# Patient Record
Sex: MALE | Race: BLACK OR AFRICAN AMERICAN | NOT HISPANIC OR LATINO | Employment: OTHER | URBAN - METROPOLITAN AREA
[De-identification: names, ages, dates, MRNs, and addresses within clinical notes are randomized per-mention and may not be internally consistent; named-entity substitution may affect disease eponyms.]

---

## 2017-03-01 ENCOUNTER — HOSPITAL ENCOUNTER (EMERGENCY)
Facility: HOSPITAL | Age: 52
Discharge: HOME | End: 2017-03-01
Attending: EMERGENCY MEDICINE
Payer: MEDICAID

## 2017-03-01 VITALS
WEIGHT: 240 LBS | OXYGEN SATURATION: 95 % | TEMPERATURE: 99 F | RESPIRATION RATE: 13 BRPM | DIASTOLIC BLOOD PRESSURE: 62 MMHG | BODY MASS INDEX: 36.37 KG/M2 | HEIGHT: 68 IN | HEART RATE: 75 BPM | SYSTOLIC BLOOD PRESSURE: 111 MMHG

## 2017-03-01 DIAGNOSIS — F41.9 ANXIETY: ICD-10-CM

## 2017-03-01 DIAGNOSIS — K43.9 VENTRAL HERNIA WITHOUT OBSTRUCTION OR GANGRENE: ICD-10-CM

## 2017-03-01 DIAGNOSIS — J44.1 COPD WITH ACUTE EXACERBATION: Primary | ICD-10-CM

## 2017-03-01 LAB
ALBUMIN SERPL BCP-MCNC: 3 G/DL
ALP SERPL-CCNC: 81 U/L
ALT SERPL W/O P-5'-P-CCNC: 37 U/L
ANION GAP SERPL CALC-SCNC: 5 MMOL/L
AST SERPL-CCNC: 40 U/L
BASOPHILS # BLD AUTO: 0.02 K/UL
BASOPHILS NFR BLD: 0.3 %
BILIRUB SERPL-MCNC: 0.6 MG/DL
BNP SERPL-MCNC: 27 PG/ML
BUN SERPL-MCNC: 11 MG/DL
CALCIUM SERPL-MCNC: 8.8 MG/DL
CHLORIDE SERPL-SCNC: 103 MMOL/L
CO2 SERPL-SCNC: 28 MMOL/L
CREAT SERPL-MCNC: 0.9 MG/DL
DIFFERENTIAL METHOD: ABNORMAL
EOSINOPHIL # BLD AUTO: 0.2 K/UL
EOSINOPHIL NFR BLD: 2.1 %
ERYTHROCYTE [DISTWIDTH] IN BLOOD BY AUTOMATED COUNT: 12.1 %
EST. GFR  (AFRICAN AMERICAN): >60 ML/MIN/1.73 M^2
EST. GFR  (NON AFRICAN AMERICAN): >60 ML/MIN/1.73 M^2
GLUCOSE SERPL-MCNC: 123 MG/DL
HCT VFR BLD AUTO: 38.7 %
HGB BLD-MCNC: 13.2 G/DL
INR PPP: 1
LACTATE SERPL-SCNC: 0.8 MMOL/L
LYMPHOCYTES # BLD AUTO: 1.6 K/UL
LYMPHOCYTES NFR BLD: 22.2 %
MCH RBC QN AUTO: 31.4 PG
MCHC RBC AUTO-ENTMCNC: 34.1 %
MCV RBC AUTO: 92 FL
MONOCYTES # BLD AUTO: 0.5 K/UL
MONOCYTES NFR BLD: 6.8 %
NEUTROPHILS # BLD AUTO: 5 K/UL
NEUTROPHILS NFR BLD: 68.5 %
PLATELET # BLD AUTO: 206 K/UL
PMV BLD AUTO: 10.5 FL
POTASSIUM SERPL-SCNC: 4.1 MMOL/L
PROT SERPL-MCNC: 7.2 G/DL
PROTHROMBIN TIME: 10.4 SEC
RBC # BLD AUTO: 4.21 M/UL
SODIUM SERPL-SCNC: 136 MMOL/L
TROPONIN I SERPL DL<=0.01 NG/ML-MCNC: <0.006 NG/ML
WBC # BLD AUTO: 7.3 K/UL

## 2017-03-01 PROCEDURE — 84484 ASSAY OF TROPONIN QUANT: CPT

## 2017-03-01 PROCEDURE — 99284 EMERGENCY DEPT VISIT MOD MDM: CPT | Mod: 25

## 2017-03-01 PROCEDURE — 96366 THER/PROPH/DIAG IV INF ADDON: CPT

## 2017-03-01 PROCEDURE — 87040 BLOOD CULTURE FOR BACTERIA: CPT

## 2017-03-01 PROCEDURE — 99291 CRITICAL CARE FIRST HOUR: CPT | Mod: ,,, | Performed by: EMERGENCY MEDICINE

## 2017-03-01 PROCEDURE — 25000242 PHARM REV CODE 250 ALT 637 W/ HCPCS: Performed by: EMERGENCY MEDICINE

## 2017-03-01 PROCEDURE — 25000003 PHARM REV CODE 250: Performed by: EMERGENCY MEDICINE

## 2017-03-01 PROCEDURE — 83880 ASSAY OF NATRIURETIC PEPTIDE: CPT

## 2017-03-01 PROCEDURE — 63600175 PHARM REV CODE 636 W HCPCS: Performed by: EMERGENCY MEDICINE

## 2017-03-01 PROCEDURE — 96375 TX/PRO/DX INJ NEW DRUG ADDON: CPT

## 2017-03-01 PROCEDURE — 27000221 HC OXYGEN, UP TO 24 HOURS

## 2017-03-01 PROCEDURE — 83605 ASSAY OF LACTIC ACID: CPT

## 2017-03-01 PROCEDURE — 85025 COMPLETE CBC W/AUTO DIFF WBC: CPT

## 2017-03-01 PROCEDURE — 85610 PROTHROMBIN TIME: CPT

## 2017-03-01 PROCEDURE — 80053 COMPREHEN METABOLIC PANEL: CPT

## 2017-03-01 PROCEDURE — 94640 AIRWAY INHALATION TREATMENT: CPT

## 2017-03-01 PROCEDURE — 96365 THER/PROPH/DIAG IV INF INIT: CPT

## 2017-03-01 RX ORDER — DOXYCYCLINE HYCLATE 100 MG
100 TABLET ORAL
Status: COMPLETED | OUTPATIENT
Start: 2017-03-01 | End: 2017-03-01

## 2017-03-01 RX ORDER — TIOTROPIUM BROMIDE 18 UG/1
18 CAPSULE ORAL; RESPIRATORY (INHALATION) DAILY
Qty: 30 CAPSULE | Refills: 0 | Status: SHIPPED | OUTPATIENT
Start: 2017-03-01 | End: 2017-07-07 | Stop reason: SDUPTHER

## 2017-03-01 RX ORDER — IPRATROPIUM BROMIDE AND ALBUTEROL SULFATE 2.5; .5 MG/3ML; MG/3ML
3 SOLUTION RESPIRATORY (INHALATION)
Status: COMPLETED | OUTPATIENT
Start: 2017-03-01 | End: 2017-03-01

## 2017-03-01 RX ORDER — PREDNISONE 20 MG/1
60 TABLET ORAL DAILY
Qty: 12 TABLET | Refills: 0 | Status: SHIPPED | OUTPATIENT
Start: 2017-03-01 | End: 2017-03-05

## 2017-03-01 RX ORDER — ONDANSETRON 2 MG/ML
4 INJECTION INTRAMUSCULAR; INTRAVENOUS
Status: COMPLETED | OUTPATIENT
Start: 2017-03-01 | End: 2017-03-01

## 2017-03-01 RX ORDER — PREDNISONE 20 MG/1
40 TABLET ORAL
Status: COMPLETED | OUTPATIENT
Start: 2017-03-01 | End: 2017-03-01

## 2017-03-01 RX ORDER — FENTANYL CITRATE 50 UG/ML
50 INJECTION, SOLUTION INTRAMUSCULAR; INTRAVENOUS
Status: DISCONTINUED | OUTPATIENT
Start: 2017-03-01 | End: 2017-03-01

## 2017-03-01 RX ORDER — PROMETHAZINE HYDROCHLORIDE 25 MG/1
25 TABLET ORAL EVERY 6 HOURS PRN
Qty: 21 TABLET | Refills: 0 | Status: SHIPPED | OUTPATIENT
Start: 2017-03-01 | End: 2017-03-01

## 2017-03-01 RX ORDER — PROMETHAZINE HYDROCHLORIDE 25 MG/1
25 TABLET ORAL EVERY 6 HOURS PRN
Qty: 21 TABLET | Refills: 0 | Status: SHIPPED | OUTPATIENT
Start: 2017-03-01 | End: 2017-08-27 | Stop reason: CLARIF

## 2017-03-01 RX ORDER — MAGNESIUM SULFATE HEPTAHYDRATE 40 MG/ML
2 INJECTION, SOLUTION INTRAVENOUS
Status: COMPLETED | OUTPATIENT
Start: 2017-03-01 | End: 2017-03-01

## 2017-03-01 RX ORDER — CLONAZEPAM 0.5 MG/1
0.5 TABLET ORAL 2 TIMES DAILY PRN
Qty: 10 TABLET | Refills: 0 | Status: SHIPPED | OUTPATIENT
Start: 2017-03-01 | End: 2017-03-16

## 2017-03-01 RX ORDER — ALBUTEROL SULFATE 90 UG/1
1-2 AEROSOL, METERED RESPIRATORY (INHALATION) EVERY 4 HOURS PRN
Qty: 1 INHALER | Refills: 1 | Status: SHIPPED | OUTPATIENT
Start: 2017-03-01 | End: 2017-03-31

## 2017-03-01 RX ORDER — METHYLPREDNISOLONE SOD SUCC 125 MG
125 VIAL (EA) INJECTION
Status: COMPLETED | OUTPATIENT
Start: 2017-03-01 | End: 2017-03-01

## 2017-03-01 RX ORDER — ONDANSETRON 2 MG/ML
4 INJECTION INTRAMUSCULAR; INTRAVENOUS
Status: DISCONTINUED | OUTPATIENT
Start: 2017-03-01 | End: 2017-03-01

## 2017-03-01 RX ADMIN — PREDNISONE 40 MG: 20 TABLET ORAL at 04:03

## 2017-03-01 RX ADMIN — IPRATROPIUM BROMIDE AND ALBUTEROL SULFATE 3 ML: .5; 3 SOLUTION RESPIRATORY (INHALATION) at 12:03

## 2017-03-01 RX ADMIN — MAGNESIUM SULFATE IN WATER 2 G: 40 INJECTION, SOLUTION INTRAVENOUS at 12:03

## 2017-03-01 RX ADMIN — METHYLPREDNISOLONE SODIUM SUCCINATE 125 MG: 125 INJECTION, POWDER, FOR SOLUTION INTRAMUSCULAR; INTRAVENOUS at 12:03

## 2017-03-01 RX ADMIN — DOXYCYCLINE HYCLATE 100 MG: 100 TABLET, COATED ORAL at 04:03

## 2017-03-01 RX ADMIN — ONDANSETRON 4 MG: 2 INJECTION INTRAMUSCULAR; INTRAVENOUS at 12:03
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
## 2017-03-01 NOTE — DISCHARGE INSTRUCTIONS
COPD Flare    You have had a flare-up of your COPD.  COPD, or chronic obstructive pulmonary disease, is a common lung disease. It causes your airways to become irritated and narrower. This makes it harder for you to breathe. Emphysema and chronic bronchitis are both types of COPD. This is a chronic condition, which means you always have it. Sometimes it gets worse. When this happens, it is called a flare-up.  Symptoms of COPD  People with COPD may have symptoms most of the time. In a flare-up, your symptoms get worse. These symptoms may mean you are having a flare-up:  · Shortness of breath, shallow or rapid breathing, or wheezing that gets worse  · Lung infection  · Cough that gets worse  · More mucus, thicker mucus or mucus of a different color  · Tiredness, decreased energy, or trouble doing your usual activities  · Fever  · Chest tightness  · Your symptoms dont get better even when you use your usual medicines, inhalers, and nebulizer  · Trouble talking  · You feel confused  Causes of flare-ups  Unfortunately, a flare-up can happen even though you did everything right, and you followed your doctors instructions. Some causes of flare-ups are:  · Smoking or secondhand smoke  · Colds, the flu, or respiratory infections  · Air pollution  · Sudden change in the weather  · Dust, irritating chemicals, or strong fumes  · Not taking your medicines as prescribed  Home care  Here are some things you can do at home to treat a flare-up:  · Try not to panic. This makes it harder to breathe, and keeps you from doing the right things.  · Dont smoke or be around others who are smoking.  · Try to drink more fluids than usual during a flare-up, unless your doctor has told you not to because of heart and kidney problems. More fluids can help loosen the mucus.  · Use your inhalers and nebulizer, if you have one, as you have been told to.  · If you were given antibiotics, take them until they are used up or your doctor tells you  to stop. Its important to finish the antibiotics, even though you feel better. This will make sure the infection has cleared.  · If you were given prednisone or another steroid, finish it even if you feel better.  Preventing a flare-up  Even though flare-ups happen, the best way to treat one is to prevent it before it starts. Here are some pointers:  · Dont smoke or be around others who are smoking.  · Take your medicines as you have been told.  · Talk with your doctor about getting a flu shot every year. Also find out if you need a pneumonia shot.  · If there is a weather advisory warning to stay indoors, try to stay inside when possible.  · Try to eat healthy and get plenty of sleep.  · Try to avoid things that usually set you off, like dust, chemical fumes, hairsprays, or strong perfumes.  Follow-up care  Follow up with your healthcare provider, or as advised.  If a culture was done, you will be told if your treatment needs to be changed. You can call as directed for the results.  If X-rays were done, you will be notified of any new findings that may affect your care.  Call 911  Call 911 if any of these occur:  · You have trouble breathing  · You feel confused or its difficult to wake you up  · You faint or lose consciousness  · You have a rapid heart rate  · You have new pain in your chest, arm, shoulder, neck or upper back  When to seek medical advice  Call your healthcare provider right away if any of these occur:  · Wheezing or shortness of breath gets worse  · You need to use your inhalers more often than usual without relief  · Fever of 100.4°F (38ºC) or higher, or as directed by your healthcare provider  · Coughing up lots of dark-colored or bloody mucus (sputum)  · Chest pain with each breath  · You do not start to get better within 24 hours  · Swelling of your ankles gets worse  · Dizziness or weakness  Date Last Reviewed: 9/1/2016  © 2267-1562 [employer]. [address] [employer],  [place] [address]. All rights reserved. This information is not intended as a substitute for professional medical care. Always follow your healthcare professional's instructions.

## 2017-03-01 NOTE — ED AVS SNAPSHOT
[facility]  [address] [facility] [id number]               Ming Harrington   3/1/2017 11:49 AM   ED    Description:  Male : 1965   Department:  [facility]           Your Care was Coordinated By:     Provider Role From To    Reza Oreilly MD Attending Provider 17 1153 --      Reason for Visit     Asthma           Diagnoses this Visit        Comments    COPD with acute exacerbation    -  Primary     Ventral hernia without obstruction or gangrene         Anxiety           ED Disposition     None           To Do List           Follow-up Information     Follow up with Primary MD. Schedule an appointment as soon as possible for a visit in 3 days.        Follow up with [facility]. Schedule an appointment as soon as possible for a visit in 1 week.    Specialty:  Surgery    Why:  For further consultation and management of ventral hernia    Contact information:    [address] [facility] [id number] [phone]       These Medications        Disp Refills Start End    clonazePAM (KLONOPIN) 0.5 MG tablet 10 tablet 0 3/1/2017     Take 1 tablet (0.5 mg total) by mouth 2 (two) times daily as needed (anxiety). - Oral    Pharmacy: [facility] [address] AT [facility] Ph #: [phone]       mometasone-formoterol (DULERA) 200-5 mcg/actuation inhaler 1 Inhaler 0 3/1/2017 3/31/2017    Inhale 2 puffs into the lungs 2 (two) times daily. - Inhalation    Pharmacy: [address] AT [facility] Ph #: [phone]       promethazine (PHENERGAN) 25 MG tablet 21 tablet 0 3/1/2017 3/11/2017    Take 1 tablet (25 mg total) by mouth every 6 (six) hours as needed for Nausea. - Oral    Pharmacy: [address] AT [facility] Ph #:  [phone]       tiotropium (SPIRIVA) 18 mcg inhalation capsule 30 capsule 0 3/1/2017     Inhale 1 capsule (18 mcg total) into the lungs once daily. - Inhalation    Pharmacy: [place] [address] AT [facility] Ph #: [phone]       albuterol 90 mcg/actuation inhaler 1 Inhaler 1 3/1/2017 3/31/2017    Inhale 1-2 puffs into the lungs every 4 (four) hours as needed for Wheezing or Shortness of Breath. - Inhalation    Pharmacy: [address] AT [facility] Ph #: [phone]       predniSONE (DELTASONE) 20 MG tablet 12 tablet 0 3/1/2017 3/5/2017    Take 3 tablets (60 mg total) by mouth once daily. - Oral    Pharmacy: [address] Ph #: [phone]         [facility]     [facility] On Call Nurse Care Line - 24/7 Assistance  Registered nurses in the [facility] On Call Center provide clinical advisement, health education, appointment booking, and other advisory services.  Call for this free service at [phone].             Medications           Message regarding Medications     Verify the changes and/or additions to your medication regime listed below are the same as discussed with your clinician today.  If any of these changes or additions are incorrect, please notify your healthcare provider.        START taking these NEW medications        Refills    predniSONE (DELTASONE) 20 MG tablet 0    Sig: Take 3 tablets (60 mg total) by mouth once daily.    Class: Print    Route: Oral      These medications were administered today        Dose Freq    albuterol-ipratropium 2.5mg-0.5mg/3mL nebulizer solution 3 mL 3 mL Every 5 min    Sig: Take 3 mLs by nebulization every 5 (five) minutes.    Class: Normal    Route: Nebulization    methylPREDNISolone sodium succinate injection 125 mg 125 mg ED 1 Time    Sig:  Inject 125 mg into the vein ED 1 Time.    Class: Normal    Route: Intravenous    magnesium sulfate 2g in water 50mL IVPB (premix) 2 g ED 1 Time    Sig: Inject 50 mLs (2 g total) into the vein ED 1 Time.    Class: Normal    Route: Intravenous    ondansetron injection 4 mg 4 mg ED 1 Time    Sig: Inject 4 mg into the vein ED 1 Time.    Class: Normal    Route: Intravenous    predniSONE tablet 40 mg 40 mg ED 1 Time    Sig: Take 2 tablets (40 mg total) by mouth ED 1 Time.    Class: Normal    Route: Oral    doxycycline tablet 100 mg 100 mg ED 1 Time    Sig: Take 1 tablet (100 mg total) by mouth ED 1 Time.    Class: Normal    Route: Oral           Verify that the below list of medications is an accurate representation of the medications you are currently taking.  If none reported, the list may be blank. If incorrect, please contact your healthcare provider. Carry this list with you in case of emergency.           Current Medications     acetaminophen (TYLENOL) 325 MG tablet Take 1 tablet (325 mg total) by mouth every 6 (six) hours as needed for Pain.    albuterol 90 mcg/actuation inhaler Inhale 1-2 puffs into the lungs every 4 (four) hours as needed for Wheezing or Shortness of Breath.    aspirin (ECOTRIN) 325 MG EC tablet Take 1 tablet (325 mg total) by mouth daily as needed for Pain.    clonazePAM (KLONOPIN) 0.5 MG tablet Take 1 tablet (0.5 mg total) by mouth 2 (two) times daily as needed (anxiety).    cloNIDine (CATAPRES) 0.3 MG tablet Take 1 tablet (0.3 mg total) by mouth 3 (three) times daily.    furosemide (LASIX) 40 MG tablet Take 1 tablet (40 mg total) by mouth 2 (two) times daily.    mometasone-formoterol (DULERA) 200-5 mcg/actuation inhaler Inhale 2 puffs into the lungs 2 (two) times daily.    ondansetron (ZOFRAN-ODT) 4 MG TbDL Take 1 tablet (4 mg total) by mouth every 8 (eight) hours as needed.    predniSONE (DELTASONE) 20 MG tablet Take 3 tablets (60 mg total) by mouth once daily.    promethazine (PHENERGAN) 25  MG tablet Take 1 tablet (25 mg total) by mouth every 6 (six) hours as needed for Nausea.    senna-docusate 8.6-50 mg (PERICOLACE) 8.6-50 mg per tablet Take 1 tablet by mouth 2 (two) times daily.    tiotropium (SPIRIVA) 18 mcg inhalation capsule Inhale 1 capsule (18 mcg total) into the lungs once daily.    tramadol (ULTRAM) 50 mg tablet Take 1 tablet (50 mg total) by mouth every 6 (six) hours as needed for Pain.           Clinical Reference Information           Your Vitals Were     BP                   111/62           Allergies as of 3/1/2017        Reactions    Compazine [Prochlorperazine Edisylate] Other (See Comments)    Hallucinations     Iodine And Iodide Containing Products Swelling    Facial swelling      Immunizations Administered on Date of Encounter - 3/1/2017     None      ED Micro, Lab, POCT     Start Ordered       Status Ordering Provider    03/01/17 1346 03/01/17 1346  Comprehensive metabolic panel  STAT      Final result     03/01/17 1207 03/01/17 1206  Lactic acid, plasma  Once      Final result     03/01/17 1207 03/01/17 1207  CBC auto differential  STAT      Final result     03/01/17 1207 03/01/17 1207    STAT,   Status:  Canceled      Canceled     03/01/17 1207 03/01/17 1207  Troponin I  STAT      Final result     03/01/17 1207 03/01/17 1207  Brain natriuretic peptide  STAT      Final result     03/01/17 1207 03/01/17 1207  Protime-INR  Once      Final result     03/01/17 1206 03/01/17 1205  Blood Culture #1 **CANNOT BE ORDERED STAT**  Once      In process     03/01/17 1206 03/01/17 1205  Blood Culture #2 **CANNOT BE ORDERED STAT**  Once      In process     03/01/17 1155 03/01/17 1154    STAT,   Status:  Canceled      Canceled       ED Imaging Orders     Start Ordered       Status Ordering Provider    03/01/17 1155 03/01/17 1154  X-Ray Chest AP Portable  1 time imaging      Final result         Discharge Instructions         COPD Flare    You have had a flare-up of your COPD.  COPD, or chronic  obstructive pulmonary disease, is a common lung disease. It causes your airways to become irritated and narrower. This makes it harder for you to breathe. Emphysema and chronic bronchitis are both types of COPD. This is a chronic condition, which means you always have it. Sometimes it gets worse. When this happens, it is called a flare-up.  Symptoms of COPD  People with COPD may have symptoms most of the time. In a flare-up, your symptoms get worse. These symptoms may mean you are having a flare-up:  · Shortness of breath, shallow or rapid breathing, or wheezing that gets worse  · Lung infection  · Cough that gets worse  · More mucus, thicker mucus or mucus of a different color  · Tiredness, decreased energy, or trouble doing your usual activities  · Fever  · Chest tightness  · Your symptoms dont get better even when you use your usual medicines, inhalers, and nebulizer  · Trouble talking  · You feel confused  Causes of flare-ups  Unfortunately, a flare-up can happen even though you did everything right, and you followed your doctors instructions. Some causes of flare-ups are:  · Smoking or secondhand smoke  · Colds, the flu, or respiratory infections  · Air pollution  · Sudden change in the weather  · Dust, irritating chemicals, or strong fumes  · Not taking your medicines as prescribed  Home care  Here are some things you can do at home to treat a flare-up:  · Try not to panic. This makes it harder to breathe, and keeps you from doing the right things.  · Dont smoke or be around others who are smoking.  · Try to drink more fluids than usual during a flare-up, unless your doctor has told you not to because of heart and kidney problems. More fluids can help loosen the mucus.  · Use your inhalers and nebulizer, if you have one, as you have been told to.  · If you were given antibiotics, take them until they are used up or your doctor tells you to stop. Its important to finish the antibiotics, even though you feel  better. This will make sure the infection has cleared.  · If you were given prednisone or another steroid, finish it even if you feel better.  Preventing a flare-up  Even though flare-ups happen, the best way to treat one is to prevent it before it starts. Here are some pointers:  · Dont smoke or be around others who are smoking.  · Take your medicines as you have been told.  · Talk with your doctor about getting a flu shot every year. Also find out if you need a pneumonia shot.  · If there is a weather advisory warning to stay indoors, try to stay inside when possible.  · Try to eat healthy and get plenty of sleep.  · Try to avoid things that usually set you off, like dust, chemical fumes, hairsprays, or strong perfumes.  Follow-up care  Follow up with your healthcare provider, or as advised.  If a culture was done, you will be told if your treatment needs to be changed. You can call as directed for the results.  If X-rays were done, you will be notified of any new findings that may affect your care.  Call 911  Call 911 if any of these occur:  · You have trouble breathing  · You feel confused or its difficult to wake you up  · You faint or lose consciousness  · You have a rapid heart rate  · You have new pain in your chest, arm, shoulder, neck or upper back  When to seek medical advice  Call your healthcare provider right away if any of these occur:  · Wheezing or shortness of breath gets worse  · You need to use your inhalers more often than usual without relief  · Fever of 100.4°F (38ºC) or higher, or as directed by your healthcare provider  · Coughing up lots of dark-colored or bloody mucus (sputum)  · Chest pain with each breath  · You do not start to get better within 24 hours  · Swelling of your ankles gets worse  · Dizziness or weakness  Date Last Reviewed: 9/1/2016  © 8107-0392 [employer]. [address] [place] [address], [place] [address]. All rights reserved. This information is not intended as a  substitute for professional medical care. Always follow your healthcare professional's instructions.          [facility] Sign-Up     Activating your [facility] account is as easy as 1-2-3!     1) Visit [email or web address], select Sign Up Now, enter this activation code and your date of birth, then select Next.  [email or web address]  Expires: 4/15/2017  4:12 PM      2) Create a username and password to use when you visit [facility] in the future and select a security question in case you lose your password and select Next.    3) Enter your e-mail address and click Sign Up!    Additional Information  If you have questions, please e-mail [email or web address] or call [phone] to talk to our [facility] staff. Remember, [facility] is NOT to be used for urgent needs. For medical emergencies, dial 911.         Smoking Cessation     If you would like to quit smoking:   You may be eligible for free services if you are a [place] resident and started smoking cigarettes before September 1, 1988.  Call the Smoking Cessation Trust (SCT) toll free at (423) 471-9741 or (886) 549-0964.   Call 8-499-QUIT-NOW if you do not meet the above criteria.             [facility] complies with applicable Federal civil rights laws and does not discriminate on the basis of race, color, national origin, age, disability, or sex.        Language Assistance Services     ATTENTION: Language assistance services are available, free of charge. Please call [phone].      ATENCIÓN: Si habla español, tiene a ward disposición servicios gratuitos de asistencia lingüística. Llame al [phone].     CHÚ Ý: N?u b?n nói Ti?ng Vi?t, có các d?ch v? h? tr? ngôn ng? mi?n phí dành cho b?n. G?i s? [phone].

## 2017-03-01 NOTE — ED TRIAGE NOTES
Patient received with complaint of difficulty breathing for two days despite home treatments.  Patient with thick green sputum reported, not seen.  Fever intermittently.  Abdominal pain with hernia noted to above umbilicus.  Area is somewhat firm.  Will have physician evaluate.  Rates pain 10/10 to abdomen.  LBM two days ago.      No LDA's in place on arrival to department.    Family not present.    Pain:  Generalized abdominal pain rated 10/10.      Psychosocial:  Patient is calm and cooperative.  Patients insight and judgement are appropriate to situation.  Appears clean, well maintained, with clothing appropriate to environment.  No evidence of delusions, hallucinations, or psychosis.    Neuro:  Eyes open spontaneously.  Awake, alert, oriented x 4.  Speech clear and appropriate.  Tolerating saliva secretions well.  Able to follow commands, demonstrating ability to actively and appropriately communicate within context of current conversation.  Symmetrical facial muscles.  Moving all extremities well with no noted weakness.  Adequate muscle tone present.    Movement is purposeful.  No evidence of impaired sensation.  Responds to external stimuli with appropriate reflexes.  Pupils equally round and reactive to light.  No noted drifts.      Airway:  Bilateral chest rise and fall.  RR regular and labored with rate of 26, difficulty speaking full sentences, prolonged expiration and expiratory wheeze throughout.  No crepitus or subcutaneous emphysema noted on palpation.      Circulatory:  Skin warm, dry, and pink.  Apical and radial pulses strong and regular.  Capillary refill/skin blanching less than 3 seconds to distal of 4 extremities.    Abdomen:  Abdomen soft and non-distended.  See notes.      Urinary:  Patient reports routine urination without pain, frequency, or urgency.  Voids independently.  Reports urine appears el/yellow in color.    Extremities:  No redness, heat, swelling, deformity, or pain.    Skin:   Intact with no bruising/discolorations noted.

## 2017-03-06 LAB
BACTERIA BLD CULT: NORMAL
BACTERIA BLD CULT: NORMAL

## 2017-03-07 ENCOUNTER — OFFICE VISIT (OUTPATIENT)
Dept: SURGERY | Facility: CLINIC | Age: 52
End: 2017-03-07
Payer: MEDICAID

## 2017-03-07 VITALS
WEIGHT: 249 LBS | HEIGHT: 68 IN | TEMPERATURE: 98 F | BODY MASS INDEX: 37.74 KG/M2 | DIASTOLIC BLOOD PRESSURE: 99 MMHG | SYSTOLIC BLOOD PRESSURE: 144 MMHG | HEART RATE: 96 BPM

## 2017-03-07 DIAGNOSIS — E66.09 OBESITY DUE TO EXCESS CALORIES, UNSPECIFIED OBESITY SEVERITY: Chronic | ICD-10-CM

## 2017-03-07 DIAGNOSIS — I27.20 PULMONARY HTN: Chronic | ICD-10-CM

## 2017-03-07 DIAGNOSIS — J44.1 COPD EXACERBATION: ICD-10-CM

## 2017-03-07 DIAGNOSIS — K43.9 VENTRAL HERNIA WITHOUT OBSTRUCTION OR GANGRENE: Primary | ICD-10-CM

## 2017-03-07 PROBLEM — E66.9 OBESITY: Chronic | Status: ACTIVE | Noted: 2017-03-07

## 2017-03-07 PROCEDURE — 99215 OFFICE O/P EST HI 40 MIN: CPT | Mod: PBBFAC | Performed by: SURGERY

## 2017-03-07 PROCEDURE — 99999 PR PBB SHADOW E&M-EST. PATIENT-LVL V: CPT | Mod: PBBFAC,,, | Performed by: SURGERY

## 2017-03-07 PROCEDURE — 99203 OFFICE O/P NEW LOW 30 MIN: CPT | Mod: S$PBB,,, | Performed by: SURGERY

## 2017-03-07 RX ORDER — DOXYCYCLINE 100 MG/1
CAPSULE ORAL
COMMUNITY
Start: 2017-03-03 | End: 2017-03-16
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
## 2017-03-07 NOTE — PROGRESS NOTES
I have seen the patient, reviewed the Resident's history and physical, assessment and plan. I have personally interviewed and examined the patient at bedside and: agree with the findings.     Multiple severe medical problems including copd, although currently off O2 he was hospitalized for exacerbation in December, pulmonary htn, dilated cbd with occasional elevation in liver enzymes with incomplete work up and hep c, bmi 37.86, chronic pain and taking street drugs, very symptomatic and incarcerated epigastric hernia s/p repair times two.  Optimally we would get pulmonary consult, obtain pcp, GI follow up, pain clinic and then repair hernia.  He is not happy with this as he just wants his hernia repaired so we will see what anesthesia consult/clearance will suggest.  Chance of recurrent hernia is 50% or more.

## 2017-03-08 ENCOUNTER — TELEPHONE (OUTPATIENT)
Dept: HEPATOLOGY | Facility: CLINIC | Age: 52
End: 2017-03-08

## 2017-03-08 NOTE — TELEPHONE ENCOUNTER
MA attempted to call patient to schedule his appt to see us back in the clinic. Male answer his phone saying he is not available I asked who Im speaking with he didn't answer me and stated he will have Mr. Lai to call us to schedule his appt. HUSSAIN

## 2017-03-08 NOTE — TELEPHONE ENCOUNTER
----- Message from Aditi Galarza NP sent at 3/8/2017  8:09 AM CST -----   Tami, this is a pt I saw over 3 yrs ago. He needs f/u with us in hepatology clinic. He has hep C but has a lot of other issues as well that need to be sorted out first. Please schedule him in [facility] with me.    Thanks,  Aditi    ----- Message -----     From: Kenyon Chawla MD     Sent: 3/8/2017   7:36 AM       To: Aditi Galarza NP    Please do.  He doesn't really want anything but his hernia repaired but this is contraindicated as he has a number of medical issues that aren't worked up.  ----- Message -----     From: Aditi Galarza NP     Sent: 3/7/2017   5:06 PM       To: Kenyon Chawla MD    Ok. Let me know if there are any issues. Can have my staff call him to get him set up if so.    ----- Message -----     From: Kenyon Chawla MD     Sent: 3/7/2017   1:50 PM       To: Aditi Galarza NP, #    We will try to set up.  ----- Message -----     From: Aditi Galarza NP     Sent: 3/7/2017  12:43 PM       To: Kenyon Chawla MD    Yes, absolutely should see us for further evaluation since looks like he never followed up since his initial visit.    Aditi    ----- Message -----     From: Kenyon Chawla MD     Sent: 3/7/2017   9:48 AM       To: Aditi Galarza NP    Do his lfts elevation from a while back and his dilated cbd require further work up?

## 2017-03-10 ENCOUNTER — HOSPITAL ENCOUNTER (OUTPATIENT)
Dept: PREADMISSION TESTING | Facility: HOSPITAL | Age: 52
Discharge: HOME | End: 2017-03-10

## 2017-03-14 ENCOUNTER — TELEPHONE (OUTPATIENT)
Dept: HEPATOLOGY | Facility: CLINIC | Age: 52
End: 2017-03-14

## 2017-03-14 NOTE — TELEPHONE ENCOUNTER
MA attempted to call patient again, friend answer the phone and said he will have him to call us. Tentatively schedule 3/17. HUSSAIN

## 2017-03-14 NOTE — TELEPHONE ENCOUNTER
----- Message from Aditi Galarza NP sent at 3/8/2017  8:09 AM CST -----   Tami, this is a pt I saw over 3 yrs ago. He needs f/u with us in hepatology clinic. He has hep C but has a lot of other issues as well that need to be sorted out first. Please schedule him in [facility] with me.    Thanks,  Aditi    ----- Message -----     From: Kenyon Chawla MD     Sent: 3/8/2017   7:36 AM       To: Aditi Galarza NP    Please do.  He doesn't really want anything but his hernia repaired but this is contraindicated as he has a number of medical issues that aren't worked up.  ----- Message -----     From: Aditi Galarza NP     Sent: 3/7/2017   5:06 PM       To: Kenyon Chawla MD    Ok. Let me know if there are any issues. Can have my staff call him to get him set up if so.    ----- Message -----     From: Kenyon Chawla MD     Sent: 3/7/2017   1:50 PM       To: Adtii Galarza NP, #    We will try to set up.  ----- Message -----     From: Aditi Galarza NP     Sent: 3/7/2017  12:43 PM       To: Kenyon Chawla MD    Yes, absolutely should see us for further evaluation since looks like he never followed up since his initial visit.    Aditi    ----- Message -----     From: Kenyon Chawla MD     Sent: 3/7/2017   9:48 AM       To: Aditi Galarza NP    Do his lfts elevation from a while back and his dilated cbd require further work up?

## 2017-03-16 ENCOUNTER — INITIAL CONSULT (OUTPATIENT)
Dept: INTERNAL MEDICINE | Facility: CLINIC | Age: 52
End: 2017-03-16
Payer: MEDICAID

## 2017-03-16 ENCOUNTER — HOSPITAL ENCOUNTER (OUTPATIENT)
Dept: PREADMISSION TESTING | Facility: HOSPITAL | Age: 52
Discharge: HOME | End: 2017-03-16
Attending: ANESTHESIOLOGY
Payer: MEDICAID

## 2017-03-16 VITALS
OXYGEN SATURATION: 95 % | WEIGHT: 245.38 LBS | TEMPERATURE: 99 F | SYSTOLIC BLOOD PRESSURE: 125 MMHG | BODY MASS INDEX: 37.19 KG/M2 | DIASTOLIC BLOOD PRESSURE: 67 MMHG | HEIGHT: 68 IN | HEART RATE: 98 BPM

## 2017-03-16 DIAGNOSIS — B18.2 CHRONIC HEPATITIS C WITHOUT HEPATIC COMA: ICD-10-CM

## 2017-03-16 DIAGNOSIS — E66.9 NON MORBID OBESITY, UNSPECIFIED OBESITY TYPE: Chronic | ICD-10-CM

## 2017-03-16 DIAGNOSIS — I27.20 PULMONARY HTN: Chronic | ICD-10-CM

## 2017-03-16 DIAGNOSIS — I10 ESSENTIAL HYPERTENSION: Chronic | ICD-10-CM

## 2017-03-16 DIAGNOSIS — K83.8 COMMON BILE DUCT DILATATION: ICD-10-CM

## 2017-03-16 DIAGNOSIS — R10.9 ABDOMINAL PAIN, UNSPECIFIED LOCATION: ICD-10-CM

## 2017-03-16 DIAGNOSIS — F17.200 TOBACCO USE DISORDER: Primary | ICD-10-CM

## 2017-03-16 DIAGNOSIS — J44.9 CHRONIC OBSTRUCTIVE PULMONARY DISEASE, UNSPECIFIED COPD TYPE: ICD-10-CM

## 2017-03-16 DIAGNOSIS — K43.9 VENTRAL HERNIA WITHOUT OBSTRUCTION OR GANGRENE: ICD-10-CM

## 2017-03-16 PROCEDURE — 99999 PR PBB SHADOW E&M-EST. PATIENT-LVL IV: CPT | Mod: PBBFAC,,, | Performed by: HOSPITALIST

## 2017-03-16 PROCEDURE — 99214 OFFICE O/P EST MOD 30 MIN: CPT | Mod: PBBFAC | Performed by: HOSPITALIST

## 2017-03-16 PROCEDURE — 99215 OFFICE O/P EST HI 40 MIN: CPT | Mod: S$PBB,,, | Performed by: HOSPITALIST

## 2017-03-16 RX ORDER — OLANZAPINE 10 MG/1
10 TABLET ORAL 3 TIMES DAILY
COMMUNITY
End: 2017-03-16

## 2017-03-16 NOTE — IP AVS SNAPSHOT
[facility]  [address]  [facility] [address]  Phone: [phone]           Patient Discharge Instructions    Our goal is to set you up for success. This packet includes information on your condition, medications, and your home care. It will help you to care for yourself so you don't get sicker.     Please ask your nurse if you have any questions.        There are many details to remember when preparing for your surgery. Here is what you will need to do, please ask your nurse if there are more specific instructions and if you have any questions:    1. 24 hours before procedure Do not smoke or drink alcoholic beverages 24 hours prior to your procedure    2. Eating before procedure Do not eat or drink anything 8 hours before your procedure - this includes gum, mints, and candy.     3. Day of procedure Please remove all jewelry for the procedure. If you wear contact lenses, dentures, hearing aids or glasses, bring a container to put them in during your surgery and give to a family member for safekeeping.  If your doctor has scheduled you for an overnight stay, bring a small overnight bag with any personal items that you need.    4. After procedure Make arrangements in advance for transportation home by a responsible adult. It is not safe to drive a vehicle during the 24 hours following surgery.     PLEASE NOTE: You may be contacted the day before your surgery to confirm your surgery date and arrival time. The Surgery schedule has many variables which may affect the time of your surgery case. Family members should be available if your surgery time changes.                [facility]  Unless otherwise directed by your provider, please contact [facility], our nurse care line that is available for 24/7 assistance.     [phone] (toll-free)    Registered nurses in the [facility] provide clinical advisement, health education, appointment booking, and other  advisory services.                    ** Verify the list of medication(s) below is accurate and up to date. Carry this with you in case of emergency. If your medications have changed, please notify your healthcare provider.             Medication List      TAKE these medications        Additional Info                      * albuterol 0.63 mg/3 mL Nebu   Commonly known as:  ACCUNEB   Refills:  0   Dose:  0.63 mg    Instructions:  Take 0.63 mg by nebulization every 6 (six) hours as needed. Rescue     Begin Date    AM    Noon    PM    Bedtime       * albuterol 90 mcg/actuation inhaler   Quantity:  1 Inhaler   Refills:  1   Dose:  1-2 puff    Instructions:  Inhale 1-2 puffs into the lungs every 4 (four) hours as needed for Wheezing or Shortness of Breath.     Begin Date    AM    Noon    PM    Bedtime       cloNIDine 0.3 MG tablet   Commonly known as:  CATAPRES   Quantity:  90 tablet   Refills:  0   Dose:  0.3 mg    Instructions:  Take 1 tablet (0.3 mg total) by mouth 3 (three) times daily.     Begin Date    AM    Noon    PM    Bedtime       furosemide 40 MG tablet   Commonly known as:  LASIX   Quantity:  60 tablet   Refills:  0   Dose:  40 mg    Instructions:  Take 1 tablet (40 mg total) by mouth 2 (two) times daily.     Begin Date    AM    Noon    PM    Bedtime       ondansetron 4 MG Tbdl   Commonly known as:  ZOFRAN-ODT   Quantity:  15 tablet   Refills:  0   Dose:  4 mg    Instructions:  Take 1 tablet (4 mg total) by mouth every 8 (eight) hours as needed.     Begin Date    AM    Noon    PM    Bedtime       promethazine 25 MG tablet   Commonly known as:  PHENERGAN   Quantity:  21 tablet   Refills:  0   Dose:  25 mg    Instructions:  Take 1 tablet (25 mg total) by mouth every 6 (six) hours as needed for Nausea.     Begin Date    AM    Noon    PM    Bedtime       senna-docusate 8.6-50 mg 8.6-50 mg per tablet   Commonly known as:  PERICOLACE   Refills:  0   Dose:  1 tablet    Instructions:  Take 1 tablet by mouth 2 (two)  times daily.     Begin Date    AM    Noon    PM    Bedtime       tiotropium 18 mcg inhalation capsule   Commonly known as:  SPIRIVA   Quantity:  30 capsule   Refills:  0   Dose:  18 mcg    Instructions:  Inhale 1 capsule (18 mcg total) into the lungs once daily.     Begin Date    AM    Noon    PM    Bedtime       * Notice:  This list has 2 medication(s) that are the same as other medications prescribed for you. Read the directions carefully, and ask your doctor or other care provider to review them with you.               Please bring to all follow up appointments:    1. A copy of your discharge instructions.  2. All medicines you are currently taking in their original bottles.  3. Identification and insurance card.    Please arrive 15 minutes ahead of scheduled appointment time.    Please call 24 hours in advance if you must reschedule your appointment and/or time.        Your Scheduled Appointments     Mar 17, 2017  3:40 PM CDT   Established Patient Visit with Aditi Galarza NP   [facility] - Hepatology ([facility] )    [address]  [place] [address] [phone]              Your Future Surgeries/Procedures     Mar 30, 2017   Surgery with Kenyon Chawla MD   [facility] ([facility])    [address] [facility] [address] [phone]                  Discharge Instructions       Your surgery has been scheduled for:__________________________________________    You should report to:  ____[facility], located on the [place] [facility] side of the first floor of the           [facility] ([phone])  ____[facility], located on the [facility] side of the            Second floor of the [facility] ([phone])  ____3rd Floor [facility] located on the [facility] of the [facility] [phone]  Please Note   - Tell your doctor if you take Aspirin,  products containing Aspirin, herbal medications  or blood thinners, such as Coumadin, Ticlid, or Plavix.  (Consult your provider regarding holding or stopping before surgery).  - Arrange for someone to drive you home following surgery.  You will not be allowed to leave the surgical facility alone or drive yourself home following sedation and anesthesia.  Before Surgery  - Stop taking all herbal medications 14days prior to surgery  - No Motrin/Advil (Ibuprofen) 7 days before surgery  - No Aleve (Naproxen) 7 days before surgery  - Stop Taking Asprin, products containing Asprin _____days before surgery  - Stop taking blood thinners_______days before surgery  - Refrain from drinking alcoholic beverages for 24hours before and after surgery  - Stop or limit smoking _________days before surgery  Night before Surgery  - DO NOT EAT OR DRINK ANYTHING AFTER MIDNIGHT, INCLUDING GUM, HARD CANDY, MINTS, OR CHEWING TOBACCO.  - Take a shower or bath (shower is recommended).  Bathe with Hibiclens soap or an antibacterial soap from the neck down.  If not supplied by your surgeon, hibiclens soap will need to be purchased over the counter in pharmacy.  Rinse soap off thoroughly.  - Shampoo your hair with your regular shampoo  The Day of Surgery  - Take another bath or shower with hibiclens or any antibacterial soap, to reduce the chance of infection.  - Take heart and blood pressure medications with a small sip of water, as advised by the perioperative team.  - Do not take fluid pills  - You may brush your teeth and rinse your mouth, but do not swall any additional water.   - Do not apply perfumes, powder, body lotions or deodorant on the day of surgery.  - Nail polish should be removed.  - Do not wear makeup or moisturizer  - Wear comfortable clothes, such as a button front shirt and loose fitting pants.  - Leave all jewelry, including body piercings, and valuables at home.    - Bring any devices you will neeed after surgery such as  crutches or canes.  - If you have sleep apnea, please bring your CPAP machine  In the event that your physical condition changes including the onset of a cold or respiratory illness, or if you have to delay or cancel your surgery, please notify your surgeon.    Anesthesia: General Anesthesia  Youre due to have surgery. During surgery, youll be given medication called anesthesia. (It is also called anesthetic.) This will keep you comfortable and pain-free. Your surgeon will use general anesthesia. This sheet tells you more about it.    What Is General Anesthesia?  General anesthesia puts you into a state like deep sleep. It goes into the bloodstream (IV anesthetics), into the lungs (gas anesthetics), or both. You feel nothing during the procedure. You will not remember it. During the procedure, the anesthesia provider monitors you. He or she checks your heart rate and rhythm, blood pressure, and blood oxygen.  · IV Anesthetics  IV anesthetics are given through an IV line in your arm. Theyre often given first. This is so you are asleep before a gas anesthetic is started. Some kinds of IV anesthetics relieve pain. Others relax you. Your doctor will decide which kind is best in your case.  · Gas Anesthetics  Gas anesthetics are breathed into the lungs. They are often used to keep you asleep. They can be given through a facemask, an endotracheal tube, or a laryngeal mask airway.  ¨ If you have a facemask, your anesthesia provider will most likely place it over your nose and mouth while youre still awake. Youll breathe oxygen through the mask as your IV anesthetic is started. Gas anesthetic may be added through the mask.  ¨ If you have an endotracheal tube or a laryngeal mask airway, it will be inserted into your throat after youre asleep.  Anesthesia Tools and Medications  You will likely have:  · IV anesthetics sent through an IV line into your bloodstream.  · Gas anesthetics breathed into your lungs, where they  pass into your bloodstream.  · A pulse oximeter on the end of your finger. This measures your blood oxygen level.  · Electrocardiography leads (electrodes) on your chest. These record your heart rate and rhythm.  · A blood pressure cuff. This reads your blood pressure.  Risks and Possible Complications  General anesthesia has some risks. These include:  · Breathing problems  · Nausea and vomiting  · Sore throat or hoarseness  · Allergic reaction to the anesthetic  · Ongoing numbness (rare)  · Irregular heartbeat (rare)  · Cardiac arrest (rare)   Anesthesia Safety  · Follow all instructions you are given for how long not to eat or drink before your procedure.  · Be sure your doctor knows what medications you take. This includes over-the-counter medications, herbs, and supplements.  · Have an adult family member or friend drive you home after the procedure.  · For the first 24 hours after your surgery:  ¨ Do not drive or use heavy equipment.  ¨ Do not make important decisions or sign documents.  ¨ Avoid alcohol.  ¨ Have someone stay with you, if possible. They can watch for problems and help keep you safe.  © 1544-8087 [facility], [address] [place] [address]. All rights reserved. This information is not intended as a substitute for professional medical care. Always follow your healthcare professional's instructions.    Discharge References/Attachments     PAIN AT HOME, MANAGING POST-OP: NON-MEDICATION RELIEF (ENGLISH)    PAIN MANAGEMENT AFTER SURGERY (ENGLISH)        Admission Information     Date & Time Provider Department CSN    3/16/2017  9:00 AM Anais Qiu MD [facility] [id number]      Care Providers     Provider Role Specialty Primary office phone    Anais Qiu MD Attending Provider Anesthesiology [phone]      Recent Lab Values        1/4/2013 1/13/2014 8/18/2014                     1:00 PM  3:15 AM  6:02 AM         A1C 4.6 4.4 (L) 4.8                    Allergies as of 3/16/2017        Reactions    Compazine [Prochlorperazine Edisylate] Other (See Comments), Anaphylaxis    Hallucinations     Iodine And Iodide Containing Products Anaphylaxis, Swelling    Facial swelling    Shellfish Containing Products     Anaphylaxis^      Advance Directives     An advance directive is a document which, in the event you are no longer able to make decisions for yourself, tells your healthcare team what kind of treatment you do or do not want to receive, or who you would like to make those decisions for you.  If you do not currently have an advance directive, [facility] encourages you to create one.  For more information call:  [phone]), [phone] ([phone]),  or log on to [email or web address].        Language Assistance Services     ATTENTION: Language assistance services are available, free of charge. Please call [phone].      ATENCIÓN: Si habla español, tiene a ward disposición servicios gratuitos de asistencia lingüística. Llame al [phone].     [facility] Ý: N?u b?n nói Ti?ng Vi?t, có các d?ch v? h? tr? ngôn ng? mi?n phí dành cho b?n. G?i s? [phone].        [facility] Sign-Up     Activating your [facility] account is as easy as 1-2-3!     1) Visit [email or web address], select Sign Up Now, enter this activation code and your date of birth, then select Next.  [email or web address]  Expires: 4/15/2017  5:12 PM      2) Create a username and password to use when you visit [facility] in the future and select a security question in case you lose your password and select Next.    3) Enter your e-mail address and click Sign Up!    Additional Information  If you have questions, please e-mail [email or web address] or call [phone] to talk to our [facility] staff. Remember, [facility] is NOT to be used for urgent needs. For medical emergencies, dial 911.          [facility] complies with applicable Federal civil rights laws and does not  discriminate on the basis of race, color, national origin, age, disability, or sex.

## 2017-03-16 NOTE — DISCHARGE INSTRUCTIONS
Your surgery has been scheduled for:__________________________________________    You should report to:  ____[facility], located on the [place] side of the first floor of the           [facility] ([phone])  ____[facility], located on the [facility] side of the            Second floor of the [facility] [phone])  ____3rd Floor [facility] located on the [facility] side of the [facility] [phone]  Please Note   - Tell your doctor if you take Aspirin, products containing Aspirin, herbal medications  or blood thinners, such as Coumadin, Ticlid, or Plavix.  (Consult your provider regarding holding or stopping before surgery).  - Arrange for someone to drive you home following surgery.  You will not be allowed to leave the surgical facility alone or drive yourself home following sedation and anesthesia.  Before Surgery  - Stop taking all herbal medications 14days prior to surgery  - No Motrin/Advil (Ibuprofen) 7 days before surgery  - No Aleve (Naproxen) 7 days before surgery  - Stop Taking Asprin, products containing Asprin _____days before surgery  - Stop taking blood thinners_______days before surgery  - Refrain from drinking alcoholic beverages for 24hours before and after surgery  - Stop or limit smoking _________days before surgery  Night before Surgery  - DO NOT EAT OR DRINK ANYTHING AFTER MIDNIGHT, INCLUDING GUM, HARD CANDY, MINTS, OR CHEWING TOBACCO.  - Take a shower or bath (shower is recommended).  Bathe with Hibiclens soap or an antibacterial soap from the neck down.  If not supplied by your surgeon, hibiclens soap will need to be purchased over the counter in pharmacy.  Rinse soap off thoroughly.  - Shampoo your hair with your regular shampoo  The Day of Surgery  - Take another bath or shower with hibiclens or any antibacterial soap, to reduce the chance of infection.  - Take heart and blood  pressure medications with a small sip of water, as advised by the perioperative team.  - Do not take fluid pills  - You may brush your teeth and rinse your mouth, but do not swall any additional water.   - Do not apply perfumes, powder, body lotions or deodorant on the day of surgery.  - Nail polish should be removed.  - Do not wear makeup or moisturizer  - Wear comfortable clothes, such as a button front shirt and loose fitting pants.  - Leave all jewelry, including body piercings, and valuables at home.    - Bring any devices you will neeed after surgery such as crutches or canes.  - If you have sleep apnea, please bring your CPAP machine  In the event that your physical condition changes including the onset of a cold or respiratory illness, or if you have to delay or cancel your surgery, please notify your surgeon.    Anesthesia: General Anesthesia  Youre due to have surgery. During surgery, youll be given medication called anesthesia. (It is also called anesthetic.) This will keep you comfortable and pain-free. Your surgeon will use general anesthesia. This sheet tells you more about it.    What Is General Anesthesia?  General anesthesia puts you into a state like deep sleep. It goes into the bloodstream (IV anesthetics), into the lungs (gas anesthetics), or both. You feel nothing during the procedure. You will not remember it. During the procedure, the anesthesia provider monitors you. He or she checks your heart rate and rhythm, blood pressure, and blood oxygen.  · IV Anesthetics  IV anesthetics are given through an IV line in your arm. Theyre often given first. This is so you are asleep before a gas anesthetic is started. Some kinds of IV anesthetics relieve pain. Others relax you. Your doctor will decide which kind is best in your case.  · Gas Anesthetics  Gas anesthetics are breathed into the lungs. They are often used to keep you asleep. They can be given through a facemask, an endotracheal tube, or a  laryngeal mask airway.  ¨ If you have a facemask, your anesthesia provider will most likely place it over your nose and mouth while youre still awake. Youll breathe oxygen through the mask as your IV anesthetic is started. Gas anesthetic may be added through the mask.  ¨ If you have an endotracheal tube or a laryngeal mask airway, it will be inserted into your throat after youre asleep.  Anesthesia Tools and Medications  You will likely have:  · IV anesthetics sent through an IV line into your bloodstream.  · Gas anesthetics breathed into your lungs, where they pass into your bloodstream.  · A pulse oximeter on the end of your finger. This measures your blood oxygen level.  · Electrocardiography leads (electrodes) on your chest. These record your heart rate and rhythm.  · A blood pressure cuff. This reads your blood pressure.  Risks and Possible Complications  General anesthesia has some risks. These include:  · Breathing problems  · Nausea and vomiting  · Sore throat or hoarseness  · Allergic reaction to the anesthetic  · Ongoing numbness (rare)  · Irregular heartbeat (rare)  · Cardiac arrest (rare)   Anesthesia Safety  · Follow all instructions you are given for how long not to eat or drink before your procedure.  · Be sure your doctor knows what medications you take. This includes over-the-counter medications, herbs, and supplements.  · Have an adult family member or friend drive you home after the procedure.  · For the first 24 hours after your surgery:  ¨ Do not drive or use heavy equipment.  ¨ Do not make important decisions or sign documents.  ¨ Avoid alcohol.  ¨ Have someone stay with you, if possible. They can watch for problems and help keep you safe.  © 3013-6422 [employer], [address] [place] [address], [place] [address]. All rights reserved. This information is not intended as a substitute for professional medical care. Always follow your healthcare professional's instructions.
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
## 2017-03-29 ENCOUNTER — HOSPITAL ENCOUNTER (OUTPATIENT)
Dept: PULMONOLOGY | Facility: CLINIC | Age: 52
Discharge: HOME | End: 2017-03-29
Payer: MEDICAID

## 2017-03-29 ENCOUNTER — HOSPITAL ENCOUNTER (EMERGENCY)
Facility: HOSPITAL | Age: 52
Discharge: HOME | End: 2017-03-29
Attending: EMERGENCY MEDICINE
Payer: MEDICAID

## 2017-03-29 VITALS
DIASTOLIC BLOOD PRESSURE: 83 MMHG | TEMPERATURE: 98 F | RESPIRATION RATE: 18 BRPM | HEART RATE: 84 BPM | SYSTOLIC BLOOD PRESSURE: 146 MMHG | OXYGEN SATURATION: 95 %

## 2017-03-29 DIAGNOSIS — J44.9 CHRONIC OBSTRUCTIVE PULMONARY DISEASE, UNSPECIFIED COPD TYPE: ICD-10-CM

## 2017-03-29 DIAGNOSIS — K42.9 UMBILICAL HERNIA WITHOUT OBSTRUCTION AND WITHOUT GANGRENE: Primary | ICD-10-CM

## 2017-03-29 LAB
POST FEV1 FVC: 0.5
POST FEV1: 1.04
POST FVC: 2.09
PRE FEV1 FVC: 51
PRE FEV1: 0.89
PRE FVC: 1.73
PREDICTED FEV1 FVC: 82
PREDICTED FEV1: 3.13
PREDICTED FVC: 3.81

## 2017-03-29 PROCEDURE — 99284 EMERGENCY DEPT VISIT MOD MDM: CPT | Mod: ,,, | Performed by: EMERGENCY MEDICINE

## 2017-03-29 PROCEDURE — 94729 DIFFUSING CAPACITY: CPT | Mod: 26,S$PBB,, | Performed by: INTERNAL MEDICINE

## 2017-03-29 PROCEDURE — 99283 EMERGENCY DEPT VISIT LOW MDM: CPT | Mod: 25

## 2017-03-29 PROCEDURE — 36600 WITHDRAWAL OF ARTERIAL BLOOD: CPT | Mod: S$PBB,,, | Performed by: INTERNAL MEDICINE

## 2017-03-29 PROCEDURE — 25000003 PHARM REV CODE 250: Performed by: EMERGENCY MEDICINE

## 2017-03-29 PROCEDURE — 94060 EVALUATION OF WHEEZING: CPT | Mod: 26,S$PBB,, | Performed by: INTERNAL MEDICINE

## 2017-03-29 RX ORDER — ALBUTEROL SULFATE 90 UG/1
1-2 AEROSOL, METERED RESPIRATORY (INHALATION) EVERY 6 HOURS PRN
Qty: 1 INHALER | Refills: 0 | Status: SHIPPED | OUTPATIENT
Start: 2017-03-29 | End: 2017-06-04

## 2017-03-29 RX ORDER — OXYCODONE AND ACETAMINOPHEN 5; 325 MG/1; MG/1
1-2 TABLET ORAL EVERY 6 HOURS PRN
Qty: 20 TABLET | Refills: 0 | Status: ON HOLD | OUTPATIENT
Start: 2017-03-29 | End: 2017-06-27 | Stop reason: HOSPADM

## 2017-03-29 RX ORDER — CLONIDINE HYDROCHLORIDE 0.1 MG/1
0.3 TABLET ORAL 3 TIMES DAILY
Qty: 90 TABLET | Refills: 0 | Status: SHIPPED | OUTPATIENT
Start: 2017-03-29 | End: 2017-08-30 | Stop reason: SDUPTHER

## 2017-03-29 RX ORDER — OXYCODONE AND ACETAMINOPHEN 5; 325 MG/1; MG/1
2 TABLET ORAL
Status: COMPLETED | OUTPATIENT
Start: 2017-03-29 | End: 2017-03-29

## 2017-03-29 RX ADMIN — OXYCODONE HYDROCHLORIDE AND ACETAMINOPHEN 2 TABLET: 5; 325 TABLET ORAL at 04:03
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
## 2017-03-29 NOTE — ED NOTES
"Adult Physical Assessment  LOC: Ming Harrington, 51 y.o. male verified via two identifiers.  The patient is awake, alert, oriented and speaking appropriately at this time.  APPEARANCE: Patient resting comfortably and appears to be in no acute distress at this time. Patient is clean and well groomed, patient's clothing is properly fastened.  SKIN:The skin is warm and dry, color consistent with ethnicity, patient has normal skin turgor and moist mucus membranes, skin intact, no breakdown or brusing noted.  MUSCULOSKELETAL: Patient moving all extremities well, no obvious swelling or deformities noted.  RESPIRATORY: Airway is open and patent, respirations are spontaneous, patient has a normal effort and rate, no accessory muscle use noted.  CARDIAC: Patient has a normal rate and rhythm, no periphreal edema noted in any extremity, capillary refill < 3 seconds in all extremities  ABDOMEN: Tender LLQ. Inspection: hernia noted; Bowel sounds present in all four quadrants. Pt sts, "I HAVE HAD THE HERNIA FOR A LITTLE OVER A YEAR NOW. IT JUST KEEPS GETTING BIGGER AND IT HURTS REALLY BAD."  NEUROLOGIC: Eyes open spontaneously, behavior appropriate to situation, follows commands.  "

## 2017-03-29 NOTE — ED AVS SNAPSHOT
[facility]  [address]  [facility] [address]               Ming Harrington   3/29/2017  3:36 PM   ED    Description:  Male : 1965   Department:  [facility]           Your Care was Coordinated By:     Provider Role From To    Sonny Lockwood MD Attending Provider [id number] --      Reason for Visit     Abdominal Pain           Diagnoses this Visit        Comments    Umbilical hernia without obstruction and without gangrene    -  Primary       ED Disposition     ED Disposition Condition Comment    Discharge             To Do List           Follow-up Information     Follow up with Kenyon Chawla MD In 1 week.    Specialties:  General Surgery, Bariatrics    Why:  Call for an appointment to reschedule your surgery    Contact information:    [address]  [facility] [address]  [phone]         These Medications        Disp Refills Start End    albuterol 90 mcg/actuation inhaler 1 Inhaler 0 3/29/2017 3/29/2018    Inhale 1-2 puffs into the lungs every 6 (six) hours as needed for Wheezing. - Inhalation    Pharmacy: [facility] [address] Ph #: [phone]       cloNIDine (CATAPRES) 0.1 MG tablet 90 tablet 0 3/29/2017 3/29/2018    Take 3 tablets (0.3 mg total) by mouth 3 (three) times daily. - Oral    Pharmacy: [address] Ph #: [phone]       oxycodone-acetaminophen (PERCOCET) 5-325 mg per tablet 20 tablet 0 3/29/2017     Take 1-2 tablets by mouth every 6 (six) hours as needed for Pain. - Oral    Pharmacy: [address] Ph #: [phone]         [facility] On Call     [facility] On Call Nurse Care Line -  Assistance  Registered nurses in the [facility] On Call Center provide clinical  advisement, health education, appointment booking, and other advisory services.  Call for this free service at [phone].             Medications           Message regarding Medications     Verify the changes and/or additions to your medication regime listed below are the same as discussed with your clinician today.  If any of these changes or additions are incorrect, please notify your healthcare provider.        START taking these NEW medications        Refills    albuterol 90 mcg/actuation inhaler 0    Sig: Inhale 1-2 puffs into the lungs every 6 (six) hours as needed for Wheezing.    Class: Print    Route: Inhalation    cloNIDine (CATAPRES) 0.1 MG tablet 0    Sig: Take 3 tablets (0.3 mg total) by mouth 3 (three) times daily.    Class: Print    Route: Oral    oxycodone-acetaminophen (PERCOCET) 5-325 mg per tablet 0    Sig: Take 1-2 tablets by mouth every 6 (six) hours as needed for Pain.    Class: Print    Route: Oral      These medications were administered today        Dose Freq    oxycodone-acetaminophen 5-325 mg per tablet 2 tablet 2 tablet ED 1 Time    Sig: Take 2 tablets by mouth ED 1 Time.    Class: Normal    Route: Oral           Verify that the below list of medications is an accurate representation of the medications you are currently taking.  If none reported, the list may be blank. If incorrect, please contact your healthcare provider. Carry this list with you in case of emergency.           Current Medications     albuterol (ACCUNEB) 0.63 mg/3 mL Nebu Take 0.63 mg by nebulization every 6 (six) hours as needed. Rescue    albuterol 90 mcg/actuation inhaler Inhale 1-2 puffs into the lungs every 4 (four) hours as needed for Wheezing or Shortness of Breath.    albuterol 90 mcg/actuation inhaler Inhale 1-2 puffs into the lungs every 6 (six) hours as needed for Wheezing.    cloNIDine (CATAPRES) 0.1 MG tablet Take 3 tablets (0.3 mg total) by mouth 3 (three) times daily.    furosemide (LASIX) 40 MG  tablet Take 1 tablet (40 mg total) by mouth 2 (two) times daily.    ondansetron (ZOFRAN-ODT) 4 MG TbDL Take 1 tablet (4 mg total) by mouth every 8 (eight) hours as needed.    oxycodone-acetaminophen (PERCOCET) 5-325 mg per tablet Take 1-2 tablets by mouth every 6 (six) hours as needed for Pain.    oxycodone-acetaminophen 5-325 mg per tablet 2 tablet Take 2 tablets by mouth ED 1 Time.    promethazine (PHENERGAN) 25 MG tablet Take 1 tablet (25 mg total) by mouth every 6 (six) hours as needed for Nausea.    senna-docusate 8.6-50 mg (PERICOLACE) 8.6-50 mg per tablet Take 1 tablet by mouth 2 (two) times daily.    tiotropium (SPIRIVA) 18 mcg inhalation capsule Inhale 1 capsule (18 mcg total) into the lungs once daily.           Clinical Reference Information           Your Vitals Were     BP Pulse Temp Resp SpO2       146/83 84 98 °F (36.7 °C) 18 95%       Allergies as of 3/29/2017        Reactions    Compazine [Prochlorperazine Edisylate] Other (See Comments), Anaphylaxis    Hallucinations     Iodine And Iodide Containing Products Anaphylaxis, Swelling    Facial swelling    Shellfish Containing Products     Anaphylaxis^      Immunizations Administered on Date of Encounter - 3/29/2017     None      ED Micro, Lab, POCT     Start Ordered       Status Ordering Provider    03/29/17 1547 03/29/17 1546    STAT,   Status:  Canceled      Canceled     03/29/17 1547 03/29/17 1546    STAT,   Status:  Canceled      Canceled       ED Imaging Orders     None        Discharge Instructions         Hernia (Adult)    A hernia can happen when there is a weakness or defect in the wall of the abdomen or groin. Intestines or nearby tissues may move from their usual location and push through the weakness in the wall. This can cause a hernia (bulge) you may see or feel.  Causes and Risk Factors   A hernia may be present at birth. Or it may be caused by the wear and tear of daily living. Certain factors can make a hernia more likely. These can  include:  · Heavy lifting  · Straining, whether from lifting, movement, or constipation  · Chronic cough  · Injury to the abdominal wall  · Excess weight  · Pregnancy  · Prior surgery  · Older age  · Family history of hernia  Symptoms  Symptoms of a hernia may come on suddenly. Or they may appear slowly over time. Some common symptoms include:  · Bulge in the groin area, around the navel, or in the scrotum (the bulge may get bigger when you stand and go away when you lie down)  · Pain or pressure around the bulge  · Pain during activities such as lifting, coughing, or sneezing  · A feeling of weakness or pressure in the groin  · Pain or swelling in the scrotum  Types of hernias  There are different types of hernia. The type you have depends on its location:  · Inguinal: This type is in the groin or scrotum. It is more common in men.  · Femoral: This type is in the groin, upper thigh (where the leg bends), or labia. It is more common in women.  · Ventral: This type is in the abdominal wall.  · Umbilical: This type occurs around the navel (belly button).  · Incisional: This type occurs at the site of a previous surgery.  The condition of the hernia can help determine how urgently it needs to be treated.  · Reducible: It goes back in by itself, or it can be pushed back in.  · Irreducible: It cant be pushed back in.  · Incarcerated/Strangulated: The intestine is trapped (incarcerated). If this happens, you wont be able to push the bulge back in. If the incarcerated hernia isnt treated, it may become strangulated. This means the area loses blood supply and the tissue may die. This requires emergency surgery! Treatment is needed right away!  In most cases, a hernia will not heal on its own. Surgery is usually needed to repair the defect in the abdominal wall or groin. Youll be told more about surgery, if needed.  If your symptoms are not severe, treatment may sometimes be delayed. In such cases, regular follow-up  visits with the provider will be needed. Youll be asked to keep track of your symptoms and to watch for signs of more serious problems. You may also be given guidelines similar to the home care instructions below.  Home Care  To help keep a hernia from getting worse, you may be advised to:  · Avoid heavy lifting and straining as directed.  · Take steps to prevent constipation, such as eating more fiber and drinking more water. This may help reduce straining that can occur when having a bowel movement. Reducing straining may help keep your symptoms from getting worse.  · Maintain a healthy weight or lose excess weight. This can help reduce strain on abdominal muscles and tissues.  · Stop smoking. This can help prevent coughing that may also strain abdominal muscles and tissues.  Follow-up care  Follow up with your healthcare provider, or as directed. If imaging tests were done, they will be reviewed a doctor. You will be told the results and any new findings that may affect your care.  When to seek medical advice  Call your healthcare provider right away if any of these occur:  · Hernia hardens, swells, or grows larger  · Hernia can no longer be pushed back in  · Pain moves to the lower right abdomen (just below the waistline), or spreads to the back  Call 911  Call 911 right away if any of these occur:  · Nausea and vomiting  · Severe pain, redness, or tenderness in the area near the hernia  · Pain worsens quickly and doesnt get better  · Inability to have a bowel movement or pass gas  · Fever of 100.4°F (38°C) or higher  · Trouble breathing  · Fainting  · Rapid heart rate  · Vomiting blood  · Large amounts of blood in stool  Date Last Reviewed: 6/9/2015 © 2000-2016 [employer]. [address] [place] [address], [place] [address]. All rights reserved. This information is not intended as a substitute for professional medical care. Always follow your healthcare professional's instructions.          [facility]  Sign-Up     Activating your [facility] account is as easy as 1-2-3!     1) Visit [email or web address], select Sign Up Now, enter this activation code and your date of birth, then select Next.  [email or web address]  Expires: 4/15/2017  5:12 PM      2) Create a username and password to use when you visit [facility] in the future and select a security question in case you lose your password and select Next.    3) Enter your e-mail address and click Sign Up!    Additional Information  If you have questions, please e-mail [email or web address] or call [phone] to talk to our [facility] staff. Remember, [facility] is NOT to be used for urgent needs. For medical emergencies, dial 911.         Smoking Cessation     If you would like to quit smoking:   You may be eligible for free services if you are a [place] resident and started smoking cigarettes before September 1, 1988.  Call the Smoking Cessation [employer] (RUST) toll free at (337) 500-5367 or (896) 328-2742.   Call 7-904-QUIT-NOW if you do not meet the above criteria.             [facility] complies with applicable Federal civil rights laws and does not discriminate on the basis of race, color, national origin, age, disability, or sex.        Language Assistance Services     ATTENTION: Language assistance services are available, free of charge. Please call [phone].      ATENCIÓN: Si habla español, tiene a ward disposición servicios gratuitos de asistencia lingüística. Llame al [phone].     CHÚ Ý: N?u b?n nói Ti?ng Vi?t, có các d?ch v? h? tr? ngôn ng? mi?n phí dành cho b?n. G?i s? [phone].
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
## 2017-03-29 NOTE — DISCHARGE INSTRUCTIONS
Hernia (Adult)    A hernia can happen when there is a weakness or defect in the wall of the abdomen or groin. Intestines or nearby tissues may move from their usual location and push through the weakness in the wall. This can cause a hernia (bulge) you may see or feel.  Causes and Risk Factors   A hernia may be present at birth. Or it may be caused by the wear and tear of daily living. Certain factors can make a hernia more likely. These can include:  · Heavy lifting  · Straining, whether from lifting, movement, or constipation  · Chronic cough  · Injury to the abdominal wall  · Excess weight  · Pregnancy  · Prior surgery  · Older age  · Family history of hernia  Symptoms  Symptoms of a hernia may come on suddenly. Or they may appear slowly over time. Some common symptoms include:  · Bulge in the groin area, around the navel, or in the scrotum (the bulge may get bigger when you stand and go away when you lie down)  · Pain or pressure around the bulge  · Pain during activities such as lifting, coughing, or sneezing  · A feeling of weakness or pressure in the groin  · Pain or swelling in the scrotum  Types of hernias  There are different types of hernia. The type you have depends on its location:  · Inguinal: This type is in the groin or scrotum. It is more common in men.  · Femoral: This type is in the groin, upper thigh (where the leg bends), or labia. It is more common in women.  · Ventral: This type is in the abdominal wall.  · Umbilical: This type occurs around the navel (belly button).  · Incisional: This type occurs at the site of a previous surgery.  The condition of the hernia can help determine how urgently it needs to be treated.  · Reducible: It goes back in by itself, or it can be pushed back in.  · Irreducible: It cant be pushed back in.  · Incarcerated/Strangulated: The intestine is trapped (incarcerated). If this happens, you wont be able to push the bulge back in. If the incarcerated hernia isnt  treated, it may become strangulated. This means the area loses blood supply and the tissue may die. This requires emergency surgery! Treatment is needed right away!  In most cases, a hernia will not heal on its own. Surgery is usually needed to repair the defect in the abdominal wall or groin. Youll be told more about surgery, if needed.  If your symptoms are not severe, treatment may sometimes be delayed. In such cases, regular follow-up visits with the provider will be needed. Youll be asked to keep track of your symptoms and to watch for signs of more serious problems. You may also be given guidelines similar to the home care instructions below.  Home Care  To help keep a hernia from getting worse, you may be advised to:  · Avoid heavy lifting and straining as directed.  · Take steps to prevent constipation, such as eating more fiber and drinking more water. This may help reduce straining that can occur when having a bowel movement. Reducing straining may help keep your symptoms from getting worse.  · Maintain a healthy weight or lose excess weight. This can help reduce strain on abdominal muscles and tissues.  · Stop smoking. This can help prevent coughing that may also strain abdominal muscles and tissues.  Follow-up care  Follow up with your healthcare provider, or as directed. If imaging tests were done, they will be reviewed a doctor. You will be told the results and any new findings that may affect your care.  When to seek medical advice  Call your healthcare provider right away if any of these occur:  · Hernia hardens, swells, or grows larger  · Hernia can no longer be pushed back in  · Pain moves to the lower right abdomen (just below the waistline), or spreads to the back  Call 911  Call 911 right away if any of these occur:  · Nausea and vomiting  · Severe pain, redness, or tenderness in the area near the hernia  · Pain worsens quickly and doesnt get better  · Inability to have a bowel movement or  pass gas  · Fever of 100.4°F (38°C) or higher  · Trouble breathing  · Fainting  · Rapid heart rate  · Vomiting blood  · Large amounts of blood in stool  Date Last Reviewed: 6/9/2015  © 3080-4675 [employer]. [address] [place] [address], [place] [address]. All rights reserved. This information is not intended as a substitute for professional medical care. Always follow your healthcare professional's instructions.

## 2017-04-05 ENCOUNTER — TELEPHONE (OUTPATIENT)
Dept: SURGERY | Facility: CLINIC | Age: 52
End: 2017-04-05

## 2017-04-05 NOTE — TELEPHONE ENCOUNTER
Spoke to his roommate who told me he was on his breathing machine, and wanted me to call him back. I have scheduled him him another consult with a pulmonary physician on 4/25/17 to possibly get clearance for hernia repair. Roommate said he'd relay the message.

## 2017-04-06 ENCOUNTER — TELEPHONE (OUTPATIENT)
Dept: SURGERY | Facility: CLINIC | Age: 52
End: 2017-04-06

## 2017-04-06 NOTE — TELEPHONE ENCOUNTER
Spoke to patient and informed him of an appointment that was made for him to be surgically cleared through pulmonary. Patient was displeased with having to wait for surgical clearance. Patient also stated he was wanting or willing to see another surgeon who may consider performing this procedure if Dr. Chawla will not do it in a timely manner! I told him that I will attempt to find another general surgeon closer to his area([place]) , and schedule him a consultation. He was very happy with this solution. Patient will still keep his Pulmonary consult for 4/25/17 as well.

## 2017-04-25 ENCOUNTER — OFFICE VISIT (OUTPATIENT)
Dept: PULMONOLOGY | Facility: CLINIC | Age: 52
End: 2017-04-25
Payer: MEDICAID

## 2017-04-25 ENCOUNTER — HOSPITAL ENCOUNTER (EMERGENCY)
Facility: HOSPITAL | Age: 52
Discharge: HOME | End: 2017-04-25
Attending: EMERGENCY MEDICINE
Payer: MEDICAID

## 2017-04-25 VITALS
DIASTOLIC BLOOD PRESSURE: 74 MMHG | SYSTOLIC BLOOD PRESSURE: 130 MMHG | HEART RATE: 101 BPM | HEIGHT: 67 IN | BODY MASS INDEX: 38.1 KG/M2 | WEIGHT: 242.75 LBS | OXYGEN SATURATION: 95 %

## 2017-04-25 VITALS
HEIGHT: 70 IN | HEART RATE: 80 BPM | OXYGEN SATURATION: 96 % | WEIGHT: 242.5 LBS | BODY MASS INDEX: 34.72 KG/M2 | SYSTOLIC BLOOD PRESSURE: 128 MMHG | TEMPERATURE: 98 F | RESPIRATION RATE: 18 BRPM | DIASTOLIC BLOOD PRESSURE: 66 MMHG

## 2017-04-25 DIAGNOSIS — K46.9 HERNIA: Primary | ICD-10-CM

## 2017-04-25 DIAGNOSIS — Z01.811 ENCOUNTER FOR PRE-OPERATIVE RESPIRATORY CLEARANCE: Primary | ICD-10-CM

## 2017-04-25 DIAGNOSIS — R10.9 ABDOMINAL PAIN, UNSPECIFIED LOCATION: ICD-10-CM

## 2017-04-25 PROCEDURE — 99213 OFFICE O/P EST LOW 20 MIN: CPT | Mod: S$PBB,,, | Performed by: INTERNAL MEDICINE

## 2017-04-25 PROCEDURE — 99283 EMERGENCY DEPT VISIT LOW MDM: CPT | Mod: 27

## 2017-04-25 PROCEDURE — 99999 PR PBB SHADOW E&M-EST. PATIENT-LVL III: CPT | Mod: PBBFAC,,, | Performed by: INTERNAL MEDICINE

## 2017-04-25 PROCEDURE — 25000003 PHARM REV CODE 250: Performed by: PHYSICIAN ASSISTANT

## 2017-04-25 PROCEDURE — 99284 EMERGENCY DEPT VISIT MOD MDM: CPT | Mod: ,,, | Performed by: PHYSICIAN ASSISTANT

## 2017-04-25 RX ORDER — OXYCODONE AND ACETAMINOPHEN 5; 325 MG/1; MG/1
1 TABLET ORAL EVERY 4 HOURS PRN
Qty: 10 TABLET | Refills: 0 | Status: ON HOLD | OUTPATIENT
Start: 2017-04-25 | End: 2017-06-27 | Stop reason: HOSPADM

## 2017-04-25 RX ORDER — ONDANSETRON 4 MG/1
4 TABLET, ORALLY DISINTEGRATING ORAL EVERY 8 HOURS PRN
Qty: 12 TABLET | Refills: 0 | Status: ON HOLD | OUTPATIENT
Start: 2017-04-25 | End: 2017-06-27 | Stop reason: HOSPADM

## 2017-04-25 RX ORDER — OXYCODONE AND ACETAMINOPHEN 5; 325 MG/1; MG/1
1 TABLET ORAL
Status: COMPLETED | OUTPATIENT
Start: 2017-04-25 | End: 2017-04-25

## 2017-04-25 RX ORDER — CLONIDINE HYDROCHLORIDE 0.1 MG/1
0.3 TABLET ORAL 3 TIMES DAILY
Qty: 30 TABLET | Refills: 0 | Status: SHIPPED | OUTPATIENT
Start: 2017-04-25 | End: 2017-07-07 | Stop reason: SDUPTHER

## 2017-04-25 RX ADMIN — OXYCODONE HYDROCHLORIDE AND ACETAMINOPHEN 1 TABLET: 5; 325 TABLET ORAL at 04:04

## 2017-04-25 NOTE — DISCHARGE INSTRUCTIONS
Follow-up with Dr. Chawla.  Return to the ED for any new or worsening symptoms, including those listed below.        What Is a Hernia?    A hernia is when an organ or tissue pushes through a weak area in the belly (abdominal) wall. This weak area may be present at birth. Or it may be caused by abdominal strain over time. If not treated, a hernia can get worse with time and physical stress.  When a bulge forms  When there is a weak area in the abdominal wall, an organ or tissue can push outward. This often causes a bulge that you can see under your skin. The bulge may get bigger when you stand up. It may go away when you lie down. You may also feel some pressure or mild pain when lifting, coughing, urinating, or doing other activities.  Types of hernias  The type of hernia you have depends on its location. Most hernias form in the groin at or near the internal ring. This is the entrance to a canal between the abdomen and groin. Hernias can also occur in the abdomen, thigh, or genitals.  · An incisional hernia occurs at the site of a previous surgical incision.  · An umbilical hernia occurs at the navel.  · An indirect inguinal hernia occurs in the groin at the internal ring.  · A direct inguinal hernia occurs in the groin near the internal ring.  · A femoral hernia occurs just below the groin.  · An epigastric hernia occurs in the upper abdomen at the midline.  Diagnosis  In most cases, your healthcare provider can diagnose a hernia by doing a physical exam.  In some cases it might not be clear why you have a swelling in the belly wall. Then your provider may order an imaging test such as an ultrasound. This can help with the diagnosis.  Surgery  A hernia will not heal on its own. Surgery is needed to fix the weak spot in the abdominal wall. If not treated, a hernia can get larger. It can also cause serious health problems. The good news is that hernia surgery can be done quickly and safely. In some cases, you  can go home the same day as your surgery.   When to call your provider  Call your healthcare provider right away if the swelling around your hernia becomes larger, firmer, or more painful. These may be signs that your intestines are stuck in the abdominal wall. This is an emergency. The hernia must be repaired right away to avoid serious problems.  Date Last Reviewed: 7/1/2016  © 5976-9320 [employer]. [address] [place] [address], [place] [address]. All rights reserved. This information is not intended as a substitute for professional medical care. Always follow your healthcare professional's instructions.

## 2017-04-25 NOTE — ED NOTES
GENERAL: The patient is a well-developed, well-nourished male in no apparent distress. He is alert and oriented x3.    HEENT: Head is normocephalic and atraumatic. Extraocular muscles are intact. Pupils are equal, round, and reactive to light and accommodation. Nares appeared normal. Mouth is well hydrated and without lesions. Mucous membranes are moist. Posterior pharynx clear of any exudate or lesions.    NECK: Supple. No carotid bruits. No lymphadenopathy or thyromegaly.    LUNGS: Clear to auscultation.    HEART: Regular rate and rhythm without murmur.     ABDOMEN: Soft, non distended but tender to palpation LLQ and mid abdomin.    EXTREMITIES: Without any cyanosis, clubbing, rash, lesions or edema.     NEUROLOGIC: Cranial nerves II through XII are grossly intact.     PSYCHIATRIC: Flat affect, but denies suicidal or homicidal ideations.    SKIN: No ulceration or induration present.
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
## 2017-04-25 NOTE — ED AVS SNAPSHOT
[facility]  [address]  [facility] [address]               Ming Harrington   2017  3:41 PM   ED    Description:  Male : 1965   Department:  [facility]           Your Care was Coordinated By:     Provider Role From To    Aric Chapman III, MD Attending Provider 17 [id number] --    Chantel Mena PA-C Physician Assistant 17 [id number] --      Reason for Visit     Abdominal Pain           Diagnoses this Visit        Comments    Hernia    -  Primary     Abdominal pain, unspecified location           ED Disposition     ED Disposition Condition Comment    Discharge             To Do List           Follow-up Information     Schedule an appointment as soon as possible for a visit with Kenyon Chawla MD.    Specialties:  General Surgery, Bariatrics    Contact information:    [address]  [facility] [address]  [phone]         These Medications        Disp Refills Start End    oxycodone-acetaminophen (PERCOCET) 5-325 mg per tablet 10 tablet 0 2017     Take 1 tablet by mouth every 4 (four) hours as needed for Pain. - Oral    Pharmacy: [facility] [address] Ph #: [phone]       ondansetron (ZOFRAN-ODT) 4 MG TbDL 12 tablet 0 2017     Take 1 tablet (4 mg total) by mouth every 8 (eight) hours as needed. - Oral    Pharmacy: [facility] [address] Ph #: [phone]         [facility]     [facility] Nurse Care Line - 24/ Assistance  Unless otherwise directed by your provider, please contact [facility], our nurse care line that is available for 24/ assistance.     Registered nurses in the [facility] provide: appointment scheduling, clinical advisement, health education, and other advisory services.  Call: [phone] (toll free)                Medications           Message regarding Medications     Verify the changes and/or additions to your medication regime listed below are the same as discussed with your clinician today.  If any of these changes or additions are incorrect, please notify your healthcare provider.        START taking these NEW medications        Refills    oxycodone-acetaminophen (PERCOCET) 5-325 mg per tablet 0    Sig: Take 1 tablet by mouth every 4 (four) hours as needed for Pain.    Class: Print    Route: Oral    ondansetron (ZOFRAN-ODT) 4 MG TbDL 0    Sig: Take 1 tablet (4 mg total) by mouth every 8 (eight) hours as needed.    Class: Print    Route: Oral      These medications were administered today        Dose Freq    oxycodone-acetaminophen 5-325 mg per tablet 1 tablet 1 tablet ED 1 Time    Sig: Take 1 tablet by mouth ED 1 Time.    Class: Normal    Route: Oral    Cosign for Ordering: Required by Aric Chapman III, MD           Verify that the below list of medications is an accurate representation of the medications you are currently taking.  If none reported, the list may be blank. If incorrect, please contact your healthcare provider. Carry this list with you in case of emergency.           Current Medications     albuterol (ACCUNEB) 0.63 mg/3 mL Nebu Take 0.63 mg by nebulization every 6 (six) hours as needed. Rescue    albuterol 90 mcg/actuation inhaler Inhale 1-2 puffs into the lungs every 6 (six) hours as needed for Wheezing.    cloNIDine (CATAPRES) 0.1 MG tablet Take 3 tablets (0.3 mg total) by mouth 3 (three) times daily.    furosemide (LASIX) 40 MG tablet Take 1 tablet (40 mg total) by mouth 2 (two) times daily.    ondansetron (ZOFRAN-ODT) 4 MG TbDL Take 1 tablet (4 mg total) by mouth every 8 (eight) hours as needed.    ondansetron (ZOFRAN-ODT) 4 MG TbDL Take 1 tablet (4 mg total) by mouth every 8 (eight) hours as needed.    oxycodone-acetaminophen (PERCOCET) 5-325 mg per tablet Take 1-2 tablets by mouth every 6 (six)  "hours as needed for Pain.    oxycodone-acetaminophen (PERCOCET) 5-325 mg per tablet Take 1 tablet by mouth every 4 (four) hours as needed for Pain.    oxycodone-acetaminophen 5-325 mg per tablet 1 tablet Take 1 tablet by mouth ED 1 Time.    promethazine (PHENERGAN) 25 MG tablet Take 1 tablet (25 mg total) by mouth every 6 (six) hours as needed for Nausea.    senna-docusate 8.6-50 mg (PERICOLACE) 8.6-50 mg per tablet Take 1 tablet by mouth 2 (two) times daily.    tiotropium (SPIRIVA) 18 mcg inhalation capsule Inhale 1 capsule (18 mcg total) into the lungs once daily.           Clinical Reference Information           Your Vitals Were     BP Pulse Temp Resp Height Weight    127/85 88 98.9 °F (37.2 °C) (Oral) 18 5' 10" (1.778 m) 110 kg (242 lb 8.1 oz)    SpO2 BMI             96% 34.8 kg/m2         Allergies as of 4/25/2017        Reactions    Compazine [Prochlorperazine Edisylate] Other (See Comments), Anaphylaxis    Hallucinations     Iodine And Iodide Containing Products Anaphylaxis, Swelling    Facial swelling    Shellfish Containing Products     Anaphylaxis^      Immunizations Administered on Date of Encounter - 4/25/2017     None      ED Micro, Lab, POCT     None      ED Imaging Orders     None        Discharge Instructions       Follow-up with Dr. Chawla.  Return to the ED for any new or worsening symptoms, including those listed below.        What Is a Hernia?    A hernia is when an organ or tissue pushes through a weak area in the belly (abdominal) wall. This weak area may be present at birth. Or it may be caused by abdominal strain over time. If not treated, a hernia can get worse with time and physical stress.  When a bulge forms  When there is a weak area in the abdominal wall, an organ or tissue can push outward. This often causes a bulge that you can see under your skin. The bulge may get bigger when you stand up. It may go away when you lie down. You may also feel some pressure or mild pain when " lifting, coughing, urinating, or doing other activities.  Types of hernias  The type of hernia you have depends on its location. Most hernias form in the groin at or near the internal ring. This is the entrance to a canal between the abdomen and groin. Hernias can also occur in the abdomen, thigh, or genitals.  · An incisional hernia occurs at the site of a previous surgical incision.  · An umbilical hernia occurs at the navel.  · An indirect inguinal hernia occurs in the groin at the internal ring.  · A direct inguinal hernia occurs in the groin near the internal ring.  · A femoral hernia occurs just below the groin.  · An epigastric hernia occurs in the upper abdomen at the midline.  Diagnosis  In most cases, your healthcare provider can diagnose a hernia by doing a physical exam.  In some cases it might not be clear why you have a swelling in the belly wall. Then your provider may order an imaging test such as an ultrasound. This can help with the diagnosis.  Surgery  A hernia will not heal on its own. Surgery is needed to fix the weak spot in the abdominal wall. If not treated, a hernia can get larger. It can also cause serious health problems. The good news is that hernia surgery can be done quickly and safely. In some cases, you can go home the same day as your surgery.   When to call your provider  Call your healthcare provider right away if the swelling around your hernia becomes larger, firmer, or more painful. These may be signs that your intestines are stuck in the abdominal wall. This is an emergency. The hernia must be repaired right away to avoid serious problems.  Date Last Reviewed: 7/1/2016 © 2000-2016 [employer]. [address] [place] [address], [place] [address]. All rights reserved. This information is not intended as a substitute for professional medical care. Always follow your healthcare professional's instructions.          [facility] Sign-Up     Activating your [facility] account is as  easy as 1-2-3!     1) Visit [email or web address], select Sign Up Now, enter this activation code and your date of birth, then select Next.  [email or web address]  Expires: 6/9/2017  4:11 PM      2) Create a username and password to use when you visit [facility] in the future and select a security question in case you lose your password and select Next.    3) Enter your e-mail address and click Sign Up!    Additional Information  If you have questions, please e-mail [email or web address] or call [phone] to talk to our [facility] staff. Remember, [facility] is NOT to be used for urgent needs. For medical emergencies, dial 911.         Smoking Cessation     If you would like to quit smoking:   You may be eligible for free services if you are a [place] resident and started smoking cigarettes before September 1, 1988.  Call the Smoking Cessation Trust ([facility]) toll free at [phone] or [phone].   Call 7-085-QUIT-NOW if you do not meet the above criteria.   Contact us via email: [email or web address]   View our website for more information: [email or web address]         [facility] complies with applicable Federal civil rights laws and does not discriminate on the basis of race, color, national origin, age, disability, or sex.        Language Assistance Services     ATTENTION: Language assistance services are available, free of charge. Please call [phone].      ATENCIÓN: Si habla español, tiene a ward disposición servicios gratuitos de asistencia lingüística. Llame al [phone].     CHÚ Ý: N?u b?n nói Ti?ng Vi?t, có các d?ch v? h? tr? ngôn ng? mi?n phí dành cho b?n. G?i s? [phone].

## 2017-04-25 NOTE — LETTER
April 26, 2017      Kenyon Chawla MD  [address] [facility] [address]           [facility]  [address]  [facility] [address]  Phone: [phone]          Patient: Ming Harrington   MR Number: [id number]   YOB: 1965   Date of Visit: 4/25/2017       Dear Dr. Kenyon Chawla:    Thank you for referring Ming Harrington to me for evaluation. Attached you will find relevant portions of my assessment and plan of care.    If you have questions, please do not hesitate to call me. I look forward to following Ming Harrington along with you.    Sincerely,    Kenyon Wray MD    Enclosure  CC:  No Recipients    If you would like to receive this communication electronically, please contact [email or web address] or [phone] to request more information on [employer] Link access.    For providers and/or their staff who would like to refer a patient to [facility], please contact us through our one-stop-shop provider referral line, [facility], at [phone].    If you feel you have received this communication in error or would no longer like to receive these types of communications, please e-mail [email or web address]
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
## 2017-06-04 ENCOUNTER — HOSPITAL ENCOUNTER (EMERGENCY)
Facility: HOSPITAL | Age: 52
Discharge: HOME | End: 2017-06-04
Attending: EMERGENCY MEDICINE
Payer: MEDICAID

## 2017-06-04 VITALS
RESPIRATION RATE: 17 BRPM | DIASTOLIC BLOOD PRESSURE: 65 MMHG | SYSTOLIC BLOOD PRESSURE: 110 MMHG | TEMPERATURE: 98 F | OXYGEN SATURATION: 96 % | HEIGHT: 68 IN | BODY MASS INDEX: 37.13 KG/M2 | HEART RATE: 87 BPM | WEIGHT: 245 LBS

## 2017-06-04 DIAGNOSIS — J44.1 ACUTE EXACERBATION OF CHRONIC OBSTRUCTIVE PULMONARY DISEASE (COPD): Primary | ICD-10-CM

## 2017-06-04 DIAGNOSIS — Z72.0 TOBACCO ABUSE: ICD-10-CM

## 2017-06-04 PROCEDURE — 63600175 PHARM REV CODE 636 W HCPCS: Performed by: EMERGENCY MEDICINE

## 2017-06-04 PROCEDURE — 99284 EMERGENCY DEPT VISIT MOD MDM: CPT | Mod: 25

## 2017-06-04 PROCEDURE — 25000242 PHARM REV CODE 250 ALT 637 W/ HCPCS: Performed by: EMERGENCY MEDICINE

## 2017-06-04 PROCEDURE — 94644 CONT INHLJ TX 1ST HOUR: CPT

## 2017-06-04 PROCEDURE — 96365 THER/PROPH/DIAG IV INF INIT: CPT

## 2017-06-04 PROCEDURE — 25000003 PHARM REV CODE 250: Performed by: EMERGENCY MEDICINE

## 2017-06-04 RX ORDER — IPRATROPIUM BROMIDE 0.5 MG/2.5ML
1 SOLUTION RESPIRATORY (INHALATION)
Status: COMPLETED | OUTPATIENT
Start: 2017-06-04 | End: 2017-06-04

## 2017-06-04 RX ORDER — ALBUTEROL SULFATE 0.83 MG/ML
15 SOLUTION RESPIRATORY (INHALATION)
Status: COMPLETED | OUTPATIENT
Start: 2017-06-04 | End: 2017-06-04

## 2017-06-04 RX ORDER — ALBUTEROL SULFATE 90 UG/1
1-2 AEROSOL, METERED RESPIRATORY (INHALATION) EVERY 6 HOURS PRN
Qty: 1 INHALER | Refills: 0 | Status: ON HOLD | OUTPATIENT
Start: 2017-06-04 | End: 2017-06-27

## 2017-06-04 RX ORDER — METHYLPREDNISOLONE SOD SUCC 125 MG
125 VIAL (EA) INJECTION
Status: COMPLETED | OUTPATIENT
Start: 2017-06-04 | End: 2017-06-04

## 2017-06-04 RX ORDER — ALBUTEROL SULFATE 0.63 MG/3ML
0.63 SOLUTION RESPIRATORY (INHALATION) EVERY 6 HOURS PRN
Qty: 1 VIAL | Refills: 0 | Status: SHIPPED | OUTPATIENT
Start: 2017-06-04 | End: 2017-07-07 | Stop reason: ALTCHOICE

## 2017-06-04 RX ADMIN — IPRATROPIUM BROMIDE 1 MG: 0.5 SOLUTION RESPIRATORY (INHALATION) at 04:06

## 2017-06-04 RX ADMIN — ALBUTEROL SULFATE 15 MG: 2.5 SOLUTION RESPIRATORY (INHALATION) at 04:06

## 2017-06-04 RX ADMIN — METHYLPREDNISOLONE SODIUM SUCCINATE 125 MG: 125 INJECTION, POWDER, FOR SOLUTION INTRAMUSCULAR; INTRAVENOUS at 04:06

## 2017-06-04 RX ADMIN — PROMETHAZINE HYDROCHLORIDE 12.5 MG: 25 INJECTION INTRAMUSCULAR; INTRAVENOUS at 04:06

## 2017-06-04 NOTE — DISCHARGE INSTRUCTIONS
Stop smoking.  Wear your oxygen.  Use your nebulizer and inhaler as directed.  See your doctor this week.

## 2017-06-04 NOTE — ED NOTES
Pt presents to ED with c/o SOB that started last night. Pt states progressively got worse and is out of all inhalers and meds. Pt presents in mild distress on arrival. Audible wheezes noted throughout. Pt was chronic smoker but no longer smokes but lives with smokers and is also on 2L O2 at home. Pt placed on cardiac monitor, bp cuff and continuous pulse ox. Also placed on 2L O2 via nasal cannula.
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
## 2017-06-05 NOTE — ED NOTES
"Pt given discharge instruction and instructions explained to pt and wife with rx, pt becomes verbally abusive to staff, pt reports he wants his "fluid pill" refill, pt yells at MD and RN, pt wife tries to calm pt but pt continues to yell at staff and wife, pt leaves ED with wife wife was given pt discharge instructions and rx  "

## 2017-06-26 ENCOUNTER — HOSPITAL ENCOUNTER (OUTPATIENT)
Facility: HOSPITAL | Age: 52
Discharge: HOME | End: 2017-06-27
Attending: EMERGENCY MEDICINE | Admitting: INTERNAL MEDICINE
Payer: MEDICAID

## 2017-06-26 DIAGNOSIS — R06.02 SOB (SHORTNESS OF BREATH): ICD-10-CM

## 2017-06-26 DIAGNOSIS — J44.1 COPD WITH ACUTE EXACERBATION: Primary | ICD-10-CM

## 2017-06-26 DIAGNOSIS — J44.9 CHRONIC OBSTRUCTIVE PULMONARY DISEASE, UNSPECIFIED COPD TYPE: ICD-10-CM

## 2017-06-26 DIAGNOSIS — F19.11 HISTORY OF POLYDRUG ABUSE: ICD-10-CM

## 2017-06-26 LAB
ALBUMIN SERPL BCP-MCNC: 3.4 G/DL
ALLENS TEST: ABNORMAL
ALP SERPL-CCNC: 76 U/L
ALT SERPL W/O P-5'-P-CCNC: 37 U/L
AMMONIA PLAS-SCNC: 74 UMOL/L
AMPHET+METHAMPHET UR QL: NEGATIVE
ANION GAP SERPL CALC-SCNC: 7 MMOL/L
AST SERPL-CCNC: 40 U/L
BACTERIA #/AREA URNS HPF: NORMAL /HPF
BARBITURATES UR QL SCN>200 NG/ML: NEGATIVE
BASOPHILS # BLD AUTO: 0.01 K/UL
BASOPHILS NFR BLD: 0.1 %
BENZODIAZ UR QL SCN>200 NG/ML: NEGATIVE
BILIRUB SERPL-MCNC: 0.8 MG/DL
BILIRUB UR QL STRIP: NEGATIVE
BNP SERPL-MCNC: 11 PG/ML
BUN SERPL-MCNC: 12 MG/DL
BZE UR QL SCN: NORMAL
CALCIUM SERPL-MCNC: 9.4 MG/DL
CANNABINOIDS UR QL SCN: NEGATIVE
CHLORIDE SERPL-SCNC: 98 MMOL/L
CLARITY UR: ABNORMAL
CO2 SERPL-SCNC: 33 MMOL/L
COLOR UR: YELLOW
CREAT SERPL-MCNC: 0.9 MG/DL
CREAT UR-MCNC: 47.4 MG/DL
DELSYS: ABNORMAL
DIFFERENTIAL METHOD: ABNORMAL
EOSINOPHIL # BLD AUTO: 0.1 K/UL
EOSINOPHIL NFR BLD: 1.3 %
ERYTHROCYTE [DISTWIDTH] IN BLOOD BY AUTOMATED COUNT: 12.1 %
EST. GFR  (AFRICAN AMERICAN): >60 ML/MIN/1.73 M^2
EST. GFR  (NON AFRICAN AMERICAN): >60 ML/MIN/1.73 M^2
GLUCOSE SERPL-MCNC: 132 MG/DL
GLUCOSE UR QL STRIP: NEGATIVE
HCO3 UR-SCNC: 35.3 MMOL/L (ref 24–28)
HCT VFR BLD AUTO: 36.1 %
HGB BLD-MCNC: 12.2 G/DL
HGB UR QL STRIP: NEGATIVE
HYALINE CASTS #/AREA URNS LPF: 0 /LPF
KETONES UR QL STRIP: NEGATIVE
LEUKOCYTE ESTERASE UR QL STRIP: NEGATIVE
LYMPHOCYTES # BLD AUTO: 2.5 K/UL
LYMPHOCYTES NFR BLD: 30.6 %
MCH RBC QN AUTO: 30.3 PG
MCHC RBC AUTO-ENTMCNC: 33.8 %
MCV RBC AUTO: 90 FL
METHADONE UR QL SCN>300 NG/ML: NEGATIVE
MICROSCOPIC COMMENT: NORMAL
MONOCYTES # BLD AUTO: 0.5 K/UL
MONOCYTES NFR BLD: 5.8 %
NEUTROPHILS # BLD AUTO: 5.1 K/UL
NEUTROPHILS NFR BLD: 62.1 %
NITRITE UR QL STRIP: NEGATIVE
OPIATES UR QL SCN: NORMAL
PCO2 BLDA: 56.7 MMHG (ref 35–45)
PCP UR QL SCN>25 NG/ML: NEGATIVE
PH SMN: 7.4 [PH] (ref 7.35–7.45)
PH UR STRIP: 7 [PH] (ref 5–8)
PLATELET # BLD AUTO: 223 K/UL
PMV BLD AUTO: 9.5 FL
PO2 BLDA: 59 MMHG (ref 80–100)
POC BE: 10 MMOL/L
POC SATURATED O2: 89 % (ref 95–100)
POC TCO2: 37 MMOL/L (ref 23–27)
POCT GLUCOSE: 116 MG/DL (ref 70–110)
POCT GLUCOSE: 148 MG/DL (ref 70–110)
POTASSIUM SERPL-SCNC: 3.8 MMOL/L
PROT SERPL-MCNC: 8.1 G/DL
PROT UR QL STRIP: ABNORMAL
RBC # BLD AUTO: 4.03 M/UL
RBC #/AREA URNS HPF: 3 /HPF (ref 0–4)
SAMPLE: ABNORMAL
SITE: ABNORMAL
SODIUM SERPL-SCNC: 138 MMOL/L
SP GR UR STRIP: 1.01 (ref 1–1.03)
TOXICOLOGY INFORMATION: NORMAL
TROPONIN I SERPL DL<=0.01 NG/ML-MCNC: 0.01 NG/ML
URN SPEC COLLECT METH UR: ABNORMAL
UROBILINOGEN UR STRIP-ACNC: NEGATIVE EU/DL
WBC # BLD AUTO: 8.15 K/UL
WBC #/AREA URNS HPF: 2 /HPF (ref 0–5)

## 2017-06-26 PROCEDURE — 96374 THER/PROPH/DIAG INJ IV PUSH: CPT

## 2017-06-26 PROCEDURE — 25000242 PHARM REV CODE 250 ALT 637 W/ HCPCS: Performed by: PHYSICIAN ASSISTANT

## 2017-06-26 PROCEDURE — 36600 WITHDRAWAL OF ARTERIAL BLOOD: CPT

## 2017-06-26 PROCEDURE — 82140 ASSAY OF AMMONIA: CPT

## 2017-06-26 PROCEDURE — 25000242 PHARM REV CODE 250 ALT 637 W/ HCPCS: Performed by: EMERGENCY MEDICINE

## 2017-06-26 PROCEDURE — 83880 ASSAY OF NATRIURETIC PEPTIDE: CPT

## 2017-06-26 PROCEDURE — 94640 AIRWAY INHALATION TREATMENT: CPT | Mod: 76

## 2017-06-26 PROCEDURE — 82962 GLUCOSE BLOOD TEST: CPT

## 2017-06-26 PROCEDURE — 25000003 PHARM REV CODE 250: Performed by: PHYSICIAN ASSISTANT

## 2017-06-26 PROCEDURE — G0378 HOSPITAL OBSERVATION PER HR: HCPCS

## 2017-06-26 PROCEDURE — 94761 N-INVAS EAR/PLS OXIMETRY MLT: CPT

## 2017-06-26 PROCEDURE — 63600175 PHARM REV CODE 636 W HCPCS: Performed by: HOSPITALIST

## 2017-06-26 PROCEDURE — 94645 CONT INHLJ TX EACH ADDL HOUR: CPT

## 2017-06-26 PROCEDURE — 84484 ASSAY OF TROPONIN QUANT: CPT

## 2017-06-26 PROCEDURE — 94640 AIRWAY INHALATION TREATMENT: CPT

## 2017-06-26 PROCEDURE — 80053 COMPREHEN METABOLIC PANEL: CPT

## 2017-06-26 PROCEDURE — 94644 CONT INHLJ TX 1ST HOUR: CPT

## 2017-06-26 PROCEDURE — 25000003 PHARM REV CODE 250: Performed by: STUDENT IN AN ORGANIZED HEALTH CARE EDUCATION/TRAINING PROGRAM

## 2017-06-26 PROCEDURE — 63600175 PHARM REV CODE 636 W HCPCS: Performed by: PHYSICIAN ASSISTANT

## 2017-06-26 PROCEDURE — 99285 EMERGENCY DEPT VISIT HI MDM: CPT | Mod: 25

## 2017-06-26 PROCEDURE — 80307 DRUG TEST PRSMV CHEM ANLYZR: CPT

## 2017-06-26 PROCEDURE — 93010 ELECTROCARDIOGRAM REPORT: CPT | Mod: ,,, | Performed by: INTERNAL MEDICINE

## 2017-06-26 PROCEDURE — 63600175 PHARM REV CODE 636 W HCPCS

## 2017-06-26 PROCEDURE — 93005 ELECTROCARDIOGRAM TRACING: CPT

## 2017-06-26 PROCEDURE — 27000221 HC OXYGEN, UP TO 24 HOURS

## 2017-06-26 PROCEDURE — 81000 URINALYSIS NONAUTO W/SCOPE: CPT

## 2017-06-26 PROCEDURE — 63600175 PHARM REV CODE 636 W HCPCS: Performed by: STUDENT IN AN ORGANIZED HEALTH CARE EDUCATION/TRAINING PROGRAM

## 2017-06-26 PROCEDURE — 85025 COMPLETE CBC W/AUTO DIFF WBC: CPT

## 2017-06-26 PROCEDURE — 36415 COLL VENOUS BLD VENIPUNCTURE: CPT

## 2017-06-26 PROCEDURE — 25000242 PHARM REV CODE 250 ALT 637 W/ HCPCS: Performed by: STUDENT IN AN ORGANIZED HEALTH CARE EDUCATION/TRAINING PROGRAM

## 2017-06-26 PROCEDURE — 82803 BLOOD GASES ANY COMBINATION: CPT

## 2017-06-26 PROCEDURE — 96375 TX/PRO/DX INJ NEW DRUG ADDON: CPT

## 2017-06-26 RX ORDER — AMOXICILLIN AND CLAVULANATE POTASSIUM 875; 125 MG/1; MG/1
1 TABLET, FILM COATED ORAL EVERY 12 HOURS
Status: DISCONTINUED | OUTPATIENT
Start: 2017-06-26 | End: 2017-06-27 | Stop reason: HOSPADM

## 2017-06-26 RX ORDER — LOPERAMIDE HYDROCHLORIDE 2 MG/1
2 CAPSULE ORAL 4 TIMES DAILY PRN
Status: DISCONTINUED | OUTPATIENT
Start: 2017-06-26 | End: 2017-06-27 | Stop reason: HOSPADM

## 2017-06-26 RX ORDER — ONDANSETRON 2 MG/ML
4 INJECTION INTRAMUSCULAR; INTRAVENOUS EVERY 6 HOURS PRN
Status: DISCONTINUED | OUTPATIENT
Start: 2017-06-26 | End: 2017-06-27 | Stop reason: HOSPADM

## 2017-06-26 RX ORDER — METHYLPREDNISOLONE SOD SUCC 125 MG
125 VIAL (EA) INJECTION
Status: COMPLETED | OUTPATIENT
Start: 2017-06-26 | End: 2017-06-26

## 2017-06-26 RX ORDER — IPRATROPIUM BROMIDE AND ALBUTEROL SULFATE 2.5; .5 MG/3ML; MG/3ML
3 SOLUTION RESPIRATORY (INHALATION) EVERY 4 HOURS
Status: DISCONTINUED | OUTPATIENT
Start: 2017-06-26 | End: 2017-06-27 | Stop reason: HOSPADM

## 2017-06-26 RX ORDER — IPRATROPIUM BROMIDE 0.5 MG/2.5ML
500 SOLUTION RESPIRATORY (INHALATION)
Status: COMPLETED | OUTPATIENT
Start: 2017-06-26 | End: 2017-06-26

## 2017-06-26 RX ORDER — ONDANSETRON 2 MG/ML
INJECTION INTRAMUSCULAR; INTRAVENOUS
Status: DISPENSED
Start: 2017-06-26 | End: 2017-06-27

## 2017-06-26 RX ORDER — PREDNISONE 20 MG/1
60 TABLET ORAL
Status: DISCONTINUED | OUTPATIENT
Start: 2017-06-26 | End: 2017-06-26

## 2017-06-26 RX ORDER — NALOXONE HCL 0.4 MG/ML
0.4 VIAL (ML) INJECTION ONCE
Status: COMPLETED | OUTPATIENT
Start: 2017-06-26 | End: 2017-06-26

## 2017-06-26 RX ORDER — ONDANSETRON 4 MG/1
4 TABLET, ORALLY DISINTEGRATING ORAL EVERY 6 HOURS PRN
Status: DISCONTINUED | OUTPATIENT
Start: 2017-06-26 | End: 2017-06-27 | Stop reason: HOSPADM

## 2017-06-26 RX ORDER — TIOTROPIUM BROMIDE 18 UG/1
18 CAPSULE ORAL; RESPIRATORY (INHALATION) DAILY
Status: DISCONTINUED | OUTPATIENT
Start: 2017-06-26 | End: 2017-06-27 | Stop reason: HOSPADM

## 2017-06-26 RX ORDER — NALOXONE HCL 0.4 MG/ML
VIAL (ML) INJECTION
Status: COMPLETED
Start: 2017-06-26 | End: 2017-06-26

## 2017-06-26 RX ORDER — SODIUM CHLORIDE 0.9 % (FLUSH) 0.9 %
3 SYRINGE (ML) INJECTION EVERY 8 HOURS
Status: DISCONTINUED | OUTPATIENT
Start: 2017-06-26 | End: 2017-06-27 | Stop reason: HOSPADM

## 2017-06-26 RX ORDER — CLONIDINE HYDROCHLORIDE 0.3 MG/1
0.3 TABLET ORAL 3 TIMES DAILY
Status: DISCONTINUED | OUTPATIENT
Start: 2017-06-26 | End: 2017-06-27 | Stop reason: HOSPADM

## 2017-06-26 RX ORDER — ALBUTEROL SULFATE 0.83 MG/ML
10 SOLUTION RESPIRATORY (INHALATION)
Status: COMPLETED | OUTPATIENT
Start: 2017-06-26 | End: 2017-06-26

## 2017-06-26 RX ORDER — PROMETHAZINE HYDROCHLORIDE 25 MG/1
25 TABLET ORAL EVERY 6 HOURS PRN
Status: DISCONTINUED | OUTPATIENT
Start: 2017-06-26 | End: 2017-06-26

## 2017-06-26 RX ORDER — IPRATROPIUM BROMIDE AND ALBUTEROL SULFATE 2.5; .5 MG/3ML; MG/3ML
3 SOLUTION RESPIRATORY (INHALATION)
Status: DISCONTINUED | OUTPATIENT
Start: 2017-06-26 | End: 2017-06-26

## 2017-06-26 RX ORDER — DICYCLOMINE HYDROCHLORIDE 10 MG/1
10 CAPSULE ORAL EVERY 6 HOURS PRN
Status: DISCONTINUED | OUTPATIENT
Start: 2017-06-26 | End: 2017-06-27 | Stop reason: HOSPADM

## 2017-06-26 RX ORDER — ENOXAPARIN SODIUM 100 MG/ML
40 INJECTION SUBCUTANEOUS EVERY 24 HOURS
Status: DISCONTINUED | OUTPATIENT
Start: 2017-06-26 | End: 2017-06-27 | Stop reason: HOSPADM

## 2017-06-26 RX ORDER — FLUTICASONE FUROATE AND VILANTEROL 200; 25 UG/1; UG/1
1 POWDER RESPIRATORY (INHALATION) DAILY
Status: DISCONTINUED | OUTPATIENT
Start: 2017-06-26 | End: 2017-06-27 | Stop reason: HOSPADM

## 2017-06-26 RX ORDER — PROMETHAZINE HYDROCHLORIDE 25 MG/ML
25 INJECTION, SOLUTION INTRAMUSCULAR; INTRAVENOUS EVERY 6 HOURS PRN
Status: DISCONTINUED | OUTPATIENT
Start: 2017-06-26 | End: 2017-06-27 | Stop reason: HOSPADM

## 2017-06-26 RX ADMIN — PROMETHAZINE HYDROCHLORIDE 25 MG: 25 INJECTION INTRAMUSCULAR; INTRAVENOUS at 04:06

## 2017-06-26 RX ADMIN — IPRATROPIUM BROMIDE 500 MCG: 0.5 SOLUTION RESPIRATORY (INHALATION) at 09:06

## 2017-06-26 RX ADMIN — IPRATROPIUM BROMIDE AND ALBUTEROL SULFATE 3 ML: .5; 3 SOLUTION RESPIRATORY (INHALATION) at 11:06

## 2017-06-26 RX ADMIN — DICYCLOMINE HYDROCHLORIDE 10 MG: 10 CAPSULE ORAL at 09:06

## 2017-06-26 RX ADMIN — ONDANSETRON 4 MG: 2 INJECTION INTRAMUSCULAR; INTRAVENOUS at 09:06

## 2017-06-26 RX ADMIN — ALBUTEROL SULFATE 10 MG: 2.5 SOLUTION RESPIRATORY (INHALATION) at 12:06

## 2017-06-26 RX ADMIN — CLONIDINE HYDROCHLORIDE 0.3 MG: 0.3 TABLET ORAL at 09:06

## 2017-06-26 RX ADMIN — IPRATROPIUM BROMIDE AND ALBUTEROL SULFATE 3 ML: .5; 3 SOLUTION RESPIRATORY (INHALATION) at 05:06

## 2017-06-26 RX ADMIN — METHYLPREDNISOLONE SODIUM SUCCINATE 125 MG: 125 INJECTION, POWDER, FOR SOLUTION INTRAMUSCULAR; INTRAVENOUS at 10:06

## 2017-06-26 RX ADMIN — SODIUM CHLORIDE, PRESERVATIVE FREE 3 ML: 5 INJECTION INTRAVENOUS at 10:06

## 2017-06-26 RX ADMIN — PROMETHAZINE HYDROCHLORIDE 12.5 MG: 25 INJECTION INTRAMUSCULAR; INTRAVENOUS at 10:06

## 2017-06-26 RX ADMIN — IPRATROPIUM BROMIDE AND ALBUTEROL SULFATE 3 ML: .5; 3 SOLUTION RESPIRATORY (INHALATION) at 08:06

## 2017-06-26 RX ADMIN — FLUTICASONE FUROATE AND VILANTEROL TRIFENATATE 1 PUFF: 200; 25 POWDER RESPIRATORY (INHALATION) at 05:06

## 2017-06-26 RX ADMIN — Medication 0.4 MG: at 02:06

## 2017-06-26 RX ADMIN — ALBUTEROL SULFATE 10 MG: 2.5 SOLUTION RESPIRATORY (INHALATION) at 09:06

## 2017-06-26 RX ADMIN — AZITHROMYCIN MONOHYDRATE 500 MG: 500 INJECTION, POWDER, LYOPHILIZED, FOR SOLUTION INTRAVENOUS at 07:06

## 2017-06-26 RX ADMIN — ENOXAPARIN SODIUM 40 MG: 100 INJECTION SUBCUTANEOUS at 04:06

## 2017-06-26 RX ADMIN — NALOXONE HYDROCHLORIDE 0.4 MG: 0.4 INJECTION, SOLUTION INTRAMUSCULAR; INTRAVENOUS; SUBCUTANEOUS at 02:06

## 2017-06-26 RX ADMIN — AMOXICILLIN AND CLAVULANATE POTASSIUM 1 TABLET: 875; 125 TABLET, FILM COATED ORAL at 08:06

## 2017-06-26 NOTE — ED NOTES
Report received from ARIANNA Mcintosh. Assumed care of pt. Resting in no acute distress. Will continue to monitor.
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
## 2017-06-26 NOTE — NURSING
Patient arrived to floor. Patient extremely lethargic and opening eyes to vigorous stimulation only. Patient will not keep eyes open longer than 10-20 seconds at a time. MD and charge nurse notified. Vitals WNL and charted per flowsheet.

## 2017-06-26 NOTE — ED TRIAGE NOTES
Pt presents to ED today c/o SOB. Pt reports history of asthma, however home rescue inhaler is not providing relief.

## 2017-06-27 VITALS
HEIGHT: 70 IN | RESPIRATION RATE: 18 BRPM | WEIGHT: 235 LBS | OXYGEN SATURATION: 95 % | BODY MASS INDEX: 33.64 KG/M2 | DIASTOLIC BLOOD PRESSURE: 72 MMHG | TEMPERATURE: 99 F | SYSTOLIC BLOOD PRESSURE: 120 MMHG | HEART RATE: 105 BPM

## 2017-06-27 LAB
ALBUMIN SERPL BCP-MCNC: 3.3 G/DL
ALP SERPL-CCNC: 72 U/L
ALT SERPL W/O P-5'-P-CCNC: 34 U/L
ANION GAP SERPL CALC-SCNC: 7 MMOL/L
AST SERPL-CCNC: 35 U/L
BASOPHILS # BLD AUTO: 0 K/UL
BASOPHILS NFR BLD: 0 %
BILIRUB SERPL-MCNC: 0.7 MG/DL
BUN SERPL-MCNC: 17 MG/DL
CALCIUM SERPL-MCNC: 10.1 MG/DL
CHLORIDE SERPL-SCNC: 99 MMOL/L
CO2 SERPL-SCNC: 33 MMOL/L
CREAT SERPL-MCNC: 1 MG/DL
DIFFERENTIAL METHOD: ABNORMAL
EOSINOPHIL # BLD AUTO: 0 K/UL
EOSINOPHIL NFR BLD: 0 %
ERYTHROCYTE [DISTWIDTH] IN BLOOD BY AUTOMATED COUNT: 12.3 %
EST. GFR  (AFRICAN AMERICAN): >60 ML/MIN/1.73 M^2
EST. GFR  (NON AFRICAN AMERICAN): >60 ML/MIN/1.73 M^2
GLUCOSE SERPL-MCNC: 126 MG/DL
HCT VFR BLD AUTO: 37.1 %
HGB BLD-MCNC: 12.7 G/DL
LYMPHOCYTES # BLD AUTO: 1 K/UL
LYMPHOCYTES NFR BLD: 8.7 %
MCH RBC QN AUTO: 30.1 PG
MCHC RBC AUTO-ENTMCNC: 34.2 %
MCV RBC AUTO: 88 FL
MONOCYTES # BLD AUTO: 0.9 K/UL
MONOCYTES NFR BLD: 7.6 %
NEUTROPHILS # BLD AUTO: 10 K/UL
NEUTROPHILS NFR BLD: 83.5 %
PLATELET # BLD AUTO: 271 K/UL
PMV BLD AUTO: 10.3 FL
POTASSIUM SERPL-SCNC: 4.1 MMOL/L
PROT SERPL-MCNC: 8.3 G/DL
RBC # BLD AUTO: 4.22 M/UL
SODIUM SERPL-SCNC: 139 MMOL/L
WBC # BLD AUTO: 12.02 K/UL

## 2017-06-27 PROCEDURE — 63600175 PHARM REV CODE 636 W HCPCS: Performed by: STUDENT IN AN ORGANIZED HEALTH CARE EDUCATION/TRAINING PROGRAM

## 2017-06-27 PROCEDURE — 94640 AIRWAY INHALATION TREATMENT: CPT

## 2017-06-27 PROCEDURE — 80053 COMPREHEN METABOLIC PANEL: CPT

## 2017-06-27 PROCEDURE — 25000242 PHARM REV CODE 250 ALT 637 W/ HCPCS: Performed by: STUDENT IN AN ORGANIZED HEALTH CARE EDUCATION/TRAINING PROGRAM

## 2017-06-27 PROCEDURE — 63600175 PHARM REV CODE 636 W HCPCS: Performed by: HOSPITALIST

## 2017-06-27 PROCEDURE — 25000003 PHARM REV CODE 250: Performed by: STUDENT IN AN ORGANIZED HEALTH CARE EDUCATION/TRAINING PROGRAM

## 2017-06-27 PROCEDURE — 36415 COLL VENOUS BLD VENIPUNCTURE: CPT

## 2017-06-27 PROCEDURE — 94761 N-INVAS EAR/PLS OXIMETRY MLT: CPT

## 2017-06-27 PROCEDURE — 27000221 HC OXYGEN, UP TO 24 HOURS

## 2017-06-27 PROCEDURE — 25000242 PHARM REV CODE 250 ALT 637 W/ HCPCS: Performed by: EMERGENCY MEDICINE

## 2017-06-27 PROCEDURE — 85025 COMPLETE CBC W/AUTO DIFF WBC: CPT

## 2017-06-27 PROCEDURE — 94640 AIRWAY INHALATION TREATMENT: CPT | Mod: 76

## 2017-06-27 PROCEDURE — G0378 HOSPITAL OBSERVATION PER HR: HCPCS

## 2017-06-27 RX ORDER — IPRATROPIUM BROMIDE AND ALBUTEROL SULFATE 2.5; .5 MG/3ML; MG/3ML
3 SOLUTION RESPIRATORY (INHALATION) EVERY 6 HOURS PRN
Qty: 10 VIAL | Refills: 3 | Status: SHIPPED | OUTPATIENT
Start: 2017-06-27 | End: 2017-07-07 | Stop reason: SDUPTHER

## 2017-06-27 RX ORDER — ALBUTEROL SULFATE 90 UG/1
1-2 AEROSOL, METERED RESPIRATORY (INHALATION) EVERY 6 HOURS PRN
Qty: 1 INHALER | Refills: 0 | Status: SHIPPED | OUTPATIENT
Start: 2017-06-27 | End: 2017-07-07 | Stop reason: SDUPTHER

## 2017-06-27 RX ORDER — TIOTROPIUM BROMIDE 18 UG/1
18 CAPSULE ORAL; RESPIRATORY (INHALATION) DAILY
Qty: 30 CAPSULE | Refills: 1 | Status: SHIPPED | OUTPATIENT
Start: 2017-06-27 | End: 2017-07-07 | Stop reason: SDUPTHER

## 2017-06-27 RX ORDER — FLUTICASONE FUROATE AND VILANTEROL 200; 25 UG/1; UG/1
1 POWDER RESPIRATORY (INHALATION) DAILY
Qty: 60 EACH | Refills: 1 | Status: SHIPPED | OUTPATIENT
Start: 2017-06-27 | End: 2017-07-07 | Stop reason: ALTCHOICE

## 2017-06-27 RX ORDER — AZITHROMYCIN 250 MG/1
TABLET, FILM COATED ORAL
Qty: 6 TABLET | Refills: 0 | Status: SHIPPED | OUTPATIENT
Start: 2017-06-27 | End: 2020-04-16 | Stop reason: SDUPTHER

## 2017-06-27 RX ORDER — PREDNISONE 20 MG/1
40 TABLET ORAL DAILY
Qty: 6 TABLET | Refills: 0 | Status: SHIPPED | OUTPATIENT
Start: 2017-06-27 | End: 2017-06-30

## 2017-06-27 RX ORDER — AMOXICILLIN AND CLAVULANATE POTASSIUM 875; 125 MG/1; MG/1
1 TABLET, FILM COATED ORAL EVERY 12 HOURS
Qty: 14 TABLET | Refills: 0 | Status: SHIPPED | OUTPATIENT
Start: 2017-06-27 | End: 2017-07-04

## 2017-06-27 RX ADMIN — TIOTROPIUM BROMIDE 18 MCG: 18 CAPSULE ORAL; RESPIRATORY (INHALATION) at 09:06

## 2017-06-27 RX ADMIN — IPRATROPIUM BROMIDE AND ALBUTEROL SULFATE 3 ML: .5; 3 SOLUTION RESPIRATORY (INHALATION) at 04:06

## 2017-06-27 RX ADMIN — CLONIDINE HYDROCHLORIDE 0.3 MG: 0.3 TABLET ORAL at 06:06

## 2017-06-27 RX ADMIN — AMOXICILLIN AND CLAVULANATE POTASSIUM 1 TABLET: 875; 125 TABLET, FILM COATED ORAL at 08:06

## 2017-06-27 RX ADMIN — PREDNISONE 50 MG: 10 TABLET ORAL at 08:06

## 2017-06-27 RX ADMIN — PROMETHAZINE HYDROCHLORIDE 25 MG: 25 INJECTION INTRAMUSCULAR; INTRAVENOUS at 02:06

## 2017-06-27 RX ADMIN — SODIUM CHLORIDE, PRESERVATIVE FREE 3 ML: 5 INJECTION INTRAVENOUS at 06:06

## 2017-06-27 RX ADMIN — FLUTICASONE FUROATE AND VILANTEROL TRIFENATATE 1 PUFF: 200; 25 POWDER RESPIRATORY (INHALATION) at 09:06

## 2017-06-27 RX ADMIN — IPRATROPIUM BROMIDE AND ALBUTEROL SULFATE 3 ML: .5; 3 SOLUTION RESPIRATORY (INHALATION) at 11:06

## 2017-06-27 RX ADMIN — PROMETHAZINE HYDROCHLORIDE 25 MG: 25 INJECTION INTRAMUSCULAR; INTRAVENOUS at 09:06

## 2017-06-27 NOTE — PLAN OF CARE
"TN met with patient. Patient lives with his father. He does not drive, but uses public transportation. Patient is independent at home, but uses oxygen at home (supplied by [facility]). Patient inform TN he is able to afford his medications. He does not have a PCP, but would like a PCP on discharge. TN educated patient on priority care clinic, will contact Anita to schedule appointment.     --TN provided patient with priority care clinic pamphlet and instructed on follow-up date and time.    --Patient reports "I am not gonna have a ride home when I'm discharged." "I don't have any money, can ya'll get me a cab." TN called patient's sister Rosita on facesheet. She stated she will speak with her sister, and they will make sure patient has a ride home.     Asmita HarringtonDctkd-[phone]    Follow-up With  Details  Why  Contact Info   Kenyon Wray MD  Call   called to make appointment. They will call you with a date and time. This is your lung doctor. (Pulmonologist)  [address]  [place] [address]  [phone]   [facility]    This is your oxygen supplier.  [address]  [address]  [place] [address]  [phone]   Ayla Pate MD  On 7/7/2017  Labwork 1st floor at 1:30 pm, then appointment at 2:00 pm. This is the [facility].--This will be your Primary Care Physican visit.  [address]  [address]  [place] [address]  [phone]        06/27/17 1035   Discharge Assessment   Assessment Type Discharge Planning Assessment   Confirmed/corrected address and phone number on facesheet? Yes   Assessment information obtained from? Patient;Medical Record   Expected Length of Stay (days) 1   Communicated expected length of stay with patient/caregiver yes   Type of Healthcare Directive Received Other (Comment)  (None)   Prior to hospitilization cognitive status: Alert/Oriented   Prior to hospitalization functional status: Independent   Current cognitive status: " Alert/Oriented   Current Functional Status: Independent   Arrived From home or self-care   Lives With parent(s)  (lives with his father)   Able to Return to Prior Arrangements yes   Is patient able to care for self after discharge? Yes   Patient's perception of discharge disposition home or selfcare   Readmission Within The Last 30 Days no previous admission in last 30 days   Patient currently being followed by outpatient case management? No   Patient currently receives home health services? No   Does the patient currently use HME? Yes   Equipment Currently Used at Home oxygen;respiratory supplies  (supplied by [facility])   Do you have any problems affording any of your prescribed medications? No   Is the patient taking medications as prescribed? yes   Does the patient have transportation to healthcare appointments? Yes   Transportation Available public transportation   On Dialysis? No   Does the patient receive services at the Coumadin Clinic? No   Discharge Plan A Home with family   Discharge Plan B Home with family;Home Health   Patient/Family In Agreement With Plan yes     Eleni Price RN  Transition Navigator  [phone]

## 2017-06-27 NOTE — PROGRESS NOTES
"U Internal Medicine Resident CAROLAI Progress Note    Subjective:      Ming Harrington is a 51 y.o. male who is being followed by the U Internal Medicine service at [facility] for COPD exacerbation and opiate intoxication. Patient slept well overnight. No issues overnight per nursing. Complaining of heroin withdrawal symptoms consisting of abdominal pain, nausea, and myalgias; but much improved from yesterday afternoon. Denies any respiratory complaints this AM. Denies CP, SOB, diarrhea, increased cough, constipation, fever, chills.     Objective:   Last 24 Hour Vital Signs:  BP  Min: 123/78  Max: 175/85  Temp  Av.6 °F (37 °C)  Min: 98.2 °F (36.8 °C)  Max: 98.7 °F (37.1 °C)  Pulse  Av.5  Min: 88  Max: 119  Resp  Av.5  Min: 13  Max: 34  SpO2  Av.1 %  Min: 83 %  Max: 100 %  Height  Av' 10" (177.8 cm)  Min: 5' 10" (177.8 cm)  Max: 5' 10" (177.8 cm)  Weight  Av.6 kg (235 lb)  Min: 106.6 kg (235 lb)  Max: 106.6 kg (235 lb)  Body mass index is 33.72 kg/m².  I/O last 3 completed shifts:  In: 305 [P.O.:305]  Out: 400 [Urine:400]    Physical Examination:  BP (!) 144/83   Pulse 105   Temp 98.7 °F (37.1 °C) (Oral)   Resp 18   Ht 5' 10" (1.778 m)   Wt 106.6 kg (235 lb)   SpO2 97%   BMI 33.72 kg/m²     General Appearance:    No distress, resting comfortably, speaking in full sentences, alert and oriented     Head:    Normocephalic, without obvious abnormality, atraumatic   Eyes:    PERRL, conjunctiva/corneas clear, EOM's intact    Throat:   Lips, mucosa, and tongue normal; teeth and gums normal   Neck:   Supple, symmetrical, trachea midline   Lungs:     Wheezes throughout, prolonged expiratory phase, no rales, not in respiratory distress   Heart:    Tachycardic and regular rhythm, S1 and S2 normal, no murmur appreciated    Abdomen:     Soft, non-tender, bowel sounds active all four quadrants, midline ventral hernia   Extremities:  no cyanosis or edema   Pulses:   2+ and " symmetric all extremities   Skin:   Multiple indurated and erythematous wounds/needle marks on bilateral upper extremities   Neurologic:   No focal deficits. Normal strength and sensation       Laboratory:  Most Recent Data:  CBC:   Lab Results   Component Value Date    WBC 12.02 06/27/2017    HGB 12.7 (L) 06/27/2017    HCT 37.1 (L) 06/27/2017     06/27/2017    MCV 88 06/27/2017    RDW 12.3 06/27/2017     WBC Differential: 62.1 % N, 0 % Bands, 30.6 % L, 5.8 % M, 0.5 % Eo, 0.01 % Baso, 0 additional cells seen  BMP: Lab Results   Component Value Date     06/26/2017    K 3.8 06/26/2017    CL 98 06/26/2017    CO2 33 (H) 06/26/2017    BUN 12 06/26/2017    CREATININE 0.9 06/26/2017     (H) 06/26/2017    CALCIUM 9.4 06/26/2017     Cardiac:   Lab Results   Component Value Date    TROPONINI 0.012 06/26/2017    BNP 11 06/26/2017   Trended Lab Data:    Recent Labs  Lab 06/26/17  1024 06/27/17  0352   WBC 8.15 12.02   HGB 12.2* 12.7*   HCT 36.1* 37.1*    271   MCV 90 88   RDW 12.1 12.3    139   K 3.8 4.1   CL 98 99   CO2 33* 33*   BUN 12 17   CREATININE 0.9 1.0   * 126*   PROT 8.1 8.3   ALBUMIN 3.4* 3.3*   BILITOT 0.8 0.7   AST 40 35   ALKPHOS 76 72   ALT 37 34       Trended Cardiac Data:    Recent Labs  Lab 06/26/17  1024   TROPONINI 0.012   BNP 11       Other Results:  EKG (my interpretation): NSR, QTc 482    Radiology:  Imaging Results          X-Ray Chest AP Portable (Final result)  Result time 06/26/17 10:20:13    Final result by Ace Dolan MD (06/26/17 10:20:13)                 Impression:      No acute cardiopulmonary process identified.      Electronically signed by: ACE DOLAN MD  Date:     06/26/17  Time:    10:20              Narrative:    Chest AP single view.  Comparison: 6/4/17.    Mediastinal structures are midline.  Cardiac silhouette is normal in size.  Lungs are symmetrically expanded.  No evidence of focal consolidative process, pneumothorax, or significant  effusion.  The bones show DJD.  No free air visualized beneath the diaphragm.                               Assessment:     Ming Harrington is a 51 y.o. 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 07/21/2014    Hydrocephalus, adult 07/21/2014    Tremor, involuntary spasm, or fasciculation 07/21/2014    Tobacco use disorder 01/16/2014    Hepatitis C 02/06/2013    Chronic obstructive pulmonary disease     HTN (hypertension)         Plan:     Polysubstance Abuse with Opiate Withdrawal  - daily heroin use for years  states that he goes through withdrawals ~1day after not using  UTox also positive for cocaine  tobacco and occasional alcohol use as well  - counseled on importance of cessation  patient not willing to quit at this time   - required 1 dose of narcan once transferred to floor for increased lethargy  responded appropriately  now awake and alert, n/v, abdominal pain, myalgias  required transfer to ICU for closer monitoring with q1 hr neurochecks   - likely transfer out of ICU today with possible discharge later  - PRN bentyl, zofran, phenergan, clonidine    COPD Exacerbation  - 3-4 days of progressive SOB and productive cough  symptoms similar to previous episodes  recently presented to ED for similar presentation  - received solumedrol IV in ED and nebs with some relief  only using rescue inhaler for the past week  out of Spiriva and Advair  inhalers  continues to smoke  - pH 7.40  PCO2 57  PO2 59  bicarb 35  - no leukocytosis or bandemia  CXR without evidence of infection  no diaphragmatic flattening   - Prednisone 50mg daily for 5 days  duonebs q3 while awake  azithromycin 5 day course  Spiriva and Breo   - f/u daily peak flows   - improvement overnight  will need pulm follow up on discharge as well as rx refills  - continue to monitor for improvement     Soft Tissue Infection  - secondary to IVDU  multiple indurated wounds with some erythema  no leukocytosis or bandemia   - course of Augmentin for 10 days     HTN  - states that he is on clonidine 0.3mg TID   - will continue medication while inpatient and continue to monitor vitals    DVT ppx: lovenox  FEN: no IVF  replace PRN  regular    Dispo: likely transfer out of ICU today, possibly discharge in afternoon if respiratory status continues to improve      Carlos LOPEZ Garrett  [facility] Internal Medicine HO-I  [facility] Internal Medicine Service Team B    [facility] Medicine Hospitalist Pager numbers:   [facility] Hospitalist Medicine Team A (Boyd/Michael): [phone]  [facility] Hospitalist Medicine Team B (Dayanna/Danielle):  [phone]

## 2017-06-27 NOTE — PLAN OF CARE
Problem: Patient Care Overview  Goal: Plan of Care Review  Pt received on nasal cannula at  2 lpm. SPO2  98 %.  Pt in no apparent respiratory distress.  Will continue to monitor.

## 2017-06-27 NOTE — NURSING
Patient AAOX3. Patient yelling at staff about narcan being adminsitered. States he didn't want that medication and we woke him up. Explained importance of medication and risks if medication was not administered.

## 2017-06-27 NOTE — PLAN OF CARE
"Patient discharged to home, no needs noted upon discharge.    TN discussed with patient discharging planning. TN informed patient she spoke with his sister Rosita, and she stated that her and her sister will provide discharge transportation. TN will call sister Asmita when patient is ready for discharge. Patient also wears 02 at home. TN spoke with patient regarding needing oxygen at transport for discharge, and if his sister will be able to bring it. Patient responded, "I don't need it okay, don't worry about it!" TN informed patient's nurse Zack to notify when discharged, so sister can be called.     --TN called Asmita, no answer.     Asmita [id number]    --TN called and spoke with patient's sister Rosita. She was made aware patient is discharged today, but MD team would like to round on him first. TN will call Rosita back and tell her time patient can be picked up. She will notify her sister Asmita. Rosita is aware patient's home oxygen needs to be brought to the hospital as well for her brother. TN will update sisters.    Rosita Lopez Sister   [phone]     --Update: TN called patient's sister Rosita to inform her patient is discharged, and he is ready to be picked up. Patient is calling sisters as well to inform them of discharge.    --Update: TN noticed patient was discharged out of system. TN contact patient's nurse Zack. He said patient did not want to be in the hospital anymore and wanted to wait outside. Patient's nurse is aware patient is on home 02. TN attempted to contact Asmita, no response. Will contact Rosita.     --TN contact Rosita, she stated Asmita will be at the hospital in 10 minutes. She is aware about patient.    Future Appointments  Date Time Provider Department Center   7/7/2017 2:00 PM Ayla Pate MD [facility]       Follow-up With  Details  Why  Contact Info   Kenyon Wray MD  Call   called to make appointment. They will call " you with a date and time. This is your lung doctor. (Pulmonologist)  [address]  [facility] [address]  [phone]   [facility]    This is your oxygen supplier.  [address]  [address]  [facility] [address]  [phone]   Ayla Pate MD  On 7/7/2017  Labwork 1st floor at 1:30 pm, then appointment at 2:00 pm. This is [facility].--This will be your Primary Care Physican visit.  [address]  [address]  [facility] [address]  [phone]            06/27/17 1308   Final Note   Assessment Type Final Discharge Note   Discharge Disposition Home   Discharge planning education complete? Yes   Hospital Follow Up  Appt(s) scheduled? Yes   Discharge plans and expectations educations in teach back method with documentation complete? Yes   Offered [facility] -- Bedside Delivery? Yes   Discharge/Hospital Encounter Summary to (non-[facility]) PCP n/a   Referral to Outpatient Case Management complete? n/a   Referral to / orders for Home Health Complete? n/a   30 day supply of medicines given at discharge, if documented non-compliance / non-adherence? n/a   Did you assess the readiness or willingness of the family or caregiver to support self management of care? Yes   Right Care Referral Info   Post Acute Recommendation No Care     Eleni Price RN  Transition Navigator  [phone]

## 2017-06-29 RX ORDER — FUROSEMIDE 40 MG/1
TABLET ORAL
Qty: 60 TABLET | Refills: 6 | Status: SHIPPED | OUTPATIENT
Start: 2017-06-29 | End: 2017-08-30 | Stop reason: SDUPTHER

## 2017-07-07 ENCOUNTER — OFFICE VISIT (OUTPATIENT)
Dept: PRIMARY CARE CLINIC | Facility: CLINIC | Age: 52
End: 2017-07-07
Payer: MEDICAID

## 2017-07-07 ENCOUNTER — HOSPITAL ENCOUNTER (OUTPATIENT)
Dept: RADIOLOGY | Facility: HOSPITAL | Age: 52
Discharge: HOME | End: 2017-07-07
Attending: INTERNAL MEDICINE
Payer: MEDICAID

## 2017-07-07 VITALS
TEMPERATURE: 99 F | OXYGEN SATURATION: 96 % | WEIGHT: 253.5 LBS | BODY MASS INDEX: 36.38 KG/M2 | DIASTOLIC BLOOD PRESSURE: 73 MMHG | HEART RATE: 87 BPM | SYSTOLIC BLOOD PRESSURE: 113 MMHG

## 2017-07-07 DIAGNOSIS — F19.10 IV DRUG ABUSE: ICD-10-CM

## 2017-07-07 DIAGNOSIS — J44.0 CHRONIC OBSTRUCTIVE PULMONARY DISEASE WITH ACUTE LOWER RESPIRATORY INFECTION: Primary | ICD-10-CM

## 2017-07-07 DIAGNOSIS — R68.83 CHILLS (WITHOUT FEVER): ICD-10-CM

## 2017-07-07 DIAGNOSIS — F11.10 HEROIN ABUSE: ICD-10-CM

## 2017-07-07 DIAGNOSIS — R05.8 COUGH WITH EXPECTORATION: ICD-10-CM

## 2017-07-07 DIAGNOSIS — R61 NIGHT SWEAT: ICD-10-CM

## 2017-07-07 PROCEDURE — 71020 XR CHEST PA AND LATERAL: CPT | Mod: 26,,, | Performed by: RADIOLOGY

## 2017-07-07 PROCEDURE — 99214 OFFICE O/P EST MOD 30 MIN: CPT | Mod: S$PBB,,, | Performed by: INTERNAL MEDICINE

## 2017-07-07 PROCEDURE — 99213 OFFICE O/P EST LOW 20 MIN: CPT | Mod: PBBFAC,25,PO | Performed by: INTERNAL MEDICINE

## 2017-07-07 PROCEDURE — 99999 PR PBB SHADOW E&M-EST. PATIENT-LVL III: CPT | Mod: PBBFAC,,, | Performed by: INTERNAL MEDICINE

## 2017-07-07 PROCEDURE — 71020 XR CHEST PA AND LATERAL: CPT | Mod: TC

## 2017-07-07 RX ORDER — ALBUTEROL SULFATE 90 UG/1
1-2 AEROSOL, METERED RESPIRATORY (INHALATION) EVERY 6 HOURS PRN
Qty: 1 INHALER | Refills: 0 | Status: SHIPPED | OUTPATIENT
Start: 2017-07-07 | End: 2017-08-30 | Stop reason: SDUPTHER

## 2017-07-07 RX ORDER — METFORMIN HYDROCHLORIDE 500 MG/1
500 TABLET ORAL 2 TIMES DAILY
COMMUNITY
Start: 2017-06-20 | End: 2017-09-13 | Stop reason: SDUPTHER

## 2017-07-07 RX ORDER — IPRATROPIUM BROMIDE AND ALBUTEROL SULFATE 2.5; .5 MG/3ML; MG/3ML
3 SOLUTION RESPIRATORY (INHALATION) EVERY 6 HOURS PRN
Qty: 3 ML | Refills: 6 | Status: SHIPPED | OUTPATIENT
Start: 2017-07-07 | End: 2017-08-30 | Stop reason: SDUPTHER

## 2017-07-07 RX ORDER — OLANZAPINE 10 MG/1
10 TABLET ORAL 2 TIMES DAILY
COMMUNITY
Start: 2017-05-25 | End: 2017-09-13 | Stop reason: SDUPTHER

## 2017-07-07 RX ORDER — TIOTROPIUM BROMIDE 18 UG/1
18 CAPSULE ORAL; RESPIRATORY (INHALATION) DAILY
Qty: 30 CAPSULE | Refills: 1 | Status: SHIPPED | OUTPATIENT
Start: 2017-07-07 | End: 2017-09-13 | Stop reason: SDUPTHER

## 2017-07-07 RX ORDER — LEVOFLOXACIN 750 MG/1
750 TABLET ORAL DAILY
Qty: 7 TABLET | Refills: 0 | Status: SHIPPED | OUTPATIENT
Start: 2017-07-07 | End: 2017-07-14
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
## 2017-07-21 ENCOUNTER — HOSPITAL ENCOUNTER (INPATIENT)
Facility: HOSPITAL | Age: 52
LOS: 2 days | Discharge: HOME | DRG: 442 | End: 2017-07-23
Attending: EMERGENCY MEDICINE | Admitting: FAMILY MEDICINE
Payer: MEDICAID

## 2017-07-21 DIAGNOSIS — J96.02 ACUTE RESPIRATORY FAILURE WITH HYPOXIA AND HYPERCARBIA: Primary | ICD-10-CM

## 2017-07-21 DIAGNOSIS — B19.20 HEPATITIS C VIRUS INFECTION, UNSPECIFIED CHRONICITY: ICD-10-CM

## 2017-07-21 DIAGNOSIS — F19.10 POLYSUBSTANCE ABUSE: ICD-10-CM

## 2017-07-21 DIAGNOSIS — K72.91 HEPATIC COMA/ENCEPHALOPATHY: ICD-10-CM

## 2017-07-21 DIAGNOSIS — J44.1 COPD EXACERBATION: ICD-10-CM

## 2017-07-21 DIAGNOSIS — K76.82 HEPATIC ENCEPHALOPATHY: ICD-10-CM

## 2017-07-21 DIAGNOSIS — R06.02 SHORTNESS OF BREATH: ICD-10-CM

## 2017-07-21 DIAGNOSIS — J96.01 ACUTE RESPIRATORY FAILURE WITH HYPOXIA AND HYPERCARBIA: Primary | ICD-10-CM

## 2017-07-21 DIAGNOSIS — J96.02 ACUTE RESPIRATORY ACIDOSIS: ICD-10-CM

## 2017-07-21 DIAGNOSIS — J44.1 COPD WITH ACUTE EXACERBATION: ICD-10-CM

## 2017-07-21 DIAGNOSIS — G93.40 ENCEPHALOPATHY: ICD-10-CM

## 2017-07-21 LAB
ALBUMIN SERPL BCP-MCNC: 3.3 G/DL
ALLENS TEST: ABNORMAL
ALLENS TEST: ABNORMAL
ALP SERPL-CCNC: 82 U/L
ALT SERPL W/O P-5'-P-CCNC: 44 U/L
AMMONIA PLAS-SCNC: 81 UMOL/L
AMPHET+METHAMPHET UR QL: NEGATIVE
ANION GAP SERPL CALC-SCNC: 8 MMOL/L
AST SERPL-CCNC: 83 U/L
BACTERIA #/AREA URNS HPF: ABNORMAL /HPF
BARBITURATES UR QL SCN>200 NG/ML: NEGATIVE
BASOPHILS # BLD AUTO: 0.01 K/UL
BASOPHILS NFR BLD: 0.1 %
BENZODIAZ UR QL SCN>200 NG/ML: NORMAL
BILIRUB SERPL-MCNC: 1.6 MG/DL
BILIRUB UR QL STRIP: NEGATIVE
BNP SERPL-MCNC: 44 PG/ML
BUN SERPL-MCNC: 16 MG/DL
BZE UR QL SCN: NORMAL
CALCIUM SERPL-MCNC: 9 MG/DL
CANNABINOIDS UR QL SCN: NEGATIVE
CHLORIDE SERPL-SCNC: 98 MMOL/L
CLARITY UR: CLEAR
CO2 SERPL-SCNC: 34 MMOL/L
COLOR UR: ABNORMAL
CREAT SERPL-MCNC: 1.2 MG/DL
CREAT UR-MCNC: 109.6 MG/DL
DELSYS: ABNORMAL
DELSYS: ABNORMAL
DIFFERENTIAL METHOD: ABNORMAL
EOSINOPHIL # BLD AUTO: 0.1 K/UL
EOSINOPHIL NFR BLD: 0.8 %
ERYTHROCYTE [DISTWIDTH] IN BLOOD BY AUTOMATED COUNT: 12.5 %
EST. GFR  (AFRICAN AMERICAN): >60 ML/MIN/1.73 M^2
EST. GFR  (NON AFRICAN AMERICAN): >60 ML/MIN/1.73 M^2
ETHANOL SERPL-MCNC: <10 MG/DL
FLOW: 3
GLUCOSE SERPL-MCNC: 125 MG/DL
GLUCOSE UR QL STRIP: NEGATIVE
HCO3 UR-SCNC: 39.3 MMOL/L (ref 24–28)
HCO3 UR-SCNC: 39.9 MMOL/L (ref 24–28)
HCT VFR BLD AUTO: 38.3 %
HGB BLD-MCNC: 12.2 G/DL
HGB UR QL STRIP: NEGATIVE
HYALINE CASTS #/AREA URNS LPF: 0 /LPF
KETONES UR QL STRIP: NEGATIVE
LACTATE SERPL-SCNC: 1 MMOL/L
LEUKOCYTE ESTERASE UR QL STRIP: NEGATIVE
LYMPHOCYTES # BLD AUTO: 1.6 K/UL
LYMPHOCYTES NFR BLD: 17.8 %
MCH RBC QN AUTO: 29.4 PG
MCHC RBC AUTO-ENTMCNC: 31.9 G/DL
MCV RBC AUTO: 92 FL
METHADONE UR QL SCN>300 NG/ML: NEGATIVE
MICROSCOPIC COMMENT: ABNORMAL
MODE: ABNORMAL
MONOCYTES # BLD AUTO: 0.6 K/UL
MONOCYTES NFR BLD: 6.7 %
NEUTROPHILS # BLD AUTO: 6.7 K/UL
NEUTROPHILS NFR BLD: 74.4 %
NITRITE UR QL STRIP: NEGATIVE
OPIATES UR QL SCN: NORMAL
PCO2 BLDA: 88.9 MMHG (ref 35–45)
PCO2 BLDA: 99 MMHG (ref 35–45)
PCP UR QL SCN>25 NG/ML: NEGATIVE
PH SMN: 7.21 [PH] (ref 7.35–7.45)
PH SMN: 7.25 [PH] (ref 7.35–7.45)
PH UR STRIP: 6 [PH] (ref 5–8)
PLATELET # BLD AUTO: 189 K/UL
PMV BLD AUTO: 9.6 FL
PO2 BLDA: 77 MMHG (ref 80–100)
PO2 BLDA: 79 MMHG (ref 80–100)
POC BE: 12 MMOL/L
POC BE: 12 MMOL/L
POC SATURATED O2: 91 % (ref 95–100)
POC SATURATED O2: 92 % (ref 95–100)
POC TCO2: 42 MMOL/L (ref 23–27)
POC TCO2: 43 MMOL/L (ref 23–27)
POCT GLUCOSE: 123 MG/DL (ref 70–110)
POCT GLUCOSE: 149 MG/DL (ref 70–110)
POTASSIUM SERPL-SCNC: 3.7 MMOL/L
PROT SERPL-MCNC: 7.9 G/DL
PROT UR QL STRIP: ABNORMAL
RBC # BLD AUTO: 4.15 M/UL
RBC #/AREA URNS HPF: 2 /HPF (ref 0–4)
SAMPLE: ABNORMAL
SAMPLE: ABNORMAL
SITE: ABNORMAL
SITE: ABNORMAL
SODIUM SERPL-SCNC: 140 MMOL/L
SP GR UR STRIP: 1.02 (ref 1–1.03)
TOXICOLOGY INFORMATION: NORMAL
TROPONIN I SERPL DL<=0.01 NG/ML-MCNC: 0.03 NG/ML
URN SPEC COLLECT METH UR: ABNORMAL
UROBILINOGEN UR STRIP-ACNC: 1 EU/DL
WBC # BLD AUTO: 8.99 K/UL
WBC #/AREA URNS HPF: 3 /HPF (ref 0–5)

## 2017-07-21 PROCEDURE — 25000003 PHARM REV CODE 250: Performed by: EMERGENCY MEDICINE

## 2017-07-21 PROCEDURE — 82803 BLOOD GASES ANY COMBINATION: CPT

## 2017-07-21 PROCEDURE — 63600175 PHARM REV CODE 636 W HCPCS: Performed by: EMERGENCY MEDICINE

## 2017-07-21 PROCEDURE — 94640 AIRWAY INHALATION TREATMENT: CPT

## 2017-07-21 PROCEDURE — 80307 DRUG TEST PRSMV CHEM ANLYZR: CPT

## 2017-07-21 PROCEDURE — 94660 CPAP INITIATION&MGMT: CPT

## 2017-07-21 PROCEDURE — 80053 COMPREHEN METABOLIC PANEL: CPT

## 2017-07-21 PROCEDURE — 85025 COMPLETE CBC W/AUTO DIFF WBC: CPT

## 2017-07-21 PROCEDURE — 83605 ASSAY OF LACTIC ACID: CPT

## 2017-07-21 PROCEDURE — 94644 CONT INHLJ TX 1ST HOUR: CPT

## 2017-07-21 PROCEDURE — 96375 TX/PRO/DX INJ NEW DRUG ADDON: CPT

## 2017-07-21 PROCEDURE — 25000242 PHARM REV CODE 250 ALT 637 W/ HCPCS: Performed by: EMERGENCY MEDICINE

## 2017-07-21 PROCEDURE — 81000 URINALYSIS NONAUTO W/SCOPE: CPT

## 2017-07-21 PROCEDURE — 27000190 HC CPAP FULL FACE MASK W/VALVE

## 2017-07-21 PROCEDURE — 36600 WITHDRAWAL OF ARTERIAL BLOOD: CPT

## 2017-07-21 PROCEDURE — 99285 EMERGENCY DEPT VISIT HI MDM: CPT | Mod: 25

## 2017-07-21 PROCEDURE — 84484 ASSAY OF TROPONIN QUANT: CPT

## 2017-07-21 PROCEDURE — 83880 ASSAY OF NATRIURETIC PEPTIDE: CPT

## 2017-07-21 PROCEDURE — 96374 THER/PROPH/DIAG INJ IV PUSH: CPT

## 2017-07-21 PROCEDURE — 82962 GLUCOSE BLOOD TEST: CPT

## 2017-07-21 PROCEDURE — 99900035 HC TECH TIME PER 15 MIN (STAT)

## 2017-07-21 PROCEDURE — 82140 ASSAY OF AMMONIA: CPT

## 2017-07-21 PROCEDURE — 93005 ELECTROCARDIOGRAM TRACING: CPT

## 2017-07-21 PROCEDURE — 80320 DRUG SCREEN QUANTALCOHOLS: CPT

## 2017-07-21 PROCEDURE — 12000002 HC ACUTE/MED SURGE SEMI-PRIVATE ROOM

## 2017-07-21 PROCEDURE — 63600175 PHARM REV CODE 636 W HCPCS: Performed by: NURSE PRACTITIONER

## 2017-07-21 RX ORDER — IPRATROPIUM BROMIDE AND ALBUTEROL SULFATE 2.5; .5 MG/3ML; MG/3ML
3 SOLUTION RESPIRATORY (INHALATION)
Status: DISCONTINUED | OUTPATIENT
Start: 2017-07-21 | End: 2017-07-21

## 2017-07-21 RX ORDER — ACETAMINOPHEN 325 MG/1
650 TABLET ORAL
Status: COMPLETED | OUTPATIENT
Start: 2017-07-21 | End: 2017-07-21

## 2017-07-21 RX ORDER — METHYLPREDNISOLONE SOD SUCC 125 MG
125 VIAL (EA) INJECTION EVERY 8 HOURS
Status: DISCONTINUED | OUTPATIENT
Start: 2017-07-21 | End: 2017-07-22

## 2017-07-21 RX ORDER — OLANZAPINE 2.5 MG/1
10 TABLET ORAL 2 TIMES DAILY
Status: DISCONTINUED | OUTPATIENT
Start: 2017-07-21 | End: 2017-07-23 | Stop reason: HOSPADM

## 2017-07-21 RX ORDER — LACTULOSE 10 G/15ML
10 SOLUTION ORAL
Status: COMPLETED | OUTPATIENT
Start: 2017-07-21 | End: 2017-07-21

## 2017-07-21 RX ORDER — ONDANSETRON 8 MG/1
8 TABLET, ORALLY DISINTEGRATING ORAL EVERY 8 HOURS PRN
Status: DISCONTINUED | OUTPATIENT
Start: 2017-07-21 | End: 2017-07-23 | Stop reason: HOSPADM

## 2017-07-21 RX ORDER — ALBUTEROL SULFATE 0.83 MG/ML
10 SOLUTION RESPIRATORY (INHALATION)
Status: COMPLETED | OUTPATIENT
Start: 2017-07-21 | End: 2017-07-21

## 2017-07-21 RX ORDER — METHYLPREDNISOLONE SOD SUCC 125 MG
125 VIAL (EA) INJECTION
Status: COMPLETED | OUTPATIENT
Start: 2017-07-21 | End: 2017-07-21

## 2017-07-21 RX ORDER — IPRATROPIUM BROMIDE 0.5 MG/2.5ML
0.5 SOLUTION RESPIRATORY (INHALATION)
Status: COMPLETED | OUTPATIENT
Start: 2017-07-21 | End: 2017-07-21

## 2017-07-21 RX ORDER — ONDANSETRON 2 MG/ML
4 INJECTION INTRAMUSCULAR; INTRAVENOUS
Status: COMPLETED | OUTPATIENT
Start: 2017-07-21 | End: 2017-07-21

## 2017-07-21 RX ORDER — AMOXICILLIN 250 MG
1 CAPSULE ORAL 2 TIMES DAILY
Status: DISCONTINUED | OUTPATIENT
Start: 2017-07-21 | End: 2017-07-23 | Stop reason: HOSPADM

## 2017-07-21 RX ORDER — FAMOTIDINE 20 MG/1
20 TABLET, FILM COATED ORAL 2 TIMES DAILY
Status: DISCONTINUED | OUTPATIENT
Start: 2017-07-21 | End: 2017-07-23 | Stop reason: HOSPADM

## 2017-07-21 RX ORDER — ALBUTEROL SULFATE 0.83 MG/ML
5 SOLUTION RESPIRATORY (INHALATION)
Status: COMPLETED | OUTPATIENT
Start: 2017-07-21 | End: 2017-07-21

## 2017-07-21 RX ORDER — ACETAMINOPHEN 325 MG/1
650 TABLET ORAL EVERY 4 HOURS PRN
Status: DISCONTINUED | OUTPATIENT
Start: 2017-07-21 | End: 2017-07-23 | Stop reason: HOSPADM

## 2017-07-21 RX ORDER — HYDROCODONE BITARTRATE AND ACETAMINOPHEN 5; 325 MG/1; MG/1
1 TABLET ORAL EVERY 4 HOURS PRN
Status: CANCELLED | OUTPATIENT
Start: 2017-07-21

## 2017-07-21 RX ORDER — CLONIDINE HYDROCHLORIDE 0.3 MG/1
0.3 TABLET ORAL 3 TIMES DAILY
Status: DISCONTINUED | OUTPATIENT
Start: 2017-07-21 | End: 2017-07-23 | Stop reason: HOSPADM

## 2017-07-21 RX ORDER — INSULIN ASPART 100 [IU]/ML
0-5 INJECTION, SOLUTION INTRAVENOUS; SUBCUTANEOUS
Status: DISCONTINUED | OUTPATIENT
Start: 2017-07-21 | End: 2017-07-23 | Stop reason: HOSPADM

## 2017-07-21 RX ORDER — FUROSEMIDE 40 MG/1
40 TABLET ORAL 2 TIMES DAILY
Status: DISCONTINUED | OUTPATIENT
Start: 2017-07-21 | End: 2017-07-23 | Stop reason: HOSPADM

## 2017-07-21 RX ORDER — NALOXONE HCL 0.4 MG/ML
0.4 VIAL (ML) INJECTION
Status: DISCONTINUED | OUTPATIENT
Start: 2017-07-21 | End: 2017-07-21

## 2017-07-21 RX ADMIN — ONDANSETRON 4 MG: 2 INJECTION INTRAMUSCULAR; INTRAVENOUS at 06:07

## 2017-07-21 RX ADMIN — ACETAMINOPHEN 650 MG: 325 TABLET ORAL at 06:07

## 2017-07-21 RX ADMIN — METHYLPREDNISOLONE SODIUM SUCCINATE 125 MG: 125 INJECTION, POWDER, FOR SOLUTION INTRAMUSCULAR; INTRAVENOUS at 06:07

## 2017-07-21 RX ADMIN — ALBUTEROL SULFATE 5 MG: 2.5 SOLUTION RESPIRATORY (INHALATION) at 06:07

## 2017-07-21 RX ADMIN — METHYLPREDNISOLONE SODIUM SUCCINATE 125 MG: 125 INJECTION, POWDER, FOR SOLUTION INTRAMUSCULAR; INTRAVENOUS at 10:07

## 2017-07-21 RX ADMIN — LACTULOSE 10 G: 20 SOLUTION ORAL at 08:07

## 2017-07-21 RX ADMIN — ALBUTEROL SULFATE 10 MG: 2.5 SOLUTION RESPIRATORY (INHALATION) at 05:07

## 2017-07-21 RX ADMIN — IPRATROPIUM BROMIDE 0.5 MG: 0.5 SOLUTION RESPIRATORY (INHALATION) at 06:07
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
## 2017-07-21 NOTE — ED NOTES
Pt here c/o worsening SOB. RA sat=85%--pt placed on 2LncO2 and sat inc to 90%. Pt is alert with slurred speech, skin is warm/oily and normal coloring for ethnicity. Breath sounds are diminished throughout.Lower ext with edema. Pt is oriented and lethargic-pt states he took valium today. pt denies recent heroin iv use since he was last hospitalized.  He has multiple scabs from iv drug use to right arm.

## 2017-07-22 PROBLEM — F19.10 POLYSUBSTANCE ABUSE: Status: ACTIVE | Noted: 2017-07-22

## 2017-07-22 LAB
ALBUMIN SERPL BCP-MCNC: 3.2 G/DL
ALP SERPL-CCNC: 78 U/L
ALT SERPL W/O P-5'-P-CCNC: 42 U/L
ANION GAP SERPL CALC-SCNC: 10 MMOL/L
APTT BLDCRRT: 25.6 SEC
AST SERPL-CCNC: 70 U/L
BASOPHILS # BLD AUTO: 0 K/UL
BASOPHILS # BLD AUTO: NORMAL K/UL
BASOPHILS NFR BLD: 0 %
BASOPHILS NFR BLD: NORMAL %
BILIRUB SERPL-MCNC: 0.9 MG/DL
BUN SERPL-MCNC: 14 MG/DL
CALCIUM SERPL-MCNC: 8.6 MG/DL
CHLORIDE SERPL-SCNC: 99 MMOL/L
CK SERPL-CCNC: 904 U/L
CO2 SERPL-SCNC: 32 MMOL/L
CREAT SERPL-MCNC: 1.1 MG/DL
DELSYS: ABNORMAL
DIFFERENTIAL METHOD: ABNORMAL
DIFFERENTIAL METHOD: NORMAL
EOSINOPHIL # BLD AUTO: 0 K/UL
EOSINOPHIL # BLD AUTO: NORMAL K/UL
EOSINOPHIL NFR BLD: 0 %
EOSINOPHIL NFR BLD: NORMAL %
EP: 5
ERYTHROCYTE [DISTWIDTH] IN BLOOD BY AUTOMATED COUNT: 12.5 %
ERYTHROCYTE [DISTWIDTH] IN BLOOD BY AUTOMATED COUNT: NORMAL %
EST. GFR  (AFRICAN AMERICAN): >60 ML/MIN/1.73 M^2
EST. GFR  (NON AFRICAN AMERICAN): >60 ML/MIN/1.73 M^2
FIO2: 30
FLOW: 2
GLUCOSE SERPL-MCNC: 133 MG/DL
HCO3 UR-SCNC: 37.6 MMOL/L (ref 24–28)
HCO3 UR-SCNC: 39.7 MMOL/L (ref 24–28)
HCO3 UR-SCNC: 42.2 MMOL/L (ref 24–28)
HCT VFR BLD AUTO: 36.7 %
HCT VFR BLD AUTO: NORMAL %
HGB BLD-MCNC: 11.9 G/DL
HGB BLD-MCNC: NORMAL G/DL
INR PPP: 1.1
IP: 10
LYMPHOCYTES # BLD AUTO: 0.6 K/UL
LYMPHOCYTES # BLD AUTO: NORMAL K/UL
LYMPHOCYTES NFR BLD: 8.4 %
LYMPHOCYTES NFR BLD: NORMAL %
MAGNESIUM SERPL-MCNC: 1.8 MG/DL
MCH RBC QN AUTO: 29.8 PG
MCH RBC QN AUTO: NORMAL PG
MCHC RBC AUTO-ENTMCNC: 32.4 G/DL
MCHC RBC AUTO-ENTMCNC: NORMAL G/DL
MCV RBC AUTO: 92 FL
MCV RBC AUTO: NORMAL FL
MODE: ABNORMAL
MODE: ABNORMAL
MONOCYTES # BLD AUTO: 0.2 K/UL
MONOCYTES # BLD AUTO: NORMAL K/UL
MONOCYTES NFR BLD: 2 %
MONOCYTES NFR BLD: NORMAL %
NEUTROPHILS # BLD AUTO: 6.8 K/UL
NEUTROPHILS # BLD AUTO: NORMAL K/UL
NEUTROPHILS NFR BLD: 89.3 %
NEUTROPHILS NFR BLD: NORMAL %
PCO2 BLDA: 48.9 MMHG (ref 35–45)
PCO2 BLDA: 68.3 MMHG (ref 35–45)
PCO2 BLDA: 91.2 MMHG (ref 35–45)
PH SMN: 7.27 [PH] (ref 7.35–7.45)
PH SMN: 7.35 [PH] (ref 7.35–7.45)
PH SMN: 7.52 [PH] (ref 7.35–7.45)
PHOSPHATE SERPL-MCNC: 2.5 MG/DL
PLATELET # BLD AUTO: 200 K/UL
PLATELET # BLD AUTO: NORMAL K/UL
PMV BLD AUTO: 9.5 FL
PMV BLD AUTO: NORMAL FL
PO2 BLDA: 106 MMHG (ref 80–100)
PO2 BLDA: 51 MMHG (ref 80–100)
PO2 BLDA: 66 MMHG (ref 80–100)
POC BE: 12 MMOL/L
POC BE: 15 MMOL/L
POC BE: 17 MMOL/L
POC SATURATED O2: 88 % (ref 95–100)
POC SATURATED O2: 90 % (ref 95–100)
POC SATURATED O2: 97 % (ref 95–100)
POC TCO2: 40 MMOL/L (ref 23–27)
POC TCO2: 41 MMOL/L (ref 23–27)
POC TCO2: 45 MMOL/L (ref 23–27)
POCT GLUCOSE: 160 MG/DL (ref 70–110)
POTASSIUM SERPL-SCNC: 4.4 MMOL/L
PROT SERPL-MCNC: 7.7 G/DL
PROTHROMBIN TIME: 11.4 SEC
RBC # BLD AUTO: 4 M/UL
RBC # BLD AUTO: NORMAL M/UL
SAMPLE: ABNORMAL
SITE: ABNORMAL
SODIUM SERPL-SCNC: 141 MMOL/L
T4 FREE SERPL-MCNC: 0.85 NG/DL
TSH SERPL DL<=0.005 MIU/L-ACNC: 0.1 UIU/ML
WBC # BLD AUTO: 7.63 K/UL
WBC # BLD AUTO: NORMAL K/UL

## 2017-07-22 PROCEDURE — 25000003 PHARM REV CODE 250: Performed by: NURSE PRACTITIONER

## 2017-07-22 PROCEDURE — 36600 WITHDRAWAL OF ARTERIAL BLOOD: CPT

## 2017-07-22 PROCEDURE — 85025 COMPLETE CBC W/AUTO DIFF WBC: CPT | Mod: 91

## 2017-07-22 PROCEDURE — 84443 ASSAY THYROID STIM HORMONE: CPT

## 2017-07-22 PROCEDURE — 85610 PROTHROMBIN TIME: CPT

## 2017-07-22 PROCEDURE — 63600175 PHARM REV CODE 636 W HCPCS: Performed by: NURSE PRACTITIONER

## 2017-07-22 PROCEDURE — 94640 AIRWAY INHALATION TREATMENT: CPT

## 2017-07-22 PROCEDURE — 25000242 PHARM REV CODE 250 ALT 637 W/ HCPCS: Performed by: NURSE PRACTITIONER

## 2017-07-22 PROCEDURE — 20000000 HC ICU ROOM

## 2017-07-22 PROCEDURE — 83735 ASSAY OF MAGNESIUM: CPT

## 2017-07-22 PROCEDURE — 82803 BLOOD GASES ANY COMBINATION: CPT

## 2017-07-22 PROCEDURE — 94664 DEMO&/EVAL PT USE INHALER: CPT

## 2017-07-22 PROCEDURE — 80053 COMPREHEN METABOLIC PANEL: CPT

## 2017-07-22 PROCEDURE — 94761 N-INVAS EAR/PLS OXIMETRY MLT: CPT

## 2017-07-22 PROCEDURE — 63600175 PHARM REV CODE 636 W HCPCS

## 2017-07-22 PROCEDURE — 27000173 HC ACAPELLA DEVICE DH OR DM

## 2017-07-22 PROCEDURE — 36415 COLL VENOUS BLD VENIPUNCTURE: CPT

## 2017-07-22 PROCEDURE — 82550 ASSAY OF CK (CPK): CPT

## 2017-07-22 PROCEDURE — 27000221 HC OXYGEN, UP TO 24 HOURS

## 2017-07-22 PROCEDURE — 84100 ASSAY OF PHOSPHORUS: CPT

## 2017-07-22 PROCEDURE — 94660 CPAP INITIATION&MGMT: CPT

## 2017-07-22 PROCEDURE — 25000003 PHARM REV CODE 250

## 2017-07-22 PROCEDURE — 84439 ASSAY OF FREE THYROXINE: CPT

## 2017-07-22 PROCEDURE — 85730 THROMBOPLASTIN TIME PARTIAL: CPT

## 2017-07-22 PROCEDURE — 80074 ACUTE HEPATITIS PANEL: CPT

## 2017-07-22 RX ORDER — NALOXONE HCL 0.4 MG/ML
0.4 VIAL (ML) INJECTION
Status: DISCONTINUED | OUTPATIENT
Start: 2017-07-22 | End: 2017-07-23 | Stop reason: HOSPADM

## 2017-07-22 RX ORDER — ONDANSETRON 2 MG/ML
4 INJECTION INTRAMUSCULAR; INTRAVENOUS EVERY 12 HOURS PRN
Status: DISCONTINUED | OUTPATIENT
Start: 2017-07-22 | End: 2017-07-23 | Stop reason: HOSPADM

## 2017-07-22 RX ORDER — IBUPROFEN 200 MG
16 TABLET ORAL
Status: DISCONTINUED | OUTPATIENT
Start: 2017-07-22 | End: 2017-07-23 | Stop reason: HOSPADM

## 2017-07-22 RX ORDER — IBUPROFEN 200 MG
24 TABLET ORAL
Status: DISCONTINUED | OUTPATIENT
Start: 2017-07-22 | End: 2017-07-23 | Stop reason: HOSPADM

## 2017-07-22 RX ORDER — PREDNISONE 20 MG/1
40 TABLET ORAL DAILY
Status: DISCONTINUED | OUTPATIENT
Start: 2017-07-23 | End: 2017-07-23 | Stop reason: HOSPADM

## 2017-07-22 RX ORDER — SODIUM CHLORIDE 9 MG/ML
INJECTION, SOLUTION INTRAVENOUS CONTINUOUS
Status: DISCONTINUED | OUTPATIENT
Start: 2017-07-22 | End: 2017-07-22

## 2017-07-22 RX ORDER — LACTULOSE 10 G/15ML
20 SOLUTION ORAL 3 TIMES DAILY
Status: DISCONTINUED | OUTPATIENT
Start: 2017-07-22 | End: 2017-07-23 | Stop reason: HOSPADM

## 2017-07-22 RX ORDER — GLUCAGON 1 MG
1 KIT INJECTION
Status: DISCONTINUED | OUTPATIENT
Start: 2017-07-22 | End: 2017-07-23 | Stop reason: HOSPADM

## 2017-07-22 RX ORDER — IPRATROPIUM BROMIDE AND ALBUTEROL SULFATE 2.5; .5 MG/3ML; MG/3ML
3 SOLUTION RESPIRATORY (INHALATION) EVERY 4 HOURS
Status: DISCONTINUED | OUTPATIENT
Start: 2017-07-22 | End: 2017-07-23 | Stop reason: HOSPADM

## 2017-07-22 RX ORDER — DOXYCYCLINE HYCLATE 100 MG
100 TABLET ORAL EVERY 12 HOURS
Status: DISCONTINUED | OUTPATIENT
Start: 2017-07-22 | End: 2017-07-23 | Stop reason: HOSPADM

## 2017-07-22 RX ADMIN — NALOXONE HYDROCHLORIDE 0.4 MG: 0.4 INJECTION, SOLUTION INTRAMUSCULAR; INTRAVENOUS; SUBCUTANEOUS at 02:07

## 2017-07-22 RX ADMIN — METHYLPREDNISOLONE SODIUM SUCCINATE 125 MG: 125 INJECTION, POWDER, FOR SOLUTION INTRAMUSCULAR; INTRAVENOUS at 05:07

## 2017-07-22 RX ADMIN — ONDANSETRON 4 MG: 2 INJECTION INTRAMUSCULAR; INTRAVENOUS at 09:07

## 2017-07-22 RX ADMIN — IPRATROPIUM BROMIDE AND ALBUTEROL SULFATE 3 ML: .5; 3 SOLUTION RESPIRATORY (INHALATION) at 04:07

## 2017-07-22 RX ADMIN — IPRATROPIUM BROMIDE AND ALBUTEROL SULFATE 3 ML: .5; 3 SOLUTION RESPIRATORY (INHALATION) at 11:07

## 2017-07-22 RX ADMIN — CLONIDINE HYDROCHLORIDE 0.3 MG: 0.3 TABLET ORAL at 09:07

## 2017-07-22 RX ADMIN — ACETAMINOPHEN 650 MG: 325 TABLET ORAL at 09:07

## 2017-07-22 RX ADMIN — FUROSEMIDE 40 MG: 40 TABLET ORAL at 09:07

## 2017-07-22 RX ADMIN — OLANZAPINE 10 MG: 2.5 TABLET, FILM COATED ORAL at 10:07

## 2017-07-22 RX ADMIN — LACTULOSE 20 G: 20 SOLUTION ORAL at 04:07

## 2017-07-22 RX ADMIN — CLONIDINE HYDROCHLORIDE 0.3 MG: 0.3 TABLET ORAL at 01:07

## 2017-07-22 RX ADMIN — STANDARDIZED SENNA CONCENTRATE AND DOCUSATE SODIUM 1 TABLET: 8.6; 5 TABLET, FILM COATED ORAL at 09:07

## 2017-07-22 RX ADMIN — IPRATROPIUM BROMIDE AND ALBUTEROL SULFATE 3 ML: .5; 3 SOLUTION RESPIRATORY (INHALATION) at 09:07

## 2017-07-22 RX ADMIN — DOXYCYCLINE HYCLATE 100 MG: 100 TABLET, COATED ORAL at 09:07

## 2017-07-22 RX ADMIN — OLANZAPINE 10 MG: 2.5 TABLET, FILM COATED ORAL at 09:07

## 2017-07-22 RX ADMIN — LACTULOSE 20 G: 20 SOLUTION ORAL at 05:07

## 2017-07-22 RX ADMIN — FAMOTIDINE 20 MG: 20 TABLET, FILM COATED ORAL at 10:07

## 2017-07-22 RX ADMIN — IPRATROPIUM BROMIDE AND ALBUTEROL SULFATE 3 ML: .5; 3 SOLUTION RESPIRATORY (INHALATION) at 07:07

## 2017-07-22 RX ADMIN — FUROSEMIDE 40 MG: 40 TABLET ORAL at 10:07

## 2017-07-22 RX ADMIN — CLONIDINE HYDROCHLORIDE 0.3 MG: 0.3 TABLET ORAL at 05:07

## 2017-07-22 RX ADMIN — ONDANSETRON 8 MG: 8 TABLET, ORALLY DISINTEGRATING ORAL at 04:07

## 2017-07-22 RX ADMIN — DOXYCYCLINE HYCLATE 100 MG: 100 TABLET, COATED ORAL at 12:07

## 2017-07-22 RX ADMIN — LACTULOSE 20 G: 20 SOLUTION ORAL at 01:07

## 2017-07-22 RX ADMIN — Medication 500 MG: at 01:07

## 2017-07-22 RX ADMIN — SODIUM CHLORIDE: 0.9 INJECTION, SOLUTION INTRAVENOUS at 02:07

## 2017-07-22 RX ADMIN — FAMOTIDINE 20 MG: 20 TABLET, FILM COATED ORAL at 09:07

## 2017-07-22 RX ADMIN — IPRATROPIUM BROMIDE AND ALBUTEROL SULFATE 3 ML: .5; 3 SOLUTION RESPIRATORY (INHALATION) at 01:07

## 2017-07-22 RX ADMIN — LACTULOSE 20 G: 20 SOLUTION ORAL at 09:07

## 2017-07-22 RX ADMIN — SODIUM CHLORIDE 500 ML: 0.9 INJECTION, SOLUTION INTRAVENOUS at 02:07

## 2017-07-22 NOTE — ED NOTES
Patient standing up using urinal. Alert and oriented. Patient stated he wanted his sister called to tell her she did not need to come. Sister notified

## 2017-07-22 NOTE — PLAN OF CARE
Pt more awake, but confused, able to follow commands and answer few questions. Pt refusing to let lab stick for morning labs. Voided per urinal- 400ml el odorous urine. Bedside swallow study also performed- no coughing or complications, able to admin PO Lactulose and PRN zofran for nausea. MD paged several times to ask for morning ABG and update on pt status.

## 2017-07-22 NOTE — NURSING
Pt arrived to ICU via ED stretcher, placed on cardiac monitor, SR 90's, 117/72, SpO2 98% - BiPap 10/5 40%, oral temp 98.8, pt lethargic, not following commands, not answering admit questions,  pupils +2 PERRLA, safety precautions in place. Scars/scabs bilateral forearms present on admit. Education provided. Continue to monitor pt closely.

## 2017-07-22 NOTE — PROGRESS NOTES
Attempted ABG x 3 attempts.  Patient refused another stick at this time and wanted to be taken off BIPAP to eat.  Will try again later.

## 2017-07-22 NOTE — PLAN OF CARE
ABG results given to Dr. Roblero, update given on pt status- pt very lethargic, not following commands, unable to keep eyes open or answer questions. VSS. Unable to admin PO medications at this time. Orders received to admin Narcan and cont to monitor.

## 2017-07-22 NOTE — PLAN OF CARE
Problem: Patient Care Overview  Goal: Plan of Care Review  Outcome: Ongoing (interventions implemented as appropriate)  Pt remains drowsy, arouses to voice and follows commands, on BiPap SpO2 92-96%, ABG improving, Sinus rhythm- sinus tach , -140's, voids per urinal, PIV x1 with IVF infusing at 125ml/hr. Track marks from iv drug use on bilateral arms present on arrival. Safety precautions in place. Report given to ARIANNA Fernando and ARIANNA Murray
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
## 2017-07-22 NOTE — ED NOTES
Blue pads placed under patient. Patient aware of care of plan. Will be admitted for COPD exacerbation. Nonskid socks, fallrish band and allergy band applied

## 2017-07-22 NOTE — ED NOTES
Pt continues to appear drowsy and lethargic but arouses to verbal stimulation. Pt is oriented. VSS.

## 2017-07-23 VITALS
HEART RATE: 118 BPM | RESPIRATION RATE: 20 BRPM | TEMPERATURE: 99 F | HEIGHT: 63 IN | DIASTOLIC BLOOD PRESSURE: 94 MMHG | SYSTOLIC BLOOD PRESSURE: 142 MMHG | OXYGEN SATURATION: 96 % | WEIGHT: 243.81 LBS | BODY MASS INDEX: 43.2 KG/M2

## 2017-07-23 LAB
ALBUMIN SERPL BCP-MCNC: 3.3 G/DL
ALP SERPL-CCNC: 71 U/L
ALT SERPL W/O P-5'-P-CCNC: 42 U/L
ANION GAP SERPL CALC-SCNC: 12 MMOL/L
APTT BLDCRRT: 26.6 SEC
AST SERPL-CCNC: 74 U/L
BASOPHILS # BLD AUTO: 0.01 K/UL
BASOPHILS NFR BLD: 0.1 %
BILIRUB SERPL-MCNC: 1 MG/DL
BUN SERPL-MCNC: 17 MG/DL
CALCIUM SERPL-MCNC: 9.6 MG/DL
CHLORIDE SERPL-SCNC: 95 MMOL/L
CO2 SERPL-SCNC: 29 MMOL/L
CREAT SERPL-MCNC: 1.1 MG/DL
DIFFERENTIAL METHOD: ABNORMAL
EOSINOPHIL # BLD AUTO: 0.1 K/UL
EOSINOPHIL NFR BLD: 0.7 %
ERYTHROCYTE [DISTWIDTH] IN BLOOD BY AUTOMATED COUNT: 12.7 %
EST. GFR  (AFRICAN AMERICAN): >60 ML/MIN/1.73 M^2
EST. GFR  (NON AFRICAN AMERICAN): >60 ML/MIN/1.73 M^2
GLUCOSE SERPL-MCNC: 104 MG/DL
HCT VFR BLD AUTO: 36 %
HGB BLD-MCNC: 11.8 G/DL
INR PPP: 1
LYMPHOCYTES # BLD AUTO: 1.2 K/UL
LYMPHOCYTES NFR BLD: 9.9 %
MAGNESIUM SERPL-MCNC: 2.2 MG/DL
MCH RBC QN AUTO: 29.7 PG
MCHC RBC AUTO-ENTMCNC: 32.8 G/DL
MCV RBC AUTO: 91 FL
MONOCYTES # BLD AUTO: 0.9 K/UL
MONOCYTES NFR BLD: 7.1 %
NEUTROPHILS # BLD AUTO: 10.3 K/UL
NEUTROPHILS NFR BLD: 81.9 %
PHOSPHATE SERPL-MCNC: 2 MG/DL
PLATELET # BLD AUTO: 207 K/UL
PMV BLD AUTO: 9.8 FL
POCT GLUCOSE: 119 MG/DL (ref 70–110)
POCT GLUCOSE: 173 MG/DL (ref 70–110)
POTASSIUM SERPL-SCNC: 4.5 MMOL/L
PROT SERPL-MCNC: 8.5 G/DL
PROTHROMBIN TIME: 11.1 SEC
RBC # BLD AUTO: 3.97 M/UL
SODIUM SERPL-SCNC: 136 MMOL/L
WBC # BLD AUTO: 12.55 K/UL

## 2017-07-23 PROCEDURE — 99900035 HC TECH TIME PER 15 MIN (STAT)

## 2017-07-23 PROCEDURE — 80053 COMPREHEN METABOLIC PANEL: CPT

## 2017-07-23 PROCEDURE — 94664 DEMO&/EVAL PT USE INHALER: CPT

## 2017-07-23 PROCEDURE — 85025 COMPLETE CBC W/AUTO DIFF WBC: CPT

## 2017-07-23 PROCEDURE — 85730 THROMBOPLASTIN TIME PARTIAL: CPT

## 2017-07-23 PROCEDURE — 85610 PROTHROMBIN TIME: CPT

## 2017-07-23 PROCEDURE — 25000242 PHARM REV CODE 250 ALT 637 W/ HCPCS: Performed by: NURSE PRACTITIONER

## 2017-07-23 PROCEDURE — 84100 ASSAY OF PHOSPHORUS: CPT

## 2017-07-23 PROCEDURE — 25000003 PHARM REV CODE 250: Performed by: NURSE PRACTITIONER

## 2017-07-23 PROCEDURE — 27000173 HC ACAPELLA DEVICE DH OR DM

## 2017-07-23 PROCEDURE — 94640 AIRWAY INHALATION TREATMENT: CPT

## 2017-07-23 PROCEDURE — 83735 ASSAY OF MAGNESIUM: CPT

## 2017-07-23 PROCEDURE — 25000003 PHARM REV CODE 250

## 2017-07-23 PROCEDURE — 36415 COLL VENOUS BLD VENIPUNCTURE: CPT

## 2017-07-23 PROCEDURE — 27000221 HC OXYGEN, UP TO 24 HOURS

## 2017-07-23 PROCEDURE — 94761 N-INVAS EAR/PLS OXIMETRY MLT: CPT

## 2017-07-23 PROCEDURE — 63600175 PHARM REV CODE 636 W HCPCS

## 2017-07-23 RX ORDER — DOXYCYCLINE HYCLATE 100 MG
100 TABLET ORAL EVERY 12 HOURS
Qty: 4 TABLET | Refills: 0 | Status: SHIPPED | OUTPATIENT
Start: 2017-07-23 | End: 2017-08-27 | Stop reason: CLARIF

## 2017-07-23 RX ORDER — PREDNISONE 20 MG/1
40 TABLET ORAL DAILY
Qty: 3 TABLET | Refills: 0 | Status: SHIPPED | OUTPATIENT
Start: 2017-07-23 | End: 2017-07-26

## 2017-07-23 RX ADMIN — STANDARDIZED SENNA CONCENTRATE AND DOCUSATE SODIUM 1 TABLET: 8.6; 5 TABLET, FILM COATED ORAL at 08:07

## 2017-07-23 RX ADMIN — FUROSEMIDE 40 MG: 40 TABLET ORAL at 08:07

## 2017-07-23 RX ADMIN — IPRATROPIUM BROMIDE AND ALBUTEROL SULFATE 3 ML: .5; 3 SOLUTION RESPIRATORY (INHALATION) at 03:07

## 2017-07-23 RX ADMIN — CLONIDINE HYDROCHLORIDE 0.3 MG: 0.3 TABLET ORAL at 05:07

## 2017-07-23 RX ADMIN — IPRATROPIUM BROMIDE AND ALBUTEROL SULFATE 3 ML: .5; 3 SOLUTION RESPIRATORY (INHALATION) at 08:07

## 2017-07-23 RX ADMIN — OLANZAPINE 10 MG: 2.5 TABLET, FILM COATED ORAL at 08:07

## 2017-07-23 RX ADMIN — IPRATROPIUM BROMIDE AND ALBUTEROL SULFATE 3 ML: .5; 3 SOLUTION RESPIRATORY (INHALATION) at 12:07

## 2017-07-23 RX ADMIN — ACETAMINOPHEN 650 MG: 325 TABLET ORAL at 11:07

## 2017-07-23 RX ADMIN — LACTULOSE 20 G: 20 SOLUTION ORAL at 05:07

## 2017-07-23 RX ADMIN — CLONIDINE HYDROCHLORIDE 0.3 MG: 0.3 TABLET ORAL at 01:07

## 2017-07-23 RX ADMIN — PREDNISONE 40 MG: 20 TABLET ORAL at 08:07

## 2017-07-23 RX ADMIN — FAMOTIDINE 20 MG: 20 TABLET, FILM COATED ORAL at 08:07

## 2017-07-23 RX ADMIN — DOXYCYCLINE HYCLATE 100 MG: 100 TABLET, COATED ORAL at 08:07

## 2017-07-23 RX ADMIN — LACTULOSE 20 G: 20 SOLUTION ORAL at 01:07

## 2017-07-23 NOTE — PLAN OF CARE
Pt is being discharged. Discharge teaching was reviewed with patient. Pt verbalized understanding. Refer to clinical references for pt education. IV removed, tip intact, pt tolerated well. Cardiac monitor removed. Dr. Acevedo at bedside reviewing DC with pt. Pt sisterAsmita notified of DC and will be able to  pt in 1 hour. Will continue to monitor. Awaiting transport.    @1642: Pt continuously getting up to hallway to leave and stated he wants to wait outside for sister. SisterAsmita notified pt is sitting outside main lobby. Sister stated she is few minutes away. Pt transported via wheelchair with belongings to outside main lobby.

## 2017-07-23 NOTE — DISCHARGE INSTRUCTIONS
COPD, WHAT IS (ENGLISH) View Edit Remove  CHECKING YOUR BLOOD PRESSURE, STEP-BY-STEP (ENGLISH) View Edit Remove  ADDICTION, ADDICTION (ENGLISH) View Edit Remove  EATING HEART-HEALTHY FOODS (ENGLISH) View Edit Remove  HIGH BLOOD PRESSURE, CONTROLLING (ENGLISH) View Edit Remove  SHORTNESS OF BREATH (DYSPNEA) (ENGLISH) View Edit Remove  DOXYCYCLINE ORAL TABLETS (PERIODONTITIS) (ENGLISH) View Edit Remove  PREDNISONE TABLETS (ENGLISH) View Edit Remove

## 2017-07-23 NOTE — PLAN OF CARE
Problem: Patient Care Overview  Goal: Plan of Care Review  Pt received on nasal cannula at 2  lpm. SPO2 95  %.  Pt with mild respiratory distress improved with aerosol tx..  Will continue to monitor.

## 2017-07-23 NOTE — PLAN OF CARE
Problem: Patient Care Overview  Goal: Plan of Care Review  Outcome: Ongoing (interventions implemented as appropriate)  Pt's SpO2 92% on 2 lpm NC. No adverse reactions to aerosol tx. No respiratory distress noted. Continue to monitor SpO2.

## 2017-07-23 NOTE — PROGRESS NOTES
Call received from Dr Acevedo, pt not stable for discharge today. Will keep overnight. TN updated Gissel with [facility].

## 2017-07-23 NOTE — PLAN OF CARE
Pt discharged to home with family. Pt eager to leave. MD spoke with pt and plan of care reviewed. Pt has home concentrator already set up at home.  Patient told bedside RN he would be able to get his own portable tank.   No other d/c needs noted.     Future Appointments  Date Time Provider Department Center   7/27/2017 10:20 AM El Joe MD [facility]

## 2017-07-23 NOTE — PLAN OF CARE
Patient transported to room 423 per wheelchair I no distress. Alert, oriented x4. Telemetry sinus'/tachycardia. Oriented to room. Instructed to call for assistance, verbalizes understanding of teaching.

## 2017-07-23 NOTE — PLAN OF CARE
Problem: Fall Risk (Adult)  Goal: Identify Related Risk Factors and Signs and Symptoms  Related risk factors and signs and symptoms are identified upon initiation of Human Response Clinical Practice Guideline (CPG)   Outcome: Ongoing (interventions implemented as appropriate)  Pt had three large liquid stools on today. Pt tolerated PO diet well. Planning to discontinue to the floor pending PM. ABG

## 2017-07-23 NOTE — PROGRESS NOTES
TN updated by bedside RN, patient may need home oxygen . TN placed call to sister Asmita. Sister states pt has oxygen set up at home and she is able to  patient on discharge.  TN verified with patient, pt has home concentrator but no portable tank. Patient insurance does not cover portable tank. Pt states he can purchase portable tank. TN placed call to [facility] on call and udpated on above. Royce with [facility] will call TN back with update.        per Royce  Portable tank cannot be provided to patient, pt original prescription date is more than a year old. Pt will have to be retested and new prescriptions provided.      Dr Acevedo update.

## 2017-07-23 NOTE — PROGRESS NOTES
Progress Note  [facility]  Admit Date: 7/21/2017   LOS: 2 days   SUBJECTIVE:   Follow-up For: respiratory acidosis     Patient seen and examined this AM.  Patient breathing comfortably with NC displaced. Denies any CP, SOB, N/V/D, headaches, fever or chills. Requesting to be discharged    Review of Systems   Constitutional: Negative for chills and fever.   HENT: Negative for hearing loss.    Respiratory: Negative for cough, hemoptysis and shortness of breath.    Cardiovascular: Negative for chest pain and palpitations.   Musculoskeletal: Negative for myalgias.   Neurological: Negative for dizziness, tingling and headaches.   Psychiatric/Behavioral: Negative for depression and suicidal ideas.       OBJECTIVE:   Vital Signs (Most Recent)  Temp: 98.9 °F (37.2 °C) (07/23/17 0800)  Pulse: 99 (07/23/17 0836)  Resp: 20 (07/23/17 0836)  BP: (!) 142/94 (07/23/17 0800)  SpO2: 95 % (07/23/17 0836)    I & O (Last 24H):    Intake/Output Summary (Last 24 hours) at 07/23/17 0958  Last data filed at 07/23/17 0708   Gross per 24 hour   Intake              860 ml   Output             1425 ml   Net             -565 ml     Wt Readings from Last 3 Encounters:   07/22/17 110.6 kg (243 lb 13.3 oz)   07/17/17 116.1 kg (256 lb)   07/07/17 115 kg (253 lb 8.5 oz)       Current Diet Order   Procedures    Diet Diabetic 2000 Calories        Physical Exam   GEN: NAD, AAO X 3, bipap mask on face  HENT: Head:normocephalic, atraumatic. Ears:not examined. Nose: no discharge, no epistaxis. Throat: MOM.  Neck: no JVD  Lungs:  decreased breath sounds bilaterally on lung bases.  No crackles, no wheezing.   Cardiovascular: Heart: regular rate and rhythm. Chest Wall: no tenderness, responds to sternal rub. Extremities: no cyanosis or edema, or clubbing. Pulses: 2+ and symmetric.  Abdomen/Rectal: Abdomen: soft, non-tender non-distented; bowel sounds normal; no masses,  no organomegaly. Rectal: deferred  Skin: bilateral arm track marks.     Neurologic: Mental status: AAO X 3      Laboratory Data:  CBC    Recent Labs  Lab 07/22/17  0327 07/22/17  1154 07/23/17  0411   WBC SEE COMMENT 7.63 12.55   RBC SEE COMMENT 4.00* 3.97*   HGB SEE COMMENT 11.9* 11.8*   HCT SEE COMMENT 36.7* 36.0*   PLT SEE COMMENT 200 207   MCV SEE COMMENT 92 91   MCH SEE COMMENT 29.8 29.7   MCHC SEE COMMENT 32.4 32.8     CMP    Recent Labs  Lab 07/21/17  1740 07/22/17  0327 07/23/17  0813   CALCIUM 9.0 8.6* 9.6   PROT 7.9 7.7 8.5*    141 136   K 3.7 4.4 4.5   CO2 34* 32* 29   CL 98 99 95   BUN 16 14 17   CREATININE 1.2 1.1 1.1   ALKPHOS 82 78 71   ALT 44 42 42   AST 83* 70* 74*   BILITOT 1.6* 0.9 1.0     POCT-Glucose  POCT Glucose   Date Value Ref Range Status   07/23/2017 119 (H) 70 - 110 mg/dL Final   07/22/2017 160 (H) 70 - 110 mg/dL Final   07/21/2017 123 (H) 70 - 110 mg/dL Final   07/21/2017 149 (H) 70 - 110 mg/dL Final     COAGS    Recent Labs  Lab 07/22/17  1154 07/23/17  0411   INR 1.1 1.0   APTT 25.6 26.6     UA  No results for input(s): COLORU, CLARITYU, SPECGRAV, PHUR, PROTEINUA, GLUCOSEU, BLOODU, WBCU, RBCU, BACTERIA, MUCUS in the last 24 hours.    Invalid input(s):  BILIRUBINCON  MICRO  Microbiology Results (last 7 days)     ** No results found for the last 168 hours. **        LIPID PANEL  No results found for: CHOL  No results found for: HDL  No results found for: LDLCALC  No results found for: TRIG  No results found for: CHOLHDL    Diagnostic Results:    ASSESSMENT/PLAN:   50 y/o male with a PMH of COPD/asthma, polysubstance drug abuse, HTN, Hep C is admitted for respiratory acidosis with AMS.      AMS- resolved  -improved on mental status now AAO X 4  - thought to be 2/2 hepatic encephalopathy vs opoid intoxication vs hypoperfusion.  -ammonia level of 81  started lactulose  -breathing stable on 2L O2  opioid/benzo (+) on admission  s/p narcan  -will monitor with q4 neuro checks    HTN  -restart home dose clonidine     COPD exacerbation   -Resp Acidosis  improved  -acapella   - continue PO doxy abx and PO steroids, duonebs     H/O Hep C  - high ammonia levels on admissioin  - ordered lactulose  - MELD score of 7     DVT prophylaxis: Scd     Dispo: follow up O2 sats.     Case discussed with staff    Regina Acevedo D.O.  [facility] HO-2  07/23/2017

## 2017-07-23 NOTE — PLAN OF CARE
07/22/17 1045   Discharge Assessment   Assessment Type Discharge Planning Assessment   Confirmed/corrected address and phone number on facesheet? Yes   Assessment information obtained from? Other  (Asmita Harrington([phone])   Prior to hospitilization cognitive status: Alert/Oriented   Prior to hospitalization functional status: Independent;Assistive Equipment   Current cognitive status: Alert/Oriented   Current Functional Status: Independent;Assistive Equipment   Arrived From home or self-care   Lives With parent(s)   Able to Return to Prior Arrangements yes   Is patient able to care for self after discharge? Yes   How many people do you have in your home that can help with your care after discharge? 1   Who are your caregiver(s) and their phone number(s)? Asmita Harrington(sister)[phone]/Rosita Lopez([phone]   Patient's perception of discharge disposition home or selfcare   Readmission Within The Last 30 Days no previous admission in last 30 days   Patient currently being followed by outpatient case management? Unable to determine (comments)   Patient currently receives home health services? No   Does the patient currently use HME? Yes   Patient currently receives private duty nursing? No   Patient currently receives any other outside agency services? No   Equipment Currently Used at Home oxygen   Do you have any problems affording any of your prescribed medications? No   Is the patient taking medications as prescribed? yes   Do you have any financial concerns preventing you from receiving the healthcare you need? No   Does the patient have transportation to healthcare appointments? Yes   Transportation Available family or friend will provide   On Dialysis? No   Does the patient receive services at the [facility] Clinic? No   Are there any open cases? No   Discharge Plan A Home with family   Discharge Plan B Home   Patient/Family In Agreement With Plan yes   The pt was very lethargic in ICU,so his  sister Asmita completed the assessment. She states the pt sees a psych doctor and he's forgetful sometimes.

## 2017-07-23 NOTE — NURSING
Report to Ene KELLER called and given. Pt will transfer to rm 423 soon. Pt has had no distress this shift so far.

## 2017-07-24 LAB
HAV IGM SERPL QL IA: NEGATIVE
HBV CORE IGM SERPL QL IA: NEGATIVE
HBV SURFACE AG SERPL QL IA: NEGATIVE
HCV AB SERPL QL IA: POSITIVE
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
## 2017-08-26 PROCEDURE — 96374 THER/PROPH/DIAG INJ IV PUSH: CPT

## 2017-08-26 PROCEDURE — 96375 TX/PRO/DX INJ NEW DRUG ADDON: CPT

## 2017-08-26 PROCEDURE — 99285 EMERGENCY DEPT VISIT HI MDM: CPT

## 2017-08-27 ENCOUNTER — HOSPITAL ENCOUNTER (INPATIENT)
Facility: HOSPITAL | Age: 52
LOS: 1 days | Discharge: HOME | DRG: 189 | End: 2017-08-28
Attending: EMERGENCY MEDICINE | Admitting: HOSPITALIST
Payer: MEDICAID

## 2017-08-27 DIAGNOSIS — J96.01 ACUTE RESPIRATORY FAILURE WITH HYPOXIA: ICD-10-CM

## 2017-08-27 DIAGNOSIS — R79.89 ELEVATED TROPONIN: ICD-10-CM

## 2017-08-27 DIAGNOSIS — J44.1 COPD EXACERBATION: Primary | ICD-10-CM

## 2017-08-27 PROBLEM — F19.10 POLYSUBSTANCE ABUSE: Chronic | Status: ACTIVE | Noted: 2017-07-22

## 2017-08-27 PROBLEM — J96.11 CHRONIC RESPIRATORY FAILURE WITH HYPOXIA AND HYPERCAPNIA: Chronic | Status: ACTIVE | Noted: 2017-08-27

## 2017-08-27 PROBLEM — J96.12 CHRONIC RESPIRATORY FAILURE WITH HYPOXIA AND HYPERCAPNIA: Chronic | Status: ACTIVE | Noted: 2017-08-27

## 2017-08-27 LAB
ALBUMIN SERPL BCP-MCNC: 3.1 G/DL
ALLENS TEST: ABNORMAL
ALP SERPL-CCNC: 73 U/L
ALT SERPL W/O P-5'-P-CCNC: 21 U/L
AMPHET+METHAMPHET UR QL: NEGATIVE
ANION GAP SERPL CALC-SCNC: 5 MMOL/L
AST SERPL-CCNC: 34 U/L
BARBITURATES UR QL SCN>200 NG/ML: NEGATIVE
BASOPHILS # BLD AUTO: 0.01 K/UL
BASOPHILS NFR BLD: 0.1 %
BENZODIAZ UR QL SCN>200 NG/ML: NORMAL
BILIRUB SERPL-MCNC: 0.6 MG/DL
BNP SERPL-MCNC: 882 PG/ML
BUN SERPL-MCNC: 7 MG/DL
BZE UR QL SCN: NEGATIVE
CALCIUM SERPL-MCNC: 8.7 MG/DL
CANNABINOIDS UR QL SCN: NEGATIVE
CHLORIDE SERPL-SCNC: 102 MMOL/L
CO2 SERPL-SCNC: 36 MMOL/L
CREAT SERPL-MCNC: 0.9 MG/DL
CREAT UR-MCNC: 77.5 MG/DL
DELSYS: ABNORMAL
DELSYS: ABNORMAL
DIFFERENTIAL METHOD: ABNORMAL
EOSINOPHIL # BLD AUTO: 0.1 K/UL
EOSINOPHIL NFR BLD: 1.1 %
EP: 6
ERYTHROCYTE [DISTWIDTH] IN BLOOD BY AUTOMATED COUNT: 14.2 %
ERYTHROCYTE [SEDIMENTATION RATE] IN BLOOD BY WESTERGREN METHOD: 4 MM/H
EST. GFR  (AFRICAN AMERICAN): >60 ML/MIN/1.73 M^2
EST. GFR  (NON AFRICAN AMERICAN): >60 ML/MIN/1.73 M^2
FIO2: 100
FLOW: 3
GLUCOSE SERPL-MCNC: 86 MG/DL
HCO3 UR-SCNC: 41.5 MMOL/L (ref 24–28)
HCO3 UR-SCNC: 42.6 MMOL/L (ref 24–28)
HCT VFR BLD AUTO: 41.2 %
HGB BLD-MCNC: 12.8 G/DL
INR PPP: 1
IP: 12
LYMPHOCYTES # BLD AUTO: 1.5 K/UL
LYMPHOCYTES NFR BLD: 19.5 %
MCH RBC QN AUTO: 30.4 PG
MCHC RBC AUTO-ENTMCNC: 31.1 G/DL
MCV RBC AUTO: 98 FL
METHADONE UR QL SCN>300 NG/ML: NEGATIVE
MIN VOL: 3
MODE: ABNORMAL
MODE: ABNORMAL
MONOCYTES # BLD AUTO: 0.7 K/UL
MONOCYTES NFR BLD: 9 %
NEUTROPHILS # BLD AUTO: 5.5 K/UL
NEUTROPHILS NFR BLD: 70.3 %
OPIATES UR QL SCN: NORMAL
PCO2 BLDA: 106.6 MMHG (ref 35–45)
PCO2 BLDA: 71.4 MMHG (ref 35–45)
PCP UR QL SCN>25 NG/ML: NEGATIVE
PH SMN: 7.21 [PH] (ref 7.35–7.45)
PH SMN: 7.37 [PH] (ref 7.35–7.45)
PLATELET # BLD AUTO: 184 K/UL
PMV BLD AUTO: 10.5 FL
PO2 BLDA: 109 MMHG (ref 40–60)
PO2 BLDA: 60 MMHG (ref 80–100)
POC BE: 15 MMOL/L
POC BE: 16 MMOL/L
POC SATURATED O2: 88 % (ref 95–100)
POC SATURATED O2: 96 % (ref 95–100)
POC TCO2: 44 MMOL/L (ref 23–27)
POC TCO2: 46 MMOL/L (ref 24–29)
POCT GLUCOSE: 110 MG/DL (ref 70–110)
POTASSIUM SERPL-SCNC: 4.4 MMOL/L
PROT SERPL-MCNC: 7.8 G/DL
PROTHROMBIN TIME: 10.7 SEC
RBC # BLD AUTO: 4.21 M/UL
SAMPLE: ABNORMAL
SAMPLE: ABNORMAL
SITE: ABNORMAL
SITE: ABNORMAL
SODIUM SERPL-SCNC: 143 MMOL/L
SP02: 40
SPONT RATE: 13
TOXICOLOGY INFORMATION: NORMAL
TROPONIN I SERPL DL<=0.01 NG/ML-MCNC: 0.01 NG/ML
TROPONIN I SERPL DL<=0.01 NG/ML-MCNC: 0.02 NG/ML
TROPONIN I SERPL DL<=0.01 NG/ML-MCNC: 0.03 NG/ML
WBC # BLD AUTO: 7.88 K/UL

## 2017-08-27 PROCEDURE — S4991 NICOTINE PATCH NONLEGEND: HCPCS | Performed by: NURSE PRACTITIONER

## 2017-08-27 PROCEDURE — 63600175 PHARM REV CODE 636 W HCPCS: Performed by: NURSE PRACTITIONER

## 2017-08-27 PROCEDURE — 25000242 PHARM REV CODE 250 ALT 637 W/ HCPCS: Performed by: NURSE PRACTITIONER

## 2017-08-27 PROCEDURE — 82803 BLOOD GASES ANY COMBINATION: CPT

## 2017-08-27 PROCEDURE — 93005 ELECTROCARDIOGRAM TRACING: CPT

## 2017-08-27 PROCEDURE — 84484 ASSAY OF TROPONIN QUANT: CPT

## 2017-08-27 PROCEDURE — 85025 COMPLETE CBC W/AUTO DIFF WBC: CPT

## 2017-08-27 PROCEDURE — 25000242 PHARM REV CODE 250 ALT 637 W/ HCPCS: Performed by: EMERGENCY MEDICINE

## 2017-08-27 PROCEDURE — 63600175 PHARM REV CODE 636 W HCPCS: Performed by: HOSPITALIST

## 2017-08-27 PROCEDURE — 83880 ASSAY OF NATRIURETIC PEPTIDE: CPT

## 2017-08-27 PROCEDURE — 25000003 PHARM REV CODE 250: Performed by: NURSE PRACTITIONER

## 2017-08-27 PROCEDURE — 94761 N-INVAS EAR/PLS OXIMETRY MLT: CPT

## 2017-08-27 PROCEDURE — A4216 STERILE WATER/SALINE, 10 ML: HCPCS | Performed by: NURSE PRACTITIONER

## 2017-08-27 PROCEDURE — 94640 AIRWAY INHALATION TREATMENT: CPT

## 2017-08-27 PROCEDURE — 63600175 PHARM REV CODE 636 W HCPCS: Performed by: EMERGENCY MEDICINE

## 2017-08-27 PROCEDURE — 27000221 HC OXYGEN, UP TO 24 HOURS

## 2017-08-27 PROCEDURE — 27000190 HC CPAP FULL FACE MASK W/VALVE

## 2017-08-27 PROCEDURE — 84484 ASSAY OF TROPONIN QUANT: CPT | Mod: 91

## 2017-08-27 PROCEDURE — 80053 COMPREHEN METABOLIC PANEL: CPT

## 2017-08-27 PROCEDURE — 94644 CONT INHLJ TX 1ST HOUR: CPT

## 2017-08-27 PROCEDURE — 80307 DRUG TEST PRSMV CHEM ANLYZR: CPT

## 2017-08-27 PROCEDURE — 85610 PROTHROMBIN TIME: CPT

## 2017-08-27 PROCEDURE — 99900035 HC TECH TIME PER 15 MIN (STAT)

## 2017-08-27 PROCEDURE — 36415 COLL VENOUS BLD VENIPUNCTURE: CPT

## 2017-08-27 PROCEDURE — 11000001 HC ACUTE MED/SURG PRIVATE ROOM

## 2017-08-27 RX ORDER — AZITHROMYCIN 250 MG/1
250 TABLET, FILM COATED ORAL DAILY
Status: DISCONTINUED | OUTPATIENT
Start: 2017-08-28 | End: 2017-08-28 | Stop reason: HOSPADM

## 2017-08-27 RX ORDER — GUAIFENESIN 600 MG/1
600 TABLET, EXTENDED RELEASE ORAL 2 TIMES DAILY
Status: DISCONTINUED | OUTPATIENT
Start: 2017-08-27 | End: 2017-08-28 | Stop reason: HOSPADM

## 2017-08-27 RX ORDER — ONDANSETRON 2 MG/ML
8 INJECTION INTRAMUSCULAR; INTRAVENOUS EVERY 8 HOURS PRN
Status: DISCONTINUED | OUTPATIENT
Start: 2017-08-27 | End: 2017-08-28 | Stop reason: HOSPADM

## 2017-08-27 RX ORDER — IBUPROFEN 200 MG
24 TABLET ORAL
Status: DISCONTINUED | OUTPATIENT
Start: 2017-08-27 | End: 2017-08-27

## 2017-08-27 RX ORDER — OLANZAPINE 2.5 MG/1
10 TABLET ORAL DAILY
Status: DISCONTINUED | OUTPATIENT
Start: 2017-08-27 | End: 2017-08-28 | Stop reason: HOSPADM

## 2017-08-27 RX ORDER — INSULIN ASPART 100 [IU]/ML
0-5 INJECTION, SOLUTION INTRAVENOUS; SUBCUTANEOUS
Status: DISCONTINUED | OUTPATIENT
Start: 2017-08-27 | End: 2017-08-28 | Stop reason: HOSPADM

## 2017-08-27 RX ORDER — ACETAMINOPHEN 500 MG
500 TABLET ORAL EVERY 6 HOURS PRN
Status: DISCONTINUED | OUTPATIENT
Start: 2017-08-27 | End: 2017-08-28 | Stop reason: HOSPADM

## 2017-08-27 RX ORDER — GLUCAGON 1 MG
1 KIT INJECTION
Status: DISCONTINUED | OUTPATIENT
Start: 2017-08-27 | End: 2017-08-27

## 2017-08-27 RX ORDER — FUROSEMIDE 10 MG/ML
60 INJECTION INTRAMUSCULAR; INTRAVENOUS 2 TIMES DAILY
Status: DISCONTINUED | OUTPATIENT
Start: 2017-08-27 | End: 2017-08-28 | Stop reason: HOSPADM

## 2017-08-27 RX ORDER — GLUCAGON 1 MG
1 KIT INJECTION
Status: DISCONTINUED | OUTPATIENT
Start: 2017-08-27 | End: 2017-08-28 | Stop reason: HOSPADM

## 2017-08-27 RX ORDER — CLONIDINE HYDROCHLORIDE 0.3 MG/1
0.3 TABLET ORAL 3 TIMES DAILY
Status: DISCONTINUED | OUTPATIENT
Start: 2017-08-27 | End: 2017-08-28 | Stop reason: HOSPADM

## 2017-08-27 RX ORDER — IPRATROPIUM BROMIDE AND ALBUTEROL SULFATE 2.5; .5 MG/3ML; MG/3ML
3 SOLUTION RESPIRATORY (INHALATION) EVERY 4 HOURS
Status: DISCONTINUED | OUTPATIENT
Start: 2017-08-27 | End: 2017-08-28 | Stop reason: HOSPADM

## 2017-08-27 RX ORDER — ALBUTEROL SULFATE 0.83 MG/ML
10 SOLUTION RESPIRATORY (INHALATION)
Status: COMPLETED | OUTPATIENT
Start: 2017-08-27 | End: 2017-08-27

## 2017-08-27 RX ORDER — MUPIROCIN 20 MG/G
OINTMENT TOPICAL 2 TIMES DAILY
Status: DISCONTINUED | OUTPATIENT
Start: 2017-08-27 | End: 2017-08-28 | Stop reason: HOSPADM

## 2017-08-27 RX ORDER — IBUPROFEN 200 MG
24 TABLET ORAL
Status: DISCONTINUED | OUTPATIENT
Start: 2017-08-27 | End: 2017-08-28 | Stop reason: HOSPADM

## 2017-08-27 RX ORDER — KETOROLAC TROMETHAMINE 30 MG/ML
15 INJECTION, SOLUTION INTRAMUSCULAR; INTRAVENOUS EVERY 6 HOURS PRN
Status: DISCONTINUED | OUTPATIENT
Start: 2017-08-27 | End: 2017-08-28 | Stop reason: HOSPADM

## 2017-08-27 RX ORDER — FUROSEMIDE 10 MG/ML
80 INJECTION INTRAMUSCULAR; INTRAVENOUS
Status: COMPLETED | OUTPATIENT
Start: 2017-08-27 | End: 2017-08-27

## 2017-08-27 RX ORDER — TIOTROPIUM BROMIDE 18 UG/1
18 CAPSULE ORAL; RESPIRATORY (INHALATION) DAILY
Status: DISCONTINUED | OUTPATIENT
Start: 2017-08-27 | End: 2017-08-28 | Stop reason: HOSPADM

## 2017-08-27 RX ORDER — ENOXAPARIN SODIUM 100 MG/ML
40 INJECTION SUBCUTANEOUS EVERY 24 HOURS
Status: DISCONTINUED | OUTPATIENT
Start: 2017-08-27 | End: 2017-08-28 | Stop reason: HOSPADM

## 2017-08-27 RX ORDER — METHYLPREDNISOLONE SOD SUCC 125 MG
125 VIAL (EA) INJECTION
Status: COMPLETED | OUTPATIENT
Start: 2017-08-27 | End: 2017-08-27

## 2017-08-27 RX ORDER — IBUPROFEN 200 MG
16 TABLET ORAL
Status: DISCONTINUED | OUTPATIENT
Start: 2017-08-27 | End: 2017-08-27

## 2017-08-27 RX ORDER — INSULIN ASPART 100 [IU]/ML
0-5 INJECTION, SOLUTION INTRAVENOUS; SUBCUTANEOUS
Status: DISCONTINUED | OUTPATIENT
Start: 2017-08-27 | End: 2017-08-27

## 2017-08-27 RX ORDER — IBUPROFEN 200 MG
16 TABLET ORAL
Status: DISCONTINUED | OUTPATIENT
Start: 2017-08-27 | End: 2017-08-28 | Stop reason: HOSPADM

## 2017-08-27 RX ORDER — IBUPROFEN 200 MG
1 TABLET ORAL DAILY
Status: DISCONTINUED | OUTPATIENT
Start: 2017-08-27 | End: 2017-08-28 | Stop reason: HOSPADM

## 2017-08-27 RX ORDER — SODIUM CHLORIDE 0.9 % (FLUSH) 0.9 %
3 SYRINGE (ML) INJECTION EVERY 8 HOURS
Status: DISCONTINUED | OUTPATIENT
Start: 2017-08-27 | End: 2017-08-27

## 2017-08-27 RX ORDER — PREDNISONE 20 MG/1
40 TABLET ORAL DAILY
Status: DISCONTINUED | OUTPATIENT
Start: 2017-08-27 | End: 2017-08-28 | Stop reason: HOSPADM

## 2017-08-27 RX ORDER — AZITHROMYCIN 250 MG/1
500 TABLET, FILM COATED ORAL ONCE
Status: COMPLETED | OUTPATIENT
Start: 2017-08-27 | End: 2017-08-27

## 2017-08-27 RX ADMIN — FUROSEMIDE 60 MG: 10 INJECTION, SOLUTION INTRAMUSCULAR; INTRAVENOUS at 05:08

## 2017-08-27 RX ADMIN — IPRATROPIUM BROMIDE AND ALBUTEROL SULFATE 3 ML: .5; 3 SOLUTION RESPIRATORY (INHALATION) at 08:08

## 2017-08-27 RX ADMIN — OLANZAPINE 10 MG: 2.5 TABLET, FILM COATED ORAL at 09:08

## 2017-08-27 RX ADMIN — ALBUTEROL SULFATE 10 MG: 2.5 SOLUTION RESPIRATORY (INHALATION) at 12:08

## 2017-08-27 RX ADMIN — CLONIDINE HYDROCHLORIDE 0.3 MG: 0.3 TABLET ORAL at 02:08

## 2017-08-27 RX ADMIN — SODIUM CHLORIDE, PRESERVATIVE FREE 3 ML: 5 INJECTION INTRAVENOUS at 02:08

## 2017-08-27 RX ADMIN — FUROSEMIDE 80 MG: 10 INJECTION, SOLUTION INTRAMUSCULAR; INTRAVENOUS at 01:08

## 2017-08-27 RX ADMIN — MUPIROCIN: 20 OINTMENT TOPICAL at 09:08

## 2017-08-27 RX ADMIN — IPRATROPIUM BROMIDE AND ALBUTEROL SULFATE 3 ML: .5; 3 SOLUTION RESPIRATORY (INHALATION) at 03:08

## 2017-08-27 RX ADMIN — ONDANSETRON 8 MG: 2 INJECTION INTRAMUSCULAR; INTRAVENOUS at 10:08

## 2017-08-27 RX ADMIN — SODIUM CHLORIDE, PRESERVATIVE FREE 3 ML: 5 INJECTION INTRAVENOUS at 05:08

## 2017-08-27 RX ADMIN — CLONIDINE HYDROCHLORIDE 0.3 MG: 0.3 TABLET ORAL at 09:08

## 2017-08-27 RX ADMIN — PANTOPRAZOLE SODIUM 600 MG: 40 TABLET, DELAYED RELEASE ORAL at 09:08

## 2017-08-27 RX ADMIN — FUROSEMIDE 60 MG: 10 INJECTION, SOLUTION INTRAMUSCULAR; INTRAVENOUS at 09:08

## 2017-08-27 RX ADMIN — ENOXAPARIN SODIUM 40 MG: 100 INJECTION SUBCUTANEOUS at 04:08

## 2017-08-27 RX ADMIN — PREDNISONE 40 MG: 20 TABLET ORAL at 09:08

## 2017-08-27 RX ADMIN — AZITHROMYCIN 500 MG: 250 TABLET, FILM COATED ORAL at 03:08

## 2017-08-27 RX ADMIN — IPRATROPIUM BROMIDE AND ALBUTEROL SULFATE 3 ML: .5; 3 SOLUTION RESPIRATORY (INHALATION) at 12:08

## 2017-08-27 RX ADMIN — CLONIDINE HYDROCHLORIDE 0.3 MG: 0.3 TABLET ORAL at 05:08

## 2017-08-27 RX ADMIN — NICOTINE 1 PATCH: 14 PATCH, EXTENDED RELEASE TRANSDERMAL at 09:08

## 2017-08-27 RX ADMIN — TIOTROPIUM BROMIDE 18 MCG: 18 CAPSULE ORAL; RESPIRATORY (INHALATION) at 08:08

## 2017-08-27 RX ADMIN — METHYLPREDNISOLONE SODIUM SUCCINATE 125 MG: 125 INJECTION, POWDER, FOR SOLUTION INTRAMUSCULAR; INTRAVENOUS at 01:08

## 2017-08-27 NOTE — PLAN OF CARE
Placed on continuous POX as ordered. Pox on 3 L NC -88-93%.Abgs drawn RT will set up Bipap machine in room.Remains quite drowsy. Hob elevated . Bed alarm on.

## 2017-08-27 NOTE — PLAN OF CARE
Very sleepy / lethargic.Arousable to verbal stimuli  And when shaken .follows commands but falls asleep midway instructions.HOB elevated ,On telemetry and fall risk monitoring. Bilateral lower and upper extremities edematous with numerous scarring..Orededred ointment applied to all alba.

## 2017-08-27 NOTE — ASSESSMENT & PLAN NOTE
Patient with multiple ED visits and hospitalizations related to COPD but continues to smoke cigarettes.
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
## 2017-08-27 NOTE — ED NOTES
vbg drawn per finger stick per nurse and given to RT;md aware that RT unable to obtain abg after 3 attempts
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
## 2017-08-27 NOTE — ED NOTES
md at bedside and aware of no iv access and multiple scarring both arms due to pt hx iv drug use of heroin

## 2017-08-27 NOTE — HOSPITAL COURSE
Nebulizer treatments were continued.  He was given daily prednisone.  He tolerated being weaned to home dose 2L NC.  Discharged home to complete oral antibiotics and prednisone.
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
## 2017-08-27 NOTE — ED NOTES
Pt again redirected to the bed and again put pt back on nasal o2; pt reports anxiety and requesting xanax; po water given

## 2017-08-27 NOTE — ASSESSMENT & PLAN NOTE
Heroin abuse  Admits to heroin use within last 7 days.  Patient counseled on importance of seeking assistance with drug abuse counseling.

## 2017-08-27 NOTE — ED NOTES
Pt standing in room and grabbing his clothes and thought he was going home; told pt he was admitted; pt took o2 off and now in no distres and NP is here and aware; no mention of pain

## 2017-08-27 NOTE — PLAN OF CARE
Remains drowsy and lethargic. Arrousable to voice and whern shaken. On telemetry monitoring as well as continuous POX.No true red alarm ectopy. Pox on 2 L NC-89-93%All 4 extremities remain  edematous.

## 2017-08-27 NOTE — PROGRESS NOTES
[place] to obtain ABG due to scar tissue in both wrists. MD Guzman notified at [facility] 0116. Thomas suggested VBG route instead. Waiting for Nurse to draw venous

## 2017-08-27 NOTE — PLAN OF CARE
Received from ED via stretcher. Awake but drowsy. Responds to questions appropriately.placed on Telemetry, O2@2L/nc O2 sats 90%. Oriented to room and equipment. Instructed to call for assistance before getting out of bed. Verbalized understanding. Call bell in reach. Bed alarm initiated. No respiratory distress noted. Will continue to monitor.

## 2017-08-27 NOTE — ED NOTES
AMELIA Meier and charge nurse in room with the patient to discuss plan of care and results of tests

## 2017-08-27 NOTE — ASSESSMENT & PLAN NOTE
Shortness of breath  Acute on chronic respiratory failure with hypoxia   Given solumedrol 125 mg IV and albuterol nebulizer in ED.  Weaned from BiPAP to 4L NC.    - Albuterol-ipratropium nebulizers  - Mucinex BID  - Azithromycin x 5 days  - Prednisone 40 mg daily

## 2017-08-27 NOTE — PLAN OF CARE
Problem: Patient Care Overview  Goal: Plan of Care Review  Outcome: Ongoing (interventions implemented as appropriate)  Pt was found on room air with SpO2 84% on room air. Pt was place back on 3 lpm NC with SpO2 92%. No adverse reactions to aerosol tx or MDI. No respiratory distress noted. Continue to monitor SpO2.

## 2017-08-28 VITALS
SYSTOLIC BLOOD PRESSURE: 142 MMHG | BODY MASS INDEX: 38.31 KG/M2 | HEIGHT: 69 IN | TEMPERATURE: 99 F | OXYGEN SATURATION: 94 % | WEIGHT: 258.63 LBS | RESPIRATION RATE: 18 BRPM | HEART RATE: 87 BPM | DIASTOLIC BLOOD PRESSURE: 87 MMHG

## 2017-08-28 PROBLEM — J96.02 ACUTE RESPIRATORY ACIDOSIS: Status: RESOLVED | Noted: 2017-07-21 | Resolved: 2017-08-28

## 2017-08-28 PROBLEM — R79.89 ELEVATED TROPONIN I MEASUREMENT: Status: RESOLVED | Noted: 2017-08-27 | Resolved: 2017-08-28

## 2017-08-28 PROBLEM — J44.1 COPD WITH ACUTE EXACERBATION: Status: RESOLVED | Noted: 2017-06-26 | Resolved: 2017-08-28

## 2017-08-28 PROBLEM — K72.91 HEPATIC COMA/ENCEPHALOPATHY: Status: RESOLVED | Noted: 2017-07-21 | Resolved: 2017-08-28

## 2017-08-28 LAB
POCT GLUCOSE: 122 MG/DL (ref 70–110)
POCT GLUCOSE: 155 MG/DL (ref 70–110)

## 2017-08-28 PROCEDURE — S4991 NICOTINE PATCH NONLEGEND: HCPCS | Performed by: NURSE PRACTITIONER

## 2017-08-28 PROCEDURE — 99900035 HC TECH TIME PER 15 MIN (STAT)

## 2017-08-28 PROCEDURE — 63600175 PHARM REV CODE 636 W HCPCS: Performed by: NURSE PRACTITIONER

## 2017-08-28 PROCEDURE — 94761 N-INVAS EAR/PLS OXIMETRY MLT: CPT

## 2017-08-28 PROCEDURE — 25000003 PHARM REV CODE 250: Performed by: NURSE PRACTITIONER

## 2017-08-28 PROCEDURE — 25000242 PHARM REV CODE 250 ALT 637 W/ HCPCS: Performed by: NURSE PRACTITIONER

## 2017-08-28 PROCEDURE — 94640 AIRWAY INHALATION TREATMENT: CPT

## 2017-08-28 PROCEDURE — 63600175 PHARM REV CODE 636 W HCPCS: Performed by: HOSPITALIST

## 2017-08-28 RX ORDER — IBUPROFEN 200 MG
1 TABLET ORAL DAILY
Refills: 0 | COMMUNITY
Start: 2017-08-28 | End: 2017-09-13 | Stop reason: SDUPTHER

## 2017-08-28 RX ORDER — MUPIROCIN 20 MG/G
OINTMENT TOPICAL 2 TIMES DAILY
Qty: 30 G | Refills: 0 | Status: SHIPPED | OUTPATIENT
Start: 2017-08-28 | End: 2017-09-07

## 2017-08-28 RX ORDER — GUAIFENESIN 600 MG/1
600 TABLET, EXTENDED RELEASE ORAL 2 TIMES DAILY
COMMUNITY
Start: 2017-08-28 | End: 2017-09-07

## 2017-08-28 RX ORDER — PREDNISONE 20 MG/1
40 TABLET ORAL DAILY
Qty: 10 TABLET | Refills: 0 | Status: SHIPPED | OUTPATIENT
Start: 2017-08-28 | End: 2017-09-02

## 2017-08-28 RX ORDER — AZITHROMYCIN 250 MG/1
250 TABLET, FILM COATED ORAL DAILY
Qty: 4 TABLET | Refills: 0 | Status: SHIPPED | OUTPATIENT
Start: 2017-08-28 | End: 2017-09-01

## 2017-08-28 RX ORDER — IPRATROPIUM BROMIDE AND ALBUTEROL SULFATE 2.5; .5 MG/3ML; MG/3ML
3 SOLUTION RESPIRATORY (INHALATION) EVERY 6 HOURS PRN
Qty: 120 VIAL | Refills: 1 | Status: SHIPPED | OUTPATIENT
Start: 2017-08-28 | End: 2017-09-13 | Stop reason: SDUPTHER

## 2017-08-28 RX ADMIN — IPRATROPIUM BROMIDE AND ALBUTEROL SULFATE 3 ML: .5; 3 SOLUTION RESPIRATORY (INHALATION) at 04:08

## 2017-08-28 RX ADMIN — PANTOPRAZOLE SODIUM 600 MG: 40 TABLET, DELAYED RELEASE ORAL at 08:08

## 2017-08-28 RX ADMIN — AZITHROMYCIN 250 MG: 250 TABLET, FILM COATED ORAL at 08:08

## 2017-08-28 RX ADMIN — CLONIDINE HYDROCHLORIDE 0.3 MG: 0.3 TABLET ORAL at 05:08

## 2017-08-28 RX ADMIN — NICOTINE 1 PATCH: 14 PATCH, EXTENDED RELEASE TRANSDERMAL at 08:08

## 2017-08-28 RX ADMIN — KETOROLAC TROMETHAMINE 15 MG: 30 INJECTION, SOLUTION INTRAMUSCULAR at 12:08

## 2017-08-28 RX ADMIN — PREDNISONE 40 MG: 20 TABLET ORAL at 08:08

## 2017-08-28 RX ADMIN — ONDANSETRON 8 MG: 2 INJECTION INTRAMUSCULAR; INTRAVENOUS at 08:08

## 2017-08-28 RX ADMIN — FUROSEMIDE 60 MG: 10 INJECTION, SOLUTION INTRAMUSCULAR; INTRAVENOUS at 08:08

## 2017-08-28 RX ADMIN — MUPIROCIN: 20 OINTMENT TOPICAL at 09:08

## 2017-08-28 RX ADMIN — OLANZAPINE 10 MG: 2.5 TABLET, FILM COATED ORAL at 08:08

## 2017-08-28 NOTE — PLAN OF CARE
Problem: Infection, Risk/Actual (Adult)  Intervention: Prevent Infection/Maximize Resistance  Medicated x 1 for pain and nausea with adequate relief.  Pt able to sleep through the night.  NSR on telemetry with HR in the 80's. Continuous pulse ox on.  Sats low 90%.    Bed alarm on for safety.  Will continue to monitor.

## 2017-08-28 NOTE — PLAN OF CARE
Patient states he is willing to go to his f/u with Family Practice clinic as ordered, would prefer them over the [facility]. TN received ok to set up Secure Patient Delivery from PAT Garibay to return home today.     08/28/17 0951   Final Note   Assessment Type Final Discharge Note   Discharge Disposition Home   What phone number can be called within the next 1-3 days to see how you are doing after discharge? [phone]   Hospital Follow Up  Appt(s) scheduled? Yes   Discharge plans and expectations educations in teach back method with documentation complete? Yes   Right Care Referral Info   Post Acute Recommendation No Care   Hayden Schwartz, RN  Transitional Navigator/Case Management  [phone]

## 2017-08-28 NOTE — ASSESSMENT & PLAN NOTE
Heroin abuse   Patient counseled on importance of seeking assistance with drug abuse counseling.

## 2017-08-28 NOTE — PROGRESS NOTES
Pt refused to be placed on bipap due to pain. RN notified. PT SATS are 93% on 3lpm NC. Will continue to monitor.

## 2017-08-28 NOTE — DISCHARGE INSTRUCTIONS
ADDICTION, ADDICTION (ENGLISH) View Edit Remove  CHRONIC OBSTRUCTIVE PULMONARY DISEASE (COPD), DISCHARGE INSTRUCTIONS (ENGLISH) View Edit Remove  DRUG ABUSE (ENGLISH) View Edit Remove  AZITHROMYCIN TABLETS (ENGLISH) View Edit Remove  GUAIFENESIN ORAL ER TABLETS (ENGLISH) View Edit Remove  MUPIROCIN SKIN CREAM OR OINTMENT (ENGLISH) View Edit Remove  NICOTINE SKIN PATCHES (ENGLISH) View Edit Remove  PREDNISONE TABLETS (ENGLISH) View Edit Remove  ADDICTION, RECOVERING: CONTINUING WITH COUNSELING (ENGLISH) View Edit Remove  ADDICTION: YOUR TREATMENT OPTIONS (ENGLISH) View Edit Remove  ADDICTION: GETTING HELP (ENGLISH) View Edit Remove  CONTROLLED BREATHING, DISCHARGE INSTRUCTIONS (ENGLISH) View Edit Remove  COPD FLARE (ENGLISH) View Edit Remove  OXYGEN, USING AT HOME (ENGLISH) View Edit Remove  OXYGEN, USING AT HOME: DISCHARGE INSTRUCTIONS (ENGLISH) View Edit Remove  OXYGEN, USING SAFELY (ENGLISH) View Edit Remove  LUNG DISEASE, CHRONIC: IF OXYGEN IS PRESCRIBED (ENGLISH) View Edit

## 2017-08-28 NOTE — PLAN OF CARE
Problem: Patient Care Overview  Goal: Plan of Care Review  Outcome: Ongoing (interventions implemented as appropriate)  Administer nebulizer treatments as ordered and encourage deep breathing / cough.  Continued monitoring of patient's oxygenation status.  The proper method of use, as well as anticipated side effects, of this metered-dose inhaler are discussed and demonstrated to the patient.

## 2017-08-28 NOTE — NURSING
IV site removed. No active bleeding noted. Tele monitor removed for discharge. Discharge instructions and educational printouts given to pt and discussed with patient thoroughly. Patient verbalized clear understanding of all discussed. Patient d/c'd via w/c with Wheelchair Van escort with all of patient 's belongings. O2 at 3l/nc con to be in use during transport. At present no distress noted.
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
## 2017-08-28 NOTE — ASSESSMENT & PLAN NOTE
Shortness of breath  Acute on chronic respiratory failure with hypoxia   - Continue home dose supplemental O2    - Albuterol-ipratropium nebulizers  - Mucinex BID  - Azithromycin x 5 days  - Prednisone 40 mg daily x 5 days

## 2017-08-28 NOTE — SUBJECTIVE & OBJECTIVE
Interval History:  Patient had episode of vomiting last night.  Reports that he feels much better.  States that he is okay to go home.  Denies shortness of breath and chest pain.  Requesting prescription for nicotine patch.  Instructed to schedule and keep appointment with PCP for close f/u.    Review of Systems   Constitutional: Negative for chills and fever.   Respiratory: Positive for cough and wheezing. Negative for shortness of breath.    Cardiovascular: Negative for chest pain and palpitations.   Gastrointestinal: Negative for abdominal pain.   Neurological: Negative for dizziness and headaches.     Objective:     Vital Signs (Most Recent):  Temp: 98.8 °F (37.1 °C) (08/28/17 0440)  Pulse: 72 (08/28/17 0600)  Resp: 20 (08/28/17 0440)  BP: (!) 147/94 (08/28/17 0440)  SpO2: 96 % (08/28/17 0401) Vital Signs (24h Range):  Temp:  [96.1 °F (35.6 °C)-99 °F (37.2 °C)] 98.8 °F (37.1 °C)  Pulse:  [] 72  Resp:  [18-20] 20  SpO2:  [84 %-96 %] 96 %  BP: (119-147)/(69-94) 147/94     Weight: 117.3 kg (258 lb 9.6 oz)  Body mass index is 38.19 kg/m².    Intake/Output Summary (Last 24 hours) at 08/28/17 0636  Last data filed at 08/28/17 0500   Gross per 24 hour   Intake              890 ml   Output             3050 ml   Net            -2160 ml      Physical Exam   Constitutional: He is sleeping. He is easily aroused. No distress. Nasal cannula in place.   2L NC   Cardiovascular: Normal rate, regular rhythm and intact distal pulses.    Pulmonary/Chest: Effort normal. He has wheezes.   Expiratory wheezing bilaterally.   Abdominal: Soft. Bowel sounds are normal. There is no tenderness.   Musculoskeletal: Normal range of motion. He exhibits edema. He exhibits no tenderness.   1+ edema to bilateral lower extremities.   Neurological: He is easily aroused.   Nursing note and vitals reviewed.      Significant Labs:   CBC:   Recent Labs  Lab 08/27/17  0109   WBC 7.88   HGB 12.8*   HCT 41.2        CMP:   Recent Labs  Lab  08/27/17  0109      K 4.4      CO2 36*   GLU 86   BUN 7   CREATININE 0.9   CALCIUM 8.7   PROT 7.8   ALBUMIN 3.1*   BILITOT 0.6   ALKPHOS 73   AST 34   ALT 21   ANIONGAP 5*   EGFRNONAA >60     All pertinent labs within the past 24 hours have been reviewed.    Significant Imaging: None

## 2017-08-29 ENCOUNTER — PATIENT OUTREACH (OUTPATIENT)
Dept: ADMINISTRATIVE | Facility: CLINIC | Age: 52
End: 2017-08-29

## 2017-08-29 NOTE — PROGRESS NOTES
C3 nurse attempted to contact patient. No answer. The following message was left for the patient to return the call:  Good morning  I am a nurse calling on behalf of [facility] from the [facility].  This is a Transitional Care Call for Ming Harrington. When you have a moment please contact us at [phone] or [phone] Monday through Friday, between the hours of 8 am to 4 pm. We look forward to speaking with you. On behalf of [facility] have a nice day.    The patient has a scheduled HOSFU appointment with El Joe MD on 9/6/2017  @8:40am.

## 2017-08-29 NOTE — PROGRESS NOTES
pts appointment rescheduled per pts request due to needing prescriptions for other medications that he routinely takes.

## 2017-08-29 NOTE — PATIENT INSTRUCTIONS
COPD Flare    You have had a flare-up of your COPD.  COPD, or chronic obstructive pulmonary disease, is a common lung disease. It causes your airways to become irritated and narrower. This makes it harder for you to breathe. Emphysema and chronic bronchitis are both types of COPD. This is a chronic condition, which means you always have it. Sometimes it gets worse. When this happens, it is called a flare-up.  Symptoms of COPD  People with COPD may have symptoms most of the time. In a flare-up, your symptoms get worse. These symptoms may mean you are having a flare-up:  · Shortness of breath, shallow or rapid breathing, or wheezing that gets worse  · Lung infection  · Cough that gets worse  · More mucus, thicker mucus or mucus of a different color  · Tiredness, decreased energy, or trouble doing your usual activities  · Fever  · Chest tightness  · Your symptoms dont get better even when you use your usual medicines, inhalers, and nebulizer  · Trouble talking  · You feel confused  Causes of flare-ups  Unfortunately, a flare-up can happen even though you did everything right, and you followed your doctors instructions. Some causes of flare-ups are:  · Smoking or secondhand smoke  · Colds, the flu, or respiratory infections  · Air pollution  · Sudden change in the weather  · Dust, irritating chemicals, or strong fumes  · Not taking your medicines as prescribed  Home care  Here are some things you can do at home to treat a flare-up:  · Try not to panic. This makes it harder to breathe, and keeps you from doing the right things.  · Dont smoke or be around others who are smoking.  · Try to drink more fluids than usual during a flare-up, unless your doctor has told you not to because of heart and kidney problems. More fluids can help loosen the mucus.  · Use your inhalers and nebulizer, if you have one, as you have been told to.  · If you were given antibiotics, take them until they are used up or your doctor tells you  to stop. Its important to finish the antibiotics, even though you feel better. This will make sure the infection has cleared.  · If you were given prednisone or another steroid, finish it even if you feel better.  Preventing a flare-up  Even though flare-ups happen, the best way to treat one is to prevent it before it starts. Here are some pointers:  · Dont smoke or be around others who are smoking.  · Take your medicines as you have been told.  · Talk with your doctor about getting a flu shot every year. Also find out if you need a pneumonia shot.  · If there is a weather advisory warning to stay indoors, try to stay inside when possible.  · Try to eat healthy and get plenty of sleep.  · Try to avoid things that usually set you off, like dust, chemical fumes, hairsprays, or strong perfumes.  Follow-up care  Follow up with your healthcare provider, or as advised.  If a culture was done, you will be told if your treatment needs to be changed. You can call as directed for the results.  If X-rays were done, you will be notified of any new findings that may affect your care.  Call 911  Call 911 if any of these occur:  · You have trouble breathing  · You feel confused or its difficult to wake you up  · You faint or lose consciousness  · You have a rapid heart rate  · You have new pain in your chest, arm, shoulder, neck or upper back  When to seek medical advice  Call your healthcare provider right away if any of these occur:  · Wheezing or shortness of breath gets worse  · You need to use your inhalers more often than usual without relief  · Fever of 100.4°F (38ºC) or higher, or as directed by your healthcare provider  · Coughing up lots of dark-colored or bloody mucus (sputum)  · Chest pain with each breath  · You do not start to get better within 24 hours  · Swelling of your ankles gets worse  · Dizziness or weakness  Date Last Reviewed: 9/1/2017  © 1771-6724 [employer]. [address] [facility],  [place] [address]. All rights reserved. This information is not intended as a substitute for professional medical care. Always follow your healthcare professional's instructions.

## 2017-08-29 NOTE — PHYSICIAN QUERY
"PT Name: Ming Harrington  MR #: [id number]    Physician Query Form - Heart  Condition Clarification     CDS/: Sabina Aguilera RN              Contact information: [phone]  This form is a permanent document in the medical record.     Query Date: August 29, 2017    By submitting this query, we are merely seeking further clarification of documentation. Please utilize your independent clinical judgment when addressing the question(s) below.    The medical record contains the following   Indicators     Supporting Clinical Findings Location in Medical Record   x BNP  Labs 8/27/17    EF      Radiology findings      Echo Results      "Ascites" documented     x "SOB" or "LAND" documented Shortness of breath ED Note 8/27    "Hypoxia" documented     x Heart Failure documented Past med hx of CHF ED note 8/27/17  H&P 8/27/17   x "Edema" documented 3+ edema to bilateral lower extremity  H&P constitutional 8/27/17   x Diuretics/Meds Lasix 60mg  IV BID MAR 8/27/17   x Treatment: Diurese with IV lasix H&P 8/27/17   x Other:  Pulmonary HTN    DDX:  chf exacerbation H&P 8/27/17    ED note 8/27/17       Provider, please specify diagnosis or diagnoses associated with above clinical findings.                               [  ] Acute Systolic Heart Failure ( EF < 40)*  [  ] Acute on Chronic Systolic Heart Failure ( EF < 40)*  [  ] Acute Diastolic Heart Failure ( EF > 40)*  [x  ] Acute on Chronic Diastolic Heart Failure( EF > 40)*  [  ] Acute Combined Systolic and Diastolic Heart Failure  [  ] Acute on Chronic Combined Systolic and Diastolic Heart Failure  [  ] Other Type of Heart Failure (please specify type): _________________________  [  ] Heart Failure Ruled Out  [  ] Other (please specify): ___________________________________  [  ] Clinically Undetermined            *American [employer] Association                                                                                                          Please document in your progress " notes daily for the duration of treatment until resolved and include in your discharge summary.

## 2017-08-30 ENCOUNTER — TELEPHONE (OUTPATIENT)
Dept: FAMILY MEDICINE | Facility: HOSPITAL | Age: 52
End: 2017-08-30

## 2017-08-30 DIAGNOSIS — J44.0 CHRONIC OBSTRUCTIVE PULMONARY DISEASE WITH ACUTE LOWER RESPIRATORY INFECTION: ICD-10-CM

## 2017-08-30 RX ORDER — CLONIDINE HYDROCHLORIDE 0.1 MG/1
0.3 TABLET ORAL 3 TIMES DAILY
Qty: 90 TABLET | Refills: 0 | Status: SHIPPED | OUTPATIENT
Start: 2017-08-30 | End: 2017-09-13 | Stop reason: SDUPTHER

## 2017-08-30 RX ORDER — FUROSEMIDE 40 MG/1
40 TABLET ORAL 2 TIMES DAILY
Qty: 60 TABLET | Refills: 6 | Status: SHIPPED | OUTPATIENT
Start: 2017-08-30 | End: 2017-09-13 | Stop reason: SDUPTHER

## 2017-08-30 RX ORDER — ALBUTEROL SULFATE 90 UG/1
1-2 AEROSOL, METERED RESPIRATORY (INHALATION) EVERY 6 HOURS PRN
Qty: 1 INHALER | Refills: 0 | Status: SHIPPED | OUTPATIENT
Start: 2017-08-30 | End: 2017-09-13 | Stop reason: SDUPTHER

## 2017-08-30 RX ORDER — IPRATROPIUM BROMIDE AND ALBUTEROL SULFATE 2.5; .5 MG/3ML; MG/3ML
3 SOLUTION RESPIRATORY (INHALATION) EVERY 6 HOURS PRN
Qty: 3 ML | Refills: 6 | Status: SHIPPED | OUTPATIENT
Start: 2017-08-30 | End: 2017-10-01

## 2017-08-30 NOTE — TELEPHONE ENCOUNTER
Patient called stating he was discharged from the hospital yesterday but didn't get prescriptions for some of his medications. Patient requests refills of albuterol inhaler and nebulizer solution, clonidine 0.1mg tablets, and  furosemide 40mg. States he was also given a prescription for steroids but usually get promethazine to take with the steroids since steroids make him nauseous.

## 2017-09-13 ENCOUNTER — OFFICE VISIT (OUTPATIENT)
Dept: FAMILY MEDICINE | Facility: HOSPITAL | Age: 52
End: 2017-09-13
Attending: FAMILY MEDICINE
Payer: MEDICAID

## 2017-09-13 VITALS
HEART RATE: 97 BPM | SYSTOLIC BLOOD PRESSURE: 141 MMHG | DIASTOLIC BLOOD PRESSURE: 101 MMHG | BODY MASS INDEX: 38.42 KG/M2 | HEIGHT: 68 IN | TEMPERATURE: 99 F | WEIGHT: 253.5 LBS

## 2017-09-13 DIAGNOSIS — R60.0 BILATERAL LEG EDEMA: Primary | ICD-10-CM

## 2017-09-13 DIAGNOSIS — J44.0 CHRONIC OBSTRUCTIVE PULMONARY DISEASE WITH ACUTE LOWER RESPIRATORY INFECTION: ICD-10-CM

## 2017-09-13 DIAGNOSIS — K42.9 UMBILICAL HERNIA WITHOUT OBSTRUCTION AND WITHOUT GANGRENE: ICD-10-CM

## 2017-09-13 DIAGNOSIS — I10 ESSENTIAL HYPERTENSION: ICD-10-CM

## 2017-09-13 DIAGNOSIS — E11.9 TYPE 2 DIABETES MELLITUS WITHOUT COMPLICATION, WITHOUT LONG-TERM CURRENT USE OF INSULIN: ICD-10-CM

## 2017-09-13 DIAGNOSIS — F31.81 BIPOLAR 2 DISORDER: ICD-10-CM

## 2017-09-13 DIAGNOSIS — Z72.0 TOBACCO ABUSE: ICD-10-CM

## 2017-09-13 PROCEDURE — 99214 OFFICE O/P EST MOD 30 MIN: CPT | Performed by: FAMILY MEDICINE

## 2017-09-13 RX ORDER — OLANZAPINE 10 MG/1
10 TABLET ORAL 2 TIMES DAILY
Qty: 60 TABLET | Refills: 0 | Status: ON HOLD | OUTPATIENT
Start: 2017-09-13 | End: 2018-07-05

## 2017-09-13 RX ORDER — NAPROXEN 500 MG/1
500 TABLET ORAL 2 TIMES DAILY
Qty: 60 TABLET | Refills: 0 | Status: ON HOLD | OUTPATIENT
Start: 2017-09-13 | End: 2017-10-04

## 2017-09-13 RX ORDER — TIOTROPIUM BROMIDE 18 UG/1
18 CAPSULE ORAL; RESPIRATORY (INHALATION) DAILY
Qty: 30 CAPSULE | Refills: 1 | Status: SHIPPED | OUTPATIENT
Start: 2017-09-13 | End: 2017-12-13 | Stop reason: SDUPTHER

## 2017-09-13 RX ORDER — ONDANSETRON 4 MG/1
4 TABLET, ORALLY DISINTEGRATING ORAL EVERY 8 HOURS PRN
Qty: 60 TABLET | Refills: 0 | Status: SHIPPED | OUTPATIENT
Start: 2017-09-13 | End: 2018-02-15 | Stop reason: ALTCHOICE

## 2017-09-13 RX ORDER — DOXYCYCLINE 100 MG/1
CAPSULE ORAL
Status: ON HOLD | COMMUNITY
Start: 2017-07-24 | End: 2017-10-04

## 2017-09-13 RX ORDER — IBUPROFEN 200 MG
1 TABLET ORAL DAILY
Refills: 0 | Status: ON HOLD | COMMUNITY
Start: 2017-09-13 | End: 2019-02-28 | Stop reason: CLARIF

## 2017-09-13 RX ORDER — FUROSEMIDE 40 MG/1
40 TABLET ORAL 2 TIMES DAILY
Qty: 60 TABLET | Refills: 6 | Status: ON HOLD | OUTPATIENT
Start: 2017-09-13 | End: 2017-10-04 | Stop reason: HOSPADM

## 2017-09-13 RX ORDER — METFORMIN HYDROCHLORIDE 500 MG/1
500 TABLET ORAL 2 TIMES DAILY
Qty: 180 TABLET | Refills: 3 | Status: ON HOLD | OUTPATIENT
Start: 2017-09-13 | End: 2017-10-04

## 2017-09-13 RX ORDER — CLONIDINE HYDROCHLORIDE 0.1 MG/1
0.3 TABLET ORAL 3 TIMES DAILY
Qty: 90 TABLET | Refills: 0 | Status: ON HOLD | OUTPATIENT
Start: 2017-09-13 | End: 2017-10-04

## 2017-09-13 RX ORDER — ALBUTEROL SULFATE 90 UG/1
1-2 AEROSOL, METERED RESPIRATORY (INHALATION) EVERY 6 HOURS PRN
Qty: 1 INHALER | Refills: 0 | Status: SHIPPED | OUTPATIENT
Start: 2017-09-13 | End: 2017-10-16 | Stop reason: SDUPTHER

## 2017-09-13 RX ORDER — CLONIDINE HYDROCHLORIDE 0.1 MG/1
0.3 TABLET ORAL 3 TIMES DAILY
Qty: 90 TABLET | Refills: 0 | Status: SHIPPED | OUTPATIENT
Start: 2017-09-13 | End: 2017-09-13 | Stop reason: SDUPTHER

## 2017-09-13 RX ORDER — IPRATROPIUM BROMIDE AND ALBUTEROL SULFATE 2.5; .5 MG/3ML; MG/3ML
3 SOLUTION RESPIRATORY (INHALATION) EVERY 6 HOURS PRN
Qty: 120 VIAL | Refills: 1 | Status: SHIPPED | OUTPATIENT
Start: 2017-09-13 | End: 2017-10-05 | Stop reason: SDUPTHER

## 2017-09-13 RX ORDER — AMOXICILLIN 250 MG
1 CAPSULE ORAL 2 TIMES DAILY
Status: ON HOLD | COMMUNITY
Start: 2017-09-13 | End: 2018-07-05 | Stop reason: HOSPADM

## 2017-09-13 NOTE — PROGRESS NOTES
Subjective:       Patient ID: Ming Harrington is a 51 y.o. male.    Chief Complaint: Hospital Follow Up (8/27/2017 ); Hernia (Pain in abdomen); Medication Refill (Out of medications); and Medication Refill (Requested a promethazine prescription)    Medication Refill   Pertinent negatives include no abdominal pain, arthralgias, chest pain, chills, coughing, diaphoresis, fatigue, fever, myalgias or weakness.      Ming Harrington is a 52 yo male who presents as a follow up from the ER.  He was in the ER on 08/27/2017 for COPD exacerbation.  He has been recovering well, and all his symptoms have resolved except for a mild cough.  He did not take the methylprendisone that was prescribed in the ED because he says that the medication makes him nauseated.  In the ER, a BNP was measured to be 882, and Mr. Harrington was started on lasix.  He complains that he his bilateral leg edema has been increasing.  Today, he is also complaining of 9/10 pain from a left umbilical hernia.  Two years ago, he had surgery for the umbilical hernia, but the hernia returned last year.  For the past few months, the pain has been worsening and it associated with non-bloody constipation and bloody vomiting.  The hernia is reducible, and there are is no overlying erythema or infection.  Patient reports that his pain is severe and he buys illicit drugs such as opioids.        Review of Systems   Constitutional: Negative for chills, diaphoresis, fatigue and fever.   HENT: Negative for nosebleeds, rhinorrhea and sneezing.    Eyes: Negative for pain.   Respiratory: Negative for apnea, cough, chest tightness, shortness of breath and wheezing.    Cardiovascular: Negative for chest pain, palpitations and leg swelling.   Gastrointestinal: Positive for constipation. Negative for abdominal pain and diarrhea.   Endocrine: Negative.    Genitourinary: Negative for dysuria, flank pain, frequency and urgency.   Musculoskeletal: Negative for arthralgias, back pain and myalgias.    Skin: Negative.    Allergic/Immunologic: Negative.    Neurological: Negative for dizziness, weakness and light-headedness.   Hematological: Negative.    Psychiatric/Behavioral: Negative.        Objective:      Vitals:    09/13/17 1557   BP: (!) 141/101   Pulse: 97   Temp: 98.7 °F (37.1 °C)     Physical Exam   Constitutional: He is oriented to person, place, and time. He appears well-developed and well-nourished.   HENT:   Head: Normocephalic.   Eyes: Pupils are equal, round, and reactive to light.   Neck: Normal range of motion. Neck supple.   Cardiovascular: Normal rate, regular rhythm, normal heart sounds and intact distal pulses.  Exam reveals no gallop and no friction rub.    No murmur heard.  Pulmonary/Chest: Effort normal and breath sounds normal. He has no wheezes. He has no rales.   Abdominal: Soft. Bowel sounds are normal. He exhibits no distension. There is tenderness in the periumbilical area. A hernia is present.       Musculoskeletal: Normal range of motion.        Right lower leg: He exhibits edema.        Left lower leg: He exhibits edema.   Neurological: He is alert and oriented to person, place, and time.   Skin: Skin is warm and dry.   Psychiatric: He has a normal mood and affect. His behavior is normal. Judgment and thought content normal.       Assessment:       1. Bilateral leg edema    2. Chronic obstructive pulmonary disease with acute lower respiratory infection    3. Essential hypertension    4. Type 2 diabetes mellitus without complication, without long-term current use of insulin    5. Tobacco abuse    6. Umbilical hernia without obstruction and without gangrene    7. Bipolar 2 disorder        Plan:       Bilateral leg edema  -     2D Echo w/ Color Flow Doppler; Future  -     furosemide (LASIX) 40 MG tablet; Take 1 tablet (40 mg total) by mouth 2 (two) times daily.  Dispense: 60 tablet; Refill: 6    Chronic obstructive pulmonary disease with acute lower respiratory infection  -      albuterol 90 mcg/actuation inhaler; Inhale 1-2 puffs into the lungs every 6 (six) hours as needed for Wheezing.  Dispense: 1 Inhaler; Refill: 0  -     albuterol-ipratropium 2.5mg-0.5mg/3mL (DUO-NEB) 0.5 mg-3 mg(2.5 mg base)/3 mL nebulizer solution; Take 3 mLs by nebulization every 6 (six) hours as needed for Wheezing. Rescue  Dispense: 120 vial; Refill: 1  -     tiotropium (SPIRIVA) 18 mcg inhalation capsule; Inhale 1 capsule (18 mcg total) into the lungs once daily. Controller  Dispense: 30 capsule; Refill: 1    Essential hypertension  -     cloNIDine (CATAPRES) 0.1 MG tablet; Take 3 tablets (0.3 mg total) by mouth 3 (three) times daily.  Dispense: 90 tablet; Refill: 0    Type 2 diabetes mellitus without complication, without long-term current use of insulin  -     metformin (GLUCOPHAGE) 500 MG tablet; Take 1 tablet (500 mg total) by mouth 2 (two) times daily.  Dispense: 180 tablet; Refill: 3  -     Hemoglobin A1c; Future; Expected date: 09/13/2017    Tobacco abuse  -     nicotine (NICODERM CQ) 14 mg/24 hr; Place 1 patch onto the skin once daily.; Refill: 0    Umbilical hernia without obstruction and without gangrene  -     senna-docusate 8.6-50 mg (PERICOLACE) 8.6-50 mg per tablet; Take 1 tablet by mouth 2 (two) times daily.  -     Ambulatory referral to General Surgery  - Naproxen 500mg BID    Bipolar 2 disorder  -     olanzapine (ZYPREXA) 10 MG tablet; Take 1 tablet (10 mg total) by mouth 2 (two) times daily. 1 tab in am and 2 tabs at bedtime  Dispense: 60 tablet; Refill: 0      RTC 1 month

## 2017-09-14 NOTE — PROGRESS NOTES
I assume primary medical responsibility for this patient, I have reviewed the case history, findings, diagnosis and treatment plan with the resident and agree that the care is reasonable and necessary. This service has been performed by a resident without the presence of a teaching physician under the primary care exception  Katerin Maldonado  9/14/2017

## 2017-09-30 ENCOUNTER — HOSPITAL ENCOUNTER (INPATIENT)
Facility: HOSPITAL | Age: 52
LOS: 3 days | Discharge: HOME | DRG: 190 | End: 2017-10-04
Attending: EMERGENCY MEDICINE | Admitting: FAMILY MEDICINE
Payer: MEDICAID

## 2017-09-30 DIAGNOSIS — J44.1 COPD EXACERBATION: ICD-10-CM

## 2017-09-30 DIAGNOSIS — I10 ESSENTIAL HYPERTENSION: ICD-10-CM

## 2017-09-30 DIAGNOSIS — A41.9 SEPSIS: ICD-10-CM

## 2017-09-30 DIAGNOSIS — F31.81 BIPOLAR 2 DISORDER: ICD-10-CM

## 2017-09-30 DIAGNOSIS — E11.8 TYPE 2 DIABETES MELLITUS WITH COMPLICATION, WITHOUT LONG-TERM CURRENT USE OF INSULIN: ICD-10-CM

## 2017-09-30 DIAGNOSIS — F19.10 POLYSUBSTANCE ABUSE: ICD-10-CM

## 2017-09-30 DIAGNOSIS — J96.02 ACUTE RESPIRATORY FAILURE WITH HYPERCAPNIA: Primary | ICD-10-CM

## 2017-09-30 DIAGNOSIS — J44.1 ACUTE EXACERBATION OF CHRONIC OBSTRUCTIVE PULMONARY DISEASE (COPD): ICD-10-CM

## 2017-09-30 DIAGNOSIS — K42.9 UMBILICAL HERNIA WITHOUT OBSTRUCTION AND WITHOUT GANGRENE: ICD-10-CM

## 2017-09-30 DIAGNOSIS — E11.9 TYPE 2 DIABETES MELLITUS WITHOUT COMPLICATION, WITHOUT LONG-TERM CURRENT USE OF INSULIN: ICD-10-CM

## 2017-09-30 DIAGNOSIS — R78.81 BACTEREMIA: ICD-10-CM

## 2017-09-30 DIAGNOSIS — Z72.0 TOBACCO ABUSE: ICD-10-CM

## 2017-09-30 LAB
ALBUMIN SERPL BCP-MCNC: 3.1 G/DL
ALLENS TEST: ABNORMAL
ALP SERPL-CCNC: 76 U/L
ALT SERPL W/O P-5'-P-CCNC: 28 U/L
AMPHET+METHAMPHET UR QL: NEGATIVE
ANION GAP SERPL CALC-SCNC: 8 MMOL/L
AST SERPL-CCNC: 49 U/L
BARBITURATES UR QL SCN>200 NG/ML: NEGATIVE
BASOPHILS # BLD AUTO: 0.01 K/UL
BASOPHILS NFR BLD: 0.1 %
BENZODIAZ UR QL SCN>200 NG/ML: NEGATIVE
BILIRUB SERPL-MCNC: 0.6 MG/DL
BNP SERPL-MCNC: 72 PG/ML
BUN SERPL-MCNC: 12 MG/DL
BZE UR QL SCN: NORMAL
CALCIUM SERPL-MCNC: 8.8 MG/DL
CANNABINOIDS UR QL SCN: NEGATIVE
CHLORIDE SERPL-SCNC: 99 MMOL/L
CO2 SERPL-SCNC: 33 MMOL/L
CREAT SERPL-MCNC: 0.9 MG/DL
CREAT UR-MCNC: 27.2 MG/DL
DELSYS: ABNORMAL
DIFFERENTIAL METHOD: ABNORMAL
EOSINOPHIL # BLD AUTO: 0.1 K/UL
EOSINOPHIL NFR BLD: 1.7 %
ERYTHROCYTE [DISTWIDTH] IN BLOOD BY AUTOMATED COUNT: 13.2 %
EST. GFR  (AFRICAN AMERICAN): >60 ML/MIN/1.73 M^2
EST. GFR  (NON AFRICAN AMERICAN): >60 ML/MIN/1.73 M^2
FLOW: 10
GLUCOSE SERPL-MCNC: 116 MG/DL
HCO3 UR-SCNC: 40.5 MMOL/L (ref 24–28)
HCT VFR BLD AUTO: 40.7 %
HGB BLD-MCNC: 13.3 G/DL
INR PPP: 1
LYMPHOCYTES # BLD AUTO: 2.1 K/UL
LYMPHOCYTES NFR BLD: 27.5 %
MCH RBC QN AUTO: 30 PG
MCHC RBC AUTO-ENTMCNC: 32.7 G/DL
MCV RBC AUTO: 92 FL
METHADONE UR QL SCN>300 NG/ML: NEGATIVE
MODE: ABNORMAL
MONOCYTES # BLD AUTO: 0.5 K/UL
MONOCYTES NFR BLD: 6.8 %
NEUTROPHILS # BLD AUTO: 5 K/UL
NEUTROPHILS NFR BLD: 63.8 %
OPIATES UR QL SCN: NORMAL
PCO2 BLDA: 86 MMHG (ref 35–45)
PCP UR QL SCN>25 NG/ML: NEGATIVE
PH SMN: 7.28 [PH] (ref 7.35–7.45)
PLATELET # BLD AUTO: 180 K/UL
PMV BLD AUTO: 10.5 FL
PO2 BLDA: 178 MMHG (ref 80–100)
POC BE: 14 MMOL/L
POC SATURATED O2: 99 % (ref 95–100)
POC TCO2: 43 MMOL/L (ref 23–27)
POTASSIUM SERPL-SCNC: 4.9 MMOL/L
PROT SERPL-MCNC: 8 G/DL
PROTHROMBIN TIME: 10.5 SEC
RBC # BLD AUTO: 4.43 M/UL
SAMPLE: ABNORMAL
SITE: ABNORMAL
SODIUM SERPL-SCNC: 140 MMOL/L
SP02: 99
TOXICOLOGY INFORMATION: NORMAL
TROPONIN I SERPL DL<=0.01 NG/ML-MCNC: <0.006 NG/ML
WBC # BLD AUTO: 7.78 K/UL

## 2017-09-30 PROCEDURE — 96375 TX/PRO/DX INJ NEW DRUG ADDON: CPT

## 2017-09-30 PROCEDURE — 96376 TX/PRO/DX INJ SAME DRUG ADON: CPT

## 2017-09-30 PROCEDURE — 94640 AIRWAY INHALATION TREATMENT: CPT

## 2017-09-30 PROCEDURE — 80307 DRUG TEST PRSMV CHEM ANLYZR: CPT

## 2017-09-30 PROCEDURE — 93010 ELECTROCARDIOGRAM REPORT: CPT | Mod: ,,, | Performed by: INTERNAL MEDICINE

## 2017-09-30 PROCEDURE — 85025 COMPLETE CBC W/AUTO DIFF WBC: CPT

## 2017-09-30 PROCEDURE — 83880 ASSAY OF NATRIURETIC PEPTIDE: CPT

## 2017-09-30 PROCEDURE — 96372 THER/PROPH/DIAG INJ SC/IM: CPT

## 2017-09-30 PROCEDURE — 99900035 HC TECH TIME PER 15 MIN (STAT)

## 2017-09-30 PROCEDURE — 84484 ASSAY OF TROPONIN QUANT: CPT

## 2017-09-30 PROCEDURE — 25000003 PHARM REV CODE 250: Performed by: EMERGENCY MEDICINE

## 2017-09-30 PROCEDURE — 82962 GLUCOSE BLOOD TEST: CPT

## 2017-09-30 PROCEDURE — 87040 BLOOD CULTURE FOR BACTERIA: CPT

## 2017-09-30 PROCEDURE — 36600 WITHDRAWAL OF ARTERIAL BLOOD: CPT

## 2017-09-30 PROCEDURE — 93005 ELECTROCARDIOGRAM TRACING: CPT

## 2017-09-30 PROCEDURE — 99291 CRITICAL CARE FIRST HOUR: CPT | Mod: 25

## 2017-09-30 PROCEDURE — 82803 BLOOD GASES ANY COMBINATION: CPT

## 2017-09-30 PROCEDURE — 25000242 PHARM REV CODE 250 ALT 637 W/ HCPCS: Performed by: EMERGENCY MEDICINE

## 2017-09-30 PROCEDURE — 94660 CPAP INITIATION&MGMT: CPT

## 2017-09-30 PROCEDURE — 96365 THER/PROPH/DIAG IV INF INIT: CPT

## 2017-09-30 PROCEDURE — 11000001 HC ACUTE MED/SURG PRIVATE ROOM

## 2017-09-30 PROCEDURE — 80053 COMPREHEN METABOLIC PANEL: CPT

## 2017-09-30 PROCEDURE — 85610 PROTHROMBIN TIME: CPT

## 2017-09-30 PROCEDURE — 63600175 PHARM REV CODE 636 W HCPCS: Performed by: EMERGENCY MEDICINE

## 2017-09-30 PROCEDURE — 27000190 HC CPAP FULL FACE MASK W/VALVE

## 2017-09-30 RX ORDER — ALBUTEROL SULFATE 0.83 MG/ML
10 SOLUTION RESPIRATORY (INHALATION)
Status: COMPLETED | OUTPATIENT
Start: 2017-09-30 | End: 2017-09-30

## 2017-09-30 RX ORDER — MOXIFLOXACIN HYDROCHLORIDE 400 MG/250ML
400 INJECTION, SOLUTION INTRAVENOUS
Status: DISCONTINUED | OUTPATIENT
Start: 2017-09-30 | End: 2017-09-30

## 2017-09-30 RX ORDER — METHYLPREDNISOLONE SOD SUCC 125 MG
125 VIAL (EA) INJECTION
Status: COMPLETED | OUTPATIENT
Start: 2017-09-30 | End: 2017-09-30

## 2017-09-30 RX ORDER — MOXIFLOXACIN HYDROCHLORIDE 400 MG/1
400 TABLET ORAL
Status: COMPLETED | OUTPATIENT
Start: 2017-09-30 | End: 2017-09-30

## 2017-09-30 RX ADMIN — MOXIFLOXACIN HYDROCHLORIDE 400 MG: 400 TABLET, FILM COATED ORAL at 11:09

## 2017-09-30 RX ADMIN — METHYLPREDNISOLONE SODIUM SUCCINATE 125 MG: 125 INJECTION, POWDER, FOR SOLUTION INTRAMUSCULAR; INTRAVENOUS at 11:09

## 2017-09-30 RX ADMIN — ALBUTEROL SULFATE 10 MG: 2.5 SOLUTION RESPIRATORY (INHALATION) at 10:09

## 2017-09-30 RX ADMIN — PROMETHAZINE HYDROCHLORIDE: 25 INJECTION INTRAMUSCULAR; INTRAVENOUS at 11:09

## 2017-10-01 PROBLEM — E87.5 HYPERKALEMIA: Status: ACTIVE | Noted: 2017-10-01

## 2017-10-01 PROBLEM — K76.82 HEPATIC ENCEPHALOPATHY: Status: RESOLVED | Noted: 2017-07-21 | Resolved: 2017-10-01

## 2017-10-01 PROBLEM — E66.9 OBESITY: Chronic | Status: RESOLVED | Noted: 2017-03-07 | Resolved: 2017-10-01

## 2017-10-01 PROBLEM — K83.8 COMMON BILE DUCT DILATATION: Status: RESOLVED | Noted: 2017-03-16 | Resolved: 2017-10-01

## 2017-10-01 PROBLEM — J96.11 CHRONIC RESPIRATORY FAILURE WITH HYPOXIA AND HYPERCAPNIA: Chronic | Status: RESOLVED | Noted: 2017-08-27 | Resolved: 2017-10-01

## 2017-10-01 PROBLEM — F19.10 POLYSUBSTANCE ABUSE: Chronic | Status: RESOLVED | Noted: 2017-07-22 | Resolved: 2017-10-01

## 2017-10-01 PROBLEM — J96.02 ACUTE RESPIRATORY FAILURE WITH HYPERCAPNIA: Status: ACTIVE | Noted: 2017-10-01

## 2017-10-01 PROBLEM — F19.939 DRUG WITHDRAWAL: Status: ACTIVE | Noted: 2017-10-01

## 2017-10-01 PROBLEM — J96.12 CHRONIC RESPIRATORY FAILURE WITH HYPOXIA AND HYPERCAPNIA: Chronic | Status: RESOLVED | Noted: 2017-08-27 | Resolved: 2017-10-01

## 2017-10-01 PROBLEM — I27.20 PULMONARY HTN: Chronic | Status: RESOLVED | Noted: 2017-03-07 | Resolved: 2017-10-01

## 2017-10-01 LAB
ALBUMIN SERPL BCP-MCNC: 3.1 G/DL
ALLENS TEST: ABNORMAL
ALP SERPL-CCNC: 72 U/L
ALT SERPL W/O P-5'-P-CCNC: 30 U/L
ANION GAP SERPL CALC-SCNC: 11 MMOL/L
AST SERPL-CCNC: 45 U/L
BASOPHILS # BLD AUTO: 0 K/UL
BASOPHILS NFR BLD: 0 %
BILIRUB SERPL-MCNC: 0.8 MG/DL
BUN SERPL-MCNC: 13 MG/DL
CALCIUM SERPL-MCNC: 9.9 MG/DL
CHLORIDE SERPL-SCNC: 100 MMOL/L
CO2 SERPL-SCNC: 29 MMOL/L
CREAT SERPL-MCNC: 1 MG/DL
DELSYS: ABNORMAL
DIFFERENTIAL METHOD: ABNORMAL
EOSINOPHIL # BLD AUTO: 0 K/UL
EOSINOPHIL NFR BLD: 0.1 %
EP: 6
ERYTHROCYTE [DISTWIDTH] IN BLOOD BY AUTOMATED COUNT: 13.6 %
ERYTHROCYTE [SEDIMENTATION RATE] IN BLOOD BY WESTERGREN METHOD: 4 MM/H
EST. GFR  (AFRICAN AMERICAN): >60 ML/MIN/1.73 M^2
EST. GFR  (NON AFRICAN AMERICAN): >60 ML/MIN/1.73 M^2
ESTIMATED AVG GLUCOSE: 94 MG/DL
FIO2: 40
GLUCOSE SERPL-MCNC: 145 MG/DL
HBA1C MFR BLD HPLC: 4.9 %
HCO3 UR-SCNC: 39.2 MMOL/L (ref 24–28)
HCT VFR BLD AUTO: 40.6 %
HGB BLD-MCNC: 13.2 G/DL
IP: 12
LYMPHOCYTES # BLD AUTO: 0.6 K/UL
LYMPHOCYTES NFR BLD: 8.4 %
MAGNESIUM SERPL-MCNC: 2 MG/DL
MCH RBC QN AUTO: 29.9 PG
MCHC RBC AUTO-ENTMCNC: 32.5 G/DL
MCV RBC AUTO: 92 FL
MIN VOL: 6
MODE: ABNORMAL
MONOCYTES # BLD AUTO: 0.1 K/UL
MONOCYTES NFR BLD: 1.3 %
NEUTROPHILS # BLD AUTO: 6 K/UL
NEUTROPHILS NFR BLD: 90.1 %
PCO2 BLDA: 84 MMHG (ref 35–45)
PH SMN: 7.28 [PH] (ref 7.35–7.45)
PHOSPHATE SERPL-MCNC: 2.9 MG/DL
PLATELET # BLD AUTO: 161 K/UL
PMV BLD AUTO: 10.3 FL
PO2 BLDA: 102 MMHG (ref 80–100)
POC BE: 12 MMOL/L
POC SATURATED O2: 96 % (ref 95–100)
POC TCO2: 42 MMOL/L (ref 23–27)
POCT GLUCOSE: 128 MG/DL (ref 70–110)
POCT GLUCOSE: 146 MG/DL (ref 70–110)
POCT GLUCOSE: 157 MG/DL (ref 70–110)
POCT GLUCOSE: 174 MG/DL (ref 70–110)
POTASSIUM SERPL-SCNC: 5.6 MMOL/L
PROCALCITONIN SERPL IA-MCNC: 0.07 NG/ML
PROT SERPL-MCNC: 8.1 G/DL
RBC # BLD AUTO: 4.41 M/UL
SAMPLE: ABNORMAL
SITE: ABNORMAL
SODIUM SERPL-SCNC: 140 MMOL/L
SP02: 96
SPONT RATE: 18
WBC # BLD AUTO: 6.68 K/UL

## 2017-10-01 PROCEDURE — 85025 COMPLETE CBC W/AUTO DIFF WBC: CPT

## 2017-10-01 PROCEDURE — 63600175 PHARM REV CODE 636 W HCPCS: Performed by: FAMILY MEDICINE

## 2017-10-01 PROCEDURE — S4991 NICOTINE PATCH NONLEGEND: HCPCS | Performed by: STUDENT IN AN ORGANIZED HEALTH CARE EDUCATION/TRAINING PROGRAM

## 2017-10-01 PROCEDURE — 82803 BLOOD GASES ANY COMBINATION: CPT

## 2017-10-01 PROCEDURE — 94640 AIRWAY INHALATION TREATMENT: CPT

## 2017-10-01 PROCEDURE — 80053 COMPREHEN METABOLIC PANEL: CPT

## 2017-10-01 PROCEDURE — 25000003 PHARM REV CODE 250: Performed by: FAMILY MEDICINE

## 2017-10-01 PROCEDURE — 84100 ASSAY OF PHOSPHORUS: CPT

## 2017-10-01 PROCEDURE — 25000242 PHARM REV CODE 250 ALT 637 W/ HCPCS: Performed by: FAMILY MEDICINE

## 2017-10-01 PROCEDURE — 25000003 PHARM REV CODE 250: Performed by: STUDENT IN AN ORGANIZED HEALTH CARE EDUCATION/TRAINING PROGRAM

## 2017-10-01 PROCEDURE — A4216 STERILE WATER/SALINE, 10 ML: HCPCS | Performed by: STUDENT IN AN ORGANIZED HEALTH CARE EDUCATION/TRAINING PROGRAM

## 2017-10-01 PROCEDURE — 11000001 HC ACUTE MED/SURG PRIVATE ROOM

## 2017-10-01 PROCEDURE — 83735 ASSAY OF MAGNESIUM: CPT

## 2017-10-01 PROCEDURE — 36600 WITHDRAWAL OF ARTERIAL BLOOD: CPT

## 2017-10-01 PROCEDURE — 25000242 PHARM REV CODE 250 ALT 637 W/ HCPCS: Performed by: STUDENT IN AN ORGANIZED HEALTH CARE EDUCATION/TRAINING PROGRAM

## 2017-10-01 PROCEDURE — 63600175 PHARM REV CODE 636 W HCPCS: Performed by: STUDENT IN AN ORGANIZED HEALTH CARE EDUCATION/TRAINING PROGRAM

## 2017-10-01 PROCEDURE — 84145 PROCALCITONIN (PCT): CPT

## 2017-10-01 PROCEDURE — 83036 HEMOGLOBIN GLYCOSYLATED A1C: CPT

## 2017-10-01 RX ORDER — CLONIDINE HYDROCHLORIDE 0.3 MG/1
0.3 TABLET ORAL 3 TIMES DAILY
Status: DISCONTINUED | OUTPATIENT
Start: 2017-10-01 | End: 2017-10-03

## 2017-10-01 RX ORDER — IBUPROFEN 200 MG
24 TABLET ORAL
Status: DISCONTINUED | OUTPATIENT
Start: 2017-10-01 | End: 2017-10-04 | Stop reason: HOSPADM

## 2017-10-01 RX ORDER — FLUTICASONE FUROATE AND VILANTEROL 100; 25 UG/1; UG/1
1 POWDER RESPIRATORY (INHALATION) DAILY
Status: DISCONTINUED | OUTPATIENT
Start: 2017-10-01 | End: 2017-10-04 | Stop reason: HOSPADM

## 2017-10-01 RX ORDER — IBUPROFEN 200 MG
1 TABLET ORAL DAILY
Status: DISCONTINUED | OUTPATIENT
Start: 2017-10-01 | End: 2017-10-04 | Stop reason: HOSPADM

## 2017-10-01 RX ORDER — INSULIN ASPART 100 [IU]/ML
0-5 INJECTION, SOLUTION INTRAVENOUS; SUBCUTANEOUS
Status: DISCONTINUED | OUTPATIENT
Start: 2017-10-01 | End: 2017-10-04 | Stop reason: HOSPADM

## 2017-10-01 RX ORDER — PANTOPRAZOLE SODIUM 40 MG/1
40 TABLET, DELAYED RELEASE ORAL DAILY
Status: DISCONTINUED | OUTPATIENT
Start: 2017-10-01 | End: 2017-10-04 | Stop reason: HOSPADM

## 2017-10-01 RX ORDER — METHYLPREDNISOLONE SOD SUCC 125 MG
125 VIAL (EA) INJECTION EVERY 6 HOURS
Status: DISCONTINUED | OUTPATIENT
Start: 2017-10-01 | End: 2017-10-02

## 2017-10-01 RX ORDER — FUROSEMIDE 40 MG/1
40 TABLET ORAL 2 TIMES DAILY
Status: DISCONTINUED | OUTPATIENT
Start: 2017-10-01 | End: 2017-10-04 | Stop reason: HOSPADM

## 2017-10-01 RX ORDER — IPRATROPIUM BROMIDE AND ALBUTEROL SULFATE 2.5; .5 MG/3ML; MG/3ML
3 SOLUTION RESPIRATORY (INHALATION) EVERY 4 HOURS PRN
Status: DISCONTINUED | OUTPATIENT
Start: 2017-10-01 | End: 2017-10-04 | Stop reason: HOSPADM

## 2017-10-01 RX ORDER — IBUPROFEN 200 MG
16 TABLET ORAL
Status: DISCONTINUED | OUTPATIENT
Start: 2017-10-01 | End: 2017-10-04 | Stop reason: HOSPADM

## 2017-10-01 RX ORDER — OLANZAPINE 2.5 MG/1
10 TABLET ORAL 2 TIMES DAILY
Status: DISCONTINUED | OUTPATIENT
Start: 2017-10-01 | End: 2017-10-04 | Stop reason: HOSPADM

## 2017-10-01 RX ORDER — POLYETHYLENE GLYCOL 3350 17 G/17G
17 POWDER, FOR SOLUTION ORAL DAILY
Status: DISCONTINUED | OUTPATIENT
Start: 2017-10-01 | End: 2017-10-04 | Stop reason: HOSPADM

## 2017-10-01 RX ORDER — HYDROCODONE BITARTRATE AND ACETAMINOPHEN 10; 325 MG/1; MG/1
1 TABLET ORAL EVERY 6 HOURS PRN
Status: DISCONTINUED | OUTPATIENT
Start: 2017-10-01 | End: 2017-10-04

## 2017-10-01 RX ORDER — GLUCAGON 1 MG
1 KIT INJECTION
Status: DISCONTINUED | OUTPATIENT
Start: 2017-10-01 | End: 2017-10-04 | Stop reason: HOSPADM

## 2017-10-01 RX ORDER — SODIUM CHLORIDE 0.9 % (FLUSH) 0.9 %
3 SYRINGE (ML) INJECTION EVERY 8 HOURS
Status: DISCONTINUED | OUTPATIENT
Start: 2017-10-01 | End: 2017-10-04 | Stop reason: HOSPADM

## 2017-10-01 RX ORDER — ENOXAPARIN SODIUM 100 MG/ML
40 INJECTION SUBCUTANEOUS EVERY 24 HOURS
Status: DISCONTINUED | OUTPATIENT
Start: 2017-10-01 | End: 2017-10-04 | Stop reason: HOSPADM

## 2017-10-01 RX ORDER — HYDRALAZINE HYDROCHLORIDE 20 MG/ML
10 INJECTION INTRAMUSCULAR; INTRAVENOUS EVERY 6 HOURS PRN
Status: DISCONTINUED | OUTPATIENT
Start: 2017-10-01 | End: 2017-10-04 | Stop reason: HOSPADM

## 2017-10-01 RX ORDER — TIOTROPIUM BROMIDE 18 UG/1
18 CAPSULE ORAL; RESPIRATORY (INHALATION) DAILY
Status: DISCONTINUED | OUTPATIENT
Start: 2017-10-01 | End: 2017-10-04 | Stop reason: HOSPADM

## 2017-10-01 RX ORDER — ONDANSETRON 8 MG/1
8 TABLET, ORALLY DISINTEGRATING ORAL EVERY 8 HOURS PRN
Status: DISCONTINUED | OUTPATIENT
Start: 2017-10-01 | End: 2017-10-04 | Stop reason: HOSPADM

## 2017-10-01 RX ORDER — AMOXICILLIN 250 MG
1 CAPSULE ORAL 2 TIMES DAILY
Status: DISCONTINUED | OUTPATIENT
Start: 2017-10-01 | End: 2017-10-04 | Stop reason: HOSPADM

## 2017-10-01 RX ORDER — ACETAMINOPHEN 325 MG/1
650 TABLET ORAL EVERY 4 HOURS PRN
Status: DISCONTINUED | OUTPATIENT
Start: 2017-10-01 | End: 2017-10-04 | Stop reason: HOSPADM

## 2017-10-01 RX ADMIN — POLYETHYLENE GLYCOL 3350 17 G: 17 POWDER, FOR SOLUTION ORAL at 07:10

## 2017-10-01 RX ADMIN — METHYLPREDNISOLONE SODIUM SUCCINATE 125 MG: 125 INJECTION, POWDER, FOR SOLUTION INTRAMUSCULAR; INTRAVENOUS at 11:10

## 2017-10-01 RX ADMIN — STANDARDIZED SENNA CONCENTRATE AND DOCUSATE SODIUM 1 TABLET: 8.6; 5 TABLET, FILM COATED ORAL at 09:10

## 2017-10-01 RX ADMIN — HYDRALAZINE HYDROCHLORIDE 10 MG: 20 INJECTION INTRAMUSCULAR; INTRAVENOUS at 06:10

## 2017-10-01 RX ADMIN — CLONIDINE HYDROCHLORIDE 0.3 MG: 0.3 TABLET ORAL at 05:10

## 2017-10-01 RX ADMIN — CLONIDINE HYDROCHLORIDE 0.3 MG: 0.3 TABLET ORAL at 09:10

## 2017-10-01 RX ADMIN — IPRATROPIUM BROMIDE AND ALBUTEROL SULFATE 3 ML: .5; 3 SOLUTION RESPIRATORY (INHALATION) at 06:10

## 2017-10-01 RX ADMIN — AZITHROMYCIN MONOHYDRATE 500 MG: 500 INJECTION, POWDER, LYOPHILIZED, FOR SOLUTION INTRAVENOUS at 03:10

## 2017-10-01 RX ADMIN — OLANZAPINE 10 MG: 10 TABLET, FILM COATED ORAL at 09:10

## 2017-10-01 RX ADMIN — TIOTROPIUM BROMIDE 18 MCG: 18 CAPSULE ORAL; RESPIRATORY (INHALATION) at 09:10

## 2017-10-01 RX ADMIN — FUROSEMIDE 40 MG: 40 TABLET ORAL at 09:10

## 2017-10-01 RX ADMIN — METHYLPREDNISOLONE SODIUM SUCCINATE 125 MG: 125 INJECTION, POWDER, FOR SOLUTION INTRAMUSCULAR; INTRAVENOUS at 06:10

## 2017-10-01 RX ADMIN — SODIUM CHLORIDE, PRESERVATIVE FREE 3 ML: 5 INJECTION INTRAVENOUS at 02:10

## 2017-10-01 RX ADMIN — HYDROCODONE BITARTRATE AND ACETAMINOPHEN 1 TABLET: 10; 325 TABLET ORAL at 09:10

## 2017-10-01 RX ADMIN — PANTOPRAZOLE SODIUM 40 MG: 40 TABLET, DELAYED RELEASE ORAL at 09:10

## 2017-10-01 RX ADMIN — SODIUM CHLORIDE, PRESERVATIVE FREE 3 ML: 5 INJECTION INTRAVENOUS at 06:10

## 2017-10-01 RX ADMIN — NICOTINE 1 PATCH: 14 PATCH, EXTENDED RELEASE TRANSDERMAL at 10:10

## 2017-10-01 RX ADMIN — LORAZEPAM 1 MG: 2 INJECTION INTRAMUSCULAR; INTRAVENOUS at 11:10

## 2017-10-01 RX ADMIN — ENOXAPARIN SODIUM 40 MG: 100 INJECTION SUBCUTANEOUS at 05:10

## 2017-10-01 RX ADMIN — METHYLPREDNISOLONE SODIUM SUCCINATE 125 MG: 125 INJECTION, POWDER, FOR SOLUTION INTRAMUSCULAR; INTRAVENOUS at 05:10

## 2017-10-01 RX ADMIN — SODIUM CHLORIDE, PRESERVATIVE FREE 3 ML: 5 INJECTION INTRAVENOUS at 10:10

## 2017-10-01 RX ADMIN — ONDANSETRON 8 MG: 4 TABLET, ORALLY DISINTEGRATING ORAL at 03:10

## 2017-10-01 RX ADMIN — DOXYCYCLINE 100 MG: 100 INJECTION, POWDER, LYOPHILIZED, FOR SOLUTION INTRAVENOUS at 11:10

## 2017-10-01 RX ADMIN — HYDROCODONE BITARTRATE AND ACETAMINOPHEN 1 TABLET: 10; 325 TABLET ORAL at 03:10

## 2017-10-01 RX ADMIN — FLUTICASONE FUROATE AND VILANTEROL TRIFENATATE 1 PUFF: 100; 25 POWDER RESPIRATORY (INHALATION) at 09:10

## 2017-10-01 RX ADMIN — CLONIDINE HYDROCHLORIDE 0.3 MG: 0.3 TABLET ORAL at 01:10

## 2017-10-01 NOTE — ED NOTES
Pt refusing to wear bipap.  MD notified.  Instructed to keep pt on O@ per NC to keep sats greater than 92% as long as pt tolerates.

## 2017-10-01 NOTE — ED NOTES
Sleeping, easily arouses no c/o.  resp even and unlabored.  Exp wheezing noted but improved from arrival.  O2 sat 93% on 3l/m, RR 18.

## 2017-10-01 NOTE — ED TRIAGE NOTES
"To ER with c/o SOB x 3 days.  Pt states that he has asthma and wears home O2  But is out of "treatment" medicine.  Reports last use of albuterol inhaler one hour ago.  resp rate 22 and labored.  Audible wheezing noted.  Placed on cardiac monitor and continuous sat monitor.  Denies pain at this time.  Dr. Rodriguez at the bedside.  Pt states that he is afraid to sleep and is always afraid to sleep because he fears dying and has not slept for several days.  "
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
## 2017-10-02 ENCOUNTER — ANESTHESIA EVENT (OUTPATIENT)
Dept: CARDIOLOGY | Facility: HOSPITAL | Age: 52
DRG: 190 | End: 2017-10-02
Payer: MEDICAID

## 2017-10-02 ENCOUNTER — ANESTHESIA (OUTPATIENT)
Dept: CARDIOLOGY | Facility: HOSPITAL | Age: 52
DRG: 190 | End: 2017-10-02
Payer: MEDICAID

## 2017-10-02 PROBLEM — E11.8 TYPE 2 DIABETES MELLITUS WITH COMPLICATION, WITHOUT LONG-TERM CURRENT USE OF INSULIN: Status: ACTIVE | Noted: 2017-10-02

## 2017-10-02 LAB
ALBUMIN SERPL BCP-MCNC: 3 G/DL
ALP SERPL-CCNC: 67 U/L
ALT SERPL W/O P-5'-P-CCNC: 24 U/L
ANION GAP SERPL CALC-SCNC: 11 MMOL/L
AST SERPL-CCNC: 28 U/L
BASOPHILS # BLD AUTO: 0 K/UL
BASOPHILS NFR BLD: 0 %
BILIRUB SERPL-MCNC: 0.7 MG/DL
BUN SERPL-MCNC: 21 MG/DL
CALCIUM SERPL-MCNC: 9.3 MG/DL
CHLORIDE SERPL-SCNC: 95 MMOL/L
CO2 SERPL-SCNC: 28 MMOL/L
CREAT SERPL-MCNC: 1 MG/DL
CRP SERPL-MCNC: <0.1 MG/L
DIASTOLIC DYSFUNCTION: NO
DIFFERENTIAL METHOD: ABNORMAL
EOSINOPHIL # BLD AUTO: 0 K/UL
EOSINOPHIL NFR BLD: 0 %
ERYTHROCYTE [DISTWIDTH] IN BLOOD BY AUTOMATED COUNT: 13.5 %
ERYTHROCYTE [SEDIMENTATION RATE] IN BLOOD BY WESTERGREN METHOD: 16 MM/HR
EST. GFR  (AFRICAN AMERICAN): >60 ML/MIN/1.73 M^2
EST. GFR  (NON AFRICAN AMERICAN): >60 ML/MIN/1.73 M^2
ESTIMATED PA SYSTOLIC PRESSURE: 29.83
GLUCOSE SERPL-MCNC: 125 MG/DL
HCT VFR BLD AUTO: 41.5 %
HGB BLD-MCNC: 14 G/DL
LYMPHOCYTES # BLD AUTO: 0.7 K/UL
LYMPHOCYTES NFR BLD: 6 %
MAGNESIUM SERPL-MCNC: 1.8 MG/DL
MCH RBC QN AUTO: 29.7 PG
MCHC RBC AUTO-ENTMCNC: 33.7 G/DL
MCV RBC AUTO: 88 FL
MONOCYTES # BLD AUTO: 0.1 K/UL
MONOCYTES NFR BLD: 0.7 %
NEUTROPHILS # BLD AUTO: 10.2 K/UL
NEUTROPHILS NFR BLD: 93 %
PHOSPHATE SERPL-MCNC: 2.3 MG/DL
PLATELET # BLD AUTO: 183 K/UL
PMV BLD AUTO: 11 FL
POCT GLUCOSE: 117 MG/DL (ref 70–110)
POCT GLUCOSE: 129 MG/DL (ref 70–110)
POCT GLUCOSE: 140 MG/DL (ref 70–110)
POCT GLUCOSE: 146 MG/DL (ref 70–110)
POTASSIUM SERPL-SCNC: 4.1 MMOL/L
PROT SERPL-MCNC: 7.7 G/DL
RBC # BLD AUTO: 4.72 M/UL
RETIRED EF AND QEF - SEE NOTES: 70 (ref 55–65)
RHEUMATOID FACT SERPL-ACNC: 37 IU/ML
SODIUM SERPL-SCNC: 134 MMOL/L
VANCOMYCIN SERPL-MCNC: <1.1 UG/ML
WBC # BLD AUTO: 10.92 K/UL

## 2017-10-02 PROCEDURE — G8978 MOBILITY CURRENT STATUS: HCPCS | Mod: CK

## 2017-10-02 PROCEDURE — 86431 RHEUMATOID FACTOR QUANT: CPT

## 2017-10-02 PROCEDURE — 97161 PT EVAL LOW COMPLEX 20 MIN: CPT

## 2017-10-02 PROCEDURE — 83735 ASSAY OF MAGNESIUM: CPT

## 2017-10-02 PROCEDURE — 87040 BLOOD CULTURE FOR BACTERIA: CPT

## 2017-10-02 PROCEDURE — 25000242 PHARM REV CODE 250 ALT 637 W/ HCPCS: Performed by: STUDENT IN AN ORGANIZED HEALTH CARE EDUCATION/TRAINING PROGRAM

## 2017-10-02 PROCEDURE — G8979 MOBILITY GOAL STATUS: HCPCS | Mod: CJ

## 2017-10-02 PROCEDURE — 94761 N-INVAS EAR/PLS OXIMETRY MLT: CPT

## 2017-10-02 PROCEDURE — 25000003 PHARM REV CODE 250: Performed by: FAMILY MEDICINE

## 2017-10-02 PROCEDURE — 25000003 PHARM REV CODE 250: Performed by: STUDENT IN AN ORGANIZED HEALTH CARE EDUCATION/TRAINING PROGRAM

## 2017-10-02 PROCEDURE — 36000 PLACE NEEDLE IN VEIN: CPT | Performed by: ANESTHESIOLOGY

## 2017-10-02 PROCEDURE — 63600175 PHARM REV CODE 636 W HCPCS: Performed by: FAMILY MEDICINE

## 2017-10-02 PROCEDURE — 11000001 HC ACUTE MED/SURG PRIVATE ROOM

## 2017-10-02 PROCEDURE — 84100 ASSAY OF PHOSPHORUS: CPT

## 2017-10-02 PROCEDURE — A4216 STERILE WATER/SALINE, 10 ML: HCPCS | Performed by: STUDENT IN AN ORGANIZED HEALTH CARE EDUCATION/TRAINING PROGRAM

## 2017-10-02 PROCEDURE — 36415 COLL VENOUS BLD VENIPUNCTURE: CPT

## 2017-10-02 PROCEDURE — 80053 COMPREHEN METABOLIC PANEL: CPT

## 2017-10-02 PROCEDURE — 25000242 PHARM REV CODE 250 ALT 637 W/ HCPCS: Performed by: FAMILY MEDICINE

## 2017-10-02 PROCEDURE — 86140 C-REACTIVE PROTEIN: CPT

## 2017-10-02 PROCEDURE — S4991 NICOTINE PATCH NONLEGEND: HCPCS | Performed by: STUDENT IN AN ORGANIZED HEALTH CARE EDUCATION/TRAINING PROGRAM

## 2017-10-02 PROCEDURE — 27000221 HC OXYGEN, UP TO 24 HOURS

## 2017-10-02 PROCEDURE — 97165 OT EVAL LOW COMPLEX 30 MIN: CPT

## 2017-10-02 PROCEDURE — 93306 TTE W/DOPPLER COMPLETE: CPT

## 2017-10-02 PROCEDURE — 94640 AIRWAY INHALATION TREATMENT: CPT

## 2017-10-02 PROCEDURE — 63600175 PHARM REV CODE 636 W HCPCS: Performed by: STUDENT IN AN ORGANIZED HEALTH CARE EDUCATION/TRAINING PROGRAM

## 2017-10-02 PROCEDURE — 85652 RBC SED RATE AUTOMATED: CPT

## 2017-10-02 PROCEDURE — 99900035 HC TECH TIME PER 15 MIN (STAT)

## 2017-10-02 PROCEDURE — 85025 COMPLETE CBC W/AUTO DIFF WBC: CPT

## 2017-10-02 PROCEDURE — 80202 ASSAY OF VANCOMYCIN: CPT

## 2017-10-02 RX ORDER — AMLODIPINE BESYLATE 5 MG/1
10 TABLET ORAL DAILY
Status: DISCONTINUED | OUTPATIENT
Start: 2017-10-02 | End: 2017-10-04 | Stop reason: HOSPADM

## 2017-10-02 RX ORDER — AMLODIPINE BESYLATE 5 MG/1
10 TABLET ORAL DAILY
Status: DISCONTINUED | OUTPATIENT
Start: 2017-10-02 | End: 2017-10-02

## 2017-10-02 RX ORDER — AMLODIPINE BESYLATE 5 MG/1
5 TABLET ORAL DAILY
Status: DISCONTINUED | OUTPATIENT
Start: 2017-10-02 | End: 2017-10-02

## 2017-10-02 RX ORDER — PREDNISONE 20 MG/1
40 TABLET ORAL DAILY
Status: DISCONTINUED | OUTPATIENT
Start: 2017-10-02 | End: 2017-10-04 | Stop reason: HOSPADM

## 2017-10-02 RX ADMIN — FLUTICASONE FUROATE AND VILANTEROL TRIFENATATE 1 PUFF: 100; 25 POWDER RESPIRATORY (INHALATION) at 08:10

## 2017-10-02 RX ADMIN — IPRATROPIUM BROMIDE AND ALBUTEROL SULFATE 3 ML: .5; 3 SOLUTION RESPIRATORY (INHALATION) at 02:10

## 2017-10-02 RX ADMIN — ONDANSETRON 8 MG: 4 TABLET, ORALLY DISINTEGRATING ORAL at 01:10

## 2017-10-02 RX ADMIN — AMLODIPINE BESYLATE 10 MG: 5 TABLET ORAL at 11:10

## 2017-10-02 RX ADMIN — IPRATROPIUM BROMIDE AND ALBUTEROL SULFATE 3 ML: .5; 3 SOLUTION RESPIRATORY (INHALATION) at 12:10

## 2017-10-02 RX ADMIN — HYDRALAZINE HYDROCHLORIDE 10 MG: 20 INJECTION INTRAMUSCULAR; INTRAVENOUS at 08:10

## 2017-10-02 RX ADMIN — FUROSEMIDE 40 MG: 40 TABLET ORAL at 08:10

## 2017-10-02 RX ADMIN — SODIUM CHLORIDE, PRESERVATIVE FREE 3 ML: 5 INJECTION INTRAVENOUS at 01:10

## 2017-10-02 RX ADMIN — PREDNISONE 40 MG: 20 TABLET ORAL at 08:10

## 2017-10-02 RX ADMIN — STANDARDIZED SENNA CONCENTRATE AND DOCUSATE SODIUM 1 TABLET: 8.6; 5 TABLET, FILM COATED ORAL at 09:10

## 2017-10-02 RX ADMIN — NICOTINE 1 PATCH: 14 PATCH, EXTENDED RELEASE TRANSDERMAL at 08:10

## 2017-10-02 RX ADMIN — POLYETHYLENE GLYCOL 3350 17 G: 17 POWDER, FOR SOLUTION ORAL at 08:10

## 2017-10-02 RX ADMIN — METHYLPREDNISOLONE SODIUM SUCCINATE 125 MG: 125 INJECTION, POWDER, FOR SOLUTION INTRAMUSCULAR; INTRAVENOUS at 05:10

## 2017-10-02 RX ADMIN — ONDANSETRON 8 MG: 4 TABLET, ORALLY DISINTEGRATING ORAL at 05:10

## 2017-10-02 RX ADMIN — STANDARDIZED SENNA CONCENTRATE AND DOCUSATE SODIUM 1 TABLET: 8.6; 5 TABLET, FILM COATED ORAL at 08:10

## 2017-10-02 RX ADMIN — IPRATROPIUM BROMIDE AND ALBUTEROL SULFATE 3 ML: .5; 3 SOLUTION RESPIRATORY (INHALATION) at 08:10

## 2017-10-02 RX ADMIN — HYDROCODONE BITARTRATE AND ACETAMINOPHEN 1 TABLET: 10; 325 TABLET ORAL at 04:10

## 2017-10-02 RX ADMIN — TIOTROPIUM BROMIDE 18 MCG: 18 CAPSULE ORAL; RESPIRATORY (INHALATION) at 08:10

## 2017-10-02 RX ADMIN — CLONIDINE HYDROCHLORIDE 0.3 MG: 0.3 TABLET ORAL at 01:10

## 2017-10-02 RX ADMIN — CLONIDINE HYDROCHLORIDE 0.3 MG: 0.3 TABLET ORAL at 09:10

## 2017-10-02 RX ADMIN — VANCOMYCIN HYDROCHLORIDE 1250 MG: 10 INJECTION, POWDER, LYOPHILIZED, FOR SOLUTION INTRAVENOUS at 04:10

## 2017-10-02 RX ADMIN — DOXYCYCLINE 100 MG: 100 INJECTION, POWDER, LYOPHILIZED, FOR SOLUTION INTRAVENOUS at 01:10

## 2017-10-02 RX ADMIN — CLONIDINE HYDROCHLORIDE 0.3 MG: 0.3 TABLET ORAL at 05:10

## 2017-10-02 RX ADMIN — ENOXAPARIN SODIUM 40 MG: 100 INJECTION SUBCUTANEOUS at 04:10

## 2017-10-02 RX ADMIN — ACETAMINOPHEN 650 MG: 325 TABLET ORAL at 02:10

## 2017-10-02 RX ADMIN — HYDROCODONE BITARTRATE AND ACETAMINOPHEN 1 TABLET: 10; 325 TABLET ORAL at 11:10

## 2017-10-02 RX ADMIN — HYDROCODONE BITARTRATE AND ACETAMINOPHEN 1 TABLET: 10; 325 TABLET ORAL at 05:10

## 2017-10-02 RX ADMIN — VANCOMYCIN HYDROCHLORIDE 1750 MG: 1 INJECTION, POWDER, LYOPHILIZED, FOR SOLUTION INTRAVENOUS at 08:10

## 2017-10-02 RX ADMIN — OLANZAPINE 10 MG: 10 TABLET, FILM COATED ORAL at 09:10

## 2017-10-02 RX ADMIN — FUROSEMIDE 40 MG: 40 TABLET ORAL at 09:10

## 2017-10-02 RX ADMIN — OLANZAPINE 10 MG: 10 TABLET, FILM COATED ORAL at 08:10

## 2017-10-02 RX ADMIN — PANTOPRAZOLE SODIUM 40 MG: 40 TABLET, DELAYED RELEASE ORAL at 08:10

## 2017-10-02 RX ADMIN — SODIUM CHLORIDE, PRESERVATIVE FREE 3 ML: 5 INJECTION INTRAVENOUS at 05:10
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
## 2017-10-02 NOTE — PROGRESS NOTES
"Vancomycin Dosing and Monitoring Pharmacy Protocol    Ming Harrington is a 51 y.o. male    Height: 5' 8" (1.727 m)   Wt Readings from Last 1 Encounters:   10/01/17 113.4 kg (250 lb 1.6 oz)     Ideal body weight: 68.4 kg (150 lb 12.7 oz)  Adjusted ideal body weight: 86.4 kg (190 lb 8.3 oz)    Temp Readings from Last 3 Encounters:   10/02/17 97.7 °F (36.5 °C) (Oral)   09/13/17 98.7 °F (37.1 °C)   08/28/17 98.6 °F (37 °C) (Oral)      Lab Results   Component Value Date/Time    WBC 6.68 10/01/2017 05:05 AM    WBC 7.78 09/30/2017 11:00 PM    WBC 7.88 08/27/2017 01:09 AM      Lab Results   Component Value Date/Time    CREATININE 1.0 10/01/2017 06:25 AM    CREATININE 0.9 09/30/2017 11:00 PM    CREATININE 0.9 08/27/2017 01:09 AM        Serum creatinine: 1 mg/dL 10/01/17 0625  Estimated creatinine clearance: 105.6 mL/min    Antibiotics       Start     Stop Route Frequency Ordered    10/02/17 0715  vancomycin (VANCOCIN) 1,750 mg in dextrose 5 % 500 mL IVPB  (Vancomycin IVPB with levels panel)      -- IV Every 12 hours (non-standard times) 10/02/17 0610    10/01/17 1016  doxycycline (VIBRAMYCIN) 100mg in dextrose 5% 250 mL IVPB (ready to mix)      10/06 1029 IV Every 12 hours (non-standard times) 10/01/17 1016          Antifungals       None            Microbiology Results (last 7 days)       Procedure Component Value Units Date/Time    Blood culture [[id number]] Collected:  09/30/17 2337    Order Status:  Completed Specimen:  Blood from Peripheral, Antecubital, Right Updated:  10/02/17 0612     Blood Culture, Routine No Growth to date     Blood Culture, Routine No Growth to date    Blood Culture #1 **CANNOT BE ORDERED STAT** [[id number]] Collected:  09/30/17 2307    Order Status:  Completed Specimen:  Blood from Peripheral, Antecubital, Left Updated:  10/02/17 0424     Blood Culture, Routine Gram stain aer bottle: Gram positive cocci in clusters resembling Staph      Blood Culture, Routine Results called to and read back by: Esteban" China KELLER  10/02/2017       Blood Culture, Routine 04:24    Culture, Respiratory with Gram Stain [[id number]]     Order Status:  No result Specimen:  Respiratory             Indication:   Bacteremia    Target Level: 15-20 mcg/ml    Hemodialysis:   No on N/A    Dosing Weight:   AdjBW--adjusted body weight  If ABW is greater than or equal to 30% over Ideal Body Weight, AdjBW will be used to calculate vancomycin dose.    Last Vancomycin dose: N/A   Date/Time given: N/A         Vancomycin level:  No results for input(s): VANCOMYCIN-TROUGH in the last 72 hours.  No results for input(s): VANCOMYCIN, RANDOM in the last 72 hours.    Per Protocol Initial/Adjustments Dosin. Initial/Adjustment Dose: Loading dose = 1750mg x1, followed by Maintenance dose of 1250 mg q12hr to be given at 10- @ 19:30 date/time  2. Vancomycin Trough Level will be drawn on 10-03 @ 18:30date/time    Pharmacy will continue to follow.    Please contact if you have any further questions. Thank you.    Ryan Coleman, PharmD  [phone]

## 2017-10-02 NOTE — PROGRESS NOTES
"Vancomycin Dosing and Monitoring Pharmacy Protocol    Ming Harrington is a 51 y.o. male    Height: 5' 8" (1.727 m)   Wt Readings from Last 1 Encounters:   10/01/17 113.4 kg (250 lb 1.6 oz)     Ideal body weight: 68.4 kg (150 lb 12.7 oz)  Adjusted ideal body weight: 86.4 kg (190 lb 8.3 oz)    Temp Readings from Last 3 Encounters:   10/02/17 98.2 °F (36.8 °C) (Oral)   09/13/17 98.7 °F (37.1 °C)   08/28/17 98.6 °F (37 °C) (Oral)      Lab Results   Component Value Date/Time    WBC 10.92 10/02/2017 08:50 AM    WBC 6.68 10/01/2017 05:05 AM    WBC 7.78 09/30/2017 11:00 PM      Lab Results   Component Value Date/Time    CREATININE 1.0 10/02/2017 06:29 AM    CREATININE 1.0 10/01/2017 06:25 AM    CREATININE 0.9 09/30/2017 11:00 PM        Serum creatinine: 1 mg/dL 10/02/17 0629  Estimated creatinine clearance: 105.6 mL/min    Antibiotics       Start     Stop Route Frequency Ordered    10/02/17 1600  vancomycin (VANCOCIN) 1,250 mg in dextrose 5 % 250 mL IVPB      -- IV Every 8 hours (non-standard times) 10/02/17 1147    10/01/17 1016  doxycycline (VIBRAMYCIN) 100mg in dextrose 5% 250 mL IVPB (ready to mix)      10/05 2359 IV Every 12 hours (non-standard times) 10/01/17 1016          Antifungals       None            Microbiology Results (last 7 days)       Procedure Component Value Units Date/Time    Blood culture [[id number]] Collected:  10/02/17 0850    Order Status:  Sent Specimen:  Blood Updated:  10/02/17 0901    Blood culture [[id number]] Collected:  10/02/17 0855    Order Status:  Sent Specimen:  Blood Updated:  10/02/17 0901    Blood culture [[id number]] Collected:  09/30/17 2337    Order Status:  Completed Specimen:  Blood from Peripheral, Antecubital, Right Updated:  10/02/17 0612     Blood Culture, Routine No Growth to date     Blood Culture, Routine No Growth to date    Blood Culture #1 **CANNOT BE ORDERED STAT** [[id number]] Collected:  09/30/17 0013    Order Status:  Completed Specimen:  Blood from Peripheral, Antecubital, " Left Updated:  10/02/17 0424     Blood Culture, Routine Gram stain aer bottle: Gram positive cocci in clusters resembling Staph      Blood Culture, Routine Results called to and read back by: Esteban Atkinson RN  10/02/2017       Blood Culture, Routine 04:24    Culture, Respiratory with Gram Stain [[id number]]     Order Status:  No result Specimen:  Respiratory             Indication:   Bacteremia    Target Level: 15-20 mcg/ml    Hemodialysis:   No on N/A    Dosing Weight:   AdjBW--adjusted body weight  If ABW is greater than or equal to 30% over Ideal Body Weight, AdjBW will be used to calculate vancomycin dose.    Last Vancomycin dose: 1750 mg   Date/Time given: 10/2 0810          Vancomycin level:  No results for input(s): VANCOMYCIN-TROUGH in the last 72 hours.  Recent Labs   Lab Result Units  10/02/17   0629   Vancomycin, Random ug/mL  <1.1       Per Protocol Initial/Adjustments Dosin. Initial/Adjustment Dose: INCREASE Vancomycin will be adjusted from 1250mg q12hr to 1250mg q8hr  2. Vancomycin Trough Level will be drawn on 10/3 @ 0700date/time    Pharmacy will continue to follow.    Please contact if you have any further questions. Thank you.    Jean Camarena, PharmD  [phone]

## 2017-10-02 NOTE — PLAN OF CARE
Problem: Occupational Therapy Goal  Goal: Occupational Therapy Goal  Goals to be met by: 11/2/17     Patient will increase functional independence with ADLs by performing:    LE Dressing with Stand-by Assistance.  Grooming while standing with Stand-by Assistance.  Toileting from toilet with Stand-by Assistance for hygiene and clothing management.   Stand pivot transfers with Stand-by Assistance.  Toilet transfer to toilet with Stand-by Assistance.  Upper extremity exercise program x10 reps per handout, with independence.    Outcome: Ongoing (interventions implemented as appropriate)  Pt would benefit from cont OT services in order to maximize functional independence. Recommendations ongoing at this time as pt with reports of dizziness and pain limiting evaluation
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
## 2017-10-02 NOTE — PLAN OF CARE
Problem: Physical Therapy Goal  Goal: Physical Therapy Goal  Goals to be met by: 17     Patient will increase functional independence with mobility by performin. Supine <> sit with Modified [place]  2. Sit to stand transfer with Supervision  3. Bed to chair transfer with Stand-by Assistance   4. Gait  x 150 feet with Stand-by Assistance    Outcome: Ongoing (interventions implemented as appropriate)  PT evaluation completed and pt will benefit from skilled PT services to address pt's functional limitations.
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
## 2017-10-02 NOTE — PLAN OF CARE
Problem: Patient Care Overview  Goal: Plan of Care Review  Outcome: Ongoing (interventions implemented as appropriate)  Plan of care reviewed with patient, understanding verbalized. No complaints overnight. Pt remains on tele, SR in 60s and 70s. Bed alarm on, call light in reach, fall precautions in place. Report given to oncoming nurse.

## 2017-10-02 NOTE — PROGRESS NOTES
"Vancomycin Dosing and Monitoring Pharmacy Protocol    Ming Harrington is a 51 y.o. male    Height: 5' 8" (1.727 m)   Wt Readings from Last 1 Encounters:   10/01/17 113.4 kg (250 lb 1.6 oz)     Ideal body weight: 68.4 kg (150 lb 12.7 oz)  Adjusted ideal body weight: 86.4 kg (190 lb 8.3 oz)    Temp Readings from Last 3 Encounters:   10/02/17 97.7 °F (36.5 °C) (Oral)   09/13/17 98.7 °F (37.1 °C)   08/28/17 98.6 °F (37 °C) (Oral)      Lab Results   Component Value Date/Time    WBC 6.68 10/01/2017 05:05 AM    WBC 7.78 09/30/2017 11:00 PM    WBC 7.88 08/27/2017 01:09 AM      Lab Results   Component Value Date/Time    CREATININE 1.0 10/01/2017 06:25 AM    CREATININE 0.9 09/30/2017 11:00 PM    CREATININE 0.9 08/27/2017 01:09 AM        Serum creatinine: 1 mg/dL 10/01/17 0625  Estimated creatinine clearance: 105.6 mL/min    Antibiotics       Start     Stop Route Frequency Ordered    10/02/17 0715  vancomycin (VANCOCIN) 1,750 mg in dextrose 5 % 500 mL IVPB  (Vancomycin IVPB with levels panel)      -- IV Every 12 hours (non-standard times) 10/02/17 0610    10/01/17 1016  doxycycline (VIBRAMYCIN) 100mg in dextrose 5% 250 mL IVPB (ready to mix)      10/06 1029 IV Every 12 hours (non-standard times) 10/01/17 1016          Antifungals       None            Microbiology Results (last 7 days)       Procedure Component Value Units Date/Time    Blood culture [[id number]] Collected:  09/30/17 2337    Order Status:  Completed Specimen:  Blood from Peripheral, Antecubital, Right Updated:  10/02/17 0612     Blood Culture, Routine No Growth to date     Blood Culture, Routine No Growth to date    Blood Culture #1 **CANNOT BE ORDERED STAT** [[id number]] Collected:  09/30/17 2307    Order Status:  Completed Specimen:  Blood from Peripheral, Antecubital, Left Updated:  10/02/17 0424     Blood Culture, Routine Gram stain aer bottle: Gram positive cocci in clusters resembling Staph      Blood Culture, Routine Results called to and read back by: Esteban" China KELLER  10/02/2017       Blood Culture, Routine 04:24    Culture, Respiratory with Gram Stain [[id number]]     Order Status:  No result Specimen:  Respiratory             Indication:   Bacteremia    Target Level: 15-20 mcg/ml    Hemodialysis:   No on N/A    Dosing Weight:   AdjBW--adjusted body weight  If ABW is greater than or equal to 30% over Ideal Body Weight, AdjBW will be used to calculate vancomycin dose.    Last Vancomycin dose: N/A   Date/Time given: N/A         Vancomycin level:  No results for input(s): VANCOMYCIN-TROUGH in the last 72 hours.  No results for input(s): VANCOMYCIN, RANDOM in the last 72 hours.    Per Protocol Initial/Adjustments Dosin. Initial/Adjustment Dose: Loading dose = 1750mg x1, followed by Maintenance dose of 1250 mg q12hr to be given at 10- @ 19:30 date/time  2. Vancomycin Trough Level will be drawn on 10-03 @ 18:30date/time    Pharmacy will continue to follow.    Please contact if you have any further questions. Thank you.    Ryan Coleman, PharmD  [phone]

## 2017-10-02 NOTE — ANESTHESIA PROCEDURE NOTES
Peripheral IV Insertion    Diagnosis: COPD    Patient location during procedure: floor  Procedure start time: 10/2/2017 12:59 PM  Timeout: 10/2/2017 12:55 PM  Procedure end time: 10/2/2017 1:10 PM  Staffing  Anesthesiologist: APRIL THOMAS  Resident/CRNA: ALESSANDRA RIOJAS  Performed: resident/CRNA   Anesthesiologist was present at the time of the procedure.  Preanesthetic Checklist  Completed: patient identified, site marked, surgical consent, pre-op evaluation, timeout performed, IV checked, risks and benefits discussed, monitors and equipment checked and anesthesia consent givenPeripheral IV Insertion  Skin Prep: alcohol swabs  Local Infiltration: none  Orientation: left  Location: forearm  Catheter Size: 20 G  Catheter placement by Ultrasound guidance. Heme positive aspiration all ports.  Needle advanced into vessel with real time Ultrasound guidance.Insertion Attempts: 1  Assessment  Dressing: secured with tape and tegaderm  Patient: Tolerated well  Line flushed easily.

## 2017-10-02 NOTE — PLAN OF CARE
Problem: Patient Care Overview  Goal: Plan of Care Review  Outcome: Ongoing (interventions implemented as appropriate)  Plan of care reviewed with patient. Patient verbalized understanding. Patient on 3L O2 per nc, O2 sats remain ~94%. Patient complains of constant back pain, nausea that is unrelieved with PRN medications. Patient NSR-sinus tachycardia on telemetry monitoring, HR 80's-110's, with no true red alarms noted. Bed is low and locked, patient refuses bed alarm. Call light is within reach. Patient has been instructed to call if in need of assistance. Verbalized understanding.

## 2017-10-03 LAB
ALBUMIN SERPL BCP-MCNC: 2.9 G/DL
ALP SERPL-CCNC: 63 U/L
ALT SERPL W/O P-5'-P-CCNC: 29 U/L
ANION GAP SERPL CALC-SCNC: 10 MMOL/L
AST SERPL-CCNC: 34 U/L
BASOPHILS # BLD AUTO: 0 K/UL
BASOPHILS NFR BLD: 0 %
BILIRUB SERPL-MCNC: 1.2 MG/DL
BUN SERPL-MCNC: 23 MG/DL
CALCIUM SERPL-MCNC: 9 MG/DL
CHLORIDE SERPL-SCNC: 96 MMOL/L
CO2 SERPL-SCNC: 29 MMOL/L
CREAT SERPL-MCNC: 1 MG/DL
DIFFERENTIAL METHOD: ABNORMAL
EOSINOPHIL # BLD AUTO: 0 K/UL
EOSINOPHIL NFR BLD: 0 %
ERYTHROCYTE [DISTWIDTH] IN BLOOD BY AUTOMATED COUNT: 13.9 %
EST. GFR  (AFRICAN AMERICAN): >60 ML/MIN/1.73 M^2
EST. GFR  (NON AFRICAN AMERICAN): >60 ML/MIN/1.73 M^2
GLUCOSE SERPL-MCNC: 110 MG/DL
HCT VFR BLD AUTO: 44 %
HGB BLD-MCNC: 14.9 G/DL
LYMPHOCYTES # BLD AUTO: 1.2 K/UL
LYMPHOCYTES NFR BLD: 9.8 %
MAGNESIUM SERPL-MCNC: 2 MG/DL
MCH RBC QN AUTO: 29.7 PG
MCHC RBC AUTO-ENTMCNC: 33.9 G/DL
MCV RBC AUTO: 88 FL
MONOCYTES # BLD AUTO: 0.6 K/UL
MONOCYTES NFR BLD: 5 %
NEUTROPHILS # BLD AUTO: 10 K/UL
NEUTROPHILS NFR BLD: 84.9 %
PHOSPHATE SERPL-MCNC: 3.7 MG/DL
PLATELET # BLD AUTO: 173 K/UL
PMV BLD AUTO: 11.7 FL
POCT GLUCOSE: 121 MG/DL (ref 70–110)
POCT GLUCOSE: 130 MG/DL (ref 70–110)
POCT GLUCOSE: 132 MG/DL (ref 70–110)
POCT GLUCOSE: 135 MG/DL (ref 70–110)
POTASSIUM SERPL-SCNC: 4 MMOL/L
PROT SERPL-MCNC: 7.5 G/DL
RBC # BLD AUTO: 5.02 M/UL
SODIUM SERPL-SCNC: 135 MMOL/L
VANCOMYCIN TROUGH SERPL-MCNC: 17.5 UG/ML
WBC # BLD AUTO: 11.74 K/UL

## 2017-10-03 PROCEDURE — 25000003 PHARM REV CODE 250: Performed by: PSYCHIATRY & NEUROLOGY

## 2017-10-03 PROCEDURE — 97116 GAIT TRAINING THERAPY: CPT

## 2017-10-03 PROCEDURE — 80053 COMPREHEN METABOLIC PANEL: CPT

## 2017-10-03 PROCEDURE — 97535 SELF CARE MNGMENT TRAINING: CPT

## 2017-10-03 PROCEDURE — A4216 STERILE WATER/SALINE, 10 ML: HCPCS | Performed by: STUDENT IN AN ORGANIZED HEALTH CARE EDUCATION/TRAINING PROGRAM

## 2017-10-03 PROCEDURE — 84100 ASSAY OF PHOSPHORUS: CPT

## 2017-10-03 PROCEDURE — 25000003 PHARM REV CODE 250: Performed by: FAMILY MEDICINE

## 2017-10-03 PROCEDURE — 25000003 PHARM REV CODE 250: Performed by: STUDENT IN AN ORGANIZED HEALTH CARE EDUCATION/TRAINING PROGRAM

## 2017-10-03 PROCEDURE — 63600175 PHARM REV CODE 636 W HCPCS: Performed by: FAMILY MEDICINE

## 2017-10-03 PROCEDURE — 85025 COMPLETE CBC W/AUTO DIFF WBC: CPT

## 2017-10-03 PROCEDURE — 80202 ASSAY OF VANCOMYCIN: CPT

## 2017-10-03 PROCEDURE — 87522 HEPATITIS C REVRS TRNSCRPJ: CPT

## 2017-10-03 PROCEDURE — 25000242 PHARM REV CODE 250 ALT 637 W/ HCPCS: Performed by: STUDENT IN AN ORGANIZED HEALTH CARE EDUCATION/TRAINING PROGRAM

## 2017-10-03 PROCEDURE — 94640 AIRWAY INHALATION TREATMENT: CPT

## 2017-10-03 PROCEDURE — 63600175 PHARM REV CODE 636 W HCPCS: Performed by: PSYCHIATRY & NEUROLOGY

## 2017-10-03 PROCEDURE — S4991 NICOTINE PATCH NONLEGEND: HCPCS | Performed by: STUDENT IN AN ORGANIZED HEALTH CARE EDUCATION/TRAINING PROGRAM

## 2017-10-03 PROCEDURE — 63600175 PHARM REV CODE 636 W HCPCS: Performed by: STUDENT IN AN ORGANIZED HEALTH CARE EDUCATION/TRAINING PROGRAM

## 2017-10-03 PROCEDURE — 94761 N-INVAS EAR/PLS OXIMETRY MLT: CPT

## 2017-10-03 PROCEDURE — 83735 ASSAY OF MAGNESIUM: CPT

## 2017-10-03 PROCEDURE — 27000221 HC OXYGEN, UP TO 24 HOURS

## 2017-10-03 PROCEDURE — 11000001 HC ACUTE MED/SURG PRIVATE ROOM

## 2017-10-03 PROCEDURE — 99900035 HC TECH TIME PER 15 MIN (STAT)

## 2017-10-03 RX ORDER — BUPRENORPHINE AND NALOXONE 2; .5 MG/1; MG/1
4 FILM, SOLUBLE BUCCAL; SUBLINGUAL
Status: DISCONTINUED | OUTPATIENT
Start: 2017-10-03 | End: 2017-10-04

## 2017-10-03 RX ORDER — OXYCODONE HYDROCHLORIDE 5 MG/1
10 TABLET ORAL EVERY 4 HOURS PRN
Status: DISCONTINUED | OUTPATIENT
Start: 2017-10-03 | End: 2017-10-04

## 2017-10-03 RX ORDER — OXYCODONE HYDROCHLORIDE 5 MG/1
10 TABLET ORAL EVERY 4 HOURS
Status: DISCONTINUED | OUTPATIENT
Start: 2017-10-03 | End: 2017-10-03

## 2017-10-03 RX ORDER — OLANZAPINE 2.5 MG/1
5 TABLET ORAL NIGHTLY
Status: DISCONTINUED | OUTPATIENT
Start: 2017-10-03 | End: 2017-10-04 | Stop reason: HOSPADM

## 2017-10-03 RX ADMIN — CLONIDINE HYDROCHLORIDE 0.3 MG: 0.3 TABLET ORAL at 06:10

## 2017-10-03 RX ADMIN — PANTOPRAZOLE SODIUM 40 MG: 40 TABLET, DELAYED RELEASE ORAL at 07:10

## 2017-10-03 RX ADMIN — ONDANSETRON 8 MG: 4 TABLET, ORALLY DISINTEGRATING ORAL at 01:10

## 2017-10-03 RX ADMIN — STANDARDIZED SENNA CONCENTRATE AND DOCUSATE SODIUM 1 TABLET: 8.6; 5 TABLET, FILM COATED ORAL at 07:10

## 2017-10-03 RX ADMIN — HYDROCODONE BITARTRATE AND ACETAMINOPHEN 1 TABLET: 10; 325 TABLET ORAL at 03:10

## 2017-10-03 RX ADMIN — VANCOMYCIN HYDROCHLORIDE 1250 MG: 10 INJECTION, POWDER, LYOPHILIZED, FOR SOLUTION INTRAVENOUS at 12:10

## 2017-10-03 RX ADMIN — DOXYCYCLINE 100 MG: 100 INJECTION, POWDER, LYOPHILIZED, FOR SOLUTION INTRAVENOUS at 01:10

## 2017-10-03 RX ADMIN — FLUTICASONE FUROATE AND VILANTEROL TRIFENATATE 1 PUFF: 100; 25 POWDER RESPIRATORY (INHALATION) at 08:10

## 2017-10-03 RX ADMIN — TIOTROPIUM BROMIDE 18 MCG: 18 CAPSULE ORAL; RESPIRATORY (INHALATION) at 08:10

## 2017-10-03 RX ADMIN — OXYCODONE HYDROCHLORIDE 10 MG: 5 TABLET ORAL at 01:10

## 2017-10-03 RX ADMIN — ONDANSETRON 8 MG: 4 TABLET, ORALLY DISINTEGRATING ORAL at 02:10

## 2017-10-03 RX ADMIN — STANDARDIZED SENNA CONCENTRATE AND DOCUSATE SODIUM 1 TABLET: 8.6; 5 TABLET, FILM COATED ORAL at 09:10

## 2017-10-03 RX ADMIN — PREDNISONE 40 MG: 20 TABLET ORAL at 07:10

## 2017-10-03 RX ADMIN — VANCOMYCIN HYDROCHLORIDE 1250 MG: 10 INJECTION, POWDER, LYOPHILIZED, FOR SOLUTION INTRAVENOUS at 07:10

## 2017-10-03 RX ADMIN — HYDROCODONE BITARTRATE AND ACETAMINOPHEN 1 TABLET: 10; 325 TABLET ORAL at 12:10

## 2017-10-03 RX ADMIN — OXYCODONE HYDROCHLORIDE 10 MG: 5 TABLET ORAL at 05:10

## 2017-10-03 RX ADMIN — HYDROCODONE BITARTRATE AND ACETAMINOPHEN 1 TABLET: 10; 325 TABLET ORAL at 07:10

## 2017-10-03 RX ADMIN — IPRATROPIUM BROMIDE AND ALBUTEROL SULFATE 3 ML: .5; 3 SOLUTION RESPIRATORY (INHALATION) at 04:10

## 2017-10-03 RX ADMIN — FUROSEMIDE 40 MG: 40 TABLET ORAL at 07:10

## 2017-10-03 RX ADMIN — SODIUM CHLORIDE, PRESERVATIVE FREE 3 ML: 5 INJECTION INTRAVENOUS at 10:10

## 2017-10-03 RX ADMIN — AMLODIPINE BESYLATE 10 MG: 5 TABLET ORAL at 07:10

## 2017-10-03 RX ADMIN — OLANZAPINE 10 MG: 10 TABLET, FILM COATED ORAL at 09:10

## 2017-10-03 RX ADMIN — SODIUM CHLORIDE, PRESERVATIVE FREE 3 ML: 5 INJECTION INTRAVENOUS at 06:10

## 2017-10-03 RX ADMIN — NICOTINE 1 PATCH: 14 PATCH, EXTENDED RELEASE TRANSDERMAL at 07:10

## 2017-10-03 RX ADMIN — IPRATROPIUM BROMIDE AND ALBUTEROL SULFATE 3 ML: .5; 3 SOLUTION RESPIRATORY (INHALATION) at 09:10

## 2017-10-03 RX ADMIN — OLANZAPINE 10 MG: 10 TABLET, FILM COATED ORAL at 07:10

## 2017-10-03 RX ADMIN — ENOXAPARIN SODIUM 40 MG: 100 INJECTION SUBCUTANEOUS at 05:10

## 2017-10-03 RX ADMIN — POLYETHYLENE GLYCOL 3350 17 G: 17 POWDER, FOR SOLUTION ORAL at 07:10

## 2017-10-03 RX ADMIN — OLANZAPINE 5 MG: 2.5 TABLET, FILM COATED ORAL at 09:10

## 2017-10-03 RX ADMIN — FUROSEMIDE 40 MG: 40 TABLET ORAL at 09:10

## 2017-10-03 RX ADMIN — CLONIDINE HYDROCHLORIDE 0.3 MG: 0.3 TABLET ORAL at 01:10

## 2017-10-03 RX ADMIN — DOXYCYCLINE 100 MG: 100 INJECTION, POWDER, LYOPHILIZED, FOR SOLUTION INTRAVENOUS at 02:10

## 2017-10-03 RX ADMIN — VANCOMYCIN HYDROCHLORIDE 1250 MG: 10 INJECTION, POWDER, LYOPHILIZED, FOR SOLUTION INTRAVENOUS at 08:10

## 2017-10-03 RX ADMIN — BUPRENORPHINE HYDROCHLORIDE, NALOXONE HYDROCHLORIDE 2 EACH: 2; .5 FILM, SOLUBLE BUCCAL; SUBLINGUAL at 05:10

## 2017-10-03 NOTE — PLAN OF CARE
Problem: Patient Care Overview  Goal: Plan of Care Review  Sats 93% RA; tolerating well; pt request PRN aerosol tx; tx given without incident; will continue to monitor.

## 2017-10-03 NOTE — PT/OT/SLP PROGRESS
"Physical Therapy  Treatment and Discharge    Ming Harrington   MRN: [id number]   Admitting Diagnosis: Acute respiratory failure with hypercapnia    PT Received On: 10/03/17  PT Start Time: 1355     PT Stop Time: 1418    PT Total Time (min): 23 min with OT       Billable Minutes:  Gait Training 10    Treatment Type: Treatment  PT/PTA: PT     PTA Visit Number: 0       General Precautions: Standard, fall, respiratory  Orthopedic Precautions: N/A   Braces: N/A    Do you have any cultural, spiritual, Caodaism conflicts, given your current situation?: none reported to PT    Subjective:  Communicated with ARIANNA Atwood prior to session.  Pt reports he's feeling better today, "i'm getting sick because of the withdrawal"    Pain/Comfort  Pain Rating 1:  (reports pain but does not state a number)  Location - Orientation 1: generalized  Location 1:  ("all over')  Pain Addressed 1: Distraction, Cessation of Activity, Nurse notified    Objective:   Patient found with: oxygen    Functional Mobility:  Bed Mobility:   Supine to Sit: Modified Independent  Sit to Supine: Modified Independent    Transfers:  Sit <> Stand Assistance: Supervision  Sit <> Stand Assistive Device: No Assistive Device    Gait:   Gait Distance: 150 feet x 2 and >300 feet with no AD and CGA initially then progressed to SBA with therapist managing IV pole  Assistance 1: Stand by Assistance, Contact Guard Assistance  Gait Assistive Device: No device  Gait Deviation(s): lateral lean, increased stride width    Balance:   Static Sit: NORMAL: No deviations seen in posture held statically  Dynamic Sit: NORMAL: No deviations seen in posture held dynamically  Static Stand: GOOD: Takes MODERATE challenges from all directions  Dynamic stand: GOOD: Needs SUPERVISION only during gait and able to self right with moderate      Therapeutic Activities and Exercises:  Pt completed multiple bouts of gait without AD, initially completing with CGA then progressing to SBA with therapist " managing IV pole and O2 tank. Pt refused participation in stair training and reports he will have no problem with stairs. Pt stood at sink for 2-3 minutes with supervision with no LOB or instability.    AM-PAC 6 CLICK MOBILITY  How much help from another person does this patient currently need?   1 = Unable, Total/Dependent Assistance  2 = A lot, Maximum/Moderate Assistance  3 = A little, Minimum/Contact Guard/Supervision  4 = None, Modified [place]/Independent    Turning over in bed (including adjusting bedclothes, sheets and blankets)?: 4  Sitting down on and standing up from a chair with arms (e.g., wheelchair, bedside commode, etc.): 3  Moving from lying on back to sitting on the side of the bed?: 4  Moving to and from a bed to a chair (including a wheelchair)?: 3  Need to walk in hospital room?: 3  Climbing 3-5 steps with a railing?: 3  Total Score: 20    AM-PAC Raw Score CMS G-Code Modifier Level of Impairment Assistance   6 % Total / Unable   7 - 9 CM 80 - 100% Maximal Assist   10 - 14 CL 60 - 80% Moderate Assist   15 - 19 CK 40 - 60% Moderate Assist   20 - 22 CJ 20 - 40% Minimal Assist   23 CI 1-20% SBA / CGA   24 CH 0% Independent/ Mod I     Patient left HOB elevated with all lines intact, call button in reach, bed alarm on and RN notified.    Assessment:  Ming Harrington is a 51 y.o. male with a medical diagnosis of Acute respiratory failure with hypercapnia and presents with improved stability during gait and pt reporting he is ambulating and completing mobility at his baseline level. Pt discharged from PT services at this time.    Rehab identified problem list/impairments: Rehab identified problem list/impairments: impaired endurance, impaired cardiopulmonary response to activity    Rehab potential is good.    Activity tolerance: Good    Discharge recommendations: Discharge Facility/Level Of Care Needs:  (follow MD d/c disposition)     Barriers to discharge: Barriers to Discharge:  None    Equipment recommendations: Equipment Needed After Discharge: shower chair     GOALS:    Physical Therapy Goals     Not on file          Multidisciplinary Problems (Resolved)        Problem: Physical Therapy Goal    Goal Priority Disciplines Outcome Goal Variances Interventions   Physical Therapy Goal   (Resolved)     PT/OT, PT Outcome(s) achieved     Description:  Goals to be met by: 17     Patient will increase functional independence with mobility by performin. Supine <> sit with Modified [place] MET 10/3/2017  2. Sit to stand transfer with Supervision MET 10/3/2017  3. Bed to chair transfer with Stand-by Assistance MET 10/3/2017  4. Gait  x 150 feet with Stand-by Assistance MET 10/3/2017                       PLAN:    Patient to be seen  (d/c)  to address the above listed problems via gait training, therapeutic activities, therapeutic exercises  Plan of Care expires: 10/03/17  Plan of Care reviewed with: patient         Alecia Oliveira, PT  10/03/2017

## 2017-10-03 NOTE — PLAN OF CARE
Problem: Patient Care Overview  Goal: Plan of Care Review  Outcome: Ongoing (interventions implemented as appropriate)  Remained npo for denisse procedure this am. Telemetry intact, no evidence of ectopy during shift. Refused bed alarm during shift.

## 2017-10-03 NOTE — PLAN OF CARE
Problem: Occupational Therapy Goal  Goal: Occupational Therapy Goal  Goals to be met by: 11/2/17     Patient will increase functional independence with ADLs by performing:    LE Dressing with Stand-by Assistance.  Grooming while standing with Stand-by Assistance.  Toileting from toilet with Stand-by Assistance for hygiene and clothing management.   Stand pivot transfers with Stand-by Assistance.  Toilet transfer to toilet with Stand-by Assistance.  Upper extremity exercise program x10 reps per handout, with independence.     Outcome: Outcome(s) achieved Date Met: 10/03/17  Pt has met goals this date. No further OT needs at this time. Remains SOB with increased axs; educated on and participated in pursed lip breathing with rest breaks. D/c OT at this time

## 2017-10-03 NOTE — PT/OT/SLP PROGRESS
"Occupational Therapy  Treatment/Dc Summary     Ming Harrington   MRN: [id number]   Admitting Diagnosis: Acute respiratory failure with hypercapnia    OT Date of Treatment: 10/03/17   OT Start Time: 1356  OT Stop Time: 1419  OT Total Time (min): 23 min    Billable Minutes:  Self Care/Home Management 10 co treatment with PT     General Precautions: Standard, fall, respiratory  Orthopedic Precautions: N/A  Braces: N/A         Subjective:  Communicated with nsg prior to session.  Pt states, " I'm much better today"   Pain/Comfort  Pain Rating 1:  (states "everywhere" when asked if he has pain )    Objective:        Functional Mobility:  Bed Mobility:  Supine to Sit: Supervision  Sit to Supine: Supervision    Transfers:   Sit <> Stand Assistance: Stand By Assistance  Sit <> Stand Assistive Device: No Assistive Device    Functional Ambulation: SBA without AD; standing rest breaks required with pursed lip breathing     Activities of Daily Living:     LE Dressing Level of Assistance: Stand by assistance (maxx undergarments )  Grooming Position: Standing  Grooming Level of Assistance: Stand by assistance  Toileting Where Assessed: Bed level (urinal )  Toileting Level of Assistance: Stand by assistance, Supervision       Balance:   Static Sit: NORMAL: No deviations seen in posture held statically  Dynamic Sit: NORMAL: No deviations seen in posture held dynamically  Static Stand: GOOD-: Takes MODERATE challenges from all directions inconsistently  Dynamic stand: FAIR+: Needs CLOSE SUPERVISION during gait and is able to right self with minor LOB    Therapeutic Activities and Exercises:  See PT note for ambulation assessment     AM-PAC 6 CLICK ADL   How much help from another person does this patient currently need?   1 = Unable, Total/Dependent Assistance  2 = A lot, Maximum/Moderate Assistance  3 = A little, Minimum/Contact Guard/Supervision  4 = None, Modified [place]/Independent    Putting on and taking off regular lower body " "clothing? : 3  Bathing (including washing, rinsing, drying)?: 3  Toileting, which includes using toilet, bedpan, or urinal? : 4  Putting on and taking off regular upper body clothing?: 4  Taking care of personal grooming such as brushing teeth?: 3  Eating meals?: 4  Total Score: 21     AM-PAC Raw Score CMS "G-Code Modifier Level of Impairment Assistance   6 % Total / Unable   7 - 8 CM 80 - 100% Maximal Assist   9-13 CL 60 - 80% Moderate Assist   14 - 19 CK 40 - 60% Moderate Assist   20 - 22 CJ 20 - 40% Minimal Assist   23 CI 1-20% SBA / CGA   24 CH 0% Independent/ Mod I       Patient left supine with all lines intact, call button in reach, bed alarm on and nsg notified    ASSESSMENT:  Ming Harrington is a 51 y.o. male with a medical diagnosis of Acute respiratory failure with hypercapnia and presents with SOB. Pt has met goals this date. No further OT needs at this time. Remains SOB with increased axs; educated on and participated in pursed lip breathing with rest breaks. D/c OT at this time     Rehab identified problem list/impairments: Rehab identified problem list/impairments: impaired endurance, impaired cardiopulmonary response to activity, decreased safety awareness, edema, gait instability, impaired functional mobilty, weakness    Rehab potential is good.    Activity tolerance: Fair + 2/2 SOB     Discharge recommendations: Discharge Facility/Level Of Care Needs:  (follow MD d/c dispo )     Barriers to discharge: Barriers to Discharge: Inaccessible home environment, Decreased caregiver support    Equipment recommendations: shower chair     GOALS:    Occupational Therapy Goals     Not on file          Multidisciplinary Problems (Resolved)        Problem: Occupational Therapy Goal    Goal Priority Disciplines Outcome Interventions   Occupational Therapy Goal   (Resolved)     OT, PT/OT Outcome(s) achieved    Description:  Goals to be met by: 11/2/17     Patient will increase functional independence with ADLs by " performing:    LE Dressing with Stand-by Assistance.  Grooming while standing with Stand-by Assistance.  Toileting from toilet with Stand-by Assistance for hygiene and clothing management.   Stand pivot transfers with Stand-by Assistance.  Toilet transfer to toilet with Stand-by Assistance.  Upper extremity exercise program x10 reps per handout, with independence.                      Plan:  Patient to be seen  (D/c OT ) to address the above listed problems via self-care/home management, therapeutic activities, therapeutic exercises  Plan of Care expires: 10/03/17  Plan of Care reviewed with: patient         Ashlyn RUEDA Alfredo, OT  10/03/2017

## 2017-10-03 NOTE — PLAN OF CARE
Problem: Chronic Obstructive Pulmonary Disease (Adult)  Intervention: Optimize Oxygenation/Ventilation/Perfusion  Maintained o2 on per nasal cannula. Monitor o2 saturation with in normal limits. Received respiratory treatments as needed. Continue to monitor. Respiratory status.
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
## 2017-10-03 NOTE — PROGRESS NOTES
"Vancomycin Dosing and Monitoring Pharmacy Protocol    Ming Harrington is a 51 y.o. male    Height: 5' 8" (1.727 m)   Wt Readings from Last 1 Encounters:   10/01/17 113.4 kg (250 lb 1.6 oz)     Ideal body weight: 68.4 kg (150 lb 12.7 oz)  Adjusted ideal body weight: 86.4 kg (190 lb 8.3 oz)    Temp Readings from Last 3 Encounters:   10/03/17 98.1 °F (36.7 °C) (Oral)   09/13/17 98.7 °F (37.1 °C)   08/28/17 98.6 °F (37 °C) (Oral)      Lab Results   Component Value Date/Time    WBC 11.74 10/03/2017 07:01 AM    WBC 10.92 10/02/2017 08:50 AM    WBC 6.68 10/01/2017 05:05 AM      Lab Results   Component Value Date/Time    CREATININE 1.0 10/03/2017 07:01 AM    CREATININE 1.0 10/02/2017 06:29 AM    CREATININE 1.0 10/01/2017 06:25 AM        Serum creatinine: 1 mg/dL 10/03/17 0701  Estimated creatinine clearance: 105.6 mL/min    Antibiotics       Start     Stop Route Frequency Ordered    10/03/17 2000  vancomycin (VANCOCIN) 1,250 mg in dextrose 5 % 250 mL IVPB      -- IV Every 12 hours (non-standard times) 10/03/17 1143    10/01/17 1016  doxycycline (VIBRAMYCIN) 100mg in dextrose 5% 250 mL IVPB (ready to mix)      10/05 1359 IV Every 12 hours (non-standard times) 10/01/17 1016          Antifungals       None            Microbiology Results (last 7 days)       Procedure Component Value Units Date/Time    Blood Culture #1 **CANNOT BE ORDERED STAT** [[id number]] Collected:  09/30/17 2307    Order Status:  Completed Specimen:  Blood from Peripheral, Antecubital, Left Updated:  10/03/17 1101     Blood Culture, Routine Gram stain aer bottle: Gram positive cocci in clusters resembling Staph      Blood Culture, Routine Results called to and read back by: Esteban Atkinson RN  10/02/2017       Blood Culture, Routine 04:24     Blood Culture, Routine --     COAGULASE-NEGATIVE STAPHYLOCOCCUS SPECIES  Organism is a probable contaminant      Blood culture [[id number]] Collected:  09/30/17 2337    Order Status:  Completed Specimen:  Blood from " Peripheral, Antecubital, Right Updated:  10/03/17 0612     Blood Culture, Routine No Growth to date     Blood Culture, Routine No Growth to date     Blood Culture, Routine No Growth to date    Blood culture [[id number]] Collected:  10/02/17 0855    Order Status:  Completed Specimen:  Blood Updated:  10/02/17 2115     Blood Culture, Routine No Growth to date    Blood culture [[id number]] Collected:  10/02/17 0850    Order Status:  Completed Specimen:  Blood Updated:  10/02/17 2115     Blood Culture, Routine No Growth to date    Culture, Respiratory with Gram Stain [[id number]]     Order Status:  No result Specimen:  Respiratory             Indication:   Pneumonia    Target Level: 15-20 mcg/ml    Hemodialysis:   No on N/A    Dosing Weight:   AdjBW--adjusted body weight  If ABW is greater than or equal to 30% over Ideal Body Weight, AdjBW will be used to calculate vancomycin dose.    Last Vancomycin dose: 1250 mg   Date/Time given: 10/3 0027          Vancomycin level:  Recent Labs   Lab Result Units  10/03/17   0701   Vancomycin-Trough ug/mL  17.5     Recent Labs   Lab Result Units  10/02/17   0629  10/03/17   0701   Vancomycin, Random ug/mL  <1.1   --    Vancomycin-Trough ug/mL   --   17.5       Per Protocol Initial/Adjustments Dosin. Initial/Adjustment Dose: DECREASE Vancomycin will be adjusted from 1250mg q8hr to 1250mg q12hr  2. Vancomycin Trough Level will be drawn on 10/6 0700date/time    Pharmacy will continue to follow.    Please contact if you have any further questions. Thank you.    Jean Camarena, PharmD  [phone]

## 2017-10-03 NOTE — PROGRESS NOTES
.Pharmacy New Medication Education    Patient accepted medication education.    Pharmacy educated patient on name and purpose of medications and possible side effects, using the teach-back method.     Apap  Duoneb  Amlodipine  Clonidine  D50%  Doxy  Enoxaparin  Breo  Lasix  Glucagon  Glucose  Hydralazine  Norco  Novolog  Nicotine  Olanzapine  Zofran  Pantoprazole  Miralax  Prednisone  Senokot  Spiriva  vancomycin    Learners of pharmacy medication education included:  patient    Patient +/- learner response:  teachback

## 2017-10-03 NOTE — PLAN OF CARE
Problem: Physical Therapy Goal  Goal: Physical Therapy Goal  Goals to be met by: 17     Patient will increase functional independence with mobility by performin. Supine <> sit with Modified [place] MET 10/3/2017  2. Sit to stand transfer with Supervision MET 10/3/2017  3. Bed to chair transfer with Stand-by Assistance MET 10/3/2017  4. Gait  x 150 feet with Stand-by Assistance MET 10/3/2017     Outcome: Outcome(s) achieved Date Met: 10/03/17  Pt has met all goals and is being discharged from PT services at this time.

## 2017-10-04 ENCOUNTER — ANESTHESIA (OUTPATIENT)
Dept: SURGERY | Facility: HOSPITAL | Age: 52
DRG: 190 | End: 2017-10-04
Payer: MEDICAID

## 2017-10-04 ENCOUNTER — ANESTHESIA EVENT (OUTPATIENT)
Dept: SURGERY | Facility: HOSPITAL | Age: 52
DRG: 190 | End: 2017-10-04
Payer: MEDICAID

## 2017-10-04 VITALS
HEIGHT: 68 IN | SYSTOLIC BLOOD PRESSURE: 142 MMHG | BODY MASS INDEX: 37.91 KG/M2 | DIASTOLIC BLOOD PRESSURE: 81 MMHG | OXYGEN SATURATION: 95 % | WEIGHT: 250.13 LBS | TEMPERATURE: 99 F | RESPIRATION RATE: 26 BRPM | HEART RATE: 110 BPM

## 2017-10-04 VITALS — RESPIRATION RATE: 40 BRPM

## 2017-10-04 LAB
BACTERIA BLD CULT: NORMAL
POCT GLUCOSE: 113 MG/DL (ref 70–110)
POCT GLUCOSE: 117 MG/DL (ref 70–110)
POCT GLUCOSE: 177 MG/DL (ref 70–110)

## 2017-10-04 PROCEDURE — 37000009 HC ANESTHESIA EA ADD 15 MINS: Performed by: INTERNAL MEDICINE

## 2017-10-04 PROCEDURE — 27000221 HC OXYGEN, UP TO 24 HOURS

## 2017-10-04 PROCEDURE — 63600175 PHARM REV CODE 636 W HCPCS: Performed by: STUDENT IN AN ORGANIZED HEALTH CARE EDUCATION/TRAINING PROGRAM

## 2017-10-04 PROCEDURE — 63600175 PHARM REV CODE 636 W HCPCS: Performed by: FAMILY MEDICINE

## 2017-10-04 PROCEDURE — 94640 AIRWAY INHALATION TREATMENT: CPT

## 2017-10-04 PROCEDURE — 37000008 HC ANESTHESIA 1ST 15 MINUTES: Performed by: INTERNAL MEDICINE

## 2017-10-04 PROCEDURE — 94761 N-INVAS EAR/PLS OXIMETRY MLT: CPT

## 2017-10-04 PROCEDURE — 63600175 PHARM REV CODE 636 W HCPCS: Performed by: NURSE ANESTHETIST, CERTIFIED REGISTERED

## 2017-10-04 PROCEDURE — 25000003 PHARM REV CODE 250: Performed by: FAMILY MEDICINE

## 2017-10-04 PROCEDURE — 71000033 HC RECOVERY, INTIAL HOUR: Performed by: INTERNAL MEDICINE

## 2017-10-04 PROCEDURE — 25000242 PHARM REV CODE 250 ALT 637 W/ HCPCS: Performed by: STUDENT IN AN ORGANIZED HEALTH CARE EDUCATION/TRAINING PROGRAM

## 2017-10-04 PROCEDURE — S4991 NICOTINE PATCH NONLEGEND: HCPCS | Performed by: STUDENT IN AN ORGANIZED HEALTH CARE EDUCATION/TRAINING PROGRAM

## 2017-10-04 PROCEDURE — 63600175 PHARM REV CODE 636 W HCPCS: Performed by: PSYCHIATRY & NEUROLOGY

## 2017-10-04 PROCEDURE — 25000003 PHARM REV CODE 250: Performed by: NURSE ANESTHETIST, CERTIFIED REGISTERED

## 2017-10-04 PROCEDURE — A4216 STERILE WATER/SALINE, 10 ML: HCPCS | Performed by: STUDENT IN AN ORGANIZED HEALTH CARE EDUCATION/TRAINING PROGRAM

## 2017-10-04 PROCEDURE — 93312 ECHO TRANSESOPHAGEAL: CPT

## 2017-10-04 PROCEDURE — 93325 DOPPLER ECHO COLOR FLOW MAPG: CPT

## 2017-10-04 PROCEDURE — 25000003 PHARM REV CODE 250: Performed by: PSYCHIATRY & NEUROLOGY

## 2017-10-04 PROCEDURE — 25000003 PHARM REV CODE 250: Performed by: STUDENT IN AN ORGANIZED HEALTH CARE EDUCATION/TRAINING PROGRAM

## 2017-10-04 RX ORDER — OLANZAPINE 10 MG/1
10 TABLET ORAL 2 TIMES DAILY
Qty: 60 TABLET | Refills: 11 | Status: ON HOLD | OUTPATIENT
Start: 2017-10-04 | End: 2018-07-05 | Stop reason: HOSPADM

## 2017-10-04 RX ORDER — SODIUM CHLORIDE 9 MG/ML
INJECTION, SOLUTION INTRAVENOUS CONTINUOUS PRN
Status: DISCONTINUED | OUTPATIENT
Start: 2017-10-04 | End: 2017-10-04

## 2017-10-04 RX ORDER — IBUPROFEN 200 MG
1 TABLET ORAL DAILY
Qty: 30 PATCH | Refills: 11 | Status: ON HOLD | OUTPATIENT
Start: 2017-10-05 | End: 2018-07-05 | Stop reason: HOSPADM

## 2017-10-04 RX ORDER — NAPROXEN 500 MG/1
500 TABLET ORAL 2 TIMES DAILY
Qty: 60 TABLET | Refills: 0 | Status: SHIPPED | OUTPATIENT
Start: 2017-10-04 | End: 2018-02-15 | Stop reason: ALTCHOICE

## 2017-10-04 RX ORDER — CLONIDINE HYDROCHLORIDE 0.1 MG/1
0.3 TABLET ORAL 3 TIMES DAILY
Qty: 90 TABLET | Refills: 0 | Status: SHIPPED | OUTPATIENT
Start: 2017-10-04 | End: 2019-04-17 | Stop reason: SDUPTHER

## 2017-10-04 RX ORDER — METFORMIN HYDROCHLORIDE 500 MG/1
500 TABLET ORAL 2 TIMES DAILY
Qty: 180 TABLET | Refills: 3 | Status: SHIPPED | OUTPATIENT
Start: 2017-10-04 | End: 2017-10-05 | Stop reason: SDUPTHER

## 2017-10-04 RX ORDER — AMOXICILLIN 250 MG
1 CAPSULE ORAL 2 TIMES DAILY
Qty: 30 TABLET | Refills: 11 | Status: ON HOLD | OUTPATIENT
Start: 2017-10-04 | End: 2020-02-27 | Stop reason: HOSPADM

## 2017-10-04 RX ORDER — PROPOFOL 10 MG/ML
VIAL (ML) INTRAVENOUS
Status: DISCONTINUED | OUTPATIENT
Start: 2017-10-04 | End: 2017-10-04

## 2017-10-04 RX ORDER — ESCITALOPRAM OXALATE 10 MG/1
10 TABLET ORAL DAILY
Qty: 30 TABLET | Refills: 11 | Status: ON HOLD | OUTPATIENT
Start: 2017-10-05 | End: 2019-02-28 | Stop reason: CLARIF

## 2017-10-04 RX ORDER — OLANZAPINE 5 MG/1
5 TABLET ORAL NIGHTLY
Qty: 30 TABLET | Refills: 11 | Status: ON HOLD | OUTPATIENT
Start: 2017-10-04 | End: 2018-07-05 | Stop reason: HOSPADM

## 2017-10-04 RX ORDER — AMLODIPINE BESYLATE 10 MG/1
10 TABLET ORAL DAILY
Qty: 30 TABLET | Refills: 11 | Status: ON HOLD | OUTPATIENT
Start: 2017-10-05 | End: 2018-07-05

## 2017-10-04 RX ORDER — DOXYCYCLINE 100 MG/1
100 CAPSULE ORAL EVERY 12 HOURS
Qty: 4 CAPSULE | Refills: 0 | Status: SHIPPED | OUTPATIENT
Start: 2017-10-04 | End: 2017-10-06

## 2017-10-04 RX ORDER — PREDNISONE 20 MG/1
40 TABLET ORAL DAILY
Qty: 4 TABLET | Refills: 0 | Status: SHIPPED | OUTPATIENT
Start: 2017-10-05 | End: 2017-10-07

## 2017-10-04 RX ORDER — MIDAZOLAM HYDROCHLORIDE 1 MG/ML
INJECTION, SOLUTION INTRAMUSCULAR; INTRAVENOUS
Status: DISCONTINUED | OUTPATIENT
Start: 2017-10-04 | End: 2017-10-04

## 2017-10-04 RX ORDER — FLUTICASONE FUROATE AND VILANTEROL 100; 25 UG/1; UG/1
1 POWDER RESPIRATORY (INHALATION) DAILY
Qty: 60 EACH | Refills: 11 | Status: SHIPPED | OUTPATIENT
Start: 2017-10-05 | End: 2017-10-05 | Stop reason: SDUPTHER

## 2017-10-04 RX ORDER — ONDANSETRON 8 MG/1
8 TABLET, ORALLY DISINTEGRATING ORAL EVERY 8 HOURS PRN
Qty: 6 TABLET | Refills: 11 | Status: SHIPPED | OUTPATIENT
Start: 2017-10-04 | End: 2018-02-15 | Stop reason: ALTCHOICE

## 2017-10-04 RX ORDER — BUPRENORPHINE AND NALOXONE 2; .5 MG/1; MG/1
4 FILM, SOLUBLE BUCCAL; SUBLINGUAL 2 TIMES DAILY
Status: DISCONTINUED | OUTPATIENT
Start: 2017-10-04 | End: 2017-10-04 | Stop reason: HOSPADM

## 2017-10-04 RX ORDER — ESCITALOPRAM OXALATE 10 MG/1
10 TABLET ORAL DAILY
Status: DISCONTINUED | OUTPATIENT
Start: 2017-10-04 | End: 2017-10-04 | Stop reason: HOSPADM

## 2017-10-04 RX ORDER — DOXYCYCLINE HYCLATE 100 MG
100 TABLET ORAL EVERY 12 HOURS
Status: DISCONTINUED | OUTPATIENT
Start: 2017-10-05 | End: 2017-10-04 | Stop reason: HOSPADM

## 2017-10-04 RX ORDER — FUROSEMIDE 40 MG/1
40 TABLET ORAL 2 TIMES DAILY
Qty: 60 TABLET | Refills: 11 | Status: SHIPPED | OUTPATIENT
Start: 2017-10-04 | End: 2018-01-17

## 2017-10-04 RX ADMIN — TIOTROPIUM BROMIDE 18 MCG: 18 CAPSULE ORAL; RESPIRATORY (INHALATION) at 08:10

## 2017-10-04 RX ADMIN — MIDAZOLAM 3 MG: 1 INJECTION INTRAMUSCULAR; INTRAVENOUS at 12:10

## 2017-10-04 RX ADMIN — DOXYCYCLINE 100 MG: 100 INJECTION, POWDER, LYOPHILIZED, FOR SOLUTION INTRAVENOUS at 01:10

## 2017-10-04 RX ADMIN — SODIUM CHLORIDE, PRESERVATIVE FREE 3 ML: 5 INJECTION INTRAVENOUS at 02:10

## 2017-10-04 RX ADMIN — SODIUM CHLORIDE: 0.9 INJECTION, SOLUTION INTRAVENOUS at 12:10

## 2017-10-04 RX ADMIN — ESCITALOPRAM 10 MG: 10 TABLET, FILM COATED ORAL at 03:10

## 2017-10-04 RX ADMIN — OXYCODONE HYDROCHLORIDE 10 MG: 5 TABLET ORAL at 02:10

## 2017-10-04 RX ADMIN — FLUTICASONE FUROATE AND VILANTEROL TRIFENATATE 1 PUFF: 100; 25 POWDER RESPIRATORY (INHALATION) at 08:10

## 2017-10-04 RX ADMIN — ENOXAPARIN SODIUM 40 MG: 100 INJECTION SUBCUTANEOUS at 04:10

## 2017-10-04 RX ADMIN — IPRATROPIUM BROMIDE AND ALBUTEROL SULFATE 3 ML: .5; 3 SOLUTION RESPIRATORY (INHALATION) at 10:10

## 2017-10-04 RX ADMIN — PROPOFOL 50 MG: 10 INJECTION, EMULSION INTRAVENOUS at 12:10

## 2017-10-04 RX ADMIN — DOXYCYCLINE 100 MG: 100 INJECTION, POWDER, LYOPHILIZED, FOR SOLUTION INTRAVENOUS at 02:10

## 2017-10-04 RX ADMIN — ONDANSETRON 8 MG: 4 TABLET, ORALLY DISINTEGRATING ORAL at 11:10

## 2017-10-04 RX ADMIN — SODIUM CHLORIDE, PRESERVATIVE FREE 3 ML: 5 INJECTION INTRAVENOUS at 06:10

## 2017-10-04 RX ADMIN — OXYCODONE HYDROCHLORIDE 10 MG: 5 TABLET ORAL at 08:10

## 2017-10-04 RX ADMIN — PREDNISONE 40 MG: 20 TABLET ORAL at 08:10

## 2017-10-04 RX ADMIN — VANCOMYCIN HYDROCHLORIDE 1250 MG: 10 INJECTION, POWDER, LYOPHILIZED, FOR SOLUTION INTRAVENOUS at 08:10

## 2017-10-04 RX ADMIN — NICOTINE 1 PATCH: 14 PATCH, EXTENDED RELEASE TRANSDERMAL at 08:10

## 2017-10-04 RX ADMIN — FUROSEMIDE 40 MG: 40 TABLET ORAL at 08:10

## 2017-10-04 RX ADMIN — AMLODIPINE BESYLATE 10 MG: 5 TABLET ORAL at 08:10

## 2017-10-04 RX ADMIN — MIDAZOLAM 2 MG: 1 INJECTION INTRAMUSCULAR; INTRAVENOUS at 12:10

## 2017-10-04 RX ADMIN — PROPOFOL 40 MG: 10 INJECTION, EMULSION INTRAVENOUS at 12:10

## 2017-10-04 RX ADMIN — STANDARDIZED SENNA CONCENTRATE AND DOCUSATE SODIUM 1 TABLET: 8.6; 5 TABLET, FILM COATED ORAL at 08:10

## 2017-10-04 RX ADMIN — BUPRENORPHINE HYDROCHLORIDE, NALOXONE HYDROCHLORIDE 2 EACH: 2; .5 FILM, SOLUBLE BUCCAL; SUBLINGUAL at 11:10

## 2017-10-04 RX ADMIN — BUPRENORPHINE HYDROCHLORIDE, NALOXONE HYDROCHLORIDE 2 EACH: 2; .5 FILM, SOLUBLE BUCCAL; SUBLINGUAL at 08:10

## 2017-10-04 RX ADMIN — OXYCODONE HYDROCHLORIDE 10 MG: 5 TABLET ORAL at 12:10

## 2017-10-04 RX ADMIN — OLANZAPINE 10 MG: 10 TABLET, FILM COATED ORAL at 08:10

## 2017-10-04 RX ADMIN — PANTOPRAZOLE SODIUM 40 MG: 40 TABLET, DELAYED RELEASE ORAL at 08:10

## 2017-10-04 NOTE — PLAN OF CARE
Problem: Patient Care Overview  Goal: Plan of Care Review  Outcome: Ongoing (interventions implemented as appropriate)  No recovery room issue

## 2017-10-04 NOTE — PLAN OF CARE
Patient on continuous telemetry monitoring during shift. HR . Patient denies any chest pain for discomfort during shift.

## 2017-10-04 NOTE — TRANSFER OF CARE
"Anesthesia Transfer of Care Note    Patient: Ming Harrington    Procedure(s) Performed: Procedure(s) (LRB):  TRANSESOPHAGEAL ECHOCARDIOGRAM (ERIS) (N/A)    Patient location: PACU    Anesthesia Type: MAC    Transport from OR: Transported from OR on room air with adequate spontaneous ventilation    Post pain: adequate analgesia    Post assessment: no apparent anesthetic complications    Level of consciousness: alert, awake and oriented    Nausea/Vomiting: no nausea/vomiting    Complications: none    Transfer of care protocol was followed      Last vitals:   Visit Vitals  /89 (Patient Position: Lying)   Pulse 86   Temp 36.5 °C (97.7 °F) (Oral)   Resp 18   Ht 5' 8" (1.727 m)   Wt 113.4 kg (250 lb 1.6 oz)   SpO2 (!) 93%   BMI 38.03 kg/m²     "

## 2017-10-04 NOTE — ANESTHESIA PREPROCEDURE EVALUATION
10/04/2017  Ming Harrington is a 51 y.o., male for ERIS    Anesthesia Evaluation      I have reviewed the Medications.   Steroids Taken In Past Year: Prednisone    Review of Systems  Anesthesia Hx:  No problems with previous Anesthesia Denies Hx of Anesthetic complications History of prior surgery of interest to airway management or planning: Previous anesthesia: General 2013: Umb. Hernia with general anesthesia.  Denies Family Hx of Anesthesia complications.   Denies Personal Hx of Anesthesia complications.   Social:  Tobacco Use: Active smoker of cigarette for 16 years, < 1/2 a pack per day Illicit Drug Use: (heroin, last used thursday) Types of drugs include Marijuana,  Abuses drugs:  drug use is in recovery.  Cardiovascular:   Exercise tolerance: poor Hypertension CHF   ECG  Normal sinus rhythm  Anterior infarct ,age undetermined  Abnormal ECG, no change from previous    TTE 10/2/17  CONCLUSIONS     1 - No wall motion abnormalities.     2 - Normal left ventricular systolic function (EF 65-70%).     3 - Normal left ventricular diastolic function.     4 - Normal right ventricular systolic function .     5 - The estimated PA systolic pressure is 30 mmHg.  Congestive Heart Failure (CHF)  Hypertension    Pulmonary:   COPD Asthma ER visit 3/2017 for COPD- discharged on Prednisone, not taking for past few months, put on 40 mg per day since this admission Chronic Obstructive Pulmonary Disease (COPD): Inhaler use is rescue inhaler PRN.  Pulmonary Hypertension    Hepatic/GI:   Hepatitis, C  Hernia, Umbilical Hernia Liver Disease, Hepatitis (C)    Neurological:  Neurology Normal    Endocrine:   Diabetes  Metabolic Disorders, Obesity / BMI > 30      Lab Results   Component Value Date    WBC 11.74 10/03/2017    HGB 14.9 10/03/2017    HCT 44.0 10/03/2017     10/03/2017    ALT 29 10/03/2017    AST 34 10/03/2017    NA  135 (L) 10/03/2017    K 4.0 10/03/2017    CL 96 10/03/2017    CREATININE 1.0 10/03/2017    BUN 23 (H) 10/03/2017    CO2 29 10/03/2017    TSH 0.095 (L) 07/22/2017    INR 1.0 09/30/2017    HGBA1C 4.9 10/01/2017     Wt Readings from Last 3 Encounters:   10/01/17 113.4 kg (250 lb 1.6 oz)   09/13/17 115 kg (253 lb 8.5 oz)   08/28/17 117.3 kg (258 lb 9.6 oz)     Temp Readings from Last 3 Encounters:   10/04/17 36.5 °C (97.7 °F) (Oral)   09/13/17 37.1 °C (98.7 °F)   08/28/17 37 °C (98.6 °F) (Oral)     BP Readings from Last 3 Encounters:   10/04/17 128/89   09/13/17 (!) 141/101   08/28/17 (!) 142/87     Pulse Readings from Last 3 Encounters:   10/04/17 86   09/13/17 97   08/28/17 87         Physical Exam  General:  Obesity    Airway/Jaw/Neck:  Airway Findings: Mouth Opening: Normal Tongue: Normal  General Airway Assessment: Adult  Mallampati: III  Improves to II with phonation.  TM Distance: Normal, at least 6 cm      Dental:  Dental Findings: Edentulous   Chest/Lungs:  Chest/Lungs Findings: Clear to auscultation, Normal Respiratory Rate     Heart/Vascular:  Heart Findings: Rate: Normal  Rhythm: Regular Rhythm  Sounds: Normal        Mental Status:  Mental Status Findings:  Alert and Oriented, Cooperative         Anesthesia Plan  Type of Anesthesia, risks & benefits discussed:  Anesthesia Type:  general, MAC  Patient's Preference:   Intra-op Monitoring Plan: standard ASA monitors  Intra-op Monitoring Plan Comments:   Post Op Pain Control Plan: multimodal analgesia  Post Op Pain Control Plan Comments:   Induction:   IV  Beta Blocker:  Patient is not currently on a Beta-Blocker (No further documentation required).       Informed Consent: Patient understands risks and agrees with Anesthesia plan.  Questions answered. Anesthesia consent signed with patient.  ASA Score: 3     Day of Surgery Review of History & Physical:            Ready For Surgery From Anesthesia Perspective.

## 2017-10-04 NOTE — DISCHARGE INSTRUCTIONS
Alcohol and Drug Abuse Resources, your FIRST step is CALLING  Alcoholics Anonymous:  [phone]  Narcotics Anonymous:  [phone]  [place] on Alcohol and Drug Abuse (CADA): 489 824-CADA (8602)  [facility] [place] Line:  211  [facility] Administration:  [phone] ext. [phone]  [facility]:  [phone]  [facility]:  [phone]    Chronic Obstructive Pulmonary Disease (COPD), Discharge Instructions (English) View Edit Remove   Hypertension, Established (English) View Edit Remove   Hepatitis C (HCV), Understanding (English) View Edit Remove   Drug Abuse and Addiction, Treating (English) View Edit Remove   Shortness of Breath (Dyspnea) (English) View Edit Remove   Kicking the Smoking Habit (English) View Edit Remove   Clonidine tablets (English) View Edit Remove   Doxycycline tablets or capsules (English) View Edit Remove   Furosemide tablets (English) View Edit Remove   Olanzapine tablets (English) View Edit Remove   Ondansetron oral dissolving tablet (English) View Edit Remove   Nicotine skin patches (English) View Edit Remove   Prednisone tablets (English)
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
## 2017-10-04 NOTE — PHYSICIAN QUERY
PT Name: Ming Harrington  MR #: [id number]     Physician Query Form - Documentation Clarification      CDS/: Kari Hernandez               Contact information:[phone]  This form is a permanent document in the medical record.     Query Date: October 4, 2017    By submitting this query, we are merely seeking further clarification of documentation. Please utilize your independent clinical judgment when addressing the question(s) below.    The Medical record reflects the following:    Supporting Clinical Findings Location in Medical Record     Indication:   Pneumonia    Vancomycin IV  Vibramycin IV     Patient slightly rotated.  Cardiac silhouette is stable in size.  Lungs are expanded.  There is blunting of the left costophrenic angle which could reflect possible small effusion and mild left basilar atelectasis.  Otherwise no evidence of new focal consolidative process.  No pneumothorax.  No free air visualized beneath the diaphragm.   Pharmacist PN 10/3      MAR 10/3  MAR 10/3    Xray 9/30                                                                                      Doctor, Please specify diagnosis or diagnoses associated with above clinical findings.    Provider Use Only    [x ] Pneumonia  [  ] Other explanations with details____________________________________  [  ] Clinically unable to determine

## 2017-10-04 NOTE — H&P
Internal H/P      I have reviewed the patient's chart and there has been  No significant change to date. Patient aware of procedure and understands all risks/benefits. Consents obtained and signed. All questions and concerns addressed.
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
## 2017-10-04 NOTE — ANESTHESIA POSTPROCEDURE EVALUATION
"Anesthesia Post Evaluation    Patient: Ming Harrington    Procedure(s) Performed: Procedure(s) (LRB):  TRANSESOPHAGEAL ECHOCARDIOGRAM (ERIS) (N/A)    Final Anesthesia Type: MAC  Patient location during evaluation: PACU  Patient participation: Yes- Able to Participate  Level of consciousness: awake  Post-procedure vital signs: reviewed and stable  Pain management: adequate  Airway patency: patent  PONV status at discharge: No PONV  Anesthetic complications: no      Cardiovascular status: hemodynamically stable and blood pressure returned to baseline  Respiratory status: unassisted and spontaneous ventilation  Hydration status: euvolemic  Follow-up not needed.        Visit Vitals  BP (!) 147/69   Pulse (!) 111   Temp 36.7 °C (98 °F) (Oral)   Resp (!) 22   Ht 5' 8" (1.727 m)   Wt 113.4 kg (250 lb 1.6 oz)   SpO2 95%   BMI 38.03 kg/m²       Pain/Isis Score: Pain Assessment Performed: Yes (10/4/2017  1:13 PM)  Presence of Pain: denies (10/4/2017  1:13 PM)  Pain Rating Prior to Med Admin: 9 (10/4/2017  2:00 PM)  Pain Rating Post Med Admin: 0 (10/4/2017  1:20 AM)  Isis Score: 10 (10/4/2017  1:13 PM)  Modified Isis Score: 20 (10/4/2017  1:13 PM)      "

## 2017-10-04 NOTE — PLAN OF CARE
TN entered RX Rehab Resources into his Patient Instructions as follows:  Alcohol and Drug Abuse Resources, your FIRST step is CALLING  Alcoholics Anonymous:  [phone]  Narcotics Anonymous:  [phone]  [place] on Alcohol and Drug Abuse (CADA): 414 827-CADA (2234)  [facility] Line:  211  [facility] Administration:  [phone] ext. [phone]  [facility]:  [phone]  [facility]:  [phone]    Future Appointments  Date Time Provider Department Center   10/10/2017 1:20 PM El Joe MD [facility]

## 2017-10-04 NOTE — BRIEF OP NOTE
Procedure: ERIS with MAC    Surgeons:  Dr. ESTRELLITA Kearns MD    Brief findings:    No evidence of valvular vegetations or aortic root abscess.  Normal left and right ventricular systolic function.  No evidence of ASD/PFO via bubble study w/ valsalva.  No evidence of ascending aortic lesions.       Complications: none    EBL: none    Dispo: routine post ERIS care.    Thank you for involving us in the care of this patient.

## 2017-10-04 NOTE — PLAN OF CARE
Problem: Patient Care Overview  Goal: Plan of Care Review  Outcome: Ongoing (interventions implemented as appropriate)  Plan of care reviewed with patient. Fall precautions reviewed with patient. Patient refuse bed alarm today. Charge nurse aware. Patient on home oxygen 2L per NC and is maintained during hospital stay. CBG well controlled during shift, not requiring prn insulin. NPO after midnight for scheduled ERIS tomorrow. [facility] cardiology consulted. Psychology consulted. Medications adjusted for withdrawal prevention. Patient complains of chronic back pain requiring PRN pain medication multiple times during shift.     Bedside shift report done with ARIANNA Ramsey. Patient included and verbalized complete understanding of the plan of care.

## 2017-10-04 NOTE — PLAN OF CARE
Continued on telemetry and fall risk monitoring. No true red alarm ectopy outside of non sustained tachycardia at times.  No SS of drug witdrawlel thus far. Exertional sob wit expiratory Wheezing present ,pox 95-96 with o 2 on , without o2 93%Hob elevated

## 2017-10-04 NOTE — PROGRESS NOTES
.Pharmacy New Medication Education    Patient accepted medication education.    Pharmacy educated patient on name and purpose of medications and possible side effects, using the teach-back method.     suboxone  oxy  Learners of pharmacy medication education included:  patient    Patient +/- learner response:  teachback

## 2017-10-05 DIAGNOSIS — E11.9 TYPE 2 DIABETES MELLITUS WITHOUT COMPLICATION, WITHOUT LONG-TERM CURRENT USE OF INSULIN: ICD-10-CM

## 2017-10-05 DIAGNOSIS — J44.0 CHRONIC OBSTRUCTIVE PULMONARY DISEASE WITH ACUTE LOWER RESPIRATORY INFECTION: ICD-10-CM

## 2017-10-05 NOTE — TELEPHONE ENCOUNTER
----- Message from Jagruti Manzano sent at 10/5/2017  3:54 PM CDT -----  Contact: self   Please send refill orders for:   albuterol-ipratropium 2.5mg-0.5mg/3mL (DUO-NEB) 0.5 mg-3 mg(2.5 mg base)/3 mL nebulizer solution  fluticasone-vilanterol (BREO) 100-25 mcg/dose diskus inhaler    metformin (GLUCOPHAGE) 500 MG tablet    Pharmacy: [facility]

## 2017-10-05 NOTE — PROGRESS NOTES
Chart reviewed, the patient examined and case discussed with the staff.  The   staff has reported that the patient is doing better.  The patient reported that   he is doing better with his withdrawal part.  He denies craving for heroin or   any other opioid preparations.  He states that his withdrawal part is   better, but he still has those feelings that he is in withdrawal.  The patient   has no tremors, nausea, vomiting, diarrhea at this time.  He  states that   he was taking Ativan at home.  His blood pressure is 147/69 with 111 pulse.  He   is on continuous oxygen due to his hypoxia and COPD.  His respiratory rate is   28 and he has normal temperature.  He took his Zyprexa 10 in the morning and   at night.  The patient states that he is not hearing voices anymore.  His CBC   is unremarkable.  He has no tremors at this time.  He requires oxygen at home   too.    ASSESSMENT AND PLAN:  1.  We will discharge this patient back to home.   2.  We will reduce Suboxone 8:2, have sublingual twice a day from today.  3.  We will initiate Lexapro 5 mg p.o. q.a.m. x2 days, then 10 mg p.o. q.a.m.   for depression and anxiety.      NATHAN/IN  dd: 10/04/2017 15:11:04 (CDT)  td: 10/04/2017 21:46:51 (CDT)  Doc ID   #[id number]  Job ID #[id number]    CC:

## 2017-10-05 NOTE — PLAN OF CARE
Patient dc'd home after hours.  Follow-up With  Details  Why  Contact Info   El Joe MD  Go on 10/10/2017  @ 1:20pm, your new primary care MD  [address] [facility]  [address] [place] [id number]  [phone]       Rx Rehab resources entered in Patient Instructions            10/05/17 0733   Final Note   Assessment Type Final Discharge Note   Discharge Disposition Home   Hospital Follow Up  Appt(s) scheduled? Yes   Right Care Referral Info   Post Acute Recommendation No Care

## 2017-10-05 NOTE — PLAN OF CARE
Discharged home with instructions and prescriptions. Refused ordered vaccines ,stated had all and is up to date IV. Access, telemetry discontinued

## 2017-10-06 LAB
BACTERIA BLD CULT: NORMAL
HCV LOG: 7.12 LOG (10) IU/ML
HCV RNA QUANT PCR: ABNORMAL IU/ML
HCV, QUALITATIVE: DETECTED IU/ML

## 2017-10-07 LAB
BACTERIA BLD CULT: NORMAL
BACTERIA BLD CULT: NORMAL

## 2017-10-09 RX ORDER — METFORMIN HYDROCHLORIDE 500 MG/1
500 TABLET ORAL 2 TIMES DAILY
Qty: 180 TABLET | Refills: 3 | Status: SHIPPED | OUTPATIENT
Start: 2017-10-09 | End: 2018-01-17

## 2017-10-09 RX ORDER — FLUTICASONE FUROATE AND VILANTEROL 100; 25 UG/1; UG/1
1 POWDER RESPIRATORY (INHALATION) DAILY
Qty: 60 EACH | Refills: 11 | Status: SHIPPED | OUTPATIENT
Start: 2017-10-09 | End: 2017-10-30

## 2017-10-09 RX ORDER — IPRATROPIUM BROMIDE AND ALBUTEROL SULFATE 2.5; .5 MG/3ML; MG/3ML
3 SOLUTION RESPIRATORY (INHALATION) EVERY 6 HOURS PRN
Qty: 120 VIAL | Refills: 1 | Status: SHIPPED | OUTPATIENT
Start: 2017-10-09 | End: 2018-03-04 | Stop reason: ALTCHOICE

## 2017-10-14 DIAGNOSIS — J44.0 CHRONIC OBSTRUCTIVE PULMONARY DISEASE WITH ACUTE LOWER RESPIRATORY INFECTION: ICD-10-CM

## 2017-10-16 DIAGNOSIS — J44.0 CHRONIC OBSTRUCTIVE PULMONARY DISEASE WITH ACUTE LOWER RESPIRATORY INFECTION: ICD-10-CM

## 2017-10-16 RX ORDER — ALBUTEROL SULFATE 90 UG/1
1-2 AEROSOL, METERED RESPIRATORY (INHALATION) EVERY 6 HOURS PRN
Qty: 1 INHALER | Refills: 3 | Status: SHIPPED | OUTPATIENT
Start: 2017-10-16 | End: 2017-10-30

## 2017-10-16 RX ORDER — ALBUTEROL SULFATE 90 UG/1
AEROSOL, METERED RESPIRATORY (INHALATION)
Qty: 18 G | Refills: 3 | OUTPATIENT
Start: 2017-10-16
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
## 2017-10-30 ENCOUNTER — HOSPITAL ENCOUNTER (EMERGENCY)
Facility: HOSPITAL | Age: 52
Discharge: HOME | End: 2017-10-30
Attending: EMERGENCY MEDICINE
Payer: MEDICAID

## 2017-10-30 VITALS
HEART RATE: 98 BPM | RESPIRATION RATE: 20 BRPM | WEIGHT: 255 LBS | DIASTOLIC BLOOD PRESSURE: 86 MMHG | SYSTOLIC BLOOD PRESSURE: 139 MMHG | TEMPERATURE: 99 F | OXYGEN SATURATION: 90 % | BODY MASS INDEX: 38.65 KG/M2 | HEIGHT: 68 IN

## 2017-10-30 DIAGNOSIS — J44.0 CHRONIC OBSTRUCTIVE PULMONARY DISEASE WITH ACUTE LOWER RESPIRATORY INFECTION: ICD-10-CM

## 2017-10-30 DIAGNOSIS — R06.02 SOB (SHORTNESS OF BREATH): ICD-10-CM

## 2017-10-30 DIAGNOSIS — J44.1 COPD EXACERBATION: Primary | ICD-10-CM

## 2017-10-30 DIAGNOSIS — F11.10 HEROIN ABUSE: ICD-10-CM

## 2017-10-30 LAB
ALBUMIN SERPL BCP-MCNC: 3.2 G/DL
ALLENS TEST: ABNORMAL
ALP SERPL-CCNC: 77 U/L
ALT SERPL W/O P-5'-P-CCNC: 32 U/L
ANION GAP SERPL CALC-SCNC: 6 MMOL/L
APTT BLDCRRT: 31.5 SEC
AST SERPL-CCNC: 43 U/L
BACTERIA #/AREA URNS HPF: NORMAL /HPF
BASOPHILS # BLD AUTO: 0.02 K/UL
BASOPHILS NFR BLD: 0.3 %
BILIRUB SERPL-MCNC: 1 MG/DL
BILIRUB UR QL STRIP: NEGATIVE
BNP SERPL-MCNC: 52 PG/ML
BUN SERPL-MCNC: 10 MG/DL
CALCIUM SERPL-MCNC: 9 MG/DL
CHLORIDE SERPL-SCNC: 100 MMOL/L
CLARITY UR: CLEAR
CO2 SERPL-SCNC: 31 MMOL/L
COLOR UR: YELLOW
CREAT SERPL-MCNC: 0.8 MG/DL
DELSYS: ABNORMAL
DIFFERENTIAL METHOD: NORMAL
EOSINOPHIL # BLD AUTO: 0.1 K/UL
EOSINOPHIL NFR BLD: 1.3 %
ERYTHROCYTE [DISTWIDTH] IN BLOOD BY AUTOMATED COUNT: 14.2 %
EST. GFR  (AFRICAN AMERICAN): >60 ML/MIN/1.73 M^2
EST. GFR  (NON AFRICAN AMERICAN): >60 ML/MIN/1.73 M^2
GLUCOSE SERPL-MCNC: 108 MG/DL
GLUCOSE UR QL STRIP: NEGATIVE
HCO3 UR-SCNC: 39.6 MMOL/L (ref 24–28)
HCT VFR BLD AUTO: 44.4 %
HGB BLD-MCNC: 14.2 G/DL
HGB UR QL STRIP: ABNORMAL
HYALINE CASTS #/AREA URNS LPF: 0 /LPF
INR PPP: 1.2
KETONES UR QL STRIP: NEGATIVE
LEUKOCYTE ESTERASE UR QL STRIP: NEGATIVE
LYMPHOCYTES # BLD AUTO: 2.2 K/UL
LYMPHOCYTES NFR BLD: 29.9 %
MCH RBC QN AUTO: 29.6 PG
MCHC RBC AUTO-ENTMCNC: 32 G/DL
MCV RBC AUTO: 93 FL
MICROSCOPIC COMMENT: NORMAL
MONOCYTES # BLD AUTO: 0.7 K/UL
MONOCYTES NFR BLD: 9.9 %
NEUTROPHILS # BLD AUTO: 4.4 K/UL
NEUTROPHILS NFR BLD: 58.5 %
NITRITE UR QL STRIP: NEGATIVE
PCO2 BLDA: 83.5 MMHG (ref 35–45)
PH SMN: 7.28 [PH] (ref 7.35–7.45)
PH UR STRIP: 6 [PH] (ref 5–8)
PLATELET # BLD AUTO: 186 K/UL
PMV BLD AUTO: 9.7 FL
PO2 BLDA: 73 MMHG (ref 80–100)
POC BE: 13 MMOL/L
POC SATURATED O2: 91 % (ref 95–100)
POC TCO2: 42 MMOL/L (ref 23–27)
POTASSIUM SERPL-SCNC: 4.3 MMOL/L
PROT SERPL-MCNC: 7.9 G/DL
PROT UR QL STRIP: ABNORMAL
PROTHROMBIN TIME: 12.8 SEC
RBC # BLD AUTO: 4.79 M/UL
RBC #/AREA URNS HPF: 3 /HPF (ref 0–4)
SAMPLE: ABNORMAL
SITE: ABNORMAL
SODIUM SERPL-SCNC: 137 MMOL/L
SP GR UR STRIP: >=1.03 (ref 1–1.03)
TROPONIN I SERPL DL<=0.01 NG/ML-MCNC: <0.006 NG/ML
URN SPEC COLLECT METH UR: ABNORMAL
UROBILINOGEN UR STRIP-ACNC: NEGATIVE EU/DL
WBC # BLD AUTO: 7.5 K/UL
WBC #/AREA URNS HPF: 1 /HPF (ref 0–5)

## 2017-10-30 PROCEDURE — 85610 PROTHROMBIN TIME: CPT

## 2017-10-30 PROCEDURE — 84484 ASSAY OF TROPONIN QUANT: CPT

## 2017-10-30 PROCEDURE — 83880 ASSAY OF NATRIURETIC PEPTIDE: CPT

## 2017-10-30 PROCEDURE — 82803 BLOOD GASES ANY COMBINATION: CPT

## 2017-10-30 PROCEDURE — 80053 COMPREHEN METABOLIC PANEL: CPT

## 2017-10-30 PROCEDURE — 99284 EMERGENCY DEPT VISIT MOD MDM: CPT | Mod: 25

## 2017-10-30 PROCEDURE — 96374 THER/PROPH/DIAG INJ IV PUSH: CPT

## 2017-10-30 PROCEDURE — 25000003 PHARM REV CODE 250: Performed by: NURSE PRACTITIONER

## 2017-10-30 PROCEDURE — 85730 THROMBOPLASTIN TIME PARTIAL: CPT

## 2017-10-30 PROCEDURE — 36600 WITHDRAWAL OF ARTERIAL BLOOD: CPT

## 2017-10-30 PROCEDURE — 63600175 PHARM REV CODE 636 W HCPCS: Performed by: EMERGENCY MEDICINE

## 2017-10-30 PROCEDURE — 96361 HYDRATE IV INFUSION ADD-ON: CPT

## 2017-10-30 PROCEDURE — 25000242 PHARM REV CODE 250 ALT 637 W/ HCPCS: Performed by: NURSE PRACTITIONER

## 2017-10-30 PROCEDURE — 85025 COMPLETE CBC W/AUTO DIFF WBC: CPT

## 2017-10-30 PROCEDURE — 93005 ELECTROCARDIOGRAM TRACING: CPT

## 2017-10-30 PROCEDURE — 94644 CONT INHLJ TX 1ST HOUR: CPT

## 2017-10-30 PROCEDURE — 81000 URINALYSIS NONAUTO W/SCOPE: CPT

## 2017-10-30 RX ORDER — FLUTICASONE FUROATE AND VILANTEROL 100; 25 UG/1; UG/1
1 POWDER RESPIRATORY (INHALATION) DAILY
Qty: 60 EACH | Refills: 6 | Status: SHIPPED | OUTPATIENT
Start: 2017-10-30 | End: 2017-12-13 | Stop reason: SDUPTHER

## 2017-10-30 RX ORDER — ALBUTEROL SULFATE 90 UG/1
1-2 AEROSOL, METERED RESPIRATORY (INHALATION) EVERY 6 HOURS PRN
Qty: 1 INHALER | Refills: 6 | Status: SHIPPED | OUTPATIENT
Start: 2017-10-30 | End: 2018-01-17

## 2017-10-30 RX ORDER — ALBUTEROL SULFATE 2.5 MG/.5ML
2.5 SOLUTION RESPIRATORY (INHALATION) EVERY 4 HOURS PRN
Qty: 25 EACH | Refills: 6 | Status: SHIPPED | OUTPATIENT
Start: 2017-10-30 | End: 2018-03-04 | Stop reason: ALTCHOICE

## 2017-10-30 RX ORDER — METHYLPREDNISOLONE SOD SUCC 125 MG
125 VIAL (EA) INJECTION
Status: COMPLETED | OUTPATIENT
Start: 2017-10-30 | End: 2017-10-30

## 2017-10-30 RX ORDER — ALBUTEROL SULFATE 0.83 MG/ML
15 SOLUTION RESPIRATORY (INHALATION) ONCE
Status: COMPLETED | OUTPATIENT
Start: 2017-10-30 | End: 2017-10-30

## 2017-10-30 RX ADMIN — ALBUTEROL SULFATE 15 MG: 2.5 SOLUTION RESPIRATORY (INHALATION) at 12:10

## 2017-10-30 RX ADMIN — SODIUM CHLORIDE 1000 ML: 0.9 INJECTION, SOLUTION INTRAVENOUS at 12:10

## 2017-10-30 RX ADMIN — METHYLPREDNISOLONE SODIUM SUCCINATE 125 MG: 125 INJECTION, POWDER, FOR SOLUTION INTRAMUSCULAR; INTRAVENOUS at 01:10
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
## 2017-10-30 NOTE — CONSULTS
Discharge pt to home.  Continue Zyprexa as per order.  Pt will contact [facility] person for detox and rehab after father's burial services.

## 2017-10-30 NOTE — ED NOTES
Pt presents to ED w/ c/o asthma attack. Pt reports he is SOB and lost his inhaler and that the attack began last night.
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
## 2017-12-01 ENCOUNTER — HOSPITAL ENCOUNTER (INPATIENT)
Facility: HOSPITAL | Age: 52
LOS: 1 days | Discharge: LEFT BEFORE DISCHARGE | DRG: 190 | End: 2017-12-02
Attending: EMERGENCY MEDICINE | Admitting: FAMILY MEDICINE
Payer: MEDICAID

## 2017-12-01 DIAGNOSIS — J45.901 EXACERBATION OF ASTHMA, UNSPECIFIED ASTHMA SEVERITY, UNSPECIFIED WHETHER PERSISTENT: Primary | ICD-10-CM

## 2017-12-01 DIAGNOSIS — R06.02 SHORTNESS OF BREATH: ICD-10-CM

## 2017-12-01 DIAGNOSIS — Z95.828 S/P PICC CENTRAL LINE PLACEMENT: ICD-10-CM

## 2017-12-01 DIAGNOSIS — J96.02 ACUTE RESPIRATORY FAILURE WITH HYPERCAPNIA: ICD-10-CM

## 2017-12-01 DIAGNOSIS — F11.93 OPIOID WITHDRAWAL: ICD-10-CM

## 2017-12-01 DIAGNOSIS — R09.02 HYPOXIA: ICD-10-CM

## 2017-12-01 DIAGNOSIS — G89.29 OTHER CHRONIC PAIN: ICD-10-CM

## 2017-12-01 LAB
ALBUMIN SERPL BCP-MCNC: 3.4 G/DL
ALLENS TEST: ABNORMAL
ALP SERPL-CCNC: 72 U/L
ALT SERPL W/O P-5'-P-CCNC: 30 U/L
AMPHET+METHAMPHET UR QL: NEGATIVE
ANION GAP SERPL CALC-SCNC: 10 MMOL/L
APTT BLDCRRT: 29.8 SEC
AST SERPL-CCNC: 39 U/L
BARBITURATES UR QL SCN>200 NG/ML: NEGATIVE
BASOPHILS # BLD AUTO: 0.01 K/UL
BASOPHILS NFR BLD: 0.1 %
BENZODIAZ UR QL SCN>200 NG/ML: NORMAL
BILIRUB SERPL-MCNC: 1.5 MG/DL
BNP SERPL-MCNC: 69 PG/ML
BUN SERPL-MCNC: 24 MG/DL
BZE UR QL SCN: NORMAL
CALCIUM SERPL-MCNC: 9.1 MG/DL
CANNABINOIDS UR QL SCN: NEGATIVE
CHLORIDE SERPL-SCNC: 103 MMOL/L
CO2 SERPL-SCNC: 26 MMOL/L
CREAT SERPL-MCNC: 1.2 MG/DL
CREAT UR-MCNC: 100.7 MG/DL
DIFFERENTIAL METHOD: ABNORMAL
EOSINOPHIL # BLD AUTO: 0.1 K/UL
EOSINOPHIL NFR BLD: 0.9 %
ERYTHROCYTE [DISTWIDTH] IN BLOOD BY AUTOMATED COUNT: 13.9 %
EST. GFR  (AFRICAN AMERICAN): >60 ML/MIN/1.73 M^2
EST. GFR  (NON AFRICAN AMERICAN): >60 ML/MIN/1.73 M^2
GLUCOSE SERPL-MCNC: 107 MG/DL
HCO3 UR-SCNC: 35.3 MMOL/L (ref 24–28)
HCT VFR BLD AUTO: 50.8 %
HGB BLD-MCNC: 16.2 G/DL
INR PPP: 1
LACTATE SERPL-SCNC: 0.7 MMOL/L
LYMPHOCYTES # BLD AUTO: 3.3 K/UL
LYMPHOCYTES NFR BLD: 41.4 %
MCH RBC QN AUTO: 29.2 PG
MCHC RBC AUTO-ENTMCNC: 31.9 G/DL
MCV RBC AUTO: 92 FL
METHADONE UR QL SCN>300 NG/ML: NEGATIVE
MONOCYTES # BLD AUTO: 0.7 K/UL
MONOCYTES NFR BLD: 8.6 %
NEUTROPHILS # BLD AUTO: 3.9 K/UL
NEUTROPHILS NFR BLD: 48.9 %
OPIATES UR QL SCN: NORMAL
PCO2 BLDA: 68.7 MMHG (ref 35–45)
PCP UR QL SCN>25 NG/ML: NEGATIVE
PH SMN: 7.32 [PH] (ref 7.35–7.45)
PLATELET # BLD AUTO: 152 K/UL
PMV BLD AUTO: 10.4 FL
PO2 BLDA: 55 MMHG (ref 80–100)
POC BE: 9 MMOL/L
POC SATURATED O2: 84 % (ref 95–100)
POC TCO2: 37 MMOL/L (ref 23–27)
POCT GLUCOSE: 105 MG/DL (ref 70–110)
POCT GLUCOSE: 142 MG/DL (ref 70–110)
POCT GLUCOSE: 62 MG/DL (ref 70–110)
POCT GLUCOSE: 99 MG/DL (ref 70–110)
POTASSIUM SERPL-SCNC: 5.4 MMOL/L
PROT SERPL-MCNC: 8 G/DL
PROTHROMBIN TIME: 10.2 SEC
RBC # BLD AUTO: 5.55 M/UL
SAMPLE: ABNORMAL
SITE: ABNORMAL
SODIUM SERPL-SCNC: 139 MMOL/L
TOXICOLOGY INFORMATION: NORMAL
TROPONIN I SERPL DL<=0.01 NG/ML-MCNC: 0.03 NG/ML
WBC # BLD AUTO: 7.9 K/UL

## 2017-12-01 PROCEDURE — 94660 CPAP INITIATION&MGMT: CPT

## 2017-12-01 PROCEDURE — 93010 ELECTROCARDIOGRAM REPORT: CPT | Mod: ,,, | Performed by: INTERNAL MEDICINE

## 2017-12-01 PROCEDURE — 85610 PROTHROMBIN TIME: CPT

## 2017-12-01 PROCEDURE — 96375 TX/PRO/DX INJ NEW DRUG ADDON: CPT

## 2017-12-01 PROCEDURE — 83880 ASSAY OF NATRIURETIC PEPTIDE: CPT

## 2017-12-01 PROCEDURE — 94640 AIRWAY INHALATION TREATMENT: CPT

## 2017-12-01 PROCEDURE — 12000002 HC ACUTE/MED SURGE SEMI-PRIVATE ROOM

## 2017-12-01 PROCEDURE — 82803 BLOOD GASES ANY COMBINATION: CPT

## 2017-12-01 PROCEDURE — 84145 PROCALCITONIN (PCT): CPT

## 2017-12-01 PROCEDURE — 96366 THER/PROPH/DIAG IV INF ADDON: CPT

## 2017-12-01 PROCEDURE — 25000242 PHARM REV CODE 250 ALT 637 W/ HCPCS: Performed by: EMERGENCY MEDICINE

## 2017-12-01 PROCEDURE — 93005 ELECTROCARDIOGRAM TRACING: CPT

## 2017-12-01 PROCEDURE — 94644 CONT INHLJ TX 1ST HOUR: CPT

## 2017-12-01 PROCEDURE — 25000242 PHARM REV CODE 250 ALT 637 W/ HCPCS

## 2017-12-01 PROCEDURE — 85025 COMPLETE CBC W/AUTO DIFF WBC: CPT

## 2017-12-01 PROCEDURE — 27000190 HC CPAP FULL FACE MASK W/VALVE

## 2017-12-01 PROCEDURE — 63600175 PHARM REV CODE 636 W HCPCS: Performed by: EMERGENCY MEDICINE

## 2017-12-01 PROCEDURE — 87040 BLOOD CULTURE FOR BACTERIA: CPT | Mod: 59

## 2017-12-01 PROCEDURE — 36600 WITHDRAWAL OF ARTERIAL BLOOD: CPT

## 2017-12-01 PROCEDURE — 82962 GLUCOSE BLOOD TEST: CPT

## 2017-12-01 PROCEDURE — 63600175 PHARM REV CODE 636 W HCPCS

## 2017-12-01 PROCEDURE — 96365 THER/PROPH/DIAG IV INF INIT: CPT

## 2017-12-01 PROCEDURE — 96376 TX/PRO/DX INJ SAME DRUG ADON: CPT

## 2017-12-01 PROCEDURE — 80307 DRUG TEST PRSMV CHEM ANLYZR: CPT

## 2017-12-01 PROCEDURE — 80053 COMPREHEN METABOLIC PANEL: CPT

## 2017-12-01 PROCEDURE — 85730 THROMBOPLASTIN TIME PARTIAL: CPT

## 2017-12-01 PROCEDURE — 84484 ASSAY OF TROPONIN QUANT: CPT

## 2017-12-01 PROCEDURE — 25000003 PHARM REV CODE 250: Performed by: EMERGENCY MEDICINE

## 2017-12-01 PROCEDURE — 83605 ASSAY OF LACTIC ACID: CPT

## 2017-12-01 PROCEDURE — 96367 TX/PROPH/DG ADDL SEQ IV INF: CPT

## 2017-12-01 PROCEDURE — 99291 CRITICAL CARE FIRST HOUR: CPT | Mod: 25

## 2017-12-01 RX ORDER — METHYLPREDNISOLONE SOD SUCC 125 MG
125 VIAL (EA) INJECTION
Status: COMPLETED | OUTPATIENT
Start: 2017-12-01 | End: 2017-12-01

## 2017-12-01 RX ORDER — NALOXONE HCL 0.4 MG/ML
0.4 VIAL (ML) INJECTION ONCE
Status: COMPLETED | OUTPATIENT
Start: 2017-12-01 | End: 2017-12-01

## 2017-12-01 RX ORDER — DEXTROSE 50 % IN WATER (D50W) INTRAVENOUS SYRINGE
25
Status: COMPLETED | OUTPATIENT
Start: 2017-12-01 | End: 2017-12-01

## 2017-12-01 RX ORDER — IPRATROPIUM BROMIDE AND ALBUTEROL SULFATE 2.5; .5 MG/3ML; MG/3ML
3 SOLUTION RESPIRATORY (INHALATION) EVERY 6 HOURS
Status: DISCONTINUED | OUTPATIENT
Start: 2017-12-01 | End: 2017-12-02 | Stop reason: HOSPADM

## 2017-12-01 RX ORDER — NALOXONE HCL 0.4 MG/ML
2 VIAL (ML) INJECTION
Status: COMPLETED | OUTPATIENT
Start: 2017-12-01 | End: 2017-12-01

## 2017-12-01 RX ORDER — SODIUM CHLORIDE 9 MG/ML
INJECTION, SOLUTION INTRAVENOUS CONTINUOUS
Status: ACTIVE | OUTPATIENT
Start: 2017-12-02 | End: 2017-12-02

## 2017-12-01 RX ORDER — IPRATROPIUM BROMIDE AND ALBUTEROL SULFATE 2.5; .5 MG/3ML; MG/3ML
3 SOLUTION RESPIRATORY (INHALATION)
Status: COMPLETED | OUTPATIENT
Start: 2017-12-01 | End: 2017-12-01

## 2017-12-01 RX ORDER — ONDANSETRON 2 MG/ML
INJECTION INTRAMUSCULAR; INTRAVENOUS
Status: COMPLETED
Start: 2017-12-01 | End: 2017-12-01

## 2017-12-01 RX ORDER — ALBUTEROL SULFATE 0.83 MG/ML
10 SOLUTION RESPIRATORY (INHALATION)
Status: COMPLETED | OUTPATIENT
Start: 2017-12-01 | End: 2017-12-01

## 2017-12-01 RX ORDER — NALOXONE HCL 0.4 MG/ML
VIAL (ML) INJECTION
Status: COMPLETED
Start: 2017-12-01 | End: 2017-12-01

## 2017-12-01 RX ADMIN — IPRATROPIUM BROMIDE AND ALBUTEROL SULFATE 3 ML: .5; 3 SOLUTION RESPIRATORY (INHALATION) at 06:12

## 2017-12-01 RX ADMIN — NALOXONE HYDROCHLORIDE 0.4 MG: 0.4 INJECTION, SOLUTION INTRAMUSCULAR; INTRAVENOUS; SUBCUTANEOUS at 08:12

## 2017-12-01 RX ADMIN — METHYLPREDNISOLONE SODIUM SUCCINATE 125 MG: 125 INJECTION, POWDER, FOR SOLUTION INTRAMUSCULAR; INTRAVENOUS at 05:12

## 2017-12-01 RX ADMIN — ONDANSETRON 8 MG: 2 INJECTION INTRAMUSCULAR; INTRAVENOUS at 05:12

## 2017-12-01 RX ADMIN — ALBUTEROL SULFATE 10 MG: 2.5 SOLUTION RESPIRATORY (INHALATION) at 05:12

## 2017-12-01 RX ADMIN — INSULIN HUMAN 10 UNITS: 100 INJECTION, SOLUTION PARENTERAL at 05:12

## 2017-12-01 RX ADMIN — NALOXONE HYDROCHLORIDE 2 MG: 0.4 INJECTION, SOLUTION INTRAMUSCULAR; INTRAVENOUS; SUBCUTANEOUS at 05:12

## 2017-12-01 RX ADMIN — DEXTROSE MONOHYDRATE 25 G: 25 INJECTION, SOLUTION INTRAVENOUS at 05:12

## 2017-12-01 RX ADMIN — CEFTRIAXONE 2 G: 2 INJECTION, SOLUTION INTRAVENOUS at 10:12

## 2017-12-01 RX ADMIN — CALCIUM GLUCONATE 1 G: 94 INJECTION, SOLUTION INTRAVENOUS at 07:12

## 2017-12-01 RX ADMIN — IPRATROPIUM BROMIDE AND ALBUTEROL SULFATE 3 ML: .5; 3 SOLUTION RESPIRATORY (INHALATION) at 10:12
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
## 2017-12-01 NOTE — ED NOTES
"Pt awake and alert. States "oh no, Im withdrawing. im hurting so bad" MD notified. Respiratory and xray at BS  "

## 2017-12-01 NOTE — PROGRESS NOTES
"Pt threw up in BiPAP mask, taken off, after three times, placed on mask breathing tx. Via oxygen.  D/C'ed "BiPAP, sats = 96% on room air while throwing up.  "

## 2017-12-02 VITALS
WEIGHT: 255.75 LBS | BODY MASS INDEX: 41.1 KG/M2 | TEMPERATURE: 98 F | DIASTOLIC BLOOD PRESSURE: 73 MMHG | HEIGHT: 66 IN | HEART RATE: 109 BPM | OXYGEN SATURATION: 95 % | SYSTOLIC BLOOD PRESSURE: 153 MMHG | RESPIRATION RATE: 28 BRPM

## 2017-12-02 LAB
ALBUMIN SERPL BCP-MCNC: 2.8 G/DL
ALP SERPL-CCNC: 57 U/L
ALT SERPL W/O P-5'-P-CCNC: 25 U/L
ANION GAP SERPL CALC-SCNC: 3 MMOL/L
AST SERPL-CCNC: 26 U/L
BASOPHILS # BLD AUTO: 0 K/UL
BASOPHILS NFR BLD: 0 %
BILIRUB SERPL-MCNC: 0.8 MG/DL
BUN SERPL-MCNC: 22 MG/DL
CALCIUM SERPL-MCNC: 8.5 MG/DL
CHLORIDE SERPL-SCNC: 101 MMOL/L
CO2 SERPL-SCNC: 35 MMOL/L
CREAT SERPL-MCNC: 1 MG/DL
DIFFERENTIAL METHOD: ABNORMAL
EOSINOPHIL # BLD AUTO: 0 K/UL
EOSINOPHIL NFR BLD: 0 %
ERYTHROCYTE [DISTWIDTH] IN BLOOD BY AUTOMATED COUNT: 13.8 %
EST. GFR  (AFRICAN AMERICAN): >60 ML/MIN/1.73 M^2
EST. GFR  (NON AFRICAN AMERICAN): >60 ML/MIN/1.73 M^2
GLUCOSE SERPL-MCNC: 121 MG/DL
HCT VFR BLD AUTO: 39.9 %
HGB BLD-MCNC: 12.6 G/DL
LYMPHOCYTES # BLD AUTO: 1.1 K/UL
LYMPHOCYTES NFR BLD: 17.1 %
MAGNESIUM SERPL-MCNC: 1.9 MG/DL
MCH RBC QN AUTO: 29.3 PG
MCHC RBC AUTO-ENTMCNC: 31.6 G/DL
MCV RBC AUTO: 93 FL
MONOCYTES # BLD AUTO: 0.3 K/UL
MONOCYTES NFR BLD: 3.9 %
NEUTROPHILS # BLD AUTO: 5 K/UL
NEUTROPHILS NFR BLD: 78.7 %
PHOSPHATE SERPL-MCNC: 4.3 MG/DL
PLATELET # BLD AUTO: 164 K/UL
PMV BLD AUTO: 10.2 FL
POTASSIUM SERPL-SCNC: 5.2 MMOL/L
PROCALCITONIN SERPL IA-MCNC: 0.13 NG/ML
PROT SERPL-MCNC: 6.9 G/DL
RBC # BLD AUTO: 4.3 M/UL
SODIUM SERPL-SCNC: 139 MMOL/L
WBC # BLD AUTO: 6.33 K/UL

## 2017-12-02 PROCEDURE — 25000003 PHARM REV CODE 250

## 2017-12-02 PROCEDURE — 63600175 PHARM REV CODE 636 W HCPCS

## 2017-12-02 PROCEDURE — 02HV33Z INSERTION OF INFUSION DEVICE INTO SUPERIOR VENA CAVA, PERCUTANEOUS APPROACH: ICD-10-PCS | Performed by: RADIOLOGY

## 2017-12-02 PROCEDURE — 94640 AIRWAY INHALATION TREATMENT: CPT

## 2017-12-02 PROCEDURE — 27000221 HC OXYGEN, UP TO 24 HOURS

## 2017-12-02 PROCEDURE — 80053 COMPREHEN METABOLIC PANEL: CPT

## 2017-12-02 PROCEDURE — 25000003 PHARM REV CODE 250: Performed by: FAMILY MEDICINE

## 2017-12-02 PROCEDURE — 83735 ASSAY OF MAGNESIUM: CPT

## 2017-12-02 PROCEDURE — S4991 NICOTINE PATCH NONLEGEND: HCPCS

## 2017-12-02 PROCEDURE — 85025 COMPLETE CBC W/AUTO DIFF WBC: CPT

## 2017-12-02 PROCEDURE — 84100 ASSAY OF PHOSPHORUS: CPT

## 2017-12-02 PROCEDURE — 63600175 PHARM REV CODE 636 W HCPCS: Performed by: FAMILY MEDICINE

## 2017-12-02 PROCEDURE — 25000242 PHARM REV CODE 250 ALT 637 W/ HCPCS

## 2017-12-02 RX ORDER — FLUTICASONE FUROATE AND VILANTEROL 100; 25 UG/1; UG/1
1 POWDER RESPIRATORY (INHALATION) DAILY
Status: DISCONTINUED | OUTPATIENT
Start: 2017-12-02 | End: 2017-12-02 | Stop reason: HOSPADM

## 2017-12-02 RX ORDER — GLUCAGON 1 MG
1 KIT INJECTION
Status: DISCONTINUED | OUTPATIENT
Start: 2017-12-02 | End: 2017-12-02 | Stop reason: HOSPADM

## 2017-12-02 RX ORDER — OLANZAPINE 2.5 MG/1
10 TABLET ORAL 2 TIMES DAILY
Status: DISCONTINUED | OUTPATIENT
Start: 2017-12-02 | End: 2017-12-02 | Stop reason: HOSPADM

## 2017-12-02 RX ORDER — NALOXONE HCL 0.4 MG/ML
0.4 VIAL (ML) INJECTION
Status: DISCONTINUED | OUTPATIENT
Start: 2017-12-02 | End: 2017-12-02 | Stop reason: HOSPADM

## 2017-12-02 RX ORDER — TRAMADOL HYDROCHLORIDE AND ACETAMINOPHEN 37.5; 325 MG/1; MG/1
1 TABLET, FILM COATED ORAL EVERY 6 HOURS PRN
Status: DISCONTINUED | OUTPATIENT
Start: 2017-12-02 | End: 2017-12-02 | Stop reason: HOSPADM

## 2017-12-02 RX ORDER — PREDNISONE 20 MG/1
40 TABLET ORAL DAILY
Status: DISCONTINUED | OUTPATIENT
Start: 2017-12-02 | End: 2017-12-02 | Stop reason: HOSPADM

## 2017-12-02 RX ORDER — SODIUM CHLORIDE 0.9 % (FLUSH) 0.9 %
5 SYRINGE (ML) INJECTION
Status: DISCONTINUED | OUTPATIENT
Start: 2017-12-02 | End: 2017-12-02 | Stop reason: HOSPADM

## 2017-12-02 RX ORDER — IBUPROFEN 200 MG
16 TABLET ORAL
Status: DISCONTINUED | OUTPATIENT
Start: 2017-12-02 | End: 2017-12-02 | Stop reason: HOSPADM

## 2017-12-02 RX ORDER — INSULIN ASPART 100 [IU]/ML
1-10 INJECTION, SOLUTION INTRAVENOUS; SUBCUTANEOUS
Status: DISCONTINUED | OUTPATIENT
Start: 2017-12-02 | End: 2017-12-02 | Stop reason: HOSPADM

## 2017-12-02 RX ORDER — ESCITALOPRAM OXALATE 10 MG/1
10 TABLET ORAL DAILY
Status: DISCONTINUED | OUTPATIENT
Start: 2017-12-02 | End: 2017-12-02 | Stop reason: HOSPADM

## 2017-12-02 RX ORDER — CLONIDINE HYDROCHLORIDE 0.3 MG/1
0.3 TABLET ORAL 3 TIMES DAILY
Status: DISCONTINUED | OUTPATIENT
Start: 2017-12-02 | End: 2017-12-02 | Stop reason: HOSPADM

## 2017-12-02 RX ORDER — IBUPROFEN 200 MG
1 TABLET ORAL DAILY
Status: DISCONTINUED | OUTPATIENT
Start: 2017-12-02 | End: 2017-12-02 | Stop reason: HOSPADM

## 2017-12-02 RX ORDER — IBUPROFEN 200 MG
24 TABLET ORAL
Status: DISCONTINUED | OUTPATIENT
Start: 2017-12-02 | End: 2017-12-02 | Stop reason: HOSPADM

## 2017-12-02 RX ORDER — ALBUTEROL SULFATE 0.83 MG/ML
10 SOLUTION RESPIRATORY (INHALATION) ONCE
Status: DISCONTINUED | OUTPATIENT
Start: 2017-12-02 | End: 2017-12-02

## 2017-12-02 RX ORDER — ALBUTEROL SULFATE 0.83 MG/ML
10 SOLUTION RESPIRATORY (INHALATION) ONCE
Status: COMPLETED | OUTPATIENT
Start: 2017-12-02 | End: 2017-12-02

## 2017-12-02 RX ORDER — AMLODIPINE BESYLATE 5 MG/1
10 TABLET ORAL DAILY
Status: DISCONTINUED | OUTPATIENT
Start: 2017-12-02 | End: 2017-12-02 | Stop reason: HOSPADM

## 2017-12-02 RX ADMIN — ESCITALOPRAM 10 MG: 10 TABLET, FILM COATED ORAL at 08:12

## 2017-12-02 RX ADMIN — IPRATROPIUM BROMIDE AND ALBUTEROL SULFATE 3 ML: .5; 3 SOLUTION RESPIRATORY (INHALATION) at 07:12

## 2017-12-02 RX ADMIN — DEXTROSE MONOHYDRATE 1500 MG: 5 INJECTION, SOLUTION INTRAVENOUS at 04:12

## 2017-12-02 RX ADMIN — PREDNISONE 40 MG: 20 TABLET ORAL at 08:12

## 2017-12-02 RX ADMIN — OLANZAPINE 10 MG: 10 TABLET, FILM COATED ORAL at 12:12

## 2017-12-02 RX ADMIN — OLANZAPINE 10 MG: 10 TABLET, FILM COATED ORAL at 08:12

## 2017-12-02 RX ADMIN — ALBUTEROL SULFATE 10 MG: 2.5 SOLUTION RESPIRATORY (INHALATION) at 11:12

## 2017-12-02 RX ADMIN — DEXTROSE MONOHYDRATE 1500 MG: 5 INJECTION, SOLUTION INTRAVENOUS at 06:12

## 2017-12-02 RX ADMIN — SODIUM CHLORIDE: 0.9 INJECTION, SOLUTION INTRAVENOUS at 12:12

## 2017-12-02 RX ADMIN — VANCOMYCIN HYDROCHLORIDE 1750 MG: 100 INJECTION, POWDER, LYOPHILIZED, FOR SOLUTION INTRAVENOUS at 12:12

## 2017-12-02 RX ADMIN — FLUTICASONE FUROATE AND VILANTEROL TRIFENATATE 1 PUFF: 100; 25 POWDER RESPIRATORY (INHALATION) at 07:12

## 2017-12-02 RX ADMIN — TRAMADOL HYDROCHLORIDE AND ACETAMINOPHEN 1 TABLET: 37.5; 325 TABLET, FILM COATED ORAL at 02:12

## 2017-12-02 RX ADMIN — AMLODIPINE BESYLATE 10 MG: 5 TABLET ORAL at 08:12

## 2017-12-02 RX ADMIN — NICOTINE 1 PATCH: 14 PATCH, EXTENDED RELEASE TRANSDERMAL at 08:12

## 2017-12-02 NOTE — ED NOTES
Pt given 3 orange juices for blood glucose=62. Pt is alert with clear speech,resp are regular and unlabored,on 2LncO2 sat=95%,requesting pain med and food.

## 2017-12-02 NOTE — PROGRESS NOTES
Progress Note  [facility]  Admit Date: 12/1/2017   LOS: 1 day   SUBJECTIVE:   Follow-up For: COPD exacerbation     Patient seen and examined this AM.  On 3L nasal canula.  In NAD, is asking for pain medications.  Utox positive for coccaine, benzos and opioids.      ROS  Denies chest pain, fever, chills,n/v/d  OBJECTIVE:   Vital Signs (Most Recent)  Temp: 98 °F (36.7 °C) (12/02/17 0715)  Pulse: 82 (12/02/17 0800)  Resp: 19 (12/02/17 0800)  BP: 116/69 (12/02/17 0800)  SpO2: (!) 93 % (12/02/17 0800)    I & O (Last 24H):  Intake/Output Summary (Last 24 hours) at 12/02/17 0903  Last data filed at 12/02/17 0630   Gross per 24 hour   Intake              460 ml   Output             1200 ml   Net             -740 ml     Wt Readings from Last 3 Encounters:   12/02/17 116 kg (255 lb 11.7 oz)   10/30/17 115.7 kg (255 lb)   10/01/17 113.4 kg (250 lb 1.6 oz)       Current Diet Order   Procedures    Diet NPO        Physical Exam    GEN: NAD  HEENT: MOM, PERRLA  CV:RRR, no murmur  PULM: minimal wheezing   GI: s/nt/nd  EXT: upper extremities with multiple IV track marks, no abscess or discharge appreciated  Laboratory Data:  CBC    Recent Labs  Lab 12/01/17  1741 12/02/17  0603   WBC 7.90 6.33   RBC 5.55 4.30*   HGB 16.2 12.6*   HCT 50.8 39.9*    164   MCV 92 93   MCH 29.2 29.3   MCHC 31.9* 31.6*     CMP    Recent Labs  Lab 12/01/17  1700 12/02/17  0603   CALCIUM 9.1 8.5*   PROT 8.0 6.9    139   K 5.4* 5.2*   CO2 26 35*    101   BUN 24* 22*   CREATININE 1.2 1.0   ALKPHOS 72 57   ALT 30 25   AST 39 26   BILITOT 1.5* 0.8     POCT-Glucose  POCT Glucose   Date Value Ref Range Status   12/01/2017 142 (H) 70 - 110 mg/dL Final   12/01/2017 105 70 - 110 mg/dL Final   12/01/2017 62 (L) 70 - 110 mg/dL Final   12/01/2017 99 70 - 110 mg/dL Final     COAGS    Recent Labs  Lab 12/01/17  2130   INR 1.0   APTT 29.8     UA  No results for input(s): COLORU, CLARITYU, SPECGRAV, PHUR, PROTEINUA, GLUCOSEU, BLOODU, WBCU,  RBCU, BACTERIA, MUCUS in the last 24 hours.    Invalid input(s):  BILIRUBINCON  MICRO  Microbiology Results (last 7 days)     Procedure Component Value Units Date/Time    Blood culture [[id number]] Collected:  12/01/17 2122    Order Status:  Sent Specimen:  Blood from Peripheral, Antecubital, Right Updated:  12/02/17 0140    Blood culture [[id number]] Collected:  12/01/17 2122    Order Status:  Sent Specimen:  Blood from Peripheral, Antecubital, Left Updated:  12/02/17 0140    Culture, Respiratory with Gram Stain [[id number]]     Order Status:  No result Specimen:  Respiratory         LIPID PANEL  No results found for: CHOL  No results found for: HDL  No results found for: LDLCALC  No results found for: TRIG  No results found for: CHOLHDL    Diagnostic Results:  Imaging in last 24 hours:    ASSESSMENT/PLAN:     51 y/o M with a PMH of IV drug use, asthma/COPD, Hep C, HTN, anxiety presents with SOB found in resp distress, then became lethargic and wake up with narcan is admitted for a COPD exacerbation with polysubstance abuse.      Plan: admit to ICU     Neuro  -AAO X 3 initially  -became lethargic and was given narcan X 1 and woke up  -after narcan X 2, patient had symptoms of withdrawal  -restarted clonidine and will monitor for withdrawal symptoms   -no focal neurological deficits on PE  -Utox positive for benzodiazepines, coccaine and opioids     CV  hemodynamically stable      PULM  -improved lung exam from yesterday  -first was tachypnic, then became lethargic and RR decreased in the 12's, and responded to Narcan  -at home on 2 L nasal canula   -will monitor resp rate  -will treat as COPD exacerbation  -duonebs q 6 hrs   -prednisone 40 mg qd  -peak flow pending      GI/FEN  -electrolytes replaced      ID  -IV drug user  -Initially met SIRS criteria with tachycardia, tachypnea  -no WBC, lactic acid WNL and Procal WNL  -started daylin blood cultures and started abx with concern for endocarditis due to IV drug use  -if  remains afebrile X 24 hrs and no SIRS criteria, will d/c abx     PPx: SCD     Dispo: monitor resp rate and withdrawal symptoms, restarted clonidine, f/u blood bcx, d/c abx if afebrile x 24 hrs.  Step down today and possible d/c abx      Case discussed with staff        12/2/2017 Benny Roblero MD  9:03 AM

## 2017-12-02 NOTE — PLAN OF CARE
Problem: Patient Care Overview  Goal: Plan of Care Review  Outcome: Ongoing (interventions implemented as appropriate)  Patient on oxygen with documented flow.  Will attempt to wean per O2 order protocol. 3L NC sats 96%, Will continue to monitor.

## 2017-12-02 NOTE — PROCEDURES
"Ming Harrington is a 52 y.o. male patient.    Temp: 98.9 °F (37.2 °C) (12/01/17 2235)  Pulse: 91 (12/01/17 2235)  Resp: 20 (12/01/17 2235)  BP: (!) 147/87 (12/01/17 2220)  SpO2: 98 % (12/01/17 2235)  Weight: 114.8 kg (253 lb) (12/01/17 1648)  Height: 5' 8" (172.7 cm) (12/01/17 1648)    PICC  Date/Time: 12/1/2017 11:42 PM  Performed by: GWEN ASTORGA  Consent Done: Yes  Time out: Immediately prior to procedure a time out was called to verify the correct patient, procedure, equipment, support staff and site/side marked as required  Indications: med administration and vascular access  Anesthesia: local infiltration  Local anesthetic: lidocaine 1% without epinephrine  Anesthetic Total (mL): 5  Preparation: skin prepped with ChloraPrep  Skin prep agent dried: skin prep agent completely dried prior to procedure  Sterile barriers: all five maximum sterile barriers used - cap, mask, sterile gown, sterile gloves, and large sterile sheet  Hand hygiene: hand hygiene performed prior to central venous catheter insertion  Location details: right brachial  Catheter type: double lumen  Catheter size: 5 Fr  Catheter Length: 40cm    Ultrasound guidance: yes  Vessel Caliber: medium, compressibility normal  Needle advanced into vessel with real time Ultrasound guidance.  Guidewire confirmed in vessel.  Sterile sheath used.  Number of attempts: 1  Post-procedure: blood return through all ports and sterile dressing applied  Estimated blood loss (mL): 1    Assessment: placement verified by x-ray, no pneumothorax on x-ray, tip termination and successful placement  Complications: none  Comments: WAIT FOR RAD REPORT BEFORE USING        Gwen Astorga  12/1/2017  "

## 2017-12-02 NOTE — ED NOTES
Pt awake and alert following commands. resp are regular and unlabored. Skin WDL. 2LncO2 in use. C/o pain-notified MD. Instructed on urine collection.

## 2017-12-02 NOTE — ED NOTES
Unable to infuse vancomycin at this time due to PICC RN starting PICC line in arm where IV is located. Consent for PICC line at bedside. Will continue to monitor closely.

## 2017-12-02 NOTE — ED NOTES
Waiting on approval to use PICC line. Will notify admit team as soon as xray results. Holding Vanc infusion at this time. Will continue to monitor closely.

## 2017-12-02 NOTE — ED NOTES
Notified D of orange juice treating for blood glusoe=62. MD instrucetd that he tolerates juice,ok for judy, notified MD of continued request for pain med.
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
## 2017-12-02 NOTE — ED NOTES
notifeid admit MD Pauline of possible need for more narcan-pt blood glucose=105, RR is regular and unlabored and even with good depth however pt with lethargy and requries painful stimuli to wake up.once he is awake, he is oriented x3 and follows commands.

## 2017-12-02 NOTE — ED NOTES
Family medicine doctor at bedside to reassess patient. Patient belongings placed in bag and dentures labeled and placed in belongings bag at bedside. Patient repositioned. Patient in gown and on cardiac monitor. Patient awake and talking to this nurse. Requesting food and something to drink. Will continue to monitor closely.

## 2017-12-03 NOTE — PLAN OF CARE
12/02/17 1405   Discharge Assessment   Assessment Type Discharge Planning Assessment   Confirmed/corrected address and phone number on facesheet? Yes   Assessment information obtained from? Patient   Prior to hospitilization cognitive status: Alert/Oriented   Prior to hospitalization functional status: Independent   Current cognitive status: Alert/Oriented   Current Functional Status: Independent   Facility Arrived From: home   Lives With sibling(s)   Able to Return to Prior Arrangements yes   Is patient able to care for self after discharge? Yes   Who are your caregiver(s) and their phone number(s)? Rosita Lopez(sister)[phone]/ Asmita Juany(sister)[phone]   Patient's perception of discharge disposition psychiatric hospital   Readmission Within The Last 30 Days no previous admission in last 30 days   Patient currently being followed by outpatient case management? No   Patient currently receives any other outside agency services? No   Equipment Currently Used at Home oxygen   Do you have any problems affording any of your prescribed medications? No   Is the patient taking medications as prescribed? yes   Does the patient have transportation home? Yes   Transportation Available Medicaid transportation;family or friend will provide   Does the patient receive services at the [facility]? No   Discharge Plan A Psychiatric hospital   Discharge Plan B Home with family   Patient/Family In Agreement With Plan yes   The pt is known to the Sw from past admits. The pt was very very grouchy with the ICU staff but he was appropriate with the Sw. The pt states he lives with his brother and he uses Medicaid transportation to get to his doctor's appointments and get home from the hospital. The pt was slouched down in the bed stating he was comfortable.

## 2017-12-03 NOTE — NURSING
1930 Pt called RN to bedside and stated that he wanted to leave AMA because he is able to wear oxygen and take care of himself at home. Called Dr. Camarena to report pt request.Stated he will come to bedside and speak to patient.     2030 Dr. Braden at bedside. Advised patient of all medical risks of leaving AMA. Pt still chose to leave facility. Left at 2050 alone. Pt stated he called a cab to pick him up. PICC line removed before leaving. No other LDAs present.

## 2017-12-03 NOTE — PLAN OF CARE
Problem: Patient Care Overview  Goal: Plan of Care Review  Outcome: Ongoing (interventions implemented as appropriate)  Received pt on 2L NC; tolerated tx well.
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
## 2017-12-07 LAB
BACTERIA BLD CULT: NORMAL
BACTERIA BLD CULT: NORMAL

## 2017-12-12 DIAGNOSIS — I10 ESSENTIAL HYPERTENSION: ICD-10-CM

## 2017-12-12 DIAGNOSIS — J44.0 CHRONIC OBSTRUCTIVE PULMONARY DISEASE WITH ACUTE LOWER RESPIRATORY INFECTION: ICD-10-CM

## 2017-12-12 RX ORDER — ALBUTEROL SULFATE 2.5 MG/.5ML
2.5 SOLUTION RESPIRATORY (INHALATION) EVERY 4 HOURS PRN
Qty: 25 EACH | Refills: 6 | Status: CANCELLED | OUTPATIENT
Start: 2017-12-12 | End: 2018-12-12

## 2017-12-12 RX ORDER — CLONIDINE HYDROCHLORIDE 0.1 MG/1
0.3 TABLET ORAL 3 TIMES DAILY
Qty: 90 TABLET | Refills: 0 | Status: CANCELLED | OUTPATIENT
Start: 2017-12-12 | End: 2018-12-12

## 2017-12-12 RX ORDER — ALBUTEROL SULFATE 90 UG/1
1-2 AEROSOL, METERED RESPIRATORY (INHALATION) EVERY 6 HOURS PRN
Qty: 1 INHALER | Refills: 6 | Status: CANCELLED | OUTPATIENT
Start: 2017-12-12 | End: 2018-12-12

## 2017-12-12 NOTE — TELEPHONE ENCOUNTER
----- Message from Anais Pickard sent at 12/8/2017  3:49 PM CST -----  Pt need refills on he's med cloNIDine (CATAPRES) 0.1 MG tablet &  albuterol sulfate 2.5 mg/0.5 mL Nebu   Please send it to [facility] pharmacy [phone]

## 2017-12-12 NOTE — DISCHARGE SUMMARY
[facility]  Discharge Summary     Patient ID:  Ming Harrington  [id number]  52 y.o.  1965    Admit date: 12/1/2017    Discharge Date and Time: 12/2/2017  9:00 PM    Admitting Physician: Burt Betts MD     Discharge Provider: Al Camarena    Reason for Admission: Shortness of breath [R06.02]  Opioid withdrawal [F11.23]  Hypoxia [R09.02]  Other chronic pain [G89.29]  S/P PICC central line placement [Z95.828]  Acute respiratory failure with hypercapnia [J96.02]  Exacerbation of asthma, unspecified asthma severity, unspecified whether persistent [J45.901]  Exacerbation of asthma, unspecified asthma severity, unspecified whether persistent [J45.901]    Admission Condition: stable    Procedures Performed: * No surgery found *    History of Present Illness:  Patient is a 52 y.o. male with a PMH of IV drug use currently not on methadone, asthma/COPD, Hep C, HTN, anxiety presents with SOB. History was obtained via ED records due to patient's lethargy.  He initially presented with SOB reporting he was out of his albuterol and Breo.  No record of fever, chills, n/v/d.    In the ED, he was treated with duonebs, became lethargic and was given narcan and he work up.      Hospital Course (synopsis of major diagnoses, care, treatment, and services provided during the course of the hospital stay):     Patient was admitted to ICU for concerns for benzo withdrawal with concerns of acute severe decline in respiratory function in the setting of acute COPD. Patient was admitted with SIRS positive and started on borad spectrum antibiotics. Psych was consulted to assist with rehab after discharge and recommended [facility] to [facility] when he is medically stable. During hospital course, patient mentation and respiratory status improved and patient was transferred to the floor. Antibiotics were de-escalated. Patient was recommended to continue hospitalization due to acute COPD however patient  reported that he was fine and wanted to leave AMA prior to stepping down to the floor.     Consults: Psychiatry    Significant Diagnostic Studies:     Final Diagnoses:    Principal Problem: COPD exacerbation   Secondary Diagnoses:   Active Hospital Problems    Diagnosis  POA    *COPD exacerbation [J44.1]  Yes    Exacerbation of asthma [J45.901]  Yes    Drug withdrawal [F19.939]  Yes      Resolved Hospital Problems    Diagnosis Date Resolved POA   No resolved problems to display.       Discharged Condition: fair    Discharge Exam:  GEN: NAD  HEENT: MOM, MAHESH  CV:RRR, no murmur  PULM: minimal wheezing   GI: s/nt/nd  EXT: upper extremities with multiple IV track marks, no abscess or discharge appreciated    Disposition: Left Against Medical Advice    Follow Up/Patient Instructions:     Medications:  Reconciled Home Medications:   Discharge Medication List as of 12/2/2017  9:20 PM      CONTINUE these medications which have NOT CHANGED    Details   albuterol 90 mcg/actuation inhaler Inhale 1-2 puffs into the lungs every 6 (six) hours as needed for Wheezing., Starting Mon 10/30/2017, Until Tue 10/30/2018, Print      albuterol sulfate 2.5 mg/0.5 mL Nebu Take 2.5 mg by nebulization every 4 (four) hours as needed. Rescue, Starting Mon 10/30/2017, Until Tue 10/30/2018, Print      albuterol-ipratropium 2.5mg-0.5mg/3mL (DUO-NEB) 0.5 mg-3 mg(2.5 mg base)/3 mL nebulizer solution Take 3 mLs by nebulization every 6 (six) hours as needed for Wheezing. Rescue, Starting Mon 10/9/2017, Until Tue 10/9/2018, Normal      amlodipine (NORVASC) 10 MG tablet Take 1 tablet (10 mg total) by mouth once daily., Starting Thu 10/5/2017, Until Fri 10/5/2018, Print      cloNIDine (CATAPRES) 0.1 MG tablet Take 3 tablets (0.3 mg total) by mouth 3 (three) times daily., Starting Wed 10/4/2017, Until Thu 10/4/2018, Print      escitalopram oxalate (LEXAPRO) 10 MG tablet Take 1 tablet (10 mg total) by mouth once daily., Starting Thu 10/5/2017, Until  Fri 10/5/2018, Print      fluticasone-vilanterol (BREO) 100-25 mcg/dose diskus inhaler Inhale 1 puff into the lungs once daily. Controller, Starting Mon 10/30/2017, Print      furosemide (LASIX) 40 MG tablet Take 1 tablet (40 mg total) by mouth 2 (two) times daily., Starting Wed 10/4/2017, Until Thu 10/4/2018, Print      metformin (GLUCOPHAGE) 500 MG tablet Take 1 tablet (500 mg total) by mouth 2 (two) times daily., Starting Mon 10/9/2017, Print      naproxen (NAPROSYN) 500 MG tablet Take 1 tablet (500 mg total) by mouth 2 (two) times daily., Starting Wed 10/4/2017, Print      !! nicotine (NICODERM CQ) 14 mg/24 hr Place 1 patch onto the skin once daily., Starting Wed 9/13/2017, OTC      !! nicotine (NICODERM CQ) 14 mg/24 hr Place 1 patch onto the skin once daily., Starting Thu 10/5/2017, Print      !! olanzapine (ZYPREXA) 10 MG tablet Take 1 tablet (10 mg total) by mouth 2 (two) times daily. 1 tab in am and 2 tabs at bedtime, Starting Wed 9/13/2017, Normal      !! olanzapine (ZYPREXA) 10 MG tablet Take 1 tablet (10 mg total) by mouth 2 (two) times daily., Starting Wed 10/4/2017, Until Thu 10/4/2018, Print      !! olanzapine (ZYPREXA) 5 MG tablet Take 1 tablet (5 mg total) by mouth every evening., Starting Wed 10/4/2017, Until Thu 10/4/2018, Print      !! ondansetron (ZOFRAN-ODT) 4 MG TbDL Take 1 tablet (4 mg total) by mouth every 8 (eight) hours as needed., Starting Wed 9/13/2017, Normal      !! ondansetron (ZOFRAN-ODT) 8 MG TbDL Take 1 tablet (8 mg total) by mouth every 8 (eight) hours as needed., Starting Wed 10/4/2017, Print      !! senna-docusate 8.6-50 mg (PERICOLACE) 8.6-50 mg per tablet Take 1 tablet by mouth 2 (two) times daily., Starting Wed 9/13/2017, OTC      !! senna-docusate 8.6-50 mg (PERICOLACE) 8.6-50 mg per tablet Take 1 tablet by mouth 2 (two) times daily., Starting Wed 10/4/2017, Print      tiotropium (SPIRIVA) 18 mcg inhalation capsule Inhale 1 capsule (18 mcg total) into the lungs once daily.  Controller, Starting Wed 9/13/2017, Until Thu 9/13/2018, Normal       !! - Potential duplicate medications found. Please discuss with provider.        No discharge procedures on file.    Activity: activity as tolerated  Diet: cardiac diet    More than 30 minutes was allocated to discharge planning.    Signed:  Al Camarena  12/12/2017  9:49 AM

## 2017-12-13 ENCOUNTER — OFFICE VISIT (OUTPATIENT)
Dept: FAMILY MEDICINE | Facility: HOSPITAL | Age: 52
End: 2017-12-13
Attending: FAMILY MEDICINE
Payer: MEDICAID

## 2017-12-13 VITALS
TEMPERATURE: 97 F | HEART RATE: 93 BPM | WEIGHT: 256.81 LBS | BODY MASS INDEX: 41.45 KG/M2 | DIASTOLIC BLOOD PRESSURE: 73 MMHG | SYSTOLIC BLOOD PRESSURE: 104 MMHG

## 2017-12-13 DIAGNOSIS — J44.0 CHRONIC OBSTRUCTIVE PULMONARY DISEASE WITH ACUTE LOWER RESPIRATORY INFECTION: ICD-10-CM

## 2017-12-13 DIAGNOSIS — K42.9 RECURRENT UMBILICAL HERNIA: Primary | ICD-10-CM

## 2017-12-13 PROCEDURE — 99213 OFFICE O/P EST LOW 20 MIN: CPT | Performed by: FAMILY MEDICINE

## 2017-12-13 RX ORDER — FLUTICASONE FUROATE AND VILANTEROL 100; 25 UG/1; UG/1
1 POWDER RESPIRATORY (INHALATION) DAILY
Qty: 60 EACH | Refills: 6 | Status: SHIPPED | OUTPATIENT
Start: 2017-12-13 | End: 2017-12-13 | Stop reason: SDUPTHER

## 2017-12-13 RX ORDER — FLUTICASONE FUROATE AND VILANTEROL 100; 25 UG/1; UG/1
1 POWDER RESPIRATORY (INHALATION) DAILY
Qty: 60 EACH | Refills: 6 | Status: ON HOLD | OUTPATIENT
Start: 2017-12-13 | End: 2018-07-05

## 2017-12-13 NOTE — PROGRESS NOTES
Subjective:       Patient ID: Ming Harrington is a 52 y.o. male.    Chief Complaint: Hospital Follow Up; Hernia; and Medication Refill    Patient presents today requesting refill of his BREO and Spiriva.  In addition to this, patient states that he was recently moving a bed frame when he felt his previous hernia repair rupture.  Patient is requesting narcotics today for his pain and became very angry when other alternatives were mentioned.        Review of Systems   Constitutional: Negative for chills and fever.   Respiratory: Positive for wheezing. Negative for shortness of breath.    Cardiovascular: Negative for chest pain.   Gastrointestinal: Negative for nausea and vomiting.        Umbilical hernia   Neurological: Negative for headaches.       Objective:      Vitals:    12/13/17 1421   BP: 104/73   Pulse: 93   Temp: 97.2 °F (36.2 °C)     Physical Exam   Constitutional: He is oriented to person, place, and time. He appears well-developed and well-nourished. No distress.   HENT:   Head: Normocephalic and atraumatic.   Right Ear: External ear normal.   Left Ear: External ear normal.   Nose: Nose normal.   Mouth/Throat: Oropharynx is clear and moist. No oropharyngeal exudate.   Eyes: Conjunctivae and EOM are normal. Pupils are equal, round, and reactive to light. Right eye exhibits no discharge. Left eye exhibits no discharge.   Neck: Normal range of motion. Neck supple. No JVD present. No thyromegaly present.   Cardiovascular: Normal rate, regular rhythm, normal heart sounds and intact distal pulses.  Exam reveals no gallop and no friction rub.    No murmur heard.  Pulmonary/Chest: Effort normal. No respiratory distress. He has wheezes. He has no rales. He exhibits no tenderness.   Abdominal: Soft. Bowel sounds are normal. There is no tenderness. There is no guarding. A hernia is present.   Musculoskeletal: Normal range of motion. He exhibits no edema, tenderness or deformity.   Lymphadenopathy:     He has no cervical  adenopathy.   Neurological: He is alert and oriented to person, place, and time. He has normal reflexes. No cranial nerve deficit.   Skin: Skin is warm and dry. No rash noted. He is not diaphoretic. No erythema.   Psychiatric: He has a normal mood and affect. His behavior is normal.   Vitals reviewed.      Assessment:       1. Recurrent umbilical hernia    2. Chronic obstructive pulmonary disease with acute lower respiratory infection        Plan:       Recurrent umbilical hernia  -     Ambulatory referral to General Surgery    Chronic obstructive pulmonary disease with acute lower respiratory infection  -     tiotropium bromide (SPIRIVA RESPIMAT) 2.5 mcg/actuation Mist; Inhale 1 capsule into the lungs once daily. Controller  Dispense: 4 g; Refill: 11  -     Discontinue: fluticasone-vilanterol (BREO) 100-25 mcg/dose diskus inhaler; Inhale 1 puff into the lungs once daily. Controller  Dispense: 60 each; Refill: 6  -     fluticasone-vilanterol (BREO) 100-25 mcg/dose diskus inhaler; Inhale 1 puff into the lungs once daily. Controller  Dispense: 60 each; Refill: 6      Return in about 4 weeks (around 1/10/2018).

## 2018-01-03 NOTE — PROGRESS NOTES
I assume primary medical responsibility for this patient, I have reviewed the case history, findings, diagnosis and treatment plan with the resident and agree that the care is reasonable and necessary. This service has been performed by a resident without the presence of a teaching physician under the primary care exception  Katerin Maldonado  1/3/2018

## 2018-01-16 ENCOUNTER — HOSPITAL ENCOUNTER (INPATIENT)
Facility: HOSPITAL | Age: 53
LOS: 1 days | Discharge: LEFT BEFORE DISCHARGE | DRG: 192 | End: 2018-01-17
Attending: EMERGENCY MEDICINE | Admitting: FAMILY MEDICINE
Payer: MEDICAID

## 2018-01-16 DIAGNOSIS — R06.02 SHORTNESS OF BREATH: ICD-10-CM

## 2018-01-16 DIAGNOSIS — E11.9 TYPE 2 DIABETES MELLITUS WITHOUT COMPLICATION, WITHOUT LONG-TERM CURRENT USE OF INSULIN: ICD-10-CM

## 2018-01-16 DIAGNOSIS — J44.1 COPD EXACERBATION: Primary | ICD-10-CM

## 2018-01-16 DIAGNOSIS — J44.0 CHRONIC OBSTRUCTIVE PULMONARY DISEASE WITH ACUTE LOWER RESPIRATORY INFECTION: ICD-10-CM

## 2018-01-16 DIAGNOSIS — E11.8 TYPE 2 DIABETES MELLITUS WITH COMPLICATION, WITHOUT LONG-TERM CURRENT USE OF INSULIN: ICD-10-CM

## 2018-01-16 LAB
ANION GAP SERPL CALC-SCNC: 7 MMOL/L
BUN SERPL-MCNC: 9 MG/DL
CALCIUM SERPL-MCNC: 9.1 MG/DL
CHLORIDE SERPL-SCNC: 99 MMOL/L
CO2 SERPL-SCNC: 36 MMOL/L
CREAT SERPL-MCNC: 1 MG/DL
ERYTHROCYTE [DISTWIDTH] IN BLOOD BY AUTOMATED COUNT: 12.9 %
EST. GFR  (AFRICAN AMERICAN): >60 ML/MIN/1.73 M^2
EST. GFR  (NON AFRICAN AMERICAN): >60 ML/MIN/1.73 M^2
GLUCOSE SERPL-MCNC: 105 MG/DL
HCT VFR BLD AUTO: 45.4 %
HGB BLD-MCNC: 14.8 G/DL
MCH RBC QN AUTO: 29.7 PG
MCHC RBC AUTO-ENTMCNC: 32.6 G/DL
MCV RBC AUTO: 91 FL
PLATELET # BLD AUTO: 180 K/UL
PMV BLD AUTO: 10.7 FL
POTASSIUM SERPL-SCNC: 4.1 MMOL/L
RBC # BLD AUTO: 4.98 M/UL
SODIUM SERPL-SCNC: 142 MMOL/L
TROPONIN I SERPL DL<=0.01 NG/ML-MCNC: <0.006 NG/ML
WBC # BLD AUTO: 6.61 K/UL

## 2018-01-16 PROCEDURE — 96376 TX/PRO/DX INJ SAME DRUG ADON: CPT

## 2018-01-16 PROCEDURE — 99285 EMERGENCY DEPT VISIT HI MDM: CPT | Mod: 25

## 2018-01-16 PROCEDURE — 93005 ELECTROCARDIOGRAM TRACING: CPT

## 2018-01-16 PROCEDURE — 80048 BASIC METABOLIC PNL TOTAL CA: CPT

## 2018-01-16 PROCEDURE — 84484 ASSAY OF TROPONIN QUANT: CPT

## 2018-01-16 PROCEDURE — 25000003 PHARM REV CODE 250: Performed by: EMERGENCY MEDICINE

## 2018-01-16 PROCEDURE — 93010 ELECTROCARDIOGRAM REPORT: CPT | Mod: ,,, | Performed by: INTERNAL MEDICINE

## 2018-01-16 PROCEDURE — 25000242 PHARM REV CODE 250 ALT 637 W/ HCPCS: Performed by: EMERGENCY MEDICINE

## 2018-01-16 PROCEDURE — 63600175 PHARM REV CODE 636 W HCPCS: Performed by: EMERGENCY MEDICINE

## 2018-01-16 PROCEDURE — 12000002 HC ACUTE/MED SURGE SEMI-PRIVATE ROOM

## 2018-01-16 PROCEDURE — 85027 COMPLETE CBC AUTOMATED: CPT

## 2018-01-16 PROCEDURE — 96374 THER/PROPH/DIAG INJ IV PUSH: CPT

## 2018-01-16 PROCEDURE — 94640 AIRWAY INHALATION TREATMENT: CPT

## 2018-01-16 PROCEDURE — 96375 TX/PRO/DX INJ NEW DRUG ADDON: CPT

## 2018-01-16 PROCEDURE — 94644 CONT INHLJ TX 1ST HOUR: CPT

## 2018-01-16 RX ORDER — ACETAMINOPHEN 325 MG/1
650 TABLET ORAL
Status: COMPLETED | OUTPATIENT
Start: 2018-01-16 | End: 2018-01-16

## 2018-01-16 RX ORDER — ALBUTEROL SULFATE 0.83 MG/ML
15 SOLUTION RESPIRATORY (INHALATION) CONTINUOUS
Status: DISCONTINUED | OUTPATIENT
Start: 2018-01-16 | End: 2018-01-17

## 2018-01-16 RX ORDER — ONDANSETRON 2 MG/ML
4 INJECTION INTRAMUSCULAR; INTRAVENOUS
Status: COMPLETED | OUTPATIENT
Start: 2018-01-16 | End: 2018-01-16

## 2018-01-16 RX ORDER — METHYLPREDNISOLONE SOD SUCC 125 MG
80 VIAL (EA) INJECTION ONCE
Status: COMPLETED | OUTPATIENT
Start: 2018-01-16 | End: 2018-01-16

## 2018-01-16 RX ADMIN — METHYLPREDNISOLONE SODIUM SUCCINATE 80 MG: 125 INJECTION, POWDER, FOR SOLUTION INTRAMUSCULAR; INTRAVENOUS at 08:01

## 2018-01-16 RX ADMIN — ONDANSETRON 4 MG: 2 INJECTION INTRAMUSCULAR; INTRAVENOUS at 08:01

## 2018-01-16 RX ADMIN — ACETAMINOPHEN 650 MG: 325 TABLET ORAL at 10:01

## 2018-01-16 RX ADMIN — ALBUTEROL SULFATE 15 MG: 2.5 SOLUTION RESPIRATORY (INHALATION) at 08:01

## 2018-01-17 VITALS
TEMPERATURE: 99 F | HEIGHT: 66 IN | WEIGHT: 256 LBS | SYSTOLIC BLOOD PRESSURE: 185 MMHG | DIASTOLIC BLOOD PRESSURE: 109 MMHG | HEART RATE: 95 BPM | BODY MASS INDEX: 41.14 KG/M2 | RESPIRATION RATE: 12 BRPM | OXYGEN SATURATION: 93 %

## 2018-01-17 PROBLEM — R06.02 SHORTNESS OF BREATH: Status: ACTIVE | Noted: 2018-01-17

## 2018-01-17 LAB
AMPHET+METHAMPHET UR QL: NEGATIVE
BARBITURATES UR QL SCN>200 NG/ML: NEGATIVE
BENZODIAZ UR QL SCN>200 NG/ML: NEGATIVE
BNP SERPL-MCNC: 21 PG/ML
BZE UR QL SCN: NORMAL
CANNABINOIDS UR QL SCN: NEGATIVE
CREAT UR-MCNC: 55.8 MG/DL
ETHANOL UR-MCNC: <10 MG/DL
FLUAV AG SPEC QL IA: NEGATIVE
FLUBV AG SPEC QL IA: NEGATIVE
METHADONE UR QL SCN>300 NG/ML: NEGATIVE
OPIATES UR QL SCN: NORMAL
PCP UR QL SCN>25 NG/ML: NEGATIVE
POCT GLUCOSE: 166 MG/DL (ref 70–110)
SPECIMEN SOURCE: NORMAL
TOXICOLOGY INFORMATION: NORMAL

## 2018-01-17 PROCEDURE — 87400 INFLUENZA A/B EACH AG IA: CPT

## 2018-01-17 PROCEDURE — 83880 ASSAY OF NATRIURETIC PEPTIDE: CPT

## 2018-01-17 PROCEDURE — 80307 DRUG TEST PRSMV CHEM ANLYZR: CPT

## 2018-01-17 PROCEDURE — 25000003 PHARM REV CODE 250: Performed by: FAMILY MEDICINE

## 2018-01-17 PROCEDURE — 94645 CONT INHLJ TX EACH ADDL HOUR: CPT

## 2018-01-17 PROCEDURE — 94640 AIRWAY INHALATION TREATMENT: CPT

## 2018-01-17 PROCEDURE — S4991 NICOTINE PATCH NONLEGEND: HCPCS | Performed by: FAMILY MEDICINE

## 2018-01-17 PROCEDURE — 63600175 PHARM REV CODE 636 W HCPCS: Performed by: FAMILY MEDICINE

## 2018-01-17 PROCEDURE — 12000002 HC ACUTE/MED SURGE SEMI-PRIVATE ROOM

## 2018-01-17 PROCEDURE — 63600175 PHARM REV CODE 636 W HCPCS: Performed by: EMERGENCY MEDICINE

## 2018-01-17 PROCEDURE — 25000242 PHARM REV CODE 250 ALT 637 W/ HCPCS: Performed by: FAMILY MEDICINE

## 2018-01-17 PROCEDURE — 94761 N-INVAS EAR/PLS OXIMETRY MLT: CPT

## 2018-01-17 PROCEDURE — 27000221 HC OXYGEN, UP TO 24 HOURS

## 2018-01-17 PROCEDURE — 25000003 PHARM REV CODE 250: Performed by: EMERGENCY MEDICINE

## 2018-01-17 PROCEDURE — 25000242 PHARM REV CODE 250 ALT 637 W/ HCPCS: Performed by: EMERGENCY MEDICINE

## 2018-01-17 RX ORDER — ALBUTEROL SULFATE 90 UG/1
1-2 AEROSOL, METERED RESPIRATORY (INHALATION) EVERY 6 HOURS PRN
Qty: 1 INHALER | Refills: 6 | Status: SHIPPED | OUTPATIENT
Start: 2018-01-17 | End: 2018-03-04 | Stop reason: ALTCHOICE

## 2018-01-17 RX ORDER — METFORMIN HYDROCHLORIDE 500 MG/1
500 TABLET ORAL 2 TIMES DAILY
Qty: 180 TABLET | Refills: 3 | Status: ON HOLD | OUTPATIENT
Start: 2018-01-17 | End: 2018-07-05 | Stop reason: HOSPADM

## 2018-01-17 RX ORDER — AMLODIPINE BESYLATE 10 MG/1
10 TABLET ORAL DAILY
Qty: 30 TABLET | Refills: 2 | Status: ON HOLD | OUTPATIENT
Start: 2018-01-18 | End: 2018-07-05 | Stop reason: HOSPADM

## 2018-01-17 RX ORDER — ACETAMINOPHEN 325 MG/1
650 TABLET ORAL EVERY 4 HOURS PRN
Status: DISCONTINUED | OUTPATIENT
Start: 2018-01-17 | End: 2018-01-18 | Stop reason: HOSPADM

## 2018-01-17 RX ORDER — FLUTICASONE FUROATE AND VILANTEROL 100; 25 UG/1; UG/1
1 POWDER RESPIRATORY (INHALATION) DAILY
Status: DISCONTINUED | OUTPATIENT
Start: 2018-01-17 | End: 2018-01-18 | Stop reason: HOSPADM

## 2018-01-17 RX ORDER — ENOXAPARIN SODIUM 100 MG/ML
40 INJECTION SUBCUTANEOUS EVERY 24 HOURS
Status: DISCONTINUED | OUTPATIENT
Start: 2018-01-17 | End: 2018-01-18 | Stop reason: HOSPADM

## 2018-01-17 RX ORDER — AZITHROMYCIN 250 MG/1
250 TABLET, FILM COATED ORAL DAILY
Qty: 4 TABLET | Refills: 0 | Status: SHIPPED | OUTPATIENT
Start: 2018-01-18 | End: 2018-07-01 | Stop reason: ALTCHOICE

## 2018-01-17 RX ORDER — SODIUM CHLORIDE 0.9 % (FLUSH) 0.9 %
3 SYRINGE (ML) INJECTION
Status: DISCONTINUED | OUTPATIENT
Start: 2018-01-17 | End: 2018-01-18 | Stop reason: HOSPADM

## 2018-01-17 RX ORDER — GLUCAGON 1 MG
1 KIT INJECTION
Status: DISCONTINUED | OUTPATIENT
Start: 2018-01-17 | End: 2018-01-18 | Stop reason: HOSPADM

## 2018-01-17 RX ORDER — AMLODIPINE BESYLATE 5 MG/1
10 TABLET ORAL DAILY
Status: DISCONTINUED | OUTPATIENT
Start: 2018-01-17 | End: 2018-01-18 | Stop reason: HOSPADM

## 2018-01-17 RX ORDER — IBUPROFEN 200 MG
16 TABLET ORAL
Status: DISCONTINUED | OUTPATIENT
Start: 2018-01-17 | End: 2018-01-18 | Stop reason: HOSPADM

## 2018-01-17 RX ORDER — FUROSEMIDE 10 MG/ML
40 INJECTION INTRAMUSCULAR; INTRAVENOUS 2 TIMES DAILY
Status: DISCONTINUED | OUTPATIENT
Start: 2018-01-17 | End: 2018-01-17

## 2018-01-17 RX ORDER — INSULIN ASPART 100 [IU]/ML
1-10 INJECTION, SOLUTION INTRAVENOUS; SUBCUTANEOUS
Status: DISCONTINUED | OUTPATIENT
Start: 2018-01-17 | End: 2018-01-18 | Stop reason: HOSPADM

## 2018-01-17 RX ORDER — IPRATROPIUM BROMIDE AND ALBUTEROL SULFATE 2.5; .5 MG/3ML; MG/3ML
3 SOLUTION RESPIRATORY (INHALATION) EVERY 4 HOURS
Status: DISCONTINUED | OUTPATIENT
Start: 2018-01-17 | End: 2018-01-18 | Stop reason: HOSPADM

## 2018-01-17 RX ORDER — CLONIDINE HYDROCHLORIDE 0.1 MG/1
0.1 TABLET ORAL
Status: COMPLETED | OUTPATIENT
Start: 2018-01-17 | End: 2018-01-17

## 2018-01-17 RX ORDER — FLUTICASONE FUROATE AND VILANTEROL 100; 25 UG/1; UG/1
1 POWDER RESPIRATORY (INHALATION) DAILY
Qty: 60 EACH | Refills: 2 | Status: ON HOLD | OUTPATIENT
Start: 2018-01-18 | End: 2018-07-05 | Stop reason: HOSPADM

## 2018-01-17 RX ORDER — ONDANSETRON 4 MG/1
8 TABLET, ORALLY DISINTEGRATING ORAL EVERY 8 HOURS PRN
Status: DISCONTINUED | OUTPATIENT
Start: 2018-01-17 | End: 2018-01-18 | Stop reason: HOSPADM

## 2018-01-17 RX ORDER — AZITHROMYCIN 250 MG/1
250 TABLET, FILM COATED ORAL DAILY
Status: DISCONTINUED | OUTPATIENT
Start: 2018-01-18 | End: 2018-01-18 | Stop reason: HOSPADM

## 2018-01-17 RX ORDER — AZITHROMYCIN 250 MG/1
500 TABLET, FILM COATED ORAL ONCE
Status: COMPLETED | OUTPATIENT
Start: 2018-01-17 | End: 2018-01-17

## 2018-01-17 RX ORDER — ESCITALOPRAM OXALATE 10 MG/1
10 TABLET ORAL DAILY
Status: DISCONTINUED | OUTPATIENT
Start: 2018-01-17 | End: 2018-01-18 | Stop reason: HOSPADM

## 2018-01-17 RX ORDER — PREDNISONE 50 MG/1
50 TABLET ORAL DAILY
Qty: 4 TABLET | Refills: 0 | Status: SHIPPED | OUTPATIENT
Start: 2018-01-17 | End: 2018-01-21

## 2018-01-17 RX ORDER — SODIUM CHLORIDE 0.9 % (FLUSH) 0.9 %
5 SYRINGE (ML) INJECTION
Status: DISCONTINUED | OUTPATIENT
Start: 2018-01-17 | End: 2018-01-18 | Stop reason: HOSPADM

## 2018-01-17 RX ORDER — IBUPROFEN 200 MG
1 TABLET ORAL DAILY
Status: DISCONTINUED | OUTPATIENT
Start: 2018-01-17 | End: 2018-01-18 | Stop reason: HOSPADM

## 2018-01-17 RX ORDER — CLONIDINE HYDROCHLORIDE 0.3 MG/1
0.3 TABLET ORAL 3 TIMES DAILY
Qty: 90 TABLET | Refills: 3 | Status: ON HOLD | OUTPATIENT
Start: 2018-01-17 | End: 2018-07-05 | Stop reason: HOSPADM

## 2018-01-17 RX ORDER — METHYLPREDNISOLONE SOD SUCC 125 MG
80 VIAL (EA) INJECTION EVERY 8 HOURS
Status: DISCONTINUED | OUTPATIENT
Start: 2018-01-17 | End: 2018-01-18 | Stop reason: HOSPADM

## 2018-01-17 RX ORDER — IBUPROFEN 200 MG
24 TABLET ORAL
Status: DISCONTINUED | OUTPATIENT
Start: 2018-01-17 | End: 2018-01-18 | Stop reason: HOSPADM

## 2018-01-17 RX ORDER — ALBUTEROL SULFATE 0.83 MG/ML
15 SOLUTION RESPIRATORY (INHALATION) CONTINUOUS
Status: DISCONTINUED | OUTPATIENT
Start: 2018-01-17 | End: 2018-01-17

## 2018-01-17 RX ORDER — FUROSEMIDE 10 MG/ML
40 INJECTION INTRAMUSCULAR; INTRAVENOUS DAILY
Status: DISCONTINUED | OUTPATIENT
Start: 2018-01-17 | End: 2018-01-18 | Stop reason: HOSPADM

## 2018-01-17 RX ORDER — OLANZAPINE 2.5 MG/1
5 TABLET ORAL NIGHTLY
Status: DISCONTINUED | OUTPATIENT
Start: 2018-01-17 | End: 2018-01-18 | Stop reason: HOSPADM

## 2018-01-17 RX ORDER — FUROSEMIDE 40 MG/1
40 TABLET ORAL 2 TIMES DAILY
Qty: 60 TABLET | Refills: 3 | Status: ON HOLD | OUTPATIENT
Start: 2018-01-17 | End: 2018-07-05

## 2018-01-17 RX ORDER — TIOTROPIUM BROMIDE 18 UG/1
1 CAPSULE ORAL; RESPIRATORY (INHALATION) DAILY
Status: DISCONTINUED | OUTPATIENT
Start: 2018-01-17 | End: 2018-01-18 | Stop reason: HOSPADM

## 2018-01-17 RX ORDER — AMOXICILLIN 250 MG
1 CAPSULE ORAL 2 TIMES DAILY
Status: DISCONTINUED | OUTPATIENT
Start: 2018-01-17 | End: 2018-01-18 | Stop reason: HOSPADM

## 2018-01-17 RX ORDER — FUROSEMIDE 10 MG/ML
40 INJECTION INTRAMUSCULAR; INTRAVENOUS
Status: COMPLETED | OUTPATIENT
Start: 2018-01-17 | End: 2018-01-17

## 2018-01-17 RX ORDER — ESCITALOPRAM OXALATE 10 MG/1
10 TABLET ORAL DAILY
Qty: 30 TABLET | Refills: 3 | Status: ON HOLD | OUTPATIENT
Start: 2018-01-18 | End: 2018-07-05 | Stop reason: HOSPADM

## 2018-01-17 RX ORDER — ALBUTEROL SULFATE 2.5 MG/.5ML
2.5 SOLUTION RESPIRATORY (INHALATION) EVERY 4 HOURS
Status: DISCONTINUED | OUTPATIENT
Start: 2018-01-17 | End: 2018-01-17

## 2018-01-17 RX ORDER — OLANZAPINE 10 MG/1
10 TABLET ORAL 2 TIMES DAILY
Status: DISCONTINUED | OUTPATIENT
Start: 2018-01-17 | End: 2018-01-18 | Stop reason: HOSPADM

## 2018-01-17 RX ADMIN — IPRATROPIUM BROMIDE AND ALBUTEROL SULFATE 3 ML: .5; 3 SOLUTION RESPIRATORY (INHALATION) at 07:01

## 2018-01-17 RX ADMIN — ALBUTEROL SULFATE 15 MG: 2.5 SOLUTION RESPIRATORY (INHALATION) at 03:01

## 2018-01-17 RX ADMIN — TIOTROPIUM BROMIDE 18 MCG: 18 CAPSULE ORAL; RESPIRATORY (INHALATION) at 11:01

## 2018-01-17 RX ADMIN — OLANZAPINE 10 MG: 10 TABLET, FILM COATED ORAL at 02:01

## 2018-01-17 RX ADMIN — AZITHROMYCIN 500 MG: 250 TABLET, FILM COATED ORAL at 07:01

## 2018-01-17 RX ADMIN — ACETAMINOPHEN 650 MG: 325 TABLET ORAL at 11:01

## 2018-01-17 RX ADMIN — FUROSEMIDE 40 MG: 10 INJECTION, SOLUTION INTRAMUSCULAR; INTRAVENOUS at 03:01

## 2018-01-17 RX ADMIN — STANDARDIZED SENNA CONCENTRATE AND DOCUSATE SODIUM 1 TABLET: 8.6; 5 TABLET, FILM COATED ORAL at 09:01

## 2018-01-17 RX ADMIN — FLUTICASONE FUROATE AND VILANTEROL TRIFENATATE 1 PUFF: 100; 25 POWDER RESPIRATORY (INHALATION) at 11:01

## 2018-01-17 RX ADMIN — NICOTINE 1 PATCH: 14 PATCH, EXTENDED RELEASE TRANSDERMAL at 09:01

## 2018-01-17 RX ADMIN — IPRATROPIUM BROMIDE AND ALBUTEROL SULFATE 3 ML: .5; 3 SOLUTION RESPIRATORY (INHALATION) at 11:01

## 2018-01-17 RX ADMIN — FUROSEMIDE 40 MG: 10 INJECTION, SOLUTION INTRAMUSCULAR; INTRAVENOUS at 09:01

## 2018-01-17 RX ADMIN — METHYLPREDNISOLONE SODIUM SUCCINATE 80 MG: 125 INJECTION, POWDER, FOR SOLUTION INTRAMUSCULAR; INTRAVENOUS at 02:01

## 2018-01-17 RX ADMIN — ESCITALOPRAM 10 MG: 10 TABLET, FILM COATED ORAL at 09:01

## 2018-01-17 RX ADMIN — AMLODIPINE BESYLATE 10 MG: 5 TABLET ORAL at 09:01

## 2018-01-17 RX ADMIN — OLANZAPINE 10 MG: 10 TABLET, FILM COATED ORAL at 09:01

## 2018-01-17 RX ADMIN — CLONIDINE HYDROCHLORIDE 0.1 MG: 0.1 TABLET ORAL at 03:01

## 2018-01-17 RX ADMIN — CLONIDINE HYDROCHLORIDE 0.3 MG: 0.2 TABLET ORAL at 02:01
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
## 2018-01-17 NOTE — ED NOTES
Patient easily aroused; meal try served to patient and sitting up to eat; pt has some audible wheezing and md is aware and meds given for elevated bp and will monitor the patient
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
## 2018-01-17 NOTE — PLAN OF CARE
Problem: Patient Care Overview  Goal: Plan of Care Review  Outcome: Ongoing (interventions implemented as appropriate)  cont to monitor resp needs

## 2018-01-17 NOTE — ED NOTES
RT called for admit peak flow as requested and orders released and bed requested--- spoke to Sabina

## 2018-02-05 NOTE — DISCHARGE SUMMARY
[facility] Medicine  Discharge Summary      Patient Name: Ming Harrington  MRN: [id number]  Admission Date: 1/16/2018  Hospital Length of Stay: 1 days  Discharge Date and Time: Patient Left AMA on 1/17/18 3:00pm  Attending Physician: Dr. Lit Dallas  Discharging Provider: Kalen Mcdermott MD  Primary Care Provider: Theo Lopez MD        HPI:51 yo male with pmhx drug abuse on methadone, copd, hep C, htn, anxiety presented with SOB. He ran out of his meds yesterday. When seen he was lethergic but easily arousable. He denies any chest pain, nausea, vomiting, fever, chills, congestion. He was complaining of SOB that was improving with the breathing treatments in the ED. He was also complaining of pain in his hernia in the abdomen.   In the Ed, he was given nebulizer treatment and steroids. He responded well but was hypoxic at 85% on RA. He was  Started on oxygen.    * No surgery found *      Hospital Course:   SOB:   - likely 2/2 copd exacerbation vs chf exacerbation  - CXR with hyperinflation consistent with COPD exacerbation  - BNP normal   - s/p IV steroids and neb treatment in ED, pt hypoxic started on oxygen  - Continued steroids and azithromycin   - Duo nebs q4h  - Breo q4 h      CHF:   - Continued Lasix 40 mg daily  - strict I/O's     HTN:   - Continued home meds clonidine, amlodipine    Consults:     Final Active Diagnoses:    Diagnosis Date Noted POA    PRINCIPAL PROBLEM:  Shortness of breath [R06.02] 01/17/2018 Yes    Type 2 diabetes mellitus with complication, without long-term current use of insulin [E11.8] 10/02/2017 Yes    COPD exacerbation [J44.1] 07/21/2017 Yes    Recurrent umbilical hernia [K42.9] 03/04/2013 Yes      Problems Resolved During this Admission:    Diagnosis Date Noted Date Resolved POA      Discharged Condition: against medical advice    Disposition: Left Against Medical Adv*    Follow Up:     Patient Instructions:   No discharge procedures on  file.  Medications:  Reconciled Home Medications:   Discharge Medication List as of 1/18/2018 10:39 AM      START taking these medications    Details   azithromycin (Z-HAVEN) 250 MG tablet Take 1 tablet (250 mg total) by mouth once daily., Starting Thu 1/18/2018, Print      predniSONE (DELTASONE) 50 MG Tab Take 1 tablet (50 mg total) by mouth once daily., Starting Wed 1/17/2018, Until Sun 1/21/2018, Print         CONTINUE these medications which have CHANGED    Details   albuterol 90 mcg/actuation inhaler Inhale 1-2 puffs into the lungs every 6 (six) hours as needed for Wheezing., Starting Wed 1/17/2018, Until Thu 1/17/2019, Print      !! amLODIPine (NORVASC) 10 MG tablet Take 1 tablet (10 mg total) by mouth once daily., Starting Thu 1/18/2018, Until Fri 1/18/2019, Print      !! cloNIDine (CATAPRES) 0.3 MG tablet Take 1 tablet (0.3 mg total) by mouth 3 (three) times daily., Starting Wed 1/17/2018, Until Thu 1/17/2019, Print      !! escitalopram oxalate (LEXAPRO) 10 MG tablet Take 1 tablet (10 mg total) by mouth once daily., Starting Thu 1/18/2018, Until Fri 1/18/2019, Print      !! fluticasone-vilanterol (BREO) 100-25 mcg/dose diskus inhaler Inhale 1 puff into the lungs once daily. Controller, Starting Thu 1/18/2018, Print      furosemide (LASIX) 40 MG tablet Take 1 tablet (40 mg total) by mouth 2 (two) times daily., Starting Wed 1/17/2018, Until Thu 1/17/2019, Print      metFORMIN (GLUCOPHAGE) 500 MG tablet Take 1 tablet (500 mg total) by mouth 2 (two) times daily., Starting Wed 1/17/2018, Print      !! tiotropium bromide (SPIRIVA RESPIMAT) 2.5 mcg/actuation Mist Inhale 1 each into the lungs once daily. Controller, Starting Wed 1/17/2018, Print       !! - Potential duplicate medications found. Please discuss with provider.      CONTINUE these medications which have NOT CHANGED    Details   !! nicotine (NICODERM CQ) 14 mg/24 hr Place 1 patch onto the skin once daily., Starting Wed 9/13/2017, OTC      albuterol sulfate  2.5 mg/0.5 mL Nebu Take 2.5 mg by nebulization every 4 (four) hours as needed. Rescue, Starting Mon 10/30/2017, Until Tue 10/30/2018, Print      albuterol-ipratropium 2.5mg-0.5mg/3mL (DUO-NEB) 0.5 mg-3 mg(2.5 mg base)/3 mL nebulizer solution Take 3 mLs by nebulization every 6 (six) hours as needed for Wheezing. Rescue, Starting Mon 10/9/2017, Until Tue 10/9/2018, Normal      !! amlodipine (NORVASC) 10 MG tablet Take 1 tablet (10 mg total) by mouth once daily., Starting Thu 10/5/2017, Until Fri 10/5/2018, Print      !! cloNIDine (CATAPRES) 0.1 MG tablet Take 3 tablets (0.3 mg total) by mouth 3 (three) times daily., Starting Wed 10/4/2017, Until Thu 10/4/2018, Print      !! escitalopram oxalate (LEXAPRO) 10 MG tablet Take 1 tablet (10 mg total) by mouth once daily., Starting Thu 10/5/2017, Until Fri 10/5/2018, Print      !! fluticasone-vilanterol (BREO) 100-25 mcg/dose diskus inhaler Inhale 1 puff into the lungs once daily. Controller, Starting Wed 12/13/2017, Normal      naproxen (NAPROSYN) 500 MG tablet Take 1 tablet (500 mg total) by mouth 2 (two) times daily., Starting Wed 10/4/2017, Print      !! nicotine (NICODERM CQ) 14 mg/24 hr Place 1 patch onto the skin once daily., Starting Thu 10/5/2017, Print      !! olanzapine (ZYPREXA) 10 MG tablet Take 1 tablet (10 mg total) by mouth 2 (two) times daily. 1 tab in am and 2 tabs at bedtime, Starting Wed 9/13/2017, Normal      !! olanzapine (ZYPREXA) 10 MG tablet Take 1 tablet (10 mg total) by mouth 2 (two) times daily., Starting Wed 10/4/2017, Until Thu 10/4/2018, Print      !! olanzapine (ZYPREXA) 5 MG tablet Take 1 tablet (5 mg total) by mouth every evening., Starting Wed 10/4/2017, Until Thu 10/4/2018, Print      !! ondansetron (ZOFRAN-ODT) 4 MG TbDL Take 1 tablet (4 mg total) by mouth every 8 (eight) hours as needed., Starting Wed 9/13/2017, Normal      !! ondansetron (ZOFRAN-ODT) 8 MG TbDL Take 1 tablet (8 mg total) by mouth every 8 (eight) hours as needed.,  Starting Wed 10/4/2017, Print      !! senna-docusate 8.6-50 mg (PERICOLACE) 8.6-50 mg per tablet Take 1 tablet by mouth 2 (two) times daily., Starting Wed 9/13/2017, OTC      !! senna-docusate 8.6-50 mg (PERICOLACE) 8.6-50 mg per tablet Take 1 tablet by mouth 2 (two) times daily., Starting Wed 10/4/2017, Print      !! tiotropium bromide (SPIRIVA RESPIMAT) 2.5 mcg/actuation Mist Inhale 1 capsule into the lungs once daily. Controller, Starting Wed 12/13/2017, Normal       !! - Potential duplicate medications found. Please discuss with provider.          Significant Diagnostic Studies: See HPI    Pending Diagnostic Studies:     None        Indwelling Lines/Drains at time of discharge:   Lines/Drains/Airways          No matching active lines, drains, or airways          Time spent on the discharge of patient: 45 minutes  The patient felt he was back to his normal baseline and wanted to go home and he didn't want to be admitted. He walked out AMA.         Kalen Mcdermott MD  Department of Hospital Medicine  [facility]

## 2018-02-15 ENCOUNTER — HOSPITAL ENCOUNTER (EMERGENCY)
Facility: HOSPITAL | Age: 53
Discharge: HOME | End: 2018-02-15
Attending: EMERGENCY MEDICINE
Payer: MEDICAID

## 2018-02-15 VITALS
BODY MASS INDEX: 39.4 KG/M2 | HEART RATE: 100 BPM | HEIGHT: 68 IN | DIASTOLIC BLOOD PRESSURE: 62 MMHG | OXYGEN SATURATION: 94 % | RESPIRATION RATE: 18 BRPM | SYSTOLIC BLOOD PRESSURE: 105 MMHG | WEIGHT: 260 LBS | TEMPERATURE: 99 F

## 2018-02-15 DIAGNOSIS — K42.9 UMBILICAL HERNIA WITHOUT OBSTRUCTION AND WITHOUT GANGRENE: Primary | ICD-10-CM

## 2018-02-15 PROCEDURE — 99284 EMERGENCY DEPT VISIT MOD MDM: CPT | Mod: ,,, | Performed by: PHYSICIAN ASSISTANT

## 2018-02-15 PROCEDURE — 63600175 PHARM REV CODE 636 W HCPCS: Performed by: EMERGENCY MEDICINE

## 2018-02-15 PROCEDURE — 25000003 PHARM REV CODE 250: Performed by: PHYSICIAN ASSISTANT

## 2018-02-15 PROCEDURE — 99284 EMERGENCY DEPT VISIT MOD MDM: CPT | Mod: 25

## 2018-02-15 PROCEDURE — 96374 THER/PROPH/DIAG INJ IV PUSH: CPT

## 2018-02-15 RX ORDER — KETOROLAC TROMETHAMINE 30 MG/ML
10 INJECTION, SOLUTION INTRAMUSCULAR; INTRAVENOUS
Status: COMPLETED | OUTPATIENT
Start: 2018-02-15 | End: 2018-02-15

## 2018-02-15 RX ORDER — NAPROXEN 500 MG/1
500 TABLET ORAL 2 TIMES DAILY WITH MEALS
Qty: 60 TABLET | Refills: 0 | Status: SHIPPED | OUTPATIENT
Start: 2018-02-15 | End: 2018-02-15

## 2018-02-15 RX ORDER — ACETAMINOPHEN 500 MG
500 TABLET ORAL EVERY 6 HOURS PRN
Qty: 20 TABLET | Refills: 0 | Status: ON HOLD | OUTPATIENT
Start: 2018-02-15 | End: 2019-02-28 | Stop reason: CLARIF

## 2018-02-15 RX ORDER — ACETAMINOPHEN 500 MG
500 TABLET ORAL EVERY 6 HOURS PRN
Qty: 20 TABLET | Refills: 0 | Status: SHIPPED | OUTPATIENT
Start: 2018-02-15 | End: 2018-02-15

## 2018-02-15 RX ORDER — ONDANSETRON 4 MG/1
8 TABLET, ORALLY DISINTEGRATING ORAL EVERY 6 HOURS PRN
Qty: 20 TABLET | Refills: 0 | Status: SHIPPED | OUTPATIENT
Start: 2018-02-15 | End: 2020-04-16 | Stop reason: SDUPTHER

## 2018-02-15 RX ORDER — ACETAMINOPHEN 500 MG
1000 TABLET ORAL
Status: COMPLETED | OUTPATIENT
Start: 2018-02-15 | End: 2018-02-15

## 2018-02-15 RX ORDER — NAPROXEN 500 MG/1
500 TABLET ORAL 2 TIMES DAILY WITH MEALS
Qty: 60 TABLET | Refills: 0 | Status: ON HOLD | OUTPATIENT
Start: 2018-02-15 | End: 2018-07-05 | Stop reason: HOSPADM

## 2018-02-15 RX ORDER — ONDANSETRON 4 MG/1
4 TABLET, ORALLY DISINTEGRATING ORAL
Status: COMPLETED | OUTPATIENT
Start: 2018-02-15 | End: 2018-02-15

## 2018-02-15 RX ADMIN — ONDANSETRON 4 MG: 4 TABLET, ORALLY DISINTEGRATING ORAL at 12:02

## 2018-02-15 RX ADMIN — KETOROLAC TROMETHAMINE 10 MG: 30 INJECTION, SOLUTION INTRAMUSCULAR at 01:02

## 2018-02-15 RX ADMIN — ACETAMINOPHEN 1000 MG: 500 TABLET ORAL at 12:02

## 2018-02-15 NOTE — ED TRIAGE NOTES
Presents to ER with pain to his hernia site LLQ for about 4 months.  States that he wants his hernia removed.    Pt identifiers checked and correct  LOC: The patient is awake, alert, aware of environment with an appropriate affect. Oriented x3, speaking appropriately  APPEARANCE: Pt resting comfortably, in no acute distress, pt is clean and well groomed, clothing properly fastened  SKIN: Skin warm, dry and intact, normal skin turgor, moist mucus membranes  RESPIRATORY: Airway is open and patent, respirations are spontaneous, even and unlabored, normal effort and rate  MUSCULOSKELETAL: No obvious deformities.
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
## 2018-02-16 LAB
BUN SERPL-MCNC: 10 MG/DL (ref 6–30)
CHLORIDE SERPL-SCNC: 107 MMOL/L (ref 95–110)
CREAT SERPL-MCNC: 1.4 MG/DL (ref 0.5–1.4)
GLUCOSE SERPL-MCNC: 113 MG/DL (ref 70–110)
HCT VFR BLD CALC: 42 %PCV (ref 36–54)
POC IONIZED CALCIUM: 0.92 MMOL/L (ref 1.06–1.42)
POC TCO2 (MEASURED): 22 MMOL/L (ref 23–29)
POTASSIUM BLD-SCNC: 4.3 MMOL/L (ref 3.5–5.1)
SAMPLE: ABNORMAL
SODIUM BLD-SCNC: 138 MMOL/L (ref 136–145)

## 2018-03-04 ENCOUNTER — HOSPITAL ENCOUNTER (EMERGENCY)
Facility: HOSPITAL | Age: 53
Discharge: HOME | End: 2018-03-04
Attending: EMERGENCY MEDICINE
Payer: MEDICAID

## 2018-03-04 VITALS
BODY MASS INDEX: 39.4 KG/M2 | DIASTOLIC BLOOD PRESSURE: 77 MMHG | RESPIRATION RATE: 20 BRPM | TEMPERATURE: 99 F | OXYGEN SATURATION: 96 % | SYSTOLIC BLOOD PRESSURE: 113 MMHG | HEART RATE: 88 BPM | WEIGHT: 260 LBS | HEIGHT: 68 IN

## 2018-03-04 DIAGNOSIS — R10.9 ABDOMINAL PAIN, UNSPECIFIED ABDOMINAL LOCATION: Primary | ICD-10-CM

## 2018-03-04 DIAGNOSIS — E11.8 TYPE 2 DIABETES MELLITUS WITH COMPLICATION, WITHOUT LONG-TERM CURRENT USE OF INSULIN: ICD-10-CM

## 2018-03-04 DIAGNOSIS — K43.9 VENTRAL HERNIA WITHOUT OBSTRUCTION OR GANGRENE: ICD-10-CM

## 2018-03-04 DIAGNOSIS — J44.1 COPD EXACERBATION: ICD-10-CM

## 2018-03-04 LAB
ALBUMIN SERPL BCP-MCNC: 3.9 G/DL
ALP SERPL-CCNC: 103 U/L
ALT SERPL W/O P-5'-P-CCNC: 38 U/L
ANION GAP SERPL CALC-SCNC: 8 MMOL/L
AST SERPL-CCNC: 44 U/L
BASOPHILS # BLD AUTO: 0.04 K/UL
BASOPHILS NFR BLD: 0.6 %
BILIRUB SERPL-MCNC: 1.2 MG/DL
BUN SERPL-MCNC: 13 MG/DL
CALCIUM SERPL-MCNC: 9.7 MG/DL
CHLORIDE SERPL-SCNC: 97 MMOL/L
CO2 SERPL-SCNC: 35 MMOL/L
CREAT SERPL-MCNC: 1 MG/DL
DIFFERENTIAL METHOD: NORMAL
EOSINOPHIL # BLD AUTO: 0.1 K/UL
EOSINOPHIL NFR BLD: 1.6 %
ERYTHROCYTE [DISTWIDTH] IN BLOOD BY AUTOMATED COUNT: 12.3 %
EST. GFR  (AFRICAN AMERICAN): >60 ML/MIN/1.73 M^2
EST. GFR  (NON AFRICAN AMERICAN): >60 ML/MIN/1.73 M^2
GLUCOSE SERPL-MCNC: 91 MG/DL
HCT VFR BLD AUTO: 42.3 %
HGB BLD-MCNC: 14.1 G/DL
IMM GRANULOCYTES # BLD AUTO: 0.02 K/UL
IMM GRANULOCYTES NFR BLD AUTO: 0.3 %
LACTATE SERPL-SCNC: 0.7 MMOL/L
LIPASE SERPL-CCNC: 15 U/L
LYMPHOCYTES # BLD AUTO: 2 K/UL
LYMPHOCYTES NFR BLD: 29 %
MCH RBC QN AUTO: 30.1 PG
MCHC RBC AUTO-ENTMCNC: 33.3 G/DL
MCV RBC AUTO: 90 FL
MONOCYTES # BLD AUTO: 0.5 K/UL
MONOCYTES NFR BLD: 6.8 %
NEUTROPHILS # BLD AUTO: 4.3 K/UL
NEUTROPHILS NFR BLD: 61.7 %
NRBC BLD-RTO: 0 /100 WBC
PLATELET # BLD AUTO: 178 K/UL
PMV BLD AUTO: 10.8 FL
POTASSIUM SERPL-SCNC: 3.9 MMOL/L
PROT SERPL-MCNC: 8.3 G/DL
RBC # BLD AUTO: 4.68 M/UL
SODIUM SERPL-SCNC: 140 MMOL/L
WBC # BLD AUTO: 6.89 K/UL

## 2018-03-04 PROCEDURE — 96374 THER/PROPH/DIAG INJ IV PUSH: CPT

## 2018-03-04 PROCEDURE — 85025 COMPLETE CBC W/AUTO DIFF WBC: CPT

## 2018-03-04 PROCEDURE — 94640 AIRWAY INHALATION TREATMENT: CPT

## 2018-03-04 PROCEDURE — 25000003 PHARM REV CODE 250: Performed by: PHYSICIAN ASSISTANT

## 2018-03-04 PROCEDURE — 80053 COMPREHEN METABOLIC PANEL: CPT

## 2018-03-04 PROCEDURE — 99284 EMERGENCY DEPT VISIT MOD MDM: CPT | Mod: ,,, | Performed by: PHYSICIAN ASSISTANT

## 2018-03-04 PROCEDURE — 25000242 PHARM REV CODE 250 ALT 637 W/ HCPCS: Performed by: PHYSICIAN ASSISTANT

## 2018-03-04 PROCEDURE — 83605 ASSAY OF LACTIC ACID: CPT

## 2018-03-04 PROCEDURE — 99285 EMERGENCY DEPT VISIT HI MDM: CPT | Mod: 25

## 2018-03-04 PROCEDURE — 96375 TX/PRO/DX INJ NEW DRUG ADDON: CPT

## 2018-03-04 PROCEDURE — 63600175 PHARM REV CODE 636 W HCPCS: Performed by: PHYSICIAN ASSISTANT

## 2018-03-04 PROCEDURE — 83690 ASSAY OF LIPASE: CPT

## 2018-03-04 RX ORDER — ALBUTEROL SULFATE 2.5 MG/.5ML
2.5 SOLUTION RESPIRATORY (INHALATION) EVERY 4 HOURS PRN
Qty: 28 EACH | Refills: 0 | Status: ON HOLD | OUTPATIENT
Start: 2018-03-04 | End: 2018-07-05 | Stop reason: HOSPADM

## 2018-03-04 RX ORDER — IPRATROPIUM BROMIDE AND ALBUTEROL SULFATE 2.5; .5 MG/3ML; MG/3ML
3 SOLUTION RESPIRATORY (INHALATION)
Status: COMPLETED | OUTPATIENT
Start: 2018-03-04 | End: 2018-03-04

## 2018-03-04 RX ORDER — PREDNISONE 20 MG/1
60 TABLET ORAL
Status: COMPLETED | OUTPATIENT
Start: 2018-03-04 | End: 2018-03-04

## 2018-03-04 RX ORDER — ONDANSETRON 4 MG/1
4-8 TABLET, FILM COATED ORAL EVERY 8 HOURS PRN
Qty: 20 TABLET | Refills: 0 | Status: ON HOLD | OUTPATIENT
Start: 2018-03-04 | End: 2019-02-28 | Stop reason: CLARIF

## 2018-03-04 RX ORDER — METHYLPREDNISOLONE SOD SUCC 125 MG
125 VIAL (EA) INJECTION
Status: DISCONTINUED | OUTPATIENT
Start: 2018-03-04 | End: 2018-03-04

## 2018-03-04 RX ORDER — ALBUTEROL SULFATE 90 UG/1
1-2 AEROSOL, METERED RESPIRATORY (INHALATION) EVERY 6 HOURS PRN
Qty: 1 INHALER | Refills: 0 | Status: ON HOLD | OUTPATIENT
Start: 2018-03-04 | End: 2018-07-05

## 2018-03-04 RX ORDER — PREDNISONE 20 MG/1
40 TABLET ORAL DAILY
Qty: 10 TABLET | Refills: 0 | Status: SHIPPED | OUTPATIENT
Start: 2018-03-04 | End: 2019-03-28 | Stop reason: SDUPTHER

## 2018-03-04 RX ORDER — DICYCLOMINE HYDROCHLORIDE 20 MG/1
20 TABLET ORAL 2 TIMES DAILY PRN
Qty: 20 TABLET | Refills: 0 | Status: SHIPPED | OUTPATIENT
Start: 2018-03-04 | End: 2018-04-03

## 2018-03-04 RX ORDER — DIPHENHYDRAMINE HYDROCHLORIDE 50 MG/ML
50 INJECTION INTRAMUSCULAR; INTRAVENOUS
Status: DISCONTINUED | OUTPATIENT
Start: 2018-03-04 | End: 2018-03-04

## 2018-03-04 RX ORDER — KETOROLAC TROMETHAMINE 30 MG/ML
10 INJECTION, SOLUTION INTRAMUSCULAR; INTRAVENOUS
Status: COMPLETED | OUTPATIENT
Start: 2018-03-04 | End: 2018-03-04

## 2018-03-04 RX ORDER — ONDANSETRON 2 MG/ML
4 INJECTION INTRAMUSCULAR; INTRAVENOUS
Status: COMPLETED | OUTPATIENT
Start: 2018-03-04 | End: 2018-03-04

## 2018-03-04 RX ADMIN — ALUMINUM HYDROXIDE, MAGNESIUM HYDROXIDE, AND SIMETHICONE 50 ML: 200; 200; 20 SUSPENSION ORAL at 10:03

## 2018-03-04 RX ADMIN — IPRATROPIUM BROMIDE AND ALBUTEROL SULFATE 3 ML: .5; 3 SOLUTION RESPIRATORY (INHALATION) at 11:03

## 2018-03-04 RX ADMIN — PREDNISONE 60 MG: 20 TABLET ORAL at 11:03

## 2018-03-04 RX ADMIN — ONDANSETRON HYDROCHLORIDE 4 MG: 2 INJECTION, SOLUTION INTRAMUSCULAR; INTRAVENOUS at 09:03

## 2018-03-04 RX ADMIN — KETOROLAC TROMETHAMINE 10 MG: 30 INJECTION, SOLUTION INTRAMUSCULAR at 10:03

## 2018-03-05 NOTE — ED TRIAGE NOTES
Patient with hx of abdominal hernia presents to ED with c/c of increased size of his hernia accompanied by constipation, nausea, and vomiting x4 days.

## 2018-03-05 NOTE — ED NOTES
Pt asking if he could eat and drink something. Instructed pt that he could not have anything because he had a ct ordered and we had to wait for ct results before he could drink something. Pt upset.
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
## 2018-03-05 NOTE — ED NOTES
Pt walking down the hallway fussing and complaining about not getting pain meds. Spoke to Bea. New orders received.

## 2018-04-03 ENCOUNTER — TELEPHONE (OUTPATIENT)
Dept: SURGERY | Facility: CLINIC | Age: 53
End: 2018-04-03

## 2018-04-03 ENCOUNTER — NURSE TRIAGE (OUTPATIENT)
Dept: ADMINISTRATIVE | Facility: CLINIC | Age: 53
End: 2018-04-03

## 2018-04-03 NOTE — TELEPHONE ENCOUNTER
"    Reason for Disposition   Requesting regular office appointment    Answer Assessment - Initial Assessment Questions  1. REASON FOR CALL or QUESTION: "What is your reason for calling today?" or "How can I best help you?" or "What question do you have that I can help answer?"     Patient is calling to make an appointment with his surgeon for a hernia repair. Patient transferred to Dr. KIANA Chawla's office.    Protocols used: ST INFORMATION ONLY CALL-A-AH      "

## 2018-04-03 NOTE — TELEPHONE ENCOUNTER
----- Message from Jenna Mcfarland sent at 4/3/2018 12:54 PM CDT -----  Contact: self  Ming States that she needs a call returned by a nurse in reference to wanting to have his hernia surgery and states he was sick before,  Please call Ming @ [phone], he states he has been in and out of ER . Thanks :)

## 2018-04-26 ENCOUNTER — OFFICE VISIT (OUTPATIENT)
Dept: SURGERY | Facility: CLINIC | Age: 53
End: 2018-04-26
Payer: MEDICAID

## 2018-04-26 VITALS
HEART RATE: 91 BPM | WEIGHT: 245 LBS | BODY MASS INDEX: 37.13 KG/M2 | DIASTOLIC BLOOD PRESSURE: 87 MMHG | HEIGHT: 68 IN | TEMPERATURE: 99 F | SYSTOLIC BLOOD PRESSURE: 129 MMHG

## 2018-04-26 DIAGNOSIS — K42.9 RECURRENT UMBILICAL HERNIA: Primary | ICD-10-CM

## 2018-04-26 DIAGNOSIS — J44.1 COPD EXACERBATION: ICD-10-CM

## 2018-04-26 DIAGNOSIS — E11.8 TYPE 2 DIABETES MELLITUS WITH COMPLICATION, WITHOUT LONG-TERM CURRENT USE OF INSULIN: ICD-10-CM

## 2018-04-26 PROCEDURE — 99213 OFFICE O/P EST LOW 20 MIN: CPT | Mod: PBBFAC | Performed by: SURGERY

## 2018-04-26 PROCEDURE — 99213 OFFICE O/P EST LOW 20 MIN: CPT | Mod: S$PBB,,, | Performed by: SURGERY

## 2018-04-26 PROCEDURE — 99999 PR PBB SHADOW E&M-EST. PATIENT-LVL III: CPT | Mod: PBBFAC,,, | Performed by: SURGERY

## 2018-04-26 RX ORDER — ALBUTEROL SULFATE 0.83 MG/ML
SOLUTION RESPIRATORY (INHALATION)
Status: ON HOLD | COMMUNITY
Start: 2018-03-06 | End: 2018-07-05 | Stop reason: HOSPADM

## 2018-04-26 RX ORDER — ONDANSETRON 8 MG/1
TABLET, ORALLY DISINTEGRATING ORAL
Status: ON HOLD | COMMUNITY
Start: 2018-04-20 | End: 2018-07-05 | Stop reason: HOSPADM

## 2018-04-26 RX ORDER — ALBUTEROL SULFATE 1.25 MG/3ML
SOLUTION RESPIRATORY (INHALATION)
Status: ON HOLD | COMMUNITY
Start: 2018-03-27 | End: 2018-07-05 | Stop reason: HOSPADM

## 2018-04-26 RX ORDER — ESCITALOPRAM OXALATE 20 MG/1
TABLET ORAL
Status: ON HOLD | COMMUNITY
Start: 2018-02-15 | End: 2018-07-05 | Stop reason: HOSPADM
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
## 2018-04-26 NOTE — LETTER
[facility] - General Surgery  [address]  [facility] [address]  Phone: [phone] April 26, 2018      Theo Lopez MD  [address] [place] [address] [place]  [address]  [facility] [place] [address]    Patient: Ming Harrington   MR Number: [id number]   YOB: 1965   Date of Visit: 4/26/2018     Dear Dr. Lopez:    Thank you for referring Ming Harrington to me for evaluation. Below are the relevant portions of my assessment and plan of care.    ASSESSMENT/PLAN:     Multiply recurrent incisional hernia.  Many comorbidities and above average risk for surgery with over 50% chance of recurrent hernia.    PLAN: Consult liver GI.  Anesthesia consult.  For open incisional hernia with mesh.    Please let me know if you think he is not a surgical candidate.     If you have questions, please do not hesitate to call me. I look forward to following Ming along with you.    Sincerely,      Kenyon Chawla MD   Section Head - General, Laparoscopic, Bariatric  Acute Care and Oncologic Surgery   - Surgical Weight Loss Program  [facility]    WSR/bogdan    CC  Aditi Galarza, AMELIA Smith MD

## 2018-05-07 ENCOUNTER — TELEPHONE (OUTPATIENT)
Dept: HEPATOLOGY | Facility: CLINIC | Age: 53
End: 2018-05-07

## 2018-05-07 NOTE — TELEPHONE ENCOUNTER
----- Message from Angelic Harper RN sent at 5/7/2018  9:17 AM CDT -----  This pt needs to be set up for eval of hep c per Dr. Chawla.  He is tentatively scheduled for surgery on 5/18, but needs to be worked up prior and cleared per hepatology.    Please advise.    Thanks,  Angelic

## 2018-05-07 NOTE — TELEPHONE ENCOUNTER
Attempt to reach pt to schedule however person who answered the phone states the pt is not there and did not have a contact number to reach the pt, I can place the pt on this coming week Thursday or Friday at 340pm slot please let me know if this is good and which day to schedule thanks.

## 2018-05-07 NOTE — TELEPHONE ENCOUNTER
Angelic sorry about the change of date and time but linda can see this pt on Friday at 300pm with in her [facility] clinic instead of Thursday since she will be seeing patients with a doctor that afternoon,, please note the change and advise the pt of this new time and date thanks.

## 2018-05-07 NOTE — TELEPHONE ENCOUNTER
Pt scheduled for Thursday 5/10/18 at 340pm with linda Girard PA-C She will see pt on her Hepatology clinic afternoon to clear pt for upcoming surgery if possible.

## 2018-05-11 ENCOUNTER — TELEPHONE (OUTPATIENT)
Dept: HEPATOLOGY | Facility: CLINIC | Age: 53
End: 2018-05-11

## 2018-05-11 NOTE — TELEPHONE ENCOUNTER
----- Message from Edilma Urbina RN sent at 5/11/2018  1:00 PM CDT -----  Contact: self  Patient needs to be seen in [facility] clinic by any provider per Hugh.  ----- Message -----  From: Amy Grimes  Sent: 5/11/2018  10:04 AM  To: [facility] Staff    Pt is calling in regards to rescheduling his appt on today. Per pt would like to reschedule to 05/15, the same day as his other appt, if possible.    Pt would like a call back to do so at [phone].    Thank you
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
## 2018-05-15 ENCOUNTER — TELEPHONE (OUTPATIENT)
Dept: HEPATOLOGY | Facility: CLINIC | Age: 53
End: 2018-05-15

## 2018-05-15 ENCOUNTER — HOSPITAL ENCOUNTER (OUTPATIENT)
Dept: PREADMISSION TESTING | Facility: HOSPITAL | Age: 53
Discharge: HOME | End: 2018-05-15

## 2018-05-15 NOTE — TELEPHONE ENCOUNTER
MA called patient reschedule his appt he said it need to be before 5/18 before his surgery. He accepted the appt tomorrow 5/16.

## 2018-05-16 ENCOUNTER — HOSPITAL ENCOUNTER (OUTPATIENT)
Dept: PREADMISSION TESTING | Facility: HOSPITAL | Age: 53
Discharge: HOME | End: 2018-05-16

## 2018-05-22 ENCOUNTER — TELEPHONE (OUTPATIENT)
Dept: HEPATOLOGY | Facility: CLINIC | Age: 53
End: 2018-05-22

## 2018-05-22 NOTE — TELEPHONE ENCOUNTER
Pt has not been seen by me since 2013. Not cleared for surgery from hepatology perspective. He had appt last week that he cancelled. Please call pt to reschedule f/u visit with any available provider.

## 2018-06-06 NOTE — ED NOTES
Report received from edenilson velasquez patient awake alert ox4 in no acute distress patient on cardiac monitor bp cuff and pulse ox patient call light at bedside patient updated on admit status waiting for bed. Patient has no questions at this time nurse will continue to monitor    160

## 2018-07-01 ENCOUNTER — HOSPITAL ENCOUNTER (EMERGENCY)
Facility: HOSPITAL | Age: 53
Discharge: HOME | End: 2018-07-01
Attending: EMERGENCY MEDICINE
Payer: MEDICAID

## 2018-07-01 VITALS
RESPIRATION RATE: 19 BRPM | HEIGHT: 68 IN | DIASTOLIC BLOOD PRESSURE: 68 MMHG | SYSTOLIC BLOOD PRESSURE: 109 MMHG | BODY MASS INDEX: 36.37 KG/M2 | OXYGEN SATURATION: 93 % | WEIGHT: 240 LBS | HEART RATE: 87 BPM | TEMPERATURE: 98 F

## 2018-07-01 DIAGNOSIS — J44.1 COPD EXACERBATION: ICD-10-CM

## 2018-07-01 DIAGNOSIS — R06.02 SOB (SHORTNESS OF BREATH): ICD-10-CM

## 2018-07-01 DIAGNOSIS — T78.40XA ALLERGIC REACTION, INITIAL ENCOUNTER: Primary | ICD-10-CM

## 2018-07-01 PROCEDURE — 94640 AIRWAY INHALATION TREATMENT: CPT

## 2018-07-01 PROCEDURE — 63600175 PHARM REV CODE 636 W HCPCS: Performed by: EMERGENCY MEDICINE

## 2018-07-01 PROCEDURE — 96372 THER/PROPH/DIAG INJ SC/IM: CPT

## 2018-07-01 PROCEDURE — 27000221 HC OXYGEN, UP TO 24 HOURS

## 2018-07-01 PROCEDURE — 25000242 PHARM REV CODE 250 ALT 637 W/ HCPCS: Performed by: EMERGENCY MEDICINE

## 2018-07-01 PROCEDURE — 99284 EMERGENCY DEPT VISIT MOD MDM: CPT | Mod: 25

## 2018-07-01 PROCEDURE — 93005 ELECTROCARDIOGRAM TRACING: CPT

## 2018-07-01 PROCEDURE — 93010 ELECTROCARDIOGRAM REPORT: CPT | Mod: ,,, | Performed by: INTERNAL MEDICINE

## 2018-07-01 RX ORDER — IPRATROPIUM BROMIDE AND ALBUTEROL SULFATE 2.5; .5 MG/3ML; MG/3ML
3 SOLUTION RESPIRATORY (INHALATION)
Status: DISCONTINUED | OUTPATIENT
Start: 2018-07-01 | End: 2018-07-01 | Stop reason: HOSPADM

## 2018-07-01 RX ORDER — DEXAMETHASONE SODIUM PHOSPHATE 4 MG/ML
10 INJECTION, SOLUTION INTRA-ARTICULAR; INTRALESIONAL; INTRAMUSCULAR; INTRAVENOUS; SOFT TISSUE
Status: COMPLETED | OUTPATIENT
Start: 2018-07-01 | End: 2018-07-01

## 2018-07-01 RX ORDER — IPRATROPIUM BROMIDE AND ALBUTEROL SULFATE 2.5; .5 MG/3ML; MG/3ML
3 SOLUTION RESPIRATORY (INHALATION)
Status: COMPLETED | OUTPATIENT
Start: 2018-07-01 | End: 2018-07-01

## 2018-07-01 RX ORDER — ALBUTEROL SULFATE 90 UG/1
1-2 AEROSOL, METERED RESPIRATORY (INHALATION) EVERY 6 HOURS PRN
Qty: 1 INHALER | Refills: 0 | Status: ON HOLD | OUTPATIENT
Start: 2018-07-01 | End: 2018-07-05 | Stop reason: HOSPADM

## 2018-07-01 RX ADMIN — IPRATROPIUM BROMIDE AND ALBUTEROL SULFATE 3 ML: .5; 3 SOLUTION RESPIRATORY (INHALATION) at 08:07

## 2018-07-01 RX ADMIN — DEXAMETHASONE SODIUM PHOSPHATE 10 MG: 4 INJECTION, SOLUTION INTRAMUSCULAR; INTRAVENOUS at 08:07

## 2018-07-01 NOTE — ED TRIAGE NOTES
Pt presents to ED with complaint of shortness of breath with wheezing and swelling to tongue after eating boudin that had crawfish in it and states allergic to shellfish.

## 2018-07-01 NOTE — ED NOTES
"Patient became angry when Dr. Lewis would not refill all of home medications. Advised patient to f/u with PCP tomorrow for routine home meds. States that we are "putting him out to go to another hospital". Community referral sheet given and explained. Advised f/u with [facility] or [facility]. Explained that this was not another hospital but a clinic in which he can received consistent primary care. Patient began to raise his voice, stepping close to this staff member. He continued to holler about this facility not providing care. Security notified and patient escorted out.   "

## 2018-07-01 NOTE — DISCHARGE INSTRUCTIONS
Please make sure you are drinking plenty of fluids, such as gatorade G2 or Powerade Light. I recommend purchasing over the counter benadryl and pepcid to take as needed for any further allergic reaction symptoms.
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
## 2018-07-04 ENCOUNTER — HOSPITAL ENCOUNTER (OUTPATIENT)
Facility: HOSPITAL | Age: 53
Discharge: HOME | End: 2018-07-05
Attending: EMERGENCY MEDICINE | Admitting: HOSPITALIST
Payer: MEDICAID

## 2018-07-04 DIAGNOSIS — J44.0 CHRONIC OBSTRUCTIVE PULMONARY DISEASE WITH ACUTE LOWER RESPIRATORY INFECTION: ICD-10-CM

## 2018-07-04 DIAGNOSIS — E11.9 TYPE 2 DIABETES MELLITUS WITHOUT COMPLICATION, WITHOUT LONG-TERM CURRENT USE OF INSULIN: ICD-10-CM

## 2018-07-04 DIAGNOSIS — J45.21 EXACERBATION OF INTERMITTENT ASTHMA, UNSPECIFIED ASTHMA SEVERITY: Primary | ICD-10-CM

## 2018-07-04 DIAGNOSIS — J44.1 COPD WITH ACUTE EXACERBATION: ICD-10-CM

## 2018-07-04 DIAGNOSIS — F31.81 BIPOLAR 2 DISORDER: ICD-10-CM

## 2018-07-04 DIAGNOSIS — R07.9 CHEST PAIN: ICD-10-CM

## 2018-07-04 LAB
ALBUMIN SERPL BCP-MCNC: 3.2 G/DL
ALP SERPL-CCNC: 95 U/L
ALT SERPL W/O P-5'-P-CCNC: 36 U/L
ANION GAP SERPL CALC-SCNC: 10 MMOL/L
AST SERPL-CCNC: 41 U/L
BASOPHILS # BLD AUTO: 0.03 K/UL
BASOPHILS NFR BLD: 0.4 %
BILIRUB SERPL-MCNC: 1.1 MG/DL
BNP SERPL-MCNC: 41 PG/ML
BUN SERPL-MCNC: 10 MG/DL
CALCIUM SERPL-MCNC: 9.4 MG/DL
CHLORIDE SERPL-SCNC: 103 MMOL/L
CO2 SERPL-SCNC: 27 MMOL/L
CREAT SERPL-MCNC: 1 MG/DL
DIFFERENTIAL METHOD: NORMAL
EOSINOPHIL # BLD AUTO: 0.2 K/UL
EOSINOPHIL NFR BLD: 2.3 %
ERYTHROCYTE [DISTWIDTH] IN BLOOD BY AUTOMATED COUNT: 12.8 %
EST. GFR  (AFRICAN AMERICAN): >60 ML/MIN/1.73 M^2
EST. GFR  (NON AFRICAN AMERICAN): >60 ML/MIN/1.73 M^2
ESTIMATED AVG GLUCOSE: 88 MG/DL
GLUCOSE SERPL-MCNC: 101 MG/DL
HBA1C MFR BLD HPLC: 4.7 %
HCT VFR BLD AUTO: 45.2 %
HGB BLD-MCNC: 14.9 G/DL
IMM GRANULOCYTES # BLD AUTO: 0.02 K/UL
IMM GRANULOCYTES NFR BLD AUTO: 0.3 %
LYMPHOCYTES # BLD AUTO: 1.5 K/UL
LYMPHOCYTES NFR BLD: 22.2 %
MCH RBC QN AUTO: 29.8 PG
MCHC RBC AUTO-ENTMCNC: 33 G/DL
MCV RBC AUTO: 90 FL
MONOCYTES # BLD AUTO: 0.4 K/UL
MONOCYTES NFR BLD: 5.5 %
NEUTROPHILS # BLD AUTO: 4.8 K/UL
NEUTROPHILS NFR BLD: 69.3 %
NRBC BLD-RTO: 0 /100 WBC
PLATELET # BLD AUTO: 212 K/UL
PMV BLD AUTO: 9.8 FL
POTASSIUM SERPL-SCNC: 4 MMOL/L
PROCALCITONIN SERPL IA-MCNC: 0.06 NG/ML
PROT SERPL-MCNC: 8.3 G/DL
RBC # BLD AUTO: 5 M/UL
SODIUM SERPL-SCNC: 140 MMOL/L
TROPONIN I SERPL DL<=0.01 NG/ML-MCNC: <0.006 NG/ML
WBC # BLD AUTO: 6.93 K/UL

## 2018-07-04 PROCEDURE — 94761 N-INVAS EAR/PLS OXIMETRY MLT: CPT

## 2018-07-04 PROCEDURE — 27000221 HC OXYGEN, UP TO 24 HOURS

## 2018-07-04 PROCEDURE — 63600175 PHARM REV CODE 636 W HCPCS: Performed by: PHYSICIAN ASSISTANT

## 2018-07-04 PROCEDURE — 83036 HEMOGLOBIN GLYCOSYLATED A1C: CPT

## 2018-07-04 PROCEDURE — 84484 ASSAY OF TROPONIN QUANT: CPT

## 2018-07-04 PROCEDURE — 99219 PR INITIAL OBSERVATION CARE,LEVL II: CPT | Mod: ,,, | Performed by: HOSPITALIST

## 2018-07-04 PROCEDURE — 25000003 PHARM REV CODE 250: Performed by: PHYSICIAN ASSISTANT

## 2018-07-04 PROCEDURE — 94640 AIRWAY INHALATION TREATMENT: CPT

## 2018-07-04 PROCEDURE — 85025 COMPLETE CBC W/AUTO DIFF WBC: CPT

## 2018-07-04 PROCEDURE — 80053 COMPREHEN METABOLIC PANEL: CPT

## 2018-07-04 PROCEDURE — 25000242 PHARM REV CODE 250 ALT 637 W/ HCPCS: Performed by: HOSPITALIST

## 2018-07-04 PROCEDURE — 99285 EMERGENCY DEPT VISIT HI MDM: CPT | Mod: 25

## 2018-07-04 PROCEDURE — 84145 PROCALCITONIN (PCT): CPT

## 2018-07-04 PROCEDURE — 25000003 PHARM REV CODE 250: Performed by: HOSPITALIST

## 2018-07-04 PROCEDURE — 36415 COLL VENOUS BLD VENIPUNCTURE: CPT

## 2018-07-04 PROCEDURE — G0378 HOSPITAL OBSERVATION PER HR: HCPCS

## 2018-07-04 PROCEDURE — 96375 TX/PRO/DX INJ NEW DRUG ADDON: CPT

## 2018-07-04 PROCEDURE — 83880 ASSAY OF NATRIURETIC PEPTIDE: CPT

## 2018-07-04 PROCEDURE — 93005 ELECTROCARDIOGRAM TRACING: CPT

## 2018-07-04 PROCEDURE — 63600175 PHARM REV CODE 636 W HCPCS: Performed by: HOSPITALIST

## 2018-07-04 PROCEDURE — 99285 EMERGENCY DEPT VISIT HI MDM: CPT | Mod: ,,, | Performed by: PHYSICIAN ASSISTANT

## 2018-07-04 PROCEDURE — 93010 ELECTROCARDIOGRAM REPORT: CPT | Mod: ,,, | Performed by: INTERNAL MEDICINE

## 2018-07-04 PROCEDURE — 96365 THER/PROPH/DIAG IV INF INIT: CPT

## 2018-07-04 PROCEDURE — 96361 HYDRATE IV INFUSION ADD-ON: CPT

## 2018-07-04 PROCEDURE — 25000242 PHARM REV CODE 250 ALT 637 W/ HCPCS: Performed by: PHYSICIAN ASSISTANT

## 2018-07-04 RX ORDER — KETOROLAC TROMETHAMINE 30 MG/ML
15 INJECTION, SOLUTION INTRAMUSCULAR; INTRAVENOUS ONCE
Status: COMPLETED | OUTPATIENT
Start: 2018-07-04 | End: 2018-07-04

## 2018-07-04 RX ORDER — CETIRIZINE HYDROCHLORIDE 5 MG/1
10 TABLET ORAL DAILY
Status: DISCONTINUED | OUTPATIENT
Start: 2018-07-05 | End: 2018-07-04

## 2018-07-04 RX ORDER — DICYCLOMINE HYDROCHLORIDE 10 MG/1
20 CAPSULE ORAL 4 TIMES DAILY PRN
Status: DISCONTINUED | OUTPATIENT
Start: 2018-07-04 | End: 2018-07-05 | Stop reason: HOSPADM

## 2018-07-04 RX ORDER — GLUCAGON 1 MG
1 KIT INJECTION
Status: DISCONTINUED | OUTPATIENT
Start: 2018-07-04 | End: 2018-07-05 | Stop reason: HOSPADM

## 2018-07-04 RX ORDER — CETIRIZINE HYDROCHLORIDE 5 MG/1
10 TABLET ORAL DAILY
Status: DISCONTINUED | OUTPATIENT
Start: 2018-07-04 | End: 2018-07-05 | Stop reason: HOSPADM

## 2018-07-04 RX ORDER — AMLODIPINE BESYLATE 10 MG/1
10 TABLET ORAL DAILY
Status: DISCONTINUED | OUTPATIENT
Start: 2018-07-04 | End: 2018-07-05 | Stop reason: HOSPADM

## 2018-07-04 RX ORDER — IBUPROFEN 200 MG
24 TABLET ORAL
Status: DISCONTINUED | OUTPATIENT
Start: 2018-07-04 | End: 2018-07-05 | Stop reason: HOSPADM

## 2018-07-04 RX ORDER — PREDNISONE 20 MG/1
60 TABLET ORAL
Status: COMPLETED | OUTPATIENT
Start: 2018-07-04 | End: 2018-07-04

## 2018-07-04 RX ORDER — AMOXICILLIN 250 MG
1 CAPSULE ORAL 2 TIMES DAILY PRN
Status: DISCONTINUED | OUTPATIENT
Start: 2018-07-04 | End: 2018-07-05 | Stop reason: HOSPADM

## 2018-07-04 RX ORDER — PROMETHAZINE HYDROCHLORIDE 12.5 MG/1
12.5 TABLET ORAL
Status: COMPLETED | OUTPATIENT
Start: 2018-07-04 | End: 2018-07-04

## 2018-07-04 RX ORDER — IPRATROPIUM BROMIDE AND ALBUTEROL SULFATE 2.5; .5 MG/3ML; MG/3ML
3 SOLUTION RESPIRATORY (INHALATION)
Status: DISCONTINUED | OUTPATIENT
Start: 2018-07-04 | End: 2018-07-04

## 2018-07-04 RX ORDER — PREDNISONE 20 MG/1
60 TABLET ORAL DAILY
Status: DISCONTINUED | OUTPATIENT
Start: 2018-07-05 | End: 2018-07-05 | Stop reason: HOSPADM

## 2018-07-04 RX ORDER — RAMELTEON 8 MG/1
8 TABLET ORAL NIGHTLY PRN
Status: DISCONTINUED | OUTPATIENT
Start: 2018-07-04 | End: 2018-07-05 | Stop reason: HOSPADM

## 2018-07-04 RX ORDER — IPRATROPIUM BROMIDE AND ALBUTEROL SULFATE 2.5; .5 MG/3ML; MG/3ML
3 SOLUTION RESPIRATORY (INHALATION) EVERY 4 HOURS
Status: DISCONTINUED | OUTPATIENT
Start: 2018-07-04 | End: 2018-07-05 | Stop reason: HOSPADM

## 2018-07-04 RX ORDER — DICYCLOMINE HYDROCHLORIDE 10 MG/1
20 CAPSULE ORAL
Status: COMPLETED | OUTPATIENT
Start: 2018-07-04 | End: 2018-07-04

## 2018-07-04 RX ORDER — IPRATROPIUM BROMIDE AND ALBUTEROL SULFATE 2.5; .5 MG/3ML; MG/3ML
3 SOLUTION RESPIRATORY (INHALATION)
Status: COMPLETED | OUTPATIENT
Start: 2018-07-04 | End: 2018-07-04

## 2018-07-04 RX ORDER — IBUPROFEN 200 MG
16 TABLET ORAL
Status: DISCONTINUED | OUTPATIENT
Start: 2018-07-04 | End: 2018-07-05 | Stop reason: HOSPADM

## 2018-07-04 RX ORDER — KETOROLAC TROMETHAMINE 30 MG/ML
15 INJECTION, SOLUTION INTRAMUSCULAR; INTRAVENOUS EVERY 6 HOURS PRN
Status: DISCONTINUED | OUTPATIENT
Start: 2018-07-04 | End: 2018-07-05 | Stop reason: HOSPADM

## 2018-07-04 RX ORDER — ACETAMINOPHEN 500 MG
1000 TABLET ORAL EVERY 8 HOURS PRN
Status: DISCONTINUED | OUTPATIENT
Start: 2018-07-04 | End: 2018-07-05 | Stop reason: HOSPADM

## 2018-07-04 RX ORDER — MAGNESIUM SULFATE HEPTAHYDRATE 40 MG/ML
2 INJECTION, SOLUTION INTRAVENOUS
Status: COMPLETED | OUTPATIENT
Start: 2018-07-04 | End: 2018-07-04

## 2018-07-04 RX ORDER — ONDANSETRON 4 MG/1
4 TABLET, FILM COATED ORAL EVERY 6 HOURS PRN
Status: DISCONTINUED | OUTPATIENT
Start: 2018-07-04 | End: 2018-07-05 | Stop reason: HOSPADM

## 2018-07-04 RX ORDER — ACETAMINOPHEN 500 MG
1000 TABLET ORAL
Status: COMPLETED | OUTPATIENT
Start: 2018-07-04 | End: 2018-07-04

## 2018-07-04 RX ORDER — FUROSEMIDE 40 MG/1
40 TABLET ORAL 2 TIMES DAILY
Status: DISCONTINUED | OUTPATIENT
Start: 2018-07-04 | End: 2018-07-05 | Stop reason: HOSPADM

## 2018-07-04 RX ORDER — FLUTICASONE FUROATE AND VILANTEROL 100; 25 UG/1; UG/1
1 POWDER RESPIRATORY (INHALATION) DAILY
Status: DISCONTINUED | OUTPATIENT
Start: 2018-07-04 | End: 2018-07-05 | Stop reason: HOSPADM

## 2018-07-04 RX ORDER — PROMETHAZINE HYDROCHLORIDE 12.5 MG/1
12.5 TABLET ORAL EVERY 6 HOURS PRN
Status: DISCONTINUED | OUTPATIENT
Start: 2018-07-04 | End: 2018-07-05 | Stop reason: HOSPADM

## 2018-07-04 RX ORDER — SODIUM CHLORIDE 0.9 % (FLUSH) 0.9 %
5 SYRINGE (ML) INJECTION
Status: DISCONTINUED | OUTPATIENT
Start: 2018-07-04 | End: 2018-07-05 | Stop reason: HOSPADM

## 2018-07-04 RX ORDER — ONDANSETRON 4 MG/1
4 TABLET, ORALLY DISINTEGRATING ORAL
Status: COMPLETED | OUTPATIENT
Start: 2018-07-04 | End: 2018-07-04

## 2018-07-04 RX ADMIN — ONDANSETRON 4 MG: 4 TABLET, ORALLY DISINTEGRATING ORAL at 10:07

## 2018-07-04 RX ADMIN — FLUTICASONE FUROATE AND VILANTEROL TRIFENATATE 1 PUFF: 100; 25 POWDER RESPIRATORY (INHALATION) at 06:07

## 2018-07-04 RX ADMIN — IPRATROPIUM BROMIDE AND ALBUTEROL SULFATE 3 ML: .5; 3 SOLUTION RESPIRATORY (INHALATION) at 09:07

## 2018-07-04 RX ADMIN — IPRATROPIUM BROMIDE AND ALBUTEROL SULFATE 3 ML: .5; 3 SOLUTION RESPIRATORY (INHALATION) at 07:07

## 2018-07-04 RX ADMIN — AMLODIPINE BESYLATE 10 MG: 10 TABLET ORAL at 02:07

## 2018-07-04 RX ADMIN — DICYCLOMINE HYDROCHLORIDE 20 MG: 10 CAPSULE ORAL at 10:07

## 2018-07-04 RX ADMIN — CETIRIZINE HYDROCHLORIDE 10 MG: 5 TABLET, FILM COATED ORAL at 04:07

## 2018-07-04 RX ADMIN — FUROSEMIDE 40 MG: 40 TABLET ORAL at 04:07

## 2018-07-04 RX ADMIN — PROMETHAZINE HYDROCHLORIDE 12.5 MG: 12.5 TABLET ORAL at 08:07

## 2018-07-04 RX ADMIN — SODIUM CHLORIDE 1000 ML: 0.9 INJECTION, SOLUTION INTRAVENOUS at 08:07

## 2018-07-04 RX ADMIN — MAGNESIUM SULFATE IN WATER 2 G: 40 INJECTION, SOLUTION INTRAVENOUS at 12:07

## 2018-07-04 RX ADMIN — IPRATROPIUM BROMIDE AND ALBUTEROL SULFATE 3 ML: .5; 3 SOLUTION RESPIRATORY (INHALATION) at 08:07

## 2018-07-04 RX ADMIN — KETOROLAC TROMETHAMINE 15 MG: 30 INJECTION, SOLUTION INTRAMUSCULAR at 01:07

## 2018-07-04 RX ADMIN — PROMETHAZINE HYDROCHLORIDE 12.5 MG: 12.5 TABLET ORAL at 01:07

## 2018-07-04 RX ADMIN — ACETAMINOPHEN 1000 MG: 500 TABLET ORAL at 04:07

## 2018-07-04 RX ADMIN — IPRATROPIUM BROMIDE AND ALBUTEROL SULFATE 3 ML: .5; 3 SOLUTION RESPIRATORY (INHALATION) at 03:07

## 2018-07-04 RX ADMIN — ONDANSETRON 4 MG: 4 TABLET, FILM COATED ORAL at 04:07

## 2018-07-04 RX ADMIN — PROMETHAZINE HYDROCHLORIDE 12.5 MG: 12.5 TABLET ORAL at 07:07

## 2018-07-04 RX ADMIN — ACETAMINOPHEN 1000 MG: 500 TABLET, COATED ORAL at 10:07

## 2018-07-04 RX ADMIN — PREDNISONE 60 MG: 20 TABLET ORAL at 08:07

## 2018-07-04 RX ADMIN — KETOROLAC TROMETHAMINE 15 MG: 30 INJECTION, SOLUTION INTRAMUSCULAR at 08:07

## 2018-07-04 NOTE — SUBJECTIVE & OBJECTIVE
Interval History: ***    Review of Systems   Constitutional: Negative for chills, fatigue and fever.   HENT: Negative for ear discharge and rhinorrhea.    Respiratory: Positive for cough, chest tightness and shortness of breath. Negative for wheezing.    Cardiovascular: Negative.  Negative for chest pain, palpitations and leg swelling.   Gastrointestinal: Negative for abdominal distention, abdominal pain, constipation, diarrhea, nausea and vomiting.   Genitourinary: Negative for decreased urine volume, dysuria, flank pain, frequency and urgency.   Musculoskeletal: Negative for back pain, gait problem and joint swelling.   Skin: Negative for rash.   Neurological: Negative for dizziness, weakness, light-headedness and headaches.   Psychiatric/Behavioral: Negative for agitation, behavioral problems and confusion.     Objective:     Vital Signs (Most Recent):  Temp: 98.4 °F (36.9 °C) (07/04/18 0715)  Pulse: 88 (07/04/18 1544)  Resp: (!) 23 (07/04/18 1544)  BP: (!) 174/85 (07/04/18 1416)  SpO2: (!) 91 % (07/04/18 1544) Vital Signs (24h Range):  Temp:  [98.4 °F (36.9 °C)] 98.4 °F (36.9 °C)  Pulse:  [] 88  Resp:  [18-25] 23  SpO2:  [91 %-100 %] 91 %  BP: (115-174)/(80-96) 174/85     Weight: 108.9 kg (240 lb)  Body mass index is 36.49 kg/m².    Intake/Output Summary (Last 24 hours) at 07/04/18 1556  Last data filed at 07/04/18 0900   Gross per 24 hour   Intake                0 ml   Output              500 ml   Net             -500 ml      Physical Exam   Constitutional: He is oriented to person, place, and time. He appears well-developed and well-nourished.   Eyes: EOM are normal. Pupils are equal, round, and reactive to light.   Cardiovascular: Normal rate, regular rhythm and normal heart sounds.  Exam reveals no gallop and no friction rub.    No murmur heard.  Pulmonary/Chest: Effort normal. No respiratory distress. He has wheezes. He has no rales. He exhibits no tenderness.   Abdominal: Soft. Bowel sounds are  normal. He exhibits no distension and no mass. There is no tenderness. There is no rebound and no guarding.   Musculoskeletal: Normal range of motion.   Neurological: He is alert and oriented to person, place, and time. He displays normal reflexes. No cranial nerve deficit. He exhibits normal muscle tone. Coordination normal.   Skin: Skin is warm and dry.       MELD-Na score: 6 at 12/2/2017  6:03 AM  MELD score: 6 at 12/2/2017  6:03 AM  Calculated from:  Serum Creatinine: 1 mg/dL at 12/2/2017  6:03 AM  Serum Sodium: 139 mmol/L (Rounded to 137) at 12/2/2017  6:03 AM  Total Bilirubin: 0.8 mg/dL (Rounded to 1) at 12/2/2017  6:03 AM  INR(ratio): 1 at 12/1/2017  9:30 PM  Age: 52 years    Significant Labs:  CBC:    Recent Labs  Lab 07/04/18  0802   WBC 6.93   HGB 14.9   HCT 45.2        CMP:    Recent Labs  Lab 07/04/18  0802      K 4.0      CO2 27      BUN 10   CREATININE 1.0   CALCIUM 9.4   PROT 8.3   ALBUMIN 3.2*   BILITOT 1.1*   ALKPHOS 95   AST 41*   ALT 36   ANIONGAP 10   EGFRNONAA >60.0     PTINR:  No results for input(s): INR in the last 48 hours.    Significant Procedures:   Dobutamine Stress Test with Color Flow: No results found for this or any previous visit.    None  CXR- no consolidation

## 2018-07-04 NOTE — ASSESSMENT & PLAN NOTE
Symptoms likely related to asthma exacerbation spurred by allergies. Pt reports hx of asthma since childhood; however notes say COPD. Per pt, he reports only a 3 pack year hx ( 1/2 pack x 6 years). Unclear if pt with asthma vs. COPD.   - Will start prednisone 60mg qd , cetrizine 10mg qd   - duonebs q4   - cont breo; at home was on breo, spiriva, albuterol
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
## 2018-07-04 NOTE — ED TRIAGE NOTES
Pt reports sudden onset of SOB this morning. Pt reports he ran out of his inhaler. Pt denies CP, fever, HA, nausea, vomiting, urinary difficulties.
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
## 2018-07-04 NOTE — ASSESSMENT & PLAN NOTE
Prior hx of IVDU per notes. Pt states he is currently on methadone but unable to give name of clinic to verify   - will treat any withdrawal symptomatically - e.g. anti-emetics, bentyl, etc

## 2018-07-04 NOTE — HPI
"51 yo man with hx of asthma who presents with worsening SOB x  4 days with acute worsening since last night. Pt states symptoms started 4 days ago after eating some crawfish which he is allergic to. He reported to the ED tht day with SOB and was released after symptomatic treatment. Since that time, pt states he began to need his albuterol pump more often. Pt states he usually takes it sparingly and was requiring his pump on a daily basis. He also reports increasing cough and chest tightness during this time. Last night symptoms worsened as he ran out of his albuterol pump. Given his worsening symptoms her reported to the ED.  He denies fevers, chills, leg swelling, URI symptoms, sick contacts.     In terms of his asthma history, pt state he was "born with it." Has a prior hx of intubatation x  2 - last event was 20 years. States he usually has about 1 ER visit for his asthma a year. However in the last year 5 admits and 1 ER visit noted per chart review.   "

## 2018-07-04 NOTE — ED NOTES
"Pt states "I can't take prednisone without nausea medication. I usually get phenergan with it." MITCHEL MITCHELL notified.  "

## 2018-07-04 NOTE — ED NOTES
Pt. Given sandwich and crackers per diet orders. No distress noted. Call light within reach, will continue to monitor.

## 2018-07-04 NOTE — ASSESSMENT & PLAN NOTE
Pt hypertensive   Continue home amlodipine 10mg  Unclear if pt is on clonidine still as pt states he was recently in skilled nursing   Will monitor
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
## 2018-07-04 NOTE — ED NOTES
HOURLY ROUNDING COMPLETE. Patient resting in stretcher and is in NAD at this time. Pt is awake alert and oriented x4, VSS, respirations mildly labored, port XR at bedside. Pt updated on POC. Bed low and locked with side rails up x2, call bell in pt reach. Urinal at bedside.

## 2018-07-04 NOTE — ED NOTES
"Pt states "I'm withdrawing from methadone. I'm vomiting right now" - no active vomiting noted at this time, no emesis noted in pt room - MITCHEL MITCHELL notified.   "

## 2018-07-04 NOTE — ED NOTES
HOURLY ROUNDING COMPLETE. Patient resting in stretcher and is in NAD at this time. Pt is awake alert and oriented x4, VSS. Pt updated on POC. Bed low and locked with side rails up x2, call bell in pt reach. Urinal at bedside.

## 2018-07-05 VITALS
TEMPERATURE: 99 F | HEART RATE: 88 BPM | DIASTOLIC BLOOD PRESSURE: 104 MMHG | SYSTOLIC BLOOD PRESSURE: 148 MMHG | OXYGEN SATURATION: 97 % | WEIGHT: 240.31 LBS | RESPIRATION RATE: 16 BRPM | HEIGHT: 68 IN | BODY MASS INDEX: 36.42 KG/M2

## 2018-07-05 PROBLEM — J45.901 EXACERBATION OF ASTHMA: Status: RESOLVED | Noted: 2017-12-01 | Resolved: 2018-07-05

## 2018-07-05 PROBLEM — E11.8 TYPE 2 DIABETES MELLITUS WITH COMPLICATION, WITHOUT LONG-TERM CURRENT USE OF INSULIN: Status: RESOLVED | Noted: 2017-10-02 | Resolved: 2018-07-05

## 2018-07-05 LAB
ALBUMIN SERPL BCP-MCNC: 3.2 G/DL
ALP SERPL-CCNC: 91 U/L
ALT SERPL W/O P-5'-P-CCNC: 40 U/L
ANION GAP SERPL CALC-SCNC: 10 MMOL/L
AST SERPL-CCNC: 45 U/L
BASOPHILS # BLD AUTO: 0.01 K/UL
BASOPHILS NFR BLD: 0.1 %
BILIRUB SERPL-MCNC: 1.3 MG/DL
BUN SERPL-MCNC: 15 MG/DL
CALCIUM SERPL-MCNC: 9.3 MG/DL
CHLORIDE SERPL-SCNC: 100 MMOL/L
CO2 SERPL-SCNC: 29 MMOL/L
CREAT SERPL-MCNC: 1.1 MG/DL
DIFFERENTIAL METHOD: ABNORMAL
EOSINOPHIL # BLD AUTO: 0 K/UL
EOSINOPHIL NFR BLD: 0.3 %
ERYTHROCYTE [DISTWIDTH] IN BLOOD BY AUTOMATED COUNT: 12.6 %
EST. GFR  (AFRICAN AMERICAN): >60 ML/MIN/1.73 M^2
EST. GFR  (NON AFRICAN AMERICAN): >60 ML/MIN/1.73 M^2
GLUCOSE SERPL-MCNC: 107 MG/DL
HCT VFR BLD AUTO: 44.6 %
HGB BLD-MCNC: 15.2 G/DL
IMM GRANULOCYTES # BLD AUTO: 0.07 K/UL
IMM GRANULOCYTES NFR BLD AUTO: 0.6 %
LYMPHOCYTES # BLD AUTO: 2.1 K/UL
LYMPHOCYTES NFR BLD: 16.2 %
MCH RBC QN AUTO: 29.9 PG
MCHC RBC AUTO-ENTMCNC: 34.1 G/DL
MCV RBC AUTO: 88 FL
MONOCYTES # BLD AUTO: 0.7 K/UL
MONOCYTES NFR BLD: 5.7 %
NEUTROPHILS # BLD AUTO: 9.8 K/UL
NEUTROPHILS NFR BLD: 77.1 %
NRBC BLD-RTO: 0 /100 WBC
PLATELET # BLD AUTO: 234 K/UL
PLATELET BLD QL SMEAR: ABNORMAL
PMV BLD AUTO: 12.3 FL
POTASSIUM SERPL-SCNC: 3.8 MMOL/L
PROT SERPL-MCNC: 8.2 G/DL
RBC # BLD AUTO: 5.09 M/UL
SODIUM SERPL-SCNC: 139 MMOL/L
WBC # BLD AUTO: 12.68 K/UL

## 2018-07-05 PROCEDURE — 94640 AIRWAY INHALATION TREATMENT: CPT

## 2018-07-05 PROCEDURE — G0378 HOSPITAL OBSERVATION PER HR: HCPCS

## 2018-07-05 PROCEDURE — 63600175 PHARM REV CODE 636 W HCPCS: Performed by: PHYSICIAN ASSISTANT

## 2018-07-05 PROCEDURE — 85025 COMPLETE CBC W/AUTO DIFF WBC: CPT

## 2018-07-05 PROCEDURE — 25000242 PHARM REV CODE 250 ALT 637 W/ HCPCS: Performed by: HOSPITALIST

## 2018-07-05 PROCEDURE — 94761 N-INVAS EAR/PLS OXIMETRY MLT: CPT

## 2018-07-05 PROCEDURE — 36415 COLL VENOUS BLD VENIPUNCTURE: CPT

## 2018-07-05 PROCEDURE — 99225 PR SUBSEQUENT OBSERVATION CARE,LEVEL II: CPT | Mod: ,,, | Performed by: HOSPITALIST

## 2018-07-05 PROCEDURE — 63600175 PHARM REV CODE 636 W HCPCS: Performed by: HOSPITALIST

## 2018-07-05 PROCEDURE — 25000003 PHARM REV CODE 250: Performed by: HOSPITALIST

## 2018-07-05 PROCEDURE — 80053 COMPREHEN METABOLIC PANEL: CPT

## 2018-07-05 RX ORDER — AMLODIPINE BESYLATE 10 MG/1
10 TABLET ORAL DAILY
Qty: 30 TABLET | Refills: 3 | Status: SHIPPED | OUTPATIENT
Start: 2018-07-05 | End: 2019-03-28

## 2018-07-05 RX ORDER — PREDNISONE 20 MG/1
40 TABLET ORAL DAILY
Qty: 6 TABLET | Refills: 0 | Status: SHIPPED | OUTPATIENT
Start: 2018-07-06 | End: 2018-07-09

## 2018-07-05 RX ORDER — OLANZAPINE 10 MG/1
10 TABLET ORAL 2 TIMES DAILY
Qty: 60 TABLET | Refills: 0 | Status: SHIPPED | OUTPATIENT
Start: 2018-07-05 | End: 2019-03-28

## 2018-07-05 RX ORDER — IBUPROFEN 600 MG/1
600 TABLET ORAL EVERY 6 HOURS PRN
Status: DISCONTINUED | OUTPATIENT
Start: 2018-07-05 | End: 2018-07-05 | Stop reason: HOSPADM

## 2018-07-05 RX ORDER — FLUTICASONE FUROATE AND VILANTEROL 100; 25 UG/1; UG/1
1 POWDER RESPIRATORY (INHALATION) DAILY
Qty: 60 EACH | Refills: 6 | Status: ON HOLD | OUTPATIENT
Start: 2018-07-05 | End: 2018-12-02 | Stop reason: SDUPTHER

## 2018-07-05 RX ORDER — IPRATROPIUM BROMIDE AND ALBUTEROL SULFATE 2.5; .5 MG/3ML; MG/3ML
3 SOLUTION RESPIRATORY (INHALATION) EVERY 4 HOURS
Qty: 1 BOX | Refills: 3 | Status: ON HOLD | OUTPATIENT
Start: 2018-07-05 | End: 2018-12-02 | Stop reason: SDUPTHER

## 2018-07-05 RX ORDER — FUROSEMIDE 40 MG/1
40 TABLET ORAL DAILY
Qty: 60 TABLET | Refills: 3
Start: 2018-07-05 | End: 2019-08-13 | Stop reason: SDUPTHER

## 2018-07-05 RX ORDER — ALBUTEROL SULFATE 90 UG/1
1-2 AEROSOL, METERED RESPIRATORY (INHALATION) EVERY 6 HOURS PRN
Qty: 1 INHALER | Refills: 5 | Status: ON HOLD | OUTPATIENT
Start: 2018-07-05 | End: 2018-12-02 | Stop reason: SDUPTHER

## 2018-07-05 RX ADMIN — PREDNISONE 60 MG: 20 TABLET ORAL at 09:07

## 2018-07-05 RX ADMIN — CETIRIZINE HYDROCHLORIDE 10 MG: 5 TABLET, FILM COATED ORAL at 09:07

## 2018-07-05 RX ADMIN — PROMETHAZINE HYDROCHLORIDE 12.5 MG: 12.5 TABLET ORAL at 11:07

## 2018-07-05 RX ADMIN — FUROSEMIDE 40 MG: 40 TABLET ORAL at 09:07

## 2018-07-05 RX ADMIN — IPRATROPIUM BROMIDE AND ALBUTEROL SULFATE 3 ML: .5; 3 SOLUTION RESPIRATORY (INHALATION) at 04:07

## 2018-07-05 RX ADMIN — ACETAMINOPHEN 1000 MG: 500 TABLET ORAL at 11:07

## 2018-07-05 RX ADMIN — FLUTICASONE FUROATE AND VILANTEROL TRIFENATATE 1 PUFF: 100; 25 POWDER RESPIRATORY (INHALATION) at 08:07

## 2018-07-05 RX ADMIN — ONDANSETRON 4 MG: 4 TABLET, FILM COATED ORAL at 08:07

## 2018-07-05 RX ADMIN — IBUPROFEN 600 MG: 600 TABLET, FILM COATED ORAL at 09:07

## 2018-07-05 RX ADMIN — KETOROLAC TROMETHAMINE 15 MG: 30 INJECTION, SOLUTION INTRAMUSCULAR at 09:07

## 2018-07-05 RX ADMIN — IPRATROPIUM BROMIDE AND ALBUTEROL SULFATE 3 ML: .5; 3 SOLUTION RESPIRATORY (INHALATION) at 12:07

## 2018-07-05 RX ADMIN — ACETAMINOPHEN 1000 MG: 500 TABLET ORAL at 03:07

## 2018-07-05 RX ADMIN — AMLODIPINE BESYLATE 10 MG: 10 TABLET ORAL at 09:07

## 2018-07-05 RX ADMIN — IPRATROPIUM BROMIDE AND ALBUTEROL SULFATE 3 ML: .5; 3 SOLUTION RESPIRATORY (INHALATION) at 11:07

## 2018-07-05 NOTE — PLAN OF CARE
NASH notified by nurse to cancel patient's transportation as he was not in the room and appears to have left the hospital.     NASH cancelled transport with [employer].     Rachel Guzman LMSW  [facility]  [id number]

## 2018-07-05 NOTE — NURSING
Notified by NASH that transportation would  pt at 1315. Went to pt's room to notify pt, review discharge papers, and remove IV. Pt not in bed and not in bathroom. Notified by Tele tech that tele box had been found outside on bench. Overhead paged pt to return to room. Paged MD to inform of situation. Will continue to monitor.

## 2018-07-05 NOTE — DISCHARGE INSTRUCTIONS
Theo Lopez MD PCP - General Family Medicine 12/19/2017 End 12/19/17   Phone: [phone]; Fax: [phone]        APPOINTMENT MADE ON July 12 THURS 10:40 AM  [facility] [address]  [address] [place] [address]  [place] [address]

## 2018-07-05 NOTE — PLAN OF CARE
Problem: Patient Care Overview  Goal: Plan of Care Review  Patient complaining of cramping to legs. hospitalist team notified and order for tramadol received.  IV access lost and multiple nurses attempted to regain access.  Consult for midline placed.  No other complaints.  Vital signs stable.

## 2018-07-05 NOTE — NURSING
Patient complaining of cramping to calves bilaterally.  Reported to treatment team and received prn order for toradol.

## 2018-07-05 NOTE — PLAN OF CARE
Pt vomiting and in pain. Nurse paged DR Wong  Pt has Medicaid   Will need transport set up per MSW upon d/c  No future appointments.  Dr. Theo Lopez MD    Family Medicine  ·      Dr. Theo Lopez MD is a family medicine specialist in [place], [place]. He specializes in family medicine.    Family Medicine Specialist Search >  [facility] [address]  [address]  [place] [address]     07/05/18 0839   Discharge Assessment   Assessment Type Discharge Planning Assessment   Confirmed/corrected address and phone number on facesheet? Yes   Assessment information obtained from? Patient   Expected Length of Stay (days) 4   Communicated expected length of stay with patient/caregiver yes   Prior to hospitilization cognitive status: Alert/Oriented   Prior to hospitalization functional status: Independent   Current cognitive status: Alert/Oriented   Current Functional Status: Independent   Lives With sibling(s)   Able to Return to Prior Arrangements yes   Is patient able to care for self after discharge? Yes   Who are your caregiver(s) and their phone number(s)? brother      and humble   [phone]   and martin sister [phone]   Patient's perception of discharge disposition home or selfcare   Equipment Currently Used at Home oxygen   Do you have any problems affording any of your prescribed medications? No   Is the patient taking medications as prescribed? yes   Discharge Plan A Other;Home with family   Discharge Plan B Home with family;Other   Patient/Family In Agreement With Plan yes     Contact Information  [phone]

## 2018-07-05 NOTE — PLAN OF CARE
NASH assigned to case today 7/5/2018. NASH will assist team with DC needs. NASH in communication with CM.    CM notified SW that the patient needs transportation home.     NASH arranged transport with [facility].  time is 1:15PM.    NASH notified nurse of the above.     Rachel Guzman, MORENITA  [facility]  [id number]

## 2018-07-05 NOTE — PLAN OF CARE
Theo Lopez MD PCP - General Family Medicine 12/19/2017 End 12/19/17   Phone: [phone]; Fax: [phone]        APPOINTMENT MADE ON July 12 THURS 10:40 AM  [facility] [address]  [address]  [place] [address]      LEFT MSG FOR [facility] that pt needs ride home     07/05/18 1037   Final Note   Assessment Type Final Discharge Note   Discharge Disposition Home   Hospital Follow Up  Appt(s) scheduled? Yes   Discharge plans and expectations educations in teach back method with documentation complete? Yes

## 2018-07-06 NOTE — DISCHARGE SUMMARY
"[facility] Hospital Medicine  Discharge Summary      Patient Name: Ming Harrington  MRN: [id number]  Admission Date: 7/4/2018  Hospital Length of Stay: 0 days  Discharge Date and Time: 7/5/2018  1:00 PM  Attending Physician: No att. providers found   Discharging Provider: Fidelia Wong MD  Primary Care Provider: Theo Lopez MD    [facility] Medicine Team: [facility] MED A Fidelia Wong MD       Principal Problem:Exacerbation of asthma     Chief Complaint:        Chief Complaint   Patient presents with    Asthma       Patient brought in by  EMS. Patient had an asthma attack this morning. He is out of his rescue inhaler         HPI: 51 yo man with hx of asthma, hx of IVDU (unclear if on methadone) who presents with worsening SOB x  4 days with acute worsening since last night. Pt states symptoms started 4 days ago after eating some crawfish which he is allergic to. He reported to the ED tht day with SOB and was released after symptomatic treatment. Since that time, pt states he began to need his albuterol pump more often. Pt states he usually takes it sparingly and was requiring his pump on a daily basis. He also reports increasing cough and chest tightness during this time. Last night symptoms worsened as he ran out of his albuterol pump. Given his worsening symptoms her reported to the ED.  He denies fevers, chills, leg swelling, URI symptoms, sick contacts.      In terms of his asthma history, pt state he was "born with it." Has a prior hx of intubatation x  2 - last event was 20 years. States he usually has about 1 ER visit for his asthma a year. However in the last year 5 admits and 1 ER visit noted per chart review.          * No surgery found *      Hospital Course:            * Exacerbation of asthma     Symptoms likely related to asthma exacerbation spurred by allergies. Pt reports hx of asthma since childhood; however notes say COPD. Per pt, he reports only a 3 pack year hx ( 1/2 pack x " 6 years). Unclear if pt with asthma vs. COPD. Pt was started on prednisone 60mg qd , cetrizine 10mg qd, duonebs q4, and breo. Pt's symptoms improved and he denies SOB on day of discharge. Wheezing had also resolved. Pt was requesting discharge.  Pt was discharged on home was on breo, spiriva, albuterol, and prednisone to complete a 5 day course.         Type 2 diabetes mellitus with complication, without long-term current use of insulin      A1c - 4.7 - No elevated A1c in the system. DM meds discontinued upon discharge. Problem to be removed from problem list.           Opiate dependence     Prior hx of IVDU per notes. Pt states he is currently on methadone but unable to give name of clinic to verify. Treated withdrawal symptomatically - e.g. anti-emetics, bentyl, etc              Essential hypertension     Pt hypertensive. Pt was continued home amlodipine 10mg.  Unclear if pt is on clonidine still as pt states he was recently in nursing home. This med was held and pt remained normotensive to minimally hypertensive. Pt discharged to resume home meds.                 Consults:   Consults         Status Ordering Provider     Inpatient consult to Midline team  Once     Provider:  (Not yet assigned)    Completed KARIME LABOY          Final Active Diagnoses:    Diagnosis Date Noted POA    COPD with acute exacerbation [J44.1] 07/04/2018 Yes    Opiate dependence [F11.20] 01/08/2013 Yes      Problems Resolved During this Admission:    Diagnosis Date Noted Date Resolved POA    PRINCIPAL PROBLEM:  Exacerbation of asthma [J45.901] 12/01/2017 07/05/2018 Yes    Type 2 diabetes mellitus with complication, without long-term current use of insulin [E11.8] 10/02/2017 07/05/2018 Yes    Essential hypertension [I10]  07/05/2018 Yes     Chronic      Discharged Condition: good    Disposition: Home or Self Care    Follow Up:  Follow-up Information     Please follow up.    Why:  APPOINTMENT MADE ON July 12 THURS 10:40 AM  DR WONG                Patient Instructions:     Ambulatory Referral to [facility]   Referral Priority: Routine Referral Type: Consultation   Referral Reason: Specialty Services Required    Number of Visits Requested: 1      Diet Cardiac     Notify your health care provider if you experience any of the following:  difficulty breathing or increased cough     Activity as tolerated       Medications:  Reconciled Home Medications:      Medication List      START taking these medications    albuterol-ipratropium 2.5 mg-0.5 mg/3 mL nebulizer solution  Commonly known as:  DUO-NEB  Take 3 mLs by nebulization every 4 (four) hours. Rescue     predniSONE 20 MG tablet  Commonly known as:  DELTASONE  Take 2 tablets (40 mg total) by mouth once daily. for 3 days        CHANGE how you take these medications    albuterol 90 mcg/actuation inhaler  Inhale 1-2 puffs into the lungs every 6 (six) hours as needed for Wheezing. Rescue  What changed:  Another medication with the same name was removed. Continue taking this medication, and follow the directions you see here.     amLODIPine 10 MG tablet  Commonly known as:  NORVASC  Take 1 tablet (10 mg total) by mouth once daily.  What changed:  Another medication with the same name was removed. Continue taking this medication, and follow the directions you see here.     cloNIDine 0.1 MG tablet  Commonly known as:  CATAPRES  Take 3 tablets (0.3 mg total) by mouth 3 (three) times daily.  What changed:  Another medication with the same name was removed. Continue taking this medication, and follow the directions you see here.     escitalopram oxalate 10 MG tablet  Commonly known as:  LEXAPRO  Take 1 tablet (10 mg total) by mouth once daily.  What changed:  Another medication with the same name was removed. Continue taking this medication, and follow the directions you see here.     fluticasone-vilanterol 100-25 mcg/dose diskus inhaler  Commonly known as:  BREO  Inhale 1 puff into the lungs once daily.  Controller  What changed:  Another medication with the same name was removed. Continue taking this medication, and follow the directions you see here.     furosemide 40 MG tablet  Commonly known as:  LASIX  Take 1 tablet (40 mg total) by mouth once daily.  What changed:  when to take this     nicotine 14 mg/24 hr  Commonly known as:  NICODERM CQ  Place 1 patch onto the skin once daily.  What changed:  Another medication with the same name was removed. Continue taking this medication, and follow the directions you see here.     OLANZapine 10 MG tablet  Commonly known as:  ZyPREXA  Take 1 tablet (10 mg total) by mouth 2 (two) times daily. 1 tab in am and 2 tabs at bedtime  What changed:  Another medication with the same name was removed. Continue taking this medication, and follow the directions you see here.     senna-docusate 8.6-50 mg 8.6-50 mg per tablet  Commonly known as:  PERICOLACE  Take 1 tablet by mouth 2 (two) times daily.  What changed:  Another medication with the same name was removed. Continue taking this medication, and follow the directions you see here.     tiotropium bromide 2.5 mcg/actuation Mist  Commonly known as:  SPIRIVA RESPIMAT  Inhale 1 each into the lungs once daily. Controller  What changed:  Another medication with the same name was removed. Continue taking this medication, and follow the directions you see here.        CONTINUE taking these medications    acetaminophen 500 MG tablet  Commonly known as:  TYLENOL  Take 1 tablet (500 mg total) by mouth every 6 (six) hours as needed for Pain.     ondansetron 4 MG tablet  Commonly known as:  ZOFRAN  Take 1-2 tablets (4-8 mg total) by mouth every 8 (eight) hours as needed for Nausea.        STOP taking these medications    metFORMIN 500 MG tablet  Commonly known as:  GLUCOPHAGE     naproxen 500 MG tablet  Commonly known as:  NAPROSYN     ondansetron 8 MG Tbdl  Commonly known as:  ZOFRAN-ODT            Significant Diagnostic Studies: Labs:    CMP   Recent Labs  Lab 07/05/18  0656      K 3.8      CO2 29      BUN 15   CREATININE 1.1   CALCIUM 9.3   PROT 8.2   ALBUMIN 3.2*   BILITOT 1.3*   ALKPHOS 91   AST 45*   ALT 40   ANIONGAP 10   ESTGFRAFRICA >60.0   EGFRNONAA >60.0    and CBC   Recent Labs  Lab 07/05/18  0656   WBC 12.68   HGB 15.2   HCT 44.6          Pending Diagnostic Studies:     None        Indwelling Lines/Drains at time of discharge:   Lines/Drains/Airways          No matching active lines, drains, or airways          Time spent on the discharge of patient: >30 minutes  Patient was seen and examined on the date of discharge and determined to be suitable for discharge.         Fidelia Wong MD  Department of Hospital Medicine  [facility]

## 2018-12-01 ENCOUNTER — HOSPITAL ENCOUNTER (OUTPATIENT)
Facility: HOSPITAL | Age: 53
Discharge: HOME | End: 2018-12-02
Attending: EMERGENCY MEDICINE | Admitting: FAMILY MEDICINE
Payer: MEDICAID

## 2018-12-01 DIAGNOSIS — J96.02 ACUTE RESPIRATORY FAILURE WITH HYPERCAPNIA: Primary | ICD-10-CM

## 2018-12-01 DIAGNOSIS — R06.02 SHORTNESS OF BREATH: ICD-10-CM

## 2018-12-01 DIAGNOSIS — J45.901 ASTHMA EXACERBATION IN COPD: ICD-10-CM

## 2018-12-01 DIAGNOSIS — J44.1 ASTHMA EXACERBATION IN COPD: ICD-10-CM

## 2018-12-01 DIAGNOSIS — J44.0 CHRONIC OBSTRUCTIVE PULMONARY DISEASE WITH ACUTE LOWER RESPIRATORY INFECTION: ICD-10-CM

## 2018-12-01 LAB
ALBUMIN SERPL BCP-MCNC: 3.3 G/DL
ALLENS TEST: ABNORMAL
ALLENS TEST: ABNORMAL
ALP SERPL-CCNC: 111 U/L
ALT SERPL W/O P-5'-P-CCNC: 31 U/L
AMPHET+METHAMPHET UR QL: NEGATIVE
ANION GAP SERPL CALC-SCNC: 7 MMOL/L
ANION GAP SERPL CALC-SCNC: 8 MMOL/L
AST SERPL-CCNC: 31 U/L
BARBITURATES UR QL SCN>200 NG/ML: NORMAL
BASOPHILS # BLD AUTO: 0 K/UL
BASOPHILS # BLD AUTO: 0.01 K/UL
BASOPHILS NFR BLD: 0 %
BASOPHILS NFR BLD: 0.1 %
BENZODIAZ UR QL SCN>200 NG/ML: NEGATIVE
BILIRUB SERPL-MCNC: 1.5 MG/DL
BNP SERPL-MCNC: 132 PG/ML
BUN SERPL-MCNC: 13 MG/DL
BUN SERPL-MCNC: 13 MG/DL
BZE UR QL SCN: NEGATIVE
CALCIUM SERPL-MCNC: 9.2 MG/DL
CALCIUM SERPL-MCNC: 9.4 MG/DL
CANNABINOIDS UR QL SCN: NEGATIVE
CHLORIDE SERPL-SCNC: 100 MMOL/L
CHLORIDE SERPL-SCNC: 101 MMOL/L
CO2 SERPL-SCNC: 29 MMOL/L
CO2 SERPL-SCNC: 31 MMOL/L
CREAT SERPL-MCNC: 0.9 MG/DL
CREAT SERPL-MCNC: 0.9 MG/DL
CREAT UR-MCNC: 48.1 MG/DL
DELSYS: ABNORMAL
DELSYS: ABNORMAL
DIFFERENTIAL METHOD: ABNORMAL
DIFFERENTIAL METHOD: ABNORMAL
EOSINOPHIL # BLD AUTO: 0 K/UL
EOSINOPHIL # BLD AUTO: 0 K/UL
EOSINOPHIL NFR BLD: 0 %
EOSINOPHIL NFR BLD: 0.1 %
EP: 5
ERYTHROCYTE [DISTWIDTH] IN BLOOD BY AUTOMATED COUNT: 12.5 %
ERYTHROCYTE [DISTWIDTH] IN BLOOD BY AUTOMATED COUNT: 12.5 %
EST. GFR  (AFRICAN AMERICAN): >60 ML/MIN/1.73 M^2
EST. GFR  (AFRICAN AMERICAN): >60 ML/MIN/1.73 M^2
EST. GFR  (NON AFRICAN AMERICAN): >60 ML/MIN/1.73 M^2
EST. GFR  (NON AFRICAN AMERICAN): >60 ML/MIN/1.73 M^2
FIO2: 30
FLOW: 3
GLUCOSE SERPL-MCNC: 123 MG/DL
GLUCOSE SERPL-MCNC: 135 MG/DL
HCO3 UR-SCNC: 33.7 MMOL/L (ref 24–28)
HCO3 UR-SCNC: 34.4 MMOL/L (ref 24–28)
HCT VFR BLD AUTO: 39.6 %
HCT VFR BLD AUTO: 41 %
HGB BLD-MCNC: 12.9 G/DL
HGB BLD-MCNC: 13.4 G/DL
IP: 15
LYMPHOCYTES # BLD AUTO: 1.1 K/UL
LYMPHOCYTES # BLD AUTO: 1.6 K/UL
LYMPHOCYTES NFR BLD: 12 %
LYMPHOCYTES NFR BLD: 12.3 %
MAGNESIUM SERPL-MCNC: 2.4 MG/DL
MCH RBC QN AUTO: 29.7 PG
MCH RBC QN AUTO: 30 PG
MCHC RBC AUTO-ENTMCNC: 32.6 G/DL
MCHC RBC AUTO-ENTMCNC: 32.7 G/DL
MCV RBC AUTO: 91 FL
MCV RBC AUTO: 92 FL
METHADONE UR QL SCN>300 NG/ML: NEGATIVE
MODE: ABNORMAL
MODE: ABNORMAL
MONOCYTES # BLD AUTO: 0.2 K/UL
MONOCYTES # BLD AUTO: 1.2 K/UL
MONOCYTES NFR BLD: 1.7 %
MONOCYTES NFR BLD: 9 %
NEUTROPHILS # BLD AUTO: 10.6 K/UL
NEUTROPHILS # BLD AUTO: 7.5 K/UL
NEUTROPHILS NFR BLD: 78.5 %
NEUTROPHILS NFR BLD: 85.5 %
OPIATES UR QL SCN: NORMAL
PCO2 BLDA: 63.1 MMHG (ref 35–45)
PCO2 BLDA: 80.1 MMHG (ref 35–45)
PCP UR QL SCN>25 NG/ML: NEGATIVE
PH SMN: 7.24 [PH] (ref 7.35–7.45)
PH SMN: 7.34 [PH] (ref 7.35–7.45)
PHOSPHATE SERPL-MCNC: 2.9 MG/DL
PLATELET # BLD AUTO: 197 K/UL
PLATELET # BLD AUTO: 205 K/UL
PMV BLD AUTO: 10 FL
PMV BLD AUTO: 9.6 FL
PO2 BLDA: 67 MMHG (ref 80–100)
PO2 BLDA: 92 MMHG (ref 80–100)
POC BE: 7 MMOL/L
POC BE: 8 MMOL/L
POC SATURATED O2: 91 % (ref 95–100)
POC SATURATED O2: 95 % (ref 95–100)
POC TCO2: 36 MMOL/L (ref 23–27)
POC TCO2: 37 MMOL/L (ref 23–27)
POTASSIUM SERPL-SCNC: 4.6 MMOL/L
POTASSIUM SERPL-SCNC: 5.4 MMOL/L
PROCALCITONIN SERPL IA-MCNC: 0.08 NG/ML
PROT SERPL-MCNC: 8.6 G/DL
RBC # BLD AUTO: 4.35 M/UL
RBC # BLD AUTO: 4.47 M/UL
SAMPLE: ABNORMAL
SAMPLE: ABNORMAL
SITE: ABNORMAL
SITE: ABNORMAL
SODIUM SERPL-SCNC: 137 MMOL/L
SODIUM SERPL-SCNC: 139 MMOL/L
SP02: 94
TOXICOLOGY INFORMATION: NORMAL
TROPONIN I SERPL DL<=0.01 NG/ML-MCNC: 0.01 NG/ML
WBC # BLD AUTO: 13.54 K/UL
WBC # BLD AUTO: 8.72 K/UL

## 2018-12-01 PROCEDURE — 36600 WITHDRAWAL OF ARTERIAL BLOOD: CPT

## 2018-12-01 PROCEDURE — 80307 DRUG TEST PRSMV CHEM ANLYZR: CPT

## 2018-12-01 PROCEDURE — 25000003 PHARM REV CODE 250: Performed by: FAMILY MEDICINE

## 2018-12-01 PROCEDURE — 83880 ASSAY OF NATRIURETIC PEPTIDE: CPT

## 2018-12-01 PROCEDURE — 96372 THER/PROPH/DIAG INJ SC/IM: CPT | Mod: 59

## 2018-12-01 PROCEDURE — 36415 COLL VENOUS BLD VENIPUNCTURE: CPT

## 2018-12-01 PROCEDURE — 63600175 PHARM REV CODE 636 W HCPCS: Performed by: EMERGENCY MEDICINE

## 2018-12-01 PROCEDURE — 80053 COMPREHEN METABOLIC PANEL: CPT

## 2018-12-01 PROCEDURE — 93005 ELECTROCARDIOGRAM TRACING: CPT | Mod: 59

## 2018-12-01 PROCEDURE — 25000242 PHARM REV CODE 250 ALT 637 W/ HCPCS: Performed by: FAMILY MEDICINE

## 2018-12-01 PROCEDURE — 82803 BLOOD GASES ANY COMBINATION: CPT

## 2018-12-01 PROCEDURE — 25000003 PHARM REV CODE 250: Performed by: EMERGENCY MEDICINE

## 2018-12-01 PROCEDURE — 36569 INSJ PICC 5 YR+ W/O IMAGING: CPT | Mod: 59

## 2018-12-01 PROCEDURE — 84145 PROCALCITONIN (PCT): CPT

## 2018-12-01 PROCEDURE — G0378 HOSPITAL OBSERVATION PER HR: HCPCS

## 2018-12-01 PROCEDURE — 63600175 PHARM REV CODE 636 W HCPCS: Performed by: FAMILY MEDICINE

## 2018-12-01 PROCEDURE — 99900035 HC TECH TIME PER 15 MIN (STAT)

## 2018-12-01 PROCEDURE — A4216 STERILE WATER/SALINE, 10 ML: HCPCS | Performed by: FAMILY MEDICINE

## 2018-12-01 PROCEDURE — 27000190 HC CPAP FULL FACE MASK W/VALVE

## 2018-12-01 PROCEDURE — 80048 BASIC METABOLIC PNL TOTAL CA: CPT

## 2018-12-01 PROCEDURE — 84100 ASSAY OF PHOSPHORUS: CPT

## 2018-12-01 PROCEDURE — 36556 INSERT NON-TUNNEL CV CATH: CPT

## 2018-12-01 PROCEDURE — S4991 NICOTINE PATCH NONLEGEND: HCPCS | Performed by: FAMILY MEDICINE

## 2018-12-01 PROCEDURE — 94660 CPAP INITIATION&MGMT: CPT

## 2018-12-01 PROCEDURE — 25000242 PHARM REV CODE 250 ALT 637 W/ HCPCS: Performed by: EMERGENCY MEDICINE

## 2018-12-01 PROCEDURE — 85025 COMPLETE CBC W/AUTO DIFF WBC: CPT

## 2018-12-01 PROCEDURE — 83735 ASSAY OF MAGNESIUM: CPT

## 2018-12-01 PROCEDURE — 84484 ASSAY OF TROPONIN QUANT: CPT

## 2018-12-01 PROCEDURE — 94640 AIRWAY INHALATION TREATMENT: CPT

## 2018-12-01 PROCEDURE — 99291 CRITICAL CARE FIRST HOUR: CPT | Mod: 25

## 2018-12-01 RX ORDER — IBUPROFEN 200 MG
1 TABLET ORAL DAILY
Status: DISCONTINUED | OUTPATIENT
Start: 2018-12-01 | End: 2018-12-02 | Stop reason: HOSPADM

## 2018-12-01 RX ORDER — IPRATROPIUM BROMIDE AND ALBUTEROL SULFATE 2.5; .5 MG/3ML; MG/3ML
3 SOLUTION RESPIRATORY (INHALATION) EVERY 6 HOURS
Status: DISCONTINUED | OUTPATIENT
Start: 2018-12-02 | End: 2018-12-02 | Stop reason: HOSPADM

## 2018-12-01 RX ORDER — BUTALBITAL, ACETAMINOPHEN AND CAFFEINE 50; 325; 40 MG/1; MG/1; MG/1
1 TABLET ORAL
Status: COMPLETED | OUTPATIENT
Start: 2018-12-01 | End: 2018-12-01

## 2018-12-01 RX ORDER — AMLODIPINE BESYLATE 5 MG/1
10 TABLET ORAL DAILY
Status: DISCONTINUED | OUTPATIENT
Start: 2018-12-01 | End: 2018-12-02 | Stop reason: HOSPADM

## 2018-12-01 RX ORDER — DIPHENHYDRAMINE HCL 25 MG
25 CAPSULE ORAL
Status: COMPLETED | OUTPATIENT
Start: 2018-12-01 | End: 2018-12-01

## 2018-12-01 RX ORDER — SODIUM CHLORIDE 0.9 % (FLUSH) 0.9 %
10 SYRINGE (ML) INJECTION EVERY 6 HOURS
Status: DISCONTINUED | OUTPATIENT
Start: 2018-12-01 | End: 2018-12-02 | Stop reason: HOSPADM

## 2018-12-01 RX ORDER — CLONIDINE HYDROCHLORIDE 0.3 MG/1
0.3 TABLET ORAL 3 TIMES DAILY
Status: DISCONTINUED | OUTPATIENT
Start: 2018-12-01 | End: 2018-12-02 | Stop reason: HOSPADM

## 2018-12-01 RX ORDER — SODIUM CHLORIDE 0.9 % (FLUSH) 0.9 %
10 SYRINGE (ML) INJECTION
Status: DISCONTINUED | OUTPATIENT
Start: 2018-12-01 | End: 2018-12-02 | Stop reason: HOSPADM

## 2018-12-01 RX ORDER — FUROSEMIDE 40 MG/1
40 TABLET ORAL DAILY
Status: DISCONTINUED | OUTPATIENT
Start: 2018-12-01 | End: 2018-12-02 | Stop reason: HOSPADM

## 2018-12-01 RX ORDER — LEVOFLOXACIN 5 MG/ML
750 INJECTION, SOLUTION INTRAVENOUS
Status: COMPLETED | OUTPATIENT
Start: 2018-12-01 | End: 2018-12-01

## 2018-12-01 RX ORDER — FAMOTIDINE 20 MG/1
20 TABLET, FILM COATED ORAL 2 TIMES DAILY
Status: DISCONTINUED | OUTPATIENT
Start: 2018-12-01 | End: 2018-12-02 | Stop reason: HOSPADM

## 2018-12-01 RX ORDER — METHYLPREDNISOLONE SOD SUCC 125 MG
125 VIAL (EA) INJECTION
Status: COMPLETED | OUTPATIENT
Start: 2018-12-01 | End: 2018-12-01

## 2018-12-01 RX ORDER — OLANZAPINE 10 MG/1
10 TABLET ORAL 2 TIMES DAILY
Status: DISCONTINUED | OUTPATIENT
Start: 2018-12-01 | End: 2018-12-02 | Stop reason: HOSPADM

## 2018-12-01 RX ORDER — IPRATROPIUM BROMIDE AND ALBUTEROL SULFATE 2.5; .5 MG/3ML; MG/3ML
3 SOLUTION RESPIRATORY (INHALATION)
Status: COMPLETED | OUTPATIENT
Start: 2018-12-01 | End: 2018-12-01

## 2018-12-01 RX ORDER — SODIUM CHLORIDE 0.9 % (FLUSH) 0.9 %
3 SYRINGE (ML) INJECTION
Status: DISCONTINUED | OUTPATIENT
Start: 2018-12-01 | End: 2018-12-02 | Stop reason: HOSPADM

## 2018-12-01 RX ORDER — METOCLOPRAMIDE 10 MG/1
10 TABLET ORAL
Status: COMPLETED | OUTPATIENT
Start: 2018-12-01 | End: 2018-12-01

## 2018-12-01 RX ORDER — ESCITALOPRAM OXALATE 10 MG/1
10 TABLET ORAL DAILY
Status: DISCONTINUED | OUTPATIENT
Start: 2018-12-01 | End: 2018-12-02 | Stop reason: HOSPADM

## 2018-12-01 RX ORDER — AMOXICILLIN 250 MG
1 CAPSULE ORAL 2 TIMES DAILY
Status: DISCONTINUED | OUTPATIENT
Start: 2018-12-01 | End: 2018-12-02 | Stop reason: HOSPADM

## 2018-12-01 RX ORDER — METHYLPREDNISOLONE SOD SUCC 125 MG
125 VIAL (EA) INJECTION
Status: DISCONTINUED | OUTPATIENT
Start: 2018-12-01 | End: 2018-12-01

## 2018-12-01 RX ORDER — IPRATROPIUM BROMIDE AND ALBUTEROL SULFATE 2.5; .5 MG/3ML; MG/3ML
3 SOLUTION RESPIRATORY (INHALATION) EVERY 4 HOURS
Status: DISCONTINUED | OUTPATIENT
Start: 2018-12-01 | End: 2018-12-01

## 2018-12-01 RX ADMIN — FAMOTIDINE 20 MG: 20 TABLET ORAL at 01:12

## 2018-12-01 RX ADMIN — OLANZAPINE 10 MG: 10 TABLET, FILM COATED ORAL at 01:12

## 2018-12-01 RX ADMIN — IPRATROPIUM BROMIDE AND ALBUTEROL SULFATE 3 ML: .5; 3 SOLUTION RESPIRATORY (INHALATION) at 07:12

## 2018-12-01 RX ADMIN — ESCITALOPRAM OXALATE 10 MG: 10 TABLET, FILM COATED ORAL at 01:12

## 2018-12-01 RX ADMIN — BUTALBITAL, ACETAMINOPHEN, AND CAFFEINE 1 TABLET: 50; 325; 40 TABLET ORAL at 07:12

## 2018-12-01 RX ADMIN — METHYLPREDNISOLONE SODIUM SUCCINATE 40 MG: 40 INJECTION, POWDER, FOR SOLUTION INTRAMUSCULAR; INTRAVENOUS at 07:12

## 2018-12-01 RX ADMIN — STANDARDIZED SENNA CONCENTRATE AND DOCUSATE SODIUM 1 TABLET: 8.6; 5 TABLET, FILM COATED ORAL at 01:12

## 2018-12-01 RX ADMIN — CLONIDINE HYDROCHLORIDE 0.3 MG: 0.1 TABLET ORAL at 02:12

## 2018-12-01 RX ADMIN — IPRATROPIUM BROMIDE AND ALBUTEROL SULFATE 3 ML: .5; 3 SOLUTION RESPIRATORY (INHALATION) at 12:12

## 2018-12-01 RX ADMIN — LEVOFLOXACIN 750 MG: 750 INJECTION, SOLUTION INTRAVENOUS at 07:12

## 2018-12-01 RX ADMIN — NICOTINE 1 PATCH: 14 PATCH, EXTENDED RELEASE TRANSDERMAL at 01:12

## 2018-12-01 RX ADMIN — STANDARDIZED SENNA CONCENTRATE AND DOCUSATE SODIUM 1 TABLET: 8.6; 5 TABLET, FILM COATED ORAL at 08:12

## 2018-12-01 RX ADMIN — METOCLOPRAMIDE 10 MG: 10 TABLET ORAL at 10:12

## 2018-12-01 RX ADMIN — FUROSEMIDE 40 MG: 40 TABLET ORAL at 01:12

## 2018-12-01 RX ADMIN — DIPHENHYDRAMINE HYDROCHLORIDE 25 MG: 25 CAPSULE ORAL at 10:12

## 2018-12-01 RX ADMIN — CLONIDINE HYDROCHLORIDE 0.3 MG: 0.1 TABLET ORAL at 08:12

## 2018-12-01 RX ADMIN — AMLODIPINE BESYLATE 10 MG: 5 TABLET ORAL at 01:12

## 2018-12-01 RX ADMIN — METHYLPREDNISOLONE SODIUM SUCCINATE 125 MG: 125 INJECTION, POWDER, FOR SOLUTION INTRAMUSCULAR; INTRAVENOUS at 08:12

## 2018-12-01 RX ADMIN — FAMOTIDINE 20 MG: 20 TABLET ORAL at 08:12

## 2018-12-01 RX ADMIN — Medication 10 ML: at 07:12

## 2018-12-01 NOTE — ED NOTES
Unable to admin Levofloxacin IV due to no IV access. Awaiting PICC team arrival. Dr. Aguirre notified. Medication handed off to ICU nurse

## 2018-12-01 NOTE — NURSING
Pt had picc line placed. picc X-ray placement was read by radiologist. (see note). He recommended to rule out hemothorax due to findings on the x-ray. Team notified. Stat ct without contrast to be obtained. ABG to be obtained. Pulmonary to be consulted. Safety maintained. Pt on bipap. VSS. Will continue to monitor.

## 2018-12-01 NOTE — NURSING
Rad read picc in theb brachiocephalic.  Attempted to overwire a longer picc a lost access.  Held pressure for 5 mins. Replaced picc with a 41cm. Pt tolerated well.

## 2018-12-01 NOTE — ED NOTES
Anesthesia staff, Dr. Ling, arrived to bedside to attempt IV access after unsuccessful attempt by anesthesia resident. Unable to obtain IV access due to patient restlessness. Dr. Aguirre notified. House supervisor contacted to call out PICC line team.
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
## 2018-12-01 NOTE — ED NOTES
"Attempted iv access x2 without success. Pt. Has multiple small, hard, red abscesses to bilateral upper arms that pt. States are from "skin popping" drugs-heroin and cocaine. Pt. States he last used 2 days ago.   "

## 2018-12-01 NOTE — PROCEDURES
"Ming Harrington is a 53 y.o. male patient.    Temp: 98.9 °F (37.2 °C) (12/01/18 1442)  Pulse: 85 (12/01/18 1459)  Resp: (!) 25 (12/01/18 1459)  BP: (!) 144/82 (12/01/18 1442)  SpO2: (!) 91 % (12/01/18 1459)  Weight: 111.1 kg (245 lb) (12/01/18 0640)  Height: 5' 8" (172.7 cm) (12/01/18 0640)    PICC  Date/Time: 12/1/2018 3:23 PM  Performed by: Yariel Nicolas Jr. RN  Consent Done: Yes  Indications: med administration  Anesthesia: local infiltration  Local anesthetic: lidocaine 2% without epinephrine  Anesthetic Total (mL): 2  Preparation: skin prepped with ChloraPrep  Sterile barriers: all five maximum sterile barriers used - cap, mask, sterile gown, sterile gloves, and large sterile sheet  Hand hygiene: hand hygiene performed prior to central venous catheter insertion  Location details: right basilic  Catheter type: double lumen  Catheter size: 5 Fr  Catheter Length: 40cm    Ultrasound guidance: yes  Vessel Caliber: small, compressibility normal  Vascular Doppler: not done  Needle advanced into vessel with real time Ultrasound guidance.  Guidewire confirmed in vessel.  Sterile sheath used.  Number of attempts: 1  Post-procedure: blood return through all ports, chlorhexidine patch and sterile dressing applied  Technical procedures used: modified melany Nicolas Jr  12/1/2018  "

## 2018-12-01 NOTE — ED NOTES
UNABLE TO ESTABLISH PIV ACCESS AFTER 2 ATTEMPTS PER MYSELF. ARIANNA VILLARREAL AT BEDSIDE TO ATTEMPT.

## 2018-12-02 VITALS
WEIGHT: 229.06 LBS | DIASTOLIC BLOOD PRESSURE: 67 MMHG | RESPIRATION RATE: 18 BRPM | HEART RATE: 109 BPM | SYSTOLIC BLOOD PRESSURE: 101 MMHG | HEIGHT: 68 IN | OXYGEN SATURATION: 95 % | TEMPERATURE: 99 F | BODY MASS INDEX: 34.72 KG/M2

## 2018-12-02 PROBLEM — J44.1 ACUTE EXACERBATION OF COPD WITH ASTHMA: Status: ACTIVE | Noted: 2018-12-02

## 2018-12-02 PROBLEM — J45.901 ACUTE EXACERBATION OF COPD WITH ASTHMA: Status: ACTIVE | Noted: 2018-12-02

## 2018-12-02 LAB
ALBUMIN SERPL BCP-MCNC: 3 G/DL
ALLENS TEST: ABNORMAL
ALP SERPL-CCNC: 95 U/L
ALT SERPL W/O P-5'-P-CCNC: 27 U/L
ANION GAP SERPL CALC-SCNC: 7 MMOL/L
AST SERPL-CCNC: 24 U/L
BASOPHILS # BLD AUTO: 0 K/UL
BASOPHILS NFR BLD: 0 %
BILIRUB SERPL-MCNC: 0.9 MG/DL
BUN SERPL-MCNC: 19 MG/DL
CALCIUM SERPL-MCNC: 10 MG/DL
CHLORIDE SERPL-SCNC: 98 MMOL/L
CO2 SERPL-SCNC: 33 MMOL/L
CREAT SERPL-MCNC: 1 MG/DL
DELSYS: ABNORMAL
DIFFERENTIAL METHOD: ABNORMAL
EOSINOPHIL # BLD AUTO: 0 K/UL
EOSINOPHIL NFR BLD: 0 %
EP: 5
ERYTHROCYTE [DISTWIDTH] IN BLOOD BY AUTOMATED COUNT: 12.5 %
EST. GFR  (AFRICAN AMERICAN): >60 ML/MIN/1.73 M^2
EST. GFR  (NON AFRICAN AMERICAN): >60 ML/MIN/1.73 M^2
FIO2: 30
GLUCOSE SERPL-MCNC: 150 MG/DL
HCO3 UR-SCNC: 35.3 MMOL/L (ref 24–28)
HCT VFR BLD AUTO: 38.3 %
HGB BLD-MCNC: 12.8 G/DL
IP: 15
LYMPHOCYTES # BLD AUTO: 0.7 K/UL
LYMPHOCYTES NFR BLD: 6.1 %
MAGNESIUM SERPL-MCNC: 2 MG/DL
MCH RBC QN AUTO: 29.9 PG
MCHC RBC AUTO-ENTMCNC: 33.4 G/DL
MCV RBC AUTO: 90 FL
MODE: ABNORMAL
MONOCYTES # BLD AUTO: 0.5 K/UL
MONOCYTES NFR BLD: 4.1 %
NEUTROPHILS # BLD AUTO: 10.6 K/UL
NEUTROPHILS NFR BLD: 89.6 %
PCO2 BLDA: 53.9 MMHG (ref 35–45)
PH SMN: 7.42 [PH] (ref 7.35–7.45)
PHOSPHATE SERPL-MCNC: 2.2 MG/DL
PLATELET # BLD AUTO: 220 K/UL
PMV BLD AUTO: 9.6 FL
PO2 BLDA: 60 MMHG (ref 80–100)
POC BE: 11 MMOL/L
POC SATURATED O2: 91 % (ref 95–100)
POC TCO2: 37 MMOL/L (ref 23–27)
POTASSIUM SERPL-SCNC: 4.2 MMOL/L
PROT SERPL-MCNC: 8.4 G/DL
RBC # BLD AUTO: 4.28 M/UL
SAMPLE: ABNORMAL
SITE: ABNORMAL
SODIUM SERPL-SCNC: 138 MMOL/L
T4 FREE SERPL-MCNC: 1.02 NG/DL
TSH SERPL DL<=0.005 MIU/L-ACNC: 0.1 UIU/ML
WBC # BLD AUTO: 11.84 K/UL

## 2018-12-02 PROCEDURE — 25000003 PHARM REV CODE 250: Performed by: FAMILY MEDICINE

## 2018-12-02 PROCEDURE — 94640 AIRWAY INHALATION TREATMENT: CPT

## 2018-12-02 PROCEDURE — 94761 N-INVAS EAR/PLS OXIMETRY MLT: CPT

## 2018-12-02 PROCEDURE — 84100 ASSAY OF PHOSPHORUS: CPT

## 2018-12-02 PROCEDURE — 63600175 PHARM REV CODE 636 W HCPCS: Performed by: FAMILY MEDICINE

## 2018-12-02 PROCEDURE — 27000646 HC AEROBIKA DEVICE

## 2018-12-02 PROCEDURE — S4991 NICOTINE PATCH NONLEGEND: HCPCS | Performed by: FAMILY MEDICINE

## 2018-12-02 PROCEDURE — 82803 BLOOD GASES ANY COMBINATION: CPT

## 2018-12-02 PROCEDURE — 25000242 PHARM REV CODE 250 ALT 637 W/ HCPCS: Performed by: FAMILY MEDICINE

## 2018-12-02 PROCEDURE — G0378 HOSPITAL OBSERVATION PER HR: HCPCS

## 2018-12-02 PROCEDURE — 85025 COMPLETE CBC W/AUTO DIFF WBC: CPT

## 2018-12-02 PROCEDURE — 80053 COMPREHEN METABOLIC PANEL: CPT

## 2018-12-02 PROCEDURE — 84439 ASSAY OF FREE THYROXINE: CPT

## 2018-12-02 PROCEDURE — 99900035 HC TECH TIME PER 15 MIN (STAT)

## 2018-12-02 PROCEDURE — 27000221 HC OXYGEN, UP TO 24 HOURS

## 2018-12-02 PROCEDURE — 94660 CPAP INITIATION&MGMT: CPT

## 2018-12-02 PROCEDURE — 83735 ASSAY OF MAGNESIUM: CPT

## 2018-12-02 PROCEDURE — 36415 COLL VENOUS BLD VENIPUNCTURE: CPT

## 2018-12-02 PROCEDURE — 94664 DEMO&/EVAL PT USE INHALER: CPT

## 2018-12-02 PROCEDURE — 84443 ASSAY THYROID STIM HORMONE: CPT

## 2018-12-02 PROCEDURE — 36600 WITHDRAWAL OF ARTERIAL BLOOD: CPT

## 2018-12-02 PROCEDURE — A4216 STERILE WATER/SALINE, 10 ML: HCPCS | Performed by: FAMILY MEDICINE

## 2018-12-02 RX ORDER — LEVOFLOXACIN 500 MG/1
500 TABLET, FILM COATED ORAL DAILY
Qty: 5 TABLET | Refills: 0 | Status: SHIPPED | OUTPATIENT
Start: 2018-12-02 | End: 2018-12-07

## 2018-12-02 RX ORDER — LOPERAMIDE HYDROCHLORIDE 2 MG/1
2 CAPSULE ORAL 4 TIMES DAILY PRN
Status: DISCONTINUED | OUTPATIENT
Start: 2018-12-02 | End: 2018-12-02 | Stop reason: HOSPADM

## 2018-12-02 RX ORDER — GABAPENTIN 300 MG/1
300 CAPSULE ORAL 3 TIMES DAILY
Status: DISCONTINUED | OUTPATIENT
Start: 2018-12-02 | End: 2018-12-02 | Stop reason: HOSPADM

## 2018-12-02 RX ORDER — PREDNISONE 20 MG/1
20 TABLET ORAL 2 TIMES DAILY
Qty: 8 TABLET | Refills: 0 | Status: SHIPPED | OUTPATIENT
Start: 2018-12-02 | End: 2018-12-06

## 2018-12-02 RX ORDER — DICYCLOMINE HYDROCHLORIDE 10 MG/1
10 CAPSULE ORAL EVERY 6 HOURS PRN
Status: DISCONTINUED | OUTPATIENT
Start: 2018-12-02 | End: 2018-12-02 | Stop reason: HOSPADM

## 2018-12-02 RX ORDER — DEXMEDETOMIDINE HYDROCHLORIDE 4 UG/ML
0.2 INJECTION, SOLUTION INTRAVENOUS CONTINUOUS
Status: DISCONTINUED | OUTPATIENT
Start: 2018-12-02 | End: 2018-12-02 | Stop reason: HOSPADM

## 2018-12-02 RX ORDER — FLUTICASONE FUROATE AND VILANTEROL 100; 25 UG/1; UG/1
1 POWDER RESPIRATORY (INHALATION) DAILY
Qty: 60 EACH | Refills: 7 | Status: ON HOLD | OUTPATIENT
Start: 2018-12-02 | End: 2019-03-15 | Stop reason: SDUPTHER

## 2018-12-02 RX ORDER — LEVOFLOXACIN 5 MG/ML
750 INJECTION, SOLUTION INTRAVENOUS
Status: DISCONTINUED | OUTPATIENT
Start: 2018-12-02 | End: 2018-12-02 | Stop reason: HOSPADM

## 2018-12-02 RX ORDER — ALBUTEROL SULFATE 90 UG/1
1-2 AEROSOL, METERED RESPIRATORY (INHALATION) EVERY 6 HOURS PRN
Qty: 1 INHALER | Refills: 5 | Status: ON HOLD | OUTPATIENT
Start: 2018-12-02 | End: 2019-03-03 | Stop reason: SDUPTHER

## 2018-12-02 RX ORDER — IPRATROPIUM BROMIDE AND ALBUTEROL SULFATE 2.5; .5 MG/3ML; MG/3ML
3 SOLUTION RESPIRATORY (INHALATION) EVERY 4 HOURS
Qty: 1 BOX | Refills: 3 | Status: ON HOLD | OUTPATIENT
Start: 2018-12-02 | End: 2019-03-03 | Stop reason: HOSPADM

## 2018-12-02 RX ORDER — BACLOFEN 10 MG/1
10 TABLET ORAL 3 TIMES DAILY
Status: DISCONTINUED | OUTPATIENT
Start: 2018-12-02 | End: 2018-12-02 | Stop reason: HOSPADM

## 2018-12-02 RX ORDER — HYDROXYZINE PAMOATE 25 MG/1
50 CAPSULE ORAL EVERY 8 HOURS
Status: DISCONTINUED | OUTPATIENT
Start: 2018-12-02 | End: 2018-12-02

## 2018-12-02 RX ORDER — KETOROLAC TROMETHAMINE 30 MG/ML
30 INJECTION, SOLUTION INTRAMUSCULAR; INTRAVENOUS EVERY 6 HOURS PRN
Status: DISCONTINUED | OUTPATIENT
Start: 2018-12-02 | End: 2018-12-02 | Stop reason: HOSPADM

## 2018-12-02 RX ADMIN — BACLOFEN 10 MG: 10 TABLET ORAL at 09:12

## 2018-12-02 RX ADMIN — BACLOFEN 10 MG: 10 TABLET ORAL at 03:12

## 2018-12-02 RX ADMIN — IPRATROPIUM BROMIDE AND ALBUTEROL SULFATE 3 ML: .5; 3 SOLUTION RESPIRATORY (INHALATION) at 07:12

## 2018-12-02 RX ADMIN — IPRATROPIUM BROMIDE AND ALBUTEROL SULFATE 3 ML: .5; 3 SOLUTION RESPIRATORY (INHALATION) at 12:12

## 2018-12-02 RX ADMIN — ESCITALOPRAM OXALATE 10 MG: 10 TABLET, FILM COATED ORAL at 09:12

## 2018-12-02 RX ADMIN — Medication 10 ML: at 05:12

## 2018-12-02 RX ADMIN — METHYLPREDNISOLONE SODIUM SUCCINATE 40 MG: 40 INJECTION, POWDER, FOR SOLUTION INTRAMUSCULAR; INTRAVENOUS at 05:12

## 2018-12-02 RX ADMIN — LEVOFLOXACIN 750 MG: 750 INJECTION, SOLUTION INTRAVENOUS at 09:12

## 2018-12-02 RX ADMIN — CLONIDINE HYDROCHLORIDE 0.3 MG: 0.1 TABLET ORAL at 03:12

## 2018-12-02 RX ADMIN — DEXMEDETOMIDINE HYDROCHLORIDE 1.5 MCG/KG/HR: 4 INJECTION, SOLUTION INTRAVENOUS at 05:12

## 2018-12-02 RX ADMIN — STANDARDIZED SENNA CONCENTRATE AND DOCUSATE SODIUM 1 TABLET: 8.6; 5 TABLET, FILM COATED ORAL at 09:12

## 2018-12-02 RX ADMIN — CLONIDINE HYDROCHLORIDE 0.3 MG: 0.1 TABLET ORAL at 09:12

## 2018-12-02 RX ADMIN — AMLODIPINE BESYLATE 10 MG: 5 TABLET ORAL at 09:12

## 2018-12-02 RX ADMIN — Medication 10 ML: at 12:12

## 2018-12-02 RX ADMIN — METHYLPREDNISOLONE SODIUM SUCCINATE 40 MG: 40 INJECTION, POWDER, FOR SOLUTION INTRAMUSCULAR; INTRAVENOUS at 03:12

## 2018-12-02 RX ADMIN — GABAPENTIN 300 MG: 300 CAPSULE ORAL at 03:12

## 2018-12-02 RX ADMIN — NICOTINE 1 PATCH: 14 PATCH, EXTENDED RELEASE TRANSDERMAL at 09:12

## 2018-12-02 RX ADMIN — DEXMEDETOMIDINE HYDROCHLORIDE 1.3 MCG/KG/HR: 4 INJECTION, SOLUTION INTRAVENOUS at 05:12

## 2018-12-02 RX ADMIN — FUROSEMIDE 40 MG: 40 TABLET ORAL at 09:12

## 2018-12-02 RX ADMIN — KETOROLAC TROMETHAMINE 30 MG: 30 INJECTION, SOLUTION INTRAMUSCULAR at 09:12

## 2018-12-02 RX ADMIN — DEXMEDETOMIDINE HYDROCHLORIDE 0.2 MCG/KG/HR: 4 INJECTION, SOLUTION INTRAVENOUS at 01:12

## 2018-12-02 RX ADMIN — OLANZAPINE 10 MG: 10 TABLET, FILM COATED ORAL at 10:12

## 2018-12-02 RX ADMIN — GABAPENTIN 300 MG: 300 CAPSULE ORAL at 09:12

## 2018-12-02 RX ADMIN — HYDROXYZINE PAMOATE 50 MG: 25 CAPSULE ORAL at 07:12

## 2018-12-02 RX ADMIN — KETOROLAC TROMETHAMINE 30 MG: 30 INJECTION, SOLUTION INTRAMUSCULAR at 03:12

## 2018-12-02 RX ADMIN — FAMOTIDINE 20 MG: 20 TABLET ORAL at 09:12

## 2018-12-02 NOTE — HPI
Mr. Harrington is a 52yo male admitted for presumed COPD exacerbation. Pulmonary consulted for XR interpretation suggesting hemothorax.

## 2018-12-02 NOTE — PLAN OF CARE
Problem: Patient Care Overview  Goal: Plan of Care Review  Outcome: Ongoing (interventions implemented as appropriate)  Pt is aaox4 when stimulated. Tachypneic. Still lethargic. Bipap continuous. Ct was negative for hemothorax and it is now safe to use the picc line per El ROBERTS MD. Afebrile. Safety maintained. Report given to ARIANNA Valladares.

## 2018-12-02 NOTE — PLAN OF CARE
History of Present Illness:  Ming Harrington is a 53 y.o. male with Hx of asthma, COPD, HTN and IVDU (last used 2 days ago) who presented to the ED with complaint of SOB and productive cough x 2 days. The cough is productive of copious green phlegm.  At home the patient is on 2L NC. The patient has been with out an inhaler for 3 days    Pt independent with ADLs, no HH, has O2 concentrator. Lives with Brother, Daron Harrington, his two sisters check on him frequently, Rosita Lopez and Asmita Harrington. Pt does not drive , frinds give him rides or he takes cabs.     States he does not have ride home       12/02/18 8792   Discharge Assessment   Assessment Type Discharge Planning Assessment   Confirmed/corrected address and phone number on facesheet? Yes   Assessment information obtained from? Patient   Expected Length of Stay (days) 3   Communicated expected length of stay with patient/caregiver yes   Prior to hospitilization cognitive status: Alert/Oriented;Not Oriented to Time   Prior to hospitalization functional status: Independent   Current cognitive status: Alert/Oriented   Current Functional Status: Independent   Facility Arrived From: (home)   Lives With sibling(s)  (Brother: Daron Harrington [phone])   Able to Return to Prior Arrangements yes   Is patient able to care for self after discharge? Yes   Who are your caregiver(s) and their phone number(s)? Sisters: Rosita Lopez [phone], Asmita Harrington [phone]   Patient's perception of discharge disposition home or selfcare   Readmission Within The Last 30 Days no previous admission in last 30 days   Patient currently being followed by outpatient case management? No   Patient currently receives any other outside agency services? No   Equipment Currently Used at Home oxygen  (concentrator only)   Do you have any problems affording any of your prescribed medications? No   Is the patient taking medications as prescribed? yes   Does the patient have transportation home?  No  (needs ride)   Dialysis Name and Scheduled days N/A   Does the patient receive services at the [facility] Clinic? No   Discharge Plan A Home   Discharge Plan B Home with family   Patient/Family In Agreement With Plan yes     Genoveva Bland RN Transitional Navigator  [phone]

## 2018-12-02 NOTE — PLAN OF CARE
Discharge orders noted, no HH or HME ordered.    Pt's nurse will go over medications/signs and symptoms prior to discharge    Pt stated he does not have transportation home, TN obtained permission from Case Mgmt Supervisor, Lorena Garibay to call for WC Van. TN called [facility] [phone], they will bring a WC and O2 for transportation home,  time scheduled for  6:00pm, floor nurse informed       12/02/18 3615   Final Note   Assessment Type Final Discharge Note   Anticipated Discharge Disposition Home   What phone number can be called within the next 1-3 days to see how you are doing after discharge? [phone]   Hospital Follow Up  Appt(s) scheduled? No  (Offices closed for weekend. Patient to schedule own follow up appointment.)   Right Care Referral Info   Post Acute Recommendation No Care     Genoveva Bland, RN Transitional Navigator  [phone]

## 2018-12-02 NOTE — NURSING
Received report from Razia in ICU. Pt arrived to room via WC awake, alert and oriented x4. Pt denies pain at this time. No distress noted. Applied tele monitor.VSS Discussed POC with pt. All questions answered. Verbalized understanding. Oriented to surroundings. Bed in lowest position. Belongings/Call bell within reach.Side rails up x2. Instructed to call with any needs/concerns. Verbalized understanding.Will continue to monitor.

## 2018-12-02 NOTE — PLAN OF CARE
Problem: Patient Care Overview  Goal: Plan of Care Review  Pt received on Bipap with settings as noted on flow sheet. No respiratory distress noted. Will cont to monitor.

## 2018-12-02 NOTE — PROGRESS NOTES
0000 - Pt is awake, alert, and oriented at this time, and he is no longer drowsy. Physician updated on the drug screen as well as pt's request for analgesia for a burning pain to his back and pain to his legs. Physician states he will come and evaluate the pt at the bedside.     0040 - Pt asleep at this time. Physician states okay to hold baclofen and gabapentin until pt awakens.    0100 - reported pt's increased RR reading 34 and up, occasionally reaching 40 bpm on the Bipap. Physician at the bedside to evaluate pt with eICU.    0120 - After a STAT ABG, Dr. Martinez recommended Precedex for a bit of sedation with this pt.    0510 - Respiratory therapy changed the bipap mask size to a medium size, which has been forming a better seal than the larger mask on this pt.    0528 - Reported to Dr. Joe the pt's consistent respiratory rate of 40 bpm despite maxxing out at 1.5 mcg for dexmedetomidine.    0532 - Dr. Joe consulting Dr. Martinez about pt.     0545 - Pt expresses discomfort with bipap and took bipap off at this time. Dr. Joe states okay to leave bipap off pt at this time to trial pt.     0630 - pt tolerating being off bipap well. Physician states to work on discontinuing precedex.

## 2018-12-02 NOTE — DISCHARGE SUMMARY
Discharge Summary      Admit Date: 12/1/2018    Discharge Date and Time: 12/02/2018    Attending Physician: Burt Betts MD     Discharge Physician: Josep Young MD     Principal Diagnoses: Asthma exacerbation in COPD  The primary encounter diagnosis was Acute respiratory failure with hypercapnia. Diagnoses of Shortness of breath, Asthma exacerbation in COPD, and Chronic obstructive pulmonary disease with acute lower respiratory infection were also pertinent to this visit.    Discharged Condition: stable    Hospital Course:       Ming Harrington is a 53 y.o. male with Hx of asthma, COPD, HTN and IVDU (last used 2 days ago) who presented to the ED with complaint of SOB and productive cough x 2 days.  At home the patient is on 2L NC and he ran out of inhaler for 3 days prior to this exacerbation. Per EMS when they arrived the patient was diaphoretic with SAO2 of 72%. He was placed on oxygen via mask and in the ED he was treated with steroids, antibiotics, and duonebs. He did not require intubation and was on NC during his stay. He had a negative CXR for consolidation, negative procalcitonin, and normal wbc with no fevers during his admission. He was treated as a COPD/Asthma exacerbation and he quickly improved and was at his baseline 2L NC. His drug screen was positive for opiates and he reported illicit IV drug use. He was discharged on steroids, abx, and controller medications. He wanted to leaveon day of discharge. HE will follow up with his PCP.     Asthma  Exacerbation vs COPD exacerbation   -  Will continue Levofloxacin 750 mg    - Continuous Bipap overnight, he was taken off BiPAP and satting in low 90's  -Currently awake and alert, doing well   - Continue methylprednislone 40 mg IV q8   -Duonebs Q6hrs  -Encourage IS and acapella therapy   -CXR and CT showed no consolidation   -Procal negative  -Afebrile and wbc wnl during stay      IVDU  - Last uses approximately 2 days ago,   -Utox + for opiates       HTN:   - BP stable at 146/87   - Continue home amlodipine 10 mg  -Goal BP <1500/80      Recent Labs     12/02/18  0119   PH 7.425   PCO2 53.9*   PO2 60*   HCO3 35.3*   POCSATURATED 91*   BE 11       Recent Labs   Lab 12/01/18  0806 12/01/18  1734 12/02/18  0903   WBC 13.54* 8.72 11.84   HGB 12.9* 13.4* 12.8*   HCT 39.6* 41.0 38.3*    197 220     Recent Labs   Lab 12/01/18  0806 12/01/18  1734 12/02/18  0903    137 138   K 4.6 5.4* 4.2    100 98   CO2 31* 29 33*   BUN 13 13 19   CREATININE 0.9 0.9 1.0   CALCIUM 9.2 9.4 10.0   PROT 8.6*  --  8.4   BILITOT 1.5*  --  0.9   ALKPHOS 111  --  95   ALT 31  --  27   AST 31  --  24     No results for input(s): POCTGLUCOSE in the last 168 hours.  Recent Labs   Lab 12/01/18  0806   TROPONINI 0.012     Lab Results   Component Value Date    TSH 0.100 (L) 12/02/2018    TSH 0.095 (L) 07/22/2017    TSH 0.194 (L) 07/20/2014     X-ray Chest 1 View    Result Date: 12/2/2018  EXAMINATION: XR CHEST 1 VIEW CLINICAL HISTORY: asthma exacerbation; Acute respiratory failure with hypercapnia TECHNIQUE: Single frontal view of the chest was performed. COMPARISON: CT chest from 12/01/2018, chest radiograph 12/01/2018 FINDINGS: No significant change when compared to prior exam. Cardiac silhouette is at the upper limits of normal in size.  Tortuosity of thoracic aorta.  Right-sided PICC line catheter with its tip in the mid SVC.  Lungs are well aerated.  No pneumothorax.     As above. Electronically signed by: Josep Johns MD Date:    12/02/2018 Time:    08:16    X-ray Chest 1 View    Result Date: 12/1/2018  EXAMINATION: XR CHEST 1 VIEW CLINICAL HISTORY: RAC PICC Line; TECHNIQUE: Single frontal view of the chest was performed. COMPARISON: 12/01/2018 FINDINGS: A right-sided PICC line catheter is noted with the tip in the region of the distal right brachiocephalic vein.  No pneumothorax.  There is some stranding change noted within the right lung base either representing  some atelectasis or infiltrate.     As above Electronically signed by: Ricardo Arguello DO Date:    12/01/2018 Time:    15:39    X-ray Chest Pa And Lateral    Result Date: 12/1/2018  EXAMINATION: XR CHEST PA AND LATERAL CLINICAL HISTORY: Shortness of breath TECHNIQUE: PA and lateral views of the chest were performed. COMPARISON: July 4, 2018 FINDINGS: Cardiac size is not enlarged.  There is no pleural effusion.  The osseous structures are intact.  There is no increase in pulmonary vascularity.  Increased density at the right lung base on the PA view is felt to relate to overlying soft tissues, with no correlate seen on lateral view.  If the patient's clinical symptomatology persists, or there are clinical findings in this region, recommend repeat radiograph with reposition of overlying soft tissue.     As above Electronically signed by: Silva Handy MD Date:    12/01/2018 Time:    07:55    Ct Chest Without Contrast    Result Date: 12/1/2018  EXAMINATION: CT CHEST WITHOUT CONTRAST CLINICAL HISTORY: line placement; possible hemothorax; pt allergic to iodine; Acute respiratory failure with hypercapnia TECHNIQUE: Low dose axial images, sagittal and coronal reformations were obtained from the thoracic inlet to the lung bases. Contrast was not administered. COMPARISON: Radiograph 12/01/2018, radiograph 07/04/2018.  CT abdomen 03/04/2018 FINDINGS: The structures at the base of the neck are grossly unremarkable.  No significant mediastinal lymphadenopathy.  There is a right PICC catheter tip, appears to project along the course of the SVC, and terminates within the mid to distal SVC.  The heart is not enlarged.  No pericardial effusion.  Limited evaluation of the visualized portions of the kidneys, spleen, pancreas and liver are grossly unremarkable. The airways are patent noting limited evaluation secondary to extensive respiratory motion. Respiratory motion also limits evaluation of the pulmonary parenchyma.  No  pneumothorax.  There are patchy foci of bandlike atelectasis, possibly developing consolidation within the right lower lobe, new since the most recent CT.  Clinical correlation needed to assess clinically for any signs of infection.  There is a calcified granuloma within the left lower lobe.  There is left lower lobe basilar dependent atelectasis. No findings to suggest mediastinal hematoma. No focal osseous destructive process.  No significant axillary lymphadenopathy.  The subcutaneous course of the right PICC catheter is without focal fluid collection or inflammation.     1. Right PICC catheter tip terminates at the level of the mid to distal SVC, no findings to suggest mediastinal hematoma, or significant fluid collection/inflammation along the subcutaneous course of the PICC catheter. 2. Patchy regions of likely atelectasis within the right lower lobe and medial right middle lobe.  Developing consolidation is not excluded although felt less likely.  Clinical correlation is recommended as this is new since the most recent examination.  Please note, overall evaluation is limited secondary to respiratory motion. 3. Several additional findings above. Electronically signed by: Iván Wu MD Date:    12/01/2018 Time:    18:32    X-ray Chest Ap Portable    Result Date: 12/1/2018  EXAMINATION: XR CHEST AP PORTABLE CLINICAL HISTORY: picc adjustment; TECHNIQUE: Single frontal view of the chest was performed. COMPARISON: Study performed earlier the same day FINDINGS: Stranding changes noted within the bilateral lung bases right greater than the left similar in appearance to the prior exam.  The PICC line catheter tip is now seen overlying the atrial caval junction.  When compared to the prior exam the mediastinum appears widened particularly along the right side of the mediastinum.  Interval development of a hemothorax cannot be excluded.  Consider further evaluation with CT with IV contrast as clinically warranted.   Clinical correlation recommended.     As above This report was flagged in Epic as abnormal. COMMUNICATION This critical result was discovered/received at 14:25.  The critical information above was relayed directly by me by telephone to patient's nurse  on 12/01/2018 at 14:28. Electronically signed by: Ricardo Arguello DO Date:    12/01/2018 Time:    16:28      Consults: IP CONSULT TO ANESTHESIOLOGY  IP CONSULT TO PULMONOLOGY  IP CONSULT TO PSYCHIATRY        Disposition: Home or Self Care    Patient Instructions:   Current Discharge Medication List      START taking these medications    Details   levoFLOXacin (LEVAQUIN) 500 MG tablet Take 1 tablet (500 mg total) by mouth once daily. for 5 days  Qty: 5 tablet, Refills: 0      predniSONE (DELTASONE) 20 MG tablet Take 1 tablet (20 mg total) by mouth 2 (two) times daily. for 4 days  Qty: 8 tablet, Refills: 0         CONTINUE these medications which have CHANGED    Details   albuterol (PROVENTIL/VENTOLIN HFA) 90 mcg/actuation inhaler Inhale 1-2 puffs into the lungs every 6 (six) hours as needed for Wheezing. Rescue  Qty: 1 Inhaler, Refills: 5      albuterol-ipratropium (DUO-NEB) 2.5 mg-0.5 mg/3 mL nebulizer solution Take 3 mLs by nebulization every 4 (four) hours. Rescue  Qty: 1 Box, Refills: 3      fluticasone-vilanterol (BREO) 100-25 mcg/dose diskus inhaler Inhale 1 puff into the lungs once daily. Controller  Qty: 60 each, Refills: 7    Associated Diagnoses: Chronic obstructive pulmonary disease with acute lower respiratory infection      tiotropium bromide (SPIRIVA RESPIMAT) 2.5 mcg/actuation Mist Inhale 1 each into the lungs once daily. Controller  Qty: 4 g, Refills: 3         CONTINUE these medications which have NOT CHANGED    Details   acetaminophen (TYLENOL) 500 MG tablet Take 1 tablet (500 mg total) by mouth every 6 (six) hours as needed for Pain.  Qty: 20 tablet, Refills: 0      amLODIPine (NORVASC) 10 MG tablet Take 1 tablet (10 mg total) by mouth once  daily.  Qty: 30 tablet, Refills: 3      cloNIDine (CATAPRES) 0.1 MG tablet Take 3 tablets (0.3 mg total) by mouth 3 (three) times daily.  Qty: 90 tablet, Refills: 0    Associated Diagnoses: Essential hypertension      escitalopram oxalate (LEXAPRO) 10 MG tablet Take 1 tablet (10 mg total) by mouth once daily.  Qty: 30 tablet, Refills: 11      furosemide (LASIX) 40 MG tablet Take 1 tablet (40 mg total) by mouth once daily.  Qty: 60 tablet, Refills: 3      nicotine (NICODERM CQ) 14 mg/24 hr Place 1 patch onto the skin once daily.  Refills: 0    Associated Diagnoses: Tobacco abuse      OLANZapine (ZYPREXA) 10 MG tablet Take 1 tablet (10 mg total) by mouth 2 (two) times daily. 1 tab in am and 2 tabs at bedtime  Qty: 60 tablet, Refills: 0    Associated Diagnoses: Bipolar 2 disorder      ondansetron (ZOFRAN) 4 MG tablet Take 1-2 tablets (4-8 mg total) by mouth every 8 (eight) hours as needed for Nausea.  Qty: 20 tablet, Refills: 0      senna-docusate 8.6-50 mg (PERICOLACE) 8.6-50 mg per tablet Take 1 tablet by mouth 2 (two) times daily.  Qty: 30 tablet, Refills: 11             Discharge Procedure Orders   Ambulatory referral to Pulmonology   Referral Priority: Routine Referral Type: Consultation   Referral Reason: Specialty Services Required   Requested Specialty: Pulmonary Disease   Number of Visits Requested: 1     Diet Adult Regular     Notify your health care provider if you experience any of the following:  temperature >100.4     Notify your health care provider if you experience any of the following:  persistent nausea and vomiting or diarrhea     Notify your health care provider if you experience any of the following:  redness, tenderness, or signs of infection (pain, swelling, redness, odor or green/yellow discharge around incision site)     Notify your health care provider if you experience any of the following:  difficulty breathing or increased cough     Notify your health care provider if you experience any of  the following:  persistent dizziness, light-headedness, or visual disturbances     Activity as tolerated     33 minutes was spent on this discharge summary.   Josep Young MD  12/02/2018  4:49 PM

## 2018-12-02 NOTE — ASSESSMENT & PLAN NOTE
· Chest CT ruled out hemothorax.  · COPD treatment per primary team  · Please call with questions should they arise

## 2018-12-02 NOTE — PROGRESS NOTES
Progress Note  [facility]  Admit Date: 12/1/2018   LOS: 0 days   SUBJECTIVE:   Follow-up For:Shortness of breath (COPD exacerbation)    Patient seen and examined this AM. Doing well, he is awake and alert. Complaining of pain in his back and legs. Drug screen + for opiates.     Review of Systems   Constitutional: Positive for diaphoresis. Negative for chills and fever.   HENT: Negative for hearing loss.    Respiratory: Positive for cough, sputum production (green), shortness of breath and wheezing.    Cardiovascular: Negative for chest pain.   Gastrointestinal: Negative for abdominal pain, constipation, diarrhea and vomiting.   Genitourinary: Negative for flank pain.   Musculoskeletal: Negative for joint pain.   Skin: Negative for rash.   Neurological: Positive for headaches.       OBJECTIVE:   Vital Signs (Most Recent)  Temp: 97.8 °F (36.6 °C) (12/01/18 2305)  Pulse: 93 (12/02/18 0000)  Resp: (!) 30 (12/02/18 0000)  BP: 138/81 (12/02/18 0000)  SpO2: 99 % (12/02/18 0000)    I & O (Last 24H):    Intake/Output Summary (Last 24 hours) at 12/2/2018 0043  Last data filed at 12/2/2018 0000  Gross per 24 hour   Intake 150 ml   Output 1500 ml   Net -1350 ml     Wt Readings from Last 3 Encounters:   12/01/18 103.9 kg (229 lb 0.9 oz)   07/04/18 109 kg (240 lb 4.8 oz)   07/01/18 108.9 kg (240 lb)       Current Diet Order   Procedures    Diet Cardiac        Physical Exam   Constitutional: He is oriented to person, place, and time. He appears unhealthy. He appears distressed.   On room air, responsive to noxious stimuli obese male    HENT:   Head: Normocephalic and atraumatic.   Eyes: Conjunctivae and EOM are normal.   Neck: Normal range of motion. Neck supple.   Cardiovascular: Normal rate, regular rhythm, normal heart sounds and intact distal pulses.   Pulmonary/Chest: Breath sounds normal.   On continuous BiPAP overnight, currently doing well on room air    Abdominal: Soft. Bowel sounds are normal. He exhibits  distension (2/2 to body habitus ).   Musculoskeletal: Normal range of motion. He exhibits no edema.   Neurological: He is oriented to person, place, and time.   Skin: Skin is warm and dry.   Multiple track marks on BUE    Nursing note and vitals reviewed.        Laboratory Data:  CBC  Recent Labs   Lab 12/01/18  0806 12/01/18  1734   WBC 13.54* 8.72   RBC 4.35* 4.47*   HGB 12.9* 13.4*   HCT 39.6* 41.0    197   MCV 91 92   MCH 29.7 30.0   MCHC 32.6 32.7     CMP  Recent Labs   Lab 12/01/18  0806 12/01/18  1734   CALCIUM 9.2 9.4   PROT 8.6*  --     137   K 4.6 5.4*   CO2 31* 29    100   BUN 13 13   CREATININE 0.9 0.9   ALKPHOS 111  --    ALT 31  --    AST 31  --    BILITOT 1.5*  --        Diagnostic Results:  CT Chest Without Contrast   Final Result      1. Right PICC catheter tip terminates at the level of the mid to distal SVC, no findings to suggest mediastinal hematoma, or significant fluid collection/inflammation along the subcutaneous course of the PICC catheter.   2. Patchy regions of likely atelectasis within the right lower lobe and medial right middle lobe.  Developing consolidation is not excluded although felt less likely.  Clinical correlation is recommended as this is new since the most recent examination.  Please note, overall evaluation is limited secondary to respiratory motion.   3. Several additional findings above.         Electronically signed by: Iván Wu MD   Date:    12/01/2018   Time:    18:32      X-Ray Chest AP Portable   Final Result   Abnormal      As above      This report was flagged in Epic as abnormal.      COMMUNICATION   This critical result was discovered/received at 14:25.  The critical information above was relayed directly by me by telephone to patient's nurse  on 12/01/2018 at 14:28.         Electronically signed by: Ricardo Arguello DO   Date:    12/01/2018   Time:    16:28      X-Ray Chest 1 View   Final Result      As above         Electronically signed  by: Ricardo Arguello DO   Date:    12/01/2018   Time:    15:39      X-Ray Chest PA And Lateral   Final Result      As above         Electronically signed by: Silva Handy MD   Date:    12/01/2018   Time:    07:55      X-Ray Chest 1 View    (Results Pending)       ASSESSMENT/PLAN:   Ming Harrington is a 53 y.o. male with history of asthma, COPD, IVDU and HTN      Asthma  Exacerbation vs COPD exacerbation   -  Will continue Levofloxacin 750 mg    - Continuous Bipap overnight, he was taken off BiPAP and satting in low 90's  -Currently awake and alert, doing well   - Continue methylprednislone 40 mg IV q8   -Duonebs Q6hrs  -Encourage IS and acapella therapy   -Questionable hemothorax- Stat Chest CT rules out hemothorax  -Repeat CXR shows no pneumothorax   -Consult pulm, appreciate recs      IVDU  - Last uses approximately 2 days ago,   -Utox + for opiates   - Will continue to monitor for withdrawal         HTN:   - BP stable at 146/87   - Continue home amlodipine 10 mg  -Goal BP <1500/80          Dispo: Pending pulm consutl, improving lung function and O2 saturation      12/2/2018 Bella Wynne MD  08:43 AM
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
## 2018-12-02 NOTE — NURSING TRANSFER
Nursing Transfer Note      12/2/2018     Transfer To: rm [id number] to [facility]     Transfer via bed    Transfer with O2    Transported by [facility]         Medicines sent: NONE    Chart send with patient: Yes    Patient reassessed at: 1250 12/2/2018    Upon arrival to floor: patient oriented to room, call bell in reach and bed in lowest position

## 2018-12-02 NOTE — PLAN OF CARE
Problem: Fall Risk (Adult)  Intervention: Review Medications/Identify Contributors to Fall Risk   12/02/18 0056   Safety Interventions   Medication Review/Management medications reviewed     Intervention: Patient Rounds   12/01/18 2305   Safety Interventions   Patient Rounds bed in low position;bed wheels locked;call light in reach;clutter free environment maintained;ID band on;placement of personal items at bedside;toileting offered;visualized patient     Intervention: Safety Promotion/Fall Prevention   12/01/18 2305   Safety Interventions   Safety Promotion/Fall Prevention bed alarm set;Fall Risk reviewed with patient/family;Fall Risk signage in place;pulse ox;nonskid shoes/socks when out of bed;medications reviewed;room near unit station;side rails raised x 3     Intervention: Safety Precautions   12/01/18 2305   Safety Interventions   Safety Precautions emergency equipment at bedside       Goal: Identify Related Risk Factors and Signs and Symptoms  Related risk factors and signs and symptoms are identified upon initiation of Human Response Clinical Practice Guideline (CPG)  Outcome: Ongoing (interventions implemented as appropriate)   12/02/18 0056   Fall Risk   Related Risk Factors (Fall Risk) confusion/agitation;fatigue/slow reaction;objects hard to reach;polypharmacy;sensory deficits;sleep pattern alteration;slipper/uneven surfaces;environment unfamiliar   Signs and Symptoms (Fall Risk) presence of risk factors       Problem: Asthma (Adult)  Intervention: Support Effective Ventilation/Oxygenation   12/01/18 2305   Respiratory Interventions   Airway/Ventilation Management airway patency maintained;calming measures promoted   Activity   Activity Management activity minimized*;activity clustered for rest period*   Positioning   Head of Bed (HOB) HOB at 30-45 degrees     Intervention: Support/Optimize Psychosocial Response to Condition   12/02/18 0056   Psychosocial Support   Family/Support System Care  involvement promoted   Coping/Psychosocial Interventions   Supportive Measures relaxation techniques promoted;positive reinforcement provided;verbalization of feelings encouraged;self-responsibility promoted   Environmental Support calm environment promoted;distractions minimized;environmental consistency promoted;rest periods encouraged       Goal: Signs and Symptoms of Listed Potential Problems Will be Absent, Minimized or Managed (Asthma)  Signs and symptoms of listed potential problems will be absent, minimized or managed by discharge/transition of care (reference Asthma (Adult) CPG).  Outcome: Ongoing (interventions implemented as appropriate)   12/02/18 0056   Asthma   Problems Assessed (Asthma) hypoxia/hypoxemia;recurrent exacerbation;situational response   Problems Present (Asthma) situational response

## 2018-12-02 NOTE — EICU
Keli Note :    Called by the [facility]:    Problem: Tachypnea in the setting of Acute Exacerbation of COPD on BIPAP 11/5 ,  ml    Pertinent History and labs reviewed :   Problem List:  2018-12: Asthma exacerbation in COPD  2018-07: COPD with acute exacerbation  2018-01: Shortness of breath  2017-12: Exacerbation of asthma  2017-10: Type 2 diabetes mellitus with complication, without long-  term current use of insulin  2017-10: Acute respiratory failure with hypercapnia  2017-10: Drug withdrawal  2017-10: Hyperkalemia  2017-08: Elevated troponin I measurement  2017-08: Chronic respiratory failure with hypoxia and hypercapnia  2017-07: Polysubstance abuse  2017-07: Hepatic encephalopathy  2017-07: Hepatic coma/encephalopathy  2017-07: Acute respiratory acidosis  2017-07: COPD exacerbation  2017-06: COPD with acute exacerbation  2017-03: Common bile duct dilatation  2017-03: Pulmonary HTN  2017-03: Obesity  2016-12: Anxiety  2016-12: Asthmatic bronchitis  2016-11: Abdominal pain  2016-11: Asthma exacerbation  2016-11: Ventral hernia without obstruction or gangrene  2014-08: History of tobacco abuse  2014-08: COPD exacerbation  2014-08: Chronic respiratory failure with hypoxia  2014-08: Non-adherence to medical treatment  2014-08: Headache  2014-08: Chronic obstructive asthma with acute exacerbation  2014-07: Shortness of breath  2014-07: Syncope and collapse  2014-07: Neurological muscle weakness  2014-07: Heroin abuse  2014-07: 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  2014-07: Encephalopathy  2014-07: Tremor, involuntary spasm, or fasciculation  2014-07: COPD exacerbation  2014-07: Altered mental status  2014-07: Syncope  2014-07: Weakness  2014-01: Asthma with status asthmaticus  2014-01: Tobacco use disorder  2014-01: Viral upper respiratory infection  2014-01: Asthma exacerbation  2014-01: Acute on chronic respiratory failure with hypoxia and   hypercapnia  2014-01: SIRS (systemic  inflammatory response syndrome)  2014-01: Acute bronchitis  2014-01: Smokes 1/2 pack a day or less  2013-03: Recurrent umbilical hernia  2013-02: Incisional hernia  2013-02: Hepatitis C virus infection  2013-01: Opiate dependence  2013-01: Heroin withdrawal  2013-01: Asthma with acute exacerbation  Chronic obstructive pulmonary disease  Essential hypertension  Anxiety  Methadone use        Treatment /Intervention given: STAT ABG, may start Precedex GTT for Heroin withdrawal          Jessica Dickey Physician  5:47 AM  Maxed out on Precedex , pt still tachypneic , Resume home meds Lexapro and Olanzepine

## 2018-12-02 NOTE — NURSING
Pt is awake and alert. Pt continued to be NSR on telemetry with HR in the 80's.No ectopy noted.Denies any pain. No distress noted. PICC line removed from Right arm,cath tip intact. Tele monitor removed. Discharge instructions given to patient. Discussed side effects of new medications. All questions answered. Verbalized understanding.

## 2019-02-27 ENCOUNTER — HOSPITAL ENCOUNTER (INPATIENT)
Facility: HOSPITAL | Age: 54
LOS: 4 days | Discharge: HOME | DRG: 191 | End: 2019-03-03
Attending: EMERGENCY MEDICINE | Admitting: FAMILY MEDICINE
Payer: MEDICAID

## 2019-02-27 DIAGNOSIS — J44.1 ACUTE EXACERBATION OF COPD WITH ASTHMA: Primary | ICD-10-CM

## 2019-02-27 DIAGNOSIS — F19.11 HISTORY OF DRUG ABUSE: ICD-10-CM

## 2019-02-27 DIAGNOSIS — R78.81 BACTEREMIA: ICD-10-CM

## 2019-02-27 DIAGNOSIS — J45.901 ACUTE EXACERBATION OF COPD WITH ASTHMA: Primary | ICD-10-CM

## 2019-02-27 DIAGNOSIS — R06.02 SHORTNESS OF BREATH: ICD-10-CM

## 2019-02-27 DIAGNOSIS — I10 ESSENTIAL HYPERTENSION: Chronic | ICD-10-CM

## 2019-02-27 DIAGNOSIS — J44.1 COPD WITH ACUTE EXACERBATION: ICD-10-CM

## 2019-02-27 PROBLEM — E11.9 TYPE 2 DIABETES MELLITUS, WITHOUT LONG-TERM CURRENT USE OF INSULIN: Status: ACTIVE | Noted: 2017-10-02

## 2019-02-27 PROBLEM — E11.9 TYPE 2 DIABETES MELLITUS, WITHOUT LONG-TERM CURRENT USE OF INSULIN: Status: RESOLVED | Noted: 2017-10-02 | Resolved: 2019-02-27

## 2019-02-27 LAB
ALBUMIN SERPL BCP-MCNC: 3.2 G/DL
ALP SERPL-CCNC: 103 U/L
ALT SERPL W/O P-5'-P-CCNC: 26 U/L
AMPHET+METHAMPHET UR QL: NEGATIVE
ANION GAP SERPL CALC-SCNC: 7 MMOL/L
AST SERPL-CCNC: 37 U/L
BACTERIA #/AREA URNS HPF: NORMAL /HPF
BARBITURATES UR QL SCN>200 NG/ML: NEGATIVE
BASOPHILS # BLD AUTO: 0.01 K/UL
BASOPHILS NFR BLD: 0.2 %
BENZODIAZ UR QL SCN>200 NG/ML: NEGATIVE
BILIRUB SERPL-MCNC: 0.6 MG/DL
BILIRUB UR QL STRIP: NEGATIVE
BNP SERPL-MCNC: 18 PG/ML
BUN SERPL-MCNC: 11 MG/DL
BZE UR QL SCN: NEGATIVE
CALCIUM SERPL-MCNC: 9.2 MG/DL
CANNABINOIDS UR QL SCN: NEGATIVE
CHLORIDE SERPL-SCNC: 100 MMOL/L
CLARITY UR: CLEAR
CO2 SERPL-SCNC: 29 MMOL/L
COLOR UR: YELLOW
CREAT SERPL-MCNC: 1.1 MG/DL
CREAT UR-MCNC: 51 MG/DL
DIFFERENTIAL METHOD: ABNORMAL
EOSINOPHIL # BLD AUTO: 0.1 K/UL
EOSINOPHIL NFR BLD: 1 %
ERYTHROCYTE [DISTWIDTH] IN BLOOD BY AUTOMATED COUNT: 12.9 %
EST. GFR  (AFRICAN AMERICAN): >60 ML/MIN/1.73 M^2
EST. GFR  (NON AFRICAN AMERICAN): >60 ML/MIN/1.73 M^2
ETHANOL UR-MCNC: <10 MG/DL
GLUCOSE SERPL-MCNC: 105 MG/DL
GLUCOSE UR QL STRIP: NEGATIVE
HCT VFR BLD AUTO: 39.3 %
HGB BLD-MCNC: 12.7 G/DL
HGB UR QL STRIP: NEGATIVE
HYALINE CASTS #/AREA URNS LPF: 0 /LPF
INFLUENZA A, MOLECULAR: NEGATIVE
INFLUENZA B, MOLECULAR: NEGATIVE
KETONES UR QL STRIP: NEGATIVE
LACTATE SERPL-SCNC: 2.1 MMOL/L
LEUKOCYTE ESTERASE UR QL STRIP: NEGATIVE
LYMPHOCYTES # BLD AUTO: 1.3 K/UL
LYMPHOCYTES NFR BLD: 22.4 %
MCH RBC QN AUTO: 29.5 PG
MCHC RBC AUTO-ENTMCNC: 32.3 G/DL
MCV RBC AUTO: 91 FL
METHADONE UR QL SCN>300 NG/ML: NORMAL
MICROSCOPIC COMMENT: NORMAL
MONOCYTES # BLD AUTO: 0 K/UL
MONOCYTES NFR BLD: 0 %
NEUTROPHILS # BLD AUTO: 4.4 K/UL
NEUTROPHILS NFR BLD: 76.2 %
NITRITE UR QL STRIP: NEGATIVE
OPIATES UR QL SCN: NORMAL
PCP UR QL SCN>25 NG/ML: NEGATIVE
PH UR STRIP: 6 [PH] (ref 5–8)
PLATELET # BLD AUTO: 174 K/UL
PMV BLD AUTO: 9.9 FL
POTASSIUM SERPL-SCNC: 4.7 MMOL/L
PROT SERPL-MCNC: 8.1 G/DL
PROT UR QL STRIP: ABNORMAL
RBC # BLD AUTO: 4.3 M/UL
RBC #/AREA URNS HPF: 3 /HPF (ref 0–4)
SODIUM SERPL-SCNC: 136 MMOL/L
SP GR UR STRIP: 1.02 (ref 1–1.03)
SPECIMEN SOURCE: NORMAL
TOXICOLOGY INFORMATION: NORMAL
TROPONIN I SERPL DL<=0.01 NG/ML-MCNC: <0.006 NG/ML
URN SPEC COLLECT METH UR: ABNORMAL
UROBILINOGEN UR STRIP-ACNC: 1 EU/DL
WBC # BLD AUTO: 5.76 K/UL
WBC #/AREA URNS HPF: 2 /HPF (ref 0–5)

## 2019-02-27 PROCEDURE — 87502 INFLUENZA DNA AMP PROBE: CPT

## 2019-02-27 PROCEDURE — 96372 THER/PROPH/DIAG INJ SC/IM: CPT

## 2019-02-27 PROCEDURE — 87040 BLOOD CULTURE FOR BACTERIA: CPT

## 2019-02-27 PROCEDURE — 94644 CONT INHLJ TX 1ST HOUR: CPT

## 2019-02-27 PROCEDURE — 80307 DRUG TEST PRSMV CHEM ANLYZR: CPT

## 2019-02-27 PROCEDURE — 87186 SC STD MICRODIL/AGAR DIL: CPT

## 2019-02-27 PROCEDURE — 84484 ASSAY OF TROPONIN QUANT: CPT

## 2019-02-27 PROCEDURE — 96375 TX/PRO/DX INJ NEW DRUG ADDON: CPT

## 2019-02-27 PROCEDURE — 87077 CULTURE AEROBIC IDENTIFY: CPT

## 2019-02-27 PROCEDURE — 93010 ELECTROCARDIOGRAM REPORT: CPT | Mod: ,,, | Performed by: STUDENT IN AN ORGANIZED HEALTH CARE EDUCATION/TRAINING PROGRAM

## 2019-02-27 PROCEDURE — 99285 EMERGENCY DEPT VISIT HI MDM: CPT | Mod: 25

## 2019-02-27 PROCEDURE — 94640 AIRWAY INHALATION TREATMENT: CPT

## 2019-02-27 PROCEDURE — 25000003 PHARM REV CODE 250: Performed by: EMERGENCY MEDICINE

## 2019-02-27 PROCEDURE — 93005 ELECTROCARDIOGRAM TRACING: CPT

## 2019-02-27 PROCEDURE — 12000002 HC ACUTE/MED SURGE SEMI-PRIVATE ROOM

## 2019-02-27 PROCEDURE — 85025 COMPLETE CBC W/AUTO DIFF WBC: CPT

## 2019-02-27 PROCEDURE — 11000001 HC ACUTE MED/SURG PRIVATE ROOM

## 2019-02-27 PROCEDURE — 63600175 PHARM REV CODE 636 W HCPCS: Performed by: EMERGENCY MEDICINE

## 2019-02-27 PROCEDURE — 96366 THER/PROPH/DIAG IV INF ADDON: CPT

## 2019-02-27 PROCEDURE — 83605 ASSAY OF LACTIC ACID: CPT

## 2019-02-27 PROCEDURE — 25000242 PHARM REV CODE 250 ALT 637 W/ HCPCS: Performed by: EMERGENCY MEDICINE

## 2019-02-27 PROCEDURE — 80053 COMPREHEN METABOLIC PANEL: CPT

## 2019-02-27 PROCEDURE — 81000 URINALYSIS NONAUTO W/SCOPE: CPT

## 2019-02-27 PROCEDURE — 27100107 HC POCKET PEAK FLOW METER

## 2019-02-27 PROCEDURE — 93010 EKG 12-LEAD: ICD-10-PCS | Mod: ,,, | Performed by: STUDENT IN AN ORGANIZED HEALTH CARE EDUCATION/TRAINING PROGRAM

## 2019-02-27 PROCEDURE — 83880 ASSAY OF NATRIURETIC PEPTIDE: CPT

## 2019-02-27 PROCEDURE — 99900035 HC TECH TIME PER 15 MIN (STAT)

## 2019-02-27 PROCEDURE — 96365 THER/PROPH/DIAG IV INF INIT: CPT

## 2019-02-27 PROCEDURE — 96367 TX/PROPH/DG ADDL SEQ IV INF: CPT

## 2019-02-27 PROCEDURE — 63600175 PHARM REV CODE 636 W HCPCS

## 2019-02-27 PROCEDURE — 63600175 PHARM REV CODE 636 W HCPCS: Performed by: STUDENT IN AN ORGANIZED HEALTH CARE EDUCATION/TRAINING PROGRAM

## 2019-02-27 RX ORDER — PROMETHAZINE HYDROCHLORIDE 25 MG/ML
12.5 INJECTION, SOLUTION INTRAMUSCULAR; INTRAVENOUS
Status: COMPLETED | OUTPATIENT
Start: 2019-02-27 | End: 2019-02-27

## 2019-02-27 RX ORDER — MAGNESIUM SULFATE HEPTAHYDRATE 40 MG/ML
2 INJECTION, SOLUTION INTRAVENOUS
Status: COMPLETED | OUTPATIENT
Start: 2019-02-27 | End: 2019-02-27

## 2019-02-27 RX ORDER — ONDANSETRON 8 MG/1
8 TABLET, ORALLY DISINTEGRATING ORAL
Status: COMPLETED | OUTPATIENT
Start: 2019-02-27 | End: 2019-02-27

## 2019-02-27 RX ORDER — METHYLPREDNISOLONE SOD SUCC 125 MG
125 VIAL (EA) INJECTION
Status: DISCONTINUED | OUTPATIENT
Start: 2019-02-27 | End: 2019-02-27

## 2019-02-27 RX ORDER — ALBUTEROL SULFATE 2.5 MG/.5ML
10 SOLUTION RESPIRATORY (INHALATION) ONCE
Status: COMPLETED | OUTPATIENT
Start: 2019-02-27 | End: 2019-02-27

## 2019-02-27 RX ORDER — METHYLPREDNISOLONE SOD SUCC 125 MG
125 VIAL (EA) INJECTION ONCE
Status: COMPLETED | OUTPATIENT
Start: 2019-02-27 | End: 2019-02-27

## 2019-02-27 RX ORDER — IPRATROPIUM BROMIDE AND ALBUTEROL SULFATE 2.5; .5 MG/3ML; MG/3ML
3 SOLUTION RESPIRATORY (INHALATION)
Status: COMPLETED | OUTPATIENT
Start: 2019-02-27 | End: 2019-02-27

## 2019-02-27 RX ORDER — PREDNISONE 20 MG/1
60 TABLET ORAL
Status: COMPLETED | OUTPATIENT
Start: 2019-02-27 | End: 2019-02-27

## 2019-02-27 RX ORDER — METHYLPREDNISOLONE SOD SUCC 125 MG
125 VIAL (EA) INJECTION EVERY 8 HOURS
Status: DISCONTINUED | OUTPATIENT
Start: 2019-02-27 | End: 2019-02-28

## 2019-02-27 RX ORDER — LEVOFLOXACIN 5 MG/ML
750 INJECTION, SOLUTION INTRAVENOUS
Status: DISCONTINUED | OUTPATIENT
Start: 2019-02-27 | End: 2019-03-03 | Stop reason: HOSPADM

## 2019-02-27 RX ADMIN — ALBUTEROL SULFATE 10 MG: 2.5 SOLUTION RESPIRATORY (INHALATION) at 01:02

## 2019-02-27 RX ADMIN — PREDNISONE 60 MG: 20 TABLET ORAL at 02:02

## 2019-02-27 RX ADMIN — MAGNESIUM SULFATE IN WATER 2 G: 40 INJECTION, SOLUTION INTRAVENOUS at 04:02

## 2019-02-27 RX ADMIN — IPRATROPIUM BROMIDE AND ALBUTEROL SULFATE 3 ML: .5; 3 SOLUTION RESPIRATORY (INHALATION) at 03:02

## 2019-02-27 RX ADMIN — METHYLPREDNISOLONE SODIUM SUCCINATE 125 MG: 125 INJECTION, POWDER, FOR SOLUTION INTRAMUSCULAR; INTRAVENOUS at 10:02

## 2019-02-27 RX ADMIN — LEVOFLOXACIN 750 MG: 750 INJECTION, SOLUTION INTRAVENOUS at 09:02

## 2019-02-27 RX ADMIN — PROMETHAZINE HYDROCHLORIDE 12.5 MG: 25 INJECTION INTRAMUSCULAR; INTRAVENOUS at 10:02

## 2019-02-27 RX ADMIN — ONDANSETRON 8 MG: 8 TABLET, ORALLY DISINTEGRATING ORAL at 02:02

## 2019-02-27 NOTE — PROGRESS NOTES
02/27/19 1728   Peak Flow   $ Peak Flow Charges Given;Pocket Peak Flow Meter - Supply   PEFR PREtreatment (L/Min) 270   Effort (PEFR) good   Patient Position (PEFR) semi-Aldana's

## 2019-02-27 NOTE — ED NOTES
KAYLA Schmidt MD notified of unsuccessful IV attempts. MD states to hold off on the blood and IV until patient's breathing has improved.

## 2019-02-27 NOTE — ED NOTES
Pt states after eating, his SOB returned. KAYLA Schmidt MD at bedside. Breathing treatments ordered.
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
## 2019-02-27 NOTE — ED NOTES
Pt states he feels much better. Slight wheezing still audible. No nausea, vomiting, or chest pain. Will continue to monitor closely.

## 2019-02-27 NOTE — ED NOTES
Pt to Room 18 with c/o SOB and cough x 2 days. Pt states he has a history of asthma and COPD, his symptoms flare up with weather change. Pt states his cough is productive, sputum thick and greenish. Pt breathing labored, shallow and tachypneic. Pt denies any chest pain, nausea, vomiting, diarrhea, constipation, dizziness, or headache. Pt is AAO x 3. Skin warm and dry to touch, no swelling noted. Abdomen soft and non-distended, no guarding or tenderness noted. Hernia present. No acute distress noted. Cardiac monitor and pulse ox applied. Oxygen saturation noted. Will continue to monitor closely.

## 2019-02-28 LAB
ALBUMIN SERPL BCP-MCNC: 3.1 G/DL
ALP SERPL-CCNC: 104 U/L
ALT SERPL W/O P-5'-P-CCNC: 29 U/L
ANION GAP SERPL CALC-SCNC: 3 MMOL/L
AST SERPL-CCNC: 34 U/L
BASOPHILS # BLD AUTO: 0 K/UL
BASOPHILS NFR BLD: 0 %
BILIRUB SERPL-MCNC: 0.7 MG/DL
BUN SERPL-MCNC: 13 MG/DL
CALCIUM SERPL-MCNC: 9.6 MG/DL
CHLORIDE SERPL-SCNC: 98 MMOL/L
CO2 SERPL-SCNC: 35 MMOL/L
CREAT SERPL-MCNC: 1 MG/DL
DIFFERENTIAL METHOD: ABNORMAL
EOSINOPHIL # BLD AUTO: 0 K/UL
EOSINOPHIL NFR BLD: 0 %
ERYTHROCYTE [DISTWIDTH] IN BLOOD BY AUTOMATED COUNT: 12.9 %
EST. GFR  (AFRICAN AMERICAN): >60 ML/MIN/1.73 M^2
EST. GFR  (NON AFRICAN AMERICAN): >60 ML/MIN/1.73 M^2
ESTIMATED AVG GLUCOSE: 91 MG/DL
GLUCOSE SERPL-MCNC: 127 MG/DL
HBA1C MFR BLD HPLC: 4.8 %
HCT VFR BLD AUTO: 41.5 %
HGB BLD-MCNC: 13.1 G/DL
LYMPHOCYTES # BLD AUTO: 0.6 K/UL
LYMPHOCYTES NFR BLD: 12.2 %
MAGNESIUM SERPL-MCNC: 2.3 MG/DL
MCH RBC QN AUTO: 28.8 PG
MCHC RBC AUTO-ENTMCNC: 31.6 G/DL
MCV RBC AUTO: 91 FL
MONOCYTES # BLD AUTO: 0 K/UL
MONOCYTES NFR BLD: 0.7 %
NEUTROPHILS # BLD AUTO: 3.9 K/UL
NEUTROPHILS NFR BLD: 86.9 %
PHOSPHATE SERPL-MCNC: 3.8 MG/DL
PLATELET # BLD AUTO: 176 K/UL
PMV BLD AUTO: 10.2 FL
POTASSIUM SERPL-SCNC: 4.9 MMOL/L
PROT SERPL-MCNC: 8.5 G/DL
RBC # BLD AUTO: 4.55 M/UL
SODIUM SERPL-SCNC: 136 MMOL/L
WBC # BLD AUTO: 4.52 K/UL

## 2019-02-28 PROCEDURE — 94640 AIRWAY INHALATION TREATMENT: CPT

## 2019-02-28 PROCEDURE — 94799 UNLISTED PULMONARY SVC/PX: CPT

## 2019-02-28 PROCEDURE — 25000003 PHARM REV CODE 250: Performed by: STUDENT IN AN ORGANIZED HEALTH CARE EDUCATION/TRAINING PROGRAM

## 2019-02-28 PROCEDURE — 80053 COMPREHEN METABOLIC PANEL: CPT

## 2019-02-28 PROCEDURE — 11000001 HC ACUTE MED/SURG PRIVATE ROOM

## 2019-02-28 PROCEDURE — 63600175 PHARM REV CODE 636 W HCPCS: Performed by: STUDENT IN AN ORGANIZED HEALTH CARE EDUCATION/TRAINING PROGRAM

## 2019-02-28 PROCEDURE — 83036 HEMOGLOBIN GLYCOSYLATED A1C: CPT

## 2019-02-28 PROCEDURE — 99900035 HC TECH TIME PER 15 MIN (STAT)

## 2019-02-28 PROCEDURE — S4991 NICOTINE PATCH NONLEGEND: HCPCS | Performed by: STUDENT IN AN ORGANIZED HEALTH CARE EDUCATION/TRAINING PROGRAM

## 2019-02-28 PROCEDURE — 25000242 PHARM REV CODE 250 ALT 637 W/ HCPCS: Performed by: STUDENT IN AN ORGANIZED HEALTH CARE EDUCATION/TRAINING PROGRAM

## 2019-02-28 PROCEDURE — 25000003 PHARM REV CODE 250: Performed by: PSYCHIATRY & NEUROLOGY

## 2019-02-28 PROCEDURE — 94761 N-INVAS EAR/PLS OXIMETRY MLT: CPT

## 2019-02-28 PROCEDURE — 36415 COLL VENOUS BLD VENIPUNCTURE: CPT

## 2019-02-28 PROCEDURE — 27000221 HC OXYGEN, UP TO 24 HOURS

## 2019-02-28 PROCEDURE — 87040 BLOOD CULTURE FOR BACTERIA: CPT

## 2019-02-28 PROCEDURE — 84100 ASSAY OF PHOSPHORUS: CPT

## 2019-02-28 PROCEDURE — 27000646 HC AEROBIKA DEVICE

## 2019-02-28 PROCEDURE — 94664 DEMO&/EVAL PT USE INHALER: CPT

## 2019-02-28 PROCEDURE — 85025 COMPLETE CBC W/AUTO DIFF WBC: CPT

## 2019-02-28 PROCEDURE — 83735 ASSAY OF MAGNESIUM: CPT

## 2019-02-28 RX ORDER — CLONIDINE HYDROCHLORIDE 0.3 MG/1
0.3 TABLET ORAL 3 TIMES DAILY
Status: DISCONTINUED | OUTPATIENT
Start: 2019-02-28 | End: 2019-03-03 | Stop reason: HOSPADM

## 2019-02-28 RX ORDER — ESCITALOPRAM OXALATE 5 MG/1
5 TABLET ORAL DAILY
Status: COMPLETED | OUTPATIENT
Start: 2019-02-28 | End: 2019-02-28

## 2019-02-28 RX ORDER — FUROSEMIDE 40 MG/1
40 TABLET ORAL DAILY
Status: DISCONTINUED | OUTPATIENT
Start: 2019-02-28 | End: 2019-03-03 | Stop reason: HOSPADM

## 2019-02-28 RX ORDER — IPRATROPIUM BROMIDE AND ALBUTEROL SULFATE 2.5; .5 MG/3ML; MG/3ML
3 SOLUTION RESPIRATORY (INHALATION) EVERY 4 HOURS
Status: DISCONTINUED | OUTPATIENT
Start: 2019-02-28 | End: 2019-03-03 | Stop reason: HOSPADM

## 2019-02-28 RX ORDER — IPRATROPIUM BROMIDE 0.5 MG/2.5ML
500 SOLUTION RESPIRATORY (INHALATION) 4 TIMES DAILY PRN
COMMUNITY
End: 2019-05-23

## 2019-02-28 RX ORDER — OLANZAPINE 2.5 MG/1
10 TABLET ORAL 2 TIMES DAILY
Status: DISCONTINUED | OUTPATIENT
Start: 2019-02-28 | End: 2019-03-03 | Stop reason: HOSPADM

## 2019-02-28 RX ORDER — PROMETHAZINE HYDROCHLORIDE 25 MG/1
25 TABLET ORAL EVERY 6 HOURS PRN
COMMUNITY
End: 2019-04-17 | Stop reason: SDUPTHER

## 2019-02-28 RX ORDER — METHADONE HYDROCHLORIDE 10 MG/1
80 TABLET ORAL DAILY
Status: DISCONTINUED | OUTPATIENT
Start: 2019-02-28 | End: 2019-03-03 | Stop reason: HOSPADM

## 2019-02-28 RX ORDER — CLONAZEPAM 1 MG/1
2 TABLET ORAL 3 TIMES DAILY
COMMUNITY
End: 2019-05-23

## 2019-02-28 RX ORDER — IBUPROFEN 200 MG
1 TABLET ORAL DAILY
Status: DISCONTINUED | OUTPATIENT
Start: 2019-02-28 | End: 2019-03-03 | Stop reason: HOSPADM

## 2019-02-28 RX ORDER — SODIUM CHLORIDE 0.9 % (FLUSH) 0.9 %
3 SYRINGE (ML) INJECTION
Status: DISCONTINUED | OUTPATIENT
Start: 2019-02-28 | End: 2019-03-03 | Stop reason: HOSPADM

## 2019-02-28 RX ORDER — ESCITALOPRAM OXALATE 10 MG/1
10 TABLET ORAL DAILY
Status: DISCONTINUED | OUTPATIENT
Start: 2019-03-01 | End: 2019-03-03 | Stop reason: HOSPADM

## 2019-02-28 RX ORDER — ENOXAPARIN SODIUM 100 MG/ML
40 INJECTION SUBCUTANEOUS EVERY 24 HOURS
Status: DISCONTINUED | OUTPATIENT
Start: 2019-02-28 | End: 2019-03-03 | Stop reason: HOSPADM

## 2019-02-28 RX ORDER — METHADONE HYDROCHLORIDE 10 MG/5ML
90 SOLUTION ORAL EVERY MORNING
COMMUNITY
End: 2023-02-07

## 2019-02-28 RX ORDER — AMLODIPINE BESYLATE 5 MG/1
10 TABLET ORAL DAILY
Status: DISCONTINUED | OUTPATIENT
Start: 2019-02-28 | End: 2019-03-03 | Stop reason: HOSPADM

## 2019-02-28 RX ORDER — ONDANSETRON 2 MG/ML
4 INJECTION INTRAMUSCULAR; INTRAVENOUS EVERY 8 HOURS PRN
Status: DISCONTINUED | OUTPATIENT
Start: 2019-02-28 | End: 2019-03-03 | Stop reason: HOSPADM

## 2019-02-28 RX ORDER — PREDNISONE 20 MG/1
40 TABLET ORAL DAILY
Status: DISCONTINUED | OUTPATIENT
Start: 2019-03-01 | End: 2019-03-03 | Stop reason: HOSPADM

## 2019-02-28 RX ORDER — MONTELUKAST SODIUM 10 MG/1
10 TABLET ORAL DAILY
Status: DISCONTINUED | OUTPATIENT
Start: 2019-02-28 | End: 2019-03-03 | Stop reason: HOSPADM

## 2019-02-28 RX ADMIN — NICOTINE 1 PATCH: 14 PATCH, EXTENDED RELEASE TRANSDERMAL at 09:02

## 2019-02-28 RX ADMIN — IPRATROPIUM BROMIDE AND ALBUTEROL SULFATE 3 ML: .5; 3 SOLUTION RESPIRATORY (INHALATION) at 12:02

## 2019-02-28 RX ADMIN — IPRATROPIUM BROMIDE AND ALBUTEROL SULFATE 3 ML: .5; 3 SOLUTION RESPIRATORY (INHALATION) at 04:02

## 2019-02-28 RX ADMIN — IPRATROPIUM BROMIDE AND ALBUTEROL SULFATE 3 ML: .5; 3 SOLUTION RESPIRATORY (INHALATION) at 07:02

## 2019-02-28 RX ADMIN — ENOXAPARIN SODIUM 40 MG: 100 INJECTION SUBCUTANEOUS at 05:02

## 2019-02-28 RX ADMIN — METHYLPREDNISOLONE SODIUM SUCCINATE 125 MG: 125 INJECTION, POWDER, FOR SOLUTION INTRAMUSCULAR; INTRAVENOUS at 09:02

## 2019-02-28 RX ADMIN — CLONIDINE HYDROCHLORIDE 0.3 MG: 0.3 TABLET ORAL at 09:02

## 2019-02-28 RX ADMIN — AMLODIPINE BESYLATE 10 MG: 5 TABLET ORAL at 09:02

## 2019-02-28 RX ADMIN — METHYLPREDNISOLONE SODIUM SUCCINATE 125 MG: 125 INJECTION, POWDER, FOR SOLUTION INTRAMUSCULAR; INTRAVENOUS at 03:02

## 2019-02-28 RX ADMIN — MONTELUKAST SODIUM 10 MG: 10 TABLET, COATED ORAL at 09:02

## 2019-02-28 RX ADMIN — METHYLPREDNISOLONE SODIUM SUCCINATE 125 MG: 125 INJECTION, POWDER, FOR SOLUTION INTRAMUSCULAR; INTRAVENOUS at 05:02

## 2019-02-28 RX ADMIN — CLONIDINE HYDROCHLORIDE 0.3 MG: 0.3 TABLET ORAL at 03:02

## 2019-02-28 RX ADMIN — FUROSEMIDE 40 MG: 40 TABLET ORAL at 10:02

## 2019-02-28 RX ADMIN — OLANZAPINE 10 MG: 2.5 TABLET, FILM COATED ORAL at 12:02

## 2019-02-28 RX ADMIN — LEVOFLOXACIN 750 MG: 750 INJECTION, SOLUTION INTRAVENOUS at 09:02

## 2019-02-28 RX ADMIN — LORAZEPAM 2 MG: 2 INJECTION INTRAMUSCULAR; INTRAVENOUS at 01:02

## 2019-02-28 RX ADMIN — OLANZAPINE 10 MG: 2.5 TABLET, FILM COATED ORAL at 09:02

## 2019-02-28 RX ADMIN — LORAZEPAM 2 MG: 2 INJECTION INTRAMUSCULAR; INTRAVENOUS at 05:02

## 2019-02-28 RX ADMIN — ONDANSETRON 4 MG: 2 INJECTION INTRAMUSCULAR; INTRAVENOUS at 03:02

## 2019-02-28 RX ADMIN — ESCITALOPRAM 5 MG: 5 TABLET, FILM COATED ORAL at 11:02

## 2019-02-28 RX ADMIN — METHADONE HYDROCHLORIDE 80 MG: 10 TABLET ORAL at 10:02

## 2019-02-28 RX ADMIN — IPRATROPIUM BROMIDE AND ALBUTEROL SULFATE 3 ML: .5; 3 SOLUTION RESPIRATORY (INHALATION) at 11:02

## 2019-02-28 NOTE — PROGRESS NOTES
Home Oxygen Evaluation    Date Performed: 2/28/2019    1) Patient's Home O2 Sat on room air, while at rest: 82        If O2 sats on room air at rest are 88% or below, patient qualifies. No additional testing needed. Document N/A in steps 2 and 3. If 89% or above, complete steps 2.      2) Patient's O2 Sat on room air while exercising:         If O2 sats on room air while exercising remain 89% or above patient does not qualify, no further testing needed Document N/A in step 3. If O2 sats on room air while exercising are 88% or below, continue to step 3.      3) Patient's O2 Sat while exercising on O2:  at LPM         (Must show improvement from #2 for patients to qualify)    If O2 sats improve on oxygen, patient qualifies for portable oxygen. If not, the patient does not qualify.
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
## 2019-02-28 NOTE — PROGRESS NOTES
"PGY-1 Progress Note [facility]    Follow up for:   Chief Complaint   Patient presents with    Shortness of Breath     c/o cough and asthma exacerbation x2 days. Has been out of his home O2 and his albuterol machine for the past 4 days       Hospital Stay Day 1    Subjective: Patient is doing well. He reports improvement in symptoms. Currently on his baseline oxygen. Denies fever, chills, cough, abdominal pain. Tolerating diet with no nausea or vomiting.     Scheduled Meds:   albuterol-ipratropium  3 mL Nebulization Q4H    amLODIPine  10 mg Oral Daily    cloNIDine  0.3 mg Oral TID    enoxaparin  40 mg Subcutaneous Daily    [START ON 3/1/2019] escitalopram oxalate  10 mg Oral Daily    furosemide  40 mg Oral Daily    levoFLOXacin  750 mg Intravenous Q24H    methadone  80 mg Oral Daily    methylPREDNISolone sodium succinate  125 mg Intravenous Q8H    montelukast  10 mg Oral Daily    nicotine  1 patch Transdermal Daily    OLANZapine  10 mg Oral BID     Continuous Infusions:  PRN Meds:lorazepam, ondansetron, sodium chloride 0.9%    Review of patient's allergies indicates:   Allergen Reactions    Compazine [prochlorperazine edisylate] Other (See Comments) and Anaphylaxis     Hallucinations     Iodine and iodide containing products Anaphylaxis and Swelling     Facial swelling    Shellfish containing products      Anaphylaxis^  Anaphylaxis^       Objectives:     Vitals(Most Recent)      BP  Min: 125/79  Max: 169/75  Temp  Av.2 °F (36.8 °C)  Min: 97.8 °F (36.6 °C)  Max: 99 °F (37.2 °C)  Pulse  Av.2  Min: 67  Max: 93  Resp  Av.5  Min: 12  Max: 22  SpO2  Av.5 %  Min: 90 %  Max: 95 %  Height  Av' 8" (172.7 cm)  Min: 5' 8" (172.7 cm)  Max: 5' 8" (172.7 cm)             Vitals([id number])  Temp:  [97.8 °F (36.6 °C)-99 °F (37.2 °C)]   Pulse:  [67-93]   Resp:  [12-22]   BP: (125-169)/(66-92)   SpO2:  [90 %-95 %]     I & O([id number])    Intake/Output Summary (Last 24 hours) at 2019 1317  Last data " filed at 2/28/2019 1300  Gross per 24 hour   Intake 950.5 ml   Output 500 ml   Net 450.5 ml     General: AOx3, Patient currently on 2L NC  HEENT: Poor dentition, EOMI, No thyromegaly noted, neck with full ROM  Cardiovascular: Regular Rate, Regular Rhythm, Normal S1/S2 appreciated, No gallops, rubs, or murmurs noted  Respiratory: Expiratory wheezing in all bilateral lung fields.   Abdomen: Soft, Non-tender, +BS; Large, reducible umbilical hernia   Extremity: 2+ distal pulses in all extremities, no edema noted  Skin: Multiple track marks in bilateral arm  Psych: Normal Mood, Normal Affect  Neuro: AAOx4, CN II-XII Grossly intact, Strength 5/5 in all extremities, Sensation to touch and 2 point discrimination intact throughout    Urinalysis  Recent Labs   Lab 02/27/19  1638   COLORU Yellow   SPECGRAV 1.025   PHUR 6.0   PROTEINUA 2+*   BACTERIA None   NITRITE Negative   LEUKOCYTESUR Negative   UROBILINOGEN 1.0   HYALINECASTS 0       LABS  CBC  Recent Labs   Lab 02/27/19  1637 02/28/19  0545   WBC 5.76 4.52   RBC 4.30* 4.55*   HGB 12.7* 13.1*   HCT 39.3* 41.5    176   MCV 91 91   MCH 29.5 28.8   MCHC 32.3 31.6*     BMP  Recent Labs   Lab 02/27/19  1637 02/28/19  0545    136   K 4.7 4.9   CO2 29 35*    98   BUN 11 13   CREATININE 1.1 1.0    127*       POCT-Glucose  No results found for: POCTGLUCOSE    Recent Labs   Lab 02/27/19 1637 02/28/19  0545   CALCIUM 9.2 9.6   MG  --  2.3   PHOS  --  3.8     LFT  Recent Labs   Lab 02/27/19  1637 02/28/19  0545   PROT 8.1 8.5*   ALBUMIN 3.2* 3.1*   BILITOT 0.6 0.7   AST 37 34   ALKPHOS 103 104   ALT 26 29         COAGS  No results for input(s): PT, INR, APTT in the last 168 hours.  CE  Recent Labs   Lab 02/27/19 1637   TROPONINI <0.006     ABGs  No results for input(s): PH, PCO2, PO2, HCO3, POCSATURATED, BE in the last 24 hours.  BNP  Recent Labs   Lab 02/27/19  1637   BNP 18     UA  Recent Labs   Lab 02/27/19  1638   COLORU Yellow   SPECGRAV 1.025   PHUR  6.0   PROTEINUA 2+*   BACTERIA None     LAST HbA1c  Lab Results   Component Value Date    HGBA1C 4.8 02/28/2019           Imaging  Imaging Results          US Abdomen Limited (Final result)  Result time 02/27/19 20:27:08   Procedure changed from US Doppler Abdomen Complete     Final result by Giselle Garcia MD (02/27/19 20:27:08)                 Impression:      Periumbilical hernia.  Uncertain if this may be bowel containing based on images provided.  CT follow-up may be obtained if concern for bowel containing hernia and possible obstruction.      Electronically signed by: Giselle Garcia MD  Date:    02/27/2019  Time:    20:27             Narrative:    EXAMINATION:  US ABDOMEN LIMITED    CLINICAL HISTORY:  Umbilical Hernia; Chronic obstructive pulmonary disease with (acute) exacerbation    COMPARISON:  CT abdomen and pelvis from March 2018.    FINDINGS:  Limited ultrasound evaluation was performed of the abdomen in lying and sitting positions with provocative maneuvers.  Periumbilical hernia is seen.  Uncertain if this may be bowel containing based on imaging provided.                               X-Ray Chest 1 View (Final result)  Result time 02/27/19 13:41:52    Final result by Hakeem Kincaid Jr., MD (02/27/19 13:41:52)                 Impression:      No significant abnormality seen.      Electronically signed by: Hakeem Kincaid MD  Date:    02/27/2019  Time:    13:41             Narrative:    EXAMINATION:  XR CHEST 1 VIEW    CLINICAL HISTORY:  Shortness of breath    TECHNIQUE:  Single frontal view of the chest was performed.    COMPARISON:  December 2, 2018.    FINDINGS:  Monitoring leads in place.  Right PICC or port catheter has been removed.  Heart size and pulmonary vessels are normal.  No confluent consolidation.                                Micro:  Microbiology Results (last 7 days)     Procedure Component Value Units Date/Time    Blood culture [[id number]] Collected:  02/27/19 1637    Order Status:   Completed Specimen:  Blood from Peripheral, Jugular, External Right Updated:  02/28/19 0315     Blood Culture, Routine No Growth to date    Blood culture [[id number]] Collected:  02/27/19 1936    Order Status:  Sent Specimen:  Blood from Peripheral, Lower Arm, Right Updated:  02/27/19 1937    Influenza A & B by Molecular [[id number]] Collected:  02/27/19 1641    Order Status:  Completed Specimen:  Nasopharyngeal Swab Updated:  02/27/19 1735     Influenza A, Molecular Negative     Influenza B, Molecular Negative     Flu A & B Source Nasal swab               Assessment/Plan:     Mr. Ming Harrington is a 53 y.o. male w/ PMH of asthma, COPD, HTN, hx of heroin use currently on methadone presenting for asthma exacerbation vs acute COPD excerbation     Asthma vs COPD Exacerbation  Chest X-ray: No acute cardiopulmonary process  Influenza: Negative  Levaquin 750mg IV  IV Solumedrol  Duones q4  Chest PT, Acapella, IS q4     HTN  BP: 130-150s/80-90s  BP goal <140/90  Restart home medications Amlodipine and Clonidine     Heroin Use Disorder  Hx of IVDU.   Patient reports last use 2 months  Currently at [facility]  Verified with Clinic, on 80mg of Methadone daily  Monitor for withdrawals.      Umbilical Hernia  US Hernia: Periumbilical hernia  Reducible  Continue to monitor     Code: Full  Diet: Cardiac  Ppx: Lovenox  Dispo: pending clinical improvement, oxygen tank and nebulizer machine    Case discussed with staff    Ijeoma Dugan MD  02/28/2019

## 2019-02-28 NOTE — ED NOTES
Admit team at bedside assessing patient and developing care plan. Pt denies any SOB or chest pain. VSS. Will continue to monitor closely.
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
## 2019-02-28 NOTE — PLAN OF CARE
Cued into patient's room.  Permission received per patient to turn camera to view patient.  Introduced as VN for night shift that will be working with floor nurse and nursing assistant.  Educated patient on VN's role in patient care. Plan of care reviewed with patient. Education per flowsheet.  Opportunity given for questions and questions answered.  Admission assessment questions answered.  Patient worried about not taking his methadone today; will talk to md.Instructed to call for assistance.  Will cont to monitor.    Dr. Johns notified of patient's request for home methadone; states waiting for Dr. Rojas to see patient but in the meantime, can give the ativan prn that's ordered.    Entered patient's room to update him on prn medications and patient is asleep.  Will cont to monitor.

## 2019-02-28 NOTE — PLAN OF CARE
Problem: Adult Inpatient Plan of Care  Goal: Plan of Care Review  Outcome: Ongoing (interventions implemented as appropriate)  Pt is aaox4. Patient safety maintained. Pt denies pain. Patient sleep and rested over the night. No N&V,  No diarrhea. No distress on 2L of oxygen. Will continue to monitor.

## 2019-02-28 NOTE — PROGRESS NOTES
Spoke with Patient's Methadone clinic,  [facility].   Confirmed that Patient takes 80 mg liquid Methadone Daily.   Will plan to order Methadone for patient while inpatient.    Spoke with [facility] inpatient pharmacy regarding inpatient dosing of methadone. Ok to give 80mg q day.         Also spoke with patient regarding home copd/asthma equipment. He states that his nebulizer and oxygen tank no longer work. He believes this provoked current exacerbation.     Regina Acevedo D.O.  [facility] HO-3  02/28/2019

## 2019-02-28 NOTE — ED NOTES
Introduced self to patient. Roomed cleaned off trash. Bed linen changed. Patient given another gown. Belongings placed in personal belongings bag. Let patient know we were just waiting for a bed upstairs. Patient now sleeping

## 2019-02-28 NOTE — PLAN OF CARE
Problem: Adult Inpatient Plan of Care  Goal: Plan of Care Review  Outcome: Ongoing (interventions implemented as appropriate)  Cont to monitor and treat resp needs

## 2019-02-28 NOTE — ED NOTES
Pt denies any chest pain or SOB. Diet provided per patient's request with approval from MD. Pt updated on plan of care and impending admission.

## 2019-02-28 NOTE — PLAN OF CARE
"Pt informed Tn he lives with his brother and is independent with adls; Pt also informed Tn he uses Medicaid Transportation for appointments and will need transportation home also.  Pt states he has nebulizer that is broken, oxygen concentrator thats broken , and has 2 empty tanks at home. Pt reports he has called  [facility] to inform of broken equipment however they would not come out because "insurance want cover it anymore."   Tn called [facility] and spoke to Slime who informed TN pt needs recertification from Medicaid as it has been over 5 years. Slime informed Tn nebulizer is older than 5 years so new one can be ordered.    TN notified Dr. Acevedo to order home oxygen evaluation ASAP and place home oxygen order if still qualifies. TN also informed DR. Acevedo place nebulizer order and insurance will pay for new one.  TN will send orders to [facility] once written.    Tn notified floor nurse of need to perform home oxygen eval .ASAP and instructed how to chart.    TN sent request for [facility] follow up.       02/28/19 1044   Discharge Assessment   Assessment Type Discharge Planning Assessment   Confirmed/corrected address and phone number on facesheet? Yes   Assessment information obtained from? Patient   Expected Length of Stay (days) 2   Communicated expected length of stay with patient/caregiver yes   Prior to hospitilization cognitive status: Alert/Oriented   Prior to hospitalization functional status: Independent   Current cognitive status: Alert/Oriented   Current Functional Status: Independent   Lives With sibling(s)  (brother)   Able to Return to Prior Arrangements yes   Is patient able to care for self after discharge? Yes   Who are your caregiver(s) and their phone number(s)? (brother) [phone]   Patient's perception of discharge disposition home or selfcare   Readmission Within the Last 30 Days no previous admission in last 30 days   Patient currently being followed by outpatient case " management? No   Patient currently receives any other outside agency services? No   Equipment Currently Used at Home (pt states has nebulizer, oxygen concentratoe thats broken and has 2 empty tanks )   Do you have any problems affording any of your prescribed medications? No   Is the patient taking medications as prescribed? yes   Does the patient have transportation home? Yes   Transportation Anticipated health plan transportation  (MEDICAID TRANSPORTATION)   Does the patient receive services at the [facility] Clinic? No   Discharge Plan A Home   Discharge Plan B Home with family   DME Needed Upon Discharge  nebulizer;oxygen   Patient/Family in Agreement with Plan yes

## 2019-02-28 NOTE — PROGRESS NOTES
Home Oxygen Evaluation    Date Performed: 2/28/2019    1) Patient's Home O2 Sat on room air, while at rest: 82%        If O2 sats on room air at rest are 88% or below, patient qualifies. No additional testing needed. Document N/A in steps 2 and 3. If 89% or above, complete steps 2.      2) Patient's O2 Sat on room air while exercising:      If O2 sats on room air while exercising remain 89% or above patient does not qualify, no further testing needed Document N/A in step 3. If O2 sats on room air while exercising are 88% or below, continue to step 3.      3) Patient's O2 Sat while exercising on O2: 94% 2 LPM         (Must show improvement from #2 for patients to qualify)    If O2 sats improve on oxygen, patient qualifies for portable oxygen. If not, the patient does not qualify.

## 2019-02-28 NOTE — PLAN OF CARE
TN sent order for nebulizer and home oxygen to [facility] via [employer].  Tn also attached home oxygen evaluation note.     02/28/19 4228   Post-Acute Status   Post-Acute Authorization E   E Status Referrals Sent         1373- TN spoke to Ene at [facility] who confirmed orders received for nebulizer and home oxygen

## 2019-02-28 NOTE — CONSULTS
Restart Lexapro for anxiety.  Discharge this patient to home when medically stable,  [facility]

## 2019-03-01 PROBLEM — R78.81 BACTEREMIA: Status: ACTIVE | Noted: 2019-03-01

## 2019-03-01 LAB
ALBUMIN SERPL BCP-MCNC: 3.1 G/DL
ALP SERPL-CCNC: 90 U/L
ALT SERPL W/O P-5'-P-CCNC: 24 U/L
AMMONIA PLAS-SCNC: 42 UMOL/L
ANION GAP SERPL CALC-SCNC: 8 MMOL/L
AORTIC ROOT ANNULUS: 3.03 CM
AST SERPL-CCNC: 21 U/L
AV INDEX (PROSTH): 0.95
AV MEAN GRADIENT: 7.17 MMHG
AV PEAK GRADIENT: 12.25 MMHG
AV VALVE AREA: 2.98 CM2
AV VELOCITY RATIO: 0.79
BASOPHILS # BLD AUTO: 0.01 K/UL
BASOPHILS NFR BLD: 0.1 %
BILIRUB SERPL-MCNC: 0.7 MG/DL
BSA FOR ECHO PROCEDURE: 2.27 M2
BUN SERPL-MCNC: 27 MG/DL
CALCIUM SERPL-MCNC: 9.8 MG/DL
CHLORIDE SERPL-SCNC: 97 MMOL/L
CO2 SERPL-SCNC: 31 MMOL/L
CREAT SERPL-MCNC: 1.2 MG/DL
CV ECHO LV RWT: 0.45 CM
DIFFERENTIAL METHOD: ABNORMAL
DOP CALC AO PEAK VEL: 1.75 M/S
DOP CALC AO VTI: 31.82 CM
DOP CALC LVOT AREA: 3.14 CM2
DOP CALC LVOT DIAMETER: 2 CM
DOP CALC LVOT PEAK VEL: 1.38 M/S
DOP CALC LVOT STROKE VOLUME: 94.89 CM3
DOP CALCLVOT PEAK VEL VTI: 30.22 CM
E WAVE DECELERATION TIME: 220.13 MSEC
E/A RATIO: 1.27
ECHO LV POSTERIOR WALL: 0.97 CM (ref 0.6–1.1)
EOSINOPHIL # BLD AUTO: 0 K/UL
EOSINOPHIL NFR BLD: 0 %
ERYTHROCYTE [DISTWIDTH] IN BLOOD BY AUTOMATED COUNT: 13 %
EST. GFR  (AFRICAN AMERICAN): >60 ML/MIN/1.73 M^2
EST. GFR  (NON AFRICAN AMERICAN): >60 ML/MIN/1.73 M^2
FRACTIONAL SHORTENING: 40 % (ref 28–44)
GLUCOSE SERPL-MCNC: 157 MG/DL
HCT VFR BLD AUTO: 38.7 %
HGB BLD-MCNC: 12.6 G/DL
INTERVENTRICULAR SEPTUM: 0.94 CM (ref 0.6–1.1)
LA MAJOR: 4.03 CM
LA MINOR: 3.09 CM
LA WIDTH: 3.9 CM
LEFT ATRIUM SIZE: 3.49 CM
LEFT ATRIUM VOLUME INDEX: 18.4 ML/M2
LEFT ATRIUM VOLUME: 40.47 CM3
LEFT INTERNAL DIMENSION IN SYSTOLE: 2.59 CM (ref 2.1–4)
LEFT VENTRICLE DIASTOLIC VOLUME INDEX: 37.65 ML/M2
LEFT VENTRICLE DIASTOLIC VOLUME: 82.7 ML
LEFT VENTRICLE MASS INDEX: 60.6 G/M2
LEFT VENTRICLE SYSTOLIC VOLUME INDEX: 11.1 ML/M2
LEFT VENTRICLE SYSTOLIC VOLUME: 24.32 ML
LEFT VENTRICULAR INTERNAL DIMENSION IN DIASTOLE: 4.29 CM (ref 3.5–6)
LEFT VENTRICULAR MASS: 133.2 G
LV LATERAL E/E' RATIO: 5.46
LYMPHOCYTES # BLD AUTO: 1 K/UL
LYMPHOCYTES NFR BLD: 8.2 %
MAGNESIUM SERPL-MCNC: 2.2 MG/DL
MCH RBC QN AUTO: 29.3 PG
MCHC RBC AUTO-ENTMCNC: 32.6 G/DL
MCV RBC AUTO: 90 FL
MONOCYTES # BLD AUTO: 0 K/UL
MONOCYTES NFR BLD: 0.3 %
MV PEAK A VEL: 0.56 M/S
MV PEAK E VEL: 0.71 M/S
NEUTROPHILS # BLD AUTO: 10.8 K/UL
NEUTROPHILS NFR BLD: 91.1 %
PHOSPHATE SERPL-MCNC: 4.2 MG/DL
PLATELET # BLD AUTO: 170 K/UL
PMV BLD AUTO: 10.4 FL
POTASSIUM SERPL-SCNC: 4.9 MMOL/L
PROT SERPL-MCNC: 8.1 G/DL
PULM VEIN S/D RATIO: 0.96
PV PEAK D VEL: 0.5 M/S
PV PEAK S VEL: 0.48 M/S
RA MAJOR: 4.08 CM
RA PRESSURE: 3 MMHG
RBC # BLD AUTO: 4.3 M/UL
RIGHT VENTRICULAR END-DIASTOLIC DIMENSION: 3.36 CM
SODIUM SERPL-SCNC: 136 MMOL/L
TDI LATERAL: 0.13
WBC # BLD AUTO: 11.83 K/UL

## 2019-03-01 PROCEDURE — 63600175 PHARM REV CODE 636 W HCPCS: Performed by: STUDENT IN AN ORGANIZED HEALTH CARE EDUCATION/TRAINING PROGRAM

## 2019-03-01 PROCEDURE — 25000003 PHARM REV CODE 250: Performed by: STUDENT IN AN ORGANIZED HEALTH CARE EDUCATION/TRAINING PROGRAM

## 2019-03-01 PROCEDURE — 25000003 PHARM REV CODE 250: Performed by: FAMILY MEDICINE

## 2019-03-01 PROCEDURE — 94640 AIRWAY INHALATION TREATMENT: CPT

## 2019-03-01 PROCEDURE — 94761 N-INVAS EAR/PLS OXIMETRY MLT: CPT

## 2019-03-01 PROCEDURE — 36415 COLL VENOUS BLD VENIPUNCTURE: CPT

## 2019-03-01 PROCEDURE — 94664 DEMO&/EVAL PT USE INHALER: CPT

## 2019-03-01 PROCEDURE — 27000221 HC OXYGEN, UP TO 24 HOURS

## 2019-03-01 PROCEDURE — 99900035 HC TECH TIME PER 15 MIN (STAT)

## 2019-03-01 PROCEDURE — 80053 COMPREHEN METABOLIC PANEL: CPT

## 2019-03-01 PROCEDURE — 11000001 HC ACUTE MED/SURG PRIVATE ROOM

## 2019-03-01 PROCEDURE — 82140 ASSAY OF AMMONIA: CPT

## 2019-03-01 PROCEDURE — 84100 ASSAY OF PHOSPHORUS: CPT

## 2019-03-01 PROCEDURE — 63600175 PHARM REV CODE 636 W HCPCS: Performed by: FAMILY MEDICINE

## 2019-03-01 PROCEDURE — 94799 UNLISTED PULMONARY SVC/PX: CPT

## 2019-03-01 PROCEDURE — 25000003 PHARM REV CODE 250: Performed by: PSYCHIATRY & NEUROLOGY

## 2019-03-01 PROCEDURE — S4991 NICOTINE PATCH NONLEGEND: HCPCS | Performed by: STUDENT IN AN ORGANIZED HEALTH CARE EDUCATION/TRAINING PROGRAM

## 2019-03-01 PROCEDURE — 25000242 PHARM REV CODE 250 ALT 637 W/ HCPCS: Performed by: STUDENT IN AN ORGANIZED HEALTH CARE EDUCATION/TRAINING PROGRAM

## 2019-03-01 PROCEDURE — 83735 ASSAY OF MAGNESIUM: CPT

## 2019-03-01 PROCEDURE — 85025 COMPLETE CBC W/AUTO DIFF WBC: CPT

## 2019-03-01 RX ADMIN — IPRATROPIUM BROMIDE AND ALBUTEROL SULFATE 3 ML: .5; 3 SOLUTION RESPIRATORY (INHALATION) at 12:03

## 2019-03-01 RX ADMIN — NICOTINE 1 PATCH: 14 PATCH, EXTENDED RELEASE TRANSDERMAL at 09:03

## 2019-03-01 RX ADMIN — IPRATROPIUM BROMIDE AND ALBUTEROL SULFATE 3 ML: .5; 3 SOLUTION RESPIRATORY (INHALATION) at 04:03

## 2019-03-01 RX ADMIN — IPRATROPIUM BROMIDE AND ALBUTEROL SULFATE 3 ML: .5; 3 SOLUTION RESPIRATORY (INHALATION) at 08:03

## 2019-03-01 RX ADMIN — FUROSEMIDE 40 MG: 40 TABLET ORAL at 09:03

## 2019-03-01 RX ADMIN — CLONIDINE HYDROCHLORIDE 0.3 MG: 0.3 TABLET ORAL at 09:03

## 2019-03-01 RX ADMIN — ENOXAPARIN SODIUM 40 MG: 100 INJECTION SUBCUTANEOUS at 04:03

## 2019-03-01 RX ADMIN — MONTELUKAST SODIUM 10 MG: 10 TABLET, COATED ORAL at 09:03

## 2019-03-01 RX ADMIN — VANCOMYCIN HYDROCHLORIDE 1750 MG: 100 INJECTION, POWDER, LYOPHILIZED, FOR SOLUTION INTRAVENOUS at 01:03

## 2019-03-01 RX ADMIN — VANCOMYCIN HYDROCHLORIDE 3000 MG: 100 INJECTION, POWDER, LYOPHILIZED, FOR SOLUTION INTRAVENOUS at 01:03

## 2019-03-01 RX ADMIN — METHADONE HYDROCHLORIDE 80 MG: 10 TABLET ORAL at 09:03

## 2019-03-01 RX ADMIN — ESCITALOPRAM OXALATE 10 MG: 10 TABLET, FILM COATED ORAL at 09:03

## 2019-03-01 RX ADMIN — CLONIDINE HYDROCHLORIDE 0.3 MG: 0.3 TABLET ORAL at 04:03

## 2019-03-01 RX ADMIN — PREDNISONE 40 MG: 20 TABLET ORAL at 09:03

## 2019-03-01 RX ADMIN — OLANZAPINE 10 MG: 2.5 TABLET, FILM COATED ORAL at 09:03

## 2019-03-01 RX ADMIN — LEVOFLOXACIN 750 MG: 750 INJECTION, SOLUTION INTRAVENOUS at 09:03

## 2019-03-01 RX ADMIN — AMLODIPINE BESYLATE 10 MG: 5 TABLET ORAL at 09:03

## 2019-03-01 NOTE — PLAN OF CARE
Problem: Adult Inpatient Plan of Care  Goal: Plan of Care Review  Outcome: Ongoing (interventions implemented as appropriate)  Pt is oriented x4. Patient safety maintained. Patient is calm and cooperative and denies pain. No distress on 2L of oxygen. Will continue to monitor.

## 2019-03-01 NOTE — PLAN OF CARE
Patient was found to have BCx positive with gram + cocci in clusters likely being staph. Speciation and 2nd BCx pending. Repeat BCx ordered. Vancomycin started. Trough set before 4th dose. ID to be consulted in am. Patient still uses IV drugs, last use Monday this week. TTE ordered for AM.   Discussed with Chief Resident.       Paolo Oneill MD  [facility], PGY-1

## 2019-03-01 NOTE — CONSULTS
Full note to follow     Patient with H/O IVDU on methadone presents for COPD exacerbation   Admit urine tox screen shows narcotics but  That could be the methadone  - so not clear if he is actively using or not   BC from 2-27 only 1 set - growing staph - not sure the ID yet but is concerning for a significant blood stream infection   Agree with tommy and caty for now   Also great job getting the TTE today  -

## 2019-03-01 NOTE — PLAN OF CARE
Problem: Pain Acute  Goal: Optimal Pain Control  Outcome: Ongoing (interventions implemented as appropriate)  Not achieving optimal pain control using numeric pain scale. FACES pain scale pain rating zero. Pain described as generalized pain but after nonpharm measures, distraction, rest , calming pain subsides. Patient has been irritable overnight but distractive measures utilized for  behavior modification did not warrant offering ativan for anxiety. No falls/injuries overnigth, however, virtual sitter utilized as pt becomes impulsive and agitated. Oxygen via nc maintained . No wheezes auscul overnight but exertional dyspnea present . Discussed with pt nursing plan of care and he is in agreeance.

## 2019-03-01 NOTE — PROGRESS NOTES
PGY-2 Progress Note [facility]    Follow up for:   Chief Complaint   Patient presents with    Shortness of Breath     c/o cough and asthma exacerbation x2 days. Has been out of his home O2 and his albuterol machine for the past 4 days       Hospital Stay Day 2    Subjective: Patient is doing well. He said he wants to go home, he's speaking on the phone, restarted on home methadone as home meds. Per hx IVDU, and systolic murmur on night resident's physical exam and 1x blood bottle staph pos+. Added vanc to FQ.      Denies fever, chills, cough, abdominal pain. Tolerating diet with no nausea or vomiting.     Scheduled Meds:   albuterol-ipratropium  3 mL Nebulization Q4H    amLODIPine  10 mg Oral Daily    cloNIDine  0.3 mg Oral TID    enoxaparin  40 mg Subcutaneous Daily    escitalopram oxalate  10 mg Oral Daily    furosemide  40 mg Oral Daily    levoFLOXacin  750 mg Intravenous Q24H    methadone  80 mg Oral Daily    montelukast  10 mg Oral Daily    nicotine  1 patch Transdermal Daily    OLANZapine  10 mg Oral BID    predniSONE  40 mg Oral Daily    vancomycin (VANCOCIN) IVPB  1,750 mg Intravenous Q12H     Continuous Infusions:  PRN Meds:lorazepam, ondansetron, sodium chloride 0.9%    Review of patient's allergies indicates:   Allergen Reactions    Compazine [prochlorperazine edisylate] Other (See Comments) and Anaphylaxis     Hallucinations     Iodine and iodide containing products Anaphylaxis and Swelling     Facial swelling    Shellfish containing products      Anaphylaxis^  Anaphylaxis^       Objectives:     Vitals(Most Recent)      BP  Min: 114/69  Max: 164/72  Temp  Av °F (36.7 °C)  Min: 96.7 °F (35.9 °C)  Max: 98.6 °F (37 °C)  Pulse  Av.4  Min: 68  Max: 101  Resp  Av.8  Min: 15  Max: 22  SpO2  Av.8 %  Min: 87 %  Max: 96 %             Vitals(Kkap14l)  Temp:  [96.7 °F (35.9 °C)-98.6 °F (37 °C)]   Pulse:  []   Resp:  [15-22]   BP: (114-164)/(64-83)   SpO2:  [87 %-96 %]     I &  O(Dcau12s)    Intake/Output Summary (Last 24 hours) at 3/1/2019 1301  Last data filed at 3/1/2019 1000  Gross per 24 hour   Intake 1650 ml   Output 2875 ml   Net -1225 ml     General: AOx3, Patient currently on 2L NC  HEENT: Poor dentition, EOMI, No thyromegaly noted, neck with full ROM  Cardiovascular: Regular Rate, Regular Rhythm, Normal S1/S2 appreciated, No gallops, rubs, or murmurs noted  Respiratory: prolong phase, slight expiratory wheezing in all bilateral lung fields.   Abdomen: Soft, Non-tender, +BS; Large, reducible umbilical hernia   Extremity: 2+ distal pulses in all extremities, no edema noted  Skin: Multiple track marks in bilateral arm  Psych: Normal Mood, Normal Affect  Neuro: AAOx4, CN II-XII Grossly intact, Strength 5/5 in all extremities, Sensation to touch and 2 point discrimination intact throughout    Urinalysis  No results for input(s): COLORU, CLARITYU, SPECGRAV, PHUR, PROTEINUA, GLUCOSEU, BILIRUBINCON, BLOODU, WBCU, RBCU, BACTERIA, MUCUS, NITRITE, LEUKOCYTESUR, UROBILINOGEN, HYALINECASTS in the last 24 hours.    LABS  CBC  Recent Labs   Lab 02/27/19 1637 02/28/19  0545 03/01/19  1054   WBC 5.76 4.52 11.83   RBC 4.30* 4.55* 4.30*   HGB 12.7* 13.1* 12.6*   HCT 39.3* 41.5 38.7*    176 170   MCV 91 91 90   MCH 29.5 28.8 29.3   MCHC 32.3 31.6* 32.6     BMP  Recent Labs   Lab 02/27/19 1637 02/28/19  0545 03/01/19  1054    136 136   K 4.7 4.9 4.9   CO2 29 35* 31*    98 97   BUN 11 13 27*   CREATININE 1.1 1.0 1.2    127* 157*       POCT-Glucose  No results found for: POCTGLUCOSE    Recent Labs   Lab 02/27/19  1637 02/28/19  0545 03/01/19  1054   CALCIUM 9.2 9.6 9.8   MG  --  2.3 2.2   PHOS  --  3.8 4.2     LFT  Recent Labs   Lab 02/27/19  1637 02/28/19  0545 03/01/19  1054   PROT 8.1 8.5* 8.1   ALBUMIN 3.2* 3.1* 3.1*   BILITOT 0.6 0.7 0.7   AST 37 34 21   ALKPHOS 103 104 90   ALT 26 29 24         COAGS  No results for input(s): PT, INR, APTT in the last 168  hours.  CE  Recent Labs   Lab 02/27/19  1637   TROPONINI <0.006     ABGs  No results for input(s): PH, PCO2, PO2, HCO3, POCSATURATED, BE in the last 24 hours.  BNP  Recent Labs   Lab 02/27/19  1637   BNP 18     UA  No results for input(s): COLORU, CLARITYU, SPECGRAV, PHUR, PROTEINUA, GLUCOSEU, BLOODU, WBCU, RBCU, BACTERIA, MUCUS in the last 24 hours.    Invalid input(s):  BILIRUBINCON  LAST HbA1c  Lab Results   Component Value Date    HGBA1C 4.8 02/28/2019           Imaging  Imaging Results          US Abdomen Limited (Final result)  Result time 02/27/19 20:27:08   Procedure changed from US Doppler Abdomen Complete     Final result by Giselle Garcia MD (02/27/19 20:27:08)                 Impression:      Periumbilical hernia.  Uncertain if this may be bowel containing based on images provided.  CT follow-up may be obtained if concern for bowel containing hernia and possible obstruction.      Electronically signed by: Giselle Garcia MD  Date:    02/27/2019  Time:    20:27             Narrative:    EXAMINATION:  US ABDOMEN LIMITED    CLINICAL HISTORY:  Umbilical Hernia; Chronic obstructive pulmonary disease with (acute) exacerbation    COMPARISON:  CT abdomen and pelvis from March 2018.    FINDINGS:  Limited ultrasound evaluation was performed of the abdomen in lying and sitting positions with provocative maneuvers.  Periumbilical hernia is seen.  Uncertain if this may be bowel containing based on imaging provided.                               X-Ray Chest 1 View (Final result)  Result time 02/27/19 13:41:52    Final result by Hakeem Kincaid Jr., MD (02/27/19 13:41:52)                 Impression:      No significant abnormality seen.      Electronically signed by: Hakeem Kincaid MD  Date:    02/27/2019  Time:    13:41             Narrative:    EXAMINATION:  XR CHEST 1 VIEW    CLINICAL HISTORY:  Shortness of breath    TECHNIQUE:  Single frontal view of the chest was performed.    COMPARISON:  December 2,  2018.    FINDINGS:  Monitoring leads in place.  Right PICC or port catheter has been removed.  Heart size and pulmonary vessels are normal.  No confluent consolidation.                                Micro:  Microbiology Results (last 7 days)     Procedure Component Value Units Date/Time    Blood culture [[id number]] Collected:  02/27/19 1637    Order Status:  Completed Specimen:  Blood from Peripheral, Jugular, External Right Updated:  03/01/19 1204     Blood Culture, Routine Gram stain aer bottle: Gram positive cocci in clusters resembling Staph      Blood Culture, Routine Results called to and read back by: Juju Perez RN 02/28/2019  21:54     Blood Culture, Routine --     STAPHYLOCOCCUS EPIDERMIDIS  Susceptibility pending      Blood culture [[id number]] Collected:  02/28/19 2253    Order Status:  Completed Specimen:  Blood from Peripheral, Left  Wrist Updated:  03/01/19 0915     Blood Culture, Routine No Growth to date    Blood culture [[id number]] Collected:  02/27/19 1936    Order Status:  Sent Specimen:  Blood from Peripheral, Lower Arm, Right Updated:  02/27/19 1937    Influenza A & B by Molecular [[id number]] Collected:  02/27/19 1641    Order Status:  Completed Specimen:  Nasopharyngeal Swab Updated:  02/27/19 1735     Influenza A, Molecular Negative     Influenza B, Molecular Negative     Flu A & B Source Nasal swab               Assessment/Plan:     Mr. Ming Harrington is a 53 y.o. male w/ PMH of asthma, COPD, HTN, hx of heroin use currently on methadone presenting for asthma exacerbation vs acute COPD excerbation     Asthma vs COPD Exacerbation  Chest X-ray: No acute cardiopulmonary process  Influenza: Negative  Levaquin 750mg IV  IV Solumedrol  Duones q4  Chest PT, Acapella, IS q4  Improving.     HTN  BP: 130-150s/80-90s  BP goal <140/90  Restart home medications Amlodipine and Clonidine     Heroin Use Disorder  Hx of IVDU.   Patient reports last use 2 months  Currently at [facility]  Verified with  Clinic, on 80mg of Methadone daily  Monitor for withdrawals.      Umbilical Hernia  US Hernia: Periumbilical hernia  Reducible  Continue to monitor    Bacteremia  Staph epi x1, repeated culture negative.   TTE neg for vegetation  Consulted ID for further management. Appreciate recs.     Code: Full  Diet: Cardiac  Ppx: Lovenox  Dispo: f/u ID for staph Epi, cnt current medical management.    Case discussed with staff    Giuseppe Mcdermott MD  [facility] HO2  03/01/2019

## 2019-03-01 NOTE — PROGRESS NOTES
Pt AAO x 4.  VSS. NAD. Tolerating cardiac diet. No complaints of pain this shift. Pt handed off to Janelle Mena. Safety maintained.WIll continue to monitor.

## 2019-03-02 LAB
ALBUMIN SERPL BCP-MCNC: 2.9 G/DL
ALP SERPL-CCNC: 76 U/L
ALT SERPL W/O P-5'-P-CCNC: 26 U/L
ANION GAP SERPL CALC-SCNC: 8 MMOL/L
AST SERPL-CCNC: 26 U/L
BACTERIA BLD CULT: NORMAL
BASOPHILS # BLD AUTO: 0 K/UL
BASOPHILS NFR BLD: 0 %
BILIRUB SERPL-MCNC: 0.9 MG/DL
BUN SERPL-MCNC: 29 MG/DL
CALCIUM SERPL-MCNC: 9.2 MG/DL
CHLORIDE SERPL-SCNC: 97 MMOL/L
CO2 SERPL-SCNC: 28 MMOL/L
CREAT SERPL-MCNC: 1.2 MG/DL
DIFFERENTIAL METHOD: ABNORMAL
EOSINOPHIL # BLD AUTO: 0 K/UL
EOSINOPHIL NFR BLD: 0 %
ERYTHROCYTE [DISTWIDTH] IN BLOOD BY AUTOMATED COUNT: 13.3 %
EST. GFR  (AFRICAN AMERICAN): >60 ML/MIN/1.73 M^2
EST. GFR  (NON AFRICAN AMERICAN): >60 ML/MIN/1.73 M^2
GLUCOSE SERPL-MCNC: 112 MG/DL
HCT VFR BLD AUTO: 38.7 %
HGB BLD-MCNC: 12.5 G/DL
LYMPHOCYTES # BLD AUTO: 1.3 K/UL
LYMPHOCYTES NFR BLD: 14.6 %
MAGNESIUM SERPL-MCNC: 2 MG/DL
MCH RBC QN AUTO: 29.7 PG
MCHC RBC AUTO-ENTMCNC: 32.3 G/DL
MCV RBC AUTO: 92 FL
MONOCYTES # BLD AUTO: 0.6 K/UL
MONOCYTES NFR BLD: 6.9 %
NEUTROPHILS # BLD AUTO: 7.1 K/UL
NEUTROPHILS NFR BLD: 78.3 %
PHOSPHATE SERPL-MCNC: 4.7 MG/DL
PLATELET # BLD AUTO: 184 K/UL
PMV BLD AUTO: 10.1 FL
POTASSIUM SERPL-SCNC: 4.7 MMOL/L
PROT SERPL-MCNC: 7.4 G/DL
RBC # BLD AUTO: 4.21 M/UL
SODIUM SERPL-SCNC: 133 MMOL/L
VANCOMYCIN TROUGH SERPL-MCNC: 29.5 UG/ML
WBC # BLD AUTO: 9.12 K/UL

## 2019-03-02 PROCEDURE — 25000003 PHARM REV CODE 250: Performed by: STUDENT IN AN ORGANIZED HEALTH CARE EDUCATION/TRAINING PROGRAM

## 2019-03-02 PROCEDURE — 86592 SYPHILIS TEST NON-TREP QUAL: CPT

## 2019-03-02 PROCEDURE — 25000242 PHARM REV CODE 250 ALT 637 W/ HCPCS: Performed by: STUDENT IN AN ORGANIZED HEALTH CARE EDUCATION/TRAINING PROGRAM

## 2019-03-02 PROCEDURE — 83735 ASSAY OF MAGNESIUM: CPT

## 2019-03-02 PROCEDURE — 94664 DEMO&/EVAL PT USE INHALER: CPT

## 2019-03-02 PROCEDURE — 63600175 PHARM REV CODE 636 W HCPCS: Performed by: FAMILY MEDICINE

## 2019-03-02 PROCEDURE — 63600175 PHARM REV CODE 636 W HCPCS: Performed by: STUDENT IN AN ORGANIZED HEALTH CARE EDUCATION/TRAINING PROGRAM

## 2019-03-02 PROCEDURE — 27000221 HC OXYGEN, UP TO 24 HOURS

## 2019-03-02 PROCEDURE — S4991 NICOTINE PATCH NONLEGEND: HCPCS | Performed by: STUDENT IN AN ORGANIZED HEALTH CARE EDUCATION/TRAINING PROGRAM

## 2019-03-02 PROCEDURE — 11000001 HC ACUTE MED/SURG PRIVATE ROOM

## 2019-03-02 PROCEDURE — 36415 COLL VENOUS BLD VENIPUNCTURE: CPT

## 2019-03-02 PROCEDURE — 94799 UNLISTED PULMONARY SVC/PX: CPT

## 2019-03-02 PROCEDURE — 99900035 HC TECH TIME PER 15 MIN (STAT)

## 2019-03-02 PROCEDURE — 86703 HIV-1/HIV-2 1 RESULT ANTBDY: CPT

## 2019-03-02 PROCEDURE — 94640 AIRWAY INHALATION TREATMENT: CPT

## 2019-03-02 PROCEDURE — 94761 N-INVAS EAR/PLS OXIMETRY MLT: CPT

## 2019-03-02 PROCEDURE — 25000003 PHARM REV CODE 250: Performed by: FAMILY MEDICINE

## 2019-03-02 PROCEDURE — 85025 COMPLETE CBC W/AUTO DIFF WBC: CPT

## 2019-03-02 PROCEDURE — 84100 ASSAY OF PHOSPHORUS: CPT

## 2019-03-02 PROCEDURE — 80053 COMPREHEN METABOLIC PANEL: CPT

## 2019-03-02 PROCEDURE — 80202 ASSAY OF VANCOMYCIN: CPT

## 2019-03-02 PROCEDURE — 27000646 HC AEROBIKA DEVICE

## 2019-03-02 PROCEDURE — 25000003 PHARM REV CODE 250: Performed by: PSYCHIATRY & NEUROLOGY

## 2019-03-02 RX ORDER — POLYETHYLENE GLYCOL 3350 17 G/17G
17 POWDER, FOR SOLUTION ORAL DAILY
Status: DISCONTINUED | OUTPATIENT
Start: 2019-03-02 | End: 2019-03-03 | Stop reason: HOSPADM

## 2019-03-02 RX ORDER — AMOXICILLIN 250 MG
1 CAPSULE ORAL 2 TIMES DAILY PRN
Status: DISCONTINUED | OUTPATIENT
Start: 2019-03-02 | End: 2019-03-03 | Stop reason: HOSPADM

## 2019-03-02 RX ADMIN — LEVOFLOXACIN 750 MG: 750 INJECTION, SOLUTION INTRAVENOUS at 08:03

## 2019-03-02 RX ADMIN — NICOTINE 1 PATCH: 14 PATCH, EXTENDED RELEASE TRANSDERMAL at 09:03

## 2019-03-02 RX ADMIN — LORAZEPAM 2 MG: 2 INJECTION INTRAMUSCULAR; INTRAVENOUS at 03:03

## 2019-03-02 RX ADMIN — MONTELUKAST SODIUM 10 MG: 10 TABLET, COATED ORAL at 09:03

## 2019-03-02 RX ADMIN — IPRATROPIUM BROMIDE AND ALBUTEROL SULFATE 3 ML: .5; 3 SOLUTION RESPIRATORY (INHALATION) at 07:03

## 2019-03-02 RX ADMIN — OLANZAPINE 10 MG: 2.5 TABLET, FILM COATED ORAL at 09:03

## 2019-03-02 RX ADMIN — ENOXAPARIN SODIUM 40 MG: 100 INJECTION SUBCUTANEOUS at 04:03

## 2019-03-02 RX ADMIN — IPRATROPIUM BROMIDE AND ALBUTEROL SULFATE 3 ML: .5; 3 SOLUTION RESPIRATORY (INHALATION) at 04:03

## 2019-03-02 RX ADMIN — ESCITALOPRAM OXALATE 10 MG: 10 TABLET, FILM COATED ORAL at 09:03

## 2019-03-02 RX ADMIN — CLONIDINE HYDROCHLORIDE 0.3 MG: 0.3 TABLET ORAL at 03:03

## 2019-03-02 RX ADMIN — AMLODIPINE BESYLATE 10 MG: 5 TABLET ORAL at 09:03

## 2019-03-02 RX ADMIN — METHADONE HYDROCHLORIDE 80 MG: 10 TABLET ORAL at 09:03

## 2019-03-02 RX ADMIN — VANCOMYCIN HYDROCHLORIDE 1750 MG: 100 INJECTION, POWDER, LYOPHILIZED, FOR SOLUTION INTRAVENOUS at 03:03

## 2019-03-02 RX ADMIN — POLYETHYLENE GLYCOL 3350 17 G: 17 POWDER, FOR SOLUTION ORAL at 01:03

## 2019-03-02 RX ADMIN — FUROSEMIDE 40 MG: 40 TABLET ORAL at 09:03

## 2019-03-02 RX ADMIN — IPRATROPIUM BROMIDE AND ALBUTEROL SULFATE 3 ML: .5; 3 SOLUTION RESPIRATORY (INHALATION) at 12:03

## 2019-03-02 RX ADMIN — IPRATROPIUM BROMIDE AND ALBUTEROL SULFATE 3 ML: .5; 3 SOLUTION RESPIRATORY (INHALATION) at 11:03

## 2019-03-02 RX ADMIN — PREDNISONE 40 MG: 20 TABLET ORAL at 09:03

## 2019-03-02 RX ADMIN — CLONIDINE HYDROCHLORIDE 0.3 MG: 0.3 TABLET ORAL at 09:03

## 2019-03-02 NOTE — ASSESSMENT & PLAN NOTE
Most likely this is a contaminant as it is CNS  And patient not drug using at this time.  Would keep on vanc until the BC on 2/28 is negative - maybe tomorrow or 3/3/19      Update 3-2-19 - the CNS is a skin contaminant and patient does not need to be on vanc - thanks for stopping it     ID will sign off now

## 2019-03-02 NOTE — PLAN OF CARE
Problem: Adult Inpatient Plan of Care  Goal: Plan of Care Review  Outcome: Ongoing (interventions implemented as appropriate)  Patient on oxygen. No Distress Noted. Will continue to monitor.The proper method of use, as well as anticipated side effects, of this aerosol treatment are discussed and demonstrated to the patient. Peak flow performed. Aerobika performed. IS performed.
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
## 2019-03-02 NOTE — PROGRESS NOTES
Attempted to get a new IV access but unsuccessful. A second nurse took a look before me and did not see anything either. Patient has a history of IVDA both forearms are scarred up. Spoke to anesthesia will come up to get IV.

## 2019-03-02 NOTE — PLAN OF CARE
Problem: Adult Inpatient Plan of Care  Goal: Plan of Care Review  Outcome: Ongoing (interventions implemented as appropriate)  Patient on oxygen with documented flow.  Will attempt to wean per O2 order protocol. The proper method of use, as well as anticipated side effects, of this aerosol treatment are discussed and demonstrated to the patient. Patient performs IS and Aerobika therapy. Pre and Post peak flow respiratory mechanics are performed. Will continue to monitor.

## 2019-03-02 NOTE — PROGRESS NOTES
Anesthesia came up to start IV. Attempted several times but unsuccessful and patient said to stop for now. Anesthesia said she would come back later with the U/S machine.

## 2019-03-02 NOTE — HPI
Patient with history of IVDU but now on methadone and not using by his record admitted for COPD exacerbation.  Has had BC x 1 + for staph and ID consulted.  TTE done and is negative   Repeat BC on 2-28-19 - NGTD but only 12 hours or growth

## 2019-03-02 NOTE — PROGRESS NOTES
PGY-2 Progress Note [facility]    Follow up for:  SOB    Hospital Stay Day 3    Subjective:     Lost IV last night and needed new one placed.  Received ativan 2 mg for agitation this morning and subsequently sleepy though arousable.      Repeat cultures NG thus far. ID assisting.    Scheduled Meds:   albuterol-ipratropium  3 mL Nebulization Q4H    amLODIPine  10 mg Oral Daily    cloNIDine  0.3 mg Oral TID    enoxaparin  40 mg Subcutaneous Daily    escitalopram oxalate  10 mg Oral Daily    furosemide  40 mg Oral Daily    levoFLOXacin  750 mg Intravenous Q24H    methadone  80 mg Oral Daily    montelukast  10 mg Oral Daily    nicotine  1 patch Transdermal Daily    OLANZapine  10 mg Oral BID    predniSONE  40 mg Oral Daily    vancomycin (VANCOCIN) IVPB  1,750 mg Intravenous Q12H     Continuous Infusions:  PRN Meds:ondansetron, sodium chloride 0.9%    Review of patient's allergies indicates:   Allergen Reactions    Compazine [prochlorperazine edisylate] Other (See Comments) and Anaphylaxis     Hallucinations     Iodine and iodide containing products Anaphylaxis and Swelling     Facial swelling    Shellfish containing products      Anaphylaxis^  Anaphylaxis^       Objectives:     Vitals(Most Recent)      BP  Min: 107/66  Max: 164/72  Temp  Av.1 °F (36.7 °C)  Min: 97.4 °F (36.3 °C)  Max: 98.5 °F (36.9 °C)  Pulse  Av.3  Min: 68  Max: 86  Resp  Av.3  Min: 16  Max: 20  SpO2  Av %  Min: 90 %  Max: 98 %  Weight  Av.9 kg (240 lb 1.3 oz)  Min: 108.9 kg (240 lb 1.3 oz)  Max: 108.9 kg (240 lb 1.3 oz)             Vitals(Brve79i)  Temp:  [97.4 °F (36.3 °C)-98.5 °F (36.9 °C)]   Pulse:  [68-86]   Resp:  [16-20]   BP: (107-164)/(58-85)   SpO2:  [90 %-98 %]     I & O(Uvca10a)    Intake/Output Summary (Last 24 hours) at 3/2/2019 0917  Last data filed at 3/2/2019 0749  Gross per 24 hour   Intake 1750 ml   Output 2075 ml   Net -325 ml     General: AOx3, Patient currently on 2L NC  HEENT: Poor dentition,  EOMI, No thyromegaly noted  Cardiovascular: Regular Rate, Regular Rhythm, Normal S1/S2 appreciated, No gallops, rubs, or murmurs noted  Respiratory: prolong expir phase, slight expiratory wheezing in all bilateral lung fields.   Abdomen: Soft, Non-tender, +BS; Large, reducible umbilical hernia   Extremity: 2+ distal pulses in all extremities, no edema noted  Skin: Multiple track marks in bilateral arm, chronic skin changes/thickening  Psych: Normal Mood, Normal Affect  Neuro: Sleepy, not fully participating in exam    Urinalysis  No results for input(s): COLORU, CLARITYU, SPECGRAV, PHUR, PROTEINUA, GLUCOSEU, BILIRUBINCON, BLOODU, WBCU, RBCU, BACTERIA, MUCUS, NITRITE, LEUKOCYTESUR, UROBILINOGEN, HYALINECASTS in the last 24 hours.    LABS  CBC  Recent Labs   Lab 02/28/19  0545 03/01/19  1054 03/02/19  0914   WBC 4.52 11.83 9.12   RBC 4.55* 4.30* 4.21*   HGB 13.1* 12.6* 12.5*   HCT 41.5 38.7* 38.7*    170 184   MCV 91 90 92   MCH 28.8 29.3 29.7   MCHC 31.6* 32.6 32.3     BMP  Recent Labs   Lab 02/27/19  1637 02/28/19  0545 03/01/19  1054    136 136   K 4.7 4.9 4.9   CO2 29 35* 31*    98 97   BUN 11 13 27*   CREATININE 1.1 1.0 1.2    127* 157*       POCT-Glucose  No results found for: POCTGLUCOSE    Recent Labs   Lab 02/27/19  1637 02/28/19  0545 03/01/19  1054   CALCIUM 9.2 9.6 9.8   MG  --  2.3 2.2   PHOS  --  3.8 4.2     LFT  Recent Labs   Lab 02/27/19  1637 02/28/19  0545 03/01/19  1054   PROT 8.1 8.5* 8.1   ALBUMIN 3.2* 3.1* 3.1*   BILITOT 0.6 0.7 0.7   AST 37 34 21   ALKPHOS 103 104 90   ALT 26 29 24         COAGS  No results for input(s): PT, INR, APTT in the last 168 hours.  CE  Recent Labs   Lab 02/27/19  1637   TROPONINI <0.006     ABGs  No results for input(s): PH, PCO2, PO2, HCO3, POCSATURATED, BE in the last 24 hours.  BNP  Recent Labs   Lab 02/27/19  1637   BNP 18     UA  No results for input(s): COLORU, CLARITYU, SPECGRAV, PHUR, PROTEINUA, GLUCOSEU, BLOODU, WBCU, RBCU, BACTERIA,  MUCUS in the last 24 hours.    Invalid input(s):  BILIRUBINCON  LAST HbA1c  Lab Results   Component Value Date    HGBA1C 4.8 02/28/2019           Imaging  Imaging Results          US Abdomen Limited (Final result)  Result time 02/27/19 20:27:08   Procedure changed from US Doppler Abdomen Complete     Final result by Giselle Garcia MD (02/27/19 20:27:08)                 Impression:      Periumbilical hernia.  Uncertain if this may be bowel containing based on images provided.  CT follow-up may be obtained if concern for bowel containing hernia and possible obstruction.      Electronically signed by: Giselle Garcia MD  Date:    02/27/2019  Time:    20:27             Narrative:    EXAMINATION:  US ABDOMEN LIMITED    CLINICAL HISTORY:  Umbilical Hernia; Chronic obstructive pulmonary disease with (acute) exacerbation    COMPARISON:  CT abdomen and pelvis from March 2018.    FINDINGS:  Limited ultrasound evaluation was performed of the abdomen in lying and sitting positions with provocative maneuvers.  Periumbilical hernia is seen.  Uncertain if this may be bowel containing based on imaging provided.                               X-Ray Chest 1 View (Final result)  Result time 02/27/19 13:41:52    Final result by Hakeem Kincaid Jr., MD (02/27/19 13:41:52)                 Impression:      No significant abnormality seen.      Electronically signed by: Hakeem Kincaid MD  Date:    02/27/2019  Time:    13:41             Narrative:    EXAMINATION:  XR CHEST 1 VIEW    CLINICAL HISTORY:  Shortness of breath    TECHNIQUE:  Single frontal view of the chest was performed.    COMPARISON:  December 2, 2018.    FINDINGS:  Monitoring leads in place.  Right PICC or port catheter has been removed.  Heart size and pulmonary vessels are normal.  No confluent consolidation.                                Micro:  Microbiology Results (last 7 days)     Procedure Component Value Units Date/Time    Blood culture [[id number]] Collected:   02/28/19 2253    Order Status:  Completed Specimen:  Blood from Peripheral, Left  Wrist Updated:  03/02/19 0613     Blood Culture, Routine No Growth to date     Blood Culture, Routine No Growth to date    Blood culture [[id number]] Collected:  02/27/19 1637    Order Status:  Completed Specimen:  Blood from Peripheral, Jugular, External Right Updated:  03/01/19 1506     Blood Culture, Routine Gram stain aer bottle: Gram positive cocci in clusters resembling Staph      Blood Culture, Routine Results called to and read back by: Juju Perez RN 02/28/2019  21:54     Blood Culture, Routine --     STAPHYLOCOCCUS EPIDERMIDIS  Susceptibility pending      Blood culture [[id number]] Collected:  02/27/19 1936    Order Status:  Sent Specimen:  Blood from Peripheral, Lower Arm, Right Updated:  02/27/19 1937    Influenza A & B by Molecular [[id number]] Collected:  02/27/19 1641    Order Status:  Completed Specimen:  Nasopharyngeal Swab Updated:  02/27/19 1735     Influenza A, Molecular Negative     Influenza B, Molecular Negative     Flu A & B Source Nasal swab               Assessment/Plan:     Mr. Ming Harrington is a 53 y.o. male w/ PMH of asthma, COPD, HTN, hx of heroin use currently on methadone presenting for asthma exacerbation vs acute COPD excerbation     Asthma vs COPD Exacerbation  Chest X-ray: No acute cardiopulmonary process  Influenza: Negative  Levaquin 750mg IV  IV Solumedrol  Duones q4  Chest PT, Acapella, IS q4  Improving.     HTN  BP: 130-150s/80-90s  BP goal <140/90  Restart home medications Amlodipine and Clonidine     Heroin Use Disorder  Hx of IVDU.   Patient reports last use 2 months  Currently at [facility]  Verified with Clinic, on 80mg of Methadone daily  Monitor for withdrawals.      Umbilical Hernia  US Hernia: Periumbilical hernia  Reducible  Continue to monitor    Bacteremia  Staph epi x1, repeated culture negative.   TTE neg for vegetation  Consulted ID for further management. Appreciate  recs.     Code: Full  Diet: Cardiac  Ppx: Lovenox  Dispo: f/u ID for staph Epi (possibe contaminant?), cnt current medical management.    Case discussed with staff    Hugh Ambriz MD  [facility] HO2  03/02/2019   9:44 AM

## 2019-03-02 NOTE — PROGRESS NOTES
Notified Dr. Ambriz Patient lost his IV access. It was an EJ in the rt neck and anesthesia put it in. Consult placed for anesthesia to put in IV. Next dose of Vancomycin is due will hold for now.

## 2019-03-02 NOTE — PROGRESS NOTES
[facility]  Infectious Disease  Progress Note    Patient Name: Ming Harrington  MRN: [id number]  Admission Date: 2/27/2019  Length of Stay: 3 days  Attending Physician: Burt Betts MD  Primary Care Provider: Theo Lopez MD    Isolation Status: No active isolations  Assessment/Plan:      Bacteremia    Most likely this is a contaminant as it is CNS  And patient not drug using at this time.  Would keep on vanc until the BC on 2/28 is negative - maybe tomorrow or 3/3/19      Update 3-2-19 - the CNS is a skin contaminant and patient does not need to be on vanc - thanks for stopping it     ID will sign off now        COPD with acute exacerbation    ID is ok with Levaquin for COPD exacerbation - maybe a 7 day course          Anticipated Disposition:     Thank you for your consult. I will sign off. Please contact us if you have any additional questions.    Al Horne MD  Infectious Disease  [facility]    Subjective:     Principal Problem:Acute exacerbation of COPD with asthma    HPI: Patient with history of IVDU but now on methadone and not using by his record admitted for COPD exacerbation.  Has had BC x 1 + for staph and ID consulted.  TTE done and is negative   Repeat BC on 2-28-19 - NGTD but only 12 hours or growth   Interval History: Patient resting comfortably - no new issues     Review of Systems   Constitutional: Negative.    HENT: Positive for congestion.    Eyes: Negative.    Respiratory: Positive for shortness of breath and wheezing.    Cardiovascular: Negative.    Gastrointestinal: Negative.    Genitourinary: Negative.    Musculoskeletal: Negative.    Allergic/Immunologic: Negative.    Neurological: Negative.    Hematological: Negative.      Objective:     Vital Signs (Most Recent):  Temp: 98.2 °F (36.8 °C) (03/02/19 1203)  Pulse: 73 (03/02/19 1203)  Resp: 20 (03/02/19 1203)  BP: 98/64 (03/02/19 1203)  SpO2: 98 % (03/02/19 1158) Vital Signs (24h Range):  Temp:   [97.4 °F (36.3 °C)-98.5 °F (36.9 °C)] 98.2 °F (36.8 °C)  Pulse:  [68-79] 73  Resp:  [16-20] 20  SpO2:  [94 %-98 %] 98 %  BP: ()/(58-85) 98/64     Weight: 108.9 kg (240 lb 1.3 oz)  Body mass index is 36.5 kg/m².    Estimated Creatinine Clearance: 84.2 mL/min (based on SCr of 1.2 mg/dL).    Physical Exam   Constitutional: He is oriented to person, place, and time. He appears well-developed and well-nourished.   HENT:   Head: Normocephalic and atraumatic.   Eyes: Pupils are equal, round, and reactive to light.   Neck: Normal range of motion. Neck supple.   Cardiovascular: Normal rate and regular rhythm.   Pulmonary/Chest: Effort normal and breath sounds normal.   Abdominal: Soft. Bowel sounds are normal.   Musculoskeletal: Normal range of motion.   Neurological: He is alert and oriented to person, place, and time.   Skin: Skin is warm and dry.     Significant Labs: All pertinent labs within the past 24 hours have been reviewed.    Significant Imaging: I have reviewed all pertinent imaging results/findings within the past 24 hours.

## 2019-03-02 NOTE — PLAN OF CARE
Problem: Adult Inpatient Plan of Care  Goal: Plan of Care Review  Outcome: Ongoing (interventions implemented as appropriate)  IV antibiotics given as ordered. New IV site obtained. Oxygen in progress 95% on 3 l NC. On telemetry SR/ 1 degree AV blk. Remains free from falls, bed alarm in use. Nebulizer treatments given as ordered per respiratory.

## 2019-03-02 NOTE — SUBJECTIVE & OBJECTIVE
Interval History: Patient resting comfortably - no new issues     Review of Systems   Constitutional: Negative.    HENT: Positive for congestion.    Eyes: Negative.    Respiratory: Positive for shortness of breath and wheezing.    Cardiovascular: Negative.    Gastrointestinal: Negative.    Genitourinary: Negative.    Musculoskeletal: Negative.    Allergic/Immunologic: Negative.    Neurological: Negative.    Hematological: Negative.      Objective:     Vital Signs (Most Recent):  Temp: 98.2 °F (36.8 °C) (03/02/19 1203)  Pulse: 73 (03/02/19 1203)  Resp: 20 (03/02/19 1203)  BP: 98/64 (03/02/19 1203)  SpO2: 98 % (03/02/19 1158) Vital Signs (24h Range):  Temp:  [97.4 °F (36.3 °C)-98.5 °F (36.9 °C)] 98.2 °F (36.8 °C)  Pulse:  [68-79] 73  Resp:  [16-20] 20  SpO2:  [94 %-98 %] 98 %  BP: ()/(58-85) 98/64     Weight: 108.9 kg (240 lb 1.3 oz)  Body mass index is 36.5 kg/m².    Estimated Creatinine Clearance: 84.2 mL/min (based on SCr of 1.2 mg/dL).    Physical Exam   Constitutional: He is oriented to person, place, and time. He appears well-developed and well-nourished.   HENT:   Head: Normocephalic and atraumatic.   Eyes: Pupils are equal, round, and reactive to light.   Neck: Normal range of motion. Neck supple.   Cardiovascular: Normal rate and regular rhythm.   Pulmonary/Chest: Effort normal and breath sounds normal.   Abdominal: Soft. Bowel sounds are normal.   Musculoskeletal: Normal range of motion.   Neurological: He is alert and oriented to person, place, and time.   Skin: Skin is warm and dry.     Significant Labs: All pertinent labs within the past 24 hours have been reviewed.    Significant Imaging: I have reviewed all pertinent imaging results/findings within the past 24 hours.

## 2019-03-03 VITALS
OXYGEN SATURATION: 95 % | TEMPERATURE: 98 F | RESPIRATION RATE: 18 BRPM | SYSTOLIC BLOOD PRESSURE: 122 MMHG | HEIGHT: 68 IN | BODY MASS INDEX: 35.52 KG/M2 | DIASTOLIC BLOOD PRESSURE: 83 MMHG | HEART RATE: 80 BPM | WEIGHT: 234.38 LBS

## 2019-03-03 LAB
ALBUMIN SERPL BCP-MCNC: 2.8 G/DL
ALP SERPL-CCNC: 76 U/L
ALT SERPL W/O P-5'-P-CCNC: 33 U/L
ANION GAP SERPL CALC-SCNC: 7 MMOL/L
AST SERPL-CCNC: 37 U/L
BASOPHILS # BLD AUTO: 0 K/UL
BASOPHILS NFR BLD: 0 %
BILIRUB SERPL-MCNC: 0.7 MG/DL
BUN SERPL-MCNC: 31 MG/DL
CALCIUM SERPL-MCNC: 9.1 MG/DL
CHLORIDE SERPL-SCNC: 96 MMOL/L
CO2 SERPL-SCNC: 32 MMOL/L
CREAT SERPL-MCNC: 1.1 MG/DL
DIFFERENTIAL METHOD: ABNORMAL
EOSINOPHIL # BLD AUTO: 0 K/UL
EOSINOPHIL NFR BLD: 0.1 %
ERYTHROCYTE [DISTWIDTH] IN BLOOD BY AUTOMATED COUNT: 13.2 %
EST. GFR  (AFRICAN AMERICAN): >60 ML/MIN/1.73 M^2
EST. GFR  (NON AFRICAN AMERICAN): >60 ML/MIN/1.73 M^2
GLUCOSE SERPL-MCNC: 119 MG/DL
HCT VFR BLD AUTO: 39 %
HGB BLD-MCNC: 12.5 G/DL
LYMPHOCYTES # BLD AUTO: 1.3 K/UL
LYMPHOCYTES NFR BLD: 15.4 %
MAGNESIUM SERPL-MCNC: 2.1 MG/DL
MCH RBC QN AUTO: 29.1 PG
MCHC RBC AUTO-ENTMCNC: 32.1 G/DL
MCV RBC AUTO: 91 FL
MONOCYTES # BLD AUTO: 0.7 K/UL
MONOCYTES NFR BLD: 8.2 %
NEUTROPHILS # BLD AUTO: 6.3 K/UL
NEUTROPHILS NFR BLD: 75.8 %
PHOSPHATE SERPL-MCNC: 5 MG/DL
PLATELET # BLD AUTO: 178 K/UL
PMV BLD AUTO: 10.1 FL
POTASSIUM SERPL-SCNC: 4.5 MMOL/L
PROT SERPL-MCNC: 7.2 G/DL
RBC # BLD AUTO: 4.3 M/UL
RPR SER QL: NORMAL
SODIUM SERPL-SCNC: 135 MMOL/L
VANCOMYCIN SERPL-MCNC: 11.7 UG/ML
WBC # BLD AUTO: 8.25 K/UL

## 2019-03-03 PROCEDURE — 83735 ASSAY OF MAGNESIUM: CPT

## 2019-03-03 PROCEDURE — 36415 COLL VENOUS BLD VENIPUNCTURE: CPT

## 2019-03-03 PROCEDURE — 85025 COMPLETE CBC W/AUTO DIFF WBC: CPT

## 2019-03-03 PROCEDURE — 94640 AIRWAY INHALATION TREATMENT: CPT

## 2019-03-03 PROCEDURE — 27000646 HC AEROBIKA DEVICE

## 2019-03-03 PROCEDURE — 25000003 PHARM REV CODE 250: Performed by: PSYCHIATRY & NEUROLOGY

## 2019-03-03 PROCEDURE — 80053 COMPREHEN METABOLIC PANEL: CPT

## 2019-03-03 PROCEDURE — 25000242 PHARM REV CODE 250 ALT 637 W/ HCPCS: Performed by: STUDENT IN AN ORGANIZED HEALTH CARE EDUCATION/TRAINING PROGRAM

## 2019-03-03 PROCEDURE — S4991 NICOTINE PATCH NONLEGEND: HCPCS | Performed by: STUDENT IN AN ORGANIZED HEALTH CARE EDUCATION/TRAINING PROGRAM

## 2019-03-03 PROCEDURE — 84100 ASSAY OF PHOSPHORUS: CPT

## 2019-03-03 PROCEDURE — 63600175 PHARM REV CODE 636 W HCPCS: Performed by: STUDENT IN AN ORGANIZED HEALTH CARE EDUCATION/TRAINING PROGRAM

## 2019-03-03 PROCEDURE — 94799 UNLISTED PULMONARY SVC/PX: CPT

## 2019-03-03 PROCEDURE — 94761 N-INVAS EAR/PLS OXIMETRY MLT: CPT

## 2019-03-03 PROCEDURE — 25000003 PHARM REV CODE 250: Performed by: STUDENT IN AN ORGANIZED HEALTH CARE EDUCATION/TRAINING PROGRAM

## 2019-03-03 PROCEDURE — 80202 ASSAY OF VANCOMYCIN: CPT

## 2019-03-03 PROCEDURE — 94664 DEMO&/EVAL PT USE INHALER: CPT

## 2019-03-03 PROCEDURE — 27000221 HC OXYGEN, UP TO 24 HOURS

## 2019-03-03 PROCEDURE — 99900035 HC TECH TIME PER 15 MIN (STAT)

## 2019-03-03 RX ORDER — MONTELUKAST SODIUM 10 MG/1
10 TABLET ORAL DAILY
Qty: 30 TABLET | Refills: 0 | Status: ON HOLD | OUTPATIENT
Start: 2019-03-04 | End: 2019-03-15 | Stop reason: HOSPADM

## 2019-03-03 RX ORDER — IPRATROPIUM BROMIDE AND ALBUTEROL SULFATE 2.5; .5 MG/3ML; MG/3ML
3 SOLUTION RESPIRATORY (INHALATION) EVERY 4 HOURS
Qty: 1 BOX | Refills: 0 | Status: ON HOLD | OUTPATIENT
Start: 2019-03-03 | End: 2019-03-15 | Stop reason: HOSPADM

## 2019-03-03 RX ORDER — ALBUTEROL SULFATE 90 UG/1
1-2 AEROSOL, METERED RESPIRATORY (INHALATION) EVERY 6 HOURS PRN
Qty: 1 INHALER | Refills: 5 | Status: ON HOLD | OUTPATIENT
Start: 2019-03-03 | End: 2019-03-15 | Stop reason: SDUPTHER

## 2019-03-03 RX ORDER — LEVOFLOXACIN 500 MG/1
750 TABLET, FILM COATED ORAL DAILY
Qty: 3 TABLET | Refills: 0 | Status: SHIPPED | OUTPATIENT
Start: 2019-03-03 | End: 2019-03-28

## 2019-03-03 RX ORDER — IBUPROFEN 200 MG
1 TABLET ORAL DAILY
Refills: 0 | COMMUNITY
Start: 2019-03-04 | End: 2019-03-28 | Stop reason: SDUPTHER

## 2019-03-03 RX ADMIN — IPRATROPIUM BROMIDE AND ALBUTEROL SULFATE 3 ML: .5; 3 SOLUTION RESPIRATORY (INHALATION) at 11:03

## 2019-03-03 RX ADMIN — STANDARDIZED SENNA CONCENTRATE AND DOCUSATE SODIUM 1 TABLET: 8.6; 5 TABLET, FILM COATED ORAL at 02:03

## 2019-03-03 RX ADMIN — OLANZAPINE 10 MG: 2.5 TABLET, FILM COATED ORAL at 02:03

## 2019-03-03 RX ADMIN — NICOTINE 1 PATCH: 14 PATCH, EXTENDED RELEASE TRANSDERMAL at 08:03

## 2019-03-03 RX ADMIN — CLONIDINE HYDROCHLORIDE 0.3 MG: 0.3 TABLET ORAL at 08:03

## 2019-03-03 RX ADMIN — IPRATROPIUM BROMIDE AND ALBUTEROL SULFATE 3 ML: .5; 3 SOLUTION RESPIRATORY (INHALATION) at 03:03

## 2019-03-03 RX ADMIN — AMLODIPINE BESYLATE 10 MG: 5 TABLET ORAL at 08:03

## 2019-03-03 RX ADMIN — PREDNISONE 40 MG: 20 TABLET ORAL at 08:03

## 2019-03-03 RX ADMIN — OLANZAPINE 10 MG: 2.5 TABLET, FILM COATED ORAL at 08:03

## 2019-03-03 RX ADMIN — POLYETHYLENE GLYCOL 3350 17 G: 17 POWDER, FOR SOLUTION ORAL at 08:03

## 2019-03-03 RX ADMIN — MONTELUKAST SODIUM 10 MG: 10 TABLET, COATED ORAL at 08:03

## 2019-03-03 RX ADMIN — ESCITALOPRAM OXALATE 10 MG: 10 TABLET, FILM COATED ORAL at 08:03

## 2019-03-03 RX ADMIN — METHADONE HYDROCHLORIDE 80 MG: 10 TABLET ORAL at 08:03

## 2019-03-03 RX ADMIN — FUROSEMIDE 40 MG: 40 TABLET ORAL at 08:03

## 2019-03-03 NOTE — PLAN OF CARE
Problem: Adult Inpatient Plan of Care  Goal: Plan of Care Review  Pt on documented O2. No apparent respiratory distress noted. Will continue to monitor.

## 2019-03-03 NOTE — PLAN OF CARE
No HH ordered    DME- Home O2/ Nebulizer, delivered bedside to pt 3/2/18    Future Appointments   Date Time Provider Department Center   3/13/2019 10:00 AM David Beckett PA-C [facility]   3/26/2019 11:15 AM Kenyon Chawla MD [facility]       Pt's nurse will go over medications/signs and symptoms prior to discharge       03/03/19 1217   Final Note   Assessment Type Final Discharge Note   Anticipated Discharge Disposition Home   What phone number can be called within the next 1-3 days to see how you are doing after discharge? [phone]   Hospital Follow Up  Appt(s) scheduled? No  (Offices closed for weekend. Patient to schedule own follow up appointment.)   Right Care Referral Info   Post Acute Recommendation No Care     Genoveva Bland, RN Transitional Navigator  [phone]

## 2019-03-03 NOTE — PROGRESS NOTES
Discharge instructions reviewed with patient and sister, both verbalized understanding, IV removed at bedside, telebox returned to desk.

## 2019-03-03 NOTE — PLAN OF CARE
Problem: Adult Inpatient Plan of Care  Goal: Plan of Care Review  Outcome: Ongoing (interventions implemented as appropriate)  Patient AAO. VSS. NAd. Tolerating cardiac diet, and eats many snacks. 3l supplemental oxygen provided. Cardiac monitored. Safety maintained. Will continue to monitor.

## 2019-03-03 NOTE — PLAN OF CARE
Problem: Adult Inpatient Plan of Care  Goal: Plan of Care Review  Outcome: Ongoing (interventions implemented as appropriate)  Patient Mr. Harrington is awake, alert and oriented X 4. Vital signs are stable. All night he has been asking for food and Ice cream. If we don't give him what he wanted he tries to get out of bed. Slept little time during night. IV antibiotics given as prescribed and documented. On tele monitor shows NSR with  RBBB. Will continue to monitor patient.

## 2019-03-03 NOTE — PROGRESS NOTES
PGY-2 Progress Note [facility]    Follow up for:  SOB    Hospital Stay Day 4    Subjective:     The patient said he is doing great and wants to go home. O2 tank is at bedside. NC is off. He is w/o complaints. He denies f/c, n/v/d/c, weakness, numbness, abd/urinary complaints.     Scheduled Meds:   albuterol-ipratropium  3 mL Nebulization Q4H    amLODIPine  10 mg Oral Daily    cloNIDine  0.3 mg Oral TID    enoxaparin  40 mg Subcutaneous Daily    escitalopram oxalate  10 mg Oral Daily    furosemide  40 mg Oral Daily    levoFLOXacin  750 mg Intravenous Q24H    methadone  80 mg Oral Daily    montelukast  10 mg Oral Daily    nicotine  1 patch Transdermal Daily    OLANZapine  10 mg Oral BID    polyethylene glycol  17 g Oral Daily    predniSONE  40 mg Oral Daily     Continuous Infusions:  PRN Meds:ondansetron, senna-docusate 8.6-50 mg, sodium chloride 0.9%    Review of patient's allergies indicates:   Allergen Reactions    Compazine [prochlorperazine edisylate] Other (See Comments) and Anaphylaxis     Hallucinations     Iodine and iodide containing products Anaphylaxis and Swelling     Facial swelling    Shellfish containing products      Anaphylaxis^  Anaphylaxis^       Objectives:     Vitals(Most Recent)      BP  Min: 98/64  Max: 129/83  Temp  Av °F (36.7 °C)  Min: 97.2 °F (36.2 °C)  Max: 98.8 °F (37.1 °C)  Pulse  Av.1  Min: 65  Max: 91  Resp  Av.9  Min: 17  Max: 20  SpO2  Av.8 %  Min: 95 %  Max: 98 %  Weight  Av.3 kg (234 lb 5.6 oz)  Min: 106.3 kg (234 lb 5.6 oz)  Max: 106.3 kg (234 lb 5.6 oz)             Vitals([id number])  Temp:  [97.2 °F (36.2 °C)-98.8 °F (37.1 °C)]   Pulse:  [65-91]   Resp:  [17-20]   BP: ()/(59-88)   SpO2:  [95 %-98 %]     I & O([id number])    Intake/Output Summary (Last 24 hours) at 3/3/2019 0950  Last data filed at 3/3/2019 0506  Gross per 24 hour   Intake 150 ml   Output 2905 ml   Net -2755 ml     General: AOx3, Patient currently on 2L NC  HEENT: Poor  dentition, EOMI, No thyromegaly noted  Cardiovascular: Regular Rate, Regular Rhythm, Normal S1/S2 appreciated, No gallops, rubs, or murmurs noted  Respiratory: prolong expir phase, slight expiratory wheezing in all bilateral lung fields.   Abdomen: Soft, Non-tender, +BS; Large, reducible umbilical hernia   Extremity: 2+ distal pulses in all extremities, no edema noted  Skin: Multiple track marks in bilateral arm, chronic skin changes/thickening  Psych: Normal Mood, Normal Affect  Neuro: Sleepy, not fully participating in exam    Urinalysis  No results for input(s): COLORU, CLARITYU, SPECGRAV, PHUR, PROTEINUA, GLUCOSEU, BILIRUBINCON, BLOODU, WBCU, RBCU, BACTERIA, MUCUS, NITRITE, LEUKOCYTESUR, UROBILINOGEN, HYALINECASTS in the last 24 hours.    LABS  CBC  Recent Labs   Lab 03/01/19  1054 03/02/19  0914 03/03/19  0641   WBC 11.83 9.12 8.25   RBC 4.30* 4.21* 4.30*   HGB 12.6* 12.5* 12.5*   HCT 38.7* 38.7* 39.0*    184 178   MCV 90 92 91   MCH 29.3 29.7 29.1   MCHC 32.6 32.3 32.1     BMP  Recent Labs   Lab 03/01/19  1054 03/02/19  0914 03/03/19  0642    133* 135*   K 4.9 4.7 4.5   CO2 31* 28 32*   CL 97 97 96   BUN 27* 29* 31*   CREATININE 1.2 1.2 1.1   * 112* 119*       POCT-Glucose  No results found for: POCTGLUCOSE    Recent Labs   Lab 03/01/19  1054 03/02/19  0914 03/03/19  0642   CALCIUM 9.8 9.2 9.1   MG 2.2 2.0 2.1   PHOS 4.2 4.7* 5.0*     LFT  Recent Labs   Lab 03/01/19  1054 03/02/19  0914 03/03/19  0642   PROT 8.1 7.4 7.2   ALBUMIN 3.1* 2.9* 2.8*   BILITOT 0.7 0.9 0.7   AST 21 26 37   ALKPHOS 90 76 76   ALT 24 26 33         COAGS  No results for input(s): PT, INR, APTT in the last 168 hours.  CE  Recent Labs   Lab 02/27/19  1637   TROPONINI <0.006     ABGs  No results for input(s): PH, PCO2, PO2, HCO3, POCSATURATED, BE in the last 24 hours.  BNP  Recent Labs   Lab 02/27/19  1637   BNP 18     UA  No results for input(s): COLORU, CLARITYU, SPECGRAV, PHUR, PROTEINUA, GLUCOSEU, BLOODU, WBCU,  RBCU, BACTERIA, MUCUS in the last 24 hours.    Invalid input(s):  BILIRUBINCON  LAST HbA1c  Lab Results   Component Value Date    HGBA1C 4.8 02/28/2019           Imaging  Imaging Results          US Abdomen Limited (Final result)  Result time 02/27/19 20:27:08   Procedure changed from US Doppler Abdomen Complete     Final result by Giselle Garcia MD (02/27/19 20:27:08)                 Impression:      Periumbilical hernia.  Uncertain if this may be bowel containing based on images provided.  CT follow-up may be obtained if concern for bowel containing hernia and possible obstruction.      Electronically signed by: Giselle Garcia MD  Date:    02/27/2019  Time:    20:27             Narrative:    EXAMINATION:  US ABDOMEN LIMITED    CLINICAL HISTORY:  Umbilical Hernia; Chronic obstructive pulmonary disease with (acute) exacerbation    COMPARISON:  CT abdomen and pelvis from March 2018.    FINDINGS:  Limited ultrasound evaluation was performed of the abdomen in lying and sitting positions with provocative maneuvers.  Periumbilical hernia is seen.  Uncertain if this may be bowel containing based on imaging provided.                               X-Ray Chest 1 View (Final result)  Result time 02/27/19 13:41:52    Final result by Hakeem Kincaid Jr., MD (02/27/19 13:41:52)                 Impression:      No significant abnormality seen.      Electronically signed by: Hakeem Kincaid MD  Date:    02/27/2019  Time:    13:41             Narrative:    EXAMINATION:  XR CHEST 1 VIEW    CLINICAL HISTORY:  Shortness of breath    TECHNIQUE:  Single frontal view of the chest was performed.    COMPARISON:  December 2, 2018.    FINDINGS:  Monitoring leads in place.  Right PICC or port catheter has been removed.  Heart size and pulmonary vessels are normal.  No confluent consolidation.                                Micro:  Microbiology Results (last 7 days)     Procedure Component Value Units Date/Time    Blood culture [[id number]]  Collected:  02/28/19 2253    Order Status:  Completed Specimen:  Blood from Peripheral, Left  Wrist Updated:  03/03/19 0612     Blood Culture, Routine No Growth to date     Blood Culture, Routine No Growth to date     Blood Culture, Routine No Growth to date    Blood culture [[id number]]  (Susceptibility) Collected:  02/27/19 1637    Order Status:  Completed Specimen:  Blood from Peripheral, Jugular, External Right Updated:  03/02/19 1208     Blood Culture, Routine Gram stain aer bottle: Gram positive cocci in clusters resembling Staph      Blood Culture, Routine Results called to and read back by: Juju Perez RN 02/28/2019  21:54     Blood Culture, Routine STAPHYLOCOCCUS EPIDERMIDIS    Blood culture [[id number]] Collected:  02/27/19 1936    Order Status:  Sent Specimen:  Blood from Peripheral, Lower Arm, Right Updated:  02/27/19 1937    Influenza A & B by Molecular [[id number]] Collected:  02/27/19 1641    Order Status:  Completed Specimen:  Nasopharyngeal Swab Updated:  02/27/19 1735     Influenza A, Molecular Negative     Influenza B, Molecular Negative     Flu A & B Source Nasal swab               Assessment/Plan:     Mr. Ming Harrington is a 53 y.o. male w/ PMH of asthma, COPD, HTN, hx of heroin use currently on methadone presenting for asthma exacerbation vs acute COPD excerbation     Asthma vs COPD Exacerbation  Chest X-ray: No acute cardiopulmonary process  Influenza: Negative  Levaquin 750mg IV  IV Solumedrol  Duones q4  Chest PT, Acapella, IS q4  Improving.     HTN  BP: 130-150s/80-90s  BP goal <140/90  Restart home medications Amlodipine and Clonidine     Heroin Use Disorder  Hx of IVDU.   Patient reports last use 2 months  Currently at [facility]  Verified with Clinic, on 80mg of Methadone daily  Monitor for withdrawals.      Umbilical Hernia  US Hernia: Periumbilical hernia  Reducible  Continue to monitor    Bacteremia  Staph epi x1, repeated culture negative.   TTE neg for vegetation  Consulted ID  for further management. Appreciate recs.     Code: Full  Diet: Cardiac  Ppx: Lovenox  Dispo: ID signed off, recs abx regimen, will follow, oxygen tank at bedside, will order neb upon discharge.  Case discussed with staff    Giuseppe Mcdermott MD  [facility]  03/03/2019   9:44 AM

## 2019-03-04 ENCOUNTER — PATIENT OUTREACH (OUTPATIENT)
Dept: ADMINISTRATIVE | Facility: CLINIC | Age: 54
End: 2019-03-04

## 2019-03-04 LAB — HIV 1+2 AB+HIV1 P24 AG SERPL QL IA: NEGATIVE

## 2019-03-04 NOTE — PHYSICIAN QUERY
"PT Name: Ming Harrington  MR #: [id number]    Physician Query Form - Heart  Condition Clarification     CDS/: Yesica Fajardo   RN CCDS            Contact information:[email or web address]  This form is a permanent document in the medical record.     Query Date: March 4, 2019    By submitting this query, we are merely seeking further clarification of documentation. Please utilize your independent clinical judgment when addressing the question(s) below.    The medical record contains the following   Indicators     Supporting Clinical Findings Location in Medical Record   x BNP BNP= 18 Lab 2/27   x EF EF=60% Echo 3/1    Radiology findings     x Echo Results · Study is positive for right to left shunt. The shunt may be intrapulmonary.  · Normal left ventricular systolic function. The estimated ejection fraction is 60%  · Concentric left ventricular remodeling.  · Normal right ventricular systolic function.  · Normal central venous pressure (3 mm Hg).  · No evidence of endocarditis. Echo 3/1    "Ascites" documented     x "SOB" or "LAND" documented Positive for SOB and wheezing H&P    "Hypoxia" documented     x Heart Failure documented HX of CHF H&P    "Edema" documented     x Diuretics/Meds Lasix 40 mg PO daily MAR and home RX     Treatment:      Other:      Heart failure (HF) can be acute, chronic or both. It is generally further specificed as systolic, diastolic, or combined. Lastly, it is important to identify an underlying etiology if known or suspected.     Common clues to acute exacerbation:  Rapidly progressive symptoms (w/in 2 weeks of presentation), using IV diuretics to treat, using supplemental O2, pulmonary edema on Xray, MI w/in 4 weeks, and/or BNP >500    Systolic Heart Failure: is defined as chart documentation of a left ventricular ejection fraction (LVEF) less than 40%     Diastolic Heart Failure: is defined as a left ventricular ejection fraction (LVEF) greater than 40%   +      Evidence of diastolic " dysfunction on echocardiography OR    Right heart catheterization wedge pressure above 12 mm Hg OR    Left heart catheterization left ventricular end diastolic pressure 18 mm Hg or above.    References: *American Heart Association    The clinical guidelines noted below are only system guidelines, and do not replace the providers clinical judgment.     Please further clarify the patient's history of CHF.  Thank you.                                [   x] Chronic Diastolic Heart Failure - Pre-existing diastolic HF diagnosis.  EF > 40%  without  acute HF symptoms documented  [   ] Heart Failure Ruled Out  [   ] Other Type of Heart Failure (please specify type): _________________________    [   ] Other (please specify): ___________________________________  [  ] Clinically Undetermined                          Please document in your progress notes daily for the duration of treatment until resolved and include in your discharge summary.

## 2019-03-04 NOTE — PATIENT INSTRUCTIONS
COPD Flare    You have had a flare-up of your COPD.  COPD, or chronic obstructive pulmonary disease, is a common lung disease. It causes your airways to become irritated and narrower. This makes it harder for you to breathe. Emphysema and chronic bronchitis are both types of COPD. This is a chronic condition, which means you always have it. Sometimes it gets worse. When this happens, it is called a flare-up.  Symptoms of COPD  People with COPD may have symptoms most of the time. In a flare-up, your symptoms get worse. These symptoms may mean you are having a flare-up:  · Shortness of breath, shallow or rapid breathing, or wheezing that gets worse  · Lung infection  · Cough that gets worse  · More mucus, thicker mucus or mucus of a different color  · Tiredness, decreased energy, or trouble doing your usual activities  · Fever  · Chest tightness  · Your symptoms dont get better even when you use your usual medicines, inhalers, and nebulizer  · Trouble talking  · You feel confused  Causes of flare-ups  Unfortunately, a flare-up can happen even though you did everything right, and you followed your doctors instructions. Some causes of flare-ups are:  · Smoking or secondhand smoke  · Colds, the flu, or respiratory infections  · Air pollution  · Sudden change in the weather  · Dust, irritating chemicals, or strong fumes  · Not taking your medicines as prescribed  Home care  Here are some things you can do at home to treat a flare-up:  · Try not to panic. This makes it harder to breathe, and keeps you from doing the right things.  · Dont smoke or be around others who are smoking.  · Try to drink more fluids than usual during a flare-up, unless your doctor has told you not to because of heart and kidney problems. More fluids can help loosen the mucus.  · Use your inhalers and nebulizer, if you have one, as you have been told to.  · If you were given antibiotics, take them until they are used up or your doctor tells you  to stop. Its important to finish the antibiotics, even though you feel better. This will make sure the infection has cleared.  · If you were given prednisone or another steroid, finish it even if you feel better.  Preventing a flare-up  Even though flare-ups happen, the best way to treat one is to prevent it before it starts. Here are some pointers:  · Dont smoke or be around others who are smoking.  · Take your medicines as you have been told.  · Talk with your doctor about getting a flu shot every year. Also find out if you need a pneumonia shot.  · If there is a weather advisory warning to stay indoors, try to stay inside when possible.  · Try to eat healthy and get plenty of sleep.  · Try to avoid things that usually set you off, like dust, chemical fumes, hairsprays, or strong perfumes.  Follow-up care  Follow up with your healthcare provider, or as advised.  If a culture was done, you will be told if your treatment needs to be changed. You can call as directed for the results.  If X-rays were done, you will be notified of any new findings that may affect your care.  Call 911  Call 911 if any of these occur:  · You have trouble breathing  · You feel confused or its difficult to wake you up  · You faint or lose consciousness  · You have a rapid heart rate  · You have new pain in your chest, arm, shoulder, neck or upper back  When to seek medical advice  Call your healthcare provider right away if any of these occur:  · Wheezing or shortness of breath gets worse  · You need to use your inhalers more often than usual without relief  · Fever of 100.4°F (38ºC) or higher, or as directed by your healthcare provider  · Coughing up lots of dark-colored or bloody mucus (sputum)  · Chest pain with each breath  · You do not start to get better within 24 hours  · Swelling of your ankles gets worse  · Dizziness or weakness  Date Last Reviewed: 9/1/2016  © 4919-7496 [employer]. [address] [facility],  [place] [address]. All rights reserved. This information is not intended as a substitute for professional medical care. Always follow your healthcare professional's instructions.

## 2019-03-04 NOTE — PHYSICIAN QUERY
PT Name: Ming Harrington  MR #: [id number]    Physician Query Form - Asthma Clarification      CDS/: Yesica Fajardo    RN CCDS           Contact information:[email or web address]  This form is a permanent document in the medical record.    Query Date: March 4, 2019    By submitting this query, we are merely seeking further clarification of documentation. Please utilize your independent clinical judgment when addressing the question(s) below.    The Medical Record contains the following:     Indicators Supporting Clinical Findings Location in Medical Record   x Asthma or Reactive Airway Disease documented Mr. Ming Harrington is a 53 y.o. male w/ PMH of asthma, COPD, HTN, hx of heroin use currently on methadone presenting for asthma exacerbation vs acute COPD excerbation    H&P-PN 3/3   x Acute/Chronic Illness Mr. Ming Harrington is a 53 y.o. male w/ PMH of asthma, COPD, HTN H&P    Radiology Findings     x RR     Blood Gases     O2 sats O2 sat 89-98 on 2-3L/NC  RR=18-30      x BiPAP/Intubation/Supplemental O2 Pt presents  with 2 days of shortness of breath after his nebulizer machine and oxygen tank broke. Patient also has been using his rescue albuterol 7 times a day.      H&P   x SOB, Wheezing, Productive Cough, Use of Accessory Muscles, Respiratory Distress, Hypoxia, etc. Inspiratory and expiratory wheezing in all bilateral lung fields.  Respiratory: Positive for shortness of breath and wheezing.   H&P   x Treatment In the ED, patient's oxygen sat was 89% on RA. Patient was placed on 2L of oxygen and oxygen saturations improved to 93%. No leukocyosis on labs. On chest X-ray, there were no significant abnormality seen. Patient received oral steroids, magnesium, and 3 rounds of duonebs.         H&P   x Other Asthma vs COPD Exacerbation   H&P-PN 3/3       Please further specify the type of asthma.  Thank you.        [   ] Mild intermittent asthma, uncomplicated   [   ]    [ x ]  [   ] Mild intermittent asthma, with acute  exacerbation    With COPD exacerbation  Asthma exacerbation only   [   ]   Mild persistent asthma, uncomplicated   [ x  ]    [ x  ]  [   ] Mild persistent asthma, with acute exacerbation    With COPD exacerbation  Asthma exacerbation only    Moderate persistent asthma, uncomplicated   [   ]    [   ]  [   ] Moderate persistent asthma, with acute exacerbation    With COPD exacerbation  Asthma exacerbation only    Severe persistent asthma, uncomplicated   [   ]    [   ]  [   ] Severe persistent asthma, with acute exacerbation    With COPD exacerbation  Asthma exacerbation only   [   ] Asthma, unspecified   [   ]  Clinically Undetermined       Please document in your progress notes daily for the duration of treatment until resolved and include in your discharge summary.

## 2019-03-06 LAB — BACTERIA BLD CULT: NORMAL

## 2019-03-14 ENCOUNTER — HOSPITAL ENCOUNTER (INPATIENT)
Facility: HOSPITAL | Age: 54
LOS: 3 days | Discharge: HOME | DRG: 191 | End: 2019-03-17
Attending: EMERGENCY MEDICINE | Admitting: FAMILY MEDICINE
Payer: MEDICAID

## 2019-03-14 DIAGNOSIS — J45.909 SEVERE ASTHMA, UNSPECIFIED WHETHER COMPLICATED, UNSPECIFIED WHETHER PERSISTENT: ICD-10-CM

## 2019-03-14 DIAGNOSIS — I10 ESSENTIAL HYPERTENSION: Chronic | ICD-10-CM

## 2019-03-14 DIAGNOSIS — R10.9 ABDOMINAL PAIN, UNSPECIFIED ABDOMINAL LOCATION: ICD-10-CM

## 2019-03-14 DIAGNOSIS — J44.0 CHRONIC OBSTRUCTIVE PULMONARY DISEASE WITH ACUTE LOWER RESPIRATORY INFECTION: ICD-10-CM

## 2019-03-14 DIAGNOSIS — F11.20 UNCOMPLICATED OPIOID DEPENDENCE: ICD-10-CM

## 2019-03-14 DIAGNOSIS — J45.901 ACUTE EXACERBATION OF COPD WITH ASTHMA: Primary | ICD-10-CM

## 2019-03-14 DIAGNOSIS — R06.02 SHORTNESS OF BREATH: ICD-10-CM

## 2019-03-14 DIAGNOSIS — J44.1 ACUTE EXACERBATION OF COPD WITH ASTHMA: Primary | ICD-10-CM

## 2019-03-14 LAB
ALBUMIN SERPL BCP-MCNC: 3.2 G/DL
ALLENS TEST: ABNORMAL
ALLENS TEST: ABNORMAL
ALP SERPL-CCNC: 183 U/L
ALT SERPL W/O P-5'-P-CCNC: 38 U/L
AMPHET+METHAMPHET UR QL: NEGATIVE
ANION GAP SERPL CALC-SCNC: 7 MMOL/L
APTT BLDCRRT: 31.2 SEC
AST SERPL-CCNC: 43 U/L
BACTERIA #/AREA URNS HPF: NORMAL /HPF
BARBITURATES UR QL SCN>200 NG/ML: NEGATIVE
BASOPHILS # BLD AUTO: 0.01 K/UL
BASOPHILS NFR BLD: 0.1 %
BENZODIAZ UR QL SCN>200 NG/ML: NEGATIVE
BILIRUB SERPL-MCNC: 0.9 MG/DL
BILIRUB UR QL STRIP: NEGATIVE
BNP SERPL-MCNC: 26 PG/ML
BUN SERPL-MCNC: 14 MG/DL
BZE UR QL SCN: NEGATIVE
CALCIUM SERPL-MCNC: 9.1 MG/DL
CANNABINOIDS UR QL SCN: NEGATIVE
CHLORIDE SERPL-SCNC: 99 MMOL/L
CLARITY UR: CLEAR
CO2 SERPL-SCNC: 32 MMOL/L
COLOR UR: YELLOW
CREAT SERPL-MCNC: 1 MG/DL
CREAT UR-MCNC: 54 MG/DL
DELSYS: ABNORMAL
DELSYS: ABNORMAL
DIFFERENTIAL METHOD: ABNORMAL
EOSINOPHIL # BLD AUTO: 0.1 K/UL
EOSINOPHIL NFR BLD: 0.7 %
ERYTHROCYTE [DISTWIDTH] IN BLOOD BY AUTOMATED COUNT: 13 %
EST. GFR  (AFRICAN AMERICAN): >60 ML/MIN/1.73 M^2
EST. GFR  (NON AFRICAN AMERICAN): >60 ML/MIN/1.73 M^2
ETHANOL UR-MCNC: <10 MG/DL
GLUCOSE SERPL-MCNC: 98 MG/DL
GLUCOSE UR QL STRIP: NEGATIVE
HCO3 UR-SCNC: 35.6 MMOL/L (ref 24–28)
HCO3 UR-SCNC: 36.9 MMOL/L (ref 24–28)
HCT VFR BLD AUTO: 41.2 %
HGB BLD-MCNC: 13.3 G/DL
HGB UR QL STRIP: NEGATIVE
HYALINE CASTS #/AREA URNS LPF: 0 /LPF
INFLUENZA A, MOLECULAR: NEGATIVE
INFLUENZA B, MOLECULAR: NEGATIVE
INR PPP: 1
KETONES UR QL STRIP: NEGATIVE
LEUKOCYTE ESTERASE UR QL STRIP: NEGATIVE
LYMPHOCYTES # BLD AUTO: 2.5 K/UL
LYMPHOCYTES NFR BLD: 18.6 %
MCH RBC QN AUTO: 29.5 PG
MCHC RBC AUTO-ENTMCNC: 32.3 G/DL
MCV RBC AUTO: 91 FL
METHADONE UR QL SCN>300 NG/ML: NORMAL
MICROSCOPIC COMMENT: NORMAL
MONOCYTES # BLD AUTO: 0.5 K/UL
MONOCYTES NFR BLD: 3.6 %
NEUTROPHILS # BLD AUTO: 10.3 K/UL
NEUTROPHILS NFR BLD: 76.8 %
NITRITE UR QL STRIP: NEGATIVE
OPIATES UR QL SCN: NORMAL
PCO2 BLDA: 79.7 MMHG (ref 35–45)
PCO2 BLDA: 79.7 MMHG (ref 35–45)
PCP UR QL SCN>25 NG/ML: NEGATIVE
PH SMN: 7.26 [PH] (ref 7.35–7.45)
PH SMN: 7.27 [PH] (ref 7.35–7.45)
PH UR STRIP: 6 [PH] (ref 5–8)
PLATELET # BLD AUTO: 146 K/UL
PMV BLD AUTO: 10.3 FL
PO2 BLDA: 79 MMHG (ref 80–100)
PO2 BLDA: 79 MMHG (ref 80–100)
POC BE: 10 MMOL/L
POC BE: 8 MMOL/L
POC SATURATED O2: 92 % (ref 95–100)
POC SATURATED O2: 93 % (ref 95–100)
POC TCO2: 38 MMOL/L (ref 23–27)
POC TCO2: 39 MMOL/L (ref 23–27)
POTASSIUM SERPL-SCNC: 4.2 MMOL/L
PROCALCITONIN SERPL IA-MCNC: 0.05 NG/ML
PROT SERPL-MCNC: 7.7 G/DL
PROT UR QL STRIP: ABNORMAL
PROTHROMBIN TIME: 9.9 SEC
RBC # BLD AUTO: 4.51 M/UL
RBC #/AREA URNS HPF: 2 /HPF (ref 0–4)
SAMPLE: ABNORMAL
SAMPLE: ABNORMAL
SITE: ABNORMAL
SITE: ABNORMAL
SODIUM SERPL-SCNC: 138 MMOL/L
SP GR UR STRIP: 1.02 (ref 1–1.03)
SPECIMEN SOURCE: NORMAL
TOXICOLOGY INFORMATION: NORMAL
TROPONIN I SERPL DL<=0.01 NG/ML-MCNC: <0.006 NG/ML
URN SPEC COLLECT METH UR: ABNORMAL
UROBILINOGEN UR STRIP-ACNC: ABNORMAL EU/DL
WBC # BLD AUTO: 13.36 K/UL
WBC #/AREA URNS HPF: 1 /HPF (ref 0–5)

## 2019-03-14 PROCEDURE — 99900035 HC TECH TIME PER 15 MIN (STAT)

## 2019-03-14 PROCEDURE — 85025 COMPLETE CBC W/AUTO DIFF WBC: CPT

## 2019-03-14 PROCEDURE — 25000003 PHARM REV CODE 250: Performed by: STUDENT IN AN ORGANIZED HEALTH CARE EDUCATION/TRAINING PROGRAM

## 2019-03-14 PROCEDURE — 82803 BLOOD GASES ANY COMBINATION: CPT

## 2019-03-14 PROCEDURE — 93005 ELECTROCARDIOGRAM TRACING: CPT

## 2019-03-14 PROCEDURE — 87502 INFLUENZA DNA AMP PROBE: CPT

## 2019-03-14 PROCEDURE — 80053 COMPREHEN METABOLIC PANEL: CPT

## 2019-03-14 PROCEDURE — 63600175 PHARM REV CODE 636 W HCPCS: Performed by: STUDENT IN AN ORGANIZED HEALTH CARE EDUCATION/TRAINING PROGRAM

## 2019-03-14 PROCEDURE — 80307 DRUG TEST PRSMV CHEM ANLYZR: CPT

## 2019-03-14 PROCEDURE — 20000000 HC ICU ROOM

## 2019-03-14 PROCEDURE — 63600175 PHARM REV CODE 636 W HCPCS: Performed by: EMERGENCY MEDICINE

## 2019-03-14 PROCEDURE — 94660 CPAP INITIATION&MGMT: CPT

## 2019-03-14 PROCEDURE — 96366 THER/PROPH/DIAG IV INF ADDON: CPT

## 2019-03-14 PROCEDURE — 87070 CULTURE OTHR SPECIMN AEROBIC: CPT

## 2019-03-14 PROCEDURE — 83880 ASSAY OF NATRIURETIC PEPTIDE: CPT

## 2019-03-14 PROCEDURE — 96372 THER/PROPH/DIAG INJ SC/IM: CPT | Mod: 25

## 2019-03-14 PROCEDURE — 84484 ASSAY OF TROPONIN QUANT: CPT

## 2019-03-14 PROCEDURE — 25000242 PHARM REV CODE 250 ALT 637 W/ HCPCS: Performed by: FAMILY MEDICINE

## 2019-03-14 PROCEDURE — 94761 N-INVAS EAR/PLS OXIMETRY MLT: CPT

## 2019-03-14 PROCEDURE — C9113 INJ PANTOPRAZOLE SODIUM, VIA: HCPCS | Performed by: STUDENT IN AN ORGANIZED HEALTH CARE EDUCATION/TRAINING PROGRAM

## 2019-03-14 PROCEDURE — 27000221 HC OXYGEN, UP TO 24 HOURS

## 2019-03-14 PROCEDURE — 85730 THROMBOPLASTIN TIME PARTIAL: CPT

## 2019-03-14 PROCEDURE — 96375 TX/PRO/DX INJ NEW DRUG ADDON: CPT | Mod: 59

## 2019-03-14 PROCEDURE — 93010 EKG 12-LEAD: ICD-10-PCS | Mod: ,,, | Performed by: INTERNAL MEDICINE

## 2019-03-14 PROCEDURE — 27000190 HC CPAP FULL FACE MASK W/VALVE

## 2019-03-14 PROCEDURE — 25000242 PHARM REV CODE 250 ALT 637 W/ HCPCS: Performed by: EMERGENCY MEDICINE

## 2019-03-14 PROCEDURE — 93010 ELECTROCARDIOGRAM REPORT: CPT | Mod: ,,, | Performed by: INTERNAL MEDICINE

## 2019-03-14 PROCEDURE — 96368 THER/DIAG CONCURRENT INF: CPT

## 2019-03-14 PROCEDURE — 99291 CRITICAL CARE FIRST HOUR: CPT

## 2019-03-14 PROCEDURE — 94640 AIRWAY INHALATION TREATMENT: CPT

## 2019-03-14 PROCEDURE — 25000003 PHARM REV CODE 250: Performed by: EMERGENCY MEDICINE

## 2019-03-14 PROCEDURE — 94644 CONT INHLJ TX 1ST HOUR: CPT

## 2019-03-14 PROCEDURE — 81000 URINALYSIS NONAUTO W/SCOPE: CPT

## 2019-03-14 PROCEDURE — 85610 PROTHROMBIN TIME: CPT

## 2019-03-14 PROCEDURE — 87185 SC STD ENZYME DETCJ PER NZM: CPT

## 2019-03-14 PROCEDURE — 36600 WITHDRAWAL OF ARTERIAL BLOOD: CPT

## 2019-03-14 PROCEDURE — 87205 SMEAR GRAM STAIN: CPT

## 2019-03-14 PROCEDURE — 84145 PROCALCITONIN (PCT): CPT

## 2019-03-14 PROCEDURE — 96365 THER/PROPH/DIAG IV INF INIT: CPT

## 2019-03-14 PROCEDURE — 87077 CULTURE AEROBIC IDENTIFY: CPT

## 2019-03-14 RX ORDER — METHYLPREDNISOLONE SOD SUCC 125 MG
125 VIAL (EA) INJECTION ONCE
Status: COMPLETED | OUTPATIENT
Start: 2019-03-14 | End: 2019-03-14

## 2019-03-14 RX ORDER — INSULIN ASPART 100 [IU]/ML
0-5 INJECTION, SOLUTION INTRAVENOUS; SUBCUTANEOUS EVERY 6 HOURS PRN
Status: DISCONTINUED | OUTPATIENT
Start: 2019-03-15 | End: 2019-03-17 | Stop reason: HOSPADM

## 2019-03-14 RX ORDER — IPRATROPIUM BROMIDE AND ALBUTEROL SULFATE 2.5; .5 MG/3ML; MG/3ML
3 SOLUTION RESPIRATORY (INHALATION) EVERY 4 HOURS
Status: DISCONTINUED | OUTPATIENT
Start: 2019-03-14 | End: 2019-03-14

## 2019-03-14 RX ORDER — IPRATROPIUM BROMIDE AND ALBUTEROL SULFATE 2.5; .5 MG/3ML; MG/3ML
3 SOLUTION RESPIRATORY (INHALATION) EVERY 4 HOURS
Status: DISPENSED | OUTPATIENT
Start: 2019-03-14 | End: 2019-03-15

## 2019-03-14 RX ORDER — ALBUTEROL SULFATE 2.5 MG/.5ML
SOLUTION RESPIRATORY (INHALATION)
Status: DISPENSED
Start: 2019-03-14 | End: 2019-03-14

## 2019-03-14 RX ORDER — GLUCAGON 1 MG
1 KIT INJECTION
Status: DISCONTINUED | OUTPATIENT
Start: 2019-03-15 | End: 2019-03-17 | Stop reason: HOSPADM

## 2019-03-14 RX ORDER — PREDNISONE 20 MG/1
60 TABLET ORAL
Status: COMPLETED | OUTPATIENT
Start: 2019-03-14 | End: 2019-03-14

## 2019-03-14 RX ORDER — SODIUM CHLORIDE 0.9 % (FLUSH) 0.9 %
3 SYRINGE (ML) INJECTION
Status: DISCONTINUED | OUTPATIENT
Start: 2019-03-14 | End: 2019-03-17 | Stop reason: HOSPADM

## 2019-03-14 RX ORDER — ENOXAPARIN SODIUM 100 MG/ML
40 INJECTION SUBCUTANEOUS EVERY 24 HOURS
Status: DISCONTINUED | OUTPATIENT
Start: 2019-03-14 | End: 2019-03-17 | Stop reason: HOSPADM

## 2019-03-14 RX ORDER — IPRATROPIUM BROMIDE AND ALBUTEROL SULFATE 2.5; .5 MG/3ML; MG/3ML
3 SOLUTION RESPIRATORY (INHALATION)
Status: COMPLETED | OUTPATIENT
Start: 2019-03-14 | End: 2019-03-14

## 2019-03-14 RX ORDER — MAGNESIUM SULFATE HEPTAHYDRATE 40 MG/ML
2 INJECTION, SOLUTION INTRAVENOUS
Status: COMPLETED | OUTPATIENT
Start: 2019-03-14 | End: 2019-03-14

## 2019-03-14 RX ORDER — MONTELUKAST SODIUM 10 MG/1
10 TABLET ORAL NIGHTLY
Status: DISCONTINUED | OUTPATIENT
Start: 2019-03-14 | End: 2019-03-17 | Stop reason: HOSPADM

## 2019-03-14 RX ORDER — PANTOPRAZOLE SODIUM 40 MG/10ML
40 INJECTION, POWDER, LYOPHILIZED, FOR SOLUTION INTRAVENOUS DAILY
Status: DISCONTINUED | OUTPATIENT
Start: 2019-03-14 | End: 2019-03-17 | Stop reason: HOSPADM

## 2019-03-14 RX ORDER — IBUPROFEN 200 MG
1 TABLET ORAL DAILY
Status: DISCONTINUED | OUTPATIENT
Start: 2019-03-14 | End: 2019-03-17 | Stop reason: HOSPADM

## 2019-03-14 RX ORDER — OLANZAPINE 2.5 MG/1
10 TABLET ORAL 2 TIMES DAILY
Status: DISCONTINUED | OUTPATIENT
Start: 2019-03-14 | End: 2019-03-17 | Stop reason: HOSPADM

## 2019-03-14 RX ORDER — ALBUTEROL SULFATE 2.5 MG/.5ML
15 SOLUTION RESPIRATORY (INHALATION)
Status: COMPLETED | OUTPATIENT
Start: 2019-03-14 | End: 2019-03-14

## 2019-03-14 RX ORDER — ALBUTEROL SULFATE 2.5 MG/.5ML
2.5 SOLUTION RESPIRATORY (INHALATION)
Status: DISCONTINUED | OUTPATIENT
Start: 2019-03-14 | End: 2019-03-17 | Stop reason: SDUPTHER

## 2019-03-14 RX ORDER — AMLODIPINE BESYLATE 5 MG/1
10 TABLET ORAL DAILY
Status: DISCONTINUED | OUTPATIENT
Start: 2019-03-14 | End: 2019-03-17 | Stop reason: HOSPADM

## 2019-03-14 RX ORDER — ONDANSETRON 2 MG/ML
4 INJECTION INTRAMUSCULAR; INTRAVENOUS EVERY 8 HOURS PRN
Status: DISCONTINUED | OUTPATIENT
Start: 2019-03-14 | End: 2019-03-17 | Stop reason: HOSPADM

## 2019-03-14 RX ORDER — SODIUM CHLORIDE FOR INHALATION 3 %
4 VIAL, NEBULIZER (ML) INHALATION
Status: DISCONTINUED | OUTPATIENT
Start: 2019-03-14 | End: 2019-03-17 | Stop reason: HOSPADM

## 2019-03-14 RX ORDER — FUROSEMIDE 10 MG/ML
40 INJECTION INTRAMUSCULAR; INTRAVENOUS DAILY
Status: DISCONTINUED | OUTPATIENT
Start: 2019-03-14 | End: 2019-03-16

## 2019-03-14 RX ORDER — CLONIDINE HYDROCHLORIDE 0.3 MG/1
0.3 TABLET ORAL 3 TIMES DAILY
Status: DISCONTINUED | OUTPATIENT
Start: 2019-03-14 | End: 2019-03-17 | Stop reason: HOSPADM

## 2019-03-14 RX ORDER — ONDANSETRON 4 MG/1
4 TABLET, ORALLY DISINTEGRATING ORAL
Status: COMPLETED | OUTPATIENT
Start: 2019-03-14 | End: 2019-03-14

## 2019-03-14 RX ADMIN — FUROSEMIDE 40 MG: 10 INJECTION, SOLUTION INTRAVENOUS at 05:03

## 2019-03-14 RX ADMIN — MAGNESIUM SULFATE IN WATER 2 G: 40 INJECTION, SOLUTION INTRAVENOUS at 08:03

## 2019-03-14 RX ADMIN — METHYLPREDNISOLONE SODIUM SUCCINATE 125 MG: 125 INJECTION, POWDER, FOR SOLUTION INTRAMUSCULAR; INTRAVENOUS at 01:03

## 2019-03-14 RX ADMIN — ENOXAPARIN SODIUM 40 MG: 100 INJECTION SUBCUTANEOUS at 05:03

## 2019-03-14 RX ADMIN — IPRATROPIUM BROMIDE AND ALBUTEROL SULFATE 3 ML: .5; 3 SOLUTION RESPIRATORY (INHALATION) at 03:03

## 2019-03-14 RX ADMIN — MONTELUKAST SODIUM 10 MG: 10 TABLET, FILM COATED ORAL at 09:03

## 2019-03-14 RX ADMIN — IPRATROPIUM BROMIDE AND ALBUTEROL SULFATE 3 ML: .5; 3 SOLUTION RESPIRATORY (INHALATION) at 07:03

## 2019-03-14 RX ADMIN — IPRATROPIUM BROMIDE AND ALBUTEROL SULFATE 3 ML: .5; 3 SOLUTION RESPIRATORY (INHALATION) at 12:03

## 2019-03-14 RX ADMIN — OLANZAPINE 10 MG: 10 TABLET, FILM COATED ORAL at 01:03

## 2019-03-14 RX ADMIN — ALBUTEROL SULFATE 15 MG: 2.5 SOLUTION RESPIRATORY (INHALATION) at 06:03

## 2019-03-14 RX ADMIN — PANTOPRAZOLE SODIUM 40 MG: 40 INJECTION, POWDER, FOR SOLUTION INTRAVENOUS at 01:03

## 2019-03-14 RX ADMIN — PREDNISONE 60 MG: 20 TABLET ORAL at 06:03

## 2019-03-14 RX ADMIN — OLANZAPINE 10 MG: 10 TABLET, FILM COATED ORAL at 09:03

## 2019-03-14 RX ADMIN — AZITHROMYCIN MONOHYDRATE 500 MG: 500 INJECTION, POWDER, LYOPHILIZED, FOR SOLUTION INTRAVENOUS at 01:03

## 2019-03-14 RX ADMIN — ONDANSETRON 4 MG: 4 TABLET, ORALLY DISINTEGRATING ORAL at 06:03

## 2019-03-14 RX ADMIN — IPRATROPIUM BROMIDE AND ALBUTEROL SULFATE 3 ML: .5; 3 SOLUTION RESPIRATORY (INHALATION) at 08:03

## 2019-03-14 RX ADMIN — CLONIDINE HYDROCHLORIDE 0.3 MG: 0.1 TABLET ORAL at 09:03

## 2019-03-14 NOTE — ED NOTES
Breathing treatment finished. Pt placed on O2 at 2L per nc. Respiratory Therapist notified to reassess pt. Dr. Cooper informed that pt continues to be short of breath. Verbal order received for IV.

## 2019-03-14 NOTE — ED NOTES
Patient sitting up in bed eating lunch tray, bipap off at current time. O2 saturation 92%. Patient AAOx4

## 2019-03-14 NOTE — ED NOTES
Patient sitting up in bed eating dinner tray. No signs of distress noted. Patient tolerated dinner and gave patient phone to contact family to inform of admission
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
## 2019-03-14 NOTE — ED NOTES
Spoke with Family Medicine concerning ABG. Orders for continued BiPap after he eats. Pt is in no distress at this time.

## 2019-03-14 NOTE — ED NOTES
Pt awakens to name. No distress noted. Pt is drowsy. VS stable. On Bipap machine and cardiac monitor.

## 2019-03-14 NOTE — ED NOTES
Pt resting on stretcher with eyes closed and BiPap in place. On cardiac monitor. VS stable. No distress noted. Pt is diaphoretic at times and keeps a towel so he can wipe his forehead. Pt repositioned. Will continue to monitor.

## 2019-03-14 NOTE — ED NOTES
Called report to Reshma in ICU, patient will be transported to bed 255 with bipap in place. Care assumed to ICU nurse Reshma KELLER

## 2019-03-14 NOTE — ED TRIAGE NOTES
Patient presents to the ED with complaints of SOB. Patient has history of asthma, COPD, CHF. Patient reports waking up this morning struggling to breath.   Patient was recently admitted to hospital for same symptoms. Patient stated he is normally on oxygen at home but his motor ran out and has not been able to get another machine. After last discharge he was given one tank of oxygen that lasted 2 hours. Patient has some labored breathing but able to hold clear conversation.     Review of patient's allergies indicates:   Allergen Reactions    Compazine [prochlorperazine edisylate] Other (See Comments) and Anaphylaxis     Hallucinations     Iodine and iodide containing products Anaphylaxis and Swelling     Facial swelling    Shellfish containing products      Anaphylaxis^  Anaphylaxis^  Anaphylaxis^        Patient has verified the spelling of their name and  on armband.   APPEARANCE: Patient is alert, calm, oriented x 4, and appears to be struggling with taking a breath. Patient is able to speak in full clear sentences  SKIN: Skin is normal for race, warm, and dry. Normal skin turgor and mucous membranes moist.  CARDIAC: Normal rate and rhythm, no murmur heard. Reports chest pain with deep breaths  RESPIRATORY:Normal rate and effort. +expiratory wheezing heard in all fields. + SOB   GASTRO: Bowel sounds normal, abdomen is soft, no tenderness, and no abdominal distention.  MUSCLE: Full ROM. No bony tenderness or soft tissue tenderness. No obvious deformity.  PERIPHERAL VASCULAR: peripheral pulses present. Normal cap refill. No edema. Warm to touch.

## 2019-03-14 NOTE — ED NOTES
Lab called and states that the PT and APTT results that were put in on this patient were not the right results. They will rerun the lab to get the correct results.

## 2019-03-14 NOTE — ED NOTES
Lab having trouble getting blood cultures. Pt refusing to be stuck anymore for blood. Family Medicine MD notified.

## 2019-03-14 NOTE — ED NOTES
Respiratory at bedside for second ABG. Pt is currently off of BiPap to eat lunch and is on O2 at 2L per NC, O2 sats at 92%.

## 2019-03-14 NOTE — ED NOTES
Respiratory called, breathing treatment ordered. Bilateral breath sounds wheezing in all fields. O2 saturation 94%.

## 2019-03-14 NOTE — ED NOTES
Admitting MD Dr. Dallas and  team at bedside. Sputum culture obtained and sent to lab for cultures per MD orders.

## 2019-03-14 NOTE — ED NOTES
Assisted patient to call brother to inform him of admit. Patient brother Vinny informed of patient admission by patient. Cell phone number taken for brothchristopher Casillas [phone]

## 2019-03-14 NOTE — ED NOTES
Report received care assumed. Assessment performed pt sitting high fowlers position with breathing treatment in process. Breathsounds tight and wheezing present in all fields. Patient requested to have home O2 setup before he leaves hospital, will notify case management. O2 saturations 98%. Patient productive cough, yellow sputum noted in emesis bag. Patient sweating and complaining of being hot. Temp assessed orally, 98.3, room temp is warm, door opened to help ventilate room. Work order placed to check room ac.

## 2019-03-14 NOTE — ED NOTES
Spoke to Admit team about pt eating lunch- he is requesting to eat. MD states he will keep him on Bipap for 1 more hour and then allow him to be off of BiPap, get an ABG and allow him to eat lunch.

## 2019-03-15 LAB
ALBUMIN SERPL BCP-MCNC: 2.9 G/DL
ALLENS TEST: ABNORMAL
ALP SERPL-CCNC: 170 U/L
ALT SERPL W/O P-5'-P-CCNC: 31 U/L
ANION GAP SERPL CALC-SCNC: 8 MMOL/L
AST SERPL-CCNC: 27 U/L
BASOPHILS # BLD AUTO: 0 K/UL
BASOPHILS NFR BLD: 0 %
BILIRUB SERPL-MCNC: 0.6 MG/DL
BUN SERPL-MCNC: 18 MG/DL
CALCIUM SERPL-MCNC: 9.2 MG/DL
CHLORIDE SERPL-SCNC: 96 MMOL/L
CO2 SERPL-SCNC: 35 MMOL/L
CREAT SERPL-MCNC: 1.1 MG/DL
DELSYS: ABNORMAL
DIFFERENTIAL METHOD: ABNORMAL
EOSINOPHIL # BLD AUTO: 0 K/UL
EOSINOPHIL NFR BLD: 0 %
ERYTHROCYTE [DISTWIDTH] IN BLOOD BY AUTOMATED COUNT: 12.7 %
EST. GFR  (AFRICAN AMERICAN): >60 ML/MIN/1.73 M^2
EST. GFR  (NON AFRICAN AMERICAN): >60 ML/MIN/1.73 M^2
FLOW: 2
GLUCOSE SERPL-MCNC: 140 MG/DL
HCO3 UR-SCNC: 35 MMOL/L (ref 24–28)
HCT VFR BLD AUTO: 39.6 %
HGB BLD-MCNC: 12.6 G/DL
LYMPHOCYTES # BLD AUTO: 0.7 K/UL
LYMPHOCYTES NFR BLD: 8.8 %
MAGNESIUM SERPL-MCNC: 2.1 MG/DL
MCH RBC QN AUTO: 29.2 PG
MCHC RBC AUTO-ENTMCNC: 31.8 G/DL
MCV RBC AUTO: 92 FL
MODE: ABNORMAL
MONOCYTES # BLD AUTO: 0.3 K/UL
MONOCYTES NFR BLD: 3.3 %
NEUTROPHILS # BLD AUTO: 7 K/UL
NEUTROPHILS NFR BLD: 87.8 %
PCO2 BLDA: 68.1 MMHG (ref 35–45)
PH SMN: 7.32 [PH] (ref 7.35–7.45)
PHOSPHATE SERPL-MCNC: 3.2 MG/DL
PLATELET # BLD AUTO: 144 K/UL
PMV BLD AUTO: 11 FL
PO2 BLDA: 66 MMHG (ref 80–100)
POC BE: 9 MMOL/L
POC SATURATED O2: 90 % (ref 95–100)
POC TCO2: 37 MMOL/L (ref 23–27)
POCT GLUCOSE: 132 MG/DL (ref 70–110)
POCT GLUCOSE: 144 MG/DL (ref 70–110)
POCT GLUCOSE: 156 MG/DL (ref 70–110)
POCT GLUCOSE: 168 MG/DL (ref 70–110)
POTASSIUM SERPL-SCNC: 4.5 MMOL/L
PROT SERPL-MCNC: 7.6 G/DL
RBC # BLD AUTO: 4.32 M/UL
SAMPLE: ABNORMAL
SITE: ABNORMAL
SODIUM SERPL-SCNC: 139 MMOL/L
WBC # BLD AUTO: 7.98 K/UL

## 2019-03-15 PROCEDURE — 36600 WITHDRAWAL OF ARTERIAL BLOOD: CPT

## 2019-03-15 PROCEDURE — 25000242 PHARM REV CODE 250 ALT 637 W/ HCPCS

## 2019-03-15 PROCEDURE — 80053 COMPREHEN METABOLIC PANEL: CPT

## 2019-03-15 PROCEDURE — 63600175 PHARM REV CODE 636 W HCPCS: Performed by: STUDENT IN AN ORGANIZED HEALTH CARE EDUCATION/TRAINING PROGRAM

## 2019-03-15 PROCEDURE — S4991 NICOTINE PATCH NONLEGEND: HCPCS | Performed by: STUDENT IN AN ORGANIZED HEALTH CARE EDUCATION/TRAINING PROGRAM

## 2019-03-15 PROCEDURE — C9113 INJ PANTOPRAZOLE SODIUM, VIA: HCPCS | Performed by: STUDENT IN AN ORGANIZED HEALTH CARE EDUCATION/TRAINING PROGRAM

## 2019-03-15 PROCEDURE — 83735 ASSAY OF MAGNESIUM: CPT

## 2019-03-15 PROCEDURE — 25000242 PHARM REV CODE 250 ALT 637 W/ HCPCS: Performed by: FAMILY MEDICINE

## 2019-03-15 PROCEDURE — 94761 N-INVAS EAR/PLS OXIMETRY MLT: CPT

## 2019-03-15 PROCEDURE — 94660 CPAP INITIATION&MGMT: CPT

## 2019-03-15 PROCEDURE — 94640 AIRWAY INHALATION TREATMENT: CPT

## 2019-03-15 PROCEDURE — 27000221 HC OXYGEN, UP TO 24 HOURS

## 2019-03-15 PROCEDURE — 11000001 HC ACUTE MED/SURG PRIVATE ROOM

## 2019-03-15 PROCEDURE — 84100 ASSAY OF PHOSPHORUS: CPT

## 2019-03-15 PROCEDURE — 82803 BLOOD GASES ANY COMBINATION: CPT

## 2019-03-15 PROCEDURE — 85025 COMPLETE CBC W/AUTO DIFF WBC: CPT

## 2019-03-15 PROCEDURE — 25000003 PHARM REV CODE 250: Performed by: STUDENT IN AN ORGANIZED HEALTH CARE EDUCATION/TRAINING PROGRAM

## 2019-03-15 PROCEDURE — 36415 COLL VENOUS BLD VENIPUNCTURE: CPT

## 2019-03-15 RX ORDER — IPRATROPIUM BROMIDE AND ALBUTEROL SULFATE 2.5; .5 MG/3ML; MG/3ML
3 SOLUTION RESPIRATORY (INHALATION) EVERY 4 HOURS
Qty: 1620 ML | Refills: 3 | Status: SHIPPED | OUTPATIENT
Start: 2019-03-15 | End: 2019-03-28 | Stop reason: SDUPTHER

## 2019-03-15 RX ORDER — MONTELUKAST SODIUM 10 MG/1
10 TABLET ORAL NIGHTLY
Qty: 30 TABLET | Refills: 0 | Status: SHIPPED | OUTPATIENT
Start: 2019-03-15 | End: 2019-04-14

## 2019-03-15 RX ORDER — METHADONE HYDROCHLORIDE 10 MG/1
90 TABLET ORAL DAILY
Status: DISCONTINUED | OUTPATIENT
Start: 2019-03-15 | End: 2019-03-17 | Stop reason: HOSPADM

## 2019-03-15 RX ORDER — METHADONE HYDROCHLORIDE 10 MG/1
10 TABLET ORAL DAILY
Status: DISCONTINUED | OUTPATIENT
Start: 2019-03-15 | End: 2019-03-15

## 2019-03-15 RX ORDER — FLUTICASONE FUROATE AND VILANTEROL 100; 25 UG/1; UG/1
1 POWDER RESPIRATORY (INHALATION) DAILY
Qty: 60 EACH | Refills: 7 | Status: SHIPPED | OUTPATIENT
Start: 2019-03-15 | End: 2019-03-28 | Stop reason: SDUPTHER

## 2019-03-15 RX ORDER — ALBUTEROL SULFATE 90 UG/1
1-2 AEROSOL, METERED RESPIRATORY (INHALATION) EVERY 6 HOURS PRN
Qty: 18 G | Refills: 5 | Status: SHIPPED | OUTPATIENT
Start: 2019-03-15 | End: 2019-10-29 | Stop reason: SDUPTHER

## 2019-03-15 RX ORDER — IPRATROPIUM BROMIDE AND ALBUTEROL SULFATE 2.5; .5 MG/3ML; MG/3ML
3 SOLUTION RESPIRATORY (INHALATION) EVERY 4 HOURS PRN
Status: DISCONTINUED | OUTPATIENT
Start: 2019-03-15 | End: 2019-03-16

## 2019-03-15 RX ORDER — ALBUTEROL SULFATE 0.83 MG/ML
2.5 SOLUTION RESPIRATORY (INHALATION)
Qty: 75 ML | Refills: 5 | Status: SHIPPED | OUTPATIENT
Start: 2019-03-15 | End: 2019-03-28 | Stop reason: SDUPTHER

## 2019-03-15 RX ADMIN — DEXTROSE 250 MG: 5 SOLUTION INTRAVENOUS at 01:03

## 2019-03-15 RX ADMIN — ALBUTEROL SULFATE 2.5 MG: 2.5 SOLUTION RESPIRATORY (INHALATION) at 09:03

## 2019-03-15 RX ADMIN — NICOTINE 1 PATCH: 14 PATCH, EXTENDED RELEASE TRANSDERMAL at 01:03

## 2019-03-15 RX ADMIN — PANTOPRAZOLE SODIUM 40 MG: 40 INJECTION, POWDER, FOR SOLUTION INTRAVENOUS at 09:03

## 2019-03-15 RX ADMIN — IPRATROPIUM BROMIDE AND ALBUTEROL SULFATE 3 ML: .5; 3 SOLUTION RESPIRATORY (INHALATION) at 12:03

## 2019-03-15 RX ADMIN — CLONIDINE HYDROCHLORIDE 0.3 MG: 0.1 TABLET ORAL at 08:03

## 2019-03-15 RX ADMIN — METHYLPREDNISOLONE SODIUM SUCCINATE 80 MG: 40 INJECTION, POWDER, FOR SOLUTION INTRAMUSCULAR; INTRAVENOUS at 09:03

## 2019-03-15 RX ADMIN — FUROSEMIDE 40 MG: 10 INJECTION, SOLUTION INTRAVENOUS at 09:03

## 2019-03-15 RX ADMIN — MONTELUKAST SODIUM 10 MG: 10 TABLET, FILM COATED ORAL at 08:03

## 2019-03-15 RX ADMIN — CLONIDINE HYDROCHLORIDE 0.3 MG: 0.1 TABLET ORAL at 04:03

## 2019-03-15 RX ADMIN — CLONIDINE HYDROCHLORIDE 0.3 MG: 0.1 TABLET ORAL at 09:03

## 2019-03-15 RX ADMIN — ONDANSETRON 4 MG: 2 INJECTION INTRAMUSCULAR; INTRAVENOUS at 09:03

## 2019-03-15 RX ADMIN — IPRATROPIUM BROMIDE AND ALBUTEROL SULFATE 3 ML: .5; 3 SOLUTION RESPIRATORY (INHALATION) at 04:03

## 2019-03-15 RX ADMIN — OLANZAPINE 10 MG: 10 TABLET, FILM COATED ORAL at 08:03

## 2019-03-15 RX ADMIN — METHADONE HYDROCHLORIDE 90 MG: 10 TABLET ORAL at 09:03

## 2019-03-15 RX ADMIN — ENOXAPARIN SODIUM 40 MG: 100 INJECTION SUBCUTANEOUS at 04:03

## 2019-03-15 RX ADMIN — ALBUTEROL SULFATE 2.5 MG: 2.5 SOLUTION RESPIRATORY (INHALATION) at 03:03

## 2019-03-15 RX ADMIN — METHADONE HYDROCHLORIDE 10 MG: 10 TABLET ORAL at 05:03

## 2019-03-15 RX ADMIN — OLANZAPINE 10 MG: 10 TABLET, FILM COATED ORAL at 09:03

## 2019-03-15 NOTE — PLAN OF CARE
TN met with pt, pt lives with brother, pt independent with ADLs, pt stated he uses medicaid transport to get to follow up appointment and requested to use medicaid transportation on discharge from the hospital, pt states that he came back to the hospital due to he did not have any more oxygen at home, per medical chart pt had home O2 tank set up/nebulizer delivered  to bedside on 3/3 by CM, per patient, [facility] did not deliver new concentrator to the house, pt states he attempted to call [facility] for two days post discharge, but no one did not come deliver home concentrator to the house.     TN spoke with [place] at [facility] regarding above, she stated that  attempted deliver home concentrator to house but could not contact pt post discharge for delivery and no one was at home to deliver DME, she also stated that she has no record of patient calling regarding home concentrator post discharge, she stated that TN could pull one tank from [facility] closet to give to patient on discharge, but patient would have to have some one bring O2 cart and regulator to the hospital, per [facility], out of pocket cost for home O2 cart and regulator would be $80.     TN discussed with pt that [facility] approved for him to get one O2 tank on discharge, if a family member or friend could possibly bring the o2 cart and regulator to the hospital otherwise out of pocket cost would be 80$. Patient states that his friend could bring to home O2 cart and regulator to the hospital, pt states he will notify TN when friend gets to hospital room with equipment. TN spoke with North regarding if home contrator can be delivered today so can patient can have prior to discharge from the house, she stated that home concentrator can be delivered to house today. TN spoke with pt regarding home concentrator being delivered to house today, pt stated that his brother will be at home to receive equipment.      Discharge brochure  and blue discharge folder given to pt. TN updated contact information on whiteboard.       03/15/19 0913   Discharge Assessment   Assessment Type Discharge Planning Assessment   Confirmed/corrected address and phone number on facesheet? Yes   Assessment information obtained from? Patient   Communicated expected length of stay with patient/caregiver yes   Prior to hospitilization cognitive status: Alert/Oriented   Prior to hospitalization functional status: Independent   Current cognitive status: Alert/Oriented   Current Functional Status: Independent   Lives With sibling(s)   Able to Return to Prior Arrangements yes   Is patient able to care for self after discharge? Yes   Who are your caregiver(s) and their phone number(s)? Jessica Pereyra Relative [phone]    Patient's perception of discharge disposition home or selfcare   Readmission Within the Last 30 Days other (see comments)   Patient currently being followed by outpatient case management? No   Patient currently receives any other outside agency services? No   Equipment Currently Used at Home oxygen   Do you have any problems affording any of your prescribed medications? TBD   Is the patient taking medications as prescribed? yes   Does the patient have transportation home? Yes   Transportation Anticipated health plan transportation   Does the patient receive services at the [facility]? No   Discharge Plan A Home;Home with family   Patient/Family in Agreement with Plan yes   Reagan Walsh RN-BC  Transitional Navigator  [phone]

## 2019-03-15 NOTE — PROGRESS NOTES
Progress Note  [facility]  Admit Date: 3/14/2019   LOS: 1 day   SUBJECTIVE:     Patient seen and examined this AM. No acute events occurred overnight. He endorses improvement of SOB. E endorses that he would like to have his toe nails clipped today.     OBJECTIVE:   Vital Signs (Most Recent)  Temp: 97.8 °F (36.6 °C) (03/15/19 1203)  Pulse: 86 (03/15/19 1524)  Resp: (!) 22 (03/15/19 1524)  BP: 100/63 (03/15/19 1203)  SpO2: 97 % (03/15/19 1524)    I & O (Last 24H):    Intake/Output Summary (Last 24 hours) at 3/15/2019 1546  Last data filed at 3/15/2019 1400  Gross per 24 hour   Intake 1575 ml   Output 2750 ml   Net -1175 ml     Wt Readings from Last 3 Encounters:   03/15/19 104.8 kg (231 lb 0.7 oz)   03/03/19 106.3 kg (234 lb 5.6 oz)   12/01/18 103.9 kg (229 lb 0.9 oz)       Current Diet Order   Procedures    Diet Cardiac        Physical Exam  General: well appearing not in acute distress  HEENT: Conjunctivae clear. No scleral icterus.   Neck: supple. No masses.   Cardio: RRR. Intact distal pulses.   Pulmonary: Prolonged End expiratory phase w/ little air movement.   Skin: No rash noted.   Abdomen: soft, non-tender, non-distended. No rebound/guarding. +BS  Extremities: +pitting edema.   Neuro: no focal neurological deficit present.  Psychiatry: No SI/HI.    Laboratory Data:  CBC  Recent Labs   Lab 03/14/19  0820 03/15/19  0326   WBC 13.36* 7.98   RBC 4.51* 4.32*   HGB 13.3* 12.6*   HCT 41.2 39.6*   * 144*   MCV 91 92   MCH 29.5 29.2   MCHC 32.3 31.8*     CMP  Recent Labs   Lab 03/14/19  0820 03/15/19  0326   CALCIUM 9.1 9.2   PROT 7.7 7.6    139   K 4.2 4.5   CO2 32* 35*   CL 99 96   BUN 14 18   CREATININE 1.0 1.1   ALKPHOS 183* 170*   ALT 38 31   AST 43* 27   BILITOT 0.9 0.6     POCT-Glucose  POCT Glucose   Date Value Ref Range Status   03/15/2019 132 (H) 70 - 110 mg/dL Final   03/15/2019 156 (H) 70 - 110 mg/dL Final     COAGS  Recent Labs   Lab 03/14/19  1436   INR 1.0   APTT 31.2     UA  No  results for input(s): COLORU, CLARITYU, SPECGRAV, PHUR, PROTEINUA, GLUCOSEU, BLOODU, WBCU, RBCU, BACTERIA, MUCUS in the last 24 hours.    Invalid input(s):  BILIRUBINCON  MICRO  Microbiology Results (last 7 days)     Procedure Component Value Units Date/Time    Culture, Respiratory with Gram Stain [[id number]] Collected:  03/14/19 1148    Order Status:  Completed Specimen:  Respiratory from Sputum Updated:  03/15/19 0951     Respiratory Culture Normal respiratory lisa     Gram Stain (Respiratory) <10 epithelial cells per low power field.     Gram Stain (Respiratory) Many WBC's     Gram Stain (Respiratory) Many Gram positive cocci    Blood culture [[id number]] Collected:  03/14/19 1438    Order Status:  Sent Specimen:  Blood from Peripheral, Upper Arm, Left Updated:  03/14/19 1439    Blood culture [[id number]] Collected:  03/14/19 1438    Order Status:  Sent Specimen:  Blood from Peripheral, Forearm, Left Updated:  03/14/19 1438    Influenza A & B by Molecular [[id number]] Collected:  03/14/19 0641    Order Status:  Completed Specimen:  Nasopharyngeal Swab Updated:  03/14/19 0723     Influenza A, Molecular Negative     Influenza B, Molecular Negative     Flu A & B Source Nasal swab        LIPID PANEL  No results found for: CHOL  No results found for: HDL  No results found for: LDLCALC  No results found for: TRIG  No results found for: CHOLHDL    ASSESSMENT/PLAN:   Ming Harrington is a 53 y.o. male  w/ PMHx of asthma, COPD, HTN, hx of IVDA who presented to the ED w/ acute hypercapnic hypoxia respiratory distress most likely secondary to an acute Asthma vs COPD exacerbation. Utox positive for methadone metabolites and opiate.      Acute Asthma vs COPD Exacerbation  Repeat CXR is unremarkable  Influenza: Negative  Continue Azithromycin 250 mg x 4 days (end on 3/18)  IV Solumedrol x total 5 days (end on 3/18)  Duoneb q4  Procal 0.05  On BiPAP. Repeat ABG in the am.  F/u on blood/sputum cxs  I/O is negative 2 Liters. BUN/Cr:  18/1.1  WBC 7.98     HTN  BP: 137/60  BP goal <140/90  Restart home medications Amlodipine and Clonidine     H/O IVDA w/ Heroin Use Disorder  Hx of IVDU.    notes last suboxone refill in 12/2018. Called and confirmed that the patient is taking Methadone.   Restarted Methadone 90 today  Continue to monitor     Code: Full  DVT Ppx: Lovenox     Discussed with Dr. Burt Bueno Jr., D.O.  [facility], -1  [facility]   [phone]

## 2019-03-15 NOTE — PLAN OF CARE
Problem: Adult Inpatient Plan of Care  Goal: Plan of Care Review  Outcome: Ongoing (interventions implemented as appropriate)  The proper method of use, as well as anticipated side effects, of this aerosol treatment are discussed and demonstrated to the patient.   Patient on oxygen with documented flow.  Will attempt to wean per O2 order protocol.    Will continue to monitor.

## 2019-03-15 NOTE — PLAN OF CARE
"   03/15/19 1110   Readmission Questionnaire   At the time of your discharge, did someone talk to you about what your health problems were? Yes   At the time of discharge, did someone talk to you about what to watch out for regarding worsening of your health problem? Yes   At the time of discharge, did someone talk to you about what to do if you experienced worsening of your health problem? Yes   At the time of discharge, did someone talk to you about which medication to take when you left the hospital and which ones to stop taking? Yes   At the time of discharge, did someone talk to you about when and where to follow up with a doctor after you left the hospital? (pt unsure)   What do you believe caused you to be sick enough to be re-admitted? " I did not have any oxygen at home"   How often do you need to have someone help you when you read instructions, pamphlets, or other written material from your doctor or pharmacy? Rarely   Do you have problems taking your medications as prescribed? No   Do you have any problems affording any of  your prescribed medications? To be determined   Do you have problems obtaining/receiving your medications? No   Does the patient have transportation to healthcare appointments? Yes   Lives With sibling(s)   Living Arrangements house   Does the patient have family/friends to help with healtcare needs after discharge? yes   Does your caregiver provide all the help you need? Yes   Are you currently feeling confused? No   Are you currently having problems thinking? No   Are you currently having memory problems? No   Have you felt down, depressed, or hopeless? 3   Have you felt little interest or pleasure in doing things? 3   In the last 7 days, my sleep quality was: poor   Reagan Walsh RN-BC  Transitional Navigator  [phone]    "

## 2019-03-15 NOTE — PLAN OF CARE
Problem: Adult Inpatient Plan of Care  Goal: Plan of Care Review  Outcome: Ongoing (interventions implemented as appropriate)  Patient stable VS over the night, afebrile. Complaints of 10/10 abdominal pain, Dr. Oneill informed , methadone 10 mg PO given, but patient said he is receiving 100mg PO Methadone everyday in the clinic, to be seen by Dr. Oneill in the morning, will follow up.CPAP on 93% oxygen saturation. Telemetry no ectopy. Free from fall. Will endorse patient to day shift Nurse.
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
## 2019-03-15 NOTE — PROGRESS NOTES
Called report to sebastien velasquez. Transferred pt to room 457 with telemetry and oxygen and dropped off in room/connected to tele box and notified unit.

## 2019-03-15 NOTE — PLAN OF CARE
Pt's brother delivered pt's home O2 cart and regulator to the hospital.    TN pulled 1 home O2 tank from [facility] closet, tank noted to be defective. TN returned to TN to [facility] closet and labeled it defective, TN pulled another tank from DME closet, TN delivered to pt's bedside, tank fully functioning verified by RT, safety/education handout given to patient  And [facility] contact information.    Pt reports that he does not have nebulizer at home, per medical chart, nebulizer delivered to bedside on 3/2, per Diana at [facility], per their records, it showed approval to pull from DME closet, TN verified with pt's sister, who provided transportation on previous admission, pt only left with home O2 set up not nebulizer, TN called notified Diana at [facility] that pt's sister reports that pt did not leave with nebulizer on previous admission and if she had any paperwork on record of who signed for nebulizer, per Diana, she only has paperwork on file for Home O2 set up and not nebulizer and gave approval to pull nebulizer from DME closet this admission .    TN informed Dr. Mcdermott to send Albuterol solution to [facility] pharmacy for bedside delivery.

## 2019-03-15 NOTE — NURSING
Received patient trans in from ICU,53 year old male, case of acute asthma /copd in exacerbation  brought in to the unit around 0400H via wheelchair with monitor. Conscious , coherent to time, place date, and situation, with saline lock on the left hand gauge 22 , flushed patent, with clean and dry dressing.  Patient has subjective complaint of abdominal pain, 8/10 tolerable as verbalized by the patient. afebrile, stable VS. Dr. Oneill informed regarding this transfer. MD informed that patient is following up with methadone clinic every morning, methadone 10mg PO ordered. Telemetry Normal sinus Rhythm, (70-80's) 91 % on  2 lpm via NC. RT informed regarding this transfer. Blood sugar monitored. Patient is requesting for a podiatry to see his feet condition. Advised patient to call for any assistance. Safety fall precaution measures noted, Bed alarm ON. Call bell with in reach.  Will continue to monitor patient.

## 2019-03-15 NOTE — PROGRESS NOTES
Called and notified primary team of pt reporting he has a history of sz. He seemed to recognize the name depakote when I said it at the bedside but I couldn't find any history of him taking it in his admission navigator under home meds. Primary team said they'd further investigate and potentially put in something for this.

## 2019-03-16 LAB
ALBUMIN SERPL BCP-MCNC: 2.9 G/DL
ALLENS TEST: ABNORMAL
ALP SERPL-CCNC: 142 U/L
ALT SERPL W/O P-5'-P-CCNC: 28 U/L
ANION GAP SERPL CALC-SCNC: 6 MMOL/L
AST SERPL-CCNC: 28 U/L
BACTERIA SPEC AEROBE CULT: NORMAL
BACTERIA SPEC AEROBE CULT: NORMAL
BASOPHILS # BLD AUTO: 0 K/UL
BASOPHILS NFR BLD: 0 %
BILIRUB SERPL-MCNC: 0.6 MG/DL
BUN SERPL-MCNC: 26 MG/DL
CALCIUM SERPL-MCNC: 9 MG/DL
CHLORIDE SERPL-SCNC: 96 MMOL/L
CO2 SERPL-SCNC: 32 MMOL/L
CREAT SERPL-MCNC: 1.1 MG/DL
DELSYS: ABNORMAL
DIFFERENTIAL METHOD: ABNORMAL
EOSINOPHIL # BLD AUTO: 0 K/UL
EOSINOPHIL NFR BLD: 0.1 %
ERYTHROCYTE [DISTWIDTH] IN BLOOD BY AUTOMATED COUNT: 13.1 %
EST. GFR  (AFRICAN AMERICAN): >60 ML/MIN/1.73 M^2
EST. GFR  (NON AFRICAN AMERICAN): >60 ML/MIN/1.73 M^2
FLOW: 2
GGT SERPL-CCNC: 533 U/L
GLUCOSE SERPL-MCNC: 98 MG/DL
GRAM STN SPEC: NORMAL
HCO3 UR-SCNC: 36.2 MMOL/L (ref 24–28)
HCT VFR BLD AUTO: 39.8 %
HGB BLD-MCNC: 12.7 G/DL
LYMPHOCYTES # BLD AUTO: 2.1 K/UL
LYMPHOCYTES NFR BLD: 16.3 %
MAGNESIUM SERPL-MCNC: 2.2 MG/DL
MCH RBC QN AUTO: 29.5 PG
MCHC RBC AUTO-ENTMCNC: 31.9 G/DL
MCV RBC AUTO: 93 FL
MODE: ABNORMAL
MONOCYTES # BLD AUTO: 0.7 K/UL
MONOCYTES NFR BLD: 5 %
NEUTROPHILS # BLD AUTO: 10.2 K/UL
NEUTROPHILS NFR BLD: 78.4 %
PCO2 BLDA: 64.8 MMHG (ref 35–45)
PH SMN: 7.36 [PH] (ref 7.35–7.45)
PHOSPHATE SERPL-MCNC: 3.3 MG/DL
PLATELET # BLD AUTO: 143 K/UL
PMV BLD AUTO: 10.5 FL
PO2 BLDA: 66 MMHG (ref 80–100)
POC BE: 11 MMOL/L
POC SATURATED O2: 91 % (ref 95–100)
POC TCO2: 38 MMOL/L (ref 23–27)
POCT GLUCOSE: 144 MG/DL (ref 70–110)
POCT GLUCOSE: 151 MG/DL (ref 70–110)
POCT GLUCOSE: 177 MG/DL (ref 70–110)
POTASSIUM SERPL-SCNC: 4.9 MMOL/L
PROT SERPL-MCNC: 7.2 G/DL
RBC # BLD AUTO: 4.3 M/UL
SAMPLE: ABNORMAL
SITE: ABNORMAL
SODIUM SERPL-SCNC: 134 MMOL/L
WBC # BLD AUTO: 12.98 K/UL

## 2019-03-16 PROCEDURE — 94664 DEMO&/EVAL PT USE INHALER: CPT

## 2019-03-16 PROCEDURE — 82977 ASSAY OF GGT: CPT

## 2019-03-16 PROCEDURE — 99900035 HC TECH TIME PER 15 MIN (STAT)

## 2019-03-16 PROCEDURE — 85347 COAGULATION TIME ACTIVATED: CPT

## 2019-03-16 PROCEDURE — 85025 COMPLETE CBC W/AUTO DIFF WBC: CPT

## 2019-03-16 PROCEDURE — 36415 COLL VENOUS BLD VENIPUNCTURE: CPT

## 2019-03-16 PROCEDURE — 84100 ASSAY OF PHOSPHORUS: CPT

## 2019-03-16 PROCEDURE — 63600175 PHARM REV CODE 636 W HCPCS: Performed by: STUDENT IN AN ORGANIZED HEALTH CARE EDUCATION/TRAINING PROGRAM

## 2019-03-16 PROCEDURE — 25000003 PHARM REV CODE 250: Performed by: STUDENT IN AN ORGANIZED HEALTH CARE EDUCATION/TRAINING PROGRAM

## 2019-03-16 PROCEDURE — 80053 COMPREHEN METABOLIC PANEL: CPT

## 2019-03-16 PROCEDURE — 27000221 HC OXYGEN, UP TO 24 HOURS

## 2019-03-16 PROCEDURE — 36600 WITHDRAWAL OF ARTERIAL BLOOD: CPT

## 2019-03-16 PROCEDURE — 94761 N-INVAS EAR/PLS OXIMETRY MLT: CPT

## 2019-03-16 PROCEDURE — 94660 CPAP INITIATION&MGMT: CPT

## 2019-03-16 PROCEDURE — 25000242 PHARM REV CODE 250 ALT 637 W/ HCPCS: Performed by: STUDENT IN AN ORGANIZED HEALTH CARE EDUCATION/TRAINING PROGRAM

## 2019-03-16 PROCEDURE — C9113 INJ PANTOPRAZOLE SODIUM, VIA: HCPCS | Performed by: STUDENT IN AN ORGANIZED HEALTH CARE EDUCATION/TRAINING PROGRAM

## 2019-03-16 PROCEDURE — 11000001 HC ACUTE MED/SURG PRIVATE ROOM

## 2019-03-16 PROCEDURE — 83735 ASSAY OF MAGNESIUM: CPT

## 2019-03-16 PROCEDURE — S4991 NICOTINE PATCH NONLEGEND: HCPCS | Performed by: STUDENT IN AN ORGANIZED HEALTH CARE EDUCATION/TRAINING PROGRAM

## 2019-03-16 PROCEDURE — 94640 AIRWAY INHALATION TREATMENT: CPT

## 2019-03-16 RX ORDER — FUROSEMIDE 10 MG/ML
20 INJECTION INTRAMUSCULAR; INTRAVENOUS DAILY
Status: DISCONTINUED | OUTPATIENT
Start: 2019-03-17 | End: 2019-03-17 | Stop reason: HOSPADM

## 2019-03-16 RX ORDER — IPRATROPIUM BROMIDE AND ALBUTEROL SULFATE 2.5; .5 MG/3ML; MG/3ML
3 SOLUTION RESPIRATORY (INHALATION)
Status: DISCONTINUED | OUTPATIENT
Start: 2019-03-16 | End: 2019-03-17 | Stop reason: HOSPADM

## 2019-03-16 RX ORDER — ACETAMINOPHEN 325 MG/1
650 TABLET ORAL EVERY 6 HOURS PRN
Status: DISCONTINUED | OUTPATIENT
Start: 2019-03-16 | End: 2019-03-17 | Stop reason: HOSPADM

## 2019-03-16 RX ORDER — IPRATROPIUM BROMIDE AND ALBUTEROL SULFATE 2.5; .5 MG/3ML; MG/3ML
3 SOLUTION RESPIRATORY (INHALATION) EVERY 4 HOURS
Status: DISCONTINUED | OUTPATIENT
Start: 2019-03-16 | End: 2019-03-16

## 2019-03-16 RX ORDER — RAMELTEON 8 MG/1
8 TABLET ORAL NIGHTLY PRN
Status: DISCONTINUED | OUTPATIENT
Start: 2019-03-16 | End: 2019-03-17 | Stop reason: HOSPADM

## 2019-03-16 RX ADMIN — ACETAMINOPHEN 650 MG: 325 TABLET ORAL at 03:03

## 2019-03-16 RX ADMIN — CLONIDINE HYDROCHLORIDE 0.3 MG: 0.1 TABLET ORAL at 10:03

## 2019-03-16 RX ADMIN — IPRATROPIUM BROMIDE AND ALBUTEROL SULFATE 3 ML: .5; 3 SOLUTION RESPIRATORY (INHALATION) at 04:03

## 2019-03-16 RX ADMIN — CLONIDINE HYDROCHLORIDE 0.3 MG: 0.1 TABLET ORAL at 09:03

## 2019-03-16 RX ADMIN — IPRATROPIUM BROMIDE AND ALBUTEROL SULFATE 3 ML: .5; 3 SOLUTION RESPIRATORY (INHALATION) at 07:03

## 2019-03-16 RX ADMIN — RAMELTEON 8 MG: 8 TABLET, FILM COATED ORAL at 10:03

## 2019-03-16 RX ADMIN — OLANZAPINE 10 MG: 10 TABLET, FILM COATED ORAL at 09:03

## 2019-03-16 RX ADMIN — RAMELTEON 8 MG: 8 TABLET, FILM COATED ORAL at 03:03

## 2019-03-16 RX ADMIN — IPRATROPIUM BROMIDE AND ALBUTEROL SULFATE 3 ML: .5; 3 SOLUTION RESPIRATORY (INHALATION) at 11:03

## 2019-03-16 RX ADMIN — PANTOPRAZOLE SODIUM 40 MG: 40 INJECTION, POWDER, FOR SOLUTION INTRAVENOUS at 10:03

## 2019-03-16 RX ADMIN — AZITHROMYCIN MONOHYDRATE 500 MG: 500 INJECTION, POWDER, LYOPHILIZED, FOR SOLUTION INTRAVENOUS at 01:03

## 2019-03-16 RX ADMIN — FUROSEMIDE 40 MG: 10 INJECTION, SOLUTION INTRAVENOUS at 10:03

## 2019-03-16 RX ADMIN — MONTELUKAST SODIUM 10 MG: 10 TABLET, FILM COATED ORAL at 09:03

## 2019-03-16 RX ADMIN — NICOTINE 1 PATCH: 14 PATCH, EXTENDED RELEASE TRANSDERMAL at 01:03

## 2019-03-16 RX ADMIN — AMLODIPINE BESYLATE 10 MG: 5 TABLET ORAL at 10:03

## 2019-03-16 RX ADMIN — OLANZAPINE 10 MG: 10 TABLET, FILM COATED ORAL at 10:03

## 2019-03-16 RX ADMIN — METHADONE HYDROCHLORIDE 90 MG: 10 TABLET ORAL at 10:03

## 2019-03-16 RX ADMIN — METHYLPREDNISOLONE SODIUM SUCCINATE 80 MG: 40 INJECTION, POWDER, FOR SOLUTION INTRAMUSCULAR; INTRAVENOUS at 10:03

## 2019-03-16 RX ADMIN — CLONIDINE HYDROCHLORIDE 0.3 MG: 0.1 TABLET ORAL at 05:03

## 2019-03-16 RX ADMIN — ENOXAPARIN SODIUM 40 MG: 100 INJECTION SUBCUTANEOUS at 05:03

## 2019-03-16 RX ADMIN — IPRATROPIUM BROMIDE AND ALBUTEROL SULFATE 3 ML: .5; 3 SOLUTION RESPIRATORY (INHALATION) at 08:03

## 2019-03-16 NOTE — PLAN OF CARE
Problem: Adult Inpatient Plan of Care  Goal: Plan of Care Review  Outcome: Ongoing (interventions implemented as appropriate)  1925H Patient is awake and orientedx4. Care plan explained and verbalized understanding. VIP care model explained. On oxygen 2lpm/nasal cannula, O2 saturation 98%. On heart monitor running Bradycardia at 56bpm. IV site to to left hand 22G; flushed and saline locked. Umbilical bulge noted, complaining of pain. Offered pain medications but refused. Maintained on diabetic diet. Blood sugar monitored and covered per insulin sliding scale. Maintained on fall precaution. Bed in lowest position, bed alarms on, call light within reach and instructed to call for help when needed. Will continue  to monitor.  0319H Patient complaining of pain to umbilical bulge area 10/10. Informed Dr. MIGUEL Ambriz with orders, carried out. Will continue to monitor.

## 2019-03-16 NOTE — PLAN OF CARE
VN informed primary team of ABG. Per primary team, repeat  ABG to be drawn in the morning, pt to wear bipap at night. Message passed along to bedside nurse.
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
## 2019-03-16 NOTE — PLAN OF CARE
VN cued into patients room. Pt asleep in bed in no acute distress. Will attempt to round at a later time.

## 2019-03-16 NOTE — PROGRESS NOTES
Progress Note  [facility]  Admit Date: 3/14/2019   LOS: 2 days   SUBJECTIVE:     Patient seen and examined this AM. No acute events occurred overnight. He endorses improvement of SOB. He notes that he would like to have double portions for lunch.     OBJECTIVE:   Vital Signs (Most Recent)  Temp: 96.1 °F (35.6 °C) (03/16/19 0800)  Pulse: 60 (03/16/19 0800)  Resp: 17 (03/16/19 0800)  BP: 131/80 (03/16/19 0800)  SpO2: (!) 94 % (03/16/19 0747)    I & O (Last 24H):    Intake/Output Summary (Last 24 hours) at 3/16/2019 0803  Last data filed at 3/15/2019 2330  Gross per 24 hour   Intake 900 ml   Output 1430 ml   Net -530 ml     Wt Readings from Last 3 Encounters:   03/15/19 104.8 kg (231 lb 0.7 oz)   03/03/19 106.3 kg (234 lb 5.6 oz)   12/01/18 103.9 kg (229 lb 0.9 oz)       Current Diet Order   Procedures    Diet Cardiac        Physical Exam  General: well appearing not in acute distress  HEENT: Conjunctivae clear. No scleral icterus.   Neck: supple. No masses.   Cardio: RRR. Intact distal pulses.   Pulmonary: Prolonged end expiratory phase w/ evidence of wheezing.  Skin: No rash noted.   Abdomen: TTP mid epigastric and RUQ.. No rebound/guarding. +BS  Extremities: trace edema (improvement from admission)   Neuro: no focal neurological deficit present.  Psychiatry: No SI/HI.    Laboratory Data:  CBC  Recent Labs   Lab 03/14/19  0820 03/15/19  0326 03/16/19  0706   WBC 13.36* 7.98 12.98*   RBC 4.51* 4.32* 4.30*   HGB 13.3* 12.6* 12.7*   HCT 41.2 39.6* 39.8*   * 144* 143*   MCV 91 92 93   MCH 29.5 29.2 29.5   MCHC 32.3 31.8* 31.9*     CMP  Recent Labs   Lab 03/14/19  0820 03/15/19  0326 03/16/19  0706   CALCIUM 9.1 9.2 9.0   PROT 7.7 7.6 7.2    139 134*   K 4.2 4.5 4.9   CO2 32* 35* 32*   CL 99 96 96   BUN 14 18 26*   CREATININE 1.0 1.1 1.1   ALKPHOS 183* 170* 142*   ALT 38 31 28   AST 43* 27 28   BILITOT 0.9 0.6 0.6     POCT-Glucose  POCT Glucose   Date Value Ref Range Status   03/16/2019 144 (H) 70 -  110 mg/dL Final   03/15/2019 168 (H) 70 - 110 mg/dL Final   03/15/2019 144 (H) 70 - 110 mg/dL Final   03/15/2019 132 (H) 70 - 110 mg/dL Final   03/15/2019 156 (H) 70 - 110 mg/dL Final     COAGS  Recent Labs   Lab 03/14/19  1436   INR 1.0   APTT 31.2     UA  No results for input(s): COLORU, CLARITYU, SPECGRAV, PHUR, PROTEINUA, GLUCOSEU, BLOODU, WBCU, RBCU, BACTERIA, MUCUS in the last 24 hours.    Invalid input(s):  BILIRUBINCON  MICRO  Microbiology Results (last 7 days)     Procedure Component Value Units Date/Time    Culture, Respiratory with Gram Stain [[id number]] Collected:  03/14/19 1148    Order Status:  Completed Specimen:  Respiratory from Sputum Updated:  03/15/19 0951     Respiratory Culture Normal respiratory lisa     Gram Stain (Respiratory) <10 epithelial cells per low power field.     Gram Stain (Respiratory) Many WBC's     Gram Stain (Respiratory) Many Gram positive cocci    Blood culture [[id number]] Collected:  03/14/19 1438    Order Status:  Sent Specimen:  Blood from Peripheral, Upper Arm, Left Updated:  03/14/19 1439    Blood culture [[id number]] Collected:  03/14/19 1438    Order Status:  Sent Specimen:  Blood from Peripheral, Forearm, Left Updated:  03/14/19 1438    Influenza A & B by Molecular [[id number]] Collected:  03/14/19 0641    Order Status:  Completed Specimen:  Nasopharyngeal Swab Updated:  03/14/19 0723     Influenza A, Molecular Negative     Influenza B, Molecular Negative     Flu A & B Source Nasal swab        LIPID PANEL  No results found for: CHOL  No results found for: HDL  No results found for: LDLCALC  No results found for: TRIG  No results found for: CHOLHDL    ASSESSMENT/PLAN:   Ming Harrington is a 53 y.o. male  w/ PMHx of asthma, COPD, HTN, hx of IVDA who presented to the ED w/ acute hypercapnic hypoxia respiratory distress most likely secondary to an acute Asthma vs COPD exacerbation. Utox positive for methadone metabolites and opiate.      Acute Asthma vs COPD  Exacerbation  Influenza: Negative  Continue Azithromycin 500 mg (end on 3/18)  IV Solumedrol x total 5 days (end on 3/18)  Duoneb q4   Procal 0.05  Continue BiPAP qPM. Repeat ABG in the am.  F/u on blood cx. NGTD. Sputum cx w/ MORAXELLA (BRANHAMELLA) CATARRHALIS, Many Beta Lactamase positive, Normal respiratory lisa also present. Cont current abx tx.  I/O is negative ~0.5Liters.  WBC 12.9     HTN  BP: 137/60  BP goal <140/90  Restart home medications Amlodipine and Clonidine     H/O IVDA w/ Heroin Use Disorder  Hx of IVDU.    notes last suboxone refill in 12/2018. Called and confirmed that the patient is taking Methadone.   Restarted Methadone 90 today  Continue to monitor     Code: Full  DVT Ppx: Lovenox     Dispo:  - Follow up ABG in the am.  -  and  Alkaline phosphatase elevated 142. Ordered RUQ US to r/o biliary source for his abd pain.  - F/u ABG tomorrow AM    Discussed with Dr. Isaac Bueno Jr., D.O.  \[facility] [place] [facility]\"" Family Medicine, -[facility]   [phone]

## 2019-03-16 NOTE — NURSING
On VN rounds, pt coughing continuously and complaining of abdominal pain. Dr. Ambriz notified and Nurse Valdovinos notified. Respiratory treatments PRN ordered and nurse to call MD

## 2019-03-17 VITALS
HEIGHT: 68 IN | BODY MASS INDEX: 35.02 KG/M2 | HEART RATE: 77 BPM | SYSTOLIC BLOOD PRESSURE: 116 MMHG | RESPIRATION RATE: 20 BRPM | OXYGEN SATURATION: 95 % | TEMPERATURE: 98 F | WEIGHT: 231.06 LBS | DIASTOLIC BLOOD PRESSURE: 72 MMHG

## 2019-03-17 LAB
ALBUMIN SERPL BCP-MCNC: 2.9 G/DL
ALLENS TEST: ABNORMAL
ALLENS TEST: ABNORMAL
ALP SERPL-CCNC: 138 U/L
ALT SERPL W/O P-5'-P-CCNC: 34 U/L
ANION GAP SERPL CALC-SCNC: 9 MMOL/L
AST SERPL-CCNC: 36 U/L
BASOPHILS # BLD AUTO: 0.01 K/UL
BASOPHILS NFR BLD: 0.1 %
BILIRUB SERPL-MCNC: 0.6 MG/DL
BUN SERPL-MCNC: 26 MG/DL
CALCIUM SERPL-MCNC: 9.2 MG/DL
CHLORIDE SERPL-SCNC: 97 MMOL/L
CO2 SERPL-SCNC: 29 MMOL/L
CREAT SERPL-MCNC: 1 MG/DL
DELSYS: ABNORMAL
DELSYS: ABNORMAL
DIFFERENTIAL METHOD: ABNORMAL
EOSINOPHIL # BLD AUTO: 0 K/UL
EOSINOPHIL NFR BLD: 0.3 %
ERYTHROCYTE [DISTWIDTH] IN BLOOD BY AUTOMATED COUNT: 13.2 %
EST. GFR  (AFRICAN AMERICAN): >60 ML/MIN/1.73 M^2
EST. GFR  (NON AFRICAN AMERICAN): >60 ML/MIN/1.73 M^2
FLOW: 2
GLUCOSE SERPL-MCNC: 103 MG/DL
HCO3 UR-SCNC: 33.7 MMOL/L (ref 24–28)
HCO3 UR-SCNC: 38.3 MMOL/L (ref 24–28)
HCT VFR BLD AUTO: 39.6 %
HGB BLD-MCNC: 13 G/DL
LYMPHOCYTES # BLD AUTO: 2 K/UL
LYMPHOCYTES NFR BLD: 16.6 %
MAGNESIUM SERPL-MCNC: 2.6 MG/DL
MCH RBC QN AUTO: 29.9 PG
MCHC RBC AUTO-ENTMCNC: 32.8 G/DL
MCV RBC AUTO: 91 FL
MODE: ABNORMAL
MONOCYTES # BLD AUTO: 0.5 K/UL
MONOCYTES NFR BLD: 4.2 %
NEUTROPHILS # BLD AUTO: 9.6 K/UL
NEUTROPHILS NFR BLD: 78.8 %
PCO2 BLDA: 59 MMHG (ref 35–45)
PCO2 BLDA: 64.2 MMHG (ref 35–45)
PH SMN: 7.37 [PH] (ref 7.35–7.45)
PH SMN: 7.38 [PH] (ref 7.35–7.45)
PHOSPHATE SERPL-MCNC: 4.1 MG/DL
PLATELET # BLD AUTO: 144 K/UL
PMV BLD AUTO: 11.7 FL
PO2 BLDA: 68 MMHG (ref 80–100)
PO2 BLDA: 74 MMHG (ref 80–100)
POC BE: 13 MMOL/L
POC BE: 8 MMOL/L
POC SATURATED O2: 92 % (ref 95–100)
POC SATURATED O2: 94 % (ref 95–100)
POC TCO2: 36 MMOL/L (ref 23–27)
POC TCO2: 40 MMOL/L (ref 23–27)
POCT GLUCOSE: 141 MG/DL (ref 70–110)
POCT GLUCOSE: 143 MG/DL (ref 70–110)
POTASSIUM SERPL-SCNC: 5.2 MMOL/L
PROT SERPL-MCNC: 7.3 G/DL
RBC # BLD AUTO: 4.35 M/UL
SAMPLE: ABNORMAL
SAMPLE: ABNORMAL
SITE: ABNORMAL
SITE: ABNORMAL
SODIUM SERPL-SCNC: 135 MMOL/L
WBC # BLD AUTO: 12.18 K/UL

## 2019-03-17 PROCEDURE — C9113 INJ PANTOPRAZOLE SODIUM, VIA: HCPCS | Performed by: STUDENT IN AN ORGANIZED HEALTH CARE EDUCATION/TRAINING PROGRAM

## 2019-03-17 PROCEDURE — 82803 BLOOD GASES ANY COMBINATION: CPT

## 2019-03-17 PROCEDURE — 94761 N-INVAS EAR/PLS OXIMETRY MLT: CPT

## 2019-03-17 PROCEDURE — S4991 NICOTINE PATCH NONLEGEND: HCPCS | Performed by: STUDENT IN AN ORGANIZED HEALTH CARE EDUCATION/TRAINING PROGRAM

## 2019-03-17 PROCEDURE — 63600175 PHARM REV CODE 636 W HCPCS: Performed by: STUDENT IN AN ORGANIZED HEALTH CARE EDUCATION/TRAINING PROGRAM

## 2019-03-17 PROCEDURE — 36415 COLL VENOUS BLD VENIPUNCTURE: CPT

## 2019-03-17 PROCEDURE — 83735 ASSAY OF MAGNESIUM: CPT

## 2019-03-17 PROCEDURE — 25000242 PHARM REV CODE 250 ALT 637 W/ HCPCS: Performed by: STUDENT IN AN ORGANIZED HEALTH CARE EDUCATION/TRAINING PROGRAM

## 2019-03-17 PROCEDURE — 94640 AIRWAY INHALATION TREATMENT: CPT

## 2019-03-17 PROCEDURE — 80053 COMPREHEN METABOLIC PANEL: CPT

## 2019-03-17 PROCEDURE — 27000221 HC OXYGEN, UP TO 24 HOURS

## 2019-03-17 PROCEDURE — 25000003 PHARM REV CODE 250: Performed by: STUDENT IN AN ORGANIZED HEALTH CARE EDUCATION/TRAINING PROGRAM

## 2019-03-17 PROCEDURE — 84100 ASSAY OF PHOSPHORUS: CPT

## 2019-03-17 PROCEDURE — 99900035 HC TECH TIME PER 15 MIN (STAT)

## 2019-03-17 PROCEDURE — 36600 WITHDRAWAL OF ARTERIAL BLOOD: CPT

## 2019-03-17 PROCEDURE — 94664 DEMO&/EVAL PT USE INHALER: CPT

## 2019-03-17 PROCEDURE — 85025 COMPLETE CBC W/AUTO DIFF WBC: CPT

## 2019-03-17 RX ORDER — PREDNISONE 20 MG/1
40 TABLET ORAL DAILY
Qty: 6 TABLET | Refills: 0 | Status: SHIPPED | OUTPATIENT
Start: 2019-03-18 | End: 2019-03-21

## 2019-03-17 RX ORDER — AZITHROMYCIN 250 MG/1
250 TABLET, FILM COATED ORAL DAILY
Qty: 3 TABLET | Refills: 0 | Status: SHIPPED | OUTPATIENT
Start: 2019-03-18 | End: 2019-03-21

## 2019-03-17 RX ORDER — AZITHROMYCIN 250 MG/1
250 TABLET, FILM COATED ORAL DAILY
Qty: 3 TABLET | Refills: 0 | Status: SHIPPED | OUTPATIENT
Start: 2019-03-18 | End: 2019-03-17 | Stop reason: SDUPTHER

## 2019-03-17 RX ORDER — PREDNISONE 20 MG/1
40 TABLET ORAL DAILY
Qty: 6 TABLET | Refills: 0 | Status: SHIPPED | OUTPATIENT
Start: 2019-03-18 | End: 2019-03-17 | Stop reason: SDUPTHER

## 2019-03-17 RX ADMIN — METHYLPREDNISOLONE SODIUM SUCCINATE 60 MG: 40 INJECTION, POWDER, FOR SOLUTION INTRAMUSCULAR; INTRAVENOUS at 11:03

## 2019-03-17 RX ADMIN — CLONIDINE HYDROCHLORIDE 0.3 MG: 0.1 TABLET ORAL at 10:03

## 2019-03-17 RX ADMIN — OLANZAPINE 10 MG: 10 TABLET, FILM COATED ORAL at 10:03

## 2019-03-17 RX ADMIN — AMLODIPINE BESYLATE 10 MG: 5 TABLET ORAL at 10:03

## 2019-03-17 RX ADMIN — NICOTINE 1 PATCH: 14 PATCH, EXTENDED RELEASE TRANSDERMAL at 01:03

## 2019-03-17 RX ADMIN — AZITHROMYCIN MONOHYDRATE 500 MG: 500 INJECTION, POWDER, LYOPHILIZED, FOR SOLUTION INTRAVENOUS at 12:03

## 2019-03-17 RX ADMIN — PANTOPRAZOLE SODIUM 40 MG: 40 INJECTION, POWDER, FOR SOLUTION INTRAVENOUS at 11:03

## 2019-03-17 RX ADMIN — FUROSEMIDE 20 MG: 10 INJECTION, SOLUTION INTRAMUSCULAR; INTRAVENOUS at 11:03

## 2019-03-17 RX ADMIN — IPRATROPIUM BROMIDE AND ALBUTEROL SULFATE 3 ML: .5; 3 SOLUTION RESPIRATORY (INHALATION) at 07:03

## 2019-03-17 RX ADMIN — METHADONE HYDROCHLORIDE 90 MG: 10 TABLET ORAL at 10:03

## 2019-03-17 RX ADMIN — IPRATROPIUM BROMIDE AND ALBUTEROL SULFATE 3 ML: .5; 3 SOLUTION RESPIRATORY (INHALATION) at 12:03

## 2019-03-17 NOTE — PLAN OF CARE
Bedside nurse notified VN that patient does not have transport home for DC. VN notified CM Genoveva. Genoveva stated she will work on setting up transport home and will keep us updated. Residential address verified by patient as listed in the facesheet. Pt informed that O2 concentrator was to be delivered on Friday if someone was home to accept it. Pt stated his brother was home, but nothing was delivered. Pt provided number of DME company and instructed to call once home to set up time and date of delivery. Portable O2 tank at bedside.

## 2019-03-17 NOTE — DISCHARGE INSTRUCTIONS
Azithromycin tablets (English) View Edit Remove  Prednisolone tablets (English) View Edit Remove  COPD Flare (English) View Edit Remove  Hernia (Adult) (English) View Edit Remove    Azithromycin tablets (English) View Edit Remove   Prednisolone tablets (English) View Edit Remove   COPD Flare (English) View Edit Remove   Hernia (Adult) (English) View Edit Remove

## 2019-03-17 NOTE — PLAN OF CARE
Problem: Adult Inpatient Plan of Care  Goal: Plan of Care Review  Outcome: Ongoing (interventions implemented as appropriate)  Patient is awake and orientedx4. Care plan explained and verbalized understanding. On oxygen 2lpm/nasal cannula, O2 saturation 98%. On heart monitor running Bradycardia HR 59. IV site to to right FA 22G; flushed and saline locked. Umbilical bulge noted, complaining of pain. Offered pain medications but refused. Maintained on diabetic diet. Blood sugar monitored and covered per insulin sliding scale. Maintained on fall precaution. Bed in lowest position,patient refused bed alarm, call light within reach and instructed to call for help when needed. Will continue  to monitor. Patient refused Bipap at 02:45 a.m.

## 2019-03-17 NOTE — PROGRESS NOTES
[facility] Medicine  Progress Note    Patient Name: Ming Harrington  MRN: [id number]  Patient Class: IP- Inpatient   Admission Date: 3/14/2019  Length of Stay: 3 days  Attending Physician: Lit Dallas III, MD  Primary Care Provider: Vazquez Barajas MD        Subjective:     Principal Problem:Acute exacerbation of COPD with asthma    HPI:  Ming Harrington is a 53 y.o. male w/ Pmhx of asthma, COPD, HTN, HFpEF (Echo: EF~60%), Hep C, and h/o IVDA now on methadone who presents to the ED w/ c/o SOB beginning early this morning. He endorsed breathing treatments at home without relief. In addition, his nebulizer machine broke and he no longer has his oxygen tank/equipment. He denies any fevers, chills, productive cough, rash, CP, or HA. He is on 2L O2 NC at home. Patient reports leg edema, pleuritic chest pain, denies calf tenderness, denies. Patient denies long travel or prolonged immobilization. Pt denies history of blood clots, denies weight gain.     In the ED, ABG: pH:7.27/pCO2:79.7/HCO3:32. Placed on BiPAP and received duoneb, Magnesium, and steroids. His CXR unremarkable for PNA. Negative Trop, and BNP 26.    Hospital Course:  03/16: Pt continued to endorse abdominal pain GGT elevated 533  RUQ US ordered pending read   BiPap QHS  Breathing tx q4 hours     03/17: Pt refused Bipap  SOB has improved     Interval History: Patient seen and examined this AM at bedside. The patient is in a very pleasant mood. He states he feels significantly better than on admission. The patient refused Bipap last night stating that it feels very uncomfortable. He notes his SOB, fatigue and abdominal pain to have vastly improved from the prior days     Review of Systems   Constitutional: Positive for fatigue (resolved). Negative for activity change, chills and fever.   HENT: Negative for congestion and ear pain.    Eyes: Negative for pain.   Respiratory: Positive for shortness of breath (improving) and wheezing  (improving). Negative for apnea and chest tightness.    Cardiovascular: Positive for leg swelling (improved). Negative for chest pain and palpitations.   Gastrointestinal: Positive for abdominal pain (improved). Negative for abdominal distention, constipation, diarrhea, nausea and rectal pain.   Endocrine: Negative for polydipsia.   Genitourinary: Negative for flank pain.   Musculoskeletal: Negative for arthralgias, back pain, neck pain and neck stiffness.   Skin: Negative for rash.   Neurological: Negative for dizziness, tremors, facial asymmetry, speech difficulty and light-headedness.   Psychiatric/Behavioral: Negative for agitation.     Objective:     Vital Signs (Most Recent):  Temp: 96.2 °F (35.7 °C) (03/17/19 0613)  Pulse: 61 (03/17/19 0613)  Resp: (!) 21 (03/17/19 0613)  BP: (!) 151/93 (03/17/19 0613)  SpO2: 96 % (03/17/19 0359) Vital Signs (24h Range):  Temp:  [96.1 °F (35.6 °C)-97.1 °F (36.2 °C)] 96.2 °F (35.7 °C)  Pulse:  [55-83] 61  Resp:  [17-21] 21  SpO2:  [94 %-98 %] 96 %  BP: (105-155)/(60-95) 151/93     Weight: 104.8 kg (231 lb 0.7 oz)  Body mass index is 35.13 kg/m².    Intake/Output Summary (Last 24 hours) at 3/17/2019 0648  Last data filed at 3/17/2019 0600  Gross per 24 hour   Intake 1525 ml   Output 1800 ml   Net -275 ml      Physical Exam   Constitutional: He is oriented to person, place, and time. He appears well-developed and well-nourished. He is cooperative. No distress.   HENT:   Head: Normocephalic and atraumatic.   Right Ear: External ear normal.   Left Ear: External ear normal.   Nose: Nose normal.   Mouth/Throat: Oropharynx is clear and moist.   Eyes: Conjunctivae and EOM are normal. Pupils are equal, round, and reactive to light.   Neck: Normal range of motion. Neck supple.   Cardiovascular: Normal rate, regular rhythm, normal heart sounds and intact distal pulses. Exam reveals no gallop and no friction rub.   No murmur heard.  Pulmonary/Chest: Effort normal. He has wheezes.    Abdominal: Soft. Bowel sounds are normal. He exhibits no distension. There is tenderness in the right upper quadrant. There is no rigidity, no guarding and negative Moore's sign.   Musculoskeletal: Normal range of motion. He exhibits no edema.   Neurological: He is alert and oriented to person, place, and time.   Skin: Skin is warm and dry.   Psychiatric: He has a normal mood and affect.   Nursing note and vitals reviewed.      Significant Labs:   ABGs:   Recent Labs   Lab 03/17/19  0633   PH 7.384   PCO2 64.2*   HCO3 38.3*   POCSATURATED 94*   BE 13     Bilirubin:   Recent Labs   Lab 03/03/19  0642 03/14/19  0820 03/15/19  0326 03/16/19  0706 03/17/19  0443   BILITOT 0.7 0.9 0.6 0.6 0.6     CBC:   Recent Labs   Lab 03/16/19  0706   WBC 12.98*   HGB 12.7*   HCT 39.8*   *     CMP:   Recent Labs   Lab 03/16/19  0706 03/17/19  0443   * 135*   K 4.9 5.2*   CL 96 97   CO2 32* 29   GLU 98 103   BUN 26* 26*   CREATININE 1.1 1.0   CALCIUM 9.0 9.2   PROT 7.2 7.3   ALBUMIN 2.9* 2.9*   BILITOT 0.6 0.6   ALKPHOS 142* 138*   AST 28 36   ALT 28 34   ANIONGAP 6* 9   EGFRNONAA >60 >60     Lipase: No results for input(s): LIPASE in the last 48 hours.  Magnesium:   Recent Labs   Lab 03/16/19  0706 03/17/19  0443   MG 2.2 2.6     POCT Glucose:   Recent Labs   Lab 03/16/19  1218 03/16/19  2313 03/17/19  0610   POCTGLUCOSE 151* 177* 141*     TSH:   Recent Labs   Lab 12/02/18  0903   TSH 0.100*     Urine Studies: No results for input(s): COLORU, APPEARANCEUA, PHUR, SPECGRAV, PROTEINUA, GLUCUA, KETONESU, BILIRUBINUA, OCCULTUA, NITRITE, UROBILINOGEN, LEUKOCYTESUR, RBCUA, WBCUA, BACTERIA, SQUAMEPITHEL, HYALINECASTS in the last 48 hours.    Invalid input(s): WRIGHTSUR  All pertinent labs within the past 24 hours have been reviewed.    Significant Imaging: Pending Abd US today.  I have reviewed and interpreted all pertinent imaging results/findings within the past 24 hours.    Assessment/Plan:      * Acute exacerbation of COPD  with asthma    Patient with complaint of SOB  ABG on admission: pH:7.27/pCO2:79.7/HCO3:32.   Procal 0.05  Influenza: Negative  Continue Azithromycin 500 mg (end on 3/18)  IV Solumedrol x total 5 days (end on 3/18)  Duoneb q4 hours   Continue BiPAP qPM. Repeat ABG in the am.  F/u on blood cx. NGTD.   Sputum cx w/ MORAXELLA (BRANHAMELLA) CATARRHALIS, Many Beta Lactamase positive, Normal respiratory lisa also present. Cont current abx tx.  Repeat ABG: pH 7.38, CO2 64.2, O2 74. HCO3 38.3  WBC trended down to 12.18  Afebrile        Opiate dependence    Hx of IVDU.    notes last suboxone refill in 12/2018.   Called and confirmed that the patient is taking Methadone 90.   Restarted Methadone 90 today  Continue to monitor         HTN (hypertension)    Patient with history of HTN   Resume home Amlodipine and clonidine   BP goal <140/90  Adjust medications to achieve BP goals              VTE Risk Mitigation (From admission, onward)        Ordered     enoxaparin injection 40 mg  Daily      03/14/19 1153     IP VTE HIGH RISK PATIENT  Once      03/14/19 1153              D'Amico C Johnson, MD  Department of Hospital Medicine   [facility]

## 2019-03-17 NOTE — ASSESSMENT & PLAN NOTE
Patient with complaint of SOB  ABG on admission: pH:7.27/pCO2:79.7/HCO3:32.   Procal 0.05  Influenza: Negative  Continue Azithromycin 500 mg (end on 3/18)  IV Solumedrol x total 5 days (end on 3/18)  Duoneb q4 hours   Continue BiPAP qPM. Repeat ABG in the am.  F/u on blood cx. NGTD.   Sputum cx w/ MORAXELLA (BRANHAMELLA) CATARRHALIS, Many Beta Lactamase positive, Normal respiratory lisa also present. Cont current abx tx.  Repeat ABG: pH 7.38, CO2 64.2, O2 74. HCO3 38.3  WBC trended down to 12.18  Afebrile

## 2019-03-17 NOTE — HOSPITAL COURSE
03/16: Pt continued to endorse abdominal pain GGT elevated 533  RUQ US ordered pending read   BiPap QHS  Breathing tx q4 hours     03/17: Pt refused Bipap  SOB has improved

## 2019-03-17 NOTE — NURSING
Patient refuses bed alarm and turns off the alarm. Verbalized safety and fall precautions with patient will continue to monitor.

## 2019-03-17 NOTE — HPI
Ming Harrington is a 53 y.o. male w/ Pmhx of asthma, COPD, HTN, HFpEF (Echo: EF~60%), Hep C, and h/o IVDA now on methadone who presents to the ED w/ c/o SOB beginning early this morning. He endorsed breathing treatments at home without relief. In addition, his nebulizer machine broke and he no longer has his oxygen tank/equipment. He denies any fevers, chills, productive cough, rash, CP, or HA. He is on 2L O2 NC at home. Patient reports leg edema, pleuritic chest pain, denies calf tenderness, denies. Patient denies long travel or prolonged immobilization. Pt denies history of blood clots, denies weight gain.     In the ED, ABG: pH:7.27/pCO2:79.7/HCO3:32. Placed on BiPAP and received duoneb, Magnesium, and steroids. His CXR unremarkable for PNA. Negative Trop, and BNP 26.

## 2019-03-17 NOTE — ASSESSMENT & PLAN NOTE
Hx of IVDU.    notes last suboxone refill in 12/2018.   Called and confirmed that the patient is taking Methadone 90.   Restarted Methadone 90 today  Continue to monitor

## 2019-03-17 NOTE — PLAN OF CARE
Pt ambulated with bedside RN and VSS per flowsheet, pt sats >95% on 2L NC as ordered. Pt fell back to sleep as soon as bedside nurse left the room. Dr. Mcdermott called and made aware of the following, to assess pt at the bedside. Charge nurse Hugh aware of situation. Per Dr. Mcdermott, orders for ABG not necessary at this time and okay for discharge. Bedside nurse to review DC instructions with patient as he is not able to follow along with VN at this time. Wheelchair transport set up, O2 tank and prescription meds at bedside.

## 2019-03-17 NOTE — PLAN OF CARE
VN cued into patients room for DC instructions. Pt continues to doze off while reviewing DC instructions, easily arousable and answers questions appropriately when woken up by bedside nurse, but continues to doze off after left alone. Dr. Mcdermott called and notified, VN informed MD that CO2 was elevated this AM as pt refused bipap last night. Stated to ambulate with pt and retake vitals. Bedside nurse aware and to ambulate with pt.

## 2019-03-17 NOTE — PLAN OF CARE
Update: [facility] [phone] ETA between 3:30 - 4:00 pm    [place] contacted by Virtual Nurse, pt needs transportation home. [place] contacted Case Mgmt Supervisor, Lorena Garibay, she gave permission to arrange transportation. [place] placed order through [facility] [phone].     Per [place] [employer], Replacement O2 tank, nebulizer and medications delivered to pt on Friday 3/15/19 bedside.    Discharge orders noted, no HH ordered.    Future Appointments   Date Time Provider Department Center   3/25/2019  9:00 AM David Beckett PA-C [facility]   3/26/2019 11:15 AM Kenyon Chawla MD [facility]       Pt's nurse will go over medications/signs and symptoms prior to discharge       03/17/19 0953   Final Note   Assessment Type Final Discharge Note   Anticipated Discharge Disposition Home   What phone number can be called within the next 1-3 days to see how you are doing after discharge? [phone]   Right Care Referral Info   Post Acute Recommendation No Care     Genoveva Bland, RN Transitional Navigator  [phone]      .

## 2019-03-17 NOTE — PLAN OF CARE
Problem: Adult Inpatient Plan of Care  Goal: Plan of Care Review  Plan of care reviewed with patient. Voices understanding. NSR on monitor with no red alarms noted. Side rails up x3, bed low, bed alarm on, call bell within reach. Will continue to monitor.

## 2019-03-17 NOTE — SUBJECTIVE & OBJECTIVE
Interval History: Patient seen and examined this AM at bedside. The patient is in a very pleasant mood. He states he feels significantly better than on admission. The patient refused Bipap last night stating that it feels very uncomfortable. He notes his SOB, fatigue and abdominal pain to have vastly improved from the prior days     Review of Systems   Constitutional: Positive for fatigue (resolved). Negative for activity change, chills and fever.   HENT: Negative for congestion and ear pain.    Eyes: Negative for pain.   Respiratory: Positive for shortness of breath (improving) and wheezing (improving). Negative for apnea and chest tightness.    Cardiovascular: Positive for leg swelling (improved). Negative for chest pain and palpitations.   Gastrointestinal: Positive for abdominal pain (improved). Negative for abdominal distention, constipation, diarrhea, nausea and rectal pain.   Endocrine: Negative for polydipsia.   Genitourinary: Negative for flank pain.   Musculoskeletal: Negative for arthralgias, back pain, neck pain and neck stiffness.   Skin: Negative for rash.   Neurological: Negative for dizziness, tremors, facial asymmetry, speech difficulty and light-headedness.   Psychiatric/Behavioral: Negative for agitation.     Objective:     Vital Signs (Most Recent):  Temp: 96.2 °F (35.7 °C) (03/17/19 0613)  Pulse: 61 (03/17/19 0613)  Resp: (!) 21 (03/17/19 0613)  BP: (!) 151/93 (03/17/19 0613)  SpO2: 96 % (03/17/19 0359) Vital Signs (24h Range):  Temp:  [96.1 °F (35.6 °C)-97.1 °F (36.2 °C)] 96.2 °F (35.7 °C)  Pulse:  [55-83] 61  Resp:  [17-21] 21  SpO2:  [94 %-98 %] 96 %  BP: (105-155)/(60-95) 151/93     Weight: 104.8 kg (231 lb 0.7 oz)  Body mass index is 35.13 kg/m².    Intake/Output Summary (Last 24 hours) at 3/17/2019 0648  Last data filed at 3/17/2019 0600  Gross per 24 hour   Intake 1525 ml   Output 1800 ml   Net -275 ml      Physical Exam   Constitutional: He is oriented to person, place, and time. He  appears well-developed and well-nourished. He is cooperative. No distress.   HENT:   Head: Normocephalic and atraumatic.   Right Ear: External ear normal.   Left Ear: External ear normal.   Nose: Nose normal.   Mouth/Throat: Oropharynx is clear and moist.   Eyes: Conjunctivae and EOM are normal. Pupils are equal, round, and reactive to light.   Neck: Normal range of motion. Neck supple.   Cardiovascular: Normal rate, regular rhythm, normal heart sounds and intact distal pulses. Exam reveals no gallop and no friction rub.   No murmur heard.  Pulmonary/Chest: Effort normal. He has wheezes.   Abdominal: Soft. Bowel sounds are normal. He exhibits no distension. There is tenderness in the right upper quadrant. There is no rigidity, no guarding and negative Moore's sign.   Musculoskeletal: Normal range of motion. He exhibits no edema.   Neurological: He is alert and oriented to person, place, and time.   Skin: Skin is warm and dry.   Psychiatric: He has a normal mood and affect.   Nursing note and vitals reviewed.      Significant Labs:   ABGs:   Recent Labs   Lab 03/17/19  0633   PH 7.384   PCO2 64.2*   HCO3 38.3*   POCSATURATED 94*   BE 13     Bilirubin:   Recent Labs   Lab 03/03/19  0642 03/14/19  0820 03/15/19  0326 03/16/19  0706 03/17/19  0443   BILITOT 0.7 0.9 0.6 0.6 0.6     CBC:   Recent Labs   Lab 03/16/19  0706   WBC 12.98*   HGB 12.7*   HCT 39.8*   *     CMP:   Recent Labs   Lab 03/16/19  0706 03/17/19  0443   * 135*   K 4.9 5.2*   CL 96 97   CO2 32* 29   GLU 98 103   BUN 26* 26*   CREATININE 1.1 1.0   CALCIUM 9.0 9.2   PROT 7.2 7.3   ALBUMIN 2.9* 2.9*   BILITOT 0.6 0.6   ALKPHOS 142* 138*   AST 28 36   ALT 28 34   ANIONGAP 6* 9   EGFRNONAA >60 >60     Lipase: No results for input(s): LIPASE in the last 48 hours.  Magnesium:   Recent Labs   Lab 03/16/19  0706 03/17/19  0443   MG 2.2 2.6     POCT Glucose:   Recent Labs   Lab 03/16/19  1218 03/16/19  2313 03/17/19  0610   POCTGLUCOSE 151* 177* 141*      TSH:   Recent Labs   Lab 12/02/18  0903   TSH 0.100*     Urine Studies: No results for input(s): COLORU, APPEARANCEUA, PHUR, SPECGRAV, PROTEINUA, GLUCUA, KETONESU, BILIRUBINUA, OCCULTUA, NITRITE, UROBILINOGEN, LEUKOCYTESUR, RBCUA, WBCUA, BACTERIA, SQUAMEPITHEL, HYALINECASTS in the last 48 hours.    Invalid input(s): WRIGHTSUR  All pertinent labs within the past 24 hours have been reviewed.    Significant Imaging: Pending Abd US today.  I have reviewed and interpreted all pertinent imaging results/findings within the past 24 hours.

## 2019-03-17 NOTE — PLAN OF CARE
VN and patient rounded and discussed plan of care. Patient reports feeling better than when he came into the hospital. He is happy to be able to breathe better now.  Sixto KELLER floor nurse notified about patients requests for ordered sleep aid, and will be bringing it to patient.  He knows how to use his call light that is close by him and states that he will not get up out of bed by himself. Bed locked and in lowest position with alarm on.

## 2019-03-17 NOTE — ASSESSMENT & PLAN NOTE
Patient with history of HTN   Resume home Amlodipine and clonidine   BP goal <140/90  Adjust medications to achieve BP goals

## 2019-03-17 NOTE — NURSING
"Patient refusing bipap "the pressure is to tight" I wore it for a couple of hours." I explained the benefits he refused. Will continue to monitor.   "

## 2019-03-25 NOTE — DISCHARGE SUMMARY
[facility]  Discharge Summary      Admit Date: 3/14/2019    Discharge Date and Time: 3/17/2019  4:00 PM    Attending Physician: Dr. Gray    Reason for Admission: Acute COPD exacerbation with Asthma    Procedures Performed: * No surgery found *    History of Present Illness:  Mr. Ming Harrington is a 53 y.o. male w/ Pmhx of asthma, COPD, HTN, HFpEF (Echo: EF~60%), Hep C, and h/o IVDA now on methadone who presents to the ED w/ c/o SOB beginning early this morning. He endorsed breathing treatments at home without relief. In addition, his nebulizer machine broke and he no longer has his oxygen tank/equipment. He denies any fevers, chills, productive cough, rash, CP, or HA. He is on 2L O2 NC at home. Patient reports leg edema, pleuritic chest pain, denies calf tenderness, denies. Patient denies long travel or prolonged immobilization. Pt denies history of blood clots, denies weight gain.     In the ED, ABG: pH:7.27/pCO2:79.7/HCO3:32. Placed on BiPAP and received duoneb, Magnesium, and steroids. His CXR unremarkable for PNA. Negative Trop, and BNP 26.    Hospital Course (synopsis of major diagnoses, care, treatment, and services provided during the course of the hospital stay):   Pt was admitted for acute COPD exacerbation. Initially patient required Bipap but was able to transition to nasal canula during the day and bipap only at night. He was titrated to home O2  levels. Pt started on IV abx (azithromycin) and IV steriods (solumedrol). ABG improved, lung sounds improved and pt was stable for discharge. He will complete a 5 day course of ABx (po azithro) and 5 day course of (prednisone) upon discharge.     Consults: none    Significant Diagnostic Studies:   Sputum cx w/ MORAXELLA (BRANHAMELLA) CATARRHALIS, Many Beta Lactamase positive    Final Diagnoses:    Principal Problem: Acute exacerbation of COPD with asthma   Secondary Diagnoses:   Active Hospital Problems    Diagnosis  POA    *Acute  exacerbation of COPD with asthma [J44.1, J45.901]  Yes    Severe asthma [J45.998]  Yes    Opiate dependence [F11.20]  Yes    HTN (hypertension) [I10]  Yes     Chronic      Resolved Hospital Problems    Diagnosis Date Resolved POA    Recurrent umbilical hernia [K42.9] 03/14/2019 Yes       Discharged Condition: stable    Disposition: Home or Self Care    Follow Up/Patient Instructions:     Medications:  Reconciled Home Medications:      Medication List      CHANGE how you take these medications    * albuterol 2.5 mg /3 mL (0.083 %) nebulizer solution  Commonly known as:  PROVENTIL  take 3 mLs Nebulization Every 4 hours, Rescue  What changed:  You were already taking a medication with the same name, and this prescription was added. Make sure you understand how and when to take each.     * VENTOLIN HFA 90 mcg/actuation inhaler  Generic drug:  albuterol  Inhale 1-2 puffs into the lungs every 6 (six) hours as needed for Wheezing or Shortness of Breath. Rescue  What changed:  Another medication with the same name was added. Make sure you understand how and when to take each.     montelukast 10 mg tablet  Commonly known as:  SINGULAIR  Take 1 tablet (10 mg total) by mouth every evening.  What changed:  when to take this         * This list has 2 medication(s) that are the same as other medications prescribed for you. Read the directions carefully, and ask your doctor or other care provider to review them with you.            CONTINUE taking these medications    albuterol-ipratropium 2.5 mg-0.5 mg/3 mL nebulizer solution  Commonly known as:  DUO-NEB  Take 3 mLs by nebulization every 4 (four) hours. Rescue     amLODIPine 10 MG tablet  Commonly known as:  NORVASC  Take 1 tablet (10 mg total) by mouth once daily.     BREO ELLIPTA 100-25 mcg/dose diskus inhaler  Generic drug:  fluticasone-vilanterol  Inhale 1 puff into the lungs once daily. Controller     clonazePAM 1 MG tablet  Commonly known as:  KLONOPIN  Take 2 mg by  "mouth 3 (three) times daily.     cloNIDine 0.1 MG tablet  Commonly known as:  CATAPRES  Take 3 tablets (0.3 mg total) by mouth 3 (three) times daily.     furosemide 40 MG tablet  Commonly known as:  LASIX  Take 1 tablet (40 mg total) by mouth once daily.     ipratropium 0.02 % nebulizer solution  Commonly known as:  ATROVENT  Take 500 mcg by nebulization 4 (four) times daily. Rescue     levoFLOXacin 500 MG tablet  Commonly known as:  LEVAQUIN  Take 1.5 tablets (750 mg total) by mouth once daily.     methadone 5 MG tablet  Commonly known as:  DOLOPHINE  Take 90 mg by mouth once daily.     nicotine 14 mg/24 hr  Commonly known as:  NICODERM CQ  Place 1 patch onto the skin once daily.     OLANZapine 10 MG tablet  Commonly known as:  ZyPREXA  Take 1 tablet (10 mg total) by mouth 2 (two) times daily. 1 tab in am and 2 tabs at bedtime     promethazine 25 MG tablet  Commonly known as:  PHENERGAN  Take 25 mg by mouth every 6 (six) hours as needed for Nausea.     senna-docusate 8.6-50 mg 8.6-50 mg per tablet  Commonly known as:  PERICOLACE  Take 1 tablet by mouth 2 (two) times daily.     tiotropium bromide 2.5 mcg/actuation Mist  Commonly known as:  SPIRIVA RESPIMAT  Inhale 1 each into the lungs once daily. Controller        ASK your doctor about these medications    azithromycin 250 MG tablet  Commonly known as:  Z-HAVEN  Take 1 tablet (250 mg total) by mouth once daily. for 3 days  Ask about: Should I take this medication?     predniSONE 20 MG tablet  Commonly known as:  DELTASONE  Take 2 tablets (40 mg total) by mouth once daily. for 3 days  Ask about: Should I take this medication?          Discharge Procedure Orders   NEBULIZER FOR HOME USE     Order Specific Question Answer Comments   Height: 5' 8" (1.727 m)    Weight: 104.8 kg (231 lb 0.7 oz)    Does patient have medical equipment at home? oxygen    Length of need (1-99 months): 99      Diet Cardiac     Activity as tolerated     Follow-up Information     Vazquez Westbrook " MD Karl. Schedule an appointment as soon as possible for a visit in 1 week.    Specialty:  Family Medicine  Why:  Follow-Up, Offices closed for weekend. Patient to schedule own follow up appointment.  Contact information:  [address] [place] [id number] [phone]             [facility]    Specialty:  DME Provider  Why:  DME: provider of O2/Concentrator & Tanks  Contact information:  [address] [facility] [address]  [phone]                  > 30 minutes spent on this discharge.     Regina Acevedo D.O.  [facility] HO-3  03/25/2019

## 2019-03-26 ENCOUNTER — TELEPHONE (OUTPATIENT)
Dept: SURGERY | Facility: CLINIC | Age: 54
End: 2019-03-26

## 2019-03-26 ENCOUNTER — OFFICE VISIT (OUTPATIENT)
Dept: SURGERY | Facility: CLINIC | Age: 54
End: 2019-03-26
Payer: MEDICAID

## 2019-03-26 VITALS
HEIGHT: 68 IN | DIASTOLIC BLOOD PRESSURE: 91 MMHG | BODY MASS INDEX: 35.61 KG/M2 | SYSTOLIC BLOOD PRESSURE: 146 MMHG | WEIGHT: 235 LBS | HEART RATE: 81 BPM

## 2019-03-26 DIAGNOSIS — K43.2 INCISIONAL HERNIA, WITHOUT OBSTRUCTION OR GANGRENE: Primary | ICD-10-CM

## 2019-03-26 DIAGNOSIS — K42.9 UMBILICAL HERNIA WITHOUT OBSTRUCTION AND WITHOUT GANGRENE: Primary | ICD-10-CM

## 2019-03-26 PROCEDURE — 99214 PR OFFICE/OUTPT VISIT, EST, LEVL IV, 30-39 MIN: ICD-10-PCS | Mod: S$PBB,,, | Performed by: SURGERY

## 2019-03-26 PROCEDURE — 99999 PR PBB SHADOW E&M-EST. PATIENT-LVL III: CPT | Mod: PBBFAC,,, | Performed by: SURGERY

## 2019-03-26 PROCEDURE — 99213 OFFICE O/P EST LOW 20 MIN: CPT | Mod: PBBFAC | Performed by: SURGERY

## 2019-03-26 PROCEDURE — 99214 OFFICE O/P EST MOD 30 MIN: CPT | Mod: S$PBB,,, | Performed by: SURGERY

## 2019-03-26 PROCEDURE — 99999 PR PBB SHADOW E&M-EST. PATIENT-LVL III: ICD-10-PCS | Mod: PBBFAC,,, | Performed by: SURGERY

## 2019-03-26 RX ORDER — IPRATROPIUM BROMIDE 17 UG/1
2 AEROSOL, METERED RESPIRATORY (INHALATION)
COMMUNITY
Start: 2019-02-20 | End: 2019-10-29 | Stop reason: SDUPTHER

## 2019-03-26 RX ORDER — BUPRENORPHINE HYDROCHLORIDE AND NALOXONE HYDROCHLORIDE DIHYDRATE 8; 2 MG/1; MG/1
TABLET SUBLINGUAL
COMMUNITY
Start: 2018-12-28 | End: 2019-03-28

## 2019-03-26 RX ORDER — ARIPIPRAZOLE 15 MG/1
TABLET ORAL
COMMUNITY
Start: 2019-02-10 | End: 2019-03-28

## 2019-03-26 RX ORDER — HYDROXYZINE HYDROCHLORIDE 50 MG/1
TABLET, FILM COATED ORAL
COMMUNITY
Start: 2019-02-21 | End: 2019-03-28

## 2019-03-26 RX ORDER — ARIPIPRAZOLE 10 MG/1
10 TABLET ORAL 2 TIMES DAILY
COMMUNITY
Start: 2018-12-24 | End: 2019-08-22

## 2019-03-26 RX ORDER — FLUTICASONE PROPIONATE AND SALMETEROL 50; 250 UG/1; UG/1
POWDER RESPIRATORY (INHALATION)
COMMUNITY
Start: 2019-02-06 | End: 2019-04-17 | Stop reason: SDUPTHER

## 2019-03-26 RX ORDER — METFORMIN HYDROCHLORIDE 500 MG/1
500 TABLET ORAL
Status: ON HOLD | COMMUNITY
Start: 2019-01-14 | End: 2020-02-27 | Stop reason: SDUPTHER

## 2019-03-26 RX ORDER — ALBUTEROL SULFATE 1.25 MG/3ML
SOLUTION RESPIRATORY (INHALATION)
COMMUNITY
Start: 2019-02-10 | End: 2019-03-28

## 2019-03-26 NOTE — PROGRESS NOTES
I have seen the patient, reviewed the Resident's history and physical, assessment and plan. I have personally interviewed and examined the patient at bedside and: agree with the findings.     Multiply recurrent, symptomatic umbilical hernia (upper left navel, 8 cm), large defect for open repair with mesh.  He put off repair a year ago due to being in correction for a parking ticket.  He says that he cannot have sex due to embarrassment from the hernia.  He is just out of the hospital for copd flare.  Pulmonology and hepatology clearance.  For open repair with mesh.  I am not sure repair is going to allow him to have sex.
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
## 2019-03-26 NOTE — LETTER
[facility] - General Surgery  [address]  [facility] [address]  Phone: [phone] March 26, 2019      Vazquez Barajas MD  [address] [place] [id number]    Patient: Ming Harrington   MR Number: [id number]   YOB: 1965   Date of Visit: 3/26/2019     Dear Dr. Barajas:    Thank you for referring Ming Harrington to me for evaluation. Attached you will find relevant portions of my assessment and plan of care.    Mr. Harrington presents with multiple recurrent, symptomatic umbilical hernia (upper left navel, 8 cm), large defect for open repair with mesh.      He put off repair a year ago due to being in residential for a parking ticket.  He says that he cannot have sex due to embarrassment from the hernia.  He is just out of the hospital for COPD flare. We will need pulmonology and hepatology clearance.      We will plan for open repair with mesh.  I am not sure repair is going to allow him to have sex.    If you have questions, please do not hesitate to call me. I look forward to following Ming Harrington along with you.    Sincerely,      Kenyon Chawla MD  Professor, [employer]  Section Head, General Surgery  [facility]    WSR/afrachele
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
## 2019-03-28 ENCOUNTER — OFFICE VISIT (OUTPATIENT)
Dept: FAMILY MEDICINE | Facility: HOSPITAL | Age: 54
End: 2019-03-28
Attending: SPECIALIST
Payer: MEDICAID

## 2019-03-28 VITALS
HEART RATE: 79 BPM | DIASTOLIC BLOOD PRESSURE: 74 MMHG | HEIGHT: 68 IN | WEIGHT: 238.13 LBS | OXYGEN SATURATION: 92 % | SYSTOLIC BLOOD PRESSURE: 112 MMHG | BODY MASS INDEX: 36.09 KG/M2

## 2019-03-28 DIAGNOSIS — I10 ESSENTIAL HYPERTENSION: Chronic | ICD-10-CM

## 2019-03-28 DIAGNOSIS — F11.20 UNCOMPLICATED OPIOID DEPENDENCE: ICD-10-CM

## 2019-03-28 DIAGNOSIS — J44.0 CHRONIC OBSTRUCTIVE PULMONARY DISEASE WITH ACUTE LOWER RESPIRATORY INFECTION: Primary | ICD-10-CM

## 2019-03-28 DIAGNOSIS — I50.9 CONGESTIVE HEART FAILURE, UNSPECIFIED HF CHRONICITY, UNSPECIFIED HEART FAILURE TYPE: ICD-10-CM

## 2019-03-28 DIAGNOSIS — Z72.0 TOBACCO ABUSE: ICD-10-CM

## 2019-03-28 PROCEDURE — 99215 OFFICE O/P EST HI 40 MIN: CPT | Performed by: PHYSICIAN ASSISTANT

## 2019-03-28 RX ORDER — IBUPROFEN 200 MG
1 TABLET ORAL DAILY
Qty: 30 PATCH | Refills: 11 | Status: ON HOLD | OUTPATIENT
Start: 2019-03-28 | End: 2020-02-27 | Stop reason: HOSPADM

## 2019-03-28 RX ORDER — FLUTICASONE FUROATE AND VILANTEROL 100; 25 UG/1; UG/1
1 POWDER RESPIRATORY (INHALATION) DAILY
Qty: 60 EACH | Refills: 7 | Status: ON HOLD | OUTPATIENT
Start: 2019-03-28 | End: 2020-02-27 | Stop reason: HOSPADM

## 2019-03-28 RX ORDER — IPRATROPIUM BROMIDE AND ALBUTEROL SULFATE 2.5; .5 MG/3ML; MG/3ML
3 SOLUTION RESPIRATORY (INHALATION) EVERY 4 HOURS
Qty: 1620 ML | Refills: 3 | Status: SHIPPED | OUTPATIENT
Start: 2019-03-28 | End: 2019-10-29 | Stop reason: SDUPTHER

## 2019-03-28 RX ORDER — ALBUTEROL SULFATE 0.83 MG/ML
2.5 SOLUTION RESPIRATORY (INHALATION)
Qty: 75 ML | Refills: 5 | Status: SHIPPED | OUTPATIENT
Start: 2019-03-28 | End: 2019-04-17 | Stop reason: SDUPTHER

## 2019-03-28 RX ORDER — PREDNISONE 20 MG/1
40 TABLET ORAL DAILY
Qty: 10 TABLET | Refills: 0 | Status: SHIPPED | OUTPATIENT
Start: 2019-03-28 | End: 2019-04-02

## 2019-03-28 RX ORDER — AZITHROMYCIN 250 MG/1
TABLET, FILM COATED ORAL
Qty: 6 TABLET | Refills: 0 | Status: SHIPPED | OUTPATIENT
Start: 2019-03-28 | End: 2019-04-02

## 2019-03-28 NOTE — PROGRESS NOTES
"Subjective:       Patient ID: Ming Harrington is a 53 y.o. male.    Chief Complaint: COPD    Patient was seen in [facility] today for hospital follow up.  Recently hospitalized for COPD exacerbation after presenting to the ED with increasing SOB.    Admit Date: 3/14/2019  Discharge Date: 3/17/2019     PMH of asthma, COPD, HTN, HFpEF (Echo: EF~60%), Hep C, and h/o IVDA now on methadone.   He endorsed breathing treatments at home without relief.   In addition, his nebulizer machine broke and he no longer has his oxygen tank/equipment.   He denies any fevers, chills, productive cough, rash, CP, or HA.   He is on 2L O2 NC at home.   Patient reports leg edema, pleuritic chest pain, denies calf tenderness, denies. Patient denies long travel or prolonged immobilization. Pt denies history of blood clots, denies weight gain.     In the ED, ABG: pH:7.27/pCO2:79.7/HCO3:32. Placed on BiPAP and received duoneb, Magnesium, and steroids. His CXR unremarkable for PNA. Negative Trop, and BNP 26.     Admitted for acute COPD exacerbation.   Initially patient required Bipap but was able to transition to nasal canula during the day and bipap only at night. He was titrated to home O2  levels.   Pt started on IV abx (azithromycin) and IV steriods (solumedrol).   ABG improved, lung sounds improved and pt was stable for discharge.   Was supposed to complete a 5 day course of ABx (po azithro) and 5 day course of (prednisone) upon discharge.     Today the patient is unaccompanied during the visit.   He reports that he did not get any of his medications upon discharge from the hospital.  He is still having SOB (improved) and endorses a "cold" that they were supposed to treat with antibiotics.  He was supposed to receive the rest of a Zpack treatment at discharge, but he did not.   Not wearing his home oxygen during visit and is requesting portable (sats on RA at rest 83-89%).   Does have nebulizer, but did not get the " solution  Reports that stress brings on his COPD exacerbations and he needs a refill on his Klonopin ( checked and he has never filled a script for Klonopin).     COPD   This is a chronic problem. The current episode started more than 1 year ago. The problem occurs intermittently. The problem has been waxing and waning. Associated symptoms include abdominal pain and coughing. Pertinent negatives include no chest pain, chills, fever, headaches, joint swelling, nausea, sore throat or vomiting. The symptoms are aggravated by stress. The treatment provided mild relief.     Review of Systems   Constitutional: Negative for activity change, chills and fever.   HENT: Negative for ear pain, nosebleeds and sore throat.    Eyes: Negative for photophobia, pain and visual disturbance.   Respiratory: Positive for cough, shortness of breath and wheezing.    Cardiovascular: Positive for leg swelling. Negative for chest pain and palpitations.   Gastrointestinal: Positive for abdominal pain. Negative for diarrhea, nausea and vomiting.   Musculoskeletal: Negative for gait problem and joint swelling.   Skin: Negative for color change and wound.   Neurological: Negative for dizziness, syncope, light-headedness and headaches.   Hematological: Negative for adenopathy. Does not bruise/bleed easily.   Psychiatric/Behavioral: Negative for confusion and suicidal ideas. The patient is nervous/anxious.        Objective:      Vitals:    03/28/19 1013   BP: 112/74   Pulse: 79     Physical Exam   Constitutional: He is oriented to person, place, and time. He appears well-developed and well-nourished. No distress.   HENT:   Head: Normocephalic and atraumatic.   Eyes: Conjunctivae and EOM are normal.   Neck: No tracheal deviation present.   Cardiovascular: Normal rate, regular rhythm, normal heart sounds and intact distal pulses.   No murmur heard.  Pulmonary/Chest: Effort normal. No respiratory distress. He has wheezes. He exhibits no tenderness.    Scattered expiratory wheezes throughout bilateral lung fields   Abdominal: Soft. Bowel sounds are normal. He exhibits no distension. There is no tenderness. There is no guarding.   Musculoskeletal: Normal range of motion. He exhibits no edema, tenderness or deformity.   Neurological: He is alert and oriented to person, place, and time. Coordination normal.   Skin: Skin is warm and dry. He is not diaphoretic.   Psychiatric: He has a normal mood and affect. His behavior is normal. Thought content normal.   Nursing note and vitals reviewed.      Assessment:       1. Chronic obstructive pulmonary disease with acute lower respiratory infection    2. Essential hypertension    3. Uncomplicated opioid dependence    4. Congestive heart failure, unspecified HF chronicity, unspecified heart failure type    5. Tobacco abuse        Plan:       Chronic obstructive pulmonary disease with acute lower respiratory infection   -Will treat as COPD exacerbation with azithromycin and steroids x5 days   -Portable oxygen ordered   -Nebulizer refill for home delivery   -Continue inhalers  -     OXYGEN FOR HOME USE  -     albuterol (PROVENTIL) 2.5 mg /3 mL (0.083 %) nebulizer solution; take 3 mLs Nebulization Every 4 hours, Rescue  Dispense: 75 mL; Refill: 5  -     albuterol-ipratropium (DUO-NEB) 2.5 mg-0.5 mg/3 mL nebulizer solution; Take 3 mLs by nebulization every 4 (four) hours. Rescue  Dispense: 1620 mL; Refill: 3  -     fluticasone-vilanterol (BREO) 100-25 mcg/dose diskus inhaler; Inhale 1 puff into the lungs once daily. Controller  Dispense: 60 each; Refill: 7  -     azithromycin (Z-HAVEN) 250 MG tablet; Take 2 tablets by mouth on day 1; Take 1 tablet by mouth on days 2-5  Dispense: 6 tablet; Refill: 0  -     predniSONE (DELTASONE) 20 MG tablet; Take 2 tablets (40 mg total) by mouth once daily. for 5 days  Dispense: 10 tablet; Refill: 0    Essential hypertension   -BP well controlled today at 112/74   -Continue home  medications  Uncomplicated opioid dependence   -Daily methadone at [facility] on [address]  Congestive heart failure, unspecified HF chronicity, unspecified heart failure type   -Preserved EF;    -Daily lasix   -Cardiology for med recommendations  -     Cancel: Ambulatory referral to Cardiology    Tobacco abuse  -     nicotine (NICODERM CQ) 14 mg/24 hr; Place 1 patch onto the skin once daily.  Dispense: 30 patch; Refill: 11      Follow up in about 2 weeks (around 4/11/2019).

## 2019-04-09 ENCOUNTER — OFFICE VISIT (OUTPATIENT)
Dept: HEPATOLOGY | Facility: CLINIC | Age: 54
End: 2019-04-09
Payer: MEDICAID

## 2019-04-09 VITALS
BODY MASS INDEX: 34.55 KG/M2 | SYSTOLIC BLOOD PRESSURE: 138 MMHG | HEART RATE: 89 BPM | DIASTOLIC BLOOD PRESSURE: 82 MMHG | OXYGEN SATURATION: 96 % | HEIGHT: 68 IN | RESPIRATION RATE: 20 BRPM | TEMPERATURE: 99 F | WEIGHT: 227.94 LBS

## 2019-04-09 DIAGNOSIS — R74.8 ELEVATED LIVER ENZYMES: Primary | ICD-10-CM

## 2019-04-09 DIAGNOSIS — B19.20 HEPATITIS C VIRUS INFECTION WITHOUT HEPATIC COMA, UNSPECIFIED CHRONICITY: ICD-10-CM

## 2019-04-09 PROCEDURE — 99204 OFFICE O/P NEW MOD 45 MIN: CPT | Mod: S$PBB,,, | Performed by: INTERNAL MEDICINE

## 2019-04-09 PROCEDURE — 99204 PR OFFICE/OUTPT VISIT, NEW, LEVL IV, 45-59 MIN: ICD-10-PCS | Mod: S$PBB,,, | Performed by: INTERNAL MEDICINE

## 2019-04-09 PROCEDURE — 99214 OFFICE O/P EST MOD 30 MIN: CPT | Mod: PBBFAC,PN | Performed by: INTERNAL MEDICINE

## 2019-04-09 PROCEDURE — 99999 PR PBB SHADOW E&M-EST. PATIENT-LVL IV: ICD-10-PCS | Mod: PBBFAC,,, | Performed by: INTERNAL MEDICINE

## 2019-04-09 PROCEDURE — 99999 PR PBB SHADOW E&M-EST. PATIENT-LVL IV: CPT | Mod: PBBFAC,,, | Performed by: INTERNAL MEDICINE

## 2019-04-09 NOTE — LETTER
April 14, 2019      Theo Lopez MD  [address]  [place] [address]           [facility]  [address]  [place] [address]  Phone: [phone]  Fax: [phone]          Patient: Ming Harrington   MR Number: [id number]   YOB: 1965   Date of Visit: 4/9/2019       Dear Dr. Theo Lopez:    Thank you for referring Ming Harrington to me for evaluation. Attached you will find relevant portions of my assessment and plan of care.    If you have questions, please do not hesitate to call me. I look forward to following Ming Harrington along with you.    Sincerely,    Livia Rebollar MD    Enclosure  CC:  No Recipients    If you would like to receive this communication electronically, please contact [email or web address] or [phone] to request more information on [employer] access.    For providers and/or their staff who would like to refer a patient to [facility], please contact us through our one-stop-shop provider referral line, [facility], at [phone].    If you feel you have received this communication in error or would no longer like to receive these types of communications, please e-mail [email or web address]

## 2019-04-09 NOTE — PATIENT INSTRUCTIONS
1.blood tests today including a test to check for hepatitis C to see if it is still there  2. fibroscan at main campus (Cande will call to schedule)  Back in 2 weeks
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
## 2019-04-15 ENCOUNTER — TELEPHONE (OUTPATIENT)
Dept: HEPATOLOGY | Facility: CLINIC | Age: 54
End: 2019-04-15

## 2019-04-15 NOTE — TELEPHONE ENCOUNTER
----- Message from Livia Rebollar MD sent at 4/12/2019  5:09 PM CDT -----  Has HCV- please arrange for treatment. Cande, please let pt know he does have HCV. He knows I was going to check

## 2019-04-16 ENCOUNTER — PROCEDURE VISIT (OUTPATIENT)
Dept: HEPATOLOGY | Facility: CLINIC | Age: 54
End: 2019-04-16
Attending: INTERNAL MEDICINE
Payer: MEDICAID

## 2019-04-16 ENCOUNTER — TELEPHONE (OUTPATIENT)
Dept: HEPATOLOGY | Facility: CLINIC | Age: 54
End: 2019-04-16

## 2019-04-16 DIAGNOSIS — R74.8 ELEVATED LIVER ENZYMES: ICD-10-CM

## 2019-04-16 PROCEDURE — 91200 LIVER ELASTOGRAPHY: CPT | Mod: 26,S$PBB,, | Performed by: INTERNAL MEDICINE

## 2019-04-16 PROCEDURE — 91200 PR LIVER ELASTOGRAPHY W/OUT IMAG W/INTERP & REPORT: ICD-10-PCS | Mod: 26,S$PBB,, | Performed by: INTERNAL MEDICINE

## 2019-04-16 PROCEDURE — 91200 LIVER ELASTOGRAPHY: CPT | Mod: PBBFAC | Performed by: INTERNAL MEDICINE

## 2019-04-17 ENCOUNTER — OFFICE VISIT (OUTPATIENT)
Dept: FAMILY MEDICINE | Facility: HOSPITAL | Age: 54
End: 2019-04-17
Attending: FAMILY MEDICINE
Payer: MEDICAID

## 2019-04-17 VITALS
BODY MASS INDEX: 37.02 KG/M2 | WEIGHT: 235.88 LBS | HEART RATE: 76 BPM | SYSTOLIC BLOOD PRESSURE: 136 MMHG | HEIGHT: 67 IN | DIASTOLIC BLOOD PRESSURE: 94 MMHG

## 2019-04-17 DIAGNOSIS — F41.1 GENERALIZED ANXIETY DISORDER: ICD-10-CM

## 2019-04-17 DIAGNOSIS — K42.9 UMBILICAL HERNIA WITHOUT OBSTRUCTION AND WITHOUT GANGRENE: ICD-10-CM

## 2019-04-17 DIAGNOSIS — J44.1 ACUTE EXACERBATION OF COPD WITH ASTHMA: Primary | ICD-10-CM

## 2019-04-17 DIAGNOSIS — J45.901 ACUTE EXACERBATION OF COPD WITH ASTHMA: Primary | ICD-10-CM

## 2019-04-17 DIAGNOSIS — F20.9 SCHIZOPHRENIA, UNSPECIFIED TYPE: ICD-10-CM

## 2019-04-17 DIAGNOSIS — F19.90 IV DRUG USER: ICD-10-CM

## 2019-04-17 DIAGNOSIS — F11.220 OPIOID DEPENDENCE WITH UNCOMPLICATED INTOXICATION: ICD-10-CM

## 2019-04-17 DIAGNOSIS — I10 ESSENTIAL HYPERTENSION: ICD-10-CM

## 2019-04-17 DIAGNOSIS — B19.20 HEPATITIS C VIRUS INFECTION WITHOUT HEPATIC COMA, UNSPECIFIED CHRONICITY: ICD-10-CM

## 2019-04-17 PROBLEM — J44.9 COPD (CHRONIC OBSTRUCTIVE PULMONARY DISEASE): Status: ACTIVE | Noted: 2019-04-17

## 2019-04-17 PROCEDURE — 99213 OFFICE O/P EST LOW 20 MIN: CPT | Performed by: STUDENT IN AN ORGANIZED HEALTH CARE EDUCATION/TRAINING PROGRAM

## 2019-04-17 RX ORDER — ALBUTEROL SULFATE 0.83 MG/ML
2.5 SOLUTION RESPIRATORY (INHALATION)
Qty: 75 ML | Refills: 5 | Status: ON HOLD | OUTPATIENT
Start: 2019-04-17 | End: 2020-02-27 | Stop reason: HOSPADM

## 2019-04-17 RX ORDER — FLUTICASONE PROPIONATE AND SALMETEROL 50; 250 UG/1; UG/1
1 POWDER RESPIRATORY (INHALATION) 2 TIMES DAILY
Qty: 60 EACH | Refills: 3 | Status: SHIPPED | OUTPATIENT
Start: 2019-04-17 | End: 2019-10-29 | Stop reason: SDUPTHER

## 2019-04-17 RX ORDER — PROMETHAZINE HYDROCHLORIDE 25 MG/1
25 TABLET ORAL EVERY 8 HOURS PRN
Qty: 90 TABLET | Refills: 0 | Status: SHIPPED | OUTPATIENT
Start: 2019-04-17 | End: 2019-08-22

## 2019-04-17 RX ORDER — ESCITALOPRAM OXALATE 10 MG/1
10 TABLET ORAL DAILY
Qty: 30 TABLET | Refills: 0 | Status: ON HOLD | OUTPATIENT
Start: 2019-04-17 | End: 2020-02-27 | Stop reason: SDUPTHER

## 2019-04-17 RX ORDER — CLONIDINE HYDROCHLORIDE 0.1 MG/1
0.3 TABLET ORAL 3 TIMES DAILY
Qty: 90 TABLET | Refills: 0 | Status: SHIPPED | OUTPATIENT
Start: 2019-04-17 | End: 2019-10-29 | Stop reason: SDUPTHER

## 2019-04-17 NOTE — PROGRESS NOTES
"Subjective:       Patient ID: Ming Harrington is a 53 y.o. male.    Chief Complaint: Anxiety    HPI   Mr. Harrington is a 52 yo M with PMHx of Schizophrenia, COPD, HTN, HFpEF (EF of 60% on 3/1/19), and Hep C presenting to clinic for anxiety. Also needs refills of several medications including COPD meds. He says his COPD is well-controlled on his current regimen. He reports using his Albuterol every morning to "wake up his lungs" and occasionally whenever the weather changes. He reports shortness of breath when he walks up more than one flight of stairs. He denies lower extremity swelling and sleeps on one pillow. His BP is well-controlled on his current regimen. For his schizophrenia, he reports that he hears voices but they are not distressing and do not command him to do things. He has significant anxiety. History of benzo use that psych does not want him on. Anxiety presents him from sleeping and is keeping him on edge all the time. History of IV drug use but denies any current use.     Review of Systems   Constitutional: Negative for activity change, chills, fatigue and fever.   HENT: Negative for congestion and sinus pressure.    Eyes: Negative for visual disturbance.   Respiratory: Positive for shortness of breath. Negative for chest tightness.    Cardiovascular: Positive for chest pain (associated with greasy foods and laying down ).   Gastrointestinal: Positive for abdominal pain (hernia). Negative for abdominal distention, diarrhea, nausea and vomiting.   Endocrine: Negative for polyuria.   Genitourinary: Negative for frequency.   Musculoskeletal: Negative for arthralgias and myalgias.   Skin: Negative for color change.   Neurological: Negative for dizziness, weakness and light-headedness.   Psychiatric/Behavioral: Positive for hallucinations (auditory). Negative for agitation and behavioral problems.       Objective:      Vitals:    04/17/19 1141   BP: (!) 136/94   Pulse: 76     Physical Exam   Constitutional: He is " oriented to person, place, and time. He appears well-developed and well-nourished. No distress.   HENT:   Head: Normocephalic and atraumatic.   Eyes: Conjunctivae are normal.   Neck: Normal range of motion. Neck supple.   Cardiovascular: Normal rate and regular rhythm.   Pulmonary/Chest: Effort normal and breath sounds normal.   Abdominal: Soft. Bowel sounds are normal. He exhibits no distension. There is no tenderness. A hernia (large, protruding umbilical hernia) is present.   Musculoskeletal: He exhibits no edema or tenderness.   Neurological: He is alert and oriented to person, place, and time.   Skin: Skin is warm.   Psychiatric: He has a normal mood and affect. His behavior is normal.   Nursing note and vitals reviewed.      Assessment:       1. Acute exacerbation of COPD with asthma    2. Essential hypertension    3. Hepatitis C virus infection without hepatic coma, unspecified chronicity    4. Umbilical hernia without obstruction and without gangrene    5. Schizophrenia, unspecified type    6. Generalized anxiety disorder    7. IV drug user    8. Opioid dependence with uncomplicated intoxication        Plan:       Acute exacerbation of COPD with asthma  -     ADVAIR DISKUS 250-50 mcg/dose diskus inhaler; Inhale 1 puff into the lungs 2 (two) times daily.  Dispense: 60 each; Refill: 3  -     albuterol (PROVENTIL) 2.5 mg /3 mL (0.083 %) nebulizer solution; take 3 mLs Nebulization Every 4 hours, Rescue  Dispense: 75 mL; Refill: 5    Essential hypertension  Comments:  stable, will continue current regimen  Orders:  -     cloNIDine (CATAPRES) 0.1 MG tablet; Take 3 tablets (0.3 mg total) by mouth 3 (three) times daily.  Dispense: 90 tablet; Refill: 0    Hepatitis C virus infection without hepatic coma, unspecified chronicity  Comments:  following with hepatology  planning to undergo therapy    Umbilical hernia without obstruction and without gangrene  Comments:  stable, following with general surgery      Schizophrenia, unspecified type  Comments:  continue abilify    Generalized anxiety disorder  -     escitalopram oxalate (LEXAPRO) 10 MG tablet; Take 1 tablet (10 mg total) by mouth once daily.  Dispense: 30 tablet; Refill: 0    IV drug user    Opioid dependence with uncomplicated intoxication    Other orders  -     promethazine (PHENERGAN) 25 MG tablet; Take 1 tablet (25 mg total) by mouth every 8 (eight) hours as needed for Nausea.  Dispense: 90 tablet; Refill: 0      Follow up in about 2 weeks (around 5/1/2019) for review of symptoms following linitiation of lexapro.          Vazquez Barajas MD  [facility] PGY-2  04/17/2019  1:05 PM

## 2019-04-18 ENCOUNTER — TELEPHONE (OUTPATIENT)
Dept: SURGERY | Facility: CLINIC | Age: 54
End: 2019-04-18

## 2019-04-18 NOTE — PRE-PROCEDURE INSTRUCTIONS
Spoke with Patient.  NPO, medication, and pre-op instructions reviewed.  Arrival time 1400.  Instructed that he can eat and drink until 0600, have clear liquids between 0600 - 1230, and then to remain NPO after 1230.  Denies previous problems with Anesthesia.  Is [place].  Uses oxO2 @L prn.  Stated that he usually needs to take Nebulizer breathing treatments in the Summer on humid, hot days.  Verbalized understanding of instructions.

## 2019-04-21 ENCOUNTER — ANESTHESIA EVENT (OUTPATIENT)
Dept: SURGERY | Facility: HOSPITAL | Age: 54
DRG: 907 | End: 2019-04-21
Payer: MEDICAID

## 2019-04-21 NOTE — ANESTHESIA PREPROCEDURE EVALUATION
[facility]  Anesthesia Pre-Operative Evaluation         Patient Name: Ming Harrington  YOB: 1965  MRN: [id number]    SUBJECTIVE:     Pre-operative evaluation for Procedure(s) (LRB):  REPAIR, HERNIA, INCISIONAL, RECURRENT ( OPEN WITH MESH) (N/A)     04/21/2019    Ming Harrington is a 53 y.o. male w/ a significant PMHx of asthma, COPD, HTN, HFpEF (Echo: EF~60%), Hep C, and h/o IVDA now on methadone, schizophrenia and a recurrent ventral hernia who presents for the above procedure.         LDA: None documented.    Prev airway: Mask ventilation not attempted. Easy oral intubation with Alejandro 2, grade 1 view, 8.0 ETT secured at 22cm at the lip    Drips: None documented.    Patient Active Problem List   Diagnosis    HTN (hypertension)    Opiate dependence    Hepatitis C virus infection    Umbilical hernia without obstruction and without gangrene    COPD exacerbation    Acute respiratory failure with hypercapnia    Drug withdrawal    Hyperkalemia    Shortness of breath    COPD with acute exacerbation    Asthma exacerbation in COPD    Acute exacerbation of COPD with asthma    Bacteremia    Severe asthma    CHF (congestive heart failure)    COPD (chronic obstructive pulmonary disease)    IV drug user    Generalized anxiety disorder       Review of patient's allergies indicates:   Allergen Reactions    Iodine and iodide containing products Anaphylaxis and Swelling     Facial swelling    Shellfish containing products Anaphylaxis             Compazine [prochlorperazine edisylate] Hallucinations       Current Outpatient Medications:  No current facility-administered medications for this encounter.     Current Outpatient Medications:     ADVAIR DISKUS 250-50 mcg/dose diskus inhaler, Inhale 1 puff into the lungs 2 (two) times daily., Disp: 60 each, Rfl: 3    albuterol (PROVENTIL) 2.5 mg /3 mL (0.083 %) nebulizer solution, take 3 mLs Nebulization Every 4 hours, Rescue (Patient taking  differently: Take 2.5 mg by nebulization every 4 (four) hours as needed for Shortness of Breath. ), Disp: 75 mL, Rfl: 5    albuterol (PROVENTIL/VENTOLIN HFA) 90 mcg/actuation inhaler, Inhale 1-2 puffs into the lungs every 6 (six) hours as needed for Wheezing or Shortness of Breath. Rescue, Disp: 18 g, Rfl: 5    albuterol-ipratropium (DUO-NEB) 2.5 mg-0.5 mg/3 mL nebulizer solution, Take 3 mLs by nebulization every 4 (four) hours. Rescue (Patient taking differently: Take 3 mLs by nebulization every 4 (four) hours as needed. Rescue), Disp: 1620 mL, Rfl: 3    ARIPiprazole (ABILIFY) 10 MG Tab, Take 10 mg by mouth 2 (two) times daily. , Disp: , Rfl:     ATROVENT HFA 17 mcg/actuation inhaler, Inhale 2 puffs into the lungs every 4 to 6 hours as needed. , Disp: , Rfl:     clonazePAM (KLONOPIN) 1 MG tablet, Take 2 mg by mouth 3 (three) times daily. Takes two 1 mg tablets (2 mg) three times daily, Disp: , Rfl:     cloNIDine (CATAPRES) 0.1 MG tablet, Take 3 tablets (0.3 mg total) by mouth 3 (three) times daily., Disp: 90 tablet, Rfl: 0    escitalopram oxalate (LEXAPRO) 10 MG tablet, Take 1 tablet (10 mg total) by mouth once daily., Disp: 30 tablet, Rfl: 0    fluticasone-vilanterol (BREO) 100-25 mcg/dose diskus inhaler, Inhale 1 puff into the lungs once daily. Controller (Patient taking differently: Inhale 1 puff into the lungs every morning. Controller), Disp: 60 each, Rfl: 7    furosemide (LASIX) 40 MG tablet, Take 1 tablet (40 mg total) by mouth once daily. (Patient taking differently: Take 20 mg by mouth 2 (two) times daily. Takes half of a 40 mg tablet (20 mg) twice daily), Disp: 60 tablet, Rfl: 3    ipratropium (ATROVENT) 0.02 % nebulizer solution, Take 500 mcg by nebulization 4 (four) times daily as needed. Rescue , Disp: , Rfl:     metFORMIN (GLUCOPHAGE) 500 MG tablet, Take 500 mg by mouth daily with breakfast. , Disp: , Rfl:     methadone (DOLOPHINE) 5 MG tablet, Take 90 mg by mouth every morning. , Disp: ,  Rfl:     nicotine (NICODERM CQ) 14 mg/24 hr, Place 1 patch onto the skin once daily. (Patient taking differently: Place 1 patch onto the skin every morning. ), Disp: 30 patch, Rfl: 11    promethazine (PHENERGAN) 25 MG tablet, Take 1 tablet (25 mg total) by mouth every 8 (eight) hours as needed for Nausea., Disp: 90 tablet, Rfl: 0    senna-docusate 8.6-50 mg (PERICOLACE) 8.6-50 mg per tablet, Take 1 tablet by mouth 2 (two) times daily., Disp: 30 tablet, Rfl: 11    tiotropium bromide (SPIRIVA RESPIMAT) 2.5 mcg/actuation Mist, Inhale 1 each into the lungs once daily. Controller (Patient taking differently: Inhale 1 each into the lungs every morning. Controller), Disp: 4 g, Rfl: 3    Past Surgical History:   Procedure Laterality Date    AMPUTATION      left hand tip of fingers    REPAIR, HERNIA, UMBILICAL, AGE 4 YEARS OR OLDER N/A 3/4/2013    Performed by Huber Matta MD at [facility] OR [address] [place] [address]    TRANSESOPHAGEAL ECHOCARDIOGRAM (ERIS) N/A 10/4/2017    Performed by Herbert Peterson [facility] at [facility] OR    UMBILICAL HERNIA REPAIR      UMBILICAL HERNIA REPAIR  2013    Recurrent.  By Dr. Matta       Social History     Socioeconomic History    Marital status: Single     Spouse name: Not on file    Number of children: Not on file    Years of education: Not on file    Highest education level: Not on file   Occupational History    Not on file   Social Needs    Financial resource strain: Not on file    Food insecurity:     Worry: Not on file     Inability: Not on file    Transportation needs:     Medical: Not on file     Non-medical: Not on file   Tobacco Use    Smoking status: Former Smoker     Packs/day: 0.50     Types: Cigarettes     Last attempt to quit: 2018     Years since quittin.7    Smokeless tobacco: Never Used   Substance and Sexual Activity    Alcohol use: No     Comment: never a heavy drinker, used to drink socially    Drug use: Yes     Types: IV, Heroin, Hydrocodone,  Benzodiazepines, Marijuana     Comment: former marijuana use, h/o IVDA and intranasal drug use     Sexual activity: Never   Lifestyle    Physical activity:     Days per week: Not on file     Minutes per session: Not on file    Stress: Not on file   Relationships    Social connections:     Talks on phone: Not on file     Gets together: Not on file     Attends [employer] service: Not on file     Active member of club or organization: Not on file     Attends meetings of clubs or organizations: Not on file     Relationship status: Not on file   Other Topics Concern    Not on file   Social History Narrative    Not on file       OBJECTIVE:     Vital Signs Range (Last 24H):         Significant Labs:  Lab Results   Component Value Date    WBC 7.12 04/09/2019    HGB 14.4 04/09/2019    HCT 43.2 04/09/2019     04/09/2019    ALT 40 04/09/2019    AST 47 (H) 04/09/2019     04/09/2019    K 4.1 04/09/2019    CL 99 04/09/2019    CREATININE 0.99 04/09/2019    BUN 12 04/09/2019    CO2 34 (H) 04/09/2019    TSH 0.100 (L) 12/02/2018    INR 1.0 04/09/2019    HGBA1C 4.8 02/28/2019       Diagnostic Studies:     EKG:   Vent. Rate : 080 BPM     Atrial Rate : 080 BPM     P-R Int : 172 ms          QRS Dur : 096 ms      QT Int : 368 ms       P-R-T Axes : 082 075 075 degrees     QTc Int : 424 ms    Sinus rhythm with Premature atrial complexes with Aberrant conduction  Incomplete right bundle branch block  Borderline Abnormal ECG  When compared with ECG of 27-FEB-2019 13:03,  Aberrant conduction is now Present  Confirmed by Neymar Dickey MD ([id number]) on 3/15/2019 5:34:33 PM    2D ECHO:  · Study is positive for right to left shunt. The shunt may be intrapulmonary.  · Normal left ventricular systolic function. The estimated ejection fraction is 60%  · Concentric left ventricular remodeling.  · Normal right ventricular systolic function.  · Normal central venous pressure (3 mm Hg).  · No evidence of endocarditis.          ASSESSMENT/PLAN:         Anesthesia Evaluation      I have reviewed the Medications.   Steroids Taken In Past Year: Prednisone    Review of Systems  Anesthesia Hx:  No problems with previous Anesthesia Denies Hx of Anesthetic complications History of prior surgery of interest to airway management or planning: Previous anesthesia: General 2013: Umb. Hernia with general anesthesia.  Denies Family Hx of Anesthesia complications.   Denies Personal Hx of Anesthesia complications.   Social:  Non-Smoker  Tobacco Use:  of cigarette for 16 years, < 1/2 a pack per day Illicit Drug Use: (heroin, last used thursday) Types of drugs include Marijuana, IV drug use,  Abuses drugs:  drug use is in recovery.  Hematology/Oncology:     Oncology Normal    -- Denies Anemia:   EENT/Dental:EENT/Dental Normal   Cardiovascular:   Exercise tolerance: poor Hypertension Denies CAD.     Denies Angina. CHF  Functional Capacity Can you climb two flights of stairs? ==> Yes  Congestive Heart Failure (CHF)  Hypertension    Pulmonary:   COPD, severe Asthma Denies Recent URI.  Denies Sleep Apnea. 2 admissions for asthma vs COPD exacerbations within the past month. Chronic Obstructive Pulmonary Disease (COPD): Inhaler use is rescue inhaler PRN.    Renal/:  Renal/ Normal     Hepatic/GI:   Denies PUD. Denies Hiatal Hernia.  Denies GERD. Denies Liver Disease. Hepatitis, C  Hernia, Umbilical Hernia Liver DiseaseHepatitis: C.    Neurological:  Neurology Normal  Denies CVA. Denies Seizures.    Endocrine:   Diabetes Denies Hypothyroidism.  Metabolic Disorders, Obesity / BMI > 30  Psych:   Psychiatric History (schizophrenia) anxiety  Psychotic Disorder and Schizophrenia.          Physical Exam  General:  Morbid Obesity    Airway/Jaw/Neck:  Airway Findings: Mouth Opening: Normal Tongue: Normal  General Airway Assessment: Adult  Mallampati: I  TM Distance: Normal, at least 6 cm  Jaw/Neck Findings:  Neck ROM: Normal ROM  Neck Findings:      Eyes/Ears/Nose:  EYES/EARS/NOSE FINDINGS: Normal   Dental:  Dental Findings: Edentulous   Chest/Lungs:  Chest/Lungs Findings: Expiratory Wheezes, Mild     Heart/Vascular:  Heart Findings: Rate: Normal  Rhythm: Regular Rhythm  Sounds: Normal        Mental Status:  Mental Status Findings:  Alert and Oriented         Anesthesia Plan  Type of Anesthesia, risks & benefits discussed:  Anesthesia Type:  general  Patient's Preference: Proceed with anesthesia understanding that the risks are very small but could be serious or life threatening.  Intra-op Monitoring Plan: standard ASA monitors  Intra-op Monitoring Plan Comments:   Post Op Pain Control Plan: per primary service following discharge from PACU, IV/PO Opioids PRN and multimodal analgesia  Post Op Pain Control Plan Comments:   Induction:   IV  Beta Blocker:  Patient is not currently on a Beta-Blocker (No further documentation required).       Informed Consent: Patient understands risks and agrees with Anesthesia plan.  Questions answered. Anesthesia consent signed with patient.  ASA Score: 3     Day of Surgery Review of History & Physical: I have interviewed and examined the patient. I have reviewed the patient's H&P dated:        Anesthesia Plan Notes: LTA, bronchodilators as needed        Ready For Surgery From Anesthesia Perspective.

## 2019-04-22 ENCOUNTER — ANESTHESIA (OUTPATIENT)
Dept: SURGERY | Facility: HOSPITAL | Age: 54
DRG: 907 | End: 2019-04-22
Payer: MEDICAID

## 2019-04-22 ENCOUNTER — TELEPHONE (OUTPATIENT)
Dept: SURGERY | Facility: CLINIC | Age: 54
End: 2019-04-22

## 2019-04-22 ENCOUNTER — HOSPITAL ENCOUNTER (INPATIENT)
Facility: HOSPITAL | Age: 54
LOS: 9 days | Discharge: HOME | DRG: 907 | End: 2019-05-01
Attending: SURGERY | Admitting: SURGERY
Payer: MEDICAID

## 2019-04-22 DIAGNOSIS — K43.9 VENTRAL HERNIA: ICD-10-CM

## 2019-04-22 DIAGNOSIS — Z98.890 S/P EVACUATION OF HEMATOMA: ICD-10-CM

## 2019-04-22 DIAGNOSIS — D62 ACUTE BLOOD LOSS ANEMIA: ICD-10-CM

## 2019-04-22 DIAGNOSIS — R94.31 PROLONGED Q-T INTERVAL ON ECG: ICD-10-CM

## 2019-04-22 DIAGNOSIS — E86.1 HYPOTENSION DUE TO HYPOVOLEMIA: ICD-10-CM

## 2019-04-22 DIAGNOSIS — Z87.19 S/P HERNIA REPAIR: ICD-10-CM

## 2019-04-22 DIAGNOSIS — R53.81 DEBILITY: ICD-10-CM

## 2019-04-22 DIAGNOSIS — K42.9 UMBILICAL HERNIA WITHOUT OBSTRUCTION AND WITHOUT GANGRENE: Primary | ICD-10-CM

## 2019-04-22 DIAGNOSIS — J44.1 COPD EXACERBATION: ICD-10-CM

## 2019-04-22 DIAGNOSIS — I95.9 HYPOTENSION: ICD-10-CM

## 2019-04-22 DIAGNOSIS — R60.9 SWELLING: ICD-10-CM

## 2019-04-22 DIAGNOSIS — Z98.890 S/P HERNIA REPAIR: ICD-10-CM

## 2019-04-22 LAB
BASOPHILS # BLD AUTO: 0.02 K/UL (ref 0–0.2)
BASOPHILS NFR BLD: 0.3 % (ref 0–1.9)
DIFFERENTIAL METHOD: ABNORMAL
EOSINOPHIL # BLD AUTO: 0.1 K/UL (ref 0–0.5)
EOSINOPHIL NFR BLD: 1.6 % (ref 0–8)
ERYTHROCYTE [DISTWIDTH] IN BLOOD BY AUTOMATED COUNT: 12.6 % (ref 11.5–14.5)
HCT VFR BLD AUTO: 36.9 % (ref 40–54)
HGB BLD-MCNC: 12.1 G/DL (ref 14–18)
IMM GRANULOCYTES # BLD AUTO: 0.02 K/UL (ref 0–0.04)
IMM GRANULOCYTES NFR BLD AUTO: 0.3 % (ref 0–0.5)
LYMPHOCYTES # BLD AUTO: 1.8 K/UL (ref 1–4.8)
LYMPHOCYTES NFR BLD: 29 % (ref 18–48)
MCH RBC QN AUTO: 29.9 PG (ref 27–31)
MCHC RBC AUTO-ENTMCNC: 32.8 G/DL (ref 32–36)
MCV RBC AUTO: 91 FL (ref 82–98)
MONOCYTES # BLD AUTO: 0.4 K/UL (ref 0.3–1)
MONOCYTES NFR BLD: 6.3 % (ref 4–15)
NEUTROPHILS # BLD AUTO: 3.9 K/UL (ref 1.8–7.7)
NEUTROPHILS NFR BLD: 62.5 % (ref 38–73)
NRBC BLD-RTO: 0 /100 WBC
PLATELET # BLD AUTO: 149 K/UL (ref 150–350)
PMV BLD AUTO: 10.2 FL (ref 9.2–12.9)
POCT GLUCOSE: 91 MG/DL (ref 70–110)
POCT GLUCOSE: 94 MG/DL (ref 70–110)
RBC # BLD AUTO: 4.05 M/UL (ref 4.6–6.2)
WBC # BLD AUTO: 6.2 K/UL (ref 3.9–12.7)

## 2019-04-22 PROCEDURE — A4216 STERILE WATER/SALINE, 10 ML: HCPCS | Performed by: SURGERY

## 2019-04-22 PROCEDURE — 25000003 PHARM REV CODE 250: Performed by: SURGERY

## 2019-04-22 PROCEDURE — 94761 N-INVAS EAR/PLS OXIMETRY MLT: CPT

## 2019-04-22 PROCEDURE — 63600175 PHARM REV CODE 636 W HCPCS: Performed by: ANESTHESIOLOGY

## 2019-04-22 PROCEDURE — 63600175 PHARM REV CODE 636 W HCPCS: Performed by: SURGERY

## 2019-04-22 PROCEDURE — 71000039 HC RECOVERY, EACH ADD'L HOUR: Performed by: SURGERY

## 2019-04-22 PROCEDURE — 94640 AIRWAY INHALATION TREATMENT: CPT

## 2019-04-22 PROCEDURE — 12000002 HC ACUTE/MED SURGE SEMI-PRIVATE ROOM

## 2019-04-22 PROCEDURE — 71000033 HC RECOVERY, INTIAL HOUR: Performed by: SURGERY

## 2019-04-22 PROCEDURE — 49566 PR REPAIR RECURR INCIS HERNIA,STRANG: CPT | Mod: ,,, | Performed by: SURGERY

## 2019-04-22 PROCEDURE — 25000003 PHARM REV CODE 250: Performed by: NURSE ANESTHETIST, CERTIFIED REGISTERED

## 2019-04-22 PROCEDURE — 49566 PR REPAIR RECURR INCIS HERNIA,STRANG: ICD-10-PCS | Mod: ,,, | Performed by: SURGERY

## 2019-04-22 PROCEDURE — 49568 PR IMPLANT MESH HERNIA REPAIR/DEBRIDEMENT CLOSURE: CPT | Mod: ,,, | Performed by: SURGERY

## 2019-04-22 PROCEDURE — 49568 PR IMPLANT MESH HERNIA REPAIR/DEBRIDEMENT CLOSURE: ICD-10-PCS | Mod: ,,, | Performed by: SURGERY

## 2019-04-22 PROCEDURE — D9220A PRA ANESTHESIA: ICD-10-PCS | Mod: ANES,,, | Performed by: ANESTHESIOLOGY

## 2019-04-22 PROCEDURE — 36415 COLL VENOUS BLD VENIPUNCTURE: CPT

## 2019-04-22 PROCEDURE — D9220A PRA ANESTHESIA: Mod: CRNA,,, | Performed by: NURSE ANESTHETIST, CERTIFIED REGISTERED

## 2019-04-22 PROCEDURE — 85025 COMPLETE CBC W/AUTO DIFF WBC: CPT

## 2019-04-22 PROCEDURE — 36000706: Performed by: SURGERY

## 2019-04-22 PROCEDURE — C1729 CATH, DRAINAGE: HCPCS | Performed by: SURGERY

## 2019-04-22 PROCEDURE — D9220A PRA ANESTHESIA: ICD-10-PCS | Mod: CRNA,,, | Performed by: NURSE ANESTHETIST, CERTIFIED REGISTERED

## 2019-04-22 PROCEDURE — D9220A PRA ANESTHESIA: Mod: ANES,,, | Performed by: ANESTHESIOLOGY

## 2019-04-22 PROCEDURE — 82962 GLUCOSE BLOOD TEST: CPT | Performed by: SURGERY

## 2019-04-22 PROCEDURE — 25000242 PHARM REV CODE 250 ALT 637 W/ HCPCS: Performed by: ANESTHESIOLOGY

## 2019-04-22 PROCEDURE — 37000008 HC ANESTHESIA 1ST 15 MINUTES: Performed by: SURGERY

## 2019-04-22 PROCEDURE — C1781 MESH (IMPLANTABLE): HCPCS | Performed by: SURGERY

## 2019-04-22 PROCEDURE — 37000009 HC ANESTHESIA EA ADD 15 MINS: Performed by: SURGERY

## 2019-04-22 PROCEDURE — 36000707: Performed by: SURGERY

## 2019-04-22 PROCEDURE — 63600175 PHARM REV CODE 636 W HCPCS: Performed by: NURSE ANESTHETIST, CERTIFIED REGISTERED

## 2019-04-22 DEVICE — PROCEED MESH OVAL 4X6 INCH: Type: IMPLANTABLE DEVICE | Site: ABDOMEN | Status: FUNCTIONAL

## 2019-04-22 RX ORDER — LIDOCAINE HYDROCHLORIDE 10 MG/ML
1 INJECTION, SOLUTION EPIDURAL; INFILTRATION; INTRACAUDAL; PERINEURAL ONCE
Status: DISCONTINUED | OUTPATIENT
Start: 2019-04-22 | End: 2019-04-24

## 2019-04-22 RX ORDER — BUPIVACAINE HYDROCHLORIDE 2.5 MG/ML
INJECTION, SOLUTION EPIDURAL; INFILTRATION; INTRACAUDAL
Status: DISCONTINUED | OUTPATIENT
Start: 2019-04-22 | End: 2019-04-22 | Stop reason: HOSPADM

## 2019-04-22 RX ORDER — ONDANSETRON 2 MG/ML
8 INJECTION INTRAMUSCULAR; INTRAVENOUS EVERY 8 HOURS PRN
Status: DISCONTINUED | OUTPATIENT
Start: 2019-04-22 | End: 2019-05-01 | Stop reason: HOSPADM

## 2019-04-22 RX ORDER — FENTANYL CITRATE 50 UG/ML
INJECTION, SOLUTION INTRAMUSCULAR; INTRAVENOUS
Status: DISCONTINUED | OUTPATIENT
Start: 2019-04-22 | End: 2019-04-22

## 2019-04-22 RX ORDER — MIDAZOLAM HYDROCHLORIDE 1 MG/ML
INJECTION, SOLUTION INTRAMUSCULAR; INTRAVENOUS
Status: DISCONTINUED | OUTPATIENT
Start: 2019-04-22 | End: 2019-04-22

## 2019-04-22 RX ORDER — SODIUM CHLORIDE 0.9 % (FLUSH) 0.9 %
3 SYRINGE (ML) INJECTION EVERY 8 HOURS
Status: DISCONTINUED | OUTPATIENT
Start: 2019-04-22 | End: 2019-05-01 | Stop reason: HOSPADM

## 2019-04-22 RX ORDER — NALOXONE HCL 0.4 MG/ML
0.02 VIAL (ML) INJECTION
Status: DISCONTINUED | OUTPATIENT
Start: 2019-04-22 | End: 2019-04-25

## 2019-04-22 RX ORDER — DEXTROSE MONOHYDRATE, SODIUM CHLORIDE, AND POTASSIUM CHLORIDE 50; 1.49; 9 G/1000ML; G/1000ML; G/1000ML
INJECTION, SOLUTION INTRAVENOUS CONTINUOUS
Status: DISCONTINUED | OUTPATIENT
Start: 2019-04-22 | End: 2019-04-24

## 2019-04-22 RX ORDER — HYDROMORPHONE HYDROCHLORIDE 1 MG/ML
0.2 INJECTION, SOLUTION INTRAMUSCULAR; INTRAVENOUS; SUBCUTANEOUS EVERY 5 MIN PRN
Status: DISCONTINUED | OUTPATIENT
Start: 2019-04-22 | End: 2019-04-22 | Stop reason: HOSPADM

## 2019-04-22 RX ORDER — ONDANSETRON 2 MG/ML
INJECTION INTRAMUSCULAR; INTRAVENOUS
Status: DISCONTINUED | OUTPATIENT
Start: 2019-04-22 | End: 2019-04-22

## 2019-04-22 RX ORDER — CEFAZOLIN SODIUM 1 G/3ML
2 INJECTION, POWDER, FOR SOLUTION INTRAMUSCULAR; INTRAVENOUS
Status: COMPLETED | OUTPATIENT
Start: 2019-04-22 | End: 2019-04-22

## 2019-04-22 RX ORDER — PROPOFOL 10 MG/ML
VIAL (ML) INTRAVENOUS
Status: DISCONTINUED | OUTPATIENT
Start: 2019-04-22 | End: 2019-04-22

## 2019-04-22 RX ORDER — IPRATROPIUM BROMIDE AND ALBUTEROL SULFATE 2.5; .5 MG/3ML; MG/3ML
3 SOLUTION RESPIRATORY (INHALATION) ONCE
Status: COMPLETED | OUTPATIENT
Start: 2019-04-22 | End: 2019-04-22

## 2019-04-22 RX ORDER — IPRATROPIUM BROMIDE AND ALBUTEROL SULFATE 2.5; .5 MG/3ML; MG/3ML
3 SOLUTION RESPIRATORY (INHALATION) ONCE AS NEEDED
Status: DISCONTINUED | OUTPATIENT
Start: 2019-04-22 | End: 2019-04-22 | Stop reason: HOSPADM

## 2019-04-22 RX ORDER — HYDROMORPHONE HCL IN 0.9% NACL 6 MG/30 ML
PATIENT CONTROLLED ANALGESIA SYRINGE INTRAVENOUS CONTINUOUS
Status: DISCONTINUED | OUTPATIENT
Start: 2019-04-22 | End: 2019-04-25

## 2019-04-22 RX ORDER — BACITRACIN 50000 [IU]/1
INJECTION, POWDER, FOR SOLUTION INTRAMUSCULAR
Status: DISCONTINUED | OUTPATIENT
Start: 2019-04-22 | End: 2019-04-22 | Stop reason: HOSPADM

## 2019-04-22 RX ORDER — ROCURONIUM BROMIDE 10 MG/ML
INJECTION, SOLUTION INTRAVENOUS
Status: DISCONTINUED | OUTPATIENT
Start: 2019-04-22 | End: 2019-04-22

## 2019-04-22 RX ORDER — RAMELTEON 8 MG/1
8 TABLET ORAL NIGHTLY PRN
Status: DISCONTINUED | OUTPATIENT
Start: 2019-04-22 | End: 2019-05-01 | Stop reason: HOSPADM

## 2019-04-22 RX ORDER — ONDANSETRON 2 MG/ML
4 INJECTION INTRAMUSCULAR; INTRAVENOUS ONCE AS NEEDED
Status: DISCONTINUED | OUTPATIENT
Start: 2019-04-22 | End: 2019-04-22 | Stop reason: HOSPADM

## 2019-04-22 RX ORDER — SODIUM CHLORIDE 9 MG/ML
INJECTION, SOLUTION INTRAVENOUS CONTINUOUS
Status: DISCONTINUED | OUTPATIENT
Start: 2019-04-22 | End: 2019-04-22

## 2019-04-22 RX ORDER — IPRATROPIUM BROMIDE AND ALBUTEROL SULFATE 2.5; .5 MG/3ML; MG/3ML
3 SOLUTION RESPIRATORY (INHALATION) ONCE
Status: DISCONTINUED | OUTPATIENT
Start: 2019-04-22 | End: 2019-04-22

## 2019-04-22 RX ORDER — LIDOCAINE HCL/PF 100 MG/5ML
SYRINGE (ML) INTRAVENOUS
Status: DISCONTINUED | OUTPATIENT
Start: 2019-04-22 | End: 2019-04-22

## 2019-04-22 RX ORDER — ENOXAPARIN SODIUM 100 MG/ML
40 INJECTION SUBCUTANEOUS EVERY 24 HOURS
Status: DISCONTINUED | OUTPATIENT
Start: 2019-04-22 | End: 2019-04-24

## 2019-04-22 RX ADMIN — FENTANYL CITRATE 50 MCG: 50 INJECTION, SOLUTION INTRAMUSCULAR; INTRAVENOUS at 06:04

## 2019-04-22 RX ADMIN — MIDAZOLAM HYDROCHLORIDE 2 MG: 1 INJECTION, SOLUTION INTRAMUSCULAR; INTRAVENOUS at 06:04

## 2019-04-22 RX ADMIN — SODIUM CHLORIDE: 0.9 INJECTION, SOLUTION INTRAVENOUS at 05:04

## 2019-04-22 RX ADMIN — HYDROMORPHONE HYDROCHLORIDE 0.2 MG: 1 INJECTION, SOLUTION INTRAMUSCULAR; INTRAVENOUS; SUBCUTANEOUS at 09:04

## 2019-04-22 RX ADMIN — PROPOFOL 50 MG: 10 INJECTION, EMULSION INTRAVENOUS at 06:04

## 2019-04-22 RX ADMIN — ENOXAPARIN SODIUM 40 MG: 100 INJECTION SUBCUTANEOUS at 09:04

## 2019-04-22 RX ADMIN — ROCURONIUM BROMIDE 50 MG: 10 INJECTION, SOLUTION INTRAVENOUS at 06:04

## 2019-04-22 RX ADMIN — DEXTROSE MONOHYDRATE, SODIUM CHLORIDE, AND POTASSIUM CHLORIDE: 50; 9; 1.49 INJECTION, SOLUTION INTRAVENOUS at 08:04

## 2019-04-22 RX ADMIN — PROPOFOL 120 MG: 10 INJECTION, EMULSION INTRAVENOUS at 06:04

## 2019-04-22 RX ADMIN — SODIUM CHLORIDE, SODIUM GLUCONATE, SODIUM ACETATE, POTASSIUM CHLORIDE, MAGNESIUM CHLORIDE, SODIUM PHOSPHATE, DIBASIC, AND POTASSIUM PHOSPHATE: .53; .5; .37; .037; .03; .012; .00082 INJECTION, SOLUTION INTRAVENOUS at 07:04

## 2019-04-22 RX ADMIN — HYDROMORPHONE HYDROCHLORIDE 0.2 MG: 1 INJECTION, SOLUTION INTRAMUSCULAR; INTRAVENOUS; SUBCUTANEOUS at 10:04

## 2019-04-22 RX ADMIN — CEFAZOLIN 2 G: 330 INJECTION, POWDER, FOR SOLUTION INTRAMUSCULAR; INTRAVENOUS at 06:04

## 2019-04-22 RX ADMIN — HYDROMORPHONE HYDROCHLORIDE 0.2 MG: 1 INJECTION, SOLUTION INTRAMUSCULAR; INTRAVENOUS; SUBCUTANEOUS at 11:04

## 2019-04-22 RX ADMIN — Medication 3 ML: at 10:04

## 2019-04-22 RX ADMIN — Medication: at 08:04

## 2019-04-22 RX ADMIN — LIDOCAINE HYDROCHLORIDE 100 MG: 20 INJECTION, SOLUTION INTRAVENOUS at 06:04

## 2019-04-22 RX ADMIN — ROCURONIUM BROMIDE 20 MG: 10 INJECTION, SOLUTION INTRAVENOUS at 06:04

## 2019-04-22 RX ADMIN — ONDANSETRON 4 MG: 2 INJECTION INTRAMUSCULAR; INTRAVENOUS at 07:04

## 2019-04-22 RX ADMIN — IPRATROPIUM BROMIDE AND ALBUTEROL SULFATE 3 ML: .5; 3 SOLUTION RESPIRATORY (INHALATION) at 04:04

## 2019-04-22 NOTE — TELEPHONE ENCOUNTER
Informed patient that he can have clear liquids until 1230 pm today. His arrival time was for 230pm and no solid foods at all. Pt v/u and will follow plan.

## 2019-04-22 NOTE — INTERVAL H&P NOTE
The patient has been examined and the H&P has been reviewed:    I concur with the findings and no changes have occurred since H&P was written.    Anesthesia/Surgery risks, benefits and alternative options discussed and understood by patient/family.          Active Hospital Problems    Diagnosis  POA    Ventral hernia [K43.9]  Yes      Resolved Hospital Problems   No resolved problems to display.

## 2019-04-22 NOTE — PROGRESS NOTES
Dr. Rock called and report given that we were unable to start IV on pt. Also reported to doctor that pt. Stated her had a skittle in his mouth earlier but spit it out and also that pt. drank gatorade at 1130

## 2019-04-22 NOTE — PROCEDURES
"Ming Harrington is a 53 y.o. male patient.    Temp: 98.4 °F (36.9 °C) (04/22/19 1446)  Pulse: 80 (04/22/19 1635)  Resp: 18 (04/22/19 1635)  BP: (!) 152/90 (04/22/19 1446)  SpO2: (!) 90 % (04/22/19 1635)  Weight: 107 kg (236 lb) (04/22/19 1446)  Height: 5' 8" (172.7 cm) (04/22/19 1446)       Central Line  Date/Time: 4/22/2019 5:59 PM  Performed by: SANTOS Samano Jr., MD  Indications: vascular access and med administration  Anesthesia: local infiltration    Anesthesia:  Local Anesthetic: lidocaine 1% without epinephrine  Anesthetic total: 5 mL  Preparation: skin prepped with ChloraPrep  Skin prep agent dried: skin prep agent completely dried prior to procedure  Sterile barriers: all five maximum sterile barriers used - cap, mask, sterile gown, sterile gloves, and large sterile sheet  Location details: right internal jugular  Catheter type: triple lumen  Catheter Length: 16cm    Ultrasound guidance: yes  Vessel Caliber: largeNumber of attempts: 1  Post-procedure: line sutured,  chlorhexidine patch,  sterile dressing applied and blood return through all ports          OBEY Samano Jr.  4/22/2019  "

## 2019-04-22 NOTE — LETTER
May 1, 2019        No Recipients                [address]  [facility] [address]  Phone: [phone]  Fax: [phone]   Patient: Ming Harrington   MR Number: [id number]   YOB: 1965   Date of Visit: 3/26/2019     Dear [facility],    During his recent hospitalization, Ming Harrington has taken methadone 90 mg once daily. In the post-op period, he was on a dilaudid PCA. He was weaned to IV pushes. He then took about 50 mg oxycodone PO per day. He had decreased his intake to 20mg oxycodone daily towards the end of his stay.    Please don't hesitate to contact us if you need additional information.    Sincerely,    KIANA Jacob MD   General Surgery, PGY-1  Pager: [phone]        CC  No Recipients    Enclosure

## 2019-04-23 LAB
ALBUMIN SERPL BCP-MCNC: 2.6 G/DL (ref 3.5–5.2)
ALP SERPL-CCNC: 103 U/L (ref 55–135)
ALT SERPL W/O P-5'-P-CCNC: 26 U/L (ref 10–44)
ANION GAP SERPL CALC-SCNC: 6 MMOL/L (ref 8–16)
AST SERPL-CCNC: 32 U/L (ref 10–40)
BASOPHILS # BLD AUTO: 0.01 K/UL (ref 0–0.2)
BASOPHILS NFR BLD: 0.1 % (ref 0–1.9)
BILIRUB SERPL-MCNC: 1.1 MG/DL (ref 0.1–1)
BUN SERPL-MCNC: 6 MG/DL (ref 6–20)
CALCIUM SERPL-MCNC: 8.4 MG/DL (ref 8.7–10.5)
CHLORIDE SERPL-SCNC: 103 MMOL/L (ref 95–110)
CO2 SERPL-SCNC: 30 MMOL/L (ref 23–29)
CREAT SERPL-MCNC: 0.8 MG/DL (ref 0.5–1.4)
DIFFERENTIAL METHOD: ABNORMAL
EOSINOPHIL # BLD AUTO: 0 K/UL (ref 0–0.5)
EOSINOPHIL NFR BLD: 0 % (ref 0–8)
ERYTHROCYTE [DISTWIDTH] IN BLOOD BY AUTOMATED COUNT: 12.5 % (ref 11.5–14.5)
EST. GFR  (AFRICAN AMERICAN): >60 ML/MIN/1.73 M^2
EST. GFR  (NON AFRICAN AMERICAN): >60 ML/MIN/1.73 M^2
GLUCOSE SERPL-MCNC: 114 MG/DL (ref 70–110)
HCT VFR BLD AUTO: 35.5 % (ref 40–54)
HGB BLD-MCNC: 11.8 G/DL (ref 14–18)
IMM GRANULOCYTES # BLD AUTO: 0.06 K/UL (ref 0–0.04)
IMM GRANULOCYTES NFR BLD AUTO: 0.6 % (ref 0–0.5)
LYMPHOCYTES # BLD AUTO: 0.7 K/UL (ref 1–4.8)
LYMPHOCYTES NFR BLD: 7.2 % (ref 18–48)
MAGNESIUM SERPL-MCNC: 1.4 MG/DL (ref 1.6–2.6)
MCH RBC QN AUTO: 29.9 PG (ref 27–31)
MCHC RBC AUTO-ENTMCNC: 33.2 G/DL (ref 32–36)
MCV RBC AUTO: 90 FL (ref 82–98)
MONOCYTES # BLD AUTO: 0.4 K/UL (ref 0.3–1)
MONOCYTES NFR BLD: 4.4 % (ref 4–15)
NEUTROPHILS # BLD AUTO: 8.3 K/UL (ref 1.8–7.7)
NEUTROPHILS NFR BLD: 87.7 % (ref 38–73)
NRBC BLD-RTO: 0 /100 WBC
PHOSPHATE SERPL-MCNC: 3.2 MG/DL (ref 2.7–4.5)
PLATELET # BLD AUTO: 176 K/UL (ref 150–350)
PMV BLD AUTO: 11 FL (ref 9.2–12.9)
POCT GLUCOSE: 121 MG/DL (ref 70–110)
POCT GLUCOSE: 138 MG/DL (ref 70–110)
POTASSIUM SERPL-SCNC: 4.3 MMOL/L (ref 3.5–5.1)
PROT SERPL-MCNC: 6.2 G/DL (ref 6–8.4)
RBC # BLD AUTO: 3.95 M/UL (ref 4.6–6.2)
SODIUM SERPL-SCNC: 139 MMOL/L (ref 136–145)
WBC # BLD AUTO: 9.42 K/UL (ref 3.9–12.7)

## 2019-04-23 PROCEDURE — 85025 COMPLETE CBC W/AUTO DIFF WBC: CPT

## 2019-04-23 PROCEDURE — 27000221 HC OXYGEN, UP TO 24 HOURS

## 2019-04-23 PROCEDURE — 83735 ASSAY OF MAGNESIUM: CPT

## 2019-04-23 PROCEDURE — 80053 COMPREHEN METABOLIC PANEL: CPT

## 2019-04-23 PROCEDURE — 25000003 PHARM REV CODE 250: Performed by: STUDENT IN AN ORGANIZED HEALTH CARE EDUCATION/TRAINING PROGRAM

## 2019-04-23 PROCEDURE — 94761 N-INVAS EAR/PLS OXIMETRY MLT: CPT

## 2019-04-23 PROCEDURE — 84100 ASSAY OF PHOSPHORUS: CPT

## 2019-04-23 PROCEDURE — S4991 NICOTINE PATCH NONLEGEND: HCPCS | Performed by: SURGERY

## 2019-04-23 PROCEDURE — 25000003 PHARM REV CODE 250: Performed by: SURGERY

## 2019-04-23 PROCEDURE — 63600175 PHARM REV CODE 636 W HCPCS: Performed by: STUDENT IN AN ORGANIZED HEALTH CARE EDUCATION/TRAINING PROGRAM

## 2019-04-23 PROCEDURE — 25000242 PHARM REV CODE 250 ALT 637 W/ HCPCS: Performed by: STUDENT IN AN ORGANIZED HEALTH CARE EDUCATION/TRAINING PROGRAM

## 2019-04-23 PROCEDURE — 25000242 PHARM REV CODE 250 ALT 637 W/ HCPCS: Performed by: SURGERY

## 2019-04-23 PROCEDURE — A4216 STERILE WATER/SALINE, 10 ML: HCPCS | Performed by: SURGERY

## 2019-04-23 PROCEDURE — 63600175 PHARM REV CODE 636 W HCPCS: Performed by: SURGERY

## 2019-04-23 PROCEDURE — 12000002 HC ACUTE/MED SURGE SEMI-PRIVATE ROOM

## 2019-04-23 PROCEDURE — 94640 AIRWAY INHALATION TREATMENT: CPT

## 2019-04-23 RX ORDER — SCOLOPAMINE TRANSDERMAL SYSTEM 1 MG/1
1 PATCH, EXTENDED RELEASE TRANSDERMAL
Status: DISCONTINUED | OUTPATIENT
Start: 2019-04-23 | End: 2019-04-24

## 2019-04-23 RX ORDER — IBUPROFEN 200 MG
1 TABLET ORAL EVERY MORNING
Status: DISCONTINUED | OUTPATIENT
Start: 2019-04-23 | End: 2019-05-01 | Stop reason: HOSPADM

## 2019-04-23 RX ORDER — IPRATROPIUM BROMIDE AND ALBUTEROL SULFATE 2.5; .5 MG/3ML; MG/3ML
3 SOLUTION RESPIRATORY (INHALATION) EVERY 4 HOURS PRN
Status: DISCONTINUED | OUTPATIENT
Start: 2019-04-23 | End: 2019-04-23

## 2019-04-23 RX ORDER — FLUTICASONE FUROATE AND VILANTEROL 100; 25 UG/1; UG/1
1 POWDER RESPIRATORY (INHALATION) EVERY MORNING
Status: DISCONTINUED | OUTPATIENT
Start: 2019-04-23 | End: 2019-04-23 | Stop reason: SDUPTHER

## 2019-04-23 RX ORDER — FLUTICASONE PROPIONATE 50 MCG
2 SPRAY, SUSPENSION (ML) NASAL DAILY
Status: DISCONTINUED | OUTPATIENT
Start: 2019-04-23 | End: 2019-05-01 | Stop reason: HOSPADM

## 2019-04-23 RX ORDER — IPRATROPIUM BROMIDE AND ALBUTEROL SULFATE 2.5; .5 MG/3ML; MG/3ML
3 SOLUTION RESPIRATORY (INHALATION) EVERY 6 HOURS
Status: DISCONTINUED | OUTPATIENT
Start: 2019-04-23 | End: 2019-04-23

## 2019-04-23 RX ORDER — KETOROLAC TROMETHAMINE 30 MG/ML
30 INJECTION, SOLUTION INTRAMUSCULAR; INTRAVENOUS EVERY 8 HOURS
Status: DISCONTINUED | OUTPATIENT
Start: 2019-04-23 | End: 2019-04-24

## 2019-04-23 RX ORDER — GLUCAGON 1 MG
1 KIT INJECTION
Status: DISCONTINUED | OUTPATIENT
Start: 2019-04-23 | End: 2019-05-01 | Stop reason: HOSPADM

## 2019-04-23 RX ORDER — CLONAZEPAM 0.5 MG/1
2 TABLET ORAL 3 TIMES DAILY
Status: DISCONTINUED | OUTPATIENT
Start: 2019-04-23 | End: 2019-04-24

## 2019-04-23 RX ORDER — IPRATROPIUM BROMIDE AND ALBUTEROL SULFATE 2.5; .5 MG/3ML; MG/3ML
3 SOLUTION RESPIRATORY (INHALATION) EVERY 4 HOURS PRN
Status: DISCONTINUED | OUTPATIENT
Start: 2019-04-23 | End: 2019-05-01 | Stop reason: HOSPADM

## 2019-04-23 RX ORDER — ARIPIPRAZOLE 5 MG/1
10 TABLET ORAL 2 TIMES DAILY
Status: DISCONTINUED | OUTPATIENT
Start: 2019-04-23 | End: 2019-05-01 | Stop reason: HOSPADM

## 2019-04-23 RX ORDER — INSULIN ASPART 100 [IU]/ML
1-10 INJECTION, SOLUTION INTRAVENOUS; SUBCUTANEOUS EVERY 6 HOURS PRN
Status: DISCONTINUED | OUTPATIENT
Start: 2019-04-23 | End: 2019-05-01 | Stop reason: HOSPADM

## 2019-04-23 RX ORDER — METHADONE HYDROCHLORIDE 10 MG/1
90 TABLET ORAL DAILY
Status: DISCONTINUED | OUTPATIENT
Start: 2019-04-23 | End: 2019-04-24

## 2019-04-23 RX ORDER — IPRATROPIUM BROMIDE AND ALBUTEROL SULFATE 2.5; .5 MG/3ML; MG/3ML
3 SOLUTION RESPIRATORY (INHALATION) EVERY 6 HOURS
Status: DISCONTINUED | OUTPATIENT
Start: 2019-04-23 | End: 2019-04-24

## 2019-04-23 RX ORDER — ACETAMINOPHEN 500 MG
1000 TABLET ORAL EVERY 8 HOURS
Status: DISCONTINUED | OUTPATIENT
Start: 2019-04-23 | End: 2019-05-01 | Stop reason: HOSPADM

## 2019-04-23 RX ORDER — MAGNESIUM SULFATE HEPTAHYDRATE 40 MG/ML
2 INJECTION, SOLUTION INTRAVENOUS ONCE
Status: COMPLETED | OUTPATIENT
Start: 2019-04-23 | End: 2019-04-23

## 2019-04-23 RX ORDER — FLUTICASONE FUROATE AND VILANTEROL 100; 25 UG/1; UG/1
1 POWDER RESPIRATORY (INHALATION) DAILY
Status: DISCONTINUED | OUTPATIENT
Start: 2019-04-23 | End: 2019-05-01 | Stop reason: HOSPADM

## 2019-04-23 RX ORDER — GABAPENTIN 300 MG/1
300 CAPSULE ORAL 3 TIMES DAILY
Status: DISCONTINUED | OUTPATIENT
Start: 2019-04-23 | End: 2019-05-01 | Stop reason: HOSPADM

## 2019-04-23 RX ADMIN — CLONAZEPAM 2 MG: 0.5 TABLET ORAL at 03:04

## 2019-04-23 RX ADMIN — IPRATROPIUM BROMIDE AND ALBUTEROL SULFATE 3 ML: .5; 3 SOLUTION RESPIRATORY (INHALATION) at 01:04

## 2019-04-23 RX ADMIN — ACETAMINOPHEN 1000 MG: 500 TABLET ORAL at 11:04

## 2019-04-23 RX ADMIN — ARIPIPRAZOLE 10 MG: 5 TABLET ORAL at 11:04

## 2019-04-23 RX ADMIN — KETOROLAC TROMETHAMINE 30 MG: 30 INJECTION, SOLUTION INTRAMUSCULAR; INTRAVENOUS at 08:04

## 2019-04-23 RX ADMIN — ACETAMINOPHEN 1000 MG: 500 TABLET ORAL at 03:04

## 2019-04-23 RX ADMIN — Medication 1 PATCH: at 10:04

## 2019-04-23 RX ADMIN — ONDANSETRON 8 MG: 2 INJECTION INTRAMUSCULAR; INTRAVENOUS at 02:04

## 2019-04-23 RX ADMIN — ENOXAPARIN SODIUM 40 MG: 100 INJECTION SUBCUTANEOUS at 04:04

## 2019-04-23 RX ADMIN — IPRATROPIUM BROMIDE AND ALBUTEROL SULFATE 3 ML: .5; 3 SOLUTION RESPIRATORY (INHALATION) at 08:04

## 2019-04-23 RX ADMIN — ARIPIPRAZOLE 10 MG: 5 TABLET ORAL at 10:04

## 2019-04-23 RX ADMIN — CLONIDINE HYDROCHLORIDE 0.3 MG: 0.2 TABLET ORAL at 03:04

## 2019-04-23 RX ADMIN — Medication: at 11:04

## 2019-04-23 RX ADMIN — DEXTROSE MONOHYDRATE, SODIUM CHLORIDE, AND POTASSIUM CHLORIDE: 50; 9; 1.49 INJECTION, SOLUTION INTRAVENOUS at 11:04

## 2019-04-23 RX ADMIN — MAGNESIUM SULFATE IN WATER 2 G: 40 INJECTION, SOLUTION INTRAVENOUS at 10:04

## 2019-04-23 RX ADMIN — Medication 3 ML: at 06:04

## 2019-04-23 RX ADMIN — FLUTICASONE FUROATE AND VILANTEROL TRIFENATATE 1 PUFF: 100; 25 POWDER RESPIRATORY (INHALATION) at 10:04

## 2019-04-23 RX ADMIN — GABAPENTIN 300 MG: 300 CAPSULE ORAL at 08:04

## 2019-04-23 RX ADMIN — Medication 3 ML: at 10:04

## 2019-04-23 RX ADMIN — Medication: at 06:04

## 2019-04-23 RX ADMIN — GABAPENTIN 300 MG: 300 CAPSULE ORAL at 03:04

## 2019-04-23 RX ADMIN — SCOPALAMINE 1 PATCH: 1 PATCH, EXTENDED RELEASE TRANSDERMAL at 06:04

## 2019-04-23 RX ADMIN — Medication 3 ML: at 02:04

## 2019-04-23 RX ADMIN — METHADONE HYDROCHLORIDE 90 MG: 10 TABLET ORAL at 08:04

## 2019-04-23 RX ADMIN — KETOROLAC TROMETHAMINE 30 MG: 30 INJECTION, SOLUTION INTRAMUSCULAR; INTRAVENOUS at 11:04

## 2019-04-23 RX ADMIN — GABAPENTIN 300 MG: 300 CAPSULE ORAL at 11:04

## 2019-04-23 RX ADMIN — ONDANSETRON 8 MG: 2 INJECTION INTRAMUSCULAR; INTRAVENOUS at 01:04

## 2019-04-23 RX ADMIN — FLUTICASONE PROPIONATE 100 MCG: 50 SPRAY, METERED NASAL at 04:04

## 2019-04-23 RX ADMIN — KETOROLAC TROMETHAMINE 30 MG: 30 INJECTION, SOLUTION INTRAMUSCULAR; INTRAVENOUS at 04:04

## 2019-04-23 NOTE — ASSESSMENT & PLAN NOTE
Mr. Harrington is a 52 yo man who is s/p ventral hernia repair on 4/22/2019. Pain control issue overnight. Had N/V. On 3L NC.     - added back methadone 90mg qDaily  - multimodal pain control (toradol, gabapentin and tylenol kami)  - continue PCA  - goal sO2>88%  - duonebs q6 while awake  - ambulate today  - lovenox ppx    Dispo: await return of bowel function

## 2019-04-23 NOTE — PLAN OF CARE
Problem: Adult Inpatient Plan of Care  Goal: Plan of Care Review  Outcome: Ongoing (interventions implemented as appropriate)  Pt appears groggy but oriented x4, NV after lunch, uncontrolled pain, BG checks Q6 no coverage needed, pt refused to ambulate throughout day, voiding clear yellow urine, no falls noted, will continue to monitor.

## 2019-04-23 NOTE — NURSING TRANSFER
Nursing Transfer Note      4/22/2019     Transfer 540 A    Transfer via stretcher    Transfer with PCA pain pump, O2    Transported by transport    Medicines sent: Dilaudid pain pump, IV fluids    Chart send with patient: yes    Notified: {RN on the floor    Patient reassessed at: upon arrival to the unit

## 2019-04-23 NOTE — TRANSFER OF CARE
"Anesthesia Transfer of Care Note    Patient: Ming Harrington    Procedure(s) Performed: Procedure(s) (LRB):  REPAIR, HERNIA, INCISIONAL, RECURRENT ( OPEN WITH MESH) (N/A)    Patient location: PACU    Anesthesia Type: general    Transport from OR: Transported from OR on 6-10 L/min O2 by face mask with adequate spontaneous ventilation    Post pain: adequate analgesia    Post assessment: no apparent anesthetic complications and tolerated procedure well    Post vital signs: stable    Level of consciousness: awake, alert and oriented    Nausea/Vomiting: no nausea/vomiting    Complications: none    Transfer of care protocol was followed      Last vitals:   Visit Vitals  BP (!) 151/100 (BP Location: Left arm, Patient Position: Lying)   Pulse 81   Temp 36.4 °C (97.5 °F) (Temporal)   Resp 16   Ht 5' 8" (1.727 m)   Wt 107 kg (236 lb)   SpO2 100%   BMI 35.88 kg/m²     "

## 2019-04-23 NOTE — SUBJECTIVE & OBJECTIVE
Interval History: Pain control issues overnight. Methadone patient. On 3L NC; he is on 2L at home. Had N/V overnight.     Medications:  Continuous Infusions:   dextrose 5 % and 0.9 % NaCl with KCl 20 mEq 125 mL/hr at 04/22/19 2017    hydromorphone in 0.9 % NaCl 6 mg/30 ml       Scheduled Meds:   enoxaparin  40 mg Subcutaneous Daily    ketorolac  30 mg Intravenous Q8H    lidocaine (PF) 10 mg/ml (1%)  1 mL Intradermal Once    lidocaine (PF) 10 mg/ml (1%)  1 mL Other Once    methadone  90 mg Oral Daily    sodium chloride 0.9%  3 mL Intravenous Q8H     PRN Meds:albuterol-ipratropium, naloxone, ondansetron, ramelteon     Review of patient's allergies indicates:   Allergen Reactions    Iodine and iodide containing products Anaphylaxis and Swelling     Facial swelling    Shellfish containing products Anaphylaxis             Compazine [prochlorperazine edisylate] Hallucinations     Objective:     Vital Signs (Most Recent):  Temp: 98.9 °F (37.2 °C) (04/22/19 2230)  Pulse: 79 (04/22/19 2315)  Resp: (!) 23 (04/22/19 2315)  BP: (!) 172/106 (04/22/19 2315)  SpO2: (!) 94 % (04/22/19 2315) Vital Signs (24h Range):  Temp:  [97.5 °F (36.4 °C)-98.9 °F (37.2 °C)] 98.9 °F (37.2 °C)  Pulse:  [78-89] 79  Resp:  [16-23] 23  SpO2:  [90 %-100 %] 94 %  BP: (134-172)/() 172/106     Weight: 107 kg (236 lb)  Body mass index is 35.88 kg/m².    Intake/Output - Last 3 Shifts       04/21 0700 - 04/22 0659 04/22 0700 - 04/23 0659 04/23 0700 - 04/24 0659    I.V. (mL/kg)  1353 (12.6)     Total Intake(mL/kg)  1353 (12.6)     Urine (mL/kg/hr)  125     Drains  155     Total Output  280     Net  +1073            Urine Occurrence  1 x           Physical Exam   Constitutional: He is oriented to person, place, and time. He appears well-developed and well-nourished.   Cardiovascular: Normal rate and regular rhythm.   Pulmonary/Chest: Effort normal. No respiratory distress.   Abdominal: Soft. He exhibits no distension.   Appropriately tender.  Incisions are clean and intact, some minor bloody oozing   Musculoskeletal: He exhibits no edema.   Neurological: He is alert and oriented to person, place, and time.   Skin: Skin is warm and dry.   Psychiatric: He has a normal mood and affect.   Vitals reviewed.      Significant Labs:  Pending CMP/CBC    Significant Diagnostics:  I have reviewed all diagnostics.

## 2019-04-23 NOTE — PROGRESS NOTES
[facility]  General Surgery  Progress Note    Subjective:       Post-Op Info:  Procedure(s) (LRB):  REPAIR, HERNIA, INCISIONAL, RECURRENT ( OPEN WITH MESH) (N/A)   1 Day Post-Op     Interval History: Pain control issues overnight. Methadone patient. On 3L NC; he is on 2L at home. Had N/V overnight.     Medications:  Continuous Infusions:   dextrose 5 % and 0.9 % NaCl with KCl 20 mEq 125 mL/hr at 04/22/19 2017    hydromorphone in 0.9 % NaCl 6 mg/30 ml       Scheduled Meds:   enoxaparin  40 mg Subcutaneous Daily    ketorolac  30 mg Intravenous Q8H    lidocaine (PF) 10 mg/ml (1%)  1 mL Intradermal Once    lidocaine (PF) 10 mg/ml (1%)  1 mL Other Once    methadone  90 mg Oral Daily    sodium chloride 0.9%  3 mL Intravenous Q8H     PRN Meds:albuterol-ipratropium, naloxone, ondansetron, ramelteon     Review of patient's allergies indicates:   Allergen Reactions    Iodine and iodide containing products Anaphylaxis and Swelling     Facial swelling    Shellfish containing products Anaphylaxis             Compazine [prochlorperazine edisylate] Hallucinations     Objective:     Vital Signs (Most Recent):  Temp: 98.9 °F (37.2 °C) (04/22/19 2230)  Pulse: 79 (04/22/19 2315)  Resp: (!) 23 (04/22/19 2315)  BP: (!) 172/106 (04/22/19 2315)  SpO2: (!) 94 % (04/22/19 2315) Vital Signs (24h Range):  Temp:  [97.5 °F (36.4 °C)-98.9 °F (37.2 °C)] 98.9 °F (37.2 °C)  Pulse:  [78-89] 79  Resp:  [16-23] 23  SpO2:  [90 %-100 %] 94 %  BP: (134-172)/() 172/106     Weight: 107 kg (236 lb)  Body mass index is 35.88 kg/m².    Intake/Output - Last 3 Shifts       04/21 0700 - 04/22 0659 04/22 0700 - 04/23 0659 04/23 0700 - 04/24 0659    I.V. (mL/kg)  1353 (12.6)     Total Intake(mL/kg)  1353 (12.6)     Urine (mL/kg/hr)  125     Drains  155     Total Output  280     Net  +1073            Urine Occurrence  1 x           Physical Exam   Constitutional: He is oriented to person, place, and time. He appears  well-developed and well-nourished.   Cardiovascular: Normal rate and regular rhythm.   Pulmonary/Chest: Effort normal. No respiratory distress.   Abdominal: Soft. He exhibits no distension.   Appropriately tender. Incisions are clean and intact, some minor bloody oozing   Musculoskeletal: He exhibits no edema.   Neurological: He is alert and oriented to person, place, and time.   Skin: Skin is warm and dry.   Psychiatric: He has a normal mood and affect.   Vitals reviewed.      Significant Labs:  Pending CMP/CBC    Significant Diagnostics:  I have reviewed all diagnostics.    Assessment/Plan:     Ventral hernia  Mr. Harrington is a 52 yo man who is s/p ventral hernia repair on 4/22/2019. Pain control issue overnight. Had N/V. On 3L NC.     - added back methadone 90mg qDaily  - multimodal pain control (toradol, gabapentin and tylenol kami)  - continue PCA  - goal sO2>88%  - duonebs q6 while awake  - ambulate today  - lovenox ppx    Dispo: await return of bowel function        ESTIVEN Jacob MD  General Surgery  [facility]

## 2019-04-23 NOTE — PLAN OF CARE
POC reviewed with pt, acknowledged understanding. Pt AAO x 4. Pt slightly drowsy. Pt remains free of falls/injuries. Pt on telemetry remains SR. Pt tolerating npo diet. Pt pain controlled with prescribed meds, pca pump. No acute events throughout shift. Pt wearing SCDs. Pt on 3 L NC.No distress noted, will continue to monitor.

## 2019-04-23 NOTE — PROGRESS NOTES
Patient arrived to floor from recovery via stretcher. Patient is awake, slightly drowsy, but easily oriented. Skin is warm and dry. Pulses are present in all extremities. Neurovascularly intact. Some crackles noted to lung fields. O2@#L/nc at present. C/o SOB at times.Hypoactive bowel sounds noted. Midline noted with S/S. Left sided deepthi drain ivf's and dilaudid pca infusing at present. Patient oriented to environment as best as possible and encouraged to ask questions.

## 2019-04-23 NOTE — BRIEF OP NOTE
Operative Note       Surgery Date: 4/22/2019     Surgeon(s) and Role:     * Kenyon Chawla MD - Primary     * SANTOS Samano Jr., MD - Resident - Assisting    Pre-op Diagnosis:  Incisional hernia, without obstruction or gangrene [K43.2]    Post-op Diagnosis:  Incisional hernia, without obstruction or gangrene [K43.2]    Procedure(s) (LRB):  REPAIR, HERNIA, INCISIONAL, RECURRENT ( OPEN WITH MESH) (N/A)    Anesthesia: General    Procedure in Detail/Findings:  6 by 4 inch complicated defect repaired with underlay mesh and fascial closure.  Drain.    Estimated Blood Loss: Minimal           Specimens (From admission, onward)    None        Implants:   Implant Name Type Inv. Item Serial No.  Lot No. LRB No. Used   PROCEED MESH OVAL 4X6 INCH - [id number]  PROCEED MESH OVAL 4X6 INCH  [employer] [id number] N/A 1              Disposition: PACU - hemodynamically stable.           Condition: Good    Attestation:  I was present and scrubbed for the entire procedure.

## 2019-04-23 NOTE — NURSING
Pt complained of SOB and had wheezes in all lung fields, pt forced to sit up and use IS, pt coughed up sputum, breath sounds cleared, pt stated that he felt better.

## 2019-04-23 NOTE — OP NOTE
DATE OF PROCEDURE: 04/22/2019     PREOPERATIVE DIAGNOSIS: Recurrent epigastric hernia     POSTOPERATIVE DIAGNOSIS: Same    PROCEDURE PERFORMED: Recurrent epigastric hernia repair with mesh.     ATTENDING SURGEON: Kenyon Chawla M.D.     HOUSESTAFF SURGEON: Dangelo Samano M.D. (RES)    ANESTHESIA: General endotracheal.     ESTIMATED BLOOD LOSS: 50 mL.     FINDINGS: One large defect, hernia recurrence, and two smaller defects were all combined.  The final defect measured 6 x 4 inches and was repaired with Proceed mesh (6 x 4 cm).    SPECIMENS: None.     DRAINS: 10 Fr Brian drain over the fascia    COMPLICATIONS: None.     INDICATIONS: Ming Harrington is a 53 y.o.-year-old male referred to my General Surgery Clinic with a symptomatic hernia, desiring repair. It has been repaired twice in the past, most recently in 2013.    We offered a repair with mesh and the patient agreed to proceed. Informed consent was signed, including a high risk of recurrence, and understanding of the risks and benefits of surgery was expressed.     OPERATIVE PROCEDURE: The patient was identified in preoperative holding, brought back to the Operating Room, placed supine on the operating table and padded appropriately. Monitors were applied and a smooth induction of general endotracheal anesthesia. The bdomen was shaved with electric clippers and prepped and draped in the standard sterile surgical fashion. A timeout was performed and all team members present, agreed this was the correct procedure on the correct patient. We also confirmed administration of appropriate preoperative antibiotics.     A vertical midline skin incision was made with the scalpel, through the former incision and umbilicus, and extending 5 cm cephalad. Subcutaneous tissue was divided with Bovie electrocautery and this dissection was carried down to the anterior abdominal wall fascia. Fascia was cleaned off with cautery.  The hernia defect was identified.  Hernia sac was  opened.  Omentum was taken down from fascial edges with bovie electrocautery, until the undersurface of the peritoneum was free.      There were two smaller defects to the right, which were combined with the main hernia.    The final defect was measured to be 6 x 4 inches.  We decided to repair it with a piece of Proceed mesh.  It was introduced into the abdomen. The mesh was sewn in place with interrupted 0 Prolene sutures in horizontal mattress fashion.  We checked for gaps between them and found none and then all sutures were tied. The fascia was closed over the mesh with a running 0 Vicryl suture.      A 10 Fr Brian drain was placed over the fascia to help obliterate dead space, and secured with 3-0 Nylon.  Marcaine was administered.  A 2-0 Vicryl was used to tack the umbilical skin down to the fascia.    Several 3-0 Vicryl deep dermal sutures were placed.  4-0 Vicryl was used to re-approximate the skin in a running subcuticular layer.  A sterile dressing was applied. The patient was extubated in the Operating Room and transported to the Recovery Room in stable condition. All sponge, instrument and needle counts were correct at the end of the case.

## 2019-04-23 NOTE — PLAN OF CARE
1:10 PM Patient lives in a 1 story house, w/3 [address], w/railings, w/his brother. Patient is independent, agile, w/home O2-not working x 2 months. See below. CM asked patient if he tried calling the DME Co. phone # on equipment at home to get it repaired? Patient states he doesn't think the Co. is in business any longer. CM informed him CM would talk to his brother to find out more info. He verbalized his understanding.     [facility] given to patient after informed about it;patient verbalized their understanding.     3:20-3:30 PM CM left voice message on pt's brothers' cell, Vinny, asking him to call CM to discuss pt's non-workng O2 equipment. CM called [facility], spoke to Lorena/STEPHAN [id number], & informed of above;Asked her if she could tell if [facility] was the provider? She checked & states [facility] is the supplier;She put in a service call for home O2 & will reach out to brother to discuss w/him & asks CM to have pt bring in his empty portable O2 tanks into his hospital room to be refilled. CM called pts brother, Vinny, on home phone, & he answered. CM discussed above. He verbalized his understanding & states he will have to get a ride to hospital w/the empty O2 tanks to bring them here to get refilled;He will work on getting a ride by tomorrow.        04/23/19 1310   Discharge Assessment   Assessment Type Discharge Planning Assessment   Confirmed/corrected address and phone number on facesheet? Yes   Assessment information obtained from? Patient;Medical Record   Expected Length of Stay (days)   (TBD/? 2-3)   Communicated expected length of stay with patient/caregiver no  (Per MD)   Prior to hospitilization cognitive status: Alert/Oriented;No Deficits   Prior to hospitalization functional status: Independent;Assistive Equipment   Current cognitive status: No Deficits;Alert/Oriented   Current Functional Status: Independent;Assistive Equipment;Needs Assistance   Facility Arrived From:   (N/A)   Lives With  sibling(s)  (Adult brother: Vinny)   Able to Return to Prior Arrangements yes   Is patient able to care for self after discharge? Yes   Who are your caregiver(s) and their phone number(s)?    (Vinny Harrington Brother     [phone]. Emergency contacts: 1. Asmita Harrington Sister     [phone], 2.Rosita Lopez Sister     [phone].  Jessica Pereyra Relative [phone]   )   Patient's perception of discharge disposition home or selfcare  (Patient wants HH.  PT/OT will be ordered to evaluate his needs. CM discussed w/him: If he is independent w/ADL, agile, he will not need HH, & w/having HB Mcaid, it is difficult to obtain HH or get an accepting HH Co. He may be able to get OP PT/OT. )   Readmission Within the Last 30 Days no previous admission in last 30 days   Patient currently being followed by outpatient case management? No   Patient currently receives any other outside agency services? No   Equipment Currently Used at Home nebulizer;oxygen  (Patient state his O2 machine motor must have burned up for it had not worked for 2 months. His brother confirms his portable O2 tanks x2 are empty. See CM note regarding [facility] to assist w/replacement. Nebs prn. )   Do you have any problems affording any of your prescribed medications? No   Is the patient taking medications as prescribed? yes   Does the patient have transportation home? Yes   Transportation Anticipated health plan transportation  (Medicaid)   Dialysis Name and Scheduled days   (N/A)   Does the patient receive services at the [facility]? No   Discharge Plan A Home with family   Discharge Plan B Home with family  (? OP PT/OT pending PT/OT recs)   DME Needed Upon Discharge    (TBD)   Patient/Family in Agreement with Plan yes

## 2019-04-24 ENCOUNTER — ANESTHESIA (OUTPATIENT)
Dept: SURGERY | Facility: HOSPITAL | Age: 54
DRG: 907 | End: 2019-04-24
Payer: MEDICAID

## 2019-04-24 ENCOUNTER — ANESTHESIA EVENT (OUTPATIENT)
Dept: SURGERY | Facility: HOSPITAL | Age: 54
DRG: 907 | End: 2019-04-24
Payer: MEDICAID

## 2019-04-24 LAB
ABO + RH BLD: NORMAL
ALBUMIN SERPL BCP-MCNC: 1.8 G/DL (ref 3.5–5.2)
ALBUMIN SERPL BCP-MCNC: 2.1 G/DL (ref 3.5–5.2)
ALBUMIN SERPL BCP-MCNC: 2.2 G/DL (ref 3.5–5.2)
ALLENS TEST: ABNORMAL
ALLENS TEST: NORMAL
ALLENS TEST: NORMAL
ALP SERPL-CCNC: 61 U/L (ref 55–135)
ALP SERPL-CCNC: 70 U/L (ref 55–135)
ALP SERPL-CCNC: 75 U/L (ref 55–135)
ALT SERPL W/O P-5'-P-CCNC: 13 U/L (ref 10–44)
ALT SERPL W/O P-5'-P-CCNC: 16 U/L (ref 10–44)
ALT SERPL W/O P-5'-P-CCNC: 17 U/L (ref 10–44)
ANION GAP SERPL CALC-SCNC: 4 MMOL/L (ref 8–16)
ANION GAP SERPL CALC-SCNC: 5 MMOL/L (ref 8–16)
ANION GAP SERPL CALC-SCNC: 6 MMOL/L (ref 8–16)
ANISOCYTOSIS BLD QL SMEAR: SLIGHT
ANISOCYTOSIS BLD QL SMEAR: SLIGHT
APTT BLDCRRT: 23.9 SEC (ref 21–32)
APTT BLDCRRT: 29.3 SEC (ref 21–32)
AST SERPL-CCNC: 19 U/L (ref 10–40)
AST SERPL-CCNC: 19 U/L (ref 10–40)
AST SERPL-CCNC: 23 U/L (ref 10–40)
BASOPHILS # BLD AUTO: 0.02 K/UL (ref 0–0.2)
BASOPHILS # BLD AUTO: 0.02 K/UL (ref 0–0.2)
BASOPHILS # BLD AUTO: 0.03 K/UL (ref 0–0.2)
BASOPHILS # BLD AUTO: 0.04 K/UL (ref 0–0.2)
BASOPHILS # BLD AUTO: 0.09 K/UL (ref 0–0.2)
BASOPHILS NFR BLD: 0.1 % (ref 0–1.9)
BASOPHILS NFR BLD: 0.2 % (ref 0–1.9)
BILIRUB DIRECT SERPL-MCNC: 0.4 MG/DL (ref 0.1–0.3)
BILIRUB SERPL-MCNC: 0.6 MG/DL (ref 0.1–1)
BILIRUB SERPL-MCNC: 0.7 MG/DL (ref 0.1–1)
BILIRUB SERPL-MCNC: 0.8 MG/DL (ref 0.1–1)
BLD GP AB SCN CELLS X3 SERPL QL: NORMAL
BLD PROD TYP BPU: NORMAL
BLOOD UNIT EXPIRATION DATE: NORMAL
BLOOD UNIT TYPE CODE: 8400
BLOOD UNIT TYPE: NORMAL
BUN SERPL-MCNC: 12 MG/DL (ref 6–20)
BUN SERPL-MCNC: 14 MG/DL (ref 6–20)
BUN SERPL-MCNC: 17 MG/DL (ref 6–20)
BUN SERPL-MCNC: 19 MG/DL (ref 6–20)
BUN SERPL-MCNC: 21 MG/DL (ref 6–20)
CALCIUM SERPL-MCNC: 7.3 MG/DL (ref 8.7–10.5)
CALCIUM SERPL-MCNC: 7.6 MG/DL (ref 8.7–10.5)
CALCIUM SERPL-MCNC: 7.7 MG/DL (ref 8.7–10.5)
CALCIUM SERPL-MCNC: 8 MG/DL (ref 8.7–10.5)
CALCIUM SERPL-MCNC: 8.4 MG/DL (ref 8.7–10.5)
CHLORIDE SERPL-SCNC: 105 MMOL/L (ref 95–110)
CHLORIDE SERPL-SCNC: 107 MMOL/L (ref 95–110)
CHLORIDE SERPL-SCNC: 108 MMOL/L (ref 95–110)
CHLORIDE SERPL-SCNC: 108 MMOL/L (ref 95–110)
CHLORIDE SERPL-SCNC: 109 MMOL/L (ref 95–110)
CO2 SERPL-SCNC: 25 MMOL/L (ref 23–29)
CO2 SERPL-SCNC: 25 MMOL/L (ref 23–29)
CO2 SERPL-SCNC: 27 MMOL/L (ref 23–29)
CO2 SERPL-SCNC: 29 MMOL/L (ref 23–29)
CO2 SERPL-SCNC: 30 MMOL/L (ref 23–29)
CODING SYSTEM: NORMAL
CREAT SERPL-MCNC: 1.1 MG/DL (ref 0.5–1.4)
CREAT SERPL-MCNC: 1.5 MG/DL (ref 0.5–1.4)
CREAT SERPL-MCNC: 1.7 MG/DL (ref 0.5–1.4)
DELSYS: ABNORMAL
DELSYS: NORMAL
DELSYS: NORMAL
DIFFERENTIAL METHOD: ABNORMAL
DISPENSE STATUS: NORMAL
EOSINOPHIL # BLD AUTO: 0 K/UL (ref 0–0.5)
EOSINOPHIL # BLD AUTO: 0.1 K/UL (ref 0–0.5)
EOSINOPHIL # BLD AUTO: 0.1 K/UL (ref 0–0.5)
EOSINOPHIL NFR BLD: 0.1 % (ref 0–8)
EOSINOPHIL NFR BLD: 0.6 % (ref 0–8)
EOSINOPHIL NFR BLD: 0.6 % (ref 0–8)
ERYTHROCYTE [DISTWIDTH] IN BLOOD BY AUTOMATED COUNT: 12.6 % (ref 11.5–14.5)
ERYTHROCYTE [DISTWIDTH] IN BLOOD BY AUTOMATED COUNT: 12.8 % (ref 11.5–14.5)
ERYTHROCYTE [DISTWIDTH] IN BLOOD BY AUTOMATED COUNT: 13 % (ref 11.5–14.5)
ERYTHROCYTE [DISTWIDTH] IN BLOOD BY AUTOMATED COUNT: 13.7 % (ref 11.5–14.5)
ERYTHROCYTE [DISTWIDTH] IN BLOOD BY AUTOMATED COUNT: 13.8 % (ref 11.5–14.5)
ERYTHROCYTE [DISTWIDTH] IN BLOOD BY AUTOMATED COUNT: 14 % (ref 11.5–14.5)
ERYTHROCYTE [DISTWIDTH] IN BLOOD BY AUTOMATED COUNT: 14 % (ref 11.5–14.5)
ERYTHROCYTE [SEDIMENTATION RATE] IN BLOOD BY WESTERGREN METHOD: 20 MM/H
EST. GFR  (AFRICAN AMERICAN): 51.7 ML/MIN/1.73 M^2
EST. GFR  (AFRICAN AMERICAN): >60 ML/MIN/1.73 M^2
EST. GFR  (NON AFRICAN AMERICAN): 44.7 ML/MIN/1.73 M^2
EST. GFR  (NON AFRICAN AMERICAN): 52 ML/MIN/1.73 M^2
EST. GFR  (NON AFRICAN AMERICAN): >60 ML/MIN/1.73 M^2
FIO2: 100
FIO2: 40
FIO2: 70
FIO2: 90
FLOW: 15
FLOW: 6
FLOW: 6
GLUCOSE SERPL-MCNC: 111 MG/DL (ref 70–110)
GLUCOSE SERPL-MCNC: 114 MG/DL (ref 70–110)
GLUCOSE SERPL-MCNC: 115 MG/DL (ref 70–110)
GLUCOSE SERPL-MCNC: 120 MG/DL (ref 70–110)
GLUCOSE SERPL-MCNC: 120 MG/DL (ref 70–110)
HCO3 UR-SCNC: 25.8 MMOL/L (ref 24–28)
HCO3 UR-SCNC: 26 MMOL/L (ref 24–28)
HCO3 UR-SCNC: 26.1 MMOL/L (ref 24–28)
HCO3 UR-SCNC: 27.8 MMOL/L (ref 24–28)
HCO3 UR-SCNC: 27.8 MMOL/L (ref 24–28)
HCO3 UR-SCNC: 27.9 MMOL/L (ref 24–28)
HCO3 UR-SCNC: 28.1 MMOL/L (ref 24–28)
HCT VFR BLD AUTO: 26 % (ref 40–54)
HCT VFR BLD AUTO: 26.9 % (ref 40–54)
HCT VFR BLD AUTO: 28.4 % (ref 40–54)
HCT VFR BLD AUTO: 28.4 % (ref 40–54)
HCT VFR BLD AUTO: 28.5 % (ref 40–54)
HCT VFR BLD AUTO: 29.1 % (ref 40–54)
HCT VFR BLD AUTO: 29.1 % (ref 40–54)
HCT VFR BLD CALC: 18 %PCV (ref 36–54)
HCT VFR BLD CALC: 20 %PCV (ref 36–54)
HCT VFR BLD CALC: 26 %PCV (ref 36–54)
HCT VFR BLD CALC: 28 %PCV (ref 36–54)
HGB BLD-MCNC: 8.1 G/DL (ref 14–18)
HGB BLD-MCNC: 8.8 G/DL (ref 14–18)
HGB BLD-MCNC: 9.1 G/DL (ref 14–18)
HGB BLD-MCNC: 9.3 G/DL (ref 14–18)
HGB BLD-MCNC: 9.3 G/DL (ref 14–18)
HGB BLD-MCNC: 9.4 G/DL (ref 14–18)
HGB BLD-MCNC: 9.5 G/DL (ref 14–18)
HYPOCHROMIA BLD QL SMEAR: ABNORMAL
HYPOCHROMIA BLD QL SMEAR: ABNORMAL
IMM GRANULOCYTES # BLD AUTO: 0.06 K/UL (ref 0–0.04)
IMM GRANULOCYTES # BLD AUTO: 0.06 K/UL (ref 0–0.04)
IMM GRANULOCYTES # BLD AUTO: 0.09 K/UL (ref 0–0.04)
IMM GRANULOCYTES # BLD AUTO: 0.09 K/UL (ref 0–0.04)
IMM GRANULOCYTES # BLD AUTO: 0.17 K/UL (ref 0–0.04)
IMM GRANULOCYTES # BLD AUTO: 0.35 K/UL (ref 0–0.04)
IMM GRANULOCYTES # BLD AUTO: 0.37 K/UL (ref 0–0.04)
IMM GRANULOCYTES NFR BLD AUTO: 0.4 % (ref 0–0.5)
IMM GRANULOCYTES NFR BLD AUTO: 0.4 % (ref 0–0.5)
IMM GRANULOCYTES NFR BLD AUTO: 0.5 % (ref 0–0.5)
IMM GRANULOCYTES NFR BLD AUTO: 0.5 % (ref 0–0.5)
IMM GRANULOCYTES NFR BLD AUTO: 0.7 % (ref 0–0.5)
IMM GRANULOCYTES NFR BLD AUTO: 1 % (ref 0–0.5)
IMM GRANULOCYTES NFR BLD AUTO: 1.2 % (ref 0–0.5)
INR PPP: 1 (ref 0.8–1.2)
INR PPP: 1 (ref 0.8–1.2)
LACTATE SERPL-SCNC: 0.9 MMOL/L (ref 0.5–2.2)
LDH SERPL L TO P-CCNC: 1 MMOL/L (ref 0.5–2.2)
LDH SERPL L TO P-CCNC: 1.09 MMOL/L (ref 0.36–1.25)
LYMPHOCYTES # BLD AUTO: 1.7 K/UL (ref 1–4.8)
LYMPHOCYTES # BLD AUTO: 1.7 K/UL (ref 1–4.8)
LYMPHOCYTES # BLD AUTO: 1.9 K/UL (ref 1–4.8)
LYMPHOCYTES # BLD AUTO: 2.1 K/UL (ref 1–4.8)
LYMPHOCYTES # BLD AUTO: 2.5 K/UL (ref 1–4.8)
LYMPHOCYTES # BLD AUTO: 2.5 K/UL (ref 1–4.8)
LYMPHOCYTES # BLD AUTO: 3.1 K/UL (ref 1–4.8)
LYMPHOCYTES NFR BLD: 11 % (ref 18–48)
LYMPHOCYTES NFR BLD: 11.8 % (ref 18–48)
LYMPHOCYTES NFR BLD: 13.5 % (ref 18–48)
LYMPHOCYTES NFR BLD: 13.5 % (ref 18–48)
LYMPHOCYTES NFR BLD: 6.7 % (ref 18–48)
LYMPHOCYTES NFR BLD: 8.7 % (ref 18–48)
LYMPHOCYTES NFR BLD: 9.4 % (ref 18–48)
MAGNESIUM SERPL-MCNC: 1.8 MG/DL (ref 1.6–2.6)
MAGNESIUM SERPL-MCNC: 2.1 MG/DL (ref 1.6–2.6)
MCH RBC QN AUTO: 29.2 PG (ref 27–31)
MCH RBC QN AUTO: 29.8 PG (ref 27–31)
MCH RBC QN AUTO: 29.9 PG (ref 27–31)
MCH RBC QN AUTO: 30.1 PG (ref 27–31)
MCH RBC QN AUTO: 30.1 PG (ref 27–31)
MCH RBC QN AUTO: 30.6 PG (ref 27–31)
MCH RBC QN AUTO: 31.3 PG (ref 27–31)
MCHC RBC AUTO-ENTMCNC: 31.2 G/DL (ref 32–36)
MCHC RBC AUTO-ENTMCNC: 31.9 G/DL (ref 32–36)
MCHC RBC AUTO-ENTMCNC: 32 G/DL (ref 32–36)
MCHC RBC AUTO-ENTMCNC: 32 G/DL (ref 32–36)
MCHC RBC AUTO-ENTMCNC: 32.7 G/DL (ref 32–36)
MCHC RBC AUTO-ENTMCNC: 33.1 G/DL (ref 32–36)
MCHC RBC AUTO-ENTMCNC: 33.5 G/DL (ref 32–36)
MCV RBC AUTO: 92 FL (ref 82–98)
MCV RBC AUTO: 93 FL (ref 82–98)
MCV RBC AUTO: 94 FL (ref 82–98)
MODE: ABNORMAL
MODE: NORMAL
MODE: NORMAL
MONOCYTES # BLD AUTO: 1 K/UL (ref 0.3–1)
MONOCYTES # BLD AUTO: 1.1 K/UL (ref 0.3–1)
MONOCYTES # BLD AUTO: 1.7 K/UL (ref 0.3–1)
MONOCYTES # BLD AUTO: 1.7 K/UL (ref 0.3–1)
MONOCYTES NFR BLD: 4.5 % (ref 4–15)
MONOCYTES NFR BLD: 4.8 % (ref 4–15)
MONOCYTES NFR BLD: 5.7 % (ref 4–15)
MONOCYTES NFR BLD: 6.2 % (ref 4–15)
MONOCYTES NFR BLD: 6.2 % (ref 4–15)
MONOCYTES NFR BLD: 7 % (ref 4–15)
MONOCYTES NFR BLD: 7.9 % (ref 4–15)
NEUTROPHILS # BLD AUTO: 11.3 K/UL (ref 1.8–7.7)
NEUTROPHILS # BLD AUTO: 12.8 K/UL (ref 1.8–7.7)
NEUTROPHILS # BLD AUTO: 14.6 K/UL (ref 1.8–7.7)
NEUTROPHILS # BLD AUTO: 14.6 K/UL (ref 1.8–7.7)
NEUTROPHILS # BLD AUTO: 19.3 K/UL (ref 1.8–7.7)
NEUTROPHILS # BLD AUTO: 25 K/UL (ref 1.8–7.7)
NEUTROPHILS # BLD AUTO: 30.8 K/UL (ref 1.8–7.7)
NEUTROPHILS NFR BLD: 79 % (ref 38–73)
NEUTROPHILS NFR BLD: 79 % (ref 38–73)
NEUTROPHILS NFR BLD: 79.7 % (ref 38–73)
NEUTROPHILS NFR BLD: 81.4 % (ref 38–73)
NEUTROPHILS NFR BLD: 85.2 % (ref 38–73)
NEUTROPHILS NFR BLD: 85.2 % (ref 38–73)
NEUTROPHILS NFR BLD: 86.2 % (ref 38–73)
NRBC BLD-RTO: 0 /100 WBC
OVALOCYTES BLD QL SMEAR: ABNORMAL
OVALOCYTES BLD QL SMEAR: ABNORMAL
PCO2 BLDA: 51.7 MMHG (ref 35–45)
PCO2 BLDA: 52.4 MMHG (ref 35–45)
PCO2 BLDA: 55.3 MMHG (ref 35–45)
PCO2 BLDA: 57.1 MMHG (ref 35–45)
PCO2 BLDA: 58.2 MMHG (ref 35–45)
PCO2 BLDA: 70.3 MMHG (ref 35–45)
PCO2 BLDA: 76.5 MMHG (ref 35–45)
PEEP: 5
PH SMN: 7.17 [PH] (ref 7.35–7.45)
PH SMN: 7.18 [PH] (ref 7.35–7.45)
PH SMN: 7.29 [PH] (ref 7.35–7.45)
PH SMN: 7.3 [PH] (ref 7.35–7.45)
PH SMN: 7.3 [PH] (ref 7.35–7.45)
PH SMN: 7.31 [PH] (ref 7.35–7.45)
PH SMN: 7.31 [PH] (ref 7.35–7.45)
PHOSPHATE SERPL-MCNC: 4.4 MG/DL (ref 2.7–4.5)
PHOSPHATE SERPL-MCNC: 4.9 MG/DL (ref 2.7–4.5)
PLATELET # BLD AUTO: 118 K/UL (ref 150–350)
PLATELET # BLD AUTO: 127 K/UL (ref 150–350)
PLATELET # BLD AUTO: 148 K/UL (ref 150–350)
PLATELET # BLD AUTO: 158 K/UL (ref 150–350)
PLATELET # BLD AUTO: 158 K/UL (ref 150–350)
PLATELET # BLD AUTO: 180 K/UL (ref 150–350)
PLATELET # BLD AUTO: 195 K/UL (ref 150–350)
PMV BLD AUTO: 10.2 FL (ref 9.2–12.9)
PMV BLD AUTO: 10.2 FL (ref 9.2–12.9)
PMV BLD AUTO: 10.3 FL (ref 9.2–12.9)
PMV BLD AUTO: 10.4 FL (ref 9.2–12.9)
PMV BLD AUTO: 10.6 FL (ref 9.2–12.9)
PMV BLD AUTO: 10.8 FL (ref 9.2–12.9)
PMV BLD AUTO: 9.9 FL (ref 9.2–12.9)
PO2 BLDA: 167 MMHG (ref 80–100)
PO2 BLDA: 203 MMHG (ref 80–100)
PO2 BLDA: 34 MMHG (ref 40–60)
PO2 BLDA: 34 MMHG (ref 80–100)
PO2 BLDA: 63 MMHG (ref 80–100)
PO2 BLDA: 72 MMHG (ref 80–100)
PO2 BLDA: 98 MMHG (ref 80–100)
POC BE: -1 MMOL/L
POC BE: -2 MMOL/L
POC BE: 0 MMOL/L
POC BE: 0 MMOL/L
POC BE: 1 MMOL/L
POC BE: 1 MMOL/L
POC BE: 2 MMOL/L
POC IONIZED CALCIUM: 1.14 MMOL/L (ref 1.06–1.42)
POC IONIZED CALCIUM: 1.18 MMOL/L (ref 1.06–1.42)
POC IONIZED CALCIUM: 1.2 MMOL/L (ref 1.06–1.42)
POC IONIZED CALCIUM: 1.2 MMOL/L (ref 1.06–1.42)
POC SATURATED O2: 100 % (ref 95–100)
POC SATURATED O2: 48 % (ref 95–100)
POC SATURATED O2: 57 % (ref 95–100)
POC SATURATED O2: 89 % (ref 95–100)
POC SATURATED O2: 92 % (ref 95–100)
POC SATURATED O2: 97 % (ref 95–100)
POC SATURATED O2: 99 % (ref 95–100)
POC TCO2: 27 MMOL/L (ref 23–27)
POC TCO2: 28 MMOL/L (ref 23–27)
POC TCO2: 28 MMOL/L (ref 23–27)
POC TCO2: 29 MMOL/L (ref 23–27)
POC TCO2: 30 MMOL/L (ref 23–27)
POC TCO2: 30 MMOL/L (ref 23–27)
POC TCO2: 30 MMOL/L (ref 24–29)
POCT GLUCOSE: 115 MG/DL (ref 70–110)
POCT GLUCOSE: 118 MG/DL (ref 70–110)
POCT GLUCOSE: 119 MG/DL (ref 70–110)
POCT GLUCOSE: 127 MG/DL (ref 70–110)
POCT GLUCOSE: 139 MG/DL (ref 70–110)
POCT GLUCOSE: 94 MG/DL (ref 70–110)
POIKILOCYTOSIS BLD QL SMEAR: SLIGHT
POIKILOCYTOSIS BLD QL SMEAR: SLIGHT
POLYCHROMASIA BLD QL SMEAR: ABNORMAL
POLYCHROMASIA BLD QL SMEAR: ABNORMAL
POTASSIUM BLD-SCNC: 5.1 MMOL/L (ref 3.5–5.1)
POTASSIUM BLD-SCNC: 5.2 MMOL/L (ref 3.5–5.1)
POTASSIUM BLD-SCNC: 5.4 MMOL/L (ref 3.5–5.1)
POTASSIUM BLD-SCNC: 5.5 MMOL/L (ref 3.5–5.1)
POTASSIUM SERPL-SCNC: 5 MMOL/L (ref 3.5–5.1)
POTASSIUM SERPL-SCNC: 5.1 MMOL/L (ref 3.5–5.1)
POTASSIUM SERPL-SCNC: 5.1 MMOL/L (ref 3.5–5.1)
POTASSIUM SERPL-SCNC: 5.3 MMOL/L (ref 3.5–5.1)
POTASSIUM SERPL-SCNC: 5.7 MMOL/L (ref 3.5–5.1)
PROT SERPL-MCNC: 4.3 G/DL (ref 6–8.4)
PROT SERPL-MCNC: 5 G/DL (ref 6–8.4)
PROT SERPL-MCNC: 5.5 G/DL (ref 6–8.4)
PROTHROMBIN TIME: 10 SEC (ref 9–12.5)
PROTHROMBIN TIME: 10.5 SEC (ref 9–12.5)
RBC # BLD AUTO: 2.77 M/UL (ref 4.6–6.2)
RBC # BLD AUTO: 2.94 M/UL (ref 4.6–6.2)
RBC # BLD AUTO: 3.04 M/UL (ref 4.6–6.2)
RBC # BLD AUTO: 3.05 M/UL (ref 4.6–6.2)
RBC # BLD AUTO: 3.07 M/UL (ref 4.6–6.2)
RBC # BLD AUTO: 3.09 M/UL (ref 4.6–6.2)
RBC # BLD AUTO: 3.09 M/UL (ref 4.6–6.2)
SAMPLE: ABNORMAL
SAMPLE: NORMAL
SAMPLE: NORMAL
SITE: ABNORMAL
SITE: NORMAL
SITE: NORMAL
SODIUM BLD-SCNC: 139 MMOL/L (ref 136–145)
SODIUM BLD-SCNC: 139 MMOL/L (ref 136–145)
SODIUM BLD-SCNC: 140 MMOL/L (ref 136–145)
SODIUM BLD-SCNC: 141 MMOL/L (ref 136–145)
SODIUM SERPL-SCNC: 138 MMOL/L (ref 136–145)
SODIUM SERPL-SCNC: 139 MMOL/L (ref 136–145)
SODIUM SERPL-SCNC: 140 MMOL/L (ref 136–145)
SODIUM SERPL-SCNC: 140 MMOL/L (ref 136–145)
SODIUM SERPL-SCNC: 141 MMOL/L (ref 136–145)
SP02: 100
SP02: 100
SP02: 94
SP02: 98
TRANS ERYTHROCYTES VOL PATIENT: NORMAL ML
VT: 410
VT: 450
VT: 450
WBC # BLD AUTO: 14.24 K/UL (ref 3.9–12.7)
WBC # BLD AUTO: 15.77 K/UL (ref 3.9–12.7)
WBC # BLD AUTO: 18.44 K/UL (ref 3.9–12.7)
WBC # BLD AUTO: 18.44 K/UL (ref 3.9–12.7)
WBC # BLD AUTO: 22.67 K/UL (ref 3.9–12.7)
WBC # BLD AUTO: 28.95 K/UL (ref 3.9–12.7)
WBC # BLD AUTO: 36.18 K/UL (ref 3.9–12.7)

## 2019-04-24 PROCEDURE — 82330 ASSAY OF CALCIUM: CPT

## 2019-04-24 PROCEDURE — 93010 ELECTROCARDIOGRAM REPORT: CPT | Mod: ,,, | Performed by: INTERNAL MEDICINE

## 2019-04-24 PROCEDURE — 85730 THROMBOPLASTIN TIME PARTIAL: CPT | Mod: 91

## 2019-04-24 PROCEDURE — 83605 ASSAY OF LACTIC ACID: CPT

## 2019-04-24 PROCEDURE — 25000003 PHARM REV CODE 250: Performed by: STUDENT IN AN ORGANIZED HEALTH CARE EDUCATION/TRAINING PROGRAM

## 2019-04-24 PROCEDURE — 25000003 PHARM REV CODE 250: Performed by: SURGERY

## 2019-04-24 PROCEDURE — 86920 COMPATIBILITY TEST SPIN: CPT

## 2019-04-24 PROCEDURE — 80076 HEPATIC FUNCTION PANEL: CPT

## 2019-04-24 PROCEDURE — 25000242 PHARM REV CODE 250 ALT 637 W/ HCPCS: Performed by: STUDENT IN AN ORGANIZED HEALTH CARE EDUCATION/TRAINING PROGRAM

## 2019-04-24 PROCEDURE — 99291 CRITICAL CARE FIRST HOUR: CPT | Mod: 24,,, | Performed by: SURGERY

## 2019-04-24 PROCEDURE — 83735 ASSAY OF MAGNESIUM: CPT

## 2019-04-24 PROCEDURE — 99900035 HC TECH TIME PER 15 MIN (STAT)

## 2019-04-24 PROCEDURE — 94640 AIRWAY INHALATION TREATMENT: CPT

## 2019-04-24 PROCEDURE — 84100 ASSAY OF PHOSPHORUS: CPT

## 2019-04-24 PROCEDURE — 27200966 HC CLOSED SUCTION SYSTEM

## 2019-04-24 PROCEDURE — 10140 I&D HMTMA SEROMA/FLUID COLLJ: CPT | Mod: 51,58,, | Performed by: SURGERY

## 2019-04-24 PROCEDURE — 63600175 PHARM REV CODE 636 W HCPCS: Performed by: STUDENT IN AN ORGANIZED HEALTH CARE EDUCATION/TRAINING PROGRAM

## 2019-04-24 PROCEDURE — 63600175 PHARM REV CODE 636 W HCPCS: Performed by: SURGERY

## 2019-04-24 PROCEDURE — 99292 CRITICAL CARE ADDL 30 MIN: CPT | Mod: 24,,, | Performed by: SURGERY

## 2019-04-24 PROCEDURE — 36000709 HC OR TIME LEV III EA ADD 15 MIN: Performed by: SURGERY

## 2019-04-24 PROCEDURE — P9021 RED BLOOD CELLS UNIT: HCPCS

## 2019-04-24 PROCEDURE — 37000008 HC ANESTHESIA 1ST 15 MINUTES: Performed by: SURGERY

## 2019-04-24 PROCEDURE — 85014 HEMATOCRIT: CPT

## 2019-04-24 PROCEDURE — 84295 ASSAY OF SERUM SODIUM: CPT

## 2019-04-24 PROCEDURE — 10140 PR DRAINAGE OF HEMATOMA/FLUID: ICD-10-PCS | Mod: 51,58,, | Performed by: SURGERY

## 2019-04-24 PROCEDURE — 80053 COMPREHEN METABOLIC PANEL: CPT

## 2019-04-24 PROCEDURE — 80053 COMPREHEN METABOLIC PANEL: CPT | Mod: 91

## 2019-04-24 PROCEDURE — 49320 DIAG LAPARO SEPARATE PROC: CPT | Mod: 58,,, | Performed by: SURGERY

## 2019-04-24 PROCEDURE — 84132 ASSAY OF SERUM POTASSIUM: CPT

## 2019-04-24 PROCEDURE — D9220A PRA ANESTHESIA: Mod: ,,, | Performed by: ANESTHESIOLOGY

## 2019-04-24 PROCEDURE — 94667 MNPJ CHEST WALL 1ST: CPT

## 2019-04-24 PROCEDURE — 36000708 HC OR TIME LEV III 1ST 15 MIN: Performed by: SURGERY

## 2019-04-24 PROCEDURE — 86901 BLOOD TYPING SEROLOGIC RH(D): CPT

## 2019-04-24 PROCEDURE — 82565 ASSAY OF CREATININE: CPT

## 2019-04-24 PROCEDURE — 83735 ASSAY OF MAGNESIUM: CPT | Mod: 91

## 2019-04-24 PROCEDURE — 20000000 HC ICU ROOM

## 2019-04-24 PROCEDURE — C9113 INJ PANTOPRAZOLE SODIUM, VIA: HCPCS | Performed by: STUDENT IN AN ORGANIZED HEALTH CARE EDUCATION/TRAINING PROGRAM

## 2019-04-24 PROCEDURE — 63600175 PHARM REV CODE 636 W HCPCS

## 2019-04-24 PROCEDURE — D9220A PRA ANESTHESIA: ICD-10-PCS | Mod: ,,, | Performed by: ANESTHESIOLOGY

## 2019-04-24 PROCEDURE — 82800 BLOOD PH: CPT

## 2019-04-24 PROCEDURE — 99292 PR CRITICAL CARE, ADDL 30 MIN: ICD-10-PCS | Mod: 24,,, | Performed by: SURGERY

## 2019-04-24 PROCEDURE — 93005 ELECTROCARDIOGRAM TRACING: CPT

## 2019-04-24 PROCEDURE — 82803 BLOOD GASES ANY COMBINATION: CPT

## 2019-04-24 PROCEDURE — 85610 PROTHROMBIN TIME: CPT

## 2019-04-24 PROCEDURE — 80048 BASIC METABOLIC PNL TOTAL CA: CPT | Mod: 91

## 2019-04-24 PROCEDURE — 27000221 HC OXYGEN, UP TO 24 HOURS

## 2019-04-24 PROCEDURE — A4216 STERILE WATER/SALINE, 10 ML: HCPCS | Performed by: SURGERY

## 2019-04-24 PROCEDURE — 37000009 HC ANESTHESIA EA ADD 15 MINS: Performed by: SURGERY

## 2019-04-24 PROCEDURE — 85025 COMPLETE CBC W/AUTO DIFF WBC: CPT | Mod: 91

## 2019-04-24 PROCEDURE — 25000003 PHARM REV CODE 250

## 2019-04-24 PROCEDURE — 99291 PR CRITICAL CARE, E/M 30-74 MINUTES: ICD-10-PCS | Mod: 24,,, | Performed by: SURGERY

## 2019-04-24 PROCEDURE — 27201423 OPTIME MED/SURG SUP & DEVICES STERILE SUPPLY: Performed by: SURGERY

## 2019-04-24 PROCEDURE — 94668 MNPJ CHEST WALL SBSQ: CPT

## 2019-04-24 PROCEDURE — 99900026 HC AIRWAY MAINTENANCE (STAT)

## 2019-04-24 PROCEDURE — 93010 EKG 12-LEAD: ICD-10-PCS | Mod: ,,, | Performed by: INTERNAL MEDICINE

## 2019-04-24 PROCEDURE — 37799 UNLISTED PX VASCULAR SURGERY: CPT

## 2019-04-24 PROCEDURE — 49320 PR LAP,DIAGNOSTIC ABDOMEN: ICD-10-PCS | Mod: 58,,, | Performed by: SURGERY

## 2019-04-24 PROCEDURE — 94761 N-INVAS EAR/PLS OXIMETRY MLT: CPT

## 2019-04-24 PROCEDURE — 84100 ASSAY OF PHOSPHORUS: CPT | Mod: 91

## 2019-04-24 PROCEDURE — 94002 VENT MGMT INPAT INIT DAY: CPT

## 2019-04-24 RX ORDER — LIDOCAINE HYDROCHLORIDE 10 MG/ML
1 INJECTION, SOLUTION EPIDURAL; INFILTRATION; INTRACAUDAL; PERINEURAL ONCE
Status: DISCONTINUED | OUTPATIENT
Start: 2019-04-24 | End: 2019-04-25

## 2019-04-24 RX ORDER — PROPOFOL 10 MG/ML
INJECTION, EMULSION INTRAVENOUS
Status: COMPLETED
Start: 2019-04-24 | End: 2019-04-24

## 2019-04-24 RX ORDER — NOREPINEPHRINE BITARTRATE/D5W 4MG/250ML
0.02 PLASTIC BAG, INJECTION (ML) INTRAVENOUS CONTINUOUS
Status: DISCONTINUED | OUTPATIENT
Start: 2019-04-24 | End: 2019-04-25

## 2019-04-24 RX ORDER — LIDOCAINE HCL/PF 100 MG/5ML
SYRINGE (ML) INTRAVENOUS
Status: DISCONTINUED | OUTPATIENT
Start: 2019-04-24 | End: 2019-04-24

## 2019-04-24 RX ORDER — PROPOFOL 10 MG/ML
5 INJECTION, EMULSION INTRAVENOUS CONTINUOUS
Status: DISCONTINUED | OUTPATIENT
Start: 2019-04-24 | End: 2019-04-25

## 2019-04-24 RX ORDER — PANTOPRAZOLE SODIUM 40 MG/10ML
40 INJECTION, POWDER, LYOPHILIZED, FOR SOLUTION INTRAVENOUS DAILY
Status: DISCONTINUED | OUTPATIENT
Start: 2019-04-24 | End: 2019-04-26

## 2019-04-24 RX ORDER — LIDOCAINE HYDROCHLORIDE 10 MG/ML
1 INJECTION INFILTRATION; PERINEURAL ONCE
Status: COMPLETED | OUTPATIENT
Start: 2019-04-24 | End: 2019-04-24

## 2019-04-24 RX ORDER — SODIUM CHLORIDE 0.9 % (FLUSH) 0.9 %
10 SYRINGE (ML) INJECTION
Status: DISCONTINUED | OUTPATIENT
Start: 2019-04-24 | End: 2019-05-01 | Stop reason: HOSPADM

## 2019-04-24 RX ORDER — MAGNESIUM SULFATE HEPTAHYDRATE 40 MG/ML
2 INJECTION, SOLUTION INTRAVENOUS
Status: DISCONTINUED | OUTPATIENT
Start: 2019-04-24 | End: 2019-04-29

## 2019-04-24 RX ORDER — HEPARIN SODIUM 5000 [USP'U]/ML
5000 INJECTION, SOLUTION INTRAVENOUS; SUBCUTANEOUS EVERY 8 HOURS
Status: DISCONTINUED | OUTPATIENT
Start: 2019-04-25 | End: 2019-05-01 | Stop reason: HOSPADM

## 2019-04-24 RX ORDER — DEXTROSE MONOHYDRATE AND SODIUM CHLORIDE 5; .9 G/100ML; G/100ML
INJECTION, SOLUTION INTRAVENOUS CONTINUOUS
Status: DISCONTINUED | OUTPATIENT
Start: 2019-04-24 | End: 2019-04-26

## 2019-04-24 RX ORDER — FENTANYL CITRATE-0.9 % NACL/PF 10 MCG/ML
PLASTIC BAG, INJECTION (ML) INTRAVENOUS CONTINUOUS
Status: DISCONTINUED | OUTPATIENT
Start: 2019-04-24 | End: 2019-04-25

## 2019-04-24 RX ORDER — HYDROCODONE BITARTRATE AND ACETAMINOPHEN 500; 5 MG/1; MG/1
TABLET ORAL
Status: DISCONTINUED | OUTPATIENT
Start: 2019-04-24 | End: 2019-04-25

## 2019-04-24 RX ORDER — IPRATROPIUM BROMIDE AND ALBUTEROL SULFATE 2.5; .5 MG/3ML; MG/3ML
3 SOLUTION RESPIRATORY (INHALATION) EVERY 4 HOURS
Status: DISCONTINUED | OUTPATIENT
Start: 2019-04-24 | End: 2019-05-01 | Stop reason: HOSPADM

## 2019-04-24 RX ORDER — FENTANYL CITRATE 50 UG/ML
INJECTION, SOLUTION INTRAMUSCULAR; INTRAVENOUS
Status: DISCONTINUED | OUTPATIENT
Start: 2019-04-24 | End: 2019-04-24

## 2019-04-24 RX ORDER — HYDROCODONE BITARTRATE AND ACETAMINOPHEN 500; 5 MG/1; MG/1
TABLET ORAL
Status: DISCONTINUED | OUTPATIENT
Start: 2019-04-24 | End: 2019-05-01 | Stop reason: HOSPADM

## 2019-04-24 RX ORDER — POTASSIUM CHLORIDE 29.8 MG/ML
40 INJECTION INTRAVENOUS
Status: DISCONTINUED | OUTPATIENT
Start: 2019-04-24 | End: 2019-04-29

## 2019-04-24 RX ORDER — NOREPINEPHRINE BITARTRATE 1 MG/ML
INJECTION, SOLUTION INTRAVENOUS
Status: COMPLETED
Start: 2019-04-24 | End: 2019-04-24

## 2019-04-24 RX ORDER — SUCCINYLCHOLINE CHLORIDE 20 MG/ML
INJECTION INTRAMUSCULAR; INTRAVENOUS
Status: DISCONTINUED | OUTPATIENT
Start: 2019-04-24 | End: 2019-04-24

## 2019-04-24 RX ORDER — CEFAZOLIN SODIUM 1 G/3ML
INJECTION, POWDER, FOR SOLUTION INTRAMUSCULAR; INTRAVENOUS
Status: DISCONTINUED | OUTPATIENT
Start: 2019-04-24 | End: 2019-04-24

## 2019-04-24 RX ORDER — PHENYLEPHRINE HYDROCHLORIDE 10 MG/ML
INJECTION INTRAVENOUS
Status: DISCONTINUED | OUTPATIENT
Start: 2019-04-24 | End: 2019-04-24

## 2019-04-24 RX ORDER — PROPOFOL 10 MG/ML
VIAL (ML) INTRAVENOUS
Status: DISCONTINUED | OUTPATIENT
Start: 2019-04-24 | End: 2019-04-24

## 2019-04-24 RX ORDER — LIDOCAINE HYDROCHLORIDE 10 MG/ML
INJECTION INFILTRATION; PERINEURAL
Status: COMPLETED
Start: 2019-04-24 | End: 2019-04-24

## 2019-04-24 RX ORDER — ROCURONIUM BROMIDE 10 MG/ML
INJECTION, SOLUTION INTRAVENOUS
Status: DISCONTINUED | OUTPATIENT
Start: 2019-04-24 | End: 2019-04-24

## 2019-04-24 RX ORDER — BUPIVACAINE HYDROCHLORIDE 2.5 MG/ML
INJECTION, SOLUTION EPIDURAL; INFILTRATION; INTRACAUDAL
Status: DISCONTINUED | OUTPATIENT
Start: 2019-04-24 | End: 2019-04-24 | Stop reason: HOSPADM

## 2019-04-24 RX ADMIN — FENTANYL CITRATE 50 MCG: 50 INJECTION, SOLUTION INTRAMUSCULAR; INTRAVENOUS at 08:04

## 2019-04-24 RX ADMIN — ROCURONIUM BROMIDE 40 MG: 10 INJECTION, SOLUTION INTRAVENOUS at 08:04

## 2019-04-24 RX ADMIN — CLONIDINE HYDROCHLORIDE 0.3 MG: 0.2 TABLET ORAL at 12:04

## 2019-04-24 RX ADMIN — Medication: at 03:04

## 2019-04-24 RX ADMIN — DEXTROSE AND SODIUM CHLORIDE: 5; .9 INJECTION, SOLUTION INTRAVENOUS at 10:04

## 2019-04-24 RX ADMIN — LIDOCAINE HYDROCHLORIDE 1 ML: 10 INJECTION INFILTRATION; PERINEURAL at 06:04

## 2019-04-24 RX ADMIN — SODIUM CHLORIDE, SODIUM LACTATE, POTASSIUM CHLORIDE, AND CALCIUM CHLORIDE 1000 ML: .6; .31; .03; .02 INJECTION, SOLUTION INTRAVENOUS at 03:04

## 2019-04-24 RX ADMIN — Medication 0.12 MCG/KG/MIN: at 07:04

## 2019-04-24 RX ADMIN — IPRATROPIUM BROMIDE AND ALBUTEROL SULFATE 3 ML: .5; 3 SOLUTION RESPIRATORY (INHALATION) at 11:04

## 2019-04-24 RX ADMIN — PHENYLEPHRINE HYDROCHLORIDE 200 MCG: 10 INJECTION INTRAVENOUS at 08:04

## 2019-04-24 RX ADMIN — NOREPINEPHRINE BITARTRATE 4 MG: 1 INJECTION, SOLUTION, CONCENTRATE INTRAVENOUS at 07:04

## 2019-04-24 RX ADMIN — Medication 3 ML: at 06:04

## 2019-04-24 RX ADMIN — PROPOFOL 25 MCG/KG/MIN: 10 INJECTION, EMULSION INTRAVENOUS at 10:04

## 2019-04-24 RX ADMIN — Medication 3 ML: at 10:04

## 2019-04-24 RX ADMIN — NOREPINEPHRINE BITARTRATE 0.06 MCG/KG/MIN: 1 INJECTION, SOLUTION, CONCENTRATE INTRAVENOUS at 08:04

## 2019-04-24 RX ADMIN — SODIUM CHLORIDE 1000 ML: 0.9 INJECTION, SOLUTION INTRAVENOUS at 07:04

## 2019-04-24 RX ADMIN — SUCCINYLCHOLINE CHLORIDE 200 MG: 20 INJECTION, SOLUTION INTRAMUSCULAR; INTRAVENOUS at 08:04

## 2019-04-24 RX ADMIN — IPRATROPIUM BROMIDE AND ALBUTEROL SULFATE 3 ML: .5; 3 SOLUTION RESPIRATORY (INHALATION) at 03:04

## 2019-04-24 RX ADMIN — PANTOPRAZOLE SODIUM 40 MG: 40 INJECTION, POWDER, LYOPHILIZED, FOR SOLUTION INTRAVENOUS at 10:04

## 2019-04-24 RX ADMIN — DEXTROSE AND SODIUM CHLORIDE: 5; .9 INJECTION, SOLUTION INTRAVENOUS at 09:04

## 2019-04-24 RX ADMIN — Medication 0.06 MCG/KG/MIN: at 01:04

## 2019-04-24 RX ADMIN — CEFAZOLIN 2 G: 330 INJECTION, POWDER, FOR SOLUTION INTRAMUSCULAR; INTRAVENOUS at 08:04

## 2019-04-24 RX ADMIN — SODIUM CHLORIDE, SODIUM GLUCONATE, SODIUM ACETATE, POTASSIUM CHLORIDE, MAGNESIUM CHLORIDE, SODIUM PHOSPHATE, DIBASIC, AND POTASSIUM PHOSPHATE: .53; .5; .37; .037; .03; .012; .00082 INJECTION, SOLUTION INTRAVENOUS at 08:04

## 2019-04-24 RX ADMIN — PROPOFOL 30 MCG/KG/MIN: 10 INJECTION, EMULSION INTRAVENOUS at 03:04

## 2019-04-24 RX ADMIN — IPRATROPIUM BROMIDE AND ALBUTEROL SULFATE 3 ML: .5; 3 SOLUTION RESPIRATORY (INHALATION) at 07:04

## 2019-04-24 RX ADMIN — PROPOFOL 140 MG: 10 INJECTION, EMULSION INTRAVENOUS at 08:04

## 2019-04-24 RX ADMIN — PROPOFOL 30 MCG/KG/MIN: 10 INJECTION, EMULSION INTRAVENOUS at 09:04

## 2019-04-24 RX ADMIN — SODIUM CHLORIDE, SODIUM LACTATE, POTASSIUM CHLORIDE, AND CALCIUM CHLORIDE 1000 ML: .6; .31; .03; .02 INJECTION, SOLUTION INTRAVENOUS at 05:04

## 2019-04-24 RX ADMIN — PHENYLEPHRINE HYDROCHLORIDE 100 MCG: 10 INJECTION INTRAVENOUS at 08:04

## 2019-04-24 RX ADMIN — LIDOCAINE HYDROCHLORIDE 1 ML: 10 INJECTION, SOLUTION INFILTRATION; PERINEURAL at 06:04

## 2019-04-24 NOTE — PROGRESS NOTES
Patient has been stable since returning from OR. UOP remains WDL. Patient is currently intubated, continuing to wean FiO2. Pain managed with IV Fentanyl. Cardiac profile monitored via invasive device. Blood pressure support provided via Norepinephrine. Please see flowsheet data for full assessment details.

## 2019-04-24 NOTE — ANESTHESIA PREPROCEDURE EVALUATION
[facility]  Anesthesia Pre-Operative Evaluation         Patient Name: Ming Harrington  YOB: 1965  MRN: [id number]    SUBJECTIVE:     04/24/2019    Pre-operative evaluation for Procedure(s) (LRB):  LAPAROSCOPY, DIAGNOSTIC (N/A)  LAPAROTOMY, EXPLORATORY (N/A)    Ming Harrington is a 53 y.o. male with asthma, COPD, HTN, HFpEF (Echo: EF~60%), Hep C, and h/o IVDA now on methadone, schizophrenia and a recurrent ventral hernia who that was repaired on 4/22/19.  POD#2 rapid response called for hypotension.  Abdominal exam demonstrating distention.  Radial arterial line place.  Patient currently getting 2 units of blood.  Patient scheduled for class A exploratory laparoscopy versus laparotomy.  Patient currently on 0.02 of levophed for hemodynamic support.     LDA:        Percutaneous Central Line Insertion/Assessment - triple lumen  right internal jugular (Active)   Dressing dressing reinforced 4/23/2019  9:04 PM   Securement secured w/ sutures;catheter securement device utilized 4/23/2019  9:04 PM   Additional Site Signs no erythema;no warmth;no edema;no pain;no drainage;no palpable cord;no streak formation 4/23/2019  9:04 PM   Distal Patency/Care infusing;blood return present 4/23/2019  9:04 PM   Medial Patency/Care normal saline locked 4/23/2019  9:51 AM   Proximal Patency/Care normal saline locked 4/23/2019  9:51 AM   Line Interventions blood specimen obtained and sent to lab 4/23/2019  9:04 PM   Dressing Change Due 04/30/19 4/23/2019  9:04 PM   Daily Line Review Performed 4/23/2019  9:04 PM   Number of days:             Closed/Suction Drain 04/22/19 1910 Left Abdomen Bulb 10 Fr. (Active)   Site Description Unable to view 4/23/2019  9:04 PM   Dressing Type [place] 4/23/2019  9:04 PM   Dressing Status Intact 4/23/2019  9:04 PM   Dressing Intervention Dressing reinforced 4/23/2019  9:04 PM   Drainage Bloody 4/23/2019  9:04 PM   Status To bulb suction 4/23/2019  9:04 PM   Output (mL) 30 mL  4/24/2019  6:16 AM   Number of days: 1            Urethral Catheter 04/24/19 0547 (Active)   Output (mL) 275 mL 4/24/2019  6:16 AM   Number of days: 0       Previous airway:   Mask ventilation not attempted. Easy oral intubation with Alejandro 2, grade 1 view, 8.0 ETT secured at 22cm at the lip          Drips:    dextrose 5 % and 0.9 % NaCl with KCl 20 mEq 125 mL/hr at 04/23/19 2305    hydromorphone in 0.9 % NaCl 6 mg/30 ml      norepinephrine bitartrate-D5W 0.12 mcg/kg/min (04/24/19 0700)       Patient Active Problem List   Diagnosis    HTN (hypertension)    Opiate dependence    Hepatitis C virus infection    Umbilical hernia without obstruction and without gangrene    COPD exacerbation    Acute respiratory failure with hypercapnia    Drug withdrawal    Hyperkalemia    Shortness of breath    COPD with acute exacerbation    Asthma exacerbation in COPD    Acute exacerbation of COPD with asthma    Bacteremia    Severe asthma    CHF (congestive heart failure)    COPD (chronic obstructive pulmonary disease)    IV drug user    Generalized anxiety disorder    Ventral hernia       Review of patient's allergies indicates:   Allergen Reactions    Iodine and iodide containing products Anaphylaxis and Swelling     Facial swelling    Shellfish containing products Anaphylaxis             Compazine [prochlorperazine edisylate] Hallucinations       Current Inpatient Medications:   acetaminophen  1,000 mg Oral Q8H    albuterol-ipratropium  3 mL Nebulization Q6H    ARIPiprazole  10 mg Oral BID    fluticasone  2 spray Each Nare Daily    fluticasone-vilanterol  1 puff Inhalation Daily    gabapentin  300 mg Oral TID    ketorolac  30 mg Intravenous Q8H    lidocaine (PF) 10 mg/ml (1%)  1 mL Intradermal Once    methadone  90 mg Oral Daily    nicotine  1 patch Transdermal QAM    pantoprazole  40 mg Intravenous Daily    scopolamine  1 patch Transdermal Q3 Days    sodium chloride 0.9%  1,000 mL Intravenous  Once    sodium chloride 0.9%  3 mL Intravenous Q8H       No current facility-administered medications on file prior to encounter.      Current Outpatient Medications on File Prior to Encounter   Medication Sig Dispense Refill    albuterol (PROVENTIL/VENTOLIN HFA) 90 mcg/actuation inhaler Inhale 1-2 puffs into the lungs every 6 (six) hours as needed for Wheezing or Shortness of Breath. Rescue 18 g 5    ARIPiprazole (ABILIFY) 10 MG Tab Take 10 mg by mouth 2 (two) times daily.       ATROVENT HFA 17 mcg/actuation inhaler Inhale 2 puffs into the lungs every 4 to 6 hours as needed.       furosemide (LASIX) 40 MG tablet Take 1 tablet (40 mg total) by mouth once daily. (Patient taking differently: Take 20 mg by mouth 2 (two) times daily. Takes half of a 40 mg tablet (20 mg) twice daily) 60 tablet 3    ipratropium (ATROVENT) 0.02 % nebulizer solution Take 500 mcg by nebulization 4 (four) times daily as needed. Rescue       metFORMIN (GLUCOPHAGE) 500 MG tablet Take 500 mg by mouth daily with breakfast.       methadone (DOLOPHINE) 5 MG tablet Take 90 mg by mouth every morning.       senna-docusate 8.6-50 mg (PERICOLACE) 8.6-50 mg per tablet Take 1 tablet by mouth 2 (two) times daily. 30 tablet 11    clonazePAM (KLONOPIN) 1 MG tablet Take 2 mg by mouth 3 (three) times daily. Takes two 1 mg tablets (2 mg) three times daily      tiotropium bromide (SPIRIVA RESPIMAT) 2.5 mcg/actuation Mist Inhale 1 each into the lungs once daily. Controller (Patient taking differently: Inhale 1 each into the lungs every morning. Controller) 4 g 3       Past Surgical History:   Procedure Laterality Date    AMPUTATION      left hand tip of fingers    REPAIR, HERNIA, INCISIONAL, RECURRENT ( OPEN WITH MESH) N/A 4/22/2019    Performed by Kenyon [facility]awla MD at [facility] OR 2ND FLR    REPAIR, HERNIA, UMBILICAL, AGE 4 YEARS OR OLDER N/A 3/4/2013    Performed by Huber Matta MD at [facility] OR 2ND FLR    TRANSESOPHAGEAL ECHOCARDIOGRAM  (ERIS) N/A 10/4/2017    [facility]    UMBILICAL HERNIA REPAIR      UMBILICAL HERNIA REPAIR  2013    Recurrent.  By Dr. Matta       Social History     Socioeconomic History    Marital status: Single     Spouse name: Not on file    Number of children: Not on file    Years of education: Not on file    Highest education level: Not on file   Occupational History    Not on file   Social Needs    Financial resource strain: Not on file    Food insecurity:     Worry: Not on file     Inability: Not on file    Transportation needs:     Medical: Not on file     Non-medical: Not on file   Tobacco Use    Smoking status: Former Smoker     Packs/day: 0.50     Types: Cigarettes     Last attempt to quit: 2018     Years since quittin.7    Smokeless tobacco: Never Used   Substance and Sexual Activity    Alcohol use: No     Comment: never a heavy drinker, used to drink socially    Drug use: Yes     Types: IV, Heroin, Hydrocodone, Benzodiazepines, Marijuana     Comment: former marijuana use, h/o IVDA and intranasal drug use     Sexual activity: Never   Lifestyle    Physical activity:     Days per week: Not on file     Minutes per session: Not on file    Stress: Not on file   Relationships    Social connections:     Talks on phone: Not on file     Gets together: Not on file     Attends Restorationist service: Not on file     Active member of club or organization: Not on file     Attends meetings of clubs or organizations: Not on file     Relationship status: Not on file   Other Topics Concern    Not on file   Social History Narrative    Not on file       OBJECTIVE:     Vital Signs Range (Last 24H):  Temp:  [36.1 °C (97 °F)-37.2 °C (99 °F)]   Pulse:  []   Resp:  [15-26]   BP: ()/(39-82)   SpO2:  [90 %-99 %]       Significant Labs:  Lab Results   Component Value Date    WBC 15.77 (H) 2019    HGB 8.1 (L) 2019    HCT 20 (LL) 2019      04/24/2019    ALT 17 04/24/2019    AST 19 04/24/2019     04/24/2019    K 5.3 (H) 04/24/2019     04/24/2019    CREATININE 1.5 (H) 04/24/2019    BUN 14 04/24/2019    CO2 29 04/24/2019    TSH 0.100 (L) 12/02/2018    INR 1.0 04/09/2019    HGBA1C 4.8 02/28/2019       Diagnostic Studies:      Cardiac Studies:  EKG:   Vent. Rate : 094 BPM     Atrial Rate : 094 BPM     P-R Int : 154 ms          QRS Dur : 078 ms      QT Int : 390 ms       P-R-T Axes : 072 046 058 degrees     QTc Int : 487 ms    Normal sinus rhythm  Prolonged QT  Abnormal ECG  When compared with ECG of 14-MAR-2019 09:24,  Aberrant conduction is no longer Present  Incomplete right bundle branch block is no longer Present  QT has lengthened    Referred By: JANICE CARLIN           Confirmed By:     2D Echo:  · Study is positive for right to left shunt. The shunt may be intrapulmonary.  · Normal left ventricular systolic function. The estimated ejection fraction is 60%  · Concentric left ventricular remodeling.  · Normal right ventricular systolic function.  · Normal central venous pressure (3 mm Hg).  · No evidence of endocarditis.    ASSESSMENT/PLAN:     Anesthesia Evaluation      I have reviewed the Medications.   Steroids Taken In Past Year: Prednisone    Review of Systems  Anesthesia Hx:  No problems with previous Anesthesia Denies Hx of Anesthetic complications History of prior surgery of interest to airway management or planning: Previous anesthesia: General 2013: Umb. Hernia with general anesthesia.  Denies Family Hx of Anesthesia complications.   Denies Personal Hx of Anesthesia complications.   Social:  Non-Smoker  Tobacco Use:  of cigarette for 16 years, < 1/2 a pack per day Illicit Drug Use: (heroin, last used thursday) Types of drugs include Marijuana, IV drug use,  Abuses drugs:  drug use is in recovery.  Hematology/Oncology:     Oncology Normal    -- Denies Anemia:   EENT/Dental:EENT/Dental Normal   Cardiovascular:   Exercise  tolerance: poor Hypertension Denies CAD.     Denies Angina. CHF  Functional Capacity Can you climb two flights of stairs? ==> Yes  Congestive Heart Failure (CHF)  Hypertension    Pulmonary:   COPD, severe Asthma Denies Recent URI.  Denies Sleep Apnea. 2 admissions for asthma vs COPD exacerbations within the past month. Chronic Obstructive Pulmonary Disease (COPD): Inhaler use is rescue inhaler PRN.    Renal/:  Renal/ Normal     Hepatic/GI:   Denies PUD. Denies Hiatal Hernia.  Denies GERD. Denies Liver Disease. Hepatitis, C  Hernia, Umbilical Hernia Liver DiseaseHepatitis: C.    Neurological:  Neurology Normal  Denies CVA. Denies Seizures.    Endocrine:   Diabetes Denies Hypothyroidism.  Metabolic Disorders, Obesity / BMI > 30  Psych:   Psychiatric History (schizophrenia) anxiety  Psychotic Disorder and Schizophrenia.          Physical Exam  General:  Morbid Obesity    Airway/Jaw/Neck:  Airway Findings: Mouth Opening: Normal Tongue: Normal  General Airway Assessment: Adult  Mallampati: I  TM Distance: Normal, at least 6 cm  Jaw/Neck Findings:  Neck ROM: Normal ROM  Neck Findings:     Eyes/Ears/Nose:  EYES/EARS/NOSE FINDINGS: Normal   Dental:  Dental Findings: Edentulous   Chest/Lungs:  Chest/Lungs Findings: Expiratory Wheezes, Mild     Heart/Vascular:  Heart Findings: Rate: Normal  Rhythm: Regular Rhythm  Sounds: Normal  Vascular Findings: Normal       Mental Status:  Mental Status Findings:  Alert and Oriented         Anesthesia Plan  Type of Anesthesia, risks & benefits discussed:  Anesthesia Type:  general  Patient's Preference:   Intra-op Monitoring Plan: standard ASA monitors, arterial line and central line  Intra-op Monitoring Plan Comments:   Post Op Pain Control Plan: per primary service following discharge from PACU, IV/PO Opioids PRN and multimodal analgesia  Post Op Pain Control Plan Comments:   Induction:   IV  Beta Blocker:  Patient is not currently on a Beta-Blocker (No further documentation  required).       Informed Consent: Patient representative understands risks and agrees with Anesthesia plan.  Questions answered. Anesthesia consent signed with patient representative.  ASA Score: 3  emergent   Day of Surgery Review of History & Physical:    H&P update referred to the surgeon.     Anesthesia Plan Notes: Discussed with patient's sister that he will likely remain intubated and return to the ICU        Ready For Surgery From Anesthesia Perspective.

## 2019-04-24 NOTE — PROGRESS NOTES
Received pt to SICU room 02357 from OR. Pt placed on 980 vent with no complications. ETT secured at 24 lips. Will continue to monitor

## 2019-04-24 NOTE — PROGRESS NOTES
Pt arrived from OR to room 63693. Pt intubated, ETT secure, and pt placed on mechanical ventilation at documented settings. Will continue to monitor.

## 2019-04-24 NOTE — SIGNIFICANT EVENT
Patient POD2 open ventral hernia repair.  Overnight intern, Dr. Kwon initially called for abdominal distention. KUB done showing no fluid levels or sign of obstruction. Labs showed acute drop in H/H from 11.8/35.5 to 8.8/26.8. Stat repeat CBC drawn - 8.1/26, as well as ANNITA with Cr elevated to 1.5. Lactate wnl. Patient also with soft pressures (80 systolic) and HR in the 90s-100s despite 2L bolus of normal saline. Notably, given .3mg clonidine at 0039 which has also contributed to the hypotension. However, due to the acute drop in H/H, patient made NPO and 2 stat pRBC ordered and patient planned for transfer to the ICU for closer monitoring. Will get tejada placed for accurate I/os and resuscitation and consider radial a line once in the ICU. Discussed with both Dr. Romero and Dr. Chawla.

## 2019-04-24 NOTE — PROGRESS NOTES
[facility]  General Surgery  Progress Note    Subjective:       Post-Op Info:  Procedure(s) (LRB):  LAPAROSCOPY, DIAGNOSTIC (N/A)  EVACUATION, HEMATOMA  EXPLORATION, WOUND (N/A)   Day of Surgery     Interval History: Hypotension and falling Hgb overnight (see notes for details). Transfusing two units this morning and transfer to the ICU. Place Owen cath, A-line. Brought to OR for hematoma evacuation (1.8L) and ligation of bleeding vessel. Now, back in SICU.    Medications:  Continuous Infusions:   dextrose 5 % and 0.9 % NaCl with KCl 20 mEq 125 mL/hr at 04/23/19 2305    hydromorphone in 0.9 % NaCl 6 mg/30 ml       Scheduled Meds:   acetaminophen  1,000 mg Oral Q8H    albuterol-ipratropium  3 mL Nebulization Q6H    ARIPiprazole  10 mg Oral BID    enoxaparin  40 mg Subcutaneous Daily    fluticasone  2 spray Each Nare Daily    fluticasone-vilanterol  1 puff Inhalation Daily    gabapentin  300 mg Oral TID    ketorolac  30 mg Intravenous Q8H    lidocaine (PF) 10 mg/ml (1%)  1 mL Intradermal Once    lidocaine (PF) 10 mg/ml (1%)  1 mL Other Once    methadone  90 mg Oral Daily    nicotine  1 patch Transdermal QAM    scopolamine  1 patch Transdermal Q3 Days    sodium chloride 0.9%  3 mL Intravenous Q8H     PRN Meds:sodium chloride, sodium chloride, albuterol-ipratropium, dextrose 50%, glucagon (human recombinant), insulin aspart U-100, naloxone, ondansetron, promethazine (PHENERGAN) IVPB, ramelteon     Review of patient's allergies indicates:   Allergen Reactions    Iodine and iodide containing products Anaphylaxis and Swelling     Facial swelling    Shellfish containing products Anaphylaxis             Compazine [prochlorperazine edisylate] Hallucinations     Objective:     Vital Signs (Most Recent):  Temp: 97.9 °F (36.6 °C) (04/24/19 0315)  Pulse: 95 (04/24/19 0527)  Resp: 17 (04/24/19 0527)  BP: (!) 86/52 (04/24/19 0527)  SpO2: (!) 94 % (04/24/19 0527) Vital Signs (24h  Range):  Temp:  [97 °F (36.1 °C)-99 °F (37.2 °C)] 97.9 °F (36.6 °C)  Pulse:  [] 95  Resp:  [15-23] 17  SpO2:  [90 %-97 %] 94 %  BP: ()/(50-93) 86/52     Weight: 107 kg (236 lb)  Body mass index is 35.88 kg/m².    Intake/Output - Last 3 Shifts       04/22 0700 - 04/23 0659 04/23 0700 - 04/24 0659    P.O. 0 180    I.V. (mL/kg) 1353 (12.6)     Total Intake(mL/kg) 1353 (12.6) 180 (1.7)    Urine (mL/kg/hr) 825 200 (0.1)    Drains 185 150    Total Output 1010 350    Net +343 -170          Urine Occurrence 1 x           Physical Exam   Constitutional: He appears well-developed and well-nourished. No distress.   Cardiovascular: Normal rate and regular rhythm.   Pulmonary/Chest: Effort normal. No respiratory distress.   Abdominal:   Large abdominal mass protruding under incision   Musculoskeletal: He exhibits no edema.   Neurological: He is alert.   Skin: He is not diaphoretic.   Vitals reviewed.      Significant Labs:  CBC:   Recent Labs   Lab 04/24/19  1339   WBC 28.95*   RBC 3.04*   HGB 9.5*   HCT 28.4*   *   MCV 93   MCH 31.3*   MCHC 33.5     CMP:   Recent Labs   Lab 04/24/19  0656 04/24/19  0951   GLU  --  111*   CALCIUM  --  7.6*   ALBUMIN 1.8*  --    PROT 4.3*  --    NA  --  140   K  --  5.7*   CO2  --  27   CL  --  108   BUN  --  17   CREATININE  --  1.7*   ALKPHOS 61  --    ALT 13  --    AST 19  --    BILITOT 0.6  --        Significant Diagnostics:  I have reviewed all pertinent imaging results/findings within the past 24 hours.    Assessment/Plan:     Ventral hernia  Mr. Harrington is a 52 yo man who is s/p ventral hernia repair on 4/22/2019. Pain control issue overnight. Had N/V. On 3L NC.     Neuro:  Intubated and sedated  PCA once extubated  Multimodal pain control    Cardiac:  Norepi gtt, wean as tolerated  A-line    Pulm:  Intubated  AC control  Defer management to SICU    GI:   NPO  Pantoprazole ppx    Renal:  ANNITA due to hypotension  Cr 1.7 from 0.8  Monitor; should improve with  resuscitation    Heme/ID:  Bleeding stopped in the OR  4U PRBC transfused so far  Leukocytosis is likely from transfusion  Watch for signs of infection    Endocrine:  Insulin sliding scale      Dispo: wean vent and pressors        W Giuseppe Jacob MD  General Surgery  [facility]
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
## 2019-04-24 NOTE — ASSESSMENT & PLAN NOTE
" Patient is a 53 y.o. male presents with essential hypertension, pulmonary htn, BMI 37.25, COPD on home o2, hep C, not smoking (quit 3 months ago) and recurrent large umbilical hernia (times 2).  Per Dr. Wray's note 4/2017 he is above average risk for surgery.  Generally, he takes his advair daily, and only needs albuterol "when the weather changes," which is about 3 times a month.  Says he ambulated a quarter of a mile the other day without stopping.  Can ambulate 1 flight of stairs without stopping. Patient is s/p ventral hernia repair 4/22 with hypotension this am after receiving .3 of clonidine this am. His maps dropped to the 50s. He wsa given two  Boluses of LR and already had  A central line in place. The patient was not complaining of any pain when he was stepped up to the SICU on 4 liters NC with unstable pressures. Immediately, an arterial line was placed and 1 unit RBC was begun. He was also started on levo to .12. The patient's wbc count had climbed from 9 to 15 in one day. He's not currently on abx. His lovenox was stopped,last dose 4/23.       Neuro:   -Awake  -not on sedation  -patient was not alert and oriented, may have been secondary to pain medication.     Pulmonary:   -4 liters NC  -3 liters home o2 at baseline  -sating well mid 90s.  -trend blood gas  -Xray prn  -Duoneb prn  -acapella prn    Cardiac:   -Levo for maintaining maps greater than 65  -2 liter boluses LR on the floor, 1 liter NS in sicu 4/24  -2 units prbc 4/24    Renal:    - in 24 hrs  -Cr elevated to 1.5.   -agressively monitor renal status    ID:   -WBC up to 15 from 9  -Will consider broad spectrum abx for elevated wbc   -afebrile  -UA/cxr for possible wbc elevation outside of regular post op change     Hem/Onc:   -H and H decreased, 2 units transfused 4/24  -trend H and H q 4 hrs  -hold anticoag for now  -to OR today for ex lap    Endocrine:    -on metformin at home  -SSI here  -monitor " glucose    Fluids/Electrolytes/Nutrition/GI:   - Replace electrolytes PRN  - NPO  -  maintenace  - K+ elevated, ekg wnl. Will follow.     PPx:  - DVT: hold for now. Mechanical   - Bowel: NPO  - PUP: protonix      DISPO:  -Continue SICU care. Closely monitor H and H as he's HD unstable.

## 2019-04-24 NOTE — PROGRESS NOTES
Pt arrived from CTSU to SICU at 0600. SI resident, Dr. Robin MD @ bedside. Pt on 2L NC.  HR in 90s & MAP in low 60s. 30cc sanguinous output noted from RAZIA drain. Abdomen appeared distended, soft to touch, & noted to have oozing from midline incision. Pt lethargic. LR bolus noted to be almost completed. CVP: 1. Additional 1L NS bolus ordered while waiting for ordered blood. Pt's MAP dropped to 51, Levo gtt emergently started @ 0.12mcg/kg/min to achieve MAP >65. 2u RBC given to pt. Hct ran on Istat:18.  2u RBC ordered for standby. Anesthesia resident @ bedside for ART line placement. Aptt, INR, Hepartic panel, lactic, & CBC ordered. Flotrac monitor attached to pt, #s WNL. NGT inserted. Consent obtained by family over phone. Dr. Pleitez, Dr. Jacob, Dr. Samano, Dr. Kelly, & Dr. Hein @ bedside throughout. Unable to turn pt to complete skin assessment d/t pt being unstable.  Pt Class A to OR for EX Lap.

## 2019-04-24 NOTE — HPI
" Patient is a 53 y.o. male presents with essential hypertension, pulmonary htn, BMI 37.25, COPD on home o2, hep C, not smoking (quit 3 months ago) and recurrent large umbilical hernia (times 2).  Per Dr. Wray's note 4/2017 he is above average risk for surgery.  Generally, he takes his advair daily, and only needs albuterol "when the weather changes," which is about 3 times a month.  Says he ambulated a quarter of a mile the other day without stopping.  Can ambulate 1 flight of stairs without stopping. Patient is s/p ventral hernia repair 4/22 with hypotension this am after receiving .3 of clonidine this am. His maps dropped to the 50s. He wsa given two  Boluses of LR and already had  A central line in place. The patient was not complaining of any pain when he was stepped up to the SICU on 4 liters NC with unstable pressures. Immediately, an arterial line was placed and 1 unit RBC was begun. He was also started on levo to .12. The patient's wbc count had climbed from 9 to 15 in one day. He's not currently on abx. His lovenox was stopped,last dose 4/23.   "
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
## 2019-04-24 NOTE — SUBJECTIVE & OBJECTIVE
Interval History: Hypotension and falling Hgb overnight (see notes for details). Transfusing two units this morning and transfer to the ICU. Place Owen cath, A-line. Brought to OR for hematoma evacuation (1.8L) and ligation of bleeding vessel. Now, back in SICU.    Medications:  Continuous Infusions:   dextrose 5 % and 0.9 % NaCl with KCl 20 mEq 125 mL/hr at 04/23/19 2305    hydromorphone in 0.9 % NaCl 6 mg/30 ml       Scheduled Meds:   acetaminophen  1,000 mg Oral Q8H    albuterol-ipratropium  3 mL Nebulization Q6H    ARIPiprazole  10 mg Oral BID    enoxaparin  40 mg Subcutaneous Daily    fluticasone  2 spray Each Nare Daily    fluticasone-vilanterol  1 puff Inhalation Daily    gabapentin  300 mg Oral TID    ketorolac  30 mg Intravenous Q8H    lidocaine (PF) 10 mg/ml (1%)  1 mL Intradermal Once    lidocaine (PF) 10 mg/ml (1%)  1 mL Other Once    methadone  90 mg Oral Daily    nicotine  1 patch Transdermal QAM    scopolamine  1 patch Transdermal Q3 Days    sodium chloride 0.9%  3 mL Intravenous Q8H     PRN Meds:sodium chloride, sodium chloride, albuterol-ipratropium, dextrose 50%, glucagon (human recombinant), insulin aspart U-100, naloxone, ondansetron, promethazine (PHENERGAN) IVPB, ramelteon     Review of patient's allergies indicates:   Allergen Reactions    Iodine and iodide containing products Anaphylaxis and Swelling     Facial swelling    Shellfish containing products Anaphylaxis             Compazine [prochlorperazine edisylate] Hallucinations     Objective:     Vital Signs (Most Recent):  Temp: 97.9 °F (36.6 °C) (04/24/19 0315)  Pulse: 95 (04/24/19 0527)  Resp: 17 (04/24/19 0527)  BP: (!) 86/52 (04/24/19 0527)  SpO2: (!) 94 % (04/24/19 0527) Vital Signs (24h Range):  Temp:  [97 °F (36.1 °C)-99 °F (37.2 °C)] 97.9 °F (36.6 °C)  Pulse:  [] 95  Resp:  [15-23] 17  SpO2:  [90 %-97 %] 94 %  BP: ()/(50-93) 86/52     Weight: 107 kg (236 lb)  Body mass index is 35.88  kg/m².    Intake/Output - Last 3 Shifts       04/22 0700 - 04/23 0659 04/23 0700 - 04/24 0659    P.O. 0 180    I.V. (mL/kg) 1353 (12.6)     Total Intake(mL/kg) 1353 (12.6) 180 (1.7)    Urine (mL/kg/hr) 825 200 (0.1)    Drains 185 150    Total Output 1010 350    Net +343 -170          Urine Occurrence 1 x           Physical Exam   Constitutional: He appears well-developed and well-nourished. No distress.   Cardiovascular: Normal rate and regular rhythm.   Pulmonary/Chest: Effort normal. No respiratory distress.   Abdominal:   Large abdominal mass protruding under incision   Musculoskeletal: He exhibits no edema.   Neurological: He is alert.   Skin: He is not diaphoretic.   Vitals reviewed.      Significant Labs:  CBC:   Recent Labs   Lab 04/24/19  1339   WBC 28.95*   RBC 3.04*   HGB 9.5*   HCT 28.4*   *   MCV 93   MCH 31.3*   MCHC 33.5     CMP:   Recent Labs   Lab 04/24/19  0656 04/24/19  0951   GLU  --  111*   CALCIUM  --  7.6*   ALBUMIN 1.8*  --    PROT 4.3*  --    NA  --  140   K  --  5.7*   CO2  --  27   CL  --  108   BUN  --  17   CREATININE  --  1.7*   ALKPHOS 61  --    ALT 13  --    AST 19  --    BILITOT 0.6  --        Significant Diagnostics:  I have reviewed all pertinent imaging results/findings within the past 24 hours.

## 2019-04-24 NOTE — OP NOTE
DATE OF PROCEDURE: 04/24/2019     PREOPERATIVE DIAGNOSIS: Post operative bleeding    POSTOPERATIVE DIAGNOSIS: Subcutaneous bleeding and hematoma     PROCEDURE PERFORMED: 1. Diagnostic laparoscopy  2. Wound exploration  3. Hemostasis and evacuation of subcutaneous hematoma    ATTENDING SURGEON: Kenyon Chawla M.D.     HOUSESTAFF SURGEON: Dangelo Samano M.D. (RES)     ANESTHESIA: General endotracheal.     ESTIMATED BLOOD LOSS: 30 mL of active blood loss during surgery, ~1.8 L hematoma.     FINDINGS: No significant intraperitoneal bleed on diagnostic laparoscopy.  ~1.8 L of hematoma in subcutaneous layer.  Evacuated.  Two small, pulsatile bleeders, and a third brisk area stopped.  Irrigated until clear with antibiotics. Mary placed.  Drain upsized to 15 Fr.      SPECIMEN: None.     DRAINS: Upsized to 15 Fr.    COMPLICATIONS: None.     INDICATIONS: Ming Harrington is a 53 y.o. male s/p ventral hernia repair, who had hypotension overnight.  CBC showed decreasing Hb.  He is taken to the OR to assess for bleeding.    OPERATIVE PROCEDURE: The patient was identified in Preoperative Holding and  brought back to the Operating Room. Placed supine on the operating table and padded appropriately. Monitors were applied and there was smooth induction of general endotracheal anesthesia. The patient's abdomen was prepped and draped   in the standard sterile surgical fashion. A time-out was performed and all team   members present agreed this was the correct procedure on the correct patient.   We also confirmed administration of appropriate preoperative antibiotics.    A 5 mm incision was made in the RUQ.  An Optiview technique was used to gain access to the peritoneal cavity.  Abdomen was insufflated to 15 mm Hg.  There was no evidence of any intraperitoneal bleeding.  Therefore, gas was released, and attention turned to the subcutaneous layer.    The former midline incision was opened with the #10 scalpel and the Sheldahl  scissors.  1.8 L of hematoma was encountered in the subcutaneous layer.  This was evacuated.  After irrigation, two small, pulsatile bleeders, and a third brisk area were noted.  These areas were controlled with silk suture ligatures and the Bovie electrocautery.  No other areas were noted.    The wound was again irrigated, until clear, with antibiotic impregnated solution.  Mary was placed.  Brian drain was upsized to 15 Fr.      Skin was closed in two layers- with 3-0 Vicryl in a running subcutaneous layer and 4-0 Vicryl in subcuticular fashion.  The port site was closed with 4-0 plain catgut.  The drain was secured with 3-0 Nylon.    Dermabond was applied. The patient was transported to the ICU, remaining intubated, in critical but stable condition.  All sponge, instrument and needle counts were correct at the end of the case.    Dr. Chawla was present and scrubbed for the entirety of the procedure.

## 2019-04-24 NOTE — TRANSFER OF CARE
"Anesthesia Transfer of Care Note    Patient: Ming Harrington    Procedure(s) Performed: Procedure(s) (LRB):  LAPAROSCOPY, DIAGNOSTIC (N/A)  EVACUATION, HEMATOMA  EXPLORATION, WOUND (N/A)    Patient location: ICU    Anesthesia Type: general    Transport from OR: Continuous ECG monitoring in transport. Transported from OR intubated on 100% O2 by AMBU with assisted ventilation. Continuous SpO2 monitoring in transport. Continuos invasive BP monitoring in transport    Post pain: adequate analgesia    Post assessment: no apparent anesthetic complications    Post vital signs: stable    Level of consciousness: unresponsive    Nausea/Vomiting: no nausea/vomiting    Complications: none    Transfer of care protocol was followed      Last vitals:   Visit Vitals  BP (!) 111/57   Pulse 91   Temp 36.6 °C (97.8 °F) (Oral)   Resp 18   Ht 5' 8" (1.727 m)   Wt 107 kg (236 lb)   SpO2 100%   BMI 35.88 kg/m²     "

## 2019-04-24 NOTE — CARE UPDATE
Rapid Response Nurse Chart Check     Chart check completed, abnormal VS (BP 86/50 (58)) noted, bedside APRILN, Tessie contacted, 2 units PRBC ordered, spekaing with physician about POC. Instructed to call [phone] for further concerns or assistance.

## 2019-04-24 NOTE — ASSESSMENT & PLAN NOTE
Mr. Harrington is a 52 yo man who is s/p ventral hernia repair on 4/22/2019. Pain control issue overnight. Had N/V. On 3L NC.     Neuro:  Intubated and sedated  PCA once extubated  Multimodal pain control    Cardiac:  Norepi gtt, wean as tolerated  A-line    Pulm:  Intubated  AC control  Defer management to SICU    GI:   NPO  Pantoprazole ppx    Renal:  ANNITA due to hypotension  Cr 1.7 from 0.8  Monitor; should improve with resuscitation    Heme/ID:  Bleeding stopped in the OR  4U PRBC transfused so far  Leukocytosis is likely from transfusion  Watch for signs of infection    Endocrine:  Insulin sliding scale      Dispo: wean vent and pressors

## 2019-04-24 NOTE — ANESTHESIA POSTPROCEDURE EVALUATION
Anesthesia Post Evaluation    Patient: Ming Harrington    Procedure(s) Performed: Procedure(s) (LRB):  LAPAROSCOPY, DIAGNOSTIC (N/A)  EVACUATION, HEMATOMA  EXPLORATION, WOUND (N/A)    Final Anesthesia Type: general  Patient location during evaluation: ICU  Patient participation: No - Unable to Participate, Intubation  Level of consciousness: sedated  Post-procedure vital signs: reviewed and stable  Pain management: adequate  Airway patency: patent  PONV status at discharge: No PONV  Anesthetic complications: no      Cardiovascular status: hemodynamically stable  Respiratory status: ETT, ventilator and intubated  Hydration status: euvolemic  Follow-up not needed.          Vitals Value Taken Time   /57 4/24/2019 11:40 AM   Temp 36.7 °C (98 °F) 4/24/2019 11:20 AM   Pulse 68 4/24/2019 11:56 AM   Resp 20 4/24/2019 11:56 AM   SpO2 100 % 4/24/2019 11:56 AM   Vitals shown include unvalidated device data.      No case tracking events are documented in the log.      Pain/Isis Score: Pain Rating Prior to Med Admin: 10 (4/23/2019 11:20 PM)

## 2019-04-24 NOTE — ANESTHESIA POSTPROCEDURE EVALUATION
Anesthesia Post Evaluation    Patient: Ming Harrington    Procedure(s) Performed: Procedure(s) (LRB):  REPAIR, HERNIA, INCISIONAL, RECURRENT ( OPEN WITH MESH) (N/A)    Final Anesthesia Type: general  Patient location during evaluation: PACU  Patient participation: Yes- Able to Participate  Level of consciousness: awake and alert  Post-procedure vital signs: reviewed and stable  Pain management: adequate  Airway patency: patent  PONV status at discharge: No PONV  Anesthetic complications: no      Cardiovascular status: blood pressure returned to baseline  Respiratory status: unassisted  Hydration status: euvolemic  Follow-up not needed.          Vitals Value Taken Time   /57 4/24/2019  6:55 AM   Temp 36.8 °C (98.3 °F) 4/24/2019  6:08 AM   Pulse 80 4/24/2019  7:46 AM   Resp 21 4/24/2019  7:46 AM   SpO2 100 % 4/24/2019  7:46 AM   Vitals shown include unvalidated device data.      Event Time     Out of Recovery 23:28:25          Pain/Isis Score: Pain Rating Prior to Med Admin: 10 (4/23/2019 11:20 PM)

## 2019-04-24 NOTE — BRIEF OP NOTE
Operative Note       Surgery Date: 4/24/2019     Surgeon(s) and Role:     * Kenyon Chawla MD - Primary     * SANTOS Samano Jr., MD - Resident - Assisting     * Marvel Mckee MD - Resident - Observing    Pre-op Diagnosis:  Acute blood loss anemia [D62]    Post-op Diagnosis:  Acute blood loss anemia [D62]    Procedure(s) (LRB):  LAPAROSCOPY, DIAGNOSTIC (N/A)  EVACUATION, HEMATOMA  EXPLORATION, WOUND (N/A)    Anesthesia: General    Procedure in Detail/Findings:  One port dx laparoscopy showing no significant intrabdominal blood.  Skin reopened showing bleeding from sub q areas.  Hemostasis achieved, antibiotic irrigation, mabel and slightly larger drain placed.    Estimated Blood Loss: Minimal           Specimens (From admission, onward)    None        Implants: * No implants in log *           Disposition: PACU - hemodynamically stable.           Condition: Good    Attestation:  I was present and scrubbed for the entire procedure.

## 2019-04-24 NOTE — RESIDENT HANDOFF
Paged about increasing distension.    Patient states he starting becoming more distended in the afternoon.     Vitals:  /80 2L NC sat 94% RR 19    Gen: Appears mildly   Abd exam: Soft, tender to palpation in LUQ/RUQ/LLQ, no rebound, no guarding, hypoactive bowel sounds, incision intact with dried blood on steris and gauze    RAZIA with thicker serosanguinous output, 10 cc in bulb (125 cc output today total)     this shift, no tejada    Ordered KUB, CBC, CMP, lactate    Update: CBC shows Hgb drop 11.8 to 8.8 patient HR remained in 90s and BP stable. Lactate normal. KUB nonobstructive gas pattern, no obvious free air. CMP without significant changes from prior.    BP recheck at 0315 showed 92/58. STAT CBC ordered (drawn under kami AM labs, so STAT order cancelled) MAR review shows clonidine given at 0036 with /82. Gave 1L LR bolus. Awaiting fluids to finish and will recheck and bolus as needed.     Will continue to monitor.    Update 2: Hgb from STAT draw 8.1, rechecking vitals

## 2019-04-25 LAB
ALLENS TEST: ABNORMAL
ANION GAP SERPL CALC-SCNC: 4 MMOL/L (ref 8–16)
ANION GAP SERPL CALC-SCNC: 5 MMOL/L (ref 8–16)
BASOPHILS # BLD AUTO: 0.02 K/UL (ref 0–0.2)
BASOPHILS # BLD AUTO: 0.02 K/UL (ref 0–0.2)
BASOPHILS NFR BLD: 0.1 % (ref 0–1.9)
BASOPHILS NFR BLD: 0.2 % (ref 0–1.9)
BUN SERPL-MCNC: 17 MG/DL (ref 6–20)
BUN SERPL-MCNC: 20 MG/DL (ref 6–20)
BUN SERPL-MCNC: 21 MG/DL (ref 6–20)
BUN SERPL-MCNC: 21 MG/DL (ref 6–20)
CALCIUM SERPL-MCNC: 7.7 MG/DL (ref 8.7–10.5)
CALCIUM SERPL-MCNC: 7.7 MG/DL (ref 8.7–10.5)
CALCIUM SERPL-MCNC: 7.8 MG/DL (ref 8.7–10.5)
CALCIUM SERPL-MCNC: 7.9 MG/DL (ref 8.7–10.5)
CHLORIDE SERPL-SCNC: 107 MMOL/L (ref 95–110)
CHLORIDE SERPL-SCNC: 107 MMOL/L (ref 95–110)
CHLORIDE SERPL-SCNC: 108 MMOL/L (ref 95–110)
CHLORIDE SERPL-SCNC: 109 MMOL/L (ref 95–110)
CO2 SERPL-SCNC: 27 MMOL/L (ref 23–29)
CO2 SERPL-SCNC: 27 MMOL/L (ref 23–29)
CO2 SERPL-SCNC: 28 MMOL/L (ref 23–29)
CO2 SERPL-SCNC: 28 MMOL/L (ref 23–29)
CREAT SERPL-MCNC: 1.1 MG/DL (ref 0.5–1.4)
CREAT SERPL-MCNC: 1.2 MG/DL (ref 0.5–1.4)
CREAT SERPL-MCNC: 1.4 MG/DL (ref 0.5–1.4)
CREAT SERPL-MCNC: 1.4 MG/DL (ref 0.5–1.4)
DELSYS: ABNORMAL
DIFFERENTIAL METHOD: ABNORMAL
DIFFERENTIAL METHOD: ABNORMAL
EOSINOPHIL # BLD AUTO: 0 K/UL (ref 0–0.5)
EOSINOPHIL # BLD AUTO: 0.1 K/UL (ref 0–0.5)
EOSINOPHIL NFR BLD: 0.2 % (ref 0–8)
EOSINOPHIL NFR BLD: 0.5 % (ref 0–8)
ERYTHROCYTE [DISTWIDTH] IN BLOOD BY AUTOMATED COUNT: 13.9 % (ref 11.5–14.5)
ERYTHROCYTE [DISTWIDTH] IN BLOOD BY AUTOMATED COUNT: 13.9 % (ref 11.5–14.5)
ERYTHROCYTE [SEDIMENTATION RATE] IN BLOOD BY WESTERGREN METHOD: 20 MM/H
EST. GFR  (AFRICAN AMERICAN): >60 ML/MIN/1.73 M^2
EST. GFR  (NON AFRICAN AMERICAN): 56.6 ML/MIN/1.73 M^2
EST. GFR  (NON AFRICAN AMERICAN): 56.6 ML/MIN/1.73 M^2
EST. GFR  (NON AFRICAN AMERICAN): >60 ML/MIN/1.73 M^2
EST. GFR  (NON AFRICAN AMERICAN): >60 ML/MIN/1.73 M^2
FIO2: 40
GLUCOSE SERPL-MCNC: 101 MG/DL (ref 70–110)
GLUCOSE SERPL-MCNC: 104 MG/DL (ref 70–110)
GLUCOSE SERPL-MCNC: 104 MG/DL (ref 70–110)
GLUCOSE SERPL-MCNC: 81 MG/DL (ref 70–110)
HCO3 UR-SCNC: 28.7 MMOL/L (ref 24–28)
HCT VFR BLD AUTO: 26.7 % (ref 40–54)
HCT VFR BLD AUTO: 28.2 % (ref 40–54)
HGB BLD-MCNC: 8.8 G/DL (ref 14–18)
HGB BLD-MCNC: 9.3 G/DL (ref 14–18)
IMM GRANULOCYTES # BLD AUTO: 0.05 K/UL (ref 0–0.04)
IMM GRANULOCYTES # BLD AUTO: 0.06 K/UL (ref 0–0.04)
IMM GRANULOCYTES NFR BLD AUTO: 0.4 % (ref 0–0.5)
IMM GRANULOCYTES NFR BLD AUTO: 0.5 % (ref 0–0.5)
LYMPHOCYTES # BLD AUTO: 2 K/UL (ref 1–4.8)
LYMPHOCYTES # BLD AUTO: 2.1 K/UL (ref 1–4.8)
LYMPHOCYTES NFR BLD: 14.1 % (ref 18–48)
LYMPHOCYTES NFR BLD: 18.4 % (ref 18–48)
MAGNESIUM SERPL-MCNC: 1.7 MG/DL (ref 1.6–2.6)
MCH RBC QN AUTO: 30.3 PG (ref 27–31)
MCH RBC QN AUTO: 30.9 PG (ref 27–31)
MCHC RBC AUTO-ENTMCNC: 33 G/DL (ref 32–36)
MCHC RBC AUTO-ENTMCNC: 33 G/DL (ref 32–36)
MCV RBC AUTO: 92 FL (ref 82–98)
MCV RBC AUTO: 94 FL (ref 82–98)
MODE: ABNORMAL
MONOCYTES # BLD AUTO: 0.7 K/UL (ref 0.3–1)
MONOCYTES # BLD AUTO: 1 K/UL (ref 0.3–1)
MONOCYTES NFR BLD: 6.5 % (ref 4–15)
MONOCYTES NFR BLD: 7 % (ref 4–15)
NEUTROPHILS # BLD AUTO: 11.7 K/UL (ref 1.8–7.7)
NEUTROPHILS # BLD AUTO: 7.8 K/UL (ref 1.8–7.7)
NEUTROPHILS NFR BLD: 73.7 % (ref 38–73)
NEUTROPHILS NFR BLD: 78.4 % (ref 38–73)
NRBC BLD-RTO: 0 /100 WBC
NRBC BLD-RTO: 0 /100 WBC
PCO2 BLDA: 50.2 MMHG (ref 35–45)
PEEP: 5
PH SMN: 7.37 [PH] (ref 7.35–7.45)
PHOSPHATE SERPL-MCNC: 2.8 MG/DL (ref 2.7–4.5)
PLATELET # BLD AUTO: 124 K/UL (ref 150–350)
PLATELET # BLD AUTO: 139 K/UL (ref 150–350)
PMV BLD AUTO: 10.1 FL (ref 9.2–12.9)
PMV BLD AUTO: 9.9 FL (ref 9.2–12.9)
PO2 BLDA: 91 MMHG (ref 80–100)
POC BE: 3 MMOL/L
POC SATURATED O2: 97 % (ref 95–100)
POC TCO2: 30 MMOL/L (ref 23–27)
POCT GLUCOSE: 89 MG/DL (ref 70–110)
POCT GLUCOSE: 92 MG/DL (ref 70–110)
POCT GLUCOSE: 93 MG/DL (ref 70–110)
POTASSIUM SERPL-SCNC: 4.6 MMOL/L (ref 3.5–5.1)
POTASSIUM SERPL-SCNC: 4.9 MMOL/L (ref 3.5–5.1)
POTASSIUM SERPL-SCNC: 5.1 MMOL/L (ref 3.5–5.1)
POTASSIUM SERPL-SCNC: 5.1 MMOL/L (ref 3.5–5.1)
RBC # BLD AUTO: 2.85 M/UL (ref 4.6–6.2)
RBC # BLD AUTO: 3.07 M/UL (ref 4.6–6.2)
SAMPLE: ABNORMAL
SITE: ABNORMAL
SODIUM SERPL-SCNC: 139 MMOL/L (ref 136–145)
SODIUM SERPL-SCNC: 140 MMOL/L (ref 136–145)
SODIUM SERPL-SCNC: 140 MMOL/L (ref 136–145)
SODIUM SERPL-SCNC: 141 MMOL/L (ref 136–145)
SP02: 100
VT: 410
WBC # BLD AUTO: 10.59 K/UL (ref 3.9–12.7)
WBC # BLD AUTO: 14.99 K/UL (ref 3.9–12.7)

## 2019-04-25 PROCEDURE — 25000003 PHARM REV CODE 250: Performed by: SURGERY

## 2019-04-25 PROCEDURE — 27000646 HC AEROBIKA DEVICE

## 2019-04-25 PROCEDURE — 83735 ASSAY OF MAGNESIUM: CPT

## 2019-04-25 PROCEDURE — 97530 THERAPEUTIC ACTIVITIES: CPT

## 2019-04-25 PROCEDURE — 63600175 PHARM REV CODE 636 W HCPCS: Performed by: STUDENT IN AN ORGANIZED HEALTH CARE EDUCATION/TRAINING PROGRAM

## 2019-04-25 PROCEDURE — 25000003 PHARM REV CODE 250: Performed by: STUDENT IN AN ORGANIZED HEALTH CARE EDUCATION/TRAINING PROGRAM

## 2019-04-25 PROCEDURE — S4991 NICOTINE PATCH NONLEGEND: HCPCS | Performed by: SURGERY

## 2019-04-25 PROCEDURE — 97165 OT EVAL LOW COMPLEX 30 MIN: CPT

## 2019-04-25 PROCEDURE — 63600175 PHARM REV CODE 636 W HCPCS: Performed by: ANESTHESIOLOGY

## 2019-04-25 PROCEDURE — 94761 N-INVAS EAR/PLS OXIMETRY MLT: CPT

## 2019-04-25 PROCEDURE — 80048 BASIC METABOLIC PNL TOTAL CA: CPT

## 2019-04-25 PROCEDURE — 25000242 PHARM REV CODE 250 ALT 637 W/ HCPCS: Performed by: STUDENT IN AN ORGANIZED HEALTH CARE EDUCATION/TRAINING PROGRAM

## 2019-04-25 PROCEDURE — 37799 UNLISTED PX VASCULAR SURGERY: CPT

## 2019-04-25 PROCEDURE — C9113 INJ PANTOPRAZOLE SODIUM, VIA: HCPCS | Performed by: STUDENT IN AN ORGANIZED HEALTH CARE EDUCATION/TRAINING PROGRAM

## 2019-04-25 PROCEDURE — 94664 DEMO&/EVAL PT USE INHALER: CPT

## 2019-04-25 PROCEDURE — 99231 SBSQ HOSP IP/OBS SF/LOW 25: CPT | Mod: 24,,, | Performed by: SURGERY

## 2019-04-25 PROCEDURE — 99900035 HC TECH TIME PER 15 MIN (STAT)

## 2019-04-25 PROCEDURE — 94770 HC EXHALED C02 TEST: CPT

## 2019-04-25 PROCEDURE — A4216 STERILE WATER/SALINE, 10 ML: HCPCS | Performed by: SURGERY

## 2019-04-25 PROCEDURE — 99900026 HC AIRWAY MAINTENANCE (STAT)

## 2019-04-25 PROCEDURE — 99231 PR SUBSEQUENT HOSPITAL CARE,LEVL I: ICD-10-PCS | Mod: 24,,, | Performed by: SURGERY

## 2019-04-25 PROCEDURE — 25000003 PHARM REV CODE 250: Performed by: ANESTHESIOLOGY

## 2019-04-25 PROCEDURE — 94640 AIRWAY INHALATION TREATMENT: CPT

## 2019-04-25 PROCEDURE — 27000221 HC OXYGEN, UP TO 24 HOURS

## 2019-04-25 PROCEDURE — 82803 BLOOD GASES ANY COMBINATION: CPT

## 2019-04-25 PROCEDURE — 82800 BLOOD PH: CPT

## 2019-04-25 PROCEDURE — 84100 ASSAY OF PHOSPHORUS: CPT

## 2019-04-25 PROCEDURE — 20000000 HC ICU ROOM

## 2019-04-25 PROCEDURE — 94668 MNPJ CHEST WALL SBSQ: CPT

## 2019-04-25 PROCEDURE — 63600175 PHARM REV CODE 636 W HCPCS: Performed by: SURGERY

## 2019-04-25 PROCEDURE — 85025 COMPLETE CBC W/AUTO DIFF WBC: CPT | Mod: 91

## 2019-04-25 PROCEDURE — 97161 PT EVAL LOW COMPLEX 20 MIN: CPT

## 2019-04-25 RX ORDER — CLONAZEPAM 0.5 MG/1
1 TABLET ORAL 2 TIMES DAILY
Status: DISCONTINUED | OUTPATIENT
Start: 2019-04-25 | End: 2019-05-01 | Stop reason: HOSPADM

## 2019-04-25 RX ORDER — HYDROMORPHONE HYDROCHLORIDE 1 MG/ML
0.2 INJECTION, SOLUTION INTRAMUSCULAR; INTRAVENOUS; SUBCUTANEOUS EVERY 4 HOURS PRN
Status: DISCONTINUED | OUTPATIENT
Start: 2019-04-25 | End: 2019-04-25

## 2019-04-25 RX ORDER — OXYCODONE HCL 5 MG/5 ML
10 SOLUTION, ORAL ORAL EVERY 4 HOURS PRN
Status: DISCONTINUED | OUTPATIENT
Start: 2019-04-25 | End: 2019-04-28

## 2019-04-25 RX ORDER — OXYCODONE HCL 5 MG/5 ML
5 SOLUTION, ORAL ORAL EVERY 4 HOURS PRN
Status: DISCONTINUED | OUTPATIENT
Start: 2019-04-25 | End: 2019-04-28

## 2019-04-25 RX ORDER — HYDROMORPHONE HYDROCHLORIDE 1 MG/ML
0.2 INJECTION, SOLUTION INTRAMUSCULAR; INTRAVENOUS; SUBCUTANEOUS EVERY 4 HOURS PRN
Status: DISCONTINUED | OUTPATIENT
Start: 2019-04-25 | End: 2019-05-01 | Stop reason: HOSPADM

## 2019-04-25 RX ORDER — GUAIFENESIN 100 MG/5ML
200 SOLUTION ORAL EVERY 6 HOURS PRN
Status: DISCONTINUED | OUTPATIENT
Start: 2019-04-25 | End: 2019-05-01 | Stop reason: HOSPADM

## 2019-04-25 RX ORDER — METHADONE HYDROCHLORIDE 10 MG/1
90 TABLET ORAL DAILY
Status: DISCONTINUED | OUTPATIENT
Start: 2019-04-25 | End: 2019-05-01 | Stop reason: HOSPADM

## 2019-04-25 RX ADMIN — ACETAMINOPHEN 1000 MG: 500 TABLET ORAL at 10:04

## 2019-04-25 RX ADMIN — GABAPENTIN 300 MG: 300 CAPSULE ORAL at 08:04

## 2019-04-25 RX ADMIN — IPRATROPIUM BROMIDE AND ALBUTEROL SULFATE 3 ML: .5; 3 SOLUTION RESPIRATORY (INHALATION) at 11:04

## 2019-04-25 RX ADMIN — IPRATROPIUM BROMIDE AND ALBUTEROL SULFATE 3 ML: .5; 3 SOLUTION RESPIRATORY (INHALATION) at 03:04

## 2019-04-25 RX ADMIN — Medication 3 ML: at 06:04

## 2019-04-25 RX ADMIN — METHADONE HYDROCHLORIDE 90 MG: 10 TABLET ORAL at 09:04

## 2019-04-25 RX ADMIN — DEXTROSE AND SODIUM CHLORIDE: 5; .9 INJECTION, SOLUTION INTRAVENOUS at 05:04

## 2019-04-25 RX ADMIN — Medication 0.04 MCG/KG/MIN: at 06:04

## 2019-04-25 RX ADMIN — GABAPENTIN 300 MG: 300 CAPSULE ORAL at 09:04

## 2019-04-25 RX ADMIN — CLONAZEPAM 1 MG: 0.5 TABLET ORAL at 11:04

## 2019-04-25 RX ADMIN — OXYCODONE HYDROCHLORIDE 10 MG: 5 SOLUTION ORAL at 11:04

## 2019-04-25 RX ADMIN — GABAPENTIN 300 MG: 300 CAPSULE ORAL at 02:04

## 2019-04-25 RX ADMIN — OXYCODONE HYDROCHLORIDE 5 MG: 5 SOLUTION ORAL at 08:04

## 2019-04-25 RX ADMIN — FLUTICASONE PROPIONATE 100 MCG: 50 SPRAY, METERED NASAL at 09:04

## 2019-04-25 RX ADMIN — PROMETHAZINE HYDROCHLORIDE 12.5 MG: 25 INJECTION INTRAMUSCULAR; INTRAVENOUS at 07:04

## 2019-04-25 RX ADMIN — GUAIFENESIN 200 MG: 200 SOLUTION ORAL at 06:04

## 2019-04-25 RX ADMIN — PROPOFOL 30 MCG/KG/MIN: 10 INJECTION, EMULSION INTRAVENOUS at 05:04

## 2019-04-25 RX ADMIN — HEPARIN SODIUM 5000 UNITS: 5000 INJECTION, SOLUTION INTRAVENOUS; SUBCUTANEOUS at 09:04

## 2019-04-25 RX ADMIN — IPRATROPIUM BROMIDE AND ALBUTEROL SULFATE 3 ML: .5; 3 SOLUTION RESPIRATORY (INHALATION) at 08:04

## 2019-04-25 RX ADMIN — IPRATROPIUM BROMIDE AND ALBUTEROL SULFATE 3 ML: .5; 3 SOLUTION RESPIRATORY (INHALATION) at 10:04

## 2019-04-25 RX ADMIN — ACETAMINOPHEN 1000 MG: 500 TABLET ORAL at 01:04

## 2019-04-25 RX ADMIN — Medication 3 ML: at 10:04

## 2019-04-25 RX ADMIN — PROMETHAZINE HYDROCHLORIDE 12.5 MG: 25 INJECTION INTRAMUSCULAR; INTRAVENOUS at 01:04

## 2019-04-25 RX ADMIN — PANTOPRAZOLE SODIUM 40 MG: 40 INJECTION, POWDER, LYOPHILIZED, FOR SOLUTION INTRAVENOUS at 10:04

## 2019-04-25 RX ADMIN — FLUTICASONE FUROATE AND VILANTEROL TRIFENATATE 1 PUFF: 100; 25 POWDER RESPIRATORY (INHALATION) at 09:04

## 2019-04-25 RX ADMIN — CLONAZEPAM 1 MG: 0.5 TABLET ORAL at 08:04

## 2019-04-25 RX ADMIN — Medication 1 PATCH: at 07:04

## 2019-04-25 RX ADMIN — HEPARIN SODIUM 5000 UNITS: 5000 INJECTION, SOLUTION INTRAVENOUS; SUBCUTANEOUS at 01:04

## 2019-04-25 RX ADMIN — Medication 3 ML: at 01:04

## 2019-04-25 RX ADMIN — HEPARIN SODIUM 5000 UNITS: 5000 INJECTION, SOLUTION INTRAVENOUS; SUBCUTANEOUS at 10:04

## 2019-04-25 RX ADMIN — Medication 175 MCG/HR: at 05:04

## 2019-04-25 RX ADMIN — HYDROMORPHONE HYDROCHLORIDE 0.2 MG: 1 INJECTION, SOLUTION INTRAMUSCULAR; INTRAVENOUS; SUBCUTANEOUS at 01:04

## 2019-04-25 RX ADMIN — ARIPIPRAZOLE 10 MG: 5 TABLET ORAL at 08:04

## 2019-04-25 RX ADMIN — MAGNESIUM SULFATE 2 G: 2 INJECTION INTRAVENOUS at 05:04

## 2019-04-25 RX ADMIN — ARIPIPRAZOLE 10 MG: 5 TABLET ORAL at 09:04

## 2019-04-25 NOTE — PROGRESS NOTES
[facility]  Critical Care - Surgery  Progress Note    Patient Name: Ming Harrington  MRN: [id number]  Admission Date: 4/22/2019  Hospital Length of Stay: 2 days  Code Status: Full Code  Attending Provider: Kenyon Chawla MD  Primary Care Provider: Vazquez Barajas MD   Principal Problem: <principal problem not specified>    Subjective:     Hospital/ICU Course:  No notes on file    Interval History/Significant Events:   Extubated this AM to nasal cannula, satting well  Levo weaned to 0.02, MAPs 90s- to be weaned completely off   Patient asking about methadone; Clinic verifies 90mg dose. Will plan to resume methadone and add prn dilaudid pushes as needed for breakthrough- can increase as needed but given obesity and baseline hypoventilation with recent extubation, will titrate up slowly.    Follow-up For: Procedure(s) (LRB):  LAPAROSCOPY, DIAGNOSTIC (N/A)  EVACUATION, HEMATOMA  EXPLORATION, WOUND (N/A)    Post-Operative Day: 1 Day Post-Op    Objective:     Vital Signs (Most Recent):  Temp: (!) 100.6 °F (38.1 °C) (04/25/19 0700)  Pulse: (!) 115 (04/25/19 0715)  Resp: (!) 22 (04/25/19 0715)  BP: 137/63 (04/25/19 0700)  SpO2: 99 % (04/25/19 0715) Vital Signs (24h Range):  Temp:  [97.8 °F (36.6 °C)-100.6 °F (38.1 °C)] 100.6 °F (38.1 °C)  Pulse:  [] 115  Resp:  [0-37] 22  SpO2:  [96 %-100 %] 99 %  BP: (103-150)/(54-80) 137/63  Arterial Line BP: ()/(46-90) 103/80     Weight: 107 kg (236 lb)  Body mass index is 35.88 kg/m².      Intake/Output Summary (Last 24 hours) at 4/25/2019 0729  Last data filed at 4/25/2019 0700  Gross per 24 hour   Intake 6590 ml   Output 2175 ml   Net 4415 ml       Physical Exam   Constitutional: He appears well-developed and well-nourished.   HENT:   Head: Normocephalic and atraumatic.   Eyes: Pupils are equal, round, and reactive to light. EOM are normal.   Neck: Neck supple.   Central line in place right   Cardiovascular: Normal rate, regular rhythm and intact  distal pulses.   Pulmonary/Chest: Effort normal and breath sounds normal.   Abdominal: He exhibits distension. There is tenderness.   Musculoskeletal: He exhibits no edema or tenderness.   Neurological: He is alert.   Skin: Skin is warm and dry. Capillary refill takes 2 to 3 seconds.       Vents:  Vent Mode: A/C (04/25/19 0530)  Ventilator Initiated: Yes (04/24/19 1000)  Set Rate: 20 bmp (04/25/19 0530)  Vt Set: 410 mL (04/25/19 0530)  PEEP/CPAP: 5 cmH20 (04/25/19 0530)  Oxygen Concentration (%): 39 (04/25/19 0630)  Peak Airway Pressure: 22 cmH2O (04/25/19 0530)  Plateau Pressure: 0 cmH20 (04/25/19 0530)  Total Ve: 9.4 mL (04/25/19 0530)  F/VT Ratio<105 (RSBI): (!) 50.25 (04/25/19 0530)    Lines/Drains/Airways     Central Venous Catheter Line                 Percutaneous Central Line Insertion/Assessment - triple lumen  right internal jugular -- days          Drain                 Urethral Catheter 04/24/19 0547 1 day         Closed/Suction Drain 04/24/19 0916 Left Abdomen Bulb 15 Fr. less than 1 day         NG/OG Tube 04/24/19 0750 nasogastric Right nostril less than 1 day          Airway                 Airway - Non-Surgical 04/24/19 0824 Endotracheal Tube less than 1 day          Arterial Line                 Arterial Line 04/24/19 0715 Left Radial 1 day                Significant Labs:    CBC/Anemia Profile:  Recent Labs   Lab 04/24/19  1612 04/24/19  2300 04/25/19  0300   WBC 22.67* 18.44*  18.44* 14.99*   HGB 9.4* 9.3*  9.3* 9.3*   HCT 28.4* 29.1*  29.1* 28.2*   * 158  158 139*   MCV 93 94  94 92   RDW 13.8 14.0  14.0 13.9        Chemistries:  Recent Labs   Lab 04/24/19  0021 04/24/19  0319 04/24/19  0656  04/24/19  0954  04/24/19  1612 04/24/19  2300 04/25/19  0300    139  --    < >  --    < > 138 140  140 139   K 5.1 5.3*  --    < >  --    < > 5.1 5.1  5.1 4.9    105  --    < >  --    < > 108 107  107 108   CO2 30* 29  --    < >  --    < > 25 28  28 27   BUN 12 14  --    < >  --   "  < > 21* 21*  21* 20   CREATININE 1.1 1.5*  --    < >  --    < > 1.5* 1.4  1.4 1.2   CALCIUM 8.4* 8.0*  --    < >  --    < > 7.7* 7.7*  7.7* 7.9*   ALBUMIN 2.2* 2.1* 1.8*  --   --   --   --   --   --    PROT 5.5* 5.0* 4.3*  --   --   --   --   --   --    BILITOT 0.8 0.7 0.6  --   --   --   --   --   --    ALKPHOS 75 70 61  --   --   --   --   --   --    ALT 16 17 13  --   --   --   --   --   --    AST 23 19 19  --   --   --   --   --   --    MG  --  2.1  --   --  1.8  --   --   --  1.7   PHOS  --  4.4  --   --  4.9*  --   --   --  2.8    < > = values in this interval not displayed.         Significant Imaging:  I have reviewed all pertinent imaging results/findings within the past 24 hours.    Assessment/Plan:     Ventral hernia   Patient is a 53 y.o. male presents with essential hypertension, pulmonary htn, BMI 37.25, COPD on home o2, hep C, not smoking (quit 3 months ago) and recurrent large umbilical hernia (times 2).  Per Dr. Wray's note 4/2017 he is above average risk for surgery.  Generally, he takes his advair daily, and only needs albuterol "when the weather changes," which is about 3 times a month.  Says he ambulated a quarter of a mile the other day without stopping.  Can ambulate 1 flight of stairs without stopping. Patient is s/p ventral hernia repair 4/22 with hypotension this am after receiving .3 of clonidine this am. His maps dropped to the 50s. He wsa given two  Boluses of LR and already had  A central line in place. The patient was not complaining of any pain when he was stepped up to the SICU on 4 liters NC with unstable pressures. Immediately, an arterial line was placed and 1 unit RBC was begun. He was also started on levo to .12. The patient's wbc count had climbed from 9 to 15 in one day. He's not currently on abx. His lovenox was stopped,last dose 4/23.       Neuro:   -Awake  -not on sedation  -patient was not alert and oriented, may have been secondary to pain medication.     Pulmonary: "   - extubated to NC; satting well on 2L NC   - 3 liters home o2 at baseline  - Xray prn  - Duoneb prn  - acapella prn    Cardiac:   - Levo for maintaining maps greater than 65, weaning off this AM  - 2 units prbc 4/24  - home med clonidine will need restart when able     Renal:    -UO >1.5 L   -Cr trending down  - restart lasix when appropriate     ID:   -WBC trending down   - no abx   -afebrile  -UA/cxr for possible wbc elevation outside of regular post op change     Hem/Onc:   -H and H decreased, 2 units transfused 4/24, currently stable   -trend H and H q 4 hrs  -hold anticoag for now    Endocrine:    -on metformin at home  -SSI here  -monitor glucose    Fluids/Electrolytes/Nutrition/GI:   - Replace electrolytes PRN  - NPO, sips and ice chips today per primary once passes swallow, adat based on return of bowel fxn per primary   - NS/D5 125 maintenace      PPx:  - DVT: hold for now. Mechanical   - Bowel: NPOsips and ice chips today per primary once passes swallow, adat based on return of bowel fxn per primary   - PUP: protonix      DISPO:  - adat based on return of bowel fxn per primary  - contact methadone clinic for dosing  - multimodal pain control with PCA, gabapentin and tylenol- add NSAID as able per surgery   - stdwn this PM if continues to do well    Eliza Rodríguez MD  7:41 AM  04/25/2019            Critical care was time spent personally by me on the following activities: development of treatment plan with patient or surrogate and bedside caregivers, discussions with consultants, evaluation of patient's response to treatment, examination of patient, ordering and performing treatments and interventions, ordering and review of laboratory studies, ordering and review of radiographic studies, pulse oximetry, re-evaluation of patient's condition.  This critical care time did not overlap with that of any other provider or involve time for any procedures.

## 2019-04-25 NOTE — NURSING
Pt extubated to 2 L NC. Tolerated well. Levophed able to be weaned. All questions and concerns addressed.

## 2019-04-25 NOTE — PLAN OF CARE
Problem: Physical Therapy Goal  Goal: Physical Therapy Goal  Goals to be met by: 19    Patient will increase functional independence with mobility by performin. Supine to sit with Contact Guard Assistance -not met  2. Sit to stand transfer with Contact Guard Assistance with AD if needed. -not met  3. Gait  x 150 feet with Contact Guard Assistance using AD if needed.-not met   4. Ascend/descend 3 stair with bilateral Handrails Contact Guard Assistance -not met  5. Lower extremity exercise program x15 reps with supervision -not met    Evaluation completed and goals appropriate. Marce Hooks, PT ,2019

## 2019-04-25 NOTE — PLAN OF CARE
Problem: Adult Inpatient Plan of Care  Goal: Plan of Care Review  Pt extubated at 630 AM to 4L NC. All VSS. T max 100.1, NSR, MAP >60. AAOx4. Only gtt: D5NS @125. A-line and tejada RI'ed. Passed bedside swallow study, advanced to clear liquids. ~130cc serosanguineous output from RAZIA drain. OOBTC with PT/OT at 130. Home Methadone and Clonipine restarted. Abd binder remains in place. Full bath given. PRN cough medicine given x1. Transfer orders in place, awaiting room assignment. POC reviewed with pt and sister and all questions answered. Will continue to monitor.
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
## 2019-04-25 NOTE — PLAN OF CARE
04/25/2019      Ming Harrington  [address] [facility] [address]          Hospital Medicine Dept.  [facility] [address] [place] [id number] [phone] after hours  [phone] fax Ming Harrington has been hospitalized at the [facility] since 4/22/2019and will be discharged home tomorrow, 4/26/2019. Please help him make accommodations so that his water can not be shut off.      Please contact me if you have any questions.                  __________________________  Melissa Mg, PAT  04/25/2019
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
## 2019-04-25 NOTE — PLAN OF CARE
ARA met with patient to see if patient's brother had brought empty O2 tanks to the hospital for service/replacement with [facility]. Patient has 2 portable O2 tanks at the bedside. CM placed call to Manjula ([id number]) and requested /exchange of portable O2 tanks. Denis stated that there is a note in her system showing that someone went out to the patient's home to service the concentrator and that O2 tank exchange will take place today.     During visit, Patient also expressed concern to ARA that his water bill is due today 4/25/19. Patient states that he called water company rep. Christianson and that she is aware of his hospitalization however she is requesting documentation of hospitalized days in order to provide patient with an extention. CM reported patient concern to Rula and she has provided documentation of such for patient to present to the water company. No further needs noted at this time. ARA will continue to follow.

## 2019-04-25 NOTE — PLAN OF CARE
Problem: Occupational Therapy Goal  Goal: Occupational Therapy Goal  Goals to be met by: 5/9/19     Patient will increase functional independence with ADLs by performing:    UE Dressing with Modified [place].  LE Dressing with Modified [place] and Assistive Devices as needed.  Grooming while standing at sink with Modified [place].  Toileting from bedside commode with Modified [place] for hygiene and clothing management.   Bathing from  edge of bed with Modified [place].  Toilet transfer to bedside commode with Modified [place].  Increased functional strength to WFL for B UE.  Upper extremity exercise program x15 reps per handout, with independence.    POC initiated.

## 2019-04-25 NOTE — SUBJECTIVE & OBJECTIVE
Interval History/Significant Events:   Extubated this AM to nasal cannula, satting well  Levo weaned to 0.02, MAPs 90s- to be weaned completely off   Patient asking about methadone; will contact patients clinic. For now, PCA ordered and scheduled tylenol. Will discuss with GI     Follow-up For: Procedure(s) (LRB):  LAPAROSCOPY, DIAGNOSTIC (N/A)  EVACUATION, HEMATOMA  EXPLORATION, WOUND (N/A)    Post-Operative Day: 1 Day Post-Op    Objective:     Vital Signs (Most Recent):  Temp: (!) 100.6 °F (38.1 °C) (04/25/19 0700)  Pulse: (!) 115 (04/25/19 0715)  Resp: (!) 22 (04/25/19 0715)  BP: 137/63 (04/25/19 0700)  SpO2: 99 % (04/25/19 0715) Vital Signs (24h Range):  Temp:  [97.8 °F (36.6 °C)-100.6 °F (38.1 °C)] 100.6 °F (38.1 °C)  Pulse:  [] 115  Resp:  [0-37] 22  SpO2:  [96 %-100 %] 99 %  BP: (103-150)/(54-80) 137/63  Arterial Line BP: ()/(46-90) 103/80     Weight: 107 kg (236 lb)  Body mass index is 35.88 kg/m².      Intake/Output Summary (Last 24 hours) at 4/25/2019 0729  Last data filed at 4/25/2019 0700  Gross per 24 hour   Intake 6590 ml   Output 2175 ml   Net 4415 ml       Physical Exam   Constitutional: He appears well-developed and well-nourished.   HENT:   Head: Normocephalic and atraumatic.   Eyes: Pupils are equal, round, and reactive to light. EOM are normal.   Neck: Neck supple.   Central line in place right   Cardiovascular: Normal rate, regular rhythm and intact distal pulses.   Pulmonary/Chest: Effort normal and breath sounds normal.   Abdominal: He exhibits distension. There is tenderness.   Musculoskeletal: He exhibits no edema or tenderness.   Neurological: He is alert.   Skin: Skin is warm and dry. Capillary refill takes 2 to 3 seconds.       Vents:  Vent Mode: A/C (04/25/19 0530)  Ventilator Initiated: Yes (04/24/19 1000)  Set Rate: 20 bmp (04/25/19 0530)  Vt Set: 410 mL (04/25/19 0530)  PEEP/CPAP: 5 cmH20 (04/25/19 0530)  Oxygen Concentration (%): 39 (04/25/19 0630)  Peak Airway Pressure:  22 cmH2O (04/25/19 0530)  Plateau Pressure: 0 cmH20 (04/25/19 0530)  Total Ve: 9.4 mL (04/25/19 0530)  F/VT Ratio<105 (RSBI): (!) 50.25 (04/25/19 0530)    Lines/Drains/Airways     Central Venous Catheter Line                 Percutaneous Central Line Insertion/Assessment - triple lumen  right internal jugular -- days          Drain                 Urethral Catheter 04/24/19 0547 1 day         Closed/Suction Drain 04/24/19 0916 Left Abdomen Bulb 15 Fr. less than 1 day         NG/OG Tube 04/24/19 0750 nasogastric Right nostril less than 1 day          Airway                 Airway - Non-Surgical 04/24/19 0824 Endotracheal Tube less than 1 day          Arterial Line                 Arterial Line 04/24/19 0715 Left Radial 1 day                Significant Labs:    CBC/Anemia Profile:  Recent Labs   Lab 04/24/19  1612 04/24/19  2300 04/25/19  0300   WBC 22.67* 18.44*  18.44* 14.99*   HGB 9.4* 9.3*  9.3* 9.3*   HCT 28.4* 29.1*  29.1* 28.2*   * 158  158 139*   MCV 93 94  94 92   RDW 13.8 14.0  14.0 13.9        Chemistries:  Recent Labs   Lab 04/24/19  0021 04/24/19  0319 04/24/19  0656  04/24/19  0954  04/24/19  1612 04/24/19  2300 04/25/19  0300    139  --    < >  --    < > 138 140  140 139   K 5.1 5.3*  --    < >  --    < > 5.1 5.1  5.1 4.9    105  --    < >  --    < > 108 107  107 108   CO2 30* 29  --    < >  --    < > 25 28  28 27   BUN 12 14  --    < >  --    < > 21* 21*  21* 20   CREATININE 1.1 1.5*  --    < >  --    < > 1.5* 1.4  1.4 1.2   CALCIUM 8.4* 8.0*  --    < >  --    < > 7.7* 7.7*  7.7* 7.9*   ALBUMIN 2.2* 2.1* 1.8*  --   --   --   --   --   --    PROT 5.5* 5.0* 4.3*  --   --   --   --   --   --    BILITOT 0.8 0.7 0.6  --   --   --   --   --   --    ALKPHOS 75 70 61  --   --   --   --   --   --    ALT 16 17 13  --   --   --   --   --   --    AST 23 19 19  --   --   --   --   --   --    MG  --  2.1  --   --  1.8  --   --   --  1.7   PHOS  --  4.4  --   --  4.9*  --   --   --   2.8    < > = values in this interval not displayed.         Significant Imaging:  I have reviewed all pertinent imaging results/findings within the past 24 hours.

## 2019-04-25 NOTE — PROGRESS NOTES
Progress Note  General Surgery    Admit Date: 4/22/2019  Attending: [place]  S/P: Procedure(s) (LRB):  LAPAROSCOPY, DIAGNOSTIC (N/A)  EVACUATION, HEMATOMA  EXPLORATION, WOUND (N/A)    Post-operative Day: 1 Day Post-Op    Hospital Day: 4    SUBJECTIVE:     No acute events overnight.  Hb has been stable, 215 out RAZIA drain over 24 hours, bloody in bulb but very serous in tube.  NGT with only 100 over night.  Satting well on minimal vent settings this AM, awake and attempting to communicate    OBJECTIVE:     Vital Signs (Most Recent)  Temp: 98.1 °F (36.7 °C) (04/25/19 0300)  Pulse: 110 (04/25/19 0630)  Resp: (!) 37 (04/25/19 0630)  BP: (!) 150/70 (04/25/19 0630)  SpO2: 99 % (04/25/19 0630)    Vital Signs Range (Last 24H):  Temp:  [97.8 °F (36.6 °C)-99 °F (37.2 °C)]   Pulse:  []   Resp:  [0-37]   BP: (103-150)/(54-80)   SpO2:  [96 %-100 %]   Arterial Line BP: ()/(46-90)     I & O (Last 24H):    Intake/Output Summary (Last 24 hours) at 4/25/2019 0701  Last data filed at 4/25/2019 0600  Gross per 24 hour   Intake 6590 ml   Output 2075 ml   Net 4515 ml       Physical Exam:  Gen: Intubated, but awake  HEENT: NCAT  CV: RRR, no m/r/g   Pulm: CAB, unlabored, symmetrical   Abd: Soft, attp. Distension improvedNo rebound, guarding. Incisions c/d/i with dermabond intact.  Skin tears from previous adhesive dressing.  No hematoma.  Extremities: no cyanosis or edema, or clubbing  Skin: Skin color, texture, turgor normal. No rashes or lesions    Laboratory:  CBC:   Recent Labs   Lab 04/25/19  0300   WBC 14.99*   RBC 3.07*   HGB 9.3*   HCT 28.2*   *   MCV 92   MCH 30.3   MCHC 33.0     CMP:   Recent Labs   Lab 04/24/19  0656  04/25/19  0300   GLU  --    < > 101   CALCIUM  --    < > 7.9*   ALBUMIN 1.8*  --   --    PROT 4.3*  --   --    NA  --    < > 139   K  --    < > 4.9   CO2  --    < > 27   CL  --    < > 108   BUN  --    < > 20   CREATININE  --    < > 1.2   ALKPHOS 61  --   --    ALT 13  --   --    AST 19  --   --     BILITOT 0.6  --   --     < > = values in this interval not displayed.     Coagulation:   Recent Labs   Lab 04/24/19  0954   INR 1.0   APTT 23.9     Labs within the past 24 hours have been reviewed.    ASSESSMENT/PLAN:     Assessment: POD 1 from wound exploration and diagnostic lap, POD 3 from ventral hernia repair    Plan:     Neuro:  Sedation turned off  SBT now  PCA once extubated  Multimodal pain control     Cardiac:  Norepi gtt, wean as tolerated- goal MAP >60  A-line     Pulm:  Intubated  AC control  Defer management to SICU     GI:   NPO.  OK for sips and ice chips once extubated  Await ROBF  Pantoprazole ppx     Renal:  ANNITA continues to improve- Cr 1.2 today    Heme/ID:  Hb stable  Leukocytosis (reactive from transfusion) continues to improve     Endocrine:  Insulin sliding scale    Ppx: Start SQH today and continue to monitor CBC (Q12)      Dispo: Potential step down this afternoon- extubate this morning and wean pressors      Dangelo Samano MD  General Surgery, PGY-5  [phone]

## 2019-04-25 NOTE — PLAN OF CARE
SW saw that therapy had recommended SNF for Pt. Pt has been waiting for a stepdown bed for several days. SW went to meet with Pt and Pt's son, Barrett, who was at the bedside to discuss this recommendation. Pt states she would like to return to her Assisted Living with [facility], but Pt's son states he feels Pt may need placement first. NASH left a list of [facility] approved SNFs with her name and number and asked Pt and Pt's son to discuss this together and decide if they would like to pursue placement. NASH asked family to review list and provide her with choices should they decide on placement. SW to continue to follow.     Melissa Mg, PAT        04/25/19 1528   Post-Acute Status   Post-Acute Authorization Placement   Post-Acute Placement Status Patient List Provided

## 2019-04-25 NOTE — ASSESSMENT & PLAN NOTE
" Patient is a 53 y.o. male presents with essential hypertension, pulmonary htn, BMI 37.25, COPD on home o2, hep C, not smoking (quit 3 months ago) and recurrent large umbilical hernia (times 2).  Per Dr. Wray's note 4/2017 he is above average risk for surgery.  Generally, he takes his advair daily, and only needs albuterol "when the weather changes," which is about 3 times a month.  Says he ambulated a quarter of a mile the other day without stopping.  Can ambulate 1 flight of stairs without stopping. Patient is s/p ventral hernia repair 4/22 with hypotension this am after receiving .3 of clonidine this am. His maps dropped to the 50s. He wsa given two  Boluses of LR and already had  A central line in place. The patient was not complaining of any pain when he was stepped up to the SICU on 4 liters NC with unstable pressures. Immediately, an arterial line was placed and 1 unit RBC was begun. He was also started on levo to .12. The patient's wbc count had climbed from 9 to 15 in one day. He's not currently on abx. His lovenox was stopped,last dose 4/23.       Neuro:   -Awake  -not on sedation  -patient was not alert and oriented, may have been secondary to pain medication.     Pulmonary:   - extubated to NC; satting well on 2L NC   - 3 liters home o2 at baseline  - Xray prn  - Duoneb prn  - acapella prn    Cardiac:   - Levo for maintaining maps greater than 65, weaning off this AM  - 2 units prbc 4/24  - home med clonidine will need restart when able     Renal:    -UO >1.5 L   -Cr trending down  - restart lasix when appropriate     ID:   -WBC trending down   - no abx   -afebrile  -UA/cxr for possible wbc elevation outside of regular post op change     Hem/Onc:   -H and H decreased, 2 units transfused 4/24, currently stable   -trend H and H q 4 hrs  -hold anticoag for now    Endocrine:    -on metformin at home  -SSI here  -monitor glucose    Fluids/Electrolytes/Nutrition/GI:   - Replace electrolytes PRN  - NPO, sips " and ice chips today per primary once passes swallow, adat based on return of bowel fxn per primary   - NS/D5 125 maintenace      PPx:  - DVT: hold for now. Mechanical   - Bowel: NPOsips and ice chips today per primary once passes swallow, adat based on return of bowel fxn per primary   - PUP: protonix      DISPO:  - adat based on return of bowel fxn per primary  - contact methadone clinic for dosing  - multimodal pain control with PCA, gabapentin and tylenol- add NSAID as able per surgery   - stdwn this PM if continues to do well    Eliza Rodríguez MD  7:41 AM  04/25/2019

## 2019-04-25 NOTE — SUBJECTIVE & OBJECTIVE
Interval History: Extubated. Hgb stable. Productive cough. Ambulating. Tolerating diet.     Medications:  Continuous Infusions:   dextrose 5 % and 0.9 % NaCl 125 mL/hr at 04/25/19 0700    fentanyl 10 mL/hr at 04/25/19 0700    hydromorphone in 0.9 % NaCl 6 mg/30 ml      norepinephrine bitartrate-D5W 0.04 mcg/kg/min (04/25/19 0600)    propofol 20 mcg/kg/min (04/25/19 0600)     Scheduled Meds:   acetaminophen  1,000 mg Oral Q8H    albuterol-ipratropium  3 mL Nebulization Q4H    ARIPiprazole  10 mg Oral BID    fluticasone  2 spray Each Nare Daily    fluticasone-vilanterol  1 puff Inhalation Daily    gabapentin  300 mg Oral TID    heparin (porcine)  5,000 Units Subcutaneous Q8H    nicotine  1 patch Transdermal QAM    pantoprazole  40 mg Intravenous Daily    sodium chloride 0.9%  3 mL Intravenous Q8H     PRN Meds:sodium chloride, albuterol-ipratropium, dextrose 50%, glucagon (human recombinant), insulin aspart U-100, magnesium sulfate IVPB, naloxone, ondansetron, potassium chloride in water, promethazine (PHENERGAN) IVPB, ramelteon, sodium chloride 0.9%     Review of patient's allergies indicates:   Allergen Reactions    Iodine and iodide containing products Anaphylaxis and Swelling     Facial swelling    Shellfish containing products Anaphylaxis             Compazine [prochlorperazine edisylate] Hallucinations     Objective:     Vital Signs (Most Recent):  Temp: (!) 100.6 °F (38.1 °C) (04/25/19 0700)  Pulse: (!) 115 (04/25/19 0715)  Resp: (!) 22 (04/25/19 0715)  BP: 137/63 (04/25/19 0700)  SpO2: 99 % (04/25/19 0715) Vital Signs (24h Range):  Temp:  [97.8 °F (36.6 °C)-100.6 °F (38.1 °C)] 100.6 °F (38.1 °C)  Pulse:  [] 115  Resp:  [0-37] 22  SpO2:  [96 %-100 %] 99 %  BP: (103-150)/(55-80) 137/63  Arterial Line BP: ()/(46-90) 103/80     Weight: 107 kg (236 lb)  Body mass index is 35.88 kg/m².    Intake/Output - Last 3 Shifts       04/23 0700 - 04/24 0659 04/24 0700 - 04/25 0659 04/25 0700 -  04/26 0659    P.O. 180      I.V. (mL/kg)  5990 (56)     Blood 250 850     Total Intake(mL/kg) 430 (4) 6840 (63.9)     Urine (mL/kg/hr) 475 (0.2) 1770 (0.7) 100 (1.5)    Drains 180 315     Total Output 655 2085 100    Net -225 +4755 -100                 Physical Exam   Constitutional: He appears well-developed and well-nourished. No distress.   Cardiovascular: Normal rate and regular rhythm.   Pulmonary/Chest: Effort normal. No respiratory distress.   Productive cough   Abdominal: Soft. He exhibits no distension.   Abdominal mass gone after evacuation of hematoma; Appropriately tender to palpation   Drain in place with sanguinous output, but turning more sero-sanguinous   Musculoskeletal: He exhibits no edema.   Neurological: He is alert.   Skin: He is not diaphoretic.   Vitals reviewed.      Significant Labs:  CBC:   Recent Labs   Lab 04/25/19  0300   WBC 14.99*   RBC 3.07*   HGB 9.3*   HCT 28.2*   *   MCV 92   MCH 30.3   MCHC 33.0     CMP:   Recent Labs   Lab 04/24/19  0656  04/25/19  0300   GLU  --    < > 101   CALCIUM  --    < > 7.9*   ALBUMIN 1.8*  --   --    PROT 4.3*  --   --    NA  --    < > 139   K  --    < > 4.9   CO2  --    < > 27   CL  --    < > 108   BUN  --    < > 20   CREATININE  --    < > 1.2   ALKPHOS 61  --   --    ALT 13  --   --    AST 19  --   --    BILITOT 0.6  --   --     < > = values in this interval not displayed.     ABGs:   Recent Labs   Lab 04/25/19  0314   PH 7.366   PCO2 50.2*   PO2 91   HCO3 28.7*   POCSATURATED 97   BE 3       Significant Diagnostics:  I have reviewed all pertinent imaging results/findings within the past 24 hours.

## 2019-04-25 NOTE — PLAN OF CARE
SW met with Pt due to request for letter to not have water shut off. SW faxed ([phone]) letter to Flaca's attention at [facility].    Melissa Mg LCSW

## 2019-04-26 ENCOUNTER — CLINICAL SUPPORT (OUTPATIENT)
Dept: CARDIOLOGY | Facility: CLINIC | Age: 54
DRG: 907 | End: 2019-04-26
Attending: SURGERY
Payer: MEDICAID

## 2019-04-26 LAB
ANION GAP SERPL CALC-SCNC: 3 MMOL/L (ref 8–16)
BASOPHILS # BLD AUTO: 0.02 K/UL (ref 0–0.2)
BASOPHILS NFR BLD: 0.3 % (ref 0–1.9)
BUN SERPL-MCNC: 11 MG/DL (ref 6–20)
CALCIUM SERPL-MCNC: 8 MG/DL (ref 8.7–10.5)
CHLORIDE SERPL-SCNC: 107 MMOL/L (ref 95–110)
CO2 SERPL-SCNC: 29 MMOL/L (ref 23–29)
CREAT SERPL-MCNC: 0.9 MG/DL (ref 0.5–1.4)
DIFFERENTIAL METHOD: ABNORMAL
EOSINOPHIL # BLD AUTO: 0.1 K/UL (ref 0–0.5)
EOSINOPHIL NFR BLD: 1.2 % (ref 0–8)
ERYTHROCYTE [DISTWIDTH] IN BLOOD BY AUTOMATED COUNT: 13.8 % (ref 11.5–14.5)
EST. GFR  (AFRICAN AMERICAN): >60 ML/MIN/1.73 M^2
EST. GFR  (NON AFRICAN AMERICAN): >60 ML/MIN/1.73 M^2
GLUCOSE SERPL-MCNC: 85 MG/DL (ref 70–110)
HCT VFR BLD AUTO: 27.4 % (ref 40–54)
HGB BLD-MCNC: 8.6 G/DL (ref 14–18)
IMM GRANULOCYTES # BLD AUTO: 0.02 K/UL (ref 0–0.04)
IMM GRANULOCYTES NFR BLD AUTO: 0.3 % (ref 0–0.5)
LYMPHOCYTES # BLD AUTO: 1.7 K/UL (ref 1–4.8)
LYMPHOCYTES NFR BLD: 25.7 % (ref 18–48)
MAGNESIUM SERPL-MCNC: 1.8 MG/DL (ref 1.6–2.6)
MCH RBC QN AUTO: 30.3 PG (ref 27–31)
MCHC RBC AUTO-ENTMCNC: 31.4 G/DL (ref 32–36)
MCV RBC AUTO: 97 FL (ref 82–98)
MONOCYTES # BLD AUTO: 0.5 K/UL (ref 0.3–1)
MONOCYTES NFR BLD: 7.8 % (ref 4–15)
NEUTROPHILS # BLD AUTO: 4.2 K/UL (ref 1.8–7.7)
NEUTROPHILS NFR BLD: 64.7 % (ref 38–73)
NRBC BLD-RTO: 0 /100 WBC
PHOSPHATE SERPL-MCNC: 3.7 MG/DL (ref 2.7–4.5)
PLATELET # BLD AUTO: 128 K/UL (ref 150–350)
PMV BLD AUTO: 9.9 FL (ref 9.2–12.9)
POCT GLUCOSE: 116 MG/DL (ref 70–110)
POCT GLUCOSE: 93 MG/DL (ref 70–110)
POTASSIUM SERPL-SCNC: 4.3 MMOL/L (ref 3.5–5.1)
RBC # BLD AUTO: 2.84 M/UL (ref 4.6–6.2)
SODIUM SERPL-SCNC: 139 MMOL/L (ref 136–145)
WBC # BLD AUTO: 6.42 K/UL (ref 3.9–12.7)

## 2019-04-26 PROCEDURE — 97116 GAIT TRAINING THERAPY: CPT

## 2019-04-26 PROCEDURE — 63600175 PHARM REV CODE 636 W HCPCS: Performed by: SURGERY

## 2019-04-26 PROCEDURE — 25000242 PHARM REV CODE 250 ALT 637 W/ HCPCS: Performed by: STUDENT IN AN ORGANIZED HEALTH CARE EDUCATION/TRAINING PROGRAM

## 2019-04-26 PROCEDURE — 27000221 HC OXYGEN, UP TO 24 HOURS

## 2019-04-26 PROCEDURE — 63600175 PHARM REV CODE 636 W HCPCS: Performed by: STUDENT IN AN ORGANIZED HEALTH CARE EDUCATION/TRAINING PROGRAM

## 2019-04-26 PROCEDURE — 25000003 PHARM REV CODE 250: Performed by: STUDENT IN AN ORGANIZED HEALTH CARE EDUCATION/TRAINING PROGRAM

## 2019-04-26 PROCEDURE — 99900035 HC TECH TIME PER 15 MIN (STAT)

## 2019-04-26 PROCEDURE — 97535 SELF CARE MNGMENT TRAINING: CPT

## 2019-04-26 PROCEDURE — 25000242 PHARM REV CODE 250 ALT 637 W/ HCPCS: Performed by: SURGERY

## 2019-04-26 PROCEDURE — 94761 N-INVAS EAR/PLS OXIMETRY MLT: CPT

## 2019-04-26 PROCEDURE — 84100 ASSAY OF PHOSPHORUS: CPT

## 2019-04-26 PROCEDURE — 94664 DEMO&/EVAL PT USE INHALER: CPT

## 2019-04-26 PROCEDURE — 85025 COMPLETE CBC W/AUTO DIFF WBC: CPT

## 2019-04-26 PROCEDURE — 11000001 HC ACUTE MED/SURG PRIVATE ROOM

## 2019-04-26 PROCEDURE — S4991 NICOTINE PATCH NONLEGEND: HCPCS | Performed by: SURGERY

## 2019-04-26 PROCEDURE — A4216 STERILE WATER/SALINE, 10 ML: HCPCS | Performed by: SURGERY

## 2019-04-26 PROCEDURE — 25000003 PHARM REV CODE 250: Performed by: SURGERY

## 2019-04-26 PROCEDURE — 93971 CV US DOPPLER VENOUS ARM LEFT (CUPID ONLY): ICD-10-PCS | Mod: 26,LT,, | Performed by: INTERNAL MEDICINE

## 2019-04-26 PROCEDURE — 80048 BASIC METABOLIC PNL TOTAL CA: CPT

## 2019-04-26 PROCEDURE — 99231 PR SUBSEQUENT HOSPITAL CARE,LEVL I: ICD-10-PCS | Mod: 24,,, | Performed by: SURGERY

## 2019-04-26 PROCEDURE — 83735 ASSAY OF MAGNESIUM: CPT

## 2019-04-26 PROCEDURE — 99231 SBSQ HOSP IP/OBS SF/LOW 25: CPT | Mod: 24,,, | Performed by: SURGERY

## 2019-04-26 PROCEDURE — 94640 AIRWAY INHALATION TREATMENT: CPT

## 2019-04-26 PROCEDURE — 93971 EXTREMITY STUDY: CPT | Mod: LT

## 2019-04-26 PROCEDURE — 25000003 PHARM REV CODE 250: Performed by: ANESTHESIOLOGY

## 2019-04-26 PROCEDURE — C9113 INJ PANTOPRAZOLE SODIUM, VIA: HCPCS | Performed by: STUDENT IN AN ORGANIZED HEALTH CARE EDUCATION/TRAINING PROGRAM

## 2019-04-26 PROCEDURE — 94668 MNPJ CHEST WALL SBSQ: CPT

## 2019-04-26 PROCEDURE — 93971 EXTREMITY STUDY: CPT | Mod: 26,LT,, | Performed by: INTERNAL MEDICINE

## 2019-04-26 RX ORDER — SENNOSIDES 8.6 MG/1
8.6 TABLET ORAL DAILY
Status: DISCONTINUED | OUTPATIENT
Start: 2019-04-26 | End: 2019-05-01 | Stop reason: HOSPADM

## 2019-04-26 RX ORDER — FUROSEMIDE 10 MG/ML
20 INJECTION INTRAMUSCULAR; INTRAVENOUS ONCE
Status: COMPLETED | OUTPATIENT
Start: 2019-04-26 | End: 2019-04-26

## 2019-04-26 RX ORDER — PANTOPRAZOLE SODIUM 40 MG/1
40 TABLET, DELAYED RELEASE ORAL DAILY
Status: DISCONTINUED | OUTPATIENT
Start: 2019-04-27 | End: 2019-05-01 | Stop reason: HOSPADM

## 2019-04-26 RX ORDER — SODIUM CHLORIDE FOR INHALATION 3 %
4 VIAL, NEBULIZER (ML) INHALATION
Status: DISCONTINUED | OUTPATIENT
Start: 2019-04-26 | End: 2019-05-01 | Stop reason: HOSPADM

## 2019-04-26 RX ORDER — FUROSEMIDE 20 MG/1
20 TABLET ORAL 2 TIMES DAILY
Status: DISCONTINUED | OUTPATIENT
Start: 2019-04-27 | End: 2019-05-01 | Stop reason: HOSPADM

## 2019-04-26 RX ORDER — OXYCODONE HCL 5 MG/5 ML
10 SOLUTION, ORAL ORAL ONCE
Status: COMPLETED | OUTPATIENT
Start: 2019-04-26 | End: 2019-04-26

## 2019-04-26 RX ORDER — POLYETHYLENE GLYCOL 3350 17 G/17G
17 POWDER, FOR SOLUTION ORAL DAILY
Status: DISCONTINUED | OUTPATIENT
Start: 2019-04-26 | End: 2019-05-01 | Stop reason: HOSPADM

## 2019-04-26 RX ORDER — DOCUSATE SODIUM 100 MG/1
100 CAPSULE, LIQUID FILLED ORAL 2 TIMES DAILY
Status: DISCONTINUED | OUTPATIENT
Start: 2019-04-26 | End: 2019-05-01 | Stop reason: HOSPADM

## 2019-04-26 RX ADMIN — IPRATROPIUM BROMIDE AND ALBUTEROL SULFATE 3 ML: .5; 3 SOLUTION RESPIRATORY (INHALATION) at 04:04

## 2019-04-26 RX ADMIN — CLONAZEPAM 1 MG: 0.5 TABLET ORAL at 08:04

## 2019-04-26 RX ADMIN — GUAIFENESIN 200 MG: 200 SOLUTION ORAL at 08:04

## 2019-04-26 RX ADMIN — OXYCODONE HYDROCHLORIDE 10 MG: 5 SOLUTION ORAL at 03:04

## 2019-04-26 RX ADMIN — OXYCODONE HYDROCHLORIDE 10 MG: 5 SOLUTION ORAL at 09:04

## 2019-04-26 RX ADMIN — HEPARIN SODIUM 5000 UNITS: 5000 INJECTION, SOLUTION INTRAVENOUS; SUBCUTANEOUS at 03:04

## 2019-04-26 RX ADMIN — HEPARIN SODIUM 5000 UNITS: 5000 INJECTION, SOLUTION INTRAVENOUS; SUBCUTANEOUS at 05:04

## 2019-04-26 RX ADMIN — METHADONE HYDROCHLORIDE 90 MG: 10 TABLET ORAL at 08:04

## 2019-04-26 RX ADMIN — FLUTICASONE PROPIONATE 100 MCG: 50 SPRAY, METERED NASAL at 09:04

## 2019-04-26 RX ADMIN — HEPARIN SODIUM 5000 UNITS: 5000 INJECTION, SOLUTION INTRAVENOUS; SUBCUTANEOUS at 09:04

## 2019-04-26 RX ADMIN — ARIPIPRAZOLE 10 MG: 5 TABLET ORAL at 08:04

## 2019-04-26 RX ADMIN — ONDANSETRON 8 MG: 2 INJECTION INTRAMUSCULAR; INTRAVENOUS at 09:04

## 2019-04-26 RX ADMIN — IPRATROPIUM BROMIDE AND ALBUTEROL SULFATE 3 ML: .5; 3 SOLUTION RESPIRATORY (INHALATION) at 07:04

## 2019-04-26 RX ADMIN — IPRATROPIUM BROMIDE AND ALBUTEROL SULFATE 3 ML: .5; 3 SOLUTION RESPIRATORY (INHALATION) at 12:04

## 2019-04-26 RX ADMIN — ACETAMINOPHEN 1000 MG: 500 TABLET ORAL at 05:04

## 2019-04-26 RX ADMIN — Medication 1 PATCH: at 06:04

## 2019-04-26 RX ADMIN — GABAPENTIN 300 MG: 300 CAPSULE ORAL at 03:04

## 2019-04-26 RX ADMIN — Medication 3 ML: at 05:04

## 2019-04-26 RX ADMIN — DOCUSATE SODIUM 100 MG: 100 CAPSULE, LIQUID FILLED ORAL at 09:04

## 2019-04-26 RX ADMIN — IPRATROPIUM BROMIDE AND ALBUTEROL SULFATE 3 ML: .5; 3 SOLUTION RESPIRATORY (INHALATION) at 08:04

## 2019-04-26 RX ADMIN — SENNOSIDES 8.6 MG: 8.6 TABLET, FILM COATED ORAL at 08:04

## 2019-04-26 RX ADMIN — ONDANSETRON 8 MG: 2 INJECTION INTRAMUSCULAR; INTRAVENOUS at 06:04

## 2019-04-26 RX ADMIN — GABAPENTIN 300 MG: 300 CAPSULE ORAL at 08:04

## 2019-04-26 RX ADMIN — PANTOPRAZOLE SODIUM 40 MG: 40 INJECTION, POWDER, LYOPHILIZED, FOR SOLUTION INTRAVENOUS at 08:04

## 2019-04-26 RX ADMIN — Medication 3 ML: at 09:04

## 2019-04-26 RX ADMIN — SODIUM CHLORIDE SOLN NEBU 3% 4 ML: 3 NEBU SOLN at 05:04

## 2019-04-26 RX ADMIN — ACETAMINOPHEN 1000 MG: 500 TABLET ORAL at 09:04

## 2019-04-26 RX ADMIN — MAGNESIUM SULFATE 2 G: 2 INJECTION INTRAVENOUS at 09:04

## 2019-04-26 RX ADMIN — GUAIFENESIN 200 MG: 200 SOLUTION ORAL at 09:04

## 2019-04-26 RX ADMIN — CLONAZEPAM 1 MG: 0.5 TABLET ORAL at 09:04

## 2019-04-26 RX ADMIN — ACETAMINOPHEN 1000 MG: 500 TABLET ORAL at 03:04

## 2019-04-26 RX ADMIN — DOCUSATE SODIUM 100 MG: 100 CAPSULE, LIQUID FILLED ORAL at 08:04

## 2019-04-26 RX ADMIN — Medication 3 ML: at 02:04

## 2019-04-26 RX ADMIN — FLUTICASONE FUROATE AND VILANTEROL TRIFENATATE 1 PUFF: 100; 25 POWDER RESPIRATORY (INHALATION) at 09:04

## 2019-04-26 RX ADMIN — ARIPIPRAZOLE 10 MG: 5 TABLET ORAL at 09:04

## 2019-04-26 RX ADMIN — PROMETHAZINE HYDROCHLORIDE 12.5 MG: 25 INJECTION INTRAMUSCULAR; INTRAVENOUS at 03:04

## 2019-04-26 RX ADMIN — FUROSEMIDE 20 MG: 10 INJECTION, SOLUTION INTRAMUSCULAR; INTRAVENOUS at 03:04

## 2019-04-26 RX ADMIN — GABAPENTIN 300 MG: 300 CAPSULE ORAL at 09:04

## 2019-04-26 RX ADMIN — POLYETHYLENE GLYCOL 3350 17 G: 17 POWDER, FOR SOLUTION ORAL at 02:04

## 2019-04-26 RX ADMIN — OXYCODONE HYDROCHLORIDE 10 MG: 5 SOLUTION ORAL at 06:04

## 2019-04-26 NOTE — NURSING TRANSFER
Nursing Transfer Note      4/26/2019     Transfer To: [address]    Transfer via wheelchair    Transfer with 3L NC to O2    Transported by ICU RN    Medicines sent: Phenergan, inhaler    Chart send with patient: Yes    Notified: sister    Patient reassessed at: 4/26, 1115     Upon arrival to floor: patient oriented to room, call bell in reach and bed in lowest position    Report called to ARIANNA Greer. All VSS upon transfer. All belongings sent with pt and Zoey informed of pt's two home oxygen tanks at bedside.

## 2019-04-26 NOTE — PROGRESS NOTES
Pt ate 100% of lunch. C/O nausea. Given 12.5 mg of Promethazine IVPB. Medication was effective.Will continue to monitor.

## 2019-04-26 NOTE — PLAN OF CARE
Problem: Occupational Therapy Goal  Goal: Occupational Therapy Goal  Goals to be met by: 5/9/19     Patient will increase functional independence with ADLs by performing:    UE Dressing with Modified [place].  LE Dressing with Modified [place] and Assistive Devices as needed.  Grooming while standing at sink with Modified [place].  Toileting from bedside commode with Modified [place] for hygiene and clothing management.   Bathing from  edge of bed with Modified [place].  Toilet transfer to bedside commode with Modified [place].  Increased functional strength to WFL for B UE.  Upper extremity exercise program x15 reps per handout, with independence.     Cont.  POC.

## 2019-04-26 NOTE — ASSESSMENT & PLAN NOTE
" Patient is a 53 y.o. male presents with essential hypertension, pulmonary htn, BMI 37.25, COPD on home o2, hep C, not smoking (quit 3 months ago) and recurrent large umbilical hernia (times 2).  Per Dr. Wray's note 4/2017 he is above average risk for surgery.  Generally, he takes his advair daily, and only needs albuterol "when the weather changes," which is about 3 times a month.  Says he ambulated a quarter of a mile the other day without stopping.  Can ambulate 1 flight of stairs without stopping. Patient is s/p ventral hernia repair 4/22 with hypotension this am after receiving .3 of clonidine this am. His maps dropped to the 50s. He wsa given two  Boluses of LR and already had  A central line in place. The patient was not complaining of any pain when he was stepped up to the SICU on 4 liters NC with unstable pressures. Immediately, an arterial line was placed and 1 unit RBC was begun. He was also started on levo to .12. The patient's wbc count had climbed from 9 to 15 in one day. He's not currently on abx. His lovenox was stopped,last dose 4/23.       Neuro:   -Awake and appropriately responding to questions  - pain control with methadone (home med), scheduled tylenol, oxy 5, 10, and dilaudid for breakthrough  - currently well controlled per nursing     Pulmonary:   - extubated to NC; satting well on 2L NC   - 3 liters home o2 at baseline  - Xray prn  - Duoneb prn  - acapella prn    Cardiac:   - off pressors   - 2 units prbc 4/24  - home med clonidine will need restart when able     Renal:    -UO >1.9 L   -Cr trending down  - restart lasix when appropriate     ID:   -WBC trending down   - no abx   -afebrile       Hem/Onc:   -H and H decreased, 2 units transfused 4/24, currently stable   -trend H and H  -hold anticoag for now    Endocrine:    -on metformin at home  -SSI here  -monitor glucose    Fluids/Electrolytes/Nutrition/GI:   - Replace electrolytes PRN  - diet clear liquid   - KUB with some bowel " dilation; monitor with physical exam and repeat imaging  - bowel regiment in place       PPx:  - DVT: hold for now. Mechanical   - Bowel: clear liquid diet  - PUP: protonix      DISPO:  - monitor bowel distention   - multimodal pain control with gabapentin, tylenol and home med methadone; minimize opiods. add NSAID as able per surgery   - stdwn orders for 10 or 5 in 4/25

## 2019-04-26 NOTE — PT/OT/SLP PROGRESS
Physical Therapy Treatment    Patient Name:  Ming Harrington   MRN:  [id number]    Recommendations:     Discharge Recommendations:  rehabilitation facility   Discharge Equipment Recommendations: (will determine DME Needs closer to discharge)   Barriers to discharge: Decreased caregiver support family will not be able to assist at current functional level.     Assessment:     Ming Harrington is a 53 y.o. male admitted with a medical diagnosis of <principal problem not specified>.  He presents with the following impairments/functional limitations:  weakness, gait instability, impaired balance, impaired endurance, decreased lower extremity function, impaired functional mobilty, pain pt girma treatment better being able to gait train. Pt will benefit from cont skilled PT 4x/wk to progress physically. Pt will need inpt rehab when medically stable to maximize rehab potential. Pt is s/p recurrent epigastric hernia repair with mesh, wound exploration, evacuation of subcutaneous hematoma 4/24/19.    Rehab Prognosis: Good; patient would benefit from acute skilled PT services to address these deficits and reach maximum level of function.    Recent Surgery: Procedure(s) (LRB):  LAPAROSCOPY, DIAGNOSTIC (N/A)  EVACUATION, HEMATOMA  EXPLORATION, WOUND (N/A) 2 Days Post-Op    Plan:     During this hospitalization, patient to be seen 4 x/week to address the identified rehab impairments via gait training, therapeutic activities, therapeutic exercises and progress toward the following goals:    · Plan of Care Expires:  05/24/19    Subjective     Chief Complaint: pt c/o pain during treatment.   Patient/Family Comments/goals:  To get better and go home.   Pain/Comfort:  · Pain Rating 1: 8/10  · Location 1: abdomen  · Pain Rating Post-Intervention 1: 8/10      Objective:     Communicated with nurse prior to session.  Patient found supine with telemetry, pulse ox (continuous), blood pressure cuff, central line, oxygen, RAZIA drain(abdominal binder) upon PT  entry to room.     General Precautions: Standard, fall   Orthopedic Precautions:    Braces:       Functional Mobility:  · Bed Mobility:   Pt needed verbal cues for hand placement and sequencing for functional mobility  · Rolling Right: moderate assistance  · Supine to Sit: moderate assistance  ·   · Transfers:     · Sit to Stand:  minimum assistance with rolling walker  ·   · Gait: pt received gait training ~ 8 ft with RW and CGA.       AM-PAC 6 CLICK MOBILITY  Turning over in bed (including adjusting bedclothes, sheets and blankets)?: 2  Sitting down on and standing up from a chair with arms (e.g., wheelchair, bedside commode, etc.): 3  Moving from lying on back to sitting on the side of the bed?: 2  Moving to and from a bed to a chair (including a wheelchair)?: 3  Need to walk in hospital room?: 3  Climbing 3-5 steps with a railing?: 1  Basic Mobility Total Score: 14         Patient left up in w/c with all lines intact and call button in reach..    GOALS:   Multidisciplinary Problems     Physical Therapy Goals        Problem: Physical Therapy Goal    Goal Priority Disciplines Outcome Goal Variances Interventions   Physical Therapy Goal     PT, PT/OT      Description:  Goals to be met by: 19    Patient will increase functional independence with mobility by performin. Supine to sit with Contact Guard Assistance -not met  2. Sit to stand transfer with Contact Guard Assistance with AD if needed. -not met  3. Gait  x 150 feet with Contact Guard Assistance using AD if needed.-not met   4. Ascend/descend 3 stair with bilateral Handrails Contact Guard Assistance -not met  5. Lower extremity exercise program x15 reps with supervision -not met                      Time Tracking:     PT Received On: 19  PT Start Time: 951     PT Stop Time:   PT Total Time (min): 14 min     Billable Minutes: Gait Training 14 min    Treatment Type: Treatment  PT/PTA: PT     PTA Visit Number: 0     Marce Hooks  PT  04/26/2019

## 2019-04-26 NOTE — PHYSICIAN QUERY
PT Name: Ming Harrington  MR #: [id number]    Physician Query Form - Respiratory Condition Clarification      CDS/: Merna Aguilera RN, CDS               Contact information: [email or web address]                                                                                                                        [phone]    This form is a permanent document in the medical record.    Query Date: April 26, 2019    By submitting this query, we are merely seeking further clarification of documentation. Please utilize your independent clinical judgment when addressing the question(s) below.    The Medical record contains the following   Indicators   Supporting Clinical Findings Location in Medical Record   x   SOB, LAND, Wheezing, Productive Cough, Use of Accessory Muscles, etc. Pt complained of SOB and had wheezes in all lung fields, pt forced to sit up and use IS, pt coughed up sputum, breath sounds cleared, pt stated that he felt better   RN PN 4/23   x   Acute/Chronic Illness Hemorrhagic shock  COPD  CHF   CC Surgery H&P 4/24       x   Radiology Findings Subsegmental atelectatic changes noted at the lung bases. No pleural effusion   Chest Xray 4/24      Respiratory Distress or Failure        Hypoxia or Hypercapnia     x   RR         ABGs         O2 sat  Labs 4/24      BiPAP/Intubation       x   Supplemental O2 On 3L NC; he is on 2L at home    General Surgery PN 4/23   x   Home O2, Oxygen Dependence COPD on home o2   CC Surgery H&P 4/24   x   Treatment Xray prn  Duoneb prn  acapella prn    albuterol-ipratropium 2.5 mg-0.5 mg/3 mL nebulizer solution 3 mL    ADVAIR DISKUS 250-50 mcg/dose diskus inhaleralbuterol (PROVENTIL) 2.5 mg /3 mL (0.083 %) nebulizer solution  albuterol (PROVENTIL/VENTOLIN HFA) 90 mcg/actuation inhaler  albuterol-ipratropium (DUO-NEB) 2.5 mg-0.5 mg/3 mL nebulizer solution  tiotropium bromide (SPIRIVA RESPIMAT) 2.5 mcg/actuation Mist    CC Surgery H&P 4/24      MAR 4/22- 4/26      Home  "Meds  Home Meds  Home Meds    Home Meds    Home Meds   x   Other Patient is a 53 y.o. male presents with essential hypertension, pulmonary htn, BMI 37.25, COPD on home o2, hep C, not smoking (quit 3 months ago) and recurrent large umbilical hernia (times 2).  Per Dr. Wray's note 4/2017 he is above average risk for surgery.  Generally, he takes his advair daily, and only needs albuterol "when the weather changes," which is about 3 times a month.  Says he ambulated a quarter of a mile the other day without stopping.  Can ambulate 1 flight of stairs without stopping. Patient is s/p ventral hernia repair 4/22 with hypotension this am after receiving .3 of clonidine this am. His maps dropped to the 50s. He wsa given two  Boluses of LR and already had  A central line in place. The patient was not complaining of any pain when he was stepped up to the SICU on 4 liters NC with unstable pressures. Immediately, an arterial line was placed and 1 unit RBC was begun. He was also started on levo to .12.   CC Surgery H&P 4/24     Respiratory failure can be acute, chronic or both. It is generally further specified as hypoxic, hypercapnic or both. Lastly, it is important to identify an etiology, if known or suspected.   References::  https://www.acphospitalist.org/archives/2013/10/coding.htm http://[employer].com/acute-respiratory-failure-know     The clinical guidelines noted below are only system guidelines, and do not replace the providers clinical judgment.    Provider, please specify diagnosis or diagnoses associated with above clinical findings.   [ xx  ] Chronic Respiratory Failure with Hypoxia and Hypercapnia - Continuous home oxygen and Normal pH with high pCO2 (chronic hypercapnia) (common example: COPD)   [   ] Other Chronic Respiratory Failure   [   ] Other Respiratory Diagnosis (please specify): _________________________________   [   ] Clinically Undetermined       Please document in your progress notes daily for " the duration of treatment until resolved and include in your discharge summary.

## 2019-04-26 NOTE — ASSESSMENT & PLAN NOTE
Mr. Harrington is a 54 yo man who is s/p ventral hernia repair on 4/22/2019. Pain control issue overnight. Had N/V. On 3L NC.     - HDS  - trend Hgb  - resp toilet for productive cough  - multimodal pain control  - regular diet  - pantoprazole  - Cr normalized  - sliding scale insulin    Dispo: monitor at least one more day due to productive cough

## 2019-04-26 NOTE — PHYSICIAN QUERY
PT Name: Ming Harrington  MR #: [id number]     PHYSICIAN QUERY -  ELECTROLYTE CLARIFICATION      CDS/: Merna Aguilera RN, CDS              Contact information: [email or web address]                                                                                                                        [phone]  This form is a permanent document in the medical record.     Query Date: 2019    By submitting this query, we are merely seeking further clarification of documentation to reflect the severity of illness of your patient. Please utilize your independent clinical judgment when addressing the question(s) below.    The Medical record reflects the following:     Indicators   Supporting Clinical Findings Location in Medical Record   x Lab Value(s) Ma.4 --> 2.1 Labs -    x Treatment                                 Medication magnesium sulfate 2g in water 50mL IVPB (premix) MAR     Other       Provider, please specify the diagnosis or diagnoses that correspond(s) to the above indicators. John all that apply:    [ xx  ] Hypomagnesemia   [   ] Other electrolyte disturbance (please specify): _______   [   ] Clinically Undetermined       Please document in your progress notes daily for the duration of treatment until resolved, and include in your discharge summary.

## 2019-04-26 NOTE — PLAN OF CARE
Problem: Adult Inpatient Plan of Care  Goal: Plan of Care Review  Outcome: Ongoing (interventions implemented as appropriate)  No acute events overnight, see assessment and vital sign flow sheets for complete details of shift. Pertinent details as follows:    -AAOx4 calm and cooperative, verbalizes concerns r/t re-occurrence of internal bleeding, hx of anxiety, denies SI, expresses hopefulness.  -Counseled on treatment goals, safety precautions, pain and anxiety management interventions, discharge criteria, pt demonstrates understanding.  -Intermittent cough productive of thick white sputum moderating, weaned to 3L NC (baseline 2L), saturating well.  -Abdominal/incisional pain well controlled w/ PO meds per MAR, midline incision WDL.  -Tolerating clear liquids well, c/o constipation, will coordinate w/ primary team to address.   -Persistent c/o L hand neuropathy, numbness/shooting pains, LUE edema present, radial pulses unequal R>L, notified team, no additional orders at this time.   -RAZIA w/ approx 15ml/hr sanguinous drainage.    -Voiding concentrated urine per urinal.  -Mobilized from bed to chair w/ steady gait, no dizziness or SOB, pt subjectively reports improving strength.     Plan for txr to step down unit today. Will continue to monitor.

## 2019-04-26 NOTE — PHYSICIAN QUERY
"PT Name: Ming Harrington  MR #: [id number]    Physician Query Form - Heart  Condition Clarification     CDS/: Merna Aguilera RN, CDS               Contact information: [email or web address]                                                                                                                        [phone]  This form is a permanent document in the medical record.     Query Date: April 26, 2019    By submitting this query, we are merely seeking further clarification of documentation. Please utilize your independent clinical judgment when addressing the question(s) below.    The medical record contains the following   Indicators     Supporting Clinical Findings Location in Medical Record    BNP     x EF (Echo: EF~60%)   Anesthesia Preprocedure Eval 4/24   x Radiology findings Subsegmental atelectatic changes noted at the lung bases.  Otherwise the lungs are clear.  Heart size normal. Chest Xray 4/25    Echo Results      "Ascites" documented      "SOB" or "LAND" documented      "Hypoxia" documented     x Heart Failure documented HFpEF   Anesthesia Preprocedure Eval 4/24    "Edema" documented     x Diuretics/Meds furosemide (LASIX) 40 MG tablet   Home Med   x Treatment: restart lasix when appropriate   CC Surgery PN 4/26    Other:      Heart failure (HF) can be acute, chronic or both. It is generally further specificed as systolic, diastolic, or combined. Lastly, it is important to identify an underlying etiology if known or suspected.     Common clues to acute exacerbation:  Rapidly progressive symptoms (w/in 2 weeks of presentation), using IV diuretics to treat, using supplemental O2, pulmonary edema on Xray, MI w/in 4 weeks, and/or BNP >500    Systolic Heart Failure: is defined as chart documentation of a left ventricular ejection fraction (LVEF) less than 40%     Diastolic Heart Failure: is defined as a left ventricular ejection fraction (LVEF) greater than 40%   +      Evidence of diastolic " dysfunction on echocardiography OR    Right heart catheterization wedge pressure above 12 mm Hg OR    Left heart catheterization left ventricular end diastolic pressure 18 mm Hg or above.    References: *American Heart Association    The clinical guidelines noted below are only system guidelines, and do not replace the providers clinical judgment.     Provider, please specify the diagnosis associated with above clinical findings                              [   ] Chronic Diastolic Heart Failure - Pre-existing diastolic HF diagnosis.  EF > 40%  without  acute HF symptoms documented  [   ] Other Type of Heart Failure (please specify type): _________________________  [   ] Other (please specify): ___________________________________  [ x ] Clinically Undetermined                          Please document in your progress notes daily for the duration of treatment until resolved and include in your discharge summary.

## 2019-04-26 NOTE — PROGRESS NOTES
Pt arrive to floor via stretcher escorted by staff. 0n 3L O2. RAZIA drain x 1 to abdomen intact. Pt AAO x 4. Call light within reach.Bed in lowest position.

## 2019-04-26 NOTE — SUBJECTIVE & OBJECTIVE
"Interval History/Significant Events:   Did well overnight, pain well controlled. Continues to have some soreness around LLQ drain site, LUQ and intermittent L index finger "shooting pains." KUB obtained with some dilated appearing bowel. Otherwise doing well with good UOP (just under 2L) afebrile and HDS    Follow-up For: Procedure(s) (LRB):  LAPAROSCOPY, DIAGNOSTIC (N/A)  EVACUATION, HEMATOMA  EXPLORATION, WOUND (N/A)    Post-Operative Day: 2 Days Post-Op    Objective:     Vital Signs (Most Recent):  Temp: 98.5 °F (36.9 °C) (04/26/19 0345)  Pulse: 89 (04/26/19 0702)  Resp: 18 (04/26/19 0702)  BP: 134/73 (04/26/19 0300)  SpO2: 100 % (04/26/19 0702) Vital Signs (24h Range):  Temp:  [98.5 °F (36.9 °C)-99.9 °F (37.7 °C)] 98.5 °F (36.9 °C)  Pulse:  [] 89  Resp:  [13-34] 18  SpO2:  [95 %-100 %] 100 %  BP: (111-143)/(61-87) 134/73  Arterial Line BP: (115)/(81) 115/81     Weight: 107 kg (235 lb 14.3 oz)  Body mass index is 35.87 kg/m².      Intake/Output Summary (Last 24 hours) at 4/26/2019 0715  Last data filed at 4/26/2019 0605  Gross per 24 hour   Intake 4395.42 ml   Output 2225 ml   Net 2170.42 ml       Physical Exam   Constitutional: He appears well-developed and well-nourished. No distress.   Cardiovascular: Normal rate and regular rhythm.   Pulmonary/Chest: Effort normal. No respiratory distress.   Abdominal: Soft. He exhibits no distension.   Abdominal mass gone after evacuation of hematoma; Appropriately tender to palpation   Drain in place with sanguinous output, but turning more sero-sanguinous   Musculoskeletal: He exhibits no edema.   Neurological: He is alert.   Skin: He is not diaphoretic.   Vitals reviewed.      Vents:  Vent Mode: A/C (04/25/19 0530)  Ventilator Initiated: Yes (04/24/19 1000)  Set Rate: 20 bmp (04/25/19 0530)  Vt Set: 410 mL (04/25/19 0530)  PEEP/CPAP: 5 cmH20 (04/25/19 0530)  Oxygen Concentration (%): 32 (04/26/19 0410)  Peak Airway Pressure: 22 cmH2O (04/25/19 0530)  Plateau " Pressure: 0 cmH20 (04/25/19 0530)  Total Ve: 9.4 mL (04/25/19 0530)  F/VT Ratio<105 (RSBI): (!) 50.25 (04/25/19 0530)    Lines/Drains/Airways     Central Venous Catheter Line                 Percutaneous Central Line Insertion/Assessment - triple lumen  right internal jugular -- days          Drain                 Closed/Suction Drain 04/24/19 0916 Left Abdomen Bulb 15 Fr. 1 day                Significant Labs:    CBC/Anemia Profile:  Recent Labs   Lab 04/25/19 0300 04/25/19  1145 04/26/19 0300   WBC 14.99* 10.59 6.42   HGB 9.3* 8.8* 8.6*   HCT 28.2* 26.7* 27.4*   * 124* 128*   MCV 92 94 97   RDW 13.9 13.9 13.8        Chemistries:  Recent Labs   Lab 04/24/19  0954  04/25/19  0300 04/25/19 1145 04/26/19  0300   NA  --    < > 139 141 139   K  --    < > 4.9 4.6 4.3   CL  --    < > 108 109 107   CO2  --    < > 27 27 29   BUN  --    < > 20 17 11   CREATININE  --    < > 1.2 1.1 0.9   CALCIUM  --    < > 7.9* 7.8* 8.0*   MG 1.8  --  1.7  --  1.8   PHOS 4.9*  --  2.8  --  3.7    < > = values in this interval not displayed.       Significant Imaging:  I have reviewed all pertinent imaging results/findings within the past 24 hours.

## 2019-04-26 NOTE — HOSPITAL COURSE
Underwent open incision hernia repair with mesh. Post-op, he developed increasing abdominal pain. He had swelling. He became hypotensive and was found to have falling Hgb. Transferred to SICU for resuscitation and started on levophed. Taken back to the OR for exploratory laparoscopy, which was negative. Wound was opened and bleeding vessels were identified in the subcutaneous space and ligated. He received 4U PRBC and subsequently his Hgb was stable.  He did remain intubated for one night s/p takeback given high volume resuscitation with underlying COPD on home O2. He then progressed well and was tolerating a diet. However, he then developed a productive cough.      He was offered rehab, but declined, preferring to instead rely on his brother who is very attentive.

## 2019-04-26 NOTE — PLAN OF CARE
CM went to visit patient to discuss current POC and was notified by the ICU floor nurse that patient transferred to stepdown unit in room 527B. Floor nurse also notified CM that [facility] sent someone out to service the patient's O2 tanks however when they filled the tanks the valvel was never closed so the O2 tanks were on empty when transport picked up patient and belonging to transfer to 5th floor. CM called and spoke to staff at [facility]   to notify them of O2 tanks needing refills again due to valve being left open and staff stated that someone would be over today to reservice the O2 tanks.     CM went to 5th floor (Rm 527B) and spoke with patient to notify him of PT/OT recommendations for patient to discharge to Rehab facility once medically stable. Patient refused Rehab placement as well as out patient therapy services. Patient states that he prefers to discharge home with the assistance of his brother who is very attentive and is used to being his caregiver. Melissa Shields-NASH notified of patient refusal for Rehab placement as well as out patient therapy. NASH was also made aware of the issue with O2 tanks and [facility] being notified. Felipe will continue to follow.

## 2019-04-26 NOTE — PT/OT/SLP PROGRESS
Occupational Therapy   Treatment    Name: Ming Harrington  MRN: [id number]  Admitting Diagnosis:  <principal problem not specified>  2 Days Post-Op    Recommendations:     Discharge Recommendations: rehabilitation facility  Discharge Equipment Recommendations:  (TBD)  Barriers to discharge:  None    Assessment:     Ming Harrington is a 53 y.o. male with a medical diagnosis of <principal problem not specified>.  He was able to perform supine/sit T/F c mod A and sit/stand T/F c min A x2 and walk to window in room c CGA and RW.  Able to perform UB dressing c max A, toilet hygiene c set-up and grooming c set-up.  B UE appear to be stronger today compared to yesterday.  Pt was more alert today as well.  Performance deficits affecting function are weakness, impaired endurance, impaired self care skills, impaired functional mobilty, decreased upper extremity function.     Rehab Prognosis:  Good; patient would benefit from acute skilled OT services to address these deficits and reach maximum level of function.       Plan:     Patient to be seen 4 x/week to address the above listed problems via self-care/home management, therapeutic activities, therapeutic exercises  · Plan of Care Expires: 05/09/19  · Plan of Care Reviewed with: patient    Subjective     Pain/Comfort:  · Pain Rating 1: 8/10(Abdomen.)    Objective:     Communicated with: RN prior to session.  Patient found supine with bed alarm, blood pressure cuff, central line, oxygen, peripheral IV, pulse ox (continuous), SCD, telemetry, tracheostomy upon OT entry to room.    General Precautions: Standard, fall   Orthopedic Precautions:N/A   Braces: N/A     Occupational Performance:     Bed Mobility:    · Patient completed Supine to Sit with moderate assistance     Functional Mobility/Transfers:  · Patient completed Sit <> Stand Transfer with minimum assistance  with  x2   · Patient completed Bed <> Chair Transfer using Stand Pivot technique with minimum assistance with rolling  walker  · Functional Mobility: Pt was able to walk to window in room c min A and RW.  Has fair dynamic standing balance at this time.    Activities of Daily Living:  · Grooming: modified independence to wash face and rinse mouth c mouthwash.  · Upper Body Dressing: maximal assistance to don hospital gown 2* IV lines.  · Toileting: stand by assistance to use hand held urinal at bedside.      [facility] 6 Click ADL: 14    Patient left up in chair with all lines intact, call button in reach and RN notifiedEducation:      GOALS:   Multidisciplinary Problems     Occupational Therapy Goals        Problem: Occupational Therapy Goal    Goal Priority Disciplines Outcome Interventions   Occupational Therapy Goal     OT, PT/OT     Description:  Goals to be met by: 5/9/19     Patient will increase functional independence with ADLs by performing:    UE Dressing with Modified [place].  LE Dressing with Modified [place] and Assistive Devices as needed.  Grooming while standing at sink with Modified [place].  Toileting from bedside commode with Modified [place] for hygiene and clothing management.   Bathing from  edge of bed with Modified [place].  Toilet transfer to bedside commode with Modified [place].  Increased functional strength to WFL for B UE.  Upper extremity exercise program x15 reps per handout, with independence.                      Time Tracking:     OT Date of Treatment: 04/26/19  OT Start Time: 0938  OT Stop Time: 1006  OT Total Time (min): 28 min    Billable Minutes:Self Care/Home Management 28    JACK Sutton  4/26/2019

## 2019-04-26 NOTE — PROGRESS NOTES
"[facility]  General Surgery  Progress Note    Subjective:     History of Present Illness:  Patient is a 53 y.o. male presents with essential hypertension, pulmonary htn, BMI 37.25, COPD on home o2, hep C, not smoking (quit 3 months ago) and recurrent large umbilical hernia (times 2).  Per Dr. Wray's note 4/2017 he is above average risk for surgery.       Generally, he takes his advair daily, and only needs albuterol "when the weather changes," which is about 3 times a month.  Says he ambulated a quarter of a mile the other day without stopping.  Can ambulate 1 flight of stairs without stopping.      Pain is to the hernia site, and radiating inferiorly to his groin.  It is daily, and worse in the mornings, and exacerbated by laying on his side.    To OR for open incisional hernia repair with mesh        Post-Op Info:  Procedure(s) (LRB):  LAPAROSCOPY, DIAGNOSTIC (N/A)  EVACUATION, HEMATOMA  EXPLORATION, WOUND (N/A)   2 Days Post-Op     Interval History: Extubated. Hgb stable. Productive cough. Ambulating. Tolerating diet.     Medications:  Continuous Infusions:   dextrose 5 % and 0.9 % NaCl 125 mL/hr at 04/25/19 0700    fentanyl 10 mL/hr at 04/25/19 0700    hydromorphone in 0.9 % NaCl 6 mg/30 ml      norepinephrine bitartrate-D5W 0.04 mcg/kg/min (04/25/19 0600)    propofol 20 mcg/kg/min (04/25/19 0600)     Scheduled Meds:   acetaminophen  1,000 mg Oral Q8H    albuterol-ipratropium  3 mL Nebulization Q4H    ARIPiprazole  10 mg Oral BID    fluticasone  2 spray Each Nare Daily    fluticasone-vilanterol  1 puff Inhalation Daily    gabapentin  300 mg Oral TID    heparin (porcine)  5,000 Units Subcutaneous Q8H    nicotine  1 patch Transdermal QAM    pantoprazole  40 mg Intravenous Daily    sodium chloride 0.9%  3 mL Intravenous Q8H     PRN Meds:sodium chloride, albuterol-ipratropium, dextrose 50%, glucagon (human recombinant), insulin aspart U-100, magnesium sulfate IVPB, naloxone, " ondansetron, potassium chloride in water, promethazine (PHENERGAN) IVPB, ramelteon, sodium chloride 0.9%     Review of patient's allergies indicates:   Allergen Reactions    Iodine and iodide containing products Anaphylaxis and Swelling     Facial swelling    Shellfish containing products Anaphylaxis             Compazine [prochlorperazine edisylate] Hallucinations     Objective:     Vital Signs (Most Recent):  Temp: (!) 100.6 °F (38.1 °C) (04/25/19 0700)  Pulse: (!) 115 (04/25/19 0715)  Resp: (!) 22 (04/25/19 0715)  BP: 137/63 (04/25/19 0700)  SpO2: 99 % (04/25/19 0715) Vital Signs (24h Range):  Temp:  [97.8 °F (36.6 °C)-100.6 °F (38.1 °C)] 100.6 °F (38.1 °C)  Pulse:  [] 115  Resp:  [0-37] 22  SpO2:  [96 %-100 %] 99 %  BP: (103-150)/(55-80) 137/63  Arterial Line BP: ()/(46-90) 103/80     Weight: 107 kg (236 lb)  Body mass index is 35.88 kg/m².    Intake/Output - Last 3 Shifts       04/23 0700 - 04/24 0659 04/24 0700 - 04/25 0659 04/25 0700 - 04/26 0659    P.O. 180      I.V. (mL/kg)  5990 (56)     Blood 250 850     Total Intake(mL/kg) 430 (4) 6840 (63.9)     Urine (mL/kg/hr) 475 (0.2) 1770 (0.7) 100 (1.5)    Drains 180 315     Total Output 655 2085 100    Net -225 +4755 -100                 Physical Exam   Constitutional: He appears well-developed and well-nourished. No distress.   Cardiovascular: Normal rate and regular rhythm.   Pulmonary/Chest: Effort normal. No respiratory distress.   Productive cough   Abdominal: Soft. He exhibits no distension.   Abdominal mass gone after evacuation of hematoma; Appropriately tender to palpation   Drain in place with sanguinous output, but turning more sero-sanguinous   Musculoskeletal: He exhibits no edema.   Neurological: He is alert.   Skin: He is not diaphoretic.   Vitals reviewed.      Significant Labs:  CBC:   Recent Labs   Lab 04/25/19  0300   WBC 14.99*   RBC 3.07*   HGB 9.3*   HCT 28.2*   *   MCV 92   MCH 30.3   MCHC 33.0     CMP:   Recent Labs    Lab 04/24/19  0656  04/25/19  0300   GLU  --    < > 101   CALCIUM  --    < > 7.9*   ALBUMIN 1.8*  --   --    PROT 4.3*  --   --    NA  --    < > 139   K  --    < > 4.9   CO2  --    < > 27   CL  --    < > 108   BUN  --    < > 20   CREATININE  --    < > 1.2   ALKPHOS 61  --   --    ALT 13  --   --    AST 19  --   --    BILITOT 0.6  --   --     < > = values in this interval not displayed.     ABGs:   Recent Labs   Lab 04/25/19  0314   PH 7.366   PCO2 50.2*   PO2 91   HCO3 28.7*   POCSATURATED 97   BE 3       Significant Diagnostics:  I have reviewed all pertinent imaging results/findings within the past 24 hours.    Assessment/Plan:     Ventral hernia  Mr. Harrington is a 54 yo man who is s/p ventral hernia repair on 4/22/2019. Pain control issue overnight. Had N/V. On 3L NC.     - HDS  - trend Hgb  - resp toilet for productive cough  - multimodal pain control  - regular diet  - pantoprazole  - Cr normalized  - sliding scale insulin    Dispo: monitor at least one more day due to productive cough        W Giuseppe Jacob MD  General Surgery  [facility]

## 2019-04-26 NOTE — PROGRESS NOTES
"[facility]  Critical Care - Surgery  Progress Note    Patient Name: Ming Harrington  MRN: [id number]  Admission Date: 4/22/2019  Hospital Length of Stay: 3 days  Code Status: Full Code  Attending Provider: Kenyon Chawla MD  Primary Care Provider: Vazquez Barajas MD   Principal Problem: <principal problem not specified>    Subjective:     Hospital/ICU Course:  No notes on file    Interval History/Significant Events:   Did well overnight, pain well controlled. Continues to have some soreness around LLQ drain site, LUQ and intermittent L index finger "shooting pains." KUB obtained with some dilated appearing bowel. Otherwise doing well with good UOP (just under 2L) afebrile and HDS    Follow-up For: Procedure(s) (LRB):  LAPAROSCOPY, DIAGNOSTIC (N/A)  EVACUATION, HEMATOMA  EXPLORATION, WOUND (N/A)    Post-Operative Day: 2 Days Post-Op    Objective:     Vital Signs (Most Recent):  Temp: 98.5 °F (36.9 °C) (04/26/19 0345)  Pulse: 89 (04/26/19 0702)  Resp: 18 (04/26/19 0702)  BP: 134/73 (04/26/19 0300)  SpO2: 100 % (04/26/19 0702) Vital Signs (24h Range):  Temp:  [98.5 °F (36.9 °C)-99.9 °F (37.7 °C)] 98.5 °F (36.9 °C)  Pulse:  [] 89  Resp:  [13-34] 18  SpO2:  [95 %-100 %] 100 %  BP: (111-143)/(61-87) 134/73  Arterial Line BP: (115)/(81) 115/81     Weight: 107 kg (235 lb 14.3 oz)  Body mass index is 35.87 kg/m².      Intake/Output Summary (Last 24 hours) at 4/26/2019 0715  Last data filed at 4/26/2019 0605  Gross per 24 hour   Intake 4395.42 ml   Output 2225 ml   Net 2170.42 ml       Physical Exam   Constitutional: He appears well-developed and well-nourished. No distress.   Cardiovascular: Normal rate and regular rhythm.   Pulmonary/Chest: Effort normal. No respiratory distress.   Abdominal: Soft. He exhibits no distension.   Abdominal mass gone after evacuation of hematoma; Appropriately tender to palpation   Drain in place with sanguinous output, but turning more sero-sanguinous " "  Musculoskeletal: He exhibits no edema.   Neurological: He is alert.   Skin: He is not diaphoretic.   Vitals reviewed.      Vents:  Vent Mode: A/C (04/25/19 0530)  Ventilator Initiated: Yes (04/24/19 1000)  Set Rate: 20 bmp (04/25/19 0530)  Vt Set: 410 mL (04/25/19 0530)  PEEP/CPAP: 5 cmH20 (04/25/19 0530)  Oxygen Concentration (%): 32 (04/26/19 0410)  Peak Airway Pressure: 22 cmH2O (04/25/19 0530)  Plateau Pressure: 0 cmH20 (04/25/19 0530)  Total Ve: 9.4 mL (04/25/19 0530)  F/VT Ratio<105 (RSBI): (!) 50.25 (04/25/19 0530)    Lines/Drains/Airways     Central Venous Catheter Line                 Percutaneous Central Line Insertion/Assessment - triple lumen  right internal jugular -- days          Drain                 Closed/Suction Drain 04/24/19 0916 Left Abdomen Bulb 15 Fr. 1 day                Significant Labs:    CBC/Anemia Profile:  Recent Labs   Lab 04/25/19  0300 04/25/19  1145 04/26/19  0300   WBC 14.99* 10.59 6.42   HGB 9.3* 8.8* 8.6*   HCT 28.2* 26.7* 27.4*   * 124* 128*   MCV 92 94 97   RDW 13.9 13.9 13.8        Chemistries:  Recent Labs   Lab 04/24/19  0954  04/25/19  0300 04/25/19  1145 04/26/19  0300   NA  --    < > 139 141 139   K  --    < > 4.9 4.6 4.3   CL  --    < > 108 109 107   CO2  --    < > 27 27 29   BUN  --    < > 20 17 11   CREATININE  --    < > 1.2 1.1 0.9   CALCIUM  --    < > 7.9* 7.8* 8.0*   MG 1.8  --  1.7  --  1.8   PHOS 4.9*  --  2.8  --  3.7    < > = values in this interval not displayed.       Significant Imaging:  I have reviewed all pertinent imaging results/findings within the past 24 hours.    Assessment/Plan:     Ventral hernia   Patient is a 53 y.o. male presents with essential hypertension, pulmonary htn, BMI 37.25, COPD on home o2, hep C, not smoking (quit 3 months ago) and recurrent large umbilical hernia (times 2).  Per Dr. Wray's note 4/2017 he is above average risk for surgery.  Generally, he takes his advair daily, and only needs albuterol "when the weather " "changes," which is about 3 times a month.  Says he ambulated a quarter of a mile the other day without stopping.  Can ambulate 1 flight of stairs without stopping. Patient is s/p ventral hernia repair 4/22 with hypotension this am after receiving .3 of clonidine this am. His maps dropped to the 50s. He wsa given two  Boluses of LR and already had  A central line in place. The patient was not complaining of any pain when he was stepped up to the SICU on 4 liters NC with unstable pressures. Immediately, an arterial line was placed and 1 unit RBC was begun. He was also started on levo to .12. The patient's wbc count had climbed from 9 to 15 in one day. He's not currently on abx. His lovenox was stopped,last dose 4/23.       Neuro:   -Awake and appropriately responding to questions  - pain control with methadone (home med), scheduled tylenol, oxy 5, 10, and dilaudid for breakthrough  - currently well controlled per nursing     Pulmonary:   - extubated to NC; satting well on 2L NC   - 3 liters home o2 at baseline  - Xray prn  - Duoneb prn  - acapella prn    Cardiac:   - off pressors   - 2 units prbc 4/24  - home med clonidine will need restart when able     Renal:    -UO >1.9 L   -Cr trending down  - restart lasix when appropriate     ID:   -WBC trending down   - no abx   -afebrile       Hem/Onc:   -H and H decreased, 2 units transfused 4/24, currently stable   -trend H and H  -hold anticoag for now    Endocrine:    -on metformin at home  -SSI here  -monitor glucose    Fluids/Electrolytes/Nutrition/GI:   - Replace electrolytes PRN  - diet clear liquid   - KUB with some bowel dilation; monitor with physical exam and repeat imaging  - bowel regiment in place       PPx:  - DVT: hold for now. Mechanical   - Bowel: clear liquid diet  - PUP: protonix      DISPO:  - monitor bowel distention   - multimodal pain control with gabapentin, tylenol and home med methadone; minimize opiods. add NSAID as able per surgery   - stdwn " orders for 10 or 5 in 4/25               Critical care was time spent personally by me on the following activities: development of treatment plan with patient or surrogate and bedside caregivers, discussions with consultants, evaluation of patient's response to treatment, examination of patient, ordering and performing treatments and interventions, ordering and review of laboratory studies, ordering and review of radiographic studies, pulse oximetry, re-evaluation of patient's condition.  This critical care time did not overlap with that of any other provider or involve time for any procedures.     Eliza Rodríguez MD  Critical Care - Surgery  [facility]

## 2019-04-26 NOTE — HPI
"Patient is a 53 y.o. male presents with essential hypertension, pulmonary htn, BMI 37.25, COPD on home o2, hep C, not smoking (quit 3 months ago) and recurrent large umbilical hernia (times 2).  Per Dr. Wray's note 4/2017 he is above average risk for surgery.       Generally, he takes his advair daily, and only needs albuterol "when the weather changes," which is about 3 times a month.  Says he ambulated a quarter of a mile the other day without stopping.  Can ambulate 1 flight of stairs without stopping.      Pain is to the hernia site, and radiating inferiorly to his groin.  It is daily, and worse in the mornings, and exacerbated by laying on his side.    To OR for open incisional hernia repair with mesh      "

## 2019-04-26 NOTE — NURSING
0540 pt w/ severe breakthrough pain LUQ abd is tender upon palpation with new firmness in associated quadrant. VSS. MD at bedside to assess, one time additional oxycodone dose administered, orders for KUB placed. Pain resolves w/ med admin. No other changes. Will continue to monitor.

## 2019-04-26 NOTE — PLAN OF CARE
Per Surgical Resident, Pt will be staying over night because he needs to have a productive cough. Pt will need Medicaid Transport arranged when he is discharge ready. The number is [phone]. Resident made aware that he would need to call Case Mgmt On call in order to have this arranged.     Melissa Mg, PAT        04/26/19 7022   Post-Acute Status   Post-Acute Authorization Other  (Transport )

## 2019-04-26 NOTE — PLAN OF CARE
Problem: Physical Therapy Goal  Goal: Physical Therapy Goal  Goals to be met by: 19    Patient will increase functional independence with mobility by performin. Supine to sit with Contact Guard Assistance -not met  2. Sit to stand transfer with Contact Guard Assistance with AD if needed. -not met  3. Gait  x 150 feet with Contact Guard Assistance using AD if needed.-not met   4. Ascend/descend 3 stair with bilateral Handrails Contact Guard Assistance -not met  5. Lower extremity exercise program x15 reps with supervision -not met     Goals remain appropriate. Marce Hooks, PT  2019

## 2019-04-27 LAB
ANION GAP SERPL CALC-SCNC: 5 MMOL/L (ref 8–16)
BASOPHILS # BLD AUTO: 0.01 K/UL (ref 0–0.2)
BASOPHILS NFR BLD: 0.2 % (ref 0–1.9)
BUN SERPL-MCNC: 8 MG/DL (ref 6–20)
CALCIUM SERPL-MCNC: 8.3 MG/DL (ref 8.7–10.5)
CHLORIDE SERPL-SCNC: 105 MMOL/L (ref 95–110)
CO2 SERPL-SCNC: 32 MMOL/L (ref 23–29)
CREAT SERPL-MCNC: 1 MG/DL (ref 0.5–1.4)
DIFFERENTIAL METHOD: ABNORMAL
EOSINOPHIL # BLD AUTO: 0.2 K/UL (ref 0–0.5)
EOSINOPHIL NFR BLD: 3.2 % (ref 0–8)
ERYTHROCYTE [DISTWIDTH] IN BLOOD BY AUTOMATED COUNT: 13.6 % (ref 11.5–14.5)
EST. GFR  (AFRICAN AMERICAN): >60 ML/MIN/1.73 M^2
EST. GFR  (NON AFRICAN AMERICAN): >60 ML/MIN/1.73 M^2
GLUCOSE SERPL-MCNC: 80 MG/DL (ref 70–110)
HCT VFR BLD AUTO: 26.7 % (ref 40–54)
HGB BLD-MCNC: 8.5 G/DL (ref 14–18)
IMM GRANULOCYTES # BLD AUTO: 0.02 K/UL (ref 0–0.04)
IMM GRANULOCYTES NFR BLD AUTO: 0.4 % (ref 0–0.5)
LYMPHOCYTES # BLD AUTO: 1.2 K/UL (ref 1–4.8)
LYMPHOCYTES NFR BLD: 24.3 % (ref 18–48)
MAGNESIUM SERPL-MCNC: 1.7 MG/DL (ref 1.6–2.6)
MCH RBC QN AUTO: 30.7 PG (ref 27–31)
MCHC RBC AUTO-ENTMCNC: 31.8 G/DL (ref 32–36)
MCV RBC AUTO: 96 FL (ref 82–98)
MONOCYTES # BLD AUTO: 0.5 K/UL (ref 0.3–1)
MONOCYTES NFR BLD: 8.9 % (ref 4–15)
NEUTROPHILS # BLD AUTO: 3.2 K/UL (ref 1.8–7.7)
NEUTROPHILS NFR BLD: 63 % (ref 38–73)
NRBC BLD-RTO: 0 /100 WBC
PHOSPHATE SERPL-MCNC: 4.2 MG/DL (ref 2.7–4.5)
PLATELET # BLD AUTO: 163 K/UL (ref 150–350)
PMV BLD AUTO: 10.3 FL (ref 9.2–12.9)
POCT GLUCOSE: 100 MG/DL (ref 70–110)
POCT GLUCOSE: 119 MG/DL (ref 70–110)
POCT GLUCOSE: 88 MG/DL (ref 70–110)
POCT GLUCOSE: 93 MG/DL (ref 70–110)
POCT GLUCOSE: 94 MG/DL (ref 70–110)
POTASSIUM SERPL-SCNC: 4.6 MMOL/L (ref 3.5–5.1)
RBC # BLD AUTO: 2.77 M/UL (ref 4.6–6.2)
SODIUM SERPL-SCNC: 142 MMOL/L (ref 136–145)
WBC # BLD AUTO: 5.07 K/UL (ref 3.9–12.7)

## 2019-04-27 PROCEDURE — 99900035 HC TECH TIME PER 15 MIN (STAT)

## 2019-04-27 PROCEDURE — 83735 ASSAY OF MAGNESIUM: CPT

## 2019-04-27 PROCEDURE — 27000221 HC OXYGEN, UP TO 24 HOURS

## 2019-04-27 PROCEDURE — 11000001 HC ACUTE MED/SURG PRIVATE ROOM

## 2019-04-27 PROCEDURE — 84100 ASSAY OF PHOSPHORUS: CPT

## 2019-04-27 PROCEDURE — 94640 AIRWAY INHALATION TREATMENT: CPT

## 2019-04-27 PROCEDURE — 25000003 PHARM REV CODE 250: Performed by: STUDENT IN AN ORGANIZED HEALTH CARE EDUCATION/TRAINING PROGRAM

## 2019-04-27 PROCEDURE — 25000242 PHARM REV CODE 250 ALT 637 W/ HCPCS: Performed by: STUDENT IN AN ORGANIZED HEALTH CARE EDUCATION/TRAINING PROGRAM

## 2019-04-27 PROCEDURE — 94664 DEMO&/EVAL PT USE INHALER: CPT

## 2019-04-27 PROCEDURE — 25000242 PHARM REV CODE 250 ALT 637 W/ HCPCS: Performed by: SURGERY

## 2019-04-27 PROCEDURE — S4991 NICOTINE PATCH NONLEGEND: HCPCS | Performed by: SURGERY

## 2019-04-27 PROCEDURE — 63600175 PHARM REV CODE 636 W HCPCS: Performed by: STUDENT IN AN ORGANIZED HEALTH CARE EDUCATION/TRAINING PROGRAM

## 2019-04-27 PROCEDURE — 94761 N-INVAS EAR/PLS OXIMETRY MLT: CPT

## 2019-04-27 PROCEDURE — A4216 STERILE WATER/SALINE, 10 ML: HCPCS | Performed by: SURGERY

## 2019-04-27 PROCEDURE — 25000003 PHARM REV CODE 250: Performed by: SURGERY

## 2019-04-27 PROCEDURE — 80048 BASIC METABOLIC PNL TOTAL CA: CPT

## 2019-04-27 PROCEDURE — 85025 COMPLETE CBC W/AUTO DIFF WBC: CPT

## 2019-04-27 PROCEDURE — 25000003 PHARM REV CODE 250: Performed by: ANESTHESIOLOGY

## 2019-04-27 PROCEDURE — 63600175 PHARM REV CODE 636 W HCPCS: Performed by: SURGERY

## 2019-04-27 RX ORDER — FUROSEMIDE 10 MG/ML
20 INJECTION INTRAMUSCULAR; INTRAVENOUS ONCE
Status: COMPLETED | OUTPATIENT
Start: 2019-04-27 | End: 2019-04-27

## 2019-04-27 RX ADMIN — HEPARIN SODIUM 5000 UNITS: 5000 INJECTION, SOLUTION INTRAVENOUS; SUBCUTANEOUS at 09:04

## 2019-04-27 RX ADMIN — Medication 3 ML: at 09:04

## 2019-04-27 RX ADMIN — Medication 1 PATCH: at 08:04

## 2019-04-27 RX ADMIN — IPRATROPIUM BROMIDE AND ALBUTEROL SULFATE 3 ML: .5; 3 SOLUTION RESPIRATORY (INHALATION) at 01:04

## 2019-04-27 RX ADMIN — ACETAMINOPHEN 1000 MG: 500 TABLET ORAL at 02:04

## 2019-04-27 RX ADMIN — METHADONE HYDROCHLORIDE 90 MG: 10 TABLET ORAL at 08:04

## 2019-04-27 RX ADMIN — HEPARIN SODIUM 5000 UNITS: 5000 INJECTION, SOLUTION INTRAVENOUS; SUBCUTANEOUS at 01:04

## 2019-04-27 RX ADMIN — IPRATROPIUM BROMIDE AND ALBUTEROL SULFATE 3 ML: .5; 3 SOLUTION RESPIRATORY (INHALATION) at 09:04

## 2019-04-27 RX ADMIN — CLONAZEPAM 1 MG: 0.5 TABLET ORAL at 09:04

## 2019-04-27 RX ADMIN — PROMETHAZINE HYDROCHLORIDE 12.5 MG: 25 INJECTION INTRAMUSCULAR; INTRAVENOUS at 05:04

## 2019-04-27 RX ADMIN — FUROSEMIDE 20 MG: 20 TABLET ORAL at 09:04

## 2019-04-27 RX ADMIN — GUAIFENESIN 200 MG: 200 SOLUTION ORAL at 04:04

## 2019-04-27 RX ADMIN — SENNOSIDES 8.6 MG: 8.6 TABLET, FILM COATED ORAL at 08:04

## 2019-04-27 RX ADMIN — GABAPENTIN 300 MG: 300 CAPSULE ORAL at 08:04

## 2019-04-27 RX ADMIN — DOCUSATE SODIUM 100 MG: 100 CAPSULE, LIQUID FILLED ORAL at 08:04

## 2019-04-27 RX ADMIN — FUROSEMIDE 20 MG: 10 INJECTION, SOLUTION INTRAMUSCULAR; INTRAVENOUS at 01:04

## 2019-04-27 RX ADMIN — FLUTICASONE PROPIONATE 100 MCG: 50 SPRAY, METERED NASAL at 10:04

## 2019-04-27 RX ADMIN — OXYCODONE HYDROCHLORIDE 10 MG: 5 SOLUTION ORAL at 09:04

## 2019-04-27 RX ADMIN — IPRATROPIUM BROMIDE AND ALBUTEROL SULFATE 3 ML: .5; 3 SOLUTION RESPIRATORY (INHALATION) at 04:04

## 2019-04-27 RX ADMIN — OXYCODONE HYDROCHLORIDE 10 MG: 5 SOLUTION ORAL at 04:04

## 2019-04-27 RX ADMIN — FLUTICASONE FUROATE AND VILANTEROL TRIFENATATE 1 PUFF: 100; 25 POWDER RESPIRATORY (INHALATION) at 10:04

## 2019-04-27 RX ADMIN — ACETAMINOPHEN 1000 MG: 500 TABLET ORAL at 05:04

## 2019-04-27 RX ADMIN — ARIPIPRAZOLE 10 MG: 5 TABLET ORAL at 09:04

## 2019-04-27 RX ADMIN — IPRATROPIUM BROMIDE AND ALBUTEROL SULFATE 3 ML: .5; 3 SOLUTION RESPIRATORY (INHALATION) at 07:04

## 2019-04-27 RX ADMIN — SODIUM CHLORIDE SOLN NEBU 3% 4 ML: 3 NEBU SOLN at 07:04

## 2019-04-27 RX ADMIN — ONDANSETRON 8 MG: 2 INJECTION INTRAMUSCULAR; INTRAVENOUS at 01:04

## 2019-04-27 RX ADMIN — SODIUM CHLORIDE SOLN NEBU 3% 4 ML: 3 NEBU SOLN at 04:04

## 2019-04-27 RX ADMIN — ARIPIPRAZOLE 10 MG: 5 TABLET ORAL at 08:04

## 2019-04-27 RX ADMIN — CLONAZEPAM 1 MG: 0.5 TABLET ORAL at 08:04

## 2019-04-27 RX ADMIN — GABAPENTIN 300 MG: 300 CAPSULE ORAL at 02:04

## 2019-04-27 RX ADMIN — GUAIFENESIN 200 MG: 200 SOLUTION ORAL at 09:04

## 2019-04-27 RX ADMIN — GABAPENTIN 300 MG: 300 CAPSULE ORAL at 09:04

## 2019-04-27 RX ADMIN — FUROSEMIDE 20 MG: 20 TABLET ORAL at 08:04

## 2019-04-27 RX ADMIN — ACETAMINOPHEN 1000 MG: 500 TABLET ORAL at 09:04

## 2019-04-27 RX ADMIN — GUAIFENESIN 200 MG: 200 SOLUTION ORAL at 01:04

## 2019-04-27 RX ADMIN — PANTOPRAZOLE SODIUM 40 MG: 40 TABLET, DELAYED RELEASE ORAL at 08:04

## 2019-04-27 RX ADMIN — HEPARIN SODIUM 5000 UNITS: 5000 INJECTION, SOLUTION INTRAVENOUS; SUBCUTANEOUS at 05:04

## 2019-04-27 RX ADMIN — OXYCODONE HYDROCHLORIDE 10 MG: 5 SOLUTION ORAL at 01:04

## 2019-04-27 RX ADMIN — Medication 3 ML: at 01:04

## 2019-04-27 RX ADMIN — IPRATROPIUM BROMIDE AND ALBUTEROL SULFATE 3 ML: .5; 3 SOLUTION RESPIRATORY (INHALATION) at 12:04

## 2019-04-27 RX ADMIN — DOCUSATE SODIUM 100 MG: 100 CAPSULE, LIQUID FILLED ORAL at 09:04

## 2019-04-27 RX ADMIN — POLYETHYLENE GLYCOL 3350 17 G: 17 POWDER, FOR SOLUTION ORAL at 08:04

## 2019-04-27 NOTE — NURSING
Patient AAOX4, call light and belongings in reach, siderails upx 2.  Patient states pain is uncontrolled throughout shift, but during hourly checks patient is noted to be sleeping with no concerns and often drowsy when trying to speak with him.  Patient continues to request phenergan for nausea and vomiting, but only one occurrence of vomiting has been noted, zofran was administered.  Patient adamant he only needs phenergan.  T100.6 and MD notified will continue to monitor at this time.  Patient remains free from falls and safety has been maintained this shift.

## 2019-04-27 NOTE — ASSESSMENT & PLAN NOTE
Mr. Harrington is a 54 yo man who is s/p ventral hernia repair on 4/22/2019. Pain control issue overnight. Had N/V. On 3L NC.     - HDS  - resp toilet for productive cough  - Lasix scheduled PO and IV x 1 this AM  - multimodal pain control  - regular diet  - pantoprazole  - Cr normalized  - sliding scale insulin  - Ambulate in hallway TID    Dispo: monitor at least one more day due to productive cough/pulm congestion

## 2019-04-27 NOTE — SUBJECTIVE & OBJECTIVE
Interval History: No acute events. Responded to diuresis, but chest continues to sound congested. Tolerating diet without issue.     Medications:  Continuous Infusions:  Scheduled Meds:   acetaminophen  1,000 mg Oral Q8H    albuterol-ipratropium  3 mL Nebulization Q4H    ARIPiprazole  10 mg Oral BID    clonazePAM  1 mg Oral BID    docusate sodium  100 mg Oral BID    fluticasone  2 spray Each Nare Daily    fluticasone-vilanterol  1 puff Inhalation Daily    furosemide  20 mg Intravenous Once    furosemide  20 mg Oral BID    gabapentin  300 mg Oral TID    heparin (porcine)  5,000 Units Subcutaneous Q8H    methadone  90 mg Oral Daily    nicotine  1 patch Transdermal QAM    pantoprazole  40 mg Oral Daily    polyethylene glycol  17 g Oral Daily    senna  8.6 mg Oral Daily    sodium chloride 0.9%  3 mL Intravenous Q8H    sodium chloride 3%  4 mL Nebulization Q8H WA     PRN Meds:sodium chloride, albuterol-ipratropium, dextrose 50%, glucagon (human recombinant), guaifenesin 100 mg/5 ml, HYDROmorphone, insulin aspart U-100, magnesium sulfate IVPB, ondansetron, oxyCODONE, oxyCODONE, potassium chloride in water, promethazine (PHENERGAN) IVPB, ramelteon, sodium chloride 0.9%     Review of patient's allergies indicates:   Allergen Reactions    Iodine and iodide containing products Anaphylaxis and Swelling     Facial swelling    Shellfish containing products Anaphylaxis             Compazine [prochlorperazine edisylate] Hallucinations     Objective:     Vital Signs (Most Recent):  Temp: 99.8 °F (37.7 °C) (04/27/19 0825)  Pulse: 82 (04/27/19 1023)  Resp: 18 (04/27/19 1023)  BP: 134/76 (04/27/19 0825)  SpO2: (!) 94 % (04/27/19 0825) Vital Signs (24h Range):  Temp:  [97 °F (36.1 °C)-99.8 °F (37.7 °C)] 99.8 °F (37.7 °C)  Pulse:  [] 82  Resp:  [14-20] 18  SpO2:  [94 %-99 %] 94 %  BP: (118-150)/(75-86) 134/76     Weight: 107 kg (235 lb 14.3 oz)  Body mass index is 35.87 kg/m².    Intake/Output - Last 3 Shifts        04/25 0700 - 04/26 0659 04/26 0700 - 04/27 0659 04/27 0700 - 04/28 0659    P.O. 1140 740     I.V. (mL/kg) 3155.4 (29.5) 739.6 (6.9)     Blood       IV Piggyback 100      Total Intake(mL/kg) 4395.4 (41.1) 1479.6 (13.8)     Urine (mL/kg/hr) 2040 (0.8) 3100 (1.2)     Emesis/NG output  0     Drains 320 315     Stool  0     Total Output 2360 3415     Net +2035.4 -1935.4                  Physical Exam   Constitutional: He appears well-developed and well-nourished. No distress.   Cardiovascular: Normal rate and regular rhythm.   Pulmonary/Chest: Effort normal. No respiratory distress.   Productive cough   Abdominal: Soft. He exhibits no distension.   Abdominal mass gone after evacuation of hematoma; Appropriately tender to palpation   Drain in place with sero-sanguinous output   Musculoskeletal: He exhibits no edema.   Neurological: He is alert.   Skin: He is not diaphoretic.   Vitals reviewed.      Significant Labs:  CBC:   Recent Labs   Lab 04/27/19  0508   WBC 5.07   RBC 2.77*   HGB 8.5*   HCT 26.7*      MCV 96   MCH 30.7   MCHC 31.8*     CMP:   Recent Labs   Lab 04/24/19  0656  04/27/19  0508   GLU  --    < > 80   CALCIUM  --    < > 8.3*   ALBUMIN 1.8*  --   --    PROT 4.3*  --   --    NA  --    < > 142   K  --    < > 4.6   CO2  --    < > 32*   CL  --    < > 105   BUN  --    < > 8   CREATININE  --    < > 1.0   ALKPHOS 61  --   --    ALT 13  --   --    AST 19  --   --    BILITOT 0.6  --   --     < > = values in this interval not displayed.       Significant Diagnostics:  CXR: I have reviewed all pertinent results/findings within the past 24 hours and my personal findings are:  slightly worsened opacifications in the lower lung fields

## 2019-04-27 NOTE — PROGRESS NOTES
"[facility]  General Surgery  Progress Note    Subjective:     History of Present Illness:  Patient is a 53 y.o. male presents with essential hypertension, pulmonary htn, BMI 37.25, COPD on home o2, hep C, not smoking (quit 3 months ago) and recurrent large umbilical hernia (times 2).  Per Dr. Wray's note 4/2017 he is above average risk for surgery.       Generally, he takes his advair daily, and only needs albuterol "when the weather changes," which is about 3 times a month.  Says he ambulated a quarter of a mile the other day without stopping.  Can ambulate 1 flight of stairs without stopping.      Pain is to the hernia site, and radiating inferiorly to his groin.  It is daily, and worse in the mornings, and exacerbated by laying on his side.    To OR for open incisional hernia repair with mesh        Post-Op Info:  Procedure(s) (LRB):  LAPAROSCOPY, DIAGNOSTIC (N/A)  EVACUATION, HEMATOMA  EXPLORATION, WOUND (N/A)   3 Days Post-Op     Interval History: No acute events. Responded to diuresis, but chest continues to sound congested. Tolerating diet without issue.     Medications:  Continuous Infusions:  Scheduled Meds:   acetaminophen  1,000 mg Oral Q8H    albuterol-ipratropium  3 mL Nebulization Q4H    ARIPiprazole  10 mg Oral BID    clonazePAM  1 mg Oral BID    docusate sodium  100 mg Oral BID    fluticasone  2 spray Each Nare Daily    fluticasone-vilanterol  1 puff Inhalation Daily    furosemide  20 mg Intravenous Once    furosemide  20 mg Oral BID    gabapentin  300 mg Oral TID    heparin (porcine)  5,000 Units Subcutaneous Q8H    methadone  90 mg Oral Daily    nicotine  1 patch Transdermal QAM    pantoprazole  40 mg Oral Daily    polyethylene glycol  17 g Oral Daily    senna  8.6 mg Oral Daily    sodium chloride 0.9%  3 mL Intravenous Q8H    sodium chloride 3%  4 mL Nebulization Q8H WA     PRN Meds:sodium chloride, albuterol-ipratropium, dextrose 50%, glucagon (human " recombinant), guaifenesin 100 mg/5 ml, HYDROmorphone, insulin aspart U-100, magnesium sulfate IVPB, ondansetron, oxyCODONE, oxyCODONE, potassium chloride in water, promethazine (PHENERGAN) IVPB, ramelteon, sodium chloride 0.9%     Review of patient's allergies indicates:   Allergen Reactions    Iodine and iodide containing products Anaphylaxis and Swelling     Facial swelling    Shellfish containing products Anaphylaxis             Compazine [prochlorperazine edisylate] Hallucinations     Objective:     Vital Signs (Most Recent):  Temp: 99.8 °F (37.7 °C) (04/27/19 0825)  Pulse: 82 (04/27/19 1023)  Resp: 18 (04/27/19 1023)  BP: 134/76 (04/27/19 0825)  SpO2: (!) 94 % (04/27/19 0825) Vital Signs (24h Range):  Temp:  [97 °F (36.1 °C)-99.8 °F (37.7 °C)] 99.8 °F (37.7 °C)  Pulse:  [] 82  Resp:  [14-20] 18  SpO2:  [94 %-99 %] 94 %  BP: (118-150)/(75-86) 134/76     Weight: 107 kg (235 lb 14.3 oz)  Body mass index is 35.87 kg/m².    Intake/Output - Last 3 Shifts       04/25 0700 - 04/26 0659 04/26 0700 - 04/27 0659 04/27 0700 - 04/28 0659    P.O. 1140 740     I.V. (mL/kg) 3155.4 (29.5) 739.6 (6.9)     Blood       IV Piggyback 100      Total Intake(mL/kg) 4395.4 (41.1) 1479.6 (13.8)     Urine (mL/kg/hr) 2040 (0.8) 3100 (1.2)     Emesis/NG output  0     Drains 320 315     Stool  0     Total Output 2360 3415     Net +2035.4 -1935.4                  Physical Exam   Constitutional: He appears well-developed and well-nourished. No distress.   Cardiovascular: Normal rate and regular rhythm.   Pulmonary/Chest: Effort normal. No respiratory distress.   Productive cough   Abdominal: Soft. He exhibits no distension.   Abdominal mass gone after evacuation of hematoma; Appropriately tender to palpation   Drain in place with sero-sanguinous output   Musculoskeletal: He exhibits no edema.   Neurological: He is alert.   Skin: He is not diaphoretic.   Vitals reviewed.      Significant Labs:  CBC:   Recent Labs   Lab 04/27/19  0508    WBC 5.07   RBC 2.77*   HGB 8.5*   HCT 26.7*      MCV 96   MCH 30.7   MCHC 31.8*     CMP:   Recent Labs   Lab 04/24/19  0656  04/27/19  0508   GLU  --    < > 80   CALCIUM  --    < > 8.3*   ALBUMIN 1.8*  --   --    PROT 4.3*  --   --    NA  --    < > 142   K  --    < > 4.6   CO2  --    < > 32*   CL  --    < > 105   BUN  --    < > 8   CREATININE  --    < > 1.0   ALKPHOS 61  --   --    ALT 13  --   --    AST 19  --   --    BILITOT 0.6  --   --     < > = values in this interval not displayed.       Significant Diagnostics:  CXR: I have reviewed all pertinent results/findings within the past 24 hours and my personal findings are:  slightly worsened opacifications in the lower lung fields    Assessment/Plan:     Ventral hernia  Mr. Harrington is a 52 yo man who is s/p ventral hernia repair on 4/22/2019. Pain control issue overnight. Had N/V. On 3L NC.     - HDS  - resp toilet for productive cough  - Lasix scheduled PO and IV x 1 this AM  - multimodal pain control  - regular diet  - pantoprazole  - Cr normalized  - sliding scale insulin  - Ambulate in hallway TID    Dispo: monitor at least one more day due to productive cough/pulm congestion        Huber Joseph MD  General Surgery  [facility]

## 2019-04-28 LAB
ANION GAP SERPL CALC-SCNC: 5 MMOL/L (ref 8–16)
BASOPHILS # BLD AUTO: 0.02 K/UL (ref 0–0.2)
BASOPHILS NFR BLD: 0.4 % (ref 0–1.9)
BLD PROD TYP BPU: NORMAL
BLD PROD TYP BPU: NORMAL
BLOOD UNIT EXPIRATION DATE: NORMAL
BLOOD UNIT EXPIRATION DATE: NORMAL
BLOOD UNIT TYPE CODE: 8400
BLOOD UNIT TYPE CODE: 8400
BLOOD UNIT TYPE: NORMAL
BLOOD UNIT TYPE: NORMAL
BUN SERPL-MCNC: 9 MG/DL (ref 6–20)
CALCIUM SERPL-MCNC: 8.5 MG/DL (ref 8.7–10.5)
CHLORIDE SERPL-SCNC: 100 MMOL/L (ref 95–110)
CO2 SERPL-SCNC: 36 MMOL/L (ref 23–29)
CODING SYSTEM: NORMAL
CODING SYSTEM: NORMAL
CREAT SERPL-MCNC: 0.9 MG/DL (ref 0.5–1.4)
DIFFERENTIAL METHOD: ABNORMAL
DISPENSE STATUS: NORMAL
DISPENSE STATUS: NORMAL
EOSINOPHIL # BLD AUTO: 0.3 K/UL (ref 0–0.5)
EOSINOPHIL NFR BLD: 4.5 % (ref 0–8)
ERYTHROCYTE [DISTWIDTH] IN BLOOD BY AUTOMATED COUNT: 13.4 % (ref 11.5–14.5)
EST. GFR  (AFRICAN AMERICAN): >60 ML/MIN/1.73 M^2
EST. GFR  (NON AFRICAN AMERICAN): >60 ML/MIN/1.73 M^2
GLUCOSE SERPL-MCNC: 106 MG/DL (ref 70–110)
HCT VFR BLD AUTO: 26.4 % (ref 40–54)
HGB BLD-MCNC: 8.3 G/DL (ref 14–18)
IMM GRANULOCYTES # BLD AUTO: 0.01 K/UL (ref 0–0.04)
IMM GRANULOCYTES NFR BLD AUTO: 0.2 % (ref 0–0.5)
LYMPHOCYTES # BLD AUTO: 1.6 K/UL (ref 1–4.8)
LYMPHOCYTES NFR BLD: 28.9 % (ref 18–48)
MAGNESIUM SERPL-MCNC: 1.5 MG/DL (ref 1.6–2.6)
MCH RBC QN AUTO: 31 PG (ref 27–31)
MCHC RBC AUTO-ENTMCNC: 31.4 G/DL (ref 32–36)
MCV RBC AUTO: 99 FL (ref 82–98)
MONOCYTES # BLD AUTO: 0.5 K/UL (ref 0.3–1)
MONOCYTES NFR BLD: 8.4 % (ref 4–15)
NEUTROPHILS # BLD AUTO: 3.2 K/UL (ref 1.8–7.7)
NEUTROPHILS NFR BLD: 57.6 % (ref 38–73)
NRBC BLD-RTO: 0 /100 WBC
PHOSPHATE SERPL-MCNC: 3.4 MG/DL (ref 2.7–4.5)
PLATELET # BLD AUTO: 173 K/UL (ref 150–350)
PMV BLD AUTO: 10.3 FL (ref 9.2–12.9)
POCT GLUCOSE: 111 MG/DL (ref 70–110)
POCT GLUCOSE: 116 MG/DL (ref 70–110)
POCT GLUCOSE: 116 MG/DL (ref 70–110)
POCT GLUCOSE: 120 MG/DL (ref 70–110)
POCT GLUCOSE: 90 MG/DL (ref 70–110)
POTASSIUM SERPL-SCNC: 4 MMOL/L (ref 3.5–5.1)
RBC # BLD AUTO: 2.68 M/UL (ref 4.6–6.2)
SODIUM SERPL-SCNC: 141 MMOL/L (ref 136–145)
TRANS ERYTHROCYTES VOL PATIENT: NORMAL ML
TRANS ERYTHROCYTES VOL PATIENT: NORMAL ML
WBC # BLD AUTO: 5.61 K/UL (ref 3.9–12.7)

## 2019-04-28 PROCEDURE — 63600175 PHARM REV CODE 636 W HCPCS: Performed by: STUDENT IN AN ORGANIZED HEALTH CARE EDUCATION/TRAINING PROGRAM

## 2019-04-28 PROCEDURE — 63600175 PHARM REV CODE 636 W HCPCS: Performed by: SURGERY

## 2019-04-28 PROCEDURE — 25000003 PHARM REV CODE 250: Performed by: SURGERY

## 2019-04-28 PROCEDURE — 25000003 PHARM REV CODE 250: Performed by: ANESTHESIOLOGY

## 2019-04-28 PROCEDURE — 94640 AIRWAY INHALATION TREATMENT: CPT

## 2019-04-28 PROCEDURE — 25000003 PHARM REV CODE 250: Performed by: STUDENT IN AN ORGANIZED HEALTH CARE EDUCATION/TRAINING PROGRAM

## 2019-04-28 PROCEDURE — 99900035 HC TECH TIME PER 15 MIN (STAT)

## 2019-04-28 PROCEDURE — 80048 BASIC METABOLIC PNL TOTAL CA: CPT

## 2019-04-28 PROCEDURE — 11000001 HC ACUTE MED/SURG PRIVATE ROOM

## 2019-04-28 PROCEDURE — 84100 ASSAY OF PHOSPHORUS: CPT

## 2019-04-28 PROCEDURE — 94761 N-INVAS EAR/PLS OXIMETRY MLT: CPT

## 2019-04-28 PROCEDURE — 83735 ASSAY OF MAGNESIUM: CPT

## 2019-04-28 PROCEDURE — 63600175 PHARM REV CODE 636 W HCPCS: Performed by: ANESTHESIOLOGY

## 2019-04-28 PROCEDURE — 25000242 PHARM REV CODE 250 ALT 637 W/ HCPCS: Performed by: ANESTHESIOLOGY

## 2019-04-28 PROCEDURE — 94664 DEMO&/EVAL PT USE INHALER: CPT

## 2019-04-28 PROCEDURE — 27000221 HC OXYGEN, UP TO 24 HOURS

## 2019-04-28 PROCEDURE — 85025 COMPLETE CBC W/AUTO DIFF WBC: CPT

## 2019-04-28 PROCEDURE — 25000242 PHARM REV CODE 250 ALT 637 W/ HCPCS: Performed by: STUDENT IN AN ORGANIZED HEALTH CARE EDUCATION/TRAINING PROGRAM

## 2019-04-28 PROCEDURE — 25000242 PHARM REV CODE 250 ALT 637 W/ HCPCS: Performed by: SURGERY

## 2019-04-28 PROCEDURE — S4991 NICOTINE PATCH NONLEGEND: HCPCS | Performed by: STUDENT IN AN ORGANIZED HEALTH CARE EDUCATION/TRAINING PROGRAM

## 2019-04-28 PROCEDURE — A4216 STERILE WATER/SALINE, 10 ML: HCPCS | Performed by: STUDENT IN AN ORGANIZED HEALTH CARE EDUCATION/TRAINING PROGRAM

## 2019-04-28 RX ORDER — OXYCODONE HCL 5 MG/5 ML
10 SOLUTION, ORAL ORAL EVERY 6 HOURS PRN
Status: DISCONTINUED | OUTPATIENT
Start: 2019-04-28 | End: 2019-05-01 | Stop reason: HOSPADM

## 2019-04-28 RX ORDER — OXYCODONE HCL 5 MG/5 ML
5 SOLUTION, ORAL ORAL EVERY 6 HOURS PRN
Status: DISCONTINUED | OUTPATIENT
Start: 2019-04-28 | End: 2019-05-01 | Stop reason: HOSPADM

## 2019-04-28 RX ORDER — ACETAZOLAMIDE 500 MG/5ML
500 INJECTION, POWDER, LYOPHILIZED, FOR SOLUTION INTRAVENOUS ONCE
Status: DISCONTINUED | OUTPATIENT
Start: 2019-04-28 | End: 2019-04-28

## 2019-04-28 RX ADMIN — IPRATROPIUM BROMIDE AND ALBUTEROL SULFATE 3 ML: .5; 3 SOLUTION RESPIRATORY (INHALATION) at 11:04

## 2019-04-28 RX ADMIN — GUAIFENESIN 200 MG: 200 SOLUTION ORAL at 04:04

## 2019-04-28 RX ADMIN — IPRATROPIUM BROMIDE AND ALBUTEROL SULFATE 3 ML: .5; 3 SOLUTION RESPIRATORY (INHALATION) at 08:04

## 2019-04-28 RX ADMIN — SENNOSIDES 8.6 MG: 8.6 TABLET, FILM COATED ORAL at 08:04

## 2019-04-28 RX ADMIN — OXYCODONE HYDROCHLORIDE 10 MG: 5 SOLUTION ORAL at 06:04

## 2019-04-28 RX ADMIN — METHADONE HYDROCHLORIDE 90 MG: 10 TABLET ORAL at 09:04

## 2019-04-28 RX ADMIN — ACETAMINOPHEN 1000 MG: 500 TABLET ORAL at 05:04

## 2019-04-28 RX ADMIN — FLUTICASONE PROPIONATE 100 MCG: 50 SPRAY, METERED NASAL at 09:04

## 2019-04-28 RX ADMIN — FUROSEMIDE 20 MG: 20 TABLET ORAL at 09:04

## 2019-04-28 RX ADMIN — FUROSEMIDE 20 MG: 20 TABLET ORAL at 08:04

## 2019-04-28 RX ADMIN — ARIPIPRAZOLE 10 MG: 5 TABLET ORAL at 08:04

## 2019-04-28 RX ADMIN — HEPARIN SODIUM 5000 UNITS: 5000 INJECTION, SOLUTION INTRAVENOUS; SUBCUTANEOUS at 09:04

## 2019-04-28 RX ADMIN — CLONAZEPAM 1 MG: 0.5 TABLET ORAL at 09:04

## 2019-04-28 RX ADMIN — DOCUSATE SODIUM 100 MG: 100 CAPSULE, LIQUID FILLED ORAL at 09:04

## 2019-04-28 RX ADMIN — GABAPENTIN 300 MG: 300 CAPSULE ORAL at 08:04

## 2019-04-28 RX ADMIN — PANTOPRAZOLE SODIUM 40 MG: 40 TABLET, DELAYED RELEASE ORAL at 08:04

## 2019-04-28 RX ADMIN — FLUTICASONE FUROATE AND VILANTEROL TRIFENATATE 1 PUFF: 100; 25 POWDER RESPIRATORY (INHALATION) at 09:04

## 2019-04-28 RX ADMIN — ARIPIPRAZOLE 10 MG: 5 TABLET ORAL at 09:04

## 2019-04-28 RX ADMIN — GABAPENTIN 300 MG: 300 CAPSULE ORAL at 09:04

## 2019-04-28 RX ADMIN — Medication 3 ML: at 10:04

## 2019-04-28 RX ADMIN — POLYETHYLENE GLYCOL 3350 17 G: 17 POWDER, FOR SOLUTION ORAL at 08:04

## 2019-04-28 RX ADMIN — GABAPENTIN 300 MG: 300 CAPSULE ORAL at 03:04

## 2019-04-28 RX ADMIN — Medication 1 PATCH: at 05:04

## 2019-04-28 RX ADMIN — IPRATROPIUM BROMIDE AND ALBUTEROL SULFATE 3 ML: .5; 3 SOLUTION RESPIRATORY (INHALATION) at 12:04

## 2019-04-28 RX ADMIN — HEPARIN SODIUM 5000 UNITS: 5000 INJECTION, SOLUTION INTRAVENOUS; SUBCUTANEOUS at 05:04

## 2019-04-28 RX ADMIN — Medication 3 ML: at 01:04

## 2019-04-28 RX ADMIN — ACETAMINOPHEN 1000 MG: 500 TABLET ORAL at 01:04

## 2019-04-28 RX ADMIN — IPRATROPIUM BROMIDE AND ALBUTEROL SULFATE 3 ML: .5; 3 SOLUTION RESPIRATORY (INHALATION) at 04:04

## 2019-04-28 RX ADMIN — OXYCODONE HYDROCHLORIDE 10 MG: 5 SOLUTION ORAL at 09:04

## 2019-04-28 RX ADMIN — SODIUM CHLORIDE SOLN NEBU 3% 4 ML: 3 NEBU SOLN at 04:04

## 2019-04-28 RX ADMIN — SODIUM CHLORIDE SOLN NEBU 3% 4 ML: 3 NEBU SOLN at 08:04

## 2019-04-28 RX ADMIN — CLONAZEPAM 1 MG: 0.5 TABLET ORAL at 08:04

## 2019-04-28 RX ADMIN — DOCUSATE SODIUM 100 MG: 100 CAPSULE, LIQUID FILLED ORAL at 08:04

## 2019-04-28 RX ADMIN — ACETAZOLAMIDE 500 MG: 500 INJECTION, POWDER, LYOPHILIZED, FOR SOLUTION INTRAVENOUS at 01:04

## 2019-04-28 RX ADMIN — ACETAMINOPHEN 1000 MG: 500 TABLET ORAL at 09:04

## 2019-04-28 RX ADMIN — OXYCODONE HYDROCHLORIDE 10 MG: 5 SOLUTION ORAL at 01:04

## 2019-04-28 RX ADMIN — ONDANSETRON 8 MG: 2 INJECTION INTRAMUSCULAR; INTRAVENOUS at 06:04

## 2019-04-28 RX ADMIN — GUAIFENESIN 200 MG: 200 SOLUTION ORAL at 09:04

## 2019-04-28 RX ADMIN — HEPARIN SODIUM 5000 UNITS: 5000 INJECTION, SOLUTION INTRAVENOUS; SUBCUTANEOUS at 01:04

## 2019-04-28 RX ADMIN — OXYCODONE HYDROCHLORIDE 10 MG: 5 SOLUTION ORAL at 10:04

## 2019-04-28 NOTE — PLAN OF CARE
Problem: Adult Inpatient Plan of Care  Goal: Plan of Care Review  Patient AAOX4, patient is noted to be extremely drowsy with current pain medication regimen, and patient continues to request phenergan when not nauseated or vomiting, Dr Nicolas notified, patient vitals currently stable, and NS on tele.  IJ site clean, dry and reinforced, saline locked and flushes well.  Patient has sat at edge of bed but has not ambulated secondary to drowsiness MD notified.  Patient remains free from falls and safety has been maintained this shift.

## 2019-04-28 NOTE — SUBJECTIVE & OBJECTIVE
Interval History: No acute events. Ethan single low-grade temp to 100.6, otherwise VSS on 3L NC. Pain well-controlled. Ambulating in hallway.     Medications:  Continuous Infusions:  Scheduled Meds:   acetaminophen  1,000 mg Oral Q8H    acetaZOLAMIDE  500 mg Intravenous Once    albuterol-ipratropium  3 mL Nebulization Q4H    ARIPiprazole  10 mg Oral BID    clonazePAM  1 mg Oral BID    docusate sodium  100 mg Oral BID    fluticasone  2 spray Each Nare Daily    fluticasone-vilanterol  1 puff Inhalation Daily    furosemide  20 mg Oral BID    gabapentin  300 mg Oral TID    heparin (porcine)  5,000 Units Subcutaneous Q8H    methadone  90 mg Oral Daily    nicotine  1 patch Transdermal QAM    pantoprazole  40 mg Oral Daily    polyethylene glycol  17 g Oral Daily    senna  8.6 mg Oral Daily    sodium chloride 0.9%  3 mL Intravenous Q8H    sodium chloride 3%  4 mL Nebulization Q8H WA     PRN Meds:sodium chloride, albuterol-ipratropium, dextrose 50%, glucagon (human recombinant), guaifenesin 100 mg/5 ml, HYDROmorphone, insulin aspart U-100, magnesium sulfate IVPB, ondansetron, oxyCODONE, oxyCODONE, potassium chloride in water, promethazine (PHENERGAN) IVPB, ramelteon, sodium chloride 0.9%     Review of patient's allergies indicates:   Allergen Reactions    Iodine and iodide containing products Anaphylaxis and Swelling     Facial swelling    Shellfish containing products Anaphylaxis             Compazine [prochlorperazine edisylate] Hallucinations     Objective:     Vital Signs (Most Recent):  Temp: 97.2 °F (36.2 °C) (04/28/19 0711)  Pulse: 92 (04/28/19 0900)  Resp: 18 (04/28/19 0900)  BP: 130/79 (04/28/19 0711)  SpO2: 96 % (04/28/19 0837) Vital Signs (24h Range):  Temp:  [97.2 °F (36.2 °C)-100.6 °F (38.1 °C)] 97.2 °F (36.2 °C)  Pulse:  [] 92  Resp:  [16-20] 18  SpO2:  [92 %-97 %] 96 %  BP: (124-133)/(71-80) 130/79     Weight: 107 kg (235 lb 14.3 oz)  Body mass index is 35.87 kg/m².    Intake/Output  - Last 3 Shifts       04/26 0700 - 04/27 0659 04/27 0700 - 04/28 0659 04/28 0700 - 04/29 0659    P.O. 740 880     I.V. (mL/kg) 739.6 (6.9)      IV Piggyback  100     Total Intake(mL/kg) 1479.6 (13.8) 980 (9.2)     Urine (mL/kg/hr) 3100 (1.2) 1900 (0.7)     Emesis/NG output 0      Drains 315 90     Stool 0      Total Output 3415 1990     Net -1935.4 -1010                  Physical Exam   Constitutional: He appears well-developed and well-nourished. No distress.   Cardiovascular: Normal rate and regular rhythm.   Pulmonary/Chest: Effort normal. No respiratory distress.   Abdominal: Soft. He exhibits no distension.   Appropriately tender to palpation  Drain in place with sero-sanguinous output   Musculoskeletal: He exhibits no edema.   Neurological: He is alert.   Skin: He is not diaphoretic.   Vitals reviewed.      Significant Labs:  CBC:   Recent Labs   Lab 04/28/19  0526   WBC 5.61   RBC 2.68*   HGB 8.3*   HCT 26.4*      MCV 99*   MCH 31.0   MCHC 31.4*     CMP:   Recent Labs   Lab 04/24/19  0656  04/28/19  0526   GLU  --    < > 106   CALCIUM  --    < > 8.5*   ALBUMIN 1.8*  --   --    PROT 4.3*  --   --    NA  --    < > 141   K  --    < > 4.0   CO2  --    < > 36*   CL  --    < > 100   BUN  --    < > 9   CREATININE  --    < > 0.9   ALKPHOS 61  --   --    ALT 13  --   --    AST 19  --   --    BILITOT 0.6  --   --     < > = values in this interval not displayed.       Significant Diagnostics:  I have reviewed all pertinent imaging results/findings within the past 24 hours.

## 2019-04-28 NOTE — PROGRESS NOTES
"[facility]  General Surgery  Progress Note    Subjective:     History of Present Illness:  Patient is a 53 y.o. male presents with essential hypertension, pulmonary htn, BMI 37.25, COPD on home o2, hep C, not smoking (quit 3 months ago) and recurrent large umbilical hernia (times 2).  Per Dr. Wray's note 4/2017 he is above average risk for surgery.       Generally, he takes his advair daily, and only needs albuterol "when the weather changes," which is about 3 times a month.  Says he ambulated a quarter of a mile the other day without stopping.  Can ambulate 1 flight of stairs without stopping.      Pain is to the hernia site, and radiating inferiorly to his groin.  It is daily, and worse in the mornings, and exacerbated by laying on his side.    To OR for open incisional hernia repair with mesh        Post-Op Info:  Procedure(s) (LRB):  LAPAROSCOPY, DIAGNOSTIC (N/A)  EVACUATION, HEMATOMA  EXPLORATION, WOUND (N/A)   4 Days Post-Op     Interval History: No acute events. Ethan single low-grade temp to 100.6, otherwise VSS on 3L NC. Pain well-controlled. Ambulating in hallway.     Medications:  Continuous Infusions:  Scheduled Meds:   acetaminophen  1,000 mg Oral Q8H    acetaZOLAMIDE  500 mg Intravenous Once    albuterol-ipratropium  3 mL Nebulization Q4H    ARIPiprazole  10 mg Oral BID    clonazePAM  1 mg Oral BID    docusate sodium  100 mg Oral BID    fluticasone  2 spray Each Nare Daily    fluticasone-vilanterol  1 puff Inhalation Daily    furosemide  20 mg Oral BID    gabapentin  300 mg Oral TID    heparin (porcine)  5,000 Units Subcutaneous Q8H    methadone  90 mg Oral Daily    nicotine  1 patch Transdermal QAM    pantoprazole  40 mg Oral Daily    polyethylene glycol  17 g Oral Daily    senna  8.6 mg Oral Daily    sodium chloride 0.9%  3 mL Intravenous Q8H    sodium chloride 3%  4 mL Nebulization Q8H WA     PRN Meds:sodium chloride, albuterol-ipratropium, dextrose 50%, " glucagon (human recombinant), guaifenesin 100 mg/5 ml, HYDROmorphone, insulin aspart U-100, magnesium sulfate IVPB, ondansetron, oxyCODONE, oxyCODONE, potassium chloride in water, promethazine (PHENERGAN) IVPB, ramelteon, sodium chloride 0.9%     Review of patient's allergies indicates:   Allergen Reactions    Iodine and iodide containing products Anaphylaxis and Swelling     Facial swelling    Shellfish containing products Anaphylaxis             Compazine [prochlorperazine edisylate] Hallucinations     Objective:     Vital Signs (Most Recent):  Temp: 97.2 °F (36.2 °C) (04/28/19 0711)  Pulse: 92 (04/28/19 0900)  Resp: 18 (04/28/19 0900)  BP: 130/79 (04/28/19 0711)  SpO2: 96 % (04/28/19 0837) Vital Signs (24h Range):  Temp:  [97.2 °F (36.2 °C)-100.6 °F (38.1 °C)] 97.2 °F (36.2 °C)  Pulse:  [] 92  Resp:  [16-20] 18  SpO2:  [92 %-97 %] 96 %  BP: (124-133)/(71-80) 130/79     Weight: 107 kg (235 lb 14.3 oz)  Body mass index is 35.87 kg/m².    Intake/Output - Last 3 Shifts       04/26 0700 - 04/27 0659 04/27 0700 - 04/28 0659 04/28 0700 - 04/29 0659    P.O. 740 880     I.V. (mL/kg) 739.6 (6.9)      IV Piggyback  100     Total Intake(mL/kg) 1479.6 (13.8) 980 (9.2)     Urine (mL/kg/hr) 3100 (1.2) 1900 (0.7)     Emesis/NG output 0      Drains 315 90     Stool 0      Total Output 3415 1990     Net -1935.4 -1010                  Physical Exam   Constitutional: He appears well-developed and well-nourished. No distress.   Cardiovascular: Normal rate and regular rhythm.   Pulmonary/Chest: Effort normal. No respiratory distress.   Abdominal: Soft. He exhibits no distension.   Appropriately tender to palpation  Drain in place with sero-sanguinous output   Musculoskeletal: He exhibits no edema.   Neurological: He is alert.   Skin: He is not diaphoretic.   Vitals reviewed.      Significant Labs:  CBC:   Recent Labs   Lab 04/28/19  0526   WBC 5.61   RBC 2.68*   HGB 8.3*   HCT 26.4*      MCV 99*   MCH 31.0   MCHC 31.4*      CMP:   Recent Labs   Lab 04/24/19  0656  04/28/19  0526   GLU  --    < > 106   CALCIUM  --    < > 8.5*   ALBUMIN 1.8*  --   --    PROT 4.3*  --   --    NA  --    < > 141   K  --    < > 4.0   CO2  --    < > 36*   CL  --    < > 100   BUN  --    < > 9   CREATININE  --    < > 0.9   ALKPHOS 61  --   --    ALT 13  --   --    AST 19  --   --    BILITOT 0.6  --   --     < > = values in this interval not displayed.       Significant Diagnostics:  I have reviewed all pertinent imaging results/findings within the past 24 hours.    Assessment/Plan:     Ventral hernia  Mr. Harrington is a 52 yo man who is s/p ventral hernia repair on 4/22/2019. Pain improved; cough improved. On 3L NC.     - HDS  - resp toilet   - Lasix scheduled PO  - Diamox 500 IV x 1 this AM   - multimodal pain control  - regular diet  - pantoprazole  - sliding scale insulin  - Ambulate in hallway TID    Dispo: Likely home tomorrow after further diuresis and pt back to previous home O2 level.        Huber Joseph MD  General Surgery  [facility]

## 2019-04-28 NOTE — ASSESSMENT & PLAN NOTE
Mr. Harrington is a 52 yo man who is s/p ventral hernia repair on 4/22/2019. Pain improved; cough improved. On 3L NC.     - HDS  - resp toilet   - Lasix scheduled PO  - Diamox 500 IV x 1 this AM   - multimodal pain control  - regular diet  - pantoprazole  - sliding scale insulin  - Ambulate in hallway TID    Dispo: Likely home tomorrow after further diuresis and pt back to previous home O2 level.

## 2019-04-29 LAB
ANION GAP SERPL CALC-SCNC: 8 MMOL/L (ref 8–16)
BACTERIA #/AREA URNS AUTO: NORMAL /HPF
BASOPHILS # BLD AUTO: 0.02 K/UL (ref 0–0.2)
BASOPHILS NFR BLD: 0.2 % (ref 0–1.9)
BILIRUB UR QL STRIP: NEGATIVE
BNP SERPL-MCNC: 23 PG/ML (ref 0–99)
BUN SERPL-MCNC: 8 MG/DL (ref 6–20)
CALCIUM SERPL-MCNC: 8.7 MG/DL (ref 8.7–10.5)
CHLORIDE SERPL-SCNC: 99 MMOL/L (ref 95–110)
CLARITY UR REFRACT.AUTO: CLEAR
CO2 SERPL-SCNC: 32 MMOL/L (ref 23–29)
COLOR UR AUTO: YELLOW
CREAT SERPL-MCNC: 1 MG/DL (ref 0.5–1.4)
DIFFERENTIAL METHOD: ABNORMAL
EOSINOPHIL # BLD AUTO: 0.3 K/UL (ref 0–0.5)
EOSINOPHIL NFR BLD: 3.4 % (ref 0–8)
ERYTHROCYTE [DISTWIDTH] IN BLOOD BY AUTOMATED COUNT: 13.2 % (ref 11.5–14.5)
EST. GFR  (AFRICAN AMERICAN): >60 ML/MIN/1.73 M^2
EST. GFR  (NON AFRICAN AMERICAN): >60 ML/MIN/1.73 M^2
GLUCOSE SERPL-MCNC: 97 MG/DL (ref 70–110)
GLUCOSE UR QL STRIP: NEGATIVE
HCT VFR BLD AUTO: 28.8 % (ref 40–54)
HGB BLD-MCNC: 8.9 G/DL (ref 14–18)
HGB UR QL STRIP: NEGATIVE
HYALINE CASTS UR QL AUTO: 0 /LPF
IMM GRANULOCYTES # BLD AUTO: 0.03 K/UL (ref 0–0.04)
IMM GRANULOCYTES NFR BLD AUTO: 0.3 % (ref 0–0.5)
KETONES UR QL STRIP: NEGATIVE
LEUKOCYTE ESTERASE UR QL STRIP: NEGATIVE
LYMPHOCYTES # BLD AUTO: 1.1 K/UL (ref 1–4.8)
LYMPHOCYTES NFR BLD: 12.6 % (ref 18–48)
MAGNESIUM SERPL-MCNC: 1.6 MG/DL (ref 1.6–2.6)
MCH RBC QN AUTO: 30.4 PG (ref 27–31)
MCHC RBC AUTO-ENTMCNC: 30.9 G/DL (ref 32–36)
MCV RBC AUTO: 98 FL (ref 82–98)
MICROSCOPIC COMMENT: NORMAL
MONOCYTES # BLD AUTO: 0.8 K/UL (ref 0.3–1)
MONOCYTES NFR BLD: 8.8 % (ref 4–15)
NEUTROPHILS # BLD AUTO: 6.5 K/UL (ref 1.8–7.7)
NEUTROPHILS NFR BLD: 74.7 % (ref 38–73)
NITRITE UR QL STRIP: NEGATIVE
NRBC BLD-RTO: 0 /100 WBC
PH UR STRIP: >8 [PH] (ref 5–8)
PHOSPHATE SERPL-MCNC: 3.6 MG/DL (ref 2.7–4.5)
PLATELET # BLD AUTO: 215 K/UL (ref 150–350)
PMV BLD AUTO: 10.4 FL (ref 9.2–12.9)
POCT GLUCOSE: 215 MG/DL (ref 70–110)
POCT GLUCOSE: 98 MG/DL (ref 70–110)
POTASSIUM SERPL-SCNC: 4.2 MMOL/L (ref 3.5–5.1)
PROT UR QL STRIP: ABNORMAL
RBC # BLD AUTO: 2.93 M/UL (ref 4.6–6.2)
RBC #/AREA URNS AUTO: 0 /HPF (ref 0–4)
SODIUM SERPL-SCNC: 139 MMOL/L (ref 136–145)
SP GR UR STRIP: 1.01 (ref 1–1.03)
URN SPEC COLLECT METH UR: ABNORMAL
WBC # BLD AUTO: 8.73 K/UL (ref 3.9–12.7)
WBC #/AREA URNS AUTO: 0 /HPF (ref 0–5)

## 2019-04-29 PROCEDURE — 87070 CULTURE OTHR SPECIMN AEROBIC: CPT

## 2019-04-29 PROCEDURE — 93010 EKG 12-LEAD: ICD-10-PCS | Mod: ,,, | Performed by: INTERNAL MEDICINE

## 2019-04-29 PROCEDURE — 25000003 PHARM REV CODE 250: Performed by: ANESTHESIOLOGY

## 2019-04-29 PROCEDURE — 63600175 PHARM REV CODE 636 W HCPCS: Performed by: ANESTHESIOLOGY

## 2019-04-29 PROCEDURE — 25000242 PHARM REV CODE 250 ALT 637 W/ HCPCS: Performed by: SURGERY

## 2019-04-29 PROCEDURE — 63600175 PHARM REV CODE 636 W HCPCS: Performed by: STUDENT IN AN ORGANIZED HEALTH CARE EDUCATION/TRAINING PROGRAM

## 2019-04-29 PROCEDURE — 99233 SBSQ HOSP IP/OBS HIGH 50: CPT | Mod: ,,, | Performed by: INTERNAL MEDICINE

## 2019-04-29 PROCEDURE — 99900035 HC TECH TIME PER 15 MIN (STAT)

## 2019-04-29 PROCEDURE — 25000003 PHARM REV CODE 250: Performed by: SURGERY

## 2019-04-29 PROCEDURE — 87186 SC STD MICRODIL/AGAR DIL: CPT

## 2019-04-29 PROCEDURE — 87185 SC STD ENZYME DETCJ PER NZM: CPT

## 2019-04-29 PROCEDURE — 81001 URINALYSIS AUTO W/SCOPE: CPT

## 2019-04-29 PROCEDURE — 87040 BLOOD CULTURE FOR BACTERIA: CPT | Mod: 59

## 2019-04-29 PROCEDURE — 25000003 PHARM REV CODE 250: Performed by: STUDENT IN AN ORGANIZED HEALTH CARE EDUCATION/TRAINING PROGRAM

## 2019-04-29 PROCEDURE — 85025 COMPLETE CBC W/AUTO DIFF WBC: CPT

## 2019-04-29 PROCEDURE — S4991 NICOTINE PATCH NONLEGEND: HCPCS | Performed by: STUDENT IN AN ORGANIZED HEALTH CARE EDUCATION/TRAINING PROGRAM

## 2019-04-29 PROCEDURE — 27000221 HC OXYGEN, UP TO 24 HOURS

## 2019-04-29 PROCEDURE — 87077 CULTURE AEROBIC IDENTIFY: CPT

## 2019-04-29 PROCEDURE — 94799 UNLISTED PULMONARY SVC/PX: CPT

## 2019-04-29 PROCEDURE — 99233 PR SUBSEQUENT HOSPITAL CARE,LEVL III: ICD-10-PCS | Mod: ,,, | Performed by: INTERNAL MEDICINE

## 2019-04-29 PROCEDURE — 87205 SMEAR GRAM STAIN: CPT

## 2019-04-29 PROCEDURE — 94664 DEMO&/EVAL PT USE INHALER: CPT

## 2019-04-29 PROCEDURE — 25000242 PHARM REV CODE 250 ALT 637 W/ HCPCS: Performed by: ANESTHESIOLOGY

## 2019-04-29 PROCEDURE — 94640 AIRWAY INHALATION TREATMENT: CPT

## 2019-04-29 PROCEDURE — 84100 ASSAY OF PHOSPHORUS: CPT

## 2019-04-29 PROCEDURE — 11000001 HC ACUTE MED/SURG PRIVATE ROOM

## 2019-04-29 PROCEDURE — 83880 ASSAY OF NATRIURETIC PEPTIDE: CPT

## 2019-04-29 PROCEDURE — 94761 N-INVAS EAR/PLS OXIMETRY MLT: CPT

## 2019-04-29 PROCEDURE — A4216 STERILE WATER/SALINE, 10 ML: HCPCS | Performed by: STUDENT IN AN ORGANIZED HEALTH CARE EDUCATION/TRAINING PROGRAM

## 2019-04-29 PROCEDURE — 80048 BASIC METABOLIC PNL TOTAL CA: CPT

## 2019-04-29 PROCEDURE — 93005 ELECTROCARDIOGRAM TRACING: CPT

## 2019-04-29 PROCEDURE — 83735 ASSAY OF MAGNESIUM: CPT

## 2019-04-29 PROCEDURE — 63600175 PHARM REV CODE 636 W HCPCS: Performed by: SURGERY

## 2019-04-29 PROCEDURE — 93010 ELECTROCARDIOGRAM REPORT: CPT | Mod: ,,, | Performed by: INTERNAL MEDICINE

## 2019-04-29 RX ORDER — SYRING-NEEDL,DISP,INSUL,0.3 ML 29 G X1/2"
296 SYRINGE, EMPTY DISPOSABLE MISCELLANEOUS ONCE
Status: COMPLETED | OUTPATIENT
Start: 2019-04-29 | End: 2019-04-29

## 2019-04-29 RX ORDER — TIOTROPIUM BROMIDE 18 UG/1
1 CAPSULE ORAL; RESPIRATORY (INHALATION) DAILY
Status: DISCONTINUED | OUTPATIENT
Start: 2019-04-30 | End: 2019-05-01 | Stop reason: HOSPADM

## 2019-04-29 RX ORDER — LEVOFLOXACIN 750 MG/1
750 TABLET ORAL DAILY
Status: DISCONTINUED | OUTPATIENT
Start: 2019-04-30 | End: 2019-04-29

## 2019-04-29 RX ORDER — CIPROFLOXACIN 500 MG/1
500 TABLET ORAL EVERY 12 HOURS
Status: DISCONTINUED | OUTPATIENT
Start: 2019-04-29 | End: 2019-04-29

## 2019-04-29 RX ORDER — PREDNISONE 20 MG/1
40 TABLET ORAL DAILY
Status: DISCONTINUED | OUTPATIENT
Start: 2019-04-29 | End: 2019-05-01 | Stop reason: HOSPADM

## 2019-04-29 RX ORDER — LEVOFLOXACIN 750 MG/1
750 TABLET ORAL DAILY
Status: DISCONTINUED | OUTPATIENT
Start: 2019-04-30 | End: 2019-05-01 | Stop reason: HOSPADM

## 2019-04-29 RX ORDER — MAGNESIUM SULFATE HEPTAHYDRATE 40 MG/ML
2 INJECTION, SOLUTION INTRAVENOUS
Status: COMPLETED | OUTPATIENT
Start: 2019-04-29 | End: 2019-04-29

## 2019-04-29 RX ADMIN — ACETAMINOPHEN 1000 MG: 500 TABLET ORAL at 05:04

## 2019-04-29 RX ADMIN — DOCUSATE SODIUM 100 MG: 100 CAPSULE, LIQUID FILLED ORAL at 09:04

## 2019-04-29 RX ADMIN — FUROSEMIDE 20 MG: 20 TABLET ORAL at 09:04

## 2019-04-29 RX ADMIN — PREDNISONE 40 MG: 20 TABLET ORAL at 10:04

## 2019-04-29 RX ADMIN — PANTOPRAZOLE SODIUM 40 MG: 40 TABLET, DELAYED RELEASE ORAL at 10:04

## 2019-04-29 RX ADMIN — IPRATROPIUM BROMIDE AND ALBUTEROL SULFATE 3 ML: .5; 3 SOLUTION RESPIRATORY (INHALATION) at 07:04

## 2019-04-29 RX ADMIN — OXYCODONE HYDROCHLORIDE 10 MG: 5 SOLUTION ORAL at 06:04

## 2019-04-29 RX ADMIN — FLUTICASONE PROPIONATE 100 MCG: 50 SPRAY, METERED NASAL at 09:04

## 2019-04-29 RX ADMIN — HEPARIN SODIUM 5000 UNITS: 5000 INJECTION, SOLUTION INTRAVENOUS; SUBCUTANEOUS at 09:04

## 2019-04-29 RX ADMIN — ARIPIPRAZOLE 10 MG: 5 TABLET ORAL at 10:04

## 2019-04-29 RX ADMIN — HEPARIN SODIUM 5000 UNITS: 5000 INJECTION, SOLUTION INTRAVENOUS; SUBCUTANEOUS at 05:04

## 2019-04-29 RX ADMIN — FUROSEMIDE 20 MG: 20 TABLET ORAL at 10:04

## 2019-04-29 RX ADMIN — Medication 3 ML: at 02:04

## 2019-04-29 RX ADMIN — DOCUSATE SODIUM 100 MG: 100 CAPSULE, LIQUID FILLED ORAL at 10:04

## 2019-04-29 RX ADMIN — Medication 3 ML: at 06:04

## 2019-04-29 RX ADMIN — IPRATROPIUM BROMIDE AND ALBUTEROL SULFATE 3 ML: .5; 3 SOLUTION RESPIRATORY (INHALATION) at 03:04

## 2019-04-29 RX ADMIN — GABAPENTIN 300 MG: 300 CAPSULE ORAL at 04:04

## 2019-04-29 RX ADMIN — ACETAZOLAMIDE 500 MG: 500 INJECTION, POWDER, LYOPHILIZED, FOR SOLUTION INTRAVENOUS at 10:04

## 2019-04-29 RX ADMIN — GUAIFENESIN 200 MG: 200 SOLUTION ORAL at 05:04

## 2019-04-29 RX ADMIN — IPRATROPIUM BROMIDE AND ALBUTEROL SULFATE 3 ML: .5; 3 SOLUTION RESPIRATORY (INHALATION) at 11:04

## 2019-04-29 RX ADMIN — Medication 1 PATCH: at 07:04

## 2019-04-29 RX ADMIN — IPRATROPIUM BROMIDE AND ALBUTEROL SULFATE 3 ML: .5; 3 SOLUTION RESPIRATORY (INHALATION) at 08:04

## 2019-04-29 RX ADMIN — MAGNESIUM SULFATE IN WATER 2 G: 40 INJECTION, SOLUTION INTRAVENOUS at 02:04

## 2019-04-29 RX ADMIN — MAGNESIUM CITRATE 296 ML: 1.75 LIQUID ORAL at 07:04

## 2019-04-29 RX ADMIN — POLYETHYLENE GLYCOL 3350 17 G: 17 POWDER, FOR SOLUTION ORAL at 10:04

## 2019-04-29 RX ADMIN — FLUTICASONE FUROATE AND VILANTEROL TRIFENATATE 1 PUFF: 100; 25 POWDER RESPIRATORY (INHALATION) at 09:04

## 2019-04-29 RX ADMIN — ACETAMINOPHEN 1000 MG: 500 TABLET ORAL at 09:04

## 2019-04-29 RX ADMIN — MAGNESIUM SULFATE IN WATER 2 G: 40 INJECTION, SOLUTION INTRAVENOUS at 11:04

## 2019-04-29 RX ADMIN — ARIPIPRAZOLE 10 MG: 5 TABLET ORAL at 09:04

## 2019-04-29 RX ADMIN — CIPROFLOXACIN HYDROCHLORIDE 500 MG: 500 TABLET, FILM COATED ORAL at 02:04

## 2019-04-29 RX ADMIN — SODIUM CHLORIDE SOLN NEBU 3% 4 ML: 3 NEBU SOLN at 03:04

## 2019-04-29 RX ADMIN — GABAPENTIN 300 MG: 300 CAPSULE ORAL at 10:04

## 2019-04-29 RX ADMIN — METHADONE HYDROCHLORIDE 90 MG: 10 TABLET ORAL at 10:04

## 2019-04-29 RX ADMIN — CLONAZEPAM 1 MG: 0.5 TABLET ORAL at 09:04

## 2019-04-29 RX ADMIN — SENNOSIDES 8.6 MG: 8.6 TABLET, FILM COATED ORAL at 10:04

## 2019-04-29 RX ADMIN — INSULIN ASPART 4 UNITS: 100 INJECTION, SOLUTION INTRAVENOUS; SUBCUTANEOUS at 06:04

## 2019-04-29 RX ADMIN — GABAPENTIN 300 MG: 300 CAPSULE ORAL at 09:04

## 2019-04-29 RX ADMIN — OXYCODONE HYDROCHLORIDE 10 MG: 5 SOLUTION ORAL at 05:04

## 2019-04-29 RX ADMIN — SODIUM CHLORIDE SOLN NEBU 3% 4 ML: 3 NEBU SOLN at 07:04

## 2019-04-29 RX ADMIN — Medication 3 ML: at 09:04

## 2019-04-29 NOTE — ASSESSMENT & PLAN NOTE
Pt w/ known history of COPD presents for open incisional hernia repair w/ mesh on 4/24. Pt has been recovering well post-operatively. Developed fever overnight (4/28) w/ associated productive cough and interval changes in chest xray. Pulmonary consulted for assistance for possible COPD exacerbation.  - CXR 4/29: Pulmondary edema, PNA, aspiration or sepsis  - Normally on 2L home O2; Satting well on 2L NC   - Home meds include breo, spirivia and ventolin   - Started smoking at age 35. Admits stopping only upon hospital admission  - UOP 3.0L over last 24 hours. Net     Plan:   - Would switch ciprofloxacin to levoquin   - Continue to diurese per primary  - Continue incentive spirometry and pulm toilet  - PT/OT and mobilize pt as tolerated   - Consider derm consult for new onset cutaneous hand lesions   - BCx and Resp Cx pending

## 2019-04-29 NOTE — HPI
"Patient is a 53 y.o. male presents with essential hypertension, pulmonary htn, BMI 37.25, COPD on home o2, hep C, not smoking (quit 3 months ago) and recurrent large umbilical hernia (times 2).  Per Dr. Wray's note 4/2017 he is above average risk for surgery. Generally, he takes his advair daily, and only needs albuterol "when the weather changes," which is about 3 times a month.  Says he ambulated a quarter of a mile the other day without stopping.  Can ambulate 1 flight of stairs without stopping. Pain is to the hernia site, and radiating inferiorly to his groin. It is daily, and worse in the mornings, and exacerbated by laying on his side.    Presented for incisional hernia repair with mesh performed on 4/24.     Pulmonary consulted for COPD exacerbation.      "
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
## 2019-04-29 NOTE — ASSESSMENT & PLAN NOTE
Mr. Harrington is a 52 yo man who is s/p ventral hernia repair on 4/22/2019. Meets GOLD criteria for moderate to severe COPD exacerbation.    COPD  - fever work-up: blood cx, sputum cx, UA, CXR  - resp toilet, duonebs q4hrs  - cipro for COPD exacerbation; he is high risk for pseudomonas  - baseline EKG and follow-up tomorrow after 24 hrs cipro  - replaced Mg, K and Ca are replete    S/p incisional hernia repair  - multimodal pain control  - regular diet  - pantoprazole  - sliding scale insulin  - Ambulate in hallway TID    Dispo: Likely home tomorrow after further diuresis and pt back to previous home O2 level.

## 2019-04-29 NOTE — PROGRESS NOTES
"[facility]  General Surgery  Progress Note    Subjective:     History of Present Illness:  Patient is a 53 y.o. male presents with essential hypertension, pulmonary htn, BMI 37.25, COPD on home o2, hep C, not smoking (quit 3 months ago) and recurrent large umbilical hernia (times 2).  Per Dr. Wray's note 4/2017 he is above average risk for surgery.       Generally, he takes his advair daily, and only needs albuterol "when the weather changes," which is about 3 times a month.  Says he ambulated a quarter of a mile the other day without stopping.  Can ambulate 1 flight of stairs without stopping.      Pain is to the hernia site, and radiating inferiorly to his groin.  It is daily, and worse in the mornings, and exacerbated by laying on his side.    To OR for open incisional hernia repair with mesh        Post-Op Info:  Procedure(s) (LRB):  LAPAROSCOPY, DIAGNOSTIC (N/A)  EVACUATION, HEMATOMA  EXPLORATION, WOUND (N/A)   5 Days Post-Op     Interval History: Drain output dark serosanguinous. Increased sputum, change in sputum color overnight and increase in subjective dyspnea with stable O2 requirement. Ambulating. Pain controlled. Good response to lasix.    Medications:  Continuous Infusions:  Scheduled Meds:   acetaminophen  1,000 mg Oral Q8H    albuterol-ipratropium  3 mL Nebulization Q4H    ARIPiprazole  10 mg Oral BID    ciprofloxacin HCl  500 mg Oral Q12H    clonazePAM  1 mg Oral BID    docusate sodium  100 mg Oral BID    fluticasone  2 spray Each Nare Daily    fluticasone-vilanterol  1 puff Inhalation Daily    furosemide  20 mg Oral BID    gabapentin  300 mg Oral TID    heparin (porcine)  5,000 Units Subcutaneous Q8H    magnesium sulfate IVPB  2 g Intravenous Q2H    methadone  90 mg Oral Daily    nicotine  1 patch Transdermal QAM    pantoprazole  40 mg Oral Daily    polyethylene glycol  17 g Oral Daily    senna  8.6 mg Oral Daily    sodium chloride 0.9%  3 mL Intravenous Q8H "    sodium chloride 3%  4 mL Nebulization Q8H WA     PRN Meds:sodium chloride, albuterol-ipratropium, dextrose 50%, glucagon (human recombinant), guaifenesin 100 mg/5 ml, HYDROmorphone, insulin aspart U-100, ondansetron, oxyCODONE, oxyCODONE, promethazine (PHENERGAN) IVPB, ramelteon, sodium chloride 0.9%     Review of patient's allergies indicates:   Allergen Reactions    Iodine and iodide containing products Anaphylaxis and Swelling     Facial swelling    Shellfish containing products Anaphylaxis             Compazine [prochlorperazine edisylate] Hallucinations     Objective:     Vital Signs (Most Recent):  Temp: 98.2 °F (36.8 °C) (04/29/19 0744)  Pulse: (!) 111 (04/29/19 0744)  Resp: 14 (04/29/19 0744)  BP: 117/70 (04/29/19 0744)  SpO2: 96 % (04/29/19 0744) Vital Signs (24h Range):  Temp:  [97 °F (36.1 °C)-100.4 °F (38 °C)] 98.2 °F (36.8 °C)  Pulse:  [] 111  Resp:  [12-22] 14  SpO2:  [95 %-99 %] 96 %  BP: (111-133)/(65-77) 117/70     Weight: 107 kg (235 lb 14.3 oz)  Body mass index is 35.87 kg/m².    Intake/Output - Last 3 Shifts       04/27 0700 - 04/28 0659 04/28 0700 - 04/29 0659 04/29 0700 - 04/30 0659    P.O. 880 940     I.V. (mL/kg)       IV Piggyback 100      Total Intake(mL/kg) 980 (9.2) 940 (8.8)     Urine (mL/kg/hr) 1900 (0.7) 3000 (1.2)     Emesis/NG output       Drains 90 130     Stool       Total Output 1990 7040     Net -1010 -2191                  Physical Exam   Constitutional: He is oriented to person, place, and time. He appears well-developed and well-nourished. No distress.   Cardiovascular: Normal rate and regular rhythm.   Pulmonary/Chest: Effort normal. No respiratory distress. He has rales.   Increase in sputum; change in color from clear to light brown    Abdominal: Soft. He exhibits no distension.   Appropriately tender to palpation  Drain in place with dark sero-sanguinous output  Incision is clean/dry/intact   Musculoskeletal: He exhibits no edema.   Neurological: He is alert  and oriented to person, place, and time.   Skin: Skin is warm and dry. He is not diaphoretic.   Vitals reviewed.      Significant Labs:  CBC:   Recent Labs   Lab 04/29/19  0453   WBC 8.73   RBC 2.93*   HGB 8.9*   HCT 28.8*      MCV 98   MCH 30.4   MCHC 30.9*     CMP:   Recent Labs   Lab 04/24/19  0656  04/29/19  0453   GLU  --    < > 97   CALCIUM  --    < > 8.7   ALBUMIN 1.8*  --   --    PROT 4.3*  --   --    NA  --    < > 139   K  --    < > 4.2   CO2  --    < > 32*   CL  --    < > 99   BUN  --    < > 8   CREATININE  --    < > 1.0   ALKPHOS 61  --   --    ALT 13  --   --    AST 19  --   --    BILITOT 0.6  --   --     < > = values in this interval not displayed.       Significant Diagnostics:  I have reviewed all pertinent imaging results/findings within the past 24 hours.    Assessment/Plan:     Ventral hernia  Mr. Harrington is a 52 yo man who is s/p ventral hernia repair on 4/22/2019. Meets GOLD criteria for moderate to severe COPD exacerbation.    COPD  - fever work-up: blood cx, sputum cx, UA, CXR  - resp toilet, duonebs q4hrs  - cipro for COPD exacerbation; he is high risk for pseudomonas  - baseline EKG and follow-up tomorrow after 24 hrs cipro  - replaced Mg, K and Ca are replete    S/p incisional hernia repair  - multimodal pain control  - regular diet  - pantoprazole  - sliding scale insulin  - lasix 20 mg BID PO  - Ambulate in hallway TID    Dispo: Likely home tomorrow after further diuresis and pt back to previous home O2 level.        ESTIVEN Jacob MD  General Surgery  [facility]

## 2019-04-29 NOTE — PLAN OF CARE
12:17 PM Visited patient. AAOX4. D/c plan: Home;See below. 2 home portable O2 tanks are at his BS. Will follow-up w/[facility]orena, [facility], to confirm they refilled them.     3:30 PM NASH Bartlett, updated CM that she spoke to [employer], who confirms patient's 2 home portable O2 tanks were filled.        04/29/19 1217   Discharge Reassessment   Assessment Type Discharge Planning Reassessment   Provided patient/caregiver education on the expected discharge date and the discharge plan Yes  (D/c date per MD. Confirmed w/patient he is declining: Rehab placement as well as out patient therapy services. Patient states that he prefers to discharge home with the assistance of his brother who is very attentive and is used to being his caregiver. )   Do you have any problems affording any of your prescribed medications? No   Discharge Plan A Home with family   Discharge Plan B Home with family   DME Needed Upon Discharge  walker, rolling   Patient choice form signed by patient/caregiver N/A   Anticipated Discharge Disposition Home   Can the patient answer the patient profile reliably? Yes, cognitively intact

## 2019-04-29 NOTE — SUBJECTIVE & OBJECTIVE
Interval History: Drain output dark serosanguinous. Increased sputum, change in sputum color overnight and increase in subjective dyspnea with stable O2 requirement. Ambulating. Pain controlled. Good response to lasix.    Medications:  Continuous Infusions:  Scheduled Meds:   acetaminophen  1,000 mg Oral Q8H    albuterol-ipratropium  3 mL Nebulization Q4H    ARIPiprazole  10 mg Oral BID    ciprofloxacin HCl  500 mg Oral Q12H    clonazePAM  1 mg Oral BID    docusate sodium  100 mg Oral BID    fluticasone  2 spray Each Nare Daily    fluticasone-vilanterol  1 puff Inhalation Daily    furosemide  20 mg Oral BID    gabapentin  300 mg Oral TID    heparin (porcine)  5,000 Units Subcutaneous Q8H    magnesium sulfate IVPB  2 g Intravenous Q2H    methadone  90 mg Oral Daily    nicotine  1 patch Transdermal QAM    pantoprazole  40 mg Oral Daily    polyethylene glycol  17 g Oral Daily    senna  8.6 mg Oral Daily    sodium chloride 0.9%  3 mL Intravenous Q8H    sodium chloride 3%  4 mL Nebulization Q8H WA     PRN Meds:sodium chloride, albuterol-ipratropium, dextrose 50%, glucagon (human recombinant), guaifenesin 100 mg/5 ml, HYDROmorphone, insulin aspart U-100, ondansetron, oxyCODONE, oxyCODONE, promethazine (PHENERGAN) IVPB, ramelteon, sodium chloride 0.9%     Review of patient's allergies indicates:   Allergen Reactions    Iodine and iodide containing products Anaphylaxis and Swelling     Facial swelling    Shellfish containing products Anaphylaxis             Compazine [prochlorperazine edisylate] Hallucinations     Objective:     Vital Signs (Most Recent):  Temp: 98.2 °F (36.8 °C) (04/29/19 0744)  Pulse: (!) 111 (04/29/19 0744)  Resp: 14 (04/29/19 0744)  BP: 117/70 (04/29/19 0744)  SpO2: 96 % (04/29/19 0744) Vital Signs (24h Range):  Temp:  [97 °F (36.1 °C)-100.4 °F (38 °C)] 98.2 °F (36.8 °C)  Pulse:  [] 111  Resp:  [12-22] 14  SpO2:  [95 %-99 %] 96 %  BP: (111-133)/(65-77) 117/70     Weight: 107  kg (235 lb 14.3 oz)  Body mass index is 35.87 kg/m².    Intake/Output - Last 3 Shifts       04/27 0700 - 04/28 0659 04/28 0700 - 04/29 0659 04/29 0700 - 04/30 0659    P.O. 880 940     I.V. (mL/kg)       IV Piggyback 100      Total Intake(mL/kg) 980 (9.2) 940 (8.8)     Urine (mL/kg/hr) 1900 (0.7) 3000 (1.2)     Emesis/NG output       Drains 90 130     Stool       Total Output 1990 3130     Net -1010 -2190                  Physical Exam   Constitutional: He is oriented to person, place, and time. He appears well-developed and well-nourished. No distress.   Cardiovascular: Normal rate and regular rhythm.   Pulmonary/Chest: Effort normal. No respiratory distress. He has rales.   Increase in sputum; change in color from clear to light brown    Abdominal: Soft. He exhibits no distension.   Appropriately tender to palpation  Drain in place with dark sero-sanguinous output  Incision is clean/dry/intact   Musculoskeletal: He exhibits no edema.   Neurological: He is alert and oriented to person, place, and time.   Skin: Skin is warm and dry. He is not diaphoretic.   Vitals reviewed.      Significant Labs:  CBC:   Recent Labs   Lab 04/29/19  0453   WBC 8.73   RBC 2.93*   HGB 8.9*   HCT 28.8*      MCV 98   MCH 30.4   MCHC 30.9*     CMP:   Recent Labs   Lab 04/24/19  0656  04/29/19  0453   GLU  --    < > 97   CALCIUM  --    < > 8.7   ALBUMIN 1.8*  --   --    PROT 4.3*  --   --    NA  --    < > 139   K  --    < > 4.2   CO2  --    < > 32*   CL  --    < > 99   BUN  --    < > 8   CREATININE  --    < > 1.0   ALKPHOS 61  --   --    ALT 13  --   --    AST 19  --   --    BILITOT 0.6  --   --     < > = values in this interval not displayed.       Significant Diagnostics:  I have reviewed all pertinent imaging results/findings within the past 24 hours.
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
## 2019-04-30 LAB
ANION GAP SERPL CALC-SCNC: 8 MMOL/L (ref 8–16)
BASOPHILS # BLD AUTO: 0.02 K/UL (ref 0–0.2)
BASOPHILS NFR BLD: 0.2 % (ref 0–1.9)
BUN SERPL-MCNC: 11 MG/DL (ref 6–20)
CALCIUM SERPL-MCNC: 9.2 MG/DL (ref 8.7–10.5)
CHLORIDE SERPL-SCNC: 100 MMOL/L (ref 95–110)
CO2 SERPL-SCNC: 31 MMOL/L (ref 23–29)
CREAT SERPL-MCNC: 0.9 MG/DL (ref 0.5–1.4)
DIFFERENTIAL METHOD: ABNORMAL
EOSINOPHIL # BLD AUTO: 0 K/UL (ref 0–0.5)
EOSINOPHIL NFR BLD: 0.1 % (ref 0–8)
ERYTHROCYTE [DISTWIDTH] IN BLOOD BY AUTOMATED COUNT: 13.1 % (ref 11.5–14.5)
EST. GFR  (AFRICAN AMERICAN): >60 ML/MIN/1.73 M^2
EST. GFR  (NON AFRICAN AMERICAN): >60 ML/MIN/1.73 M^2
GLUCOSE SERPL-MCNC: 113 MG/DL (ref 70–110)
HCT VFR BLD AUTO: 32.1 % (ref 40–54)
HGB BLD-MCNC: 9.9 G/DL (ref 14–18)
IMM GRANULOCYTES # BLD AUTO: 0.09 K/UL (ref 0–0.04)
IMM GRANULOCYTES NFR BLD AUTO: 0.9 % (ref 0–0.5)
LYMPHOCYTES # BLD AUTO: 1.1 K/UL (ref 1–4.8)
LYMPHOCYTES NFR BLD: 11 % (ref 18–48)
MAGNESIUM SERPL-MCNC: 2.7 MG/DL (ref 1.6–2.6)
MCH RBC QN AUTO: 30.2 PG (ref 27–31)
MCHC RBC AUTO-ENTMCNC: 30.8 G/DL (ref 32–36)
MCV RBC AUTO: 98 FL (ref 82–98)
MONOCYTES # BLD AUTO: 0.5 K/UL (ref 0.3–1)
MONOCYTES NFR BLD: 5.6 % (ref 4–15)
NEUTROPHILS # BLD AUTO: 7.9 K/UL (ref 1.8–7.7)
NEUTROPHILS NFR BLD: 82.2 % (ref 38–73)
NRBC BLD-RTO: 0 /100 WBC
PHOSPHATE SERPL-MCNC: 4.3 MG/DL (ref 2.7–4.5)
PLATELET # BLD AUTO: 270 K/UL (ref 150–350)
PMV BLD AUTO: 10.8 FL (ref 9.2–12.9)
POCT GLUCOSE: 114 MG/DL (ref 70–110)
POCT GLUCOSE: 224 MG/DL (ref 70–110)
POCT GLUCOSE: 354 MG/DL (ref 70–110)
POTASSIUM SERPL-SCNC: 4.8 MMOL/L (ref 3.5–5.1)
RBC # BLD AUTO: 3.28 M/UL (ref 4.6–6.2)
SODIUM SERPL-SCNC: 139 MMOL/L (ref 136–145)
WBC # BLD AUTO: 9.57 K/UL (ref 3.9–12.7)

## 2019-04-30 PROCEDURE — 83735 ASSAY OF MAGNESIUM: CPT

## 2019-04-30 PROCEDURE — 27000646 HC AEROBIKA DEVICE

## 2019-04-30 PROCEDURE — 25000003 PHARM REV CODE 250: Performed by: STUDENT IN AN ORGANIZED HEALTH CARE EDUCATION/TRAINING PROGRAM

## 2019-04-30 PROCEDURE — 94664 DEMO&/EVAL PT USE INHALER: CPT

## 2019-04-30 PROCEDURE — 25000003 PHARM REV CODE 250: Performed by: ANESTHESIOLOGY

## 2019-04-30 PROCEDURE — 93010 EKG 12-LEAD: ICD-10-PCS | Mod: ,,, | Performed by: INTERNAL MEDICINE

## 2019-04-30 PROCEDURE — A4216 STERILE WATER/SALINE, 10 ML: HCPCS | Performed by: STUDENT IN AN ORGANIZED HEALTH CARE EDUCATION/TRAINING PROGRAM

## 2019-04-30 PROCEDURE — 99900035 HC TECH TIME PER 15 MIN (STAT)

## 2019-04-30 PROCEDURE — 97530 THERAPEUTIC ACTIVITIES: CPT

## 2019-04-30 PROCEDURE — 93005 ELECTROCARDIOGRAM TRACING: CPT

## 2019-04-30 PROCEDURE — 97535 SELF CARE MNGMENT TRAINING: CPT

## 2019-04-30 PROCEDURE — 36415 COLL VENOUS BLD VENIPUNCTURE: CPT

## 2019-04-30 PROCEDURE — 63600175 PHARM REV CODE 636 W HCPCS: Performed by: STUDENT IN AN ORGANIZED HEALTH CARE EDUCATION/TRAINING PROGRAM

## 2019-04-30 PROCEDURE — 84100 ASSAY OF PHOSPHORUS: CPT

## 2019-04-30 PROCEDURE — 11000001 HC ACUTE MED/SURG PRIVATE ROOM

## 2019-04-30 PROCEDURE — 25000003 PHARM REV CODE 250: Performed by: SURGERY

## 2019-04-30 PROCEDURE — 63600175 PHARM REV CODE 636 W HCPCS: Performed by: ANESTHESIOLOGY

## 2019-04-30 PROCEDURE — 94640 AIRWAY INHALATION TREATMENT: CPT

## 2019-04-30 PROCEDURE — 93010 ELECTROCARDIOGRAM REPORT: CPT | Mod: ,,, | Performed by: INTERNAL MEDICINE

## 2019-04-30 PROCEDURE — 94761 N-INVAS EAR/PLS OXIMETRY MLT: CPT

## 2019-04-30 PROCEDURE — 80048 BASIC METABOLIC PNL TOTAL CA: CPT

## 2019-04-30 PROCEDURE — 25000242 PHARM REV CODE 250 ALT 637 W/ HCPCS: Performed by: ANESTHESIOLOGY

## 2019-04-30 PROCEDURE — 97116 GAIT TRAINING THERAPY: CPT

## 2019-04-30 PROCEDURE — S4991 NICOTINE PATCH NONLEGEND: HCPCS | Performed by: STUDENT IN AN ORGANIZED HEALTH CARE EDUCATION/TRAINING PROGRAM

## 2019-04-30 PROCEDURE — 27000221 HC OXYGEN, UP TO 24 HOURS

## 2019-04-30 PROCEDURE — 25000242 PHARM REV CODE 250 ALT 637 W/ HCPCS: Performed by: HOSPITALIST

## 2019-04-30 PROCEDURE — 85025 COMPLETE CBC W/AUTO DIFF WBC: CPT

## 2019-04-30 PROCEDURE — 25000242 PHARM REV CODE 250 ALT 637 W/ HCPCS: Performed by: SURGERY

## 2019-04-30 RX ORDER — BISACODYL 10 MG
10 SUPPOSITORY, RECTAL RECTAL DAILY
Status: DISCONTINUED | OUTPATIENT
Start: 2019-04-30 | End: 2019-05-01 | Stop reason: HOSPADM

## 2019-04-30 RX ADMIN — FLUTICASONE PROPIONATE 100 MCG: 50 SPRAY, METERED NASAL at 08:04

## 2019-04-30 RX ADMIN — IPRATROPIUM BROMIDE AND ALBUTEROL SULFATE 3 ML: .5; 3 SOLUTION RESPIRATORY (INHALATION) at 07:04

## 2019-04-30 RX ADMIN — FUROSEMIDE 20 MG: 20 TABLET ORAL at 08:04

## 2019-04-30 RX ADMIN — SENNOSIDES 8.6 MG: 8.6 TABLET, FILM COATED ORAL at 08:04

## 2019-04-30 RX ADMIN — OXYCODONE HYDROCHLORIDE 10 MG: 5 SOLUTION ORAL at 04:04

## 2019-04-30 RX ADMIN — DOCUSATE SODIUM 100 MG: 100 CAPSULE, LIQUID FILLED ORAL at 08:04

## 2019-04-30 RX ADMIN — GABAPENTIN 300 MG: 300 CAPSULE ORAL at 03:04

## 2019-04-30 RX ADMIN — Medication 1 PATCH: at 06:04

## 2019-04-30 RX ADMIN — ACETAMINOPHEN 1000 MG: 500 TABLET ORAL at 05:04

## 2019-04-30 RX ADMIN — BISACODYL 10 MG: 10 SUPPOSITORY RECTAL at 07:04

## 2019-04-30 RX ADMIN — ACETAMINOPHEN 1000 MG: 500 TABLET ORAL at 01:04

## 2019-04-30 RX ADMIN — IPRATROPIUM BROMIDE AND ALBUTEROL SULFATE 3 ML: .5; 3 SOLUTION RESPIRATORY (INHALATION) at 03:04

## 2019-04-30 RX ADMIN — LEVOFLOXACIN 750 MG: 750 TABLET, FILM COATED ORAL at 05:04

## 2019-04-30 RX ADMIN — METHADONE HYDROCHLORIDE 90 MG: 10 TABLET ORAL at 08:04

## 2019-04-30 RX ADMIN — IPRATROPIUM BROMIDE AND ALBUTEROL SULFATE 3 ML: .5; 3 SOLUTION RESPIRATORY (INHALATION) at 09:04

## 2019-04-30 RX ADMIN — ACETAMINOPHEN 1000 MG: 500 TABLET ORAL at 09:04

## 2019-04-30 RX ADMIN — CLONAZEPAM 1 MG: 0.5 TABLET ORAL at 08:04

## 2019-04-30 RX ADMIN — GABAPENTIN 300 MG: 300 CAPSULE ORAL at 08:04

## 2019-04-30 RX ADMIN — FLUTICASONE FUROATE AND VILANTEROL TRIFENATATE 1 PUFF: 100; 25 POWDER RESPIRATORY (INHALATION) at 08:04

## 2019-04-30 RX ADMIN — POLYETHYLENE GLYCOL 3350 17 G: 17 POWDER, FOR SOLUTION ORAL at 08:04

## 2019-04-30 RX ADMIN — PREDNISONE 40 MG: 20 TABLET ORAL at 08:04

## 2019-04-30 RX ADMIN — PANTOPRAZOLE SODIUM 40 MG: 40 TABLET, DELAYED RELEASE ORAL at 08:04

## 2019-04-30 RX ADMIN — ACETAZOLAMIDE 500 MG: 500 INJECTION, POWDER, LYOPHILIZED, FOR SOLUTION INTRAVENOUS at 08:04

## 2019-04-30 RX ADMIN — OXYCODONE HYDROCHLORIDE 10 MG: 5 SOLUTION ORAL at 08:04

## 2019-04-30 RX ADMIN — HEPARIN SODIUM 5000 UNITS: 5000 INJECTION, SOLUTION INTRAVENOUS; SUBCUTANEOUS at 05:04

## 2019-04-30 RX ADMIN — PROMETHAZINE HYDROCHLORIDE 12.5 MG: 25 INJECTION INTRAMUSCULAR; INTRAVENOUS at 06:04

## 2019-04-30 RX ADMIN — ONDANSETRON 8 MG: 2 INJECTION INTRAMUSCULAR; INTRAVENOUS at 05:04

## 2019-04-30 RX ADMIN — TIOTROPIUM BROMIDE 18 MCG: 18 CAPSULE ORAL; RESPIRATORY (INHALATION) at 10:04

## 2019-04-30 RX ADMIN — SODIUM CHLORIDE SOLN NEBU 3% 4 ML: 3 NEBU SOLN at 03:04

## 2019-04-30 RX ADMIN — Medication 3 ML: at 05:04

## 2019-04-30 RX ADMIN — IPRATROPIUM BROMIDE AND ALBUTEROL SULFATE 3 ML: .5; 3 SOLUTION RESPIRATORY (INHALATION) at 12:04

## 2019-04-30 RX ADMIN — ARIPIPRAZOLE 10 MG: 5 TABLET ORAL at 08:04

## 2019-04-30 RX ADMIN — HEPARIN SODIUM 5000 UNITS: 5000 INJECTION, SOLUTION INTRAVENOUS; SUBCUTANEOUS at 09:04

## 2019-04-30 RX ADMIN — HEPARIN SODIUM 5000 UNITS: 5000 INJECTION, SOLUTION INTRAVENOUS; SUBCUTANEOUS at 01:04

## 2019-04-30 RX ADMIN — Medication 3 ML: at 02:04

## 2019-04-30 RX ADMIN — SODIUM CHLORIDE SOLN NEBU 3% 4 ML: 3 NEBU SOLN at 09:04

## 2019-04-30 NOTE — PLAN OF CARE
Problem: Occupational Therapy Goal  Goal: Occupational Therapy Goal  Goals to be met by: 5/9/19     Patient will increase functional independence with ADLs by performing:    UE Dressing with Modified [place].  LE Dressing with Modified [place] and Assistive Devices as needed.  Grooming while standing at sink with Modified [place].  Toileting from bedside commode with Modified [place] for hygiene and clothing management.   Bathing from  edge of bed with Modified [place].  Toilet transfer to bedside commode with Modified [place].  Increased functional strength to WFL for B UE.  Upper extremity exercise program x15 reps per handout, with independence.     Goals remain appropriate.

## 2019-04-30 NOTE — PT/OT/SLP DISCHARGE
Physical Therapy Discharge Summary    Name: Ming Harrington  MRN: [id number]   Principal Problem: <principal problem not specified>     Patient Discharged from acute Physical Therapy on 19.  Please refer to prior PT noted date on 19 for functional status.     Assessment:     Patient has met all goals and is not appropriate for therapy.    Objective:     GOALS:   Multidisciplinary Problems     Physical Therapy Goals     Not on file          Multidisciplinary Problems (Resolved)        Problem: Physical Therapy Goal    Goal Priority Disciplines Outcome Goal Variances Interventions   Physical Therapy Goal   (Resolved)     PT, PT/OT Outcome(s) achieved     Description:  Goals to be met by: 19    Patient will increase functional independence with mobility by performin. Supine to sit with Contact Guard Assistance -not met  2. Sit to stand transfer with Contact Guard Assistance with AD if needed. -not met  3. Gait  x 150 feet with Contact Guard Assistance using AD if needed.-not met   4. Ascend/descend 3 stair with bilateral Handrails Contact Guard Assistance -not met  5. Lower extremity exercise program x15 reps with supervision -not met                      Reasons for Discontinuation of Therapy Services  Satisfactory goal achievement.      Plan:     Patient Discharged to: Home with Home Health Service.    Vazquez Coleman, PT  2019

## 2019-04-30 NOTE — ASSESSMENT & PLAN NOTE
Mr. Harrington is a 52 yo man who is s/p ventral hernia repair on 4/22/2019. Meets GOLD criteria for moderate to severe COPD exacerbation. On levaquin and prednisone. Improving resp status.    COPD  - fever work-up: blood cx, sputum cx with GNR and GPC, UA, CXR  - resp toilet, restarted home resp meds  - levaquin for COPD exacerbation; he is high risk for pseudomonas  - baseline EKG and follow-up tomorrow after 24 hrs  - replaced Mg, K and Ca are replete    S/p incisional hernia repair  - multimodal pain control  - regular diet  - pantoprazole  - sliding scale insulin  - Ambulate in hallway TID    Dispo: Likely home tomorrow

## 2019-04-30 NOTE — PROGRESS NOTES
"[facility]  General Surgery  Progress Note    Subjective:     History of Present Illness:  Patient is a 53 y.o. male presents with essential hypertension, pulmonary htn, BMI 37.25, COPD on home o2, hep C, not smoking (quit 3 months ago) and recurrent large umbilical hernia (times 2).  Per Dr. Wray's note 4/2017 he is above average risk for surgery.       Generally, he takes his advair daily, and only needs albuterol "when the weather changes," which is about 3 times a month.  Says he ambulated a quarter of a mile the other day without stopping.  Can ambulate 1 flight of stairs without stopping.      Pain is to the hernia site, and radiating inferiorly to his groin.  It is daily, and worse in the mornings, and exacerbated by laying on his side.    To OR for open incisional hernia repair with mesh        Post-Op Info:  Procedure(s) (LRB):  LAPAROSCOPY, DIAGNOSTIC (N/A)  EVACUATION, HEMATOMA  EXPLORATION, WOUND (N/A)   6 Days Post-Op     Interval History: Emesis x 2 overnight and early this AM. Breathing is stable. HR is coming down. HDS. Incision looks good.     Medications:  Continuous Infusions:  Scheduled Meds:   acetaminophen  1,000 mg Oral Q8H    albuterol-ipratropium  3 mL Nebulization Q4H    ARIPiprazole  10 mg Oral BID    bisacodyl  10 mg Rectal Daily    clonazePAM  1 mg Oral BID    docusate sodium  100 mg Oral BID    fluticasone propionate  2 spray Each Nare Daily    fluticasone furoate-vilanterol  1 puff Inhalation Daily    furosemide  20 mg Oral BID    gabapentin  300 mg Oral TID    heparin (porcine)  5,000 Units Subcutaneous Q8H    levoFLOXacin  750 mg Oral Daily    methadone  90 mg Oral Daily    nicotine  1 patch Transdermal QAM    pantoprazole  40 mg Oral Daily    polyethylene glycol  17 g Oral Daily    predniSONE  40 mg Oral Daily    senna  8.6 mg Oral Daily    sodium chloride 0.9%  3 mL Intravenous Q8H    sodium chloride 3%  4 mL Nebulization Q8H WA    " tiotropium  1 capsule Inhalation Daily     PRN Meds:sodium chloride, albuterol-ipratropium, dextrose 50%, glucagon (human recombinant), guaifenesin 100 mg/5 ml, HYDROmorphone, insulin aspart U-100, ondansetron, oxyCODONE, oxyCODONE, promethazine (PHENERGAN) IVPB, ramelteon, sodium chloride 0.9%     Review of patient's allergies indicates:   Allergen Reactions    Iodine and iodide containing products Anaphylaxis and Swelling     Facial swelling    Shellfish containing products Anaphylaxis             Compazine [prochlorperazine edisylate] Hallucinations     Objective:     Vital Signs (Most Recent):  Temp: 96.4 °F (35.8 °C) (04/30/19 1221)  Pulse: 82 (04/30/19 1221)  Resp: 18 (04/30/19 1221)  BP: 112/69 (04/30/19 1221)  SpO2: 98 % (04/30/19 1221) Vital Signs (24h Range):  Temp:  [96.4 °F (35.8 °C)-99 °F (37.2 °C)] 96.4 °F (35.8 °C)  Pulse:  [] 82  Resp:  [16-20] 18  SpO2:  [95 %-98 %] 98 %  BP: (112-132)/(65-77) 112/69     Weight: 107 kg (235 lb 14.3 oz)  Body mass index is 35.87 kg/m².    Intake/Output - Last 3 Shifts       04/28 0700 - 04/29 0659 04/29 0700 - 04/30 0659 04/30 0700 - 05/01 0659    P.O. 940 940     IV Piggyback       Total Intake(mL/kg) 940 (8.8) 940 (8.8)     Urine (mL/kg/hr) 3000 (1.2) 3300 (1.3)     Emesis/NG output  0     Drains 130 90     Total Output 3130 3390     Net -2190 -2450            Urine Occurrence  400 x     Emesis Occurrence  2 x           Physical Exam   Constitutional: He is oriented to person, place, and time. He appears well-developed and well-nourished. No distress.   Cardiovascular: Normal rate and regular rhythm.   Pulmonary/Chest: Effort normal. No respiratory distress. He has rales.   Brown sputum, stable   Abdominal: Soft. He exhibits no distension.   Appropriately tender to palpation  Drain in place with dark sero-sanguinous output  Incision is clean/dry/intact   Musculoskeletal: He exhibits no edema.   Neurological: He is alert and oriented to person, place, and  time.   Skin: Skin is warm and dry. He is not diaphoretic.   Vitals reviewed.      Significant Labs:  CBC:   Recent Labs   Lab 04/30/19  0503   WBC 9.57   RBC 3.28*   HGB 9.9*   HCT 32.1*      MCV 98   MCH 30.2   MCHC 30.8*     CMP:   Recent Labs   Lab 04/24/19  0656  04/30/19  0503   GLU  --    < > 113*   CALCIUM  --    < > 9.2   ALBUMIN 1.8*  --   --    PROT 4.3*  --   --    NA  --    < > 139   K  --    < > 4.8   CO2  --    < > 31*   CL  --    < > 100   BUN  --    < > 11   CREATININE  --    < > 0.9   ALKPHOS 61  --   --    ALT 13  --   --    AST 19  --   --    BILITOT 0.6  --   --     < > = values in this interval not displayed.       Significant Diagnostics:  I have reviewed all pertinent imaging results/findings within the past 24 hours.    Assessment/Plan:     Ventral hernia  Mr. Harrington is a 52 yo man who is s/p ventral hernia repair on 4/22/2019. Meets GOLD criteria for moderate to severe COPD exacerbation. On levaquin and prednisone. Improving resp status.    COPD  - fever work-up: blood cx, sputum cx with GNR and GPC, UA, CXR  - resp toilet, restarted home resp meds  - levaquin for COPD exacerbation; he is high risk for pseudomonas  - baseline EKG and follow-up tomorrow after 24 hrs  - replaced Mg, K and Ca are replete    S/p incisional hernia repair  - multimodal pain control  - regular diet  - pantoprazole  - sliding scale insulin  - Ambulate in hallway TID    Dispo: Likely home tomorrow        ESTIVEN Jacob MD  General Surgery  [facility]

## 2019-04-30 NOTE — PT/OT/SLP PROGRESS
Physical Therapy Treatment    Patient Name:  Ming Harrington   MRN:  [id number]    Recommendations:     Discharge Recommendations:  home health PT   Discharge Equipment Recommendations: walker, rolling   Barriers to discharge: None    Assessment:     Ming Harrington is a 53 y.o. male admitted with a medical diagnosis of <principal problem not specified>.  He presents with the following impairments/functional limitations:  weakness, impaired endurance, impaired self care skills, impaired cardiopulmonary response to activity.    -Pt tolerated session well today with improved functional independence and activity tolerance. Pt ambulated >300' in hallway using RW for support and SBA. Pt with no LOB during ambulation and t/f's, demonstrating good stability. Pt reported no SOB during upright activity and stated he would rather go home instead of IP Rehab. PT recommends HHPT upon d/c at this time with a RW for safety.    Rehab Prognosis: Good; patient would benefit from acute skilled PT services to address these deficits and reach maximum level of function.    Recent Surgery: Procedure(s) (LRB):  LAPAROSCOPY, DIAGNOSTIC (N/A)  EVACUATION, HEMATOMA  EXPLORATION, WOUND (N/A) 6 Days Post-Op    Plan:     During this hospitalization, patient to be seen 4 x/week to address the identified rehab impairments via gait training, therapeutic activities, therapeutic exercises and progress toward the following goals:    · Plan of Care Expires:  05/24/19    Subjective     Chief Complaint: impaired mobility  Patient/Family Comments/goals: return home to [facility]  Pain/Comfort:  · Pain Rating 1: 0/10      Objective:     Communicated with nsg prior to session.  Patient found supine with peripheral IV, RAZIA drain, telemetry(abdominal binder) upon PT entry to room.     General Precautions: Standard, fall   Orthopedic Precautions:N/A   Braces: N/A     Functional Mobility:  · Bed Mobility:     · Scooting: modified independence  · Supine to Sit: modified  independence  · Sit to Supine: modified independence  · Transfers:     · Sit to Stand:  supervision with rolling walker  · Bed to Chair: supervision with  rolling walker  using  Stand Pivot  · Gait: 300' using RW with SBA      AM-PAC 6 CLICK MOBILITY  Turning over in bed (including adjusting bedclothes, sheets and blankets)?: 4  Sitting down on and standing up from a chair with arms (e.g., wheelchair, bedside commode, etc.): 4  Moving from lying on back to sitting on the side of the bed?: 4  Moving to and from a bed to a chair (including a wheelchair)?: 4  Need to walk in hospital room?: 4  Climbing 3-5 steps with a railing?: 4  Basic Mobility Total Score: 24       Therapeutic Activities and Exercises:   -Pt educated on:  A. PT POC and role of PT  B. Importance of OOB activity to improve functional outcomes   C. DME mgmt and t/f sequencing  D. Performing HEP to reduce the risk of developing blood clots  E. Gait sequencing   F. D/c planning      Patient left supine with all lines intact, call button in reach and nsg notified..    GOALS:   Multidisciplinary Problems     Physical Therapy Goals     Not on file          Multidisciplinary Problems (Resolved)        Problem: Physical Therapy Goal    Goal Priority Disciplines Outcome Goal Variances Interventions   Physical Therapy Goal   (Resolved)     PT, PT/OT Outcome(s) achieved     Description:  Goals to be met by: 19    Patient will increase functional independence with mobility by performin. Supine to sit with Contact Guard Assistance -not met  2. Sit to stand transfer with Contact Guard Assistance with AD if needed. -not met  3. Gait  x 150 feet with Contact Guard Assistance using AD if needed.-not met   4. Ascend/descend 3 stair with bilateral Handrails Contact Guard Assistance -not met  5. Lower extremity exercise program x15 reps with supervision -not met                      Time Tracking:     PT Received On: 19  PT Start Time: 945     PT Stop  Time: 1010  PT Total Time (min): 25 min     Billable Minutes: Gait Training 15 and Therapeutic Activity 10    Treatment Type: Treatment  PT/PTA: PT     PTA Visit Number: 0     Vazquez Coleman, PT  04/30/2019

## 2019-04-30 NOTE — SUBJECTIVE & OBJECTIVE
Interval History: Emesis x 2 overnight and early this AM. Breathing is stable. HR is coming down. HDS. Incision looks good.     Medications:  Continuous Infusions:  Scheduled Meds:   acetaminophen  1,000 mg Oral Q8H    albuterol-ipratropium  3 mL Nebulization Q4H    ARIPiprazole  10 mg Oral BID    bisacodyl  10 mg Rectal Daily    clonazePAM  1 mg Oral BID    docusate sodium  100 mg Oral BID    fluticasone propionate  2 spray Each Nare Daily    fluticasone furoate-vilanterol  1 puff Inhalation Daily    furosemide  20 mg Oral BID    gabapentin  300 mg Oral TID    heparin (porcine)  5,000 Units Subcutaneous Q8H    levoFLOXacin  750 mg Oral Daily    methadone  90 mg Oral Daily    nicotine  1 patch Transdermal QAM    pantoprazole  40 mg Oral Daily    polyethylene glycol  17 g Oral Daily    predniSONE  40 mg Oral Daily    senna  8.6 mg Oral Daily    sodium chloride 0.9%  3 mL Intravenous Q8H    sodium chloride 3%  4 mL Nebulization Q8H WA    tiotropium  1 capsule Inhalation Daily     PRN Meds:sodium chloride, albuterol-ipratropium, dextrose 50%, glucagon (human recombinant), guaifenesin 100 mg/5 ml, HYDROmorphone, insulin aspart U-100, ondansetron, oxyCODONE, oxyCODONE, promethazine (PHENERGAN) IVPB, ramelteon, sodium chloride 0.9%     Review of patient's allergies indicates:   Allergen Reactions    Iodine and iodide containing products Anaphylaxis and Swelling     Facial swelling    Shellfish containing products Anaphylaxis             Compazine [prochlorperazine edisylate] Hallucinations     Objective:     Vital Signs (Most Recent):  Temp: 96.4 °F (35.8 °C) (04/30/19 1221)  Pulse: 82 (04/30/19 1221)  Resp: 18 (04/30/19 1221)  BP: 112/69 (04/30/19 1221)  SpO2: 98 % (04/30/19 1221) Vital Signs (24h Range):  Temp:  [96.4 °F (35.8 °C)-99 °F (37.2 °C)] 96.4 °F (35.8 °C)  Pulse:  [] 82  Resp:  [16-20] 18  SpO2:  [95 %-98 %] 98 %  BP: (112-132)/(65-77) 112/69     Weight: 107 kg (235 lb 14.3  oz)  Body mass index is 35.87 kg/m².    Intake/Output - Last 3 Shifts       04/28 0700 - 04/29 0659 04/29 0700 - 04/30 0659 04/30 0700 - 05/01 0659    P.O. 940 940     IV Piggyback       Total Intake(mL/kg) 940 (8.8) 940 (8.8)     Urine (mL/kg/hr) 3000 (1.2) 3300 (1.3)     Emesis/NG output  0     Drains 130 90     Total Output 3130 3390     Net -2190 -2450            Urine Occurrence  400 x     Emesis Occurrence  2 x           Physical Exam   Constitutional: He is oriented to person, place, and time. He appears well-developed and well-nourished. No distress.   Cardiovascular: Normal rate and regular rhythm.   Pulmonary/Chest: Effort normal. No respiratory distress. He has rales.   Brown sputum, stable   Abdominal: Soft. He exhibits no distension.   Appropriately tender to palpation  Drain in place with dark sero-sanguinous output  Incision is clean/dry/intact   Musculoskeletal: He exhibits no edema.   Neurological: He is alert and oriented to person, place, and time.   Skin: Skin is warm and dry. He is not diaphoretic.   Vitals reviewed.      Significant Labs:  CBC:   Recent Labs   Lab 04/30/19  0503   WBC 9.57   RBC 3.28*   HGB 9.9*   HCT 32.1*      MCV 98   MCH 30.2   MCHC 30.8*     CMP:   Recent Labs   Lab 04/24/19  0656  04/30/19  0503   GLU  --    < > 113*   CALCIUM  --    < > 9.2   ALBUMIN 1.8*  --   --    PROT 4.3*  --   --    NA  --    < > 139   K  --    < > 4.8   CO2  --    < > 31*   CL  --    < > 100   BUN  --    < > 11   CREATININE  --    < > 0.9   ALKPHOS 61  --   --    ALT 13  --   --    AST 19  --   --    BILITOT 0.6  --   --     < > = values in this interval not displayed.       Significant Diagnostics:  I have reviewed all pertinent imaging results/findings within the past 24 hours.

## 2019-04-30 NOTE — PLAN OF CARE
Problem: Physical Therapy Goal  Goal: Physical Therapy Goal  Goals to be met by: 19    Patient will increase functional independence with mobility by performin. Supine to sit with Contact Guard Assistance -not met  2. Sit to stand transfer with Contact Guard Assistance with AD if needed. -not met  3. Gait  x 150 feet with Contact Guard Assistance using AD if needed.-not met   4. Ascend/descend 3 stair with bilateral Handrails Contact Guard Assistance -not met  5. Lower extremity exercise program x15 reps with supervision -not met     Outcome: Outcome(s) achieved Date Met: 19  Pt tolerated session well today with improved functional independence and activity tolerance. Pt ambulated >300' in hallway using RW for support and SBA. Pt with no LOB during ambulation and t/f's, demonstrating good stability. Pt reported no SOB during upright activity and stated he would rather go home instead of IP Rehab. PT recommends HHPT upon d/c at this time with a RW for safety.

## 2019-04-30 NOTE — PT/OT/SLP PROGRESS
Occupational Therapy   Treatment    Name: Ming Harrington  MRN: [id number]  Admitting Diagnosis:  Ventral hernia repair   6 Days Post-Op    Recommendations:     Discharge Recommendations: home with home health      Assessment:     Ming Harrington is a 53 y.o. male . Performance deficits affecting function are weakness, impaired functional mobilty, gait instability, impaired endurance, impaired balance, impaired self care skills, decreased safety awareness.   Pt tolerated session fairly well this date. Pt cooperative and following directions; however, limited safety awareness noted. Medical chart indicates pt is refusing t/f to REHAB or SNF; thus, plans is for d/c home with fly assist. Pt would benefit from  services upon t/f home with 24 hour assist/supervision.     Rehab Prognosis:  Good; patient would benefit from acute skilled OT services to address these deficits and reach maximum level of function.       Plan:     Patient to be seen 4 x/week to address the above listed problems via self-care/home management, therapeutic activities, therapeutic exercises  · Plan of Care Expires: 05/09/19  · Plan of Care Reviewed with: patient    Subjective     Pain/Comfort:  · Pain Rating 1: 0/10    Objective:     Communicated with: nsg prior to session.  Upon first attempt, nsg reports pt agitated and not cooperative and requested OT to return. OT returned in one hour and pt found lying in bed calm with pleasant affect.     General Precautions: Standard, fall   Orthopedic Precautions:N/A       Occupational Performance:     Bed Mobility:    · Patient completed Supine to Sit with supervision     Functional Mobility/Transfers:  · Patient completed Sit <> Stand Transfer with stand by assistance  with  no assistive device       Activities of Daily Living:  · Feeding: independent; however, pt noted to have spilled coffee all over his bed  · G/H; seated with set-up  · UE dressing: MIN A         [facility] 6 Click ADL: 16    Treatment & Education:  Pt  completed 3 stands with SBA without AE. Pt able to ambulated in room with CGA without AD and t/f to chair. Pt moving quickly with decreased safety awareness in environment with several obstacles. Chair alarm in place once in chair.   Pt participated with basic ADL's as stated and education provided to pt re: safety with functional mobility/ADL skills and OT POC.     Patient left up in chair with all lines intact, call button in reach, chair alarm on and nsg notifiedEducation:      GOALS:   Multidisciplinary Problems     Occupational Therapy Goals        Problem: Occupational Therapy Goal    Goal Priority Disciplines Outcome Interventions   Occupational Therapy Goal     OT, PT/OT     Description:  Goals to be met by: 5/9/19     Patient will increase functional independence with ADLs by performing:    UE Dressing with Modified [place].  LE Dressing with Modified [place] and Assistive Devices as needed.  Grooming while standing at sink with Modified [place].  Toileting from bedside commode with Modified [place] for hygiene and clothing management.   Bathing from  edge of bed with Modified [place].  Toilet transfer to bedside commode with Modified [place].  Increased functional strength to WFL for B UE.  Upper extremity exercise program x15 reps per handout, with independence.                      Time Tracking:     OT Date of Treatment: 04/30/19  OT Start Time: 1000  OT Stop Time: 1030  OT Total Time (min): 30 min    Billable Minutes:Self Care/Home Management 25    JACK Thompson  4/30/2019

## 2019-05-01 VITALS
WEIGHT: 235.88 LBS | TEMPERATURE: 99 F | OXYGEN SATURATION: 96 % | HEART RATE: 90 BPM | HEIGHT: 68 IN | BODY MASS INDEX: 35.75 KG/M2 | DIASTOLIC BLOOD PRESSURE: 60 MMHG | SYSTOLIC BLOOD PRESSURE: 100 MMHG | RESPIRATION RATE: 18 BRPM

## 2019-05-01 LAB
ANION GAP SERPL CALC-SCNC: 5 MMOL/L (ref 8–16)
BASOPHILS # BLD AUTO: 0.04 K/UL (ref 0–0.2)
BASOPHILS NFR BLD: 0.4 % (ref 0–1.9)
BUN SERPL-MCNC: 16 MG/DL (ref 6–20)
CALCIUM SERPL-MCNC: 8.8 MG/DL (ref 8.7–10.5)
CHLORIDE SERPL-SCNC: 102 MMOL/L (ref 95–110)
CO2 SERPL-SCNC: 33 MMOL/L (ref 23–29)
CREAT SERPL-MCNC: 1 MG/DL (ref 0.5–1.4)
DIFFERENTIAL METHOD: ABNORMAL
EOSINOPHIL # BLD AUTO: 0 K/UL (ref 0–0.5)
EOSINOPHIL NFR BLD: 0.2 % (ref 0–8)
ERYTHROCYTE [DISTWIDTH] IN BLOOD BY AUTOMATED COUNT: 13.1 % (ref 11.5–14.5)
EST. GFR  (AFRICAN AMERICAN): >60 ML/MIN/1.73 M^2
EST. GFR  (NON AFRICAN AMERICAN): >60 ML/MIN/1.73 M^2
GLUCOSE SERPL-MCNC: 99 MG/DL (ref 70–110)
HCT VFR BLD AUTO: 26.7 % (ref 40–54)
HGB BLD-MCNC: 8.4 G/DL (ref 14–18)
IMM GRANULOCYTES # BLD AUTO: 0.45 K/UL (ref 0–0.04)
IMM GRANULOCYTES NFR BLD AUTO: 4.6 % (ref 0–0.5)
LYMPHOCYTES # BLD AUTO: 1.3 K/UL (ref 1–4.8)
LYMPHOCYTES NFR BLD: 13.7 % (ref 18–48)
MAGNESIUM SERPL-MCNC: 2.4 MG/DL (ref 1.6–2.6)
MCH RBC QN AUTO: 30.9 PG (ref 27–31)
MCHC RBC AUTO-ENTMCNC: 31.5 G/DL (ref 32–36)
MCV RBC AUTO: 98 FL (ref 82–98)
MONOCYTES # BLD AUTO: 0.7 K/UL (ref 0.3–1)
MONOCYTES NFR BLD: 7.3 % (ref 4–15)
NEUTROPHILS # BLD AUTO: 7.2 K/UL (ref 1.8–7.7)
NEUTROPHILS NFR BLD: 73.8 % (ref 38–73)
NRBC BLD-RTO: 0 /100 WBC
PHOSPHATE SERPL-MCNC: 3.2 MG/DL (ref 2.7–4.5)
PLATELET # BLD AUTO: 303 K/UL (ref 150–350)
PMV BLD AUTO: 10.7 FL (ref 9.2–12.9)
POCT GLUCOSE: 113 MG/DL (ref 70–110)
POCT GLUCOSE: 114 MG/DL (ref 70–110)
POCT GLUCOSE: 128 MG/DL (ref 70–110)
POCT GLUCOSE: 143 MG/DL (ref 70–110)
POTASSIUM SERPL-SCNC: 4.8 MMOL/L (ref 3.5–5.1)
RBC # BLD AUTO: 2.72 M/UL (ref 4.6–6.2)
SODIUM SERPL-SCNC: 140 MMOL/L (ref 136–145)
WBC # BLD AUTO: 9.77 K/UL (ref 3.9–12.7)

## 2019-05-01 PROCEDURE — 63600175 PHARM REV CODE 636 W HCPCS: Performed by: STUDENT IN AN ORGANIZED HEALTH CARE EDUCATION/TRAINING PROGRAM

## 2019-05-01 PROCEDURE — 25000003 PHARM REV CODE 250: Performed by: STUDENT IN AN ORGANIZED HEALTH CARE EDUCATION/TRAINING PROGRAM

## 2019-05-01 PROCEDURE — 80048 BASIC METABOLIC PNL TOTAL CA: CPT

## 2019-05-01 PROCEDURE — 84100 ASSAY OF PHOSPHORUS: CPT

## 2019-05-01 PROCEDURE — 94664 DEMO&/EVAL PT USE INHALER: CPT

## 2019-05-01 PROCEDURE — 25000003 PHARM REV CODE 250: Performed by: SURGERY

## 2019-05-01 PROCEDURE — 94640 AIRWAY INHALATION TREATMENT: CPT

## 2019-05-01 PROCEDURE — 83735 ASSAY OF MAGNESIUM: CPT

## 2019-05-01 PROCEDURE — 25000242 PHARM REV CODE 250 ALT 637 W/ HCPCS: Performed by: SURGERY

## 2019-05-01 PROCEDURE — 25000003 PHARM REV CODE 250: Performed by: ANESTHESIOLOGY

## 2019-05-01 PROCEDURE — 85025 COMPLETE CBC W/AUTO DIFF WBC: CPT

## 2019-05-01 PROCEDURE — 94761 N-INVAS EAR/PLS OXIMETRY MLT: CPT

## 2019-05-01 PROCEDURE — S4991 NICOTINE PATCH NONLEGEND: HCPCS | Performed by: STUDENT IN AN ORGANIZED HEALTH CARE EDUCATION/TRAINING PROGRAM

## 2019-05-01 PROCEDURE — 25000242 PHARM REV CODE 250 ALT 637 W/ HCPCS: Performed by: ANESTHESIOLOGY

## 2019-05-01 PROCEDURE — 99900035 HC TECH TIME PER 15 MIN (STAT)

## 2019-05-01 PROCEDURE — 63600175 PHARM REV CODE 636 W HCPCS: Performed by: ANESTHESIOLOGY

## 2019-05-01 PROCEDURE — A4216 STERILE WATER/SALINE, 10 ML: HCPCS | Performed by: STUDENT IN AN ORGANIZED HEALTH CARE EDUCATION/TRAINING PROGRAM

## 2019-05-01 PROCEDURE — 27000221 HC OXYGEN, UP TO 24 HOURS

## 2019-05-01 RX ORDER — OXYCODONE AND ACETAMINOPHEN 5; 325 MG/1; MG/1
1-2 TABLET ORAL EVERY 4 HOURS PRN
Qty: 45 TABLET | Refills: 0 | Status: SHIPPED | OUTPATIENT
Start: 2019-05-01 | End: 2019-05-14 | Stop reason: SDUPTHER

## 2019-05-01 RX ORDER — POLYETHYLENE GLYCOL 3350 17 G/17G
17 POWDER, FOR SOLUTION ORAL 2 TIMES DAILY
Qty: 510 G | Refills: 2 | Status: SHIPPED | OUTPATIENT
Start: 2019-05-01 | End: 2019-08-22

## 2019-05-01 RX ORDER — SYRING-NEEDL,DISP,INSUL,0.3 ML 29 G X1/2"
296 SYRINGE, EMPTY DISPOSABLE MISCELLANEOUS ONCE
Status: COMPLETED | OUTPATIENT
Start: 2019-05-01 | End: 2019-05-01

## 2019-05-01 RX ORDER — LEVOFLOXACIN 750 MG/1
750 TABLET ORAL DAILY
Qty: 4 TABLET | Refills: 0 | Status: SHIPPED | OUTPATIENT
Start: 2019-05-02 | End: 2019-05-20

## 2019-05-01 RX ORDER — PREDNISONE 20 MG/1
40 TABLET ORAL DAILY
Qty: 4 TABLET | Refills: 0 | Status: SHIPPED | OUTPATIENT
Start: 2019-05-02 | End: 2019-05-04

## 2019-05-01 RX ADMIN — IPRATROPIUM BROMIDE AND ALBUTEROL SULFATE 3 ML: .5; 3 SOLUTION RESPIRATORY (INHALATION) at 12:05

## 2019-05-01 RX ADMIN — ARIPIPRAZOLE 10 MG: 5 TABLET ORAL at 09:05

## 2019-05-01 RX ADMIN — IPRATROPIUM BROMIDE AND ALBUTEROL SULFATE 3 ML: .5; 3 SOLUTION RESPIRATORY (INHALATION) at 04:05

## 2019-05-01 RX ADMIN — POLYETHYLENE GLYCOL 3350 17 G: 17 POWDER, FOR SOLUTION ORAL at 09:05

## 2019-05-01 RX ADMIN — SODIUM CHLORIDE SOLN NEBU 3% 4 ML: 3 NEBU SOLN at 07:05

## 2019-05-01 RX ADMIN — Medication 3 ML: at 02:05

## 2019-05-01 RX ADMIN — SENNOSIDES 8.6 MG: 8.6 TABLET, FILM COATED ORAL at 09:05

## 2019-05-01 RX ADMIN — IPRATROPIUM BROMIDE AND ALBUTEROL SULFATE 3 ML: .5; 3 SOLUTION RESPIRATORY (INHALATION) at 03:05

## 2019-05-01 RX ADMIN — FUROSEMIDE 20 MG: 20 TABLET ORAL at 09:05

## 2019-05-01 RX ADMIN — ACETAMINOPHEN 1000 MG: 500 TABLET ORAL at 01:05

## 2019-05-01 RX ADMIN — OXYCODONE HYDROCHLORIDE 10 MG: 5 SOLUTION ORAL at 05:05

## 2019-05-01 RX ADMIN — PREDNISONE 40 MG: 20 TABLET ORAL at 09:05

## 2019-05-01 RX ADMIN — SODIUM CHLORIDE SOLN NEBU 3% 4 ML: 3 NEBU SOLN at 03:05

## 2019-05-01 RX ADMIN — OXYCODONE HYDROCHLORIDE 10 MG: 5 SOLUTION ORAL at 06:05

## 2019-05-01 RX ADMIN — MAGNESIUM CITRATE 296 ML: 1.75 LIQUID ORAL at 01:05

## 2019-05-01 RX ADMIN — IPRATROPIUM BROMIDE AND ALBUTEROL SULFATE 3 ML: .5; 3 SOLUTION RESPIRATORY (INHALATION) at 07:05

## 2019-05-01 RX ADMIN — HEPARIN SODIUM 5000 UNITS: 5000 INJECTION, SOLUTION INTRAVENOUS; SUBCUTANEOUS at 07:05

## 2019-05-01 RX ADMIN — IPRATROPIUM BROMIDE AND ALBUTEROL SULFATE 3 ML: .5; 3 SOLUTION RESPIRATORY (INHALATION) at 11:05

## 2019-05-01 RX ADMIN — PANTOPRAZOLE SODIUM 40 MG: 40 TABLET, DELAYED RELEASE ORAL at 09:05

## 2019-05-01 RX ADMIN — HEPARIN SODIUM 5000 UNITS: 5000 INJECTION, SOLUTION INTRAVENOUS; SUBCUTANEOUS at 01:05

## 2019-05-01 RX ADMIN — DOCUSATE SODIUM 100 MG: 100 CAPSULE, LIQUID FILLED ORAL at 09:05

## 2019-05-01 RX ADMIN — Medication 3 ML: at 06:05

## 2019-05-01 RX ADMIN — ACETAMINOPHEN 1000 MG: 500 TABLET ORAL at 06:05

## 2019-05-01 RX ADMIN — CLONAZEPAM 1 MG: 0.5 TABLET ORAL at 09:05

## 2019-05-01 RX ADMIN — Medication 1 PATCH: at 07:05

## 2019-05-01 RX ADMIN — BISACODYL 10 MG: 10 SUPPOSITORY RECTAL at 09:05

## 2019-05-01 RX ADMIN — GABAPENTIN 300 MG: 300 CAPSULE ORAL at 05:05

## 2019-05-01 RX ADMIN — LEVOFLOXACIN 750 MG: 750 TABLET, FILM COATED ORAL at 09:05

## 2019-05-01 RX ADMIN — METHADONE HYDROCHLORIDE 90 MG: 10 TABLET ORAL at 09:05

## 2019-05-01 RX ADMIN — GABAPENTIN 300 MG: 300 CAPSULE ORAL at 09:05

## 2019-05-01 NOTE — PROGRESS NOTES
I assume primary medical responsibility for this patient, I have reviewed the case history, findings, diagnosis and treatment plan with the resident and agree that the care is reasonable and necessary. This service has been performed by a resident without the presence of a teaching physician under the primary care exception.  See below addendum for my evaluation and additional findings.

## 2019-05-01 NOTE — SUBJECTIVE & OBJECTIVE
Interval History: Another episode of emesis overnight; not preceded by nausea. Possibly from coughing? His breathing is improved, on 2L NC, which is home rate. Incision looks good. Pain controlled. Dark SS output from drain, decreasing volume (140 --> 100).    Medications:  Continuous Infusions:  Scheduled Meds:   acetaminophen  1,000 mg Oral Q8H    albuterol-ipratropium  3 mL Nebulization Q4H    ARIPiprazole  10 mg Oral BID    bisacodyl  10 mg Rectal Daily    clonazePAM  1 mg Oral BID    docusate sodium  100 mg Oral BID    fluticasone propionate  2 spray Each Nare Daily    fluticasone furoate-vilanterol  1 puff Inhalation Daily    furosemide  20 mg Oral BID    gabapentin  300 mg Oral TID    heparin (porcine)  5,000 Units Subcutaneous Q8H    levoFLOXacin  750 mg Oral Daily    methadone  90 mg Oral Daily    nicotine  1 patch Transdermal QAM    pantoprazole  40 mg Oral Daily    polyethylene glycol  17 g Oral Daily    predniSONE  40 mg Oral Daily    senna  8.6 mg Oral Daily    sodium chloride 0.9%  3 mL Intravenous Q8H    sodium chloride 3%  4 mL Nebulization Q8H WA    tiotropium  1 capsule Inhalation Daily     PRN Meds:sodium chloride, albuterol-ipratropium, dextrose 50%, glucagon (human recombinant), guaifenesin 100 mg/5 ml, HYDROmorphone, insulin aspart U-100, ondansetron, oxyCODONE, oxyCODONE, promethazine (PHENERGAN) IVPB, ramelteon, sodium chloride 0.9%     Review of patient's allergies indicates:   Allergen Reactions    Iodine and iodide containing products Anaphylaxis and Swelling     Facial swelling    Shellfish containing products Anaphylaxis             Compazine [prochlorperazine edisylate] Hallucinations     Objective:     Vital Signs (Most Recent):  Temp: 97.7 °F (36.5 °C) (05/01/19 0806)  Pulse: 93 (05/01/19 0806)  Resp: 16 (05/01/19 0806)  BP: 113/75 (05/01/19 0806)  SpO2: 96 % (05/01/19 0806) Vital Signs (24h Range):  Temp:  [96.1 °F (35.6 °C)-97.7 °F (36.5 °C)] 97.7 °F (36.5  °C)  Pulse:  [] 93  Resp:  [16-20] 16  SpO2:  [92 %-98 %] 96 %  BP: (112-140)/(66-76) 113/75     Weight: 107 kg (235 lb 14.3 oz)  Body mass index is 35.87 kg/m².    Intake/Output - Last 3 Shifts       04/29 0700 - 04/30 0659 04/30 0700 - 05/01 0659 05/01 0700 - 05/02 0659    P.O. 940 1200     Total Intake(mL/kg) 940 (8.8) 1200 (11.2)     Urine (mL/kg/hr) 3300 (1.3) 400 (0.2)     Emesis/NG output 0 0     Drains 90 100     Stool  0     Total Output 3390 500     Net -2450 +700            Urine Occurrence 400 x 1875 x     Emesis Occurrence 2 x            Physical Exam   Constitutional: He is oriented to person, place, and time. He appears well-developed and well-nourished. No distress.   Cardiovascular: Normal rate and regular rhythm.   Pulmonary/Chest: Effort normal. No respiratory distress. He has rales.   Brown sputum, stable   Abdominal: Soft. He exhibits no distension.   Appropriately tender to palpation  Drain in place with dark sero-sanguinous output  Incision is clean/dry/intact   Musculoskeletal: He exhibits no edema.   Neurological: He is alert and oriented to person, place, and time.   Skin: Skin is warm and dry. He is not diaphoretic.   Vitals reviewed.      Significant Labs:  CBC:   Recent Labs   Lab 05/01/19  0416   WBC 9.77   RBC 2.72*   HGB 8.4*   HCT 26.7*      MCV 98   MCH 30.9   MCHC 31.5*     CMP:   Recent Labs   Lab 05/01/19  0416   GLU 99   CALCIUM 8.8      K 4.8   CO2 33*      BUN 16   CREATININE 1.0       Significant Diagnostics:  I have reviewed all pertinent imaging results/findings within the past 24 hours.

## 2019-05-01 NOTE — PROGRESS NOTES
"[facility]  General Surgery  Progress Note    Subjective:     History of Present Illness:  Patient is a 53 y.o. male presents with essential hypertension, pulmonary htn, BMI 37.25, COPD on home o2, hep C, not smoking (quit 3 months ago) and recurrent large umbilical hernia (times 2).  Per Dr. Wray's note 4/2017 he is above average risk for surgery.       Generally, he takes his advair daily, and only needs albuterol "when the weather changes," which is about 3 times a month.  Says he ambulated a quarter of a mile the other day without stopping.  Can ambulate 1 flight of stairs without stopping.      Pain is to the hernia site, and radiating inferiorly to his groin.  It is daily, and worse in the mornings, and exacerbated by laying on his side.    To OR for open incisional hernia repair with mesh        Post-Op Info:  Procedure(s) (LRB):  LAPAROSCOPY, DIAGNOSTIC (N/A)  EVACUATION, HEMATOMA  EXPLORATION, WOUND (N/A)   7 Days Post-Op     Interval History: Another episode of emesis overnight; not preceded by nausea. Possibly from coughing? His breathing is improved, on 2L NC, which is home rate. Incision looks good. Pain controlled. Dark SS output from drain, decreasing volume (140 --> 100).    Had small BM. Passing gas    Medications:  Continuous Infusions:  Scheduled Meds:   acetaminophen  1,000 mg Oral Q8H    albuterol-ipratropium  3 mL Nebulization Q4H    ARIPiprazole  10 mg Oral BID    bisacodyl  10 mg Rectal Daily    clonazePAM  1 mg Oral BID    docusate sodium  100 mg Oral BID    fluticasone propionate  2 spray Each Nare Daily    fluticasone furoate-vilanterol  1 puff Inhalation Daily    furosemide  20 mg Oral BID    gabapentin  300 mg Oral TID    heparin (porcine)  5,000 Units Subcutaneous Q8H    levoFLOXacin  750 mg Oral Daily    methadone  90 mg Oral Daily    nicotine  1 patch Transdermal QAM    pantoprazole  40 mg Oral Daily    polyethylene glycol  17 g Oral Daily    " predniSONE  40 mg Oral Daily    senna  8.6 mg Oral Daily    sodium chloride 0.9%  3 mL Intravenous Q8H    sodium chloride 3%  4 mL Nebulization Q8H WA    tiotropium  1 capsule Inhalation Daily     PRN Meds:sodium chloride, albuterol-ipratropium, dextrose 50%, glucagon (human recombinant), guaifenesin 100 mg/5 ml, HYDROmorphone, insulin aspart U-100, ondansetron, oxyCODONE, oxyCODONE, promethazine (PHENERGAN) IVPB, ramelteon, sodium chloride 0.9%     Review of patient's allergies indicates:   Allergen Reactions    Iodine and iodide containing products Anaphylaxis and Swelling     Facial swelling    Shellfish containing products Anaphylaxis             Compazine [prochlorperazine edisylate] Hallucinations     Objective:     Vital Signs (Most Recent):  Temp: 97.7 °F (36.5 °C) (05/01/19 0806)  Pulse: 93 (05/01/19 0806)  Resp: 16 (05/01/19 0806)  BP: 113/75 (05/01/19 0806)  SpO2: 96 % (05/01/19 0806) Vital Signs (24h Range):  Temp:  [96.1 °F (35.6 °C)-97.7 °F (36.5 °C)] 97.7 °F (36.5 °C)  Pulse:  [] 93  Resp:  [16-20] 16  SpO2:  [92 %-98 %] 96 %  BP: (112-140)/(66-76) 113/75     Weight: 107 kg (235 lb 14.3 oz)  Body mass index is 35.87 kg/m².    Intake/Output - Last 3 Shifts       04/29 0700 - 04/30 0659 04/30 0700 - 05/01 0659 05/01 0700 - 05/02 0659    P.O. 940 1200     Total Intake(mL/kg) 940 (8.8) 1200 (11.2)     Urine (mL/kg/hr) 3300 (1.3) 400 (0.2)     Emesis/NG output 0 0     Drains 90 100     Stool  0     Total Output 3390 500     Net -2450 +700            Urine Occurrence 400 x 1875 x     Emesis Occurrence 2 x            Physical Exam   Constitutional: He is oriented to person, place, and time. He appears well-developed and well-nourished. No distress.   Cardiovascular: Normal rate and regular rhythm.   Pulmonary/Chest: Effort normal. No respiratory distress. He has rales.   Brown sputum, stable   Abdominal: Soft. He exhibits no distension.   Appropriately tender to palpation  Drain in place with  dark sero-sanguinous output  Incision is clean/dry/intact   Musculoskeletal: He exhibits no edema.   Neurological: He is alert and oriented to person, place, and time.   Skin: Skin is warm and dry. He is not diaphoretic.   Vitals reviewed.      Significant Labs:  CBC:   Recent Labs   Lab 05/01/19 0416   WBC 9.77   RBC 2.72*   HGB 8.4*   HCT 26.7*      MCV 98   MCH 30.9   MCHC 31.5*     CMP:   Recent Labs   Lab 05/01/19 0416   GLU 99   CALCIUM 8.8      K 4.8   CO2 33*      BUN 16   CREATININE 1.0       Significant Diagnostics:  I have reviewed all pertinent imaging results/findings within the past 24 hours.    Assessment/Plan:     Ventral hernia  Mr. Harrington is a 54 yo man who is s/p ventral hernia repair on 4/22/2019. Meets GOLD criteria for moderate to severe COPD exacerbation. On levaquin and prednisone. Improving resp status.    COPD  - fever work-up: blood cx, sputum cx with GNR and GPC, UA, CXR  - resp toilet, restarted home resp meds  - levaquin and prednisone for COPD exacerbation; he is high risk for pseudomonas  - baseline EKG and follow-up tomorrow after 24 hrs  - replaced Mg, K and Ca are replete  - discharge with levaquin and prednisone    S/p incisional hernia repair  - multimodal pain control  - regular diet  - pantoprazole  - sliding scale insulin  - keep drain  - Ambulate in hallway TID    Dispo: Likely home today; needs note for methadone clinic        ESTIVEN Jacob MD  General Surgery  [facility]

## 2019-05-01 NOTE — DISCHARGE SUMMARY
"[facility]  General Surgery  Discharge Summary      Patient Name: Ming Harrington  MRN: [id number]  Admission Date: 4/22/2019  Hospital Length of Stay: 8 days  Discharge Date and Time: 5/1/2019  5:29 PM  Attending Physician: No att. providers found   Discharging Provider: OBEY Samano Jr., MD  Primary Care Provider: Vazquez Barajas MD     HPI: Patient is a 53 y.o. male presents with essential hypertension, pulmonary htn, BMI 37.25, COPD on home o2, hep C, not smoking (quit 3 months ago) and recurrent large umbilical hernia (times 2).  Per Dr. Wray's note 4/2017 he is above average risk for surgery.       Generally, he takes his advair daily, and only needs albuterol "when the weather changes," which is about 3 times a month.  Says he ambulated a quarter of a mile the other day without stopping.  Can ambulate 1 flight of stairs without stopping.      Pain is to the hernia site, and radiating inferiorly to his groin.  It is daily, and worse in the mornings, and exacerbated by laying on his side.     To OR for open incisional hernia repair with mesh    Procedure(s) (LRB):  LAPAROSCOPY, DIAGNOSTIC (N/A)  EVACUATION, HEMATOMA  EXPLORATION, WOUND (N/A)     Hospital Course: Patient was taken to the OR for incisional hernia repair.  On the evening of POD 1, he became hypotensive.  CBC revealed blood loss anemia.  He was transfused and taken to the operating room for re-exploration, and found to have a hematoma in the subcutaneous space.  This was evacuated, and the bleeding stopped.    He was then monitored in the ICU, where no further evidence of bleeding was noted.  He was extubated the following day.  He did also suffer from a COPD exacerbation.  This improved with pulmonary toilet, diuresis, PO levaquin, and steroids.       Pt was started on a surgically progressive diet, which he tolerated well.  At this time, his pain is controlled with oral pain medication, he is ambulating well, and is able to " "urinate on his own.  Patient stable for discharge- he will require f/u in one week to evaluate RAZIA drain for removal, and will complete a short course of levaquin and steroids at home.    He has been written for post operative pain medication (percocet)    Consults:   Consults (From admission, onward)        Status Ordering Provider     Inpatient consult to Midline team  Once     Provider:  (Not yet assigned)    Completed KIANA MARTINEZ     Inpatient consult to Pulmonology  Once     Provider:  (Not yet assigned)    Completed KIANA MARTINEZ     IP consult to case management  Once     Provider:  (Not yet assigned)    Acknowledged KENYON CHAWLA        Pending Diagnostic Studies:     None        Final Active Diagnoses:    Diagnosis Date Noted POA    PRINCIPAL PROBLEM:  Ventral hernia [K43.9] 04/22/2019 Yes    Acute blood loss anemia [D62]  No    Hypotension [I95.9]  No    COPD exacerbation [J44.1] 07/21/2017 Yes      Problems Resolved During this Admission:      Discharged Condition: good    Disposition: Home or Self Care    Follow Up:  Follow-up Information     Kenyon Chawla MD On 5/7/2019.    Specialties:  General Surgery, Bariatrics  Why:  Post-op Appt 5/7/19 at 08:45 AM.  RAZIA drain eval  Contact information:  [address]  [facility] [address]  [phone]             [facility]    Specialty:  DME Provider  Why:  Home DME: Rolling walker to be delivered to hospital room.   Contact information:  [address]  [facility]  [facility] [id number] [phone]             Home O2, portable O2 tanks.    Why:  Oxygen-Already on at home. Home O2 serviced;home portable tanks refilled & are at pt bedside.                Patient Instructions:      WALKER FOR HOME USE     Order Specific Question Answer Comments   Type of Walker: Adult (5'4"-6'6")    With wheels? Yes    Height: 5' 8" (1.727 m)    Weight: 107 kg (235 lb 14.3 oz)    Length of need (1-99 months): 99    Does patient have medical " equipment at home? oxygen    Please check all that apply: Patient's condition impairs ambulation.    Please check all that apply: Patient is unable to safely ambulate without equipment.      Lifting restrictions   Order Comments: Do not lift anything greater than 10-15 lbs for 6 weeks     Notify your health care provider if you experience any of the following:  increased confusion or weakness     Notify your health care provider if you experience any of the following:  persistent dizziness, light-headedness, or visual disturbances     Notify your health care provider if you experience any of the following:  worsening rash     Notify your health care provider if you experience any of the following:  severe persistent headache     Notify your health care provider if you experience any of the following:  difficulty breathing or increased cough     Notify your health care provider if you experience any of the following:  redness, tenderness, or signs of infection (pain, swelling, redness, odor or green/yellow discharge around incision site)     Notify your health care provider if you experience any of the following:  severe uncontrolled pain     Notify your health care provider if you experience any of the following:  persistent nausea and vomiting or diarrhea     Notify your health care provider if you experience any of the following:  temperature >100.4     No dressing needed     Activity as tolerated   Order Comments: Please wear abdominal binder as much as possible.  Okay to take it off for bathing or washing the binder (can put it in washing machine).    May shower.  Wash gently with warm soap and water.  Do not bathe or soak the incision for two weeks.  Dermabond (surgical glue) will peel off on its own after a couple of weeks.    You may need an over the counter stool softener, such as Miralax, for constipation caused by the pain medication.  We recommend titrating this to 1-2 BM every 1-2 days.      Medications:  Reconciled Home Medications:      Medication List      START taking these medications    levoFLOXacin 750 MG tablet  Commonly known as:  LEVAQUIN  Take 1 tablet (750 mg total) by mouth once daily for 4 days  Start taking on:  5/2/2019     oxyCODONE-acetaminophen 5-325 mg per tablet  Commonly known as:  PERCOCET  Take 1-2 tablets by mouth every 4 (four) hours as needed for Pain.     polyethylene glycol 17 gram/dose powder  Commonly known as:  GLYCOLAX  Mix 17 g by mouth 2 (two) times daily.     predniSONE 20 MG tablet  Commonly known as:  DELTASONE  Take 2 tablets (40 mg total) by mouth once daily for 2 days  Start taking on:  5/2/2019        CHANGE how you take these medications    albuterol-ipratropium 2.5 mg-0.5 mg/3 mL nebulizer solution  Commonly known as:  DUO-NEB  Take 3 mLs by nebulization every 4 (four) hours. Rescue  What changed:    · when to take this  · reasons to take this  · additional instructions     fluticasone furoate-vilanterol 100-25 mcg/dose diskus inhaler  Commonly known as:  BREO  Inhale 1 puff into the lungs once daily. Controller  What changed:    · when to take this  · additional instructions     furosemide 40 MG tablet  Commonly known as:  LASIX  Take 1 tablet (40 mg total) by mouth once daily.  What changed:    · how much to take  · when to take this  · additional instructions     nicotine 14 mg/24 hr  Commonly known as:  NICODERM CQ  Place 1 patch onto the skin once daily.  What changed:  when to take this     tiotropium bromide 2.5 mcg/actuation Mist  Commonly known as:  SPIRIVA RESPIMAT  Inhale 1 each into the lungs once daily. Controller  What changed:    · when to take this  · additional instructions     * VENTOLIN HFA 90 mcg/actuation inhaler  Generic drug:  albuterol  Inhale 1-2 puffs into the lungs every 6 (six) hours as needed for Wheezing or Shortness of Breath. Rescue  What changed:  Another medication with the same name was changed. Make sure you understand  how and when to take each.     * albuterol 2.5 mg /3 mL (0.083 %) nebulizer solution  Commonly known as:  PROVENTIL  take 3 mLs Nebulization Every 4 hours, Rescue  What changed:    · when to take this  · reasons to take this         * This list has 2 medication(s) that are the same as other medications prescribed for you. Read the directions carefully, and ask your doctor or other care provider to review them with you.            CONTINUE taking these medications    ADVAIR DISKUS 250-50 mcg/dose diskus inhaler  Generic drug:  fluticasone-salmeterol 250-50 mcg/dose  Inhale 1 puff into the lungs 2 (two) times daily.     ARIPiprazole 10 MG Tab  Commonly known as:  ABILIFY  Take 10 mg by mouth 2 (two) times daily.     clonazePAM 1 MG tablet  Commonly known as:  KLONOPIN  Take 2 mg by mouth 3 (three) times daily. Takes two 1 mg tablets (2 mg) three times daily     cloNIDine 0.1 MG tablet  Commonly known as:  CATAPRES  Take 3 tablets (0.3 mg total) by mouth 3 (three) times daily.     escitalopram oxalate 10 MG tablet  Commonly known as:  LEXAPRO  Take 1 tablet (10 mg total) by mouth once daily.     * ipratropium 0.02 % nebulizer solution  Commonly known as:  ATROVENT  Take 500 mcg by nebulization 4 (four) times daily as needed. Rescue     * ATROVENT HFA 17 mcg/actuation inhaler  Generic drug:  ipratropium  Inhale 2 puffs into the lungs every 4 to 6 hours as needed.     metFORMIN 500 MG tablet  Commonly known as:  GLUCOPHAGE  Take 500 mg by mouth daily with breakfast.     methadone 5 MG tablet  Commonly known as:  DOLOPHINE  Take 90 mg by mouth every morning.     promethazine 25 MG tablet  Commonly known as:  PHENERGAN  Take 1 tablet (25 mg total) by mouth every 8 (eight) hours as needed for Nausea.     senna-docusate 8.6-50 mg 8.6-50 mg per tablet  Commonly known as:  PERICOLACE  Take 1 tablet by mouth 2 (two) times daily.         * This list has 2 medication(s) that are the same as other medications prescribed for you.  Read the directions carefully, and ask your doctor or other care provider to review them with you.                OBEY Samano Jr., MD  General Surgery  [facility]

## 2019-05-01 NOTE — ASSESSMENT & PLAN NOTE
Mr. Harrington is a 52 yo man who is s/p ventral hernia repair on 4/22/2019. Meets GOLD criteria for moderate to severe COPD exacerbation. On levaquin and prednisone. Improving resp status.    COPD  - fever work-up: blood cx, sputum cx with GNR and GPC, UA, CXR  - resp toilet, restarted home resp meds  - levaquin and prednisone for COPD exacerbation; he is high risk for pseudomonas  - baseline EKG and follow-up tomorrow after 24 hrs  - replaced Mg, K and Ca are replete  - discharge with levaquin and prednisone    S/p incisional hernia repair  - multimodal pain control  - regular diet  - pantoprazole  - sliding scale insulin  - keep drain  - Ambulate in hallway TID    Dispo: Likely home today; needs note for methadone clinic

## 2019-05-02 ENCOUNTER — PATIENT OUTREACH (OUTPATIENT)
Dept: ADMINISTRATIVE | Facility: CLINIC | Age: 54
End: 2019-05-02

## 2019-05-02 LAB
BACTERIA SPEC AEROBE CULT: NORMAL
GRAM STN SPEC: NORMAL

## 2019-05-02 NOTE — PROCEDURES
Procedures Fibroscan Procedure     Name: Ming Harrington  Date of Procedure : 2019   :: Livia Rebollar MD  Diagnosis: HCV    Probe: XL    Fibroscan readin.9 KPa    Fibrosis:F2     CAP readin dB/m    Steatosis: :<S1

## 2019-05-02 NOTE — PATIENT INSTRUCTIONS
After Laparoscopic Hernia Repair  You had a procedure called laparoscopic hernia repair. A hernia is a defect in the tough tissue covering the musculature of the abdominal wall (fascia). During laparoscopic hernia surgery, a surgeon inserts a telescope attached to a camera as well as surgical instruments through tiny incisions in your abdomen. The surgeon repairs the hernia with a mesh, which patches the tear or weakness in the fascia.  Home care  · Note that your shoulder may feel tight or your neck may be stiff for 24 to 48 hours after your surgery. This is common and usually lasts a short time. You may also have numbness around the incision area.  · Keep doing the coughing and deep breathing exercises that you learned in the hospital. These will help to prevent lung infection.  · Prevent constipation so you dont strain when going to the bathroom. Eat fruits, vegetables, and whole grains. Drink 6 to 8 glasses of water a day, unless otherwise directed. Use a laxative or a mild stool softener if your healthcare provider says its OK.  · Wash your incision with mild soap and water. Pat it dry. Dont use oil, powder, or lotion on your incision.  · Shower or take baths as instructed by your healthcare provider. Instructions will vary based on how your incision was closed and how its healing. It may be closed with glue, sutures, or staples. Your healthcare provider may have different advice for each kind.  Activity  · Ask others to help with chores and errands while you recover.  · Dont lift anything heavier than 10 pounds until your healthcare provider says its OK.  · Dont mow the lawn, use a vacuum , or do other strenuous activities until your healthcare provider says it's OK.  · Climb stairs slowly and pause after every few steps.  · Walk as often as you feel able.  · Ask your healthcare provider when you can drive again. This may be when you stop taking pain medicine and can move comfortably from side  to side. Dont drive if you are still taking opioid pain medicine.  When to call your healthcare provider   Call your healthcare provider right away if you have any of the following:  · Pain, bleeding, redness, or fluid at the incision site that gets worse  · Fever of 100.4°F (38°C) or higher, or as directed by your healthcare provider  · Vomiting or nausea that doesnt go away  · Inability to urinate  · No bowel movement after 3 days  · Swelling in abdomen or groin that gets worse  · Pain thats not relieved by medicine   Date Last Reviewed: 10/1/2016  © 7042-0009 [employer]. [address] [place] [address], [place] [address]. All rights reserved. This information is not intended as a substitute for professional medical care. Always follow your healthcare professional's instructions.

## 2019-05-04 LAB
BACTERIA BLD CULT: NORMAL
BACTERIA BLD CULT: NORMAL

## 2019-05-06 ENCOUNTER — TELEPHONE (OUTPATIENT)
Dept: SURGERY | Facility: CLINIC | Age: 54
End: 2019-05-06

## 2019-05-06 NOTE — PT/OT/SLP DISCHARGE
Occupational Therapy Discharge Summary    Ming Harrington  MRN: [id number]   Principal Problem: Ventral hernia      Patient Discharged from acute Occupational Therapy on 5/6/2019  .  Please refer to prior OT note dated 4/30/19 for functional status.    Assessment:      Patient appropriate for care in another setting.    Objective:     GOALS:   Multidisciplinary Problems     Occupational Therapy Goals     Not on file          Multidisciplinary Problems (Resolved)        Problem: Occupational Therapy Goal    Goal Priority Disciplines Outcome Interventions   Occupational Therapy Goal   (Resolved)     OT, PT/OT Outcome(s) achieved    Description:  Goals to be met by: 5/9/19     Patient will increase functional independence with ADLs by performing:    UE Dressing with Modified [place].  LE Dressing with Modified [place] and Assistive Devices as needed.  Grooming while standing at sink with Modified [place].  Toileting from bedside commode with Modified [place] for hygiene and clothing management.   Bathing from  edge of bed with Modified [place].  Toilet transfer to bedside commode with Modified [place].  Increased functional strength to WFL for B UE.  Upper extremity exercise program x15 reps per handout, with independence.                  Pt progressing toward stated goals, but goals not yet achieved upon hospital d/c.     Reasons for Discontinuation of Therapy Services  Transfer to alternate level of care.      Plan:     Patient Discharged to: Home with Home Health Service    JACK Thompson  5/6/2019

## 2019-05-06 NOTE — TELEPHONE ENCOUNTER
Returned a call to [facility] which was a transportation service was unable to speak with anyone. Called pt as well, no answer. Patient has a 2 week p/o appt from surgery that is important for him to be here.

## 2019-05-06 NOTE — TELEPHONE ENCOUNTER
----- Message from Kishore Terrell sent at 5/6/2019  9:54 AM CDT -----  Contact: [facility]  Needs Advice    Reason for call: [facility] calling to see if pts appt tomorrow was urgent. Caller states they need to schedule the pt for an appt for tomorrow but if this appt is mandatory [facility] will wait. please contact [facility] regarding this matter.    Communication Preference: [phone]    Additional Information:

## 2019-05-07 ENCOUNTER — OFFICE VISIT (OUTPATIENT)
Dept: SURGERY | Facility: CLINIC | Age: 54
End: 2019-05-07
Payer: MEDICAID

## 2019-05-07 VITALS
HEART RATE: 86 BPM | WEIGHT: 244 LBS | HEIGHT: 68 IN | BODY MASS INDEX: 36.98 KG/M2 | DIASTOLIC BLOOD PRESSURE: 90 MMHG | TEMPERATURE: 99 F | SYSTOLIC BLOOD PRESSURE: 149 MMHG

## 2019-05-07 DIAGNOSIS — Z87.19 HISTORY OF VENTRAL HERNIA REPAIR: Primary | ICD-10-CM

## 2019-05-07 DIAGNOSIS — Z98.890 HISTORY OF VENTRAL HERNIA REPAIR: Primary | ICD-10-CM

## 2019-05-07 PROCEDURE — 99024 PR POST-OP FOLLOW-UP VISIT: ICD-10-PCS | Mod: ,,, | Performed by: SURGERY

## 2019-05-07 PROCEDURE — 99024 POSTOP FOLLOW-UP VISIT: CPT | Mod: ,,, | Performed by: SURGERY

## 2019-05-07 PROCEDURE — 99999 PR PBB SHADOW E&M-EST. PATIENT-LVL III: ICD-10-PCS | Mod: PBBFAC,,, | Performed by: SURGERY

## 2019-05-07 PROCEDURE — 99999 PR PBB SHADOW E&M-EST. PATIENT-LVL III: CPT | Mod: PBBFAC,,, | Performed by: SURGERY

## 2019-05-07 PROCEDURE — 99213 OFFICE O/P EST LOW 20 MIN: CPT | Mod: PBBFAC | Performed by: SURGERY

## 2019-05-07 RX ORDER — ONDANSETRON 4 MG/1
4 TABLET, FILM COATED ORAL EVERY 6 HOURS PRN
Qty: 30 TABLET | Refills: 0 | Status: SHIPPED | OUTPATIENT
Start: 2019-05-07 | End: 2019-08-02 | Stop reason: CLARIF

## 2019-05-07 NOTE — LETTER
[facility] - General Surgery  [address]  [facility] [address]  Phone: [phone] May 7, 2019      Vazquez Barajas MD  [address] [place] [id number]    Patient: Ming Harrington   MR Number: [id number]   YOB: 1965   Date of Visit: 5/7/2019     Dear Dr. Barajas:    Thank you for referring Ming Harrington to me for evaluation. Attached you will find relevant portions of my assessment and plan of care.    Mr. Harrington is doing well after surgery with expected pain and ecchymosis.  Drain removed.      The patient is to return to the clinic in one week.  He is to continue light duty restrictions for one month.    If you have questions, please do not hesitate to call me. I look forward to following Ming Harrington along with you.    Sincerely,      Kenyon Chawla MD  Professor, [employer]  Section Head, General Surgery  [facility]    WSR/afw
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
## 2019-05-14 ENCOUNTER — OFFICE VISIT (OUTPATIENT)
Dept: SURGERY | Facility: CLINIC | Age: 54
End: 2019-05-14
Payer: MEDICAID

## 2019-05-14 VITALS
HEART RATE: 87 BPM | WEIGHT: 237.63 LBS | BODY MASS INDEX: 36.02 KG/M2 | SYSTOLIC BLOOD PRESSURE: 136 MMHG | DIASTOLIC BLOOD PRESSURE: 77 MMHG | HEIGHT: 68 IN

## 2019-05-14 DIAGNOSIS — R10.84 GENERALIZED ABDOMINAL PAIN: ICD-10-CM

## 2019-05-14 DIAGNOSIS — K43.9 VENTRAL HERNIA WITHOUT OBSTRUCTION OR GANGRENE: ICD-10-CM

## 2019-05-14 DIAGNOSIS — Z09 POSTOP CHECK: Primary | ICD-10-CM

## 2019-05-14 PROCEDURE — 99024 PR POST-OP FOLLOW-UP VISIT: ICD-10-PCS | Mod: ,,, | Performed by: SURGERY

## 2019-05-14 PROCEDURE — 99999 PR PBB SHADOW E&M-EST. PATIENT-LVL III: ICD-10-PCS | Mod: PBBFAC,,, | Performed by: SURGERY

## 2019-05-14 PROCEDURE — 99213 OFFICE O/P EST LOW 20 MIN: CPT | Mod: PBBFAC | Performed by: SURGERY

## 2019-05-14 PROCEDURE — 99024 POSTOP FOLLOW-UP VISIT: CPT | Mod: ,,, | Performed by: SURGERY

## 2019-05-14 PROCEDURE — 99999 PR PBB SHADOW E&M-EST. PATIENT-LVL III: CPT | Mod: PBBFAC,,, | Performed by: SURGERY

## 2019-05-14 RX ORDER — MONTELUKAST SODIUM 10 MG/1
10 TABLET ORAL NIGHTLY
COMMUNITY
Start: 2019-04-15 | End: 2019-10-29 | Stop reason: SDUPTHER

## 2019-05-14 RX ORDER — OXYCODONE AND ACETAMINOPHEN 5; 325 MG/1; MG/1
1-2 TABLET ORAL EVERY 4 HOURS PRN
Qty: 10 TABLET | Refills: 0 | Status: SHIPPED | OUTPATIENT
Start: 2019-05-14 | End: 2019-08-02 | Stop reason: CLARIF

## 2019-05-14 RX ORDER — ARIPIPRAZOLE 15 MG/1
TABLET ORAL
COMMUNITY
Start: 2019-03-29 | End: 2019-05-23

## 2019-05-17 ENCOUNTER — HOSPITAL ENCOUNTER (OUTPATIENT)
Dept: RADIOLOGY | Facility: HOSPITAL | Age: 54
Discharge: HOME | End: 2019-05-17
Attending: SURGERY
Payer: MEDICAID

## 2019-05-17 ENCOUNTER — TELEPHONE (OUTPATIENT)
Dept: INTERVENTIONAL RADIOLOGY/VASCULAR | Facility: HOSPITAL | Age: 54
End: 2019-05-17

## 2019-05-17 DIAGNOSIS — R10.84 GENERALIZED ABDOMINAL PAIN: ICD-10-CM

## 2019-05-17 DIAGNOSIS — L02.91 ABSCESS: Primary | ICD-10-CM

## 2019-05-17 PROCEDURE — 74176 CT ABD & PELVIS W/O CONTRAST: CPT | Mod: TC

## 2019-05-17 PROCEDURE — 74176 CT ABDOMEN PELVIS WITHOUT CONTRAST: ICD-10-PCS | Mod: 26,,, | Performed by: RADIOLOGY

## 2019-05-17 PROCEDURE — 74176 CT ABD & PELVIS W/O CONTRAST: CPT | Mod: 26,,, | Performed by: RADIOLOGY

## 2019-05-17 RX ORDER — SULFAMETHOXAZOLE AND TRIMETHOPRIM 800; 160 MG/1; MG/1
1 TABLET ORAL 2 TIMES DAILY
Qty: 20 TABLET | Refills: 0 | Status: SHIPPED | OUTPATIENT
Start: 2019-05-17 | End: 2019-05-23

## 2019-05-19 RX ORDER — MIDAZOLAM HYDROCHLORIDE 1 MG/ML
1 INJECTION INTRAMUSCULAR; INTRAVENOUS
Status: CANCELLED | OUTPATIENT
Start: 2019-05-19

## 2019-05-19 RX ORDER — FENTANYL CITRATE 50 UG/ML
50 INJECTION, SOLUTION INTRAMUSCULAR; INTRAVENOUS
Status: CANCELLED | OUTPATIENT
Start: 2019-05-19

## 2019-05-20 ENCOUNTER — HOSPITAL ENCOUNTER (OUTPATIENT)
Facility: HOSPITAL | Age: 54
Discharge: HOME | End: 2019-05-20
Attending: SURGERY | Admitting: SURGERY
Payer: MEDICAID

## 2019-05-20 VITALS
BODY MASS INDEX: 35.92 KG/M2 | WEIGHT: 237 LBS | HEART RATE: 76 BPM | TEMPERATURE: 98 F | SYSTOLIC BLOOD PRESSURE: 128 MMHG | RESPIRATION RATE: 16 BRPM | HEIGHT: 68 IN | DIASTOLIC BLOOD PRESSURE: 87 MMHG | OXYGEN SATURATION: 95 %

## 2019-05-20 DIAGNOSIS — L02.91 ABSCESS: ICD-10-CM

## 2019-05-20 LAB
APPEARANCE FLD: NORMAL
BODY FLD TYPE: NORMAL
COLOR FLD: YELLOW
GRAM STN SPEC: NORMAL
GRAM STN SPEC: NORMAL
LYMPHOCYTES NFR FLD MANUAL: 5 %
MONOS+MACROS NFR FLD MANUAL: 21 %
NEUTROPHILS NFR FLD MANUAL: 74 %
POCT GLUCOSE: 102 MG/DL (ref 70–110)
WBC # FLD: 476 /CU MM

## 2019-05-20 PROCEDURE — 87102 FUNGUS ISOLATION CULTURE: CPT

## 2019-05-20 PROCEDURE — 87205 SMEAR GRAM STAIN: CPT

## 2019-05-20 PROCEDURE — 87206 SMEAR FLUORESCENT/ACID STAI: CPT

## 2019-05-20 PROCEDURE — 87116 MYCOBACTERIA CULTURE: CPT

## 2019-05-20 PROCEDURE — 89051 BODY FLUID CELL COUNT: CPT

## 2019-05-20 PROCEDURE — 87070 CULTURE OTHR SPECIMN AEROBIC: CPT

## 2019-05-20 PROCEDURE — 82962 GLUCOSE BLOOD TEST: CPT

## 2019-05-20 PROCEDURE — 87075 CULTR BACTERIA EXCEPT BLOOD: CPT

## 2019-05-20 RX ORDER — SODIUM CHLORIDE 9 MG/ML
500 INJECTION, SOLUTION INTRAVENOUS ONCE
Status: DISCONTINUED | OUTPATIENT
Start: 2019-05-20 | End: 2019-05-20 | Stop reason: HOSPADM
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
## 2019-05-20 NOTE — PROGRESS NOTES
Pt arrived to IR room 189 for drain placement, no acute distress noted. Orders, consent and labs reviewed on chart.

## 2019-05-20 NOTE — PROGRESS NOTES
Drain placement completed, pt tolerated well. No apparent distress noted. 750 ml removed from left abd, dressing applied CDI. Labs collected and sent. Pt to be transferred to [facility] for discharge. Report to be given at bedside.

## 2019-05-20 NOTE — PROCEDURES
Radiology Post-Procedure Note    Pre Op Diagnosis:Seroma    Post Op Diagnosis: Seroma     Procedure:midline SQ drainage    Procedure performed by: Brenton Acuña MD    Written Informed Consent Obtained: Yes    Specimen Removed:  cc clear fluid    Estimated Blood Loss: Minimal    Findings: CT was used for localization of abnormal fluid collection. A needle was inserted into the fluid collection and serous fluid was aspirated.  A wire was inserted into the collection and the tract was dilated.  A 10 Portuguese all-purpose drainage catheter was inserted and a pigtail loop of the distal end was formed.  The catheter was sutured into place and approximately  750 mL fluid was removed.     A specimen was sent to the lab for further analysis and culture.    The patient tolerated procedure well and there were no complications. Please see procedure report under Imaging for further details.    Brenton Acuña M.D.  Diagnostic and Interventional Radiologist  Department of Radiology  Pager: [phone]

## 2019-05-22 LAB — BACTERIA SPEC AEROBE CULT: NORMAL

## 2019-05-23 ENCOUNTER — TELEPHONE (OUTPATIENT)
Dept: SURGERY | Facility: CLINIC | Age: 54
End: 2019-05-23

## 2019-05-23 ENCOUNTER — HOSPITAL ENCOUNTER (EMERGENCY)
Facility: HOSPITAL | Age: 54
Discharge: HOME | End: 2019-05-23
Attending: EMERGENCY MEDICINE
Payer: MEDICAID

## 2019-05-23 VITALS
BODY MASS INDEX: 35.92 KG/M2 | WEIGHT: 237 LBS | HEART RATE: 65 BPM | SYSTOLIC BLOOD PRESSURE: 117 MMHG | TEMPERATURE: 98 F | RESPIRATION RATE: 18 BRPM | HEIGHT: 68 IN | DIASTOLIC BLOOD PRESSURE: 96 MMHG | OXYGEN SATURATION: 95 %

## 2019-05-23 DIAGNOSIS — R11.0 NAUSEA: ICD-10-CM

## 2019-05-23 DIAGNOSIS — G89.18 JACKSON PRATT DRAIN SITE PAIN: Primary | ICD-10-CM

## 2019-05-23 PROBLEM — L02.91 ABSCESS: Status: RESOLVED | Noted: 2019-05-20 | Resolved: 2019-05-23

## 2019-05-23 PROBLEM — R18.8 ABDOMINAL FLUID COLLECTION: Status: ACTIVE | Noted: 2019-05-23

## 2019-05-23 LAB
ALBUMIN SERPL BCP-MCNC: 3.3 G/DL (ref 3.5–5.2)
ALP SERPL-CCNC: 137 U/L (ref 55–135)
ALT SERPL W/O P-5'-P-CCNC: 32 U/L (ref 10–44)
ANION GAP SERPL CALC-SCNC: 8 MMOL/L (ref 8–16)
AST SERPL-CCNC: 75 U/L (ref 10–40)
BACTERIA #/AREA URNS AUTO: ABNORMAL /HPF
BASOPHILS # BLD AUTO: 0.03 K/UL (ref 0–0.2)
BASOPHILS NFR BLD: 0.6 % (ref 0–1.9)
BILIRUB SERPL-MCNC: 0.7 MG/DL (ref 0.1–1)
BILIRUB UR QL STRIP: NEGATIVE
BUN SERPL-MCNC: 14 MG/DL (ref 6–20)
BUN SERPL-MCNC: 16 MG/DL (ref 6–30)
CALCIUM SERPL-MCNC: 9.4 MG/DL (ref 8.7–10.5)
CHLORIDE SERPL-SCNC: 95 MMOL/L (ref 95–110)
CHLORIDE SERPL-SCNC: 96 MMOL/L (ref 95–110)
CLARITY UR REFRACT.AUTO: CLEAR
CO2 SERPL-SCNC: 31 MMOL/L (ref 23–29)
COLOR UR AUTO: YELLOW
CREAT SERPL-MCNC: 1 MG/DL (ref 0.5–1.4)
CREAT SERPL-MCNC: 1.1 MG/DL (ref 0.5–1.4)
DIFFERENTIAL METHOD: ABNORMAL
EOSINOPHIL # BLD AUTO: 0.2 K/UL (ref 0–0.5)
EOSINOPHIL NFR BLD: 4.1 % (ref 0–8)
ERYTHROCYTE [DISTWIDTH] IN BLOOD BY AUTOMATED COUNT: 13.6 % (ref 11.5–14.5)
EST. GFR  (AFRICAN AMERICAN): >60 ML/MIN/1.73 M^2
EST. GFR  (NON AFRICAN AMERICAN): >60 ML/MIN/1.73 M^2
GLUCOSE SERPL-MCNC: 79 MG/DL (ref 70–110)
GLUCOSE SERPL-MCNC: 82 MG/DL (ref 70–110)
GLUCOSE UR QL STRIP: NEGATIVE
HCT VFR BLD AUTO: 38.5 % (ref 40–54)
HCT VFR BLD CALC: 38 %PCV (ref 36–54)
HGB BLD-MCNC: 12.5 G/DL (ref 14–18)
HGB UR QL STRIP: NEGATIVE
HYALINE CASTS UR QL AUTO: 2 /LPF
IMM GRANULOCYTES # BLD AUTO: 0.03 K/UL (ref 0–0.04)
IMM GRANULOCYTES NFR BLD AUTO: 0.6 % (ref 0–0.5)
KETONES UR QL STRIP: NEGATIVE
LACTATE SERPL-SCNC: 0.8 MMOL/L (ref 0.5–2.2)
LEUKOCYTE ESTERASE UR QL STRIP: ABNORMAL
LIPASE SERPL-CCNC: 28 U/L (ref 4–60)
LYMPHOCYTES # BLD AUTO: 1.5 K/UL (ref 1–4.8)
LYMPHOCYTES NFR BLD: 28.4 % (ref 18–48)
MCH RBC QN AUTO: 29.7 PG (ref 27–31)
MCHC RBC AUTO-ENTMCNC: 32.5 G/DL (ref 32–36)
MCV RBC AUTO: 91 FL (ref 82–98)
MICROSCOPIC COMMENT: ABNORMAL
MONOCYTES # BLD AUTO: 0.3 K/UL (ref 0.3–1)
MONOCYTES NFR BLD: 6.2 % (ref 4–15)
NEUTROPHILS # BLD AUTO: 3.2 K/UL (ref 1.8–7.7)
NEUTROPHILS NFR BLD: 60.1 % (ref 38–73)
NITRITE UR QL STRIP: NEGATIVE
NRBC BLD-RTO: 0 /100 WBC
PH UR STRIP: 6 [PH] (ref 5–8)
PLATELET # BLD AUTO: 180 K/UL (ref 150–350)
PMV BLD AUTO: 10.9 FL (ref 9.2–12.9)
POC IONIZED CALCIUM: 1 MMOL/L (ref 1.06–1.42)
POC TCO2 (MEASURED): 33 MMOL/L (ref 23–29)
POTASSIUM BLD-SCNC: 4.1 MMOL/L (ref 3.5–5.1)
POTASSIUM SERPL-SCNC: 5.3 MMOL/L (ref 3.5–5.1)
PROT SERPL-MCNC: 8.3 G/DL (ref 6–8.4)
PROT UR QL STRIP: ABNORMAL
RBC # BLD AUTO: 4.21 M/UL (ref 4.6–6.2)
RBC #/AREA URNS AUTO: 1 /HPF (ref 0–4)
SAMPLE: ABNORMAL
SODIUM BLD-SCNC: 138 MMOL/L (ref 136–145)
SODIUM SERPL-SCNC: 134 MMOL/L (ref 136–145)
SP GR UR STRIP: 1.01 (ref 1–1.03)
URN SPEC COLLECT METH UR: ABNORMAL
WBC # BLD AUTO: 5.35 K/UL (ref 3.9–12.7)
WBC #/AREA URNS AUTO: 2 /HPF (ref 0–5)

## 2019-05-23 PROCEDURE — 80053 COMPREHEN METABOLIC PANEL: CPT

## 2019-05-23 PROCEDURE — 99285 PR EMERGENCY DEPT VISIT,LEVEL V: ICD-10-PCS | Mod: ,,, | Performed by: EMERGENCY MEDICINE

## 2019-05-23 PROCEDURE — 96374 THER/PROPH/DIAG INJ IV PUSH: CPT

## 2019-05-23 PROCEDURE — 82962 GLUCOSE BLOOD TEST: CPT

## 2019-05-23 PROCEDURE — 81001 URINALYSIS AUTO W/SCOPE: CPT

## 2019-05-23 PROCEDURE — 25000242 PHARM REV CODE 250 ALT 637 W/ HCPCS: Performed by: PHYSICIAN ASSISTANT

## 2019-05-23 PROCEDURE — 87040 BLOOD CULTURE FOR BACTERIA: CPT

## 2019-05-23 PROCEDURE — 25000003 PHARM REV CODE 250: Performed by: PHYSICIAN ASSISTANT

## 2019-05-23 PROCEDURE — 83690 ASSAY OF LIPASE: CPT

## 2019-05-23 PROCEDURE — 99284 EMERGENCY DEPT VISIT MOD MDM: CPT | Mod: 25

## 2019-05-23 PROCEDURE — 96361 HYDRATE IV INFUSION ADD-ON: CPT

## 2019-05-23 PROCEDURE — 83605 ASSAY OF LACTIC ACID: CPT

## 2019-05-23 PROCEDURE — 63600175 PHARM REV CODE 636 W HCPCS: Performed by: PHYSICIAN ASSISTANT

## 2019-05-23 PROCEDURE — 99285 EMERGENCY DEPT VISIT HI MDM: CPT | Mod: ,,, | Performed by: EMERGENCY MEDICINE

## 2019-05-23 PROCEDURE — 85025 COMPLETE CBC W/AUTO DIFF WBC: CPT

## 2019-05-23 RX ORDER — IPRATROPIUM BROMIDE AND ALBUTEROL SULFATE 2.5; .5 MG/3ML; MG/3ML
3 SOLUTION RESPIRATORY (INHALATION)
Status: COMPLETED | OUTPATIENT
Start: 2019-05-23 | End: 2019-05-23

## 2019-05-23 RX ORDER — ONDANSETRON 2 MG/ML
4 INJECTION INTRAMUSCULAR; INTRAVENOUS
Status: COMPLETED | OUTPATIENT
Start: 2019-05-23 | End: 2019-05-23

## 2019-05-23 RX ADMIN — ONDANSETRON 4 MG: 2 INJECTION INTRAMUSCULAR; INTRAVENOUS at 05:05

## 2019-05-23 RX ADMIN — IPRATROPIUM BROMIDE AND ALBUTEROL SULFATE 3 ML: .5; 3 SOLUTION RESPIRATORY (INHALATION) at 04:05

## 2019-05-23 RX ADMIN — SODIUM CHLORIDE 1000 ML: 0.9 INJECTION, SOLUTION INTRAVENOUS at 03:05

## 2019-05-23 NOTE — ED TRIAGE NOTES
Pt reports having hernia repair one month ago, began to have complication of swelling to abd, RAZIA drain placed to abd to drain fluid, pt reports that there has not been any new drainage within the last 3 days and is now having abd swelling.
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
## 2019-05-23 NOTE — ED NOTES
Several attempts have been made by 2 different RN's to obtain second set of BC, unsuccessful, PAULA Melendez made aware.

## 2019-05-23 NOTE — ED NOTES
Provider with surgery team noted at patient's bedside to assess RAZIA drain. RAZIA drain unclogged per MD, drain is now draining without difficulty.
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
## 2019-05-23 NOTE — ED NOTES
Pt requesting breathing tx, pt has hx of asthma, wheezing noted, pt reports SOB, o2 sat 96% on 2LNC, pt repositioned in bed. PAULA Melendez made aware of patient request.

## 2019-05-23 NOTE — TELEPHONE ENCOUNTER
Patient called c/o his stomach being distended , he had a drain placed and is concerned it's not working properly. I suggested him coming in to be seen but he has transportation issues. I then suggested that he may need to report to the nearest ED to be worked up, he agrees and will try to find a ride.
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
## 2019-05-23 NOTE — DISCHARGE INSTRUCTIONS
Please make sure to follow up with General Surgery and PCP to discuss today's Emergency Department visit and for further evaluation and management. Please return to the Emergency Department if your symptoms return or you develop any additional concerning symptoms.

## 2019-05-23 NOTE — ED NOTES
Patient lying in bed sleeping, easily aroused. Continuous cardiac monitoring,  Alarms set and turned on for monitor, Plan of care provided and explained to patient.Plan of care includes: observe and reassure, position of comfort. AOx 3, Respirations even and unlabored, no drainage noted in RAZIA drain at this time, pt c/o nausea, no vomiting, zofran to be given, skin W/D, PAREDES well.Bed placed in low position, side rails up x 2, call light is within reach of patient. No distress noted. Will continue to monitor.

## 2019-05-23 NOTE — ASSESSMENT & PLAN NOTE
Patient is a 54yo male with multiple medical comorbidities that underwent open ventral hernia repair with mesh on 4/22/19, requiring takeback for subq hematoma on 4/24/19. IR drain placement on 5/20/19 for postoperative fluid collection. Presenting today with increased abdominal distension and decreased drain output over the last 48 hours.     - Patient is afebrile with no leukocytosis and a normal white count. WBC of the body fluid on 5/20 were within normal limits. Other cultures still pending.   - Abdominal distention and discomfort is likely secondary to a clogged IR drain. Small amounts of fibrinous material in the drain tubing and at the apex of the bulb. After manipulation of the tubing and the drain itself, over 140cc of serous fluid was expressed on my exam.   - Abdomen was not peritoneal upon exam. No recurrence of the hernia appreciated.   - Would recommend potential exchange of the IR tubing. Drain itself appears to be in a fine position as fluid freely flowed after stripping of the tubing.   - Can follow up with CT on Monday 5/27 if condition persists.

## 2019-05-23 NOTE — ED NOTES
IV attempt x 2 unsuccessful. Dressing applied, Dr. bergman made aware that there will be delays with lab collection and IV.

## 2019-05-23 NOTE — HPI
Patient is a 53 y.o. male with essential hypertension, pulmonary htn, BMI 37.25, COPD on 2L home O2, and hep C, that is one month status post repair of recurrent large epigastric hernia with mesh on 4/22/19. Surgery was complicated by a post-operative hematoma (1.8L) that required take-back diagnostic lap with washout. He was discharged on 5/1/19. He re-presented on 5/20/19 with a large abdominal fluid collection. He underwent IR drainage with pigtail catheter placement, with 750cc serous fluid out immediately. Patient was discharged on a course of Bactrim. Body fluid cultures showed WBC count of 476. Aerobic culture showed rare diptheroids. Other cultures still pending.     Patient presented to the ED today secondary to increased abdominal distension, pain around the drain site, poor taste in his mouth, and shortness of breath that began yesterday morning. He says that for the first two days after his drain placement, that he had a lot of output and was feeling well. However, he says that he has had minimal output yesterday and today (unable to quantify, did not measure). He came in for evaluation today.     Upon workup in the ED, he was afebrile, vital signs stable, did not have a leukocytosis, had a normal lactate, and was non-toxic appearing.

## 2019-05-24 NOTE — ED NOTES
At discharge pt AOx3, resp even/unlabored, abd pain improved to 3:10, no n/v, skinw/d, PAREDES well, NAD.

## 2019-05-27 LAB — BACTERIA SPEC ANAEROBE CULT: NORMAL

## 2019-05-28 LAB — BACTERIA BLD CULT: NORMAL

## 2019-06-05 ENCOUNTER — TELEPHONE (OUTPATIENT)
Dept: SURGERY | Facility: CLINIC | Age: 54
End: 2019-06-05

## 2019-06-05 NOTE — TELEPHONE ENCOUNTER
Explained that he'll need to keep his drain in until the output has stopped. He previously had an ER visit due to his drain being clogged which was remedied. He is ok with coming in on 6/11/19 for 1045 am to be evaluated.

## 2019-06-05 NOTE — TELEPHONE ENCOUNTER
----- Message from Hope Camarena sent at 6/5/2019 11:12 AM CDT -----  Contact: Patient   Needs Advice    Reason for call: Patient states that he needs to speak with a nurse regarding the tube in his stomach, after a recent trip to the ED         Communication Preference: [phone]     Additional Information: please contact the patient

## 2019-06-11 ENCOUNTER — OFFICE VISIT (OUTPATIENT)
Dept: SURGERY | Facility: CLINIC | Age: 54
End: 2019-06-11
Payer: MEDICAID

## 2019-06-11 VITALS
WEIGHT: 242 LBS | HEIGHT: 68 IN | SYSTOLIC BLOOD PRESSURE: 158 MMHG | DIASTOLIC BLOOD PRESSURE: 93 MMHG | BODY MASS INDEX: 36.68 KG/M2 | HEART RATE: 67 BPM

## 2019-06-11 DIAGNOSIS — Z09 POSTOP CHECK: Primary | ICD-10-CM

## 2019-06-11 DIAGNOSIS — R10.9 ABDOMINAL PAIN, UNSPECIFIED ABDOMINAL LOCATION: ICD-10-CM

## 2019-06-11 PROBLEM — K43.9 VENTRAL HERNIA: Status: RESOLVED | Noted: 2019-04-22 | Resolved: 2019-06-11

## 2019-06-11 PROCEDURE — 99999 PR PBB SHADOW E&M-EST. PATIENT-LVL III: ICD-10-PCS | Mod: PBBFAC,,, | Performed by: SURGERY

## 2019-06-11 PROCEDURE — 99213 OFFICE O/P EST LOW 20 MIN: CPT | Mod: PBBFAC | Performed by: SURGERY

## 2019-06-11 PROCEDURE — 99999 PR PBB SHADOW E&M-EST. PATIENT-LVL III: CPT | Mod: PBBFAC,,, | Performed by: SURGERY

## 2019-06-11 PROCEDURE — 99024 POSTOP FOLLOW-UP VISIT: CPT | Mod: ,,, | Performed by: SURGERY

## 2019-06-11 PROCEDURE — 99024 PR POST-OP FOLLOW-UP VISIT: ICD-10-PCS | Mod: ,,, | Performed by: SURGERY

## 2019-06-11 RX ORDER — ONDANSETRON 8 MG/1
8 TABLET, ORALLY DISINTEGRATING ORAL EVERY 6 HOURS PRN
Qty: 30 TABLET | Refills: 0 | Status: SHIPPED | OUTPATIENT
Start: 2019-06-11 | End: 2019-07-25 | Stop reason: SDUPTHER
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
## 2019-06-20 ENCOUNTER — TELEPHONE (OUTPATIENT)
Dept: SURGERY | Facility: CLINIC | Age: 54
End: 2019-06-20

## 2019-06-20 NOTE — TELEPHONE ENCOUNTER
Spoke to patient and scheduled a CT scan to be done @ our [place] location on Saturday 6/22/19. Also informed patient that his appointment set for today will be rescheduled until after we receive imaging results. Pt v/u

## 2019-06-20 NOTE — TELEPHONE ENCOUNTER
----- Message from Solitario Beach sent at 6/20/2019  8:40 AM CDT -----  Ray,     Reason for call:Pt calling to speak with you in regards to scheduling a CT.        Communication Preference:[phone]    Additional Information:

## 2019-06-24 ENCOUNTER — TELEPHONE (OUTPATIENT)
Dept: SURGERY | Facility: CLINIC | Age: 54
End: 2019-06-24

## 2019-06-24 NOTE — TELEPHONE ENCOUNTER
----- Message from Hope Camarena sent at 6/24/2019 10:31 AM CDT -----  Contact: Patient   Needs Advice    Reason for call: Caller states that he needs to speak to the nurse regarding the CT scan that was scheduled for him        Communication Preference: [phone]     Additional Information: please contact the patient

## 2019-06-26 ENCOUNTER — HOSPITAL ENCOUNTER (OUTPATIENT)
Dept: RADIOLOGY | Facility: HOSPITAL | Age: 54
Discharge: HOME | End: 2019-06-26
Attending: SURGERY
Payer: MEDICAID

## 2019-06-26 DIAGNOSIS — R10.9 ABDOMINAL PAIN, UNSPECIFIED ABDOMINAL LOCATION: ICD-10-CM

## 2019-06-26 LAB — FUNGUS SPEC CULT: NORMAL

## 2019-06-26 PROCEDURE — 74176 CT ABD & PELVIS W/O CONTRAST: CPT | Mod: 26,,, | Performed by: RADIOLOGY

## 2019-06-26 PROCEDURE — 74176 CT ABD & PELVIS W/O CONTRAST: CPT | Mod: TC

## 2019-06-26 PROCEDURE — 74176 CT ABDOMEN PELVIS WITHOUT CONTRAST: ICD-10-PCS | Mod: 26,,, | Performed by: RADIOLOGY

## 2019-07-02 ENCOUNTER — OFFICE VISIT (OUTPATIENT)
Dept: SURGERY | Facility: CLINIC | Age: 54
End: 2019-07-02
Payer: MEDICAID

## 2019-07-02 VITALS
WEIGHT: 238 LBS | SYSTOLIC BLOOD PRESSURE: 143 MMHG | HEART RATE: 86 BPM | BODY MASS INDEX: 36.07 KG/M2 | TEMPERATURE: 98 F | HEIGHT: 68 IN | DIASTOLIC BLOOD PRESSURE: 100 MMHG

## 2019-07-02 DIAGNOSIS — T88.8XXD FLUID COLLECTION AT SURGICAL SITE, SUBSEQUENT ENCOUNTER: Primary | ICD-10-CM

## 2019-07-02 PROCEDURE — 99999 PR PBB SHADOW E&M-EST. PATIENT-LVL III: CPT | Mod: PBBFAC,,, | Performed by: SURGERY

## 2019-07-02 PROCEDURE — 99024 POSTOP FOLLOW-UP VISIT: CPT | Mod: ,,, | Performed by: SURGERY

## 2019-07-02 PROCEDURE — 99213 OFFICE O/P EST LOW 20 MIN: CPT | Mod: PBBFAC | Performed by: SURGERY

## 2019-07-02 PROCEDURE — 99999 PR PBB SHADOW E&M-EST. PATIENT-LVL III: ICD-10-PCS | Mod: PBBFAC,,, | Performed by: SURGERY

## 2019-07-02 PROCEDURE — 99024 PR POST-OP FOLLOW-UP VISIT: ICD-10-PCS | Mod: ,,, | Performed by: SURGERY

## 2019-07-02 RX ORDER — KETOROLAC TROMETHAMINE 10 MG/1
10 TABLET, FILM COATED ORAL EVERY 6 HOURS
Qty: 30 TABLET | Refills: 0 | Status: SHIPPED | OUTPATIENT
Start: 2019-07-02 | End: 2019-08-02 | Stop reason: CLARIF
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
## 2019-07-12 DIAGNOSIS — T88.8XXD FLUID COLLECTION AT SURGICAL SITE, SUBSEQUENT ENCOUNTER: Primary | ICD-10-CM

## 2019-07-16 ENCOUNTER — TELEPHONE (OUTPATIENT)
Dept: INTERVENTIONAL RADIOLOGY/VASCULAR | Facility: HOSPITAL | Age: 54
End: 2019-07-16

## 2019-07-16 DIAGNOSIS — T88.8XXD FLUID COLLECTION AT SURGICAL SITE, SUBSEQUENT ENCOUNTER: Primary | ICD-10-CM

## 2019-07-16 RX ORDER — FENTANYL CITRATE 50 UG/ML
50 INJECTION, SOLUTION INTRAMUSCULAR; INTRAVENOUS
Status: CANCELLED | OUTPATIENT
Start: 2019-07-16

## 2019-07-16 RX ORDER — MIDAZOLAM HYDROCHLORIDE 1 MG/ML
1 INJECTION INTRAMUSCULAR; INTRAVENOUS
Status: CANCELLED | OUTPATIENT
Start: 2019-07-16

## 2019-07-17 ENCOUNTER — HOSPITAL ENCOUNTER (OUTPATIENT)
Facility: HOSPITAL | Age: 54
Discharge: HOME | End: 2019-07-17
Attending: SURGERY | Admitting: SURGERY
Payer: MEDICAID

## 2019-07-17 VITALS
DIASTOLIC BLOOD PRESSURE: 86 MMHG | OXYGEN SATURATION: 97 % | TEMPERATURE: 98 F | HEART RATE: 65 BPM | RESPIRATION RATE: 16 BRPM | SYSTOLIC BLOOD PRESSURE: 130 MMHG

## 2019-07-17 DIAGNOSIS — T88.8XXA FLUID COLLECTION AT SURGICAL SITE: ICD-10-CM

## 2019-07-17 DIAGNOSIS — T88.8XXD FLUID COLLECTION AT SURGICAL SITE, SUBSEQUENT ENCOUNTER: ICD-10-CM

## 2019-07-17 LAB — POCT GLUCOSE: 70 MG/DL (ref 70–110)

## 2019-07-17 PROCEDURE — 82962 GLUCOSE BLOOD TEST: CPT

## 2019-07-17 RX ORDER — SODIUM CHLORIDE 9 MG/ML
500 INJECTION, SOLUTION INTRAVENOUS ONCE
Status: DISCONTINUED | OUTPATIENT
Start: 2019-07-17 | End: 2019-07-17 | Stop reason: HOSPADM

## 2019-07-17 RX ORDER — LIDOCAINE HYDROCHLORIDE 10 MG/ML
1 INJECTION, SOLUTION EPIDURAL; INFILTRATION; INTRACAUDAL; PERINEURAL ONCE
Status: DISCONTINUED | OUTPATIENT
Start: 2019-07-17 | End: 2019-07-17 | Stop reason: HOSPADM

## 2019-07-17 NOTE — PROCEDURES
Radiology Post-Procedure Note    Pre Op Diagnosis: Anterior abdominal wall collection  Post Op Diagnosis: Same    Procedure: US guided drainage    Procedure performed by: Ryan Dumont MD    Written Informed Consent Obtained: Yes  Specimen Removed: NO  Estimated Blood Loss: Minimal    Findings:   US demonstrated a multiloculated collection in the anterior abdominal wall. The previously placed drain was no longer working and was removed. A new 10 Fr drain was placed and 10 cc of serosanguinous fluid was obtained. Due to the multiloculated nature of the collection, it may not resolve with catheter drainage alone.    Patient tolerated procedure well.    Ryan Dumont MD  Department of Radiology  Pager: [phone]
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
## 2019-07-17 NOTE — NURSING
Pt & pts sister received dc instructions & demonstration for emptying drain and care, pt denies any pain, dsg CDI, pt left via WC in NAD with escort

## 2019-07-17 NOTE — PROGRESS NOTES
PT 10 [place]  abscess drain procedure complete. VSS. Drain site CDI. Pt to [facility]. Report given to [facility] RN. Family contacted. Pt may be discharged @ 1700 per MD Dumont.

## 2019-07-17 NOTE — PROGRESS NOTES
IV attempted per myself and Bing Gonzales LPN multiple times, unsuccessful. Called IR to notify unable to obtain IV. Called anesthesia board to notify unable to obtain IV.
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
## 2019-07-17 NOTE — PROGRESS NOTES
Pt arrived to [facility] via stretcher on RA, pt drowsy, report received from Timothy RN, no sedation was given per Timothy RN, pt arrived drowsy per RN, pt may be dcd home with family @ 1700 per MD Dumont

## 2019-07-17 NOTE — PROGRESS NOTES
Lab called to notify blood clotted again. Stated need new order before lab can come back down to redraw lab. Notified IR. Will put order in. Pt has no IV. Interventional Radiology aware. IR to do procedure w/out sedation. Pt in agreement.   1520- Called lab to come redraw blood. Will come to bedside.   1725-Lab at bedside. IR to take PT w/out blood being drawn. Unable to obtain sample. IR to cancel order.

## 2019-07-17 NOTE — DISCHARGE SUMMARY
Radiology Discharge Summary      Hospital Course: No complications    Admit Date: 7/17/2019  Discharge Date: 07/17/2019     Instructions Given to Patient: Yes  Diet: Resume prior diet  Activity: activity as tolerated and no driving for today    Description of Condition on Discharge: Stable  Vital Signs (Most Recent): Temp: 98 °F (36.7 °C) (07/17/19 1630)  Pulse: 65 (07/17/19 1715)  Resp: 16 (07/17/19 1715)  BP: 130/86 (07/17/19 1715)  SpO2: 97 % (07/17/19 1715)    Discharge Disposition: Home    Discharge Diagnosis:     Abdominal wall collection drainage.     Follow-up:   Follow up with surgery    Ryan Dumont MD  Department of Radiology  Pager: [phone]

## 2019-07-18 ENCOUNTER — TELEPHONE (OUTPATIENT)
Dept: SURGERY | Facility: CLINIC | Age: 54
End: 2019-07-18

## 2019-07-18 NOTE — TELEPHONE ENCOUNTER
----- Message from Hope Camarena sent at 7/18/2019 12:17 PM CDT -----  Contact: Patient   Needs Advice    Reason for call: Patient is asking to speak to Ray. Patient states that he is having some issues post procedure that he would like to speak with him about.        Communication Preference: [phone]     Additional Information: please contact the patient

## 2019-07-22 LAB
ACID FAST MOD KINY STN SPEC: NORMAL
MYCOBACTERIUM SPEC QL CULT: NORMAL

## 2019-07-25 ENCOUNTER — OFFICE VISIT (OUTPATIENT)
Dept: SURGERY | Facility: CLINIC | Age: 54
End: 2019-07-25
Payer: MEDICAID

## 2019-07-25 VITALS
HEIGHT: 68 IN | TEMPERATURE: 99 F | DIASTOLIC BLOOD PRESSURE: 80 MMHG | HEART RATE: 84 BPM | SYSTOLIC BLOOD PRESSURE: 125 MMHG | WEIGHT: 232 LBS | BODY MASS INDEX: 35.16 KG/M2

## 2019-07-25 DIAGNOSIS — L08.9 CHRONIC WOUND INFECTION OF ABDOMEN, SUBSEQUENT ENCOUNTER: ICD-10-CM

## 2019-07-25 DIAGNOSIS — S31.109D CHRONIC WOUND INFECTION OF ABDOMEN, SUBSEQUENT ENCOUNTER: ICD-10-CM

## 2019-07-25 PROBLEM — S31.109A CHRONIC WOUND INFECTION OF ABDOMEN: Status: ACTIVE | Noted: 2019-07-25

## 2019-07-25 PROCEDURE — 99214 OFFICE O/P EST MOD 30 MIN: CPT | Mod: S$PBB,,, | Performed by: SURGERY

## 2019-07-25 PROCEDURE — 99213 OFFICE O/P EST LOW 20 MIN: CPT | Mod: PBBFAC | Performed by: SURGERY

## 2019-07-25 PROCEDURE — 99999 PR PBB SHADOW E&M-EST. PATIENT-LVL III: ICD-10-PCS | Mod: PBBFAC,,, | Performed by: SURGERY

## 2019-07-25 PROCEDURE — 99999 PR PBB SHADOW E&M-EST. PATIENT-LVL III: CPT | Mod: PBBFAC,,, | Performed by: SURGERY

## 2019-07-25 PROCEDURE — 99214 PR OFFICE/OUTPT VISIT, EST, LEVL IV, 30-39 MIN: ICD-10-PCS | Mod: S$PBB,,, | Performed by: SURGERY

## 2019-07-25 RX ORDER — ONDANSETRON 8 MG/1
8 TABLET, ORALLY DISINTEGRATING ORAL EVERY 6 HOURS PRN
Qty: 30 TABLET | Refills: 0 | Status: SHIPPED | OUTPATIENT
Start: 2019-07-25 | End: 2019-10-29 | Stop reason: SDUPTHER
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
## 2019-07-25 NOTE — PROGRESS NOTES
I have seen the patient, reviewed the Student's history and physical, assessment and plan. I have personally interviewed and examined the patient at bedside and: agree with the findings.     S/p second drain for hematoma collection after hernia repair.  The drain is putting out 40 cc josette a day of yellowish fluid but the radiologist is concerned that placement of the drain isn't draining the whole collection.  For OR debridement.  Wound vac.

## 2019-08-02 ENCOUNTER — TELEPHONE (OUTPATIENT)
Dept: SURGERY | Facility: CLINIC | Age: 54
End: 2019-08-02

## 2019-08-04 ENCOUNTER — ANESTHESIA EVENT (OUTPATIENT)
Dept: SURGERY | Facility: HOSPITAL | Age: 54
End: 2019-08-04
Payer: MEDICAID

## 2019-08-04 NOTE — ANESTHESIA PREPROCEDURE EVALUATION
[facility]  Anesthesia Pre-Operative Evaluation         Patient Name: Ming Harrington  YOB: 1965  MRN: [id number]    SUBJECTIVE:     Pre-operative evaluation for Procedure(s) (LRB):  DEBRIDEMENT, WOUND, ABDOMEN (N/A)  APPLICATION, WOUND VAC (N/A)     08/04/2019    Ming Harrington is a 53 y.o. male w/ a significant PMHx of HTN, DM on metformin, HFpEF?, Severe COPD(FEV1 less than 1 liter on previous PFT.) on 2L home O2, smoker/IV drug use and hep C, recurrent large epigastric hernia s/p repair with mesh on 4/22/19. complicated by post-operative hematoma (1.8L) that required take-back diagnostic lap with washout, further c/w large abdominal fluid collectio s/p IR drainage.    Patient now presents for the above procedure(s).    LDA: None documented.       Closed/Suction Drain 05/20/19 1125 Left Abdomen Bulb 10 Fr. (Active)   Number of days: 76            Closed/Suction Drain 07/17/19 1611 [place];Midline Abdomen 10 Fr. (Active)   Number of days: 18       Prev airway:   Airway (Primary) Present Prior to Hospital Arrival?: No; Placement Date: 04/22/19; Placement Time: 1805; Method of Intubation: Direct laryngoscopy; Inserted by: CRNA; Airway Device: Endotracheal Tube; Mask Ventilation: Easy - oral; Intubated: Postinduction; Blade: Maria Teresa #3; Airway Device Size: 7.5; Style: Cuffed; Cuff Inflation: Minimal occlusive pressure; Inflation Amount: 7; Placement Verified By: Auscultation, Capnometry, ETT Condensation; Grade: Grade I; Complicating Factors: None; Intubation Findings: Positive EtCO2, Bilateral breath sounds, Atraumatic/Condition of teeth unchanged;  Depth of Insertion: 21; Securment: Lips; Complications: None; Breath Sounds: Equal Bilateral; Insertion Attempts: 1; Removal Date: 04/22/19;  Removal Time: 2007         Drips: None documented.      Patient Active Problem List   Diagnosis    HTN (hypertension)    Opiate dependence    Hepatitis C virus infection    COPD  exacerbation    Acute respiratory failure with hypercapnia    Drug withdrawal    Hyperkalemia    Shortness of breath    COPD with acute exacerbation    Asthma exacerbation in COPD    Acute exacerbation of COPD with asthma    Bacteremia    Severe asthma    CHF (congestive heart failure)    COPD (chronic obstructive pulmonary disease)    IV drug user    Generalized anxiety disorder    Acute blood loss anemia    Hypotension    Abdominal fluid collection    Fluid collection at surgical site    Chronic wound infection of abdomen       Review of patient's allergies indicates:   Allergen Reactions    Iodine and iodide containing products Anaphylaxis and Swelling     Facial swelling    Shellfish containing products Anaphylaxis             Compazine [prochlorperazine edisylate] Hallucinations       Current Inpatient Medications:      No current facility-administered medications on file prior to encounter.      Current Outpatient Medications on File Prior to Encounter   Medication Sig Dispense Refill    ADVAIR DISKUS 250-50 mcg/dose diskus inhaler Inhale 1 puff into the lungs 2 (two) times daily. 60 each 3    albuterol (PROVENTIL) 2.5 mg /3 mL (0.083 %) nebulizer solution take 3 mLs Nebulization Every 4 hours, Rescue (Patient taking differently: Take 2.5 mg by nebulization every 4 (four) hours as needed for Shortness of Breath. ) 75 mL 5    albuterol (PROVENTIL/VENTOLIN HFA) 90 mcg/actuation inhaler Inhale 1-2 puffs into the lungs every 6 (six) hours as needed for Wheezing or Shortness of Breath. Rescue 18 g 5    albuterol-ipratropium (DUO-NEB) 2.5 mg-0.5 mg/3 mL nebulizer solution Take 3 mLs by nebulization every 4 (four) hours. Rescue (Patient taking differently: Take 3 mLs by nebulization every 4 (four) hours as needed. Rescue) 1620 mL 3    ARIPiprazole (ABILIFY) 10 MG Tab Take 10 mg by mouth 2 (two) times daily.       ATROVENT HFA 17 mcg/actuation inhaler Inhale 2 puffs into the lungs every 4 to  6 hours as needed.       cloNIDine (CATAPRES) 0.1 MG tablet Take 3 tablets (0.3 mg total) by mouth 3 (three) times daily. 90 tablet 0    fluticasone-vilanterol (BREO) 100-25 mcg/dose diskus inhaler Inhale 1 puff into the lungs once daily. Controller (Patient taking differently: Inhale 1 puff into the lungs every morning. Controller) 60 each 7    furosemide (LASIX) 40 MG tablet Take 1 tablet (40 mg total) by mouth once daily. (Patient taking differently: Take 20 mg by mouth 2 (two) times daily. Takes half of a 40 mg tablet (20 mg) twice daily) 60 tablet 3    metFORMIN (GLUCOPHAGE) 500 MG tablet Take 500 mg by mouth daily with breakfast.       methadone (DOLOPHINE) 10 mg/5 mL solution Take 90 mg by mouth every morning.       ondansetron (ZOFRAN-ODT) 8 MG TbDL Take 1 tablet (8 mg total) by mouth every 6 (six) hours as needed. 30 tablet 0    polyethylene glycol (GLYCOLAX) 17 gram/dose powder Mix 17 g by mouth 2 (two) times daily. 510 g 2    senna-docusate 8.6-50 mg (PERICOLACE) 8.6-50 mg per tablet Take 1 tablet by mouth 2 (two) times daily. 30 tablet 11    escitalopram oxalate (LEXAPRO) 10 MG tablet Take 1 tablet (10 mg total) by mouth once daily. 30 tablet 0    montelukast (SINGULAIR) 10 mg tablet Take 10 mg by mouth every evening.       nicotine (NICODERM CQ) 14 mg/24 hr Place 1 patch onto the skin once daily. (Patient taking differently: Place 1 patch onto the skin every morning. ) 30 patch 11    promethazine (PHENERGAN) 25 MG tablet Take 1 tablet (25 mg total) by mouth every 8 (eight) hours as needed for Nausea. 90 tablet 0    tiotropium bromide (SPIRIVA RESPIMAT) 2.5 mcg/actuation Mist Inhale 1 each into the lungs once daily. Controller (Patient taking differently: Inhale 1 each into the lungs every morning. Controller) 4 g 3       Past Surgical History:   Procedure Laterality Date    AMPUTATION      left hand tip of fingers    Drainage N/A 7/17/2019    Performed by [facility] at [facility]  OR 2ND FLR    Drainage N/A 2019    Performed by [facility] Provider at [facility] OR 2ND FLR    EVACUATION, HEMATOMA  2019    Performed by Kenyon Chawla MD at [facility] OR 2ND FLR    EXPLORATION, WOUND N/A 2019    Performed by [facility] at [facility] OR 2ND FLR    LAPAROSCOPY, DIAGNOSTIC N/A 2019    Performed by [facility] at [facility] OR 2ND FLR    REPAIR, HERNIA, INCISIONAL, RECURRENT ( OPEN WITH MESH) N/A 2019    Performed by [facility] at [facility] OR 2ND FLR    REPAIR, HERNIA, UMBILICAL, AGE 5 YEARS OR OLDER N/A 3/4/2013    Performed by Huber Matta MD at [facility] OR 2ND FLR    TRANSESOPHAGEAL ECHOCARDIOGRAM (ERIS) N/A 10/4/2017    Performed by [facility] at [facility] OR    UMBILICAL HERNIA REPAIR      UMBILICAL HERNIA REPAIR      Recurrent.  By Dr. Matta       Social History     Socioeconomic History    Marital status: Single     Spouse name: Not on file    Number of children: Not on file    Years of education: Not on file    Highest education level: Not on file   Occupational History    Not on file   Social Needs    Financial resource strain: Not on file    Food insecurity:     Worry: Not on file     Inability: Not on file    Transportation needs:     Medical: Not on file     Non-medical: Not on file   Tobacco Use    Smoking status: Former Smoker     Packs/day: 0.50     Types: Cigarettes     Last attempt to quit: 2018     Years since quittin.0    Smokeless tobacco: Never Used   Substance and Sexual Activity    Alcohol use: No     Comment: never a heavy drinker, used to drink socially    Drug use: Not Currently     Types: Heroin, Hydrocodone, Benzodiazepines     Comment: former marijuana use, h/o IVDA and intranasal drug use     Sexual activity: Yes     Partners: Female   Lifestyle    Physical activity:     Days per week: Not on file     Minutes per session: Not on file    Stress: Not on file    Relationships    Social connections:     Talks on phone: Not on file     Gets together: Not on file     Attends Orthodox service: Not on file     Active member of club or organization: Not on file     Attends meetings of clubs or organizations: Not on file     Relationship status: Not on file   Other Topics Concern    Not on file   Social History Narrative    Not on file       OBJECTIVE:     Vital Signs Range (Last 24H):         Significant Labs:  Lab Results   Component Value Date    WBC 5.35 05/23/2019    HGB 12.5 (L) 05/23/2019    HCT 38 05/23/2019     05/23/2019    ALT 32 05/23/2019    AST 75 (H) 05/23/2019     (L) 05/23/2019    K 5.3 (H) 05/23/2019    CL 95 05/23/2019    CREATININE 1.1 05/23/2019    BUN 14 05/23/2019    CO2 31 (H) 05/23/2019    TSH 0.100 (L) 12/02/2018    INR 1.0 07/17/2019    HGBA1C 4.8 02/28/2019       Diagnostic Studies:  CT Abdomen Pelvis  Without Contrast   Status: Final result   "[facility]hart Results Release     [place] Status: Pending    PACS Images for [employer] Viewer      Show images for CT Abdomen Pelvis Without Contrast   PACS Images for [employer] [place] Viewer (Incl. [facility], [facility], and [facility])      Show images for CT Abdomen Pelvis Without Contrast   All Reviewers List     Kenyon Chawla MD on 7/1/2019 17:22   CT Abdomen Pelvis  Without Contrast   Order: [id number]   Status:  Final result   Visible to patient:  No (Not Released) Next appt:  None Dx:  Abdominal pain, unspecified abdominal...   Details     Reading Physician Reading Date Result Priority   Lit Espinoza MD 6/26/2019       Narrative     EXAMINATION:  CT ABDOMEN PELVIS WITHOUT CONTRAST    CLINICAL HISTORY:  eval incisional hernia; Unspecified abdominal pain    TECHNIQUE:  Low dose axial images, sagittal and coronal reformations were obtained from the lung bases to the pubic symphysis.  Oral contrast was not administered.    COMPARISON:  05/17/2019    FINDINGS:  Lung  bases are clear.  No pericardial or pleural effusion.    Liver, pancreas, and adrenal glands are unremarkable.  Gallbladder contains no calcified stones.  There is dilatation of the common bile duct, measuring up to 1.6 cm.  There is mild intrahepatic biliary dilatation.  There is mild splenomegaly, measuring 13.7 cm AP.  Multiple cysts are seen in both kidneys.  There is a punctate nonobstructing calculus at the right lower pole collecting system.  There is no hydronephrosis.  There are no contour deforming renal masses.    GI tract demonstrates no evidence for obstruction or inflammation.    No retroperitoneal lymphadenopathy.  No ascites.  Abdominal aorta is normal caliber.    There are postoperative changes in the anterior abdominal wall.  There is a pigtail drainage catheter terminating within a fluid collection in the anterior subcutaneous fat of the abdominal wall, which measures 4.6 x 11.2 x 8.9 cm.  Surrounding fat stranding noted.    Regional osseous structures demonstrate no aggressive appearing lytic or blastic lesions.  There are degenerative changes of the lumbar spine.      Impression       Pigtail catheter terminating within a fluid collection in the subcutaneous fat of the ventral abdominal wall, diminished in size from prior study.    Common bile duct dilatation, measuring up to 1.6 cm with mild intrahepatic biliary dilatation, similar to prior study.    Nonobstructing right nephrolithiasis and bilateral renal cysts.    Mild splenomegaly.      Electronically signed by: Lit Espinoza MD  Date: 06/26/2019  Time: 14:25                 EKG:   EKG 12-lead   Order: [id number]   Status:  Final result   Visible to patient:  No (Not Released) Next appt:  None Dx:  Prolonged Q-T interval on ECG      Narrative   Performed by: GEMUSE   Test Reason : R94.31,    Vent. Rate : 095 BPM     Atrial Rate : 095 BPM     P-R Int : 156 ms          QRS Dur : 092 ms      QT Int : 356 ms       P-R-T Axes : 055 014 028  degrees     QTc Int : 447 ms    Normal sinus rhythm  Normal ECG  When compared with ECG of 29-APR-2019 08:17,  No significant change was found  Confirmed by Chiki Gallego MD ([id number]) on 5/1/2019 7:52:08 AM    Referred By: JANICE CARLIN           Confirmed By:Chiki Gallego MD      Specimen Collected: 04/30/19 17:21 Last Resulted: 05/01/19 07:52               2D ECHO  3/1/2019:    TRANSTHORACIC ECHO (TTE) COMPLETE W/ CONTRAST   Conclusion     · Study is positive for right to left shunt. The shunt may be intrapulmonary.  · Normal left ventricular systolic function. The estimated ejection fraction is 60%  · Concentric left ventricular remodeling.  · Normal right ventricular systolic function.  · Normal central venous pressure (3 mm Hg).  · No evidence of endocarditis.         Results for orders placed or performed during the hospital encounter of 09/30/17   2D echo with color flow doppler   Result Value Ref Range    QEF 70 55 - 65    Diastolic Dysfunction No     Est. PA Systolic Pressure 29.83          ASSESSMENT/PLAN:         Anesthesia Evaluation    I have reviewed the Patient Summary Reports.    I have reviewed the Nursing Notes.   I have reviewed the Medications.     Review of Systems  Anesthesia Hx:  No problems with previous Anesthesia  History of prior surgery of interest to airway management or planning: Previous anesthesia: General   Social:  Smoker, Alcohol Use    Cardiovascular:   Denies Pacemaker. Hypertension  Denies Valvular problems/Murmurs.  Denies MI.  Denies CAD.    Denies CABG/stent.  Denies Dysrhythmias.   Denies Angina.             denies PVD no hyperlipidemia    Pulmonary:   COPD Denies Asthma. Shortness of breath    Renal/:   Denies Chronic Renal Disease.     Hepatic/GI:   Liver Disease, Hepatitis, C    Neurological:   Denies TIA. Denies CVA.    Endocrine:   Denies Diabetes. Denies Hypothyroidism. Denies Hyperthyroidism.    Psych:   Psychiatric History             Anesthesia Plan  Type of  Anesthesia, risks & benefits discussed:  Anesthesia Type:  general  Patient's Preference: General   Intra-op Monitoring Plan: standard ASA monitors  Intra-op Monitoring Plan Comments:   Post Op Pain Control Plan: multimodal analgesia, IV/PO Opioids PRN and per primary service following discharge from PACU  Post Op Pain Control Plan Comments:   Induction:   IV  Beta Blocker:  Patient is not currently on a Beta-Blocker (No further documentation required).       Informed Consent: Patient understands risks and agrees with Anesthesia plan.  Questions answered. Anesthesia consent signed with patient.  ASA Score: 3     Day of Surgery Review of History & Physical:    H&P update referred to the surgeon.     Anesthesia Plan Notes: NPO confirmed.  Severe COPD noted including admission in March of this year for exacerbation.  Methadone use noted, will plan multi-modal analgesia.  MICHELLE and CHF noted.         Ready For Surgery From Anesthesia Perspective.

## 2019-08-05 ENCOUNTER — ANESTHESIA (OUTPATIENT)
Dept: SURGERY | Facility: HOSPITAL | Age: 54
End: 2019-08-05
Payer: MEDICAID

## 2019-08-05 ENCOUNTER — HOSPITAL ENCOUNTER (OUTPATIENT)
Facility: HOSPITAL | Age: 54
Discharge: HOME | End: 2019-08-05
Attending: SURGERY | Admitting: SURGERY
Payer: MEDICAID

## 2019-08-05 VITALS
BODY MASS INDEX: 37.59 KG/M2 | RESPIRATION RATE: 14 BRPM | HEART RATE: 79 BPM | HEIGHT: 68 IN | WEIGHT: 248 LBS | SYSTOLIC BLOOD PRESSURE: 156 MMHG | DIASTOLIC BLOOD PRESSURE: 98 MMHG | OXYGEN SATURATION: 95 % | TEMPERATURE: 98 F

## 2019-08-05 DIAGNOSIS — S31.109A WOUND OF GROIN: ICD-10-CM

## 2019-08-05 DIAGNOSIS — R18.8 ABDOMINAL FLUID COLLECTION: Primary | ICD-10-CM

## 2019-08-05 LAB
GRAM STN SPEC: NORMAL
POCT GLUCOSE: 80 MG/DL (ref 70–110)

## 2019-08-05 PROCEDURE — 25000003 PHARM REV CODE 250: Performed by: STUDENT IN AN ORGANIZED HEALTH CARE EDUCATION/TRAINING PROGRAM

## 2019-08-05 PROCEDURE — 87205 SMEAR GRAM STAIN: CPT | Mod: 59

## 2019-08-05 PROCEDURE — 97605 NEG PRS WND THER DME<=50SQCM: CPT | Mod: ,,, | Performed by: SURGERY

## 2019-08-05 PROCEDURE — 71000039 HC RECOVERY, EACH ADD'L HOUR: Performed by: SURGERY

## 2019-08-05 PROCEDURE — 11042 DBRDMT SUBQ TIS 1ST 20SQCM/<: CPT | Mod: ,,, | Performed by: SURGERY

## 2019-08-05 PROCEDURE — 63600175 PHARM REV CODE 636 W HCPCS: Performed by: STUDENT IN AN ORGANIZED HEALTH CARE EDUCATION/TRAINING PROGRAM

## 2019-08-05 PROCEDURE — 11045 DBRDMT SUBQ TISS EACH ADDL: CPT | Mod: ,,, | Performed by: SURGERY

## 2019-08-05 PROCEDURE — 87070 CULTURE OTHR SPECIMN AEROBIC: CPT

## 2019-08-05 PROCEDURE — D9220A PRA ANESTHESIA: ICD-10-PCS | Mod: ,,, | Performed by: ANESTHESIOLOGY

## 2019-08-05 PROCEDURE — 00400 ANES INTEGUMENTARY SYS NOS: CPT | Performed by: SURGERY

## 2019-08-05 PROCEDURE — 37000009 HC ANESTHESIA EA ADD 15 MINS: Performed by: SURGERY

## 2019-08-05 PROCEDURE — 71000015 HC POSTOP RECOV 1ST HR: Performed by: SURGERY

## 2019-08-05 PROCEDURE — 27000221 HC OXYGEN, UP TO 24 HOURS

## 2019-08-05 PROCEDURE — 37000008 HC ANESTHESIA 1ST 15 MINUTES: Performed by: SURGERY

## 2019-08-05 PROCEDURE — 97605 PR NEG PRESS WOUND THERAPY (NPWT) W/NON-DISPOSABLE WOUND VAC DEVICE (DME), <=50 CM: ICD-10-PCS | Mod: ,,, | Performed by: SURGERY

## 2019-08-05 PROCEDURE — D9220A PRA ANESTHESIA: Mod: ,,, | Performed by: ANESTHESIOLOGY

## 2019-08-05 PROCEDURE — 25000003 PHARM REV CODE 250: Performed by: SURGERY

## 2019-08-05 PROCEDURE — 11042 PR DEBRIDEMENT, SKIN, SUB-Q TISSUE,=<20 SQ CM: ICD-10-PCS | Mod: ,,, | Performed by: SURGERY

## 2019-08-05 PROCEDURE — 36000706: Performed by: SURGERY

## 2019-08-05 PROCEDURE — 87206 SMEAR FLUORESCENT/ACID STAI: CPT | Mod: 91

## 2019-08-05 PROCEDURE — 99900035 HC TECH TIME PER 15 MIN (STAT)

## 2019-08-05 PROCEDURE — 87186 SC STD MICRODIL/AGAR DIL: CPT | Mod: 59

## 2019-08-05 PROCEDURE — A4216 STERILE WATER/SALINE, 10 ML: HCPCS | Performed by: STUDENT IN AN ORGANIZED HEALTH CARE EDUCATION/TRAINING PROGRAM

## 2019-08-05 PROCEDURE — 87075 CULTR BACTERIA EXCEPT BLOOD: CPT | Mod: 59

## 2019-08-05 PROCEDURE — 36556 INSERT NON-TUNNEL CV CATH: CPT | Mod: 59,,, | Performed by: ANESTHESIOLOGY

## 2019-08-05 PROCEDURE — 87015 SPECIMEN INFECT AGNT CONCNTJ: CPT

## 2019-08-05 PROCEDURE — 87116 MYCOBACTERIA CULTURE: CPT | Mod: 59

## 2019-08-05 PROCEDURE — 71000033 HC RECOVERY, INTIAL HOUR: Performed by: SURGERY

## 2019-08-05 PROCEDURE — 87102 FUNGUS ISOLATION CULTURE: CPT | Mod: 59

## 2019-08-05 PROCEDURE — 87077 CULTURE AEROBIC IDENTIFY: CPT

## 2019-08-05 PROCEDURE — 36000707: Performed by: SURGERY

## 2019-08-05 PROCEDURE — 36556 PR INSERT NON-TUNNEL CV CATH 5+ YRS OLD: ICD-10-PCS | Mod: 59,,, | Performed by: ANESTHESIOLOGY

## 2019-08-05 PROCEDURE — 82962 GLUCOSE BLOOD TEST: CPT | Performed by: SURGERY

## 2019-08-05 PROCEDURE — 94002 VENT MGMT INPAT INIT DAY: CPT | Mod: 59

## 2019-08-05 PROCEDURE — 11045 PR DEB SUBQ TISSUE ADD-ON: ICD-10-PCS | Mod: ,,, | Performed by: SURGERY

## 2019-08-05 RX ORDER — SODIUM CHLORIDE 9 MG/ML
INJECTION, SOLUTION INTRAVENOUS CONTINUOUS
Status: ACTIVE | OUTPATIENT
Start: 2019-08-05

## 2019-08-05 RX ORDER — KETAMINE HYDROCHLORIDE 10 MG/ML
INJECTION, SOLUTION INTRAMUSCULAR; INTRAVENOUS
Status: DISCONTINUED | OUTPATIENT
Start: 2019-08-05 | End: 2019-08-05

## 2019-08-05 RX ORDER — LIDOCAINE HCL/PF 100 MG/5ML
SYRINGE (ML) INTRAVENOUS
Status: DISCONTINUED | OUTPATIENT
Start: 2019-08-05 | End: 2019-08-05

## 2019-08-05 RX ORDER — ACETAMINOPHEN 10 MG/ML
INJECTION, SOLUTION INTRAVENOUS
Status: DISCONTINUED | OUTPATIENT
Start: 2019-08-05 | End: 2019-08-05

## 2019-08-05 RX ORDER — PROPOFOL 10 MG/ML
VIAL (ML) INTRAVENOUS
Status: DISCONTINUED | OUTPATIENT
Start: 2019-08-05 | End: 2019-08-05

## 2019-08-05 RX ORDER — SODIUM CHLORIDE 0.9 % (FLUSH) 0.9 %
10 SYRINGE (ML) INJECTION
Status: DISCONTINUED | OUTPATIENT
Start: 2019-08-05 | End: 2019-08-05 | Stop reason: HOSPADM

## 2019-08-05 RX ORDER — SUCCINYLCHOLINE CHLORIDE 20 MG/ML
INJECTION INTRAMUSCULAR; INTRAVENOUS
Status: DISCONTINUED | OUTPATIENT
Start: 2019-08-05 | End: 2019-08-05

## 2019-08-05 RX ORDER — ONDANSETRON 2 MG/ML
INJECTION INTRAMUSCULAR; INTRAVENOUS
Status: DISCONTINUED | OUTPATIENT
Start: 2019-08-05 | End: 2019-08-05

## 2019-08-05 RX ORDER — CEFAZOLIN SODIUM 1 G/3ML
2 INJECTION, POWDER, FOR SOLUTION INTRAMUSCULAR; INTRAVENOUS
Status: COMPLETED | OUTPATIENT
Start: 2019-08-05 | End: 2019-08-05

## 2019-08-05 RX ORDER — PHENYLEPHRINE HYDROCHLORIDE 10 MG/ML
INJECTION INTRAVENOUS
Status: DISCONTINUED | OUTPATIENT
Start: 2019-08-05 | End: 2019-08-05

## 2019-08-05 RX ORDER — FENTANYL CITRATE 50 UG/ML
INJECTION, SOLUTION INTRAMUSCULAR; INTRAVENOUS
Status: DISCONTINUED | OUTPATIENT
Start: 2019-08-05 | End: 2019-08-05

## 2019-08-05 RX ORDER — ONDANSETRON 2 MG/ML
4 INJECTION INTRAMUSCULAR; INTRAVENOUS ONCE AS NEEDED
Status: DISCONTINUED | OUTPATIENT
Start: 2019-08-05 | End: 2019-08-05 | Stop reason: HOSPADM

## 2019-08-05 RX ORDER — DEXAMETHASONE SODIUM PHOSPHATE 4 MG/ML
INJECTION, SOLUTION INTRA-ARTICULAR; INTRALESIONAL; INTRAMUSCULAR; INTRAVENOUS; SOFT TISSUE
Status: DISCONTINUED | OUTPATIENT
Start: 2019-08-05 | End: 2019-08-05

## 2019-08-05 RX ORDER — BUPIVACAINE HYDROCHLORIDE 2.5 MG/ML
INJECTION, SOLUTION EPIDURAL; INFILTRATION; INTRACAUDAL
Status: DISCONTINUED | OUTPATIENT
Start: 2019-08-05 | End: 2019-08-05 | Stop reason: HOSPADM

## 2019-08-05 RX ORDER — ROCURONIUM BROMIDE 10 MG/ML
INJECTION, SOLUTION INTRAVENOUS
Status: DISCONTINUED | OUTPATIENT
Start: 2019-08-05 | End: 2019-08-05

## 2019-08-05 RX ORDER — HYDROCODONE BITARTRATE AND ACETAMINOPHEN 5; 325 MG/1; MG/1
1 TABLET ORAL EVERY 6 HOURS PRN
Qty: 30 TABLET | Refills: 0 | Status: ON HOLD | OUTPATIENT
Start: 2019-08-05 | End: 2020-02-27 | Stop reason: HOSPADM

## 2019-08-05 RX ORDER — MIDAZOLAM HYDROCHLORIDE 1 MG/ML
INJECTION, SOLUTION INTRAMUSCULAR; INTRAVENOUS
Status: DISCONTINUED | OUTPATIENT
Start: 2019-08-05 | End: 2019-08-05

## 2019-08-05 RX ORDER — HYDROCODONE BITARTRATE AND ACETAMINOPHEN 5; 325 MG/1; MG/1
1 TABLET ORAL EVERY 6 HOURS PRN
Status: DISCONTINUED | OUTPATIENT
Start: 2019-08-05 | End: 2019-08-05 | Stop reason: HOSPADM

## 2019-08-05 RX ORDER — LIDOCAINE HYDROCHLORIDE 10 MG/ML
1 INJECTION, SOLUTION EPIDURAL; INFILTRATION; INTRACAUDAL; PERINEURAL ONCE
Status: ACTIVE | OUTPATIENT
Start: 2019-08-05

## 2019-08-05 RX ADMIN — KETAMINE HYDROCHLORIDE 30 MG: 10 INJECTION, SOLUTION INTRAMUSCULAR; INTRAVENOUS at 01:08

## 2019-08-05 RX ADMIN — SUCCINYLCHOLINE CHLORIDE 200 MG: 20 INJECTION, SOLUTION INTRAMUSCULAR; INTRAVENOUS at 01:08

## 2019-08-05 RX ADMIN — DEXMEDETOMIDINE HYDROCHLORIDE 0.25 MCG/KG/HR: 100 INJECTION, SOLUTION, CONCENTRATE INTRAVENOUS at 01:08

## 2019-08-05 RX ADMIN — ROCURONIUM BROMIDE 5 MG: 10 INJECTION, SOLUTION INTRAVENOUS at 01:08

## 2019-08-05 RX ADMIN — PROPOFOL 200 MG: 10 INJECTION, EMULSION INTRAVENOUS at 01:08

## 2019-08-05 RX ADMIN — ONDANSETRON 4 MG: 2 INJECTION INTRAMUSCULAR; INTRAVENOUS at 01:08

## 2019-08-05 RX ADMIN — PHENYLEPHRINE HYDROCHLORIDE 100 MCG: 10 INJECTION INTRAVENOUS at 01:08

## 2019-08-05 RX ADMIN — FENTANYL CITRATE 100 MCG: 50 INJECTION, SOLUTION INTRAMUSCULAR; INTRAVENOUS at 01:08

## 2019-08-05 RX ADMIN — ACETAMINOPHEN 1000 MG: 10 INJECTION, SOLUTION INTRAVENOUS at 01:08

## 2019-08-05 RX ADMIN — CEFAZOLIN 2 G: 330 INJECTION, POWDER, FOR SOLUTION INTRAMUSCULAR; INTRAVENOUS at 01:08

## 2019-08-05 RX ADMIN — MIDAZOLAM HYDROCHLORIDE 2 MG: 1 INJECTION, SOLUTION INTRAMUSCULAR; INTRAVENOUS at 01:08

## 2019-08-05 RX ADMIN — DEXAMETHASONE SODIUM PHOSPHATE 4 MG: 4 INJECTION, SOLUTION INTRAMUSCULAR; INTRAVENOUS at 01:08

## 2019-08-05 RX ADMIN — LIDOCAINE HYDROCHLORIDE 100 MG: 20 INJECTION, SOLUTION INTRAVENOUS at 01:08

## 2019-08-05 NOTE — OP NOTE
DATE 8/5/2019    PREOPERATIVE DIAGNOSIS:  Chronic wound infection of abdomen, subsequent encounter     POSTOPERATIVE DIAGNOSIS:  Chronic wound infection of abdomen, subsequent encounter     PROCEDURE PERFORMED: Debridement of chronic abdominal wound and wound vac placement    ATTENDING SURGEON: Kenyon Chawla MD    HOUSESTAFF SURGEON: Gladis Henson MD (RES)    ANESTHESIA: General    ESTIMATED BLOOD LOSS: 5cc    FINDINGS: Periumbilical fluid collection     COMPLICATIONS: None    DRAINS: None    SPECIMEN: Wound culture    INDICATION: Debridement of chronic abdominal wound with fluid collection and placement of wound vac    PROCEDURE IN DETAIL: The patient was identified in preoperative holding and brought back to the operating room. He was placed supine on the operating room table and padded appropriately. Monitors were applied and there was smooth induction of general endotracheal anesthesia. His abdomen was prepped and draped in the standard sterile surgical fashion. A timeout was performed and all team members present agreed that this was the correct procedure on the correct patient. We also confirmed administration of appropriate preoperative antibiotics.    A midline supra umbilical skin incision was made using a 15 blade scalpel. Bovie electrocautery was used to dissect down to the fluid collection located in the subcutaneous fat. Upon encountering the fluid cultures were obtained. All fluid was evacuated from wound and tissue surrounding was debrided until healthy appearing tissue was encountered. Previously placed IR drain was removed. Area was then irrigated with saline and one black sponge was measured and cut to fit into 5 cm deep by 8 cm wide wound. Wound vac dressing was applied in a sterile fashion and a good seal was confirmed once connected to the wound vac device.     The patient was slow to emerge from sedation and was transferred to recovery still intubated. After some time patient became more  responsive and was then able to extubate in the recovery room safely. From this point on recovery was uncomplicated. All sponge, instrument, and needle counts were reported as correct at the end of the procedure.    Dr. Kenyon Chawla was present and scrubbed for the entire procedure.

## 2019-08-05 NOTE — TRANSFER OF CARE
"Anesthesia Transfer of Care Note    Patient: Ming Harrington    Procedure(s) Performed: Procedure(s) (LRB):  DEBRIDEMENT, WOUND, ABDOMEN (N/A)  APPLICATION, WOUND VAC (N/A)    Patient location: PACU    Anesthesia Type: general    Transport from OR: Upon arrival to PACU/ICU, patient attached to ventilator and auscultated to confirm bilateral breath sounds and adequate TV    Post pain: adequate analgesia    Post assessment: no apparent anesthetic complications    Post vital signs: stable    Level of consciousness: awake and sedated    Nausea/Vomiting: no nausea/vomiting    Complications: none    Transfer of care protocol was followedComments: Delayed emergence, transported via ambubag and connected to vent at PACU for further emergence/extubation      Last vitals:   Visit Vitals  BP (!) 146/87 (BP Location: Right arm, Patient Position: Lying)   Pulse 87   Temp 36.9 °C (98.4 °F) (Oral)   Resp 18   Ht 5' 8" (1.727 m)   Wt 112.5 kg (248 lb)   SpO2 98%   BMI 37.71 kg/m²     "

## 2019-08-05 NOTE — INTERVAL H&P NOTE
The patient has been examined and the H&P has been reviewed:    I concur with the findings and no changes have occurred since H&P was written.    Anesthesia/Surgery risks, benefits and alternative options discussed and understood by patient/family.          Active Hospital Problems    Diagnosis  POA    Wound of groin [S31.109A]  Yes      Resolved Hospital Problems   No resolved problems to display.

## 2019-08-05 NOTE — PROGRESS NOTES
CL dc'd intact,pressure dressing applied, pt girma po applesauce and juice, VSS,resp unlabored,wound vac intact, made ready for Phase II, stable at present.

## 2019-08-05 NOTE — ANESTHESIA POSTPROCEDURE EVALUATION
Anesthesia Post Evaluation    Patient: Ming Harrington    Procedure(s) Performed: Procedure(s) (LRB):  DEBRIDEMENT, WOUND, ABDOMEN (N/A)  APPLICATION, WOUND VAC (N/A)    Final Anesthesia Type: general  Patient location during evaluation: PACU  Patient participation: Yes- Able to Participate  Level of consciousness: awake and alert  Post-procedure vital signs: reviewed and stable  Pain management: adequate  Airway patency: patent  PONV status at discharge: No PONV  Anesthetic complications: yes  Perioperative Events: delayed emergence    Cardiovascular status: blood pressure returned to baseline  Respiratory status: unassisted  Hydration status: euvolemic  Follow-up not needed.  Comments: Aggressive multi-modal regimen used for methadone patient but ended up being too much. Intubated in PACU for about 1hr postop.          Vitals Value Taken Time   /88 8/5/2019  5:01 PM   Temp 37 °C (98.6 °F) 8/5/2019  3:45 PM   Pulse 71 8/5/2019  5:11 PM   Resp 17 8/5/2019  5:11 PM   SpO2 94 % 8/5/2019  5:11 PM   Vitals shown include unvalidated device data.      Event Time     Out of Recovery 17:11:38          Pain/Isis Score: Isis Score: 9 (8/5/2019  5:11 PM)

## 2019-08-05 NOTE — BRIEF OP NOTE
[facility]  Brief Operative Note     SUMMARY     Surgery Date: 8/5/2019     Surgeon(s) and Role:     * Kenyon Chawla MD - Primary     * Gladis Henson MD - Resident - Assisting        Pre-op Diagnosis:  Chronic wound infection of abdomen, subsequent encounter [S31.109D, L08.9]    Post-op Diagnosis:  Post-Op Diagnosis Codes:     * Chronic wound infection of abdomen, subsequent encounter [S31.109D, L08.9]    Procedure(s) (LRB):  DEBRIDEMENT, WOUND, ABDOMEN (N/A)  APPLICATION, WOUND VAC (N/A)    Anesthesia: General    Description of the findings of the procedure: Debridement of abdominal fluid collection and placement of wound vac    Findings/Key Components: Debridement of chronic abdominal fluid collection/wound. Cultures obtained and IR drain removed intra-operatively. Wound vac placed with good seal observed.    Estimated Blood Loss: Minimal         Specimens:   Specimen (12h ago, onward)    None          Discharge Note    SUMMARY     Admit Date: 8/5/2019    Discharge Date and Time:  08/05/2019 3:19 PM    Hospital Course (synopsis of major diagnoses, care, treatment, and services provided during the course of the hospital stay): Patient presented for scheduled debridement of abdominal wound and wound vac placement today. Tolerated the procedure well, without complication. Patient with difficulty awakening from sedation and unable to extubate in OR. He was transferred to PACU on wake up vent. Patient extubated in PACU once awakened from sedation and following commands. Plan to discharge home today with follow up appointment in 2 weeks.       Final Diagnosis: Post-Op Diagnosis Codes:     * Chronic wound infection of abdomen, subsequent encounter [S31.109D, L08.9]    Disposition: Home or Self Care    Follow Up/Patient Instructions:     Medications:  Reconciled Home Medications:      Medication List      START taking these medications    HYDROcodone-acetaminophen 5-325 mg per  tablet  Commonly known as:  NORCO  Take 1 tablet by mouth every 6 (six) hours as needed for Pain.        CHANGE how you take these medications    albuterol-ipratropium 2.5 mg-0.5 mg/3 mL nebulizer solution  Commonly known as:  DUO-NEB  Take 3 mLs by nebulization every 4 (four) hours. Rescue  What changed:    · when to take this  · reasons to take this  · additional instructions     fluticasone furoate-vilanterol 100-25 mcg/dose diskus inhaler  Commonly known as:  BREO  Inhale 1 puff into the lungs once daily. Controller  What changed:    · when to take this  · additional instructions     furosemide 40 MG tablet  Commonly known as:  LASIX  Take 1 tablet (40 mg total) by mouth once daily.  What changed:    · how much to take  · when to take this  · additional instructions     nicotine 14 mg/24 hr  Commonly known as:  NICODERM CQ  Place 1 patch onto the skin once daily.  What changed:  when to take this     tiotropium bromide 2.5 mcg/actuation Mist  Commonly known as:  SPIRIVA RESPIMAT  Inhale 1 each into the lungs once daily. Controller  What changed:    · when to take this  · additional instructions     * VENTOLIN HFA 90 mcg/actuation inhaler  Generic drug:  albuterol  Inhale 1-2 puffs into the lungs every 6 (six) hours as needed for Wheezing or Shortness of Breath. Rescue  What changed:  Another medication with the same name was changed. Make sure you understand how and when to take each.     * albuterol 2.5 mg /3 mL (0.083 %) nebulizer solution  Commonly known as:  PROVENTIL  take 3 mLs Nebulization Every 4 hours, Rescue  What changed:    · when to take this  · reasons to take this         * This list has 2 medication(s) that are the same as other medications prescribed for you. Read the directions carefully, and ask your doctor or other care provider to review them with you.            CONTINUE taking these medications    ADVAIR DISKUS 250-50 mcg/dose diskus inhaler  Generic drug:  fluticasone-salmeterol 250-50  mcg/dose  Inhale 1 puff into the lungs 2 (two) times daily.     ARIPiprazole 10 MG Tab  Commonly known as:  ABILIFY  Take 10 mg by mouth 2 (two) times daily.     ATROVENT HFA 17 mcg/actuation inhaler  Generic drug:  ipratropium  Inhale 2 puffs into the lungs every 4 to 6 hours as needed.     cloNIDine 0.1 MG tablet  Commonly known as:  CATAPRES  Take 3 tablets (0.3 mg total) by mouth 3 (three) times daily.     escitalopram oxalate 10 MG tablet  Commonly known as:  LEXAPRO  Take 1 tablet (10 mg total) by mouth once daily.     metFORMIN 500 MG tablet  Commonly known as:  GLUCOPHAGE  Take 500 mg by mouth daily with breakfast.     methadone 10 mg/5 mL solution  Commonly known as:  DOLOPHINE  Take 90 mg by mouth every morning.     montelukast 10 mg tablet  Commonly known as:  SINGULAIR  Take 10 mg by mouth every evening.     ondansetron 8 MG Tbdl  Commonly known as:  ZOFRAN-ODT  Take 1 tablet (8 mg total) by mouth every 6 (six) hours as needed.     polyethylene glycol 17 gram/dose powder  Commonly known as:  GLYCOLAX  Mix 17 g by mouth 2 (two) times daily.     promethazine 25 MG tablet  Commonly known as:  PHENERGAN  Take 1 tablet (25 mg total) by mouth every 8 (eight) hours as needed for Nausea.     senna-docusate 8.6-50 mg 8.6-50 mg per tablet  Commonly known as:  PERICOLACE  Take 1 tablet by mouth 2 (two) times daily.          Discharge Procedure Orders   Diet Adult Regular     Lifting restrictions   Order Comments: No lifting more than 10 lbs for 6 weeks.     No driving until:   Order Comments: Not taking narcotic pain medication.     Notify your health care provider if you experience any of the following:  increased confusion or weakness     Notify your health care provider if you experience any of the following:  persistent dizziness, light-headedness, or visual disturbances     Notify your health care provider if you experience any of the following:  worsening rash     Notify your health care provider if you  experience any of the following:  severe persistent headache     Notify your health care provider if you experience any of the following:  difficulty breathing or increased cough     Notify your health care provider if you experience any of the following:  redness, tenderness, or signs of infection (pain, swelling, redness, odor or green/yellow discharge around incision site)     Notify your health care provider if you experience any of the following:  severe uncontrolled pain     Notify your health care provider if you experience any of the following:  persistent nausea and vomiting or diarrhea     Notify your health care provider if you experience any of the following:  temperature >100.4     No dressing needed   Order Comments: Wound vac in place.     Activity as tolerated     Follow-up Information     Kenyon Chawla MD In 2 weeks.    Specialties:  General Surgery, Bariatrics  Why:  Post-operative follow up  Contact information:  Ritesh MONTERROSO  [facility] [address] [phone]

## 2019-08-05 NOTE — PROGRESS NOTES
Pt extubated per MD and RT w/o difficulty, resting quietly,VSS resp unlabored,O2 sat 98% 2L NC, girma po apple juice, sister updated omn pt status, wound vac intact at -125mm/Hg, stable at present.

## 2019-08-05 NOTE — ANESTHESIA PROCEDURE NOTES
Central Line    Diagnosis: wound infection  Patient location during procedure: done in OR  Procedure start time: 8/5/2019 1:00 PM  Timeout: 8/5/2019 1:00 PM  Procedure end time: 8/5/2019 1:07 PM    Staffing  Authorizing Provider: Felipe Perez MD  Performing Provider: Felipe Perez MD    Staffing  Anesthesiologist: Sebas Antonio MD  Performed: anesthesiologist and resident/CRNA   Anesthesiologist was present at the time of the procedure.  Preanesthetic Checklist  Completed: patient identified  Indication   Indication: hemodynamic monitoring, vascular access     Anesthesia   local infiltration    Central Line   Skin Prep: skin prepped with Betadine, skin prep agent completely dried prior to procedure  maximum sterile barriers used during central venous catheter insertion  hand hygiene performed prior to central venous catheter insertion  Location: right, internal jugular.   Catheter type: triple lumen  Catheter Size: 7 Fr  Ultrasound: vascular probe with ultrasound  Vessel Caliber: large, patent  Needle advanced into vessel with real time Ultrasound guidance.   Manometry: Venous cannualation confirmed by visual estimation of blood vessel pressure using manometry.  Insertion Attempts: 1   Securement:line sutured, chlorhexidine patch, sterile dressing applied and blood return through all ports    Post-Procedure   Adverse Events:none    Guidewire

## 2019-08-05 NOTE — PLAN OF CARE
Discharge instructions reviewed with pt and sister. Understanding verbalized. No complaints of pain reported. Paper prescriptions given. Pt able to tolerate po intake. wound vac remains at 125mmMg Transported to car with belonging by PCT.

## 2019-08-05 NOTE — DISCHARGE INSTRUCTIONS
PATIENT INSTRUCTIONS  POST-ANESTHESIA    IMMEDIATELY FOLLOWING SURGERY:  Do not drive or operate machinery for the first twenty four hours after surgery.  Do not make any important decisions for twenty four hours after surgery or while taking narcotic pain medications or sedatives.  If you develop intractable nausea and vomiting or a severe headache please notify your doctor immediately.    FOLLOW-UP:  Please make an appointment with your surgeon as instructed. You do not need to follow up with anesthesia unless specifically instructed to do so.    WOUND CARE INSTRUCTIONS (if applicable):  Keep a dry clean dressing on the anesthesia/puncture wound site if there is drainage.  Once the wound has quit draining you may leave it open to air.  Generally you should leave the bandage intact for twenty four hours unless there is drainage.  If the epidural site drains for more than 36-48 hours please call the anesthesia department.    QUESTIONS?:  Please feel free to call your physician or the hospital  if you have any questions, and they will be happy to assist you.         Recovery After Procedural Sedation (Adult)  You have been given medicine by vein to make you sleep during your surgery. This may have included both a pain medicine and sleeping medicine. Most of the effects have worn off. But you may still have some drowsiness for the next 6 to 8 hours.  Home care  Follow these guidelines when you get home:  · For the next 8 hours, you should be watched by a responsible adult. This person should make sure your condition is not getting worse.  · Don't drink any alcohol for the next 24 hours.  · Don't drive, operate dangerous machinery, or make important business or personal decisions during the next 24 hours.  Note: Your healthcare provider may tell you not to take any medicine by mouth for pain or sleep in the next 4 hours. These medicines may react with the medicines you were given in the hospital. This could  cause a much stronger response than usual.  Follow-up care  Follow up with your healthcare provider if you are not alert and back to your usual level of activity within 12 hours.  When to seek medical advice  Call your healthcare provider right away if any of these occur:  · Drowsiness gets worse  · Weakness or dizziness gets worse  · Repeated vomiting  · You can't be awakened   Date Last Reviewed: 10/18/2016  © 5748-9279 [employer]. [address] [place] [address], [place] [address]. All rights reserved. This information is not intended as a substitute for professional medical care. Always follow your healthcare professional's instructions.      Vacuum-Assisted Closure of a Wound  Vacuum-assisted closure (VAC) of a wound is a type of treatment to help wounds heal. Its also known as negative pressure wound therapy. During the treatment, a device lowers air pressure on the wound. This can help the wound heal more quickly.  Understanding the wound VAC system  A wound VAC system has several parts. A foam or gauze dressing is put directly on the wound. The dressing is changed every 24 to 72 hours. An adhesive film covers and seals the dressing and wound. A drainage tube leads from under the adhesive film and connects to a portable vacuum pump. This pump removes air pressure over the wound. It may do this constantly. Or it may do it in cycles. During the treatment, youll need to carry the portable pump everywhere you go.  Why wound VAC is used  You might need this therapy for a recent traumatic wound. Or you may need it for a chronic wound. This is a wound that does not heal the way it should over time. This can happen with wounds in people who have diabetes. You may need a wound VAC if youve had a recent skin graft. And you may need a wound VAC for a large wound. Large wounds can take a longer time to heal.  A wound vacuum system may help your wound heal more quickly by:  · Draining extra fluid from the  wound  · Reducing swelling  · Reducing bacteria in the wound  · Keeping your wound moist and warm  · Helping draw together wound edges  · Increasing blood flow to your wound  · Decreasing inflammation  Wound VAC offers some other advantages over other types of wound care. It may decrease your overall discomfort. The dressings usually need to be changed less often. And they may be easier to keep in place.  Risks of wound VAC  Wound VAC has some rare risks, such as:  · Bleeding (which may be severe)  · Wound infection  · An abnormal connection between the intestinal tract and the skin (enteric fistula)  Proper training in dressing changes can help reduce the risk for these complications. Also, your healthcare provider will carefully evaluate you to make sure you are a good candidate for the therapy. Certain problems can increase your risk for complications. These include:  · Exposed organs or blood vessels  · High risk of bleeding from another medical problem  · Wound infection  · Nearby bone infection  · Dead wound tissue  · Cancer tissue  · Fragile skin, such as from aging or longtime use of topical steroids  · Allergy to adhesive  · Very poor blood flow to your wound  · Wounds close to joints that may reopen because of movement  Your healthcare provider will discuss the risks that apply to you. Make sure to talk with him or her about all of your questions and concerns.  Getting ready for wound VAC  You likely wont need to do much to get ready for wound VAC. In some cases, you may need to wait a while before having this therapy. For example, your healthcare provider may first need to treat an infection in your wound. Dead or damaged tissue may also need to be removed from your wound.  You or a caregiver may need training on how to use the wound VAC device. This is done if you will be able to have your wound vacuum therapy at home. In other cases, you may need to have your wound vacuum therapy in a healthcare  facility.  On the day of your procedure  A healthcare provider will cover your wound with foam or gauze wound dressing. An adhesive film will be put over the dressing and wound. This seals the wound. The foam connects to a drainage tube, which leads to a vacuum pump. This pump is portable. When the pump is turned on, it draws fluid through the foam and out the drainage tubing. The pump may run constantly, or it may cycle off and on. Your exact setup will depend on the specific type of wound vacuum system that you use.  Managing your wound  You may need the dressing changed about once a day. You may need it changed more or less often, depending on your wound. You or your caregiver may be trained to do this at home. Or it may be done by a visiting healthcare provider. Your provider may prescribe a pain medicine. This is to prevent or reduce pain during the dressing change.  You will likely need to use the wound VAC system for several weeks or months. During this time, youll carry the portable pump everywhere you go.  Nutrition for wound healing  During this time, make sure you follow a healthy diet. This is needed so the wound can heal and to prevent infection. Your healthcare provider can tell you more about what to include in your diet during this time.  Follow up with your healthcare provider if you have a medical condition that led to your wound, such as diabetes. Your provider can help you prevent future wounds.  Follow-up care  Your healthcare provider will carefully keep track of your healing. Make sure to keep all follow-up appointments.  When to call your healthcare provider  Call your healthcare provider right away if you have any of these:  · Fever of 100.4°F (38.0°C) or higher  · Increased redness, swelling, or warmth around wound  · Increased pain  · Bright red blood or blood clots in tubing or the collection chamber of the vacuum   Date Last Reviewed: 2/1/2017  © 6669-0117 [employer] [address]  [place] [address]. All rights reserved. This information is not intended as a substitute for professional medical care. Always follow your healthcare professional's instructions.

## 2019-08-05 NOTE — PLAN OF CARE
"Surgery waiting room called for pt sister to come back to preop room. Told to send pt family to preop room 48 in [facility].  Surgery waiting room called back to say "nevermind patient's sister went downstairs to charge her phone she will see him after surgery". Pt belongings sent to locker. Wound vac sent to OR with pt.  "

## 2019-08-07 ENCOUNTER — DOCUMENTATION ONLY (OUTPATIENT)
Dept: BARIATRICS | Facility: CLINIC | Age: 54
End: 2019-08-07

## 2019-08-07 NOTE — PROGRESS NOTES
Phone call: He is not sure if his wound vac is working correctly.  He doesn't know when wound care is coming.  He says the area hurts when he coughs.

## 2019-08-08 LAB
BACTERIA SPEC AEROBE CULT: ABNORMAL

## 2019-08-09 ENCOUNTER — HOSPITAL ENCOUNTER (EMERGENCY)
Facility: HOSPITAL | Age: 54
Discharge: HOME | End: 2019-08-09
Attending: EMERGENCY MEDICINE
Payer: MEDICAID

## 2019-08-09 VITALS
BODY MASS INDEX: 36.07 KG/M2 | RESPIRATION RATE: 18 BRPM | SYSTOLIC BLOOD PRESSURE: 144 MMHG | HEIGHT: 68 IN | HEART RATE: 75 BPM | WEIGHT: 238 LBS | DIASTOLIC BLOOD PRESSURE: 102 MMHG | OXYGEN SATURATION: 93 % | TEMPERATURE: 99 F

## 2019-08-09 DIAGNOSIS — T81.30XA WOUND DISRUPTION, INITIAL ENCOUNTER: Primary | ICD-10-CM

## 2019-08-09 LAB — BACTERIA SPEC ANAEROBE CULT: ABNORMAL

## 2019-08-09 PROCEDURE — 25000003 PHARM REV CODE 250: Performed by: EMERGENCY MEDICINE

## 2019-08-09 PROCEDURE — 99284 EMERGENCY DEPT VISIT MOD MDM: CPT | Mod: ,,, | Performed by: EMERGENCY MEDICINE

## 2019-08-09 PROCEDURE — 99283 EMERGENCY DEPT VISIT LOW MDM: CPT

## 2019-08-09 PROCEDURE — 99284 PR EMERGENCY DEPT VISIT,LEVEL IV: ICD-10-PCS | Mod: ,,, | Performed by: EMERGENCY MEDICINE

## 2019-08-09 RX ORDER — SULFAMETHOXAZOLE AND TRIMETHOPRIM 800; 160 MG/1; MG/1
1 TABLET ORAL
Status: COMPLETED | OUTPATIENT
Start: 2019-08-09 | End: 2019-08-09

## 2019-08-09 RX ORDER — SULFAMETHOXAZOLE AND TRIMETHOPRIM 800; 160 MG/1; MG/1
1 TABLET ORAL 2 TIMES DAILY
Qty: 20 TABLET | Refills: 0 | Status: SHIPPED | OUTPATIENT
Start: 2019-08-09 | End: 2019-08-19

## 2019-08-09 RX ORDER — HYDROCODONE BITARTRATE AND ACETAMINOPHEN 5; 325 MG/1; MG/1
1 TABLET ORAL
Status: COMPLETED | OUTPATIENT
Start: 2019-08-09 | End: 2019-08-09

## 2019-08-09 RX ADMIN — HYDROCODONE BITARTRATE AND ACETAMINOPHEN 1 TABLET: 5; 325 TABLET ORAL at 08:08

## 2019-08-09 RX ADMIN — SULFAMETHOXAZOLE AND TRIMETHOPRIM 1 TABLET: 800; 160 TABLET ORAL at 12:08

## 2019-08-09 NOTE — ED NOTES
"LOC: The patient is awake, alert, and oriented to place, time, situation. Affect is appropriate.  Speech is appropriate and clear.     APPEARANCE: Patient resting comfortably in no acute distress.  Patient is clean and well groomed.    SKIN: The skin is warm and dry; color consistent with ethnicity.  Patient has normal skin turgor and moist mucus membranes.  No breakdown, rash or bruising noted. Pt has wound vac to abdomen (placed Monday) that he accidentally pulled on. Small amount of controlled bleeding from incision site. Pt covered site with plastic bag taped to skin.     MUSCULOSKELETAL: Patient moving upper and lower extremities without difficulty.  Denies weakness and fatigue. Denies pain.    RESPIRATORY: Airway is open and patent. Respirations spontaneous, even, easy, and non-labored.  Patient has a normal effort and rate.  No accessory muscle use noted. Denies cough. Patient denies sob. Pt sating 90-94% on ra. Hx COPD    CARDIAC:  Normal rhythm and rate noted.  No peripheral edema noted. No complaints of chest pain.     ABDOMEN: Soft and tender to palpation LLQ. Reports LLQ/L groin pain 8/10 "shooting". Hx hernia repair 1 month ago.  No distention noted. Patient denies n/v/d.     NEUROLOGIC: Eyes open spontaneously.  Behavior appropriate to situation.  Follows commands; facial expression symmetrical.  Purposeful motor response noted; normal sensation in all extremities. Patient denies headache and dizziness.  "

## 2019-08-09 NOTE — DISCHARGE INSTRUCTIONS
Please follow-up with the wound care clinic for continued management of your wound VAC.    If you have any issues with wound VAC please call your surgeon, feel they are not able to resolve you can return to the emergency department.    He have increasing pain, fevers, redness around the surgical site, or any other new, worsening or worrisome symptoms please return to the emergency department for re-evaluation.
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
## 2019-08-09 NOTE — ED NOTES
Assumed care from Fatmata KELLER. Sitting on ED stretcher. Pt AAOX4, Breathing equal and non labored, In no acute distress. Voiced concerns of being hungry, fed pudding and akash crackers. Updated on POC, pt voiced understanding. SR upx1, call light in reach. Pt voiced understanding to call for any needs. No new needs voiced at this time. Will continue to monitor.
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
## 2019-08-09 NOTE — ED NOTES
Pt. Ambulatory to lobby with steady gait. Awaiting [facility]'s wheelchair transportation home.

## 2019-08-09 NOTE — PLAN OF CARE
Notified by Dr. Gladis Henson patient had OP surgery 8.5, and was sent home, w/home WVAC, & HH was to be arranged (via clinic);However patient told them his insurer told him they could not provide this service in the home. Patient returned, is currently in the ER, & home WVAC was changed by them. She asks for guidance in how can pt get his home WVAC changed. ARA informed her Medicaid insurers never provide in home care for home WVAC changes, so alternate plan would be pt go to [facility] for this care. Informed her an Appt will be arranged for OP Home WVAC changes.     ARA called & spoke to Mann, in scheduling, & asked for an Appt w/Renetta Issa, Wound care RN, for Monday, 8/12.   She will message Renetta to arrange.   This information was placed on discharge paper work for patient to know.

## 2019-08-09 NOTE — ED TRIAGE NOTES
"Pt had hernia repair 1 month ago and was discharged with RAZIA drain. Pt states he was not educated on how to empty/suction drain, so he "just let it be." States he still had fluid collection secondary to not emptying RAZIA drain, so he had a wound vac placed on 8/5. Pt was scratching around wound vac insertion site at 0200 this morning and wound vac came loose. Pt taped plastic bag over site. Reports small amount of bloody drainage; denies purulent drainage. Reports intermittent shooting pain in LLQ/L groin 8/10. Denies taking pain meds this am. Bleeding around site controlled. Denies fevers, chills, n/v/d, ha, dizziness, cp, sob.   "

## 2019-08-12 LAB — BACTERIA SPEC ANAEROBE CULT: NORMAL

## 2019-08-13 ENCOUNTER — OFFICE VISIT (OUTPATIENT)
Dept: SURGERY | Facility: CLINIC | Age: 54
End: 2019-08-13
Payer: MEDICAID

## 2019-08-13 VITALS
SYSTOLIC BLOOD PRESSURE: 153 MMHG | WEIGHT: 238 LBS | HEART RATE: 107 BPM | DIASTOLIC BLOOD PRESSURE: 115 MMHG | BODY MASS INDEX: 36.07 KG/M2 | HEIGHT: 68 IN

## 2019-08-13 DIAGNOSIS — L08.9 CHRONIC WOUND INFECTION OF ABDOMEN, SUBSEQUENT ENCOUNTER: Primary | ICD-10-CM

## 2019-08-13 DIAGNOSIS — S31.109D CHRONIC WOUND INFECTION OF ABDOMEN, SUBSEQUENT ENCOUNTER: Primary | ICD-10-CM

## 2019-08-13 DIAGNOSIS — I50.9 CONGESTIVE HEART FAILURE, UNSPECIFIED HF CHRONICITY, UNSPECIFIED HEART FAILURE TYPE: Primary | ICD-10-CM

## 2019-08-13 PROBLEM — R18.8 ABDOMINAL FLUID COLLECTION: Status: RESOLVED | Noted: 2019-05-23 | Resolved: 2019-08-13

## 2019-08-13 PROBLEM — R78.81 BACTEREMIA: Status: RESOLVED | Noted: 2019-03-01 | Resolved: 2019-08-13

## 2019-08-13 PROCEDURE — 99213 OFFICE O/P EST LOW 20 MIN: CPT | Mod: PBBFAC | Performed by: SURGERY

## 2019-08-13 PROCEDURE — 99024 PR POST-OP FOLLOW-UP VISIT: ICD-10-PCS | Mod: ,,, | Performed by: SURGERY

## 2019-08-13 PROCEDURE — 99999 PR PBB SHADOW E&M-EST. PATIENT-LVL III: ICD-10-PCS | Mod: PBBFAC,,, | Performed by: SURGERY

## 2019-08-13 PROCEDURE — 99024 POSTOP FOLLOW-UP VISIT: CPT | Mod: ,,, | Performed by: SURGERY

## 2019-08-13 PROCEDURE — 99999 PR PBB SHADOW E&M-EST. PATIENT-LVL III: CPT | Mod: PBBFAC,,, | Performed by: SURGERY

## 2019-08-13 RX ORDER — FUROSEMIDE 40 MG/1
20 TABLET ORAL 2 TIMES DAILY
Qty: 90 TABLET | Refills: 3 | Status: SHIPPED | OUTPATIENT
Start: 2019-08-13 | End: 2019-10-29 | Stop reason: SDUPTHER

## 2019-08-13 RX ORDER — OLANZAPINE 10 MG/1
TABLET ORAL
Status: ON HOLD | COMMUNITY
Start: 2019-08-02 | End: 2019-10-16

## 2019-08-13 NOTE — PROGRESS NOTES
I have seen the patient, reviewed the Resident's history and physical, assessment and plan. I have personally interviewed and examined the patient at bedside and: agree with the findings.     Wound vac replaced today.

## 2019-08-13 NOTE — PROGRESS NOTES
"Subjective:       Ming Harrington s/p wound vac placement on 8/5/19 for a chronic abdominal wound/fluid collection presents to the clinic for wound vac management. States his wound vac lost suction on Sunday and fluid began leaking around wound vac dressing. He last had it changed on Friday 8/8/19 and was working well at time of discharge. He was supposed to follow up at wound care clinic near his home but states that they never made an effort to contact him and he was unable to call them. He will need to schedule clinic appointments to have his wound vac changes as he does not qualify for changes at home and he was unable to schedule appointments with the wound care clinic.     Objective:      BP (!) 153/115   Pulse 107   Ht 5' 8" (1.727 m)   Wt 108 kg (238 lb)   BMI 36.19 kg/m²     General:  alert, appears stated age, cooperative and no distress   Abdomen: soft, bowel sounds active, non-tender   Incision:   Midline abdominal incision s/p debridement with granulation tissue beginning to form in areas, still with small areas of purulent fluid. Wound vac dressing replaced and cannister/hoses changed. Now working well with good seal achieved.       Assessment:       54 yo M with chronic abdominal wound with fluid collection s/p incision and debridement with wound vac placement presents to clinic for wound vac management. Dressing and sponge removed in clinic today. Wound was inspected and new sponge/dressing placed with good seal achieved.          Plan:      1. Continue antibiotics as prescribed  2. Wound care discussed  3. Follow up in clinic next week for wound vac change     Gladis Henson MD  General Surgery, PGY-I  Pager: [phone]  8/13/2019 12:56 PM    "

## 2019-08-20 ENCOUNTER — TELEPHONE (OUTPATIENT)
Dept: SURGERY | Facility: CLINIC | Age: 54
End: 2019-08-20

## 2019-08-20 ENCOUNTER — NURSE TRIAGE (OUTPATIENT)
Dept: ADMINISTRATIVE | Facility: CLINIC | Age: 54
End: 2019-08-20

## 2019-08-20 NOTE — TELEPHONE ENCOUNTER
Reason for Disposition   Health Information question, no triage required and triager able to answer question    Additional Information   Negative: RN needs further essential information from caller in order to complete triage   Negative: Requesting regular office appointment   Negative: [1] Caller requesting NON-URGENT health information AND [2] PCP's office is the best resource    Protocols used: INFORMATION ONLY CALL-A-AH    Pt states his wound vac machine is beeping. Pt was able to troubleshoot machine and it is now working.  Pt denies need for further assistance.

## 2019-08-20 NOTE — TELEPHONE ENCOUNTER
Patient informed me that he has another appointment today (bonifacio) . He'll call me once he's done and will try to make it here afterwards. I explained to him that Dr. Chawla would be in another clinic this afternoon . Pt v/u and do his best to get here before clinic ends.

## 2019-08-22 ENCOUNTER — HOSPITAL ENCOUNTER (EMERGENCY)
Facility: HOSPITAL | Age: 54
Discharge: LEFT BEFORE DISCHARGE | End: 2019-08-22
Attending: EMERGENCY MEDICINE
Payer: MEDICAID

## 2019-08-22 ENCOUNTER — OFFICE VISIT (OUTPATIENT)
Dept: SURGERY | Facility: CLINIC | Age: 54
End: 2019-08-22
Payer: MEDICAID

## 2019-08-22 VITALS
BODY MASS INDEX: 35.99 KG/M2 | TEMPERATURE: 99 F | DIASTOLIC BLOOD PRESSURE: 93 MMHG | HEART RATE: 89 BPM | HEIGHT: 69 IN | SYSTOLIC BLOOD PRESSURE: 147 MMHG | RESPIRATION RATE: 22 BRPM | WEIGHT: 243 LBS | OXYGEN SATURATION: 95 %

## 2019-08-22 VITALS
BODY MASS INDEX: 31.4 KG/M2 | SYSTOLIC BLOOD PRESSURE: 114 MMHG | WEIGHT: 212 LBS | DIASTOLIC BLOOD PRESSURE: 71 MMHG | HEIGHT: 69 IN | HEART RATE: 72 BPM | TEMPERATURE: 99 F

## 2019-08-22 DIAGNOSIS — L08.9 CHRONIC WOUND INFECTION OF ABDOMEN, SUBSEQUENT ENCOUNTER: Primary | ICD-10-CM

## 2019-08-22 DIAGNOSIS — S31.109D CHRONIC WOUND INFECTION OF ABDOMEN, SUBSEQUENT ENCOUNTER: Primary | ICD-10-CM

## 2019-08-22 DIAGNOSIS — J45.901 ACUTE EXACERBATION OF COPD WITH ASTHMA: ICD-10-CM

## 2019-08-22 DIAGNOSIS — J44.1 ACUTE EXACERBATION OF COPD WITH ASTHMA: ICD-10-CM

## 2019-08-22 PROCEDURE — 99024 POSTOP FOLLOW-UP VISIT: CPT | Mod: ,,, | Performed by: SURGERY

## 2019-08-22 PROCEDURE — 99281 EMR DPT VST MAYX REQ PHY/QHP: CPT | Mod: 27

## 2019-08-22 PROCEDURE — 99999 PR PBB SHADOW E&M-EST. PATIENT-LVL III: ICD-10-PCS | Mod: PBBFAC,,, | Performed by: SURGERY

## 2019-08-22 PROCEDURE — 99284 EMERGENCY DEPT VISIT MOD MDM: CPT | Mod: ,,, | Performed by: PHYSICIAN ASSISTANT

## 2019-08-22 PROCEDURE — 99213 OFFICE O/P EST LOW 20 MIN: CPT | Mod: PBBFAC | Performed by: SURGERY

## 2019-08-22 PROCEDURE — 99024 PR POST-OP FOLLOW-UP VISIT: ICD-10-PCS | Mod: ,,, | Performed by: SURGERY

## 2019-08-22 PROCEDURE — 99284 PR EMERGENCY DEPT VISIT,LEVEL IV: ICD-10-PCS | Mod: ,,, | Performed by: PHYSICIAN ASSISTANT

## 2019-08-22 PROCEDURE — 99999 PR PBB SHADOW E&M-EST. PATIENT-LVL III: CPT | Mod: PBBFAC,,, | Performed by: SURGERY

## 2019-08-22 RX ORDER — ALBUTEROL SULFATE 2.5 MG/.5ML
2.5 SOLUTION RESPIRATORY (INHALATION)
Status: DISCONTINUED | OUTPATIENT
Start: 2019-08-22 | End: 2019-08-22

## 2019-08-22 RX ORDER — IPRATROPIUM BROMIDE AND ALBUTEROL SULFATE 2.5; .5 MG/3ML; MG/3ML
3 SOLUTION RESPIRATORY (INHALATION)
Status: DISCONTINUED | OUTPATIENT
Start: 2019-08-22 | End: 2019-08-22

## 2019-08-22 RX ORDER — KETOROLAC TROMETHAMINE 10 MG/1
10 TABLET, FILM COATED ORAL EVERY 6 HOURS PRN
Qty: 15 TABLET | Refills: 0 | Status: ON HOLD | OUTPATIENT
Start: 2019-08-22 | End: 2020-02-27 | Stop reason: HOSPADM

## 2019-08-22 RX ORDER — POLYETHYLENE GLYCOL 3350 17 G/17G
17 POWDER, FOR SOLUTION ORAL DAILY
Qty: 30 PACKET | Refills: 0 | Status: SHIPPED | OUTPATIENT
Start: 2019-08-22 | End: 2022-01-26

## 2019-08-22 NOTE — PROGRESS NOTES
I have seen the patient, reviewed the Resident's history and physical, assessment and plan. I have personally interviewed and examined the patient at bedside and: agree with the findings.   Wound healing fairly well.  Continue wound vac.  Rtc 1 month.  May use Toradol and needs mirilax for constipation.

## 2019-08-22 NOTE — ED TRIAGE NOTES
Patient reports to the ED today with reports of SOB. Patient states that he did not have his inhaler with him. Hx of asthma

## 2019-08-22 NOTE — ED NOTES
Patient identifiers verified and correct for Don White  LOC: The patient is awake, alert and aware of environment with an appropriate affect, the patient is oriented x 3 and speaking appropriately.   APPEARANCE: Patient appears comfortable and in no acute distress, patient is clean and well groomed.  SKIN: The skin is warm and dry, color consistent with ethnicity, patient has normal skin turgor and moist mucus membranes, skin intact, no breakdown or bruising noted.   MUSCULOSKELETAL: Patient moving all extremities spontaneously, no swelling noted.  RESPIRATORY: Airway is open and patent, respirations are spontaneous, patient has a normal effort and rate, no accessory muscle use noted, O2 sats noted at 95% on room air. Reports of SOB  CARDIAC: Denies chest pain capillary refill < 3 seconds.   GASTRO: Soft and non tender to palpation, no distention noted, normoactive bowel sounds present in all four quadrants. Pt states bowel movements have been regular.  : Pt denies any pain or frequency with urination.  NEURO: Pt opens eyes spontaneously, behavior appropriate to situation, follows commands, facial expression symmetrical, bilateral hand grasp equal and even, purposeful motor response noted, normal sensation in all extremities when touched with a finger.

## 2019-08-22 NOTE — PROGRESS NOTES
"Subjective:       Ming Harrington s/p wound vac placement on 8/5/19 for a chronic abdominal wound/fluid collection presents to the clinic for wound vac management.  He last had it changed on Friday 8/13/19 He was supposed to follow up at wound care clinic near his home but states that they never made an effort to contact him and he was unable to call them. He will need to schedule clinic appointments to have his wound vac changes as he does not qualify for changes at home and he was unable to schedule appointments with the wound care clinic.     Objective:      /71   Pulse 72   Temp 98.9 °F (37.2 °C) (Oral)   Ht 5' 9" (1.753 m)   Wt 96.2 kg (212 lb)   BMI 31.31 kg/m²     General:  alert, appears stated age, cooperative and no distress   Abdomen: soft, bowel sounds active, non-tender   Incision:   Midline abdominal incision s/p debridement with granulation tissue beginning to form in areas, still with small areas of purulent fluid. Wound vac dressing replaced and cannister/hoses changed. Now working well with good seal achieved.  Wound 7*6*4       Assessment:       52 yo M with chronic abdominal wound with fluid collection s/p incision and debridement with wound vac placement presents to clinic for wound vac management. Dressing and sponge removed in clinic today. Wound was inspected and new sponge/dressing placed with good seal achieved.          Plan:     1. Continue antibiotics as prescribed  2. Wound care discussed  3. Follow up in clinic 1 month, continuer wound vac changes   4.  Mirolax for constipation and toradol for pain control       Alo Amezquita. MD   General Surgery PGY-I  Pager [phone]    "
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
## 2019-09-05 LAB
FUNGUS SPEC CULT: NORMAL
FUNGUS SPEC CULT: NORMAL

## 2019-09-10 ENCOUNTER — OFFICE VISIT (OUTPATIENT)
Dept: SURGERY | Facility: CLINIC | Age: 54
End: 2019-09-10
Payer: MEDICAID

## 2019-09-10 DIAGNOSIS — S31.109D CHRONIC WOUND INFECTION OF ABDOMEN, SUBSEQUENT ENCOUNTER: Primary | ICD-10-CM

## 2019-09-10 DIAGNOSIS — L08.9 CHRONIC WOUND INFECTION OF ABDOMEN, SUBSEQUENT ENCOUNTER: Primary | ICD-10-CM

## 2019-09-10 PROCEDURE — 99211 OFF/OP EST MAY X REQ PHY/QHP: CPT | Mod: PBBFAC | Performed by: SURGERY

## 2019-09-10 PROCEDURE — 99999 PR PBB SHADOW E&M-EST. PATIENT-LVL I: CPT | Mod: PBBFAC,,, | Performed by: SURGERY

## 2019-09-10 PROCEDURE — 99999 PR PBB SHADOW E&M-EST. PATIENT-LVL I: ICD-10-PCS | Mod: PBBFAC,,, | Performed by: SURGERY

## 2019-09-10 PROCEDURE — 99212 OFFICE O/P EST SF 10 MIN: CPT | Mod: S$PBB,,, | Performed by: SURGERY

## 2019-09-10 PROCEDURE — 99212 PR OFFICE/OUTPT VISIT, EST, LEVL II, 10-19 MIN: ICD-10-PCS | Mod: S$PBB,,, | Performed by: SURGERY

## 2019-09-10 RX ORDER — GABAPENTIN 300 MG/1
300 CAPSULE ORAL 2 TIMES DAILY
Qty: 40 CAPSULE | Refills: 0 | Status: ON HOLD | OUTPATIENT
Start: 2019-09-10 | End: 2019-10-16

## 2019-09-10 NOTE — PROGRESS NOTES
Subjective:       Ming Harrington s/p wound vac placement on 8/5/19 for a chronic abdominal wound/fluid collection presents to the clinic for wound vac management.  He last had it changed on 9/3/19 He was supposed to follow up at wound care clinic near his home but states that they never made an effort to contact him and he was unable to call them. He will need to schedule clinic appointments to have his wound vac changes as he does not qualify for changes at home and he was unable to schedule appointments with the wound care clinic.     Objective:      There were no vitals taken for this visit.    General:  alert, appears stated age, cooperative and no distress   Abdomen: soft, bowel sounds active, non-tender   Incision:   Midline abdominal incision s/p debridement with granulation tissue beginning to form in areas, still with small areas of purulent fluid. Wound vac dressing replaced and cannister/hoses changed. Now working well with good seal achieved.  Wound length: 5cm Width: 1.2cm Depth: 1.5cm       Assessment:       54 yo M with chronic abdominal wound with fluid collection s/p incision and debridement with wound vac placement presents to clinic for wound vac management. Dressing and sponge removed in clinic today. Wound was inspected and new sponge/dressing placed with good seal achieved.          Plan:     1. Continue with wound vac  2. Wound care discussed  3. Follow up in clinic 1 month, continue wound vac changes   4.  Gabapentin for pain control      Alo Amezquita. MD   General Surgery PGY-I  Pager [phone]

## 2019-09-10 NOTE — PROGRESS NOTES
I have seen the patient, reviewed the Resident's history and physical, assessment and plan. I have personally interviewed and examined the patient at bedside and: agree with the findings.     Healing well.  Likely will need one more month of wound vac.

## 2019-09-14 ENCOUNTER — HOSPITAL ENCOUNTER (EMERGENCY)
Facility: HOSPITAL | Age: 54
Discharge: HOME | End: 2019-09-14
Attending: EMERGENCY MEDICINE
Payer: MEDICAID

## 2019-09-14 VITALS
BODY MASS INDEX: 36.29 KG/M2 | OXYGEN SATURATION: 95 % | WEIGHT: 245 LBS | HEIGHT: 69 IN | SYSTOLIC BLOOD PRESSURE: 112 MMHG | TEMPERATURE: 99 F | RESPIRATION RATE: 18 BRPM | DIASTOLIC BLOOD PRESSURE: 68 MMHG | HEART RATE: 76 BPM

## 2019-09-14 DIAGNOSIS — Z51.89 VISIT FOR WOUND CHECK: ICD-10-CM

## 2019-09-14 DIAGNOSIS — S31.109D OPEN ABDOMINAL WALL WOUND, SUBSEQUENT ENCOUNTER: Primary | ICD-10-CM

## 2019-09-14 PROCEDURE — 99284 EMERGENCY DEPT VISIT MOD MDM: CPT | Mod: ,,, | Performed by: PHYSICIAN ASSISTANT

## 2019-09-14 PROCEDURE — 99281 EMR DPT VST MAYX REQ PHY/QHP: CPT

## 2019-09-14 PROCEDURE — 99284 PR EMERGENCY DEPT VISIT,LEVEL IV: ICD-10-PCS | Mod: ,,, | Performed by: PHYSICIAN ASSISTANT

## 2019-09-14 NOTE — ED TRIAGE NOTES
"Ming Harrington, a 53 y.o. male presents to the ED with complaints of mid abdominal wound vac alarming. States machine said "tubing clogged, seek professional help" around 4-5am. Wound vac dressing changed yesterday.     LOC: The patient is awake, alert, aware of environment with an appropriate affect. Oriented x3, speaking appropriately per baseline  APPEARANCE: Pt resting comfortably, in no acute distress, pt is clean and well groomed, clothing properly fastened.  SKIN: Skin warm, dry, normal skin turgor, moist mucus membranes. Wound vac noted just above umbilicus. Appears to have good seal. Vacuumed turned off by patient PTA due to "it kept alarming"  RESPIRATORY: Airway is open and patent, respirations are spontaneous, even and unlabored, normal effort and rate  MUSCULOSKELETAL: No obvious deformities.    "
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
## 2019-09-14 NOTE — ASSESSMENT & PLAN NOTE
- Changed WV.   - Wound vac functioning.   - Continue f/u with Dr. Chawla [facility] for changes.
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
## 2019-09-14 NOTE — HPI
54 y/o male with malfunctioning WV s/p wound vac placement on 8/5/19 for a chronic abdominal wound/fluid collection.  He last had it changed on 9/11/19.     Sabrina pad tubing clogged. WV alarming.

## 2019-09-17 ENCOUNTER — OFFICE VISIT (OUTPATIENT)
Dept: SURGERY | Facility: CLINIC | Age: 54
End: 2019-09-17
Payer: MEDICAID

## 2019-09-17 VITALS
BODY MASS INDEX: 36.29 KG/M2 | TEMPERATURE: 98 F | DIASTOLIC BLOOD PRESSURE: 65 MMHG | SYSTOLIC BLOOD PRESSURE: 128 MMHG | HEIGHT: 69 IN | WEIGHT: 245 LBS | HEART RATE: 65 BPM

## 2019-09-17 DIAGNOSIS — S31.109D CHRONIC WOUND INFECTION OF ABDOMEN, SUBSEQUENT ENCOUNTER: Primary | ICD-10-CM

## 2019-09-17 DIAGNOSIS — L08.9 CHRONIC WOUND INFECTION OF ABDOMEN, SUBSEQUENT ENCOUNTER: Primary | ICD-10-CM

## 2019-09-17 PROCEDURE — 99212 PR OFFICE/OUTPT VISIT, EST, LEVL II, 10-19 MIN: ICD-10-PCS | Mod: S$PBB,,, | Performed by: SURGERY

## 2019-09-17 PROCEDURE — 99213 OFFICE O/P EST LOW 20 MIN: CPT | Mod: PBBFAC | Performed by: SURGERY

## 2019-09-17 PROCEDURE — 99212 OFFICE O/P EST SF 10 MIN: CPT | Mod: S$PBB,,, | Performed by: SURGERY

## 2019-09-17 PROCEDURE — 99999 PR PBB SHADOW E&M-EST. PATIENT-LVL III: CPT | Mod: PBBFAC,,, | Performed by: SURGERY

## 2019-09-17 PROCEDURE — 99999 PR PBB SHADOW E&M-EST. PATIENT-LVL III: ICD-10-PCS | Mod: PBBFAC,,, | Performed by: SURGERY

## 2019-09-17 NOTE — PROGRESS NOTES
"Subjective:       Ming Harrington s/p wound vac placement on 8/5/19 for a chronic abdominal wound/fluid collection presents to the clinic for wound vac management.  He last had it changed on 9/3/19 He was supposed to follow up at wound care clinic near his home but states that they never made an effort to contact him and he was unable to call them. He will need to schedule clinic appointments to have his wound vac changes as he does not qualify for changes at home and he was unable to schedule appointments with the wound care clinic.     Objective:      /65   Pulse 65   Temp 98.3 °F (36.8 °C)   Ht 5' 9" (1.753 m)   Wt 111.1 kg (245 lb)   BMI 36.18 kg/m²     General:  alert, appears stated age, cooperative and no distress   Abdomen: soft, bowel sounds active, non-tender   Incision:   Midline abdominal incision s/p debridement with granulation tissue beginning to form in areas, still with small areas of purulent fluid. Wound vac dressing replaced and cannister/hoses changed. Now working well with good seal achieved.  Wound length: 5cm Width: 1.2cm Depth: 2mm       Assessment:       52 yo M with chronic abdominal wound with fluid collection s/p incision and debridement with wound vac placement presents to clinic for wound vac management. Dressing and sponge removed in clinic today. Wound was inspected and there is no need for wound vac. Can continue with wet and dry dressing changes daily. Patient was instructed in wound care, all questions were answered.       Plan:     1. Continue with wound vac  2. Wound care discussed  3. Follow up in clinic 2months. Wet and dry dressing Will try medihoney  4.  Gabapentin for pain control      Alo Amezquita. MD   General Surgery PGY-I  Pager [phone]  "

## 2019-09-17 NOTE — PROGRESS NOTES
I have seen the patient, reviewed the Resident's history and physical, assessment and plan. I have personally interviewed and examined the patient at bedside and: agree with the findings.     Wound doesn't need wound vac any more.  Continue with daily wet to dry.  Try medihoney.  Rtc 2 months.

## 2019-10-07 LAB
ACID FAST MOD KINY STN SPEC: NORMAL
ACID FAST MOD KINY STN SPEC: NORMAL
MYCOBACTERIUM SPEC QL CULT: NORMAL
MYCOBACTERIUM SPEC QL CULT: NORMAL

## 2019-10-16 ENCOUNTER — HOSPITAL ENCOUNTER (INPATIENT)
Facility: HOSPITAL | Age: 54
LOS: 2 days | Discharge: HOME | DRG: 191 | End: 2019-10-18
Attending: EMERGENCY MEDICINE | Admitting: FAMILY MEDICINE
Payer: MEDICAID

## 2019-10-16 DIAGNOSIS — R06.02 SHORTNESS OF BREATH: ICD-10-CM

## 2019-10-16 DIAGNOSIS — J44.1 COPD WITH ACUTE EXACERBATION: Primary | ICD-10-CM

## 2019-10-16 PROBLEM — I50.9 CHF (CONGESTIVE HEART FAILURE): Status: RESOLVED | Noted: 2019-03-28 | Resolved: 2019-10-16

## 2019-10-16 PROBLEM — E87.5 HYPERKALEMIA: Status: RESOLVED | Noted: 2017-10-01 | Resolved: 2019-10-16

## 2019-10-16 PROBLEM — J96.02 ACUTE RESPIRATORY FAILURE WITH HYPERCAPNIA: Status: RESOLVED | Noted: 2017-10-01 | Resolved: 2019-10-16

## 2019-10-16 PROBLEM — F19.939 DRUG WITHDRAWAL: Status: RESOLVED | Noted: 2017-10-01 | Resolved: 2019-10-16

## 2019-10-16 PROBLEM — R60.0 BILATERAL LOWER EXTREMITY EDEMA: Status: ACTIVE | Noted: 2019-10-16

## 2019-10-16 PROBLEM — S31.109A WOUND OF GROIN: Status: RESOLVED | Noted: 2019-08-05 | Resolved: 2019-10-16

## 2019-10-16 LAB
ALBUMIN SERPL BCP-MCNC: 3.3 G/DL (ref 3.5–5.2)
ALP SERPL-CCNC: 71 U/L (ref 55–135)
ALT SERPL W/O P-5'-P-CCNC: 15 U/L (ref 10–44)
AMPHET+METHAMPHET UR QL: NEGATIVE
ANION GAP SERPL CALC-SCNC: 8 MMOL/L (ref 8–16)
AST SERPL-CCNC: 22 U/L (ref 10–40)
BARBITURATES UR QL SCN>200 NG/ML: NEGATIVE
BASOPHILS # BLD AUTO: 0.02 K/UL (ref 0–0.2)
BASOPHILS NFR BLD: 0.2 % (ref 0–1.9)
BENZODIAZ UR QL SCN>200 NG/ML: NEGATIVE
BILIRUB SERPL-MCNC: 0.9 MG/DL (ref 0.1–1)
BNP SERPL-MCNC: 18 PG/ML (ref 0–99)
BUN SERPL-MCNC: 10 MG/DL (ref 6–20)
BZE UR QL SCN: NEGATIVE
CALCIUM SERPL-MCNC: 9.4 MG/DL (ref 8.7–10.5)
CANNABINOIDS UR QL SCN: NEGATIVE
CHLORIDE SERPL-SCNC: 104 MMOL/L (ref 95–110)
CO2 SERPL-SCNC: 26 MMOL/L (ref 23–29)
CREAT SERPL-MCNC: 1 MG/DL (ref 0.5–1.4)
CREAT UR-MCNC: 121.6 MG/DL (ref 23–375)
D DIMER PPP IA.FEU-MCNC: 0.53 MG/L FEU
DIFFERENTIAL METHOD: ABNORMAL
EOSINOPHIL # BLD AUTO: 0.1 K/UL (ref 0–0.5)
EOSINOPHIL NFR BLD: 1.2 % (ref 0–8)
ERYTHROCYTE [DISTWIDTH] IN BLOOD BY AUTOMATED COUNT: 12.8 % (ref 11.5–14.5)
EST. GFR  (AFRICAN AMERICAN): >60 ML/MIN/1.73 M^2
EST. GFR  (NON AFRICAN AMERICAN): >60 ML/MIN/1.73 M^2
ESTIMATED AVG GLUCOSE: 85 MG/DL (ref 68–131)
GLUCOSE SERPL-MCNC: 96 MG/DL (ref 70–110)
HBA1C MFR BLD HPLC: 4.6 % (ref 4–5.6)
HCT VFR BLD AUTO: 37.8 % (ref 40–54)
HGB BLD-MCNC: 12.3 G/DL (ref 14–18)
LYMPHOCYTES # BLD AUTO: 2.1 K/UL (ref 1–4.8)
LYMPHOCYTES NFR BLD: 25.7 % (ref 18–48)
MCH RBC QN AUTO: 30.7 PG (ref 27–31)
MCHC RBC AUTO-ENTMCNC: 32.5 G/DL (ref 32–36)
MCV RBC AUTO: 94 FL (ref 82–98)
METHADONE UR QL SCN>300 NG/ML: NORMAL
MONOCYTES # BLD AUTO: 0.9 K/UL (ref 0.3–1)
MONOCYTES NFR BLD: 11.6 % (ref 4–15)
NEUTROPHILS # BLD AUTO: 5 K/UL (ref 1.8–7.7)
NEUTROPHILS NFR BLD: 61.3 % (ref 38–73)
OPIATES UR QL SCN: NORMAL
PCP UR QL SCN>25 NG/ML: NEGATIVE
PLATELET # BLD AUTO: 240 K/UL (ref 150–350)
PMV BLD AUTO: 9.4 FL (ref 9.2–12.9)
POTASSIUM SERPL-SCNC: 4.4 MMOL/L (ref 3.5–5.1)
PROCALCITONIN SERPL IA-MCNC: 0.02 NG/ML
PROCALCITONIN SERPL IA-MCNC: 0.03 NG/ML
PROT SERPL-MCNC: 8.4 G/DL (ref 6–8.4)
RBC # BLD AUTO: 4.01 M/UL (ref 4.6–6.2)
SODIUM SERPL-SCNC: 138 MMOL/L (ref 136–145)
TOXICOLOGY INFORMATION: NORMAL
TROPONIN I SERPL DL<=0.01 NG/ML-MCNC: <0.006 NG/ML (ref 0–0.03)
WBC # BLD AUTO: 8.13 K/UL (ref 3.9–12.7)

## 2019-10-16 PROCEDURE — 85025 COMPLETE CBC W/AUTO DIFF WBC: CPT

## 2019-10-16 PROCEDURE — 84145 PROCALCITONIN (PCT): CPT

## 2019-10-16 PROCEDURE — 25000003 PHARM REV CODE 250: Performed by: EMERGENCY MEDICINE

## 2019-10-16 PROCEDURE — 85379 FIBRIN DEGRADATION QUANT: CPT

## 2019-10-16 PROCEDURE — 80053 COMPREHEN METABOLIC PANEL: CPT

## 2019-10-16 PROCEDURE — 84484 ASSAY OF TROPONIN QUANT: CPT

## 2019-10-16 PROCEDURE — 25000003 PHARM REV CODE 250: Performed by: STUDENT IN AN ORGANIZED HEALTH CARE EDUCATION/TRAINING PROGRAM

## 2019-10-16 PROCEDURE — 25000242 PHARM REV CODE 250 ALT 637 W/ HCPCS: Performed by: STUDENT IN AN ORGANIZED HEALTH CARE EDUCATION/TRAINING PROGRAM

## 2019-10-16 PROCEDURE — 11000001 HC ACUTE MED/SURG PRIVATE ROOM

## 2019-10-16 PROCEDURE — 63600175 PHARM REV CODE 636 W HCPCS: Performed by: STUDENT IN AN ORGANIZED HEALTH CARE EDUCATION/TRAINING PROGRAM

## 2019-10-16 PROCEDURE — 80307 DRUG TEST PRSMV CHEM ANLYZR: CPT

## 2019-10-16 PROCEDURE — 83880 ASSAY OF NATRIURETIC PEPTIDE: CPT

## 2019-10-16 PROCEDURE — 93005 ELECTROCARDIOGRAM TRACING: CPT

## 2019-10-16 PROCEDURE — 96372 THER/PROPH/DIAG INJ SC/IM: CPT

## 2019-10-16 PROCEDURE — 99291 CRITICAL CARE FIRST HOUR: CPT | Mod: 25

## 2019-10-16 PROCEDURE — 93010 EKG 12-LEAD: ICD-10-PCS | Mod: ,,, | Performed by: INTERNAL MEDICINE

## 2019-10-16 PROCEDURE — 96365 THER/PROPH/DIAG IV INF INIT: CPT

## 2019-10-16 PROCEDURE — 83036 HEMOGLOBIN GLYCOSYLATED A1C: CPT

## 2019-10-16 PROCEDURE — 84145 PROCALCITONIN (PCT): CPT | Mod: 91

## 2019-10-16 PROCEDURE — 27000221 HC OXYGEN, UP TO 24 HOURS

## 2019-10-16 PROCEDURE — 63600175 PHARM REV CODE 636 W HCPCS: Performed by: EMERGENCY MEDICINE

## 2019-10-16 PROCEDURE — 94640 AIRWAY INHALATION TREATMENT: CPT

## 2019-10-16 PROCEDURE — 25000242 PHARM REV CODE 250 ALT 637 W/ HCPCS: Performed by: EMERGENCY MEDICINE

## 2019-10-16 PROCEDURE — 94644 CONT INHLJ TX 1ST HOUR: CPT

## 2019-10-16 PROCEDURE — 93010 ELECTROCARDIOGRAM REPORT: CPT | Mod: ,,, | Performed by: INTERNAL MEDICINE

## 2019-10-16 RX ORDER — METHYLPREDNISOLONE SOD SUCC 125 MG
125 VIAL (EA) INJECTION
Status: COMPLETED | OUTPATIENT
Start: 2019-10-16 | End: 2019-10-16

## 2019-10-16 RX ORDER — PREDNISONE 20 MG/1
40 TABLET ORAL DAILY
Status: DISCONTINUED | OUTPATIENT
Start: 2019-10-17 | End: 2019-10-16

## 2019-10-16 RX ORDER — GLUCAGON 1 MG
1 KIT INJECTION
Status: DISCONTINUED | OUTPATIENT
Start: 2019-10-16 | End: 2019-10-18 | Stop reason: HOSPADM

## 2019-10-16 RX ORDER — FUROSEMIDE 20 MG/1
20 TABLET ORAL 2 TIMES DAILY
Status: DISCONTINUED | OUTPATIENT
Start: 2019-10-16 | End: 2019-10-18 | Stop reason: HOSPADM

## 2019-10-16 RX ORDER — GUAIFENESIN 600 MG/1
600 TABLET, EXTENDED RELEASE ORAL 2 TIMES DAILY
Status: DISCONTINUED | OUTPATIENT
Start: 2019-10-16 | End: 2019-10-18 | Stop reason: HOSPADM

## 2019-10-16 RX ORDER — TAMSULOSIN HYDROCHLORIDE 0.4 MG/1
0.4 CAPSULE ORAL DAILY
Status: DISCONTINUED | OUTPATIENT
Start: 2019-10-16 | End: 2019-10-18 | Stop reason: HOSPADM

## 2019-10-16 RX ORDER — SODIUM CHLORIDE 0.9 % (FLUSH) 0.9 %
10 SYRINGE (ML) INJECTION
Status: DISCONTINUED | OUTPATIENT
Start: 2019-10-16 | End: 2019-10-18 | Stop reason: HOSPADM

## 2019-10-16 RX ORDER — IPRATROPIUM BROMIDE 0.5 MG/2.5ML
1 SOLUTION RESPIRATORY (INHALATION)
Status: COMPLETED | OUTPATIENT
Start: 2019-10-16 | End: 2019-10-16

## 2019-10-16 RX ORDER — AZITHROMYCIN 250 MG/1
250 TABLET, FILM COATED ORAL DAILY
Status: DISCONTINUED | OUTPATIENT
Start: 2019-10-17 | End: 2019-10-16

## 2019-10-16 RX ORDER — ALBUTEROL SULFATE 2.5 MG/.5ML
15 SOLUTION RESPIRATORY (INHALATION)
Status: COMPLETED | OUTPATIENT
Start: 2019-10-16 | End: 2019-10-16

## 2019-10-16 RX ORDER — FUROSEMIDE 10 MG/ML
60 INJECTION INTRAMUSCULAR; INTRAVENOUS
Status: DISCONTINUED | OUTPATIENT
Start: 2019-10-16 | End: 2019-10-16

## 2019-10-16 RX ORDER — ENOXAPARIN SODIUM 100 MG/ML
40 INJECTION SUBCUTANEOUS EVERY 24 HOURS
Status: DISCONTINUED | OUTPATIENT
Start: 2019-10-16 | End: 2019-10-18 | Stop reason: HOSPADM

## 2019-10-16 RX ORDER — FLUTICASONE FUROATE AND VILANTEROL 100; 25 UG/1; UG/1
1 POWDER RESPIRATORY (INHALATION) EVERY MORNING
Status: DISCONTINUED | OUTPATIENT
Start: 2019-10-17 | End: 2019-10-18 | Stop reason: HOSPADM

## 2019-10-16 RX ORDER — IPRATROPIUM BROMIDE AND ALBUTEROL SULFATE 2.5; .5 MG/3ML; MG/3ML
3 SOLUTION RESPIRATORY (INHALATION) EVERY 4 HOURS
Status: DISCONTINUED | OUTPATIENT
Start: 2019-10-16 | End: 2019-10-18 | Stop reason: HOSPADM

## 2019-10-16 RX ORDER — AZITHROMYCIN 250 MG/1
1000 TABLET, FILM COATED ORAL
Status: COMPLETED | OUTPATIENT
Start: 2019-10-16 | End: 2019-10-16

## 2019-10-16 RX ORDER — CLONIDINE HYDROCHLORIDE 0.3 MG/1
0.3 TABLET ORAL 3 TIMES DAILY
Status: DISCONTINUED | OUTPATIENT
Start: 2019-10-16 | End: 2019-10-18 | Stop reason: HOSPADM

## 2019-10-16 RX ORDER — IBUPROFEN 200 MG
24 TABLET ORAL
Status: DISCONTINUED | OUTPATIENT
Start: 2019-10-16 | End: 2019-10-18 | Stop reason: HOSPADM

## 2019-10-16 RX ORDER — IBUPROFEN 200 MG
16 TABLET ORAL
Status: DISCONTINUED | OUTPATIENT
Start: 2019-10-16 | End: 2019-10-18 | Stop reason: HOSPADM

## 2019-10-16 RX ORDER — CLONAZEPAM 1 MG/1
2 TABLET ORAL 2 TIMES DAILY
Status: ON HOLD | COMMUNITY
End: 2020-02-27 | Stop reason: HOSPADM

## 2019-10-16 RX ADMIN — CLONIDINE HYDROCHLORIDE 0.3 MG: 0.2 TABLET ORAL at 09:10

## 2019-10-16 RX ADMIN — IPRATROPIUM BROMIDE AND ALBUTEROL SULFATE 3 ML: .5; 3 SOLUTION RESPIRATORY (INHALATION) at 07:10

## 2019-10-16 RX ADMIN — CEFTRIAXONE 1 G: 1 INJECTION, SOLUTION INTRAVENOUS at 04:10

## 2019-10-16 RX ADMIN — ENOXAPARIN SODIUM 40 MG: 100 INJECTION SUBCUTANEOUS at 07:10

## 2019-10-16 RX ADMIN — AZITHROMYCIN MONOHYDRATE 1000 MG: 250 TABLET ORAL at 04:10

## 2019-10-16 RX ADMIN — ALBUTEROL SULFATE 15 MG: 2.5 SOLUTION RESPIRATORY (INHALATION) at 03:10

## 2019-10-16 RX ADMIN — METHYLPREDNISOLONE SODIUM SUCCINATE 125 MG: 125 INJECTION, POWDER, FOR SOLUTION INTRAMUSCULAR; INTRAVENOUS at 02:10

## 2019-10-16 RX ADMIN — TAMSULOSIN HYDROCHLORIDE 0.4 MG: 0.4 CAPSULE ORAL at 07:10

## 2019-10-16 RX ADMIN — IPRATROPIUM BROMIDE 1 MG: 0.5 SOLUTION RESPIRATORY (INHALATION) at 03:10

## 2019-10-16 RX ADMIN — GUAIFENESIN 600 MG: 600 TABLET, EXTENDED RELEASE ORAL at 09:10

## 2019-10-16 RX ADMIN — FUROSEMIDE 20 MG: 20 TABLET ORAL at 09:10

## 2019-10-16 RX ADMIN — METHYLPREDNISOLONE SODIUM SUCCINATE 80 MG: 40 INJECTION, POWDER, FOR SOLUTION INTRAMUSCULAR; INTRAVENOUS at 09:10
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
## 2019-10-16 NOTE — ED NOTES
Attempted to obtained ABG's from Mr. Harrington. He said this is a bad time because he has a shima horse in his legs and started yelling. .. Notified nurse in ER.

## 2019-10-16 NOTE — ASSESSMENT & PLAN NOTE
Pt with increased SOB and sputum production x1wk  SpO2 80s in the ED  SpO2 on 4L increased to high 88%  CXR showed interstitial opacities c/w inflammatory process  Procal, ABG pending  Azithromycin 250m qd IV  Prednisone 40 qd x 5d  Fluticasone-vilanterol diskus  Duonebs q4  O2 NC keep Spo2 >88%  Mucinex 600 BID
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
## 2019-10-17 LAB
POCT GLUCOSE: 130 MG/DL (ref 70–110)
POCT GLUCOSE: 131 MG/DL (ref 70–110)
POCT GLUCOSE: 134 MG/DL (ref 70–110)
POCT GLUCOSE: 149 MG/DL (ref 70–110)
POCT GLUCOSE: 168 MG/DL (ref 70–110)

## 2019-10-17 PROCEDURE — 25000003 PHARM REV CODE 250: Performed by: FAMILY MEDICINE

## 2019-10-17 PROCEDURE — 27000646 HC AEROBIKA DEVICE

## 2019-10-17 PROCEDURE — 63600175 PHARM REV CODE 636 W HCPCS: Performed by: STUDENT IN AN ORGANIZED HEALTH CARE EDUCATION/TRAINING PROGRAM

## 2019-10-17 PROCEDURE — 11000001 HC ACUTE MED/SURG PRIVATE ROOM

## 2019-10-17 PROCEDURE — 25000003 PHARM REV CODE 250: Performed by: STUDENT IN AN ORGANIZED HEALTH CARE EDUCATION/TRAINING PROGRAM

## 2019-10-17 PROCEDURE — 94761 N-INVAS EAR/PLS OXIMETRY MLT: CPT

## 2019-10-17 PROCEDURE — 94640 AIRWAY INHALATION TREATMENT: CPT

## 2019-10-17 PROCEDURE — 99900035 HC TECH TIME PER 15 MIN (STAT)

## 2019-10-17 PROCEDURE — 27000221 HC OXYGEN, UP TO 24 HOURS

## 2019-10-17 PROCEDURE — 94664 DEMO&/EVAL PT USE INHALER: CPT

## 2019-10-17 PROCEDURE — 25000242 PHARM REV CODE 250 ALT 637 W/ HCPCS: Performed by: STUDENT IN AN ORGANIZED HEALTH CARE EDUCATION/TRAINING PROGRAM

## 2019-10-17 RX ORDER — TIOTROPIUM BROMIDE 18 UG/1
1 CAPSULE ORAL; RESPIRATORY (INHALATION) DAILY
Status: DISCONTINUED | OUTPATIENT
Start: 2019-10-17 | End: 2019-10-18 | Stop reason: HOSPADM

## 2019-10-17 RX ORDER — METHADONE HYDROCHLORIDE 5 MG/5ML
90 SOLUTION ORAL EVERY MORNING
Status: DISCONTINUED | OUTPATIENT
Start: 2019-10-17 | End: 2019-10-17

## 2019-10-17 RX ORDER — PREDNISONE 20 MG/1
40 TABLET ORAL DAILY
Status: DISCONTINUED | OUTPATIENT
Start: 2019-10-18 | End: 2019-10-18 | Stop reason: HOSPADM

## 2019-10-17 RX ORDER — IBUPROFEN 200 MG
16 TABLET ORAL
Status: DISCONTINUED | OUTPATIENT
Start: 2019-10-17 | End: 2019-10-17

## 2019-10-17 RX ORDER — IBUPROFEN 600 MG/1
600 TABLET ORAL EVERY 6 HOURS PRN
Status: DISCONTINUED | OUTPATIENT
Start: 2019-10-17 | End: 2019-10-18 | Stop reason: HOSPADM

## 2019-10-17 RX ORDER — ONDANSETRON 4 MG/1
4 TABLET, ORALLY DISINTEGRATING ORAL EVERY 6 HOURS PRN
Status: DISCONTINUED | OUTPATIENT
Start: 2019-10-17 | End: 2019-10-18 | Stop reason: HOSPADM

## 2019-10-17 RX ORDER — LIDOCAINE 50 MG/G
1 PATCH TOPICAL
Status: DISCONTINUED | OUTPATIENT
Start: 2019-10-17 | End: 2019-10-18 | Stop reason: HOSPADM

## 2019-10-17 RX ORDER — AZITHROMYCIN 250 MG/1
250 TABLET, FILM COATED ORAL DAILY
Status: DISCONTINUED | OUTPATIENT
Start: 2019-10-17 | End: 2019-10-18 | Stop reason: HOSPADM

## 2019-10-17 RX ORDER — GLUCAGON 1 MG
1 KIT INJECTION
Status: DISCONTINUED | OUTPATIENT
Start: 2019-10-17 | End: 2019-10-17

## 2019-10-17 RX ORDER — CLONAZEPAM 0.5 MG/1
2 TABLET ORAL 2 TIMES DAILY
Status: DISCONTINUED | OUTPATIENT
Start: 2019-10-17 | End: 2019-10-18 | Stop reason: HOSPADM

## 2019-10-17 RX ORDER — INSULIN ASPART 100 [IU]/ML
1-10 INJECTION, SOLUTION INTRAVENOUS; SUBCUTANEOUS
Status: DISCONTINUED | OUTPATIENT
Start: 2019-10-17 | End: 2019-10-18 | Stop reason: HOSPADM

## 2019-10-17 RX ORDER — IBUPROFEN 200 MG
24 TABLET ORAL
Status: DISCONTINUED | OUTPATIENT
Start: 2019-10-17 | End: 2019-10-17

## 2019-10-17 RX ORDER — MORPHINE SULFATE 2 MG/ML
1 INJECTION, SOLUTION INTRAMUSCULAR; INTRAVENOUS EVERY 6 HOURS PRN
Status: DISCONTINUED | OUTPATIENT
Start: 2019-10-17 | End: 2019-10-18 | Stop reason: HOSPADM

## 2019-10-17 RX ORDER — METHADONE HYDROCHLORIDE 10 MG/1
90 TABLET ORAL EVERY MORNING
Status: DISCONTINUED | OUTPATIENT
Start: 2019-10-17 | End: 2019-10-18 | Stop reason: HOSPADM

## 2019-10-17 RX ADMIN — TIOTROPIUM BROMIDE 18 MCG: 18 CAPSULE ORAL; RESPIRATORY (INHALATION) at 07:10

## 2019-10-17 RX ADMIN — MORPHINE SULFATE 1 MG: 2 INJECTION, SOLUTION INTRAMUSCULAR; INTRAVENOUS at 09:10

## 2019-10-17 RX ADMIN — IPRATROPIUM BROMIDE AND ALBUTEROL SULFATE 3 ML: .5; 3 SOLUTION RESPIRATORY (INHALATION) at 11:10

## 2019-10-17 RX ADMIN — AZITHROMYCIN 250 MG: 250 TABLET, FILM COATED ORAL at 04:10

## 2019-10-17 RX ADMIN — CLONAZEPAM 2 MG: 0.5 TABLET ORAL at 09:10

## 2019-10-17 RX ADMIN — IPRATROPIUM BROMIDE AND ALBUTEROL SULFATE 3 ML: .5; 3 SOLUTION RESPIRATORY (INHALATION) at 12:10

## 2019-10-17 RX ADMIN — ONDANSETRON 4 MG: 4 TABLET, ORALLY DISINTEGRATING ORAL at 05:10

## 2019-10-17 RX ADMIN — FLUTICASONE FUROATE AND VILANTEROL TRIFENATATE 1 PUFF: 100; 25 POWDER RESPIRATORY (INHALATION) at 07:10

## 2019-10-17 RX ADMIN — CLONIDINE HYDROCHLORIDE 0.3 MG: 0.2 TABLET ORAL at 04:10

## 2019-10-17 RX ADMIN — METHYLPREDNISOLONE SODIUM SUCCINATE 80 MG: 40 INJECTION, POWDER, FOR SOLUTION INTRAMUSCULAR; INTRAVENOUS at 05:10

## 2019-10-17 RX ADMIN — FUROSEMIDE 20 MG: 20 TABLET ORAL at 09:10

## 2019-10-17 RX ADMIN — ENOXAPARIN SODIUM 40 MG: 100 INJECTION SUBCUTANEOUS at 04:10

## 2019-10-17 RX ADMIN — CLONIDINE HYDROCHLORIDE 0.3 MG: 0.2 TABLET ORAL at 09:10

## 2019-10-17 RX ADMIN — TAMSULOSIN HYDROCHLORIDE 0.4 MG: 0.4 CAPSULE ORAL at 09:10

## 2019-10-17 RX ADMIN — LIDOCAINE 1 PATCH: 50 PATCH TOPICAL at 09:10

## 2019-10-17 RX ADMIN — IPRATROPIUM BROMIDE AND ALBUTEROL SULFATE 3 ML: .5; 3 SOLUTION RESPIRATORY (INHALATION) at 07:10

## 2019-10-17 RX ADMIN — GUAIFENESIN 600 MG: 600 TABLET, EXTENDED RELEASE ORAL at 09:10

## 2019-10-17 RX ADMIN — ONDANSETRON 4 MG: 4 TABLET, ORALLY DISINTEGRATING ORAL at 04:10

## 2019-10-17 RX ADMIN — IPRATROPIUM BROMIDE AND ALBUTEROL SULFATE 3 ML: .5; 3 SOLUTION RESPIRATORY (INHALATION) at 03:10

## 2019-10-17 RX ADMIN — METHADONE HYDROCHLORIDE 90 MG: 10 TABLET ORAL at 06:10

## 2019-10-17 RX ADMIN — METHYLPREDNISOLONE SODIUM SUCCINATE 80 MG: 40 INJECTION, POWDER, FOR SOLUTION INTRAMUSCULAR; INTRAVENOUS at 04:10

## 2019-10-17 RX ADMIN — ONDANSETRON 4 MG: 4 TABLET, ORALLY DISINTEGRATING ORAL at 09:10

## 2019-10-17 RX ADMIN — CLONAZEPAM 2 MG: 0.5 TABLET ORAL at 02:10

## 2019-10-17 RX ADMIN — METHYLPREDNISOLONE SODIUM SUCCINATE 80 MG: 40 INJECTION, POWDER, FOR SOLUTION INTRAMUSCULAR; INTRAVENOUS at 09:10

## 2019-10-17 RX ADMIN — IPRATROPIUM BROMIDE AND ALBUTEROL SULFATE 3 ML: .5; 3 SOLUTION RESPIRATORY (INHALATION) at 04:10

## 2019-10-17 NOTE — PLAN OF CARE
Pt states he did have home oxygen but the concentrator was making noises.  I asked if he reached out to HME company and he said no.  He also asked if I can help him get a new nebulizer.  I reached out to Lynnette with [facility] and pt was given new portable cart set up in March when he was discharged.  He has a working concentrator at home as far as they know and he got a new nebulizer this year.  Pt has not called to have portable tanks refilled or called for service visit since April 26, 2019.  She said someone will have to bring in an empty tank from home to swap out and they can fill it.  I requested a home service visit be set up soon once pt discharges.  White board updated with CM name and contact information.  Discharge brochure provided.  Pt encouraged to call with any questions or concerns.  Cm will continue to follow pt through transitions of care and assist with any discharge needs.       10/17/19 1028   Discharge Assessment   Assessment Type Discharge Planning Assessment   Confirmed/corrected address and phone number on facesheet? Yes   Assessment information obtained from? Patient   Communicated expected length of stay with patient/caregiver yes   Prior to hospitilization cognitive status: Alert/Oriented   Prior to hospitalization functional status: Assistive Equipment   Current cognitive status: Alert/Oriented   Current Functional Status: Assistive Equipment   Lives With sibling(s)   Able to Return to Prior Arrangements yes   Is patient able to care for self after discharge? Yes   Patient's perception of discharge disposition home or selfcare   Readmission Within the Last 30 Days no previous admission in last 30 days   Patient currently being followed by outpatient case management? No   Patient currently receives any other outside agency services? No   Equipment Currently Used at Home nebulizer;oxygen   Do you have any problems affording any of your prescribed medications? No   Is the patient taking  medications as prescribed? yes   Does the patient have transportation home? Yes   Transportation Anticipated health plan transportation  (Medicaid transportation)   Discharge Plan A Home   Discharge Plan B Home with family   DME Needed Upon Discharge    (TBD)   Patient/Family in Agreement with Plan yes   Does the patient have transportation to healthcare appointments? Yes     Ronald Álvarez RN,   [phone]
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
## 2019-10-17 NOTE — PROGRESS NOTES
Pharmacist Intervention IV to PO Note    Ming Harrington is a 54 y.o. male being treated with IV medication azithromycin    Patient Data:    Vital Signs (Most Recent):  Temp: 96.1 °F (35.6 °C) (10/17/19 0738)  Pulse: 70 (10/17/19 0742)  Resp: 18 (10/17/19 0742)  BP: (!) 148/105 (10/17/19 0738)  SpO2: (!) 91 % (10/17/19 0742)   Vital Signs (72h Range):  Temp:  [96.1 °F (35.6 °C)-98.1 °F (36.7 °C)]   Pulse:  [57-95]   Resp:  [15-24]   BP: (136-149)/()   SpO2:  [88 %-96 %]      CBC:  Recent Labs   Lab 10/16/19  1428   WBC 8.13   RBC 4.01*   HGB 12.3*   HCT 37.8*      MCV 94   MCH 30.7   MCHC 32.5     CMP:     Recent Labs   Lab 10/16/19  1428   GLU 96   CALCIUM 9.4   ALBUMIN 3.3*   PROT 8.4      K 4.4   CO2 26      BUN 10   CREATININE 1.0   ALKPHOS 71   ALT 15   AST 22   BILITOT 0.9       Dietary Orders:  Diet Orders            Diet Adult Regular (IDDSI Level 7): Regular starting at 10/16 1759            Based on the following criteria, this patient qualifies for intravenous to oral conversion:  [x] The patients gastrointestinal tract is functioning (tolerating medications via oral or enteral route for 24 hours and tolerating food or enteral feeds for 24 hours).  [x] The patient is hemodynamically stable for 24 hours (heart rate <100 beats per minute, systolic blood pressure >99 mm Hg, and respiratory rate <20 breaths per minute).  [x] The patient shows clinical improvement (afebrile for at least 24 hours and white blood cell count downtrending or normalized). Additionally, the patient must be non-neutropenic (absolute neutrophil count >500 cells/mm3).  [x] For antimicrobials, the patient has received IV therapy for at least 24 hours.    IV medication   azithromycin will be changed to oral medication azithromycin    Pharmacist's Name: Patrice Camarena  Pharmacist's Extension: [phone]

## 2019-10-17 NOTE — PLAN OF CARE
I spoke with pt and asked if he can have his brother bring a home portable tank to his room so he will have it for discharge or we can swap it out if needed.  He is calling his brother now.  Pt uses Medicaid transportation.  Ronald Álvarez RN,   [phone]

## 2019-10-17 NOTE — NURSING
Home Oxygen Evaluation    Date Performed: 10/17/2019    1) Patient's Home O2 Sat on room air, while at rest: SPO2 88% on 3 L nc at rest.       2) Patient's O2 Sat on room air while exercising: NA        If O2 sats on room air while exercising remain 89% or above patient does not qualify, no further testing needed Document N/A in step 3. If O2 sats on room air while exercising are 88% or below, continue to step 3.      3) Patient's O2 Sat while exercising on O2: NA      (Must show improvement from #2 for patients to qualify)    If O2 sats improve on oxygen, patient qualifies for portable oxygen. If not, the patient does not qualify.

## 2019-10-17 NOTE — NURSING
Plan of care reviewed with patient. Voiced understanding. NSR on monitor with no red alarms noted. 3 l nc today with spo2 between 88-90% at rest. Will dip to 85% with little exertion. No acute distress noted at this time. Side rails x3, bed low, call bell within reach. Maintain bed alarm for patient safety. Patient will be monitored overnight.

## 2019-10-17 NOTE — PROGRESS NOTES
[facility] Hospital Medicine  Progress Note    Patient Name: Ming Harrington  MRN: [id number]  Patient Class: IP- Inpatient   Admission Date: 10/16/2019  Length of Stay: 1 days  Attending Physician: Burt Betts MD  Primary Care Provider: Vazquez Barajas MD        Subjective:     Principal Problem:COPD with acute exacerbation    Interval History: Mr. Harrington reports feeling better today. He is able to take deeper breaths and has been weaned to 3L NC. He reports that he still has a cough with green sputum. He denies fever, chills, chest pain, nausea, vomiting.     Review of Systems   Constitutional: Negative for chills, diaphoresis, fatigue and fever.   HENT: Positive for rhinorrhea and sinus pressure. Negative for congestion, sore throat and trouble swallowing.    Eyes: Negative for itching and visual disturbance.   Respiratory: Positive for choking, chest tightness, shortness of breath and wheezing.    Cardiovascular: Negative for chest pain and leg swelling.   Gastrointestinal: Negative for constipation, diarrhea, nausea and vomiting.   Endocrine: Negative for polydipsia and polyphagia.   Genitourinary: Negative for dysuria and flank pain.   Musculoskeletal: Negative for back pain, joint swelling and neck pain.   Skin: Negative for color change and rash.   Neurological: Negative for dizziness, weakness, light-headedness and headaches.   Psychiatric/Behavioral: Negative for confusion.     Objective:     Vital Signs (Most Recent):  Temp: 96.1 °F (35.6 °C) (10/17/19 0738)  Pulse: 70 (10/17/19 0742)  Resp: 18 (10/17/19 0742)  BP: (!) 148/105 (10/17/19 0738)  SpO2: (!) 91 % (10/17/19 0742) Vital Signs (24h Range):  Temp:  [96.1 °F (35.6 °C)-98.1 °F (36.7 °C)] 96.1 °F (35.6 °C)  Pulse:  [57-95] 70  Resp:  [15-24] 18  SpO2:  [88 %-96 %] 91 %  BP: (136-149)/() 148/105     Weight: 113.5 kg (250 lb 3.6 oz)  Body mass index is 38.05 kg/m².    Intake/Output Summary (Last 24 hours) at  10/17/2019 0925  Last data filed at 10/17/2019 0830  Gross per 24 hour   Intake 630 ml   Output 975 ml   Net -345 ml      Physical Exam   Constitutional: He is oriented to person, place, and time. He appears well-developed and well-nourished.   HENT:   Head: Normocephalic and atraumatic.   Eyes: Pupils are equal, round, and reactive to light. EOM are normal.   Neck: Normal range of motion. Neck supple.   Cardiovascular: Normal rate, regular rhythm and normal heart sounds. Exam reveals no gallop and no friction rub.   No murmur heard.  Pulmonary/Chest: No respiratory distress. He has decreased breath sounds. He has wheezes in the right middle field, the right lower field, the left middle field and the left lower field. He has rhonchi in the right middle field and the right lower field.   Abdominal: Soft. Bowel sounds are normal.   Musculoskeletal: He exhibits edema.   Neurological: He is alert and oriented to person, place, and time.   Skin: Skin is warm and dry.       Significant Labs:   CBC:   Recent Labs   Lab 10/16/19  1428   WBC 8.13   HGB 12.3*   HCT 37.8*        CMP:   Recent Labs   Lab 10/16/19  1428      K 4.4      CO2 26   GLU 96   BUN 10   CREATININE 1.0   CALCIUM 9.4   PROT 8.4   ALBUMIN 3.3*   BILITOT 0.9   ALKPHOS 71   AST 22   ALT 15   ANIONGAP 8   EGFRNONAA >60     POCT Glucose:   Recent Labs   Lab 10/17/19  0552   POCTGLUCOSE 149*       Significant Imaging:   Imaging Results          X-Ray Chest AP Portable (Final result)  Result time 10/16/19 14:37:13    Final result by Slime Orourke MD (10/16/19 14:37:13)                 Impression:      See above      Electronically signed by: Slime Orourke MD  Date:    10/16/2019  Time:    14:37             Narrative:    EXAMINATION:  XR CHEST AP PORTABLE    CLINICAL HISTORY:  CHF;    TECHNIQUE:  Single frontal view of the chest was performed.    COMPARISON:  Prior from 08/05/2019    FINDINGS:  The mediastinal structures are midline.   The cardiac silhouette is not enlarged.    There are faint ill-defined opacities in the right mid and lower lung zones which could reflect developing pneumonia, a small airways inflammatory process, aspiration, or other pneumonitis.  Follow-up is recommended.    No evidence of cardiac decompensation.                                  Assessment/Plan:      Active Diagnoses:    Diagnosis Date Noted POA    PRINCIPAL PROBLEM:  COPD with acute exacerbation [J44.1] 07/04/2018 Yes    Bilateral lower extremity edema [R60.0] 10/16/2019 Unknown    Opiate dependence [F11.20] 01/08/2013 Yes    HTN (hypertension) [I10]  Yes     Chronic      Problems Resolved During this Admission:     VTE Risk Mitigation (From admission, onward)         Ordered     Place sequential compression device  Until discontinued      10/16/19 2007     enoxaparin injection 40 mg  Daily      10/16/19 1801     IP VTE HIGH RISK PATIENT  Once      10/16/19 1801               * COPD with acute exacerbation  Pt with increased SOB and sputum production x1wk  SpO2 90 on 3L  Sill with upper airway wheezing today  CXR showed interstitial opacities c/w inflammatory process  Procal negative  Azithromycin 250m qd IV transitioned to PO  Continue solumedrol 80 iv q8hr today then Prednisone 40 qd x 5d starting tomorrow  Fluticasone-vilanterol diskus  Duonebs q4  O2 NC keep Spo2 >88%  Mucinex 600 BID  Chest physiotherapy tid  CURB-65 score: 0        Bilateral lower extremity edema  Chronic lower extremity edema  Home lasix 20 bid, tamsulosin 0.4mg for help with urination     Opiate dependence  Pt prescribed methadone outpatient  UDS + methadone  Consider restarting tomorrow        HTN (hypertension)  Continue home clonidine 0.1mg tid            VTE Risk Mitigation (From admission, onward)                  Ordered        enoxaparin injection 40 mg  Daily      10/16/19 1801        IP VTE HIGH RISK PATIENT  Once      10/16/19 1801                  Fluids: n/a  Diet:  regular  DVTppx: scds  Code: full     Dispo: pt admitted for COPD exacerbation. Treat w/ duonebs, iv azithro, prednisone oral. Keep spo2>88%, currently on 3 LNC spo2 90%. Will monitor overnight. Pt has home O2 equipment. Will discharge pt tomorrow on O2, oral steroids, oral abx.      Krishna Whipple MD  Department of Hospital Medicine   [facility]

## 2019-10-17 NOTE — PLAN OF CARE
O2 saturation 92% on nasal cannula 3 lpm. The proper method of use, as well as anticipated side effects, of this aerosol treatment are discussed and demonstrated to the patient.  The proper method of use, as well as anticipated side effects, of this aerosol treatment are discussed and demonstrated to the patient.  Will continue to monitor.

## 2019-10-17 NOTE — PLAN OF CARE
Patient on oxygen with documented flow.  Will attempt to wean per O2 order protocol. The proper method of use, as well as anticipated side effects, of this aerosol treatment are discussed and demonstrated to the patient. Will continue to monitor.

## 2019-10-17 NOTE — DISCHARGE SUMMARY
[facility]  Short Stay  Discharge Summary    Admit Date: 10/16/2019    Discharge Date and Time:  10/17/2019      Discharge Attending Physician: Burt Betts MD     HPI: Pt is a 53 yo M w/ pmhx of COPD (prescribed home O2 but not currently using x 4 mo), asthma, HTN, anxiety, Hx of IVDU 1 year ago, lower extremity edema, presenting for SOB and productive cough presenting from home. Pt states about 1 week ago he began to experience SOB. Worsened with exertion. Two days ago began to experience increased green sputum production, green rhinorrhea and frontal headache. Pt has associated wheezing and pleuritic chest pain. Pt endorses subjective fever, chills, sweats, nausea. Pt tried his home albuterol and duonebs with no relief. Pt had a sick contact (brother) with a cold a week ago when the symptoms started. Pt denies chest pressure, palpitations, abdominal pain, vomiting, dysuria, hematuria, confusion.      Pt also c/o lower extremity swelling x 1-2 months for which he takes lasix. Pt was told he had CHF in the past however his last Echo 3/19 showed LVEF 60% w/ no diastolic dysfunction.      In the ED, pt Spo2 88% on 3 LNC, otherwise vitals stable. CBC, CMP no abnormalities. BNP 18. Trop negative x 1. CXR showed ill defined interstitial opacities likely representing inflammatory process, no pulmonary edema. EKG showed NSR. Pt given duoneb, solumedrol, rocephin 1g and azithromycin 1g oral. Pt remained spo2 88% after interventions. Will admit pt for COPD exacerbation.    Hospital Course:   The patient was admitted directly to the Family Medicine service. He was started on Azithromycin, Prednisone, Fluticasone-vilanterol, Duonebs, and Mucinex. He had no acute events overnight. He was weaned to 3L O2 NC in the AM. He reported feeling better and less short of breath. His home O2 tank was brought to the hospital and use at home was encouraged. At the time of discharge, he was hemodynamically stable  and had received maximum benefit from his admission.     Final Diagnoses:    Principal Problem: COPD with acute exacerbation   Secondary Diagnoses:   Active Hospital Problems    Diagnosis  POA    *COPD with acute exacerbation [J44.1]  Yes    Bilateral lower extremity edema [R60.0]  Unknown    Opiate dependence [F11.20]  Yes    HTN (hypertension) [I10]  Yes     Chronic      Resolved Hospital Problems   No resolved problems to display.       Discharged Condition: stable    Disposition: Home or Self Care    Follow up/Patient Instructions:  F/u in 1 week w/ [facility]    Medications:  Reconciled Home Medications:      Medication List      ASK your doctor about these medications    ADVAIR DISKUS 250-50 mcg/dose diskus inhaler  Generic drug:  fluticasone-salmeterol 250-50 mcg/dose  Inhale 1 puff into the lungs 2 (two) times daily.     albuterol-ipratropium 2.5 mg-0.5 mg/3 mL nebulizer solution  Commonly known as:  DUO-NEB  Take 3 mLs by nebulization every 4 (four) hours. Rescue     ATROVENT HFA 17 mcg/actuation inhaler  Generic drug:  ipratropium  Inhale 2 puffs into the lungs every 4 to 6 hours as needed.     clonazePAM 1 MG tablet  Commonly known as:  KLONOPIN  Take 2 mg by mouth 2 (two) times daily.     cloNIDine 0.1 MG tablet  Commonly known as:  CATAPRES  Take 3 tablets (0.3 mg total) by mouth 3 (three) times daily.     escitalopram oxalate 10 MG tablet  Commonly known as:  LEXAPRO  Take 1 tablet (10 mg total) by mouth once daily.     fluticasone furoate-vilanterol 100-25 mcg/dose diskus inhaler  Commonly known as:  BREO  Inhale 1 puff into the lungs once daily. Controller     furosemide 40 MG tablet  Commonly known as:  LASIX  Take 0.5 tablets (20 mg total) by mouth 2 (two) times daily.     HYDROcodone-acetaminophen 5-325 mg per tablet  Commonly known as:  NORCO  Take 1 tablet by mouth every 6 (six) hours as needed for Pain.     ketorolac 10 mg tablet  Commonly known as:  TORADOL  Take 1 tablet (10 mg  total) by mouth every 6 (six) hours as needed for Pain.     metFORMIN 500 MG tablet  Commonly known as:  GLUCOPHAGE  Take 500 mg by mouth daily with breakfast.     methadone 10 mg/5 mL solution  Commonly known as:  DOLOPHINE  Take 90 mg by mouth every morning.     montelukast 10 mg tablet  Commonly known as:  SINGULAIR  Take 10 mg by mouth every evening.     nicotine 14 mg/24 hr  Commonly known as:  NICODERM CQ  Place 1 patch onto the skin once daily.     ondansetron 8 MG Tbdl  Commonly known as:  ZOFRAN-ODT  Take 1 tablet (8 mg total) by mouth every 6 (six) hours as needed.     polyethylene glycol 17 gram Pwpk  Commonly known as:  GLYCOLAX  Take 17 g by mouth once daily.     senna-docusate 8.6-50 mg 8.6-50 mg per tablet  Commonly known as:  PERICOLACE  Take 1 tablet by mouth 2 (two) times daily.     tiotropium bromide 2.5 mcg/actuation Mist  Commonly known as:  SPIRIVA RESPIMAT  Inhale 1 each into the lungs once daily. Controller     * VENTOLIN HFA 90 mcg/actuation inhaler  Generic drug:  albuterol  Inhale 1-2 puffs into the lungs every 6 (six) hours as needed for Wheezing or Shortness of Breath. Rescue     * albuterol 2.5 mg /3 mL (0.083 %) nebulizer solution  Commonly known as:  PROVENTIL  take 3 mLs Nebulization Every 4 hours, Rescue         * This list has 2 medication(s) that are the same as other medications prescribed for you. Read the directions carefully, and ask your doctor or other care provider to review them with you.              No discharge procedures on file.     Krishna Whipple DO HO-1  [facility]

## 2019-10-17 NOTE — PLAN OF CARE
Patient's VS are stable, on tele with NSR, piv is saline locked, pt is having no s/s of resp. Distress, patient is still wheezing with expiration, currently monitoring

## 2019-10-17 NOTE — NURSING
SPO2 85% while patient eating lunch. SPO2 maintained around 88%-90% on 3 liter at rest after lunch.

## 2019-10-17 NOTE — NURSING
Received report from ARIANNA Santioz. Patient on 3L via nasal cannula; oxygen saturation 93%. Hooked to heart monitor. Encouraged to turn every 2 hours as tolerated. Maintained on fall risk precaution. Bed in lowest position, bed alarm on, call light/personal items within reach and instructed to call for help when needed. Will continue to monitor.

## 2019-10-18 VITALS
HEART RATE: 83 BPM | DIASTOLIC BLOOD PRESSURE: 82 MMHG | BODY MASS INDEX: 37.93 KG/M2 | HEIGHT: 68 IN | WEIGHT: 250.25 LBS | SYSTOLIC BLOOD PRESSURE: 134 MMHG | OXYGEN SATURATION: 90 % | RESPIRATION RATE: 22 BRPM | TEMPERATURE: 98 F

## 2019-10-18 LAB
ALBUMIN SERPL BCP-MCNC: 3.2 G/DL (ref 3.5–5.2)
ALBUMIN SERPL BCP-MCNC: 3.2 G/DL (ref 3.5–5.2)
ALP SERPL-CCNC: 71 U/L (ref 55–135)
ALP SERPL-CCNC: 78 U/L (ref 55–135)
ALT SERPL W/O P-5'-P-CCNC: 16 U/L (ref 10–44)
ALT SERPL W/O P-5'-P-CCNC: 17 U/L (ref 10–44)
ANION GAP SERPL CALC-SCNC: 10 MMOL/L (ref 8–16)
ANION GAP SERPL CALC-SCNC: 11 MMOL/L (ref 8–16)
AST SERPL-CCNC: 19 U/L (ref 10–40)
AST SERPL-CCNC: 24 U/L (ref 10–40)
BASOPHILS # BLD AUTO: 0 K/UL (ref 0–0.2)
BASOPHILS # BLD AUTO: 0.02 K/UL (ref 0–0.2)
BASOPHILS NFR BLD: 0 % (ref 0–1.9)
BASOPHILS NFR BLD: 0.2 % (ref 0–1.9)
BILIRUB SERPL-MCNC: 0.5 MG/DL (ref 0.1–1)
BILIRUB SERPL-MCNC: 0.5 MG/DL (ref 0.1–1)
BUN SERPL-MCNC: 13 MG/DL (ref 6–20)
BUN SERPL-MCNC: 15 MG/DL (ref 6–20)
CALCIUM SERPL-MCNC: 9.5 MG/DL (ref 8.7–10.5)
CALCIUM SERPL-MCNC: 9.6 MG/DL (ref 8.7–10.5)
CHLORIDE SERPL-SCNC: 100 MMOL/L (ref 95–110)
CHLORIDE SERPL-SCNC: 100 MMOL/L (ref 95–110)
CO2 SERPL-SCNC: 26 MMOL/L (ref 23–29)
CO2 SERPL-SCNC: 28 MMOL/L (ref 23–29)
CREAT SERPL-MCNC: 1.1 MG/DL (ref 0.5–1.4)
CREAT SERPL-MCNC: 1.1 MG/DL (ref 0.5–1.4)
DIFFERENTIAL METHOD: ABNORMAL
DIFFERENTIAL METHOD: ABNORMAL
EOSINOPHIL # BLD AUTO: 0 K/UL (ref 0–0.5)
EOSINOPHIL # BLD AUTO: 0 K/UL (ref 0–0.5)
EOSINOPHIL NFR BLD: 0.3 % (ref 0–8)
EOSINOPHIL NFR BLD: 0.4 % (ref 0–8)
ERYTHROCYTE [DISTWIDTH] IN BLOOD BY AUTOMATED COUNT: 12.6 % (ref 11.5–14.5)
ERYTHROCYTE [DISTWIDTH] IN BLOOD BY AUTOMATED COUNT: 12.6 % (ref 11.5–14.5)
EST. GFR  (AFRICAN AMERICAN): >60 ML/MIN/1.73 M^2
EST. GFR  (AFRICAN AMERICAN): >60 ML/MIN/1.73 M^2
EST. GFR  (NON AFRICAN AMERICAN): >60 ML/MIN/1.73 M^2
EST. GFR  (NON AFRICAN AMERICAN): >60 ML/MIN/1.73 M^2
GLUCOSE SERPL-MCNC: 122 MG/DL (ref 70–110)
GLUCOSE SERPL-MCNC: 153 MG/DL (ref 70–110)
HCT VFR BLD AUTO: 35.6 % (ref 40–54)
HCT VFR BLD AUTO: 36.8 % (ref 40–54)
HGB BLD-MCNC: 11.6 G/DL (ref 14–18)
HGB BLD-MCNC: 12 G/DL (ref 14–18)
LYMPHOCYTES # BLD AUTO: 0.6 K/UL (ref 1–4.8)
LYMPHOCYTES # BLD AUTO: 0.9 K/UL (ref 1–4.8)
LYMPHOCYTES NFR BLD: 6 % (ref 18–48)
LYMPHOCYTES NFR BLD: 7.7 % (ref 18–48)
MAGNESIUM SERPL-MCNC: 1.9 MG/DL (ref 1.6–2.6)
MAGNESIUM SERPL-MCNC: 2.1 MG/DL (ref 1.6–2.6)
MCH RBC QN AUTO: 29.9 PG (ref 27–31)
MCH RBC QN AUTO: 29.9 PG (ref 27–31)
MCHC RBC AUTO-ENTMCNC: 32.6 G/DL (ref 32–36)
MCHC RBC AUTO-ENTMCNC: 32.6 G/DL (ref 32–36)
MCV RBC AUTO: 92 FL (ref 82–98)
MCV RBC AUTO: 92 FL (ref 82–98)
MONOCYTES # BLD AUTO: 0.3 K/UL (ref 0.3–1)
MONOCYTES # BLD AUTO: 0.5 K/UL (ref 0.3–1)
MONOCYTES NFR BLD: 3 % (ref 4–15)
MONOCYTES NFR BLD: 3.8 % (ref 4–15)
NEUTROPHILS # BLD AUTO: 10.5 K/UL (ref 1.8–7.7)
NEUTROPHILS # BLD AUTO: 8.6 K/UL (ref 1.8–7.7)
NEUTROPHILS NFR BLD: 88 % (ref 38–73)
NEUTROPHILS NFR BLD: 90.6 % (ref 38–73)
PHOSPHATE SERPL-MCNC: 2.8 MG/DL (ref 2.7–4.5)
PHOSPHATE SERPL-MCNC: 3.9 MG/DL (ref 2.7–4.5)
PLATELET # BLD AUTO: 235 K/UL (ref 150–350)
PLATELET # BLD AUTO: 251 K/UL (ref 150–350)
PMV BLD AUTO: 10.2 FL (ref 9.2–12.9)
PMV BLD AUTO: 10.4 FL (ref 9.2–12.9)
POCT GLUCOSE: 128 MG/DL (ref 70–110)
POCT GLUCOSE: 140 MG/DL (ref 70–110)
POCT GLUCOSE: 151 MG/DL (ref 70–110)
POTASSIUM SERPL-SCNC: 5 MMOL/L (ref 3.5–5.1)
POTASSIUM SERPL-SCNC: 5 MMOL/L (ref 3.5–5.1)
PROT SERPL-MCNC: 8.2 G/DL (ref 6–8.4)
PROT SERPL-MCNC: 8.4 G/DL (ref 6–8.4)
RBC # BLD AUTO: 3.88 M/UL (ref 4.6–6.2)
RBC # BLD AUTO: 4.02 M/UL (ref 4.6–6.2)
SODIUM SERPL-SCNC: 137 MMOL/L (ref 136–145)
SODIUM SERPL-SCNC: 138 MMOL/L (ref 136–145)
WBC # BLD AUTO: 12.08 K/UL (ref 3.9–12.7)
WBC # BLD AUTO: 9.49 K/UL (ref 3.9–12.7)

## 2019-10-18 PROCEDURE — 25000003 PHARM REV CODE 250: Performed by: STUDENT IN AN ORGANIZED HEALTH CARE EDUCATION/TRAINING PROGRAM

## 2019-10-18 PROCEDURE — 27000221 HC OXYGEN, UP TO 24 HOURS

## 2019-10-18 PROCEDURE — 94640 AIRWAY INHALATION TREATMENT: CPT

## 2019-10-18 PROCEDURE — 83735 ASSAY OF MAGNESIUM: CPT

## 2019-10-18 PROCEDURE — 25000242 PHARM REV CODE 250 ALT 637 W/ HCPCS: Performed by: STUDENT IN AN ORGANIZED HEALTH CARE EDUCATION/TRAINING PROGRAM

## 2019-10-18 PROCEDURE — 99900035 HC TECH TIME PER 15 MIN (STAT)

## 2019-10-18 PROCEDURE — 97116 GAIT TRAINING THERAPY: CPT

## 2019-10-18 PROCEDURE — 94761 N-INVAS EAR/PLS OXIMETRY MLT: CPT

## 2019-10-18 PROCEDURE — 63600175 PHARM REV CODE 636 W HCPCS: Performed by: STUDENT IN AN ORGANIZED HEALTH CARE EDUCATION/TRAINING PROGRAM

## 2019-10-18 PROCEDURE — 83735 ASSAY OF MAGNESIUM: CPT | Mod: 91

## 2019-10-18 PROCEDURE — 36415 COLL VENOUS BLD VENIPUNCTURE: CPT

## 2019-10-18 PROCEDURE — 84100 ASSAY OF PHOSPHORUS: CPT

## 2019-10-18 PROCEDURE — 25000003 PHARM REV CODE 250: Performed by: FAMILY MEDICINE

## 2019-10-18 PROCEDURE — 97161 PT EVAL LOW COMPLEX 20 MIN: CPT

## 2019-10-18 PROCEDURE — 85025 COMPLETE CBC W/AUTO DIFF WBC: CPT | Mod: 91

## 2019-10-18 PROCEDURE — 84100 ASSAY OF PHOSPHORUS: CPT | Mod: 91

## 2019-10-18 PROCEDURE — 80053 COMPREHEN METABOLIC PANEL: CPT

## 2019-10-18 PROCEDURE — 80053 COMPREHEN METABOLIC PANEL: CPT | Mod: 91

## 2019-10-18 RX ORDER — AZITHROMYCIN 250 MG/1
250 TABLET, FILM COATED ORAL DAILY
Qty: 3 TABLET | Refills: 0 | Status: SHIPPED | OUTPATIENT
Start: 2019-10-18 | End: 2020-02-25

## 2019-10-18 RX ORDER — PREDNISONE 20 MG/1
40 TABLET ORAL DAILY
Qty: 6 TABLET | Refills: 0 | Status: SHIPPED | OUTPATIENT
Start: 2019-10-19 | End: 2019-10-22

## 2019-10-18 RX ADMIN — GUAIFENESIN 600 MG: 600 TABLET, EXTENDED RELEASE ORAL at 08:10

## 2019-10-18 RX ADMIN — TIOTROPIUM BROMIDE 18 MCG: 18 CAPSULE ORAL; RESPIRATORY (INHALATION) at 07:10

## 2019-10-18 RX ADMIN — IPRATROPIUM BROMIDE AND ALBUTEROL SULFATE 3 ML: .5; 3 SOLUTION RESPIRATORY (INHALATION) at 12:10

## 2019-10-18 RX ADMIN — FLUTICASONE FUROATE AND VILANTEROL TRIFENATATE 1 PUFF: 100; 25 POWDER RESPIRATORY (INHALATION) at 07:10

## 2019-10-18 RX ADMIN — IPRATROPIUM BROMIDE AND ALBUTEROL SULFATE 3 ML: .5; 3 SOLUTION RESPIRATORY (INHALATION) at 03:10

## 2019-10-18 RX ADMIN — PREDNISONE 40 MG: 20 TABLET ORAL at 08:10

## 2019-10-18 RX ADMIN — IPRATROPIUM BROMIDE AND ALBUTEROL SULFATE 3 ML: .5; 3 SOLUTION RESPIRATORY (INHALATION) at 07:10

## 2019-10-18 RX ADMIN — METHADONE HYDROCHLORIDE 90 MG: 10 TABLET ORAL at 08:10

## 2019-10-18 RX ADMIN — CLONIDINE HYDROCHLORIDE 0.3 MG: 0.2 TABLET ORAL at 08:10

## 2019-10-18 RX ADMIN — CLONAZEPAM 2 MG: 0.5 TABLET ORAL at 08:10

## 2019-10-18 RX ADMIN — TAMSULOSIN HYDROCHLORIDE 0.4 MG: 0.4 CAPSULE ORAL at 08:10

## 2019-10-18 RX ADMIN — AZITHROMYCIN 250 MG: 250 TABLET, FILM COATED ORAL at 03:10

## 2019-10-18 RX ADMIN — MORPHINE SULFATE 1 MG: 2 INJECTION, SOLUTION INTRAMUSCULAR; INTRAVENOUS at 08:10

## 2019-10-18 RX ADMIN — CLONIDINE HYDROCHLORIDE 0.3 MG: 0.2 TABLET ORAL at 03:10

## 2019-10-18 RX ADMIN — FUROSEMIDE 20 MG: 20 TABLET ORAL at 08:10
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
## 2019-10-18 NOTE — PLAN OF CARE
VN note: VN completed AVS and attachments and notified bedside nurseYesica. Waiting for family to arrive to admin discharge education. Will cont to be available and intervene prn.

## 2019-10-18 NOTE — PHYSICIAN QUERY
"PT Name: Ming Harrington  MR #: [id number]    Physician Query Form - Heart  Condition Clarification     CDS: Ilya Guadalupe RN CCDS               Contact information: [email or web address] or [phone]  This form is a permanent document in the medical record.     Query Date: October 18, 2019    By submitting this query, we are merely seeking further clarification of documentation. Please utilize your independent clinical judgment when addressing the question(s) below.    The medical record contains the following   Indicators     Supporting Clinical Findings Location in Medical Record     x BNP  10/16/2019 14:28   BNP 18    Lab values     x EF EF of 60% 10/16/19 H&P Krishna Whipple MD     x Radiology findings The mediastinal structures are midline.  The cardiac silhouette is not enlarged. There are faint ill-defined opacities in the right mid and lower lung zones which could reflect developing pneumonia, a small airways inflammatory process, aspiration, or other pneumonitis.  Follow-up is recommended. No evidence of cardiac decompensation. 10/16/19 Chest x-ray     x Echo Results last Echo 3/19 showed LVEF 60% w/ no diastolic dysfunction 10/16/19 H&P Krishna Whipple MD    "Ascites" documented       x "SOB" or "LAND" documented SOB 10/16/19 ED provider note Domo Tavera MD    "Hypoxia" documented       x Heart Failure documented PMHx of CHF 10/16/19 ED provider note Domo Tavera MD     x "Edema" documented edema (1+ pitting edema b/l), Chronic lower extremity edema 10/16/19 H&P Krishna Whipple MD     x Diuretics/Meds furosemide tablet 20 mg PO BID 10/16/19- current Medications    Treatment:      Other:      Heart failure (HF) can be acute, chronic or both. It is generally further specificed as systolic, diastolic, or combined. Lastly, it is important to identify an underlying etiology if known or suspected.     Common clues to acute exacerbation:  Rapidly progressive symptoms (w/in 2 weeks of presentation), using IV " diuretics to treat, using supplemental O2, pulmonary edema on Xray, MI w/in 4 weeks, and/or BNP >500    Systolic Heart Failure: is defined as chart documentation of a left ventricular ejection fraction (LVEF) less than 40%     Diastolic Heart Failure: is defined as a left ventricular ejection fraction (LVEF) greater than 40%   +      Evidence of diastolic dysfunction on echocardiography OR    Right heart catheterization wedge pressure above 12 mm Hg OR    Left heart catheterization left ventricular end diastolic pressure 18 mm Hg or above.    References: *American Heart Association    The clinical guidelines noted below are only system guidelines, and do not replace the providers clinical judgment.     Provider, please specify the diagnosis associated with above clinical findings  [   ] Chronic Diastolic Heart Failure - Pre-existing diastolic HF diagnosis.  EF > 40%  without  acute HF symptoms documented   [ x  ] Heart Failure Ruled Out   [   ] Other (please specify):   [  ] Clinically Undetermined                           Please document in your progress notes daily for the duration of treatment until resolved and include in your discharge summary.

## 2019-10-18 NOTE — PROGRESS NOTES
Progress Note    Admit Date: 10/16/2019   LOS: 2 days     SUBJECTIVE:     Follow-up For:  Mr. Harrington reports an improvement in his breathing today. He reports that he is wheezing less and feeling better overall. He is on 2L O2 NC. He reports that he is ready to go home. He denies fever, chills, chest pain, nausea, and vomiting.     Scheduled Meds:   albuterol-ipratropium  3 mL Nebulization Q4H    azithromycin  250 mg Oral Daily    clonazePAM  2 mg Oral BID    cloNIDine  0.3 mg Oral TID    enoxaparin  40 mg Subcutaneous Daily    fluticasone furoate-vilanterol  1 puff Inhalation QAM    furosemide  20 mg Oral BID    guaiFENesin  600 mg Oral BID    lidocaine  1 patch Transdermal Q24H    methadone  90 mg Oral QAM    predniSONE  40 mg Oral Daily    tamsulosin  0.4 mg Oral Daily    tiotropium  1 capsule Inhalation Daily     Continuous Infusions:  PRN Meds:Dextrose 10% Bolus, Dextrose 10% Bolus, glucagon (human recombinant), glucose, glucose, ibuprofen, insulin aspart U-100, morphine, ondansetron, sodium chloride 0.9%    Review of patient's allergies indicates:   Allergen Reactions    Iodine and iodide containing products Anaphylaxis and Swelling     Facial swelling    Shellfish containing products Anaphylaxis             Compazine [prochlorperazine edisylate] Hallucinations       Review of Systems  Constitutional: negative for chills, fatigue, fevers and malaise  Eyes: negative for irritation, redness and visual disturbance  Respiratory: positive for cough and wheezing  Cardiovascular: negative for chest pain and palpitations  Gastrointestinal: negative for constipation, diarrhea, nausea and vomiting  Integument/breast: negative for rash and skin color change    OBJECTIVE:     Vital Signs (Most Recent)  Temp: 97.2 °F (36.2 °C) (10/18/19 0727)  Pulse: 76 (10/18/19 0727)  Resp: 14 (10/18/19 0727)  BP: (!) 154/96 (10/18/19 0727)  SpO2: (!) 91 % (10/18/19 0513)    Vital Signs Range (Last 24H):  Temp:  [96.1 °F  (35.6 °C)-97.2 °F (36.2 °C)]   Pulse:  []   Resp:  [14-24]   BP: (108-154)/()   SpO2:  [90 %-95 %]     I & O (Last 24H):    Intake/Output Summary (Last 24 hours) at 10/18/2019 0730  Last data filed at 10/18/2019 0700  Gross per 24 hour   Intake 805 ml   Output 1300 ml   Net -495 ml     Physical Exam:  General: well developed, well nourished, no distress  Eyes: conjunctivae/corneas clear. PERRL.   Lungs:  diminished breath sounds LLL, RLL and RML, rhonchi LLL, RML and RUL and wheezes LLL, RML and RUL  Cardiovascular: Heart: regular rate and rhythm, S1, S2 normal, no murmur, click, rub or gallop. Chest Wall: no tenderness. Extremities: no cyanosis or edema, or clubbing and edema 1+ pitting edema in bilateral lower extremities. Pulses: 2+ and symmetric.  Abdomen/Rectal: Abdomen: soft, non-tender non-distented; bowel sounds normal; no masses,  no organomegaly. Rectal: normal tone, no masses or tenderness and not examined  Skin: Skin color, texture, turgor normal. No rashes or lesions  Psych/Behavioral:  Alert and oriented, appropriate affect.    Laboratory:  CBC:   Recent Labs   Lab 10/18/19  0057   WBC 9.49   RBC 3.88*   HGB 11.6*   HCT 35.6*      MCV 92   MCH 29.9   MCHC 32.6     CMP:   Recent Labs   Lab 10/18/19  0056   *   CALCIUM 9.5   ALBUMIN 3.2*   PROT 8.2      K 5.0   CO2 28      BUN 13   CREATININE 1.1   ALKPHOS 78   ALT 16   AST 19   BILITOT 0.5       Diagnostic Results:  Imaging Results          X-Ray Chest AP Portable (Final result)  Result time 10/16/19 14:37:13    Final result by Slime Orourke MD (10/16/19 14:37:13)                 Impression:      See above      Electronically signed by: Slime Orourke MD  Date:    10/16/2019  Time:    14:37             Narrative:    EXAMINATION:  XR CHEST AP PORTABLE    CLINICAL HISTORY:  CHF;    TECHNIQUE:  Single frontal view of the chest was performed.    COMPARISON:  Prior from 08/05/2019    FINDINGS:  The mediastinal  structures are midline.  The cardiac silhouette is not enlarged.    There are faint ill-defined opacities in the right mid and lower lung zones which could reflect developing pneumonia, a small airways inflammatory process, aspiration, or other pneumonitis.  Follow-up is recommended.    No evidence of cardiac decompensation.                                  ASSESSMENT/PLAN:     Active Diagnoses:     Diagnosis Date Noted POA    PRINCIPAL PROBLEM:  COPD with acute exacerbation [J44.1] 07/04/2018 Yes    Bilateral lower extremity edema [R60.0] 10/16/2019 Unknown    Opiate dependence [F11.20] 01/08/2013 Yes    HTN (hypertension) [I10]   Yes       Chronic       Problems Resolved During this Admission:      VTE Risk Mitigation (From admission, onward)                  Ordered        Place sequential compression device  Until discontinued      10/16/19 2007        enoxaparin injection 40 mg  Daily      10/16/19 1801        IP VTE HIGH RISK PATIENT  Once      10/16/19 1801                   * COPD with acute exacerbation  Pt with increased SOB and sputum production x1wk  SpO2 93 on 2L  Sill with upper airway wheezing today  CXR showed interstitial opacities c/w inflammatory process  Procal negative  Azithromycin 250m qd IV transitioned to PO  Prednisone 40 qd x 5d starting today  Fluticasone-vilanterol diskus  Duonebs q4  O2 NC keep Spo2 >88%  Mucinex 600 BID  Chest physiotherapy tid  CURB-65 score: 0        Bilateral lower extremity edema  Chronic lower extremity edema  Home lasix 20 bid, tamsulosin 0.4mg for help with urination     Opiate dependence  Pt prescribed methadone outpatient  UDS + methadone  Methadone restarted        HTN (hypertension)  Continue home clonidine 0.1mg tid              VTE Risk Mitigation (From admission, onward)                       Ordered         enoxaparin injection 40 mg  Daily      10/16/19 1801         IP VTE HIGH RISK PATIENT  Once      10/16/19 1801                       Fluids:  n/a  Diet: regular  DVTppx: scds  Code: full     Dispo: Continue oral azithro, prednisone. Keep spo2>88%, currently on 2 LNC spo2 93%. Pt has home O2 equipment. Will d/c today on PO abx, steroids and order for O2.    Krishna Whipple MD  Department of Hospital Medicine   [facility]

## 2019-10-18 NOTE — PLAN OF CARE
AVS and educational attachments shared with patient via [employer]. Discussed thoroughly. Patient verbalized clear understanding using teach back method. Notified bedside nurse of completion of education. At present no distress noted. Patient to be discharged via w/c with escort service and Medicaid transportation with all of patient's belongings and home oxygen. Will cont to be available to patient and family and intervene prn.

## 2019-10-18 NOTE — PLAN OF CARE
Plan of care reviewed with patient. Normal sinus rhythm on continuous heart monitor, no true red alarms. Safety maintained at this time. Bed in lowest position, side rails up x 2. Call light in reach.  Encouraged patient to use call light for assistance. Verbalized understanding. Patient AAOx4. 3L NC tolerating well. Blood glucose managed per MD orders. Vitals signs stable. Will continue to monitor patient for changes.

## 2019-10-18 NOTE — PLAN OF CARE
Patient on 3L NC. Wears at home.  Patient given aerosol treatment with no adverse reactions noted. Patient instructed on proper use.  No distress. Will continue to monitor.
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
## 2019-10-18 NOTE — PLAN OF CARE
Problem: Physical Therapy Goal  Goal: Physical Therapy Goal  Description  Goals to be met by: 10/18/2019     No PT goals established         Outcome: Met   No acute skilled PT needs  Patient independent with gait and transitional mvts.  Will DC PT service.
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
## 2019-10-21 ENCOUNTER — PATIENT OUTREACH (OUTPATIENT)
Dept: ADMINISTRATIVE | Facility: CLINIC | Age: 54
End: 2019-10-21

## 2019-10-21 NOTE — PATIENT INSTRUCTIONS
COPD Flare    You have had a flare-up of your COPD.  COPD, or chronic obstructive pulmonary disease, is a common lung disease. It causes your airways to become irritated and narrower. This makes it harder for you to breathe. Emphysema and chronic bronchitis are both types of COPD. This is a chronic condition, which means you always have it. Sometimes it gets worse. When this happens, it is called a flare-up.  Symptoms of COPD  People with COPD may have symptoms most of the time. In a flare-up, your symptoms get worse. These symptoms may mean you are having a flare-up:  · Shortness of breath, shallow or rapid breathing, or wheezing that gets worse  · Lung infection  · Cough that gets worse  · More mucus, thicker mucus or mucus of a different color  · Tiredness, decreased energy, or trouble doing your usual activities  · Fever  · Chest tightness  · Your symptoms dont get better even when you use your usual medicines, inhalers, and nebulizer  · Trouble talking  · You feel confused  Causes of flare-ups  Unfortunately, a flare-up can happen even though you did everything right, and you followed your doctors instructions. Some causes of flare-ups are:  · Smoking or secondhand smoke  · Colds, the flu, or respiratory infections  · Air pollution  · Sudden change in the weather  · Dust, irritating chemicals, or strong fumes  · Not taking your medicines as prescribed  Home care  Here are some things you can do at home to treat a flare-up:  · Try not to panic. This makes it harder to breathe, and keeps you from doing the right things.  · Dont smoke or be around others who are smoking.  · Try to drink more fluids than usual during a flare-up, unless your doctor has told you not to because of heart and kidney problems. More fluids can help loosen the mucus.  · Use your inhalers and nebulizer, if you have one, as you have been told to.  · If you were given antibiotics, take them until they are used up or your doctor tells you  to stop. Its important to finish the antibiotics, even though you feel better. This will make sure the infection has cleared.  · If you were given prednisone or another steroid, finish it even if you feel better.  Preventing a flare-up  Even though flare-ups happen, the best way to treat one is to prevent it before it starts. Here are some pointers:  · Dont smoke or be around others who are smoking.  · Take your medicines as you have been told.  · Talk with your doctor about getting a flu shot every year. Also find out if you need a pneumonia shot.  · If there is a weather advisory warning to stay indoors, try to stay inside when possible.  · Try to eat healthy and get plenty of sleep.  · Try to avoid things that usually set you off, like dust, chemical fumes, hairsprays, or strong perfumes.  Follow-up care  Follow up with your healthcare provider, or as advised.  If a culture was done, you will be told if your treatment needs to be changed. You can call as directed for the results.  If X-rays were done, you will be notified of any new findings that may affect your care.  Call 911  Call 911 if any of these occur:  · You have trouble breathing  · You feel confused or its difficult to wake you up  · You faint or lose consciousness  · You have a rapid heart rate  · You have new pain in your chest, arm, shoulder, neck or upper back  When to seek medical advice  Call your healthcare provider right away if any of these occur:  · Wheezing or shortness of breath gets worse  · You need to use your inhalers more often than usual without relief  · Fever of 100.4°F (38ºC) or higher, or as directed by your healthcare provider  · Coughing up lots of dark-colored or bloody mucus (sputum)  · Chest pain with each breath  · You do not start to get better within 24 hours  · Swelling of your ankles gets worse  · Dizziness or weakness  Date Last Reviewed: 9/1/2016  © 7127-2250 [employer]. [address],  [place] [address]. All rights reserved. This information is not intended as a substitute for professional medical care. Always follow your healthcare professional's instructions.

## 2019-10-21 NOTE — TELEPHONE ENCOUNTER
C3 nurse attempted to contact patient. The following occurred:   C3 nurse attempted to contact Ming Harrington for a TCC post hospital discharge follow up call. The patient is unable to conduct the call @ this time. The patient requested a callback.    The patient does not have a scheduled HOSFU appointment within 7-14 days post hospital discharge date 10/18/19.

## 2019-10-29 ENCOUNTER — TELEPHONE (OUTPATIENT)
Dept: FAMILY MEDICINE | Facility: HOSPITAL | Age: 54
End: 2019-10-29

## 2019-10-29 ENCOUNTER — OFFICE VISIT (OUTPATIENT)
Dept: FAMILY MEDICINE | Facility: HOSPITAL | Age: 54
End: 2019-10-29
Attending: FAMILY MEDICINE
Payer: MEDICAID

## 2019-10-29 VITALS
DIASTOLIC BLOOD PRESSURE: 79 MMHG | HEIGHT: 68 IN | WEIGHT: 249.81 LBS | TEMPERATURE: 99 F | BODY MASS INDEX: 37.86 KG/M2 | HEART RATE: 62 BPM | OXYGEN SATURATION: 94 % | SYSTOLIC BLOOD PRESSURE: 113 MMHG

## 2019-10-29 DIAGNOSIS — F41.9 ANXIETY: ICD-10-CM

## 2019-10-29 DIAGNOSIS — J44.1 ACUTE EXACERBATION OF COPD WITH ASTHMA: ICD-10-CM

## 2019-10-29 DIAGNOSIS — I10 ESSENTIAL HYPERTENSION: Primary | Chronic | ICD-10-CM

## 2019-10-29 DIAGNOSIS — J44.0 CHRONIC OBSTRUCTIVE PULMONARY DISEASE WITH ACUTE LOWER RESPIRATORY INFECTION: ICD-10-CM

## 2019-10-29 DIAGNOSIS — I10 ESSENTIAL HYPERTENSION: ICD-10-CM

## 2019-10-29 DIAGNOSIS — S31.109D CHRONIC WOUND INFECTION OF ABDOMEN, SUBSEQUENT ENCOUNTER: ICD-10-CM

## 2019-10-29 DIAGNOSIS — J45.901 ACUTE EXACERBATION OF COPD WITH ASTHMA: ICD-10-CM

## 2019-10-29 DIAGNOSIS — J44.9 CHRONIC OBSTRUCTIVE PULMONARY DISEASE, UNSPECIFIED COPD TYPE: ICD-10-CM

## 2019-10-29 DIAGNOSIS — L08.9 CHRONIC WOUND INFECTION OF ABDOMEN, SUBSEQUENT ENCOUNTER: ICD-10-CM

## 2019-10-29 DIAGNOSIS — I50.9 CONGESTIVE HEART FAILURE, UNSPECIFIED HF CHRONICITY, UNSPECIFIED HEART FAILURE TYPE: ICD-10-CM

## 2019-10-29 DIAGNOSIS — R11.0 NAUSEA: ICD-10-CM

## 2019-10-29 PROCEDURE — 99215 OFFICE O/P EST HI 40 MIN: CPT | Performed by: PHYSICIAN ASSISTANT

## 2019-10-29 RX ORDER — FLUTICASONE PROPIONATE AND SALMETEROL 50; 250 UG/1; UG/1
1 POWDER RESPIRATORY (INHALATION) 2 TIMES DAILY
Qty: 60 EACH | Refills: 3 | Status: ON HOLD | OUTPATIENT
Start: 2019-10-29 | End: 2020-02-27 | Stop reason: SDUPTHER

## 2019-10-29 RX ORDER — ONDANSETRON 8 MG/1
8 TABLET, ORALLY DISINTEGRATING ORAL EVERY 6 HOURS PRN
Qty: 30 TABLET | Refills: 0 | Status: ON HOLD | OUTPATIENT
Start: 2019-10-29 | End: 2020-02-27 | Stop reason: HOSPADM

## 2019-10-29 RX ORDER — CLONIDINE HYDROCHLORIDE 0.1 MG/1
0.3 TABLET ORAL 3 TIMES DAILY
Qty: 90 TABLET | Refills: 0 | Status: ON HOLD | OUTPATIENT
Start: 2019-10-29 | End: 2020-02-27 | Stop reason: SDUPTHER

## 2019-10-29 RX ORDER — ALBUTEROL SULFATE 90 UG/1
1-2 AEROSOL, METERED RESPIRATORY (INHALATION) EVERY 6 HOURS PRN
Qty: 18 G | Refills: 5 | Status: ON HOLD | OUTPATIENT
Start: 2019-10-29 | End: 2020-02-27 | Stop reason: SDUPTHER

## 2019-10-29 RX ORDER — IPRATROPIUM BROMIDE AND ALBUTEROL SULFATE 2.5; .5 MG/3ML; MG/3ML
3 SOLUTION RESPIRATORY (INHALATION) EVERY 4 HOURS
Qty: 1620 ML | Refills: 3 | Status: ON HOLD | OUTPATIENT
Start: 2019-10-29 | End: 2020-02-27 | Stop reason: SDUPTHER

## 2019-10-29 RX ORDER — HYDROXYZINE HYDROCHLORIDE 50 MG/1
TABLET, FILM COATED ORAL
COMMUNITY
Start: 2019-10-23 | End: 2019-10-29 | Stop reason: SDUPTHER

## 2019-10-29 RX ORDER — IPRATROPIUM BROMIDE 17 UG/1
2 AEROSOL, METERED RESPIRATORY (INHALATION)
Qty: 12.9 G | Refills: 6 | Status: ON HOLD | OUTPATIENT
Start: 2019-10-29 | End: 2020-02-27 | Stop reason: HOSPADM

## 2019-10-29 RX ORDER — MONTELUKAST SODIUM 10 MG/1
10 TABLET ORAL NIGHTLY
Qty: 90 TABLET | Refills: 3 | Status: SHIPPED | OUTPATIENT
Start: 2019-10-29 | End: 2020-02-25

## 2019-10-29 RX ORDER — HYDROXYZINE HYDROCHLORIDE 50 MG/1
25 TABLET, FILM COATED ORAL 3 TIMES DAILY PRN
Qty: 90 TABLET | Refills: 3 | Status: SHIPPED | OUTPATIENT
Start: 2019-10-29 | End: 2022-01-27

## 2019-10-29 RX ORDER — FUROSEMIDE 40 MG/1
20 TABLET ORAL 2 TIMES DAILY
Qty: 90 TABLET | Refills: 3 | Status: ON HOLD | OUTPATIENT
Start: 2019-10-29 | End: 2020-02-27 | Stop reason: SDUPTHER

## 2019-10-29 NOTE — TELEPHONE ENCOUNTER
----- Message from Hipolito aKtz sent at 10/24/2019  3:22 PM CDT -----  PT  FROM [employer] CALL STATING THAT THE PATIENT NEEDS OXYGEN CONCENTRATOR FOR HIS HOME OXYGEN. HIS MACHINE IS BROKEN HE NEEDS IT REPLACE. SHE ASK THAT I SENT THIS MESSAGE BECAUSE PATIENT MAY NOT REMEMBER TO TELL JAMES ON 10/29 @ 11.

## 2019-10-29 NOTE — PROGRESS NOTES
Subjective:       Patient ID: Ming Harrington is a 54 y.o. male.    Chief Complaint: COPD and Hospital Follow Up    Admit Date: 10/16/2019  Discharge Date and Time:  10/17/2019      PMH of COPD (prescribed home O2 but not currently using x 4 mo), asthma, HTN, anxiety, Hx of IVDU 1 year ago, lower extremity edema, presented to ED with SOB and productive cough x1 week. Associated wheezing and pleuritic chest pain. +subjective fever, chills, sweats, nausea. Home albuterol and duonebs without relief.      In ED, pt Spo2 88% on 3 LNC, otherwise vitals stable. CBC, CMP no abnormalities. BNP 18. Trop negative x 1. CXR with ill defined interstitial opacities likely representing inflammatory process, no pulmonary edema. EKG showed NSR. Pt given duoneb, solumedrol, rocephin 1g and azithromycin 1g oral. Pt remained spo2 88% after interventions. Admitted to [facility] and started on Azithromycin, Prednisone, Fluticasone-vilanterol, Duonebs, and Mucinex.     COPD:  He has finished abx and steroids and reports breathing much better.  Requesting refills of his medications. Continue home Advair.    HTN:  Well controlled today. Continue home clonidine TID.     Opiate Dependence:  Pt prescribed methadone outpatient; goes downtown daily for methadone treatment.     Review of Systems   Constitutional: Negative.    HENT: Negative.    Respiratory: Negative.    Cardiovascular: Negative.    Gastrointestinal: Negative.    Genitourinary: Negative.    Musculoskeletal: Negative.    Skin: Negative.    Neurological: Negative.    Psychiatric/Behavioral: Negative.        Objective:      Vitals:    10/29/19 1115   BP: 113/79   Pulse: 62   Temp: 98.9 °F (37.2 °C)     Physical Exam   Constitutional: He is oriented to person, place, and time. He appears well-developed and well-nourished. No distress.   HENT:   Head: Normocephalic and atraumatic.   Eyes: Conjunctivae and EOM are normal.   Neck: No tracheal deviation present.   Cardiovascular: Normal  rate, regular rhythm, normal heart sounds and intact distal pulses.   No murmur heard.  Pulmonary/Chest: Effort normal. No respiratory distress. He has wheezes. He exhibits no tenderness.   Scattered wheezes  Decreased BS throughout   Abdominal: Soft. Bowel sounds are normal. He exhibits no distension. There is no tenderness. There is no guarding.   Musculoskeletal: Normal range of motion. He exhibits no edema, tenderness or deformity.   Bilateral LE edema   Neurological: He is alert and oriented to person, place, and time. Coordination normal.   Skin: Skin is warm and dry. He is not diaphoretic.   Psychiatric: He has a normal mood and affect. His behavior is normal. Thought content normal.   Nursing note and vitals reviewed.      Assessment:       1. Essential hypertension    2. Chronic obstructive pulmonary disease, unspecified COPD type    3. Chronic wound infection of abdomen, subsequent encounter    4. Acute exacerbation of COPD with asthma    5. Chronic obstructive pulmonary disease with acute lower respiratory infection    6. Nausea    7. Essential hypertension    8. Congestive heart failure, unspecified HF chronicity, unspecified heart failure type    9. Anxiety        Plan:       Essential hypertension   -Well controlled today; continue current medication  -     cloNIDine (CATAPRES) 0.1 MG tablet; Take 3 tablets (0.3 mg total) by mouth 3 (three) times daily.  Dispense: 90 tablet; Refill: 0    Chronic wound infection of abdomen, subsequent encounter   -Looks good today; healing well   -Follow up with wound care if needed    Acute exacerbation of COPD with asthma  -     ADVAIR DISKUS 250-50 mcg/dose diskus inhaler; Inhale 1 puff into the lungs 2 (two) times daily.  Dispense: 60 each; Refill: 3    Chronic obstructive pulmonary disease with acute lower respiratory infection  -     albuterol-ipratropium (DUO-NEB) 2.5 mg-0.5 mg/3 mL nebulizer solution; Take 3 mLs by nebulization every 4 (four) hours. Rescue   Dispense: 1620 mL; Refill: 3  -     ATROVENT HFA 17 mcg/actuation inhaler; Inhale 2 puffs into the lungs every 4 to 6 hours as needed.  Dispense: 12.9 g; Refill: 6  -     albuterol (PROVENTIL/VENTOLIN HFA) 90 mcg/actuation inhaler; Inhale 1-2 puffs into the lungs every 6 (six) hours as needed for Wheezing or Shortness of Breath. Rescue  Dispense: 18 g; Refill: 5  -     montelukast (SINGULAIR) 10 mg tablet; Take 1 tablet (10 mg total) by mouth every evening.  Dispense: 90 tablet; Refill: 3    Nausea  -     ondansetron (ZOFRAN-ODT) 8 MG TbDL; Take 1 tablet (8 mg total) by mouth every 6 (six) hours as needed.  Dispense: 30 tablet; Refill: 0    Essential hypertension  Comments:  stable, will continue current regimen  Orders:  -     cloNIDine (CATAPRES) 0.1 MG tablet; Take 3 tablets (0.3 mg total) by mouth 3 (three) times daily.  Dispense: 90 tablet; Refill: 0    Congestive heart failure, unspecified HF chronicity, unspecified heart failure type  -     furosemide (LASIX) 40 MG tablet; Take 0.5 tablets (20 mg total) by mouth 2 (two) times daily.  Dispense: 90 tablet; Refill: 3    Anxiety  -     hydrOXYzine (ATARAX) 50 MG tablet; Take 0.5 tablets (25 mg total) by mouth 3 (three) times daily as needed for Itching.  Dispense: 90 tablet; Refill: 3      Future Appointments   Date Time Provider Department Center   11/25/2019 11:00 AM [facility]     Follow up in about 4 weeks (around 11/26/2019), or with PCP.

## 2020-02-25 ENCOUNTER — HOSPITAL ENCOUNTER (OUTPATIENT)
Facility: HOSPITAL | Age: 55
Discharge: HOME | End: 2020-02-27
Attending: EMERGENCY MEDICINE | Admitting: FAMILY MEDICINE
Payer: MEDICAID

## 2020-02-25 DIAGNOSIS — J45.909 ASTHMA: ICD-10-CM

## 2020-02-25 DIAGNOSIS — R09.02 HYPOXIA: ICD-10-CM

## 2020-02-25 DIAGNOSIS — F41.1 GENERALIZED ANXIETY DISORDER: ICD-10-CM

## 2020-02-25 DIAGNOSIS — R06.02 SOB (SHORTNESS OF BREATH): ICD-10-CM

## 2020-02-25 DIAGNOSIS — J45.901 ACUTE EXACERBATION OF COPD WITH ASTHMA: ICD-10-CM

## 2020-02-25 DIAGNOSIS — J44.1 COPD EXACERBATION: Primary | ICD-10-CM

## 2020-02-25 DIAGNOSIS — I50.9 CONGESTIVE HEART FAILURE, UNSPECIFIED HF CHRONICITY, UNSPECIFIED HEART FAILURE TYPE: ICD-10-CM

## 2020-02-25 DIAGNOSIS — J44.1 ACUTE EXACERBATION OF COPD WITH ASTHMA: ICD-10-CM

## 2020-02-25 DIAGNOSIS — Z72.0 TOBACCO ABUSE: ICD-10-CM

## 2020-02-25 DIAGNOSIS — J44.0 CHRONIC OBSTRUCTIVE PULMONARY DISEASE WITH ACUTE LOWER RESPIRATORY INFECTION: ICD-10-CM

## 2020-02-25 DIAGNOSIS — I10 ESSENTIAL HYPERTENSION: ICD-10-CM

## 2020-02-25 PROBLEM — F17.200 TOBACCO DEPENDENCE: Status: ACTIVE | Noted: 2020-02-25

## 2020-02-25 LAB
ALBUMIN SERPL BCP-MCNC: 3.3 G/DL (ref 3.5–5.2)
ALP SERPL-CCNC: 86 U/L (ref 55–135)
ALT SERPL W/O P-5'-P-CCNC: 14 U/L (ref 10–44)
AMPHET+METHAMPHET UR QL: NEGATIVE
ANION GAP SERPL CALC-SCNC: 10 MMOL/L (ref 8–16)
APTT BLDCRRT: 39.2 SEC (ref 21–32)
AST SERPL-CCNC: 23 U/L (ref 10–40)
BACTERIA #/AREA URNS HPF: ABNORMAL /HPF
BARBITURATES UR QL SCN>200 NG/ML: NEGATIVE
BASOPHILS # BLD AUTO: 0.02 K/UL (ref 0–0.2)
BASOPHILS NFR BLD: 0.3 % (ref 0–1.9)
BENZODIAZ UR QL SCN>200 NG/ML: NEGATIVE
BILIRUB SERPL-MCNC: 0.5 MG/DL (ref 0.1–1)
BILIRUB UR QL STRIP: NEGATIVE
BNP SERPL-MCNC: 36 PG/ML (ref 0–99)
BUN SERPL-MCNC: 9 MG/DL (ref 6–20)
BZE UR QL SCN: NEGATIVE
CALCIUM SERPL-MCNC: 8.8 MG/DL (ref 8.7–10.5)
CANNABINOIDS UR QL SCN: NEGATIVE
CHLORIDE SERPL-SCNC: 103 MMOL/L (ref 95–110)
CLARITY UR: CLEAR
CO2 SERPL-SCNC: 27 MMOL/L (ref 23–29)
COLOR UR: ABNORMAL
CREAT SERPL-MCNC: 1 MG/DL (ref 0.5–1.4)
CREAT UR-MCNC: 121.5 MG/DL (ref 23–375)
CRP SERPL-MCNC: 0.2 MG/L (ref 0–8.2)
DIFFERENTIAL METHOD: ABNORMAL
EOSINOPHIL # BLD AUTO: 0.2 K/UL (ref 0–0.5)
EOSINOPHIL NFR BLD: 2.2 % (ref 0–8)
ERYTHROCYTE [DISTWIDTH] IN BLOOD BY AUTOMATED COUNT: 13.1 % (ref 11.5–14.5)
EST. GFR  (AFRICAN AMERICAN): >60 ML/MIN/1.73 M^2
EST. GFR  (NON AFRICAN AMERICAN): >60 ML/MIN/1.73 M^2
GLUCOSE SERPL-MCNC: 104 MG/DL (ref 70–110)
GLUCOSE UR QL STRIP: NEGATIVE
HCT VFR BLD AUTO: 43.4 % (ref 40–54)
HGB BLD-MCNC: 13.5 G/DL (ref 14–18)
HGB UR QL STRIP: NEGATIVE
HYALINE CASTS #/AREA URNS LPF: 2 /LPF
IMM GRANULOCYTES # BLD AUTO: 0.01 K/UL (ref 0–0.04)
IMM GRANULOCYTES NFR BLD AUTO: 0.1 % (ref 0–0.5)
INFLUENZA A, MOLECULAR: NEGATIVE
INFLUENZA B, MOLECULAR: NEGATIVE
KETONES UR QL STRIP: NEGATIVE
LEUKOCYTE ESTERASE UR QL STRIP: NEGATIVE
LYMPHOCYTES # BLD AUTO: 2.5 K/UL (ref 1–4.8)
LYMPHOCYTES NFR BLD: 35.8 % (ref 18–48)
MCH RBC QN AUTO: 29 PG (ref 27–31)
MCHC RBC AUTO-ENTMCNC: 31.1 G/DL (ref 32–36)
MCV RBC AUTO: 93 FL (ref 82–98)
METHADONE UR QL SCN>300 NG/ML: NORMAL
MICROSCOPIC COMMENT: ABNORMAL
MONOCYTES # BLD AUTO: 0.5 K/UL (ref 0.3–1)
MONOCYTES NFR BLD: 7.3 % (ref 4–15)
NEUTROPHILS # BLD AUTO: 3.7 K/UL (ref 1.8–7.7)
NEUTROPHILS NFR BLD: 54.3 % (ref 38–73)
NITRITE UR QL STRIP: NEGATIVE
NRBC BLD-RTO: 0 /100 WBC
OPIATES UR QL SCN: NORMAL
PCP UR QL SCN>25 NG/ML: NEGATIVE
PH UR STRIP: 6 [PH] (ref 5–8)
PLATELET # BLD AUTO: 151 K/UL (ref 150–350)
PMV BLD AUTO: 10.3 FL (ref 9.2–12.9)
POTASSIUM SERPL-SCNC: 3.9 MMOL/L (ref 3.5–5.1)
PROT SERPL-MCNC: 7.8 G/DL (ref 6–8.4)
PROT UR QL STRIP: ABNORMAL
RBC # BLD AUTO: 4.65 M/UL (ref 4.6–6.2)
RBC #/AREA URNS HPF: 5 /HPF (ref 0–4)
SODIUM SERPL-SCNC: 140 MMOL/L (ref 136–145)
SP GR UR STRIP: 1.02 (ref 1–1.03)
SPECIMEN SOURCE: NORMAL
SQUAMOUS #/AREA URNS HPF: 1 /HPF
TOXICOLOGY INFORMATION: NORMAL
TROPONIN I SERPL DL<=0.01 NG/ML-MCNC: 0.01 NG/ML (ref 0–0.03)
URN SPEC COLLECT METH UR: ABNORMAL
UROBILINOGEN UR STRIP-ACNC: ABNORMAL EU/DL
WBC # BLD AUTO: 6.85 K/UL (ref 3.9–12.7)
WBC #/AREA URNS HPF: 5 /HPF (ref 0–5)

## 2020-02-25 PROCEDURE — 80053 COMPREHEN METABOLIC PANEL: CPT

## 2020-02-25 PROCEDURE — 85025 COMPLETE CBC W/AUTO DIFF WBC: CPT

## 2020-02-25 PROCEDURE — 25000003 PHARM REV CODE 250: Performed by: FAMILY MEDICINE

## 2020-02-25 PROCEDURE — 94761 N-INVAS EAR/PLS OXIMETRY MLT: CPT

## 2020-02-25 PROCEDURE — 96374 THER/PROPH/DIAG INJ IV PUSH: CPT

## 2020-02-25 PROCEDURE — 63600175 PHARM REV CODE 636 W HCPCS: Performed by: EMERGENCY MEDICINE

## 2020-02-25 PROCEDURE — 87205 SMEAR GRAM STAIN: CPT

## 2020-02-25 PROCEDURE — 81000 URINALYSIS NONAUTO W/SCOPE: CPT | Mod: 59

## 2020-02-25 PROCEDURE — 84484 ASSAY OF TROPONIN QUANT: CPT

## 2020-02-25 PROCEDURE — 94640 AIRWAY INHALATION TREATMENT: CPT

## 2020-02-25 PROCEDURE — 94644 CONT INHLJ TX 1ST HOUR: CPT

## 2020-02-25 PROCEDURE — 87070 CULTURE OTHR SPECIMN AEROBIC: CPT

## 2020-02-25 PROCEDURE — 96372 THER/PROPH/DIAG INJ SC/IM: CPT | Mod: 59 | Performed by: FAMILY MEDICINE

## 2020-02-25 PROCEDURE — 83880 ASSAY OF NATRIURETIC PEPTIDE: CPT

## 2020-02-25 PROCEDURE — G0378 HOSPITAL OBSERVATION PER HR: HCPCS

## 2020-02-25 PROCEDURE — 87077 CULTURE AEROBIC IDENTIFY: CPT

## 2020-02-25 PROCEDURE — 93005 ELECTROCARDIOGRAM TRACING: CPT

## 2020-02-25 PROCEDURE — 94664 DEMO&/EVAL PT USE INHALER: CPT

## 2020-02-25 PROCEDURE — 25000242 PHARM REV CODE 250 ALT 637 W/ HCPCS: Performed by: STUDENT IN AN ORGANIZED HEALTH CARE EDUCATION/TRAINING PROGRAM

## 2020-02-25 PROCEDURE — 85730 THROMBOPLASTIN TIME PARTIAL: CPT

## 2020-02-25 PROCEDURE — 80307 DRUG TEST PRSMV CHEM ANLYZR: CPT

## 2020-02-25 PROCEDURE — 63600175 PHARM REV CODE 636 W HCPCS: Performed by: STUDENT IN AN ORGANIZED HEALTH CARE EDUCATION/TRAINING PROGRAM

## 2020-02-25 PROCEDURE — 99285 EMERGENCY DEPT VISIT HI MDM: CPT | Mod: 25

## 2020-02-25 PROCEDURE — 99900035 HC TECH TIME PER 15 MIN (STAT)

## 2020-02-25 PROCEDURE — 25000242 PHARM REV CODE 250 ALT 637 W/ HCPCS: Performed by: EMERGENCY MEDICINE

## 2020-02-25 PROCEDURE — 25000003 PHARM REV CODE 250: Performed by: STUDENT IN AN ORGANIZED HEALTH CARE EDUCATION/TRAINING PROGRAM

## 2020-02-25 PROCEDURE — 93010 ELECTROCARDIOGRAM REPORT: CPT | Mod: ,,, | Performed by: INTERNAL MEDICINE

## 2020-02-25 PROCEDURE — 96375 TX/PRO/DX INJ NEW DRUG ADDON: CPT

## 2020-02-25 PROCEDURE — 27000221 HC OXYGEN, UP TO 24 HOURS

## 2020-02-25 PROCEDURE — 27000646 HC AEROBIKA DEVICE

## 2020-02-25 PROCEDURE — 93010 EKG 12-LEAD: ICD-10-PCS | Mod: ,,, | Performed by: INTERNAL MEDICINE

## 2020-02-25 PROCEDURE — 86140 C-REACTIVE PROTEIN: CPT

## 2020-02-25 PROCEDURE — 96375 TX/PRO/DX INJ NEW DRUG ADDON: CPT | Performed by: FAMILY MEDICINE

## 2020-02-25 PROCEDURE — 94667 MNPJ CHEST WALL 1ST: CPT

## 2020-02-25 PROCEDURE — 87186 SC STD MICRODIL/AGAR DIL: CPT

## 2020-02-25 PROCEDURE — 87502 INFLUENZA DNA AMP PROBE: CPT

## 2020-02-25 RX ORDER — IPRATROPIUM BROMIDE AND ALBUTEROL SULFATE 2.5; .5 MG/3ML; MG/3ML
3 SOLUTION RESPIRATORY (INHALATION)
Status: COMPLETED | OUTPATIENT
Start: 2020-02-25 | End: 2020-02-25

## 2020-02-25 RX ORDER — ALBUTEROL SULFATE 2.5 MG/.5ML
2.5 SOLUTION RESPIRATORY (INHALATION)
Status: DISCONTINUED | OUTPATIENT
Start: 2020-02-25 | End: 2020-02-27 | Stop reason: HOSPADM

## 2020-02-25 RX ORDER — POLYETHYLENE GLYCOL 3350 17 G/17G
17 POWDER, FOR SOLUTION ORAL DAILY
Status: DISCONTINUED | OUTPATIENT
Start: 2020-02-25 | End: 2020-02-26

## 2020-02-25 RX ORDER — IBUPROFEN 200 MG
1 TABLET ORAL DAILY
Status: DISCONTINUED | OUTPATIENT
Start: 2020-02-26 | End: 2020-02-27 | Stop reason: HOSPADM

## 2020-02-25 RX ORDER — ONDANSETRON 2 MG/ML
4 INJECTION INTRAMUSCULAR; INTRAVENOUS
Status: COMPLETED | OUTPATIENT
Start: 2020-02-25 | End: 2020-02-25

## 2020-02-25 RX ORDER — METHADONE HYDROCHLORIDE 5 MG/5ML
100 SOLUTION ORAL DAILY
Status: DISCONTINUED | OUTPATIENT
Start: 2020-02-25 | End: 2020-02-25 | Stop reason: CLARIF

## 2020-02-25 RX ORDER — FLUTICASONE FUROATE AND VILANTEROL 200; 25 UG/1; UG/1
1 POWDER RESPIRATORY (INHALATION) DAILY
Status: DISCONTINUED | OUTPATIENT
Start: 2020-02-25 | End: 2020-02-27 | Stop reason: HOSPADM

## 2020-02-25 RX ORDER — METHADONE HYDROCHLORIDE 10 MG/1
100 TABLET ORAL DAILY
Status: DISCONTINUED | OUTPATIENT
Start: 2020-02-26 | End: 2020-02-27 | Stop reason: HOSPADM

## 2020-02-25 RX ORDER — OSELTAMIVIR PHOSPHATE 75 MG/1
75 CAPSULE ORAL 2 TIMES DAILY
Status: DISCONTINUED | OUTPATIENT
Start: 2020-02-25 | End: 2020-02-27 | Stop reason: HOSPADM

## 2020-02-25 RX ORDER — ENOXAPARIN SODIUM 100 MG/ML
40 INJECTION SUBCUTANEOUS EVERY 24 HOURS
Status: DISCONTINUED | OUTPATIENT
Start: 2020-02-25 | End: 2020-02-27 | Stop reason: HOSPADM

## 2020-02-25 RX ORDER — ACETAMINOPHEN 325 MG/1
650 TABLET ORAL EVERY 8 HOURS PRN
Status: DISCONTINUED | OUTPATIENT
Start: 2020-02-25 | End: 2020-02-27 | Stop reason: HOSPADM

## 2020-02-25 RX ORDER — SODIUM CHLORIDE FOR INHALATION 3 %
4 VIAL, NEBULIZER (ML) INHALATION
Status: DISCONTINUED | OUTPATIENT
Start: 2020-02-25 | End: 2020-02-27 | Stop reason: HOSPADM

## 2020-02-25 RX ORDER — ALBUTEROL SULFATE 2.5 MG/.5ML
15 SOLUTION RESPIRATORY (INHALATION)
Status: COMPLETED | OUTPATIENT
Start: 2020-02-25 | End: 2020-02-25

## 2020-02-25 RX ORDER — PREDNISONE 20 MG/1
40 TABLET ORAL DAILY
Status: DISCONTINUED | OUTPATIENT
Start: 2020-02-25 | End: 2020-02-27 | Stop reason: HOSPADM

## 2020-02-25 RX ORDER — METHYLPREDNISOLONE SOD SUCC 125 MG
125 VIAL (EA) INJECTION
Status: COMPLETED | OUTPATIENT
Start: 2020-02-25 | End: 2020-02-25

## 2020-02-25 RX ORDER — IPRATROPIUM BROMIDE AND ALBUTEROL SULFATE 2.5; .5 MG/3ML; MG/3ML
3 SOLUTION RESPIRATORY (INHALATION) EVERY 4 HOURS
Status: DISCONTINUED | OUTPATIENT
Start: 2020-02-25 | End: 2020-02-27 | Stop reason: HOSPADM

## 2020-02-25 RX ORDER — PANTOPRAZOLE SODIUM 40 MG/1
40 TABLET, DELAYED RELEASE ORAL DAILY
Status: DISCONTINUED | OUTPATIENT
Start: 2020-02-25 | End: 2020-02-27 | Stop reason: HOSPADM

## 2020-02-25 RX ORDER — HYDROMORPHONE HYDROCHLORIDE 1 MG/ML
0.2 INJECTION, SOLUTION INTRAMUSCULAR; INTRAVENOUS; SUBCUTANEOUS ONCE
Status: COMPLETED | OUTPATIENT
Start: 2020-02-25 | End: 2020-02-25

## 2020-02-25 RX ORDER — ACETAMINOPHEN 500 MG
1000 TABLET ORAL
Status: COMPLETED | OUTPATIENT
Start: 2020-02-25 | End: 2020-02-25

## 2020-02-25 RX ORDER — ONDANSETRON 2 MG/ML
4 INJECTION INTRAMUSCULAR; INTRAVENOUS EVERY 8 HOURS PRN
Status: DISCONTINUED | OUTPATIENT
Start: 2020-02-25 | End: 2020-02-27 | Stop reason: HOSPADM

## 2020-02-25 RX ORDER — ESCITALOPRAM OXALATE 10 MG/1
10 TABLET ORAL DAILY
Status: DISCONTINUED | OUTPATIENT
Start: 2020-02-25 | End: 2020-02-27 | Stop reason: HOSPADM

## 2020-02-25 RX ORDER — CLONIDINE HYDROCHLORIDE 0.3 MG/1
0.3 TABLET ORAL 3 TIMES DAILY
Status: DISCONTINUED | OUTPATIENT
Start: 2020-02-25 | End: 2020-02-27 | Stop reason: HOSPADM

## 2020-02-25 RX ORDER — HYDROXYZINE PAMOATE 25 MG/1
25 CAPSULE ORAL EVERY 8 HOURS PRN
Status: DISCONTINUED | OUTPATIENT
Start: 2020-02-25 | End: 2020-02-27 | Stop reason: HOSPADM

## 2020-02-25 RX ORDER — SODIUM CHLORIDE 0.9 % (FLUSH) 0.9 %
10 SYRINGE (ML) INJECTION
Status: DISCONTINUED | OUTPATIENT
Start: 2020-02-25 | End: 2020-02-27 | Stop reason: HOSPADM

## 2020-02-25 RX ADMIN — ACETAMINOPHEN 1000 MG: 500 TABLET ORAL at 02:02

## 2020-02-25 RX ADMIN — METHYLPREDNISOLONE SODIUM SUCCINATE 125 MG: 125 INJECTION, POWDER, FOR SOLUTION INTRAMUSCULAR; INTRAVENOUS at 12:02

## 2020-02-25 RX ADMIN — LACTOBACILLUS TAB 4 TABLET: TAB at 05:02

## 2020-02-25 RX ADMIN — PANTOPRAZOLE SODIUM 40 MG: 40 TABLET, DELAYED RELEASE ORAL at 05:02

## 2020-02-25 RX ADMIN — CEFTRIAXONE 1 G: 1 INJECTION, SOLUTION INTRAVENOUS at 05:02

## 2020-02-25 RX ADMIN — GUAIFENESIN AND DEXTROMETHORPHAN HYDROBROMIDE 2 TABLET: 600; 30 TABLET, EXTENDED RELEASE ORAL at 08:02

## 2020-02-25 RX ADMIN — ESCITALOPRAM OXALATE 10 MG: 10 TABLET ORAL at 05:02

## 2020-02-25 RX ADMIN — IPRATROPIUM BROMIDE AND ALBUTEROL SULFATE 3 ML: .5; 3 SOLUTION RESPIRATORY (INHALATION) at 11:02

## 2020-02-25 RX ADMIN — IPRATROPIUM BROMIDE AND ALBUTEROL SULFATE 3 ML: .5; 3 SOLUTION RESPIRATORY (INHALATION) at 04:02

## 2020-02-25 RX ADMIN — ONDANSETRON 4 MG: 2 INJECTION INTRAMUSCULAR; INTRAVENOUS at 12:02

## 2020-02-25 RX ADMIN — HYDROMORPHONE HYDROCHLORIDE 0.2 MG: 1 INJECTION, SOLUTION INTRAMUSCULAR; INTRAVENOUS; SUBCUTANEOUS at 06:02

## 2020-02-25 RX ADMIN — CLONIDINE HYDROCHLORIDE 0.3 MG: 0.3 TABLET ORAL at 08:02

## 2020-02-25 RX ADMIN — OSELTAMIVIR PHOSPHATE 75 MG: 75 CAPSULE ORAL at 08:02

## 2020-02-25 RX ADMIN — IPRATROPIUM BROMIDE AND ALBUTEROL SULFATE 3 ML: .5; 3 SOLUTION RESPIRATORY (INHALATION) at 07:02

## 2020-02-25 RX ADMIN — ENOXAPARIN SODIUM 40 MG: 100 INJECTION SUBCUTANEOUS at 05:02

## 2020-02-25 RX ADMIN — PREDNISONE 40 MG: 20 TABLET ORAL at 05:02

## 2020-02-25 RX ADMIN — AZITHROMYCIN MONOHYDRATE 500 MG: 500 INJECTION, POWDER, LYOPHILIZED, FOR SOLUTION INTRAVENOUS at 05:02

## 2020-02-25 RX ADMIN — ALBUTEROL SULFATE 15 MG: 2.5 SOLUTION RESPIRATORY (INHALATION) at 11:02

## 2020-02-25 NOTE — ASSESSMENT & PLAN NOTE
Patient is presenting with complaints of shortness of breath and productive cough progressively worsening over the previous 2 weeks. Cough is productive of thick, green sputum. Patient reports a sick contact at home. No fever while in ED. Last PFT's about 3 years ago showed FEV/FVC of 51%. Patient reports that he has just taken his Advair on the day of admission, but no other medications. Diffuse myalgias but pain is not more severe in one leg compared to the other and no unilateral leg swelling or color change so DVT/PE unlikely. Patient desating in bed in ED after multiple breathing treatments.   - Duonebs q4hrs   - Fluticasone-vilanterol daily   - Ceftriaxone   - Azithromycin   - Acapella and chest physiotherapy q6hrs   - Prednisone 40mg daily   - Mucinex   - Will continue to monitor breathing status and saturations

## 2020-02-25 NOTE — PLAN OF CARE
VN cued into pt's room for introduction with pt's permission. Pt arrived to unit from ED. Pt is resting in bed noted to be on oxygen and states he feels terrible VN role explained. Fall risk and bed alarm protocol education provided. Instructed pt to call for assistance. Pt aware and agreeable. Allowed time for questions. NAD noted. Admission questions completed. Will cont to be available as needed.

## 2020-02-25 NOTE — ASSESSMENT & PLAN NOTE
Patient with a history of hypertension on clonidine .3mg TID at home. Reports that he has not taken his home medications for at least the past 5-7 days.    - Will restart home clonidine   - Consider adding second agent if necessary   - Continue to monitor BP
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
## 2020-02-25 NOTE — ASSESSMENT & PLAN NOTE
Patient with about 15-20 pack year smoking history. Endorsed increase in smoking lately.   - Nicotine patch   - Recommend cessation

## 2020-02-25 NOTE — ASSESSMENT & PLAN NOTE
Patient with a reported history of JUVENCIO. He was clearly significantly anxious during interview in the ED. Has a history of clonazepam use BID but has not taken any for the past week.    - Not giving clonazepam as patient has been off for about one week at this point   - Hydroxyzine PRN for anxiety   - Restarted home lexapro   - Will continue to monitor
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
## 2020-02-25 NOTE — ASSESSMENT & PLAN NOTE
Patient reports history of IVDU, last use >1 year ago. On methadone maintenance therapy, reportedly 100mg daily. UDS positive for methadone and opiates. Denied any other recreational drug use.   - restart home methadone   - Continue to monitor

## 2020-02-25 NOTE — HPI
"Ming Harrington is a 54 year old male with PMHx of COPD, HTN, Anxiety, Opioid dependence who presented to the ED with complaints of shortness of breath and cough. Patient reports that about 2 weeks ago he began to take care of his son who was sick with an unknown respiratory infection.  Patient reports that since then he has been progressively feeling worse.  He has had an increase in a productive cough, productive of thick green sputum.  He is also having increasingly worse shortness of breath.  Patient was then is gone was with past 2 weeks.  Patient has not taken any of his medications over the past few days, as he has felt too sick to go to the pharmacy to get his medications.  Patient has been admitted multiple times in the past for COPD exacerbations.  He is also endorsing significant myalgias throughout his entire body, rhinorrhea, facial pain and pressure.  He reports that the sputum that he is producing so thick that is clogging up my throat and is altering his voice.    In the ED, the patient received multiple DuoNeb as and an hour long breathing treatment without any benefit.  Patient also received 125 mg IV Solu-Medrol.  Despite treatment, patient remained satting in the mid 80s. CXR showed "Bibasilar subsegmental scarring versus atelectasis without focal consolidation." Patient was flu negative. BNP and troponin wnl.   "

## 2020-02-26 ENCOUNTER — CLINICAL SUPPORT (OUTPATIENT)
Dept: SMOKING CESSATION | Facility: CLINIC | Age: 55
End: 2020-02-26
Payer: COMMERCIAL

## 2020-02-26 DIAGNOSIS — F17.210 CIGARETTE SMOKER: Primary | ICD-10-CM

## 2020-02-26 LAB
ALBUMIN SERPL BCP-MCNC: 3 G/DL (ref 3.5–5.2)
ALP SERPL-CCNC: 80 U/L (ref 55–135)
ALT SERPL W/O P-5'-P-CCNC: 11 U/L (ref 10–44)
ANION GAP SERPL CALC-SCNC: 8 MMOL/L (ref 8–16)
AST SERPL-CCNC: 18 U/L (ref 10–40)
BASOPHILS # BLD AUTO: 0 K/UL (ref 0–0.2)
BASOPHILS NFR BLD: 0 % (ref 0–1.9)
BILIRUB SERPL-MCNC: 0.4 MG/DL (ref 0.1–1)
BUN SERPL-MCNC: 11 MG/DL (ref 6–20)
CALCIUM SERPL-MCNC: 8.9 MG/DL (ref 8.7–10.5)
CHLORIDE SERPL-SCNC: 102 MMOL/L (ref 95–110)
CO2 SERPL-SCNC: 29 MMOL/L (ref 23–29)
CREAT SERPL-MCNC: 1.1 MG/DL (ref 0.5–1.4)
DIFFERENTIAL METHOD: ABNORMAL
EOSINOPHIL # BLD AUTO: 0 K/UL (ref 0–0.5)
EOSINOPHIL NFR BLD: 0 % (ref 0–8)
ERYTHROCYTE [DISTWIDTH] IN BLOOD BY AUTOMATED COUNT: 12.9 % (ref 11.5–14.5)
EST. GFR  (AFRICAN AMERICAN): >60 ML/MIN/1.73 M^2
EST. GFR  (NON AFRICAN AMERICAN): >60 ML/MIN/1.73 M^2
GLUCOSE SERPL-MCNC: 159 MG/DL (ref 70–110)
HCT VFR BLD AUTO: 41.1 % (ref 40–54)
HGB BLD-MCNC: 12.7 G/DL (ref 14–18)
IMM GRANULOCYTES # BLD AUTO: 0.03 K/UL (ref 0–0.04)
IMM GRANULOCYTES NFR BLD AUTO: 0.5 % (ref 0–0.5)
LYMPHOCYTES # BLD AUTO: 0.7 K/UL (ref 1–4.8)
LYMPHOCYTES NFR BLD: 10.5 % (ref 18–48)
MAGNESIUM SERPL-MCNC: 1.9 MG/DL (ref 1.6–2.6)
MCH RBC QN AUTO: 28.9 PG (ref 27–31)
MCHC RBC AUTO-ENTMCNC: 30.9 G/DL (ref 32–36)
MCV RBC AUTO: 94 FL (ref 82–98)
MONOCYTES # BLD AUTO: 0.3 K/UL (ref 0.3–1)
MONOCYTES NFR BLD: 4.6 % (ref 4–15)
NEUTROPHILS # BLD AUTO: 5.3 K/UL (ref 1.8–7.7)
NEUTROPHILS NFR BLD: 84.4 % (ref 38–73)
NRBC BLD-RTO: 0 /100 WBC
PHOSPHATE SERPL-MCNC: 2.6 MG/DL (ref 2.7–4.5)
PLATELET # BLD AUTO: 133 K/UL (ref 150–350)
PMV BLD AUTO: 10.8 FL (ref 9.2–12.9)
POTASSIUM SERPL-SCNC: 4.9 MMOL/L (ref 3.5–5.1)
PROT SERPL-MCNC: 7.4 G/DL (ref 6–8.4)
RBC # BLD AUTO: 4.39 M/UL (ref 4.6–6.2)
SODIUM SERPL-SCNC: 139 MMOL/L (ref 136–145)
WBC # BLD AUTO: 6.31 K/UL (ref 3.9–12.7)

## 2020-02-26 PROCEDURE — 80053 COMPREHEN METABOLIC PANEL: CPT

## 2020-02-26 PROCEDURE — 96372 THER/PROPH/DIAG INJ SC/IM: CPT | Mod: 59 | Performed by: FAMILY MEDICINE

## 2020-02-26 PROCEDURE — 94668 MNPJ CHEST WALL SBSQ: CPT

## 2020-02-26 PROCEDURE — 63600175 PHARM REV CODE 636 W HCPCS: Performed by: STUDENT IN AN ORGANIZED HEALTH CARE EDUCATION/TRAINING PROGRAM

## 2020-02-26 PROCEDURE — 99900035 HC TECH TIME PER 15 MIN (STAT)

## 2020-02-26 PROCEDURE — 27000190 HC CPAP FULL FACE MASK W/VALVE

## 2020-02-26 PROCEDURE — 27000221 HC OXYGEN, UP TO 24 HOURS

## 2020-02-26 PROCEDURE — 25000003 PHARM REV CODE 250: Performed by: STUDENT IN AN ORGANIZED HEALTH CARE EDUCATION/TRAINING PROGRAM

## 2020-02-26 PROCEDURE — 25000242 PHARM REV CODE 250 ALT 637 W/ HCPCS: Performed by: STUDENT IN AN ORGANIZED HEALTH CARE EDUCATION/TRAINING PROGRAM

## 2020-02-26 PROCEDURE — 85025 COMPLETE CBC W/AUTO DIFF WBC: CPT

## 2020-02-26 PROCEDURE — 94640 AIRWAY INHALATION TREATMENT: CPT

## 2020-02-26 PROCEDURE — 84100 ASSAY OF PHOSPHORUS: CPT

## 2020-02-26 PROCEDURE — 99999 PR PBB SHADOW E&M-EST. PATIENT-LVL I: CPT | Mod: PBBFAC,,,

## 2020-02-26 PROCEDURE — 94761 N-INVAS EAR/PLS OXIMETRY MLT: CPT

## 2020-02-26 PROCEDURE — S4991 NICOTINE PATCH NONLEGEND: HCPCS | Performed by: STUDENT IN AN ORGANIZED HEALTH CARE EDUCATION/TRAINING PROGRAM

## 2020-02-26 PROCEDURE — 83735 ASSAY OF MAGNESIUM: CPT

## 2020-02-26 PROCEDURE — 99407 PR TOBACCO USE CESSATION INTENSIVE >10 MINUTES: ICD-10-PCS | Mod: S$GLB,,,

## 2020-02-26 PROCEDURE — 36415 COLL VENOUS BLD VENIPUNCTURE: CPT

## 2020-02-26 PROCEDURE — 99999 PR PBB SHADOW E&M-EST. PATIENT-LVL I: ICD-10-PCS | Mod: PBBFAC,,,

## 2020-02-26 PROCEDURE — 94660 CPAP INITIATION&MGMT: CPT

## 2020-02-26 PROCEDURE — 25000003 PHARM REV CODE 250: Performed by: FAMILY MEDICINE

## 2020-02-26 PROCEDURE — G0378 HOSPITAL OBSERVATION PER HR: HCPCS

## 2020-02-26 PROCEDURE — 99407 BEHAV CHNG SMOKING > 10 MIN: CPT | Mod: S$GLB,,,

## 2020-02-26 RX ORDER — TAMSULOSIN HYDROCHLORIDE 0.4 MG/1
0.4 CAPSULE ORAL DAILY
Status: DISCONTINUED | OUTPATIENT
Start: 2020-02-26 | End: 2020-02-27 | Stop reason: HOSPADM

## 2020-02-26 RX ORDER — FUROSEMIDE 20 MG/1
20 TABLET ORAL 2 TIMES DAILY
Status: DISCONTINUED | OUTPATIENT
Start: 2020-02-26 | End: 2020-02-27 | Stop reason: HOSPADM

## 2020-02-26 RX ORDER — AZITHROMYCIN 250 MG/1
500 TABLET, FILM COATED ORAL DAILY
Status: DISCONTINUED | OUTPATIENT
Start: 2020-02-26 | End: 2020-02-27 | Stop reason: HOSPADM

## 2020-02-26 RX ORDER — POLYETHYLENE GLYCOL 3350 17 G/17G
17 POWDER, FOR SOLUTION ORAL DAILY
Status: DISCONTINUED | OUTPATIENT
Start: 2020-02-26 | End: 2020-02-27 | Stop reason: HOSPADM

## 2020-02-26 RX ORDER — BENZONATATE 100 MG/1
100 CAPSULE ORAL 3 TIMES DAILY PRN
Status: DISCONTINUED | OUTPATIENT
Start: 2020-02-26 | End: 2020-02-27 | Stop reason: HOSPADM

## 2020-02-26 RX ADMIN — FLUTICASONE FUROATE AND VILANTEROL TRIFENATATE 1 PUFF: 200; 25 POWDER RESPIRATORY (INHALATION) at 07:02

## 2020-02-26 RX ADMIN — PANTOPRAZOLE SODIUM 40 MG: 40 TABLET, DELAYED RELEASE ORAL at 08:02

## 2020-02-26 RX ADMIN — POLYETHYLENE GLYCOL 3350 17 G: 17 POWDER, FOR SOLUTION ORAL at 08:02

## 2020-02-26 RX ADMIN — BENZONATATE 100 MG: 100 CAPSULE ORAL at 08:02

## 2020-02-26 RX ADMIN — OSELTAMIVIR PHOSPHATE 75 MG: 75 CAPSULE ORAL at 08:02

## 2020-02-26 RX ADMIN — ENOXAPARIN SODIUM 40 MG: 100 INJECTION SUBCUTANEOUS at 04:02

## 2020-02-26 RX ADMIN — LACTOBACILLUS TAB 4 TABLET: TAB at 11:02

## 2020-02-26 RX ADMIN — GUAIFENESIN AND DEXTROMETHORPHAN HYDROBROMIDE 2 TABLET: 600; 30 TABLET, EXTENDED RELEASE ORAL at 08:02

## 2020-02-26 RX ADMIN — IPRATROPIUM BROMIDE AND ALBUTEROL SULFATE 3 ML: .5; 3 SOLUTION RESPIRATORY (INHALATION) at 11:02

## 2020-02-26 RX ADMIN — CLONIDINE HYDROCHLORIDE 0.3 MG: 0.3 TABLET ORAL at 04:02

## 2020-02-26 RX ADMIN — PREDNISONE 40 MG: 20 TABLET ORAL at 08:02

## 2020-02-26 RX ADMIN — IPRATROPIUM BROMIDE AND ALBUTEROL SULFATE 3 ML: .5; 3 SOLUTION RESPIRATORY (INHALATION) at 07:02

## 2020-02-26 RX ADMIN — LACTOBACILLUS TAB 4 TABLET: TAB at 08:02

## 2020-02-26 RX ADMIN — IPRATROPIUM BROMIDE AND ALBUTEROL SULFATE 3 ML: .5; 3 SOLUTION RESPIRATORY (INHALATION) at 04:02

## 2020-02-26 RX ADMIN — Medication 1 PATCH: at 08:02

## 2020-02-26 RX ADMIN — ESCITALOPRAM OXALATE 10 MG: 10 TABLET ORAL at 08:02

## 2020-02-26 RX ADMIN — LACTOBACILLUS TAB 4 TABLET: TAB at 04:02

## 2020-02-26 RX ADMIN — AZITHROMYCIN MONOHYDRATE 500 MG: 250 TABLET ORAL at 04:02

## 2020-02-26 RX ADMIN — CLONIDINE HYDROCHLORIDE 0.3 MG: 0.3 TABLET ORAL at 08:02

## 2020-02-26 RX ADMIN — FUROSEMIDE 20 MG: 20 TABLET ORAL at 01:02

## 2020-02-26 RX ADMIN — IPRATROPIUM BROMIDE AND ALBUTEROL SULFATE 3 ML: .5; 3 SOLUTION RESPIRATORY (INHALATION) at 12:02

## 2020-02-26 RX ADMIN — METHADONE HYDROCHLORIDE 100 MG: 10 TABLET ORAL at 08:02

## 2020-02-26 RX ADMIN — TAMSULOSIN HYDROCHLORIDE 0.4 MG: 0.4 CAPSULE ORAL at 09:02

## 2020-02-26 NOTE — SUBJECTIVE & OBJECTIVE
Interval History: Patient seen and examined this morning. No acute events overnight. Patient continues to endorse shortness of breath and coughing. Patient denies any improvement in his symptoms but patient was sitting in bed without oxygen in no apparent distress. Continues to report significant amount of mucus. Patient reports urinary retention and constipation.     Review of Systems   Constitutional: Positive for chills and fatigue. Negative for appetite change, fever and unexpected weight change.   HENT: Positive for sinus pressure and sinus pain. Negative for rhinorrhea, sneezing and sore throat.    Respiratory: Positive for cough, shortness of breath and wheezing.    Cardiovascular: Negative for chest pain and palpitations.   Gastrointestinal: Positive for abdominal distention and constipation. Negative for abdominal pain, blood in stool, diarrhea, nausea and vomiting.   Genitourinary: Positive for difficulty urinating.   Musculoskeletal: Positive for myalgias. Negative for arthralgias and back pain.   Skin: Negative for color change and rash.   Neurological: Positive for weakness. Negative for dizziness, light-headedness, numbness and headaches.   Psychiatric/Behavioral: Negative for behavioral problems, confusion, decreased concentration and sleep disturbance. The patient is nervous/anxious.      Objective:     Vital Signs (Most Recent):  Temp: 96.3 °F (35.7 °C) (02/26/20 0407)  Pulse: 70 (02/26/20 0429)  Resp: 20 (02/26/20 0429)  BP: (!) 142/90 (02/26/20 0407)  SpO2: (!) 94 % (02/26/20 0429) Vital Signs (24h Range):  Temp:  [96.3 °F (35.7 °C)-98.1 °F (36.7 °C)] 96.3 °F (35.7 °C)  Pulse:  [] 70  Resp:  [13-54] 20  SpO2:  [84 %-96 %] 94 %  BP: (135-190)/() 142/90     Weight: 117.6 kg (259 lb 4.2 oz)  Body mass index is 39.42 kg/m².    Intake/Output Summary (Last 24 hours) at 2/26/2020 0739  Last data filed at 2/26/2020 0600  Gross per 24 hour   Intake 250 ml   Output 950 ml   Net -700 ml       Physical Exam   Constitutional: He is oriented to person, place, and time. He appears distressed.   HENT:   Head: Normocephalic.   Eyes: Pupils are equal, round, and reactive to light. Conjunctivae and EOM are normal.   Neck: Normal range of motion. Neck supple. No JVD present.   Cardiovascular: Normal rate, regular rhythm, normal heart sounds and intact distal pulses.   No murmur heard.  Pulmonary/Chest: No respiratory distress. He has wheezes.   Poor air entry bilaterally   Abdominal: Soft. Bowel sounds are normal. He exhibits no distension and no mass. There is no tenderness.   Musculoskeletal: Normal range of motion. He exhibits no edema (reports improved).   Neurological: He is alert and oriented to person, place, and time.   Skin: Skin is warm and dry. Capillary refill takes less than 2 seconds.   Psychiatric: He has a normal mood and affect. His behavior is normal.       Significant Labs:   CBC:   Recent Labs   Lab 02/25/20  1200 02/26/20  0659   WBC 6.85 6.31   HGB 13.5* 12.7*   HCT 43.4 41.1    133*     CMP:   Recent Labs   Lab 02/25/20  1200 02/26/20  0659    139   K 3.9 4.9    102   CO2 27 29    159*   BUN 9 11   CREATININE 1.0 1.1   CALCIUM 8.8 8.9   PROT 7.8 7.4   ALBUMIN 3.3* 3.0*   BILITOT 0.5 0.4   ALKPHOS 86 80   AST 23 18   ALT 14 11   ANIONGAP 10 8   EGFRNONAA >60 >60       Significant Imaging: I have reviewed and interpreted all pertinent imaging results/findings within the past 24 hours.

## 2020-02-26 NOTE — PROGRESS NOTES
Individual Follow-Up Form    2/26/2020    Quit Date: To be determined    Clinical Status of Patient: Outpatient    Length of Service: 30 minutes    Comments: Smoking cessation education provided. Pt denies nicotine withdrawal symptoms, stating that he has cut down to 5 cigarettes per day, but has been unable to quit completely.  He states that he would like to try Chantix.  Pt is currently enrolled in the [employer].       Diagnosis: F17.210    Next Visit: Ambulatory referral to Smoking Cessation program following hospital discharge.

## 2020-02-26 NOTE — PLAN OF CARE
Visit with pt, d/c planning discussed.  Pt independent with ADL's, lives in a home with his brother and sister.  Pt uses 3L O2 from [facility], states all 4 tanks are empty.  Message to agency with request to swap tanks, pt states his brother is at home but does not drive and cannot bring tank in.  At time of d/c will need transportation home, will request transport with Medicaid.    Discharge planning brochure provided. White board updated with CM name & contact info.  Pt encouraged to call with any questions or needs. CM will continue to follow patient throughout the transitions of care, and assist with any discharge needs.       02/26/20 8931   Discharge Assessment   Assessment Type Discharge Planning Assessment   Confirmed/corrected address and phone number on facesheet? Yes   Assessment information obtained from? Patient   Communicated expected length of stay with patient/caregiver yes   Prior to hospitilization cognitive status: Alert/Oriented   Prior to hospitalization functional status: Independent   Current cognitive status: Alert/Oriented   Current Functional Status: Independent   Lives With sibling(s)   Able to Return to Prior Arrangements yes   Is patient able to care for self after discharge? Yes   Readmission Within the Last 30 Days no previous admission in last 30 days   Patient currently being followed by outpatient case management? No   Patient currently receives any other outside agency services? No   Equipment Currently Used at Home nebulizer;oxygen   Do you have any problems affording any of your prescribed medications? No   Is the patient taking medications as prescribed? yes   Does the patient have transportation home? Yes   Transportation Anticipated family or friend will provide   Does the patient receive services at the [facility]? No   Discharge Plan A Home with family   DME Needed Upon Discharge  none   Patient/Family in Agreement with Plan yes       Avis Taylor RN  Case  Manager  [phone]
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
## 2020-02-26 NOTE — ASSESSMENT & PLAN NOTE
Patient is presenting with complaints of shortness of breath and productive cough progressively worsening over the previous 2 weeks. Cough is productive of thick, green sputum. Patient reports a sick contact at home. No fever while in ED. Last PFT's about 3 years ago showed FEV/FVC of 51%. Patient reports that he has just taken his Advair on the day of admission, but no other medications. Diffuse myalgias but pain is not more severe in one leg compared to the other and no unilateral leg swelling or color change so DVT/PE unlikely. Patient desating in bed in ED after multiple breathing treatments.   - Duonebs q4hrs   - Fluticasone-vilanterol daily   - Ceftriaxone   - Azithromycin   - Acapella and chest physiotherapy q6hrs   - Prednisone 40mg daily   - Mucinex   - Patient reports that he isn't feeling any better but is no longer de-sating and sounds better on exam   - Will continue to monitor breathing status and saturations

## 2020-02-26 NOTE — ASSESSMENT & PLAN NOTE
Patient with a history of hypertension on clonidine .3mg TID at home. Reports that he has not taken his home medications for at least the past 5-7 days.    - Will restart home clonidine   - Consider adding second agent if necessary   - BP this /90   - Continue to monitor BP

## 2020-02-26 NOTE — PLAN OF CARE
The proper method of use, as well as anticipated side effects, of this aerosol treatment are discussed and demonstrated to the patient.

## 2020-02-26 NOTE — PLAN OF CARE
Plan of care reviewed with patient, understanding verbalized. Pt remains SR on tele. No complaints of pain or acute distress noted. 2L/O2 NC. Instructed to call for any assistance, understanding verbalized. Droplet precautions initiated. Bed alarm on, call light in reach, fall precautions in place. Will continue to monitor.

## 2020-02-26 NOTE — PROGRESS NOTES
Pharmacist Intervention IV to PO Note    Ming Harrington is a 54 y.o. male being treated with IV medication azithromycin    Patient Data:    Vital Signs (Most Recent):  Temp: 97.8 °F (36.6 °C) (02/26/20 1100)  Pulse: 78 (02/26/20 1200)  Resp: 20 (02/26/20 1200)  BP: 134/75 (02/26/20 1100)  SpO2: (!) 92 % (02/26/20 1100)   Vital Signs (72h Range):  Temp:  [96.3 °F (35.7 °C)-99 °F (37.2 °C)]   Pulse:  []   Resp:  [13-54]   BP: (134-190)/()   SpO2:  [84 %-96 %]      CBC:  Recent Labs   Lab 02/25/20  1200 02/26/20  0659   WBC 6.85 6.31   RBC 4.65 4.39*   HGB 13.5* 12.7*   HCT 43.4 41.1    133*   MCV 93 94   MCH 29.0 28.9   MCHC 31.1* 30.9*     CMP:     Recent Labs   Lab 02/25/20  1200 02/26/20  0659    159*   CALCIUM 8.8 8.9   ALBUMIN 3.3* 3.0*   PROT 7.8 7.4    139   K 3.9 4.9   CO2 27 29    102   BUN 9 11   CREATININE 1.0 1.1   ALKPHOS 86 80   ALT 14 11   AST 23 18   BILITOT 0.5 0.4       Dietary Orders:  Diet Orders            Diet Cardiac: Cardiac starting at 02/25 1517            Based on the following criteria, this patient qualifies for intravenous to oral conversion:  [x] The patients gastrointestinal tract is functioning (tolerating medications via oral or enteral route for 24 hours and tolerating food or enteral feeds for 24 hours).  [x] The patient is hemodynamically stable for 24 hours (heart rate <100 beats per minute, systolic blood pressure >99 mm Hg, and respiratory rate <20 breaths per minute).  [x] The patient shows clinical improvement (afebrile for at least 24 hours and white blood cell count downtrending or normalized). Additionally, the patient must be non-neutropenic (absolute neutrophil count >500 cells/mm3).  [x] For antimicrobials, the patient has received IV therapy for at least 24 hours.    IV medication azithromycin will be changed to oral medication azitrhomycin    Pharmacist's Name: Patrice Camarena  Pharmacist's Extension: [phone]

## 2020-02-27 VITALS
HEIGHT: 68 IN | BODY MASS INDEX: 39.29 KG/M2 | TEMPERATURE: 97 F | WEIGHT: 259.25 LBS | OXYGEN SATURATION: 91 % | DIASTOLIC BLOOD PRESSURE: 82 MMHG | SYSTOLIC BLOOD PRESSURE: 129 MMHG | HEART RATE: 88 BPM | RESPIRATION RATE: 18 BRPM

## 2020-02-27 LAB
ALBUMIN SERPL BCP-MCNC: 2.9 G/DL (ref 3.5–5.2)
ALP SERPL-CCNC: 74 U/L (ref 55–135)
ALT SERPL W/O P-5'-P-CCNC: 11 U/L (ref 10–44)
ANION GAP SERPL CALC-SCNC: 4 MMOL/L (ref 8–16)
AST SERPL-CCNC: 14 U/L (ref 10–40)
BASOPHILS # BLD AUTO: 0 K/UL (ref 0–0.2)
BASOPHILS NFR BLD: 0 % (ref 0–1.9)
BILIRUB SERPL-MCNC: 0.4 MG/DL (ref 0.1–1)
BUN SERPL-MCNC: 15 MG/DL (ref 6–20)
CALCIUM SERPL-MCNC: 8.7 MG/DL (ref 8.7–10.5)
CHLORIDE SERPL-SCNC: 99 MMOL/L (ref 95–110)
CO2 SERPL-SCNC: 35 MMOL/L (ref 23–29)
CREAT SERPL-MCNC: 1 MG/DL (ref 0.5–1.4)
DIFFERENTIAL METHOD: ABNORMAL
EOSINOPHIL # BLD AUTO: 0 K/UL (ref 0–0.5)
EOSINOPHIL NFR BLD: 0.1 % (ref 0–8)
ERYTHROCYTE [DISTWIDTH] IN BLOOD BY AUTOMATED COUNT: 13.1 % (ref 11.5–14.5)
EST. GFR  (AFRICAN AMERICAN): >60 ML/MIN/1.73 M^2
EST. GFR  (NON AFRICAN AMERICAN): >60 ML/MIN/1.73 M^2
GLUCOSE SERPL-MCNC: 112 MG/DL (ref 70–110)
HCT VFR BLD AUTO: 41.7 % (ref 40–54)
HGB BLD-MCNC: 12.9 G/DL (ref 14–18)
IMM GRANULOCYTES # BLD AUTO: 0.02 K/UL (ref 0–0.04)
IMM GRANULOCYTES NFR BLD AUTO: 0.2 % (ref 0–0.5)
LYMPHOCYTES # BLD AUTO: 1.5 K/UL (ref 1–4.8)
LYMPHOCYTES NFR BLD: 17.5 % (ref 18–48)
MAGNESIUM SERPL-MCNC: 2.1 MG/DL (ref 1.6–2.6)
MCH RBC QN AUTO: 28.5 PG (ref 27–31)
MCHC RBC AUTO-ENTMCNC: 30.9 G/DL (ref 32–36)
MCV RBC AUTO: 92 FL (ref 82–98)
MONOCYTES # BLD AUTO: 0.6 K/UL (ref 0.3–1)
MONOCYTES NFR BLD: 6.7 % (ref 4–15)
NEUTROPHILS # BLD AUTO: 6.6 K/UL (ref 1.8–7.7)
NEUTROPHILS NFR BLD: 75.5 % (ref 38–73)
NRBC BLD-RTO: 0 /100 WBC
PHOSPHATE SERPL-MCNC: 3.1 MG/DL (ref 2.7–4.5)
PLATELET # BLD AUTO: 150 K/UL (ref 150–350)
PMV BLD AUTO: 10.6 FL (ref 9.2–12.9)
POTASSIUM SERPL-SCNC: 4.7 MMOL/L (ref 3.5–5.1)
PROT SERPL-MCNC: 7.1 G/DL (ref 6–8.4)
RBC # BLD AUTO: 4.52 M/UL (ref 4.6–6.2)
SODIUM SERPL-SCNC: 138 MMOL/L (ref 136–145)
WBC # BLD AUTO: 8.72 K/UL (ref 3.9–12.7)

## 2020-02-27 PROCEDURE — 94668 MNPJ CHEST WALL SBSQ: CPT

## 2020-02-27 PROCEDURE — 84100 ASSAY OF PHOSPHORUS: CPT

## 2020-02-27 PROCEDURE — 94640 AIRWAY INHALATION TREATMENT: CPT

## 2020-02-27 PROCEDURE — 83735 ASSAY OF MAGNESIUM: CPT

## 2020-02-27 PROCEDURE — 27000221 HC OXYGEN, UP TO 24 HOURS

## 2020-02-27 PROCEDURE — 25000003 PHARM REV CODE 250: Performed by: FAMILY MEDICINE

## 2020-02-27 PROCEDURE — 25000003 PHARM REV CODE 250: Performed by: STUDENT IN AN ORGANIZED HEALTH CARE EDUCATION/TRAINING PROGRAM

## 2020-02-27 PROCEDURE — 36415 COLL VENOUS BLD VENIPUNCTURE: CPT

## 2020-02-27 PROCEDURE — 80053 COMPREHEN METABOLIC PANEL: CPT

## 2020-02-27 PROCEDURE — 99900035 HC TECH TIME PER 15 MIN (STAT)

## 2020-02-27 PROCEDURE — 85025 COMPLETE CBC W/AUTO DIFF WBC: CPT

## 2020-02-27 PROCEDURE — 63600175 PHARM REV CODE 636 W HCPCS: Performed by: STUDENT IN AN ORGANIZED HEALTH CARE EDUCATION/TRAINING PROGRAM

## 2020-02-27 PROCEDURE — 25000242 PHARM REV CODE 250 ALT 637 W/ HCPCS: Performed by: STUDENT IN AN ORGANIZED HEALTH CARE EDUCATION/TRAINING PROGRAM

## 2020-02-27 PROCEDURE — S4991 NICOTINE PATCH NONLEGEND: HCPCS | Performed by: STUDENT IN AN ORGANIZED HEALTH CARE EDUCATION/TRAINING PROGRAM

## 2020-02-27 PROCEDURE — G0378 HOSPITAL OBSERVATION PER HR: HCPCS

## 2020-02-27 RX ORDER — METFORMIN HYDROCHLORIDE 500 MG/1
500 TABLET ORAL
Qty: 30 TABLET | Refills: 2 | Status: SHIPPED | OUTPATIENT
Start: 2020-02-27 | End: 2020-12-22 | Stop reason: SDUPTHER

## 2020-02-27 RX ORDER — ALBUTEROL SULFATE 90 UG/1
1-2 AEROSOL, METERED RESPIRATORY (INHALATION) EVERY 6 HOURS PRN
Qty: 18 G | Refills: 1 | Status: SHIPPED | OUTPATIENT
Start: 2020-02-27 | End: 2021-01-13 | Stop reason: SDUPTHER

## 2020-02-27 RX ORDER — CLONIDINE HYDROCHLORIDE 0.1 MG/1
0.3 TABLET ORAL 3 TIMES DAILY
Qty: 270 TABLET | Refills: 2 | Status: SHIPPED | OUTPATIENT
Start: 2020-02-27 | End: 2020-06-18 | Stop reason: SDUPTHER

## 2020-02-27 RX ORDER — FLUTICASONE PROPIONATE AND SALMETEROL 50; 250 UG/1; UG/1
1 POWDER RESPIRATORY (INHALATION) 2 TIMES DAILY
Qty: 60 EACH | Refills: 3 | Status: SHIPPED | OUTPATIENT
Start: 2020-02-27 | End: 2020-04-16 | Stop reason: SDUPTHER

## 2020-02-27 RX ORDER — TAMSULOSIN HYDROCHLORIDE 0.4 MG/1
0.4 CAPSULE ORAL DAILY
Qty: 30 CAPSULE | Refills: 2 | Status: SHIPPED | OUTPATIENT
Start: 2020-02-28 | End: 2020-05-25

## 2020-02-27 RX ORDER — AZITHROMYCIN 250 MG/1
250 TABLET, FILM COATED ORAL DAILY
Qty: 3 TABLET | Refills: 0 | Status: SHIPPED | OUTPATIENT
Start: 2020-02-27 | End: 2020-05-25

## 2020-02-27 RX ORDER — PREDNISONE 20 MG/1
40 TABLET ORAL DAILY
Qty: 6 TABLET | Refills: 0 | Status: SHIPPED | OUTPATIENT
Start: 2020-02-28 | End: 2020-03-02

## 2020-02-27 RX ORDER — POLYETHYLENE GLYCOL 3350 17 G/17G
17 POWDER, FOR SOLUTION ORAL 2 TIMES DAILY PRN
Qty: 510 G | Refills: 0 | Status: SHIPPED | OUTPATIENT
Start: 2020-02-27 | End: 2022-01-26

## 2020-02-27 RX ORDER — ESCITALOPRAM OXALATE 10 MG/1
10 TABLET ORAL DAILY
Qty: 30 TABLET | Refills: 2 | Status: SHIPPED | OUTPATIENT
Start: 2020-02-27 | End: 2021-07-22 | Stop reason: SDUPTHER

## 2020-02-27 RX ORDER — BENZONATATE 100 MG/1
200 CAPSULE ORAL 3 TIMES DAILY PRN
Qty: 30 CAPSULE | Refills: 0 | Status: SHIPPED | OUTPATIENT
Start: 2020-02-27 | End: 2020-03-08

## 2020-02-27 RX ORDER — IPRATROPIUM BROMIDE AND ALBUTEROL SULFATE 2.5; .5 MG/3ML; MG/3ML
3 SOLUTION RESPIRATORY (INHALATION) EVERY 4 HOURS
Qty: 1620 ML | Refills: 3 | Status: SHIPPED | OUTPATIENT
Start: 2020-02-27 | End: 2020-04-16 | Stop reason: SDUPTHER

## 2020-02-27 RX ORDER — OSELTAMIVIR PHOSPHATE 75 MG/1
75 CAPSULE ORAL 2 TIMES DAILY
Qty: 6 CAPSULE | Refills: 0 | Status: SHIPPED | OUTPATIENT
Start: 2020-02-27 | End: 2020-03-01

## 2020-02-27 RX ORDER — FUROSEMIDE 40 MG/1
20 TABLET ORAL 2 TIMES DAILY
Qty: 90 TABLET | Refills: 3 | Status: SHIPPED | OUTPATIENT
Start: 2020-02-27 | End: 2020-04-16 | Stop reason: SDUPTHER

## 2020-02-27 RX ADMIN — IPRATROPIUM BROMIDE AND ALBUTEROL SULFATE 3 ML: .5; 3 SOLUTION RESPIRATORY (INHALATION) at 11:02

## 2020-02-27 RX ADMIN — FUROSEMIDE 20 MG: 20 TABLET ORAL at 08:02

## 2020-02-27 RX ADMIN — METHADONE HYDROCHLORIDE 100 MG: 10 TABLET ORAL at 08:02

## 2020-02-27 RX ADMIN — BENZONATATE 100 MG: 100 CAPSULE ORAL at 05:02

## 2020-02-27 RX ADMIN — PANTOPRAZOLE SODIUM 40 MG: 40 TABLET, DELAYED RELEASE ORAL at 08:02

## 2020-02-27 RX ADMIN — POLYETHYLENE GLYCOL 3350 17 G: 17 POWDER, FOR SOLUTION ORAL at 08:02

## 2020-02-27 RX ADMIN — IPRATROPIUM BROMIDE AND ALBUTEROL SULFATE 3 ML: .5; 3 SOLUTION RESPIRATORY (INHALATION) at 03:02

## 2020-02-27 RX ADMIN — HYDROXYZINE PAMOATE 25 MG: 25 CAPSULE ORAL at 01:02

## 2020-02-27 RX ADMIN — GUAIFENESIN AND DEXTROMETHORPHAN HYDROBROMIDE 2 TABLET: 600; 30 TABLET, EXTENDED RELEASE ORAL at 08:02

## 2020-02-27 RX ADMIN — AZITHROMYCIN MONOHYDRATE 500 MG: 250 TABLET ORAL at 08:02

## 2020-02-27 RX ADMIN — FLUTICASONE FUROATE AND VILANTEROL TRIFENATATE 1 PUFF: 200; 25 POWDER RESPIRATORY (INHALATION) at 07:02

## 2020-02-27 RX ADMIN — Medication 1 PATCH: at 08:02

## 2020-02-27 RX ADMIN — CLONIDINE HYDROCHLORIDE 0.3 MG: 0.3 TABLET ORAL at 08:02

## 2020-02-27 RX ADMIN — LACTOBACILLUS TAB 4 TABLET: TAB at 08:02

## 2020-02-27 RX ADMIN — OSELTAMIVIR PHOSPHATE 75 MG: 75 CAPSULE ORAL at 08:02

## 2020-02-27 RX ADMIN — TAMSULOSIN HYDROCHLORIDE 0.4 MG: 0.4 CAPSULE ORAL at 08:02

## 2020-02-27 RX ADMIN — PREDNISONE 40 MG: 20 TABLET ORAL at 08:02

## 2020-02-27 RX ADMIN — ESCITALOPRAM OXALATE 10 MG: 10 TABLET ORAL at 08:02

## 2020-02-27 RX ADMIN — IPRATROPIUM BROMIDE AND ALBUTEROL SULFATE 3 ML: .5; 3 SOLUTION RESPIRATORY (INHALATION) at 07:02

## 2020-02-27 NOTE — NURSING
Home Oxygen Evaluation    Date Performed: 2020    1) Patient's Home O2 Sat on room air, while at rest: 90 %        If O2 sats on room air at rest are 88% or below, patient qualifies. No additional testing needed. Document N/A in steps 2 and 3. If 89% or above, complete steps 2.      2) Patient's O2 Sat on room air while exercisin%        If O2 sats on room air while exercising remain 89% or above patient does not qualify, no further testing needed Document N/A in step 3. If O2 sats on room air while exercising are 88% or below, continue to step 3.      3) Patient's O2 Sat while exercising on O2: 92% at 3 LPM         (Must show improvement from #2 for patients to qualify)    If O2 sats improve on oxygen, patient qualifies for portable oxygen. If not, the patient does not qualify.

## 2020-02-27 NOTE — ASSESSMENT & PLAN NOTE
Patient is presenting with complaints of shortness of breath and productive cough progressively worsening over the previous 2 weeks. Cough is productive of thick, green sputum. Patient reports a sick contact at home. No fever while in ED. Last PFT's about 3 years ago showed FEV/FVC of 51%. Patient reports that he has just taken his Advair on the day of admission, but no other medications. Diffuse myalgias but pain is not more severe in one leg compared to the other and no unilateral leg swelling or color change so DVT/PE unlikely. Patient desating in bed in ED after multiple breathing treatments.   - Duonebs q4hrs   - Fluticasone-vilanterol daily   - Ceftriaxone   - Azithromycin   - Acapella and chest physiotherapy q6hrs   - Prednisone 40mg daily   - Mucinex   - Patient reports that he is breathing much better but is still coughing   - Will continue to monitor breathing status and saturations; will ambulate today

## 2020-02-27 NOTE — PLAN OF CARE
Plan per pt to return to home today, pt does not have his portable tank with him, will require transport home with O2 3LNC to be arranged by CM once ready for d/c.  [facility] to exchange portable O2 tanks (4 empty total) at the home, pt has concentrator and knows he must use this until portable tank comes.  Pt verbalizes understanding he must not wait until he has all empty portable tanks to call for refills.  Pt attends outpt methadone clinic appts and must use O2 at all times.  Verbalizes understanding.  Pt could be heard scheduling his outpt Medicaid transport.  Aware he needs to speak with his Medicaid  to schedule remaining appts for the month.      Future Appointments   Date Time Provider Department Center   3/11/2020 10:40 AM David Beckett PA-C [facility]

## 2020-02-27 NOTE — PROGRESS NOTES
"[facility] Hospital Medicine  Progress Note    Patient Name: Ming Harrington  MRN: [id number]  Patient Class: OP- Observation   Admission Date: 2/25/2020  Length of Stay: 0 days  Attending Physician: Burt Betts MD  Primary Care Provider: Vazquez Barajas MD        Subjective:     Principal Problem:COPD with acute exacerbation        HPI:  Ming Harrington is a 54 year old male with PMHx of COPD, HTN, Anxiety, Opioid dependence who presented to the ED with complaints of shortness of breath and cough. Patient reports that about 2 weeks ago he began to take care of his son who was sick with an unknown respiratory infection.  Patient reports that since then he has been progressively feeling worse.  He has had an increase in a productive cough, productive of thick green sputum.  He is also having increasingly worse shortness of breath.  Patient was then is gone was with past 2 weeks.  Patient has not taken any of his medications over the past few days, as he has felt too sick to go to the pharmacy to get his medications.  Patient has been admitted multiple times in the past for COPD exacerbations.  He is also endorsing significant myalgias throughout his entire body, rhinorrhea, facial pain and pressure.  He reports that the sputum that he is producing so thick that is clogging up my throat and is altering his voice.    In the ED, the patient received multiple DuoNeb as and an hour long breathing treatment without any benefit.  Patient also received 125 mg IV Solu-Medrol.  Despite treatment, patient remained satting in the mid 80s. CXR showed "Bibasilar subsegmental scarring versus atelectasis without focal consolidation." Patient was flu negative. BNP and troponin wnl.     Overview/Hospital Course:  No notes on file    Interval History: Patient seen and examined this morning. No acute events overnight. Patient continues to endorse shortness of breath and coughing. Patient denies any improvement in " his symptoms but patient was sitting in bed without oxygen in no apparent distress. Continues to report significant amount of mucus. Patient reports urinary retention and constipation.     Review of Systems   Constitutional: Positive for fatigue. Negative for appetite change, chills, fever and unexpected weight change.   HENT: Positive for sinus pressure and sinus pain. Negative for rhinorrhea, sneezing and sore throat.    Respiratory: Positive for cough (improved) and shortness of breath (much improved from presentation). Negative for wheezing.    Cardiovascular: Negative for chest pain and palpitations.   Gastrointestinal: Negative for abdominal distention, abdominal pain, blood in stool, constipation, diarrhea, nausea and vomiting.   Genitourinary: Negative for difficulty urinating.   Musculoskeletal: Positive for myalgias. Negative for arthralgias and back pain.   Skin: Negative for color change and rash.   Neurological: Negative for dizziness, weakness, light-headedness, numbness and headaches.   Psychiatric/Behavioral: Negative for behavioral problems, confusion, decreased concentration and sleep disturbance. The patient is nervous/anxious.      Objective:     Vital Signs (Most Recent):  Temp: 96.7 °F (35.9 °C) (02/27/20 0325)  Pulse: 70 (02/27/20 0729)  Resp: 18 (02/27/20 0728)  BP: (!) 142/82 (02/27/20 0500)  SpO2: 97 % (02/27/20 0728) Vital Signs (24h Range):  Temp:  [96.7 °F (35.9 °C)-99 °F (37.2 °C)] 96.7 °F (35.9 °C)  Pulse:  [64-90] 70  Resp:  [18-24] 18  SpO2:  [92 %-97 %] 97 %  BP: (130-154)/() 142/82     Weight: 117.6 kg (259 lb 4.2 oz)  Body mass index is 39.42 kg/m².    Intake/Output Summary (Last 24 hours) at 2/27/2020 0803  Last data filed at 2/27/2020 0500  Gross per 24 hour   Intake 1040 ml   Output 3700 ml   Net -2660 ml      Physical Exam   Constitutional: He is oriented to person, place, and time. He appears distressed.   HENT:   Head: Normocephalic.   Eyes: Pupils are equal, round,  and reactive to light. Conjunctivae and EOM are normal.   Neck: Normal range of motion. Neck supple. No JVD present.   Cardiovascular: Normal rate, regular rhythm, normal heart sounds and intact distal pulses.   No murmur heard.  Pulmonary/Chest: No respiratory distress. He has wheezes (much improved from presentation).   Poor air entry bilaterally but much improved   Abdominal: Soft. Bowel sounds are normal. He exhibits no distension and no mass. There is no tenderness.   Musculoskeletal: Normal range of motion. He exhibits no edema.   Neurological: He is alert and oriented to person, place, and time.   Skin: Skin is warm and dry. Capillary refill takes less than 2 seconds.   Psychiatric: He has a normal mood and affect. His behavior is normal.       Significant Labs:   CBC:   Recent Labs   Lab 02/25/20  1200 02/26/20  0659 02/27/20  0505   WBC 6.85 6.31 8.72   HGB 13.5* 12.7* 12.9*   HCT 43.4 41.1 41.7    133* 150     CMP:   Recent Labs   Lab 02/25/20  1200 02/26/20  0659 02/27/20  0505    139 138   K 3.9 4.9 4.7    102 99   CO2 27 29 35*    159* 112*   BUN 9 11 15   CREATININE 1.0 1.1 1.0   CALCIUM 8.8 8.9 8.7   PROT 7.8 7.4 7.1   ALBUMIN 3.3* 3.0* 2.9*   BILITOT 0.5 0.4 0.4   ALKPHOS 86 80 74   AST 23 18 14   ALT 14 11 11   ANIONGAP 10 8 4*   EGFRNONAA >60 >60 >60       Significant Imaging: I have reviewed and interpreted all pertinent imaging results/findings within the past 24 hours.      Assessment/Plan:      * COPD with acute exacerbation  Patient is presenting with complaints of shortness of breath and productive cough progressively worsening over the previous 2 weeks. Cough is productive of thick, green sputum. Patient reports a sick contact at home. No fever while in ED. Last PFT's about 3 years ago showed FEV/FVC of 51%. Patient reports that he has just taken his Advair on the day of admission, but no other medications. Diffuse myalgias but pain is not more severe in one leg  compared to the other and no unilateral leg swelling or color change so DVT/PE unlikely. Patient desating in bed in ED after multiple breathing treatments.   - Duonebs q4hrs   - Fluticasone-vilanterol daily   - Ceftriaxone   - Azithromycin   - Acapella and chest physiotherapy q6hrs   - Prednisone 40mg daily   - Mucinex   - Patient reports that he is breathing much better but is still coughing   - Will continue to monitor breathing status and saturations; will ambulate today       HTN (hypertension)  Patient with a history of hypertension on clonidine .3mg TID at home. Reports that he has not taken his home medications for at least the past 5-7 days.    - Will restart home clonidine   - Consider adding second agent if necessary   - BP this /82   - Continue to monitor BP      Generalized anxiety disorder  Patient with a reported history of JUVENCIO. He was clearly significantly anxious during interview in the ED. Has a history of clonazepam use BID but has not taken any for the past week.    - Not giving clonazepam as patient has been off for about one week at this point   - Hydroxyzine PRN for anxiety   - Restarted home lexapro   - Will continue to monitor      Opiate dependence  Patient reports history of IVDU, last use >1 year ago. On methadone maintenance therapy, reportedly 100mg daily. UDS positive for methadone and opiates. Denied any other recreational drug use.   - restart home methadone   - Continue to monitor      Tobacco dependence  Patient with about 15-20 pack year smoking history. Endorsed increase in smoking lately.   - Nicotine patch   - Recommend cessation        VTE Risk Mitigation (From admission, onward)         Ordered     enoxaparin injection 40 mg  Daily      02/25/20 1541     IP VTE HIGH RISK PATIENT  Once      02/25/20 1541     Place GENOVEVA hose  Until discontinued      02/25/20 1420                Coleman Vargas MD PGY-I  Department of Hospital Medicine   [facility]

## 2020-02-27 NOTE — PLAN OF CARE
Plan for d/c home today, no HH or DME needed.  Transport arranged for 3PM pickup today via w/c van with O2, pt has concentrator and portable tanks at home.      Future Appointments   Date Time Provider Department Center   3/11/2020 10:40 AM David Beckett PA-C [facility]        02/27/20 1336   Final Note   Assessment Type Final Discharge Note   Anticipated Discharge Disposition Home   Hospital Follow Up  Appt(s) scheduled? Yes   Discharge plans and expectations educations in teach back method with documentation complete? Yes   Right Care Referral Info   Post Acute Recommendation No Care       Avis Taylor, ARIANNA    [phone]

## 2020-02-27 NOTE — PLAN OF CARE
VN rounds:  VN cued into pt's room with pt's permission.  Pt resting in bed in low position with call bell at side, bed alarm in place. Fall risk protocol discussed with pt.  VN instructed to call for assistance.  Pt aware and agreeable.   Pt reports cough unrelieved by Tessalon and Mucinex.  Dr. Vargas informed.  No acute distress noted.  Pt's chart, labs and vital signs reviewed.  Allowed time for questions.  Will continue to be available and intervene as needed.

## 2020-02-27 NOTE — ASSESSMENT & PLAN NOTE
Patient with a history of hypertension on clonidine .3mg TID at home. Reports that he has not taken his home medications for at least the past 5-7 days.    - Will restart home clonidine   - Consider adding second agent if necessary   - BP this /82   - Continue to monitor BP

## 2020-02-27 NOTE — SUBJECTIVE & OBJECTIVE
Interval History: Patient seen and examined this morning. No acute events overnight. Patient continues to endorse shortness of breath and coughing. Patient denies any improvement in his symptoms but patient was sitting in bed without oxygen in no apparent distress. Continues to report significant amount of mucus. Patient reports urinary retention and constipation.     Review of Systems   Constitutional: Positive for fatigue. Negative for appetite change, chills, fever and unexpected weight change.   HENT: Positive for sinus pressure and sinus pain. Negative for rhinorrhea, sneezing and sore throat.    Respiratory: Positive for cough (improved) and shortness of breath (much improved from presentation). Negative for wheezing.    Cardiovascular: Negative for chest pain and palpitations.   Gastrointestinal: Negative for abdominal distention, abdominal pain, blood in stool, constipation, diarrhea, nausea and vomiting.   Genitourinary: Negative for difficulty urinating.   Musculoskeletal: Positive for myalgias. Negative for arthralgias and back pain.   Skin: Negative for color change and rash.   Neurological: Negative for dizziness, weakness, light-headedness, numbness and headaches.   Psychiatric/Behavioral: Negative for behavioral problems, confusion, decreased concentration and sleep disturbance. The patient is nervous/anxious.      Objective:     Vital Signs (Most Recent):  Temp: 96.7 °F (35.9 °C) (02/27/20 0325)  Pulse: 70 (02/27/20 0729)  Resp: 18 (02/27/20 0728)  BP: (!) 142/82 (02/27/20 0500)  SpO2: 97 % (02/27/20 0728) Vital Signs (24h Range):  Temp:  [96.7 °F (35.9 °C)-99 °F (37.2 °C)] 96.7 °F (35.9 °C)  Pulse:  [64-90] 70  Resp:  [18-24] 18  SpO2:  [92 %-97 %] 97 %  BP: (130-154)/() 142/82     Weight: 117.6 kg (259 lb 4.2 oz)  Body mass index is 39.42 kg/m².    Intake/Output Summary (Last 24 hours) at 2/27/2020 0803  Last data filed at 2/27/2020 0500  Gross per 24 hour   Intake 1040 ml   Output 3700 ml    Net -2660 ml      Physical Exam   Constitutional: He is oriented to person, place, and time. He appears distressed.   HENT:   Head: Normocephalic.   Eyes: Pupils are equal, round, and reactive to light. Conjunctivae and EOM are normal.   Neck: Normal range of motion. Neck supple. No JVD present.   Cardiovascular: Normal rate, regular rhythm, normal heart sounds and intact distal pulses.   No murmur heard.  Pulmonary/Chest: No respiratory distress. He has wheezes (much improved from presentation).   Poor air entry bilaterally but much improved   Abdominal: Soft. Bowel sounds are normal. He exhibits no distension and no mass. There is no tenderness.   Musculoskeletal: Normal range of motion. He exhibits no edema.   Neurological: He is alert and oriented to person, place, and time.   Skin: Skin is warm and dry. Capillary refill takes less than 2 seconds.   Psychiatric: He has a normal mood and affect. His behavior is normal.       Significant Labs:   CBC:   Recent Labs   Lab 02/25/20  1200 02/26/20  0659 02/27/20  0505   WBC 6.85 6.31 8.72   HGB 13.5* 12.7* 12.9*   HCT 43.4 41.1 41.7    133* 150     CMP:   Recent Labs   Lab 02/25/20  1200 02/26/20  0659 02/27/20  0505    139 138   K 3.9 4.9 4.7    102 99   CO2 27 29 35*    159* 112*   BUN 9 11 15   CREATININE 1.0 1.1 1.0   CALCIUM 8.8 8.9 8.7   PROT 7.8 7.4 7.1   ALBUMIN 3.3* 3.0* 2.9*   BILITOT 0.5 0.4 0.4   ALKPHOS 86 80 74   AST 23 18 14   ALT 14 11 11   ANIONGAP 10 8 4*   EGFRNONAA >60 >60 >60       Significant Imaging: I have reviewed and interpreted all pertinent imaging results/findings within the past 24 hours.

## 2020-02-27 NOTE — NURSING
Pt. Blood pressure 154/109.Dr. Cedillo notified of results.Orders given to continue to monitor for agitation after Visteril given per PRN orders.No complaints are noted at this time.will continue to monitor.

## 2020-02-27 NOTE — PLAN OF CARE
Care plan explained & understood by pt. On O2 at 2L Sats 97%. NSR on Tele.  Safety maintained at all times. Bed on low position. Call bell within reach. Bed alarm on.  O2 eval done.  Pt for discharge today.

## 2020-02-27 NOTE — HOSPITAL COURSE
Ming Harrington is a 54 y.o. male with PMHx of COPD, CHF, htn, and opioid dependence presented with SOB and productive cough of green sputum. Patient was also experiencing desaturations down to the 80s when asleep. He was admitted for Flu negative COPD exacerbation and started on duonebs, breo, steroids, antibiotics, and chest physiotherapy. Patient was also placed on BiPAP at night. Given opioid dependence, home methadone was restarted. After the first night, patient experienced mildly improved wheezing and cough, but complained of urinary retention and constipation. He was given Flomax, Furosamide, and miralax which relieved these symptoms sufficiently. On the second day of admission, the patient experienced much improved resolution of symptoms and he felt that he was was more than feeling well enough to return home. Patient is stable for discharge home on antibiotics and home medications.

## 2020-02-27 NOTE — DISCHARGE SUMMARY
"[facility] Hospital Medicine  Discharge Summary      Patient Name: Ming Harrington  MRN: [id number]  Admission Date: 2/25/2020  Hospital Length of Stay: 0 days  Discharge Date and Time:  02/27/2020 2:08 PM  Attending Physician: Jacinda Cooper MD   Discharging Provider: Coleman Vargas MD  Primary Care Provider: Vazquez Barajas MD      HPI:   Ming Harrington is a 54 year old male with PMHx of COPD, HTN, Anxiety, Opioid dependence who presented to the ED with complaints of shortness of breath and cough. Patient reports that about 2 weeks ago he began to take care of his son who was sick with an unknown respiratory infection.  Patient reports that since then he has been progressively feeling worse.  He has had an increase in a productive cough, productive of thick green sputum.  He is also having increasingly worse shortness of breath.  Patient was then is gone was with past 2 weeks.  Patient has not taken any of his medications over the past few days, as he has felt too sick to go to the pharmacy to get his medications.  Patient has been admitted multiple times in the past for COPD exacerbations.  He is also endorsing significant myalgias throughout his entire body, rhinorrhea, facial pain and pressure.  He reports that the sputum that he is producing so thick that is clogging up my throat and is altering his voice.    In the ED, the patient received multiple DuoNeb as and an hour long breathing treatment without any benefit.  Patient also received 125 mg IV Solu-Medrol.  Despite treatment, patient remained satting in the mid 80s. CXR showed "Bibasilar subsegmental scarring versus atelectasis without focal consolidation." Patient was flu negative. BNP and troponin wnl.     * No surgery found *      Hospital Course:   Ming Harrington is a 54 y.o. male with PMHx of COPD, CHF, htn, and opioid dependence presented with SOB and productive cough of green sputum. Patient was also experiencing desaturations down " "to the 80s when asleep. He was admitted for Flu negative COPD exacerbation and started on duonebs, breo, steroids, antibiotics, and chest physiotherapy. Patient was also placed on BiPAP at night. Given opioid dependence, home methadone was restarted. After the first night, patient experienced mildly improved wheezing and cough, but complained of urinary retention and constipation. He was given Flomax, Furosamide, and miralax which relieved these symptoms sufficiently. On the second day of admission, the patient experienced much improved resolution of symptoms and he felt that he was was more than feeling well enough to return home. Patient is stable for discharge home on antibiotics and home medications.      Consults:     No new Assessment & Plan notes have been filed under this hospital service since the last note was generated.  Service: Hospital Medicine    Final Active Diagnoses:    Diagnosis Date Noted POA    PRINCIPAL PROBLEM:  COPD with acute exacerbation [J44.1] 07/04/2018 Yes    HTN (hypertension) [I10]  Yes    Generalized anxiety disorder [F41.1] 04/17/2019 Yes    Opiate dependence [F11.20] 01/08/2013 Yes    Tobacco dependence [F17.200] 02/25/2020 Yes      Problems Resolved During this Admission:       Discharged Condition: stable    Disposition: Home or Self Care    Follow Up:  Follow-up Information     David Beckett PA-C On 3/11/2020.    Specialty:  Family Medicine  Why:  10:40 AM  Contact information:  [address]  [address] [place] [address] [id number] [phone]                 Patient Instructions:      SPACER WITH MASK FOR HOME USE     Order Specific Question Answer Comments   Height: 5' 8" (1.727 m)    Weight: 117.6 kg (259 lb 4.2 oz)    Does patient have medical equipment at home? nebulizer    Length of need (1-99 months): 99      Ambulatory referral/consult to Smoking Cessation Program   Standing Status: Future   Referral Priority: Routine Referral Type: Consultation   Referral Reason: " Specialty Services Required   Requested Specialty: CTTS   Number of Visits Requested: 1     Diet Cardiac     Notify your health care provider if you experience any of the following:  temperature >100.4     Notify your health care provider if you experience any of the following:  persistent nausea and vomiting or diarrhea     Notify your health care provider if you experience any of the following:  severe uncontrolled pain     Notify your health care provider if you experience any of the following:  redness, tenderness, or signs of infection (pain, swelling, redness, odor or green/yellow discharge around incision site)     Notify your health care provider if you experience any of the following:  severe persistent headache     Notify your health care provider if you experience any of the following:  persistent dizziness, light-headedness, or visual disturbances     Notify your health care provider if you experience any of the following:  increased confusion or weakness     Activity as tolerated       Significant Diagnostic Studies: Labs:   CMP   Recent Labs   Lab 02/26/20  0659 02/27/20  0505    138   K 4.9 4.7    99   CO2 29 35*   * 112*   BUN 11 15   CREATININE 1.1 1.0   CALCIUM 8.9 8.7   PROT 7.4 7.1   ALBUMIN 3.0* 2.9*   BILITOT 0.4 0.4   ALKPHOS 80 74   AST 18 14   ALT 11 11   ANIONGAP 8 4*   ESTGFRAFRICA >60 >60   EGFRNONAA >60 >60    and CBC   Recent Labs   Lab 02/26/20  0659 02/27/20  0505   WBC 6.31 8.72   HGB 12.7* 12.9*   HCT 41.1 41.7   * 150       Pending Diagnostic Studies:     None         Medications:  Reconciled Home Medications:      Medication List      START taking these medications    azithromycin 250 MG tablet  Commonly known as:  Z-HAVEN  Take 1 tablet (250 mg total) by mouth once daily.     benzonatate 100 MG capsule  Commonly known as:  TESSALON  Take 2 capsules (200 mg total) by mouth 3 (three) times daily as needed for Cough.     dextromethorphan-guaifenesin  mg   mg per 12 hr tablet  Commonly known as:  MUCINEX DM  Take 2 tablets by mouth 2 (two) times daily. for 10 days     oseltamivir 75 MG capsule  Commonly known as:  TAMIFLU  Take 1 capsule (75 mg total) by mouth 2 (two) times daily. for 3 days     predniSONE 20 MG tablet  Commonly known as:  DELTASONE  Take 2 tablets (40 mg total) by mouth once daily. for 3 days  Start taking on:  February 28, 2020     tamsulosin 0.4 mg Cap  Commonly known as:  FLOMAX  Take 1 capsule (0.4 mg total) by mouth once daily.  Start taking on:  February 28, 2020        CHANGE how you take these medications    albuterol 90 mcg/actuation inhaler  Commonly known as:  PROVENTIL/VENTOLIN HFA  Inhale 1-2 puffs into the lungs every 6 (six) hours as needed for Wheezing or Shortness of Breath. Rescue  What changed:  Another medication with the same name was removed. Continue taking this medication, and follow the directions you see here.     * polyethylene glycol 17 gram Pwpk  Commonly known as:  GLYCOLAX  Take 17 g by mouth once daily.  What changed:  Another medication with the same name was added. Make sure you understand how and when to take each.     * polyethylene glycol 17 gram/dose powder  Commonly known as:  GLYCOLAX  mix and Take 17 g  (1 capful )  by mouth 2 (two) times daily as needed (constipation).  What changed:  You were already taking a medication with the same name, and this prescription was added. Make sure you understand how and when to take each.         * This list has 2 medication(s) that are the same as other medications prescribed for you. Read the directions carefully, and ask your doctor or other care provider to review them with you.            CONTINUE taking these medications    Advair Diskus 250-50 mcg/dose diskus inhaler  Generic drug:  fluticasone-salmeterol 250-50 mcg/dose  Inhale 1 puff into the lungs 2 (two) times daily.     albuterol-ipratropium 2.5 mg-0.5 mg/3 mL nebulizer solution  Commonly known as:   DUO-NEB  Take 3 mLs by nebulization every 4 (four) hours. Rescue     cloNIDine 0.1 MG tablet  Commonly known as:  CATAPRES  Take 3 tablets (0.3 mg total) by mouth 3 (three) times daily.     escitalopram oxalate 10 MG tablet  Commonly known as:  LEXAPRO  Take 1 tablet (10 mg total) by mouth once daily.     furosemide 40 MG tablet  Commonly known as:  LASIX  Take 0.5 tablets (20 mg total) by mouth 2 (two) times daily.     hydrOXYzine 50 MG tablet  Commonly known as:  ATARAX  Take 0.5 tablets (25 mg total) by mouth 3 (three) times daily as needed for Itching.     metFORMIN 500 MG tablet  Commonly known as:  GLUCOPHAGE  Take 1 tablet (500 mg total) by mouth daily with breakfast.     methadone 10 mg/5 mL solution  Commonly known as:  DOLOPHINE  Take 90 mg by mouth every morning.        STOP taking these medications    Atrovent HFA 17 mcg/actuation inhaler  Generic drug:  ipratropium     clonazePAM 1 MG tablet  Commonly known as:  KLONOPIN     fluticasone furoate-vilanterol 100-25 mcg/dose diskus inhaler  Commonly known as:  BREO     HYDROcodone-acetaminophen 5-325 mg per tablet  Commonly known as:  NORCO     ketorolac 10 mg tablet  Commonly known as:  TORADOL     nicotine 14 mg/24 hr  Commonly known as:  NICODERM CQ     ondansetron 8 MG Tbdl  Commonly known as:  ZOFRAN-ODT     senna-docusate 8.6-50 mg 8.6-50 mg per tablet  Commonly known as:  PERICOLACE     tiotropium bromide 2.5 mcg/actuation Mist  Commonly known as:  Spiriva Respimat            Indwelling Lines/Drains at time of discharge:   Lines/Drains/Airways     None                 Time spent on the discharge of patient: >30 minutes  Patient was seen and examined on the date of discharge and determined to be suitable for discharge.         Coleman Vargas MD PGY-I  Department of Hospital Medicine  [facility]

## 2020-02-29 LAB
BACTERIA SPEC AEROBE CULT: ABNORMAL
GRAM STN SPEC: ABNORMAL

## 2020-03-02 ENCOUNTER — TELEPHONE (OUTPATIENT)
Dept: FAMILY MEDICINE | Facility: HOSPITAL | Age: 55
End: 2020-03-02

## 2020-03-02 NOTE — TELEPHONE ENCOUNTER
----- Message from Danisha Price MA sent at 3/2/2020 11:32 AM CST -----  Contact: Patient [phone]  Type:  Patient Requesting return call    Who Called: Patient  Would the patient rather a call back or a response via [facility]? call  Best Call Back Number:[phone]  Additional Information: Patient states he is completely out of his oxygen; please [facility] an rx [facility] for more oxygen.  Please call patient to advise.  Thanks.

## 2020-03-03 ENCOUNTER — TELEPHONE (OUTPATIENT)
Dept: FAMILY MEDICINE | Facility: HOSPITAL | Age: 55
End: 2020-03-03

## 2020-03-03 DIAGNOSIS — J44.9 CHRONIC OBSTRUCTIVE PULMONARY DISEASE, UNSPECIFIED COPD TYPE: Primary | ICD-10-CM

## 2020-03-03 NOTE — TELEPHONE ENCOUNTER
----- Message from Danisha Price MA sent at 3/3/2020 10:52 AM CST -----  Contact: Patient [phone]  Patient called again requesting same; has not received return call.  Please call patient.  Thanks.  03/03/2020@10:53am  ----- Message -----  From: Danisha Price MA  Sent: 3/2/2020  11:32 AM CST  To: Karl Uriostegui Staff    Type:  Patient Requesting return call    Who Called: Patient  Would the patient rather a call back or a response via [facility]? call  Best Call Back Number:[phone]  Additional Information: Patient states he is completely out of his oxygen; please send an rx to [facility] for more oxygen.  Please call patient to advise.  Thanks.

## 2020-03-03 NOTE — TELEPHONE ENCOUNTER
----- Message from Danisha Price MA sent at 3/2/2020  4:57 PM CST -----  Contact: Patient [phone]  Patient called again requesting same.  Thanks. 03/02/2020@4:58pm  ----- Message -----  From: Danisha Price MA  Sent: 3/2/2020  11:32 AM CST  To: Karl Uriostegui Staff    Type:  Patient Requesting return call    Who Called: Patient  Would the patient rather a call back or a response via [facility]? call  Best Call Back Number:[phone]  Additional Information: Patient states he is completely out of his oxygen; please send an rx to [facility] for more oxygen.  Please call patient to advise.  Thanks.

## 2020-03-03 NOTE — TELEPHONE ENCOUNTER
----- Message from Danisha Price MA sent at 3/3/2020  4:19 PM CST -----  Contact: Patient [phone]  Patient called again requesting return call.  Thanks.  03/03/2020@4:20pm  ----- Message -----  From: Danisha Price MA  Sent: 3/3/2020  10:52 AM CST  To: Karl Uriostegui Staff    Patient called again requesting same; has not received return call.  Please call patient.  Thanks.  03/03/2020@10:53am  ----- Message -----  From: Danisha Price MA  Sent: 3/2/2020  11:32 AM CST  To: Karl Uriostegui Staff    Type:  Patient Requesting return call    Who Called: Patient  Would the patient rather a call back or a response via [facility]? call  Best Call Back Number:[phone]  Additional Information: Patient states he is completely out of his oxygen; please send an rx to [facility] for more oxygen.  Please call patient to advise.  Thanks.

## 2020-03-10 ENCOUNTER — TELEPHONE (OUTPATIENT)
Dept: FAMILY MEDICINE | Facility: HOSPITAL | Age: 55
End: 2020-03-10

## 2020-03-10 NOTE — TELEPHONE ENCOUNTER
Attempted to contact patient and notify him that he needs to be seen in clinic to re-qualified for Oxygen.   No answer and unable to leave voicemail.

## 2020-03-17 ENCOUNTER — TELEPHONE (OUTPATIENT)
Dept: FAMILY MEDICINE | Facility: HOSPITAL | Age: 55
End: 2020-03-17

## 2020-03-17 NOTE — TELEPHONE ENCOUNTER
----- Message from Hipolito Katz sent at 3/17/2020  3:23 PM CDT -----  PT CALL STATING HE MISSED HIS APPT BECAUSE OF TRANSPORTATION AND HE NEED MORE OXYGEN HE ONLY HAVE A HALF TANK LEFT AND THEY ARE ABOUT TO CLOSE THE CITY DOWN. HIS HOME [phone]

## 2020-03-19 NOTE — TELEPHONE ENCOUNTER
Called [facility] and explained due to COVID 19 patient is unable to come into clinic and only has half a tank of oxygen left . Juliana asked to fax order again. Order faxed.

## 2020-03-20 NOTE — TELEPHONE ENCOUNTER
Called patient to advise that I really could not say what [facility] is going to do abou the oxygen. I did fax them the order. Gave patient the telephone number to call.

## 2020-04-16 ENCOUNTER — E-VISIT (OUTPATIENT)
Dept: FAMILY MEDICINE | Facility: HOSPITAL | Age: 55
End: 2020-04-16

## 2020-04-16 DIAGNOSIS — J44.0 CHRONIC OBSTRUCTIVE PULMONARY DISEASE WITH ACUTE LOWER RESPIRATORY INFECTION: ICD-10-CM

## 2020-04-16 DIAGNOSIS — F19.982 DRUG-INDUCED INSOMNIA: ICD-10-CM

## 2020-04-16 DIAGNOSIS — J44.1 ACUTE EXACERBATION OF COPD WITH ASTHMA: Primary | ICD-10-CM

## 2020-04-16 DIAGNOSIS — I50.9 CONGESTIVE HEART FAILURE, UNSPECIFIED HF CHRONICITY, UNSPECIFIED HEART FAILURE TYPE: ICD-10-CM

## 2020-04-16 DIAGNOSIS — J45.901 ACUTE EXACERBATION OF COPD WITH ASTHMA: Primary | ICD-10-CM

## 2020-04-16 DIAGNOSIS — R11.0 NAUSEA: ICD-10-CM

## 2020-04-16 RX ORDER — FLUTICASONE PROPIONATE AND SALMETEROL 50; 250 UG/1; UG/1
1 POWDER RESPIRATORY (INHALATION) 2 TIMES DAILY
Qty: 60 EACH | Refills: 3 | Status: SHIPPED | OUTPATIENT
Start: 2020-04-16 | End: 2020-11-30 | Stop reason: SDUPTHER

## 2020-04-16 RX ORDER — AZITHROMYCIN 250 MG/1
TABLET, FILM COATED ORAL
Qty: 6 TABLET | Refills: 0 | Status: SHIPPED | OUTPATIENT
Start: 2020-04-16 | End: 2020-04-21

## 2020-04-16 RX ORDER — IPRATROPIUM BROMIDE AND ALBUTEROL SULFATE 2.5; .5 MG/3ML; MG/3ML
3 SOLUTION RESPIRATORY (INHALATION) EVERY 4 HOURS
Qty: 1620 ML | Refills: 3 | Status: SHIPPED | OUTPATIENT
Start: 2020-04-16 | End: 2020-05-25 | Stop reason: SDUPTHER

## 2020-04-16 RX ORDER — FUROSEMIDE 40 MG/1
40 TABLET ORAL 2 TIMES DAILY
Qty: 180 TABLET | Refills: 3 | Status: SHIPPED | OUTPATIENT
Start: 2020-04-16 | End: 2020-05-25

## 2020-04-16 RX ORDER — ONDANSETRON 4 MG/1
4-8 TABLET, ORALLY DISINTEGRATING ORAL EVERY 6 HOURS PRN
Qty: 60 TABLET | Refills: 0 | Status: SHIPPED | OUTPATIENT
Start: 2020-04-16 | End: 2020-04-21

## 2020-04-16 RX ORDER — RAMELTEON 8 MG/1
8 TABLET ORAL NIGHTLY
Qty: 90 TABLET | Refills: 0 | Status: SHIPPED | OUTPATIENT
Start: 2020-04-16 | End: 2021-04-29 | Stop reason: SDUPTHER

## 2020-04-16 NOTE — PROGRESS NOTES
Subjective:     The patient location is: Home  The chief complaint leading to consultation is: Hospital follow up, cough  Visit type: audio only  Total time spent with patient: 16 min  Each patient to whom he or she provides medical services by telemedicine is:  (1) informed of the relationship between the physician and patient and the respective role of any other health care provider with respect to management of the patient; and (2) notified that he or she may decline to receive medical services by telemedicine and may withdraw from such care at any time.      Patient ID: Ming Harrington is a 54 y.o. male.    Chief Complaint: Transitional Care and Cough  With PMH of COPD admitted w/ COPD exacerbation, also concern for CAP. Improved w/ steriods, nebs, abx    Today he reports that he is starting cough up some yellow stuff. This just started over the last 2-3 days. He denies any fever, but does report having some sweats. This is the same thing that brought him into the hospital last time. Denies any exposure to anyone. Reports that he does not leave his house. Needs his breathing machine daily, but this is his baseline.    He did get 3 tanks of oxygen delivered and a home machine.     Review of Systems  General ROS: negative fever +sweats occasionally +insomnia  Psychological ROS: negative for hallucination, depression or suicidal ideation  Ophthalmic ROS: negative for blurry vision, photophobia or eye pain  ENT ROS: negative for epistaxis, sore throat or rhinorrhea  Respiratory ROS: +cough  Cardiovascular ROS: no chest pain or dyspnea on exertion  Gastrointestinal ROS: no abdominal pain, change in bowel habits, or black/ bloody stools  Genito-Urinary ROS: no dysuria, trouble voiding, or hematuria  Musculoskeletal ROS: negative for gait disturbance or muscular weakness  Neurological ROS: no syncope or seizures; no ataxia  Dermatological ROS: negative for pruritis, rash and jaundice      Objective:      Vital Signs:  There  were no vitals filed for this visit.  Wt Readings from Last 1 Encounters:   02/26/20 0407 117.6 kg (259 lb 4.2 oz)   02/25/20 1126 108.9 kg (240 lb)     There is no height or weight on file to calculate BMI.   SpO2 Readings from Last 1 Encounters:   02/27/20 (!) 91%       Physical Exam:  General appearance: alert, cooperative, no distress  Constitutional:Oriented to person, place, and time.  Resp: +speaking in full sentences; occasional wet sounding cough  Neurologic: Alert and oriented X 3  Psychiatric: no pressured speech; normal affect; no evidence of impaired cognition    Assessment:       1. Acute exacerbation of COPD with asthma    2. Chronic obstructive pulmonary disease with acute lower respiratory infection    3. Nausea    4. Drug-induced insomnia    5. Congestive heart failure, unspecified HF chronicity, unspecified heart failure type        Plan:       Ming was seen today for transitional care and cough.    Diagnoses and all orders for this visit:    Acute exacerbation of COPD with asthma   -Will treat with ABx to prevent patient from ED visit/admission due to risk of exposure to COVID19 in a high risk patient    -Will call if symptoms worsen or fail to improve  -     azithromycin (Z-HAVEN) 250 MG tablet; Take 2 tablets by mouth on day 1; Take 1 tablet by mouth on days 2-5  -     ADVAIR DISKUS 250-50 mcg/dose diskus inhaler; Inhale 1 puff into the lungs 2 (two) times daily.    Chronic obstructive pulmonary disease with acute lower respiratory infection  -     albuterol-ipratropium (DUO-NEB) 2.5 mg-0.5 mg/3 mL nebulizer solution; Take 3 mLs by nebulization every 4 (four) hours. Rescue    Nausea  -     ondansetron (ZOFRAN-ODT) 4 MG TbDL; Take 1-2 tablets (4-8 mg total) by mouth every 6 (six) hours as needed.    Drug-induced insomnia  -     ramelteon (ROZEREM) 8 mg tablet; Take 1 tablet (8 mg total) by mouth every evening.    Congestive heart failure, unspecified HF chronicity, unspecified heart failure type  -      furosemide (LASIX) 40 MG tablet; Take 1 tablet (40 mg total) by mouth 2 (two) times daily.          Follow up with PCP in 4-6 weeks for face to face assessment. May have sooner visit if symptoms are worsening.     Post Visit Medication List:     Medication List           Accurate as of April 16, 2020 11:47 AM. If you have any questions, ask your nurse or doctor.               START taking these medications    ramelteon 8 mg tablet  Commonly known as:  ROZEREM  Take 1 tablet (8 mg total) by mouth every evening.  Started by:  David Beckett PA-C        CHANGE how you take these medications    furosemide 40 MG tablet  Commonly known as:  LASIX  Take 1 tablet (40 mg total) by mouth 2 (two) times daily.  What changed:  how much to take  Changed by:  David Beckett PA-C     ondansetron 4 MG Tbdl  Commonly known as:  ZOFRAN-ODT  Take 1-2 tablets (4-8 mg total) by mouth every 6 (six) hours as needed.  What changed:  how much to take  Changed by:  David Beckett PA-C        CONTINUE taking these medications    ADVAIR DISKUS 250-50 mcg/dose diskus inhaler  Generic drug:  fluticasone-salmeterol 250-50 mcg/dose  Inhale 1 puff into the lungs 2 (two) times daily.     albuterol 90 mcg/actuation inhaler  Commonly known as:  PROVENTIL/VENTOLIN HFA  Inhale 1-2 puffs into the lungs every 6 (six) hours as needed for Wheezing or Shortness of Breath. Rescue     albuterol-ipratropium 2.5 mg-0.5 mg/3 mL nebulizer solution  Commonly known as:  DUO-NEB  Take 3 mLs by nebulization every 4 (four) hours. Rescue     * azithromycin 250 MG tablet  Commonly known as:  Z-HAVEN  Take 1 tablet (250 mg total) by mouth once daily.     * azithromycin 250 MG tablet  Commonly known as:  Z-HAVEN  Take 2 tablets by mouth on day 1; Take 1 tablet by mouth on days 2-5     cloNIDine 0.1 MG tablet  Commonly known as:  CATAPRES  Take 3 tablets (0.3 mg total) by mouth 3 (three) times daily.     escitalopram oxalate 10 MG tablet  Commonly known as:  LEXAPRO  Take 1  tablet (10 mg total) by mouth once daily.     hydrOXYzine 50 MG tablet  Commonly known as:  ATARAX  Take 0.5 tablets (25 mg total) by mouth 3 (three) times daily as needed for Itching.     metFORMIN 500 MG tablet  Commonly known as:  GLUCOPHAGE  Take 1 tablet (500 mg total) by mouth daily with breakfast.     methadone 10 mg/5 mL solution  Commonly known as:  DOLOPHINE     * polyethylene glycol 17 gram Pwpk  Commonly known as:  GLYCOLAX  Take 17 g by mouth once daily.     * polyethylene glycol 17 gram/dose powder  Commonly known as:  GLYCOLAX  mix and Take 17 g  (1 capful )  by mouth 2 (two) times daily as needed (constipation).     tamsulosin 0.4 mg Cap  Commonly known as:  FLOMAX  Take 1 capsule (0.4 mg total) by mouth once daily.         * This list has 4 medication(s) that are the same as other medications prescribed for you. Read the directions carefully, and ask your doctor or other care provider to review them with you.               Where to Get Your Medications      [facility] [address]  [address] [facility] [address]    Phone:  [phone]   · ADVAIR DISKUS 250-50 mcg/dose diskus inhaler  · albuterol-ipratropium 2.5 mg-0.5 mg/3 mL nebulizer solution  · azithromycin 250 MG tablet  · furosemide 40 MG tablet  · ondansetron 4 MG Tbdl  · ramelteon 8 mg tablet

## 2020-05-25 ENCOUNTER — OFFICE VISIT (OUTPATIENT)
Dept: FAMILY MEDICINE | Facility: HOSPITAL | Age: 55
End: 2020-05-25
Attending: FAMILY MEDICINE
Payer: MEDICAID

## 2020-05-25 VITALS
BODY MASS INDEX: 39.42 KG/M2 | HEART RATE: 90 BPM | HEIGHT: 68 IN | WEIGHT: 260.13 LBS | SYSTOLIC BLOOD PRESSURE: 145 MMHG | DIASTOLIC BLOOD PRESSURE: 90 MMHG | OXYGEN SATURATION: 94 %

## 2020-05-25 DIAGNOSIS — R11.0 NAUSEA: ICD-10-CM

## 2020-05-25 DIAGNOSIS — J44.9 CHRONIC OBSTRUCTIVE PULMONARY DISEASE, UNSPECIFIED COPD TYPE: Primary | ICD-10-CM

## 2020-05-25 PROCEDURE — 99214 OFFICE O/P EST MOD 30 MIN: CPT | Performed by: STUDENT IN AN ORGANIZED HEALTH CARE EDUCATION/TRAINING PROGRAM

## 2020-05-25 RX ORDER — IPRATROPIUM BROMIDE 17 UG/1
AEROSOL, METERED RESPIRATORY (INHALATION)
COMMUNITY
Start: 2020-03-16 | End: 2020-05-25

## 2020-05-25 RX ORDER — ONDANSETRON 8 MG/1
8 TABLET, ORALLY DISINTEGRATING ORAL EVERY 6 HOURS PRN
Qty: 20 TABLET | Refills: 1 | Status: SHIPPED | OUTPATIENT
Start: 2020-05-25 | End: 2021-01-13 | Stop reason: SDUPTHER

## 2020-05-25 RX ORDER — FUROSEMIDE 40 MG/1
40 TABLET ORAL 2 TIMES DAILY
COMMUNITY
End: 2021-03-25 | Stop reason: SDUPTHER

## 2020-05-25 RX ORDER — IPRATROPIUM BROMIDE AND ALBUTEROL SULFATE 2.5; .5 MG/3ML; MG/3ML
3 SOLUTION RESPIRATORY (INHALATION) EVERY 4 HOURS
Qty: 1620 ML | Refills: 3 | Status: SHIPPED | OUTPATIENT
Start: 2020-05-25 | End: 2020-11-30 | Stop reason: SDUPTHER

## 2020-05-25 RX ORDER — HYDROXYZINE PAMOATE 50 MG/1
CAPSULE ORAL
COMMUNITY
Start: 2020-04-17 | End: 2022-01-27

## 2020-05-25 RX ORDER — MIRTAZAPINE 15 MG/1
TABLET, FILM COATED ORAL
COMMUNITY
Start: 2020-04-17 | End: 2022-01-27

## 2020-05-25 RX ORDER — ONDANSETRON 8 MG/1
TABLET, ORALLY DISINTEGRATING ORAL
COMMUNITY
Start: 2020-04-17 | End: 2020-05-25 | Stop reason: SDUPTHER

## 2020-05-25 RX ORDER — OLANZAPINE 10 MG/1
TABLET ORAL
COMMUNITY
Start: 2020-02-19 | End: 2022-01-26
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
## 2020-06-18 DIAGNOSIS — I10 ESSENTIAL HYPERTENSION: ICD-10-CM

## 2020-06-22 RX ORDER — CLONIDINE HYDROCHLORIDE 0.1 MG/1
0.3 TABLET ORAL 3 TIMES DAILY
Qty: 270 TABLET | Refills: 2 | Status: SHIPPED | OUTPATIENT
Start: 2020-06-22 | End: 2021-03-25 | Stop reason: SDUPTHER

## 2020-07-21 NOTE — DISCHARGE INSTRUCTIONS
Azithromycin tablets (English) View Edit Remove  Prednisone tablets (English) View Edit Remove  Chronic Obstructive Pulmonary Disease (COPD), Discharge Instructions (English) View Edit Remove  COPD Flare (English) View Edit Remove  Oxygen, Using at Home (English) View Edit Remove  Oxygen, Using Safely (English) View Edit Remove  Eating Heart-Healthy Foods (English) View Edit Remove  Diet, Discharge Instructions for Eating a Low-Salt (English) View Edit Remove      
with patient

## 2020-07-23 ENCOUNTER — LAB VISIT (OUTPATIENT)
Dept: PRIMARY CARE CLINIC | Facility: OTHER | Age: 55
End: 2020-07-23
Attending: INTERNAL MEDICINE
Payer: MEDICAID

## 2020-07-23 DIAGNOSIS — Z03.818 ENCOUNTER FOR OBSERVATION FOR SUSPECTED EXPOSURE TO OTHER BIOLOGICAL AGENTS RULED OUT: ICD-10-CM

## 2020-07-23 PROCEDURE — U0003 INFECTIOUS AGENT DETECTION BY NUCLEIC ACID (DNA OR RNA); SEVERE ACUTE RESPIRATORY SYNDROME CORONAVIRUS 2 (SARS-COV-2) (CORONAVIRUS DISEASE [COVID-19]), AMPLIFIED PROBE TECHNIQUE, MAKING USE OF HIGH THROUGHPUT TECHNOLOGIES AS DESCRIBED BY CMS-2020-01-R: HCPCS

## 2020-07-27 LAB — SARS-COV-2 RNA RESP QL NAA+PROBE: NEGATIVE

## 2020-08-06 ENCOUNTER — TELEPHONE (OUTPATIENT)
Dept: FAMILY MEDICINE | Facility: HOSPITAL | Age: 55
End: 2020-08-06

## 2020-08-06 NOTE — TELEPHONE ENCOUNTER
----- Message from Danisha Price MA sent at 8/6/2020 10:41 AM CDT -----  Contact: Patient [phone]  Patient states his oxygen tanks are empty and he needs you to call to authorize more.  Thanks.

## 2020-11-21 ENCOUNTER — NURSE TRIAGE (OUTPATIENT)
Dept: ADMINISTRATIVE | Facility: CLINIC | Age: 55
End: 2020-11-21

## 2020-11-21 NOTE — TELEPHONE ENCOUNTER
Patient c/o SOB, wheezing, and confusion. Advised per protocol to call 911.      Reason for Disposition   Difficult to awaken or acting confused (e.g., disoriented, slurred speech)    Additional Information   Negative: [1] Breathing stopped AND [2] hasn't returned   Negative: Choking on something   Negative: Severe difficulty breathing (e.g., struggling for each breath, speaks in single words)   Negative: Bluish (or gray) lips or face now    Protocols used: BREATHING DIFFICULTY-A-AH

## 2020-11-30 DIAGNOSIS — J44.1 ACUTE EXACERBATION OF COPD WITH ASTHMA: ICD-10-CM

## 2020-11-30 DIAGNOSIS — J44.0 CHRONIC OBSTRUCTIVE PULMONARY DISEASE WITH ACUTE LOWER RESPIRATORY INFECTION: ICD-10-CM

## 2020-11-30 DIAGNOSIS — J45.901 ACUTE EXACERBATION OF COPD WITH ASTHMA: ICD-10-CM

## 2020-11-30 DIAGNOSIS — J44.9 CHRONIC OBSTRUCTIVE PULMONARY DISEASE, UNSPECIFIED COPD TYPE: ICD-10-CM

## 2020-11-30 RX ORDER — FLUTICASONE PROPIONATE AND SALMETEROL 50; 250 UG/1; UG/1
1 POWDER RESPIRATORY (INHALATION) 2 TIMES DAILY
Qty: 60 EACH | Refills: 3 | Status: SHIPPED | OUTPATIENT
Start: 2020-11-30 | End: 2021-01-13 | Stop reason: SDUPTHER

## 2020-11-30 RX ORDER — IPRATROPIUM BROMIDE AND ALBUTEROL SULFATE 2.5; .5 MG/3ML; MG/3ML
3 SOLUTION RESPIRATORY (INHALATION) EVERY 4 HOURS
Qty: 1620 ML | Refills: 3 | Status: SHIPPED | OUTPATIENT
Start: 2020-11-30 | End: 2021-03-25 | Stop reason: SDUPTHER

## 2020-11-30 NOTE — TELEPHONE ENCOUNTER
----- Message from Yamileth Lawson sent at 11/30/2020 10:54 AM CST -----  Contact: [phone]  Type:  Needs Medical Advice    Who Called: Pt  Reason for Call: regarding a Refill on his Medication for ADVAIR DISKUS 250-50 mcg/dose diskus inhaler and pt needs a Refill on His Nebulizer Solution as well  Pharmacy name and phone #:  [facility] [address]  Would the patient rather a call back or a response via [facility]?  Call back  Best Call Back Number:  [phone]

## 2020-12-03 ENCOUNTER — LAB VISIT (OUTPATIENT)
Dept: PRIMARY CARE CLINIC | Facility: OTHER | Age: 55
End: 2020-12-03
Attending: INTERNAL MEDICINE
Payer: MEDICAID

## 2020-12-03 DIAGNOSIS — Z03.818 ENCOUNTER FOR OBSERVATION FOR SUSPECTED EXPOSURE TO OTHER BIOLOGICAL AGENTS RULED OUT: ICD-10-CM

## 2020-12-03 PROCEDURE — U0003 INFECTIOUS AGENT DETECTION BY NUCLEIC ACID (DNA OR RNA); SEVERE ACUTE RESPIRATORY SYNDROME CORONAVIRUS 2 (SARS-COV-2) (CORONAVIRUS DISEASE [COVID-19]), AMPLIFIED PROBE TECHNIQUE, MAKING USE OF HIGH THROUGHPUT TECHNOLOGIES AS DESCRIBED BY CMS-2020-01-R: HCPCS

## 2020-12-04 LAB — SARS-COV-2 RNA RESP QL NAA+PROBE: NOT DETECTED

## 2020-12-24 ENCOUNTER — NURSE TRIAGE (OUTPATIENT)
Dept: ADMINISTRATIVE | Facility: CLINIC | Age: 55
End: 2020-12-24

## 2020-12-30 RX ORDER — METFORMIN HYDROCHLORIDE 500 MG/1
500 TABLET ORAL
Qty: 30 TABLET | Refills: 2 | Status: SHIPPED | OUTPATIENT
Start: 2020-12-30 | End: 2021-03-25 | Stop reason: SDUPTHER

## 2021-01-13 DIAGNOSIS — J44.1 ACUTE EXACERBATION OF COPD WITH ASTHMA: ICD-10-CM

## 2021-01-13 DIAGNOSIS — J45.901 ACUTE EXACERBATION OF COPD WITH ASTHMA: ICD-10-CM

## 2021-01-13 DIAGNOSIS — J44.0 CHRONIC OBSTRUCTIVE PULMONARY DISEASE WITH ACUTE LOWER RESPIRATORY INFECTION: ICD-10-CM

## 2021-01-13 DIAGNOSIS — R11.0 NAUSEA: ICD-10-CM

## 2021-01-13 RX ORDER — ONDANSETRON 8 MG/1
8 TABLET, ORALLY DISINTEGRATING ORAL EVERY 6 HOURS PRN
Qty: 20 TABLET | Refills: 1 | Status: SHIPPED | OUTPATIENT
Start: 2021-01-13 | End: 2022-05-12 | Stop reason: ALTCHOICE

## 2021-01-13 RX ORDER — ALBUTEROL SULFATE 90 UG/1
1-2 AEROSOL, METERED RESPIRATORY (INHALATION) EVERY 6 HOURS PRN
Qty: 18 G | Refills: 1 | Status: SHIPPED | OUTPATIENT
Start: 2021-01-13 | End: 2021-03-25 | Stop reason: SDUPTHER

## 2021-01-13 RX ORDER — FLUTICASONE PROPIONATE AND SALMETEROL 50; 250 UG/1; UG/1
1 POWDER RESPIRATORY (INHALATION) 2 TIMES DAILY
Qty: 60 EACH | Refills: 3 | Status: SHIPPED | OUTPATIENT
Start: 2021-01-13 | End: 2021-03-25 | Stop reason: SDUPTHER

## 2021-03-25 ENCOUNTER — OFFICE VISIT (OUTPATIENT)
Dept: FAMILY MEDICINE | Facility: HOSPITAL | Age: 56
End: 2021-03-25
Attending: FAMILY MEDICINE
Payer: MEDICAID

## 2021-03-25 ENCOUNTER — HOSPITAL ENCOUNTER (EMERGENCY)
Facility: HOSPITAL | Age: 56
Discharge: HOME | End: 2021-03-25
Attending: EMERGENCY MEDICINE
Payer: MEDICAID

## 2021-03-25 VITALS
DIASTOLIC BLOOD PRESSURE: 111 MMHG | HEIGHT: 68 IN | HEART RATE: 101 BPM | BODY MASS INDEX: 43.03 KG/M2 | SYSTOLIC BLOOD PRESSURE: 162 MMHG | WEIGHT: 283.94 LBS

## 2021-03-25 VITALS
OXYGEN SATURATION: 98 % | HEART RATE: 90 BPM | DIASTOLIC BLOOD PRESSURE: 70 MMHG | RESPIRATION RATE: 18 BRPM | SYSTOLIC BLOOD PRESSURE: 150 MMHG | TEMPERATURE: 99 F

## 2021-03-25 DIAGNOSIS — R06.00 DYSPNEA: ICD-10-CM

## 2021-03-25 DIAGNOSIS — J45.901 ACUTE EXACERBATION OF COPD WITH ASTHMA: ICD-10-CM

## 2021-03-25 DIAGNOSIS — Z76.0 MEDICATION REFILL: Primary | ICD-10-CM

## 2021-03-25 DIAGNOSIS — J44.0 CHRONIC OBSTRUCTIVE PULMONARY DISEASE WITH ACUTE LOWER RESPIRATORY INFECTION: ICD-10-CM

## 2021-03-25 DIAGNOSIS — J44.9 CHRONIC OBSTRUCTIVE PULMONARY DISEASE, UNSPECIFIED COPD TYPE: ICD-10-CM

## 2021-03-25 DIAGNOSIS — R60.0 BILATERAL LOWER EXTREMITY EDEMA: ICD-10-CM

## 2021-03-25 DIAGNOSIS — I10 ESSENTIAL HYPERTENSION: Primary | ICD-10-CM

## 2021-03-25 DIAGNOSIS — J44.1 ACUTE EXACERBATION OF COPD WITH ASTHMA: ICD-10-CM

## 2021-03-25 DIAGNOSIS — I10 ESSENTIAL HYPERTENSION: ICD-10-CM

## 2021-03-25 LAB
ALBUMIN SERPL BCP-MCNC: 3.3 G/DL (ref 3.5–5.2)
ALP SERPL-CCNC: 98 U/L (ref 55–135)
ALT SERPL W/O P-5'-P-CCNC: 33 U/L (ref 10–44)
ANION GAP SERPL CALC-SCNC: 9 MMOL/L (ref 8–16)
AST SERPL-CCNC: 41 U/L (ref 10–40)
BASOPHILS # BLD AUTO: 0.02 K/UL (ref 0–0.2)
BASOPHILS NFR BLD: 0.3 % (ref 0–1.9)
BILIRUB SERPL-MCNC: 0.7 MG/DL (ref 0.1–1)
BNP SERPL-MCNC: 10 PG/ML (ref 0–99)
BUN SERPL-MCNC: 14 MG/DL (ref 6–20)
CALCIUM SERPL-MCNC: 8.5 MG/DL (ref 8.7–10.5)
CHLORIDE SERPL-SCNC: 101 MMOL/L (ref 95–110)
CO2 SERPL-SCNC: 30 MMOL/L (ref 23–29)
CREAT SERPL-MCNC: 1 MG/DL (ref 0.5–1.4)
CTP QC/QA: YES
DIFFERENTIAL METHOD: ABNORMAL
EOSINOPHIL # BLD AUTO: 0.1 K/UL (ref 0–0.5)
EOSINOPHIL NFR BLD: 1.5 % (ref 0–8)
ERYTHROCYTE [DISTWIDTH] IN BLOOD BY AUTOMATED COUNT: 11.9 % (ref 11.5–14.5)
EST. GFR  (AFRICAN AMERICAN): >60 ML/MIN/1.73 M^2
EST. GFR  (NON AFRICAN AMERICAN): >60 ML/MIN/1.73 M^2
GLUCOSE SERPL-MCNC: 111 MG/DL (ref 70–110)
HCT VFR BLD AUTO: 39.4 % (ref 40–54)
HGB BLD-MCNC: 12.9 G/DL (ref 14–18)
IMM GRANULOCYTES # BLD AUTO: 0.01 K/UL (ref 0–0.04)
IMM GRANULOCYTES NFR BLD AUTO: 0.2 % (ref 0–0.5)
LYMPHOCYTES # BLD AUTO: 1.3 K/UL (ref 1–4.8)
LYMPHOCYTES NFR BLD: 20.7 % (ref 18–48)
MAGNESIUM SERPL-MCNC: 2 MG/DL (ref 1.6–2.6)
MCH RBC QN AUTO: 30.4 PG (ref 27–31)
MCHC RBC AUTO-ENTMCNC: 32.7 G/DL (ref 32–36)
MCV RBC AUTO: 93 FL (ref 82–98)
MONOCYTES # BLD AUTO: 0.4 K/UL (ref 0.3–1)
MONOCYTES NFR BLD: 6.9 % (ref 4–15)
NEUTROPHILS # BLD AUTO: 4.3 K/UL (ref 1.8–7.7)
NEUTROPHILS NFR BLD: 70.4 % (ref 38–73)
NRBC BLD-RTO: 0 /100 WBC
PLATELET # BLD AUTO: 174 K/UL (ref 150–350)
PMV BLD AUTO: 10.8 FL (ref 9.2–12.9)
POTASSIUM SERPL-SCNC: 4.3 MMOL/L (ref 3.5–5.1)
PROT SERPL-MCNC: 7.8 G/DL (ref 6–8.4)
RBC # BLD AUTO: 4.25 M/UL (ref 4.6–6.2)
SARS-COV-2 RDRP RESP QL NAA+PROBE: NEGATIVE
SODIUM SERPL-SCNC: 140 MMOL/L (ref 136–145)
TROPONIN I SERPL DL<=0.01 NG/ML-MCNC: 0.01 NG/ML (ref 0–0.03)
WBC # BLD AUTO: 6.1 K/UL (ref 3.9–12.7)

## 2021-03-25 PROCEDURE — 63600175 PHARM REV CODE 636 W HCPCS: Performed by: EMERGENCY MEDICINE

## 2021-03-25 PROCEDURE — 94640 AIRWAY INHALATION TREATMENT: CPT

## 2021-03-25 PROCEDURE — 80053 COMPREHEN METABOLIC PANEL: CPT | Performed by: EMERGENCY MEDICINE

## 2021-03-25 PROCEDURE — 83880 ASSAY OF NATRIURETIC PEPTIDE: CPT | Performed by: EMERGENCY MEDICINE

## 2021-03-25 PROCEDURE — 85025 COMPLETE CBC W/AUTO DIFF WBC: CPT | Performed by: EMERGENCY MEDICINE

## 2021-03-25 PROCEDURE — 83735 ASSAY OF MAGNESIUM: CPT | Performed by: EMERGENCY MEDICINE

## 2021-03-25 PROCEDURE — 96374 THER/PROPH/DIAG INJ IV PUSH: CPT

## 2021-03-25 PROCEDURE — 27000221 HC OXYGEN, UP TO 24 HOURS

## 2021-03-25 PROCEDURE — 51798 US URINE CAPACITY MEASURE: CPT

## 2021-03-25 PROCEDURE — 99285 EMERGENCY DEPT VISIT HI MDM: CPT | Mod: 25,27

## 2021-03-25 PROCEDURE — 94761 N-INVAS EAR/PLS OXIMETRY MLT: CPT

## 2021-03-25 PROCEDURE — 99214 OFFICE O/P EST MOD 30 MIN: CPT | Mod: 25 | Performed by: STUDENT IN AN ORGANIZED HEALTH CARE EDUCATION/TRAINING PROGRAM

## 2021-03-25 PROCEDURE — 93010 EKG 12-LEAD: ICD-10-PCS | Mod: ,,, | Performed by: INTERNAL MEDICINE

## 2021-03-25 PROCEDURE — 25000003 PHARM REV CODE 250: Performed by: EMERGENCY MEDICINE

## 2021-03-25 PROCEDURE — 84484 ASSAY OF TROPONIN QUANT: CPT | Performed by: EMERGENCY MEDICINE

## 2021-03-25 PROCEDURE — 93005 ELECTROCARDIOGRAM TRACING: CPT

## 2021-03-25 PROCEDURE — U0002 COVID-19 LAB TEST NON-CDC: HCPCS | Performed by: EMERGENCY MEDICINE

## 2021-03-25 PROCEDURE — 25000242 PHARM REV CODE 250 ALT 637 W/ HCPCS: Performed by: EMERGENCY MEDICINE

## 2021-03-25 PROCEDURE — 93010 ELECTROCARDIOGRAM REPORT: CPT | Mod: ,,, | Performed by: INTERNAL MEDICINE

## 2021-03-25 RX ORDER — ALBUTEROL SULFATE 90 UG/1
1-2 AEROSOL, METERED RESPIRATORY (INHALATION) EVERY 6 HOURS PRN
Qty: 18 G | Refills: 0 | Status: SHIPPED | OUTPATIENT
Start: 2021-03-25 | End: 2021-04-29 | Stop reason: SDUPTHER

## 2021-03-25 RX ORDER — OLANZAPINE 20 MG/1
TABLET ORAL
COMMUNITY
Start: 2021-03-01 | End: 2021-04-22 | Stop reason: SDUPTHER

## 2021-03-25 RX ORDER — FUROSEMIDE 40 MG/1
40 TABLET ORAL 2 TIMES DAILY
Qty: 60 TABLET | Refills: 0 | Status: SHIPPED | OUTPATIENT
Start: 2021-03-25 | End: 2021-04-29 | Stop reason: SDUPTHER

## 2021-03-25 RX ORDER — CLONIDINE HYDROCHLORIDE 0.1 MG/1
0.1 TABLET ORAL 2 TIMES DAILY
Qty: 60 TABLET | Refills: 0 | Status: SHIPPED | OUTPATIENT
Start: 2021-03-25 | End: 2021-04-22 | Stop reason: SDUPTHER

## 2021-03-25 RX ORDER — IPRATROPIUM BROMIDE AND ALBUTEROL SULFATE 2.5; .5 MG/3ML; MG/3ML
3 SOLUTION RESPIRATORY (INHALATION) EVERY 4 HOURS
Qty: 1620 ML | Refills: 3 | Status: SHIPPED | OUTPATIENT
Start: 2021-03-25 | End: 2021-12-11 | Stop reason: SDUPTHER

## 2021-03-25 RX ORDER — FLUTICASONE PROPIONATE AND SALMETEROL 50; 250 UG/1; UG/1
1 POWDER RESPIRATORY (INHALATION) 2 TIMES DAILY
Qty: 14 EACH | Refills: 0 | Status: SHIPPED | OUTPATIENT
Start: 2021-03-25 | End: 2021-12-21

## 2021-03-25 RX ORDER — IPRATROPIUM BROMIDE AND ALBUTEROL SULFATE 2.5; .5 MG/3ML; MG/3ML
3 SOLUTION RESPIRATORY (INHALATION)
Status: COMPLETED | OUTPATIENT
Start: 2021-03-25 | End: 2021-03-25

## 2021-03-25 RX ORDER — CLONIDINE HYDROCHLORIDE 0.1 MG/1
0.1 TABLET ORAL
Status: CANCELLED | OUTPATIENT
Start: 2021-03-25 | End: 2021-03-25

## 2021-03-25 RX ORDER — FUROSEMIDE 10 MG/ML
40 INJECTION INTRAMUSCULAR; INTRAVENOUS
Status: COMPLETED | OUTPATIENT
Start: 2021-03-25 | End: 2021-03-25

## 2021-03-25 RX ORDER — METFORMIN HYDROCHLORIDE 500 MG/1
500 TABLET ORAL
Qty: 30 TABLET | Refills: 0 | Status: SHIPPED | OUTPATIENT
Start: 2021-03-25 | End: 2021-04-29 | Stop reason: SDUPTHER

## 2021-03-25 RX ADMIN — NITROGLYCERIN 1 INCH: 20 OINTMENT TOPICAL at 03:03

## 2021-03-25 RX ADMIN — IPRATROPIUM BROMIDE AND ALBUTEROL SULFATE 3 ML: .5; 2.5 SOLUTION RESPIRATORY (INHALATION) at 05:03

## 2021-03-25 RX ADMIN — FUROSEMIDE 40 MG: 10 INJECTION, SOLUTION INTRAMUSCULAR; INTRAVENOUS at 03:03

## 2021-04-02 ENCOUNTER — IMMUNIZATION (OUTPATIENT)
Dept: PRIMARY CARE CLINIC | Facility: CLINIC | Age: 56
End: 2021-04-02
Payer: MEDICAID

## 2021-04-02 DIAGNOSIS — Z23 NEED FOR VACCINATION: Primary | ICD-10-CM

## 2021-04-02 PROCEDURE — 91300 PR SARS-COV- 2 COVID-19 VACCINE, NO PRSV, 30MCG/0.3ML, IM: ICD-10-PCS | Mod: S$GLB,,, | Performed by: INTERNAL MEDICINE

## 2021-04-02 PROCEDURE — 0001A PR IMMUNIZ ADMIN, SARS-COV-2 COVID-19 VACC, 30MCG/0.3ML, 1ST DOSE: CPT | Mod: CV19,S$GLB,, | Performed by: INTERNAL MEDICINE

## 2021-04-02 PROCEDURE — 0001A PR IMMUNIZ ADMIN, SARS-COV-2 COVID-19 VACC, 30MCG/0.3ML, 1ST DOSE: ICD-10-PCS | Mod: CV19,S$GLB,, | Performed by: INTERNAL MEDICINE

## 2021-04-02 PROCEDURE — 91300 PR SARS-COV- 2 COVID-19 VACCINE, NO PRSV, 30MCG/0.3ML, IM: CPT | Mod: S$GLB,,, | Performed by: INTERNAL MEDICINE

## 2021-04-02 RX ADMIN — Medication 0.3 ML: at 12:04

## 2021-04-22 DIAGNOSIS — I10 ESSENTIAL HYPERTENSION: ICD-10-CM

## 2021-04-23 ENCOUNTER — IMMUNIZATION (OUTPATIENT)
Dept: PRIMARY CARE CLINIC | Facility: CLINIC | Age: 56
End: 2021-04-23

## 2021-04-23 ENCOUNTER — TELEPHONE (OUTPATIENT)
Dept: SURGERY | Facility: CLINIC | Age: 56
End: 2021-04-23

## 2021-04-23 DIAGNOSIS — Z23 NEED FOR VACCINATION: Primary | ICD-10-CM

## 2021-04-23 PROCEDURE — 91300 PR SARS-COV- 2 COVID-19 VACCINE, NO PRSV, 30MCG/0.3ML, IM: ICD-10-PCS | Mod: S$GLB,,, | Performed by: INTERNAL MEDICINE

## 2021-04-23 PROCEDURE — 0002A PR IMMUNIZ ADMIN, SARS-COV-2 COVID-19 VACC, 30MCG/0.3ML, 2ND DOSE: CPT | Mod: CV19,S$GLB,, | Performed by: INTERNAL MEDICINE

## 2021-04-23 PROCEDURE — 0002A PR IMMUNIZ ADMIN, SARS-COV-2 COVID-19 VACC, 30MCG/0.3ML, 2ND DOSE: ICD-10-PCS | Mod: CV19,S$GLB,, | Performed by: INTERNAL MEDICINE

## 2021-04-23 PROCEDURE — 91300 PR SARS-COV- 2 COVID-19 VACCINE, NO PRSV, 30MCG/0.3ML, IM: CPT | Mod: S$GLB,,, | Performed by: INTERNAL MEDICINE

## 2021-04-23 RX ADMIN — Medication 0.3 ML: at 02:04

## 2021-04-24 RX ORDER — CLONIDINE HYDROCHLORIDE 0.1 MG/1
0.1 TABLET ORAL 2 TIMES DAILY
Qty: 60 TABLET | Refills: 0 | Status: SHIPPED | OUTPATIENT
Start: 2021-04-24 | End: 2021-04-29 | Stop reason: SDUPTHER

## 2021-04-24 RX ORDER — OLANZAPINE 20 MG/1
20 TABLET ORAL NIGHTLY
Qty: 30 TABLET | Refills: 6 | Status: SHIPPED | OUTPATIENT
Start: 2021-04-24 | End: 2022-02-09 | Stop reason: SDUPTHER

## 2021-04-29 DIAGNOSIS — J44.0 CHRONIC OBSTRUCTIVE PULMONARY DISEASE WITH ACUTE LOWER RESPIRATORY INFECTION: ICD-10-CM

## 2021-04-29 DIAGNOSIS — I10 ESSENTIAL HYPERTENSION: ICD-10-CM

## 2021-04-29 DIAGNOSIS — F19.982 DRUG-INDUCED INSOMNIA: ICD-10-CM

## 2021-05-03 RX ORDER — FUROSEMIDE 40 MG/1
40 TABLET ORAL 2 TIMES DAILY
Qty: 60 TABLET | Refills: 0 | Status: SHIPPED | OUTPATIENT
Start: 2021-05-03 | End: 2021-06-17 | Stop reason: SDUPTHER

## 2021-05-03 RX ORDER — METFORMIN HYDROCHLORIDE 500 MG/1
500 TABLET ORAL
Qty: 30 TABLET | Refills: 0 | Status: SHIPPED | OUTPATIENT
Start: 2021-05-03 | End: 2021-08-24 | Stop reason: SDUPTHER

## 2021-05-03 RX ORDER — CLONIDINE HYDROCHLORIDE 0.1 MG/1
0.1 TABLET ORAL 2 TIMES DAILY
Qty: 60 TABLET | Refills: 0 | Status: SHIPPED | OUTPATIENT
Start: 2021-05-03 | End: 2021-06-17 | Stop reason: SDUPTHER

## 2021-05-03 RX ORDER — ALBUTEROL SULFATE 90 UG/1
1-2 AEROSOL, METERED RESPIRATORY (INHALATION) EVERY 6 HOURS PRN
Qty: 18 G | Refills: 0 | Status: SHIPPED | OUTPATIENT
Start: 2021-05-03 | End: 2021-07-21 | Stop reason: SDUPTHER

## 2021-05-03 RX ORDER — RAMELTEON 8 MG/1
8 TABLET ORAL NIGHTLY
Qty: 90 TABLET | Refills: 0 | Status: SHIPPED | OUTPATIENT
Start: 2021-05-03 | End: 2022-02-09

## 2021-06-17 ENCOUNTER — HOSPITAL ENCOUNTER (EMERGENCY)
Facility: HOSPITAL | Age: 56
Discharge: HOME | End: 2021-06-17
Attending: EMERGENCY MEDICINE
Payer: MEDICAID

## 2021-06-17 VITALS
RESPIRATION RATE: 16 BRPM | BODY MASS INDEX: 43.04 KG/M2 | HEART RATE: 94 BPM | TEMPERATURE: 98 F | SYSTOLIC BLOOD PRESSURE: 149 MMHG | HEIGHT: 68 IN | DIASTOLIC BLOOD PRESSURE: 89 MMHG | WEIGHT: 284 LBS | OXYGEN SATURATION: 95 %

## 2021-06-17 DIAGNOSIS — I10 ESSENTIAL HYPERTENSION: ICD-10-CM

## 2021-06-17 DIAGNOSIS — G44.209 ACUTE NON INTRACTABLE TENSION-TYPE HEADACHE: Primary | ICD-10-CM

## 2021-06-17 DIAGNOSIS — L02.415 ABSCESS OF RIGHT LEG: ICD-10-CM

## 2021-06-17 PROCEDURE — 25000003 PHARM REV CODE 250: Performed by: EMERGENCY MEDICINE

## 2021-06-17 PROCEDURE — 99284 EMERGENCY DEPT VISIT MOD MDM: CPT | Mod: 25

## 2021-06-17 RX ORDER — CLONIDINE HYDROCHLORIDE 0.1 MG/1
0.1 TABLET ORAL
Status: COMPLETED | OUTPATIENT
Start: 2021-06-17 | End: 2021-06-17

## 2021-06-17 RX ORDER — FUROSEMIDE 40 MG/1
40 TABLET ORAL
Status: COMPLETED | OUTPATIENT
Start: 2021-06-17 | End: 2021-06-17

## 2021-06-17 RX ORDER — FUROSEMIDE 40 MG/1
40 TABLET ORAL 2 TIMES DAILY
Qty: 60 TABLET | Refills: 0 | Status: SHIPPED | OUTPATIENT
Start: 2021-06-17 | End: 2022-01-04 | Stop reason: SDUPTHER

## 2021-06-17 RX ORDER — CLONIDINE HYDROCHLORIDE 0.1 MG/1
0.1 TABLET ORAL 2 TIMES DAILY
Qty: 60 TABLET | Refills: 0 | Status: SHIPPED | OUTPATIENT
Start: 2021-06-17 | End: 2021-07-21 | Stop reason: SDUPTHER

## 2021-06-17 RX ADMIN — CLONIDINE HYDROCHLORIDE 0.1 MG: 0.1 TABLET ORAL at 05:06

## 2021-06-17 RX ADMIN — FUROSEMIDE 40 MG: 40 TABLET ORAL at 05:06

## 2021-06-22 ENCOUNTER — NURSE TRIAGE (OUTPATIENT)
Dept: ADMINISTRATIVE | Facility: CLINIC | Age: 56
End: 2021-06-22

## 2021-07-19 DIAGNOSIS — J44.1 ACUTE EXACERBATION OF COPD WITH ASTHMA: ICD-10-CM

## 2021-07-19 DIAGNOSIS — J45.901 ACUTE EXACERBATION OF COPD WITH ASTHMA: ICD-10-CM

## 2021-07-19 DIAGNOSIS — R11.0 NAUSEA: ICD-10-CM

## 2021-07-19 DIAGNOSIS — I10 ESSENTIAL HYPERTENSION: ICD-10-CM

## 2021-07-19 DIAGNOSIS — J44.0 CHRONIC OBSTRUCTIVE PULMONARY DISEASE WITH ACUTE LOWER RESPIRATORY INFECTION: ICD-10-CM

## 2021-07-19 RX ORDER — CLONIDINE HYDROCHLORIDE 0.1 MG/1
0.1 TABLET ORAL 2 TIMES DAILY
Qty: 60 TABLET | Refills: 0 | Status: CANCELLED | OUTPATIENT
Start: 2021-07-19 | End: 2021-08-18

## 2021-07-19 RX ORDER — ONDANSETRON 8 MG/1
8 TABLET, ORALLY DISINTEGRATING ORAL EVERY 6 HOURS PRN
Qty: 20 TABLET | Refills: 1 | Status: CANCELLED | OUTPATIENT
Start: 2021-07-19

## 2021-07-19 RX ORDER — ALBUTEROL SULFATE 90 UG/1
1-2 AEROSOL, METERED RESPIRATORY (INHALATION) EVERY 6 HOURS PRN
Qty: 18 G | Refills: 0 | Status: CANCELLED | OUTPATIENT
Start: 2021-07-19 | End: 2021-08-18

## 2021-07-21 DIAGNOSIS — J44.0 CHRONIC OBSTRUCTIVE PULMONARY DISEASE WITH ACUTE LOWER RESPIRATORY INFECTION: ICD-10-CM

## 2021-07-21 DIAGNOSIS — I10 ESSENTIAL HYPERTENSION: ICD-10-CM

## 2021-07-21 DIAGNOSIS — J44.1 ACUTE EXACERBATION OF COPD WITH ASTHMA: ICD-10-CM

## 2021-07-21 DIAGNOSIS — J45.901 ACUTE EXACERBATION OF COPD WITH ASTHMA: ICD-10-CM

## 2021-07-21 DIAGNOSIS — R11.0 NAUSEA: ICD-10-CM

## 2021-07-21 RX ORDER — ONDANSETRON 8 MG/1
8 TABLET, ORALLY DISINTEGRATING ORAL EVERY 6 HOURS PRN
Qty: 20 TABLET | Refills: 1 | Status: CANCELLED | OUTPATIENT
Start: 2021-07-21

## 2021-07-21 RX ORDER — ALBUTEROL SULFATE 90 UG/1
1-2 AEROSOL, METERED RESPIRATORY (INHALATION) EVERY 6 HOURS PRN
Qty: 18 G | Refills: 3 | Status: SHIPPED | OUTPATIENT
Start: 2021-07-21 | End: 2021-08-20

## 2021-07-21 RX ORDER — CLONIDINE HYDROCHLORIDE 0.1 MG/1
0.1 TABLET ORAL 2 TIMES DAILY
Qty: 60 TABLET | Refills: 2 | Status: SHIPPED | OUTPATIENT
Start: 2021-07-21 | End: 2021-08-24 | Stop reason: SDUPTHER

## 2021-07-22 DIAGNOSIS — F41.1 GENERALIZED ANXIETY DISORDER: ICD-10-CM

## 2021-07-22 RX ORDER — ESCITALOPRAM OXALATE 10 MG/1
10 TABLET ORAL DAILY
Qty: 30 TABLET | Refills: 2 | Status: SHIPPED | OUTPATIENT
Start: 2021-07-22 | End: 2022-01-27 | Stop reason: SDUPTHER

## 2021-08-24 DIAGNOSIS — I10 ESSENTIAL HYPERTENSION: ICD-10-CM

## 2021-08-26 RX ORDER — METFORMIN HYDROCHLORIDE 500 MG/1
500 TABLET ORAL
Qty: 30 TABLET | Refills: 0 | Status: SHIPPED | OUTPATIENT
Start: 2021-08-26 | End: 2022-01-04 | Stop reason: SDUPTHER

## 2021-08-26 RX ORDER — CLONIDINE HYDROCHLORIDE 0.1 MG/1
0.1 TABLET ORAL 2 TIMES DAILY
Qty: 60 TABLET | Refills: 2 | Status: SHIPPED | OUTPATIENT
Start: 2021-08-26 | End: 2022-01-26 | Stop reason: SDUPTHER

## 2021-12-10 ENCOUNTER — HOSPITAL ENCOUNTER (EMERGENCY)
Facility: HOSPITAL | Age: 56
Discharge: HOME | End: 2021-12-11
Attending: EMERGENCY MEDICINE
Payer: MEDICAID

## 2021-12-10 DIAGNOSIS — R06.02 SHORTNESS OF BREATH: ICD-10-CM

## 2021-12-10 DIAGNOSIS — J44.9 CHRONIC OBSTRUCTIVE PULMONARY DISEASE, UNSPECIFIED COPD TYPE: Primary | ICD-10-CM

## 2021-12-10 LAB
ALBUMIN SERPL BCP-MCNC: 3.2 G/DL (ref 3.5–5.2)
ALP SERPL-CCNC: 115 U/L (ref 55–135)
ALT SERPL W/O P-5'-P-CCNC: 22 U/L (ref 10–44)
ANION GAP SERPL CALC-SCNC: 7 MMOL/L (ref 8–16)
AST SERPL-CCNC: 28 U/L (ref 10–40)
BASOPHILS # BLD AUTO: 0.03 K/UL (ref 0–0.2)
BASOPHILS NFR BLD: 0.4 % (ref 0–1.9)
BILIRUB SERPL-MCNC: 0.5 MG/DL (ref 0.1–1)
BNP SERPL-MCNC: 11 PG/ML (ref 0–99)
BUN SERPL-MCNC: 11 MG/DL (ref 6–20)
CALCIUM SERPL-MCNC: 8.7 MG/DL (ref 8.7–10.5)
CHLORIDE SERPL-SCNC: 104 MMOL/L (ref 95–110)
CO2 SERPL-SCNC: 29 MMOL/L (ref 23–29)
CREAT SERPL-MCNC: 1.3 MG/DL (ref 0.5–1.4)
CTP QC/QA: YES
DIFFERENTIAL METHOD: NORMAL
EOSINOPHIL # BLD AUTO: 0.2 K/UL (ref 0–0.5)
EOSINOPHIL NFR BLD: 2.2 % (ref 0–8)
ERYTHROCYTE [DISTWIDTH] IN BLOOD BY AUTOMATED COUNT: 11.7 % (ref 11.5–14.5)
EST. GFR  (AFRICAN AMERICAN): >60 ML/MIN/1.73 M^2
EST. GFR  (NON AFRICAN AMERICAN): >60 ML/MIN/1.73 M^2
GLUCOSE SERPL-MCNC: 94 MG/DL (ref 70–110)
HCT VFR BLD AUTO: 44 % (ref 40–54)
HGB BLD-MCNC: 14.3 G/DL (ref 14–18)
IMM GRANULOCYTES # BLD AUTO: 0.02 K/UL (ref 0–0.04)
IMM GRANULOCYTES NFR BLD AUTO: 0.2 % (ref 0–0.5)
LYMPHOCYTES # BLD AUTO: 2.5 K/UL (ref 1–4.8)
LYMPHOCYTES NFR BLD: 29.4 % (ref 18–48)
MCH RBC QN AUTO: 30.7 PG (ref 27–31)
MCHC RBC AUTO-ENTMCNC: 32.5 G/DL (ref 32–36)
MCV RBC AUTO: 94 FL (ref 82–98)
MONOCYTES # BLD AUTO: 0.6 K/UL (ref 0.3–1)
MONOCYTES NFR BLD: 6.8 % (ref 4–15)
NEUTROPHILS # BLD AUTO: 5.2 K/UL (ref 1.8–7.7)
NEUTROPHILS NFR BLD: 61 % (ref 38–73)
NRBC BLD-RTO: 0 /100 WBC
PLATELET # BLD AUTO: 181 K/UL (ref 150–450)
PMV BLD AUTO: 10.4 FL (ref 9.2–12.9)
POTASSIUM SERPL-SCNC: 4.6 MMOL/L (ref 3.5–5.1)
PROT SERPL-MCNC: 8.1 G/DL (ref 6–8.4)
RBC # BLD AUTO: 4.66 M/UL (ref 4.6–6.2)
SARS-COV-2 RDRP RESP QL NAA+PROBE: NEGATIVE
SODIUM SERPL-SCNC: 140 MMOL/L (ref 136–145)
TROPONIN I SERPL DL<=0.01 NG/ML-MCNC: <0.006 NG/ML (ref 0–0.03)
WBC # BLD AUTO: 8.5 K/UL (ref 3.9–12.7)

## 2021-12-10 PROCEDURE — 25000242 PHARM REV CODE 250 ALT 637 W/ HCPCS: Performed by: EMERGENCY MEDICINE

## 2021-12-10 PROCEDURE — 99285 EMERGENCY DEPT VISIT HI MDM: CPT | Mod: 25

## 2021-12-10 PROCEDURE — 27000221 HC OXYGEN, UP TO 24 HOURS

## 2021-12-10 PROCEDURE — 94640 AIRWAY INHALATION TREATMENT: CPT

## 2021-12-10 PROCEDURE — U0002 COVID-19 LAB TEST NON-CDC: HCPCS | Performed by: EMERGENCY MEDICINE

## 2021-12-10 PROCEDURE — 83880 ASSAY OF NATRIURETIC PEPTIDE: CPT | Performed by: EMERGENCY MEDICINE

## 2021-12-10 PROCEDURE — 96374 THER/PROPH/DIAG INJ IV PUSH: CPT

## 2021-12-10 PROCEDURE — 93005 ELECTROCARDIOGRAM TRACING: CPT

## 2021-12-10 PROCEDURE — 84484 ASSAY OF TROPONIN QUANT: CPT | Performed by: EMERGENCY MEDICINE

## 2021-12-10 PROCEDURE — 85025 COMPLETE CBC W/AUTO DIFF WBC: CPT | Performed by: EMERGENCY MEDICINE

## 2021-12-10 PROCEDURE — 96375 TX/PRO/DX INJ NEW DRUG ADDON: CPT

## 2021-12-10 PROCEDURE — 93010 ELECTROCARDIOGRAM REPORT: CPT | Mod: ,,, | Performed by: INTERNAL MEDICINE

## 2021-12-10 PROCEDURE — 93010 EKG 12-LEAD: ICD-10-PCS | Mod: ,,, | Performed by: INTERNAL MEDICINE

## 2021-12-10 PROCEDURE — 80053 COMPREHEN METABOLIC PANEL: CPT | Performed by: EMERGENCY MEDICINE

## 2021-12-10 RX ORDER — ALBUTEROL SULFATE 90 UG/1
4 AEROSOL, METERED RESPIRATORY (INHALATION)
Status: DISCONTINUED | OUTPATIENT
Start: 2021-12-10 | End: 2021-12-11 | Stop reason: HOSPADM

## 2021-12-10 RX ORDER — IPRATROPIUM BROMIDE 0.5 MG/2.5ML
0.5 SOLUTION RESPIRATORY (INHALATION)
Status: COMPLETED | OUTPATIENT
Start: 2021-12-10 | End: 2021-12-10

## 2021-12-10 RX ORDER — ALBUTEROL SULFATE 2.5 MG/.5ML
10 SOLUTION RESPIRATORY (INHALATION)
Status: COMPLETED | OUTPATIENT
Start: 2021-12-10 | End: 2021-12-10

## 2021-12-10 RX ORDER — ONDANSETRON 2 MG/ML
8 INJECTION INTRAMUSCULAR; INTRAVENOUS
Status: COMPLETED | OUTPATIENT
Start: 2021-12-10 | End: 2021-12-11

## 2021-12-10 RX ORDER — METHYLPREDNISOLONE SOD SUCC 125 MG
125 VIAL (EA) INJECTION
Status: COMPLETED | OUTPATIENT
Start: 2021-12-10 | End: 2021-12-11

## 2021-12-10 RX ADMIN — IPRATROPIUM BROMIDE 0.5 MG: 0.5 SOLUTION RESPIRATORY (INHALATION) at 11:12

## 2021-12-10 RX ADMIN — ALBUTEROL SULFATE 10 MG: 2.5 SOLUTION RESPIRATORY (INHALATION) at 11:12

## 2021-12-11 VITALS
WEIGHT: 273 LBS | OXYGEN SATURATION: 93 % | RESPIRATION RATE: 22 BRPM | HEART RATE: 85 BPM | DIASTOLIC BLOOD PRESSURE: 78 MMHG | BODY MASS INDEX: 41.51 KG/M2 | TEMPERATURE: 98 F | SYSTOLIC BLOOD PRESSURE: 160 MMHG

## 2021-12-11 PROCEDURE — 94640 AIRWAY INHALATION TREATMENT: CPT

## 2021-12-11 PROCEDURE — 25000003 PHARM REV CODE 250: Performed by: EMERGENCY MEDICINE

## 2021-12-11 PROCEDURE — 63600175 PHARM REV CODE 636 W HCPCS: Performed by: EMERGENCY MEDICINE

## 2021-12-11 PROCEDURE — 25000242 PHARM REV CODE 250 ALT 637 W/ HCPCS: Performed by: EMERGENCY MEDICINE

## 2021-12-11 RX ORDER — IPRATROPIUM BROMIDE AND ALBUTEROL SULFATE 2.5; .5 MG/3ML; MG/3ML
3 SOLUTION RESPIRATORY (INHALATION)
Status: COMPLETED | OUTPATIENT
Start: 2021-12-11 | End: 2021-12-11

## 2021-12-11 RX ORDER — ALBUTEROL SULFATE 90 UG/1
1-2 AEROSOL, METERED RESPIRATORY (INHALATION) EVERY 6 HOURS PRN
Qty: 18 G | Refills: 0 | Status: SHIPPED | OUTPATIENT
Start: 2021-12-11 | End: 2022-01-27 | Stop reason: SDUPTHER

## 2021-12-11 RX ORDER — PREDNISONE 20 MG/1
40 TABLET ORAL DAILY
Qty: 10 TABLET | Refills: 0 | Status: SHIPPED | OUTPATIENT
Start: 2021-12-11 | End: 2021-12-16

## 2021-12-11 RX ORDER — AZITHROMYCIN 250 MG/1
250 TABLET, FILM COATED ORAL DAILY
Qty: 6 TABLET | Refills: 0 | Status: SHIPPED | OUTPATIENT
Start: 2021-12-11 | End: 2021-12-21

## 2021-12-11 RX ORDER — ACETAMINOPHEN 500 MG
1000 TABLET ORAL
Status: COMPLETED | OUTPATIENT
Start: 2021-12-11 | End: 2021-12-11

## 2021-12-11 RX ORDER — IPRATROPIUM BROMIDE AND ALBUTEROL SULFATE 2.5; .5 MG/3ML; MG/3ML
3 SOLUTION RESPIRATORY (INHALATION) EVERY 4 HOURS
Qty: 1620 ML | Refills: 3 | Status: SHIPPED | OUTPATIENT
Start: 2021-12-11 | End: 2022-01-27 | Stop reason: SDUPTHER

## 2021-12-11 RX ADMIN — IPRATROPIUM BROMIDE AND ALBUTEROL SULFATE 3 ML: .5; 3 SOLUTION RESPIRATORY (INHALATION) at 02:12

## 2021-12-11 RX ADMIN — METHYLPREDNISOLONE SODIUM SUCCINATE 125 MG: 125 INJECTION, POWDER, FOR SOLUTION INTRAMUSCULAR; INTRAVENOUS at 12:12

## 2021-12-11 RX ADMIN — ONDANSETRON 8 MG: 2 INJECTION INTRAMUSCULAR; INTRAVENOUS at 12:12

## 2021-12-11 RX ADMIN — ACETAMINOPHEN 1000 MG: 500 TABLET ORAL at 03:12

## 2021-12-21 ENCOUNTER — OFFICE VISIT (OUTPATIENT)
Dept: FAMILY MEDICINE | Facility: HOSPITAL | Age: 56
End: 2021-12-21
Attending: STUDENT IN AN ORGANIZED HEALTH CARE EDUCATION/TRAINING PROGRAM
Payer: MEDICAID

## 2021-12-21 VITALS
HEART RATE: 102 BPM | BODY MASS INDEX: 41.93 KG/M2 | SYSTOLIC BLOOD PRESSURE: 145 MMHG | DIASTOLIC BLOOD PRESSURE: 98 MMHG | HEIGHT: 68 IN | WEIGHT: 276.69 LBS

## 2021-12-21 DIAGNOSIS — J44.1 ACUTE EXACERBATION OF COPD WITH ASTHMA: ICD-10-CM

## 2021-12-21 DIAGNOSIS — J30.2 SEASONAL ALLERGIC RHINITIS, UNSPECIFIED TRIGGER: ICD-10-CM

## 2021-12-21 DIAGNOSIS — L08.9 WOUND INFECTION: Primary | ICD-10-CM

## 2021-12-21 DIAGNOSIS — J45.901 ACUTE EXACERBATION OF COPD WITH ASTHMA: ICD-10-CM

## 2021-12-21 DIAGNOSIS — T14.8XXA WOUND INFECTION: Primary | ICD-10-CM

## 2021-12-21 DIAGNOSIS — B35.1 ONYCHOMYCOSIS: ICD-10-CM

## 2021-12-21 DIAGNOSIS — R11.2 NAUSEA AND VOMITING, INTRACTABILITY OF VOMITING NOT SPECIFIED, UNSPECIFIED VOMITING TYPE: ICD-10-CM

## 2021-12-21 DIAGNOSIS — L85.3 DRY SKIN DERMATITIS: ICD-10-CM

## 2021-12-21 PROCEDURE — 99215 OFFICE O/P EST HI 40 MIN: CPT | Performed by: STUDENT IN AN ORGANIZED HEALTH CARE EDUCATION/TRAINING PROGRAM

## 2021-12-21 RX ORDER — BENZONATATE 100 MG/1
100 CAPSULE ORAL 3 TIMES DAILY PRN
Qty: 30 CAPSULE | Refills: 0 | Status: SHIPPED | OUTPATIENT
Start: 2021-12-21 | End: 2021-12-31

## 2021-12-21 RX ORDER — TIOTROPIUM BROMIDE AND OLODATEROL 3.124; 2.736 UG/1; UG/1
2 SPRAY, METERED RESPIRATORY (INHALATION) DAILY
Qty: 4 G | Refills: 1 | Status: SHIPPED | OUTPATIENT
Start: 2021-12-21 | End: 2022-01-27 | Stop reason: SDUPTHER

## 2021-12-21 RX ORDER — ONDANSETRON 4 MG/1
4 TABLET, FILM COATED ORAL EVERY 8 HOURS PRN
Qty: 30 TABLET | Refills: 0 | Status: SHIPPED | OUTPATIENT
Start: 2021-12-21 | End: 2021-12-31

## 2021-12-21 RX ORDER — PREDNISONE 20 MG/1
40 TABLET ORAL DAILY
Qty: 10 TABLET | Refills: 0 | Status: SHIPPED | OUTPATIENT
Start: 2021-12-21 | End: 2021-12-26

## 2021-12-21 RX ORDER — TRIAMCINOLONE ACETONIDE 0.25 MG/G
OINTMENT TOPICAL 2 TIMES DAILY
Qty: 454 G | Refills: 0 | OUTPATIENT
Start: 2021-12-21 | End: 2022-01-27

## 2021-12-21 RX ORDER — DOXYCYCLINE 100 MG/1
100 CAPSULE ORAL 2 TIMES DAILY
Qty: 14 CAPSULE | Refills: 0 | Status: SHIPPED | OUTPATIENT
Start: 2021-12-21 | End: 2021-12-28

## 2021-12-21 RX ORDER — FLUTICASONE PROPIONATE 50 MCG
1 SPRAY, SUSPENSION (ML) NASAL DAILY
Qty: 16 G | Refills: 1 | Status: SHIPPED | OUTPATIENT
Start: 2021-12-21 | End: 2022-01-27 | Stop reason: SDUPTHER

## 2021-12-21 RX ORDER — CETIRIZINE HYDROCHLORIDE 10 MG/1
10 TABLET ORAL DAILY
Qty: 90 TABLET | Refills: 1 | Status: SHIPPED | OUTPATIENT
Start: 2021-12-21 | End: 2022-01-27 | Stop reason: SDUPTHER

## 2021-12-28 ENCOUNTER — TELEPHONE (OUTPATIENT)
Dept: ADMINISTRATIVE | Facility: OTHER | Age: 56
End: 2021-12-28
Payer: MEDICAID

## 2022-01-05 RX ORDER — FUROSEMIDE 40 MG/1
40 TABLET ORAL 2 TIMES DAILY
Qty: 60 TABLET | Refills: 0 | Status: SHIPPED | OUTPATIENT
Start: 2022-01-05 | End: 2022-02-09 | Stop reason: SDUPTHER

## 2022-01-05 RX ORDER — METFORMIN HYDROCHLORIDE 500 MG/1
500 TABLET ORAL
Qty: 30 TABLET | Refills: 0 | OUTPATIENT
Start: 2022-01-05 | End: 2022-01-27

## 2022-01-12 ENCOUNTER — TELEPHONE (OUTPATIENT)
Dept: HEPATOLOGY | Facility: HOSPITAL | Age: 57
End: 2022-01-12
Payer: MEDICAID

## 2022-01-12 NOTE — TELEPHONE ENCOUNTER
----- Message from Danisha Price MA sent at 1/7/2022  4:45 PM CST -----  Contact: Patient [phone]  Metformin, furosemide and nausea meds were printed instead of being sent to pharmacy.  Please re-send.  Thanks.    Spoke with patient, advised to schedule an appt with me in clinic.     Garland Xiong DO  [facility], PGY-2  01/12/2022

## 2022-01-26 ENCOUNTER — HOSPITAL ENCOUNTER (OUTPATIENT)
Facility: HOSPITAL | Age: 57
Discharge: HOME | End: 2022-01-27
Attending: EMERGENCY MEDICINE | Admitting: STUDENT IN AN ORGANIZED HEALTH CARE EDUCATION/TRAINING PROGRAM
Payer: MEDICAID

## 2022-01-26 DIAGNOSIS — M79.89 RIGHT LEG SWELLING: Primary | ICD-10-CM

## 2022-01-26 DIAGNOSIS — F41.1 GENERALIZED ANXIETY DISORDER: ICD-10-CM

## 2022-01-26 DIAGNOSIS — J44.9 CHRONIC OBSTRUCTIVE PULMONARY DISEASE, UNSPECIFIED COPD TYPE: ICD-10-CM

## 2022-01-26 DIAGNOSIS — J45.901 ACUTE EXACERBATION OF COPD WITH ASTHMA: ICD-10-CM

## 2022-01-26 DIAGNOSIS — J44.1 ACUTE EXACERBATION OF COPD WITH ASTHMA: ICD-10-CM

## 2022-01-26 DIAGNOSIS — J30.2 SEASONAL ALLERGIC RHINITIS, UNSPECIFIED TRIGGER: ICD-10-CM

## 2022-01-26 DIAGNOSIS — R06.02 SHORTNESS OF BREATH: ICD-10-CM

## 2022-01-26 LAB
ALBUMIN SERPL BCP-MCNC: 3.2 G/DL (ref 3.5–5.2)
ALP SERPL-CCNC: 109 U/L (ref 55–135)
ALT SERPL W/O P-5'-P-CCNC: 24 U/L (ref 10–44)
ANION GAP SERPL CALC-SCNC: 8 MMOL/L (ref 8–16)
AST SERPL-CCNC: 31 U/L (ref 10–40)
BASOPHILS # BLD AUTO: 0.02 K/UL (ref 0–0.2)
BASOPHILS NFR BLD: 0.3 % (ref 0–1.9)
BILIRUB SERPL-MCNC: 0.7 MG/DL (ref 0.1–1)
BNP SERPL-MCNC: 15 PG/ML (ref 0–99)
BUN SERPL-MCNC: 10 MG/DL (ref 6–20)
CALCIUM SERPL-MCNC: 8.8 MG/DL (ref 8.7–10.5)
CHLORIDE SERPL-SCNC: 101 MMOL/L (ref 95–110)
CO2 SERPL-SCNC: 31 MMOL/L (ref 23–29)
CREAT SERPL-MCNC: 1.3 MG/DL (ref 0.5–1.4)
CTP QC/QA: YES
CTP QC/QA: YES
DIFFERENTIAL METHOD: ABNORMAL
EOSINOPHIL # BLD AUTO: 0.1 K/UL (ref 0–0.5)
EOSINOPHIL NFR BLD: 1.4 % (ref 0–8)
ERYTHROCYTE [DISTWIDTH] IN BLOOD BY AUTOMATED COUNT: 11.8 % (ref 11.5–14.5)
EST. GFR  (AFRICAN AMERICAN): >60 ML/MIN/1.73 M^2
EST. GFR  (NON AFRICAN AMERICAN): >60 ML/MIN/1.73 M^2
GLUCOSE SERPL-MCNC: 102 MG/DL (ref 70–110)
HCT VFR BLD AUTO: 41 % (ref 40–54)
HGB BLD-MCNC: 13.7 G/DL (ref 14–18)
IMM GRANULOCYTES # BLD AUTO: 0.02 K/UL (ref 0–0.04)
IMM GRANULOCYTES NFR BLD AUTO: 0.3 % (ref 0–0.5)
LYMPHOCYTES # BLD AUTO: 1.3 K/UL (ref 1–4.8)
LYMPHOCYTES NFR BLD: 19.4 % (ref 18–48)
MCH RBC QN AUTO: 31 PG (ref 27–31)
MCHC RBC AUTO-ENTMCNC: 33.4 G/DL (ref 32–36)
MCV RBC AUTO: 93 FL (ref 82–98)
MONOCYTES # BLD AUTO: 0.6 K/UL (ref 0.3–1)
MONOCYTES NFR BLD: 8.1 % (ref 4–15)
NEUTROPHILS # BLD AUTO: 4.9 K/UL (ref 1.8–7.7)
NEUTROPHILS NFR BLD: 70.5 % (ref 38–73)
NRBC BLD-RTO: 0 /100 WBC
PLATELET # BLD AUTO: 193 K/UL (ref 150–450)
PMV BLD AUTO: 10.3 FL (ref 9.2–12.9)
POC MOLECULAR INFLUENZA A AGN: NEGATIVE
POC MOLECULAR INFLUENZA B AGN: NEGATIVE
POTASSIUM SERPL-SCNC: 4.1 MMOL/L (ref 3.5–5.1)
PROT SERPL-MCNC: 7.5 G/DL (ref 6–8.4)
RBC # BLD AUTO: 4.42 M/UL (ref 4.6–6.2)
SARS-COV-2 RDRP RESP QL NAA+PROBE: NEGATIVE
SODIUM SERPL-SCNC: 140 MMOL/L (ref 136–145)
TROPONIN I SERPL DL<=0.01 NG/ML-MCNC: <0.006 NG/ML (ref 0–0.03)
WBC # BLD AUTO: 6.9 K/UL (ref 3.9–12.7)

## 2022-01-26 PROCEDURE — 80053 COMPREHEN METABOLIC PANEL: CPT | Performed by: PHYSICIAN ASSISTANT

## 2022-01-26 PROCEDURE — 63700000 PHARM REV CODE 250 ALT 637 W/O HCPCS: Performed by: STUDENT IN AN ORGANIZED HEALTH CARE EDUCATION/TRAINING PROGRAM

## 2022-01-26 PROCEDURE — 63600175 PHARM REV CODE 636 W HCPCS: Performed by: STUDENT IN AN ORGANIZED HEALTH CARE EDUCATION/TRAINING PROGRAM

## 2022-01-26 PROCEDURE — 85025 COMPLETE CBC W/AUTO DIFF WBC: CPT | Performed by: PHYSICIAN ASSISTANT

## 2022-01-26 PROCEDURE — 84484 ASSAY OF TROPONIN QUANT: CPT | Performed by: PHYSICIAN ASSISTANT

## 2022-01-26 PROCEDURE — 94640 AIRWAY INHALATION TREATMENT: CPT

## 2022-01-26 PROCEDURE — 94761 N-INVAS EAR/PLS OXIMETRY MLT: CPT

## 2022-01-26 PROCEDURE — 25000242 PHARM REV CODE 250 ALT 637 W/ HCPCS: Performed by: STUDENT IN AN ORGANIZED HEALTH CARE EDUCATION/TRAINING PROGRAM

## 2022-01-26 PROCEDURE — 96372 THER/PROPH/DIAG INJ SC/IM: CPT | Performed by: STUDENT IN AN ORGANIZED HEALTH CARE EDUCATION/TRAINING PROGRAM

## 2022-01-26 PROCEDURE — 25000003 PHARM REV CODE 250: Performed by: STUDENT IN AN ORGANIZED HEALTH CARE EDUCATION/TRAINING PROGRAM

## 2022-01-26 PROCEDURE — 63600175 PHARM REV CODE 636 W HCPCS: Performed by: EMERGENCY MEDICINE

## 2022-01-26 PROCEDURE — 99285 EMERGENCY DEPT VISIT HI MDM: CPT | Mod: 25

## 2022-01-26 PROCEDURE — G0378 HOSPITAL OBSERVATION PER HR: HCPCS

## 2022-01-26 PROCEDURE — 96372 THER/PROPH/DIAG INJ SC/IM: CPT | Mod: 59

## 2022-01-26 PROCEDURE — U0002 COVID-19 LAB TEST NON-CDC: HCPCS | Performed by: PHYSICIAN ASSISTANT

## 2022-01-26 PROCEDURE — 83880 ASSAY OF NATRIURETIC PEPTIDE: CPT | Performed by: PHYSICIAN ASSISTANT

## 2022-01-26 PROCEDURE — 25000242 PHARM REV CODE 250 ALT 637 W/ HCPCS: Performed by: PHYSICIAN ASSISTANT

## 2022-01-26 PROCEDURE — 25000242 PHARM REV CODE 250 ALT 637 W/ HCPCS: Performed by: EMERGENCY MEDICINE

## 2022-01-26 PROCEDURE — 93005 ELECTROCARDIOGRAM TRACING: CPT

## 2022-01-26 PROCEDURE — 96374 THER/PROPH/DIAG INJ IV PUSH: CPT

## 2022-01-26 PROCEDURE — 27000221 HC OXYGEN, UP TO 24 HOURS

## 2022-01-26 PROCEDURE — 93010 EKG 12-LEAD: ICD-10-PCS | Mod: ,,, | Performed by: INTERNAL MEDICINE

## 2022-01-26 PROCEDURE — 93010 ELECTROCARDIOGRAM REPORT: CPT | Mod: ,,, | Performed by: INTERNAL MEDICINE

## 2022-01-26 RX ORDER — CLONIDINE HYDROCHLORIDE 0.1 MG/1
0.1 TABLET ORAL 2 TIMES DAILY
COMMUNITY
End: 2022-01-27 | Stop reason: SDUPTHER

## 2022-01-26 RX ORDER — GABAPENTIN 300 MG/1
300 CAPSULE ORAL 2 TIMES DAILY
Status: DISCONTINUED | OUTPATIENT
Start: 2022-01-26 | End: 2022-01-27 | Stop reason: HOSPADM

## 2022-01-26 RX ORDER — TALC
6 POWDER (GRAM) TOPICAL NIGHTLY PRN
Status: DISCONTINUED | OUTPATIENT
Start: 2022-01-26 | End: 2022-01-27 | Stop reason: HOSPADM

## 2022-01-26 RX ORDER — IPRATROPIUM BROMIDE AND ALBUTEROL SULFATE 2.5; .5 MG/3ML; MG/3ML
3 SOLUTION RESPIRATORY (INHALATION)
Status: CANCELLED | OUTPATIENT
Start: 2022-01-26

## 2022-01-26 RX ORDER — OLANZAPINE 5 MG/1
20 TABLET, ORALLY DISINTEGRATING ORAL NIGHTLY
Status: DISCONTINUED | OUTPATIENT
Start: 2022-01-26 | End: 2022-01-27 | Stop reason: HOSPADM

## 2022-01-26 RX ORDER — FLUTICASONE PROPIONATE 50 MCG
1 SPRAY, SUSPENSION (ML) NASAL DAILY
Status: DISCONTINUED | OUTPATIENT
Start: 2022-01-27 | End: 2022-01-27 | Stop reason: HOSPADM

## 2022-01-26 RX ORDER — AMOXICILLIN 250 MG
1 CAPSULE ORAL 2 TIMES DAILY PRN
Status: DISCONTINUED | OUTPATIENT
Start: 2022-01-26 | End: 2022-01-27 | Stop reason: HOSPADM

## 2022-01-26 RX ORDER — LOPERAMIDE HYDROCHLORIDE 2 MG/1
2 CAPSULE ORAL
Status: DISCONTINUED | OUTPATIENT
Start: 2022-01-26 | End: 2022-01-26

## 2022-01-26 RX ORDER — IPRATROPIUM BROMIDE AND ALBUTEROL SULFATE 2.5; .5 MG/3ML; MG/3ML
3 SOLUTION RESPIRATORY (INHALATION)
Status: DISCONTINUED | OUTPATIENT
Start: 2022-01-26 | End: 2022-01-26

## 2022-01-26 RX ORDER — ALBUTEROL SULFATE 90 UG/1
2 AEROSOL, METERED RESPIRATORY (INHALATION) EVERY 6 HOURS PRN
Status: DISCONTINUED | OUTPATIENT
Start: 2022-01-26 | End: 2022-01-26

## 2022-01-26 RX ORDER — METHYLPREDNISOLONE SOD SUCC 125 MG
125 VIAL (EA) INJECTION
Status: COMPLETED | OUTPATIENT
Start: 2022-01-26 | End: 2022-01-26

## 2022-01-26 RX ORDER — PREDNISONE 20 MG/1
40 TABLET ORAL DAILY
Status: CANCELLED | OUTPATIENT
Start: 2022-01-27 | End: 2022-02-01

## 2022-01-26 RX ORDER — PREDNISONE 20 MG/1
40 TABLET ORAL DAILY
Status: DISCONTINUED | OUTPATIENT
Start: 2022-01-27 | End: 2022-01-27 | Stop reason: HOSPADM

## 2022-01-26 RX ORDER — FUROSEMIDE 20 MG/1
20 TABLET ORAL DAILY
Status: DISCONTINUED | OUTPATIENT
Start: 2022-01-27 | End: 2022-01-27 | Stop reason: HOSPADM

## 2022-01-26 RX ORDER — AZITHROMYCIN 250 MG/1
250 TABLET, FILM COATED ORAL DAILY
Status: DISCONTINUED | OUTPATIENT
Start: 2022-01-27 | End: 2022-01-27 | Stop reason: HOSPADM

## 2022-01-26 RX ORDER — ENOXAPARIN SODIUM 100 MG/ML
40 INJECTION SUBCUTANEOUS EVERY 24 HOURS
Status: DISCONTINUED | OUTPATIENT
Start: 2022-01-26 | End: 2022-01-27 | Stop reason: HOSPADM

## 2022-01-26 RX ORDER — AZITHROMYCIN 250 MG/1
500 TABLET, FILM COATED ORAL ONCE
Status: CANCELLED | OUTPATIENT
Start: 2022-01-26 | End: 2022-01-26

## 2022-01-26 RX ORDER — ALBUTEROL SULFATE 2.5 MG/.5ML
15 SOLUTION RESPIRATORY (INHALATION)
Status: COMPLETED | OUTPATIENT
Start: 2022-01-26 | End: 2022-01-26

## 2022-01-26 RX ORDER — AZITHROMYCIN 250 MG/1
250 TABLET, FILM COATED ORAL DAILY
Status: CANCELLED | OUTPATIENT
Start: 2022-01-27 | End: 2022-01-31

## 2022-01-26 RX ORDER — GABAPENTIN 300 MG/1
300 CAPSULE ORAL 2 TIMES DAILY
Status: DISCONTINUED | OUTPATIENT
Start: 2022-01-26 | End: 2022-01-26

## 2022-01-26 RX ORDER — AZITHROMYCIN 250 MG/1
500 TABLET, FILM COATED ORAL ONCE
Status: DISCONTINUED | OUTPATIENT
Start: 2022-01-26 | End: 2022-01-26

## 2022-01-26 RX ORDER — AZITHROMYCIN 250 MG/1
500 TABLET, FILM COATED ORAL ONCE
Status: COMPLETED | OUTPATIENT
Start: 2022-01-26 | End: 2022-01-26

## 2022-01-26 RX ORDER — IPRATROPIUM BROMIDE AND ALBUTEROL SULFATE 2.5; .5 MG/3ML; MG/3ML
3 SOLUTION RESPIRATORY (INHALATION) EVERY 6 HOURS
Status: DISCONTINUED | OUTPATIENT
Start: 2022-01-26 | End: 2022-01-27

## 2022-01-26 RX ORDER — CETIRIZINE HYDROCHLORIDE 10 MG/1
10 TABLET ORAL DAILY
Status: DISCONTINUED | OUTPATIENT
Start: 2022-01-27 | End: 2022-01-27 | Stop reason: HOSPADM

## 2022-01-26 RX ORDER — CLONIDINE HYDROCHLORIDE 0.1 MG/1
0.1 TABLET ORAL 2 TIMES DAILY
Status: DISCONTINUED | OUTPATIENT
Start: 2022-01-26 | End: 2022-01-27 | Stop reason: HOSPADM

## 2022-01-26 RX ORDER — SODIUM CHLORIDE 0.9 % (FLUSH) 0.9 %
10 SYRINGE (ML) INJECTION
Status: DISCONTINUED | OUTPATIENT
Start: 2022-01-26 | End: 2022-01-27 | Stop reason: HOSPADM

## 2022-01-26 RX ORDER — ACETAMINOPHEN 500 MG
1000 TABLET ORAL EVERY 8 HOURS PRN
Status: DISCONTINUED | OUTPATIENT
Start: 2022-01-26 | End: 2022-01-27 | Stop reason: HOSPADM

## 2022-01-26 RX ORDER — IPRATROPIUM BROMIDE AND ALBUTEROL SULFATE 2.5; .5 MG/3ML; MG/3ML
3 SOLUTION RESPIRATORY (INHALATION)
Status: COMPLETED | OUTPATIENT
Start: 2022-01-26 | End: 2022-01-26

## 2022-01-26 RX ORDER — BUDESONIDE 0.5 MG/2ML
0.5 INHALANT ORAL EVERY 12 HOURS
Status: DISCONTINUED | OUTPATIENT
Start: 2022-01-26 | End: 2022-01-27 | Stop reason: HOSPADM

## 2022-01-26 RX ADMIN — AZITHROMYCIN MONOHYDRATE 500 MG: 250 TABLET ORAL at 06:01

## 2022-01-26 RX ADMIN — CLONIDINE HYDROCHLORIDE 0.1 MG: 0.1 TABLET ORAL at 08:01

## 2022-01-26 RX ADMIN — METHYLPREDNISOLONE SODIUM SUCCINATE 125 MG: 125 INJECTION, POWDER, FOR SOLUTION INTRAMUSCULAR; INTRAVENOUS at 03:01

## 2022-01-26 RX ADMIN — ALBUTEROL SULFATE 15 MG: 2.5 SOLUTION RESPIRATORY (INHALATION) at 02:01

## 2022-01-26 RX ADMIN — IPRATROPIUM BROMIDE AND ALBUTEROL SULFATE 3 ML: .5; 2.5 SOLUTION RESPIRATORY (INHALATION) at 03:01

## 2022-01-26 RX ADMIN — ENOXAPARIN SODIUM 40 MG: 100 INJECTION SUBCUTANEOUS at 06:01

## 2022-01-26 RX ADMIN — GABAPENTIN 300 MG: 300 CAPSULE ORAL at 06:01

## 2022-01-26 RX ADMIN — OLANZAPINE 20 MG: 5 TABLET, ORALLY DISINTEGRATING ORAL at 08:01

## 2022-01-26 RX ADMIN — IPRATROPIUM BROMIDE AND ALBUTEROL SULFATE 3 ML: .5; 2.5 SOLUTION RESPIRATORY (INHALATION) at 07:01

## 2022-01-26 RX ADMIN — BUDESONIDE 0.5 MG: 0.5 INHALANT RESPIRATORY (INHALATION) at 07:01
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
## 2022-01-26 NOTE — ED NOTES
Provided patient with drink and crackers. Respiratory at bedside administering breathing treatment

## 2022-01-26 NOTE — ASSESSMENT & PLAN NOTE
Likely 2/2 Hypoxia due to home equipment malfunction  Presented w/ SOB and cough productive of green sputum  S/p breathing treatment and methyl prednisone in ED  Currently on home O2 2L NC, and satting well  On PE B/L wheezing on all lung fields    Plan:  - Duo Neb Q 6  - Budesonide Q12  - Azirthomycin (1/31)  - Prednisone (1/31)  - Continuous pulse ox, IC  - Monitor respiratory status  - Amb O2 in Am for new O2 equipment

## 2022-01-26 NOTE — FIRST PROVIDER EVALUATION
Emergency Department TeleTriage Encounter Note      CHIEF COMPLAINT    Chief Complaint   Patient presents with    Shortness of Breath     Pt has hx of asthma/ copd/chf, increase in swelling/cough/ and sob, pt has audible  expiratory wheezing noted in triage        VITAL SIGNS   Initial Vitals [01/26/22 1213]   BP Pulse Resp Temp SpO2   (!) 158/106 93 (!) 24 98.9 °F (37.2 °C) (!) 87 %      MAP       --            ALLERGIES    Review of patient's allergies indicates:   Allergen Reactions    Iodine and iodide containing products Anaphylaxis and Swelling     Facial swelling    Shellfish containing products Anaphylaxis             Compazine [prochlorperazine edisylate] Hallucinations       PROVIDER TRIAGE NOTE  This is a teletriage evaluation of a 56 y.o. male presenting to the ED complaining of shortness of breath. Patient reports chest pain and shortness of breath for the past 2-3 days. He has been doing breathing treatments at home without relief. Last one was 3 hours PTA. He also reports worsening swelling and cough. Patient requesting solumedrol. He is normally on home oxygen. He was able to speak in complete sentences during teletriage. Wheezing noted by triage nurse.    Initial orders will be placed and care will be transferred to an alternate provider when patient is roomed for a full evaluation. Any additional orders and the final disposition will be determined by that provider.           ORDERS  Labs Reviewed   CBC W/ AUTO DIFFERENTIAL   COMPREHENSIVE METABOLIC PANEL   TROPONIN I   B-TYPE NATRIURETIC PEPTIDE   SARS-COV-2 RDRP GENE       ED Orders (720h ago, onward)    Start Ordered     Status Ordering Provider    01/26/22 1230 01/26/22 1222  albuterol-ipratropium 2.5 mg-0.5 mg/3 mL nebulizer solution 3 mL  ED 1 Time         Ordered JEN DOMINGUEZ    01/26/22 1222 01/26/22 1222  CBC Auto Differential  STAT         Ordered JEN DOMINGUEZ    01/26/22 1222 01/26/22 1222  Comprehensive Metabolic Panel  STAT          Ordered JEN DOMINGUEZ.    01/26/22 1222 01/26/22 1222  Pulse Oximetry Continuous  Continuous         Ordered JEN DOMINGUEZ.    01/26/22 1222 01/26/22 1222  Cardiac Monitoring - Adult  Continuous         Ordered JEN DOMINGUEZ.    01/26/22 1222 01/26/22 1222  EKG 12-lead  Once         Ordered JEN DOMINGUEZ.    01/26/22 1222 01/26/22 1222  POCT COVID-19 Rapid Screening  Once         Ordered JEN DOMINGUEZ.    01/26/22 1222 01/26/22 1222  X-Ray Chest 1 View  1 time imaging         Ordered JEN DOMINGUEZ.    01/26/22 1222 01/26/22 1222  Troponin I  STAT         Ordered JEN DOMINGUEZ.    01/26/22 1222 01/26/22 1222  Brain Natriuretic Peptide  STAT         Ordered JEN DOMINGUEZ.            Virtual Visit Note: The provider triage portion of this emergency department evaluation and documentation was performed via [employer], a HIPAA-compliant telemedicine application, in concert with a tele-presenter in the room. A face to face patient evaluation with one of my colleagues will occur once the patient is placed in an emergency department room.      DISCLAIMER: This note was prepared with [employer] voice recognition transcription software. Garbled syntax, mangled pronouns, and other bizarre constructions may be attributed to that software system.

## 2022-01-27 VITALS
HEART RATE: 89 BPM | HEIGHT: 68 IN | DIASTOLIC BLOOD PRESSURE: 68 MMHG | SYSTOLIC BLOOD PRESSURE: 141 MMHG | OXYGEN SATURATION: 94 % | RESPIRATION RATE: 22 BRPM | TEMPERATURE: 98 F | BODY MASS INDEX: 39.4 KG/M2 | WEIGHT: 260 LBS

## 2022-01-27 LAB
ALBUMIN SERPL BCP-MCNC: 2.8 G/DL (ref 3.5–5.2)
ALP SERPL-CCNC: 94 U/L (ref 55–135)
ALT SERPL W/O P-5'-P-CCNC: 22 U/L (ref 10–44)
ANION GAP SERPL CALC-SCNC: 12 MMOL/L (ref 8–16)
AST SERPL-CCNC: 26 U/L (ref 10–40)
BASOPHILS # BLD AUTO: 0 K/UL (ref 0–0.2)
BASOPHILS NFR BLD: 0 % (ref 0–1.9)
BILIRUB SERPL-MCNC: 0.5 MG/DL (ref 0.1–1)
BUN SERPL-MCNC: 14 MG/DL (ref 6–20)
CALCIUM SERPL-MCNC: 8.7 MG/DL (ref 8.7–10.5)
CHLORIDE SERPL-SCNC: 103 MMOL/L (ref 95–110)
CO2 SERPL-SCNC: 23 MMOL/L (ref 23–29)
CREAT SERPL-MCNC: 1.4 MG/DL (ref 0.5–1.4)
DIFFERENTIAL METHOD: ABNORMAL
EOSINOPHIL # BLD AUTO: 0 K/UL (ref 0–0.5)
EOSINOPHIL NFR BLD: 0 % (ref 0–8)
ERYTHROCYTE [DISTWIDTH] IN BLOOD BY AUTOMATED COUNT: 11.9 % (ref 11.5–14.5)
EST. GFR  (AFRICAN AMERICAN): >60 ML/MIN/1.73 M^2
EST. GFR  (NON AFRICAN AMERICAN): 55 ML/MIN/1.73 M^2
GLUCOSE SERPL-MCNC: 204 MG/DL (ref 70–110)
HCT VFR BLD AUTO: 40.7 % (ref 40–54)
HGB BLD-MCNC: 13 G/DL (ref 14–18)
IMM GRANULOCYTES # BLD AUTO: 0.01 K/UL (ref 0–0.04)
IMM GRANULOCYTES NFR BLD AUTO: 0.2 % (ref 0–0.5)
LYMPHOCYTES # BLD AUTO: 0.5 K/UL (ref 1–4.8)
LYMPHOCYTES NFR BLD: 9.5 % (ref 18–48)
MAGNESIUM SERPL-MCNC: 1.7 MG/DL (ref 1.6–2.6)
MCH RBC QN AUTO: 30.8 PG (ref 27–31)
MCHC RBC AUTO-ENTMCNC: 31.9 G/DL (ref 32–36)
MCV RBC AUTO: 96 FL (ref 82–98)
MONOCYTES # BLD AUTO: 0 K/UL (ref 0.3–1)
MONOCYTES NFR BLD: 0.8 % (ref 4–15)
NEUTROPHILS # BLD AUTO: 4.4 K/UL (ref 1.8–7.7)
NEUTROPHILS NFR BLD: 89.5 % (ref 38–73)
NRBC BLD-RTO: 0 /100 WBC
PHOSPHATE SERPL-MCNC: 3.1 MG/DL (ref 2.7–4.5)
PLATELET # BLD AUTO: 146 K/UL (ref 150–450)
PMV BLD AUTO: 11.3 FL (ref 9.2–12.9)
POTASSIUM SERPL-SCNC: 4.7 MMOL/L (ref 3.5–5.1)
PROT SERPL-MCNC: 7.2 G/DL (ref 6–8.4)
RBC # BLD AUTO: 4.22 M/UL (ref 4.6–6.2)
SODIUM SERPL-SCNC: 138 MMOL/L (ref 136–145)
WBC # BLD AUTO: 4.93 K/UL (ref 3.9–12.7)

## 2022-01-27 PROCEDURE — 25000003 PHARM REV CODE 250: Performed by: STUDENT IN AN ORGANIZED HEALTH CARE EDUCATION/TRAINING PROGRAM

## 2022-01-27 PROCEDURE — 85025 COMPLETE CBC W/AUTO DIFF WBC: CPT | Performed by: STUDENT IN AN ORGANIZED HEALTH CARE EDUCATION/TRAINING PROGRAM

## 2022-01-27 PROCEDURE — 63700000 PHARM REV CODE 250 ALT 637 W/O HCPCS: Performed by: STUDENT IN AN ORGANIZED HEALTH CARE EDUCATION/TRAINING PROGRAM

## 2022-01-27 PROCEDURE — 63600175 PHARM REV CODE 636 W HCPCS: Performed by: STUDENT IN AN ORGANIZED HEALTH CARE EDUCATION/TRAINING PROGRAM

## 2022-01-27 PROCEDURE — G0378 HOSPITAL OBSERVATION PER HR: HCPCS

## 2022-01-27 PROCEDURE — 84100 ASSAY OF PHOSPHORUS: CPT | Performed by: STUDENT IN AN ORGANIZED HEALTH CARE EDUCATION/TRAINING PROGRAM

## 2022-01-27 PROCEDURE — 25000242 PHARM REV CODE 250 ALT 637 W/ HCPCS: Performed by: STUDENT IN AN ORGANIZED HEALTH CARE EDUCATION/TRAINING PROGRAM

## 2022-01-27 PROCEDURE — 94761 N-INVAS EAR/PLS OXIMETRY MLT: CPT

## 2022-01-27 PROCEDURE — 94640 AIRWAY INHALATION TREATMENT: CPT

## 2022-01-27 PROCEDURE — 99900035 HC TECH TIME PER 15 MIN (STAT)

## 2022-01-27 PROCEDURE — 83735 ASSAY OF MAGNESIUM: CPT | Performed by: STUDENT IN AN ORGANIZED HEALTH CARE EDUCATION/TRAINING PROGRAM

## 2022-01-27 PROCEDURE — 27000221 HC OXYGEN, UP TO 24 HOURS

## 2022-01-27 PROCEDURE — 25000003 PHARM REV CODE 250: Performed by: FAMILY MEDICINE

## 2022-01-27 PROCEDURE — 80053 COMPREHEN METABOLIC PANEL: CPT | Performed by: STUDENT IN AN ORGANIZED HEALTH CARE EDUCATION/TRAINING PROGRAM

## 2022-01-27 RX ORDER — HYDROXYZINE HYDROCHLORIDE 25 MG/1
25 TABLET, FILM COATED ORAL 3 TIMES DAILY PRN
Status: DISCONTINUED | OUTPATIENT
Start: 2022-01-27 | End: 2022-01-27 | Stop reason: HOSPADM

## 2022-01-27 RX ORDER — ESCITALOPRAM OXALATE 10 MG/1
10 TABLET ORAL DAILY
Status: DISCONTINUED | OUTPATIENT
Start: 2022-01-27 | End: 2022-01-27 | Stop reason: HOSPADM

## 2022-01-27 RX ORDER — IPRATROPIUM BROMIDE AND ALBUTEROL SULFATE 2.5; .5 MG/3ML; MG/3ML
3 SOLUTION RESPIRATORY (INHALATION) EVERY 4 HOURS PRN
Qty: 1620 ML | Refills: 3 | Status: SHIPPED | OUTPATIENT
Start: 2022-01-27 | End: 2022-02-09 | Stop reason: SDUPTHER

## 2022-01-27 RX ORDER — PREDNISONE 20 MG/1
40 TABLET ORAL DAILY
Qty: 8 TABLET | Refills: 0 | Status: SHIPPED | OUTPATIENT
Start: 2022-01-28 | End: 2022-02-01

## 2022-01-27 RX ORDER — CETIRIZINE HYDROCHLORIDE 10 MG/1
10 TABLET ORAL DAILY
Qty: 90 TABLET | Refills: 3 | Status: SHIPPED | OUTPATIENT
Start: 2022-01-27 | End: 2022-02-09 | Stop reason: SDUPTHER

## 2022-01-27 RX ORDER — CLONIDINE HYDROCHLORIDE 0.1 MG/1
0.1 TABLET ORAL 2 TIMES DAILY
Qty: 180 TABLET | Refills: 3 | Status: SHIPPED | OUTPATIENT
Start: 2022-01-27 | End: 2022-02-09 | Stop reason: SDUPTHER

## 2022-01-27 RX ORDER — ESCITALOPRAM OXALATE 10 MG/1
10 TABLET ORAL DAILY
Status: DISCONTINUED | OUTPATIENT
Start: 2022-01-27 | End: 2022-01-27

## 2022-01-27 RX ORDER — ESCITALOPRAM OXALATE 10 MG/1
10 TABLET ORAL DAILY
Qty: 90 TABLET | Refills: 3 | Status: SHIPPED | OUTPATIENT
Start: 2022-01-27 | End: 2022-02-09 | Stop reason: SDUPTHER

## 2022-01-27 RX ORDER — IPRATROPIUM BROMIDE AND ALBUTEROL SULFATE 2.5; .5 MG/3ML; MG/3ML
3 SOLUTION RESPIRATORY (INHALATION) EVERY 4 HOURS
Status: DISCONTINUED | OUTPATIENT
Start: 2022-01-27 | End: 2022-01-27 | Stop reason: HOSPADM

## 2022-01-27 RX ORDER — METHADONE HYDROCHLORIDE 10 MG/1
90 TABLET ORAL DAILY
Status: DISCONTINUED | OUTPATIENT
Start: 2022-01-27 | End: 2022-01-27 | Stop reason: HOSPADM

## 2022-01-27 RX ORDER — TIOTROPIUM BROMIDE AND OLODATEROL 3.124; 2.736 UG/1; UG/1
2 SPRAY, METERED RESPIRATORY (INHALATION) DAILY
Qty: 4 G | Refills: 0 | Status: SHIPPED | OUTPATIENT
Start: 2022-01-27 | End: 2023-01-03 | Stop reason: SDUPTHER

## 2022-01-27 RX ORDER — ALBUTEROL SULFATE 90 UG/1
1-2 AEROSOL, METERED RESPIRATORY (INHALATION) EVERY 6 HOURS PRN
Qty: 18 G | Refills: 0 | Status: SHIPPED | OUTPATIENT
Start: 2022-01-27 | End: 2022-02-09 | Stop reason: SDUPTHER

## 2022-01-27 RX ORDER — AZITHROMYCIN 250 MG/1
250 TABLET, FILM COATED ORAL DAILY
Qty: 2 TABLET | Refills: 0 | Status: SHIPPED | OUTPATIENT
Start: 2022-01-28 | End: 2022-01-30

## 2022-01-27 RX ORDER — FLUTICASONE PROPIONATE 50 MCG
1 SPRAY, SUSPENSION (ML) NASAL DAILY
Qty: 16 G | Refills: 2 | Status: SHIPPED | OUTPATIENT
Start: 2022-01-27 | End: 2022-07-21

## 2022-01-27 RX ADMIN — IPRATROPIUM BROMIDE AND ALBUTEROL SULFATE 3 ML: .5; 2.5 SOLUTION RESPIRATORY (INHALATION) at 11:01

## 2022-01-27 RX ADMIN — AZITHROMYCIN MONOHYDRATE 250 MG: 250 TABLET ORAL at 08:01

## 2022-01-27 RX ADMIN — FUROSEMIDE 20 MG: 20 TABLET ORAL at 08:01

## 2022-01-27 RX ADMIN — BUDESONIDE 0.5 MG: 0.5 INHALANT RESPIRATORY (INHALATION) at 07:01

## 2022-01-27 RX ADMIN — IPRATROPIUM BROMIDE AND ALBUTEROL SULFATE 3 ML: .5; 2.5 SOLUTION RESPIRATORY (INHALATION) at 07:01

## 2022-01-27 RX ADMIN — METHADONE HYDROCHLORIDE 90 MG: 10 TABLET ORAL at 08:01

## 2022-01-27 RX ADMIN — ACETAMINOPHEN 1000 MG: 500 TABLET ORAL at 03:01

## 2022-01-27 RX ADMIN — CLONIDINE HYDROCHLORIDE 0.1 MG: 0.1 TABLET ORAL at 08:01

## 2022-01-27 RX ADMIN — GABAPENTIN 300 MG: 300 CAPSULE ORAL at 08:01

## 2022-01-27 RX ADMIN — CETIRIZINE HYDROCHLORIDE 10 MG: 10 TABLET, FILM COATED ORAL at 08:01

## 2022-01-27 RX ADMIN — PREDNISONE 40 MG: 20 TABLET ORAL at 08:01

## 2022-01-27 RX ADMIN — IPRATROPIUM BROMIDE AND ALBUTEROL SULFATE 3 ML: .5; 2.5 SOLUTION RESPIRATORY (INHALATION) at 12:01

## 2022-01-27 RX ADMIN — ESCITALOPRAM OXALATE 10 MG: 10 TABLET ORAL at 11:01

## 2022-01-27 NOTE — HPI
Patient is a 57 y/o male with PMH of COPD, HTN, and CKD presents to ED for evaluation of 3 day history of SOB, cough and wheezing. He reports symptoms progressively worsened. He reports that it is an intermittently productive cough with thick green sputum. He has also been having associated chest tightness. He states that he is on oxygen 2L NC home oxygen. He attempted 4-5 nebulized albuterol treatments at home today with no relief of symptoms. Patient also endorses some mild bilateral leg swelling. He reports associated nasal congestion and runny nose.  He denies any fever, chills, body aches, neck pain, ear pain, sore throat, palpitations, abdominal pain, N/V/D. He denies any known sick contacts.    ED Course: in the ED he was hypoxic at 87% on home O2 2L NC. Switched to hospital equipment of 2L NC and satted to 97. /106. Labs wnl. EKG wnl. Trops negative. Covid negative. Flu negative. CXR revealed bibasilar opacities poss atelectasis. S/p breathing treatment with improvement of SOB ans wheezing. S/p methylprednisolone. Admitted to [facility]  for management of COPD exacerbation.

## 2022-01-27 NOTE — PLAN OF CARE
Pt lives in [place] and is independent at home.  He is on 2L NC home oxygen.  Pt has his oxygen home portable set up in his room but the tank is empty.  He drove himself here and is requesting we swap out his tank for a full one.  I sent a message to Verenice with [facility] [employer] (pt's [employer] company).  Waiting on a response.  If unable to swap the tank, pt is agreeable to me setting up ambulatory transportation home with oxygen and he can have someone drive him back to get his car in the parking lot later.  Pt says he has 2 full tanks and working concentrator at home.         01/27/22 1112   Discharge Assessment   Assessment Type Discharge Planning Assessment   Confirmed/corrected address, phone number and insurance Yes   Confirmed Demographics Correct on Facesheet   Source of Information patient   Does patient/caregiver understand observation status Yes   Communicated RYLAN with patient/caregiver Yes   Reason For Admission SOB   Lives With alone   Facility Arrived From: home   Do you expect to return to your current living situation? Yes   Do you have help at home or someone to help you manage your care at home? Yes   Prior to hospitilization cognitive status: Alert/Oriented   Current cognitive status: Alert/Oriented   Walking or Climbing Stairs Difficulty ambulation difficulty, requires equipment   Dressing/Bathing Difficulty none   Equipment Currently Used at Home oxygen   Readmission within 30 days? No   Patient currently being followed by outpatient case management? No   Do you currently have service(s) that help you manage your care at home? No   Do you take prescription medications? Yes   Do you have prescription coverage? Yes   Do you have any problems affording any of your prescribed medications? No   Is the patient taking medications as prescribed? yes   How do you get to doctors appointments? car, drives self   Are you on dialysis? No   Do you take coumadin? No   Discharge Plan A Home   Discharge Plan B Home with  family   DME Needed Upon Discharge  none   Discharge Plan discussed with: Patient   Discharge Barriers Identified None     Ronald Álvarez RN,   [phone]

## 2022-01-27 NOTE — CARE UPDATE
Patient pending reassessment in the afternoon for oxygen requirement and possible discharge. Patient pending ambulatory oxygen testing. CM notified and working with DME to supply patient with oxygen tank. Nursing notified as well of pending results. Primary team return later to assess patient and speak with nursing staff about discharge planning. Primary team informed by nursing patient received his oxygen tank at bedside, his new medications in hand. Nursing staff informed primary team patient was discharged from the ED. No discharge orders were placed per primary team.  Primary team unable to give education and return precautions to patient.     Patient scheduled for appointment with PCP on 2/9/2022    Pattie Flynn MD  [facility] HO-2  1/27/2022 2:34 PM

## 2022-01-27 NOTE — PHARMACY MED REC
"Admission Medication History     The home medication history was taken by Iraida Samuels CPhT.    Medication history obtained from,[facility] pharmacy and patient    You may go to "Admission" then "Reconcile Home Medications" tabs to review and/or act upon these items.      The home medication list has been updated by the Pharmacy department.    Please read ALL comments highlighted in yellow.    Please address this information as you see fit.     Feel free to contact us if you have any questions or require assistance.      The medications listed below were removed from the home medication list.  Please reorder if appropriate:  Patient reports no longer taking the following medication(s):   glycolax           Potential issues to be addressed PRIOR TO DISCHARGE   Patient lacks understanding of therapy      Iraida Samuels CPhT.  Ext [phone]                  .          "

## 2022-01-27 NOTE — ASSESSMENT & PLAN NOTE
Asemmetrical R leg swelling with pain and tenderness on palp  H/o Venous stasis  Must r/o DVT, Wells Score 4.5 (moderate risk)  US of LE negative     Plan:  - prophylactic dvt

## 2022-01-27 NOTE — HOSPITAL COURSE
Patient admitted for COPD exacerbation. Patient resumed on home oxygen of 2LNC. Patient reports feeling well in the AM reporting difficulty obtaining his meds recently. Patient endorses being mainly home bound and chair bound. Patient reported improved appetite. Patient ambulated per nursing with home oxygen requirements. Patient was to be reassessed for discharge.   Primary team went to assess patient for discharge. Notified by nursing staff patients PIVs removed and patient was informed he was clear for discharge with his new oxygen tank per CM. Primary team did not place discharge order prior to patient ambulating out of facility. Patient ambulated per nursing on his home oxygen status and tolerated well. Meds were refilled and sent to bedside. SW documented patient does not qualify for Home Health but was able to obtain a new oxygen tank per DME.   No discharge education given or discharge referrals placed prior to patient ambulating out of facility.    See meds below for full report of new meds and continued medications.
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
## 2022-01-27 NOTE — PLAN OF CARE
Discharge plans discussed with pt.  He verbalized understanding of all.  I spoke with Verenice Katz with [facility].  She says okay to swap out oxygen tank for pt because the one he brought from home is empty.  LEIGHANN Pozo will deliver the full tank to pt's room and take the [place] one [place].  Rounds completed on pt.  All questions addressed.  Bedside nurse to discuss d/c medications.  Discussed importance to attend all f/u appts and take medications as prescribed.  Verbalized understanding.    Future Appointments   Date Time Provider Department Center   2/9/2022  3:20 PM Garland Xiong,  [facility]        01/27/22 1244   Final Note   Assessment Type Final Discharge Note   Anticipated Discharge Disposition Home   Hospital Resources/Appts/Education Provided Appointments scheduled by Navigator/Coordinator   Post-Acute Status   Post-Acute Authorization [facility] Status Set-up Complete/Auth obtained   Discharge Delays None known at this time     Ronald Álvarez RN,   [phone]

## 2022-01-27 NOTE — NURSING
Pt. Dressed and ready for discharge. Discharge instructions reviewed and understanding demonstrated. Cleared per case management upon arrival of home resources. Pt. Wheeled out to car.

## 2022-01-27 NOTE — DISCHARGE SUMMARY
[facility] Emergency Dept  [facility] Medicine  Discharge Summary      Patient Name: Ming Harrington  MRN: [id number]  Patient Class: OP- Observation  Admission Date: 1/26/2022  Hospital Length of Stay: 0 days  Discharge Date and Time: No discharge date for patient encounter.  Attending Physician: Burt Betts MD   Discharging Provider: Pattie Flynn MD  Primary Care Provider: Vu Villalpando DO      HPI:   Patient is a 55 y/o male with PMH of COPD, HTN, and CKD presents to ED for evaluation of 3 day history of SOB, cough and wheezing. He reports symptoms progressively worsened. He reports that it is an intermittently productive cough with thick green sputum. He has also been having associated chest tightness. He states that he is on oxygen 2L NC home oxygen. He attempted 4-5 nebulized albuterol treatments at home today with no relief of symptoms. Patient also endorses some mild bilateral leg swelling. He reports associated nasal congestion and runny nose.  He denies any fever, chills, body aches, neck pain, ear pain, sore throat, palpitations, abdominal pain, N/V/D. He denies any known sick contacts.    ED Course: in the ED he was hypoxic at 87% on home O2 2L NC. Switched to hospital equipment of 2L NC and satted to 97. /106. Labs wnl. EKG wnl. Trops negative. Covid negative. Flu negative. CXR revealed bibasilar opacities poss atelectasis. S/p breathing treatment with improvement of SOB ans wheezing. S/p methylprednisolone. Admitted to [facility]  for management of COPD exacerbation.       * No surgery found *      Hospital Course:   Patient admitted for COPD exacerbation. Patient resumed on home oxygen of 2LNC. Patient reports feeling well in the AM reporting difficulty obtaining his meds recently. Patient endorses being mainly home bound and chair bound. Patient reported improved appetite. Patient ambulated per nursing with home oxygen requirements. Patient was to be reassessed for discharge later today.    Primary team went to assess patient for discharge. Notified by nursing staff patients PIVs removed and patient was informed he was clear for discharge with his new oxygen tank per CM. Primary team did not place discharge order prior to patient ambulating out of facility. Patient ambulated per nursing on his home oxygen status and tolerated well. Meds were refilled and sent to bedside. SW documented patient does not qualify for Home Health but was able to obtain a new oxygen tank per DME.   No discharge education given or discharge referrals placed prior to patient ambulating out of facility.    See meds below for full report of new meds and continued medications.       Goals of Care Treatment Preferences:  Code Status: Full Code      Consults:   Consults (From admission, onward)        Status Ordering Provider     Inpatient consult to Social Work  Once        Provider:  (Not yet assigned)    Ordered ISRRAEL FOSTER          No new Assessment & Plan notes have been filed under this hospital service since the last note was generated.  Service: Hospital Medicine    Final Active Diagnoses:    Diagnosis Date Noted POA    PRINCIPAL PROBLEM:  COPD exacerbation [J44.1] 07/21/2017 Yes    Right leg swelling [M79.89] 01/26/2022 Yes    Tobacco dependence [F17.200] 02/25/2020 Yes    Bilateral lower extremity edema [R60.0] 10/16/2019 Yes    Generalized anxiety disorder [F41.1] 04/17/2019 Yes    Polysubstance abuse [F19.10] 07/22/2017 Yes     Chronic    HTN (hypertension) [I10]  Yes      Problems Resolved During this Admission:       Discharged Condition: unable to determine 2/2 to patient discharged prior to primary team assessment    Disposition:     Follow Up:    Patient Instructions:      COMPRESSION STOCKINGS     Order Specific Question Answer Comments   Pressure amount: 20-30 mmHg        Significant Diagnostic Studies: Labs:   CMP   Recent Labs   Lab 01/26/22  1316 01/27/22  0343    138   K 4.1 4.7   CL  101 103   CO2 31* 23    204*   BUN 10 14   CREATININE 1.3 1.4   CALCIUM 8.8 8.7   PROT 7.5 7.2   ALBUMIN 3.2* 2.8*   BILITOT 0.7 0.5   ALKPHOS 109 94   AST 31 26   ALT 24 22   ANIONGAP 8 12   ESTGFRAFRICA >60 >60   EGFRNONAA >60 55*   , CBC   Recent Labs   Lab 01/26/22  1316 01/26/22  1316 01/27/22  0343   WBC 6.90  --  4.93   HGB 13.7*  --  13.0*   HCT 41.0   < > 40.7     --  146*    < > = values in this interval not displayed.   , Troponin   Recent Labs   Lab 01/26/22  1316   TROPONINI <0.006    and A1C: No results for input(s): HGBA1C in the last 4320 hours.    Pending Diagnostic Studies:     None         Medications:  Reconciled Home Medications:      Medication List      START taking these medications    azithromycin 250 MG tablet  Commonly known as: Z-HAVEN  Take 1 tablet (250 mg total) by mouth once daily. for 2 days  Start taking on: January 28, 2022     predniSONE 20 MG tablet  Commonly known as: DELTASONE  Take 2 tablets (40 mg total) by mouth once daily. for 4 days  Start taking on: January 28, 2022        CHANGE how you take these medications    albuterol-ipratropium 2.5 mg-0.5 mg/3 mL nebulizer solution  Commonly known as: DUO-NEB  Inhale 1 vial (3 mLs) by nebulization every 4 (four) hours as needed for Wheezing. Rescue  What changed:   · when to take this  · reasons to take this  · additional instructions     OLANZapine 20 MG tablet  Commonly known as: ZyPREXA  Take 1 tablet (20 mg total) by mouth nightly.  What changed: Another medication with the same name was removed. Continue taking this medication, and follow the directions you see here.        CONTINUE taking these medications    albuterol 90 mcg/actuation inhaler  Commonly known as: PROVENTIL/VENTOLIN HFA  Inhale 1-2 puffs into the lungs every 6 (six) hours as needed for Wheezing.     cetirizine 10 MG tablet  Commonly known as: ZYRTEC  Take 1 tablet (10 mg total) by mouth once daily.     cloNIDine 0.1 MG tablet  Commonly known as:  CATAPRES  Take 1 tablet (0.1 mg total) by mouth 2 (two) times daily.     EScitalopram oxalate 10 MG tablet  Commonly known as: LEXAPRO  Take 1 tablet (10 mg total) by mouth once daily.     fluticasone propionate 50 mcg/actuation nasal spray  Commonly known as: FLONASE  1 spray (50 mcg total) by Each Nostril route once daily.     furosemide 40 MG tablet  Commonly known as: LASIX  Take 1 tablet (40 mg total) by mouth 2 (two) times daily.     methadone 10 mg/5 mL solution  Commonly known as: DOLOPHINE  Take 90 mg by mouth every morning.     ondansetron 8 MG Tbdl  Commonly known as: ZOFRAN-ODT  Take 1 tablet (8 mg total) by mouth every 6 (six) hours as needed.     ramelteon 8 mg tablet  Commonly known as: ROZEREM  Take 1 tablet (8 mg total) by mouth every evening.     STIOLTO RESPIMAT 2.5-2.5 mcg/actuation Mist  Generic drug: tiotropium-olodateroL  Inhale 2 puffs into the lungs once daily. Controller        STOP taking these medications    hydrOXYzine 50 MG tablet  Commonly known as: ATARAX     hydrOXYzine pamoate 50 MG Cap  Commonly known as: VISTARIL     metFORMIN 500 MG tablet  Commonly known as: GLUCOPHAGE     mirtazapine 15 MG tablet  Commonly known as: REMERON     triamcinolone acetonide 0.025% 0.025 % Oint  Commonly known as: KENALOG            Indwelling Lines/Drains at time of discharge:   Lines/Drains/Airways     None                 Time spent on the discharge of patient: 5 minutes         Pattie Flynn MD  Department of Hospital Medicine  [place] - Emergency Dept

## 2022-01-27 NOTE — ASSESSMENT & PLAN NOTE
Asemmetrical R leg swelling with pain and tenderness on palp  H/o Venous stasis  Must r/o DVT, Wells Score 4.5 (moderate risk)    Plan:  - F/u DVT US  - Switch to therapeutic Lovenox if pos DVT US

## 2022-01-27 NOTE — SUBJECTIVE & OBJECTIVE
Interval History: NAEON. Patient prescribed methadone and was restarted this AM.  Patient reports feeling better and at baseline    Review of Systems   Constitutional: Negative for fatigue and fever.   HENT: Negative for congestion, rhinorrhea and sore throat.    Eyes: Negative for visual disturbance.   Respiratory: Positive for cough, shortness of breath and wheezing (improved at baseline). Negative for chest tightness.    Cardiovascular: Negative for chest pain and leg swelling.   Gastrointestinal: Negative for abdominal pain, constipation, diarrhea and nausea.   Genitourinary: Negative for difficulty urinating and urgency.   Musculoskeletal: Negative for arthralgias.   Neurological: Negative for dizziness and headaches.     Objective:     Vital Signs (Most Recent):  Temp: 97.6 °F (36.4 °C) (01/27/22 0457)  Pulse: 76 (01/27/22 0744)  Resp: (!) 22 (01/27/22 0744)  BP: (!) 140/70 (01/27/22 0457)  SpO2: (!) 93 % (01/27/22 0744) Vital Signs (24h Range):  Temp:  [97.6 °F (36.4 °C)-98.9 °F (37.2 °C)] 97.6 °F (36.4 °C)  Pulse:  [76-93] 76  Resp:  [18-24] 22  SpO2:  [87 %-100 %] 93 %  BP: (140-158)/() 140/70     Weight: 117.9 kg (260 lb)  Body mass index is 39.53 kg/m².  No intake or output data in the 24 hours ending 01/27/22 0834   Physical Exam  Constitutional:       General: He is not in acute distress.     Appearance: Normal appearance. He is obese.   HENT:      Head: Normocephalic and atraumatic.      Right Ear: External ear normal.      Left Ear: External ear normal.      Nose: Nose normal.   Cardiovascular:      Rate and Rhythm: Normal rate and regular rhythm.      Pulses: Normal pulses.      Heart sounds: Normal heart sounds.   Pulmonary:      Effort: Pulmonary effort is normal.      Breath sounds: No wheezing.      Comments: Improved BLAE  Chest:      Chest wall: No tenderness.   Abdominal:      General: There is no distension.      Tenderness: There is no guarding.   Musculoskeletal:         General:  Swelling (asymmetric swelling worse on R side) and tenderness (R side calf) present.   Skin:     General: Skin is warm.      Capillary Refill: Capillary refill takes less than 2 seconds.   Neurological:      Mental Status: He is alert and oriented to person, place, and time. Mental status is at baseline.   Psychiatric:         Mood and Affect: Mood normal.         Behavior: Behavior normal.         Thought Content: Thought content normal.         Significant Labs:   All pertinent labs within the past 24 hours have been reviewed.  A1C: No results for input(s): HGBA1C in the last 4320 hours.  CBC:   Recent Labs   Lab 01/26/22  1316 01/27/22  0343   WBC 6.90 4.93   HGB 13.7* 13.0*   HCT 41.0 40.7    146*     CMP:   Recent Labs   Lab 01/26/22 1316 01/27/22  0343    138   K 4.1 4.7    103   CO2 31* 23    204*   BUN 10 14   CREATININE 1.3 1.4   CALCIUM 8.8 8.7   PROT 7.5 7.2   ALBUMIN 3.2* 2.8*   BILITOT 0.7 0.5   ALKPHOS 109 94   AST 31 26   ALT 24 22   ANIONGAP 8 12   EGFRNONAA >60 55*     Lipase: No results for input(s): LIPASE in the last 48 hours.  Lipid Panel: No results for input(s): CHOL, HDL, LDLCALC, TRIG, CHOLHDL in the last 48 hours.  Magnesium:   Recent Labs   Lab 01/27/22  0343   MG 1.7     POCT Glucose: No results for input(s): POCTGLUCOSE in the last 48 hours.  Troponin:   Recent Labs   Lab 01/26/22  1316   TROPONINI <0.006     TSH: No results for input(s): TSH in the last 4320 hours.    Significant Imaging: I have reviewed all pertinent imaging results/findings within the past 24 hours.

## 2022-01-27 NOTE — ASSESSMENT & PLAN NOTE
Likely 2/2 Hypoxia due to home equipment malfunction  Presented w/ SOB and cough productive of green sputum  S/p breathing treatment and methyl prednisone in ED  Currently on home O2 2L NC, and satting well  On PE B/L wheezing on all lung fields  Improved respiratory status, tolerating a diet    Plan:  - Duo Neb Q 6  - Budesonide Q12  - Azirthomycin (1/31)  - Prednisone (1/31)  - Continuous pulse ox, IS  - med rec with meds sent to bed  - Amb O2 pending   - will need oxygen on discharge

## 2022-01-27 NOTE — PROGRESS NOTES
[facility] [place] [facility]  [facility] Medicine  Progress Note    Patient Name: Ming Harrington  MRN: [id number]  Patient Class: OP- Observation   Admission Date: 1/26/2022  Length of Stay: 0 days  Attending Physician: Burt Betts MD  Primary Care Provider: Vu Villalpando DO        Subjective:     Principal Problem:COPD exacerbation        HPI:  Patient is a 55 y/o male with PMH of COPD, HTN, and CKD presents to ED for evaluation of 3 day history of SOB, cough and wheezing. He reports symptoms progressively worsened. He reports that it is an intermittently productive cough with thick green sputum. He has also been having associated chest tightness. He states that he is on oxygen 2L NC home oxygen. He attempted 4-5 nebulized albuterol treatments at home today with no relief of symptoms. Patient also endorses some mild bilateral leg swelling. He reports associated nasal congestion and runny nose.  He denies any fever, chills, body aches, neck pain, ear pain, sore throat, palpitations, abdominal pain, N/V/D. He denies any known sick contacts.    ED Course: in the ED he was hypoxic at 87% on home O2 2L NC. Switched to hospital equipment of 2L NC and satted to 97. /106. Labs wnl. EKG wnl. Trops negative. Covid negative. Flu negative. CXR revealed bibasilar opacities poss atelectasis. S/p breathing treatment with improvement of SOB ans wheezing. S/p methylprednisolone. Admitted to [facility] for management of COPD exacerbation.       Overview/Hospital Course:  No notes on file    Interval History: NAEON. Patient prescribed methadone and was restarted this AM.  Patient reports feeling better and at baseline    Review of Systems   Constitutional: Negative for fatigue and fever.   HENT: Negative for congestion, rhinorrhea and sore throat.    Eyes: Negative for visual disturbance.   Respiratory: Positive for cough, shortness of breath and wheezing (improved at baseline). Negative for chest tightness.    Cardiovascular:  Negative for chest pain and leg swelling.   Gastrointestinal: Negative for abdominal pain, constipation, diarrhea and nausea.   Genitourinary: Negative for difficulty urinating and urgency.   Musculoskeletal: Negative for arthralgias.   Neurological: Negative for dizziness and headaches.     Objective:     Vital Signs (Most Recent):  Temp: 97.6 °F (36.4 °C) (01/27/22 0457)  Pulse: 76 (01/27/22 0744)  Resp: (!) 22 (01/27/22 0744)  BP: (!) 140/70 (01/27/22 0457)  SpO2: (!) 93 % (01/27/22 0744) Vital Signs (24h Range):  Temp:  [97.6 °F (36.4 °C)-98.9 °F (37.2 °C)] 97.6 °F (36.4 °C)  Pulse:  [76-93] 76  Resp:  [18-24] 22  SpO2:  [87 %-100 %] 93 %  BP: (140-158)/() 140/70     Weight: 117.9 kg (260 lb)  Body mass index is 39.53 kg/m².  No intake or output data in the 24 hours ending 01/27/22 0834   Physical Exam  Constitutional:       General: He is not in acute distress.     Appearance: Normal appearance. He is obese.   HENT:      Head: Normocephalic and atraumatic.      Right Ear: External ear normal.      Left Ear: External ear normal.      Nose: Nose normal.   Cardiovascular:      Rate and Rhythm: Normal rate and regular rhythm.      Pulses: Normal pulses.      Heart sounds: Normal heart sounds.   Pulmonary:      Effort: Pulmonary effort is normal.      Breath sounds: No wheezing.      Comments: Improved BLAE  Chest:      Chest wall: No tenderness.   Abdominal:      General: There is no distension.      Tenderness: There is no guarding.   Musculoskeletal:         General: Swelling (asymmetric swelling worse on R side) and tenderness (R side calf) present.   Skin:     General: Skin is warm.      Capillary Refill: Capillary refill takes less than 2 seconds.   Neurological:      Mental Status: He is alert and oriented to person, place, and time. Mental status is at baseline.   Psychiatric:         Mood and Affect: Mood normal.         Behavior: Behavior normal.         Thought Content: Thought content normal.          Significant Labs:   All pertinent labs within the past 24 hours have been reviewed.  A1C: No results for input(s): HGBA1C in the last 4320 hours.  CBC:   Recent Labs   Lab 01/26/22  1316 01/27/22  0343   WBC 6.90 4.93   HGB 13.7* 13.0*   HCT 41.0 40.7    146*     CMP:   Recent Labs   Lab 01/26/22  1316 01/27/22  0343    138   K 4.1 4.7    103   CO2 31* 23    204*   BUN 10 14   CREATININE 1.3 1.4   CALCIUM 8.8 8.7   PROT 7.5 7.2   ALBUMIN 3.2* 2.8*   BILITOT 0.7 0.5   ALKPHOS 109 94   AST 31 26   ALT 24 22   ANIONGAP 8 12   EGFRNONAA >60 55*     Lipase: No results for input(s): LIPASE in the last 48 hours.  Lipid Panel: No results for input(s): CHOL, HDL, LDLCALC, TRIG, CHOLHDL in the last 48 hours.  Magnesium:   Recent Labs   Lab 01/27/22  0343   MG 1.7     POCT Glucose: No results for input(s): POCTGLUCOSE in the last 48 hours.  Troponin:   Recent Labs   Lab 01/26/22  1316   TROPONINI <0.006     TSH: No results for input(s): TSH in the last 4320 hours.    Significant Imaging: I have reviewed all pertinent imaging results/findings within the past 24 hours.      Assessment/Plan:      * COPD exacerbation  Likely 2/2 Hypoxia due to home equipment malfunction  Presented w/ SOB and cough productive of green sputum  S/p breathing treatment and methyl prednisone in ED  Currently on home O2 2L NC, and satting well  On PE B/L wheezing on all lung fields  Improved respiratory status, tolerating a diet    Plan:  - Duo Neb Q 6  - Budesonide Q12  - Azirthomycin (1/31)  - Prednisone (1/31)  - Continuous pulse ox, IS  - med rec with meds sent to bed  - Amb O2 pending   - will need oxygen on discharge        Right leg swelling  Asemmetrical R leg swelling with pain and tenderness on palp  H/o Venous stasis  Must r/o DVT, Wells Score 4.5 (moderate risk)  US of LE negative     Plan:  - prophylactic dvt       Tobacco dependence  No acute distress  Not requiring patch at this time        Bilateral  lower extremity edema  Baseline lower extremity swelling  Noncompliant    Plan:  Give compression socks at discharge      Generalized anxiety disorder  Continue home meds  - vistaril prn      Polysubstance abuse  Continue methadone 90 daily      HTN (hypertension)  BP on admission 158/106  On home Clonidine  - Continue home med Clonidine  - Monitor BP   - improved        VTE Risk Mitigation (From admission, onward)         Ordered     enoxaparin injection 40 mg  Daily         01/26/22 1743     IP VTE HIGH RISK PATIENT  Once         01/26/22 1743     Place sequential compression device  Until discontinued         01/26/22 1743                Discharge Planning   RYLAN:      Code Status: Full Code   Is the patient medically ready for discharge?:     Reason for patient still in hospital (select all that apply): Treatment and Pending disposition  Discharge Plan A: Maykel Flynn MD  Department of Hospital Medicine   [place] - Emergency Dept
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
## 2022-02-09 ENCOUNTER — OFFICE VISIT (OUTPATIENT)
Dept: FAMILY MEDICINE | Facility: HOSPITAL | Age: 57
End: 2022-02-09
Payer: MEDICAID

## 2022-02-09 VITALS
DIASTOLIC BLOOD PRESSURE: 113 MMHG | BODY MASS INDEX: 42.66 KG/M2 | SYSTOLIC BLOOD PRESSURE: 147 MMHG | WEIGHT: 281.5 LBS | HEART RATE: 101 BPM | HEIGHT: 68 IN

## 2022-02-09 DIAGNOSIS — R05.9 COUGH: ICD-10-CM

## 2022-02-09 DIAGNOSIS — I10 PRIMARY HYPERTENSION: Primary | ICD-10-CM

## 2022-02-09 DIAGNOSIS — F41.1 GENERALIZED ANXIETY DISORDER: ICD-10-CM

## 2022-02-09 DIAGNOSIS — J45.50 SEVERE PERSISTENT ASTHMA, UNSPECIFIED WHETHER COMPLICATED: ICD-10-CM

## 2022-02-09 DIAGNOSIS — G47.00 INSOMNIA, UNSPECIFIED TYPE: ICD-10-CM

## 2022-02-09 DIAGNOSIS — J44.9 CHRONIC OBSTRUCTIVE PULMONARY DISEASE, UNSPECIFIED COPD TYPE: ICD-10-CM

## 2022-02-09 DIAGNOSIS — R60.9 CHRONIC EDEMA: ICD-10-CM

## 2022-02-09 DIAGNOSIS — J30.2 SEASONAL ALLERGIC RHINITIS, UNSPECIFIED TRIGGER: ICD-10-CM

## 2022-02-09 PROCEDURE — 99213 OFFICE O/P EST LOW 20 MIN: CPT | Performed by: STUDENT IN AN ORGANIZED HEALTH CARE EDUCATION/TRAINING PROGRAM

## 2022-02-09 RX ORDER — OLANZAPINE 20 MG/1
20 TABLET ORAL NIGHTLY
Qty: 30 TABLET | Refills: 6 | Status: SHIPPED | OUTPATIENT
Start: 2022-02-09 | End: 2022-07-21 | Stop reason: SDUPTHER

## 2022-02-09 RX ORDER — IPRATROPIUM BROMIDE AND ALBUTEROL SULFATE 2.5; .5 MG/3ML; MG/3ML
3 SOLUTION RESPIRATORY (INHALATION) EVERY 4 HOURS PRN
Qty: 1620 ML | Refills: 3 | Status: SHIPPED | OUTPATIENT
Start: 2022-02-09 | End: 2022-12-22 | Stop reason: SDUPTHER

## 2022-02-09 RX ORDER — FUROSEMIDE 40 MG/1
40 TABLET ORAL 2 TIMES DAILY
Qty: 60 TABLET | Refills: 0 | Status: SHIPPED | OUTPATIENT
Start: 2022-02-09 | End: 2022-02-18 | Stop reason: SDUPTHER

## 2022-02-09 RX ORDER — CLONIDINE HYDROCHLORIDE 0.1 MG/1
0.1 TABLET ORAL 2 TIMES DAILY
Qty: 180 TABLET | Refills: 3 | Status: SHIPPED | OUTPATIENT
Start: 2022-02-09 | End: 2022-03-31 | Stop reason: SDUPTHER

## 2022-02-09 RX ORDER — CETIRIZINE HYDROCHLORIDE 10 MG/1
10 TABLET ORAL DAILY
Qty: 90 TABLET | Refills: 3 | Status: SHIPPED | OUTPATIENT
Start: 2022-02-09 | End: 2022-07-21

## 2022-02-09 RX ORDER — ALBUTEROL SULFATE 90 UG/1
1-2 AEROSOL, METERED RESPIRATORY (INHALATION) EVERY 6 HOURS PRN
Qty: 18 G | Refills: 0 | Status: SHIPPED | OUTPATIENT
Start: 2022-02-09 | End: 2022-05-09 | Stop reason: SDUPTHER

## 2022-02-09 RX ORDER — ESCITALOPRAM OXALATE 10 MG/1
10 TABLET ORAL DAILY
Qty: 90 TABLET | Refills: 3 | Status: SHIPPED | OUTPATIENT
Start: 2022-02-09 | End: 2022-07-18 | Stop reason: SDUPTHER

## 2022-02-11 NOTE — PROGRESS NOTES
Subjective:       Patient ID: Ming Harrington is a 56 y.o. male.    Chief Complaint: Follow-up and Medication Refill    Ming Harrington is a 56-year-old male PMHx COPD, asthma, CHF, T2DM, hypertension was presenting for follow-up. Patient with a recent hospitalization at [facility] for COPD exacerbation.  Patient on 2 L nasal cannula at home. Reports improvement in shortness of breath but need refill of his albuterol nebulized solution and in need of a new epidural nebulizer machine. Endorses a persistent cough.  Endorses using his albuterol inhaler daily. Denies any worsening lower extremity edema, orthopnea, or PND.    Review of Systems   Constitutional: Negative for appetite change, chills, fever and unexpected weight change.   HENT: Negative for congestion, hearing loss and sore throat.    Eyes: Negative for visual disturbance.   Respiratory: Positive for wheezing. Negative for cough and shortness of breath.    Cardiovascular: Negative for chest pain and leg swelling.   Gastrointestinal: Negative for abdominal distention, abdominal pain, blood in stool, constipation, diarrhea, nausea and vomiting.   Endocrine: Negative for cold intolerance, heat intolerance and polyuria.   Genitourinary: Negative for difficulty urinating, dysuria and frequency.   Musculoskeletal: Negative for arthralgias and myalgias.   Skin: Negative for rash.   Neurological: Negative for dizziness, weakness, numbness and headaches.   Psychiatric/Behavioral: Negative for dysphoric mood and sleep disturbance. The patient is not nervous/anxious.        Objective:      Vitals:    02/09/22 1557   BP: (!) 147/113   Pulse: 101     Physical Exam  Constitutional:       Appearance: Normal appearance.   HENT:      Head: Normocephalic and atraumatic.   Eyes:      Pupils: Pupils are equal, round, and reactive to light.   Cardiovascular:      Rate and Rhythm: Normal rate and regular rhythm.      Heart sounds: No murmur heard.  No friction rub. No gallop.    Pulmonary:       Effort: Pulmonary effort is normal.      Breath sounds: Normal breath sounds. No wheezing, rhonchi or rales.      Comments: No wheezing. Prolonged expiratory phase.  In no acute respiratory distress on 2 L nasal cannula.  Abdominal:      General: Bowel sounds are normal. There is no distension.      Palpations: Abdomen is soft.      Tenderness: There is no abdominal tenderness.   Musculoskeletal:         General: Normal range of motion.      Right lower leg: No edema.      Left lower leg: No edema.   Skin:     General: Skin is warm.      Findings: No rash.   Neurological:      Mental Status: He is alert and oriented to person, place, and time.   Psychiatric:         Mood and Affect: Mood normal.         Behavior: Behavior normal.         Assessment:       1. Primary hypertension    2. Chronic edema    3. Seasonal allergic rhinitis, unspecified trigger    4. Severe persistent asthma, unspecified whether complicated    5. Chronic obstructive pulmonary disease, unspecified COPD type    6. Generalized anxiety disorder    7. Cough        Plan:       Primary hypertension  -     cloNIDine (CATAPRES) 0.1 MG tablet; Take 1 tablet (0.1 mg total) by mouth 2 (two) times daily.  Dispense: 180 tablet; Refill: 3    Chronic edema  -     furosemide (LASIX) 40 MG tablet; Take 1 tablet (40 mg total) by mouth 2 (two) times daily.  Dispense: 60 tablet; Refill: 0    Seasonal allergic rhinitis, unspecified trigger  -     cetirizine (ZYRTEC) 10 MG tablet; Take 1 tablet (10 mg total) by mouth once daily.  Dispense: 90 tablet; Refill: 3    Severe persistent asthma, unspecified whether complicated  -     albuterol (PROVENTIL/VENTOLIN HFA) 90 mcg/actuation inhaler; Inhale 1-2 puffs into the lungs every 6 (six) hours as needed for Wheezing.  Dispense: 18 g; Refill: 0  -     NEBULIZER FOR HOME USE    Chronic obstructive pulmonary disease, unspecified COPD type  -     albuterol-ipratropium (DUO-NEB) 2.5 mg-0.5 mg/3 mL nebulizer solution;  Inhale 1 vial (3 mLs) by nebulization every 4 (four) hours as needed for Wheezing. Rescue  Dispense: 1620 mL; Refill: 3    Generalized anxiety disorder  -     EScitalopram oxalate (LEXAPRO) 10 MG tablet; Take 1 tablet (10 mg total) by mouth once daily.  Dispense: 90 tablet; Refill: 3    Insomnia  -     OLANZapine (ZYPREXA) 20 MG tablet; Take 1 tablet (20 mg total) by mouth nightly.  Dispense: 30 tablet; Refill: 6      Overall doing well since discharge from [facility] on 01/27/2022.  Provided nebulizer machine for home use and nebulizer solution. Advised to report to the emergency department if acute worsening of shortness of breath. Provided refills of home medications. Follow-up in 1-3 months.    Garland Xiong DO  [facility], PGY-2  02/09/2022

## 2022-02-18 DIAGNOSIS — R60.9 CHRONIC EDEMA: ICD-10-CM

## 2022-02-18 RX ORDER — FUROSEMIDE 40 MG/1
40 TABLET ORAL 2 TIMES DAILY
Qty: 60 TABLET | Refills: 0 | Status: SHIPPED | OUTPATIENT
Start: 2022-02-18 | End: 2022-03-16

## 2022-03-30 ENCOUNTER — OFFICE VISIT (OUTPATIENT)
Dept: FAMILY MEDICINE | Facility: HOSPITAL | Age: 57
End: 2022-03-30
Attending: SPECIALIST
Payer: MEDICAID

## 2022-03-30 VITALS
BODY MASS INDEX: 40.76 KG/M2 | DIASTOLIC BLOOD PRESSURE: 82 MMHG | SYSTOLIC BLOOD PRESSURE: 121 MMHG | WEIGHT: 268.94 LBS | HEART RATE: 77 BPM | HEIGHT: 68 IN

## 2022-03-30 DIAGNOSIS — B02.9 HERPES ZOSTER WITHOUT COMPLICATION: Primary | ICD-10-CM

## 2022-03-30 PROCEDURE — 99214 OFFICE O/P EST MOD 30 MIN: CPT | Performed by: STUDENT IN AN ORGANIZED HEALTH CARE EDUCATION/TRAINING PROGRAM

## 2022-03-30 RX ORDER — MUPIROCIN 20 MG/G
OINTMENT TOPICAL 3 TIMES DAILY
Qty: 30 G | Refills: 1 | Status: SHIPPED | OUTPATIENT
Start: 2022-03-30 | End: 2022-07-21 | Stop reason: SDUPTHER

## 2022-03-30 RX ORDER — VALACYCLOVIR HYDROCHLORIDE 1 G/1
1000 TABLET, FILM COATED ORAL 3 TIMES DAILY
Qty: 21 TABLET | Refills: 0 | Status: SHIPPED | OUTPATIENT
Start: 2022-03-30 | End: 2022-07-21 | Stop reason: SDUPTHER

## 2022-03-30 NOTE — PROGRESS NOTES
Subjective:       Patient ID: Ming Harrington is a 56 y.o. male.    Chief Complaint: Medication Refill, Follow-up (Right leg edema), and Rash (Fluid filled pustules on both lower legs-itching with broken skin on right posterior leg x one month)    Ming Harrington is a 56-year-old male PMHx COPD, asthma, CHF, T2DM, hypertension presenting with complaint of rash to right leg.  Rash first presented one month ago, located on the right inner thigh and right lateral thigh. Rash presented first as multiple fluid-filled blisters and vesicles which eventually ruptured and are now open wounds. Rash is very itchy. Denies any underlying erythema. Denies any history of shingles outbreak in the past.    Review of Systems   Constitutional: Negative for chills and fever.   HENT: Negative for congestion.    Respiratory: Positive for cough. Negative for shortness of breath.    Cardiovascular: Negative for chest pain.   Gastrointestinal: Negative for constipation, diarrhea, nausea and vomiting.   Genitourinary: Negative for dysuria and frequency.   Skin: Positive for rash.   Neurological: Negative for weakness and numbness.   Psychiatric/Behavioral: Negative for dysphoric mood. The patient is not nervous/anxious.        Objective:      Vitals:    03/30/22 1021   BP: 121/82   Pulse: 77     Physical Exam  HENT:      Head: Normocephalic.   Cardiovascular:      Rate and Rhythm: Normal rate and regular rhythm.      Heart sounds: No murmur heard.    No friction rub. No gallop.   Pulmonary:      Breath sounds: No wheezing, rhonchi or rales.      Comments: Prolonged expiratory phase  Skin:     Findings: Rash present. Rash is vesicular.             Comments: Multiple open ruptured vesicles around 2 mm in diameter over the right inner thigh and right lateral thigh in multiple stages of healing. One remaining clear vesicle on right inner thigh.    Neurological:      Mental Status: He is alert.         Assessment:       1. Herpes zoster without complication         Plan:       Herpes zoster without complication  -     valACYclovir (VALTREX) 1000 MG tablet; Take 1 tablet (1,000 mg total) by mouth 3 (three) times daily. for 7 days  Dispense: 21 tablet; Refill: 0  -     mupirocin (BACTROBAN) 2 % ointment; Apply topically 3 (three) times daily. Apply to right lower leg three times daily  Dispense: 30 g; Refill: 1    Provided a 7 day course of valacyclovir and mupirocin ointment to prevent underlying bacterial infection. Advised to obtain shingles vaccine once acute stage of illness is over. Follow-up in 2 months.     Garland Xiong DO  [facility], PGY-2  03/30/2022

## 2022-03-31 DIAGNOSIS — I10 PRIMARY HYPERTENSION: ICD-10-CM

## 2022-04-06 RX ORDER — CLONIDINE HYDROCHLORIDE 0.1 MG/1
0.1 TABLET ORAL 2 TIMES DAILY
Qty: 180 TABLET | Refills: 3 | Status: SHIPPED | OUTPATIENT
Start: 2022-04-06 | End: 2022-07-21 | Stop reason: SDUPTHER

## 2022-04-25 DIAGNOSIS — R60.9 CHRONIC EDEMA: ICD-10-CM

## 2022-04-26 RX ORDER — FUROSEMIDE 40 MG/1
40 TABLET ORAL 2 TIMES DAILY
Qty: 60 TABLET | Refills: 0 | OUTPATIENT
Start: 2022-04-26

## 2022-05-09 DIAGNOSIS — J45.50 SEVERE PERSISTENT ASTHMA, UNSPECIFIED WHETHER COMPLICATED: ICD-10-CM

## 2022-05-12 ENCOUNTER — OFFICE VISIT (OUTPATIENT)
Dept: FAMILY MEDICINE | Facility: HOSPITAL | Age: 57
End: 2022-05-12
Attending: FAMILY MEDICINE
Payer: MEDICAID

## 2022-05-12 VITALS
SYSTOLIC BLOOD PRESSURE: 136 MMHG | HEART RATE: 81 BPM | BODY MASS INDEX: 42.13 KG/M2 | WEIGHT: 278 LBS | HEIGHT: 68 IN | DIASTOLIC BLOOD PRESSURE: 93 MMHG

## 2022-05-12 DIAGNOSIS — I87.2 STASIS DERMATITIS OF BOTH LEGS: ICD-10-CM

## 2022-05-12 DIAGNOSIS — K59.09 CHRONIC CONSTIPATION: ICD-10-CM

## 2022-05-12 DIAGNOSIS — L29.9 PRURITUS: ICD-10-CM

## 2022-05-12 DIAGNOSIS — J44.9 CHRONIC OBSTRUCTIVE PULMONARY DISEASE, UNSPECIFIED COPD TYPE: Primary | ICD-10-CM

## 2022-05-12 DIAGNOSIS — R11.0 NAUSEA: ICD-10-CM

## 2022-05-12 PROCEDURE — 99214 OFFICE O/P EST MOD 30 MIN: CPT | Performed by: STUDENT IN AN ORGANIZED HEALTH CARE EDUCATION/TRAINING PROGRAM

## 2022-05-12 RX ORDER — POLYETHYLENE GLYCOL 3350 17 G/17G
17 POWDER, FOR SOLUTION ORAL DAILY
Qty: 90 EACH | Refills: 1 | Status: SHIPPED | OUTPATIENT
Start: 2022-05-12 | End: 2023-01-03 | Stop reason: SDUPTHER

## 2022-05-12 RX ORDER — ALBUTEROL SULFATE 90 UG/1
2 AEROSOL, METERED RESPIRATORY (INHALATION) EVERY 6 HOURS PRN
Qty: 18 G | Refills: 1 | Status: SHIPPED | OUTPATIENT
Start: 2022-05-12 | End: 2022-05-26 | Stop reason: SDUPTHER

## 2022-05-12 RX ORDER — TRIAMCINOLONE ACETONIDE 1 MG/G
CREAM TOPICAL 2 TIMES DAILY
Qty: 80 G | Refills: 1 | Status: SHIPPED | OUTPATIENT
Start: 2022-05-12 | End: 2022-07-21 | Stop reason: SDUPTHER

## 2022-05-12 RX ORDER — ALBUTEROL SULFATE 90 UG/1
1-2 AEROSOL, METERED RESPIRATORY (INHALATION) EVERY 6 HOURS PRN
Qty: 18 G | Refills: 0 | Status: SHIPPED | OUTPATIENT
Start: 2022-05-12 | End: 2022-06-27 | Stop reason: SDUPTHER

## 2022-05-12 RX ORDER — PROMETHAZINE HYDROCHLORIDE 12.5 MG/1
12.5 TABLET ORAL 2 TIMES DAILY PRN
Qty: 20 TABLET | Refills: 0 | Status: SHIPPED | OUTPATIENT
Start: 2022-05-12 | End: 2022-05-22

## 2022-05-13 NOTE — PROGRESS NOTES
Subjective:       Patient ID: Ming Harrington is a 56 y.o. male.    Chief Complaint: Follow-up, GI Problem (Increased gas production), and Medication Refill    Ming Harrington is a 56-year-old male PMHx COPD, asthma, CHF, T2DM, hypertension, tobacco use presenting for management of chronic medical conditions.     COPD - uses oxygen 2L as needed. No recent exacerbations, hospitalizations, or emergency room visits.  Uses albuterol inhaler daily and Stiolto daily and would like refill of medications.  Denies any worsening lower extremity edema, orthopnea, or PND.  Endorses he stop smoking 17 days ago.    Rash on lower extremities - diagnosed with herpes zoster at last clinic follow-up and provided a 7 day course of valacyclovir.  Endorses significant improvement in the rash but would like something for his skin itchiness.     Nausea and vomiting - occurring now every other day. Endorses related to his daily methadone use. Currently on 80 mg of methadone daily.  Would like a prescription for Phenergan.  Has tried Zofran in the past but does not help.    Chronic constipation - endorses using the bathroom twice weekly. Stools are hard in consistency. Patient has increased bloating and gas. Denies any bloody stools or black stools.     Review of Systems   Constitutional: Negative for chills and fever.   HENT: Negative for congestion.    Eyes: Negative for visual disturbance.   Respiratory: Positive for shortness of breath.    Cardiovascular: Negative for chest pain.   Gastrointestinal: Positive for constipation, nausea and vomiting. Negative for blood in stool.   Genitourinary: Negative for difficulty urinating.   Musculoskeletal: Negative for back pain.   Skin: Positive for rash.   Neurological: Negative for weakness and headaches.   Psychiatric/Behavioral: Negative for dysphoric mood and sleep disturbance. The patient is not nervous/anxious.        Objective:      Vitals:    05/12/22 1518   BP: (!) 136/93   Pulse: 81     Physical  Exam  Vitals reviewed.   Cardiovascular:      Rate and Rhythm: Normal rate and regular rhythm.      Heart sounds: No murmur heard.  Pulmonary:      Effort: Pulmonary effort is normal.      Breath sounds: Wheezing present. No rhonchi or rales.      Comments: Prolonged expiratory phase  Decreased air movement with expiratory wheezes bilaterally  Abdominal:      General: There is no distension.      Tenderness: There is no abdominal tenderness.      Comments: Multiple scars around umbilicus from previous hernia repair   Musculoskeletal:         General: Normal range of motion.      Right lower leg: No edema.      Left lower leg: No edema.   Skin:     General: Skin is warm.      Findings: Lesion (Multiple healing wounds to bilateral lower extremities) present.   Neurological:      General: No focal deficit present.      Mental Status: He is alert and oriented to person, place, and time.   Psychiatric:         Mood and Affect: Mood normal.         Behavior: Behavior normal.         Assessment:       1. Chronic obstructive pulmonary disease, unspecified COPD type    2. Nausea    3. Chronic constipation    4. Pruritus    5. Stasis dermatitis of both legs        Plan:       Chronic obstructive pulmonary disease, unspecified COPD type  -     albuterol (PROAIR HFA) 90 mcg/actuation inhaler; Inhale 2 puffs into the lungs every 6 (six) hours as needed for Wheezing or Shortness of Breath. Rescue  Dispense: 18 g; Refill: 1    Nausea  -     promethazine (PHENERGAN) 12.5 MG Tab; Take 1 tablet (12.5 mg total) by mouth 2 (two) times daily as needed (nausea).  Dispense: 20 tablet; Refill: 0    Chronic constipation  -     polyethylene glycol (GLYCOLAX) 17 gram PwPk; Take 17 g by mouth once daily.  Dispense: 90 each; Refill: 1    Pruritus  -     triamcinolone acetonide 0.1% (KENALOG) 0.1 % cream; Apply topically 2 (two) times daily. Apply to lower extremities twice daily for itching  Dispense: 80 g; Refill: 1    Stasis dermatitis of  both legs        -     advised to apply barrier cream to lower extremities twice daily.    Etiology of nausea and vomiting likely 2/2 chronic methadone use and chronic constipation. Provided oral Phenegran tablets for nausea and MiraLax to use daily for constipation.  Provided Kenalog cream for chronic lower extremity pruritus.     Follow-up as needed    Garland Xiong DO  [facility], PGY-2  05/12/2022

## 2022-05-26 DIAGNOSIS — R60.9 CHRONIC EDEMA: ICD-10-CM

## 2022-05-26 DIAGNOSIS — J44.9 CHRONIC OBSTRUCTIVE PULMONARY DISEASE, UNSPECIFIED COPD TYPE: ICD-10-CM

## 2022-05-27 RX ORDER — FUROSEMIDE 40 MG/1
40 TABLET ORAL 2 TIMES DAILY
Qty: 60 TABLET | Refills: 0 | Status: SHIPPED | OUTPATIENT
Start: 2022-05-27 | End: 2022-07-07 | Stop reason: SDUPTHER

## 2022-05-27 RX ORDER — ALBUTEROL SULFATE 90 UG/1
2 AEROSOL, METERED RESPIRATORY (INHALATION) EVERY 6 HOURS PRN
Qty: 18 G | Refills: 1 | Status: SHIPPED | OUTPATIENT
Start: 2022-05-27 | End: 2022-06-27 | Stop reason: SDUPTHER

## 2022-06-27 DIAGNOSIS — J44.9 CHRONIC OBSTRUCTIVE PULMONARY DISEASE, UNSPECIFIED COPD TYPE: ICD-10-CM

## 2022-06-27 DIAGNOSIS — J45.50 SEVERE PERSISTENT ASTHMA, UNSPECIFIED WHETHER COMPLICATED: ICD-10-CM

## 2022-06-29 RX ORDER — ALBUTEROL SULFATE 90 UG/1
2 AEROSOL, METERED RESPIRATORY (INHALATION) EVERY 6 HOURS PRN
Qty: 18 G | Refills: 1 | Status: SHIPPED | OUTPATIENT
Start: 2022-06-29 | End: 2022-11-29 | Stop reason: SDUPTHER

## 2022-06-29 RX ORDER — ALBUTEROL SULFATE 90 UG/1
1-2 AEROSOL, METERED RESPIRATORY (INHALATION) EVERY 6 HOURS PRN
Qty: 18 G | Refills: 0 | Status: SHIPPED | OUTPATIENT
Start: 2022-06-29 | End: 2022-09-16 | Stop reason: SDUPTHER

## 2022-07-07 DIAGNOSIS — R60.9 CHRONIC EDEMA: ICD-10-CM

## 2022-07-07 RX ORDER — FUROSEMIDE 40 MG/1
40 TABLET ORAL 2 TIMES DAILY
Qty: 60 TABLET | Refills: 0 | Status: SHIPPED | OUTPATIENT
Start: 2022-07-07 | End: 2022-07-21 | Stop reason: SDUPTHER

## 2022-07-18 DIAGNOSIS — R60.9 CHRONIC EDEMA: ICD-10-CM

## 2022-07-18 DIAGNOSIS — F41.1 GENERALIZED ANXIETY DISORDER: ICD-10-CM

## 2022-07-19 RX ORDER — ESCITALOPRAM OXALATE 10 MG/1
10 TABLET ORAL DAILY
Qty: 90 TABLET | Refills: 3 | Status: SHIPPED | OUTPATIENT
Start: 2022-07-19 | End: 2023-01-03 | Stop reason: SDUPTHER

## 2022-07-19 RX ORDER — FUROSEMIDE 40 MG/1
40 TABLET ORAL 2 TIMES DAILY
Qty: 60 TABLET | Refills: 0 | OUTPATIENT
Start: 2022-07-19

## 2022-07-21 ENCOUNTER — OFFICE VISIT (OUTPATIENT)
Dept: FAMILY MEDICINE | Facility: HOSPITAL | Age: 57
End: 2022-07-21
Attending: FAMILY MEDICINE
Payer: MEDICAID

## 2022-07-21 VITALS
HEART RATE: 98 BPM | SYSTOLIC BLOOD PRESSURE: 149 MMHG | WEIGHT: 285.94 LBS | HEIGHT: 67 IN | DIASTOLIC BLOOD PRESSURE: 101 MMHG | BODY MASS INDEX: 44.88 KG/M2

## 2022-07-21 DIAGNOSIS — B02.9 HERPES ZOSTER WITHOUT COMPLICATION: ICD-10-CM

## 2022-07-21 DIAGNOSIS — R51.9 SEVERE FRONTAL HEADACHES: Primary | ICD-10-CM

## 2022-07-21 DIAGNOSIS — G47.00 INSOMNIA, UNSPECIFIED TYPE: ICD-10-CM

## 2022-07-21 DIAGNOSIS — L29.9 PRURITUS: ICD-10-CM

## 2022-07-21 DIAGNOSIS — T14.8XXA WOUND INFECTION: ICD-10-CM

## 2022-07-21 DIAGNOSIS — R60.9 CHRONIC EDEMA: ICD-10-CM

## 2022-07-21 DIAGNOSIS — L08.9 WOUND INFECTION: ICD-10-CM

## 2022-07-21 DIAGNOSIS — I10 PRIMARY HYPERTENSION: ICD-10-CM

## 2022-07-21 DIAGNOSIS — R11.0 NAUSEA: ICD-10-CM

## 2022-07-21 DIAGNOSIS — F41.1 GENERALIZED ANXIETY DISORDER: ICD-10-CM

## 2022-07-21 PROCEDURE — 96372 THER/PROPH/DIAG INJ SC/IM: CPT

## 2022-07-21 PROCEDURE — 99213 OFFICE O/P EST LOW 20 MIN: CPT | Mod: 25 | Performed by: STUDENT IN AN ORGANIZED HEALTH CARE EDUCATION/TRAINING PROGRAM

## 2022-07-21 RX ORDER — MUPIROCIN 20 MG/G
OINTMENT TOPICAL 3 TIMES DAILY
Qty: 30 G | Refills: 1 | Status: SHIPPED | OUTPATIENT
Start: 2022-07-21 | End: 2022-10-15

## 2022-07-21 RX ORDER — LISINOPRIL 10 MG/1
10 TABLET ORAL DAILY
Qty: 90 TABLET | Refills: 3 | Status: SHIPPED | OUTPATIENT
Start: 2022-07-21 | End: 2022-10-15

## 2022-07-21 RX ORDER — TRIAMCINOLONE ACETONIDE 1 MG/G
CREAM TOPICAL 2 TIMES DAILY
Qty: 80 G | Refills: 1 | Status: SHIPPED | OUTPATIENT
Start: 2022-07-21 | End: 2023-01-03 | Stop reason: SDUPTHER

## 2022-07-21 RX ORDER — PROMETHAZINE HYDROCHLORIDE 12.5 MG/1
12.5 TABLET ORAL EVERY 6 HOURS PRN
Qty: 70 TABLET | Refills: 0 | Status: SHIPPED | OUTPATIENT
Start: 2022-07-21 | End: 2022-11-29 | Stop reason: SDUPTHER

## 2022-07-21 RX ORDER — CLONIDINE HYDROCHLORIDE 0.1 MG/1
0.1 TABLET ORAL DAILY
Qty: 90 TABLET | Refills: 3 | Status: SHIPPED | OUTPATIENT
Start: 2022-07-21 | End: 2023-01-03 | Stop reason: SDUPTHER

## 2022-07-21 RX ORDER — KETOROLAC TROMETHAMINE 30 MG/ML
60 INJECTION, SOLUTION INTRAMUSCULAR; INTRAVENOUS
Status: COMPLETED | OUTPATIENT
Start: 2022-07-21 | End: 2022-07-21

## 2022-07-21 RX ORDER — OLANZAPINE 20 MG/1
20 TABLET ORAL NIGHTLY
Qty: 30 TABLET | Refills: 6 | Status: SHIPPED | OUTPATIENT
Start: 2022-07-21 | End: 2023-01-03 | Stop reason: SDUPTHER

## 2022-07-21 RX ORDER — AMLODIPINE BESYLATE 10 MG/1
10 TABLET ORAL DAILY
Qty: 30 TABLET | Refills: 11 | Status: SHIPPED | OUTPATIENT
Start: 2022-07-21 | End: 2023-01-03 | Stop reason: SDUPTHER

## 2022-07-21 RX ORDER — FUROSEMIDE 40 MG/1
40 TABLET ORAL 2 TIMES DAILY
Qty: 60 TABLET | Refills: 3 | Status: SHIPPED | OUTPATIENT
Start: 2022-07-21 | End: 2023-01-03 | Stop reason: SDUPTHER

## 2022-07-21 RX ORDER — VALACYCLOVIR HYDROCHLORIDE 1 G/1
1000 TABLET, FILM COATED ORAL 3 TIMES DAILY
Qty: 21 TABLET | Refills: 0 | Status: SHIPPED | OUTPATIENT
Start: 2022-07-21 | End: 2022-10-15

## 2022-07-21 RX ADMIN — KETOROLAC TROMETHAMINE 60 MG: 30 INJECTION, SOLUTION INTRAMUSCULAR at 12:07
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
## 2022-07-21 NOTE — PROGRESS NOTES
Per order from Dr. Berumen, pt given Toradol 60 mg in right deltoid. BandAid applied. Pt tolerated well.

## 2022-08-01 NOTE — PROGRESS NOTES
I assume primary medical responsibility for this patient. I have reviewed the history, physical, and assessement & treatment plan with the resident and agree that the care is reasonable and necessary. This service has been performed by a resident without the presence of a teaching physician under the primary care exception. If necessary, an addendum of additional findings or evaluation beyond the resident documentation will be noted below.    Changing BP management plan. May need further work-up in the future if resistant to medications.     Albina De Leon MD    [facility]

## 2022-09-16 ENCOUNTER — TELEPHONE (OUTPATIENT)
Dept: FAMILY MEDICINE | Facility: HOSPITAL | Age: 57
End: 2022-09-16
Payer: MEDICAID

## 2022-09-16 DIAGNOSIS — J45.50 SEVERE PERSISTENT ASTHMA, UNSPECIFIED WHETHER COMPLICATED: ICD-10-CM

## 2022-09-16 RX ORDER — ALBUTEROL SULFATE 90 UG/1
1-2 AEROSOL, METERED RESPIRATORY (INHALATION) EVERY 6 HOURS PRN
Qty: 18 G | Refills: 1 | Status: SHIPPED | OUTPATIENT
Start: 2022-09-16 | End: 2022-10-15 | Stop reason: SDUPTHER

## 2022-09-16 NOTE — TELEPHONE ENCOUNTER
----- Message from Barbara Kahn sent at 9/16/2022  3:24 PM CDT -----  Patient requested a refill on the following medicine,      albuterol (PROVENTIL/VENTOLIN HFA) 90 mcg/actuation inhaler      [facility] [address]   Phone:  [phone]  Fax:  [phone]

## 2022-10-15 ENCOUNTER — HOSPITAL ENCOUNTER (EMERGENCY)
Facility: HOSPITAL | Age: 57
Discharge: HOME | End: 2022-10-15
Attending: STUDENT IN AN ORGANIZED HEALTH CARE EDUCATION/TRAINING PROGRAM
Payer: MEDICAID

## 2022-10-15 VITALS
BODY MASS INDEX: 43.95 KG/M2 | HEART RATE: 77 BPM | SYSTOLIC BLOOD PRESSURE: 144 MMHG | WEIGHT: 280 LBS | HEIGHT: 67 IN | TEMPERATURE: 99 F | RESPIRATION RATE: 16 BRPM | DIASTOLIC BLOOD PRESSURE: 85 MMHG | OXYGEN SATURATION: 98 %

## 2022-10-15 DIAGNOSIS — B88.2: ICD-10-CM

## 2022-10-15 DIAGNOSIS — J44.1 COPD WITH ACUTE EXACERBATION: Primary | ICD-10-CM

## 2022-10-15 LAB
ALBUMIN SERPL BCP-MCNC: 3.3 G/DL (ref 3.5–5.2)
ALP SERPL-CCNC: 109 U/L (ref 55–135)
ALT SERPL W/O P-5'-P-CCNC: 14 U/L (ref 10–44)
ANION GAP SERPL CALC-SCNC: 13 MMOL/L (ref 8–16)
AST SERPL-CCNC: 28 U/L (ref 10–40)
BASOPHILS # BLD AUTO: 0.01 K/UL (ref 0–0.2)
BASOPHILS NFR BLD: 0.2 % (ref 0–1.9)
BILIRUB SERPL-MCNC: 0.8 MG/DL (ref 0.1–1)
BNP SERPL-MCNC: 23 PG/ML (ref 0–99)
BUN SERPL-MCNC: 15 MG/DL (ref 6–20)
CALCIUM SERPL-MCNC: 9 MG/DL (ref 8.7–10.5)
CHLORIDE SERPL-SCNC: 100 MMOL/L (ref 95–110)
CO2 SERPL-SCNC: 28 MMOL/L (ref 23–29)
CREAT SERPL-MCNC: 1.3 MG/DL (ref 0.5–1.4)
DIFFERENTIAL METHOD: ABNORMAL
EOSINOPHIL # BLD AUTO: 0.1 K/UL (ref 0–0.5)
EOSINOPHIL NFR BLD: 2.4 % (ref 0–8)
ERYTHROCYTE [DISTWIDTH] IN BLOOD BY AUTOMATED COUNT: 12.9 % (ref 11.5–14.5)
EST. GFR  (NO RACE VARIABLE): >60 ML/MIN/1.73 M^2
GIANT PLATELETS BLD QL SMEAR: PRESENT
GLUCOSE SERPL-MCNC: 74 MG/DL (ref 70–110)
HCT VFR BLD AUTO: 39.2 % (ref 40–54)
HGB BLD-MCNC: 12.3 G/DL (ref 14–18)
IMM GRANULOCYTES # BLD AUTO: 0.01 K/UL (ref 0–0.04)
IMM GRANULOCYTES NFR BLD AUTO: 0.2 % (ref 0–0.5)
LYMPHOCYTES # BLD AUTO: 1.2 K/UL (ref 1–4.8)
LYMPHOCYTES NFR BLD: 21.4 % (ref 18–48)
MCH RBC QN AUTO: 31.1 PG (ref 27–31)
MCHC RBC AUTO-ENTMCNC: 31.4 G/DL (ref 32–36)
MCV RBC AUTO: 99 FL (ref 82–98)
MONOCYTES # BLD AUTO: 0.3 K/UL (ref 0.3–1)
MONOCYTES NFR BLD: 6.1 % (ref 4–15)
NEUTROPHILS # BLD AUTO: 3.8 K/UL (ref 1.8–7.7)
NEUTROPHILS NFR BLD: 69.7 % (ref 38–73)
NRBC BLD-RTO: 0 /100 WBC
PLATELET # BLD AUTO: 119 K/UL (ref 150–450)
PLATELET BLD QL SMEAR: ABNORMAL
PMV BLD AUTO: 11.2 FL (ref 9.2–12.9)
POTASSIUM SERPL-SCNC: 4.8 MMOL/L (ref 3.5–5.1)
PROT SERPL-MCNC: 7.7 G/DL (ref 6–8.4)
RBC # BLD AUTO: 3.96 M/UL (ref 4.6–6.2)
SODIUM SERPL-SCNC: 141 MMOL/L (ref 136–145)
TROPONIN I SERPL DL<=0.01 NG/ML-MCNC: 0.01 NG/ML (ref 0–0.03)
WBC # BLD AUTO: 5.41 K/UL (ref 3.9–12.7)

## 2022-10-15 PROCEDURE — 96375 TX/PRO/DX INJ NEW DRUG ADDON: CPT

## 2022-10-15 PROCEDURE — 84484 ASSAY OF TROPONIN QUANT: CPT | Performed by: STUDENT IN AN ORGANIZED HEALTH CARE EDUCATION/TRAINING PROGRAM

## 2022-10-15 PROCEDURE — 93010 EKG 12-LEAD: ICD-10-PCS | Mod: ,,, | Performed by: INTERNAL MEDICINE

## 2022-10-15 PROCEDURE — 25000242 PHARM REV CODE 250 ALT 637 W/ HCPCS: Performed by: STUDENT IN AN ORGANIZED HEALTH CARE EDUCATION/TRAINING PROGRAM

## 2022-10-15 PROCEDURE — 93005 ELECTROCARDIOGRAM TRACING: CPT

## 2022-10-15 PROCEDURE — 80053 COMPREHEN METABOLIC PANEL: CPT | Performed by: STUDENT IN AN ORGANIZED HEALTH CARE EDUCATION/TRAINING PROGRAM

## 2022-10-15 PROCEDURE — 93010 ELECTROCARDIOGRAM REPORT: CPT | Mod: ,,, | Performed by: INTERNAL MEDICINE

## 2022-10-15 PROCEDURE — 99285 EMERGENCY DEPT VISIT HI MDM: CPT | Mod: 25

## 2022-10-15 PROCEDURE — 85025 COMPLETE CBC W/AUTO DIFF WBC: CPT | Performed by: STUDENT IN AN ORGANIZED HEALTH CARE EDUCATION/TRAINING PROGRAM

## 2022-10-15 PROCEDURE — 96374 THER/PROPH/DIAG INJ IV PUSH: CPT

## 2022-10-15 PROCEDURE — 83880 ASSAY OF NATRIURETIC PEPTIDE: CPT | Performed by: STUDENT IN AN ORGANIZED HEALTH CARE EDUCATION/TRAINING PROGRAM

## 2022-10-15 PROCEDURE — 94640 AIRWAY INHALATION TREATMENT: CPT

## 2022-10-15 PROCEDURE — 94761 N-INVAS EAR/PLS OXIMETRY MLT: CPT

## 2022-10-15 PROCEDURE — 27000221 HC OXYGEN, UP TO 24 HOURS

## 2022-10-15 PROCEDURE — 63600175 PHARM REV CODE 636 W HCPCS: Performed by: STUDENT IN AN ORGANIZED HEALTH CARE EDUCATION/TRAINING PROGRAM

## 2022-10-15 RX ORDER — ONDANSETRON 2 MG/ML
4 INJECTION INTRAMUSCULAR; INTRAVENOUS
Status: COMPLETED | OUTPATIENT
Start: 2022-10-15 | End: 2022-10-15

## 2022-10-15 RX ORDER — NAPROXEN SODIUM 220 MG
220 TABLET ORAL
COMMUNITY
End: 2023-01-25

## 2022-10-15 RX ORDER — DEXAMETHASONE SODIUM PHOSPHATE 4 MG/ML
10 INJECTION, SOLUTION INTRA-ARTICULAR; INTRALESIONAL; INTRAMUSCULAR; INTRAVENOUS; SOFT TISSUE
Status: COMPLETED | OUTPATIENT
Start: 2022-10-15 | End: 2022-10-15

## 2022-10-15 RX ORDER — IPRATROPIUM BROMIDE AND ALBUTEROL SULFATE 2.5; .5 MG/3ML; MG/3ML
3 SOLUTION RESPIRATORY (INHALATION)
Status: COMPLETED | OUTPATIENT
Start: 2022-10-15 | End: 2022-10-15

## 2022-10-15 RX ORDER — ALBUTEROL SULFATE 90 UG/1
2 AEROSOL, METERED RESPIRATORY (INHALATION) ONCE
Status: COMPLETED | OUTPATIENT
Start: 2022-10-15 | End: 2022-10-15

## 2022-10-15 RX ADMIN — DEXAMETHASONE SODIUM PHOSPHATE 10 MG: 4 INJECTION, SOLUTION INTRA-ARTICULAR; INTRALESIONAL; INTRAMUSCULAR; INTRAVENOUS; SOFT TISSUE at 06:10

## 2022-10-15 RX ADMIN — ALBUTEROL SULFATE 2 PUFF: 90 AEROSOL, METERED RESPIRATORY (INHALATION) at 06:10

## 2022-10-15 RX ADMIN — IPRATROPIUM BROMIDE AND ALBUTEROL SULFATE 3 ML: .5; 3 SOLUTION RESPIRATORY (INHALATION) at 03:10

## 2022-10-15 RX ADMIN — ONDANSETRON 4 MG: 2 INJECTION INTRAMUSCULAR; INTRAVENOUS at 06:10

## 2022-10-15 NOTE — PHARMACY MED REC
"Admission Medication History     The home medication history was taken by Nahomy Owen CPhT.    Medication history obtained from, Patient Verified    You may go to "Admission" then "Reconcile Home Medications" tabs to review and/or act upon these items.     The home medication list has been updated by the Pharmacy department.   Please read ALL comments highlighted in yellow.   Please address this information as you see fit.    Feel free to contact us if you have any questions or require assistance.      The medications listed below were removed from the home medication list.  Please reorder if appropriate:  Patient reports no longer taking the following medication(s):  Lisinopril 10 mg  Mupirocin 2% ointment  Valtrex 1000 mg      Nahomy Owen CPhT.  Ext [phone]               .        "

## 2022-10-15 NOTE — ED NOTES
Patient belongings being double bagged and patient being cleaned, shower cap placed on patients head
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
## 2022-10-15 NOTE — ED TRIAGE NOTES
Pt admitted to the ED for SOB. Upon EMS arrival patient tachypenic and tachycardic with O2 sat of 86% on room air. After revicing duo neb patient O2 returned to 100%. Pt reporting his home nebulizer broke appx 3 days ago. Pt also reports he is O2 dependent on 2L. EMS also reports removing 3 bugs from patient.

## 2022-10-16 NOTE — ED NOTES
Care assumed from day staff, patient awaiting transportation home. Given food and drink as desired.    Airway patent,  Breathing spontaneously, maintaining oxygen saturations above 95% on NC  Monitored in sinus rhythm, normotensive  Alert and orientated  Afebrile, nil reports of pain or discomfort.    RS RN

## 2022-11-29 DIAGNOSIS — R11.0 NAUSEA: ICD-10-CM

## 2022-11-29 DIAGNOSIS — J44.9 CHRONIC OBSTRUCTIVE PULMONARY DISEASE, UNSPECIFIED COPD TYPE: ICD-10-CM

## 2022-11-29 RX ORDER — PROMETHAZINE HYDROCHLORIDE 12.5 MG/1
12.5 TABLET ORAL EVERY 6 HOURS PRN
Qty: 70 TABLET | Refills: 0 | Status: SHIPPED | OUTPATIENT
Start: 2022-11-29 | End: 2023-01-03 | Stop reason: SDUPTHER

## 2022-11-29 RX ORDER — ALBUTEROL SULFATE 90 UG/1
2 AEROSOL, METERED RESPIRATORY (INHALATION) EVERY 6 HOURS PRN
Qty: 18 G | Refills: 1 | Status: SHIPPED | OUTPATIENT
Start: 2022-11-29 | End: 2023-01-03 | Stop reason: SDUPTHER

## 2022-12-22 DIAGNOSIS — J44.9 CHRONIC OBSTRUCTIVE PULMONARY DISEASE, UNSPECIFIED COPD TYPE: ICD-10-CM

## 2022-12-22 RX ORDER — IPRATROPIUM BROMIDE AND ALBUTEROL SULFATE 2.5; .5 MG/3ML; MG/3ML
3 SOLUTION RESPIRATORY (INHALATION) EVERY 4 HOURS PRN
Qty: 1620 ML | Refills: 3 | Status: SHIPPED | OUTPATIENT
Start: 2022-12-22 | End: 2023-01-03 | Stop reason: SDUPTHER

## 2022-12-29 DIAGNOSIS — R11.0 NAUSEA: ICD-10-CM

## 2022-12-29 RX ORDER — PROMETHAZINE HYDROCHLORIDE 12.5 MG/1
12.5 TABLET ORAL EVERY 6 HOURS PRN
Qty: 70 TABLET | Refills: 0 | OUTPATIENT
Start: 2022-12-29

## 2023-01-03 ENCOUNTER — OFFICE VISIT (OUTPATIENT)
Dept: FAMILY MEDICINE | Facility: HOSPITAL | Age: 58
End: 2023-01-03
Payer: MEDICAID

## 2023-01-03 VITALS
HEIGHT: 68 IN | DIASTOLIC BLOOD PRESSURE: 71 MMHG | HEART RATE: 94 BPM | SYSTOLIC BLOOD PRESSURE: 112 MMHG | BODY MASS INDEX: 41.67 KG/M2 | WEIGHT: 274.94 LBS

## 2023-01-03 DIAGNOSIS — J44.9 CHRONIC OBSTRUCTIVE PULMONARY DISEASE, UNSPECIFIED COPD TYPE: ICD-10-CM

## 2023-01-03 DIAGNOSIS — R60.9 CHRONIC EDEMA: ICD-10-CM

## 2023-01-03 DIAGNOSIS — I10 PRIMARY HYPERTENSION: ICD-10-CM

## 2023-01-03 DIAGNOSIS — L60.0 IGTN (INGROWING TOE NAIL): Primary | ICD-10-CM

## 2023-01-03 DIAGNOSIS — R11.0 NAUSEA: ICD-10-CM

## 2023-01-03 DIAGNOSIS — K59.09 CHRONIC CONSTIPATION: ICD-10-CM

## 2023-01-03 DIAGNOSIS — G47.00 INSOMNIA, UNSPECIFIED TYPE: ICD-10-CM

## 2023-01-03 DIAGNOSIS — L29.9 PRURITUS: ICD-10-CM

## 2023-01-03 DIAGNOSIS — F41.1 GENERALIZED ANXIETY DISORDER: ICD-10-CM

## 2023-01-03 PROCEDURE — 99215 OFFICE O/P EST HI 40 MIN: CPT | Performed by: STUDENT IN AN ORGANIZED HEALTH CARE EDUCATION/TRAINING PROGRAM

## 2023-01-03 RX ORDER — OLANZAPINE 20 MG/1
20 TABLET ORAL NIGHTLY
Qty: 30 TABLET | Refills: 6 | Status: ON HOLD | OUTPATIENT
Start: 2023-01-03 | End: 2023-07-02 | Stop reason: SDUPTHER

## 2023-01-03 RX ORDER — PROMETHAZINE HYDROCHLORIDE 12.5 MG/1
12.5 TABLET ORAL EVERY 6 HOURS PRN
Qty: 70 TABLET | Refills: 0 | Status: SHIPPED | OUTPATIENT
Start: 2023-01-03 | End: 2023-03-20 | Stop reason: SDUPTHER

## 2023-01-03 RX ORDER — POLYETHYLENE GLYCOL 3350 17 G/17G
17 POWDER, FOR SOLUTION ORAL DAILY
Qty: 90 EACH | Refills: 1 | Status: SHIPPED | OUTPATIENT
Start: 2023-01-03 | End: 2023-01-25

## 2023-01-03 RX ORDER — IPRATROPIUM BROMIDE AND ALBUTEROL SULFATE 2.5; .5 MG/3ML; MG/3ML
3 SOLUTION RESPIRATORY (INHALATION) EVERY 4 HOURS PRN
Qty: 1620 ML | Refills: 3 | Status: SHIPPED | OUTPATIENT
Start: 2023-01-03 | End: 2023-06-19

## 2023-01-03 RX ORDER — FUROSEMIDE 40 MG/1
40 TABLET ORAL 2 TIMES DAILY
Qty: 60 TABLET | Refills: 3 | Status: ON HOLD | OUTPATIENT
Start: 2023-01-03 | End: 2023-01-06 | Stop reason: SDUPTHER

## 2023-01-03 RX ORDER — LISINOPRIL 5 MG/1
5 TABLET ORAL DAILY
Qty: 90 TABLET | Refills: 3 | Status: SHIPPED | OUTPATIENT
Start: 2023-01-03 | End: 2023-01-05 | Stop reason: DRUGHIGH

## 2023-01-03 RX ORDER — TIOTROPIUM BROMIDE AND OLODATEROL 3.124; 2.736 UG/1; UG/1
2 SPRAY, METERED RESPIRATORY (INHALATION) DAILY
Qty: 4 G | Refills: 0 | Status: SHIPPED | OUTPATIENT
Start: 2023-01-03 | End: 2023-01-05

## 2023-01-03 RX ORDER — ESCITALOPRAM OXALATE 10 MG/1
10 TABLET ORAL DAILY
Qty: 90 TABLET | Refills: 3 | Status: SHIPPED | OUTPATIENT
Start: 2023-01-03 | End: 2023-02-07 | Stop reason: SDUPTHER

## 2023-01-03 RX ORDER — ACETAMINOPHEN 500 MG
1000 TABLET ORAL EVERY 6 HOURS PRN
Qty: 30 TABLET | Refills: 1 | Status: SHIPPED | OUTPATIENT
Start: 2023-01-03 | End: 2023-02-07

## 2023-01-03 RX ORDER — IBUPROFEN 600 MG/1
600 TABLET ORAL 3 TIMES DAILY
Qty: 60 TABLET | Refills: 1 | Status: ON HOLD | OUTPATIENT
Start: 2023-01-03 | End: 2023-06-21 | Stop reason: HOSPADM

## 2023-01-03 RX ORDER — CLONIDINE HYDROCHLORIDE 0.1 MG/1
0.1 TABLET ORAL ONCE
Qty: 90 TABLET | Refills: 3 | Status: SHIPPED | OUTPATIENT
Start: 2023-01-03 | End: 2023-06-19 | Stop reason: SDUPTHER

## 2023-01-03 RX ORDER — LISINOPRIL 10 MG/1
TABLET ORAL
COMMUNITY
Start: 2023-01-02 | End: 2023-01-03 | Stop reason: SDUPTHER

## 2023-01-03 RX ORDER — TRIAMCINOLONE ACETONIDE 1 MG/G
CREAM TOPICAL 2 TIMES DAILY
Qty: 80 G | Refills: 1 | Status: SHIPPED | OUTPATIENT
Start: 2023-01-03 | End: 2023-01-25 | Stop reason: SDUPTHER

## 2023-01-03 RX ORDER — DICLOFENAC SODIUM 10 MG/G
2 GEL TOPICAL 4 TIMES DAILY
Qty: 100 G | Refills: 0 | Status: ON HOLD | OUTPATIENT
Start: 2023-01-03 | End: 2023-06-21 | Stop reason: HOSPADM

## 2023-01-03 RX ORDER — AMLODIPINE BESYLATE 10 MG/1
10 TABLET ORAL DAILY
Qty: 30 TABLET | Refills: 11 | Status: ON HOLD | OUTPATIENT
Start: 2023-01-03 | End: 2023-06-21 | Stop reason: HOSPADM

## 2023-01-03 RX ORDER — ALBUTEROL SULFATE 90 UG/1
2 AEROSOL, METERED RESPIRATORY (INHALATION) EVERY 6 HOURS PRN
Qty: 18 G | Refills: 1 | Status: SHIPPED | OUTPATIENT
Start: 2023-01-03 | End: 2023-02-08 | Stop reason: SDUPTHER

## 2023-01-03 RX ORDER — DOCUSATE SODIUM 100 MG/1
100 CAPSULE, LIQUID FILLED ORAL 2 TIMES DAILY
Qty: 60 CAPSULE | Refills: 1 | Status: ON HOLD | OUTPATIENT
Start: 2023-01-03 | End: 2023-07-02 | Stop reason: SDUPTHER
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
## 2023-01-04 ENCOUNTER — HOSPITAL ENCOUNTER (OUTPATIENT)
Facility: HOSPITAL | Age: 58
Discharge: HOME | End: 2023-01-06
Attending: STUDENT IN AN ORGANIZED HEALTH CARE EDUCATION/TRAINING PROGRAM | Admitting: STUDENT IN AN ORGANIZED HEALTH CARE EDUCATION/TRAINING PROGRAM
Payer: MEDICAID

## 2023-01-04 ENCOUNTER — TELEPHONE (OUTPATIENT)
Dept: FAMILY MEDICINE | Facility: HOSPITAL | Age: 58
End: 2023-01-04
Payer: MEDICAID

## 2023-01-04 DIAGNOSIS — N17.9 AKI (ACUTE KIDNEY INJURY): ICD-10-CM

## 2023-01-04 DIAGNOSIS — M79.89 RIGHT LEG SWELLING: ICD-10-CM

## 2023-01-04 DIAGNOSIS — R60.0 BILATERAL LOWER EXTREMITY EDEMA: ICD-10-CM

## 2023-01-04 DIAGNOSIS — R60.9 CHRONIC EDEMA: ICD-10-CM

## 2023-01-04 DIAGNOSIS — J44.1 ACUTE EXACERBATION OF COPD WITH ASTHMA: ICD-10-CM

## 2023-01-04 DIAGNOSIS — J45.901 ASTHMA EXACERBATION IN COPD: ICD-10-CM

## 2023-01-04 DIAGNOSIS — I10 HYPERTENSION, UNSPECIFIED TYPE: ICD-10-CM

## 2023-01-04 DIAGNOSIS — J44.1 COPD WITH ACUTE EXACERBATION: Primary | ICD-10-CM

## 2023-01-04 DIAGNOSIS — M79.605 LEFT LEG PAIN: ICD-10-CM

## 2023-01-04 DIAGNOSIS — R06.02 SHORTNESS OF BREATH: ICD-10-CM

## 2023-01-04 DIAGNOSIS — J45.901 ACUTE EXACERBATION OF COPD WITH ASTHMA: ICD-10-CM

## 2023-01-04 DIAGNOSIS — J44.1 ASTHMA EXACERBATION IN COPD: ICD-10-CM

## 2023-01-04 DIAGNOSIS — J44.9 CHRONIC OBSTRUCTIVE PULMONARY DISEASE, UNSPECIFIED COPD TYPE: ICD-10-CM

## 2023-01-04 DIAGNOSIS — I50.9 CHF (CONGESTIVE HEART FAILURE): ICD-10-CM

## 2023-01-04 DIAGNOSIS — I50.9 ACUTE ON CHRONIC CONGESTIVE HEART FAILURE, UNSPECIFIED HEART FAILURE TYPE: ICD-10-CM

## 2023-01-04 DIAGNOSIS — R60.9 SWELLING: ICD-10-CM

## 2023-01-04 PROBLEM — L60.0 IGTN (INGROWING TOE NAIL): Status: ACTIVE | Noted: 2023-01-04

## 2023-01-04 LAB
ALBUMIN SERPL BCP-MCNC: 4 G/DL (ref 3.5–5.2)
ALP SERPL-CCNC: 128 U/L (ref 55–135)
ALT SERPL W/O P-5'-P-CCNC: 27 U/L (ref 10–44)
ANION GAP SERPL CALC-SCNC: 13 MMOL/L (ref 8–16)
AST SERPL-CCNC: 44 U/L (ref 10–40)
BACTERIA #/AREA URNS HPF: NORMAL /HPF
BILIRUB SERPL-MCNC: 0.6 MG/DL (ref 0.1–1)
BILIRUB UR QL STRIP: NEGATIVE
BNP SERPL-MCNC: <10 PG/ML (ref 0–99)
BUN SERPL-MCNC: 31 MG/DL (ref 6–20)
CALCIUM SERPL-MCNC: 9 MG/DL (ref 8.7–10.5)
CHLORIDE SERPL-SCNC: 98 MMOL/L (ref 95–110)
CLARITY UR: CLEAR
CO2 SERPL-SCNC: 27 MMOL/L (ref 23–29)
COLOR UR: YELLOW
CREAT SERPL-MCNC: 1.9 MG/DL (ref 0.5–1.4)
CTP QC/QA: YES
CTP QC/QA: YES
ERYTHROCYTE [DISTWIDTH] IN BLOOD BY AUTOMATED COUNT: 12.4 % (ref 11.5–14.5)
EST. GFR  (NO RACE VARIABLE): 40 ML/MIN/1.73 M^2
ESTIMATED AVG GLUCOSE: 88 MG/DL (ref 68–131)
GLUCOSE SERPL-MCNC: 95 MG/DL (ref 70–110)
GLUCOSE UR QL STRIP: NEGATIVE
HBA1C MFR BLD: 4.7 % (ref 4–5.6)
HCT VFR BLD AUTO: 36.6 % (ref 40–54)
HGB BLD-MCNC: 11.9 G/DL (ref 14–18)
HGB UR QL STRIP: NEGATIVE
HYALINE CASTS #/AREA URNS LPF: 0 /LPF
KETONES UR QL STRIP: NEGATIVE
LEUKOCYTE ESTERASE UR QL STRIP: NEGATIVE
MCH RBC QN AUTO: 30.5 PG (ref 27–31)
MCHC RBC AUTO-ENTMCNC: 32.5 G/DL (ref 32–36)
MCV RBC AUTO: 94 FL (ref 82–98)
MICROSCOPIC COMMENT: NORMAL
NITRITE UR QL STRIP: NEGATIVE
PH UR STRIP: 7 [PH] (ref 5–8)
PLATELET # BLD AUTO: 114 K/UL (ref 150–450)
PMV BLD AUTO: 10.9 FL (ref 9.2–12.9)
POC MOLECULAR INFLUENZA A AGN: NEGATIVE
POC MOLECULAR INFLUENZA B AGN: NEGATIVE
POTASSIUM SERPL-SCNC: 4.7 MMOL/L (ref 3.5–5.1)
PROT SERPL-MCNC: 9.1 G/DL (ref 6–8.4)
PROT UR QL STRIP: ABNORMAL
RBC # BLD AUTO: 3.9 M/UL (ref 4.6–6.2)
RBC #/AREA URNS HPF: 0 /HPF (ref 0–4)
SARS-COV-2 RDRP RESP QL NAA+PROBE: NEGATIVE
SODIUM SERPL-SCNC: 138 MMOL/L (ref 136–145)
SP GR UR STRIP: 1.01 (ref 1–1.03)
SQUAMOUS #/AREA URNS HPF: 0 /HPF
TROPONIN I SERPL DL<=0.01 NG/ML-MCNC: <0.006 NG/ML (ref 0–0.03)
URN SPEC COLLECT METH UR: ABNORMAL
UROBILINOGEN UR STRIP-ACNC: NEGATIVE EU/DL
WBC # BLD AUTO: 5.78 K/UL (ref 3.9–12.7)
WBC #/AREA URNS HPF: 0 /HPF (ref 0–5)

## 2023-01-04 PROCEDURE — 80053 COMPREHEN METABOLIC PANEL: CPT | Performed by: STUDENT IN AN ORGANIZED HEALTH CARE EDUCATION/TRAINING PROGRAM

## 2023-01-04 PROCEDURE — 94644 CONT INHLJ TX 1ST HOUR: CPT

## 2023-01-04 PROCEDURE — G0378 HOSPITAL OBSERVATION PER HR: HCPCS

## 2023-01-04 PROCEDURE — 94640 AIRWAY INHALATION TREATMENT: CPT

## 2023-01-04 PROCEDURE — 83880 ASSAY OF NATRIURETIC PEPTIDE: CPT | Performed by: STUDENT IN AN ORGANIZED HEALTH CARE EDUCATION/TRAINING PROGRAM

## 2023-01-04 PROCEDURE — 80307 DRUG TEST PRSMV CHEM ANLYZR: CPT | Performed by: STUDENT IN AN ORGANIZED HEALTH CARE EDUCATION/TRAINING PROGRAM

## 2023-01-04 PROCEDURE — 94640 AIRWAY INHALATION TREATMENT: CPT | Mod: XB

## 2023-01-04 PROCEDURE — 83036 HEMOGLOBIN GLYCOSYLATED A1C: CPT | Performed by: STUDENT IN AN ORGANIZED HEALTH CARE EDUCATION/TRAINING PROGRAM

## 2023-01-04 PROCEDURE — 93010 ELECTROCARDIOGRAM REPORT: CPT | Mod: ,,, | Performed by: INTERNAL MEDICINE

## 2023-01-04 PROCEDURE — 81000 URINALYSIS NONAUTO W/SCOPE: CPT | Mod: 59 | Performed by: STUDENT IN AN ORGANIZED HEALTH CARE EDUCATION/TRAINING PROGRAM

## 2023-01-04 PROCEDURE — 99900035 HC TECH TIME PER 15 MIN (STAT)

## 2023-01-04 PROCEDURE — 25000242 PHARM REV CODE 250 ALT 637 W/ HCPCS: Performed by: STUDENT IN AN ORGANIZED HEALTH CARE EDUCATION/TRAINING PROGRAM

## 2023-01-04 PROCEDURE — 87635 SARS-COV-2 COVID-19 AMP PRB: CPT | Performed by: FAMILY MEDICINE

## 2023-01-04 PROCEDURE — 93005 ELECTROCARDIOGRAM TRACING: CPT

## 2023-01-04 PROCEDURE — 25000003 PHARM REV CODE 250: Performed by: STUDENT IN AN ORGANIZED HEALTH CARE EDUCATION/TRAINING PROGRAM

## 2023-01-04 PROCEDURE — 96365 THER/PROPH/DIAG IV INF INIT: CPT

## 2023-01-04 PROCEDURE — 96375 TX/PRO/DX INJ NEW DRUG ADDON: CPT

## 2023-01-04 PROCEDURE — 87389 HIV-1 AG W/HIV-1&-2 AB AG IA: CPT | Performed by: STUDENT IN AN ORGANIZED HEALTH CARE EDUCATION/TRAINING PROGRAM

## 2023-01-04 PROCEDURE — 99285 EMERGENCY DEPT VISIT HI MDM: CPT | Mod: 25

## 2023-01-04 PROCEDURE — 85027 COMPLETE CBC AUTOMATED: CPT | Performed by: STUDENT IN AN ORGANIZED HEALTH CARE EDUCATION/TRAINING PROGRAM

## 2023-01-04 PROCEDURE — 87502 INFLUENZA DNA AMP PROBE: CPT

## 2023-01-04 PROCEDURE — 93010 EKG 12-LEAD: ICD-10-PCS | Mod: ,,, | Performed by: INTERNAL MEDICINE

## 2023-01-04 PROCEDURE — 84484 ASSAY OF TROPONIN QUANT: CPT | Performed by: STUDENT IN AN ORGANIZED HEALTH CARE EDUCATION/TRAINING PROGRAM

## 2023-01-04 PROCEDURE — 63600175 PHARM REV CODE 636 W HCPCS: Performed by: STUDENT IN AN ORGANIZED HEALTH CARE EDUCATION/TRAINING PROGRAM

## 2023-01-04 PROCEDURE — 27000221 HC OXYGEN, UP TO 24 HOURS

## 2023-01-04 RX ORDER — OLANZAPINE 5 MG/1
20 TABLET, ORALLY DISINTEGRATING ORAL NIGHTLY
Status: DISCONTINUED | OUTPATIENT
Start: 2023-01-04 | End: 2023-01-06 | Stop reason: HOSPADM

## 2023-01-04 RX ORDER — IBUPROFEN 200 MG
16 TABLET ORAL
Status: DISCONTINUED | OUTPATIENT
Start: 2023-01-04 | End: 2023-01-04

## 2023-01-04 RX ORDER — ACETAMINOPHEN 325 MG/1
650 TABLET ORAL EVERY 4 HOURS PRN
Status: DISCONTINUED | OUTPATIENT
Start: 2023-01-04 | End: 2023-01-06 | Stop reason: HOSPADM

## 2023-01-04 RX ORDER — LISINOPRIL 2.5 MG/1
5 TABLET ORAL DAILY
Status: DISCONTINUED | OUTPATIENT
Start: 2023-01-05 | End: 2023-01-04

## 2023-01-04 RX ORDER — ACETAMINOPHEN 325 MG/1
650 TABLET ORAL EVERY 8 HOURS PRN
Status: DISCONTINUED | OUTPATIENT
Start: 2023-01-04 | End: 2023-01-06 | Stop reason: HOSPADM

## 2023-01-04 RX ORDER — INSULIN ASPART 100 [IU]/ML
1-10 INJECTION, SOLUTION INTRAVENOUS; SUBCUTANEOUS
Status: DISCONTINUED | OUTPATIENT
Start: 2023-01-04 | End: 2023-01-04

## 2023-01-04 RX ORDER — IPRATROPIUM BROMIDE 0.5 MG/2.5ML
1 SOLUTION RESPIRATORY (INHALATION)
Status: COMPLETED | OUTPATIENT
Start: 2023-01-04 | End: 2023-01-04

## 2023-01-04 RX ORDER — IPRATROPIUM BROMIDE AND ALBUTEROL SULFATE 2.5; .5 MG/3ML; MG/3ML
3 SOLUTION RESPIRATORY (INHALATION) EVERY 4 HOURS PRN
Status: DISCONTINUED | OUTPATIENT
Start: 2023-01-04 | End: 2023-01-05

## 2023-01-04 RX ORDER — FUROSEMIDE 10 MG/ML
80 INJECTION INTRAMUSCULAR; INTRAVENOUS
Status: DISCONTINUED | OUTPATIENT
Start: 2023-01-04 | End: 2023-01-04

## 2023-01-04 RX ORDER — NALOXONE HCL 0.4 MG/ML
0.02 VIAL (ML) INJECTION
Status: DISCONTINUED | OUTPATIENT
Start: 2023-01-04 | End: 2023-01-06 | Stop reason: HOSPADM

## 2023-01-04 RX ORDER — ENOXAPARIN SODIUM 100 MG/ML
40 INJECTION SUBCUTANEOUS EVERY 24 HOURS
Status: DISCONTINUED | OUTPATIENT
Start: 2023-01-04 | End: 2023-01-04

## 2023-01-04 RX ORDER — AMOXICILLIN 250 MG
1 CAPSULE ORAL 2 TIMES DAILY
Status: DISCONTINUED | OUTPATIENT
Start: 2023-01-04 | End: 2023-01-06 | Stop reason: HOSPADM

## 2023-01-04 RX ORDER — HEPARIN SODIUM 5000 [USP'U]/ML
5000 INJECTION, SOLUTION INTRAVENOUS; SUBCUTANEOUS EVERY 8 HOURS
Status: DISCONTINUED | OUTPATIENT
Start: 2023-01-04 | End: 2023-01-06 | Stop reason: HOSPADM

## 2023-01-04 RX ORDER — SODIUM CHLORIDE 0.9 % (FLUSH) 0.9 %
5 SYRINGE (ML) INJECTION
Status: DISCONTINUED | OUTPATIENT
Start: 2023-01-04 | End: 2023-01-06 | Stop reason: HOSPADM

## 2023-01-04 RX ORDER — MORPHINE SULFATE 2 MG/ML
2 INJECTION, SOLUTION INTRAMUSCULAR; INTRAVENOUS EVERY 6 HOURS PRN
Status: DISCONTINUED | OUTPATIENT
Start: 2023-01-04 | End: 2023-01-05

## 2023-01-04 RX ORDER — TALC
9 POWDER (GRAM) TOPICAL NIGHTLY PRN
Status: DISCONTINUED | OUTPATIENT
Start: 2023-01-04 | End: 2023-01-06 | Stop reason: HOSPADM

## 2023-01-04 RX ORDER — ALBUTEROL SULFATE 2.5 MG/.5ML
SOLUTION RESPIRATORY (INHALATION)
Status: DISPENSED
Start: 2023-01-04 | End: 2023-01-05

## 2023-01-04 RX ORDER — GLUCAGON 1 MG
1 KIT INJECTION
Status: DISCONTINUED | OUTPATIENT
Start: 2023-01-04 | End: 2023-01-04

## 2023-01-04 RX ORDER — METHYLPREDNISOLONE SOD SUCC 125 MG
125 VIAL (EA) INJECTION
Status: COMPLETED | OUTPATIENT
Start: 2023-01-04 | End: 2023-01-04

## 2023-01-04 RX ORDER — IBUPROFEN 200 MG
24 TABLET ORAL
Status: DISCONTINUED | OUTPATIENT
Start: 2023-01-04 | End: 2023-01-04

## 2023-01-04 RX ORDER — LIDOCAINE 50 MG/G
1 PATCH TOPICAL DAILY PRN
Status: DISCONTINUED | OUTPATIENT
Start: 2023-01-04 | End: 2023-01-06 | Stop reason: HOSPADM

## 2023-01-04 RX ORDER — ALBUTEROL SULFATE 2.5 MG/.5ML
15 SOLUTION RESPIRATORY (INHALATION)
Status: COMPLETED | OUTPATIENT
Start: 2023-01-04 | End: 2023-01-04

## 2023-01-04 RX ORDER — FUROSEMIDE 10 MG/ML
80 INJECTION INTRAMUSCULAR; INTRAVENOUS
Status: COMPLETED | OUTPATIENT
Start: 2023-01-04 | End: 2023-01-04

## 2023-01-04 RX ORDER — FUROSEMIDE 10 MG/ML
40 INJECTION INTRAMUSCULAR; INTRAVENOUS
Status: DISCONTINUED | OUTPATIENT
Start: 2023-01-05 | End: 2023-01-06 | Stop reason: HOSPADM

## 2023-01-04 RX ORDER — AMLODIPINE BESYLATE 5 MG/1
10 TABLET ORAL DAILY
Status: DISCONTINUED | OUTPATIENT
Start: 2023-01-05 | End: 2023-01-06 | Stop reason: HOSPADM

## 2023-01-04 RX ORDER — ALBUTEROL SULFATE 90 UG/1
2 AEROSOL, METERED RESPIRATORY (INHALATION) EVERY 6 HOURS PRN
Status: DISCONTINUED | OUTPATIENT
Start: 2023-01-04 | End: 2023-01-06 | Stop reason: HOSPADM

## 2023-01-04 RX ADMIN — METHYLPREDNISOLONE SODIUM SUCCINATE 125 MG: 125 INJECTION, POWDER, FOR SOLUTION INTRAMUSCULAR; INTRAVENOUS at 04:01

## 2023-01-04 RX ADMIN — FUROSEMIDE 80 MG: 10 INJECTION, SOLUTION INTRAMUSCULAR; INTRAVENOUS at 06:01

## 2023-01-04 RX ADMIN — MORPHINE SULFATE 2 MG: 2 INJECTION, SOLUTION INTRAMUSCULAR; INTRAVENOUS at 09:01

## 2023-01-04 RX ADMIN — PROMETHAZINE HYDROCHLORIDE 25 MG: 25 INJECTION INTRAMUSCULAR; INTRAVENOUS at 04:01

## 2023-01-04 RX ADMIN — IPRATROPIUM BROMIDE 1 MG: 0.5 SOLUTION RESPIRATORY (INHALATION) at 03:01

## 2023-01-04 RX ADMIN — ALBUTEROL SULFATE 15 MG: 2.5 SOLUTION RESPIRATORY (INHALATION) at 03:01
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
## 2023-01-04 NOTE — Clinical Note
Diagnosis: Shortness of breath [786.05.ICD-9-CM]   Future Attending Provider: FIDEL WHITMAN [[id number]]   Admitting Provider:: REGINALDO CARTAGENA [[id number]]

## 2023-01-05 LAB
ALBUMIN SERPL BCP-MCNC: 3.3 G/DL (ref 3.5–5.2)
ALP SERPL-CCNC: 110 U/L (ref 55–135)
ALT SERPL W/O P-5'-P-CCNC: 24 U/L (ref 10–44)
AMPHET+METHAMPHET UR QL: NEGATIVE
ANION GAP SERPL CALC-SCNC: 11 MMOL/L (ref 8–16)
AORTIC ROOT ANNULUS: 2.7 CM
ASCENDING AORTA: 3.27 CM
AST SERPL-CCNC: 29 U/L (ref 10–40)
AV INDEX (PROSTH): 0.91
AV MEAN GRADIENT: 8 MMHG
AV PEAK GRADIENT: 13 MMHG
AV VALVE AREA: 3.16 CM2
AV VELOCITY RATIO: 0.92
BARBITURATES UR QL SCN>200 NG/ML: NEGATIVE
BENZODIAZ UR QL SCN>200 NG/ML: NEGATIVE
BILIRUB SERPL-MCNC: 0.5 MG/DL (ref 0.1–1)
BUN SERPL-MCNC: 30 MG/DL (ref 6–20)
BZE UR QL SCN: NEGATIVE
CALCIUM SERPL-MCNC: 8.7 MG/DL (ref 8.7–10.5)
CANNABINOIDS UR QL SCN: NEGATIVE
CHLORIDE SERPL-SCNC: 99 MMOL/L (ref 95–110)
CO2 SERPL-SCNC: 29 MMOL/L (ref 23–29)
CREAT SERPL-MCNC: 1.8 MG/DL (ref 0.5–1.4)
CREAT UR-MCNC: 26 MG/DL (ref 23–375)
CV ECHO LV RWT: 0.36 CM
DOP CALC AO PEAK VEL: 1.77 M/S
DOP CALC AO VTI: 33.1 CM
DOP CALC LVOT AREA: 3.5 CM2
DOP CALC LVOT DIAMETER: 2.1 CM
DOP CALC LVOT PEAK VEL: 1.62 M/S
DOP CALC LVOT STROKE VOLUME: 104.55 CM3
DOP CALCLVOT PEAK VEL VTI: 30.2 CM
E WAVE DECELERATION TIME: 174.81 MSEC
E/A RATIO: 1.18
E/E' RATIO: 6.4 M/S
ECHO LV POSTERIOR WALL: 0.84 CM (ref 0.6–1.1)
EJECTION FRACTION: 55 %
EST. GFR  (NO RACE VARIABLE): 43 ML/MIN/1.73 M^2
ETHANOL UR-MCNC: <10 MG/DL
FRACTIONAL SHORTENING: 55 % (ref 28–44)
GLUCOSE SERPL-MCNC: 148 MG/DL (ref 70–110)
HIV 1+2 AB+HIV1 P24 AG SERPL QL IA: NORMAL
INTERVENTRICULAR SEPTUM: 0.76 CM (ref 0.6–1.1)
IVRT: 119.89 MSEC
LA MAJOR: 5.54 CM
LA MINOR: 5.58 CM
LEFT ATRIUM SIZE: 2.92 CM
LEFT ATRIUM VOLUME MOD: 58.45 CM3
LEFT INTERNAL DIMENSION IN SYSTOLE: 2.1 CM (ref 2.1–4)
LEFT VENTRICLE DIASTOLIC VOLUME: 100.98 ML
LEFT VENTRICLE SYSTOLIC VOLUME: 29.06 ML
LEFT VENTRICULAR INTERNAL DIMENSION IN DIASTOLE: 4.67 CM (ref 3.5–6)
LEFT VENTRICULAR MASS: 120.94 G
LV LATERAL E/E' RATIO: 5.33 M/S
LV SEPTAL E/E' RATIO: 8 M/S
LVOT MG: 6.48 MMHG
LVOT MV: 1.22 CM/S
MAGNESIUM SERPL-MCNC: 1.9 MG/DL (ref 1.6–2.6)
METHADONE UR QL SCN>300 NG/ML: ABNORMAL
MV PEAK A VEL: 0.68 M/S
MV PEAK E VEL: 0.8 M/S
MV STENOSIS PRESSURE HALF TIME: 50.7 MS
MV VALVE AREA P 1/2 METHOD: 4.34 CM2
OPIATES UR QL SCN: NEGATIVE
PCP UR QL SCN>25 NG/ML: NEGATIVE
PHOSPHATE SERPL-MCNC: 4.8 MG/DL (ref 2.7–4.5)
PISA TR MAX VEL: 2.5 M/S
POTASSIUM SERPL-SCNC: 5.1 MMOL/L (ref 3.5–5.1)
PROT SERPL-MCNC: 8 G/DL (ref 6–8.4)
RA MAJOR: 4.97 CM
RA PRESSURE: 3 MMHG
RA WIDTH: 3.92 CM
RIGHT VENTRICULAR END-DIASTOLIC DIMENSION: 2.4 CM
RIGHT VENTRICULAR LENGTH IN DIASTOLE (APICAL 4-CHAMBER VIEW): 5.6 CM
RV TISSUE DOPPLER FREE WALL SYSTOLIC VELOCITY 1 (APICAL 4 CHAMBER VIEW): 0.02 CM/S
SODIUM SERPL-SCNC: 139 MMOL/L (ref 136–145)
STJ: 2.78 CM
TDI LATERAL: 0.15 M/S
TDI SEPTAL: 0.1 M/S
TDI: 0.13 M/S
TOXICOLOGY INFORMATION: ABNORMAL
TR MAX PG: 25 MMHG
TRICUSPID ANNULAR PLANE SYSTOLIC EXCURSION: 1.8 CM
TV REST PULMONARY ARTERY PRESSURE: 28 MMHG

## 2023-01-05 PROCEDURE — 96372 THER/PROPH/DIAG INJ SC/IM: CPT | Performed by: STUDENT IN AN ORGANIZED HEALTH CARE EDUCATION/TRAINING PROGRAM

## 2023-01-05 PROCEDURE — 25000003 PHARM REV CODE 250: Performed by: STUDENT IN AN ORGANIZED HEALTH CARE EDUCATION/TRAINING PROGRAM

## 2023-01-05 PROCEDURE — 25000242 PHARM REV CODE 250 ALT 637 W/ HCPCS: Performed by: STUDENT IN AN ORGANIZED HEALTH CARE EDUCATION/TRAINING PROGRAM

## 2023-01-05 PROCEDURE — 94640 AIRWAY INHALATION TREATMENT: CPT | Mod: XB

## 2023-01-05 PROCEDURE — 99900035 HC TECH TIME PER 15 MIN (STAT)

## 2023-01-05 PROCEDURE — 94761 N-INVAS EAR/PLS OXIMETRY MLT: CPT

## 2023-01-05 PROCEDURE — 63600175 PHARM REV CODE 636 W HCPCS: Performed by: STUDENT IN AN ORGANIZED HEALTH CARE EDUCATION/TRAINING PROGRAM

## 2023-01-05 PROCEDURE — 96376 TX/PRO/DX INJ SAME DRUG ADON: CPT | Mod: 59

## 2023-01-05 PROCEDURE — 27000221 HC OXYGEN, UP TO 24 HOURS

## 2023-01-05 PROCEDURE — 83735 ASSAY OF MAGNESIUM: CPT | Performed by: STUDENT IN AN ORGANIZED HEALTH CARE EDUCATION/TRAINING PROGRAM

## 2023-01-05 PROCEDURE — G0378 HOSPITAL OBSERVATION PER HR: HCPCS

## 2023-01-05 PROCEDURE — 84100 ASSAY OF PHOSPHORUS: CPT | Performed by: STUDENT IN AN ORGANIZED HEALTH CARE EDUCATION/TRAINING PROGRAM

## 2023-01-05 PROCEDURE — 94640 AIRWAY INHALATION TREATMENT: CPT

## 2023-01-05 PROCEDURE — 80053 COMPREHEN METABOLIC PANEL: CPT | Performed by: STUDENT IN AN ORGANIZED HEALTH CARE EDUCATION/TRAINING PROGRAM

## 2023-01-05 RX ORDER — ALBUTEROL SULFATE 0.83 MG/ML
2.5 SOLUTION RESPIRATORY (INHALATION) EVERY 4 HOURS PRN
COMMUNITY
Start: 2022-11-10 | End: 2023-02-08 | Stop reason: SDUPTHER

## 2023-01-05 RX ORDER — METHADONE HYDROCHLORIDE 10 MG/1
90 TABLET ORAL DAILY
Status: DISCONTINUED | OUTPATIENT
Start: 2023-01-05 | End: 2023-01-06 | Stop reason: SDUPTHER

## 2023-01-05 RX ORDER — IPRATROPIUM BROMIDE AND ALBUTEROL SULFATE 2.5; .5 MG/3ML; MG/3ML
3 SOLUTION RESPIRATORY (INHALATION) EVERY 12 HOURS
Status: DISCONTINUED | OUTPATIENT
Start: 2023-01-05 | End: 2023-01-05

## 2023-01-05 RX ORDER — TIOTROPIUM BROMIDE AND OLODATEROL 3.124; 2.736 UG/1; UG/1
2 SPRAY, METERED RESPIRATORY (INHALATION) DAILY
Qty: 4 G | Refills: 0 | Status: SHIPPED | OUTPATIENT
Start: 2023-01-05 | End: 2023-02-07

## 2023-01-05 RX ORDER — LISINOPRIL 10 MG/1
10 TABLET ORAL DAILY
COMMUNITY
End: 2023-06-19

## 2023-01-05 RX ORDER — IPRATROPIUM BROMIDE AND ALBUTEROL SULFATE 2.5; .5 MG/3ML; MG/3ML
3 SOLUTION RESPIRATORY (INHALATION) EVERY 6 HOURS
Status: DISCONTINUED | OUTPATIENT
Start: 2023-01-05 | End: 2023-01-05

## 2023-01-05 RX ORDER — IPRATROPIUM BROMIDE AND ALBUTEROL SULFATE 2.5; .5 MG/3ML; MG/3ML
3 SOLUTION RESPIRATORY (INHALATION) EVERY 4 HOURS PRN
Status: DISCONTINUED | OUTPATIENT
Start: 2023-01-05 | End: 2023-01-06 | Stop reason: HOSPADM

## 2023-01-05 RX ADMIN — DOCUSATE SODIUM - SENNOSIDES 1 TABLET: 50; 8.6 TABLET, FILM COATED ORAL at 08:01

## 2023-01-05 RX ADMIN — AMLODIPINE BESYLATE 10 MG: 5 TABLET ORAL at 08:01

## 2023-01-05 RX ADMIN — MORPHINE SULFATE 2 MG: 2 INJECTION, SOLUTION INTRAMUSCULAR; INTRAVENOUS at 02:01

## 2023-01-05 RX ADMIN — LIDOCAINE 1 PATCH: 50 PATCH CUTANEOUS at 06:01

## 2023-01-05 RX ADMIN — FUROSEMIDE 40 MG: 10 INJECTION, SOLUTION INTRAMUSCULAR; INTRAVENOUS at 05:01

## 2023-01-05 RX ADMIN — IPRATROPIUM BROMIDE AND ALBUTEROL SULFATE 3 ML: .5; 3 SOLUTION RESPIRATORY (INHALATION) at 02:01

## 2023-01-05 RX ADMIN — ACETAMINOPHEN 650 MG: 325 TABLET ORAL at 06:01

## 2023-01-05 RX ADMIN — DOCUSATE SODIUM - SENNOSIDES 1 TABLET: 50; 8.6 TABLET, FILM COATED ORAL at 01:01

## 2023-01-05 RX ADMIN — IPRATROPIUM BROMIDE AND ALBUTEROL SULFATE 3 ML: .5; 3 SOLUTION RESPIRATORY (INHALATION) at 09:01

## 2023-01-05 RX ADMIN — HEPARIN SODIUM 5000 UNITS: 5000 INJECTION INTRAVENOUS; SUBCUTANEOUS at 01:01

## 2023-01-05 RX ADMIN — METHADONE HYDROCHLORIDE 90 MG: 10 TABLET ORAL at 12:01

## 2023-01-05 RX ADMIN — HEPARIN SODIUM 5000 UNITS: 5000 INJECTION INTRAVENOUS; SUBCUTANEOUS at 10:01

## 2023-01-05 RX ADMIN — OLANZAPINE 20 MG: 5 TABLET, ORALLY DISINTEGRATING ORAL at 01:01

## 2023-01-05 RX ADMIN — OLANZAPINE 20 MG: 5 TABLET, ORALLY DISINTEGRATING ORAL at 08:01

## 2023-01-05 RX ADMIN — MORPHINE SULFATE 2 MG: 2 INJECTION, SOLUTION INTRAMUSCULAR; INTRAVENOUS at 08:01

## 2023-01-05 RX ADMIN — ACETAMINOPHEN 650 MG: 325 TABLET ORAL at 04:01

## 2023-01-05 RX ADMIN — HEPARIN SODIUM 5000 UNITS: 5000 INJECTION INTRAVENOUS; SUBCUTANEOUS at 05:01

## 2023-01-05 NOTE — PHARMACY MED REC
"Admission Medication History     The home medication history was taken by Nahomy Owen CPhT.    Medication history obtained from, Patient and [facility] Verified    You may go to "Admission" then "Reconcile Home Medications" tabs to review and/or act upon these items.     The home medication list has been updated by the Pharmacy department.   Please read ALL comments highlighted in yellow.   Please address this information as you see fit.    Feel free to contact us if you have any questions or require assistance.        Nahomy Owen CPhT.  Ext [phone]               .        "

## 2023-01-05 NOTE — PLAN OF CARE
NASH messaged medical team for consideration for orders for straight cane for home use. CM will continue to follow.       01/05/23 1445   Post-Acute Status   Post-Acute Authorization HME   Discharge Delays (!) Orders Requiring Signature     Viviane Bridges LMSW  ED Social Work  [phone]

## 2023-01-05 NOTE — ASSESSMENT & PLAN NOTE
Unclear etiology  Possibly due leg swelling  US DVT negative    Plan  Diurese  Patient on methadone  Confirm adherence and dosage of methadone  Restart methadone and avoid opioids

## 2023-01-05 NOTE — PROGRESS NOTES
[facility]  [facility] Medicine  Progress Note    Patient Name: Ming Harrington  MRN: [id number]  Patient Class: OP- Observation   Admission Date: 1/4/2023  Length of Stay: 0 days  Attending Physician: Burt Betts MD;Er*  Primary Care Provider: Garland Xiong DO        Subjective:     Principal Problem:Acute on chronic congestive heart failure        HPI:  Patient is a 57 yr Male with pmhx of Asthma, COPD, CHF, Substance use disorder, HTN, Hep c, presented to the ED for shortness of breath, leg pain, and leg swelling. Patient reports worsening symptoms for the past three days. Patient reports shortness of breath with exertion, inability to lay flat in bed and persistent dry cough. Patient reports leg swelling and feeling of heaviness in legs. Patient describes severe pain in left leg from the foot to the knee. Patient reports feeling of chills and night sweats over the last few days. Patient denies chest pain, headache, nausea and vomiting.       Overview/Hospital Course:  No notes on file    Interval History: NAEON    Review of Systems   Constitutional:  Positive for fatigue.   HENT:  Positive for congestion. Negative for sore throat.    Eyes:  Negative for visual disturbance.   Respiratory:  Positive for shortness of breath and wheezing.    Cardiovascular:  Positive for leg swelling.   Gastrointestinal:  Negative for nausea and vomiting.   Genitourinary: Negative.    Musculoskeletal:  Positive for myalgias.   Neurological:  Negative for dizziness and headaches.   Objective:     Vital Signs (Most Recent):  Temp: 97.1 °F (36.2 °C) (01/05/23 0402)  Pulse: 94 (01/05/23 0947)  Resp: (!) 26 (01/05/23 0947)  BP: 130/76 (01/05/23 0601)  SpO2: (!) 94 % (01/05/23 0947)   Vital Signs (24h Range):  Temp:  [97.1 °F (36.2 °C)-97.9 °F (36.6 °C)] 97.1 °F (36.2 °C)  Pulse:  [75-94] 94  Resp:  [14-26] 26  SpO2:  [92 %-100 %] 94 %  BP: (123-185)/() 130/76     Weight: 121.1 kg (267 lb)  Body mass index is 40.6  kg/m².    Intake/Output Summary (Last 24 hours) at 1/5/2023 1026  Last data filed at 1/5/2023 1021  Gross per 24 hour   Intake 1120 ml   Output 4550 ml   Net -3430 ml      Physical Exam  Constitutional:       Appearance: He is obese.   HENT:      Head: Normocephalic.      Right Ear: External ear normal.      Left Ear: External ear normal.      Nose: Congestion present.      Mouth/Throat:      Mouth: Mucous membranes are moist.   Eyes:      Extraocular Movements: Extraocular movements intact.      Pupils: Pupils are equal, round, and reactive to light.   Cardiovascular:      Rate and Rhythm: Normal rate and regular rhythm.      Pulses: Normal pulses.   Pulmonary:      Effort: Pulmonary effort is normal.      Breath sounds: Rhonchi present.      Comments: Decreased breath sounds  Abdominal:      General: There is no distension.      Palpations: Abdomen is soft.      Tenderness: There is no abdominal tenderness.   Musculoskeletal:         General: Swelling and tenderness present.      Cervical back: Normal range of motion.      Right lower leg: Edema present.      Left lower leg: Edema present.   Skin:     General: Skin is warm.      Capillary Refill: Capillary refill takes less than 2 seconds.   Neurological:      General: No focal deficit present.      Mental Status: He is alert and oriented to person, place, and time.       Significant Labs: All pertinent labs within the past 24 hours have been reviewed.  BMP:   Recent Labs   Lab 01/05/23 0522   *      K 5.1   CL 99   CO2 29   BUN 30*   CREATININE 1.8*   CALCIUM 8.7   MG 1.9     CBC:   Recent Labs   Lab 01/04/23  1743   WBC 5.78   HGB 11.9*   HCT 36.6*   *     CMP:   Recent Labs   Lab 01/04/23  1601 01/05/23  0522    139   K 4.7 5.1   CL 98 99   CO2 27 29   GLU 95 148*   BUN 31* 30*   CREATININE 1.9* 1.8*   CALCIUM 9.0 8.7   PROT 9.1* 8.0   ALBUMIN 4.0 3.3*   BILITOT 0.6 0.5   ALKPHOS 128 110   AST 44* 29   ALT 27 24   ANIONGAP 13 11        Significant Imaging: I have reviewed all pertinent imaging results/findings within the past 24 hours.  CXR: I have reviewed all pertinent results/findings within the past 24 hours and my personal findings are:  Peribronchial thickening.  The findings may be seen with viral pneumonitis or reactive airways disease.  No focal consolidation.  U/S: I have reviewed all pertinent results/findings within the past 24 hours and my personal findings are:  No evidence of deep venous thrombosis in either lower extremity.      Assessment/Plan:      * Acute on chronic congestive heart failure  Patient reports 3 days of increasing leg swelling ans shortness of breath  Piting edema present  Signs of orthopnea  Patient has been off his home dose of furosemide 40mg BID for a week    Plan  Furosemide 40mg IV BID   Urinalysis  Monitor I/Os  Follow up Echo  Oxygen 2 L goal of > 88        Left leg pain  Unclear etiology  Possibly due leg swelling  US DVT negative    Plan  Diurese  Patient on methadone  Confirm adherence and dosage of methadone  Restart methadone and avoid opioids       ANNITA (acute kidney injury)  CMP shows BUN/Crt 31/1.9 increase from baseline of 15/1.3   Plan  Monitor I/Os  Daily CMP        COPD (chronic obstructive pulmonary disease)  Presenting with shortness of breath  Patient on  2L O2 continuous at home   Patient has been without controller medication tiotropium- olodaterol for  At least one week  Plan  Monitor Oxygen saturation  NC 2L  Duonebs Q4h prn  Spiriva Daily        Shortness of breath  COPD vs CHF exacerbation in setting of viral URI and medication lack of medication  See Plans for COPD and CHF      HTN (hypertension)  Restart home Amlodipine 10mg  Hold lisinopril due to ANNITA        VTE Risk Mitigation (From admission, onward)         Ordered     heparin (porcine) injection 5,000 Units  Every 8 hours         01/04/23 1816     IP VTE HIGH RISK PATIENT  Once         01/04/23 1805     Place sequential  compression device  Until discontinued         01/04/23 7358                Discharge Planning   RYLAN:      Code Status: Full Code   Is the patient medically ready for discharge?:     Reason for patient still in hospital (select all that apply): Patient trending condition and Treatment             Chiki Sauceda MD  [facility] PGY-2  01/05/2023

## 2023-01-05 NOTE — ASSESSMENT & PLAN NOTE
Patient reports 3 days of increasing leg swelling ans shortness of breath  Piting edema present  Signs of orthopnea  Patient has been off his home dose of furosemide 40mg BID for a week    Plan  Furosemide 40mg IV BID   Urinalysis  Monitor I/Os  Follow up Echo  Oxygen 2 L goal of > 88
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
## 2023-01-05 NOTE — SUBJECTIVE & OBJECTIVE
Interval History: NAEON    Review of Systems   Constitutional:  Positive for fatigue.   HENT:  Positive for congestion. Negative for sore throat.    Eyes:  Negative for visual disturbance.   Respiratory:  Positive for shortness of breath and wheezing.    Cardiovascular:  Positive for leg swelling.   Gastrointestinal:  Negative for nausea and vomiting.   Genitourinary: Negative.    Musculoskeletal:  Positive for myalgias.   Neurological:  Negative for dizziness and headaches.   Objective:     Vital Signs (Most Recent):  Temp: 97.1 °F (36.2 °C) (01/05/23 0402)  Pulse: 94 (01/05/23 0947)  Resp: (!) 26 (01/05/23 0947)  BP: 130/76 (01/05/23 0601)  SpO2: (!) 94 % (01/05/23 0947)   Vital Signs (24h Range):  Temp:  [97.1 °F (36.2 °C)-97.9 °F (36.6 °C)] 97.1 °F (36.2 °C)  Pulse:  [75-94] 94  Resp:  [14-26] 26  SpO2:  [92 %-100 %] 94 %  BP: (123-185)/() 130/76     Weight: 121.1 kg (267 lb)  Body mass index is 40.6 kg/m².    Intake/Output Summary (Last 24 hours) at 1/5/2023 1026  Last data filed at 1/5/2023 1021  Gross per 24 hour   Intake 1120 ml   Output 4550 ml   Net -3430 ml      Physical Exam  Constitutional:       Appearance: He is obese.   HENT:      Head: Normocephalic.      Right Ear: External ear normal.      Left Ear: External ear normal.      Nose: Congestion present.      Mouth/Throat:      Mouth: Mucous membranes are moist.   Eyes:      Extraocular Movements: Extraocular movements intact.      Pupils: Pupils are equal, round, and reactive to light.   Cardiovascular:      Rate and Rhythm: Normal rate and regular rhythm.      Pulses: Normal pulses.   Pulmonary:      Effort: Pulmonary effort is normal.      Breath sounds: Rhonchi present.      Comments: Decreased breath sounds  Abdominal:      General: There is no distension.      Palpations: Abdomen is soft.      Tenderness: There is no abdominal tenderness.   Musculoskeletal:         General: Swelling and tenderness present.      Cervical back: Normal range  of motion.      Right lower leg: Edema present.      Left lower leg: Edema present.   Skin:     General: Skin is warm.      Capillary Refill: Capillary refill takes less than 2 seconds.   Neurological:      General: No focal deficit present.      Mental Status: He is alert and oriented to person, place, and time.       Significant Labs: All pertinent labs within the past 24 hours have been reviewed.  BMP:   Recent Labs   Lab 01/05/23  0522   *      K 5.1   CL 99   CO2 29   BUN 30*   CREATININE 1.8*   CALCIUM 8.7   MG 1.9     CBC:   Recent Labs   Lab 01/04/23  1743   WBC 5.78   HGB 11.9*   HCT 36.6*   *     CMP:   Recent Labs   Lab 01/04/23  1601 01/05/23 0522    139   K 4.7 5.1   CL 98 99   CO2 27 29   GLU 95 148*   BUN 31* 30*   CREATININE 1.9* 1.8*   CALCIUM 9.0 8.7   PROT 9.1* 8.0   ALBUMIN 4.0 3.3*   BILITOT 0.6 0.5   ALKPHOS 128 110   AST 44* 29   ALT 27 24   ANIONGAP 13 11       Significant Imaging: I have reviewed all pertinent imaging results/findings within the past 24 hours.  CXR: I have reviewed all pertinent results/findings within the past 24 hours and my personal findings are:  Peribronchial thickening.  The findings may be seen with viral pneumonitis or reactive airways disease.  No focal consolidation.  U/S: I have reviewed all pertinent results/findings within the past 24 hours and my personal findings are:  No evidence of deep venous thrombosis in either lower extremity.

## 2023-01-05 NOTE — NURSING
Pt c/o leg pain. Upon entering room to perform assessment patient resting quietly, then awakens when nurse assessment being performed declaring 10/10 pain to legs. Stating he has not had pain medication or food throughout night. This nurse administered tylenol previously in night for pain and previous nurse was visibly giving patient Morphine prior to patient hand off. Pt reports he continues to have pain but will fall asleep while talking. NADN. Patient is wearing 2l NC for SOB. Cardiac monitor in place. Pt readjusted in bed and provided with 240ml of apple juice per patient request.
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
## 2023-01-05 NOTE — ED NOTES
Telemetry Box 0689 placed onto patient.  Patient transferred off unit to 4th floor with 1 bag of belonging and home O2 tank.

## 2023-01-05 NOTE — ED NOTES
Pt called nurse to bedside again asking about methadone orders. Pt updated by nurse that admit team is aware and needs the information to the clinic that he uses. Information provided by patient and given to admit team

## 2023-01-05 NOTE — HPI
Patient is a 57 yr Male with pmhx of Asthma, COPD, CHF, Substance use disorder, HTN, Hep c, presented to the ED for shortness of breath, leg pain, and leg swelling. Patient reports worsening symptoms for the past three days. Patient reports shortness of breath with exertion, inability to lay flat in bed and persistent dry cough. Patient reports leg swelling and feeling of heaviness in legs. Patient describes severe pain in left leg from the foot to the knee. Patient reports feeling of chills and night sweats over the last few days. Patient denies chest pain, headache, nausea and vomiting.

## 2023-01-05 NOTE — PROGRESS NOTES
01/05/23 1548   Admission   Initial VN Admission Questions Complete   Communication Issues? None   Shift   Virtual Nurse - Rounding Complete   Pain Management Interventions quiet environment facilitated   Virtual Nurse - Patient Verbalized Approval Of Camera Use   Type of Frequent Check   Type Patient Rounds   Safety/Activity   Patient Rounds bed in low position;bed wheels locked;ID band on;placement of personal items at bedside;visualized patient;call light in patient/parent reach   Safety Promotion/Fall Prevention bed alarm set;medications reviewed;side rails raised x 2;instructed to call staff for mobility   Pain/Comfort/Sleep   Preferred Pain Scale number (Numeric Rating Pain Scale)   Comfort/Acceptable Pain Level 2   Pain Rating (0-10): Rest 0   POSS (Pasero Opioid-Induced Sed Scale) 1 - Awake and alert   Pt arrived to unit. Introduced self as VN for this shift. Admission questions completed by VN. Educated pt on fall prevention protocol and instructed to call before mobilizing. Opportunity given for pt's questions. All questions answered.

## 2023-01-05 NOTE — PLAN OF CARE
SW spoke with pt at bedside. Pt was speaking extremely loud in [facility] area, SW asked if pt could speaking using an inside speaking voice. Pt stated he was hard of hearing and can only speak loudly. SW continued assessment. Pt lives with Vinny Harrington (Brother) [phone], who will transport at time of discharge. Pt is assisted in activities of daily living by Vinny and uses the Medicaid [employer] shuttle can for his appointments. Pt stated he had home health at one time, not needed currently. Pt uses home O2. Pt requested cane for home use. SW also downloaded the my [facility] mychart eliza on pt's phone. Pt requested phone  for phone, SW did not have matching  for the model.     Pt's Medicaid coverage offers very limited/no option for HH at this time. SW will follow up with cane for home use orders and get back with pt as to co-pay if needed.       01/05/23 1302   Discharge Assessment   Assessment Type Discharge Planning Assessment   Confirmed/corrected address, phone number and insurance Yes   Source of Information patient   People in Home sibling(s)   Do you expect to return to your current living situation? Yes   Do you have help at home or someone to help you manage your care at home? Yes   Who are your caregiver(s) and their phone number(s)? Vinny Harrington (Brother)   [phone]   Prior to hospitilization cognitive status: Alert/Oriented   Home Accessibility wheelchair accessible   Equipment Currently Used at Home oxygen   Patient currently being followed by outpatient case management? Unable to determine (comments)   Do you currently have service(s) that help you manage your care at home? No   Do you take prescription medications? Yes   Do you have prescription coverage? Yes   Coverage Medicaid [employer]   Do you have any problems affording any of your prescribed medications? No   Is the patient taking medications as prescribed? yes   Who is going to help you get home at discharge?  Vinny Harrington (Brother)   [phone]   How do you get to doctors appointments? family or friend will provide   Discharge Plan A Home with family   Discharge Plan B Home   DME Needed Upon Discharge  cane, straight   Discharge Plan discussed with: Patient   Discharge Barriers Identified None   Physical Activity   On average, how many days per week do you engage in moderate to strenuous exercise (like a brisk walk)? 0 days   On average, how many minutes do you engage in exercise at this level? 0 min   Housing Stability   In the last 12 months, was there a time when you were not able to pay the mortgage or rent on time? N   In the last 12 months, how many places have you lived? 1   In the last 12 months, was there a time when you did not have a steady place to sleep or slept in a shelter (including now)? N   Transportation Needs   In the past 12 months, has lack of transportation kept you from medical appointments or from getting medications? no   In the past 12 months, has lack of transportation kept you from meetings, work, or from getting things needed for daily living? No   Food Insecurity   Within the past 12 months, you worried that your food would run out before you got the money to buy more. Never true   Within the past 12 months, the food you bought just didn't last and you didn't have money to get more. Never true   Stress   Do you feel stress - tense, restless, nervous, or anxious, or unable to sleep at night because your mind is troubled all the time - these days? To some exte   OTHER   Name(s) of People in Home Vinny Harrington (Brother)   [phone]     Viviane Bridges, [facility] [place] Social Work  [phone]

## 2023-01-05 NOTE — ASSESSMENT & PLAN NOTE
Patient reports 3 days of increasing leg swelling ans shortness of breath  Piting edema present  Signs of orthopnea  Patient has been off his home dose of furosemide 40mg BID for a week    Plan  Furosemide 80mg IV in ED followed by 40mg IV BID tomorrow  Urinalysis  Echo  Oxygen 2 L goal of > 88

## 2023-01-05 NOTE — ASSESSMENT & PLAN NOTE
Presenting with shortness of breath  Patietn onf 2L O2 continuous at home   Patient has been without controller medication tiotropium- olodaterol for  At least one week  Plan  Monitor Oxygen saturation  NC 2L  Navjot

## 2023-01-05 NOTE — ASSESSMENT & PLAN NOTE
Presenting with shortness of breath  Patient on  2L O2 continuous at home   Patient has been without controller medication tiotropium- olodaterol for  At least one week  Plan  Monitor Oxygen saturation  NC 2L  Duonebs Q4h prn  Spiriva Daily

## 2023-01-05 NOTE — ASSESSMENT & PLAN NOTE
COPD vs CHF exacerbation in setting of viral URI and medication lack of medication  See Plans for COPD and CHF

## 2023-01-06 LAB
ALBUMIN SERPL BCP-MCNC: 3.3 G/DL (ref 3.5–5.2)
ALP SERPL-CCNC: 98 U/L (ref 55–135)
ALT SERPL W/O P-5'-P-CCNC: 25 U/L (ref 10–44)
ANION GAP SERPL CALC-SCNC: 7 MMOL/L (ref 8–16)
AST SERPL-CCNC: 26 U/L (ref 10–40)
BILIRUB SERPL-MCNC: 0.6 MG/DL (ref 0.1–1)
BUN SERPL-MCNC: 30 MG/DL (ref 6–20)
CALCIUM SERPL-MCNC: 9 MG/DL (ref 8.7–10.5)
CHLORIDE SERPL-SCNC: 95 MMOL/L (ref 95–110)
CO2 SERPL-SCNC: 37 MMOL/L (ref 23–29)
CREAT SERPL-MCNC: 1.5 MG/DL (ref 0.5–1.4)
EST. GFR  (NO RACE VARIABLE): 54 ML/MIN/1.73 M^2
GLUCOSE SERPL-MCNC: 102 MG/DL (ref 70–110)
MAGNESIUM SERPL-MCNC: 2 MG/DL (ref 1.6–2.6)
PHOSPHATE SERPL-MCNC: 3.6 MG/DL (ref 2.7–4.5)
POTASSIUM SERPL-SCNC: 4.2 MMOL/L (ref 3.5–5.1)
PROT SERPL-MCNC: 7.6 G/DL (ref 6–8.4)
SODIUM SERPL-SCNC: 139 MMOL/L (ref 136–145)

## 2023-01-06 PROCEDURE — 94640 AIRWAY INHALATION TREATMENT: CPT

## 2023-01-06 PROCEDURE — 96372 THER/PROPH/DIAG INJ SC/IM: CPT | Performed by: STUDENT IN AN ORGANIZED HEALTH CARE EDUCATION/TRAINING PROGRAM

## 2023-01-06 PROCEDURE — 84100 ASSAY OF PHOSPHORUS: CPT | Performed by: STUDENT IN AN ORGANIZED HEALTH CARE EDUCATION/TRAINING PROGRAM

## 2023-01-06 PROCEDURE — 99900035 HC TECH TIME PER 15 MIN (STAT)

## 2023-01-06 PROCEDURE — 25000003 PHARM REV CODE 250: Performed by: STUDENT IN AN ORGANIZED HEALTH CARE EDUCATION/TRAINING PROGRAM

## 2023-01-06 PROCEDURE — 80053 COMPREHEN METABOLIC PANEL: CPT | Performed by: STUDENT IN AN ORGANIZED HEALTH CARE EDUCATION/TRAINING PROGRAM

## 2023-01-06 PROCEDURE — 63600175 PHARM REV CODE 636 W HCPCS: Performed by: STUDENT IN AN ORGANIZED HEALTH CARE EDUCATION/TRAINING PROGRAM

## 2023-01-06 PROCEDURE — 96376 TX/PRO/DX INJ SAME DRUG ADON: CPT

## 2023-01-06 PROCEDURE — 83735 ASSAY OF MAGNESIUM: CPT | Performed by: STUDENT IN AN ORGANIZED HEALTH CARE EDUCATION/TRAINING PROGRAM

## 2023-01-06 PROCEDURE — 25000242 PHARM REV CODE 250 ALT 637 W/ HCPCS: Performed by: STUDENT IN AN ORGANIZED HEALTH CARE EDUCATION/TRAINING PROGRAM

## 2023-01-06 PROCEDURE — G0378 HOSPITAL OBSERVATION PER HR: HCPCS

## 2023-01-06 PROCEDURE — 36415 COLL VENOUS BLD VENIPUNCTURE: CPT | Performed by: STUDENT IN AN ORGANIZED HEALTH CARE EDUCATION/TRAINING PROGRAM

## 2023-01-06 PROCEDURE — 27000221 HC OXYGEN, UP TO 24 HOURS

## 2023-01-06 RX ORDER — FUROSEMIDE 40 MG/1
80 TABLET ORAL 2 TIMES DAILY
Qty: 60 TABLET | Refills: 3 | Status: SHIPPED | OUTPATIENT
Start: 2023-01-06 | End: 2023-01-06 | Stop reason: SDUPTHER

## 2023-01-06 RX ORDER — CARVEDILOL 3.12 MG/1
3.12 TABLET ORAL 2 TIMES DAILY
Qty: 60 TABLET | Refills: 11 | Status: SHIPPED | OUTPATIENT
Start: 2023-01-06 | End: 2023-01-06 | Stop reason: HOSPADM

## 2023-01-06 RX ORDER — FUROSEMIDE 40 MG/1
80 TABLET ORAL 2 TIMES DAILY
Qty: 60 TABLET | Refills: 3 | Status: SHIPPED | OUTPATIENT
Start: 2023-01-06 | End: 2023-01-20

## 2023-01-06 RX ORDER — METHADONE HYDROCHLORIDE 10 MG/1
90 TABLET ORAL DAILY
Status: DISCONTINUED | OUTPATIENT
Start: 2023-01-06 | End: 2023-01-06 | Stop reason: HOSPADM

## 2023-01-06 RX ADMIN — HEPARIN SODIUM 5000 UNITS: 5000 INJECTION INTRAVENOUS; SUBCUTANEOUS at 10:01

## 2023-01-06 RX ADMIN — FUROSEMIDE 40 MG: 10 INJECTION, SOLUTION INTRAMUSCULAR; INTRAVENOUS at 05:01

## 2023-01-06 RX ADMIN — DOCUSATE SODIUM - SENNOSIDES 1 TABLET: 50; 8.6 TABLET, FILM COATED ORAL at 10:01

## 2023-01-06 RX ADMIN — AMLODIPINE BESYLATE 10 MG: 5 TABLET ORAL at 10:01

## 2023-01-06 RX ADMIN — IPRATROPIUM BROMIDE AND ALBUTEROL SULFATE 3 ML: .5; 3 SOLUTION RESPIRATORY (INHALATION) at 04:01

## 2023-01-06 RX ADMIN — HEPARIN SODIUM 5000 UNITS: 5000 INJECTION INTRAVENOUS; SUBCUTANEOUS at 01:01

## 2023-01-06 NOTE — HOSPITAL COURSE
Patient Admitted to hospital for shortness of breath, swelling and leg pain. Patient reported severe left leg pain. Patient initially given morphine in ED but this was discontineud and replaced with patients outpatient dose of methadone 90 mg. Patient presented requiring 4L O2 NC. Patient O2 requirement improved to baseline @l O2 overnight. Patient received diureses overnight. With improving shortness of breath and pain. Patient wished to be discharged on 1/6/23 Patient reported rsolution of all symptoms. Patient requested to leave a soon as possible. Doubled patient lasix to continue diuresis. Follow-up PCP

## 2023-01-06 NOTE — PLAN OF CARE
Future Appointments   Date Time Provider Department Center   1/23/2023  2:00 PM Renetta Johnson DPM [facility] [place]   1/31/2023  2:00 PM [facility]         01/06/23 1015   Final Note   Assessment Type Final Discharge Note   Anticipated Discharge Disposition Home   What phone number can be called within the next 1-3 days to see how you are doing after discharge? [phone]   Post-Acute Status   Coverage medicaid   Discharge Delays None known at this time

## 2023-01-07 NOTE — DISCHARGE SUMMARY
[facility] Medicine  Discharge Summary      Patient Name: Ming Harrington  MRN: [id number]  MADIE: [id number]  Patient Class: OP- Observation  Admission Date: 1/4/2023  Hospital Length of Stay: 0 days  Discharge Date and Time:  01/06/2023 6:42 PM  Attending Physician: No att. providers found   Discharging Provider: Chiki Sauceda MD  Primary Care Provider: Garland Xiong DO    Primary Care Team: Networked reference to record PCT     HPI:   Patient is a 57 yr Male with pmhx of Asthma, COPD, CHF, Substance use disorder, HTN, Hep c, presented to the ED for shortness of breath, leg pain, and leg swelling. Patient reports worsening symptoms for the past three days. Patient reports shortness of breath with exertion, inability to lay flat in bed and persistent dry cough. Patient reports leg swelling and feeling of heaviness in legs. Patient describes severe pain in left leg from the foot to the knee. Patient reports feeling of chills and night sweats over the last few days. Patient denies chest pain, headache, nausea and vomiting.       * No surgery found *      Hospital Course:   Patient Admitted to hospital for shortness of breath, swelling and leg pain. Patient reported severe left leg pain. Patient initially given morphine in ED but this was discontineud and replaced with patients outpatient dose of methadone 90 mg. Patient presented requiring 4L O2 NC. Patient O2 requirement improved to baseline @l O2 overnight. Patient received diureses overnight. With improving shortness of breath and pain. Patient wished to be discharged on 1/6/23 Patient reported rsolution of all symptoms. Patient requested to leave a soon as possible. Doubled patient lasix to continue diuresis. Follow-up PCP       Goals of Care Treatment Preferences:  Code Status: Full Code    Physical Exam  Constitutional:       General: He is not in acute distress.     Appearance: Normal appearance. He is not ill-appearing or diaphoretic.   HENT:       Head: Normocephalic and atraumatic.      Right Ear: External ear normal.      Left Ear: External ear normal.      Nose: Nose normal.      Mouth/Throat:      Mouth: Mucous membranes are moist.   Eyes:      Extraocular Movements: Extraocular movements intact.   Cardiovascular:      Rate and Rhythm: Normal rate and regular rhythm.      Pulses: Normal pulses.      Heart sounds: Normal heart sounds.   Pulmonary:      Breath sounds: Wheezing present.      Comments: Decreased breath sounds    Abdominal:      General: Abdomen is flat.      Tenderness: There is no guarding.   Musculoskeletal:         General: No swelling. Normal range of motion.      Right lower leg: Edema present.      Left lower leg: Edema present.      Comments: Edema improved but still present   Skin:     General: Skin is warm and dry.   Neurological:      Mental Status: He is alert and oriented to person, place, and time.   Psychiatric:         Mood and Affect: Mood normal.         Behavior: Behavior normal.       Consults:     No new Assessment & Plan notes have been filed under this hospital service since the last note was generated.  Service: Hospital Medicine    Final Active Diagnoses:    Diagnosis Date Noted POA    PRINCIPAL PROBLEM:  Acute on chronic congestive heart failure [I50.9] 03/28/2019 Yes    ANNITA (acute kidney injury) [N17.9] 01/04/2023 Yes    Left leg pain [M79.605] 01/04/2023 Yes    COPD (chronic obstructive pulmonary disease) [J44.9] 04/17/2019 Yes    Shortness of breath [R06.02] 01/17/2018 Yes    HTN (hypertension) [I10]  Yes      Problems Resolved During this Admission:       Discharged Condition: stable    Disposition: Home or Self Care    Follow Up:   Follow-up Information     Garland Xiong, DO Follow up in 1 week(s).    Specialty: Family Medicine  Contact information:  [address]  [address] [place] [id number] [phone]                       Patient Instructions:      CANE FOR HOME USE     Order Specific  "Question Answer Comments   Type of Cane: Straight    Height: 5' 8" (1.727 m)    Weight: 121.1 kg (266 lb 15.6 oz)    Does patient have medical equipment at home? oxygen    Length of need (1-99 months): 99    Please check all that apply: Patient's condition impairs ambulation.    Please check all that apply: Patient is unable to safely ambulate without equipment.        Significant Diagnostic Studies: Labs:   BMP:   Recent Labs   Lab 01/05/23 0522 01/06/23 0428   * 102    139   K 5.1 4.2   CL 99 95   CO2 29 37*   BUN 30* 30*   CREATININE 1.8* 1.5*   CALCIUM 8.7 9.0   MG 1.9 2.0   , CMP   Recent Labs   Lab 01/05/23 0522 01/06/23 0428    139   K 5.1 4.2   CL 99 95   CO2 29 37*   * 102   BUN 30* 30*   CREATININE 1.8* 1.5*   CALCIUM 8.7 9.0   PROT 8.0 7.6   ALBUMIN 3.3* 3.3*   BILITOT 0.5 0.6   ALKPHOS 110 98   AST 29 26   ALT 24 25   ANIONGAP 11 7*    and CBC No results for input(s): WBC, HGB, HCT, PLT in the last 48 hours.    Pending Diagnostic Studies:     None         Medications:  Reconciled Home Medications:      Medication List      CHANGE how you take these medications    cloNIDine 0.1 MG tablet  Commonly known as: CATAPRES  Take 1 tablet (0.1 mg total) by mouth once. for 1 dose  What changed: when to take this     furosemide 40 MG tablet  Commonly known as: LASIX  Take 2 tablets (80 mg total) by mouth 2 (two) times daily.  What changed: how much to take        CONTINUE taking these medications    acetaminophen 500 MG tablet  Commonly known as: TYLENOL  Take 2 tablets (1,000 mg total) by mouth every 6 (six) hours as needed for Pain.     * albuterol 2.5 mg /3 mL (0.083 %) nebulizer solution  Commonly known as: PROVENTIL  Take 2.5 mg by nebulization every 4 (four) hours as needed.     * albuterol 90 mcg/actuation inhaler  Commonly known as: PROAIR HFA  Inhale 2 puffs into the lungs every 6 (six) hours as needed for Wheezing or Shortness of Breath. Rescue     albuterol-ipratropium 2.5 " mg-0.5 mg/3 mL nebulizer solution  Commonly known as: DUO-NEB  Inhale 1 vial (3 mLs) by nebulization every 4 (four) hours as needed for Wheezing. Rescue     amLODIPine 10 MG tablet  Commonly known as: NORVASC  Take 1 tablet (10 mg total) by mouth once daily.     diclofenac sodium 1 % Gel  Commonly known as: VOLTAREN  Apply 2 g topically 4 (four) times daily.     docusate sodium 100 MG capsule  Commonly known as: COLACE  Take 1 capsule (100 mg total) by mouth 2 (two) times daily.     EScitalopram oxalate 10 MG tablet  Commonly known as: LEXAPRO  Take 1 tablet (10 mg total) by mouth once daily.     ibuprofen 600 MG tablet  Commonly known as: ADVIL,MOTRIN  Take 1 tablet (600 mg total) by mouth 3 (three) times daily.     lisinopriL 10 MG tablet  Take 10 mg by mouth once daily.     methadone 10 mg/5 mL solution  Commonly known as: DOLOPHINE  Take 90 mg by mouth every morning.     naproxen sodium 220 MG tablet  Commonly known as: ANAPROX  Take 220 mg by mouth every 12 (twelve) hours.     OLANZapine 20 MG tablet  Commonly known as: ZyPREXA  Take 1 tablet (20 mg total) by mouth nightly.     polyethylene glycol 17 gram Pwpk  Commonly known as: GLYCOLAX  Take 17 g by mouth once daily.     promethazine 12.5 MG Tab  Commonly known as: PHENERGAN  Take 1 tablet (12.5 mg total) by mouth every 6 (six) hours as needed (Nausea).     STIOLTO RESPIMAT 2.5-2.5 mcg/actuation Mist  Generic drug: tiotropium-olodateroL  Inhale 2 puffs into the lungs once daily. Controller     triamcinolone acetonide 0.1% 0.1 % cream  Commonly known as: KENALOG  Apply topically 2 (two) times daily. Apply to lower extremities twice daily for itching         * This list has 2 medication(s) that are the same as other medications prescribed for you. Read the directions carefully, and ask your doctor or other care provider to review them with you.                Indwelling Lines/Drains at time of discharge:   Lines/Drains/Airways     None                 Time  spent on the discharge of patient: 30 minutes         Chiki Sauceda MD  [facility] [facility] PGY-2  01/06/2023

## 2023-01-10 VITALS
RESPIRATION RATE: 18 BRPM | OXYGEN SATURATION: 90 % | WEIGHT: 267 LBS | HEIGHT: 68 IN | SYSTOLIC BLOOD PRESSURE: 144 MMHG | TEMPERATURE: 97 F | HEART RATE: 84 BPM | BODY MASS INDEX: 40.47 KG/M2 | DIASTOLIC BLOOD PRESSURE: 82 MMHG

## 2023-01-11 ENCOUNTER — TELEPHONE (OUTPATIENT)
Dept: PODIATRY | Facility: CLINIC | Age: 58
End: 2023-01-11
Payer: MEDICAID

## 2023-01-12 ENCOUNTER — TELEPHONE (OUTPATIENT)
Dept: FAMILY MEDICINE | Facility: HOSPITAL | Age: 58
End: 2023-01-12
Payer: MEDICAID

## 2023-01-12 NOTE — TELEPHONE ENCOUNTER
Received call from patient on after call service. Patient endorsing persistent lower extremity swelling and frustration. Per chart review, patient recently admitted in the hospital for CHF exacerbation, and was diuresed. Patient discharged two days later with follow up scheduled at the end of this month. Patient endorsing currently difficulty lying flat. Patient reports eating one meal a day and eating a hamburger earlier today. Provider educated patient to return to the ED for further evaluation as patient is already taking higher doses than prescribed of lasix. Patient verbalized understanding and will attempt at his earliest convenience.    Will notify schedulers in AM for possible earlier appointment for patient if possible.   Pattie Flynn MD  [facility]  1/12/2023

## 2023-01-20 DIAGNOSIS — R60.9 CHRONIC EDEMA: ICD-10-CM

## 2023-01-20 RX ORDER — FUROSEMIDE 40 MG/1
TABLET ORAL
Qty: 74 TABLET | Refills: 0 | Status: SHIPPED | OUTPATIENT
Start: 2023-01-20 | End: 2023-02-27

## 2023-01-25 ENCOUNTER — OFFICE VISIT (OUTPATIENT)
Dept: FAMILY MEDICINE | Facility: HOSPITAL | Age: 58
End: 2023-01-25
Payer: MEDICAID

## 2023-01-25 VITALS
WEIGHT: 280.88 LBS | SYSTOLIC BLOOD PRESSURE: 128 MMHG | HEIGHT: 68 IN | HEART RATE: 100 BPM | BODY MASS INDEX: 42.57 KG/M2 | DIASTOLIC BLOOD PRESSURE: 72 MMHG

## 2023-01-25 DIAGNOSIS — L60.0 IGTN (INGROWING TOE NAIL): ICD-10-CM

## 2023-01-25 DIAGNOSIS — K46.9 HERNIA OF ABDOMINAL CAVITY: Primary | ICD-10-CM

## 2023-01-25 DIAGNOSIS — L29.9 PRURITUS: ICD-10-CM

## 2023-01-25 PROCEDURE — 99215 OFFICE O/P EST HI 40 MIN: CPT

## 2023-01-25 RX ORDER — BENZONATATE 100 MG/1
100 CAPSULE ORAL 3 TIMES DAILY PRN
Qty: 30 CAPSULE | Refills: 1 | Status: SHIPPED | OUTPATIENT
Start: 2023-01-25 | End: 2023-02-04

## 2023-01-25 RX ORDER — SENNOSIDES 8.6 MG/1
2 TABLET ORAL DAILY
Qty: 28 TABLET | Refills: 1 | Status: ON HOLD | OUTPATIENT
Start: 2023-01-25 | End: 2023-07-02 | Stop reason: HOSPADM

## 2023-01-25 RX ORDER — TRIAMCINOLONE ACETONIDE 1 MG/G
CREAM TOPICAL 2 TIMES DAILY
Qty: 80 G | Refills: 1 | Status: SHIPPED | OUTPATIENT
Start: 2023-01-25 | End: 2023-04-14 | Stop reason: SDUPTHER

## 2023-01-25 RX ORDER — POLYETHYLENE GLYCOL 3350 17 G/17G
17 POWDER, FOR SOLUTION ORAL DAILY
Qty: 14 PACKET | Refills: 1 | Status: ON HOLD | OUTPATIENT
Start: 2023-01-25 | End: 2023-07-02 | Stop reason: SDUPTHER

## 2023-01-26 ENCOUNTER — TELEPHONE (OUTPATIENT)
Dept: ADMINISTRATIVE | Facility: OTHER | Age: 58
End: 2023-01-26
Payer: MEDICAID
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
## 2023-01-27 ENCOUNTER — OFFICE VISIT (OUTPATIENT)
Dept: PODIATRY | Facility: CLINIC | Age: 58
End: 2023-01-27
Payer: MEDICAID

## 2023-01-27 VITALS — HEIGHT: 68 IN | BODY MASS INDEX: 42.74 KG/M2 | WEIGHT: 282 LBS

## 2023-01-27 DIAGNOSIS — B35.1 TINEA UNGUIUM: ICD-10-CM

## 2023-01-27 DIAGNOSIS — M79.672 FOOT PAIN, LEFT: ICD-10-CM

## 2023-01-27 DIAGNOSIS — M79.676 PAIN DUE TO ONYCHOMYCOSIS OF TOENAIL: Primary | ICD-10-CM

## 2023-01-27 DIAGNOSIS — F17.200 SMOKING: ICD-10-CM

## 2023-01-27 DIAGNOSIS — L60.0 IGTN (INGROWING TOE NAIL): ICD-10-CM

## 2023-01-27 DIAGNOSIS — B35.1 ONYCHOMYCOSIS DUE TO DERMATOPHYTE: ICD-10-CM

## 2023-01-27 DIAGNOSIS — B35.1 PAIN DUE TO ONYCHOMYCOSIS OF TOENAIL: Primary | ICD-10-CM

## 2023-01-27 PROCEDURE — 99203 PR OFFICE/OUTPT VISIT, NEW, LEVL III, 30-44 MIN: ICD-10-PCS | Mod: S$PBB,,, | Performed by: PODIATRIST

## 2023-01-27 PROCEDURE — 4010F PR ACE/ARB THEARPY RXD/TAKEN: ICD-10-PCS | Mod: CPTII,,, | Performed by: PODIATRIST

## 2023-01-27 PROCEDURE — 1160F RVW MEDS BY RX/DR IN RCRD: CPT | Mod: CPTII,,, | Performed by: PODIATRIST

## 2023-01-27 PROCEDURE — 3008F BODY MASS INDEX DOCD: CPT | Mod: CPTII,,, | Performed by: PODIATRIST

## 2023-01-27 PROCEDURE — 1160F PR REVIEW ALL MEDS BY PRESCRIBER/CLIN PHARMACIST DOCUMENTED: ICD-10-PCS | Mod: CPTII,,, | Performed by: PODIATRIST

## 2023-01-27 PROCEDURE — 99999 PR PBB SHADOW E&M-EST. PATIENT-LVL IV: CPT | Mod: PBBFAC,,, | Performed by: PODIATRIST

## 2023-01-27 PROCEDURE — 99999 PR PBB SHADOW E&M-EST. PATIENT-LVL IV: ICD-10-PCS | Mod: PBBFAC,,, | Performed by: PODIATRIST

## 2023-01-27 PROCEDURE — 99214 OFFICE O/P EST MOD 30 MIN: CPT | Mod: PBBFAC,PN | Performed by: PODIATRIST

## 2023-01-27 PROCEDURE — 3044F PR MOST RECENT HEMOGLOBIN A1C LEVEL <7.0%: ICD-10-PCS | Mod: CPTII,,, | Performed by: PODIATRIST

## 2023-01-27 PROCEDURE — 99203 OFFICE O/P NEW LOW 30 MIN: CPT | Mod: S$PBB,,, | Performed by: PODIATRIST

## 2023-01-27 PROCEDURE — 1159F MED LIST DOCD IN RCRD: CPT | Mod: CPTII,,, | Performed by: PODIATRIST

## 2023-01-27 PROCEDURE — 4010F ACE/ARB THERAPY RXD/TAKEN: CPT | Mod: CPTII,,, | Performed by: PODIATRIST

## 2023-01-27 PROCEDURE — 3044F HG A1C LEVEL LT 7.0%: CPT | Mod: CPTII,,, | Performed by: PODIATRIST

## 2023-01-27 PROCEDURE — 3008F PR BODY MASS INDEX (BMI) DOCUMENTED: ICD-10-PCS | Mod: CPTII,,, | Performed by: PODIATRIST

## 2023-01-27 PROCEDURE — 1159F PR MEDICATION LIST DOCUMENTED IN MEDICAL RECORD: ICD-10-PCS | Mod: CPTII,,, | Performed by: PODIATRIST
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
## 2023-02-07 ENCOUNTER — OFFICE VISIT (OUTPATIENT)
Dept: FAMILY MEDICINE | Facility: HOSPITAL | Age: 58
End: 2023-02-07
Payer: MEDICAID

## 2023-02-07 VITALS
DIASTOLIC BLOOD PRESSURE: 79 MMHG | BODY MASS INDEX: 42.87 KG/M2 | HEART RATE: 100 BPM | SYSTOLIC BLOOD PRESSURE: 110 MMHG | HEIGHT: 68 IN | WEIGHT: 282.88 LBS

## 2023-02-07 DIAGNOSIS — F41.1 GENERALIZED ANXIETY DISORDER: ICD-10-CM

## 2023-02-07 DIAGNOSIS — J44.9 CHRONIC OBSTRUCTIVE PULMONARY DISEASE, UNSPECIFIED COPD TYPE: ICD-10-CM

## 2023-02-07 DIAGNOSIS — L60.0 IGTN (INGROWING TOE NAIL): ICD-10-CM

## 2023-02-07 DIAGNOSIS — R60.0 PEDAL EDEMA: Primary | ICD-10-CM

## 2023-02-07 DIAGNOSIS — L03.90 CELLULITIS, UNSPECIFIED CELLULITIS SITE: ICD-10-CM

## 2023-02-07 PROCEDURE — 99214 OFFICE O/P EST MOD 30 MIN: CPT

## 2023-02-07 RX ORDER — ACETAMINOPHEN 500 MG
500 TABLET ORAL EVERY 8 HOURS PRN
Qty: 90 TABLET | Refills: 0 | Status: SHIPPED | OUTPATIENT
Start: 2023-02-07 | End: 2023-03-09

## 2023-02-07 RX ORDER — CEPHALEXIN 500 MG/1
500 CAPSULE ORAL EVERY 6 HOURS
Qty: 28 CAPSULE | Refills: 0 | Status: SHIPPED | OUTPATIENT
Start: 2023-02-07 | End: 2023-02-14

## 2023-02-07 RX ORDER — ESCITALOPRAM OXALATE 10 MG/1
10 TABLET ORAL DAILY
Qty: 90 TABLET | Refills: 3 | Status: ON HOLD | OUTPATIENT
Start: 2023-02-07 | End: 2023-07-02 | Stop reason: SDUPTHER

## 2023-02-07 RX ORDER — BUDESONIDE AND FORMOTEROL FUMARATE DIHYDRATE 80; 4.5 UG/1; UG/1
2 AEROSOL RESPIRATORY (INHALATION) DAILY
Qty: 6 G | Refills: 0 | Status: ON HOLD | OUTPATIENT
Start: 2023-02-07 | End: 2023-07-02 | Stop reason: SDUPTHER

## 2023-02-07 RX ORDER — PRAMOXINE HCL/BENZALKONIUM CHL 1%-0.13%
1 SPRAY, NON-AEROSOL (ML) TOPICAL 2 TIMES DAILY
Qty: 8 ML | Refills: 1 | Status: ON HOLD | OUTPATIENT
Start: 2023-02-07 | End: 2023-07-02 | Stop reason: HOSPADM

## 2023-02-08 ENCOUNTER — TELEPHONE (OUTPATIENT)
Dept: FAMILY MEDICINE | Facility: HOSPITAL | Age: 58
End: 2023-02-08
Payer: MEDICAID

## 2023-02-08 RX ORDER — ALBUTEROL SULFATE 90 UG/1
2 AEROSOL, METERED RESPIRATORY (INHALATION) EVERY 6 HOURS PRN
Qty: 18 G | Refills: 1 | Status: SHIPPED | OUTPATIENT
Start: 2023-02-08 | End: 2023-03-30 | Stop reason: SDUPTHER

## 2023-02-08 RX ORDER — ALBUTEROL SULFATE 0.83 MG/ML
2.5 SOLUTION RESPIRATORY (INHALATION) EVERY 4 HOURS PRN
Qty: 1 EACH | Refills: 3 | Status: SHIPPED | OUTPATIENT
Start: 2023-02-08 | End: 2023-03-30 | Stop reason: SDUPTHER
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
## 2023-02-09 ENCOUNTER — OFFICE VISIT (OUTPATIENT)
Dept: SURGERY | Facility: CLINIC | Age: 58
End: 2023-02-09
Payer: MEDICAID

## 2023-02-09 VITALS — HEIGHT: 68 IN | BODY MASS INDEX: 43.12 KG/M2 | WEIGHT: 284.5 LBS

## 2023-02-09 DIAGNOSIS — K46.9 HERNIA OF ABDOMINAL CAVITY: ICD-10-CM

## 2023-02-09 DIAGNOSIS — Z87.19 HX OF VENTRAL HERNIA REPAIR: Primary | ICD-10-CM

## 2023-02-09 DIAGNOSIS — Z98.890 HX OF VENTRAL HERNIA REPAIR: Primary | ICD-10-CM

## 2023-02-09 PROCEDURE — 1159F PR MEDICATION LIST DOCUMENTED IN MEDICAL RECORD: ICD-10-PCS | Mod: CPTII,,, | Performed by: SURGERY

## 2023-02-09 PROCEDURE — 3008F BODY MASS INDEX DOCD: CPT | Mod: CPTII,,, | Performed by: SURGERY

## 2023-02-09 PROCEDURE — 99999 PR PBB SHADOW E&M-EST. PATIENT-LVL IV: CPT | Mod: PBBFAC,,, | Performed by: SURGERY

## 2023-02-09 PROCEDURE — 99214 OFFICE O/P EST MOD 30 MIN: CPT | Mod: PBBFAC,PN | Performed by: SURGERY

## 2023-02-09 PROCEDURE — 3008F PR BODY MASS INDEX (BMI) DOCUMENTED: ICD-10-PCS | Mod: CPTII,,, | Performed by: SURGERY

## 2023-02-09 PROCEDURE — 3044F HG A1C LEVEL LT 7.0%: CPT | Mod: CPTII,,, | Performed by: SURGERY

## 2023-02-09 PROCEDURE — 99204 OFFICE O/P NEW MOD 45 MIN: CPT | Mod: S$PBB,,, | Performed by: SURGERY

## 2023-02-09 PROCEDURE — 1160F RVW MEDS BY RX/DR IN RCRD: CPT | Mod: CPTII,,, | Performed by: SURGERY

## 2023-02-09 PROCEDURE — 99204 PR OFFICE/OUTPT VISIT, NEW, LEVL IV, 45-59 MIN: ICD-10-PCS | Mod: S$PBB,,, | Performed by: SURGERY

## 2023-02-09 PROCEDURE — 4010F ACE/ARB THERAPY RXD/TAKEN: CPT | Mod: CPTII,,, | Performed by: SURGERY

## 2023-02-09 PROCEDURE — 3044F PR MOST RECENT HEMOGLOBIN A1C LEVEL <7.0%: ICD-10-PCS | Mod: CPTII,,, | Performed by: SURGERY

## 2023-02-09 PROCEDURE — 4010F PR ACE/ARB THEARPY RXD/TAKEN: ICD-10-PCS | Mod: CPTII,,, | Performed by: SURGERY

## 2023-02-09 PROCEDURE — 1159F MED LIST DOCD IN RCRD: CPT | Mod: CPTII,,, | Performed by: SURGERY

## 2023-02-09 PROCEDURE — 99999 PR PBB SHADOW E&M-EST. PATIENT-LVL IV: ICD-10-PCS | Mod: PBBFAC,,, | Performed by: SURGERY

## 2023-02-09 PROCEDURE — 1160F PR REVIEW ALL MEDS BY PRESCRIBER/CLIN PHARMACIST DOCUMENTED: ICD-10-PCS | Mod: CPTII,,, | Performed by: SURGERY
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
## 2023-03-01 ENCOUNTER — OFFICE VISIT (OUTPATIENT)
Dept: FAMILY MEDICINE | Facility: HOSPITAL | Age: 58
End: 2023-03-01
Payer: MEDICAID

## 2023-03-01 VITALS
BODY MASS INDEX: 43.77 KG/M2 | HEIGHT: 68 IN | SYSTOLIC BLOOD PRESSURE: 138 MMHG | WEIGHT: 288.81 LBS | HEART RATE: 101 BPM | DIASTOLIC BLOOD PRESSURE: 79 MMHG

## 2023-03-01 DIAGNOSIS — T30.0 BURN: Primary | ICD-10-CM

## 2023-03-01 DIAGNOSIS — K59.09 CHRONIC CONSTIPATION: ICD-10-CM

## 2023-03-01 DIAGNOSIS — I87.2 VENOUS INSUFFICIENCY OF BOTH LOWER EXTREMITIES: ICD-10-CM

## 2023-03-01 PROCEDURE — 99215 OFFICE O/P EST HI 40 MIN: CPT

## 2023-03-01 RX ORDER — MUPIROCIN 20 MG/G
OINTMENT TOPICAL
Status: SHIPPED | OUTPATIENT
Start: 2023-03-01

## 2023-03-06 NOTE — PROGRESS NOTES
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
## 2023-03-20 DIAGNOSIS — R11.0 NAUSEA: ICD-10-CM

## 2023-03-21 RX ORDER — PROMETHAZINE HYDROCHLORIDE 12.5 MG/1
12.5 TABLET ORAL EVERY 6 HOURS PRN
Qty: 70 TABLET | Refills: 0 | Status: SHIPPED | OUTPATIENT
Start: 2023-03-21 | End: 2023-04-11 | Stop reason: SDUPTHER

## 2023-03-30 DIAGNOSIS — J44.9 CHRONIC OBSTRUCTIVE PULMONARY DISEASE, UNSPECIFIED COPD TYPE: ICD-10-CM

## 2023-03-30 RX ORDER — ALBUTEROL SULFATE 0.83 MG/ML
2.5 SOLUTION RESPIRATORY (INHALATION) EVERY 4 HOURS PRN
Qty: 1 EACH | Refills: 3 | Status: ON HOLD | OUTPATIENT
Start: 2023-03-30 | End: 2023-07-02 | Stop reason: SDUPTHER

## 2023-03-30 RX ORDER — ALBUTEROL SULFATE 90 UG/1
2 AEROSOL, METERED RESPIRATORY (INHALATION) EVERY 6 HOURS PRN
Qty: 18 G | Refills: 1 | Status: ON HOLD | OUTPATIENT
Start: 2023-03-30 | End: 2023-06-21 | Stop reason: SDUPTHER

## 2023-04-10 PROBLEM — N17.9 AKI (ACUTE KIDNEY INJURY): Status: RESOLVED | Noted: 2023-01-04 | Resolved: 2023-04-10

## 2023-04-11 DIAGNOSIS — R11.0 NAUSEA: ICD-10-CM

## 2023-04-11 RX ORDER — PROMETHAZINE HYDROCHLORIDE 12.5 MG/1
12.5 TABLET ORAL EVERY 6 HOURS PRN
Qty: 40 TABLET | Refills: 0 | Status: SHIPPED | OUTPATIENT
Start: 2023-04-11 | End: 2023-04-21

## 2023-04-14 ENCOUNTER — OFFICE VISIT (OUTPATIENT)
Dept: FAMILY MEDICINE | Facility: HOSPITAL | Age: 58
End: 2023-04-14
Payer: MEDICAID

## 2023-04-14 VITALS
SYSTOLIC BLOOD PRESSURE: 115 MMHG | DIASTOLIC BLOOD PRESSURE: 82 MMHG | BODY MASS INDEX: 41.39 KG/M2 | HEART RATE: 81 BPM | WEIGHT: 273.13 LBS | HEIGHT: 68 IN

## 2023-04-14 DIAGNOSIS — F17.200 TOBACCO DEPENDENCE: Primary | ICD-10-CM

## 2023-04-14 DIAGNOSIS — L29.9 PRURITUS: ICD-10-CM

## 2023-04-14 PROCEDURE — 99214 OFFICE O/P EST MOD 30 MIN: CPT

## 2023-04-14 RX ORDER — MUPIROCIN 20 MG/G
OINTMENT TOPICAL 3 TIMES DAILY
Qty: 22 G | Refills: 2 | Status: SHIPPED | OUTPATIENT
Start: 2023-04-14 | End: 2023-05-14

## 2023-04-14 RX ORDER — CAMPHOR 0.45 %
GEL (GRAM) TOPICAL
Qty: 103 ML | Refills: 0 | Status: SHIPPED | OUTPATIENT
Start: 2023-04-14 | End: 2023-06-19

## 2023-04-14 RX ORDER — HYDROXYZINE HYDROCHLORIDE 25 MG/1
25 TABLET, FILM COATED ORAL 3 TIMES DAILY
Qty: 30 TABLET | Refills: 1 | Status: SHIPPED | OUTPATIENT
Start: 2023-04-14 | End: 2023-05-04

## 2023-04-14 RX ORDER — PETROLATUM,WHITE
OINTMENT IN PACKET (GRAM) TOPICAL
Qty: 49 G | Refills: 2 | Status: ON HOLD | OUTPATIENT
Start: 2023-04-14 | End: 2023-07-02 | Stop reason: HOSPADM

## 2023-04-14 RX ORDER — TRIAMCINOLONE ACETONIDE 1 MG/G
CREAM TOPICAL 2 TIMES DAILY
Qty: 80 G | Refills: 0 | Status: ON HOLD | OUTPATIENT
Start: 2023-04-14 | End: 2023-07-02 | Stop reason: HOSPADM

## 2023-04-14 RX ORDER — IBUPROFEN 200 MG
1 TABLET ORAL DAILY
Qty: 28 PATCH | Refills: 0 | Status: SHIPPED | OUTPATIENT
Start: 2023-04-14 | End: 2023-05-12
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
## 2023-04-25 NOTE — PROGRESS NOTES

## 2023-05-22 ENCOUNTER — TELEPHONE (OUTPATIENT)
Dept: FAMILY MEDICINE | Facility: HOSPITAL | Age: 58
End: 2023-05-22
Payer: MEDICAID

## 2023-05-22 RX ORDER — LISINOPRIL 5 MG/1
TABLET ORAL
Status: ON HOLD | COMMUNITY
Start: 2023-04-06 | End: 2023-06-21 | Stop reason: HOSPADM

## 2023-05-22 NOTE — TELEPHONE ENCOUNTER
----- Message from Catalina Suresh sent at 5/22/2023  2:34 PM CDT -----  Regarding: call pt pharmacy   Darrin, from pt pharmacy called and stated that they needs the Dr to call them concerning the pt medicine. The pt said they takes the medicine 3x a day but the prescription says 1x a day.. call back # [phone]

## 2023-06-19 ENCOUNTER — CLINICAL SUPPORT (OUTPATIENT)
Dept: SMOKING CESSATION | Facility: CLINIC | Age: 58
End: 2023-06-19
Payer: COMMERCIAL

## 2023-06-19 ENCOUNTER — HOSPITAL ENCOUNTER (INPATIENT)
Facility: HOSPITAL | Age: 58
LOS: 2 days | Discharge: HOME | DRG: 191 | End: 2023-06-21
Attending: EMERGENCY MEDICINE | Admitting: STUDENT IN AN ORGANIZED HEALTH CARE EDUCATION/TRAINING PROGRAM
Payer: MEDICAID

## 2023-06-19 DIAGNOSIS — J44.1 COPD EXACERBATION: ICD-10-CM

## 2023-06-19 DIAGNOSIS — J44.1 COPD WITH ACUTE EXACERBATION: Primary | ICD-10-CM

## 2023-06-19 DIAGNOSIS — I10 ESSENTIAL HYPERTENSION: ICD-10-CM

## 2023-06-19 DIAGNOSIS — N18.31 STAGE 3A CHRONIC KIDNEY DISEASE: ICD-10-CM

## 2023-06-19 DIAGNOSIS — F41.1 GENERALIZED ANXIETY DISORDER: ICD-10-CM

## 2023-06-19 DIAGNOSIS — J44.9 CHRONIC OBSTRUCTIVE PULMONARY DISEASE, UNSPECIFIED COPD TYPE: ICD-10-CM

## 2023-06-19 DIAGNOSIS — G47.00 INSOMNIA, UNSPECIFIED TYPE: ICD-10-CM

## 2023-06-19 DIAGNOSIS — F17.210 CIGARETTE SMOKER: Primary | ICD-10-CM

## 2023-06-19 DIAGNOSIS — F11.20 OPIOID USE DISORDER, SEVERE, ON MAINTENANCE THERAPY, DEPENDENCE: ICD-10-CM

## 2023-06-19 LAB
ALBUMIN SERPL BCP-MCNC: 3.6 G/DL (ref 3.5–5.2)
ALLENS TEST: ABNORMAL
ALP SERPL-CCNC: 104 U/L (ref 55–135)
ALT SERPL W/O P-5'-P-CCNC: 18 U/L (ref 10–44)
ANION GAP SERPL CALC-SCNC: 10 MMOL/L (ref 8–16)
AST SERPL-CCNC: 21 U/L (ref 10–40)
BASOPHILS # BLD AUTO: 0.02 K/UL (ref 0–0.2)
BASOPHILS NFR BLD: 0.3 % (ref 0–1.9)
BILIRUB SERPL-MCNC: 0.6 MG/DL (ref 0.1–1)
BNP SERPL-MCNC: 74 PG/ML (ref 0–99)
BUN SERPL-MCNC: 14 MG/DL (ref 6–20)
CALCIUM SERPL-MCNC: 9.1 MG/DL (ref 8.7–10.5)
CHLORIDE SERPL-SCNC: 101 MMOL/L (ref 95–110)
CO2 SERPL-SCNC: 31 MMOL/L (ref 23–29)
CREAT SERPL-MCNC: 1.6 MG/DL (ref 0.5–1.4)
DELSYS: ABNORMAL
DIFFERENTIAL METHOD: ABNORMAL
EOSINOPHIL # BLD AUTO: 0 K/UL (ref 0–0.5)
EOSINOPHIL NFR BLD: 0.3 % (ref 0–8)
ERYTHROCYTE [DISTWIDTH] IN BLOOD BY AUTOMATED COUNT: 11.9 % (ref 11.5–14.5)
ERYTHROCYTE [SEDIMENTATION RATE] IN BLOOD BY WESTERGREN METHOD: 30 MM/H
EST. GFR  (NO RACE VARIABLE): 50 ML/MIN/1.73 M^2
FLOW: 3
GLUCOSE SERPL-MCNC: 93 MG/DL (ref 70–110)
HCO3 UR-SCNC: 36.9 MMOL/L (ref 24–28)
HCT VFR BLD AUTO: 37.1 % (ref 40–54)
HGB BLD-MCNC: 12 G/DL (ref 14–18)
IMM GRANULOCYTES # BLD AUTO: 0.02 K/UL (ref 0–0.04)
IMM GRANULOCYTES NFR BLD AUTO: 0.3 % (ref 0–0.5)
LYMPHOCYTES # BLD AUTO: 0.6 K/UL (ref 1–4.8)
LYMPHOCYTES NFR BLD: 7.4 % (ref 18–48)
MCH RBC QN AUTO: 30.7 PG (ref 27–31)
MCHC RBC AUTO-ENTMCNC: 32.3 G/DL (ref 32–36)
MCV RBC AUTO: 95 FL (ref 82–98)
MODE: ABNORMAL
MONOCYTES # BLD AUTO: 0.3 K/UL (ref 0.3–1)
MONOCYTES NFR BLD: 4.4 % (ref 4–15)
NEUTROPHILS # BLD AUTO: 6.8 K/UL (ref 1.8–7.7)
NEUTROPHILS NFR BLD: 87.3 % (ref 38–73)
NRBC BLD-RTO: 0 /100 WBC
PCO2 BLDA: 78.6 MMHG (ref 35–45)
PH SMN: 7.28 [PH] (ref 7.35–7.45)
PLATELET # BLD AUTO: 159 K/UL (ref 150–450)
PMV BLD AUTO: 10.3 FL (ref 9.2–12.9)
PO2 BLDA: 66 MMHG (ref 80–100)
POC BE: 10 MMOL/L
POC SATURATED O2: 89 % (ref 95–100)
POC TCO2: 39 MMOL/L (ref 23–27)
POCT GLUCOSE: 112 MG/DL (ref 70–110)
POCT GLUCOSE: 151 MG/DL (ref 70–110)
POTASSIUM SERPL-SCNC: 4 MMOL/L (ref 3.5–5.1)
PROT SERPL-MCNC: 8.2 G/DL (ref 6–8.4)
RBC # BLD AUTO: 3.91 M/UL (ref 4.6–6.2)
SAMPLE: ABNORMAL
SITE: ABNORMAL
SODIUM SERPL-SCNC: 142 MMOL/L (ref 136–145)
SP02: 94
TROPONIN I SERPL DL<=0.01 NG/ML-MCNC: <0.006 NG/ML (ref 0–0.03)
WBC # BLD AUTO: 7.81 K/UL (ref 3.9–12.7)

## 2023-06-19 PROCEDURE — 63700000 PHARM REV CODE 250 ALT 637 W/O HCPCS: Performed by: STUDENT IN AN ORGANIZED HEALTH CARE EDUCATION/TRAINING PROGRAM

## 2023-06-19 PROCEDURE — 25000242 PHARM REV CODE 250 ALT 637 W/ HCPCS: Performed by: STUDENT IN AN ORGANIZED HEALTH CARE EDUCATION/TRAINING PROGRAM

## 2023-06-19 PROCEDURE — 99999 PR PBB SHADOW E&M-EST. PATIENT-LVL I: ICD-10-PCS | Mod: PBBFAC,,,

## 2023-06-19 PROCEDURE — 93005 ELECTROCARDIOGRAM TRACING: CPT

## 2023-06-19 PROCEDURE — 93010 EKG 12-LEAD: ICD-10-PCS | Mod: ,,, | Performed by: INTERNAL MEDICINE

## 2023-06-19 PROCEDURE — G0378 HOSPITAL OBSERVATION PER HR: HCPCS

## 2023-06-19 PROCEDURE — 85025 COMPLETE CBC W/AUTO DIFF WBC: CPT | Performed by: EMERGENCY MEDICINE

## 2023-06-19 PROCEDURE — 99407 PR TOBACCO USE CESSATION INTENSIVE >10 MINUTES: ICD-10-PCS | Mod: S$GLB,,,

## 2023-06-19 PROCEDURE — 63600175 PHARM REV CODE 636 W HCPCS: Performed by: EMERGENCY MEDICINE

## 2023-06-19 PROCEDURE — 99900035 HC TECH TIME PER 15 MIN (STAT)

## 2023-06-19 PROCEDURE — 94640 AIRWAY INHALATION TREATMENT: CPT | Mod: XB

## 2023-06-19 PROCEDURE — 94640 AIRWAY INHALATION TREATMENT: CPT

## 2023-06-19 PROCEDURE — 25000003 PHARM REV CODE 250: Performed by: STUDENT IN AN ORGANIZED HEALTH CARE EDUCATION/TRAINING PROGRAM

## 2023-06-19 PROCEDURE — 80053 COMPREHEN METABOLIC PANEL: CPT | Performed by: EMERGENCY MEDICINE

## 2023-06-19 PROCEDURE — 96372 THER/PROPH/DIAG INJ SC/IM: CPT | Performed by: STUDENT IN AN ORGANIZED HEALTH CARE EDUCATION/TRAINING PROGRAM

## 2023-06-19 PROCEDURE — 63600175 PHARM REV CODE 636 W HCPCS: Performed by: STUDENT IN AN ORGANIZED HEALTH CARE EDUCATION/TRAINING PROGRAM

## 2023-06-19 PROCEDURE — 99285 EMERGENCY DEPT VISIT HI MDM: CPT | Mod: 25

## 2023-06-19 PROCEDURE — 94761 N-INVAS EAR/PLS OXIMETRY MLT: CPT

## 2023-06-19 PROCEDURE — 99407 BEHAV CHNG SMOKING > 10 MIN: CPT | Mod: S$GLB,,,

## 2023-06-19 PROCEDURE — 82803 BLOOD GASES ANY COMBINATION: CPT

## 2023-06-19 PROCEDURE — 11000001 HC ACUTE MED/SURG PRIVATE ROOM

## 2023-06-19 PROCEDURE — 36600 WITHDRAWAL OF ARTERIAL BLOOD: CPT

## 2023-06-19 PROCEDURE — 84484 ASSAY OF TROPONIN QUANT: CPT | Performed by: EMERGENCY MEDICINE

## 2023-06-19 PROCEDURE — 27000221 HC OXYGEN, UP TO 24 HOURS

## 2023-06-19 PROCEDURE — 25000242 PHARM REV CODE 250 ALT 637 W/ HCPCS: Performed by: EMERGENCY MEDICINE

## 2023-06-19 PROCEDURE — S4991 NICOTINE PATCH NONLEGEND: HCPCS | Performed by: STUDENT IN AN ORGANIZED HEALTH CARE EDUCATION/TRAINING PROGRAM

## 2023-06-19 PROCEDURE — 83880 ASSAY OF NATRIURETIC PEPTIDE: CPT | Performed by: EMERGENCY MEDICINE

## 2023-06-19 PROCEDURE — 93010 ELECTROCARDIOGRAM REPORT: CPT | Mod: ,,, | Performed by: INTERNAL MEDICINE

## 2023-06-19 PROCEDURE — 99999 PR PBB SHADOW E&M-EST. PATIENT-LVL I: CPT | Mod: PBBFAC,,,

## 2023-06-19 RX ORDER — NALOXONE HCL 0.4 MG/ML
0.02 VIAL (ML) INJECTION
Status: DISCONTINUED | OUTPATIENT
Start: 2023-06-19 | End: 2023-06-21 | Stop reason: HOSPADM

## 2023-06-19 RX ORDER — SODIUM CHLORIDE 0.9 % (FLUSH) 0.9 %
5 SYRINGE (ML) INJECTION
Status: DISCONTINUED | OUTPATIENT
Start: 2023-06-19 | End: 2023-06-21 | Stop reason: HOSPADM

## 2023-06-19 RX ORDER — CLONIDINE HYDROCHLORIDE 0.1 MG/1
0.1 TABLET ORAL NIGHTLY
Status: ON HOLD | COMMUNITY
End: 2023-06-21 | Stop reason: HOSPADM

## 2023-06-19 RX ORDER — SODIUM CHLORIDE 0.9 % (FLUSH) 0.9 %
10 SYRINGE (ML) INJECTION
Status: DISCONTINUED | OUTPATIENT
Start: 2023-06-19 | End: 2023-06-21 | Stop reason: HOSPADM

## 2023-06-19 RX ORDER — FUROSEMIDE 40 MG/1
40 TABLET ORAL DAILY
Status: ON HOLD | COMMUNITY
End: 2023-07-02 | Stop reason: SDUPTHER

## 2023-06-19 RX ORDER — DEXTROSE 40 %
30 GEL (GRAM) ORAL
Status: DISCONTINUED | OUTPATIENT
Start: 2023-06-19 | End: 2023-06-21 | Stop reason: HOSPADM

## 2023-06-19 RX ORDER — ESCITALOPRAM OXALATE 10 MG/1
10 TABLET ORAL DAILY
Status: DISCONTINUED | OUTPATIENT
Start: 2023-06-19 | End: 2023-06-21 | Stop reason: HOSPADM

## 2023-06-19 RX ORDER — ACETAMINOPHEN 325 MG/1
650 TABLET ORAL EVERY 4 HOURS PRN
Status: DISCONTINUED | OUTPATIENT
Start: 2023-06-19 | End: 2023-06-21 | Stop reason: HOSPADM

## 2023-06-19 RX ORDER — METHADONE HYDROCHLORIDE 10 MG/1
90 TABLET ORAL DAILY
Status: DISCONTINUED | OUTPATIENT
Start: 2023-06-19 | End: 2023-06-21 | Stop reason: HOSPADM

## 2023-06-19 RX ORDER — AMLODIPINE BESYLATE 5 MG/1
10 TABLET ORAL DAILY
Status: DISCONTINUED | OUTPATIENT
Start: 2023-06-19 | End: 2023-06-21 | Stop reason: HOSPADM

## 2023-06-19 RX ORDER — ONDANSETRON 8 MG/1
8 TABLET, ORALLY DISINTEGRATING ORAL EVERY 8 HOURS PRN
Status: DISCONTINUED | OUTPATIENT
Start: 2023-06-19 | End: 2023-06-20

## 2023-06-19 RX ORDER — PREDNISONE 20 MG/1
60 TABLET ORAL
Status: COMPLETED | OUTPATIENT
Start: 2023-06-19 | End: 2023-06-19

## 2023-06-19 RX ORDER — OLANZAPINE 2.5 MG/1
20 TABLET ORAL NIGHTLY
Status: DISCONTINUED | OUTPATIENT
Start: 2023-06-19 | End: 2023-06-20 | Stop reason: SDUPTHER

## 2023-06-19 RX ORDER — FLUTICASONE FUROATE AND VILANTEROL 200; 25 UG/1; UG/1
1 POWDER RESPIRATORY (INHALATION) DAILY
Status: DISCONTINUED | OUTPATIENT
Start: 2023-06-19 | End: 2023-06-21 | Stop reason: HOSPADM

## 2023-06-19 RX ORDER — GLUCAGON 1 MG
1 KIT INJECTION
Status: DISCONTINUED | OUTPATIENT
Start: 2023-06-19 | End: 2023-06-21 | Stop reason: HOSPADM

## 2023-06-19 RX ORDER — IPRATROPIUM BROMIDE AND ALBUTEROL SULFATE 2.5; .5 MG/3ML; MG/3ML
3 SOLUTION RESPIRATORY (INHALATION) EVERY 4 HOURS
Status: DISCONTINUED | OUTPATIENT
Start: 2023-06-19 | End: 2023-06-21 | Stop reason: HOSPADM

## 2023-06-19 RX ORDER — INSULIN ASPART 100 [IU]/ML
1-10 INJECTION, SOLUTION INTRAVENOUS; SUBCUTANEOUS
Status: DISCONTINUED | OUTPATIENT
Start: 2023-06-19 | End: 2023-06-21 | Stop reason: HOSPADM

## 2023-06-19 RX ORDER — ACETAMINOPHEN 325 MG/1
650 TABLET ORAL EVERY 8 HOURS PRN
Status: DISCONTINUED | OUTPATIENT
Start: 2023-06-19 | End: 2023-06-21 | Stop reason: HOSPADM

## 2023-06-19 RX ORDER — IBUPROFEN 200 MG
1 TABLET ORAL DAILY
Status: DISCONTINUED | OUTPATIENT
Start: 2023-06-19 | End: 2023-06-20

## 2023-06-19 RX ORDER — AMOXICILLIN 250 MG
1 CAPSULE ORAL 2 TIMES DAILY
Status: DISCONTINUED | OUTPATIENT
Start: 2023-06-19 | End: 2023-06-21 | Stop reason: HOSPADM

## 2023-06-19 RX ORDER — ALBUTEROL SULFATE 2.5 MG/.5ML
15 SOLUTION RESPIRATORY (INHALATION)
Status: COMPLETED | OUTPATIENT
Start: 2023-06-19 | End: 2023-06-19

## 2023-06-19 RX ORDER — POLYETHYLENE GLYCOL 3350 17 G/17G
17 POWDER, FOR SOLUTION ORAL DAILY
Status: DISCONTINUED | OUTPATIENT
Start: 2023-06-19 | End: 2023-06-21 | Stop reason: HOSPADM

## 2023-06-19 RX ORDER — AZITHROMYCIN 250 MG/1
500 TABLET, FILM COATED ORAL DAILY
Status: COMPLETED | OUTPATIENT
Start: 2023-06-19 | End: 2023-06-21

## 2023-06-19 RX ORDER — DEXTROSE 40 %
15 GEL (GRAM) ORAL
Status: DISCONTINUED | OUTPATIENT
Start: 2023-06-19 | End: 2023-06-21 | Stop reason: HOSPADM

## 2023-06-19 RX ORDER — PREDNISONE 20 MG/1
40 TABLET ORAL DAILY
Status: DISCONTINUED | OUTPATIENT
Start: 2023-06-20 | End: 2023-06-21 | Stop reason: HOSPADM

## 2023-06-19 RX ORDER — HEPARIN SODIUM 5000 [USP'U]/ML
5000 INJECTION, SOLUTION INTRAVENOUS; SUBCUTANEOUS EVERY 8 HOURS
Status: DISCONTINUED | OUTPATIENT
Start: 2023-06-19 | End: 2023-06-21 | Stop reason: HOSPADM

## 2023-06-19 RX ADMIN — ESCITALOPRAM OXALATE 10 MG: 10 TABLET ORAL at 11:06

## 2023-06-19 RX ADMIN — IPRATROPIUM BROMIDE AND ALBUTEROL SULFATE 3 ML: 2.5; .5 SOLUTION RESPIRATORY (INHALATION) at 11:06

## 2023-06-19 RX ADMIN — PREDNISONE 60 MG: 20 TABLET ORAL at 07:06

## 2023-06-19 RX ADMIN — IPRATROPIUM BROMIDE AND ALBUTEROL SULFATE 3 ML: 2.5; .5 SOLUTION RESPIRATORY (INHALATION) at 08:06

## 2023-06-19 RX ADMIN — ALBUTEROL SULFATE 15 MG: 2.5 SOLUTION RESPIRATORY (INHALATION) at 07:06

## 2023-06-19 RX ADMIN — ACETAMINOPHEN 650 MG: 325 TABLET ORAL at 10:06

## 2023-06-19 RX ADMIN — ONDANSETRON 8 MG: 8 TABLET, ORALLY DISINTEGRATING ORAL at 05:06

## 2023-06-19 RX ADMIN — ACETAMINOPHEN 650 MG: 325 TABLET ORAL at 05:06

## 2023-06-19 RX ADMIN — HEPARIN SODIUM 5000 UNITS: 5000 INJECTION INTRAVENOUS; SUBCUTANEOUS at 05:06

## 2023-06-19 RX ADMIN — FLUTICASONE FUROATE AND VILANTEROL TRIFENATATE 1 PUFF: 200; 25 POWDER RESPIRATORY (INHALATION) at 03:06

## 2023-06-19 RX ADMIN — METHADONE HYDROCHLORIDE 90 MG: 10 TABLET ORAL at 11:06

## 2023-06-19 RX ADMIN — IPRATROPIUM BROMIDE AND ALBUTEROL SULFATE 3 ML: 2.5; .5 SOLUTION RESPIRATORY (INHALATION) at 03:06

## 2023-06-19 RX ADMIN — DOCUSATE SODIUM AND SENNOSIDES 1 TABLET: 8.6; 5 TABLET, FILM COATED ORAL at 08:06

## 2023-06-19 RX ADMIN — AMLODIPINE BESYLATE 10 MG: 5 TABLET ORAL at 11:06

## 2023-06-19 RX ADMIN — OLANZAPINE 20 MG: 2.5 TABLET, FILM COATED ORAL at 08:06

## 2023-06-19 RX ADMIN — AZITHROMYCIN MONOHYDRATE 500 MG: 250 TABLET ORAL at 05:06

## 2023-06-19 RX ADMIN — NICOTINE 1 PATCH: 21 PATCH, EXTENDED RELEASE TRANSDERMAL at 06:06

## 2023-06-19 NOTE — ASSESSMENT & PLAN NOTE
Patient with one-week history of shortness of breath, wheezing, and cough  ABG with pH 7.2 and pCO2 78.6.    Plan:  - prednisolone 40 mg daily x 5 total days and azithromycin 500 mg x 3 days  - Duo nebs [id number] while awake  - BiPAP QHS  - will continue controller medication and will substitute with what is available on hospital formulary  - supplemental oxygen titrated to a target O2 sat>88-92%   - Monitor Pulse Ox Q4H   - Acapella and Incentive spirometry   - smoking cessation consulted while inpatient  - will need ambulatory pulmonology consult at discharge for persistent hospitalizations/ED visits and hypercapnia
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
## 2023-06-19 NOTE — PLAN OF CARE
06/19/23 1242   Admission   Initial VN Admission Questions Complete   Communication Issues? None   Shift   Virtual Nurse - Rounding Complete   Pain Management Interventions care clustered;diversional activity provided;pain management plan reviewed with patient/caregiver;quiet environment facilitated;relaxation techniques promoted   Virtual Nurse - Patient Verbalized Approval Of Camera Use;VN Rounding   Type of Frequent Check   Type Patient Rounds;Telemetry Monitoring   Safety/Activity   Patient Rounds bed in low position;ID band on;bed wheels locked;call light in patient/parent reach;placement of personal items at bedside;clutter free environment maintained;visualized patient   Safety Promotion/Fall Prevention assistive device/personal item within reach;diversional activities provided;Fall Risk reviewed with patient/family;high risk medications identified;medications reviewed;room near unit station;side rails raised x 2;instructed to call staff for mobility   Safety Precautions emergency equipment at bedside   Activity Management Rolling - L1   Positioning   Body Position position changed independently;supine   Head of Bed (HOB) Positioning HOB at 30-45 degrees   Pain/Comfort/Sleep   Preferred Pain Scale number (Numeric Rating Pain Scale)   Cardiac   Cardiac/Telemetry Monitor On Yes   ECG   Rhythm normal sinus rhythm   VN cued into patient's room for introduction with patient's permission.  VN role explained and informed patient that VN would be working with bedside nurse and rest of care team.  Plan of care reviewed. Fall risk and bed alarm protocol education provided.  Instructed patient to call for assistance.  Patient aware and agreeable.  Patient's chart, labs and vital signs reviewed.  Allowed time for questions.  No acute distress noted.  Will continue to be available as needed.

## 2023-06-19 NOTE — HPI
Ming Harrington is a 57-year-old male with a PMHx COPD, CHF, HTN, T2DM, chronic methadone use, JUVENCIO, insomniavwho presented to [facility] ED via EMS in acute respiratory distress. Patient endorses symtpoms started after a upper respiratory tract infections a few days ago. Endorses nasal congestion, SOB, wheezing, and a non-productive cough. Endorses he is still smoking tobacco daily. Patient denies any chest pain, lower extremity edema, orthopnea, or PND.  Patient currently uses 2 L at home as needed. On albuterol inhaler as needed and Stiolto daily.     In the ED, patient found tachypneic (RR 30) with an oxygen saturation in the low 90s on 3 L NC. ABG with pH 7.2 pCO2 78.6.  Patient admitted to [facility] for acute COPD exacerbation.

## 2023-06-19 NOTE — PHARMACY MED REC
"  [facility]           Pharmacy  Admission Medication History     The home medication history was taken by Iraida Samuels.      Medication history obtained from Medications listed below were obtained from: [employer] software- [employer]    Based on information gathered for medication list, you may go to "Admission" then "Reconcile Home Medications" tabs to review and/or act upon those items.     The home medication list has been updated by the Pharmacy department.   Please read ALL comments highlighted in yellow.   Please address this information as you see fit.    Feel free to contact us if you have any questions or require assistance.    The medications listed below were removed from the home medication list.  Please reorder if appropriate:    Patient reports NOT TAKING the following medication(s):  Duoneb  Benadryl gel  Lisinopril 10mg        Current Facility-Administered Medications on File Prior to Encounter   Medication Dose Route Frequency Provider Last Rate Last Admin    0.9%  NaCl infusion   Intravenous Continuous Melissa Nielsen MD        lidocaine (PF) 10 mg/ml (1%) injection 10 mg  1 mL Intradermal Once Melissa Nielsen MD        mupirocin 2 % ointment   Topical (Top) 1 time in Clinic/HOD Asher Kiser MD         Current Outpatient Medications on File Prior to Encounter   Medication Sig Dispense Refill    albuterol (PROAIR HFA) 90 mcg/actuation inhaler Inhale 2 puffs into the lungs every 6 (six) hours as needed for Wheezing or Shortness of Breath. Rescue 18 g 1    albuterol (PROVENTIL) 2.5 mg /3 mL (0.083 %) nebulizer solution Take 3 mLs (2.5 mg total) by nebulization every 4 (four) hours as needed for Wheezing or Shortness of Breath. 1 each 3    amLODIPine (NORVASC) 10 MG tablet Take 1 tablet (10 mg total) by mouth once daily. 30 tablet 11    cloNIDine (CATAPRES) 0.1 MG tablet Take 0.1 mg by mouth nightly.      EScitalopram oxalate (LEXAPRO) 10 MG tablet Take 1 tablet " (10 mg total) by mouth once daily. 90 tablet 3    furosemide (LASIX) 40 MG tablet Take 40 mg by mouth once daily.      budesonide-formoterol 80-4.5 mcg (SYMBICORT) 80-4.5 mcg/actuation HFAA Inhale 2 puffs into the lungs once daily. Controller 6 g 0    diclofenac sodium (VOLTAREN) 1 % Gel Apply 2 g topically 4 (four) times daily. 100 g 0    docusate sodium (COLACE) 100 MG capsule Take 1 capsule (100 mg total) by mouth 2 (two) times daily. 60 capsule 1    ibuprofen (ADVIL,MOTRIN) 600 MG tablet Take 1 tablet (600 mg total) by mouth 3 (three) times daily. (Patient not taking: Reported on 6/19/2023) 60 tablet 1    lisinopriL (PRINIVIL,ZESTRIL) 5 MG tablet       OLANZapine (ZYPREXA) 20 MG tablet Take 1 tablet (20 mg total) by mouth nightly. (Patient not taking: Reported on 6/19/2023) 30 tablet 6    polyethylene glycol (GLYCOLAX) 17 gram PwPk Take 17 g by mouth once daily. 14 packet 1    pramoxine-benzalkonium chlor. (NEOSPORIN MODE TO GO) 1-0.13 % Spry Apply 1 drop topically 2 (two) times a day. 8 mL 1    SENNA 8.6 mg tablet Take 2 tablets by mouth once daily. 28 tablet 1    triamcinolone acetonide 0.1% (KENALOG) 0.1 % cream Apply topically 2 (two) times daily. Apply to lower extremities twice daily for itching 80 g 0    white petrolatum (PETROLEUM JELLY) ointment Apply topically as needed for Dry Skin. 49 g 2       Please address this information as you see fit.  Feel free to contact us if you have any questions or require assistance.    Iraida Samuels  [phone]                .

## 2023-06-19 NOTE — ASSESSMENT & PLAN NOTE
Patient on methadone 90 mg daily that he receives at [facility] in [place]  Will continue medication while inpatient
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
## 2023-06-19 NOTE — PLAN OF CARE
SW met with pt at bedside to complete assessment. Pt is Alert and able to verbally answer assessment questions.  Pt confirmed demographic information. Pt reports to live at home with his brother Vinny who is also emergency contact. Pt reports to be independent of ADL's and confirmed able to get in and out of shower and able to clothe himself. Pt reports no DME just home O2. Pt is active with methadone clinic but could not remember which one. Pt reports to be confused currently. Pt reported his brother would know if need be; SW then noted Vinny phone number to be out of service. SW to return to pt room to get updated phone number and to confirm clinic. Pt reports using medicaid transportation to get to and from appointments. SW updated whiteboard with CM name and contact information. SW confirmed pt understanding of Observation unit and expected discharge plan. SW will continue to follow pt throughout care and assist with any discharge needs.         06/19/23 1514   Discharge Planning   Assessment Type Discharge Planning Brief Assessment   Resource/Environmental Concerns none   Support Systems Family members   Equipment Currently Used at Home oxygen   Current Living Arrangements home   Patient/Family Anticipates Transition to home with family   Patient/Family Anticipated Services at Transition home health care   DME Needed Upon Discharge  none   Discharge Plan A Home with family     No future appointments.    ABUNDIO Flores Case Management  [phone]

## 2023-06-19 NOTE — PROGRESS NOTES
"Individual Follow-Up Form    6/19/2023    Quit Date: To be determined    Clinical Status of Patient: Inpatient    Length of Service: 30 minutes    Comments: Smoking cessation education provided. Pt is a 1 pk/day cigarette smoker x 22 yrs. He states that he tried cigarettes at a young age, but did not start smoking regularly until age 35 (1 pk/day on average). Pt quit smoking x 4 months, but relapsed approximately 1 yr ago. He states that he usually smokes 5 or 6 cigarettes per day, but often more than 10 in a day. Of note, he is also exposed to 2nd hand smoke on a regular basis, living with his brother, who is also a current, every day smoker. He states that they both smoke inside their home. Discussed the risks of continued smoking, benefits of quitting, and dangers of 2nd and 3rd hand smoke. Encouraged pt to establish an "outdoor smoking only" policy at home if possible, and discussed the importance of clean indoor air.  Pt states that he believes his brother will agree to smoke outdoors only going forward. Pt states ready to quit smoking for good. He is currently enrolled in the [place] [employer]. Order obtained for 21 mg nicotine patch Q day. Pt states ready to quit smoking. Ambulatory referral to Smoking Cessation clinic following hospital discharge.     Diagnosis: F17.210        "

## 2023-06-20 ENCOUNTER — CLINICAL SUPPORT (OUTPATIENT)
Dept: SMOKING CESSATION | Facility: CLINIC | Age: 58
End: 2023-06-20
Payer: MEDICAID

## 2023-06-20 DIAGNOSIS — F17.210 CIGARETTE SMOKER: Primary | ICD-10-CM

## 2023-06-20 PROBLEM — N18.31 STAGE 3A CHRONIC KIDNEY DISEASE: Status: ACTIVE | Noted: 2023-06-20

## 2023-06-20 PROBLEM — N18.4 CKD (CHRONIC KIDNEY DISEASE), STAGE IV: Status: ACTIVE | Noted: 2023-06-20

## 2023-06-20 LAB
ADENOVIRUS: NOT DETECTED
ALBUMIN SERPL BCP-MCNC: 3 G/DL (ref 3.5–5.2)
ALBUMIN/CREAT UR: 2512.1 UG/MG (ref 0–30)
ALP SERPL-CCNC: 90 U/L (ref 55–135)
ALT SERPL W/O P-5'-P-CCNC: 17 U/L (ref 10–44)
ANION GAP SERPL CALC-SCNC: 11 MMOL/L (ref 8–16)
AST SERPL-CCNC: 19 U/L (ref 10–40)
BACTERIA #/AREA URNS HPF: ABNORMAL /HPF
BASOPHILS # BLD AUTO: 0.01 K/UL (ref 0–0.2)
BASOPHILS NFR BLD: 0.1 % (ref 0–1.9)
BILIRUB SERPL-MCNC: 0.6 MG/DL (ref 0.1–1)
BILIRUB UR QL STRIP: NEGATIVE
BORDETELLA PARAPERTUSSIS (IS1001): NOT DETECTED
BORDETELLA PERTUSSIS (PTXP): NOT DETECTED
BUN SERPL-MCNC: 16 MG/DL (ref 6–20)
CALCIUM SERPL-MCNC: 8.9 MG/DL (ref 8.7–10.5)
CHLAMYDIA PNEUMONIAE: NOT DETECTED
CHLORIDE SERPL-SCNC: 103 MMOL/L (ref 95–110)
CLARITY UR: ABNORMAL
CO2 SERPL-SCNC: 25 MMOL/L (ref 23–29)
COLOR UR: YELLOW
CORONAVIRUS 229E, COMMON COLD VIRUS: NOT DETECTED
CORONAVIRUS HKU1, COMMON COLD VIRUS: NOT DETECTED
CORONAVIRUS NL63, COMMON COLD VIRUS: NOT DETECTED
CORONAVIRUS OC43, COMMON COLD VIRUS: NOT DETECTED
CREAT SERPL-MCNC: 1.6 MG/DL (ref 0.5–1.4)
CREAT UR-MCNC: 33 MG/DL (ref 23–375)
CREAT UR-MCNC: 33 MG/DL (ref 23–375)
DIFFERENTIAL METHOD: ABNORMAL
EOSINOPHIL # BLD AUTO: 0 K/UL (ref 0–0.5)
EOSINOPHIL NFR BLD: 0.1 % (ref 0–8)
ERYTHROCYTE [DISTWIDTH] IN BLOOD BY AUTOMATED COUNT: 11.9 % (ref 11.5–14.5)
EST. GFR  (NO RACE VARIABLE): 50 ML/MIN/1.73 M^2
FLUBV RNA NPH QL NAA+NON-PROBE: NOT DETECTED
GLUCOSE SERPL-MCNC: 115 MG/DL (ref 70–110)
GLUCOSE UR QL STRIP: NEGATIVE
HCT VFR BLD AUTO: 35.2 % (ref 40–54)
HGB BLD-MCNC: 11.8 G/DL (ref 14–18)
HGB UR QL STRIP: ABNORMAL
HPIV1 RNA NPH QL NAA+NON-PROBE: NOT DETECTED
HPIV2 RNA NPH QL NAA+NON-PROBE: NOT DETECTED
HPIV3 RNA NPH QL NAA+NON-PROBE: NOT DETECTED
HPIV4 RNA NPH QL NAA+NON-PROBE: NOT DETECTED
HUMAN METAPNEUMOVIRUS: NOT DETECTED
HYALINE CASTS #/AREA URNS LPF: 1 /LPF
IMM GRANULOCYTES # BLD AUTO: 0.01 K/UL (ref 0–0.04)
IMM GRANULOCYTES NFR BLD AUTO: 0.1 % (ref 0–0.5)
INFLUENZA A (SUBTYPES H1,H1-2009,H3): NOT DETECTED
INFLUENZA A, MOLECULAR: NEGATIVE
INFLUENZA B, MOLECULAR: NEGATIVE
KETONES UR QL STRIP: NEGATIVE
LEUKOCYTE ESTERASE UR QL STRIP: ABNORMAL
LYMPHOCYTES # BLD AUTO: 0.9 K/UL (ref 1–4.8)
LYMPHOCYTES NFR BLD: 13.1 % (ref 18–48)
MAGNESIUM SERPL-MCNC: 2 MG/DL (ref 1.6–2.6)
MCH RBC QN AUTO: 32.1 PG (ref 27–31)
MCHC RBC AUTO-ENTMCNC: 33.5 G/DL (ref 32–36)
MCV RBC AUTO: 96 FL (ref 82–98)
MICROALBUMIN UR DL<=1MG/L-MCNC: 829 UG/ML
MICROSCOPIC COMMENT: ABNORMAL
MONOCYTES # BLD AUTO: 0.4 K/UL (ref 0.3–1)
MONOCYTES NFR BLD: 5.5 % (ref 4–15)
MYCOPLASMA PNEUMONIAE: NOT DETECTED
NEUTROPHILS # BLD AUTO: 5.6 K/UL (ref 1.8–7.7)
NEUTROPHILS NFR BLD: 81.1 % (ref 38–73)
NITRITE UR QL STRIP: NEGATIVE
NRBC BLD-RTO: 0 /100 WBC
PH UR STRIP: 7 [PH] (ref 5–8)
PHOSPHATE SERPL-MCNC: 3.3 MG/DL (ref 2.7–4.5)
PLATELET # BLD AUTO: 152 K/UL (ref 150–450)
PMV BLD AUTO: 11.5 FL (ref 9.2–12.9)
POCT GLUCOSE: 114 MG/DL (ref 70–110)
POCT GLUCOSE: 121 MG/DL (ref 70–110)
POCT GLUCOSE: 128 MG/DL (ref 70–110)
POCT GLUCOSE: 91 MG/DL (ref 70–110)
POTASSIUM SERPL-SCNC: 4.6 MMOL/L (ref 3.5–5.1)
PROCALCITONIN SERPL IA-MCNC: 0.03 NG/ML
PROT SERPL-MCNC: 7 G/DL (ref 6–8.4)
PROT UR QL STRIP: ABNORMAL
RBC # BLD AUTO: 3.68 M/UL (ref 4.6–6.2)
RBC #/AREA URNS HPF: 3 /HPF (ref 0–4)
RESPIRATORY INFECTION PANEL SOURCE: NORMAL
RSV RNA NPH QL NAA+NON-PROBE: NOT DETECTED
RV+EV RNA NPH QL NAA+NON-PROBE: NOT DETECTED
SARS-COV-2 RDRP RESP QL NAA+PROBE: NEGATIVE
SARS-COV-2 RNA RESP QL NAA+PROBE: NOT DETECTED
SODIUM SERPL-SCNC: 139 MMOL/L (ref 136–145)
SP GR UR STRIP: 1.01 (ref 1–1.03)
SPECIMEN SOURCE: NORMAL
SQUAMOUS #/AREA URNS HPF: 3 /HPF
URN SPEC COLLECT METH UR: ABNORMAL
UROBILINOGEN UR STRIP-ACNC: ABNORMAL EU/DL
UUN UR-MCNC: 175 MG/DL (ref 140–1050)
WBC # BLD AUTO: 6.94 K/UL (ref 3.9–12.7)
WBC #/AREA URNS HPF: 52 /HPF (ref 0–5)

## 2023-06-20 PROCEDURE — 36415 COLL VENOUS BLD VENIPUNCTURE: CPT | Performed by: STUDENT IN AN ORGANIZED HEALTH CARE EDUCATION/TRAINING PROGRAM

## 2023-06-20 PROCEDURE — S4991 NICOTINE PATCH NONLEGEND: HCPCS | Performed by: STUDENT IN AN ORGANIZED HEALTH CARE EDUCATION/TRAINING PROGRAM

## 2023-06-20 PROCEDURE — 87798 DETECT AGENT NOS DNA AMP: CPT | Mod: 59 | Performed by: STUDENT IN AN ORGANIZED HEALTH CARE EDUCATION/TRAINING PROGRAM

## 2023-06-20 PROCEDURE — 27000646 HC AEROBIKA DEVICE

## 2023-06-20 PROCEDURE — 96372 THER/PROPH/DIAG INJ SC/IM: CPT | Performed by: STUDENT IN AN ORGANIZED HEALTH CARE EDUCATION/TRAINING PROGRAM

## 2023-06-20 PROCEDURE — 87088 URINE BACTERIA CULTURE: CPT | Performed by: STUDENT IN AN ORGANIZED HEALTH CARE EDUCATION/TRAINING PROGRAM

## 2023-06-20 PROCEDURE — 25000242 PHARM REV CODE 250 ALT 637 W/ HCPCS: Performed by: STUDENT IN AN ORGANIZED HEALTH CARE EDUCATION/TRAINING PROGRAM

## 2023-06-20 PROCEDURE — 80053 COMPREHEN METABOLIC PANEL: CPT | Performed by: STUDENT IN AN ORGANIZED HEALTH CARE EDUCATION/TRAINING PROGRAM

## 2023-06-20 PROCEDURE — 87070 CULTURE OTHR SPECIMN AEROBIC: CPT | Performed by: STUDENT IN AN ORGANIZED HEALTH CARE EDUCATION/TRAINING PROGRAM

## 2023-06-20 PROCEDURE — 85025 COMPLETE CBC W/AUTO DIFF WBC: CPT | Performed by: STUDENT IN AN ORGANIZED HEALTH CARE EDUCATION/TRAINING PROGRAM

## 2023-06-20 PROCEDURE — 99900035 HC TECH TIME PER 15 MIN (STAT)

## 2023-06-20 PROCEDURE — 87502 INFLUENZA DNA AMP PROBE: CPT | Performed by: STUDENT IN AN ORGANIZED HEALTH CARE EDUCATION/TRAINING PROGRAM

## 2023-06-20 PROCEDURE — 84100 ASSAY OF PHOSPHORUS: CPT | Performed by: STUDENT IN AN ORGANIZED HEALTH CARE EDUCATION/TRAINING PROGRAM

## 2023-06-20 PROCEDURE — 81000 URINALYSIS NONAUTO W/SCOPE: CPT | Performed by: STUDENT IN AN ORGANIZED HEALTH CARE EDUCATION/TRAINING PROGRAM

## 2023-06-20 PROCEDURE — 94667 MNPJ CHEST WALL 1ST: CPT

## 2023-06-20 PROCEDURE — 94640 AIRWAY INHALATION TREATMENT: CPT

## 2023-06-20 PROCEDURE — 25000003 PHARM REV CODE 250: Performed by: STUDENT IN AN ORGANIZED HEALTH CARE EDUCATION/TRAINING PROGRAM

## 2023-06-20 PROCEDURE — 11000001 HC ACUTE MED/SURG PRIVATE ROOM

## 2023-06-20 PROCEDURE — U0002 COVID-19 LAB TEST NON-CDC: HCPCS | Performed by: STUDENT IN AN ORGANIZED HEALTH CARE EDUCATION/TRAINING PROGRAM

## 2023-06-20 PROCEDURE — 83735 ASSAY OF MAGNESIUM: CPT | Performed by: STUDENT IN AN ORGANIZED HEALTH CARE EDUCATION/TRAINING PROGRAM

## 2023-06-20 PROCEDURE — 94640 AIRWAY INHALATION TREATMENT: CPT | Mod: XB

## 2023-06-20 PROCEDURE — 63600175 PHARM REV CODE 636 W HCPCS: Performed by: STUDENT IN AN ORGANIZED HEALTH CARE EDUCATION/TRAINING PROGRAM

## 2023-06-20 PROCEDURE — 87086 URINE CULTURE/COLONY COUNT: CPT | Performed by: STUDENT IN AN ORGANIZED HEALTH CARE EDUCATION/TRAINING PROGRAM

## 2023-06-20 PROCEDURE — 87186 SC STD MICRODIL/AGAR DIL: CPT | Performed by: STUDENT IN AN ORGANIZED HEALTH CARE EDUCATION/TRAINING PROGRAM

## 2023-06-20 PROCEDURE — 87633 RESP VIRUS 12-25 TARGETS: CPT | Performed by: STUDENT IN AN ORGANIZED HEALTH CARE EDUCATION/TRAINING PROGRAM

## 2023-06-20 PROCEDURE — 27000221 HC OXYGEN, UP TO 24 HOURS

## 2023-06-20 PROCEDURE — 87205 SMEAR GRAM STAIN: CPT | Performed by: STUDENT IN AN ORGANIZED HEALTH CARE EDUCATION/TRAINING PROGRAM

## 2023-06-20 PROCEDURE — 84540 ASSAY OF URINE/UREA-N: CPT | Performed by: STUDENT IN AN ORGANIZED HEALTH CARE EDUCATION/TRAINING PROGRAM

## 2023-06-20 PROCEDURE — 99406 BEHAV CHNG SMOKING 3-10 MIN: CPT | Mod: ,,,

## 2023-06-20 PROCEDURE — 94799 UNLISTED PULMONARY SVC/PX: CPT

## 2023-06-20 PROCEDURE — 63700000 PHARM REV CODE 250 ALT 637 W/O HCPCS: Performed by: STUDENT IN AN ORGANIZED HEALTH CARE EDUCATION/TRAINING PROGRAM

## 2023-06-20 PROCEDURE — 94664 DEMO&/EVAL PT USE INHALER: CPT

## 2023-06-20 PROCEDURE — 99406 PT REFUSED TOBACCO CESSATION: ICD-10-PCS | Mod: ,,,

## 2023-06-20 PROCEDURE — 94761 N-INVAS EAR/PLS OXIMETRY MLT: CPT

## 2023-06-20 PROCEDURE — 82570 ASSAY OF URINE CREATININE: CPT | Performed by: STUDENT IN AN ORGANIZED HEALTH CARE EDUCATION/TRAINING PROGRAM

## 2023-06-20 PROCEDURE — 94668 MNPJ CHEST WALL SBSQ: CPT

## 2023-06-20 PROCEDURE — 84145 PROCALCITONIN (PCT): CPT | Performed by: STUDENT IN AN ORGANIZED HEALTH CARE EDUCATION/TRAINING PROGRAM

## 2023-06-20 RX ORDER — SULFAMETHOXAZOLE AND TRIMETHOPRIM 800; 160 MG/1; MG/1
1 TABLET ORAL 2 TIMES DAILY
Status: DISCONTINUED | OUTPATIENT
Start: 2023-06-20 | End: 2023-06-21

## 2023-06-20 RX ORDER — PROMETHAZINE HYDROCHLORIDE 12.5 MG/1
25 TABLET ORAL EVERY 6 HOURS PRN
Status: DISCONTINUED | OUTPATIENT
Start: 2023-06-20 | End: 2023-06-21 | Stop reason: HOSPADM

## 2023-06-20 RX ORDER — HYDROXYZINE PAMOATE 25 MG/1
25 CAPSULE ORAL ONCE
Status: COMPLETED | OUTPATIENT
Start: 2023-06-20 | End: 2023-06-20

## 2023-06-20 RX ORDER — IBUPROFEN 200 MG
1 TABLET ORAL ONCE
Status: DISCONTINUED | OUTPATIENT
Start: 2023-06-20 | End: 2023-06-21 | Stop reason: HOSPADM

## 2023-06-20 RX ORDER — OLANZAPINE 5 MG/1
20 TABLET ORAL NIGHTLY
Status: DISCONTINUED | OUTPATIENT
Start: 2023-06-20 | End: 2023-06-21 | Stop reason: HOSPADM

## 2023-06-20 RX ORDER — FUROSEMIDE 40 MG/1
40 TABLET ORAL DAILY
Status: DISCONTINUED | OUTPATIENT
Start: 2023-06-20 | End: 2023-06-21 | Stop reason: HOSPADM

## 2023-06-20 RX ORDER — SYRING-NEEDL,DISP,INSUL,0.3 ML 29 G X1/2"
296 SYRINGE, EMPTY DISPOSABLE MISCELLANEOUS ONCE
Status: COMPLETED | OUTPATIENT
Start: 2023-06-20 | End: 2023-06-20

## 2023-06-20 RX ORDER — SODIUM CHLORIDE FOR INHALATION 3 %
4 VIAL, NEBULIZER (ML) INHALATION
Status: DISCONTINUED | OUTPATIENT
Start: 2023-06-20 | End: 2023-06-21 | Stop reason: HOSPADM

## 2023-06-20 RX ORDER — ONDANSETRON 8 MG/1
8 TABLET, ORALLY DISINTEGRATING ORAL EVERY 8 HOURS PRN
Status: DISCONTINUED | OUTPATIENT
Start: 2023-06-20 | End: 2023-06-21 | Stop reason: HOSPADM

## 2023-06-20 RX ADMIN — HEPARIN SODIUM 5000 UNITS: 5000 INJECTION INTRAVENOUS; SUBCUTANEOUS at 06:06

## 2023-06-20 RX ADMIN — AMLODIPINE BESYLATE 10 MG: 5 TABLET ORAL at 09:06

## 2023-06-20 RX ADMIN — PREDNISONE 40 MG: 20 TABLET ORAL at 09:06

## 2023-06-20 RX ADMIN — NICOTINE 1 PATCH: 21 PATCH, EXTENDED RELEASE TRANSDERMAL at 06:06

## 2023-06-20 RX ADMIN — HEPARIN SODIUM 5000 UNITS: 5000 INJECTION INTRAVENOUS; SUBCUTANEOUS at 09:06

## 2023-06-20 RX ADMIN — IPRATROPIUM BROMIDE AND ALBUTEROL SULFATE 3 ML: 2.5; .5 SOLUTION RESPIRATORY (INHALATION) at 08:06

## 2023-06-20 RX ADMIN — SULFAMETHOXAZOLE AND TRIMETHOPRIM 1 TABLET: 800; 160 TABLET ORAL at 06:06

## 2023-06-20 RX ADMIN — HEPARIN SODIUM 5000 UNITS: 5000 INJECTION INTRAVENOUS; SUBCUTANEOUS at 01:06

## 2023-06-20 RX ADMIN — FUROSEMIDE 40 MG: 40 TABLET ORAL at 09:06

## 2023-06-20 RX ADMIN — HYDROXYZINE PAMOATE 25 MG: 25 CAPSULE ORAL at 06:06

## 2023-06-20 RX ADMIN — IPRATROPIUM BROMIDE AND ALBUTEROL SULFATE 3 ML: 2.5; .5 SOLUTION RESPIRATORY (INHALATION) at 03:06

## 2023-06-20 RX ADMIN — AZITHROMYCIN MONOHYDRATE 500 MG: 250 TABLET ORAL at 09:06

## 2023-06-20 RX ADMIN — DOCUSATE SODIUM AND SENNOSIDES 1 TABLET: 8.6; 5 TABLET, FILM COATED ORAL at 08:06

## 2023-06-20 RX ADMIN — ESCITALOPRAM OXALATE 10 MG: 10 TABLET ORAL at 09:06

## 2023-06-20 RX ADMIN — IPRATROPIUM BROMIDE AND ALBUTEROL SULFATE 3 ML: 2.5; .5 SOLUTION RESPIRATORY (INHALATION) at 04:06

## 2023-06-20 RX ADMIN — PROMETHAZINE HYDROCHLORIDE 25 MG: 12.5 TABLET ORAL at 12:06

## 2023-06-20 RX ADMIN — FLUTICASONE FUROATE AND VILANTEROL TRIFENATATE 1 PUFF: 200; 25 POWDER RESPIRATORY (INHALATION) at 07:06

## 2023-06-20 RX ADMIN — MAGNESIUM CITRATE 296 ML: 1.75 LIQUID ORAL at 12:06

## 2023-06-20 RX ADMIN — IPRATROPIUM BROMIDE AND ALBUTEROL SULFATE 3 ML: 2.5; .5 SOLUTION RESPIRATORY (INHALATION) at 11:06

## 2023-06-20 RX ADMIN — IPRATROPIUM BROMIDE AND ALBUTEROL SULFATE 3 ML: 2.5; .5 SOLUTION RESPIRATORY (INHALATION) at 07:06

## 2023-06-20 RX ADMIN — PROMETHAZINE HYDROCHLORIDE 25 MG: 12.5 TABLET ORAL at 08:06

## 2023-06-20 RX ADMIN — POLYETHYLENE GLYCOL 3350 17 G: 17 POWDER, FOR SOLUTION ORAL at 09:06

## 2023-06-20 RX ADMIN — ONDANSETRON 8 MG: 8 TABLET, ORALLY DISINTEGRATING ORAL at 03:06

## 2023-06-20 RX ADMIN — NICOTINE 1 PATCH: 21 PATCH, EXTENDED RELEASE TRANSDERMAL at 10:06

## 2023-06-20 RX ADMIN — METHADONE HYDROCHLORIDE 90 MG: 10 TABLET ORAL at 09:06

## 2023-06-20 RX ADMIN — OLANZAPINE 20 MG: 5 TABLET, FILM COATED ORAL at 08:06

## 2023-06-20 RX ADMIN — DOCUSATE SODIUM AND SENNOSIDES 1 TABLET: 8.6; 5 TABLET, FILM COATED ORAL at 09:06

## 2023-06-20 NOTE — PLAN OF CARE
NA[facility] contacted for assistance with coordination of transportation to and from smoking cessation follow up. SW added medicaid resources to AVS.     Pt noted to have [employer] and can receive transportation assistance by calling [phone]. [facility] to inform pt as he uses transportation service for medical appointments.        06/20/23 1310   Post-Acute Status   Post-Acute Authorization Other   Other Status Community Services   Hospital Resources/Appts/Education Provided Appointments scheduled by Navigator/Coordinator;Appointments scheduled and added to AVS   Discharge Delays None known at this time   Discharge Plan   Discharge Plan A Home with family       Future Appointments   Date Time Provider Department Center   6/27/2023  1:00 PM Arnaldo Hopkins, CTTS [facility]   6/28/2023  2:20 PM Asher Kiser MD [facility]     ABUNDIO Flores Case Management  [phone]

## 2023-06-20 NOTE — PLAN OF CARE
Problem: Adult Inpatient Plan of Care  Goal: Plan of Care Review  Outcome: Ongoing, Progressing  Goal: Patient-Specific Goal (Individualized)  Outcome: Ongoing, Progressing  Goal: Absence of Hospital-Acquired Illness or Injury  Outcome: Ongoing, Progressing  Goal: Optimal Comfort and Wellbeing  Outcome: Ongoing, Progressing  Goal: Readiness for Transition of Care  Outcome: Ongoing, Progressing     Problem: Fall Injury Risk  Goal: Absence of Fall and Fall-Related Injury  Outcome: Ongoing, Progressing     Problem: COPD (Chronic Obstructive Pulmonary Disease) Comorbidity  Goal: Maintenance of COPD Symptom Control  Outcome: Ongoing, Progressing

## 2023-06-20 NOTE — SUBJECTIVE & OBJECTIVE
Interval History: NAEON, patient reports some improvement in breathing since arrival but remains wheezy. UOP on low side (0.3 ml/kg/hr), home lasix started (for leg swelling). Also continuing to endorse abdominal pain/nausea.    Review of Systems   Constitutional:  Negative for appetite change, chills, fever and unexpected weight change.   HENT:  Positive for congestion.    Eyes:  Negative for visual disturbance.   Respiratory:  Positive for cough, shortness of breath and wheezing.    Cardiovascular:  Negative for chest pain and leg swelling.   Gastrointestinal:  Positive for abdominal pain, nausea and vomiting. Negative for blood in stool and constipation.   Genitourinary:  Negative for difficulty urinating, dysuria and frequency.   Musculoskeletal:  Negative for arthralgias and myalgias.   Skin:  Negative for rash.   Neurological:  Negative for dizziness, weakness, numbness and headaches.   Psychiatric/Behavioral:  Negative for dysphoric mood and sleep disturbance. The patient is not nervous/anxious.    Objective:     Vital Signs (Most Recent):  Temp: 96 °F (35.6 °C) (06/20/23 0802)  Pulse: 71 (06/20/23 0802)  Resp: 18 (06/20/23 0802)  BP: (!) 139/92 (06/20/23 0802)  SpO2: (!) 93 % (06/20/23 0802) Vital Signs (24h Range):  Temp:  [96 °F (35.6 °C)-98.8 °F (37.1 °C)] 96 °F (35.6 °C)  Pulse:  [62-87] 71  Resp:  [16-20] 18  SpO2:  [83 %-98 %] 93 %  BP: (120-150)/(58-93) 139/92     Weight: 113.4 kg (249 lb 15.7 oz)  Body mass index is 38.01 kg/m².    Intake/Output Summary (Last 24 hours) at 6/20/2023 0832  Last data filed at 6/20/2023 0651  Gross per 24 hour   Intake --   Output 900 ml   Net -900 ml         Physical Exam  Constitutional:       Appearance: Normal appearance.   HENT:      Mouth/Throat:      Mouth: Mucous membranes are moist.      Pharynx: Oropharynx is clear.   Cardiovascular:      Rate and Rhythm: Normal rate and regular rhythm.      Pulses: Normal pulses.      Heart sounds: Normal heart sounds.    Pulmonary:      Effort: Pulmonary effort is normal.      Breath sounds: Wheezing present.   Abdominal:      General: Abdomen is flat. Bowel sounds are normal.      Palpations: Abdomen is soft.   Musculoskeletal:      Right lower leg: No edema.      Left lower leg: No edema.   Skin:     General: Skin is warm and dry.      Capillary Refill: Capillary refill takes less than 2 seconds.   Neurological:      General: No focal deficit present.      Mental Status: He is alert and oriented to person, place, and time. Mental status is at baseline.   Psychiatric:         Mood and Affect: Mood normal.         Behavior: Behavior normal.           Significant Labs: CBC:   Recent Labs   Lab 06/19/23  0717 06/20/23  0309   WBC 7.81 6.94   HGB 12.0* 11.8*   HCT 37.1* 35.2*    152     CMP:   Recent Labs   Lab 06/19/23  0717 06/20/23  0309    139   K 4.0 4.6    103   CO2 31* 25   GLU 93 115*   BUN 14 16   CREATININE 1.6* 1.6*   CALCIUM 9.1 8.9   PROT 8.2 7.0   ALBUMIN 3.6 3.0*   BILITOT 0.6 0.6   ALKPHOS 104 90   AST 21 19   ALT 18 17   ANIONGAP 10 11       Significant Imaging: I have reviewed all pertinent imaging results/findings within the past 24 hours.

## 2023-06-20 NOTE — ASSESSMENT & PLAN NOTE
Patient on methadone 90 mg daily that he receives at [facility] in [place]  Will continue medication while inpatient

## 2023-06-20 NOTE — ASSESSMENT & PLAN NOTE
Ranged from 3a-3b over past 5-6 months. Concern for possible oliguria while inpatient. On lasix outpatient for leg swelling.    - Continue home lasix 40 mg daily  - Will obtain renal US  - Strict I/O's  - Avoid the nephrotoxins and hypotension  - Monitor the electrolytes  - Would benefit from Nephrology follow-up as an outpatient

## 2023-06-20 NOTE — ASSESSMENT & PLAN NOTE
Patient with one-week history of shortness of breath, wheezing, and cough  ABG with pH 7.2 and pCO2 78.6.    Plan:  - prednisone 40 mg daily (day 2)  X azithromycin 500 mg (day 2)  - Duo nebs [id number] while awake  - BiPAP QHS  - Will continue controller medication and will substitute with what is available on hospital formulary  - supplemental oxygen titrated to a target O2 sat>88-92%   - Monitor Pulse Ox Q4H   - Acapella and Incentive spirometry   - Smoking cessation consulted while inpatient  - Will need ambulatory pulmonology consult at discharge for persistent hospitalizations/ED visits and hypercapnia

## 2023-06-20 NOTE — PROGRESS NOTES
[facility] Medicine  Progress Note    Patient Name: Ming Harrington  MRN: [id number]  Patient Class: OP- Observation   Admission Date: 6/19/2023  Length of Stay: 0 days  Attending Physician: Albina De Leon MD  Primary Care Provider: Garland Xiong DO        Subjective:     Principal Problem:COPD with acute exacerbation        HPI:  Ming Harrington is a 57-year-old male with a PMHx COPD, CHF, HTN, T2DM, chronic methadone use, JUVENCIO, insomniavwho presented to [facility] ED via EMS in acute respiratory distress. Patient endorses symtpoms started after a upper respiratory tract infections a few days ago. Endorses nasal congestion, SOB, wheezing, and a non-productive cough. Endorses he is still smoking tobacco daily. Patient denies any chest pain, lower extremity edema, orthopnea, or PND.  Patient currently uses 2 L at home as needed. On albuterol inhaler as needed and Stiolto daily.     In the ED, patient found tachypneic (RR 30) with an oxygen saturation in the low 90s on 3 L NC. ABG with pH 7.2 pCO2 78.6.  Patient admitted to [facility] for acute COPD exacerbation.         Overview/Hospital Course:  No notes on file    Interval History: NAEON, patient reports some improvement in breathing since arrival but remains wheezy. UOP on low side (0.3 ml/kg/hr), home lasix started (for leg swelling). Also continuing to endorse abdominal pain/nausea.    Review of Systems   Constitutional:  Negative for appetite change, chills, fever and unexpected weight change.   HENT:  Positive for congestion.    Eyes:  Negative for visual disturbance.   Respiratory:  Positive for cough, shortness of breath and wheezing.    Cardiovascular:  Negative for chest pain and leg swelling.   Gastrointestinal:  Positive for abdominal pain, nausea and vomiting. Negative for blood in stool and constipation.   Genitourinary:  Negative for difficulty urinating, dysuria and frequency.   Musculoskeletal:  Negative for arthralgias and  myalgias.   Skin:  Negative for rash.   Neurological:  Negative for dizziness, weakness, numbness and headaches.   Psychiatric/Behavioral:  Negative for dysphoric mood and sleep disturbance. The patient is not nervous/anxious.    Objective:     Vital Signs (Most Recent):  Temp: 96 °F (35.6 °C) (06/20/23 0802)  Pulse: 71 (06/20/23 0802)  Resp: 18 (06/20/23 0802)  BP: (!) 139/92 (06/20/23 0802)  SpO2: (!) 93 % (06/20/23 0802) Vital Signs (24h Range):  Temp:  [96 °F (35.6 °C)-98.8 °F (37.1 °C)] 96 °F (35.6 °C)  Pulse:  [62-87] 71  Resp:  [16-20] 18  SpO2:  [83 %-98 %] 93 %  BP: (120-150)/(58-93) 139/92     Weight: 113.4 kg (249 lb 15.7 oz)  Body mass index is 38.01 kg/m².    Intake/Output Summary (Last 24 hours) at 6/20/2023 0832  Last data filed at 6/20/2023 0651  Gross per 24 hour   Intake --   Output 900 ml   Net -900 ml         Physical Exam  Constitutional:       Appearance: Normal appearance.   HENT:      Mouth/Throat:      Mouth: Mucous membranes are moist.      Pharynx: Oropharynx is clear.   Cardiovascular:      Rate and Rhythm: Normal rate and regular rhythm.      Pulses: Normal pulses.      Heart sounds: Normal heart sounds.   Pulmonary:      Effort: Pulmonary effort is normal.      Breath sounds: Wheezing present.   Abdominal:      General: Abdomen is flat. Bowel sounds are normal.      Palpations: Abdomen is soft.   Musculoskeletal:      Right lower leg: No edema.      Left lower leg: No edema.   Skin:     General: Skin is warm and dry.      Capillary Refill: Capillary refill takes less than 2 seconds.   Neurological:      General: No focal deficit present.      Mental Status: He is alert and oriented to person, place, and time. Mental status is at baseline.   Psychiatric:         Mood and Affect: Mood normal.         Behavior: Behavior normal.           Significant Labs: CBC:   Recent Labs   Lab 06/19/23  0717 06/20/23  0309   WBC 7.81 6.94   HGB 12.0* 11.8*   HCT 37.1* 35.2*    152     CMP:    Recent Labs   Lab 06/19/23  0717 06/20/23  0309    139   K 4.0 4.6    103   CO2 31* 25   GLU 93 115*   BUN 14 16   CREATININE 1.6* 1.6*   CALCIUM 9.1 8.9   PROT 8.2 7.0   ALBUMIN 3.6 3.0*   BILITOT 0.6 0.6   ALKPHOS 104 90   AST 21 19   ALT 18 17   ANIONGAP 10 11       Significant Imaging: I have reviewed all pertinent imaging results/findings within the past 24 hours.      Assessment/Plan:      * COPD with acute exacerbation  Patient with one-week history of shortness of breath, wheezing, and cough  ABG with pH 7.2 and pCO2 78.6.    Plan:  - prednisone 40 mg daily (day 2)  X azithromycin 500 mg (day 2)  - Duo nebs M5P-S2W while awake  - BiPAP QHS  - Will continue controller medication and will substitute with what is available on hospital formulary  - supplemental oxygen titrated to a target O2 sat>88-92%   - Monitor Pulse Ox Q4H   - Acapella and Incentive spirometry   - Smoking cessation consulted while inpatient  - Will need ambulatory pulmonology consult at discharge for persistent hospitalizations/ED visits and hypercapnia        Stage 3a chronic kidney disease  Ranged from 3a-3b over past 5-6 months. Concern for possible oliguria while inpatient. On lasix outpatient for leg swelling.    - Continue home lasix 40 mg daily  - Will obtain renal US  - Strict I/O's  - Avoid the nephrotoxins and hypotension  - Monitor the electrolytes  - Would benefit from Nephrology follow-up as an outpatient      Insomnia  Continue home olanzapine      Generalized anxiety disorder  Continue home Lexapro    Opioid use disorder, severe, on maintenance therapy, dependence  Patient on methadone 90 mg daily that he receives at [facility] in [place]  Will continue medication while inpatient      Essential hypertension  Continue home amlodipine 10 mg daily  Can add additional medications as needed        VTE Risk Mitigation (From admission, onward)         Ordered     heparin (porcine) injection  5,000 Units  Every 8 hours         06/19/23 0815     IP VTE HIGH RISK PATIENT  Once         06/19/23 0815                Discharge Planning   RYLAN:      Code Status: Full Code   Is the patient medically ready for discharge?:     Reason for patient still in hospital (select all that apply): Patient trending condition and Imaging  Discharge Plan A: Home with family            Abdiaziz Berumen MD  Department of [facility] Medicine   [facility]

## 2023-06-20 NOTE — PROGRESS NOTES
Individual Follow-Up Form    6/20/2023    Quit Date: To be determined    Clinical Status of Patient: Inpatient    Length of Service: 30 minutes    Continuing Medication: yes  Patches    Comments: Smoking cessation education follow-up: Pt w/ no reported nicotine withdrawal symptoms with patch in use. Order requested upon discharge for 21 mg nicotine patch Q day. Reviewed details of pt's initial [facility]ess[facility] clinic and he states no conflicts w/ appointment date and time.     Diagnosis: F17.210    Next Visit: 6/27/23 at 1300, [facility].

## 2023-06-21 ENCOUNTER — TELEPHONE (OUTPATIENT)
Dept: PULMONOLOGY | Facility: CLINIC | Age: 58
End: 2023-06-21
Payer: MEDICAID

## 2023-06-21 VITALS
BODY MASS INDEX: 37.89 KG/M2 | HEIGHT: 68 IN | SYSTOLIC BLOOD PRESSURE: 150 MMHG | OXYGEN SATURATION: 93 % | HEART RATE: 99 BPM | DIASTOLIC BLOOD PRESSURE: 88 MMHG | WEIGHT: 250 LBS | TEMPERATURE: 97 F | RESPIRATION RATE: 16 BRPM

## 2023-06-21 PROBLEM — N39.0 URINARY TRACT INFECTION IN MALE: Status: ACTIVE | Noted: 2023-06-21

## 2023-06-21 LAB
ALBUMIN SERPL BCP-MCNC: 3.1 G/DL (ref 3.5–5.2)
ALP SERPL-CCNC: 105 U/L (ref 55–135)
ALT SERPL W/O P-5'-P-CCNC: 27 U/L (ref 10–44)
ANION GAP SERPL CALC-SCNC: 11 MMOL/L (ref 8–16)
AST SERPL-CCNC: 33 U/L (ref 10–40)
BASOPHILS # BLD AUTO: 0.01 K/UL (ref 0–0.2)
BASOPHILS NFR BLD: 0.1 % (ref 0–1.9)
BILIRUB SERPL-MCNC: 0.4 MG/DL (ref 0.1–1)
BUN SERPL-MCNC: 20 MG/DL (ref 6–20)
CALCIUM SERPL-MCNC: 9.1 MG/DL (ref 8.7–10.5)
CHLORIDE SERPL-SCNC: 103 MMOL/L (ref 95–110)
CO2 SERPL-SCNC: 28 MMOL/L (ref 23–29)
CREAT SERPL-MCNC: 1.8 MG/DL (ref 0.5–1.4)
DIFFERENTIAL METHOD: ABNORMAL
EOSINOPHIL # BLD AUTO: 0 K/UL (ref 0–0.5)
EOSINOPHIL NFR BLD: 0.1 % (ref 0–8)
ERYTHROCYTE [DISTWIDTH] IN BLOOD BY AUTOMATED COUNT: 12 % (ref 11.5–14.5)
EST. GFR  (NO RACE VARIABLE): 43 ML/MIN/1.73 M^2
GLUCOSE SERPL-MCNC: 85 MG/DL (ref 70–110)
HCT VFR BLD AUTO: 36.6 % (ref 40–54)
HGB BLD-MCNC: 12.4 G/DL (ref 14–18)
IMM GRANULOCYTES # BLD AUTO: 0.05 K/UL (ref 0–0.04)
IMM GRANULOCYTES NFR BLD AUTO: 0.6 % (ref 0–0.5)
LYMPHOCYTES # BLD AUTO: 1.2 K/UL (ref 1–4.8)
LYMPHOCYTES NFR BLD: 13.9 % (ref 18–48)
MAGNESIUM SERPL-MCNC: 2.2 MG/DL (ref 1.6–2.6)
MCH RBC QN AUTO: 31.5 PG (ref 27–31)
MCHC RBC AUTO-ENTMCNC: 33.9 G/DL (ref 32–36)
MCV RBC AUTO: 93 FL (ref 82–98)
MONOCYTES # BLD AUTO: 0.5 K/UL (ref 0.3–1)
MONOCYTES NFR BLD: 5.6 % (ref 4–15)
NEUTROPHILS # BLD AUTO: 7 K/UL (ref 1.8–7.7)
NEUTROPHILS NFR BLD: 79.7 % (ref 38–73)
NRBC BLD-RTO: 0 /100 WBC
PHOSPHATE SERPL-MCNC: 2.4 MG/DL (ref 2.7–4.5)
PLATELET # BLD AUTO: 152 K/UL (ref 150–450)
PMV BLD AUTO: 11.9 FL (ref 9.2–12.9)
POCT GLUCOSE: 100 MG/DL (ref 70–110)
POCT GLUCOSE: 111 MG/DL (ref 70–110)
POTASSIUM SERPL-SCNC: 5.1 MMOL/L (ref 3.5–5.1)
PROT SERPL-MCNC: 7.5 G/DL (ref 6–8.4)
RBC # BLD AUTO: 3.94 M/UL (ref 4.6–6.2)
SODIUM SERPL-SCNC: 142 MMOL/L (ref 136–145)
WBC # BLD AUTO: 8.78 K/UL (ref 3.9–12.7)

## 2023-06-21 PROCEDURE — 27000646 HC AEROBIKA DEVICE

## 2023-06-21 PROCEDURE — 94640 AIRWAY INHALATION TREATMENT: CPT

## 2023-06-21 PROCEDURE — 36415 COLL VENOUS BLD VENIPUNCTURE: CPT | Performed by: STUDENT IN AN ORGANIZED HEALTH CARE EDUCATION/TRAINING PROGRAM

## 2023-06-21 PROCEDURE — 25000242 PHARM REV CODE 250 ALT 637 W/ HCPCS: Performed by: STUDENT IN AN ORGANIZED HEALTH CARE EDUCATION/TRAINING PROGRAM

## 2023-06-21 PROCEDURE — 63700000 PHARM REV CODE 250 ALT 637 W/O HCPCS: Performed by: STUDENT IN AN ORGANIZED HEALTH CARE EDUCATION/TRAINING PROGRAM

## 2023-06-21 PROCEDURE — 84100 ASSAY OF PHOSPHORUS: CPT | Performed by: STUDENT IN AN ORGANIZED HEALTH CARE EDUCATION/TRAINING PROGRAM

## 2023-06-21 PROCEDURE — 83735 ASSAY OF MAGNESIUM: CPT | Performed by: STUDENT IN AN ORGANIZED HEALTH CARE EDUCATION/TRAINING PROGRAM

## 2023-06-21 PROCEDURE — 85025 COMPLETE CBC W/AUTO DIFF WBC: CPT | Performed by: STUDENT IN AN ORGANIZED HEALTH CARE EDUCATION/TRAINING PROGRAM

## 2023-06-21 PROCEDURE — 94664 DEMO&/EVAL PT USE INHALER: CPT

## 2023-06-21 PROCEDURE — 94761 N-INVAS EAR/PLS OXIMETRY MLT: CPT

## 2023-06-21 PROCEDURE — 25000003 PHARM REV CODE 250: Performed by: STUDENT IN AN ORGANIZED HEALTH CARE EDUCATION/TRAINING PROGRAM

## 2023-06-21 PROCEDURE — 63600175 PHARM REV CODE 636 W HCPCS: Performed by: STUDENT IN AN ORGANIZED HEALTH CARE EDUCATION/TRAINING PROGRAM

## 2023-06-21 PROCEDURE — 99900035 HC TECH TIME PER 15 MIN (STAT)

## 2023-06-21 PROCEDURE — 80053 COMPREHEN METABOLIC PANEL: CPT | Performed by: STUDENT IN AN ORGANIZED HEALTH CARE EDUCATION/TRAINING PROGRAM

## 2023-06-21 PROCEDURE — 27000221 HC OXYGEN, UP TO 24 HOURS

## 2023-06-21 PROCEDURE — S4991 NICOTINE PATCH NONLEGEND: HCPCS | Performed by: STUDENT IN AN ORGANIZED HEALTH CARE EDUCATION/TRAINING PROGRAM

## 2023-06-21 PROCEDURE — 94799 UNLISTED PULMONARY SVC/PX: CPT

## 2023-06-21 PROCEDURE — 94668 MNPJ CHEST WALL SBSQ: CPT

## 2023-06-21 RX ORDER — IBUPROFEN 200 MG
1 TABLET ORAL DAILY
Qty: 28 PATCH | Refills: 3 | Status: ON HOLD | OUTPATIENT
Start: 2023-06-21 | End: 2023-07-02 | Stop reason: SDUPTHER

## 2023-06-21 RX ORDER — LOSARTAN POTASSIUM 25 MG/1
25 TABLET ORAL DAILY
Status: DISCONTINUED | OUTPATIENT
Start: 2023-06-21 | End: 2023-06-21 | Stop reason: HOSPADM

## 2023-06-21 RX ORDER — SYRING-NEEDL,DISP,INSUL,0.3 ML 29 G X1/2"
296 SYRINGE, EMPTY DISPOSABLE MISCELLANEOUS ONCE
Status: DISCONTINUED | OUTPATIENT
Start: 2023-06-21 | End: 2023-06-21 | Stop reason: HOSPADM

## 2023-06-21 RX ORDER — IBUPROFEN 200 MG
1 TABLET ORAL DAILY
Status: DISCONTINUED | OUTPATIENT
Start: 2023-06-21 | End: 2023-06-21 | Stop reason: HOSPADM

## 2023-06-21 RX ORDER — LOSARTAN POTASSIUM 100 MG/1
100 TABLET ORAL DAILY
Qty: 180 TABLET | Refills: 0 | Status: ON HOLD | OUTPATIENT
Start: 2023-06-21 | End: 2023-07-02 | Stop reason: SDUPTHER

## 2023-06-21 RX ORDER — SODIUM,POTASSIUM PHOSPHATES 280-250MG
1 POWDER IN PACKET (EA) ORAL EVERY 6 HOURS
Status: DISCONTINUED | OUTPATIENT
Start: 2023-06-21 | End: 2023-06-21 | Stop reason: HOSPADM

## 2023-06-21 RX ORDER — PREDNISONE 20 MG/1
40 TABLET ORAL DAILY
Qty: 4 TABLET | Refills: 0 | Status: SHIPPED | OUTPATIENT
Start: 2023-06-22 | End: 2023-06-24

## 2023-06-21 RX ORDER — SODIUM CHLORIDE, SODIUM LACTATE, POTASSIUM CHLORIDE, CALCIUM CHLORIDE 600; 310; 30; 20 MG/100ML; MG/100ML; MG/100ML; MG/100ML
INJECTION, SOLUTION INTRAVENOUS CONTINUOUS
Status: DISCONTINUED | OUTPATIENT
Start: 2023-06-21 | End: 2023-06-21 | Stop reason: HOSPADM

## 2023-06-21 RX ORDER — TAMSULOSIN HYDROCHLORIDE 0.4 MG/1
0.4 CAPSULE ORAL DAILY
Status: DISCONTINUED | OUTPATIENT
Start: 2023-06-21 | End: 2023-06-21

## 2023-06-21 RX ORDER — ALBUTEROL SULFATE 90 UG/1
2 AEROSOL, METERED RESPIRATORY (INHALATION) EVERY 8 HOURS
Qty: 18 G | Refills: 0 | Status: ON HOLD | OUTPATIENT
Start: 2023-06-21 | End: 2023-07-02 | Stop reason: SDUPTHER

## 2023-06-21 RX ORDER — CIPROFLOXACIN 500 MG/1
500 TABLET ORAL EVERY 12 HOURS
Qty: 10 TABLET | Refills: 0 | Status: SHIPPED | OUTPATIENT
Start: 2023-06-21 | End: 2023-06-26

## 2023-06-21 RX ADMIN — HEPARIN SODIUM 5000 UNITS: 5000 INJECTION INTRAVENOUS; SUBCUTANEOUS at 09:06

## 2023-06-21 RX ADMIN — DOCUSATE SODIUM AND SENNOSIDES 1 TABLET: 8.6; 5 TABLET, FILM COATED ORAL at 08:06

## 2023-06-21 RX ADMIN — IPRATROPIUM BROMIDE AND ALBUTEROL SULFATE 3 ML: 2.5; .5 SOLUTION RESPIRATORY (INHALATION) at 11:06

## 2023-06-21 RX ADMIN — FLUTICASONE FUROATE AND VILANTEROL TRIFENATATE 1 PUFF: 200; 25 POWDER RESPIRATORY (INHALATION) at 07:06

## 2023-06-21 RX ADMIN — TAMSULOSIN HYDROCHLORIDE 0.4 MG: 0.4 CAPSULE ORAL at 08:06

## 2023-06-21 RX ADMIN — POTASSIUM & SODIUM PHOSPHATES POWDER PACK 280-160-250 MG 1 PACKET: 280-160-250 PACK at 08:06

## 2023-06-21 RX ADMIN — IPRATROPIUM BROMIDE AND ALBUTEROL SULFATE 3 ML: 2.5; .5 SOLUTION RESPIRATORY (INHALATION) at 12:06

## 2023-06-21 RX ADMIN — LOSARTAN POTASSIUM 25 MG: 25 TABLET, FILM COATED ORAL at 09:06

## 2023-06-21 RX ADMIN — SODIUM CHLORIDE, POTASSIUM CHLORIDE, SODIUM LACTATE AND CALCIUM CHLORIDE: 600; 310; 30; 20 INJECTION, SOLUTION INTRAVENOUS at 09:06

## 2023-06-21 RX ADMIN — IPRATROPIUM BROMIDE AND ALBUTEROL SULFATE 3 ML: 2.5; .5 SOLUTION RESPIRATORY (INHALATION) at 07:06

## 2023-06-21 RX ADMIN — AMLODIPINE BESYLATE 10 MG: 5 TABLET ORAL at 08:06

## 2023-06-21 RX ADMIN — METHADONE HYDROCHLORIDE 90 MG: 10 TABLET ORAL at 08:06

## 2023-06-21 RX ADMIN — HEPARIN SODIUM 5000 UNITS: 5000 INJECTION INTRAVENOUS; SUBCUTANEOUS at 02:06

## 2023-06-21 RX ADMIN — PROMETHAZINE HYDROCHLORIDE 25 MG: 12.5 TABLET ORAL at 08:06

## 2023-06-21 RX ADMIN — PREDNISONE 40 MG: 20 TABLET ORAL at 08:06

## 2023-06-21 RX ADMIN — ESCITALOPRAM OXALATE 10 MG: 10 TABLET ORAL at 08:06

## 2023-06-21 RX ADMIN — NICOTINE 1 PATCH: 21 PATCH, EXTENDED RELEASE TRANSDERMAL at 08:06

## 2023-06-21 RX ADMIN — FUROSEMIDE 40 MG: 40 TABLET ORAL at 08:06

## 2023-06-21 RX ADMIN — IPRATROPIUM BROMIDE AND ALBUTEROL SULFATE 3 ML: 2.5; .5 SOLUTION RESPIRATORY (INHALATION) at 04:06

## 2023-06-21 RX ADMIN — SULFAMETHOXAZOLE AND TRIMETHOPRIM 1 TABLET: 800; 160 TABLET ORAL at 08:06

## 2023-06-21 RX ADMIN — AZITHROMYCIN MONOHYDRATE 500 MG: 250 TABLET ORAL at 08:06

## 2023-06-21 RX ADMIN — POLYETHYLENE GLYCOL 3350 17 G: 17 POWDER, FOR SOLUTION ORAL at 08:06

## 2023-06-21 NOTE — PROGRESS NOTES
Discharge orders noted. Additional clinical references attached. Patient's discharge instructions given by bedside RN and reviewed via this VN.  Education provided on new medication, diagnosis, and follow-up appointments.  Teach back method used. Patient verbalized understanding. All questions answered. Patient awaiting family for . Bedside nurse updated on patient status and to request transportation to [facility] when ready.   06/21/23 1152   AVS Confirmation   Discharge instructions and AVS given to and reviewed with patient and/or significant other. Yes

## 2023-06-21 NOTE — ASSESSMENT & PLAN NOTE
Ranged from 3a-3b over past 5-6 months. Patient reports taking 40 mg BID lasix for leg swelling, possibly over-diuresed. Inpatient microalb/creat ratio 2500.    - Continue home lasix 40 mg daily  - Will obtain renal US  - Strict I/O's  - Avoid the nephrotoxins and hypotension  - Monitor the electrolytes  - Would benefit from Nephrology follow-up as an outpatient

## 2023-06-21 NOTE — DISCHARGE SUMMARY
[facility] Medicine  Discharge Summary      Patient Name: Ming Harrington  MRN: [id number]  MADIE: [id number]  Patient Class: IP- Inpatient  Admission Date: 6/19/2023  Hospital Length of Stay: 1 days  Discharge Date and Time:  06/21/2023 1:19 PM  Attending Physician: Albina De Leon MD   Discharging Provider: Abdiaziz Berumen MD  Primary Care Provider: Garland Xiong DO    Primary Care Team: Networked reference to record PCT     HPI:   Ming Harrington is a 57-year-old male with a PMHx COPD, CHF, HTN, T2DM, chronic methadone use, JUVENCIO, insomniavwho presented to [facility] ED via EMS in acute respiratory distress. Patient endorses symtpoms started after a upper respiratory tract infections a few days ago. Endorses nasal congestion, SOB, wheezing, and a non-productive cough. Endorses he is still smoking tobacco daily. Patient denies any chest pain, lower extremity edema, orthopnea, or PND.  Patient currently uses 2 L at home as needed. On albuterol inhaler as needed and Stiolto daily.     In the ED, patient found tachypneic (RR 30) with an oxygen saturation in the low 90s on 3 L NC. ABG with pH 7.2 pCO2 78.6.  [facility] admitted to [facility] for acute COPD exacerbation.         * No surgery found *      Hospital Course:   Patient admitted for management of COPD exacerbation. Patient treated with 3 days of steroids, azithromycin and duo-nebs with good improvement in patient's dyspnea and wheezing on exam. Of note, patient's O2 demand did not exceed his home baseline of 3L NC. Infectious workup negative for bacterial or viral infection. While inpatient, patient continued on home 90 mg maintenance methadone for opiod dependence. Patient's CKD baseline Creatinine 1.6 also worked up with renal US and urine studies which were remarkable for proteinuria and UTI, however underlying cause remains uncertain, possibly due to hypertension. UTI was worked up with CT abd/pelvis for BPH which was unremarkable. In  light of patient's proteinuria and coincident complaint of chronic LE edema on daily amlodipine, amlodipine replaced with losartan at discharge. Patient received 2 doses of double-strength bactrim for his UTI, due to bump in K to 5.1, was replaced by cipro at discharge for patient to continue for 5 additional days. At discharge, patient feeling well, in agreement with plan to follow up with nephrology and pulmonology as an outpatient, continue with remaining 2 days of COPD treatment and rest of medication adjustment, return to ED for any worsening SOB or urinary symptoms.       Goals of Care Treatment Preferences:  Code Status: Full Code      Consults:   Consults (From admission, onward)          Status Ordering Provider     Inpatient consult to Respiratory Care  Once        Provider:  (Not yet assigned)    Completed KYLE CHAVEZ            Final Active Diagnoses:    Diagnosis Date Noted POA    PRINCIPAL PROBLEM:  COPD with acute exacerbation [J44.1] 07/04/2018 Yes    Urinary tract infection in male [N39.0] 06/21/2023 No    Stage 3a chronic kidney disease [N18.31] 06/20/2023 Yes    Insomnia [G47.00] 06/19/2023 Yes    Generalized anxiety disorder [F41.1] 04/17/2019 Yes    Opioid use disorder, severe, on maintenance therapy, dependence [F11.20] 01/08/2013 Yes    Essential hypertension [I10]  Yes      Problems Resolved During this Admission:       Discharged Condition: stable    Disposition: Home or Self Care    Follow Up:   Follow-up Information       Medicaid [employer]. Call.    Why: As needed for transportation support.  Contact information:  [phone]             [facility]. Go on 6/28/2023.    Specialty: Family Medicine  Why: FOLLOW UP WITH PCP AFTER PRIORITY CARE CLINIC APPT  Contact information:  [address], [address]  [facility] [address] [phone]  Additional information:  Please park in [address] C or D and use [address]. [facility] [address]  "elevators.             [facility]. Schedule an appointment as soon as possible for a visit.    Specialty: Pulmonary Disease  Why: As needed, FOLLOW UP WITH PULMONOLOGIST if unable to get appt with [facility] due to insurance  Contact information:  [address] [facility] [address]  [phone]               [facility]. Go in 2 week(s).    Specialty: Pulmonology  Why: FOLLOW UP WITH PULMONOLOGIST, H[facility] PRIORITY [facility] SENT TO OFFICE STAFF, If date/time not listed,  will contact  Contact information:  [id number]  [facility] [address] [phone]  Additional information:  [address]                         Patient Instructions:      NEBULIZER KIT (SUPPLIES) FOR HOME USE     Order Specific Question Answer Comments   Height: 5' 8" (1.727 m)    Weight: 113.4 kg (249 lb 15.7 oz)    Does patient have medical equipment at home? oxygen    Length of need (1-99 months): 99    Mask or Mouthpiece? Mask      Ambulatory referral/consult to Pulmonology   Standing Status: Future   Referral Priority: Routine Referral Type: Consultation   Referral Reason: Specialty Services Required   Requested Specialty: Pulmonary Disease   Number of Visits Requested: 1     Ambulatory referral/consult to Nephrology   Standing Status: Future   Referral Priority: Routine Referral Type: Consultation   Referral Reason: Specialty Services Required   Requested Specialty: Nephrology   Number of Visits Requested: 1     Ambulatory referral/consult to Smoking Cessation Program   Standing Status: Future   Referral Priority: Routine Referral Type: Consultation   Referral Reason: Specialty Services Required   Requested Specialty: CTTS   Number of Visits Requested: 1     Activity as tolerated       Medications:  Reconciled Home Medications:      Medication List        START taking these medications      ciprofloxacin HCl 500 MG tablet  Commonly known as: CIPRO  Take 1 tablet (500 mg total) by mouth " every 12 (twelve) hours. for 5 days     losartan 100 MG tablet  Commonly known as: COZAAR  Take 1 tablet (100 mg total) by mouth once daily.     nicotine 21 mg/24 hr  Commonly known as: NICODERM CQ  Place 1 patch onto the skin once daily.     predniSONE 20 MG tablet  Commonly known as: DELTASONE  Take 2 tablets (40 mg total) by mouth once daily. for 2 days  Start taking on: June 22, 2023            CHANGE how you take these medications      * albuterol 2.5 mg /3 mL (0.083 %) nebulizer solution  Commonly known as: PROVENTIL  Take 3 mLs (2.5 mg total) by nebulization every 4 (four) hours as needed for Wheezing or Shortness of Breath.  What changed: Another medication with the same name was changed. Make sure you understand how and when to take each.     * albuterol 90 mcg/actuation inhaler  Commonly known as: PROAIR HFA  Inhale 2 puffs into the lungs every 8 (eight) hours. Rescue for 3 days  What changed:   when to take this  reasons to take this           * This list has 2 medication(s) that are the same as other medications prescribed for you. Read the directions carefully, and ask your doctor or other care provider to review them with you.                CONTINUE taking these medications      budesonide-formoterol 80-4.5 mcg 80-4.5 mcg/actuation Hfaa  Commonly known as: SYMBICORT  Inhale 2 puffs into the lungs once daily. Controller     docusate sodium 100 MG capsule  Commonly known as: COLACE  Take 1 capsule (100 mg total) by mouth 2 (two) times daily.     EScitalopram oxalate 10 MG tablet  Commonly known as: LEXAPRO  Take 1 tablet (10 mg total) by mouth once daily.     furosemide 40 MG tablet  Commonly known as: LASIX  Take 40 mg by mouth once daily.     NEOSPORIN MODE TO GO 1-0.13 % Spry  Generic drug: pramoxine-benzalkonium chlor.  Apply 1 drop topically 2 (two) times a day.     OLANZapine 20 MG tablet  Commonly known as: ZyPREXA  Take 1 tablet (20 mg total) by mouth nightly.     polyethylene glycol 17 gram  Pwpk  Commonly known as: GLYCOLAX  Take 17 g by mouth once daily.     senna 8.6 mg tablet  Commonly known as: SENNA  Take 2 tablets by mouth once daily.     triamcinolone acetonide 0.1% 0.1 % cream  Commonly known as: KENALOG  Apply topically 2 (two) times daily. Apply to lower extremities twice daily for itching     white petrolatum ointment  Commonly known as: PETROLEUM JELLY  Apply topically as needed for Dry Skin.            STOP taking these medications      amLODIPine 10 MG tablet  Commonly known as: NORVASC     cloNIDine 0.1 MG tablet  Commonly known as: CATAPRES     diclofenac sodium 1 % Gel  Commonly known as: VOLTAREN     ibuprofen 600 MG tablet  Commonly known as: ADVIL,MOTRIN     lisinopriL 5 MG tablet  Commonly known as: PRINIVIL,ZESTRIL              Indwelling Lines/Drains at time of discharge:   Lines/Drains/Airways       None                   Time spent on the discharge of patient: 35 minutes         Abdiaziz Berumen MD  Department of Hospital Medicine  [facility]

## 2023-06-21 NOTE — TELEPHONE ENCOUNTER
----- Message from Angelic Silva RN sent at 6/21/2023 11:00 AM CDT -----  Regarding: Pt will need FU appt with Pulm. I will fax info to [facility] just in case ins is not accepted.

## 2023-06-21 NOTE — HOSPITAL COURSE
Patient admitted for management of COPD exacerbation. Patient treated with 3 days of steroids, azithromycin and duo-nebs with good improvement in patient's dyspnea and wheezing on exam. Of note, patient's O2 demand did not exceed his home baseline of 3L NC. Infectious workup negative for bacterial or viral infection. While inpatient, patient's CKD baseline Creatinine 1.6 also worked up with renal US and urine studies which were remarkable for proteinuria and UTI, however underlying cause remains uncertain, possibly due to hypertension. UTI was worked up with CT abd/pelvis for BPH which was unremarkable. In light of patient's proteinuria and coincident complaint of chronic LE edema on daily amlodipine, amlodipine replaced with losartan at discharge. Patient received 2 doses of double-strength bactrim for his UTI, due to bump in K to 5.1, was replaced by cipro at discharge for patient to continue for 5 additional days. At discharge, patient feeling well, in agreement with plan to follow up with nephrology and pulmonology as an outpatient, continue with remaining 2 days of COPD treatment and rest of medication adjustment, return to ED for any worsening SOB or urinary symptoms.

## 2023-06-21 NOTE — ASSESSMENT & PLAN NOTE
Ranged from 3a-3b over past 5-6 months. Patient reports taking 40 mg BID lasix for leg swelling, possibly over-diuresed.    - Continue home lasix 40 mg daily  - Will obtain renal US  - Strict I/O's  - Avoid the nephrotoxins and hypotension  - Monitor the electrolytes  - Would benefit from Nephrology follow-up as an outpatient

## 2023-06-21 NOTE — SUBJECTIVE & OBJECTIVE
Interval History: NAEON, patient endorsing significant improvement in SOB, some improvement in lower abdominal pain, endorses some discomfort urinating. Urine studies for CKD concerning for UTI, started last night on empiric bactrim. -1.4L UOP yesterday.    Review of Systems   Constitutional:  Negative for chills and fever.   HENT:  Negative for congestion, rhinorrhea and sore throat.    Eyes:  Negative for visual disturbance.   Respiratory:  Negative for cough and shortness of breath.    Cardiovascular:  Negative for chest pain.   Gastrointestinal:  Positive for abdominal pain. Negative for diarrhea, nausea and vomiting.   Genitourinary:  Positive for dysuria. Negative for decreased urine volume and flank pain.   Musculoskeletal:  Negative for arthralgias and myalgias.   Skin:  Negative for rash.   Neurological:  Negative for dizziness, weakness, numbness and headaches.   Psychiatric/Behavioral:  Negative for dysphoric mood. The patient is not nervous/anxious.    Objective:     Vital Signs (Most Recent):  Temp: 97.4 °F (36.3 °C) (06/21/23 0503)  Pulse: 81 (06/21/23 0736)  Resp: 16 (06/21/23 0736)  BP: 136/81 (06/21/23 0503)  SpO2: 95 % (06/21/23 0736) Vital Signs (24h Range):  Temp:  [96 °F (35.6 °C)-99.7 °F (37.6 °C)] 97.4 °F (36.3 °C)  Pulse:  [63-92] 81  Resp:  [14-18] 16  SpO2:  [90 %-98 %] 95 %  BP: (128-175)/(76-99) 136/81     Weight: 113.4 kg (249 lb 15.7 oz)  Body mass index is 38.01 kg/m².    Intake/Output Summary (Last 24 hours) at 6/21/2023 0752  Last data filed at 6/21/2023 0604  Gross per 24 hour   Intake --   Output 1400 ml   Net -1400 ml         Physical Exam  Constitutional:       Appearance: Normal appearance.   HENT:      Mouth/Throat:      Mouth: Mucous membranes are moist.      Pharynx: Oropharynx is clear.   Cardiovascular:      Rate and Rhythm: Normal rate and regular rhythm.      Pulses: Normal pulses.      Heart sounds: Normal heart sounds.   Pulmonary:      Effort: Pulmonary effort is  normal.      Breath sounds: Normal breath sounds.   Abdominal:      General: Abdomen is flat. Bowel sounds are normal.      Palpations: Abdomen is soft.      Tenderness: There is abdominal tenderness (Suprapubic). There is no right CVA tenderness or left CVA tenderness.   Musculoskeletal:      Right lower leg: No edema.      Left lower leg: No edema.   Skin:     General: Skin is warm and dry.      Capillary Refill: Capillary refill takes less than 2 seconds.   Neurological:      General: No focal deficit present.      Mental Status: He is alert and oriented to person, place, and time. Mental status is at baseline.   Psychiatric:         Mood and Affect: Mood normal.         Behavior: Behavior normal.           Significant Labs: CBC:   Recent Labs   Lab 06/20/23  0309 06/21/23  0531   WBC 6.94 8.78   HGB 11.8* 12.4*   HCT 35.2* 36.6*    152     CMP:   Recent Labs   Lab 06/20/23  0309 06/21/23  0531    142   K 4.6 5.1    103   CO2 25 28   * 85   BUN 16 20   CREATININE 1.6* 1.8*   CALCIUM 8.9 9.1   PROT 7.0 7.5   ALBUMIN 3.0* 3.1*   BILITOT 0.6 0.4   ALKPHOS 90 105   AST 19 33   ALT 17 27   ANIONGAP 11 11     Urine Studies:   Recent Labs   Lab 06/20/23  1604   COLORU Yellow   APPEARANCEUA Hazy*   PHUR 7.0   SPECGRAV 1.010   PROTEINUA 2+*   GLUCUA Negative   KETONESU Negative   BILIRUBINUA Negative   OCCULTUA 1+*   NITRITE Negative   UROBILINOGEN 2.0-3.0*   LEUKOCYTESUR 3+*   RBCUA 3   WBCUA 52*   BACTERIA Rare   SQUAMEPITHEL 3   HYALINECASTS 1       Significant Imaging: I have reviewed all pertinent imaging results/findings within the past 24 hours.

## 2023-06-21 NOTE — ASSESSMENT & PLAN NOTE
Patient with one-week history of shortness of breath, wheezing, and cough  ABG with pH 7.2 and pCO2 78.6.  Negative procal, negative sputum culture, negative viral PCR    Plan:  - prednisone 40 mg daily (day 3)  X azithromycin 500 mg (day 3)  - Duo nebs [id number] while awake  - Will continue controller medication and will substitute with what is available on hospital formulary  - supplemental oxygen titrated to a target O2 sat>88-92%   - Monitor Pulse Ox Q4H   - Acapella and Incentive spirometry   - Smoking cessation consulted while inpatient  - Will need ambulatory pulmonology consult at discharge for persistent hospitalizations/ED visits and hypercapnia

## 2023-06-21 NOTE — PROGRESS NOTES
[facility] Medicine  Progress Note    Patient Name: Ming Harrington  MRN: [id number]  Patient Class: IP- Inpatient   Admission Date: 6/19/2023  Length of Stay: 1 days  Attending Physician: Albina De Leon MD  Primary Care Provider: Garland Xiong DO        Subjective:     Principal Problem:COPD with acute exacerbation        HPI:  Ming Harrington is a 57-year-old male with a PMHx COPD, CHF, HTN, T2DM, chronic methadone use, JUVENCIO, insomniavwho presented to [facility] ED via EMS in acute respiratory distress. Patient endorses symtpoms started after a upper respiratory tract infections a few days ago. Endorses nasal congestion, SOB, wheezing, and a non-productive cough. Endorses he is still smoking tobacco daily. Patient denies any chest pain, lower extremity edema, orthopnea, or PND.  Patient currently uses 2 L at home as needed. On albuterol inhaler as needed and Stiolto daily.     In the ED, patient found tachypneic (RR 30) with an oxygen saturation in the low 90s on 3 L NC. ABG with pH 7.2 pCO2 78.6.  [facility] admitted [facility] for acute COPD exacerbation.         Overview/Hospital Course:  No notes on file    Interval History: NAEON, patient endorsing significant improvement in SOB, some improvement in lower abdominal pain, endorses some discomfort urinating. Urine studies for CKD concerning for UTI, started last night on empiric bactrim. -1.4L UOP yesterday.    Review of Systems   Constitutional:  Negative for chills and fever.   HENT:  Negative for congestion, rhinorrhea and sore throat.    Eyes:  Negative for visual disturbance.   Respiratory:  Negative for cough and shortness of breath.    Cardiovascular:  Negative for chest pain.   Gastrointestinal:  Positive for abdominal pain. Negative for diarrhea, nausea and vomiting.   Genitourinary:  Positive for dysuria. Negative for decreased urine volume and flank pain.   Musculoskeletal:  Negative for arthralgias and myalgias.   Skin:   Negative for rash.   Neurological:  Negative for dizziness, weakness, numbness and headaches.   Psychiatric/Behavioral:  Negative for dysphoric mood. The patient is not nervous/anxious.    Objective:     Vital Signs (Most Recent):  Temp: 97.4 °F (36.3 °C) (06/21/23 0503)  Pulse: 81 (06/21/23 0736)  Resp: 16 (06/21/23 0736)  BP: 136/81 (06/21/23 0503)  SpO2: 95 % (06/21/23 0736) Vital Signs (24h Range):  Temp:  [96 °F (35.6 °C)-99.7 °F (37.6 °C)] 97.4 °F (36.3 °C)  Pulse:  [63-92] 81  Resp:  [14-18] 16  SpO2:  [90 %-98 %] 95 %  BP: (128-175)/(76-99) 136/81     Weight: 113.4 kg (249 lb 15.7 oz)  Body mass index is 38.01 kg/m².    Intake/Output Summary (Last 24 hours) at 6/21/2023 0752  Last data filed at 6/21/2023 0604  Gross per 24 hour   Intake --   Output 1400 ml   Net -1400 ml         Physical Exam  Constitutional:       Appearance: Normal appearance.   HENT:      Mouth/Throat:      Mouth: Mucous membranes are moist.      Pharynx: Oropharynx is clear.   Cardiovascular:      Rate and Rhythm: Normal rate and regular rhythm.      Pulses: Normal pulses.      Heart sounds: Normal heart sounds.   Pulmonary:      Effort: Pulmonary effort is normal.      Breath sounds: Normal breath sounds.   Abdominal:      General: Abdomen is flat. Bowel sounds are normal.      Palpations: Abdomen is soft.      Tenderness: There is abdominal tenderness (Suprapubic). There is no right CVA tenderness or left CVA tenderness.   Musculoskeletal:      Right lower leg: No edema.      Left lower leg: No edema.   Skin:     General: Skin is warm and dry.      Capillary Refill: Capillary refill takes less than 2 seconds.   Neurological:      General: No focal deficit present.      Mental Status: He is alert and oriented to person, place, and time. Mental status is at baseline.   Psychiatric:         Mood and Affect: Mood normal.         Behavior: Behavior normal.           Significant Labs: CBC:   Recent Labs   Lab 06/20/23  0309 06/21/23  0531    WBC 6.94 8.78   HGB 11.8* 12.4*   HCT 35.2* 36.6*    152     CMP:   Recent Labs   Lab 06/20/23  0309 06/21/23  0531    142   K 4.6 5.1    103   CO2 25 28   * 85   BUN 16 20   CREATININE 1.6* 1.8*   CALCIUM 8.9 9.1   PROT 7.0 7.5   ALBUMIN 3.0* 3.1*   BILITOT 0.6 0.4   ALKPHOS 90 105   AST 19 33   ALT 17 27   ANIONGAP 11 11     Urine Studies:   Recent Labs   Lab 06/20/23  1604   COLORU Yellow   APPEARANCEUA Hazy*   PHUR 7.0   SPECGRAV 1.010   PROTEINUA 2+*   GLUCUA Negative   KETONESU Negative   BILIRUBINUA Negative   OCCULTUA 1+*   NITRITE Negative   UROBILINOGEN 2.0-3.0*   LEUKOCYTESUR 3+*   RBCUA 3   WBCUA 52*   BACTERIA Rare   SQUAMEPITHEL 3   HYALINECASTS 1       Significant Imaging: I have reviewed all pertinent imaging results/findings within the past 24 hours.      Assessment/Plan:      * COPD with acute exacerbation  Patient with one-week history of shortness of breath, wheezing, and cough  ABG with pH 7.2 and pCO2 78.6.  Negative procal, negative sputum culture, negative viral PCR    Plan:  - prednisone 40 mg daily (day 3)  X azithromycin 500 mg (day 3)  - Duo nebs C3C-L9I while awake  - Will continue controller medication and will substitute with what is available on hospital formulary  - supplemental oxygen titrated to a target O2 sat>88-92%   - Monitor Pulse Ox Q4H   - Acapella and Incentive spirometry   - Smoking cessation consulted while inpatient  - Will need ambulatory pulmonology consult at discharge for persistent hospitalizations/ED visits and hypercapnia        Urinary tract infection in male  Patient with complaint of lower abdominal pain, urinary hesitancy. Urinalysis in workup of CKD with 3+ leukocytes, 52 WBCs on microscopy. Afebrile, no CVA tenderness, no signs of prostatitis. Renal US w/o hydronephrosis.    - Started on empiric bactrim on 6/20 x7days for uncomplicated male UTI  - Flomax for possible underlying BPH        Stage 3a chronic kidney disease  Ranged  from 3a-3b over past 5-6 months. Patient reports taking 40 mg BID lasix for leg swelling, possibly over-diuresed.    - Continue home lasix 40 mg daily  - Will obtain renal US  - Strict I/O's  - Avoid the nephrotoxins and hypotension  - Monitor the electrolytes  - Would benefit from Nephrology follow-up as an outpatient      Insomnia  Continue home olanzapine      Generalized anxiety disorder  Continue home Lexapro    Opioid use disorder, severe, on maintenance therapy, dependence  Patient on methadone 90 mg daily that he receives at [facility] in [place]  Will continue medication while inpatient      Essential hypertension  Continue home amlodipine 10 mg daily  Can add additional medications as needed        VTE Risk Mitigation (From admission, onward)         Ordered     heparin (porcine) injection 5,000 Units  Every 8 hours         06/19/23 0815     IP VTE HIGH RISK PATIENT  Once         06/19/23 0815                Discharge Planning   RYLAN:      Code Status: Full Code   Is the patient medically ready for discharge?:     Reason for patient still in hospital (select all that apply): Patient trending condition  Discharge Plan A: Home with family   Discharge Delays: None known at this time        Abdiaziz Berumen MD  Department of Hospital Medicine   [place] - Telemetry

## 2023-06-21 NOTE — PLAN OF CARE
"[facility] - Telemetry  Discharge Final Note    Primary Care Provider: Garland Xiong DO    Expected Discharge Date: 6/21/2023    Pharmacist will go over home medications and reasons for medications. VN and bedside nurse to reiterate final discharge instructions.     Cleared from CM . Bedside Nurse and VN notified.    Discussed with pt. Aware of F/Us needed. Contact info verified again with pt. Pt reports he will need help with transportation at [place]. ADT order placed for 1300 pm pickup as requested by pt so he can have lunch prior to leaving.      Final Discharge Note (most recent)       Final Note - 06/21/23 1104          Final Note    Assessment Type Final Discharge Note (P)      Anticipated Discharge Disposition Home or Self Care (P)      Hospital Resources/Appts/Education Provided Appointments scheduled and added to AVS (P)         Post-Acute Status    Post-Acute Authorization Other (P)      Other Status Community Services (P)    Appt request sent to [facility]. Info also faxed to [facility] for pulm follow up with unable to get [facility] appt for pulm due to ins.    Discharge Delays None known at this time (P)                      Future Appointments   Date Time Provider Department Center   6/27/2023  1:00 PM JACLYN SonS [facility] SMOKE [facility]   6/28/2023  2:20 PM Asher Kiser MD [facility]SU[facility]     /82 (BP Location: Left arm, Patient Position: Lying)   Pulse 80   Temp 98.4 °F (36.9 °C) (Oral)   Resp 16   Ht 5' 8" (1.727 m)   Wt 113.4 kg (249 lb 15.7 oz)   SpO2 (!) 91%   BMI 38.01 kg/m²       Contact Info       Medicaid [employer]    [phone]       Next Steps: Call    Instructions: As needed for transportation support.    [facility]   Specialty: Family Medicine    [address], [address]  [place] [address]   Phone: [phone]       Next Steps: Go on 6/28/2023    Instructions: FOLLOW UP WITH PCP AFTER [facility] C[facility] CLINIC APPT    " [facility]   Specialty: Pulmonary Disease    [address] [place] [address]   Phone: [phone]       Next Steps: Schedule an appointment as soon as possible for a visit    Instructions: As needed, FOLLOW UP WITH PULMONOLOGIST if unable to get appt with [facility] due to insurance    [facility]   Specialty: Pulmonology    [address]  [place] [address]   Phone: [phone]       Next Steps: Go in 2 week(s)    Instructions: FOLLOW UP WITH PULMONOLOGIST, HIGH PRIORITY [facility]BASK[facility] MSG SENT TO OFFICE STAFF, If date/time not listed,  will contact             Medication List        START taking these medications      ciprofloxacin HCl 500 MG tablet  Commonly known as: CIPRO  Take 1 tablet (500 mg total) by mouth every 12 (twelve) hours. for 5 days     losartan 100 MG tablet  Commonly known as: COZAAR  Take 1 tablet (100 mg total) by mouth once daily.     nicotine 21 mg/24 hr  Commonly known as: NICODERM CQ  Place 1 patch onto the skin once daily.     predniSONE 20 MG tablet  Commonly known as: DELTASONE  Take 2 tablets (40 mg total) by mouth once daily. for 2 days  Start taking on: June 22, 2023            CHANGE how you take these medications      * albuterol 2.5 mg /3 mL (0.083 %) nebulizer solution  Commonly known as: PROVENTIL  Take 3 mLs (2.5 mg total) by nebulization every 4 (four) hours as needed for Wheezing or Shortness of Breath.  What changed: Another medication with the same name was changed. Make sure you understand how and when to take each.     * albuterol 90 mcg/actuation inhaler  Commonly known as: PROAIR HFA  Inhale 2 puffs into the lungs every 8 (eight) hours. Rescue for 3 days  What changed:   when to take this  reasons to take this           * This list has 2 medication(s) that are the same as other medications prescribed for you. Read the directions carefully, and ask your doctor or other care provider to review them with you.                 CONTINUE taking these medications      budesonide-formoterol 80-4.5 mcg 80-4.5 mcg/actuation Hfaa  Commonly known as: SYMBICORT  Inhale 2 puffs into the lungs once daily. Controller     docusate sodium 100 MG capsule  Commonly known as: COLACE  Take 1 capsule (100 mg total) by mouth 2 (two) times daily.     EScitalopram oxalate 10 MG tablet  Commonly known as: LEXAPRO  Take 1 tablet (10 mg total) by mouth once daily.     furosemide 40 MG tablet  Commonly known as: LASIX     NEOSPORIN MODE TO GO 1-0.13 % Spry  Generic drug: pramoxine-benzalkonium chlor.  Apply 1 drop topically 2 (two) times a day.     OLANZapine 20 MG tablet  Commonly known as: ZyPREXA  Take 1 tablet (20 mg total) by mouth nightly.     polyethylene glycol 17 gram Pwpk  Commonly known as: GLYCOLAX  Take 17 g by mouth once daily.     senna 8.6 mg tablet  Commonly known as: SENNA  Take 2 tablets by mouth once daily.     triamcinolone acetonide 0.1% 0.1 % cream  Commonly known as: KENALOG  Apply topically 2 (two) times daily. Apply to lower extremities twice daily for itching     white petrolatum ointment  Commonly known as: PETROLEUM JELLY  Apply topically as needed for Dry Skin.            STOP taking these medications      amLODIPine 10 MG tablet  Commonly known as: NORVASC     cloNIDine 0.1 MG tablet  Commonly known as: CATAPRES     diclofenac sodium 1 % Gel  Commonly known as: VOLTAREN     ibuprofen 600 MG tablet  Commonly known as: ADVIL,MOTRIN     lisinopriL 5 MG tablet  Commonly known as: PRINIVIL,ZESTRIL               Where to Get Your Medications        These medications were sent to [facility] [address], [place] [address]      Hours: Mon-Fri, 8a-5:30p Phone: [phone]   albuterol 90 mcg/actuation inhaler  ciprofloxacin HCl 500 MG tablet  losartan 100 MG tablet  nicotine 21 mg/24 hr  predniSONE 20 MG tablet

## 2023-06-21 NOTE — ASSESSMENT & PLAN NOTE
Patient with complaint of lower abdominal pain, urinary hesitancy. Urinalysis in workup of CKD with 3+ leukocytes, 52 WBCs on microscopy. Afebrile, no CVA tenderness, no signs of prostatitis. Renal US w/o hydronephrosis.    - Started on empiric bactrim on 6/20 x7days for uncomplicated male UTI  - Flomax for possible underlying BPH

## 2023-06-22 ENCOUNTER — PATIENT OUTREACH (OUTPATIENT)
Dept: ADMINISTRATIVE | Facility: CLINIC | Age: 58
End: 2023-06-22
Payer: MEDICAID

## 2023-06-22 DIAGNOSIS — J44.1 COPD EXACERBATION: Primary | ICD-10-CM

## 2023-06-22 NOTE — PROGRESS NOTES
C3 nurse spoke with /NAM  for a TCC post hospital discharge follow up call. The patient has a scheduled HOSFU appointment with LAVINIA BERGERON NP on 6/23/23 @ 0800.

## 2023-06-23 ENCOUNTER — OFFICE VISIT (OUTPATIENT)
Dept: HOME HEALTH SERVICES | Facility: CLINIC | Age: 58
End: 2023-06-23
Payer: MEDICAID

## 2023-06-23 DIAGNOSIS — F19.90 IV DRUG USER: ICD-10-CM

## 2023-06-23 DIAGNOSIS — F19.10 POLYSUBSTANCE ABUSE: Chronic | ICD-10-CM

## 2023-06-23 DIAGNOSIS — J44.9 CHRONIC OBSTRUCTIVE PULMONARY DISEASE, UNSPECIFIED COPD TYPE: Primary | ICD-10-CM

## 2023-06-23 LAB — BACTERIA UR CULT: ABNORMAL

## 2023-06-23 PROCEDURE — 3044F PR MOST RECENT HEMOGLOBIN A1C LEVEL <7.0%: ICD-10-PCS | Mod: CPTII,S$GLB,, | Performed by: NURSE PRACTITIONER

## 2023-06-23 PROCEDURE — 1159F MED LIST DOCD IN RCRD: CPT | Mod: CPTII,S$GLB,, | Performed by: NURSE PRACTITIONER

## 2023-06-23 PROCEDURE — 3066F NEPHROPATHY DOC TX: CPT | Mod: CPTII,S$GLB,, | Performed by: NURSE PRACTITIONER

## 2023-06-23 PROCEDURE — 99495 TRANSJ CARE MGMT MOD F2F 14D: CPT | Mod: S$GLB,,, | Performed by: NURSE PRACTITIONER

## 2023-06-23 PROCEDURE — 1111F DSCHRG MED/CURRENT MED MERGE: CPT | Mod: CPTII,S$GLB,, | Performed by: NURSE PRACTITIONER

## 2023-06-23 PROCEDURE — 3008F PR BODY MASS INDEX (BMI) DOCUMENTED: ICD-10-PCS | Mod: CPTII,S$GLB,, | Performed by: NURSE PRACTITIONER

## 2023-06-23 PROCEDURE — 99497 PR ADVNCD CARE PLAN 30 MIN: ICD-10-PCS | Mod: S$GLB,,, | Performed by: NURSE PRACTITIONER

## 2023-06-23 PROCEDURE — 99495 TCM SERVICES (MODERATE COMPLEXITY): ICD-10-PCS | Mod: S$GLB,,, | Performed by: NURSE PRACTITIONER

## 2023-06-23 PROCEDURE — 3062F POS MACROALBUMINURIA REV: CPT | Mod: CPTII,S$GLB,, | Performed by: NURSE PRACTITIONER

## 2023-06-23 PROCEDURE — 3066F PR DOCUMENTATION OF TREATMENT FOR NEPHROPATHY: ICD-10-PCS | Mod: CPTII,S$GLB,, | Performed by: NURSE PRACTITIONER

## 2023-06-23 PROCEDURE — 99497 ADVNCD CARE PLAN 30 MIN: CPT | Mod: S$GLB,,, | Performed by: NURSE PRACTITIONER

## 2023-06-23 PROCEDURE — 1160F RVW MEDS BY RX/DR IN RCRD: CPT | Mod: CPTII,S$GLB,, | Performed by: NURSE PRACTITIONER

## 2023-06-23 PROCEDURE — 1111F PR DISCHARGE MEDS RECONCILED W/ CURRENT OUTPATIENT MED LIST: ICD-10-PCS | Mod: CPTII,S$GLB,, | Performed by: NURSE PRACTITIONER

## 2023-06-23 PROCEDURE — 3008F BODY MASS INDEX DOCD: CPT | Mod: CPTII,S$GLB,, | Performed by: NURSE PRACTITIONER

## 2023-06-23 PROCEDURE — 3044F HG A1C LEVEL LT 7.0%: CPT | Mod: CPTII,S$GLB,, | Performed by: NURSE PRACTITIONER

## 2023-06-23 PROCEDURE — 1160F PR REVIEW ALL MEDS BY PRESCRIBER/CLIN PHARMACIST DOCUMENTED: ICD-10-PCS | Mod: CPTII,S$GLB,, | Performed by: NURSE PRACTITIONER

## 2023-06-23 PROCEDURE — 3062F PR POS MACROALBUMINURIA RESULT DOCUMENTED/REVIEW: ICD-10-PCS | Mod: CPTII,S$GLB,, | Performed by: NURSE PRACTITIONER

## 2023-06-23 PROCEDURE — 1159F PR MEDICATION LIST DOCUMENTED IN MEDICAL RECORD: ICD-10-PCS | Mod: CPTII,S$GLB,, | Performed by: NURSE PRACTITIONER

## 2023-06-23 PROCEDURE — 4010F ACE/ARB THERAPY RXD/TAKEN: CPT | Mod: CPTII,S$GLB,, | Performed by: NURSE PRACTITIONER

## 2023-06-23 PROCEDURE — 4010F PR ACE/ARB THEARPY RXD/TAKEN: ICD-10-PCS | Mod: CPTII,S$GLB,, | Performed by: NURSE PRACTITIONER

## 2023-06-26 LAB
BACTERIA SPEC AEROBE CULT: ABNORMAL
BACTERIA SPEC AEROBE CULT: ABNORMAL
GRAM STN SPEC: ABNORMAL
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
## 2023-06-30 ENCOUNTER — HOSPITAL ENCOUNTER (OUTPATIENT)
Facility: HOSPITAL | Age: 58
Discharge: HOME | End: 2023-07-02
Attending: EMERGENCY MEDICINE | Admitting: STUDENT IN AN ORGANIZED HEALTH CARE EDUCATION/TRAINING PROGRAM
Payer: MEDICAID

## 2023-06-30 DIAGNOSIS — J44.9 CHRONIC OBSTRUCTIVE PULMONARY DISEASE, UNSPECIFIED COPD TYPE: ICD-10-CM

## 2023-06-30 DIAGNOSIS — G47.00 INSOMNIA, UNSPECIFIED TYPE: ICD-10-CM

## 2023-06-30 DIAGNOSIS — F41.1 GENERALIZED ANXIETY DISORDER: ICD-10-CM

## 2023-06-30 DIAGNOSIS — N17.9 AKI (ACUTE KIDNEY INJURY): Primary | ICD-10-CM

## 2023-06-30 DIAGNOSIS — K59.09 CHRONIC CONSTIPATION: ICD-10-CM

## 2023-06-30 DIAGNOSIS — J44.1 COPD EXACERBATION: ICD-10-CM

## 2023-06-30 LAB
ALBUMIN SERPL BCP-MCNC: 3.4 G/DL (ref 3.5–5.2)
ALLENS TEST: ABNORMAL
ALP SERPL-CCNC: 91 U/L (ref 55–135)
ALT SERPL W/O P-5'-P-CCNC: 22 U/L (ref 10–44)
ANION GAP SERPL CALC-SCNC: 10 MMOL/L (ref 8–16)
ANION GAP SERPL CALC-SCNC: 11 MMOL/L (ref 8–16)
AST SERPL-CCNC: 24 U/L (ref 10–40)
BACTERIA #/AREA URNS HPF: NORMAL /HPF
BASOPHILS # BLD AUTO: 0.03 K/UL (ref 0–0.2)
BASOPHILS NFR BLD: 0.3 % (ref 0–1.9)
BILIRUB SERPL-MCNC: 0.9 MG/DL (ref 0.1–1)
BILIRUB UR QL STRIP: NEGATIVE
BNP SERPL-MCNC: <10 PG/ML (ref 0–99)
BUN SERPL-MCNC: 35 MG/DL (ref 6–20)
BUN SERPL-MCNC: 39 MG/DL (ref 6–20)
CALCIUM SERPL-MCNC: 8.6 MG/DL (ref 8.7–10.5)
CALCIUM SERPL-MCNC: 8.7 MG/DL (ref 8.7–10.5)
CHLORIDE SERPL-SCNC: 103 MMOL/L (ref 95–110)
CHLORIDE SERPL-SCNC: 105 MMOL/L (ref 95–110)
CLARITY UR: CLEAR
CO2 SERPL-SCNC: 24 MMOL/L (ref 23–29)
CO2 SERPL-SCNC: 25 MMOL/L (ref 23–29)
COLOR UR: YELLOW
CREAT SERPL-MCNC: 2.5 MG/DL (ref 0.5–1.4)
CREAT SERPL-MCNC: 3.1 MG/DL (ref 0.5–1.4)
CREAT UR-MCNC: 131.2 MG/DL (ref 23–375)
CTP QC/QA: YES
DELSYS: ABNORMAL
DIFFERENTIAL METHOD: ABNORMAL
EOSINOPHIL # BLD AUTO: 0.1 K/UL (ref 0–0.5)
EOSINOPHIL NFR BLD: 1.1 % (ref 0–8)
ERYTHROCYTE [DISTWIDTH] IN BLOOD BY AUTOMATED COUNT: 12.7 % (ref 11.5–14.5)
EST. GFR  (NO RACE VARIABLE): 22 ML/MIN/1.73 M^2
EST. GFR  (NO RACE VARIABLE): 29 ML/MIN/1.73 M^2
FIO2: 32
FLOW: 3
GLUCOSE SERPL-MCNC: 114 MG/DL (ref 70–110)
GLUCOSE SERPL-MCNC: 140 MG/DL (ref 70–110)
GLUCOSE UR QL STRIP: NEGATIVE
HCO3 UR-SCNC: 28 MMOL/L (ref 24–28)
HCT VFR BLD AUTO: 37.9 % (ref 40–54)
HGB BLD-MCNC: 12.2 G/DL (ref 14–18)
HGB UR QL STRIP: NEGATIVE
HYALINE CASTS #/AREA URNS LPF: 0 /LPF
IMM GRANULOCYTES # BLD AUTO: 0.03 K/UL (ref 0–0.04)
IMM GRANULOCYTES NFR BLD AUTO: 0.3 % (ref 0–0.5)
KETONES UR QL STRIP: NEGATIVE
LEUKOCYTE ESTERASE UR QL STRIP: NEGATIVE
LYMPHOCYTES # BLD AUTO: 1.2 K/UL (ref 1–4.8)
LYMPHOCYTES NFR BLD: 11.8 % (ref 18–48)
MCH RBC QN AUTO: 30.5 PG (ref 27–31)
MCHC RBC AUTO-ENTMCNC: 32.2 G/DL (ref 32–36)
MCV RBC AUTO: 95 FL (ref 82–98)
MICROSCOPIC COMMENT: NORMAL
MODE: ABNORMAL
MONOCYTES # BLD AUTO: 0.9 K/UL (ref 0.3–1)
MONOCYTES NFR BLD: 8.4 % (ref 4–15)
NEUTROPHILS # BLD AUTO: 8 K/UL (ref 1.8–7.7)
NEUTROPHILS NFR BLD: 78.1 % (ref 38–73)
NITRITE UR QL STRIP: NEGATIVE
NRBC BLD-RTO: 0 /100 WBC
PCO2 BLDA: 51.3 MMHG (ref 35–45)
PH SMN: 7.34 [PH] (ref 7.35–7.45)
PH UR STRIP: 6 [PH] (ref 5–8)
PLATELET # BLD AUTO: 152 K/UL (ref 150–450)
PMV BLD AUTO: 10.1 FL (ref 9.2–12.9)
PO2 BLDA: 53 MMHG (ref 40–60)
POC BE: 2 MMOL/L
POC SATURATED O2: 85 % (ref 95–100)
POC TCO2: 29 MMOL/L (ref 24–29)
POCT GLUCOSE: 103 MG/DL (ref 70–110)
POCT GLUCOSE: 136 MG/DL (ref 70–110)
POCT GLUCOSE: 136 MG/DL (ref 70–110)
POCT GLUCOSE: 148 MG/DL (ref 70–110)
POCT GLUCOSE: <20 MG/DL (ref 70–110)
POTASSIUM SERPL-SCNC: 4.3 MMOL/L (ref 3.5–5.1)
POTASSIUM SERPL-SCNC: 4.8 MMOL/L (ref 3.5–5.1)
PROT SERPL-MCNC: 8 G/DL (ref 6–8.4)
PROT UR QL STRIP: ABNORMAL
RBC # BLD AUTO: 4 M/UL (ref 4.6–6.2)
RBC #/AREA URNS HPF: 0 /HPF (ref 0–4)
SAMPLE: ABNORMAL
SARS-COV-2 RDRP RESP QL NAA+PROBE: NEGATIVE
SITE: ABNORMAL
SODIUM SERPL-SCNC: 138 MMOL/L (ref 136–145)
SODIUM SERPL-SCNC: 140 MMOL/L (ref 136–145)
SODIUM UR-SCNC: 40 MMOL/L (ref 20–250)
SP GR UR STRIP: 1.01 (ref 1–1.03)
SP02: 93
TROPONIN I SERPL DL<=0.01 NG/ML-MCNC: <0.006 NG/ML (ref 0–0.03)
URN SPEC COLLECT METH UR: ABNORMAL
UROBILINOGEN UR STRIP-ACNC: ABNORMAL EU/DL
UUN UR-MCNC: 585 MG/DL (ref 140–1050)
WBC # BLD AUTO: 10.25 K/UL (ref 3.9–12.7)
WBC #/AREA URNS HPF: 0 /HPF (ref 0–5)

## 2023-06-30 PROCEDURE — 36415 COLL VENOUS BLD VENIPUNCTURE: CPT

## 2023-06-30 PROCEDURE — 96361 HYDRATE IV INFUSION ADD-ON: CPT

## 2023-06-30 PROCEDURE — S4991 NICOTINE PATCH NONLEGEND: HCPCS

## 2023-06-30 PROCEDURE — 84540 ASSAY OF URINE/UREA-N: CPT

## 2023-06-30 PROCEDURE — G0378 HOSPITAL OBSERVATION PER HR: HCPCS

## 2023-06-30 PROCEDURE — 96365 THER/PROPH/DIAG IV INF INIT: CPT

## 2023-06-30 PROCEDURE — 93010 EKG 12-LEAD: ICD-10-PCS | Mod: ,,, | Performed by: INTERNAL MEDICINE

## 2023-06-30 PROCEDURE — 63600175 PHARM REV CODE 636 W HCPCS: Performed by: EMERGENCY MEDICINE

## 2023-06-30 PROCEDURE — 81000 URINALYSIS NONAUTO W/SCOPE: CPT

## 2023-06-30 PROCEDURE — 63600175 PHARM REV CODE 636 W HCPCS: Performed by: STUDENT IN AN ORGANIZED HEALTH CARE EDUCATION/TRAINING PROGRAM

## 2023-06-30 PROCEDURE — 84300 ASSAY OF URINE SODIUM: CPT

## 2023-06-30 PROCEDURE — 94640 AIRWAY INHALATION TREATMENT: CPT

## 2023-06-30 PROCEDURE — 80053 COMPREHEN METABOLIC PANEL: CPT | Performed by: EMERGENCY MEDICINE

## 2023-06-30 PROCEDURE — 27000221 HC OXYGEN, UP TO 24 HOURS

## 2023-06-30 PROCEDURE — 25000003 PHARM REV CODE 250

## 2023-06-30 PROCEDURE — 63600175 PHARM REV CODE 636 W HCPCS

## 2023-06-30 PROCEDURE — 99900035 HC TECH TIME PER 15 MIN (STAT)

## 2023-06-30 PROCEDURE — 27000646 HC AEROBIKA DEVICE

## 2023-06-30 PROCEDURE — 25000242 PHARM REV CODE 250 ALT 637 W/ HCPCS

## 2023-06-30 PROCEDURE — 93010 ELECTROCARDIOGRAM REPORT: CPT | Mod: ,,, | Performed by: INTERNAL MEDICINE

## 2023-06-30 PROCEDURE — 96375 TX/PRO/DX INJ NEW DRUG ADDON: CPT

## 2023-06-30 PROCEDURE — 25000242 PHARM REV CODE 250 ALT 637 W/ HCPCS: Performed by: EMERGENCY MEDICINE

## 2023-06-30 PROCEDURE — 25000003 PHARM REV CODE 250: Performed by: EMERGENCY MEDICINE

## 2023-06-30 PROCEDURE — 94664 DEMO&/EVAL PT USE INHALER: CPT | Mod: XB

## 2023-06-30 PROCEDURE — 99285 EMERGENCY DEPT VISIT HI MDM: CPT | Mod: 25

## 2023-06-30 PROCEDURE — 80048 BASIC METABOLIC PNL TOTAL CA: CPT | Mod: XB

## 2023-06-30 PROCEDURE — 82962 GLUCOSE BLOOD TEST: CPT

## 2023-06-30 PROCEDURE — 96372 THER/PROPH/DIAG INJ SC/IM: CPT | Mod: 59

## 2023-06-30 PROCEDURE — 87635 SARS-COV-2 COVID-19 AMP PRB: CPT | Performed by: EMERGENCY MEDICINE

## 2023-06-30 PROCEDURE — 82570 ASSAY OF URINE CREATININE: CPT

## 2023-06-30 PROCEDURE — 96367 TX/PROPH/DG ADDL SEQ IV INF: CPT

## 2023-06-30 PROCEDURE — 94799 UNLISTED PULMONARY SVC/PX: CPT

## 2023-06-30 PROCEDURE — 25000003 PHARM REV CODE 250: Performed by: STUDENT IN AN ORGANIZED HEALTH CARE EDUCATION/TRAINING PROGRAM

## 2023-06-30 PROCEDURE — 93005 ELECTROCARDIOGRAM TRACING: CPT

## 2023-06-30 PROCEDURE — 83880 ASSAY OF NATRIURETIC PEPTIDE: CPT | Performed by: EMERGENCY MEDICINE

## 2023-06-30 PROCEDURE — 94668 MNPJ CHEST WALL SBSQ: CPT

## 2023-06-30 PROCEDURE — 94640 AIRWAY INHALATION TREATMENT: CPT | Mod: XB

## 2023-06-30 PROCEDURE — 94761 N-INVAS EAR/PLS OXIMETRY MLT: CPT

## 2023-06-30 PROCEDURE — 82803 BLOOD GASES ANY COMBINATION: CPT

## 2023-06-30 PROCEDURE — 84484 ASSAY OF TROPONIN QUANT: CPT | Performed by: EMERGENCY MEDICINE

## 2023-06-30 PROCEDURE — 85025 COMPLETE CBC W/AUTO DIFF WBC: CPT | Performed by: EMERGENCY MEDICINE

## 2023-06-30 RX ORDER — FUROSEMIDE 40 MG/1
40 TABLET ORAL DAILY
Status: DISCONTINUED | OUTPATIENT
Start: 2023-06-30 | End: 2023-06-30

## 2023-06-30 RX ORDER — SODIUM CHLORIDE, SODIUM LACTATE, POTASSIUM CHLORIDE, CALCIUM CHLORIDE 600; 310; 30; 20 MG/100ML; MG/100ML; MG/100ML; MG/100ML
INJECTION, SOLUTION INTRAVENOUS CONTINUOUS
Status: ACTIVE | OUTPATIENT
Start: 2023-06-30 | End: 2023-06-30

## 2023-06-30 RX ORDER — IBUPROFEN 200 MG
1 TABLET ORAL DAILY
Status: DISCONTINUED | OUTPATIENT
Start: 2023-06-30 | End: 2023-07-02 | Stop reason: HOSPADM

## 2023-06-30 RX ORDER — GUAIFENESIN 600 MG/1
600 TABLET, EXTENDED RELEASE ORAL 2 TIMES DAILY
Status: DISCONTINUED | OUTPATIENT
Start: 2023-06-30 | End: 2023-07-02 | Stop reason: HOSPADM

## 2023-06-30 RX ORDER — OLANZAPINE 5 MG/1
20 TABLET, ORALLY DISINTEGRATING ORAL NIGHTLY
Status: DISCONTINUED | OUTPATIENT
Start: 2023-06-30 | End: 2023-07-02 | Stop reason: HOSPADM

## 2023-06-30 RX ORDER — TALC
6 POWDER (GRAM) TOPICAL NIGHTLY PRN
Status: DISCONTINUED | OUTPATIENT
Start: 2023-06-30 | End: 2023-07-02 | Stop reason: HOSPADM

## 2023-06-30 RX ORDER — FLUTICASONE FUROATE AND VILANTEROL 200; 25 UG/1; UG/1
1 POWDER RESPIRATORY (INHALATION) DAILY
Status: DISCONTINUED | OUTPATIENT
Start: 2023-06-30 | End: 2023-07-02 | Stop reason: HOSPADM

## 2023-06-30 RX ORDER — DEXTROSE 40 %
30 GEL (GRAM) ORAL
Status: DISCONTINUED | OUTPATIENT
Start: 2023-06-30 | End: 2023-07-02 | Stop reason: HOSPADM

## 2023-06-30 RX ORDER — SODIUM CHLORIDE 0.9 % (FLUSH) 0.9 %
5 SYRINGE (ML) INJECTION
Status: DISCONTINUED | OUTPATIENT
Start: 2023-06-30 | End: 2023-07-02 | Stop reason: HOSPADM

## 2023-06-30 RX ORDER — AMOXICILLIN 250 MG
1 CAPSULE ORAL 2 TIMES DAILY
Status: DISCONTINUED | OUTPATIENT
Start: 2023-06-30 | End: 2023-07-02 | Stop reason: HOSPADM

## 2023-06-30 RX ORDER — HEPARIN SODIUM 5000 [USP'U]/ML
5000 INJECTION, SOLUTION INTRAVENOUS; SUBCUTANEOUS EVERY 8 HOURS
Status: DISCONTINUED | OUTPATIENT
Start: 2023-06-30 | End: 2023-07-02 | Stop reason: HOSPADM

## 2023-06-30 RX ORDER — ACETAMINOPHEN 325 MG/1
650 TABLET ORAL EVERY 4 HOURS PRN
Status: DISCONTINUED | OUTPATIENT
Start: 2023-06-30 | End: 2023-07-02 | Stop reason: HOSPADM

## 2023-06-30 RX ORDER — ALBUTEROL SULFATE 2.5 MG/.5ML
15 SOLUTION RESPIRATORY (INHALATION)
Status: COMPLETED | OUTPATIENT
Start: 2023-06-30 | End: 2023-06-30

## 2023-06-30 RX ORDER — GLUCAGON 1 MG
1 KIT INJECTION
Status: DISCONTINUED | OUTPATIENT
Start: 2023-06-30 | End: 2023-07-02 | Stop reason: HOSPADM

## 2023-06-30 RX ORDER — PREDNISONE 20 MG/1
60 TABLET ORAL
Status: COMPLETED | OUTPATIENT
Start: 2023-06-30 | End: 2023-06-30

## 2023-06-30 RX ORDER — PREDNISONE 20 MG/1
40 TABLET ORAL DAILY
Status: DISCONTINUED | OUTPATIENT
Start: 2023-06-30 | End: 2023-06-30

## 2023-06-30 RX ORDER — POLYETHYLENE GLYCOL 3350 17 G/17G
17 POWDER, FOR SOLUTION ORAL DAILY
Status: DISCONTINUED | OUTPATIENT
Start: 2023-06-30 | End: 2023-07-02 | Stop reason: HOSPADM

## 2023-06-30 RX ORDER — INSULIN ASPART 100 [IU]/ML
1-10 INJECTION, SOLUTION INTRAVENOUS; SUBCUTANEOUS
Status: DISCONTINUED | OUTPATIENT
Start: 2023-06-30 | End: 2023-07-02 | Stop reason: HOSPADM

## 2023-06-30 RX ORDER — ESCITALOPRAM OXALATE 10 MG/1
10 TABLET ORAL DAILY
Status: DISCONTINUED | OUTPATIENT
Start: 2023-06-30 | End: 2023-07-02 | Stop reason: HOSPADM

## 2023-06-30 RX ORDER — SODIUM CHLORIDE, SODIUM LACTATE, POTASSIUM CHLORIDE, CALCIUM CHLORIDE 600; 310; 30; 20 MG/100ML; MG/100ML; MG/100ML; MG/100ML
INJECTION, SOLUTION INTRAVENOUS CONTINUOUS
Status: DISCONTINUED | OUTPATIENT
Start: 2023-06-30 | End: 2023-06-30

## 2023-06-30 RX ORDER — SODIUM,POTASSIUM PHOSPHATES 280-250MG
1 POWDER IN PACKET (EA) ORAL EVERY 6 HOURS
Status: COMPLETED | OUTPATIENT
Start: 2023-06-30 | End: 2023-06-30

## 2023-06-30 RX ORDER — ONDANSETRON 2 MG/ML
4 INJECTION INTRAMUSCULAR; INTRAVENOUS EVERY 8 HOURS PRN
Status: DISCONTINUED | OUTPATIENT
Start: 2023-06-30 | End: 2023-07-02 | Stop reason: HOSPADM

## 2023-06-30 RX ORDER — PREDNISONE 20 MG/1
40 TABLET ORAL DAILY
Status: DISCONTINUED | OUTPATIENT
Start: 2023-07-01 | End: 2023-07-02 | Stop reason: HOSPADM

## 2023-06-30 RX ORDER — SODIUM CHLORIDE, SODIUM LACTATE, POTASSIUM CHLORIDE, CALCIUM CHLORIDE 600; 310; 30; 20 MG/100ML; MG/100ML; MG/100ML; MG/100ML
INJECTION, SOLUTION INTRAVENOUS CONTINUOUS
Status: ACTIVE | OUTPATIENT
Start: 2023-06-30 | End: 2023-07-01

## 2023-06-30 RX ORDER — LACTULOSE 10 G/15ML
20 SOLUTION ORAL 3 TIMES DAILY
Status: COMPLETED | OUTPATIENT
Start: 2023-06-30 | End: 2023-07-01

## 2023-06-30 RX ORDER — AMLODIPINE BESYLATE 5 MG/1
10 TABLET ORAL DAILY
Status: DISCONTINUED | OUTPATIENT
Start: 2023-06-30 | End: 2023-07-02 | Stop reason: HOSPADM

## 2023-06-30 RX ORDER — IPRATROPIUM BROMIDE AND ALBUTEROL SULFATE 2.5; .5 MG/3ML; MG/3ML
3 SOLUTION RESPIRATORY (INHALATION) EVERY 4 HOURS
Status: DISCONTINUED | OUTPATIENT
Start: 2023-06-30 | End: 2023-07-01

## 2023-06-30 RX ORDER — METHADONE HYDROCHLORIDE 10 MG/1
90 TABLET ORAL DAILY
Status: DISCONTINUED | OUTPATIENT
Start: 2023-06-30 | End: 2023-07-02 | Stop reason: HOSPADM

## 2023-06-30 RX ORDER — BENZONATATE 100 MG/1
100 CAPSULE ORAL 3 TIMES DAILY PRN
Status: DISCONTINUED | OUTPATIENT
Start: 2023-06-30 | End: 2023-07-02 | Stop reason: HOSPADM

## 2023-06-30 RX ORDER — IBUPROFEN 200 MG
24 TABLET ORAL
Status: DISCONTINUED | OUTPATIENT
Start: 2023-06-30 | End: 2023-06-30

## 2023-06-30 RX ORDER — DEXTROSE 40 %
15 GEL (GRAM) ORAL
Status: DISCONTINUED | OUTPATIENT
Start: 2023-06-30 | End: 2023-07-02 | Stop reason: HOSPADM

## 2023-06-30 RX ORDER — IBUPROFEN 200 MG
16 TABLET ORAL
Status: DISCONTINUED | OUTPATIENT
Start: 2023-06-30 | End: 2023-06-30

## 2023-06-30 RX ORDER — NALOXONE HCL 0.4 MG/ML
0.02 VIAL (ML) INJECTION
Status: DISCONTINUED | OUTPATIENT
Start: 2023-06-30 | End: 2023-07-02 | Stop reason: HOSPADM

## 2023-06-30 RX ADMIN — SODIUM CHLORIDE, POTASSIUM CHLORIDE, SODIUM LACTATE AND CALCIUM CHLORIDE: 600; 310; 30; 20 INJECTION, SOLUTION INTRAVENOUS at 10:06

## 2023-06-30 RX ADMIN — SODIUM CHLORIDE, POTASSIUM CHLORIDE, SODIUM LACTATE AND CALCIUM CHLORIDE: 600; 310; 30; 20 INJECTION, SOLUTION INTRAVENOUS at 01:06

## 2023-06-30 RX ADMIN — LACTULOSE 20 G: 20 SOLUTION ORAL at 09:06

## 2023-06-30 RX ADMIN — ESCITALOPRAM OXALATE 10 MG: 10 TABLET ORAL at 09:06

## 2023-06-30 RX ADMIN — POLYETHYLENE GLYCOL 3350 17 G: 17 POWDER, FOR SOLUTION ORAL at 09:06

## 2023-06-30 RX ADMIN — PREDNISONE 40 MG: 20 TABLET ORAL at 09:06

## 2023-06-30 RX ADMIN — IPRATROPIUM BROMIDE AND ALBUTEROL SULFATE 3 ML: .5; 3 SOLUTION RESPIRATORY (INHALATION) at 04:06

## 2023-06-30 RX ADMIN — POLYETHYLENE GLYCOL 3350 17 G: 17 POWDER, FOR SOLUTION ORAL at 05:06

## 2023-06-30 RX ADMIN — FLUTICASONE FUROATE AND VILANTEROL TRIFENATATE 1 PUFF: 200; 25 POWDER RESPIRATORY (INHALATION) at 09:06

## 2023-06-30 RX ADMIN — ONDANSETRON 4 MG: 2 INJECTION INTRAMUSCULAR; INTRAVENOUS at 06:06

## 2023-06-30 RX ADMIN — HEPARIN SODIUM 5000 UNITS: 5000 INJECTION INTRAVENOUS; SUBCUTANEOUS at 05:06

## 2023-06-30 RX ADMIN — IPRATROPIUM BROMIDE AND ALBUTEROL SULFATE 3 ML: .5; 3 SOLUTION RESPIRATORY (INHALATION) at 08:06

## 2023-06-30 RX ADMIN — GUAIFENESIN 600 MG: 600 TABLET, EXTENDED RELEASE ORAL at 09:06

## 2023-06-30 RX ADMIN — AZITHROMYCIN MONOHYDRATE 500 MG: 500 INJECTION, POWDER, LYOPHILIZED, FOR SOLUTION INTRAVENOUS at 03:06

## 2023-06-30 RX ADMIN — CEFTRIAXONE 1 G: 1 INJECTION, POWDER, FOR SOLUTION INTRAMUSCULAR; INTRAVENOUS at 01:06

## 2023-06-30 RX ADMIN — FUROSEMIDE 40 MG: 40 TABLET ORAL at 09:06

## 2023-06-30 RX ADMIN — OLANZAPINE 20 MG: 5 TABLET, ORALLY DISINTEGRATING ORAL at 09:06

## 2023-06-30 RX ADMIN — ALBUTEROL SULFATE 15 MG: 2.5 SOLUTION RESPIRATORY (INHALATION) at 07:06

## 2023-06-30 RX ADMIN — DOCUSATE SODIUM AND SENNOSIDES 1 TABLET: 8.6; 5 TABLET, FILM COATED ORAL at 05:06

## 2023-06-30 RX ADMIN — IPRATROPIUM BROMIDE AND ALBUTEROL SULFATE 3 ML: .5; 3 SOLUTION RESPIRATORY (INHALATION) at 11:06

## 2023-06-30 RX ADMIN — DOCUSATE SODIUM AND SENNOSIDES 1 TABLET: 8.6; 5 TABLET, FILM COATED ORAL at 09:06

## 2023-06-30 RX ADMIN — AMLODIPINE BESYLATE 10 MG: 5 TABLET ORAL at 09:06

## 2023-06-30 RX ADMIN — DOCUSATE SODIUM AND SENNOSIDES 1 TABLET: 8.6; 5 TABLET, FILM COATED ORAL at 10:06

## 2023-06-30 RX ADMIN — PREDNISONE 60 MG: 20 TABLET ORAL at 07:06

## 2023-06-30 RX ADMIN — SODIUM CHLORIDE 500 ML: 9 INJECTION, SOLUTION INTRAVENOUS at 09:06

## 2023-06-30 RX ADMIN — HEPARIN SODIUM 5000 UNITS: 5000 INJECTION INTRAVENOUS; SUBCUTANEOUS at 09:06

## 2023-06-30 RX ADMIN — NICOTINE 1 PATCH: 21 PATCH, EXTENDED RELEASE TRANSDERMAL at 09:06

## 2023-06-30 RX ADMIN — POTASSIUM & SODIUM PHOSPHATES POWDER PACK 280-160-250 MG 1 PACKET: 280-160-250 PACK at 12:06

## 2023-06-30 NOTE — NURSING
Pt. To unit. VSS. NC on. Pt. . Pt. Oriented to call light and room. Bed alarm on and call light in reach, Pt. Instructed to call for assistance.

## 2023-06-30 NOTE — ASSESSMENT & PLAN NOTE
Patient with shortness of breath, productive cough.  VBG pH 7.345, pCO2 51.3, pO2 53.   CXR with Ill-defined basilar opacities again seen, which may relate to atelectasis, noting that aspiration or pneumonia difficult to exclude.     Plan:  - prednisolone 40 mg x 5 days, azithromycin 500mg x 3 days  - Duo nebs [id number] while awake  - will continue controller medication and will substitute with what is available on hospital formulary  - BiPAP qhs  - supplemental oxygen titrated to a target O2 sat>88-92%   - Monitor Pulse Ox Q4H   - Acapella and Incentive spirometry   - smoking cessation consulted while inpatient

## 2023-06-30 NOTE — ED NOTES
Sleeping without distress after treatment. Sao2-95%.. HR-70's. Wheezing is significantly improved.

## 2023-06-30 NOTE — ASSESSMENT & PLAN NOTE
On methadone 90mg daily from [facility] in [place]    Plan:  - Continue medication.

## 2023-06-30 NOTE — PROGRESS NOTES
"   06/30/23 3919   Admission   Communication Issues? None   Shift   Virtual Nurse - Rounding Complete   Virtual Nurse - Patient Verbalized Approval Of Camera Use   Type of Frequent Check   Type Patient Rounds   Safety/Activity   Patient Rounds bed in low position;bed wheels locked;call light in patient/parent reach;clutter free environment maintained;placement of personal items at bedside;visualized patient   Safety Precautions emergency equipment at bedside   Positioning   Body Position supine   Head of Bed (HOB) Positioning HOB at 30-45 degrees   Positioning/Transfer Devices pillows;in use       VN cued into room and permission given to adjust camera towards patient.  Patient is resting quietly in bed with O2 per nasal cannula in use.  Denies pain. States, "my last bowel movement was 5 days ago."  Plan of care reviewed. Safety is maintained with bed low, wheels locked and side rails up.  Call light within reach.  Will continue to monitor.    "

## 2023-06-30 NOTE — ED NOTES
Bedside care hand off from MERON Murillo RN. Resp. At bedside for hour long treatment. Hx. Of copd. Pt. Has audible exp. Wheezing and mild visible distress.

## 2023-06-30 NOTE — HPI
Patient is a 57-year-old male with a PMHx COPD, CHF, HTN, T2DM, chronic methadone use, JUVENCIO, insomnia who presented to [facility] ED in acute respiratory distress. Endorses nasal congestion, SOB, wheezing, and a productive cough producing green sputum. Endorses he is still smoking tobacco daily. Patient reports bilateral lower extremity emea.denies any chest pain, orthopnea, or PND.  Patient currently uses 2 L at home as needed. On albuterol inhaler as needed and Stiolto daily.      In the ED, initial vital signs with /88, HR 99, Temp 99.5F, SpO2 86% on room air. VBG with pH 7.345, pCO2 51.3, pO2 53. Labs with CBC stable. CMP with BUN/Cr 39/3.1 (baseline Cr 1.6). Troponin negative. BNP <10. UA with 2+ protein. CXR with Ill-defined basilar opacities again seen, which may relate to atelectasis, noting that aspiration or pneumonia difficult to exclude. Patient placed on supplemental O2 via NC. [facility] consulted for evaluation for admission for ANNITA and acute COPD exacerbation.
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
## 2023-06-30 NOTE — PHARMACY MED REC
"    [facility]           Pharmacy  Admission Medication History     The home medication history was taken by Iraida Samuels.      Medication history obtained from Medications listed below were obtained from: [email or web address] software- [email or web address]    Based on information gathered for medication list, you may go to "Admission" then "Reconcile Home Medications" tabs to review and/or act upon those items.     The home medication list has been updated by the Pharmacy department.   Please read ALL comments highlighted in yellow.   Please address this information as you see fit.    Feel free to contact us if you have any questions or require assistance.        Current Facility-Administered Medications on File Prior to Encounter   Medication Dose Route Frequency Provider Last Rate Last Admin    0.9%  NaCl infusion   Intravenous Continuous Melissa Nielsen MD        lidocaine (PF) 10 mg/ml (1%) injection 10 mg  1 mL Intradermal Once Melissa Nielsen MD        mupirocin 2 % ointment   Topical (Top) 1 time in Clinic/HOD Asher Kiser MD         Current Outpatient Medications on File Prior to Encounter   Medication Sig Dispense Refill    albuterol (PROAIR HFA) 90 mcg/actuation inhaler Inhale 2 puffs into the lungs every 8 (eight) hours. Rescue for 3 days 18 g 0    albuterol (PROVENTIL) 2.5 mg /3 mL (0.083 %) nebulizer solution Take 3 mLs (2.5 mg total) by nebulization every 4 (four) hours as needed for Wheezing or Shortness of Breath. 1 each 3    EScitalopram oxalate (LEXAPRO) 10 MG tablet Take 1 tablet (10 mg total) by mouth once daily. 90 tablet 3    furosemide (LASIX) 40 MG tablet Take 40 mg by mouth once daily.      losartan (COZAAR) 100 MG tablet Take 1 tablet (100 mg total) by mouth once daily. 180 tablet 0    nicotine (NICODERM CQ) 21 mg/24 hr Place 1 patch onto the skin once daily. 28 patch 3    budesonide-formoterol 80-4.5 mcg (SYMBICORT) 80-4.5 mcg/actuation HFAA Inhale 2 puffs into the lungs " once daily. Controller 6 g 0    docusate sodium (COLACE) 100 MG capsule Take 1 capsule (100 mg total) by mouth 2 (two) times daily. 60 capsule 1    OLANZapine (ZYPREXA) 20 MG tablet Take 1 tablet (20 mg total) by mouth nightly. 30 tablet 6    polyethylene glycol (GLYCOLAX) 17 gram PwPk Take 17 g by mouth once daily. 14 packet 1    pramoxine-benzalkonium chlor. (NEOSPORIN MODE TO GO) 1-0.13 % Spry Apply 1 drop topically 2 (two) times a day. (Patient not taking: Reported on 6/22/2023) 8 mL 1    SENNA 8.6 mg tablet Take 2 tablets by mouth once daily. (Patient not taking: Reported on 6/22/2023) 28 tablet 1    triamcinolone acetonide 0.1% (KENALOG) 0.1 % cream Apply topically 2 (two) times daily. Apply to lower extremities twice daily for itching (Patient not taking: Reported on 6/22/2023) 80 g 0    white petrolatum (PETROLEUM JELLY) ointment Apply topically as needed for Dry Skin. (Patient not taking: Reported on 6/22/2023) 49 g 2       Please address this information as you see fit.  Feel free to contact us if you have any questions or require assistance.    Iraida Samuels  [phone]              .

## 2023-06-30 NOTE — ASSESSMENT & PLAN NOTE
BUN/Cr on admission 39/3.1 (baseline Cr 1.6)    PLAN:  - Maintain UOP at 0.5ml/kg/hr.  - Hold nephrotoxic medications .  - Renally-dose current meds if available  - Bladder scan if suspicion of retention, followed by retroperitoneal US to evaluate for Hydronephrosis. Owen if retention.   - Avoid Hypotension.  - Strict I/O's.  - Monitor renal function carefully.

## 2023-06-30 NOTE — PLAN OF CARE
Pt. AAOx4. IVF and antibiotics infusing per MAR. Pt. Tolerating diet, frequent requests for pudding and sugary snacks. BS monitored. 3L NC. VSS. Bed alarm on and call light in reach. Pt. Instructed to call for assistance.   Problem: Adult Inpatient Plan of Care  Goal: Plan of Care Review  Outcome: Ongoing, Progressing     Problem: Adult Inpatient Plan of Care  Goal: Patient-Specific Goal (Individualized)  Outcome: Ongoing, Progressing     Problem: Adult Inpatient Plan of Care  Goal: Absence of Hospital-Acquired Illness or Injury  Outcome: Ongoing, Progressing     Problem: Adult Inpatient Plan of Care  Goal: Optimal Comfort and Wellbeing  Outcome: Ongoing, Progressing

## 2023-07-01 PROBLEM — I50.9 CHF (CONGESTIVE HEART FAILURE): Status: ACTIVE | Noted: 2023-07-01

## 2023-07-01 LAB
ALBUMIN SERPL BCP-MCNC: 2.9 G/DL (ref 3.5–5.2)
ALP SERPL-CCNC: 83 U/L (ref 55–135)
ALT SERPL W/O P-5'-P-CCNC: 17 U/L (ref 10–44)
ANION GAP SERPL CALC-SCNC: 12 MMOL/L (ref 8–16)
AST SERPL-CCNC: 16 U/L (ref 10–40)
BASOPHILS # BLD AUTO: 0 K/UL (ref 0–0.2)
BASOPHILS NFR BLD: 0 % (ref 0–1.9)
BILIRUB SERPL-MCNC: 0.4 MG/DL (ref 0.1–1)
BUN SERPL-MCNC: 30 MG/DL (ref 6–20)
CALCIUM SERPL-MCNC: 8.6 MG/DL (ref 8.7–10.5)
CHLORIDE SERPL-SCNC: 107 MMOL/L (ref 95–110)
CO2 SERPL-SCNC: 22 MMOL/L (ref 23–29)
CREAT SERPL-MCNC: 1.8 MG/DL (ref 0.5–1.4)
DIFFERENTIAL METHOD: ABNORMAL
EOSINOPHIL # BLD AUTO: 0 K/UL (ref 0–0.5)
EOSINOPHIL NFR BLD: 0 % (ref 0–8)
ERYTHROCYTE [DISTWIDTH] IN BLOOD BY AUTOMATED COUNT: 12.2 % (ref 11.5–14.5)
EST. GFR  (NO RACE VARIABLE): 43 ML/MIN/1.73 M^2
GLUCOSE SERPL-MCNC: 125 MG/DL (ref 70–110)
HCT VFR BLD AUTO: 34.7 % (ref 40–54)
HGB BLD-MCNC: 11.1 G/DL (ref 14–18)
IMM GRANULOCYTES # BLD AUTO: 0.05 K/UL (ref 0–0.04)
IMM GRANULOCYTES NFR BLD AUTO: 0.6 % (ref 0–0.5)
LYMPHOCYTES # BLD AUTO: 0.5 K/UL (ref 1–4.8)
LYMPHOCYTES NFR BLD: 6.3 % (ref 18–48)
MAGNESIUM SERPL-MCNC: 2.1 MG/DL (ref 1.6–2.6)
MCH RBC QN AUTO: 29.9 PG (ref 27–31)
MCHC RBC AUTO-ENTMCNC: 32 G/DL (ref 32–36)
MCV RBC AUTO: 94 FL (ref 82–98)
MONOCYTES # BLD AUTO: 0.3 K/UL (ref 0.3–1)
MONOCYTES NFR BLD: 3.3 % (ref 4–15)
NEUTROPHILS # BLD AUTO: 7.6 K/UL (ref 1.8–7.7)
NEUTROPHILS NFR BLD: 89.8 % (ref 38–73)
NRBC BLD-RTO: 0 /100 WBC
PHOSPHATE SERPL-MCNC: 3 MG/DL (ref 2.7–4.5)
PLATELET # BLD AUTO: 138 K/UL (ref 150–450)
PMV BLD AUTO: 10.5 FL (ref 9.2–12.9)
POCT GLUCOSE: 103 MG/DL (ref 70–110)
POCT GLUCOSE: 120 MG/DL (ref 70–110)
POCT GLUCOSE: 121 MG/DL (ref 70–110)
POCT GLUCOSE: 131 MG/DL (ref 70–110)
POTASSIUM SERPL-SCNC: 4.6 MMOL/L (ref 3.5–5.1)
PROT SERPL-MCNC: 7.1 G/DL (ref 6–8.4)
RBC # BLD AUTO: 3.71 M/UL (ref 4.6–6.2)
SODIUM SERPL-SCNC: 141 MMOL/L (ref 136–145)
WBC # BLD AUTO: 8.48 K/UL (ref 3.9–12.7)

## 2023-07-01 PROCEDURE — G0378 HOSPITAL OBSERVATION PER HR: HCPCS

## 2023-07-01 PROCEDURE — 25000003 PHARM REV CODE 250

## 2023-07-01 PROCEDURE — 94640 AIRWAY INHALATION TREATMENT: CPT | Mod: XB

## 2023-07-01 PROCEDURE — 36415 COLL VENOUS BLD VENIPUNCTURE: CPT

## 2023-07-01 PROCEDURE — 94799 UNLISTED PULMONARY SVC/PX: CPT

## 2023-07-01 PROCEDURE — 25000242 PHARM REV CODE 250 ALT 637 W/ HCPCS

## 2023-07-01 PROCEDURE — 84100 ASSAY OF PHOSPHORUS: CPT

## 2023-07-01 PROCEDURE — 94664 DEMO&/EVAL PT USE INHALER: CPT | Mod: XB

## 2023-07-01 PROCEDURE — 27000646 HC AEROBIKA DEVICE

## 2023-07-01 PROCEDURE — 96366 THER/PROPH/DIAG IV INF ADDON: CPT

## 2023-07-01 PROCEDURE — 94660 CPAP INITIATION&MGMT: CPT

## 2023-07-01 PROCEDURE — S4991 NICOTINE PATCH NONLEGEND: HCPCS

## 2023-07-01 PROCEDURE — 63600175 PHARM REV CODE 636 W HCPCS: Performed by: STUDENT IN AN ORGANIZED HEALTH CARE EDUCATION/TRAINING PROGRAM

## 2023-07-01 PROCEDURE — 80053 COMPREHEN METABOLIC PANEL: CPT

## 2023-07-01 PROCEDURE — 25000003 PHARM REV CODE 250: Performed by: STUDENT IN AN ORGANIZED HEALTH CARE EDUCATION/TRAINING PROGRAM

## 2023-07-01 PROCEDURE — 96372 THER/PROPH/DIAG INJ SC/IM: CPT

## 2023-07-01 PROCEDURE — 85025 COMPLETE CBC W/AUTO DIFF WBC: CPT

## 2023-07-01 PROCEDURE — 83735 ASSAY OF MAGNESIUM: CPT

## 2023-07-01 PROCEDURE — 99900035 HC TECH TIME PER 15 MIN (STAT)

## 2023-07-01 PROCEDURE — 27000190 HC CPAP FULL FACE MASK W/VALVE

## 2023-07-01 PROCEDURE — 94668 MNPJ CHEST WALL SBSQ: CPT

## 2023-07-01 PROCEDURE — 96361 HYDRATE IV INFUSION ADD-ON: CPT

## 2023-07-01 PROCEDURE — 63600175 PHARM REV CODE 636 W HCPCS

## 2023-07-01 RX ORDER — PREGABALIN 25 MG/1
25 CAPSULE ORAL ONCE
Status: COMPLETED | OUTPATIENT
Start: 2023-07-01 | End: 2023-07-01

## 2023-07-01 RX ORDER — FUROSEMIDE 40 MG/1
40 TABLET ORAL DAILY
Status: DISCONTINUED | OUTPATIENT
Start: 2023-07-01 | End: 2023-07-02 | Stop reason: HOSPADM

## 2023-07-01 RX ORDER — IPRATROPIUM BROMIDE AND ALBUTEROL SULFATE 2.5; .5 MG/3ML; MG/3ML
3 SOLUTION RESPIRATORY (INHALATION)
Status: DISCONTINUED | OUTPATIENT
Start: 2023-07-01 | End: 2023-07-02 | Stop reason: HOSPADM

## 2023-07-01 RX ORDER — LIDOCAINE 50 MG/G
1 PATCH TOPICAL
Status: DISCONTINUED | OUTPATIENT
Start: 2023-07-01 | End: 2023-07-02 | Stop reason: HOSPADM

## 2023-07-01 RX ADMIN — LACTULOSE 20 G: 20 SOLUTION ORAL at 02:07

## 2023-07-01 RX ADMIN — CEFTRIAXONE 1 G: 1 INJECTION, POWDER, FOR SOLUTION INTRAMUSCULAR; INTRAVENOUS at 12:07

## 2023-07-01 RX ADMIN — FLUTICASONE FUROATE AND VILANTEROL TRIFENATATE 1 PUFF: 200; 25 POWDER RESPIRATORY (INHALATION) at 08:07

## 2023-07-01 RX ADMIN — HEPARIN SODIUM 5000 UNITS: 5000 INJECTION INTRAVENOUS; SUBCUTANEOUS at 09:07

## 2023-07-01 RX ADMIN — ESCITALOPRAM OXALATE 10 MG: 10 TABLET ORAL at 09:07

## 2023-07-01 RX ADMIN — PREDNISONE 40 MG: 20 TABLET ORAL at 09:07

## 2023-07-01 RX ADMIN — LACTULOSE 20 G: 20 SOLUTION ORAL at 09:07

## 2023-07-01 RX ADMIN — HEPARIN SODIUM 5000 UNITS: 5000 INJECTION INTRAVENOUS; SUBCUTANEOUS at 06:07

## 2023-07-01 RX ADMIN — IPRATROPIUM BROMIDE AND ALBUTEROL SULFATE 3 ML: .5; 3 SOLUTION RESPIRATORY (INHALATION) at 03:07

## 2023-07-01 RX ADMIN — DOCUSATE SODIUM AND SENNOSIDES 1 TABLET: 8.6; 5 TABLET, FILM COATED ORAL at 09:07

## 2023-07-01 RX ADMIN — PREGABALIN 25 MG: 25 CAPSULE ORAL at 02:07

## 2023-07-01 RX ADMIN — FUROSEMIDE 40 MG: 40 TABLET ORAL at 12:07

## 2023-07-01 RX ADMIN — POLYETHYLENE GLYCOL 3350 17 G: 17 POWDER, FOR SOLUTION ORAL at 09:07

## 2023-07-01 RX ADMIN — HEPARIN SODIUM 5000 UNITS: 5000 INJECTION INTRAVENOUS; SUBCUTANEOUS at 04:07

## 2023-07-01 RX ADMIN — METHADONE HYDROCHLORIDE 90 MG: 10 TABLET ORAL at 09:07

## 2023-07-01 RX ADMIN — LIDOCAINE 1 PATCH: 50 PATCH CUTANEOUS at 02:07

## 2023-07-01 RX ADMIN — IPRATROPIUM BROMIDE AND ALBUTEROL SULFATE 3 ML: .5; 3 SOLUTION RESPIRATORY (INHALATION) at 07:07

## 2023-07-01 RX ADMIN — GUAIFENESIN 600 MG: 600 TABLET, EXTENDED RELEASE ORAL at 09:07

## 2023-07-01 RX ADMIN — AMLODIPINE BESYLATE 10 MG: 5 TABLET ORAL at 09:07

## 2023-07-01 RX ADMIN — ACETAMINOPHEN 650 MG: 325 TABLET ORAL at 04:07

## 2023-07-01 RX ADMIN — OLANZAPINE 20 MG: 5 TABLET, ORALLY DISINTEGRATING ORAL at 09:07

## 2023-07-01 RX ADMIN — IPRATROPIUM BROMIDE AND ALBUTEROL SULFATE 3 ML: .5; 3 SOLUTION RESPIRATORY (INHALATION) at 11:07

## 2023-07-01 RX ADMIN — SODIUM CHLORIDE, POTASSIUM CHLORIDE, SODIUM LACTATE AND CALCIUM CHLORIDE: 600; 310; 30; 20 INJECTION, SOLUTION INTRAVENOUS at 06:07

## 2023-07-01 RX ADMIN — AZITHROMYCIN MONOHYDRATE 500 MG: 500 INJECTION, POWDER, LYOPHILIZED, FOR SOLUTION INTRAVENOUS at 01:07

## 2023-07-01 RX ADMIN — IPRATROPIUM BROMIDE AND ALBUTEROL SULFATE 3 ML: .5; 3 SOLUTION RESPIRATORY (INHALATION) at 12:07

## 2023-07-01 RX ADMIN — NICOTINE 1 PATCH: 21 PATCH, EXTENDED RELEASE TRANSDERMAL at 09:07

## 2023-07-01 RX ADMIN — IPRATROPIUM BROMIDE AND ALBUTEROL SULFATE 3 ML: .5; 3 SOLUTION RESPIRATORY (INHALATION) at 08:07

## 2023-07-01 NOTE — SUBJECTIVE & OBJECTIVE
Interval History: Patient reports feeling short of breath frequently despite oxygen via nasal canula.    Review of Systems   Respiratory:  Positive for cough and shortness of breath.    Cardiovascular:  Negative for chest pain and palpitations.   Gastrointestinal:  Positive for abdominal pain. Negative for nausea and vomiting.   Genitourinary:  Negative for difficulty urinating.   Neurological:  Negative for headaches.   Objective:     Vital Signs (Most Recent):  Temp: 97.8 °F (36.6 °C) (07/01/23 0808)  Pulse: 75 (07/01/23 0808)  Resp: (!) 24 (07/01/23 0808)  BP: 124/78 (07/01/23 0808)  SpO2: 95 % (07/01/23 0808) Vital Signs (24h Range):  Temp:  [97.4 °F (36.3 °C)-98.6 °F (37 °C)] 97.8 °F (36.6 °C)  Pulse:  [64-84] 75  Resp:  [18-24] 24  SpO2:  [91 %-96 %] 95 %  BP: (122-137)/(69-88) 124/78     Weight: 121.2 kg (267 lb 3.2 oz)  Body mass index is 40.63 kg/m².    Intake/Output Summary (Last 24 hours) at 7/1/2023 0923  Last data filed at 7/1/2023 0737  Gross per 24 hour   Intake 1849.44 ml   Output 1275 ml   Net 574.44 ml         Physical Exam  Constitutional:       Comments: Patient walking around with no obvious difficulty.   HENT:      Nose: No rhinorrhea.      Mouth/Throat:      Mouth: Mucous membranes are moist.      Pharynx: Oropharynx is clear.   Cardiovascular:      Rate and Rhythm: Normal rate and regular rhythm.      Pulses: Normal pulses.      Heart sounds: Normal heart sounds.   Pulmonary:      Breath sounds: Wheezing present.      Comments: Patient is coughing sporadically. (Wet cough) Wheezes heard diffusely on both sides. Patient can complete sentences.  Abdominal:      Tenderness: There is abdominal tenderness.      Comments: Obese abdomen. Tenderness on palpation of LUQ, with no guarding/rebound appreciated.   Musculoskeletal:      Right lower leg: No edema.      Left lower leg: No edema.   Skin:     General: Skin is warm and dry.      Capillary Refill: Capillary refill takes less than 2 seconds.    Neurological:      Mental Status: He is alert.      Motor: No weakness.      Coordination: Coordination normal.      Gait: Gait normal.   Psychiatric:         Mood and Affect: Mood normal.         Behavior: Behavior normal.           Significant Labs: All pertinent labs within the past 24 hours have been reviewed.  CBC:   Recent Labs   Lab 06/30/23  0705 07/01/23  0428   WBC 10.25 8.48   HGB 12.2* 11.1*   HCT 37.9* 34.7*    138*     CMP:   Recent Labs   Lab 06/30/23  0705 06/30/23  1742 07/01/23  0428    140 141   K 4.3 4.8 4.6    105 107   CO2 24 25 22*   * 140* 125*   BUN 39* 35* 30*   CREATININE 3.1* 2.5* 1.8*   CALCIUM 8.7 8.6* 8.6*   PROT 8.0  --  7.1   ALBUMIN 3.4*  --  2.9*   BILITOT 0.9  --  0.4   ALKPHOS 91  --  83   AST 24  --  16   ALT 22  --  17   ANIONGAP 11 10 12       Significant Imaging: I have reviewed all pertinent imaging results/findings within the past 24 hours.

## 2023-07-01 NOTE — PROGRESS NOTES
[facility]  Progress Note    Patient Name: Ming Harrington  MRN: [id number]  Patient Class: OP- Observation   Admission Date: 6/30/2023  Length of Stay: 0 days  Attending Physician: Sadiq Bueno DO  Primary Care Provider: Garland Xiong DO        Subjective:     Principal Problem:ANNITA (acute kidney injury)        HPI:  Patient is a 57-year-old male with a PMHx COPD, CHF, HTN, T2DM, chronic methadone use, JUVENCIO, insomnia who presented to [facility] ED in acute respiratory distress. Endorses nasal congestion, SOB, wheezing, and a productive cough producing green sputum. Endorses he is still smoking tobacco daily. Patient reports bilateral lower extremity emea.denies any chest pain, orthopnea, or PND.  Patient currently uses 2 L at home as needed. On albuterol inhaler as needed and Stiolto daily.      In the ED, initial vital signs with /88, HR 99, Temp 99.5F, SpO2 86% on room air. VBG with pH 7.345, pCO2 51.3, pO2 53. Labs with CBC stable. CMP with BUN/Cr 39/3.1 (baseline Cr 1.6). Troponin negative. BNP <10. UA with 2+ protein. CXR with Ill-defined basilar opacities again seen, which may relate to atelectasis, noting that aspiration or pneumonia difficult to exclude. Patient placed on supplemental O2 via NC. [facility] consulted for evaluation for admission for ANNITA and acute COPD exacerbation.       Overview/Hospital Course:  No notes on file    Interval History: Patient reports feeling short of breath frequently despite oxygen via nasal canula.    Review of Systems   Respiratory:  Positive for cough and shortness of breath.    Cardiovascular:  Negative for chest pain and palpitations.   Gastrointestinal:  Positive for abdominal pain. Negative for nausea and vomiting.   Genitourinary:  Negative for difficulty urinating.   Neurological:  Negative for headaches.   Objective:     Vital Signs (Most Recent):  Temp: 97.8 °F (36.6 °C) (07/01/23 0808)  Pulse: 75 (07/01/23  0808)  Resp: (!) 24 (07/01/23 0808)  BP: 124/78 (07/01/23 0808)  SpO2: 95 % (07/01/23 0808) Vital Signs (24h Range):  Temp:  [97.4 °F (36.3 °C)-98.6 °F (37 °C)] 97.8 °F (36.6 °C)  Pulse:  [64-84] 75  Resp:  [18-24] 24  SpO2:  [91 %-96 %] 95 %  BP: (122-137)/(69-88) 124/78     Weight: 121.2 kg (267 lb 3.2 oz)  Body mass index is 40.63 kg/m².    Intake/Output Summary (Last 24 hours) at 7/1/2023 0923  Last data filed at 7/1/2023 0737  Gross per 24 hour   Intake 1849.44 ml   Output 1275 ml   Net 574.44 ml         Physical Exam  Constitutional:       Comments: Patient walking around with no obvious difficulty.   HENT:      Nose: No rhinorrhea.      Mouth/Throat:      Mouth: Mucous membranes are moist.      Pharynx: Oropharynx is clear.   Cardiovascular:      Rate and Rhythm: Normal rate and regular rhythm.      Pulses: Normal pulses.      Heart sounds: Normal heart sounds.   Pulmonary:      Breath sounds: Wheezing present.      Comments: Patient is coughing sporadically. (Wet cough) Wheezes heard diffusely on both sides. Patient can complete sentences.  Abdominal:      Tenderness: There is abdominal tenderness.      Comments: Obese abdomen. Tenderness on palpation of LUQ, with no guarding/rebound appreciated.   Musculoskeletal:      Right lower leg: No edema.      Left lower leg: No edema.   Skin:     General: Skin is warm and dry.      Capillary Refill: Capillary refill takes less than 2 seconds.   Neurological:      Mental Status: He is alert.      Motor: No weakness.      Coordination: Coordination normal.      Gait: Gait normal.   Psychiatric:         Mood and Affect: Mood normal.         Behavior: Behavior normal.           Significant Labs: All pertinent labs within the past 24 hours have been reviewed.  CBC:   Recent Labs   Lab 06/30/23  0705 07/01/23  0428   WBC 10.25 8.48   HGB 12.2* 11.1*   HCT 37.9* 34.7*    138*     CMP:   Recent Labs   Lab 06/30/23  0705 06/30/23  1742 07/01/23  0428    140 141    K 4.3 4.8 4.6    105 107   CO2 24 25 22*   * 140* 125*   BUN 39* 35* 30*   CREATININE 3.1* 2.5* 1.8*   CALCIUM 8.7 8.6* 8.6*   PROT 8.0  --  7.1   ALBUMIN 3.4*  --  2.9*   BILITOT 0.9  --  0.4   ALKPHOS 91  --  83   AST 24  --  16   ALT 22  --  17   ANIONGAP 11 10 12       Significant Imaging: I have reviewed all pertinent imaging results/findings within the past 24 hours.      Assessment/Plan:      * ANNITA (acute kidney injury)  RESOLVED  BUN/Cr ON 7/1 30/1.8, on admission 39/3.1 (baseline Cr 1.6)    PLAN:  - Hold nephrotoxic medications .  - Renally-dose current meds if available  - Bladder scan if suspicion of retention, followed by retroperitoneal US to evaluate for Hydronephrosis. Owen if retention.   - Avoid Hypotension.  - Strict I/O's.  - Monitor renal function carefully.    Stage 3a chronic kidney disease  Plan as in ANNITA.      Insomnia  Plan:  - Continue home medications.      Generalized anxiety disorder  Plan:  - Continue home medications.      Shortness of breath  Plan as in COPD exacerbation.      Polysubstance abuse  Plan:  - Continue cessation counseling.      COPD exacerbation  Patient with shortness of breath, productive cough.  VBG pH 7.345, pCO2 51.3, pO2 53.   CXR with Ill-defined basilar opacities again seen, which may relate to atelectasis, noting that aspiration or pneumonia difficult to exclude.     Plan:  - prednisolone 40 mg x 5 days, azithromycin 500mg x 3 days  - Duo nebs [id number]-[id number] while awake  - will continue controller medication and will substitute with what is available on hospital formulary  - BiPAP qhs  - supplemental oxygen titrated to a target O2 sat>88-92%   - Monitor Pulse Ox Q4H   - Acapella and Incentive spirometry   - smoking cessation consulted while inpatient  - 7/1: Patient has persistent wheezing, but can walk around and complete sentences. Discharge pending clinical improvement.  - Get resp status    Opioid use disorder, severe, on maintenance therapy,  dependence  On methadone 90mg daily from [facility] in [place]    Plan:  - Continue medication.    Essential hypertension  Plan:  - Continue home medications.        VTE Risk Mitigation (From admission, onward)         Ordered     heparin (porcine) injection 5,000 Units  Every 8 hours         06/30/23 0921     IP VTE HIGH RISK PATIENT  Once         06/30/23 0921     Place sequential compression device  Until discontinued         06/30/23 0921                Discharge Planning   RYLAN:      Code Status: Full Code   Is the patient medically ready for discharge?:     Reason for patient still in hospital (select all that apply): Patient trending condition                     Tamika Davis MD  Department of Hospital Medicine   [facility]

## 2023-07-01 NOTE — ASSESSMENT & PLAN NOTE
On methadone 90mg daily from [facility] in [place]    Plan:  - Continue medication.
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
## 2023-07-01 NOTE — ASSESSMENT & PLAN NOTE
RESOLVED  BUN/Cr ON 7/1 30/1.8, on admission 39/3.1 (baseline Cr 1.6)    PLAN:  - Hold nephrotoxic medications .  - Renally-dose current meds if available  - Bladder scan if suspicion of retention, followed by retroperitoneal US to evaluate for Hydronephrosis. Owen if retention.   - Avoid Hypotension.  - Strict I/O's.  - Monitor renal function carefully.

## 2023-07-01 NOTE — ASSESSMENT & PLAN NOTE
Patient with shortness of breath, productive cough.  VBG pH 7.345, pCO2 51.3, pO2 53.   CXR with Ill-defined basilar opacities again seen, which may relate to atelectasis, noting that aspiration or pneumonia difficult to exclude.     Plan:  - prednisolone 40 mg x 5 days, azithromycin 500mg x 3 days  - Duo nebs [id number] while awake  - will continue controller medication and will substitute with what is available on hospital formulary  - BiPAP qhs  - supplemental oxygen titrated to a target O2 sat>88-92%   - Monitor Pulse Ox Q4H   - Acapella and Incentive spirometry   - smoking cessation consulted while inpatient  - 7/1: Patient has persistent wheezing, but can walk around and complete sentences. Discharge pending clinical improvement.  - Get resp status

## 2023-07-02 VITALS
WEIGHT: 267.19 LBS | DIASTOLIC BLOOD PRESSURE: 82 MMHG | SYSTOLIC BLOOD PRESSURE: 137 MMHG | HEIGHT: 68 IN | HEART RATE: 73 BPM | TEMPERATURE: 98 F | BODY MASS INDEX: 40.5 KG/M2 | OXYGEN SATURATION: 93 % | RESPIRATION RATE: 18 BRPM

## 2023-07-02 LAB
ALBUMIN SERPL BCP-MCNC: 2.9 G/DL (ref 3.5–5.2)
ALP SERPL-CCNC: 86 U/L (ref 55–135)
ALT SERPL W/O P-5'-P-CCNC: 16 U/L (ref 10–44)
ANION GAP SERPL CALC-SCNC: 10 MMOL/L (ref 8–16)
AST SERPL-CCNC: 14 U/L (ref 10–40)
BASOPHILS # BLD AUTO: 0.01 K/UL (ref 0–0.2)
BASOPHILS NFR BLD: 0.1 % (ref 0–1.9)
BILIRUB SERPL-MCNC: 0.3 MG/DL (ref 0.1–1)
BUN SERPL-MCNC: 27 MG/DL (ref 6–20)
CALCIUM SERPL-MCNC: 8.6 MG/DL (ref 8.7–10.5)
CHLORIDE SERPL-SCNC: 105 MMOL/L (ref 95–110)
CO2 SERPL-SCNC: 25 MMOL/L (ref 23–29)
CREAT SERPL-MCNC: 1.9 MG/DL (ref 0.5–1.4)
DIFFERENTIAL METHOD: ABNORMAL
EOSINOPHIL # BLD AUTO: 0 K/UL (ref 0–0.5)
EOSINOPHIL NFR BLD: 0 % (ref 0–8)
ERYTHROCYTE [DISTWIDTH] IN BLOOD BY AUTOMATED COUNT: 12.4 % (ref 11.5–14.5)
EST. GFR  (NO RACE VARIABLE): 40 ML/MIN/1.73 M^2
GLUCOSE SERPL-MCNC: 116 MG/DL (ref 70–110)
HCT VFR BLD AUTO: 34 % (ref 40–54)
HGB BLD-MCNC: 10.9 G/DL (ref 14–18)
IMM GRANULOCYTES # BLD AUTO: 0.07 K/UL (ref 0–0.04)
IMM GRANULOCYTES NFR BLD AUTO: 0.6 % (ref 0–0.5)
LYMPHOCYTES # BLD AUTO: 0.7 K/UL (ref 1–4.8)
LYMPHOCYTES NFR BLD: 5.9 % (ref 18–48)
MAGNESIUM SERPL-MCNC: 2 MG/DL (ref 1.6–2.6)
MCH RBC QN AUTO: 29.9 PG (ref 27–31)
MCHC RBC AUTO-ENTMCNC: 32.1 G/DL (ref 32–36)
MCV RBC AUTO: 93 FL (ref 82–98)
MONOCYTES # BLD AUTO: 0.5 K/UL (ref 0.3–1)
MONOCYTES NFR BLD: 4.6 % (ref 4–15)
NEUTROPHILS # BLD AUTO: 10.3 K/UL (ref 1.8–7.7)
NEUTROPHILS NFR BLD: 88.8 % (ref 38–73)
NRBC BLD-RTO: 0 /100 WBC
PHOSPHATE SERPL-MCNC: 2.4 MG/DL (ref 2.7–4.5)
PLATELET # BLD AUTO: 161 K/UL (ref 150–450)
PMV BLD AUTO: 11.4 FL (ref 9.2–12.9)
POCT GLUCOSE: 108 MG/DL (ref 70–110)
POCT GLUCOSE: 114 MG/DL (ref 70–110)
POCT GLUCOSE: 93 MG/DL (ref 70–110)
POTASSIUM SERPL-SCNC: 4.4 MMOL/L (ref 3.5–5.1)
PROT SERPL-MCNC: 6.8 G/DL (ref 6–8.4)
RBC # BLD AUTO: 3.65 M/UL (ref 4.6–6.2)
SODIUM SERPL-SCNC: 140 MMOL/L (ref 136–145)
WBC # BLD AUTO: 11.62 K/UL (ref 3.9–12.7)

## 2023-07-02 PROCEDURE — 94664 DEMO&/EVAL PT USE INHALER: CPT | Mod: XB

## 2023-07-02 PROCEDURE — 63600175 PHARM REV CODE 636 W HCPCS: Performed by: STUDENT IN AN ORGANIZED HEALTH CARE EDUCATION/TRAINING PROGRAM

## 2023-07-02 PROCEDURE — 96366 THER/PROPH/DIAG IV INF ADDON: CPT

## 2023-07-02 PROCEDURE — 84100 ASSAY OF PHOSPHORUS: CPT

## 2023-07-02 PROCEDURE — S4991 NICOTINE PATCH NONLEGEND: HCPCS

## 2023-07-02 PROCEDURE — 85025 COMPLETE CBC W/AUTO DIFF WBC: CPT

## 2023-07-02 PROCEDURE — 94640 AIRWAY INHALATION TREATMENT: CPT | Mod: XB

## 2023-07-02 PROCEDURE — 94660 CPAP INITIATION&MGMT: CPT

## 2023-07-02 PROCEDURE — 25000003 PHARM REV CODE 250

## 2023-07-02 PROCEDURE — 99900035 HC TECH TIME PER 15 MIN (STAT)

## 2023-07-02 PROCEDURE — 80053 COMPREHEN METABOLIC PANEL: CPT

## 2023-07-02 PROCEDURE — 36415 COLL VENOUS BLD VENIPUNCTURE: CPT

## 2023-07-02 PROCEDURE — 25000242 PHARM REV CODE 250 ALT 637 W/ HCPCS

## 2023-07-02 PROCEDURE — 94668 MNPJ CHEST WALL SBSQ: CPT

## 2023-07-02 PROCEDURE — G0378 HOSPITAL OBSERVATION PER HR: HCPCS

## 2023-07-02 PROCEDURE — 96365 THER/PROPH/DIAG IV INF INIT: CPT | Mod: 59

## 2023-07-02 PROCEDURE — 63600175 PHARM REV CODE 636 W HCPCS

## 2023-07-02 PROCEDURE — 83735 ASSAY OF MAGNESIUM: CPT

## 2023-07-02 PROCEDURE — 25000003 PHARM REV CODE 250: Performed by: STUDENT IN AN ORGANIZED HEALTH CARE EDUCATION/TRAINING PROGRAM

## 2023-07-02 PROCEDURE — 94761 N-INVAS EAR/PLS OXIMETRY MLT: CPT

## 2023-07-02 PROCEDURE — 96372 THER/PROPH/DIAG INJ SC/IM: CPT

## 2023-07-02 PROCEDURE — 96376 TX/PRO/DX INJ SAME DRUG ADON: CPT

## 2023-07-02 PROCEDURE — 94799 UNLISTED PULMONARY SVC/PX: CPT

## 2023-07-02 RX ORDER — FUROSEMIDE 40 MG/1
40 TABLET ORAL DAILY
Qty: 30 TABLET | Refills: 1 | Status: SHIPPED | OUTPATIENT
Start: 2023-07-02 | End: 2023-07-18 | Stop reason: SDUPTHER

## 2023-07-02 RX ORDER — LOSARTAN POTASSIUM 100 MG/1
100 TABLET ORAL DAILY
Qty: 30 TABLET | Refills: 0 | Status: SHIPPED | OUTPATIENT
Start: 2023-07-02 | End: 2023-07-18 | Stop reason: SDUPTHER

## 2023-07-02 RX ORDER — IBUPROFEN 200 MG
1 TABLET ORAL DAILY
Qty: 28 PATCH | Refills: 3 | Status: SHIPPED | OUTPATIENT
Start: 2023-07-02 | End: 2023-10-24 | Stop reason: SDUPTHER

## 2023-07-02 RX ORDER — POLYETHYLENE GLYCOL 3350 17 G/17G
17 POWDER, FOR SOLUTION ORAL DAILY
Qty: 30 PACKET | Refills: 1 | Status: SHIPPED | OUTPATIENT
Start: 2023-07-02 | End: 2024-01-25

## 2023-07-02 RX ORDER — LEVOFLOXACIN 750 MG/1
750 TABLET ORAL DAILY
Qty: 3 TABLET | Refills: 0 | Status: SHIPPED | OUTPATIENT
Start: 2023-07-02 | End: 2023-07-05

## 2023-07-02 RX ORDER — ALBUTEROL SULFATE 90 UG/1
2 AEROSOL, METERED RESPIRATORY (INHALATION) EVERY 8 HOURS
Qty: 18 G | Refills: 0 | Status: SHIPPED | OUTPATIENT
Start: 2023-07-02 | End: 2023-07-05

## 2023-07-02 RX ORDER — BUDESONIDE AND FORMOTEROL FUMARATE DIHYDRATE 80; 4.5 UG/1; UG/1
2 AEROSOL RESPIRATORY (INHALATION) DAILY
Qty: 10.2 G | Refills: 0 | Status: SHIPPED | OUTPATIENT
Start: 2023-07-02 | End: 2023-08-31 | Stop reason: SDUPTHER

## 2023-07-02 RX ORDER — ALBUTEROL SULFATE 0.83 MG/ML
2.5 SOLUTION RESPIRATORY (INHALATION) EVERY 4 HOURS PRN
Qty: 3 ML | Refills: 3 | Status: SHIPPED | OUTPATIENT
Start: 2023-07-02 | End: 2023-07-18 | Stop reason: SDUPTHER

## 2023-07-02 RX ORDER — OLANZAPINE 20 MG/1
20 TABLET ORAL NIGHTLY
Qty: 30 TABLET | Refills: 0 | Status: SHIPPED | OUTPATIENT
Start: 2023-07-02 | End: 2023-07-18

## 2023-07-02 RX ORDER — PREDNISONE 20 MG/1
40 TABLET ORAL DAILY
Qty: 6 TABLET | Refills: 0 | Status: SHIPPED | OUTPATIENT
Start: 2023-07-03 | End: 2023-07-06

## 2023-07-02 RX ORDER — DOCUSATE SODIUM 100 MG/1
100 CAPSULE, LIQUID FILLED ORAL 2 TIMES DAILY
Qty: 60 CAPSULE | Refills: 0 | Status: SHIPPED | OUTPATIENT
Start: 2023-07-02 | End: 2023-08-01

## 2023-07-02 RX ORDER — ESCITALOPRAM OXALATE 10 MG/1
10 TABLET ORAL DAILY
Qty: 30 TABLET | Refills: 0 | Status: SHIPPED | OUTPATIENT
Start: 2023-07-02 | End: 2023-07-18 | Stop reason: SDUPTHER

## 2023-07-02 RX ADMIN — GUAIFENESIN 600 MG: 600 TABLET, EXTENDED RELEASE ORAL at 08:07

## 2023-07-02 RX ADMIN — IPRATROPIUM BROMIDE AND ALBUTEROL SULFATE 3 ML: .5; 3 SOLUTION RESPIRATORY (INHALATION) at 11:07

## 2023-07-02 RX ADMIN — FUROSEMIDE 40 MG: 40 TABLET ORAL at 08:07

## 2023-07-02 RX ADMIN — METHADONE HYDROCHLORIDE 90 MG: 10 TABLET ORAL at 08:07

## 2023-07-02 RX ADMIN — DOCUSATE SODIUM AND SENNOSIDES 1 TABLET: 8.6; 5 TABLET, FILM COATED ORAL at 08:07

## 2023-07-02 RX ADMIN — FLUTICASONE FUROATE AND VILANTEROL TRIFENATATE 1 PUFF: 200; 25 POWDER RESPIRATORY (INHALATION) at 07:07

## 2023-07-02 RX ADMIN — AMLODIPINE BESYLATE 10 MG: 5 TABLET ORAL at 08:07

## 2023-07-02 RX ADMIN — NICOTINE 1 PATCH: 21 PATCH, EXTENDED RELEASE TRANSDERMAL at 08:07

## 2023-07-02 RX ADMIN — HEPARIN SODIUM 5000 UNITS: 5000 INJECTION INTRAVENOUS; SUBCUTANEOUS at 01:07

## 2023-07-02 RX ADMIN — IPRATROPIUM BROMIDE AND ALBUTEROL SULFATE 3 ML: .5; 3 SOLUTION RESPIRATORY (INHALATION) at 07:07

## 2023-07-02 RX ADMIN — SODIUM PHOSPHATE, MONOBASIC, MONOHYDRATE AND SODIUM PHOSPHATE, DIBASIC, ANHYDROUS 15 MMOL: 142; 276 INJECTION, SOLUTION INTRAVENOUS at 08:07

## 2023-07-02 RX ADMIN — PREDNISONE 40 MG: 20 TABLET ORAL at 08:07

## 2023-07-02 RX ADMIN — CEFTRIAXONE 1 G: 1 INJECTION, POWDER, FOR SOLUTION INTRAMUSCULAR; INTRAVENOUS at 12:07

## 2023-07-02 RX ADMIN — ESCITALOPRAM OXALATE 10 MG: 10 TABLET ORAL at 08:07

## 2023-07-02 RX ADMIN — HEPARIN SODIUM 5000 UNITS: 5000 INJECTION INTRAVENOUS; SUBCUTANEOUS at 10:07

## 2023-07-02 NOTE — ASSESSMENT & PLAN NOTE
Patient with shortness of breath, productive cough.  VBG pH 7.345, pCO2 51.3, pO2 53.   CXR with Ill-defined basilar opacities again seen, which may relate to atelectasis, noting that aspiration or pneumonia difficult to exclude.     Plan:  - Duo nebs [id number] while awake  - will continue controller medication and will substitute with what is available on hospital formulary  - BiPAP qhs  - supplemental oxygen titrated to a target O2 sat>88-92%   - Monitor Pulse Ox Q4H   - Acapella and Incentive spirometry   - smoking cessation  - will continue PO steroids, PO abx (levofloxacin) outpatient for remaining course

## 2023-07-02 NOTE — PLAN OF CARE
Patient is AAOx4. No complaints of pain or N/V. Medications administered per MAR. BIPAP in place. Safety maintained. Instructed to call for assistance.

## 2023-07-02 NOTE — ASSESSMENT & PLAN NOTE
Acute respiratory distress likely COPD predominant.  Trop negative. BNP <10.    Plan:  - Continue home medications.  - Continue to monitor UOP and clinical status.

## 2023-07-02 NOTE — HOSPITAL COURSE
Patient admitted to [facility] for ANNITA and acute exacerbation of chronic COPD. Acute COPD exacerbation - Initiated on duonebs, chest physio, acapella, and home equivalent inhalers. Started on azithromycin and prednisone for acute exacerbation coverage. Initiated on ceftriaxone for CAP coverage. Patient O2 weaned to baseline while maintaining appropriate O2 sats and in no apparent respiratory distress. ANNITA - US Retroperitoneal with bilateral renal cysts and a small right renal calculus without obstruction. Continued on IVF to increase intravascular fluid. Continued on home furosemide to continue to diuresis fluid from third spacing. Patient maintained adequate urine output and BUN/Cr continued to downtrend. ANNITA resolved.     With patient improved labs and clinical improvement, patient to be discharged with remaining course of PO steroids, PO abx (levofloxacin), and refills for needed medications sent to pharmacy. Patient plan discussed with patient. Return precautions discussed with patient. Patient verbally acknowledged understanding and agreement with plan. Patient encouraged to take medications as prescribed and to follow up closely with PCP.

## 2023-07-02 NOTE — DISCHARGE SUMMARY
[facility] Medicine  Discharge Summary      Patient Name: Ming Harrington  MRN: [id number]  MADIE: [id number]  Patient Class: OP- Observation  Admission Date: 6/30/2023  Hospital Length of Stay: 0 days  Discharge Date and Time:  07/02/2023 12:48 PM  Attending Physician: Sadiq Bueno DO   Discharging Provider: Rachid Goldstein MD  Primary Care Provider: Garland Xiong DO    Primary Care Team: Networked reference to record PCT     HPI:   Patient is a 57-year-old male with a PMHx COPD, CHF, HTN, T2DM, chronic methadone use, JUVENCIO, insomnia who presented to [facility] ED in acute respiratory distress. Endorses nasal congestion, SOB, wheezing, and a productive cough producing green sputum. Endorses he is still smoking tobacco daily. Patient reports bilateral lower extremity emea.denies any chest pain, orthopnea, or PND.  Patient currently uses 2 L at home as needed. On albuterol inhaler as needed and Stiolto daily.      In the ED, initial vital signs with /88, HR 99, Temp 99.5F, SpO2 86% on room air. VBG with pH 7.345, pCO2 51.3, pO2 53. Labs with CBC stable. CMP with BUN/Cr 39/3.1 (baseline Cr 1.6). Troponin negative. BNP <10. UA with 2+ protein. CXR with Ill-defined basilar opacities again seen, which may relate to atelectasis, noting that aspiration or pneumonia difficult to exclude. Patient placed on supplemental O2 via NC. [facility] consulted for evaluation for admission for ANNITA and acute COPD exacerbation.       * No surgery found *      Hospital Course:   Patient admitted to [facility] for ANNITA and acute exacerbation of chronic COPD. Acute COPD exacerbation - Initiated on duonebs, chest physio, acapella, and home equivalent inhalers. Started on azithromycin and prednisone for acute exacerbation coverage. Initiated on ceftriaxone for CAP coverage. Patient O2 weaned to baseline while maintaining appropriate O2 sats and in no apparent respiratory distress. ANNITA - US  Retroperitoneal with bilateral renal cysts and a small right renal calculus without obstruction. Continued on IVF to increase intravascular fluid. Continued on home furosemide to continue to diuresis fluid from third spacing. Patient maintained adequate urine output and BUN/Cr continued to downtrend. ANNITA resolved.     With patient improved labs and clinical improvement, patient to be discharged with remaining course of PO steroids, PO abx (levofloxacin), and refills for needed medications sent to pharmacy. Patient plan discussed with patient. Return precautions discussed with patient. Patient verbally acknowledged understanding and agreement with plan. Patient encouraged to take medications as prescribed and to follow up closely with PCP.       Goals of Care Treatment Preferences:  Code Status: Full Code      Physical Exam  Vitals and nursing note reviewed.   Constitutional:       General: He is not in acute distress.     Appearance: Normal appearance. He is obese. He is not ill-appearing or toxic-appearing.   HENT:      Head: Normocephalic.      Right Ear: External ear normal.      Left Ear: External ear normal.      Nose: Nose normal.      Mouth/Throat:      Pharynx: Oropharynx is clear.   Eyes:      Extraocular Movements: Extraocular movements intact.   Cardiovascular:      Rate and Rhythm: Normal rate and regular rhythm.      Pulses: Normal pulses.      Heart sounds: Normal heart sounds.   Pulmonary:      Effort: Pulmonary effort is normal. No respiratory distress.      Breath sounds: Wheezing (mild expiratory wheezes) present. No rhonchi or rales.      Comments: Patient on room air at time of exam.  Abdominal:      General: Abdomen is flat.      Palpations: Abdomen is soft.      Tenderness: There is no abdominal tenderness. There is no guarding or rebound.   Musculoskeletal:         General: Normal range of motion.      Cervical back: Normal range of motion.   Skin:     General: Skin is warm.      Capillary  Refill: Capillary refill takes less than 2 seconds.   Neurological:      General: No focal deficit present.      Mental Status: He is alert and oriented to person, place, and time. Mental status is at baseline.   Psychiatric:         Mood and Affect: Mood normal.         Behavior: Behavior normal.         Thought Content: Thought content normal.           Consults:   Consults (From admission, onward)        Status Ordering Provider     Inpatient consult to Anesthesiology  Once        Provider:  (Not yet assigned)    Acknowledged IGOR MCCONNELL          Psychiatric  Generalized anxiety disorder  Plan:  - Continue home medications.      Polysubstance abuse  Plan:  - Continue cessation counseling.      Opioid use disorder, severe, on maintenance therapy, dependence  On methadone 90mg daily from [facility] in [place]    Plan:  - Continue medication.  - Note provided to patient but will need to call Methadone Clinic to confirm he has been a patient.    Pulmonary  Shortness of breath  Plan as in COPD exacerbation.      COPD exacerbation  Patient with shortness of breath, productive cough.  VBG pH 7.345, pCO2 51.3, pO2 53.   CXR with Ill-defined basilar opacities again seen, which may relate to atelectasis, noting that aspiration or pneumonia difficult to exclude.     Plan:  - Duo nebs G0J-V2B while awake  - will continue controller medication and will substitute with what is available on hospital formulary  - BiPAP qhs  - supplemental oxygen titrated to a target O2 sat>88-92%   - Monitor Pulse Ox Q4H   - Acapella and Incentive spirometry   - smoking cessation  - will continue PO steroids, PO abx (levofloxacin) outpatient for remaining course    Cardiac/Vascular  CHF (congestive heart failure)  Acute respiratory distress likely COPD predominant.  Trop negative. BNP <10.    Plan:  - Continue home medications.  - Continue to monitor UOP and clinical status.    Essential hypertension  Plan:  -  Continue home medications.      Renal/  * ANNITA (acute kidney injury)  RESOLVED  BUN/Cr ON 7/1 30/1.8, on admission 39/3.1 (baseline Cr 1.6)    PLAN:  - Hold nephrotoxic medications .  - Renally-dose current meds if available  - Bladder scan if suspicion of retention, followed by retroperitoneal US to evaluate for Hydronephrosis. Owen if retention.   - Avoid Hypotension.  - Strict I/O's.  - Monitor renal function carefully.    Stage 3a chronic kidney disease  Plan as in ANNITA.      Other  Insomnia  Plan:  - Continue home medications.        Final Active Diagnoses:    Diagnosis Date Noted POA    PRINCIPAL PROBLEM:  ANNITA (acute kidney injury) [N17.9] 01/04/2023 Unknown    CHF (congestive heart failure) [I50.9] 07/01/2023 Yes    Stage 3a chronic kidney disease [N18.31] 06/20/2023 Yes    Insomnia [G47.00] 06/19/2023 Yes    Generalized anxiety disorder [F41.1] 04/17/2019 Yes    Shortness of breath [R06.02] 01/17/2018 Yes    Polysubstance abuse [F19.10] 07/22/2017 Yes     Chronic    COPD exacerbation [J44.1] 07/21/2017 Yes    Opioid use disorder, severe, on maintenance therapy, dependence [F11.20] 01/08/2013 Yes    Essential hypertension [I10]  Yes      Problems Resolved During this Admission:       Discharged Condition: stable    Disposition: Home or Self Care    Follow Up:   Follow-up Information     [facility] - FAMILY MEDICINE Follow up.    Specialty: Family Medicine  Why: OUT PATIENT  SERVICES/PCP- Please call to schedule a followup appointment within 7 days of discharge  Contact information:  [place] [id number]  [phone]                       Patient Instructions:      Diet diabetic     Notify your health care provider if you experience any of the following:  severe uncontrolled pain     Notify your health care provider if you experience any of the following:  difficulty breathing or increased cough     Notify your health care provider if you experience any of the  following:  temperature >100.4     Notify your health care provider if you experience any of the following:  persistent dizziness, light-headedness, or visual disturbances     Notify your health care provider if you experience any of the following:  increased confusion or weakness     Activity as tolerated       Significant Diagnostic Studies: N/A    Pending Diagnostic Studies:     None         Medications:  Reconciled Home Medications:      Medication List      START taking these medications    levoFLOXacin 750 MG tablet  Commonly known as: LEVAQUIN  Take 1 tablet (750 mg total) by mouth once daily. for 3 days     predniSONE 20 MG tablet  Commonly known as: DELTASONE  Take 2 tablets (40 mg total) by mouth once daily. for 3 days  Start taking on: July 3, 2023        CONTINUE taking these medications    * albuterol 90 mcg/actuation inhaler  Commonly known as: PROAIR HFA  Inhale 2 puffs into the lungs every 8 (eight) hours. Rescue for 3 days     * albuterol 2.5 mg /3 mL (0.083 %) nebulizer solution  Commonly known as: PROVENTIL  Take 3 mLs (2.5 mg total) by nebulization every 4 (four) hours as needed for Wheezing or Shortness of Breath.     budesonide-formoterol 80-4.5 mcg 80-4.5 mcg/actuation Hfaa  Commonly known as: SYMBICORT  Inhale 2 puffs into the lungs once daily. Controller     docusate sodium 100 MG capsule  Commonly known as: COLACE  Take 1 capsule (100 mg total) by mouth 2 (two) times daily.     EScitalopram oxalate 10 MG tablet  Commonly known as: LEXAPRO  Take 1 tablet (10 mg total) by mouth once daily.     furosemide 40 MG tablet  Commonly known as: LASIX  Take 1 tablet (40 mg total) by mouth once daily.     losartan 100 MG tablet  Commonly known as: COZAAR  Take 1 tablet (100 mg total) by mouth once daily.     nicotine 21 mg/24 hr  Commonly known as: NICODERM CQ  Place 1 patch onto the skin once daily.     OLANZapine 20 MG tablet  Commonly known as: ZyPREXA  Take 1 tablet (20 mg total) by mouth  nightly.     polyethylene glycol 17 gram Pwpk  Commonly known as: GLYCOLAX  Take 17 g by mouth once daily.         * This list has 2 medication(s) that are the same as other medications prescribed for you. Read the directions carefully, and ask your doctor or other care provider to review them with you.            STOP taking these medications    NEOSPORIN MODE TO GO 1-0.13 % Spry  Generic drug: pramoxine-benzalkonium chlor.     senna 8.6 mg tablet  Commonly known as: SENNA     triamcinolone acetonide 0.1% 0.1 % cream  Commonly known as: KENALOG     white petrolatum ointment  Commonly known as: PETROLEUM JELLY            Indwelling Lines/Drains at time of discharge:   Lines/Drains/Airways     None                 ________________________  Rachid Goldstein MD  \[facility]\"" Family Medicine PGY-2

## 2023-07-02 NOTE — PLAN OF CARE
Patient on RA in no apparent distress, given aerosol, MDI, IS ,CPT and Aerobika treatment, no adverse reactions will continue to monitor.

## 2023-07-02 NOTE — ASSESSMENT & PLAN NOTE
On methadone 90mg daily from [facility] in [place]    Plan:  - Continue medication.  - Note provided to patient but will need to call Methadone Clinic to confirm he has been a patient.

## 2023-07-02 NOTE — NURSING
Patient discharged per MD orders. Patient verbalized understanding of discharge instructions and discharge medications. PIV discontinued per MD orders. Catheter tip intact and pressure dressing applied.

## 2023-07-07 NOTE — TELEPHONE ENCOUNTER
Called and spoke to patient he's requesting medication for constipation and a refill on his Promethazine 12.5 mg Thank you Suzanne BRICENO

## 2023-07-07 NOTE — TELEPHONE ENCOUNTER
----- Message from Elva Zhao sent at 7/7/2023  9:10 AM CDT -----  Contact: [phone] @ patient  Good morning patient would like a call back to see if the doc give him more med of the ones he gave him while in the hospital. Please give patient a call back [phone]

## 2023-07-14 VITALS
OXYGEN SATURATION: 96 % | WEIGHT: 267 LBS | HEIGHT: 68 IN | BODY MASS INDEX: 40.47 KG/M2 | RESPIRATION RATE: 18 BRPM | HEART RATE: 81 BPM | TEMPERATURE: 98 F

## 2023-07-18 ENCOUNTER — LAB VISIT (OUTPATIENT)
Dept: LAB | Facility: HOSPITAL | Age: 58
End: 2023-07-18
Payer: MEDICAID

## 2023-07-18 ENCOUNTER — OFFICE VISIT (OUTPATIENT)
Dept: FAMILY MEDICINE | Facility: HOSPITAL | Age: 58
End: 2023-07-18
Payer: MEDICAID

## 2023-07-18 VITALS
HEIGHT: 66 IN | OXYGEN SATURATION: 93 % | DIASTOLIC BLOOD PRESSURE: 87 MMHG | WEIGHT: 271.81 LBS | BODY MASS INDEX: 43.68 KG/M2 | HEART RATE: 89 BPM | SYSTOLIC BLOOD PRESSURE: 127 MMHG

## 2023-07-18 DIAGNOSIS — G47.00 INSOMNIA, UNSPECIFIED TYPE: ICD-10-CM

## 2023-07-18 DIAGNOSIS — R05.3 CHRONIC COUGH: ICD-10-CM

## 2023-07-18 DIAGNOSIS — I10 HYPERTENSION, WELL CONTROLLED: ICD-10-CM

## 2023-07-18 DIAGNOSIS — F41.1 GENERALIZED ANXIETY DISORDER: ICD-10-CM

## 2023-07-18 DIAGNOSIS — F11.20 OPIOID USE DISORDER, SEVERE, ON MAINTENANCE THERAPY, DEPENDENCE: ICD-10-CM

## 2023-07-18 DIAGNOSIS — N18.32 STAGE 3B CHRONIC KIDNEY DISEASE: ICD-10-CM

## 2023-07-18 DIAGNOSIS — J44.9 CHRONIC OBSTRUCTIVE PULMONARY DISEASE, UNSPECIFIED COPD TYPE: Primary | ICD-10-CM

## 2023-07-18 LAB
ALBUMIN SERPL BCP-MCNC: 3.5 G/DL (ref 3.5–5.2)
ALP SERPL-CCNC: 119 U/L (ref 55–135)
ALT SERPL W/O P-5'-P-CCNC: 27 U/L (ref 10–44)
ANION GAP SERPL CALC-SCNC: 13 MMOL/L (ref 8–16)
AST SERPL-CCNC: 35 U/L (ref 10–40)
BILIRUB SERPL-MCNC: 0.6 MG/DL (ref 0.1–1)
BUN SERPL-MCNC: 12 MG/DL (ref 6–20)
CALCIUM SERPL-MCNC: 9.2 MG/DL (ref 8.7–10.5)
CHLORIDE SERPL-SCNC: 102 MMOL/L (ref 95–110)
CO2 SERPL-SCNC: 25 MMOL/L (ref 23–29)
CREAT SERPL-MCNC: 1.5 MG/DL (ref 0.5–1.4)
EST. GFR  (NO RACE VARIABLE): 54 ML/MIN/1.73 M^2
GLUCOSE SERPL-MCNC: 77 MG/DL (ref 70–110)
POTASSIUM SERPL-SCNC: 5 MMOL/L (ref 3.5–5.1)
PROT SERPL-MCNC: 8 G/DL (ref 6–8.4)
SODIUM SERPL-SCNC: 140 MMOL/L (ref 136–145)

## 2023-07-18 PROCEDURE — 99215 OFFICE O/P EST HI 40 MIN: CPT | Performed by: STUDENT IN AN ORGANIZED HEALTH CARE EDUCATION/TRAINING PROGRAM

## 2023-07-18 PROCEDURE — 36415 COLL VENOUS BLD VENIPUNCTURE: CPT | Performed by: STUDENT IN AN ORGANIZED HEALTH CARE EDUCATION/TRAINING PROGRAM

## 2023-07-18 PROCEDURE — 80053 COMPREHEN METABOLIC PANEL: CPT | Performed by: STUDENT IN AN ORGANIZED HEALTH CARE EDUCATION/TRAINING PROGRAM

## 2023-07-18 RX ORDER — ALBUTEROL SULFATE 0.83 MG/ML
2.5 SOLUTION RESPIRATORY (INHALATION) EVERY 4 HOURS PRN
Qty: 3 ML | Refills: 3 | Status: SHIPPED | OUTPATIENT
Start: 2023-07-18 | End: 2023-07-18

## 2023-07-18 RX ORDER — LOSARTAN POTASSIUM 100 MG/1
100 TABLET ORAL DAILY
Qty: 30 TABLET | Refills: 0 | Status: SHIPPED | OUTPATIENT
Start: 2023-07-18 | End: 2023-07-18

## 2023-07-18 RX ORDER — ESCITALOPRAM OXALATE 10 MG/1
10 TABLET ORAL DAILY
Qty: 90 TABLET | Refills: 1 | Status: SHIPPED | OUTPATIENT
Start: 2023-07-18 | End: 2023-07-18

## 2023-07-18 RX ORDER — LOSARTAN POTASSIUM 100 MG/1
100 TABLET ORAL DAILY
Qty: 30 TABLET | Refills: 11 | Status: SHIPPED | OUTPATIENT
Start: 2023-07-18 | End: 2023-09-26 | Stop reason: SDUPTHER

## 2023-07-18 RX ORDER — FUROSEMIDE 40 MG/1
40 TABLET ORAL DAILY
Qty: 30 TABLET | Refills: 11 | Status: SHIPPED | OUTPATIENT
Start: 2023-07-18 | End: 2023-08-31 | Stop reason: SDUPTHER

## 2023-07-18 RX ORDER — PROMETHAZINE HYDROCHLORIDE 12.5 MG/1
TABLET ORAL
COMMUNITY
Start: 2023-07-03 | End: 2023-07-18

## 2023-07-18 RX ORDER — ESCITALOPRAM OXALATE 10 MG/1
10 TABLET ORAL DAILY
Qty: 30 TABLET | Refills: 11 | Status: SHIPPED | OUTPATIENT
Start: 2023-07-18 | End: 2023-10-24 | Stop reason: SDUPTHER

## 2023-07-18 RX ORDER — FUROSEMIDE 40 MG/1
40 TABLET ORAL DAILY
Qty: 30 TABLET | Refills: 1 | Status: SHIPPED | OUTPATIENT
Start: 2023-07-18 | End: 2023-07-18

## 2023-07-18 RX ORDER — ALBUTEROL SULFATE 0.83 MG/ML
2.5 SOLUTION RESPIRATORY (INHALATION) EVERY 4 HOURS PRN
Qty: 3 ML | Refills: 3 | Status: SHIPPED | OUTPATIENT
Start: 2023-07-18 | End: 2023-09-13 | Stop reason: SDUPTHER

## 2023-07-18 NOTE — PROGRESS NOTES
"Clinic Note  [facility]    Subjective:      Ming Harrington is a 57 y.o. male with PMHx PMHx COPD, CKD3, CHF, HTN, chronic methadone use, JUVENCIO. Patient here today for general follow up. "Out of all my medications."    COPD - no PFTs, baseline 2L continuous O2, chronic cough, says he still smokes 2-3 cigarettes but used to smoke much more. Reports adherence to daily symbicort, albuterol PRN. CXRs with persistent atelectatic process.    CKD - baseline Cr for past 7 months between 1.5-1.9. Reports difficulty urinating and leg swelling without daily 40 mg lasix. Kidney imaging without clear structural etiology. Patient has yet to establish with nephrology.    Anxiety - several years on 10 mg lexapro, says today it's the reason why they recently lowered his methadone from 90 mg to 60 mg though the 10 mg lexapro has been on his chart for year.  JUVENCIO-7 today of 21 (highest).       Review of Systems   Constitutional:  Negative for chills and fever.   HENT:  Negative for congestion and sore throat.    Respiratory:  Positive for cough (Chronic).    Cardiovascular:  Positive for leg swelling (Chronic). Negative for chest pain and palpitations.   Gastrointestinal:  Negative for abdominal pain, diarrhea, nausea and vomiting.   Genitourinary:  Negative for dysuria.   Musculoskeletal:  Negative for joint pain and myalgias.   Neurological:  Negative for dizziness, tingling, weakness and headaches.   Psychiatric/Behavioral:  Negative for depression. The patient is nervous/anxious.         Objective:      Vitals:    07/18/23 0957   BP: 127/87   Pulse: 89     Body mass index is 43.87 kg/m².      Physical Exam  Constitutional:       Appearance: Normal appearance.   HENT:      Mouth/Throat:      Mouth: Mucous membranes are moist.      Pharynx: Oropharynx is clear.   Cardiovascular:      Rate and Rhythm: Normal rate and regular rhythm.      Pulses: Normal pulses.      Heart sounds: Normal heart sounds.   Pulmonary:      Effort: " Pulmonary effort is normal.      Breath sounds: Wheezing (Scattered) present.   Abdominal:      General: Abdomen is flat. Bowel sounds are normal.      Palpations: Abdomen is soft.   Musculoskeletal:      Right lower leg: Edema (Non-pitting) present.      Left lower leg: Edema (Non-pitting) present.   Skin:     General: Skin is warm and dry.      Capillary Refill: Capillary refill takes less than 2 seconds.   Neurological:      General: No focal deficit present.      Mental Status: He is alert and oriented to person, place, and time. Mental status is at baseline.   Psychiatric:         Mood and Affect: Mood is anxious.         Behavior: Behavior normal. Behavior is not aggressive. Behavior is cooperative.          Assessment/Plan:      56 yo M with complex hx presenting for particular attention to pulmonary and renal disease. For pulmonary, patient with severe chronic process, fortunately stable - for now need advanced imaging to get a sense of the extent of fibrosis/emphysema etc, formal PFTs and then hopefully established with pulmonology. Continue symbicort and albuterol PRN for now. Renal disease - now officially in CKD, etiology not yet established but is optimized currently with good BP control on ARB, lasix for fluid management, needs to establish with nephrology.    For anxiety - not controlled, JUVENCIO 7, given complexity of current visit - plan to discuss raising dose to 20 mg at next visit.      1. Chronic obstructive pulmonary disease, unspecified COPD type    - Ambulatory referral/consult to Pulmonology; Future  - Complete PFT w/ bronchodilator; Future  - albuterol (PROVENTIL) 2.5 mg /3 mL (0.083 %) nebulizer solution; Take 3 mLs (2.5 mg total) by nebulization every 4 (four) hours as needed for Wheezing or Shortness of Breath.  Dispense: 3 mL; Refill: 3    2. Stage 3b chronic kidney disease    - Ambulatory referral/consult to Nephrology; Future  - Comprehensive Metabolic Panel; Future  - furosemide (LASIX)  40 MG tablet; Take 1 tablet (40 mg total) by mouth once daily.  Dispense: 30 tablet; Refill: 11  - losartan (COZAAR) 100 MG tablet; Take 1 tablet (100 mg total) by mouth once daily.  Dispense: 30 tablet; Refill: 11    3. Insomnia, unspecified type      4. Generalized anxiety disorder    - EScitalopram oxalate (LEXAPRO) 10 MG tablet; Take 1 tablet (10 mg total) by mouth once daily.  Dispense: 30 tablet; Refill: 11    5. Hypertension, well controlled    - losartan (COZAAR) 100 MG tablet; Take 1 tablet (100 mg total) by mouth once daily.  Dispense: 30 tablet; Refill: 11    6. Chronic cough    - CT Chest Without Contrast; Future  - albuterol (PROVENTIL) 2.5 mg /3 mL (0.083 %) nebulizer solution; Take 3 mLs (2.5 mg total) by nebulization every 4 (four) hours as needed for Wheezing or Shortness of Breath.  Dispense: 3 mL; Refill: 3    Follow up in 1 month.      Patient discussed with attending physician, Dr. Moises Berumen MD  [facility] PGY-3  07/18/2023      The following information is provided to all patients.  This information is to help you find resources for any of the problems found today that may be affecting your health:                Living healthy guide: www.[facility].[place].gov       Understanding Diabetes: www.diabetes.org       Eating healthy: www.cdc.gov/healthyweight      CDC home safety checklist: www.cdc.gov/steadi/patient.html      Agency on Aging: www.goea.[place].gov       Alcoholics anonymous (AA): www.aa.org      Physical Activity: www.richard.nih.gov/fi1vsvm       Tobacco use: www.quitwithusla.org

## 2023-07-19 NOTE — PROGRESS NOTES
I assume primary medical responsibility for this patient. I have reviewed the history, physical, and assessement & treatment plan with the resident and agree that the care is reasonable and necessary. This service has been performed by a resident with the presence of a teaching physician under the primary care exception. If necessary, an addendum of additional findings or evaluation beyond the resident documentation will be noted below.    Chronic disease management provided.      Albina De Leon MD    [facility]

## 2023-08-31 ENCOUNTER — OFFICE VISIT (OUTPATIENT)
Dept: FAMILY MEDICINE | Facility: HOSPITAL | Age: 58
End: 2023-08-31
Payer: MEDICAID

## 2023-08-31 VITALS
SYSTOLIC BLOOD PRESSURE: 126 MMHG | HEIGHT: 66 IN | WEIGHT: 264.75 LBS | BODY MASS INDEX: 42.55 KG/M2 | DIASTOLIC BLOOD PRESSURE: 81 MMHG | HEART RATE: 82 BPM

## 2023-08-31 DIAGNOSIS — J41.8 MIXED SIMPLE AND MUCOPURULENT CHRONIC BRONCHITIS: ICD-10-CM

## 2023-08-31 DIAGNOSIS — N18.32 STAGE 3B CHRONIC KIDNEY DISEASE: ICD-10-CM

## 2023-08-31 DIAGNOSIS — F11.20 METHADONE MAINTENANCE THERAPY PATIENT: ICD-10-CM

## 2023-08-31 DIAGNOSIS — R11.0 NAUSEA: ICD-10-CM

## 2023-08-31 DIAGNOSIS — K59.03 DRUG-INDUCED CONSTIPATION: Primary | ICD-10-CM

## 2023-08-31 PROCEDURE — 99213 OFFICE O/P EST LOW 20 MIN: CPT | Performed by: STUDENT IN AN ORGANIZED HEALTH CARE EDUCATION/TRAINING PROGRAM

## 2023-08-31 RX ORDER — SYRING-NEEDL,DISP,INSUL,0.3 ML 29 G X1/2"
296 SYRINGE, EMPTY DISPOSABLE MISCELLANEOUS DAILY PRN
Qty: 60 ML | Refills: 0 | Status: SHIPPED | OUTPATIENT
Start: 2023-08-31 | End: 2024-03-07

## 2023-08-31 RX ORDER — PROMETHAZINE HYDROCHLORIDE 6.25 MG/5ML
25 SYRUP ORAL EVERY 6 HOURS PRN
Qty: 473 ML | Refills: 3 | Status: SHIPPED | OUTPATIENT
Start: 2023-08-31 | End: 2023-10-10 | Stop reason: SDUPTHER

## 2023-08-31 RX ORDER — FUROSEMIDE 40 MG/1
40 TABLET ORAL 2 TIMES DAILY
Qty: 180 TABLET | Refills: 0 | Status: SHIPPED | OUTPATIENT
Start: 2023-08-31 | End: 2023-10-24 | Stop reason: SDUPTHER

## 2023-08-31 RX ORDER — BUDESONIDE AND FORMOTEROL FUMARATE DIHYDRATE 80; 4.5 UG/1; UG/1
2 AEROSOL RESPIRATORY (INHALATION) DAILY
Qty: 10.2 G | Refills: 3 | Status: SHIPPED | OUTPATIENT
Start: 2023-08-31 | End: 2023-09-13 | Stop reason: SDUPTHER

## 2023-08-31 NOTE — PROGRESS NOTES
Clinic Note  [facility]    Subjective:      Ming Harrington is a 57 y.o. male with COPD, CKD3a, maintenance methadone for opioid dependence, CHF, HTN, bipolar. Patient here mainly due to constipation, however also for CKD follow up.    CKD - couldn't make appointment. Cr 1.5, eGFR 54. Requesting higher dose of lasix for LE edema.    Constipation - hasn't had BM in 5-6 days. Says he can't reach back there for a suppository.    Chronic opioid - complaining that methadone clinic went down to 60 mg from 90 mg because of kidney function.    Review of Systems   Constitutional:  Negative for chills and fever.   HENT:  Negative for congestion and sore throat.    Respiratory:  Negative for cough.    Cardiovascular:  Negative for chest pain and palpitations.   Gastrointestinal:  Positive for constipation. Negative for abdominal pain, diarrhea, nausea and vomiting.   Genitourinary:  Negative for dysuria.   Musculoskeletal:  Negative for joint pain and myalgias.   Neurological:  Negative for dizziness, tingling, weakness and headaches.   Psychiatric/Behavioral:  Negative for depression. The patient is not nervous/anxious.           Objective:      Vitals:    08/31/23 1435   BP: 126/81   Pulse: 82     Body mass index is 42.74 kg/m².      Physical Exam  Constitutional:       Appearance: Normal appearance.   HENT:      Mouth/Throat:      Mouth: Mucous membranes are moist.      Pharynx: Oropharynx is clear.   Cardiovascular:      Rate and Rhythm: Normal rate and regular rhythm.      Pulses: Normal pulses.      Heart sounds: Normal heart sounds.   Pulmonary:      Effort: Pulmonary effort is normal.      Breath sounds: Normal breath sounds.   Abdominal:      General: Abdomen is flat. Bowel sounds are normal.      Palpations: Abdomen is soft.   Musculoskeletal:      Right lower leg: Edema present.      Left lower leg: Edema present.   Skin:     General: Skin is warm and dry.      Capillary Refill: Capillary refill takes less than 2  seconds.   Neurological:      General: No focal deficit present.      Mental Status: He is alert and oriented to person, place, and time. Mental status is at baseline.   Psychiatric:         Mood and Affect: Mood normal.         Behavior: Behavior normal.            Assessment/Plan:      Patient overall doing well - constipation likely due to methadone use. BP well controlled. That said, with patient's creatinine clearance above 90, there is no need for renal dose adjustment of his methadone. Nephrology referral re-ordered. RTC in 1 month for follow up.    1. Drug-induced constipation    - magnesium citrate solution; Take 296 mLs by mouth daily as needed (Constipation).  Dispense: 60 mL; Refill: 0    2. Stage 3b chronic kidney disease    - furosemide (LASIX) 40 MG tablet; Take 1 tablet (40 mg total) by mouth 2 (two) times a day.  Dispense: 180 tablet; Refill: 0  - Ambulatory referral/consult to Nephrology; Future    3. Mixed simple and mucopurulent chronic bronchitis    - budesonide-formoterol 80-4.5 mcg (SYMBICORT) 80-4.5 mcg/actuation HFAA; Inhale 2 puffs into the lungs once daily. Controller  Dispense: 10.2 g; Refill: 03    4. Nausea    - promethazine (PHENERGAN) 6.25 mg/5 mL syrup; Take 20 mLs (25 mg total) by mouth every 6 (six) hours as needed for Nausea.  Dispense: 473 mL; Refill: 03    5. Methadone maintenance therapy patient        Patient discussed with attending physician, Dr. Moises Berumen MD  [facility] PGY-3  08/31/2023      The following information is provided to all patients.  This information is to help you find resources for any of the problems found today that may be affecting your health:                Living healthy guide: www.[facility].[place].gov       Understanding Diabetes: www.diabetes.org       Eating healthy: www.cdc.gov/healthyweight      CDC home safety checklist: www.cdc.gov/steadi/patient.html      Agency on Aging: www.goea.[place].gov       Alcoholics anonymous (AA):  www.aa.org      Physical Activity: www.richard.nih.gov/cx8zdpq       Tobacco use: www.quitwithusla.org

## 2023-09-07 NOTE — PROGRESS NOTES
I assume primary medical responsibility for this patient. I have reviewed the history, physical, and assessement & treatment plan with the resident and agree that the care is reasonable and necessary. This service has been performed by a resident without the presence of a teaching physician under the primary care exception. If necessary, an addendum of additional findings or evaluation beyond the resident documentation will be noted below.    Consider more dietary changes and/or chronic bowel regimen to help with constipation. Monitor leg edema, may need further work-up for causes of edema.     Albina De Leon MD    [facility]

## 2023-09-13 DIAGNOSIS — J41.8 MIXED SIMPLE AND MUCOPURULENT CHRONIC BRONCHITIS: ICD-10-CM

## 2023-09-13 DIAGNOSIS — J44.9 CHRONIC OBSTRUCTIVE PULMONARY DISEASE, UNSPECIFIED COPD TYPE: ICD-10-CM

## 2023-09-13 DIAGNOSIS — R05.3 CHRONIC COUGH: ICD-10-CM

## 2023-09-13 RX ORDER — ALBUTEROL SULFATE 0.83 MG/ML
2.5 SOLUTION RESPIRATORY (INHALATION) EVERY 4 HOURS PRN
Qty: 3 ML | Refills: 3 | Status: SHIPPED | OUTPATIENT
Start: 2023-09-13 | End: 2023-09-26 | Stop reason: SDUPTHER

## 2023-09-13 RX ORDER — BUDESONIDE AND FORMOTEROL FUMARATE DIHYDRATE 80; 4.5 UG/1; UG/1
2 AEROSOL RESPIRATORY (INHALATION) DAILY
Qty: 10.2 G | Refills: 3 | Status: SHIPPED | OUTPATIENT
Start: 2023-09-13 | End: 2023-09-28 | Stop reason: SDUPTHER

## 2023-09-25 PROBLEM — N39.0 URINARY TRACT INFECTION IN MALE: Status: RESOLVED | Noted: 2023-06-21 | Resolved: 2023-09-25

## 2023-09-26 DIAGNOSIS — N18.32 STAGE 3B CHRONIC KIDNEY DISEASE: ICD-10-CM

## 2023-09-26 DIAGNOSIS — R05.3 CHRONIC COUGH: ICD-10-CM

## 2023-09-26 DIAGNOSIS — J44.9 CHRONIC OBSTRUCTIVE PULMONARY DISEASE, UNSPECIFIED COPD TYPE: ICD-10-CM

## 2023-09-26 DIAGNOSIS — I10 HYPERTENSION, WELL CONTROLLED: ICD-10-CM

## 2023-09-26 RX ORDER — LOSARTAN POTASSIUM 100 MG/1
100 TABLET ORAL DAILY
Qty: 30 TABLET | Refills: 11 | Status: SHIPPED | OUTPATIENT
Start: 2023-09-26 | End: 2023-10-24 | Stop reason: SDUPTHER

## 2023-09-26 RX ORDER — ALBUTEROL SULFATE 0.83 MG/ML
2.5 SOLUTION RESPIRATORY (INHALATION) EVERY 4 HOURS PRN
Qty: 3 ML | Refills: 3 | Status: SHIPPED | OUTPATIENT
Start: 2023-09-26 | End: 2023-10-24 | Stop reason: SDUPTHER

## 2023-09-28 ENCOUNTER — HOSPITAL ENCOUNTER (OUTPATIENT)
Dept: RADIOLOGY | Facility: HOSPITAL | Age: 58
Discharge: HOME | End: 2023-09-28
Attending: STUDENT IN AN ORGANIZED HEALTH CARE EDUCATION/TRAINING PROGRAM
Payer: MEDICAID

## 2023-09-28 ENCOUNTER — OFFICE VISIT (OUTPATIENT)
Dept: FAMILY MEDICINE | Facility: HOSPITAL | Age: 58
End: 2023-09-28
Payer: MEDICAID

## 2023-09-28 VITALS
DIASTOLIC BLOOD PRESSURE: 79 MMHG | HEART RATE: 100 BPM | HEIGHT: 66 IN | SYSTOLIC BLOOD PRESSURE: 125 MMHG | WEIGHT: 275.81 LBS | BODY MASS INDEX: 44.33 KG/M2

## 2023-09-28 DIAGNOSIS — J44.1 COPD EXACERBATION: Primary | ICD-10-CM

## 2023-09-28 DIAGNOSIS — T81.89XA NON-HEALING SURGICAL WOUND, INITIAL ENCOUNTER: ICD-10-CM

## 2023-09-28 DIAGNOSIS — J44.9 CHRONIC OBSTRUCTIVE PULMONARY DISEASE, UNSPECIFIED COPD TYPE: ICD-10-CM

## 2023-09-28 PROCEDURE — 99215 OFFICE O/P EST HI 40 MIN: CPT | Mod: 25 | Performed by: STUDENT IN AN ORGANIZED HEALTH CARE EDUCATION/TRAINING PROGRAM

## 2023-09-28 PROCEDURE — 71046 X-RAY EXAM CHEST 2 VIEWS: CPT | Mod: 26,,, | Performed by: RADIOLOGY

## 2023-09-28 PROCEDURE — 71046 XR CHEST PA AND LATERAL: ICD-10-PCS | Mod: 26,,, | Performed by: RADIOLOGY

## 2023-09-28 PROCEDURE — 94640 AIRWAY INHALATION TREATMENT: CPT

## 2023-09-28 PROCEDURE — 71046 X-RAY EXAM CHEST 2 VIEWS: CPT | Mod: TC,FY

## 2023-09-28 RX ORDER — IPRATROPIUM BROMIDE AND ALBUTEROL SULFATE 2.5; .5 MG/3ML; MG/3ML
3 SOLUTION RESPIRATORY (INHALATION)
Status: COMPLETED | OUTPATIENT
Start: 2023-09-28 | End: 2023-09-28

## 2023-09-28 RX ORDER — AZITHROMYCIN 500 MG/1
500 TABLET, FILM COATED ORAL DAILY
Qty: 3 TABLET | Refills: 0 | Status: SHIPPED | OUTPATIENT
Start: 2023-09-28 | End: 2023-10-01

## 2023-09-28 RX ORDER — BUDESONIDE AND FORMOTEROL FUMARATE DIHYDRATE 80; 4.5 UG/1; UG/1
2 AEROSOL RESPIRATORY (INHALATION) DAILY
Qty: 10.2 G | Refills: 3 | Status: SHIPPED | OUTPATIENT
Start: 2023-09-28 | End: 2023-10-10

## 2023-09-28 RX ORDER — PREDNISONE 20 MG/1
40 TABLET ORAL DAILY
Qty: 10 TABLET | Refills: 0 | Status: SHIPPED | OUTPATIENT
Start: 2023-09-28 | End: 2023-10-03

## 2023-09-28 RX ADMIN — IPRATROPIUM BROMIDE AND ALBUTEROL SULFATE 3 ML: .5; 3 SOLUTION RESPIRATORY (INHALATION) at 03:09

## 2023-09-28 NOTE — PROGRESS NOTES
Progress Note  \[facility] [place] [facility]\"" Family Medicine    Subjective:      Patient ID: Ming Harrington is a 57 y.o. male    Chief Complaint: Cough and abdominal pain    Ming Harrington is a 57-year-old male with a PMHx COPD uses 2L O2 at baseline, CHF, HTN, CKD3a, opioid dependence on chronic methadone, bipolar presenting today with acute complaints.     Reports worsening productive cough, shortness of breath, and wheezing that started around 1 month ago.  Denies any recent viral illness.  Endorses the symptoms 1st started 1 month ago but recently have worsened.  Endorses he is out of his Symbicort and currently in need of refill of medication.     Patient endorses chronic abdominal pain, constipation, and a nonhealing wound. The wound is located above the umbilicus and has been present for 2 years and is associated where is abdominal pain is located.  Endorses the wound will chronically bleed and never heal as he persistently scratches at the wound due to itchiness. From chart review, patient with surgical repair of recurrent large epigastric hernia with mesh in April 2019 which was complicated by a postoperative hematoma (1.8 L) that required exploratory laparotomy with washout.  Patient endorses the scars from the surgeries have not completely healed. Regarding his constipation, reports 1 bowel movement every 5 days with last bowel movement yesterday.         Health Maintenance         Date Due Completion Date    Lipid Panel Never done ---    Pneumococcal Vaccines (Age 0-64) (1 - PCV) Never done ---    TETANUS VACCINE Never done ---    Colorectal Cancer Screening Never done ---    Shingles Vaccine (1 of 2) Never done ---    LDCT Lung Screen 12/01/2019 12/1/2018    COVID-19 Vaccine (3 - Pfizer series) 06/18/2021 4/23/2021    Influenza Vaccine (1) 09/01/2023 1/3/2023    Hemoglobin A1c (Diabetic Prevention Screening) 01/04/2026 1/4/2023            Review of Systems   Constitutional:  Negative for chills and fever.   HENT:  Negative for sore  throat.    Respiratory:  Positive for cough, shortness of breath and wheezing.    Cardiovascular:  Negative for chest pain.   Gastrointestinal:  Positive for abdominal pain (Periumbilical) and constipation.        Objective:      Vitals:    09/28/23 1502   BP: 125/79   Pulse: 100     BP Readings from Last 3 Encounters:   09/28/23 125/79   08/31/23 126/81   07/18/23 127/87     Body mass index is 44.51 kg/m².    Physical Exam  Constitutional:       Appearance: He is obese.   HENT:      Head: Normocephalic and atraumatic.   Cardiovascular:      Rate and Rhythm: Normal rate and regular rhythm.   Pulmonary:      Breath sounds: Wheezing present.      Comments: Diffusely diminished breath sounds bilaterally with diffuse bilateral wheezing.  On 2 L supplemental oxygen  Abdominal:      Tenderness: There is no abdominal tenderness.      Comments: Large surgical scar with small wound located just above the umbilicus.    Skin:     General: Skin is warm.      Findings: No rash.   Neurological:      Mental Status: He is alert and oriented to person, place, and time.   Psychiatric:         Mood and Affect: Mood normal.         Behavior: Behavior normal.         Assessment:     1. COPD exacerbation    2. Chronic obstructive pulmonary disease, unspecified COPD type    3. Non-healing surgical wound, initial encounter       Plan:     COPD exacerbation  -     X-Ray Chest PA And Lateral; Future; Expected date: 09/28/2023  -     albuterol-ipratropium 2.5 mg-0.5 mg/3 mL nebulizer solution 3 mL  -     predniSONE (DELTASONE) 20 MG tablet; Take 2 tablets (40 mg total) by mouth once daily. for 5 days  Dispense: 10 tablet; Refill: 0  -     azithromycin (ZITHROMAX) 500 MG tablet; Take 1 tablet (500 mg total) by mouth once daily. for 3 days  Dispense: 3 tablet; Refill: 0    Chronic obstructive pulmonary disease, unspecified COPD type  -     budesonide-formoterol 80-4.5 mcg (SYMBICORT) 80-4.5 mcg/actuation HFAA; Inhale 2 puffs into the lungs once  daily. Controller  Dispense: 10.2 g; Refill: 03    Non-healing surgical wound, initial encounter  -     Ambulatory referral/consult to Wound Clinic; Future; Expected date: 10/05/2023       - Patient provided DuoNeb treatment in office with slight improvement in breath sounds after treatment.  Given productive cough and worsening shortness of breath and wheezing, likely COPD exacerbation and will plan for treatment with 5 days of prednisone and 3 days of azithromycin.  Will also obtain a chest x-ray to evaluate for any type of consolidation. Refill provided of Symbicort.  Advised to continue DuoNebs at home every 6 hours for the next 48 hours.  Advised to return to clinic or emergency room if acute worsening of symptoms.   - for chronic nonhealing surgical wound, referral placed to wound care for evaluation.  Advised to apply daily lotion to the area to prevent itching.     Follow-up in 1 to 2 weeks for monitoring of symptom improvement    Garland Xiong DO  [facility] [place] [facility], PGY-3  09/28/2023      This note was partially created using [employer] Voice Recognition software. Typographical and content errors may occur with this process. While efforts are made to detect and correct such errors, in some cases errors will persist. For this reason, wording in this document should be considered in the proper context and not strictly verbatim

## 2023-09-29 ENCOUNTER — TELEPHONE (OUTPATIENT)
Dept: WOUND CARE | Facility: HOSPITAL | Age: 58
End: 2023-09-29
Payer: MEDICAID

## 2023-09-29 NOTE — TELEPHONE ENCOUNTER
Attempted to call patient in regards to wound care referral. Unable to contact patient as both primary numbers are disconnected

## 2023-10-02 PROBLEM — N17.9 AKI (ACUTE KIDNEY INJURY): Status: RESOLVED | Noted: 2023-01-04 | Resolved: 2023-10-02

## 2023-10-08 NOTE — PROGRESS NOTES
I assume primary medical responsibility for this patient. I have reviewed the history, physical, and assessement & treatment plan with the resident and agree that the care is reasonable and necessary. This service has been performed by a resident without the presence of a teaching physician under the primary care exception. If necessary, an addendum of additional findings or evaluation beyond the resident documentation will be noted below.      Albina De Leon MD    [facility]

## 2023-10-10 ENCOUNTER — OFFICE VISIT (OUTPATIENT)
Dept: FAMILY MEDICINE | Facility: HOSPITAL | Age: 58
End: 2023-10-10
Payer: MEDICAID

## 2023-10-10 VITALS
SYSTOLIC BLOOD PRESSURE: 134 MMHG | BODY MASS INDEX: 44.98 KG/M2 | DIASTOLIC BLOOD PRESSURE: 83 MMHG | WEIGHT: 278.69 LBS | HEART RATE: 81 BPM

## 2023-10-10 DIAGNOSIS — J44.9 CHRONIC OBSTRUCTIVE PULMONARY DISEASE, UNSPECIFIED COPD TYPE: Primary | ICD-10-CM

## 2023-10-10 DIAGNOSIS — K59.09 CHRONIC CONSTIPATION: ICD-10-CM

## 2023-10-10 PROCEDURE — 99214 OFFICE O/P EST MOD 30 MIN: CPT | Performed by: STUDENT IN AN ORGANIZED HEALTH CARE EDUCATION/TRAINING PROGRAM

## 2023-10-10 RX ORDER — PROMETHAZINE HYDROCHLORIDE 6.25 MG/5ML
25 SYRUP ORAL EVERY 6 HOURS PRN
Qty: 240 ML | Refills: 0 | Status: SHIPPED | OUTPATIENT
Start: 2023-10-10 | End: 2023-10-24

## 2023-10-10 RX ORDER — TIOTROPIUM BROMIDE AND OLODATEROL 3.124; 2.736 UG/1; UG/1
1 SPRAY, METERED RESPIRATORY (INHALATION) DAILY
Qty: 4 G | Refills: 2 | Status: SHIPPED | OUTPATIENT
Start: 2023-10-10 | End: 2024-01-08

## 2023-10-10 RX ORDER — ALBUTEROL SULFATE 90 UG/1
2 AEROSOL, METERED RESPIRATORY (INHALATION) EVERY 6 HOURS PRN
Qty: 18 G | Refills: 2 | Status: SHIPPED | OUTPATIENT
Start: 2023-10-10 | End: 2023-10-24 | Stop reason: SDUPTHER

## 2023-10-11 NOTE — PROGRESS NOTES
Progress Note  [facility]    Subjective:      Patient ID: Ming Harrington is a 58 y.o. male    Chief Complaint: Follow-up, Abdominal Pain, and Constipation    Ming Harrington is a 58-year-old male with a PMHx  COPD uses 2L O2 at baseline, CHF, HTN, CKD3a, opioid dependence on chronic methadone, bipolar presenting today for follow-up.    #COPD - Presenting for follow-up for recent COPD exacerbation. Patient was provided a 5 day course of prednisone, 3 day course of azithromycin, and DuoNebs at last clinic visit 2 weeks ago. CXR was without evidence of lobar consolidation. Reports no worsening of symptoms but does report a chronic cough. He endorses he was unable to refill his Symbicort. He is also requesting today for refill of Phenergan for chronic cough.    # chronic constipation - endorses he achieves around 1 bowel movement every 4-5 days. Stools are hard in consistency. Denies any bloody stools or black stools. Endorses he did have success with Linzess but was unable to afford this medication and MiraLax does not work for him. Patient is on chronic methadone for opioid dependency.         Health Maintenance         Date Due Completion Date    Lipid Panel Never done ---    Pneumococcal Vaccines (Age 0-64) (1 - PCV) Never done ---    TETANUS VACCINE Never done ---    Colorectal Cancer Screening Never done ---    Shingles Vaccine (1 of 2) Never done ---    LDCT Lung Screen 12/01/2019 12/1/2018    Influenza Vaccine (1) 09/01/2023 1/3/2023    COVID-19 Vaccine (3 - 2023-24 season) 09/01/2023 4/23/2021    Hemoglobin A1c (Diabetic Prevention Screening) 01/04/2026 1/4/2023            Review of Systems   Constitutional:  Negative for chills and fever.   Respiratory:  Positive for cough and wheezing.    Gastrointestinal:  Positive for abdominal pain and constipation. Negative for blood in stool, diarrhea, nausea and vomiting.        Objective:      Vitals:    10/10/23 1526   BP: 134/83   Pulse: 81     BP Readings from Last 3  Encounters:   10/10/23 134/83   09/28/23 125/79   08/31/23 126/81     Body mass index is 44.98 kg/m².    Physical Exam  Constitutional:       Appearance: Normal appearance.   HENT:      Head: Normocephalic and atraumatic.   Cardiovascular:      Rate and Rhythm: Normal rate and regular rhythm.   Pulmonary:      Breath sounds: Wheezing present. No rhonchi or rales.      Comments: Bilateral diminished breath sounds with associated wheezing    Neurological:      Mental Status: He is alert and oriented to person, place, and time.   Psychiatric:         Mood and Affect: Mood normal.         Behavior: Behavior normal.         Assessment:     1. Chronic obstructive pulmonary disease, unspecified COPD type    2. Chronic constipation       Plan:     Chronic obstructive pulmonary disease, unspecified COPD type  -     tiotropium-olodateroL (STIOLTO RESPIMAT) 2.5-2.5 mcg/actuation Mist; Inhale 1 puff into the lungs once daily. Controller  Dispense: 4 g; Refill: 2  -     promethazine (PHENERGAN) 6.25 mg/5 mL syrup; Take 20 mLs (25 mg total) by mouth every 6 (six) hours as needed for Nausea.  Dispense: 240 mL; Refill: 0  -     albuterol (VENTOLIN HFA) 90 mcg/actuation inhaler; Inhale 2 puffs into the lungs every 6 (six) hours as needed for Wheezing. Rescue  Dispense: 18 g; Refill: 2    Chronic constipation  -     Ambulatory referral/consult to Gastroenterology; Future; Expected date: 10/17/2023       - For COPD, will start controller medication tiotropium-olodaterol (Stiolto Respimat) as currently is covered under current insurance. Patient provided a small refill of Phenergan for chronic cough but was advised that continued use of this medication is not advised.  - Patient with a long history of chronic constipation with multiple failed treatments. Given treatment resistance, will refer patient to GI for evaluation.    Follow-up in 2-4 weeks    Garland Xiong DO  [facility], PGY-3  10/10/2023      This note was partially  created using [employer] Voice Recognition software. Typographical and content errors may occur with this process. While efforts are made to detect and correct such errors, in some cases errors will persist. For this reason, wording in this document should be considered in the proper context and not strictly verbatim

## 2023-10-24 ENCOUNTER — OFFICE VISIT (OUTPATIENT)
Dept: FAMILY MEDICINE | Facility: HOSPITAL | Age: 58
End: 2023-10-24
Payer: MEDICAID

## 2023-10-24 VITALS
BODY MASS INDEX: 44.74 KG/M2 | HEIGHT: 68 IN | HEART RATE: 91 BPM | SYSTOLIC BLOOD PRESSURE: 142 MMHG | DIASTOLIC BLOOD PRESSURE: 88 MMHG | WEIGHT: 295.19 LBS

## 2023-10-24 DIAGNOSIS — J44.9 CHRONIC OBSTRUCTIVE PULMONARY DISEASE, UNSPECIFIED COPD TYPE: Primary | ICD-10-CM

## 2023-10-24 DIAGNOSIS — F17.200 TOBACCO DEPENDENCE: ICD-10-CM

## 2023-10-24 DIAGNOSIS — R05.3 CHRONIC COUGH: ICD-10-CM

## 2023-10-24 DIAGNOSIS — K59.03 DRUG-INDUCED CONSTIPATION: ICD-10-CM

## 2023-10-24 DIAGNOSIS — F41.1 GENERALIZED ANXIETY DISORDER: ICD-10-CM

## 2023-10-24 DIAGNOSIS — E66.01 CLASS 3 OBESITY: ICD-10-CM

## 2023-10-24 DIAGNOSIS — Z23 NEED FOR PROPHYLACTIC VACCINATION AND INOCULATION AGAINST INFLUENZA: ICD-10-CM

## 2023-10-24 DIAGNOSIS — I10 HYPERTENSION, WELL CONTROLLED: ICD-10-CM

## 2023-10-24 DIAGNOSIS — N18.32 STAGE 3B CHRONIC KIDNEY DISEASE: ICD-10-CM

## 2023-10-24 PROCEDURE — 90471 IMMUNIZATION ADMIN: CPT

## 2023-10-24 PROCEDURE — 99213 OFFICE O/P EST LOW 20 MIN: CPT | Performed by: STUDENT IN AN ORGANIZED HEALTH CARE EDUCATION/TRAINING PROGRAM

## 2023-10-24 RX ORDER — IBUPROFEN 200 MG
1 TABLET ORAL DAILY
Qty: 28 PATCH | Refills: 3 | Status: SHIPPED | OUTPATIENT
Start: 2023-10-24 | End: 2023-12-04

## 2023-10-24 RX ORDER — BUDESONIDE AND FORMOTEROL FUMARATE DIHYDRATE 160; 4.5 UG/1; UG/1
2 AEROSOL RESPIRATORY (INHALATION) EVERY 12 HOURS
Qty: 10.2 G | Refills: 2 | Status: SHIPPED | OUTPATIENT
Start: 2023-10-24 | End: 2023-12-21 | Stop reason: SDUPTHER

## 2023-10-24 RX ORDER — ESCITALOPRAM OXALATE 10 MG/1
10 TABLET ORAL DAILY
Qty: 30 TABLET | Refills: 11 | Status: SHIPPED | OUTPATIENT
Start: 2023-10-24 | End: 2023-12-21 | Stop reason: SDUPTHER

## 2023-10-24 RX ORDER — FUROSEMIDE 40 MG/1
40 TABLET ORAL 2 TIMES DAILY
Qty: 180 TABLET | Refills: 0 | Status: SHIPPED | OUTPATIENT
Start: 2023-10-24 | End: 2023-12-21 | Stop reason: SDUPTHER

## 2023-10-24 RX ORDER — LOSARTAN POTASSIUM 100 MG/1
100 TABLET ORAL DAILY
Qty: 30 TABLET | Refills: 11 | Status: SHIPPED | OUTPATIENT
Start: 2023-10-24 | End: 2023-12-21 | Stop reason: SDUPTHER

## 2023-10-24 RX ORDER — ALBUTEROL SULFATE 0.83 MG/ML
2.5 SOLUTION RESPIRATORY (INHALATION) EVERY 4 HOURS PRN
Qty: 3 ML | Refills: 3 | Status: SHIPPED | OUTPATIENT
Start: 2023-10-24 | End: 2024-01-25 | Stop reason: SDUPTHER

## 2023-10-24 RX ORDER — PROMETHAZINE HYDROCHLORIDE 6.25 MG/5ML
25 SYRUP ORAL EVERY 6 HOURS PRN
Qty: 473 ML | Refills: 3 | Status: SHIPPED | OUTPATIENT
Start: 2023-10-24 | End: 2023-12-21 | Stop reason: SDUPTHER

## 2023-10-24 RX ORDER — BUDESONIDE AND FORMOTEROL FUMARATE DIHYDRATE 160; 4.5 UG/1; UG/1
AEROSOL RESPIRATORY (INHALATION)
COMMUNITY
Start: 2023-10-19 | End: 2023-10-24 | Stop reason: SDUPTHER

## 2023-10-24 RX ORDER — AMOXICILLIN 250 MG
1 CAPSULE ORAL DAILY
Qty: 30 TABLET | Refills: 0 | Status: SHIPPED | OUTPATIENT
Start: 2023-10-24 | End: 2023-12-04 | Stop reason: DRUGHIGH

## 2023-10-24 RX ORDER — ALBUTEROL SULFATE 90 UG/1
2 AEROSOL, METERED RESPIRATORY (INHALATION) EVERY 6 HOURS PRN
Qty: 18 G | Refills: 2 | Status: SHIPPED | OUTPATIENT
Start: 2023-10-24 | End: 2023-12-21 | Stop reason: SDUPTHER

## 2023-10-24 NOTE — PROGRESS NOTES
Clinic Note  [facility] Family Medicine    Subjective:      Ming Harrington is a 58 y.o. male with PMHx COPD, CKD3b with chronic BL leg swelling, long-term methadone maintenance, HTN, poor adherence. Patient here today with complaint of worsening leg swelling, constipation and requesting refills.    Leg swelling - ran out of his 40 mg BID lasix, 20 lbs weight gain in past month. Denies SOB.     COPD - Reports adherence to symbicort and albuterol    Opioid induced constipation - still pending GI referral, mag citrate not working    CKD - missed appointment, pending re-referral, Cr 1.6    HTN - taking losartan 100 mg daily    Review of Systems   Constitutional:  Negative for chills and fever.   HENT:  Negative for congestion and sore throat.    Respiratory:  Negative for cough.    Cardiovascular:  Positive for leg swelling. Negative for chest pain and palpitations.   Gastrointestinal:  Negative for abdominal pain, diarrhea, nausea and vomiting.   Genitourinary:  Negative for dysuria.   Musculoskeletal:  Negative for joint pain and myalgias.   Neurological:  Negative for dizziness, tingling, weakness and headaches.   Psychiatric/Behavioral:  Negative for depression. The patient is not nervous/anxious.           Objective:      Vitals:    10/24/23 0956   BP: (!) 142/88   Pulse: 91     Body mass index is 44.88 kg/m².      Physical Exam  Constitutional:       Appearance: Normal appearance.   HENT:      Mouth/Throat:      Mouth: Mucous membranes are moist.      Pharynx: Oropharynx is clear.   Cardiovascular:      Rate and Rhythm: Normal rate and regular rhythm.      Pulses: Normal pulses.      Heart sounds: Normal heart sounds.   Pulmonary:      Effort: Pulmonary effort is normal.      Breath sounds: Normal breath sounds.   Abdominal:      General: Abdomen is flat. Bowel sounds are normal.      Palpations: Abdomen is soft.   Musculoskeletal:      Right lower leg: Edema (3+) present.      Left lower leg: Edema (3+) present.   Skin:      General: Skin is warm and dry.      Capillary Refill: Capillary refill takes less than 2 seconds.   Neurological:      General: No focal deficit present.      Mental Status: He is alert and oriented to person, place, and time. Mental status is at baseline.   Psychiatric:         Mood and Affect: Mood normal.         Behavior: Behavior normal.            Assessment/Plan:      Most importantly, patient needs follow up with nephrology for CKD with worsening LE edema - concern that patient may not be consistent with his lasix. Patient instructed at this time to take 3 per day for 1 week at least or longer if swelling persists, call his nephro clinic for appointment. Secondarily, patient to be seen by GI for his opioid-induced constipation - for now will try senna formula as patient states he has not tried this one yet. Follow in in 3 weeks for lasix response.    1. Chronic obstructive pulmonary disease, unspecified COPD type    - albuterol (PROVENTIL) 2.5 mg /3 mL (0.083 %) nebulizer solution; Take 3 mLs (2.5 mg total) by nebulization every 4 (four) hours as needed for Wheezing or Shortness of Breath.  Dispense: 3 mL; Refill: 3  - albuterol (VENTOLIN HFA) 90 mcg/actuation inhaler; Inhale 2 puffs into the lungs every 6 (six) hours as needed for Wheezing. Rescue  Dispense: 18 g; Refill: 2  - SYMBICORT 160-4.5 mcg/actuation HFAA; Inhale 2 puffs into the lungs every 12 (twelve) hours. Controller  Dispense: 10.2 g; Refill: 2  - promethazine (PHENERGAN) 6.25 mg/5 mL syrup; Take 20 mLs (25 mg total) by mouth every 6 (six) hours as needed for Nausea.  Dispense: 473 mL; Refill: 3    2. Chronic cough    - albuterol (PROVENTIL) 2.5 mg /3 mL (0.083 %) nebulizer solution; Take 3 mLs (2.5 mg total) by nebulization every 4 (four) hours as needed for Wheezing or Shortness of Breath.  Dispense: 3 mL; Refill: 3    3. Generalized anxiety disorder    - EScitalopram oxalate (LEXAPRO) 10 MG tablet; Take 1 tablet (10 mg total) by mouth once  daily.  Dispense: 30 tablet; Refill: 11    4. Stage 3b chronic kidney disease    - furosemide (LASIX) 40 MG tablet; Take 1 tablet (40 mg total) by mouth 2 (two) times a day.  Dispense: 180 tablet; Refill: 0  - losartan (COZAAR) 100 MG tablet; Take 1 tablet (100 mg total) by mouth once daily.  Dispense: 30 tablet; Refill: 11  - Comprehensive Metabolic Panel; Future  - CBC Auto Differential; Future  - Microalbumin/creatinine urine ratio; Future    5. Hypertension, well controlled    - losartan (COZAAR) 100 MG tablet; Take 1 tablet (100 mg total) by mouth once daily.  Dispense: 30 tablet; Refill: 11  - Comprehensive Metabolic Panel; Future  - CBC Auto Differential; Future  - Microalbumin/creatinine urine ratio; Future    6. Drug-induced constipation    - Ambulatory referral/consult to Gastroenterology; Future  - senna-docusate 8.6-50 mg (SENNA WITH DOCUSATE SODIUM) 8.6-50 mg per tablet; Take 1 tablet by mouth once daily.  Dispense: 30 tablet; Refill: 0    7. Class 3 obesity      8. Tobacco dependence    - nicotine (NICODERM CQ) 21 mg/24 hr; Place 1 patch onto the skin once daily.  Dispense: 28 patch; Refill: 3    9. Need for prophylactic vaccination and inoculation against influenza    - Flu Vaccine - Quadrivalent *Preferred* (PF) (6 months & older)      Patient discussed with attending physician, Dr. Lynn Berumen MD  [facility] Family Medicine PGY-3  10/24/2023      The following information is provided to all patients.  This information is to help you find resources for any of the problems found today that may be affecting your health:                Living healthy guide: www.[facility].[place].gov       Understanding Diabetes: www.diabetes.org       Eating healthy: www.cdc.gov/healthyweight      CDC home safety checklist: www.cdc.gov/steadi/patient.html      Agency on Aging: www.goea.[place].gov       Alcoholics anonymous (AA): www.aa.org      Physical Activity: www.richard.nih.gov/yu3gfxt       Tobacco use:  www.quitwithusla.org

## 2023-10-25 NOTE — PROGRESS NOTES
I assume primary medical responsibility for this patient. I have reviewed the history, physical, and assessment & treatment plan with the resident and agree that the care is reasonable and necessary. This service has been performed by a resident without the presence of a teaching physician under the primary care exception. If necessary, an addendum of additional findings or evaluation beyond the resident documentation will be noted below.        Sadiq Bueno Jr., DO    [facility] Family Medicine

## 2023-11-05 NOTE — PROGRESS NOTES
"Vancomycin Dosing and Monitoring Pharmacy Protocol    Ming Harrington is a 52 y.o. male    Height: 5' 8" (1.727 m)   Wt Readings from Last 1 Encounters:   12/01/17 114.8 kg (253 lb)     Ideal body weight: 68.4 kg (150 lb 12.7 oz)  Adjusted ideal body weight: 86.9 kg (191 lb 10.8 oz)    Temp Readings from Last 3 Encounters:   12/01/17 98.3 °F (36.8 °C) (Oral)   10/30/17 98.7 °F (37.1 °C) (Oral)   10/04/17 99.2 °F (37.3 °C) (Oral)      Lab Results   Component Value Date/Time    WBC 7.90 12/01/2017 05:41 PM    WBC 7.50 10/30/2017 12:31 PM    WBC 11.74 10/03/2017 07:01 AM      Lab Results   Component Value Date/Time    CREATININE 1.2 12/01/2017 05:00 PM    CREATININE 0.8 10/30/2017 12:31 PM    CREATININE 1.0 10/03/2017 07:01 AM        Serum creatinine: 1.2 mg/dL 12/01/17 1700  Estimated creatinine clearance: 87.6 mL/min    Antibiotics       Start     Stop Route Frequency Ordered    12/02/17 0700  vancomycin (VANCOCIN) 1,500 mg in dextrose 5 % 250 mL IVPB      -- IV Every 8 hours 12/01/17 2212 12/01/17 2230  vancomycin (VANCOCIN) 1,750 mg in dextrose 5 % 500 mL IVPB  (Vancomycin IVPB with levels panel)      -- IV Every 12 hours (non-standard times) 12/01/17 2119 12/01/17 2230  cefTRIAXone (ROCEPHIN) 2 g in dextrose 5 % 50 mL IVPB      -- IV Every 24 hours (non-standard times) 12/01/17 2119          Antifungals       None            Microbiology Results (last 7 days)       Procedure Component Value Units Date/Time    Culture, Respiratory with Gram Stain [[id number]]     Order Status:  No result Specimen:  Respiratory     Blood culture [[id number]] Collected:  12/01/17 2122    Order Status:  Sent Specimen:  Blood from Peripheral, Antecubital, Right Updated:  12/01/17 2122    Blood culture [[id number]] Collected:  12/01/17 2122    Order Status:  Sent Specimen:  Blood from Peripheral, Antecubital, Left Updated:  12/01/17 2122            Indication:   Endocarditis    Target Level: 15-20 mcg/ml    Hemodialysis:   No on " N/A    Dosing Weight:   AdjBW--adjusted body weight  If ABW is greater than or equal to 30% over Ideal Body Weight, AdjBW will be used to calculate vancomycin dose.    Last Vancomycin dose: N/A   Date/Time given: N/A         Vancomycin level:  No results for input(s): VANCOMYCIN-TROUGH in the last 72 hours.  No results for input(s): VANCOMYCIN, RANDOM in the last 72 hours.    Per Protocol Initial/Adjustments Dosin. Initial/Adjustment Dose: Loading dose = 1750mg x1, followed by Maintenance dose of 1500 mg q8hr to be given at 17 0700 date/time  2. Vancomycin Trough Level will be drawn on 17 2230 date/time    Pharmacy will continue to follow.    Please contact if you have any further questions. Thank you.    Harshil Restrepo, PharmD  [phone]           calm and cooperative at present/Patient baseline mental status/Awake

## 2023-11-06 DIAGNOSIS — N18.32 STAGE 3B CHRONIC KIDNEY DISEASE: ICD-10-CM

## 2023-11-07 RX ORDER — FUROSEMIDE 40 MG/1
40 TABLET ORAL 2 TIMES DAILY
Qty: 180 TABLET | Refills: 0 | OUTPATIENT
Start: 2023-11-07 | End: 2024-02-05

## 2023-11-16 ENCOUNTER — TELEPHONE (OUTPATIENT)
Dept: GASTROENTEROLOGY | Facility: CLINIC | Age: 58
End: 2023-11-16
Payer: MEDICAID

## 2023-11-16 NOTE — TELEPHONE ENCOUNTER
Patient called to reschedule missed appointment, patient rescheduled to 01/08/2024.    ----- Message from Abdulaziz De Souza sent at 11/16/2023 12:35 PM CST -----  Contact: Pt  .Type:  Sooner Apoointment Request    Caller is requesting a sooner appointment.  Caller declined first available appointment listed below.  Caller will not accept being placed on the waitlist and is requesting a message be sent to doctor.  Name of Caller:pt  When is the first available appointment? Michael  Symptoms:Drug-induced constipation  Would the patient rather a call back or a response via [facility]?  Call back  Best Call Back Number:[phone]  Additional Information: Pt. Has a referral in the system and he missed his appt.

## 2023-12-04 ENCOUNTER — OFFICE VISIT (OUTPATIENT)
Dept: FAMILY MEDICINE | Facility: HOSPITAL | Age: 58
End: 2023-12-04
Payer: MEDICAID

## 2023-12-04 VITALS
HEIGHT: 68 IN | HEART RATE: 95 BPM | WEIGHT: 280.44 LBS | SYSTOLIC BLOOD PRESSURE: 113 MMHG | DIASTOLIC BLOOD PRESSURE: 72 MMHG | BODY MASS INDEX: 42.5 KG/M2

## 2023-12-04 DIAGNOSIS — Z91.09 ENVIRONMENTAL ALLERGIES: ICD-10-CM

## 2023-12-04 DIAGNOSIS — M54.9 CHRONIC BACK PAIN, UNSPECIFIED BACK LOCATION, UNSPECIFIED BACK PAIN LATERALITY: ICD-10-CM

## 2023-12-04 DIAGNOSIS — G89.29 CHRONIC BACK PAIN, UNSPECIFIED BACK LOCATION, UNSPECIFIED BACK PAIN LATERALITY: ICD-10-CM

## 2023-12-04 DIAGNOSIS — R05.3 CHRONIC COUGH: Primary | ICD-10-CM

## 2023-12-04 DIAGNOSIS — J44.9 CHRONIC OBSTRUCTIVE PULMONARY DISEASE, UNSPECIFIED COPD TYPE: ICD-10-CM

## 2023-12-04 DIAGNOSIS — K59.03 CONSTIPATION DUE TO OPIOID THERAPY: ICD-10-CM

## 2023-12-04 DIAGNOSIS — F11.20 METHADONE MAINTENANCE THERAPY PATIENT: ICD-10-CM

## 2023-12-04 DIAGNOSIS — T40.2X5A CONSTIPATION DUE TO OPIOID THERAPY: ICD-10-CM

## 2023-12-04 DIAGNOSIS — F17.200 TOBACCO DEPENDENCE: ICD-10-CM

## 2023-12-04 DIAGNOSIS — I10 HYPERTENSION, WELL CONTROLLED: ICD-10-CM

## 2023-12-04 PROCEDURE — 99214 OFFICE O/P EST MOD 30 MIN: CPT | Performed by: STUDENT IN AN ORGANIZED HEALTH CARE EDUCATION/TRAINING PROGRAM

## 2023-12-04 RX ORDER — ACETAMINOPHEN 500 MG
1000 TABLET ORAL EVERY 8 HOURS PRN
Qty: 30 TABLET | Refills: 0 | Status: SHIPPED | OUTPATIENT
Start: 2023-12-04 | End: 2023-12-11

## 2023-12-04 RX ORDER — FLUTICASONE PROPIONATE 50 MCG
1 SPRAY, SUSPENSION (ML) NASAL DAILY
Qty: 15.8 ML | Refills: 0 | Status: SHIPPED | OUTPATIENT
Start: 2023-12-04 | End: 2024-01-03

## 2023-12-04 RX ORDER — SENNOSIDES 8.6 MG/1
1 TABLET ORAL 2 TIMES DAILY
Qty: 60 TABLET | Refills: 2 | Status: SHIPPED | OUTPATIENT
Start: 2023-12-04 | End: 2024-01-25

## 2023-12-04 RX ORDER — LORATADINE 10 MG/1
10 TABLET ORAL DAILY PRN
Qty: 90 TABLET | Refills: 0 | Status: SHIPPED | OUTPATIENT
Start: 2023-12-04 | End: 2023-12-21 | Stop reason: SDUPTHER

## 2023-12-21 ENCOUNTER — OFFICE VISIT (OUTPATIENT)
Dept: FAMILY MEDICINE | Facility: HOSPITAL | Age: 58
End: 2023-12-21
Payer: MEDICAID

## 2023-12-21 ENCOUNTER — LAB VISIT (OUTPATIENT)
Dept: LAB | Facility: HOSPITAL | Age: 58
End: 2023-12-21
Payer: MEDICAID

## 2023-12-21 VITALS
HEART RATE: 87 BPM | WEIGHT: 282.88 LBS | SYSTOLIC BLOOD PRESSURE: 151 MMHG | DIASTOLIC BLOOD PRESSURE: 94 MMHG | HEIGHT: 68 IN | BODY MASS INDEX: 42.87 KG/M2

## 2023-12-21 DIAGNOSIS — R05.3 CHRONIC COUGH: Primary | ICD-10-CM

## 2023-12-21 DIAGNOSIS — I10 HYPERTENSION, WELL CONTROLLED: ICD-10-CM

## 2023-12-21 DIAGNOSIS — J44.9 CHRONIC OBSTRUCTIVE PULMONARY DISEASE, UNSPECIFIED COPD TYPE: ICD-10-CM

## 2023-12-21 DIAGNOSIS — R05.3 CHRONIC COUGH: ICD-10-CM

## 2023-12-21 DIAGNOSIS — N18.32 STAGE 3B CHRONIC KIDNEY DISEASE: Primary | ICD-10-CM

## 2023-12-21 DIAGNOSIS — N18.32 STAGE 3B CHRONIC KIDNEY DISEASE: ICD-10-CM

## 2023-12-21 DIAGNOSIS — F41.1 GENERALIZED ANXIETY DISORDER: ICD-10-CM

## 2023-12-21 LAB
ALBUMIN SERPL BCP-MCNC: 3.2 G/DL (ref 3.5–5.2)
ALP SERPL-CCNC: 93 U/L (ref 55–135)
ALT SERPL W/O P-5'-P-CCNC: 18 U/L (ref 10–44)
ANION GAP SERPL CALC-SCNC: 11 MMOL/L (ref 8–16)
AST SERPL-CCNC: 25 U/L (ref 10–40)
BASOPHILS # BLD AUTO: 0.03 K/UL (ref 0–0.2)
BASOPHILS NFR BLD: 0.6 % (ref 0–1.9)
BILIRUB SERPL-MCNC: 0.6 MG/DL (ref 0.1–1)
BUN SERPL-MCNC: 16 MG/DL (ref 6–20)
CALCIUM SERPL-MCNC: 8.7 MG/DL (ref 8.7–10.5)
CHLORIDE SERPL-SCNC: 101 MMOL/L (ref 95–110)
CHOLEST SERPL-MCNC: 145 MG/DL (ref 120–199)
CHOLEST/HDLC SERPL: 2.3 {RATIO} (ref 2–5)
CO2 SERPL-SCNC: 29 MMOL/L (ref 23–29)
CREAT SERPL-MCNC: 1.7 MG/DL (ref 0.5–1.4)
DIFFERENTIAL METHOD BLD: ABNORMAL
EOSINOPHIL # BLD AUTO: 0.2 K/UL (ref 0–0.5)
EOSINOPHIL NFR BLD: 3.7 % (ref 0–8)
ERYTHROCYTE [DISTWIDTH] IN BLOOD BY AUTOMATED COUNT: 11.8 % (ref 11.5–14.5)
EST. GFR  (NO RACE VARIABLE): 46 ML/MIN/1.73 M^2
ESTIMATED AVG GLUCOSE: 82 MG/DL (ref 68–131)
GLUCOSE SERPL-MCNC: 104 MG/DL (ref 70–110)
HBA1C MFR BLD: 4.5 % (ref 4–5.6)
HCT VFR BLD AUTO: 37 % (ref 40–54)
HDLC SERPL-MCNC: 62 MG/DL (ref 40–75)
HDLC SERPL: 42.8 % (ref 20–50)
HGB BLD-MCNC: 12.3 G/DL (ref 14–18)
IMM GRANULOCYTES # BLD AUTO: 0.01 K/UL (ref 0–0.04)
IMM GRANULOCYTES NFR BLD AUTO: 0.2 % (ref 0–0.5)
LDLC SERPL CALC-MCNC: 68.4 MG/DL (ref 63–159)
LYMPHOCYTES # BLD AUTO: 1 K/UL (ref 1–4.8)
LYMPHOCYTES NFR BLD: 20.2 % (ref 18–48)
MCH RBC QN AUTO: 31.4 PG (ref 27–31)
MCHC RBC AUTO-ENTMCNC: 33.2 G/DL (ref 32–36)
MCV RBC AUTO: 94 FL (ref 82–98)
MONOCYTES # BLD AUTO: 0.3 K/UL (ref 0.3–1)
MONOCYTES NFR BLD: 5 % (ref 4–15)
NEUTROPHILS # BLD AUTO: 3.6 K/UL (ref 1.8–7.7)
NEUTROPHILS NFR BLD: 70.3 % (ref 38–73)
NONHDLC SERPL-MCNC: 83 MG/DL
NRBC BLD-RTO: 0 /100 WBC
PLATELET # BLD AUTO: 159 K/UL (ref 150–450)
PMV BLD AUTO: 10.1 FL (ref 9.2–12.9)
POTASSIUM SERPL-SCNC: 4.3 MMOL/L (ref 3.5–5.1)
PROT SERPL-MCNC: 8.2 G/DL (ref 6–8.4)
RBC # BLD AUTO: 3.92 M/UL (ref 4.6–6.2)
SODIUM SERPL-SCNC: 141 MMOL/L (ref 136–145)
TRIGL SERPL-MCNC: 73 MG/DL (ref 30–150)
WBC # BLD AUTO: 5.15 K/UL (ref 3.9–12.7)

## 2023-12-21 PROCEDURE — 80061 LIPID PANEL: CPT | Performed by: STUDENT IN AN ORGANIZED HEALTH CARE EDUCATION/TRAINING PROGRAM

## 2023-12-21 PROCEDURE — 83036 HEMOGLOBIN GLYCOSYLATED A1C: CPT | Performed by: STUDENT IN AN ORGANIZED HEALTH CARE EDUCATION/TRAINING PROGRAM

## 2023-12-21 PROCEDURE — 99213 OFFICE O/P EST LOW 20 MIN: CPT | Performed by: STUDENT IN AN ORGANIZED HEALTH CARE EDUCATION/TRAINING PROGRAM

## 2023-12-21 PROCEDURE — 85025 COMPLETE CBC W/AUTO DIFF WBC: CPT | Performed by: STUDENT IN AN ORGANIZED HEALTH CARE EDUCATION/TRAINING PROGRAM

## 2023-12-21 PROCEDURE — 36415 COLL VENOUS BLD VENIPUNCTURE: CPT | Performed by: STUDENT IN AN ORGANIZED HEALTH CARE EDUCATION/TRAINING PROGRAM

## 2023-12-21 PROCEDURE — 80053 COMPREHEN METABOLIC PANEL: CPT | Performed by: STUDENT IN AN ORGANIZED HEALTH CARE EDUCATION/TRAINING PROGRAM

## 2023-12-21 RX ORDER — FUROSEMIDE 40 MG/1
40 TABLET ORAL 2 TIMES DAILY
Qty: 180 TABLET | Refills: 1 | Status: SHIPPED | OUTPATIENT
Start: 2023-12-21 | End: 2024-01-25 | Stop reason: SDUPTHER

## 2023-12-21 RX ORDER — LOSARTAN POTASSIUM 100 MG/1
100 TABLET ORAL DAILY
Qty: 180 TABLET | Refills: 1 | Status: ON HOLD | OUTPATIENT
Start: 2023-12-21 | End: 2024-03-01

## 2023-12-21 RX ORDER — CLONIDINE HYDROCHLORIDE 0.1 MG/1
0.1 TABLET ORAL 2 TIMES DAILY
Status: ON HOLD | COMMUNITY
Start: 2023-11-22 | End: 2024-03-01

## 2023-12-21 RX ORDER — PROMETHAZINE HYDROCHLORIDE 6.25 MG/5ML
25 SYRUP ORAL EVERY 6 HOURS PRN
Qty: 473 ML | Refills: 3 | Status: SHIPPED | OUTPATIENT
Start: 2023-12-21 | End: 2023-12-21 | Stop reason: CLARIF

## 2023-12-21 RX ORDER — LORATADINE 10 MG/1
10 TABLET ORAL DAILY PRN
Qty: 90 TABLET | Refills: 0 | Status: ON HOLD | OUTPATIENT
Start: 2023-12-21 | End: 2024-03-01

## 2023-12-21 RX ORDER — PROMETHAZINE HYDROCHLORIDE 25 MG/1
25 TABLET ORAL EVERY 4 HOURS
Qty: 90 TABLET | Refills: 1 | Status: SHIPPED | OUTPATIENT
Start: 2023-12-21 | End: 2024-01-25 | Stop reason: SDUPTHER

## 2023-12-21 RX ORDER — BUDESONIDE AND FORMOTEROL FUMARATE DIHYDRATE 160; 4.5 UG/1; UG/1
2 AEROSOL RESPIRATORY (INHALATION) EVERY 12 HOURS
Qty: 10.2 G | Refills: 2 | Status: SHIPPED | OUTPATIENT
Start: 2023-12-21 | End: 2024-01-25 | Stop reason: SDUPTHER

## 2023-12-21 RX ORDER — ESCITALOPRAM OXALATE 10 MG/1
10 TABLET ORAL DAILY
Qty: 180 TABLET | Refills: 1 | Status: SHIPPED | OUTPATIENT
Start: 2023-12-21 | End: 2024-12-15

## 2023-12-21 RX ORDER — ALBUTEROL SULFATE 90 UG/1
2 AEROSOL, METERED RESPIRATORY (INHALATION) EVERY 6 HOURS PRN
Qty: 18 G | Refills: 2 | Status: ON HOLD | OUTPATIENT
Start: 2023-12-21 | End: 2024-03-01

## 2023-12-21 RX ORDER — CLONIDINE HYDROCHLORIDE 0.1 MG/1
TABLET ORAL
Status: CANCELLED | OUTPATIENT
Start: 2023-12-21

## 2024-01-03 PROBLEM — G89.29 CHRONIC BACK PAIN: Status: ACTIVE | Noted: 2024-01-03

## 2024-01-03 PROBLEM — Z91.09 ENVIRONMENTAL ALLERGIES: Status: ACTIVE | Noted: 2024-01-03

## 2024-01-03 PROBLEM — M54.9 CHRONIC BACK PAIN: Status: ACTIVE | Noted: 2024-01-03

## 2024-01-03 PROBLEM — R05.3 CHRONIC COUGH: Status: ACTIVE | Noted: 2024-01-03

## 2024-01-03 PROBLEM — K59.03 CONSTIPATION DUE TO OPIOID THERAPY: Status: ACTIVE | Noted: 2024-01-03

## 2024-01-03 PROBLEM — T40.2X5A CONSTIPATION DUE TO OPIOID THERAPY: Status: ACTIVE | Noted: 2024-01-03

## 2024-01-03 NOTE — PROGRESS NOTES
I assume primary medical responsibility for this patient. I have reviewed the history, physical, and assessement & treatment plan with the resident and agree that the care is reasonable and necessary. This service has been performed by a resident with the presence of a teaching physician under the primary care exception. If necessary, an addendum of additional findings or evaluation beyond the resident documentation will be noted below.    Agree with plan and concern for phenergan cough syrup abuse in a known substance user. Recent chest xray unremarkable. Smoking cessation emphasized.
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
## 2024-01-05 NOTE — PROGRESS NOTES
I assume primary medical responsibility for this patient. I have reviewed the history, physical, and assessement & treatment plan with the resident and agree that the care is reasonable and necessary. This service has been performed by a resident without the presence of a teaching physician under the primary care exception. If necessary, an addendum of additional findings or evaluation beyond the resident documentation will be noted below.     Supriya Campos MD

## 2024-01-18 ENCOUNTER — TELEPHONE (OUTPATIENT)
Dept: FAMILY MEDICINE | Facility: HOSPITAL | Age: 59
End: 2024-01-18
Payer: MEDICAID

## 2024-01-18 NOTE — TELEPHONE ENCOUNTER
Message left:  He no showed for his appt today, he will need to reschedule.   ----- Message from Maria Barraza sent at 1/18/2024  4:24 PM CST -----  Regarding: refill  Type:  refill    Who Called: pt  Would the patient rather a call back or a response via [facility]? call  Best Call Back Number: [phone]  Additional Information: pt is requesting meds for headaches and a refill on his cough meds ( promethazine (PHENERGAN) 25 MG tablet )     Send to -[facility] [address] [id number] [facility] [address]  Phone: [phone] Fax: [phone]

## 2024-01-25 ENCOUNTER — OFFICE VISIT (OUTPATIENT)
Dept: FAMILY MEDICINE | Facility: HOSPITAL | Age: 59
End: 2024-01-25
Payer: MEDICAID

## 2024-01-25 VITALS
BODY MASS INDEX: 40.43 KG/M2 | HEART RATE: 85 BPM | HEIGHT: 68 IN | SYSTOLIC BLOOD PRESSURE: 129 MMHG | OXYGEN SATURATION: 94 % | DIASTOLIC BLOOD PRESSURE: 88 MMHG | WEIGHT: 266.75 LBS

## 2024-01-25 DIAGNOSIS — T40.2X5A CONSTIPATION DUE TO OPIOID THERAPY: Primary | ICD-10-CM

## 2024-01-25 DIAGNOSIS — R05.3 CHRONIC COUGH: ICD-10-CM

## 2024-01-25 DIAGNOSIS — K59.03 CONSTIPATION DUE TO OPIOID THERAPY: Primary | ICD-10-CM

## 2024-01-25 DIAGNOSIS — J44.9 CHRONIC OBSTRUCTIVE PULMONARY DISEASE, UNSPECIFIED COPD TYPE: ICD-10-CM

## 2024-01-25 DIAGNOSIS — N18.32 STAGE 3B CHRONIC KIDNEY DISEASE: ICD-10-CM

## 2024-01-25 PROCEDURE — 99214 OFFICE O/P EST MOD 30 MIN: CPT | Performed by: STUDENT IN AN ORGANIZED HEALTH CARE EDUCATION/TRAINING PROGRAM

## 2024-01-25 RX ORDER — FUROSEMIDE 40 MG/1
40 TABLET ORAL 2 TIMES DAILY
Qty: 180 TABLET | Refills: 1 | Status: ON HOLD | OUTPATIENT
Start: 2024-01-25 | End: 2024-03-01

## 2024-01-25 RX ORDER — ALBUTEROL SULFATE 0.83 MG/ML
2.5 SOLUTION RESPIRATORY (INHALATION) EVERY 4 HOURS PRN
Qty: 3 ML | Refills: 3 | Status: ON HOLD | OUTPATIENT
Start: 2024-01-25 | End: 2024-03-01

## 2024-01-25 RX ORDER — POLYETHYLENE GLYCOL 3350 17 G/17G
17 POWDER, FOR SOLUTION ORAL 2 TIMES DAILY
Qty: 100 EACH | Refills: 2 | Status: SHIPPED | OUTPATIENT
Start: 2024-01-25 | End: 2024-03-07 | Stop reason: SDUPTHER

## 2024-01-25 RX ORDER — BUDESONIDE AND FORMOTEROL FUMARATE DIHYDRATE 160; 4.5 UG/1; UG/1
2 AEROSOL RESPIRATORY (INHALATION) EVERY 12 HOURS
Qty: 10.2 G | Refills: 2 | Status: ON HOLD | OUTPATIENT
Start: 2024-01-25 | End: 2024-03-01

## 2024-01-25 RX ORDER — PROMETHAZINE HYDROCHLORIDE 25 MG/1
25 TABLET ORAL EVERY 4 HOURS
Qty: 90 TABLET | Refills: 1 | Status: SHIPPED | OUTPATIENT
Start: 2024-01-25 | End: 2024-03-07

## 2024-01-25 NOTE — PROGRESS NOTES
"Clinic Note  [facility]    Subjective:      Ming Harrington is a 58 y.o. male PMHx COPD, CKD3b with chronic BL leg swelling, long-term methadone maintenance, HTN, poor adherence. Patient here today with complaint about constipation and needs refills. Also states he's frustrated because due to his decreased kidney function his methadone dose was reduced.     Constipation - not taking anything currently, says feels like "getting stuck"  CKD - improving overall in past 6 month, last creatinine 1.7. multiple referrals to nephro, has not been able to establish.  Cough - chronic, has COPD, says is out of symbicort and albuterol. Denies worsening sx.  Leg swelling - stable, says he's taking the 40 mg daily lasix  Opioid methadone - frustrated because they've lowered dose because of kidney function, says suboxone "almost killed" him    Review of Systems   All other systems reviewed and are negative.         Objective:      Vitals:    01/25/24 1010   BP: 129/88   Pulse: 85     Body mass index is 40.56 kg/m².      Physical Exam  Constitutional:       Appearance: Normal appearance.   HENT:      Mouth/Throat:      Mouth: Mucous membranes are moist.      Pharynx: Oropharynx is clear.   Cardiovascular:      Rate and Rhythm: Normal rate and regular rhythm.      Pulses: Normal pulses.      Heart sounds: Normal heart sounds.   Pulmonary:      Effort: Pulmonary effort is normal.      Breath sounds: Normal breath sounds.   Abdominal:      General: Abdomen is flat. Bowel sounds are normal.      Palpations: Abdomen is soft.   Musculoskeletal:      Right lower leg: No edema.      Left lower leg: No edema.   Skin:     General: Skin is warm and dry.      Capillary Refill: Capillary refill takes less than 2 seconds.   Neurological:      General: No focal deficit present.      Mental Status: He is alert and oriented to person, place, and time. Mental status is at baseline.   Psychiatric:         Mood and Affect: Mood normal.         " Behavior: Behavior normal.            Assessment/Plan:        1. Constipation due to opioid therapy    Opioid-induced, currently not taking anything. Advised to take miralax BID for at least 1 week and then follow up.    - polyethylene glycol (GLYCOLAX) 17 gram PwPk; Take 17 g by mouth 2 (two) times daily.  Dispense: 100 each; Refill: 2    2. Stage 3b chronic kidney disease    Improved somewhat since we first started tracking however still in 3b range. Needs to be set up with nephrology. Patient expressed understanding about need to contact [facility].    - Ambulatory referral/consult to Nephrology; Future  - furosemide (LASIX) 40 MG tablet; Take 1 tablet (40 mg total) by mouth 2 (two) times a day.  Dispense: 180 tablet; Refill: 1    3. Chronic obstructive pulmonary disease, unspecified COPD type    Stable, controlled.    - albuterol (PROVENTIL) 2.5 mg /3 mL (0.083 %) nebulizer solution; Take 3 mLs (2.5 mg total) by nebulization every 4 (four) hours as needed for Wheezing or Shortness of Breath.  Dispense: 3 mL; Refill: 3  - SYMBICORT 160-4.5 mcg/actuation HFAA; Inhale 2 puffs into the lungs every 12 (twelve) hours. Controller  Dispense: 10.2 g; Refill: 2    4. Chronic cough    Stable, controlled.    - albuterol (PROVENTIL) 2.5 mg /3 mL (0.083 %) nebulizer solution; Take 3 mLs (2.5 mg total) by nebulization every 4 (four) hours as needed for Wheezing or Shortness of Breath.  Dispense: 3 mL; Refill: 3  - promethazine (PHENERGAN) 25 MG tablet; Take 1 tablet (25 mg total) by mouth every 4 (four) hours.  Dispense: 90 tablet; Refill: 1  - dextromethorphan-guaiFENesin  mg/5 mL Liqd; Take 1 Dose by mouth every 4 (four) hours as needed (Cough).  Dispense: 473 mL; Refill: 2      Patient discussed with attending physician, Dr. Beth Berumen MD  \[facility]\"" Family Medicine PGY-3  01/25/2024      The following information is provided to all patients.  This information is to help you find resources for any of the problems  found today that may be affecting your health:                Living healthy guide: www.[facility].[place].[facility]       Understanding Diabetes: www.diabetes.org       Eating healthy: www.cdc.gov/healthyweight      CDC home safety checklist: www.cdc.gov/steadi/patient.html      Agency on Aging: www.goea.[place].[facility]       Alcoholics anonymous (AA): www.aa.org      Physical Activity: www.richard.nih.gov/li5fwvz       Tobacco use: www.quitwithusla.org

## 2024-02-06 ENCOUNTER — TELEPHONE (OUTPATIENT)
Dept: FAMILY MEDICINE | Facility: HOSPITAL | Age: 59
End: 2024-02-06
Payer: MEDICAID

## 2024-02-06 DIAGNOSIS — N18.32 STAGE 3B CHRONIC KIDNEY DISEASE: Primary | ICD-10-CM

## 2024-02-06 NOTE — TELEPHONE ENCOUNTER
Attempted 2x to call patient to help with setting up nephro referral. No VM set up. Will follow up.    Abdiaziz Berumen MD  [facility] PGY-3

## 2024-02-06 NOTE — TELEPHONE ENCOUNTER
----- Message from Abdulaziz De Souza sent at 2/6/2024 12:47 PM CST -----  Contact: Pt  .Type:  Needs Medical Advice    Who Called: pt    Would the patient rather a call back or a response via [facility]? Call back  Best Call Back Number:  [phone]  Additional Information: Pt. Is returning a call back to the provider.

## 2024-02-07 ENCOUNTER — TELEPHONE (OUTPATIENT)
Dept: FAMILY MEDICINE | Facility: HOSPITAL | Age: 59
End: 2024-02-07
Payer: MEDICAID

## 2024-02-07 NOTE — TELEPHONE ENCOUNTER
----- Message from Orquidea Maloney MA sent at 2/7/2024  4:09 PM CST -----  Regarding: FW: call patient    ----- Message -----  From: Maria Barraza  Sent: 2/7/2024   2:42 PM CST  To: Ata Freed Staff  Subject: call patient                                     Type:  Call Patient     Who Called: pt  Would the patient rather a call back or a response via "[employer]ner? Call  Best Call Back Number: [phone]  Additional Information: pt stated he is receiving a call again and he can't answer the phone but he would like to know if a number can be left so he can call  I keep explaining to pt no one has charted any message

## 2024-02-14 ENCOUNTER — TELEPHONE (OUTPATIENT)
Dept: FAMILY MEDICINE | Facility: HOSPITAL | Age: 59
End: 2024-02-14
Payer: MEDICAID

## 2024-02-14 NOTE — TELEPHONE ENCOUNTER
----- Message from Wei Pereyra sent at 2/14/2024  3:40 PM CST -----  Contact: pt  Type:  Patient Returning Call    Who Called:pt   Who Left Message for Patient:Jacy Mehta LPN  Does the patient know what this is regarding?:medication   Would the patient rather a call back or a response via [facility]? call  Best Call Back Number:[phone]  Additional Information:

## 2024-02-14 NOTE — TELEPHONE ENCOUNTER
This rx was called in at the end of January with refills.  Pt needs to check with the pharmacy.  Unable to leave a msg for patient, voice mail has not been set up.    ----- Message from Wei Pereyra sent at 2/14/2024  1:50 PM CST -----  Contact: pt  Type:  RX Refill Request    Who Called: pt   Refill or New Rx:refill  RX Name and Strength:albuterol (PROVENTIL) 2.5 mg /3 mL (0.083 %) nebulizer solution, albuterol (VENTOLIN HFA) 90 mcg/actuation inhaler, Cough medicine   Preferred Pharmacy with phone number:[facility] [address] [facility]  [address] [facility] [address]  Phone: [phone] Fax: [phone]  Local or Mail Order:local  Ordering Provider:Ata   Would the patient rather a call back or a response via [facility]? call  Best Call Back Number:[phone]  Additional Information:

## 2024-02-15 ENCOUNTER — HOSPITAL ENCOUNTER (INPATIENT)
Facility: HOSPITAL | Age: 59
LOS: 2 days | Discharge: HOME | DRG: 291 | End: 2024-02-17
Attending: STUDENT IN AN ORGANIZED HEALTH CARE EDUCATION/TRAINING PROGRAM | Admitting: HOSPITALIST
Payer: MEDICAID

## 2024-02-15 DIAGNOSIS — U07.1 COVID-19: ICD-10-CM

## 2024-02-15 DIAGNOSIS — I50.9 ACUTE CONGESTIVE HEART FAILURE, UNSPECIFIED HEART FAILURE TYPE: ICD-10-CM

## 2024-02-15 DIAGNOSIS — J96.02 ACUTE RESPIRATORY FAILURE WITH HYPOXIA AND HYPERCAPNIA: ICD-10-CM

## 2024-02-15 DIAGNOSIS — J44.1 COPD EXACERBATION: Primary | ICD-10-CM

## 2024-02-15 DIAGNOSIS — J96.01 ACUTE RESPIRATORY FAILURE WITH HYPOXIA AND HYPERCAPNIA: ICD-10-CM

## 2024-02-15 DIAGNOSIS — I50.9 HEART FAILURE: ICD-10-CM

## 2024-02-15 LAB
ALBUMIN SERPL BCP-MCNC: 3.1 G/DL (ref 3.5–5.2)
ALLENS TEST: NO
ALP SERPL-CCNC: 97 U/L (ref 55–135)
ALT SERPL W/O P-5'-P-CCNC: 24 U/L (ref 10–44)
ANION GAP SERPL CALC-SCNC: 9 MMOL/L (ref 8–16)
AST SERPL-CCNC: 27 U/L (ref 10–40)
BASOPHILS # BLD AUTO: 0.03 K/UL (ref 0–0.2)
BASOPHILS NFR BLD: 0.4 % (ref 0–1.9)
BILIRUB SERPL-MCNC: 0.5 MG/DL (ref 0.1–1)
BNP SERPL-MCNC: 15 PG/ML (ref 0–99)
BUN SERPL-MCNC: 12 MG/DL (ref 6–20)
CALCIUM SERPL-MCNC: 8.4 MG/DL (ref 8.7–10.5)
CHLORIDE SERPL-SCNC: 99 MMOL/L (ref 95–110)
CO2 SERPL-SCNC: 32 MMOL/L (ref 23–29)
CREAT SERPL-MCNC: 1.9 MG/DL (ref 0.5–1.4)
CTP QC/QA: YES
CTP QC/QA: YES
DIFFERENTIAL METHOD BLD: ABNORMAL
EOSINOPHIL # BLD AUTO: 0.1 K/UL (ref 0–0.5)
EOSINOPHIL NFR BLD: 1.5 % (ref 0–8)
ERYTHROCYTE [DISTWIDTH] IN BLOOD BY AUTOMATED COUNT: 11.7 % (ref 11.5–14.5)
EST. GFR  (NO RACE VARIABLE): 40 ML/MIN/1.73 M^2
FIO2: 40 %
GLUCOSE SERPL-MCNC: 82 MG/DL (ref 70–110)
HCT VFR BLD AUTO: 37.8 % (ref 40–54)
HGB BLD-MCNC: 12.5 G/DL (ref 14–18)
IMM GRANULOCYTES # BLD AUTO: 0.03 K/UL (ref 0–0.04)
IMM GRANULOCYTES NFR BLD AUTO: 0.4 % (ref 0–0.5)
LPM: 5
LYMPHOCYTES # BLD AUTO: 1.4 K/UL (ref 1–4.8)
LYMPHOCYTES NFR BLD: 18.2 % (ref 18–48)
MCH RBC QN AUTO: 31.3 PG (ref 27–31)
MCHC RBC AUTO-ENTMCNC: 33.1 G/DL (ref 32–36)
MCV RBC AUTO: 95 FL (ref 82–98)
MONOCYTES # BLD AUTO: 0.6 K/UL (ref 0.3–1)
MONOCYTES NFR BLD: 7.5 % (ref 4–15)
NEUTROPHILS # BLD AUTO: 5.7 K/UL (ref 1.8–7.7)
NEUTROPHILS NFR BLD: 72 % (ref 38–73)
NRBC BLD-RTO: 0 /100 WBC
PCO2 BLDA: 74.8 MMHG (ref 35–45)
PH SMN: 7.28 [PH] (ref 7.35–7.45)
PLATELET # BLD AUTO: 148 K/UL (ref 150–450)
PMV BLD AUTO: 10.8 FL (ref 9.2–12.9)
PO2 BLDA: 72.3 MMHG (ref 80–100)
POC BASE DEFICIT: 6.1 MMOL/L (ref -2–2)
POC HCO3: 35.4 MMOL/L (ref 24–28)
POC MOLECULAR INFLUENZA A AGN: NEGATIVE
POC MOLECULAR INFLUENZA B AGN: NEGATIVE
POC PERFORMED BY: ABNORMAL
POC SATURATED O2: 93.7 % (ref 95–100)
POTASSIUM SERPL-SCNC: 4.3 MMOL/L (ref 3.5–5.1)
PROT SERPL-MCNC: 7.4 G/DL (ref 6–8.4)
RBC # BLD AUTO: 3.99 M/UL (ref 4.6–6.2)
SARS-COV-2 RDRP RESP QL NAA+PROBE: POSITIVE
SODIUM SERPL-SCNC: 140 MMOL/L (ref 136–145)
SPECIMEN SOURCE: ABNORMAL
TROPONIN I SERPL DL<=0.01 NG/ML-MCNC: <0.006 NG/ML (ref 0–0.03)
WBC # BLD AUTO: 7.91 K/UL (ref 3.9–12.7)

## 2024-02-15 PROCEDURE — 12000002 HC ACUTE/MED SURGE SEMI-PRIVATE ROOM

## 2024-02-15 PROCEDURE — 25000242 PHARM REV CODE 250 ALT 637 W/ HCPCS: Performed by: STUDENT IN AN ORGANIZED HEALTH CARE EDUCATION/TRAINING PROGRAM

## 2024-02-15 PROCEDURE — 36600 WITHDRAWAL OF ARTERIAL BLOOD: CPT

## 2024-02-15 PROCEDURE — 99900035 HC TECH TIME PER 15 MIN (STAT)

## 2024-02-15 PROCEDURE — 85025 COMPLETE CBC W/AUTO DIFF WBC: CPT | Performed by: STUDENT IN AN ORGANIZED HEALTH CARE EDUCATION/TRAINING PROGRAM

## 2024-02-15 PROCEDURE — 94660 CPAP INITIATION&MGMT: CPT

## 2024-02-15 PROCEDURE — 99285 EMERGENCY DEPT VISIT HI MDM: CPT | Mod: 25

## 2024-02-15 PROCEDURE — 96374 THER/PROPH/DIAG INJ IV PUSH: CPT

## 2024-02-15 PROCEDURE — 94640 AIRWAY INHALATION TREATMENT: CPT | Mod: XB

## 2024-02-15 PROCEDURE — 80053 COMPREHEN METABOLIC PANEL: CPT | Performed by: STUDENT IN AN ORGANIZED HEALTH CARE EDUCATION/TRAINING PROGRAM

## 2024-02-15 PROCEDURE — 63600175 PHARM REV CODE 636 W HCPCS: Performed by: STUDENT IN AN ORGANIZED HEALTH CARE EDUCATION/TRAINING PROGRAM

## 2024-02-15 PROCEDURE — 27000190 HC CPAP FULL FACE MASK W/VALVE

## 2024-02-15 PROCEDURE — 84484 ASSAY OF TROPONIN QUANT: CPT | Performed by: STUDENT IN AN ORGANIZED HEALTH CARE EDUCATION/TRAINING PROGRAM

## 2024-02-15 PROCEDURE — 87635 SARS-COV-2 COVID-19 AMP PRB: CPT | Performed by: STUDENT IN AN ORGANIZED HEALTH CARE EDUCATION/TRAINING PROGRAM

## 2024-02-15 PROCEDURE — 83880 ASSAY OF NATRIURETIC PEPTIDE: CPT | Performed by: STUDENT IN AN ORGANIZED HEALTH CARE EDUCATION/TRAINING PROGRAM

## 2024-02-15 PROCEDURE — 82803 BLOOD GASES ANY COMBINATION: CPT

## 2024-02-15 RX ORDER — ESCITALOPRAM OXALATE 10 MG/1
10 TABLET ORAL DAILY
Status: DISCONTINUED | OUTPATIENT
Start: 2024-02-16 | End: 2024-02-17 | Stop reason: HOSPADM

## 2024-02-15 RX ORDER — IPRATROPIUM BROMIDE AND ALBUTEROL SULFATE 2.5; .5 MG/3ML; MG/3ML
3 SOLUTION RESPIRATORY (INHALATION)
Status: COMPLETED | OUTPATIENT
Start: 2024-02-15 | End: 2024-02-15

## 2024-02-15 RX ORDER — CLONIDINE HYDROCHLORIDE 0.1 MG/1
0.1 TABLET ORAL 2 TIMES DAILY
Status: DISCONTINUED | OUTPATIENT
Start: 2024-02-16 | End: 2024-02-17 | Stop reason: HOSPADM

## 2024-02-15 RX ORDER — DEXAMETHASONE SODIUM PHOSPHATE 4 MG/ML
6 INJECTION, SOLUTION INTRA-ARTICULAR; INTRALESIONAL; INTRAMUSCULAR; INTRAVENOUS; SOFT TISSUE
Status: COMPLETED | OUTPATIENT
Start: 2024-02-15 | End: 2024-02-15

## 2024-02-15 RX ORDER — LOSARTAN POTASSIUM 50 MG/1
100 TABLET ORAL DAILY
Status: DISCONTINUED | OUTPATIENT
Start: 2024-02-16 | End: 2024-02-17 | Stop reason: HOSPADM

## 2024-02-15 RX ORDER — FUROSEMIDE 40 MG/1
40 TABLET ORAL 2 TIMES DAILY
Status: DISCONTINUED | OUTPATIENT
Start: 2024-02-15 | End: 2024-02-16

## 2024-02-15 RX ADMIN — IPRATROPIUM BROMIDE AND ALBUTEROL SULFATE 3 ML: 2.5; .5 SOLUTION RESPIRATORY (INHALATION) at 09:02

## 2024-02-15 RX ADMIN — DEXAMETHASONE SODIUM PHOSPHATE 6 MG: 4 INJECTION, SOLUTION INTRA-ARTICULAR; INTRALESIONAL; INTRAMUSCULAR; INTRAVENOUS; SOFT TISSUE at 10:02

## 2024-02-16 ENCOUNTER — CLINICAL SUPPORT (OUTPATIENT)
Dept: SMOKING CESSATION | Facility: CLINIC | Age: 59
End: 2024-02-16
Payer: MEDICAID

## 2024-02-16 DIAGNOSIS — F17.210 CIGARETTE SMOKER: Primary | ICD-10-CM

## 2024-02-16 LAB
ALLENS TEST: ABNORMAL
ALLENS TEST: ABNORMAL
AMPHET+METHAMPHET UR QL: NEGATIVE
AV INDEX (PROSTH): 0.97
AV MEAN GRADIENT: 7 MMHG
AV PEAK GRADIENT: 12 MMHG
AV VALVE AREA BY VELOCITY RATIO: 3.58 CM²
AV VALVE AREA: 4.25 CM²
AV VELOCITY RATIO: 0.82
BARBITURATES UR QL SCN>200 NG/ML: NEGATIVE
BASOPHILS # BLD AUTO: 0 K/UL (ref 0–0.2)
BASOPHILS NFR BLD: 0 % (ref 0–1.9)
BENZODIAZ UR QL SCN>200 NG/ML: NEGATIVE
BSA FOR ECHO PROCEDURE: 2.55 M2
BZE UR QL SCN: NEGATIVE
CANNABINOIDS UR QL SCN: NEGATIVE
CREAT UR-MCNC: 38 MG/DL (ref 23–375)
CV ECHO LV RWT: 0.47 CM
DELSYS: ABNORMAL
DELSYS: ABNORMAL
DIFFERENTIAL METHOD BLD: ABNORMAL
DOP CALC AO PEAK VEL: 1.72 M/S
DOP CALC AO VTI: 28.6 CM
DOP CALC LVOT AREA: 4.4 CM2
DOP CALC LVOT DIAMETER: 2.36 CM
DOP CALC LVOT PEAK VEL: 1.41 M/S
DOP CALC LVOT STROKE VOLUME: 121.55 CM3
DOP CALC MV VTI: 21.3 CM
DOP CALCLVOT PEAK VEL VTI: 27.8 CM
E WAVE DECELERATION TIME: 169.44 MSEC
E/A RATIO: 0.75
E/E' RATIO: 9.75 M/S
ECHO LV POSTERIOR WALL: 1.03 CM (ref 0.6–1.1)
EOSINOPHIL # BLD AUTO: 0 K/UL (ref 0–0.5)
EOSINOPHIL NFR BLD: 0 % (ref 0–8)
EP: 6
ERYTHROCYTE [DISTWIDTH] IN BLOOD BY AUTOMATED COUNT: 11.7 % (ref 11.5–14.5)
ERYTHROCYTE [SEDIMENTATION RATE] IN BLOOD BY WESTERGREN METHOD: 16 MM/H
ERYTHROCYTE [SEDIMENTATION RATE] IN BLOOD BY WESTERGREN METHOD: 20 MM/H
ETHANOL UR-MCNC: <10 MG/DL
FIO2: 35
FIO2: 40
FRACTIONAL SHORTENING: 28 % (ref 28–44)
HCO3 UR-SCNC: 34.4 MMOL/L (ref 24–28)
HCO3 UR-SCNC: 35.5 MMOL/L (ref 24–28)
HCT VFR BLD AUTO: 38.1 % (ref 40–54)
HGB BLD-MCNC: 12.5 G/DL (ref 14–18)
IMM GRANULOCYTES # BLD AUTO: 0.01 K/UL (ref 0–0.04)
IMM GRANULOCYTES NFR BLD AUTO: 0.2 % (ref 0–0.5)
INTERVENTRICULAR SEPTUM: 1.04 CM (ref 0.6–1.1)
IP: 14
IVC DIAMETER: 2 CM
LA MAJOR: 4.6 CM
LA MINOR: 4.23 CM
LA WIDTH: 4.2 CM
LEFT ATRIUM SIZE: 2.94 CM
LEFT ATRIUM VOLUME INDEX MOD: 20.3 ML/M2
LEFT ATRIUM VOLUME INDEX: 19.1 ML/M2
LEFT ATRIUM VOLUME MOD: 49.03 CM3
LEFT ATRIUM VOLUME: 46.26 CM3
LEFT INTERNAL DIMENSION IN SYSTOLE: 3.12 CM (ref 2.1–4)
LEFT VENTRICLE DIASTOLIC VOLUME INDEX: 35.21 ML/M2
LEFT VENTRICLE DIASTOLIC VOLUME: 85.2 ML
LEFT VENTRICLE MASS INDEX: 63 G/M2
LEFT VENTRICLE SYSTOLIC VOLUME INDEX: 15.9 ML/M2
LEFT VENTRICLE SYSTOLIC VOLUME: 38.53 ML
LEFT VENTRICULAR INTERNAL DIMENSION IN DIASTOLE: 4.35 CM (ref 3.5–6)
LEFT VENTRICULAR MASS: 152.27 G
LV LATERAL E/E' RATIO: 9.75 M/S
LV SEPTAL E/E' RATIO: 9.75 M/S
LVOT MG: 4.3 MMHG
LVOT MV: 0.96 CM/S
LYMPHOCYTES # BLD AUTO: 0.4 K/UL (ref 1–4.8)
LYMPHOCYTES NFR BLD: 7.9 % (ref 18–48)
MAGNESIUM SERPL-MCNC: 1.5 MG/DL (ref 1.6–2.6)
MCH RBC QN AUTO: 30.9 PG (ref 27–31)
MCHC RBC AUTO-ENTMCNC: 32.8 G/DL (ref 32–36)
MCV RBC AUTO: 94 FL (ref 82–98)
METHADONE UR QL SCN>300 NG/ML: ABNORMAL
MODE: ABNORMAL
MODE: ABNORMAL
MONOCYTES # BLD AUTO: 0.1 K/UL (ref 0.3–1)
MONOCYTES NFR BLD: 2.2 % (ref 4–15)
MV A" WAVE DURATION": 129.4 MSEC
MV MEAN GRADIENT: 2 MMHG
MV PEAK A VEL: 1.04 M/S
MV PEAK E VEL: 0.78 M/S
MV PEAK GRADIENT: 4 MMHG
MV STENOSIS PRESSURE HALF TIME: 49.14 MS
MV VALVE AREA BY CONTINUITY EQUATION: 5.71 CM2
MV VALVE AREA P 1/2 METHOD: 4.48 CM2
NEUTROPHILS # BLD AUTO: 4.5 K/UL (ref 1.8–7.7)
NEUTROPHILS NFR BLD: 89.7 % (ref 38–73)
NRBC BLD-RTO: 0 /100 WBC
OHS LV EJECTION FRACTION SIMPSONS BIPLANE MOD: 53 %
OPIATES UR QL SCN: NEGATIVE
PCO2 BLDA: 69.3 MMHG (ref 35–45)
PCO2 BLDA: 79.9 MMHG (ref 35–45)
PCP UR QL SCN>25 NG/ML: NEGATIVE
PH SMN: 7.25 [PH] (ref 7.35–7.45)
PH SMN: 7.3 [PH] (ref 7.35–7.45)
PHOSPHATE SERPL-MCNC: 3.9 MG/DL (ref 2.7–4.5)
PISA TR MAX VEL: 3.05 M/S
PLATELET # BLD AUTO: 141 K/UL (ref 150–450)
PMV BLD AUTO: 10 FL (ref 9.2–12.9)
PO2 BLDA: 67 MMHG (ref 80–100)
PO2 BLDA: 81 MMHG (ref 80–100)
POC BE: 8 MMOL/L
POC BE: 8 MMOL/L
POC SATURATED O2: 90 % (ref 95–100)
POC SATURATED O2: 93 % (ref 95–100)
POC TCO2: 37 MMOL/L (ref 23–27)
POC TCO2: 38 MMOL/L (ref 23–27)
PULM VEIN S/D RATIO: 1.57
PV MV: 1.02 M/S
PV PEAK D VEL: 0.46 M/S
PV PEAK GRADIENT: 8 MMHG
PV PEAK S VEL: 0.72 M/S
PV PEAK VELOCITY: 1.38 M/S
RA MAJOR: 4.37 CM
RA PRESSURE ESTIMATED: 3 MMHG
RA WIDTH: 4.56 CM
RBC # BLD AUTO: 4.05 M/UL (ref 4.6–6.2)
RIGHT VENTRICLE DIASTOLIC MID DIMENSION: 3.4 CM
RIGHT VENTRICULAR END-DIASTOLIC DIMENSION: 4.2 CM
RV TB RVSP: 6 MMHG
RV TISSUE DOPPLER FREE WALL SYSTOLIC VELOCITY 1 (APICAL 4 CHAMBER VIEW): 18.39 CM/S
SAMPLE: ABNORMAL
SAMPLE: ABNORMAL
SINUS: 4.27 CM
SITE: ABNORMAL
SITE: ABNORMAL
STJ: 3.81 CM
TDI LATERAL: 0.08 M/S
TDI SEPTAL: 0.08 M/S
TDI: 0.08 M/S
TOXICOLOGY INFORMATION: ABNORMAL
TR MAX PG: 37 MMHG
TRICUSPID ANNULAR PLANE SYSTOLIC EXCURSION: 2.93 CM
TV REST PULMONARY ARTERY PRESSURE: 40 MMHG
WBC # BLD AUTO: 5.06 K/UL (ref 3.9–12.7)
Z-SCORE OF LEFT VENTRICULAR DIMENSION IN END DIASTOLE: -9.67
Z-SCORE OF LEFT VENTRICULAR DIMENSION IN END SYSTOLE: -6.2

## 2024-02-16 PROCEDURE — 27100171 HC OXYGEN HIGH FLOW UP TO 24 HOURS

## 2024-02-16 PROCEDURE — 94640 AIRWAY INHALATION TREATMENT: CPT

## 2024-02-16 PROCEDURE — 25000003 PHARM REV CODE 250: Performed by: STUDENT IN AN ORGANIZED HEALTH CARE EDUCATION/TRAINING PROGRAM

## 2024-02-16 PROCEDURE — 25000003 PHARM REV CODE 250

## 2024-02-16 PROCEDURE — 63600175 PHARM REV CODE 636 W HCPCS: Performed by: STUDENT IN AN ORGANIZED HEALTH CARE EDUCATION/TRAINING PROGRAM

## 2024-02-16 PROCEDURE — 63600175 PHARM REV CODE 636 W HCPCS

## 2024-02-16 PROCEDURE — 36600 WITHDRAWAL OF ARTERIAL BLOOD: CPT

## 2024-02-16 PROCEDURE — 94660 CPAP INITIATION&MGMT: CPT

## 2024-02-16 PROCEDURE — 36415 COLL VENOUS BLD VENIPUNCTURE: CPT

## 2024-02-16 PROCEDURE — 99407 BEHAV CHNG SMOKING > 10 MIN: CPT | Mod: S$GLB,,,

## 2024-02-16 PROCEDURE — 94761 N-INVAS EAR/PLS OXIMETRY MLT: CPT | Mod: XB

## 2024-02-16 PROCEDURE — 83735 ASSAY OF MAGNESIUM: CPT

## 2024-02-16 PROCEDURE — 84100 ASSAY OF PHOSPHORUS: CPT

## 2024-02-16 PROCEDURE — 25000242 PHARM REV CODE 250 ALT 637 W/ HCPCS: Performed by: STUDENT IN AN ORGANIZED HEALTH CARE EDUCATION/TRAINING PROGRAM

## 2024-02-16 PROCEDURE — S4991 NICOTINE PATCH NONLEGEND: HCPCS | Performed by: HOSPITALIST

## 2024-02-16 PROCEDURE — 25000003 PHARM REV CODE 250: Performed by: HOSPITALIST

## 2024-02-16 PROCEDURE — 63700000 PHARM REV CODE 250 ALT 637 W/O HCPCS

## 2024-02-16 PROCEDURE — 25000242 PHARM REV CODE 250 ALT 637 W/ HCPCS

## 2024-02-16 PROCEDURE — 63600175 PHARM REV CODE 636 W HCPCS: Mod: JZ,TB

## 2024-02-16 PROCEDURE — 63600175 PHARM REV CODE 636 W HCPCS: Performed by: HOSPITALIST

## 2024-02-16 PROCEDURE — 80307 DRUG TEST PRSMV CHEM ANLYZR: CPT | Performed by: STUDENT IN AN ORGANIZED HEALTH CARE EDUCATION/TRAINING PROGRAM

## 2024-02-16 PROCEDURE — XW033E5 INTRODUCTION OF REMDESIVIR ANTI-INFECTIVE INTO PERIPHERAL VEIN, PERCUTANEOUS APPROACH, NEW TECHNOLOGY GROUP 5: ICD-10-PCS | Performed by: HOSPITALIST

## 2024-02-16 PROCEDURE — 99900035 HC TECH TIME PER 15 MIN (STAT)

## 2024-02-16 PROCEDURE — 27000207 HC ISOLATION

## 2024-02-16 PROCEDURE — 82803 BLOOD GASES ANY COMBINATION: CPT

## 2024-02-16 PROCEDURE — 11000001 HC ACUTE MED/SURG PRIVATE ROOM

## 2024-02-16 PROCEDURE — 85025 COMPLETE CBC W/AUTO DIFF WBC: CPT

## 2024-02-16 RX ORDER — VARENICLINE TARTRATE 0.5 (11)-1
KIT ORAL
Qty: 53 TABLET | Refills: 0 | Status: ON HOLD | OUTPATIENT
Start: 2024-02-16 | End: 2024-03-01

## 2024-02-16 RX ORDER — ENOXAPARIN SODIUM 100 MG/ML
40 INJECTION SUBCUTANEOUS EVERY 12 HOURS
Status: DISCONTINUED | OUTPATIENT
Start: 2024-02-16 | End: 2024-02-17 | Stop reason: HOSPADM

## 2024-02-16 RX ORDER — ACETAMINOPHEN 325 MG/1
650 TABLET ORAL EVERY 4 HOURS PRN
Status: DISCONTINUED | OUTPATIENT
Start: 2024-02-16 | End: 2024-02-17 | Stop reason: HOSPADM

## 2024-02-16 RX ORDER — MAGNESIUM SULFATE HEPTAHYDRATE 40 MG/ML
2 INJECTION, SOLUTION INTRAVENOUS ONCE
Status: COMPLETED | OUTPATIENT
Start: 2024-02-16 | End: 2024-02-16

## 2024-02-16 RX ORDER — SODIUM CHLORIDE 0.9 % (FLUSH) 0.9 %
30 SYRINGE (ML) INJECTION
Status: DISCONTINUED | OUTPATIENT
Start: 2024-02-16 | End: 2024-02-17 | Stop reason: HOSPADM

## 2024-02-16 RX ORDER — AZITHROMYCIN 250 MG/1
500 TABLET, FILM COATED ORAL ONCE
Status: DISCONTINUED | OUTPATIENT
Start: 2024-02-16 | End: 2024-02-16

## 2024-02-16 RX ORDER — IBUPROFEN 200 MG
1 TABLET ORAL DAILY
Status: DISCONTINUED | OUTPATIENT
Start: 2024-02-16 | End: 2024-02-17 | Stop reason: HOSPADM

## 2024-02-16 RX ORDER — ONDANSETRON 4 MG/1
4 TABLET, ORALLY DISINTEGRATING ORAL EVERY 12 HOURS PRN
Status: DISCONTINUED | OUTPATIENT
Start: 2024-02-16 | End: 2024-02-17 | Stop reason: HOSPADM

## 2024-02-16 RX ORDER — FLUTICASONE FUROATE AND VILANTEROL 100; 25 UG/1; UG/1
1 POWDER RESPIRATORY (INHALATION) DAILY
Status: DISCONTINUED | OUTPATIENT
Start: 2024-02-16 | End: 2024-02-17 | Stop reason: HOSPADM

## 2024-02-16 RX ORDER — IPRATROPIUM BROMIDE AND ALBUTEROL SULFATE 2.5; .5 MG/3ML; MG/3ML
3 SOLUTION RESPIRATORY (INHALATION) EVERY 4 HOURS
Status: DISCONTINUED | OUTPATIENT
Start: 2024-02-16 | End: 2024-02-16

## 2024-02-16 RX ORDER — SODIUM CHLORIDE 0.9 % (FLUSH) 0.9 %
10 SYRINGE (ML) INJECTION
Status: DISCONTINUED | OUTPATIENT
Start: 2024-02-16 | End: 2024-02-17 | Stop reason: HOSPADM

## 2024-02-16 RX ORDER — AZITHROMYCIN 250 MG/1
500 TABLET, FILM COATED ORAL DAILY
Status: DISCONTINUED | OUTPATIENT
Start: 2024-02-16 | End: 2024-02-16

## 2024-02-16 RX ORDER — IPRATROPIUM BROMIDE AND ALBUTEROL SULFATE 2.5; .5 MG/3ML; MG/3ML
3 SOLUTION RESPIRATORY (INHALATION)
Status: DISCONTINUED | OUTPATIENT
Start: 2024-02-16 | End: 2024-02-17 | Stop reason: HOSPADM

## 2024-02-16 RX ORDER — FUROSEMIDE 10 MG/ML
80 INJECTION INTRAMUSCULAR; INTRAVENOUS ONCE
Status: COMPLETED | OUTPATIENT
Start: 2024-02-16 | End: 2024-02-16

## 2024-02-16 RX ORDER — ENOXAPARIN SODIUM 100 MG/ML
40 INJECTION SUBCUTANEOUS EVERY 12 HOURS
Status: DISCONTINUED | OUTPATIENT
Start: 2024-02-16 | End: 2024-02-16

## 2024-02-16 RX ORDER — METHADONE HYDROCHLORIDE 10 MG/1
90 TABLET ORAL DAILY
Status: CANCELLED | OUTPATIENT
Start: 2024-02-16

## 2024-02-16 RX ORDER — DEXAMETHASONE SODIUM PHOSPHATE 4 MG/ML
6 INJECTION, SOLUTION INTRA-ARTICULAR; INTRALESIONAL; INTRAMUSCULAR; INTRAVENOUS; SOFT TISSUE DAILY
Status: DISCONTINUED | OUTPATIENT
Start: 2024-02-16 | End: 2024-02-17 | Stop reason: HOSPADM

## 2024-02-16 RX ORDER — AZITHROMYCIN 250 MG/1
250 TABLET, FILM COATED ORAL DAILY
Status: DISCONTINUED | OUTPATIENT
Start: 2024-02-17 | End: 2024-02-16

## 2024-02-16 RX ORDER — FUROSEMIDE 10 MG/ML
80 INJECTION INTRAMUSCULAR; INTRAVENOUS ONCE
Status: DISCONTINUED | OUTPATIENT
Start: 2024-02-16 | End: 2024-02-16

## 2024-02-16 RX ORDER — PROMETHAZINE HYDROCHLORIDE 12.5 MG/1
12.5 TABLET ORAL EVERY 6 HOURS PRN
Status: DISCONTINUED | OUTPATIENT
Start: 2024-02-16 | End: 2024-02-17 | Stop reason: HOSPADM

## 2024-02-16 RX ORDER — HEPARIN SODIUM 5000 [USP'U]/ML
7500 INJECTION, SOLUTION INTRAVENOUS; SUBCUTANEOUS EVERY 8 HOURS
Status: DISCONTINUED | OUTPATIENT
Start: 2024-02-16 | End: 2024-02-16

## 2024-02-16 RX ORDER — SODIUM CHLORIDE FOR INHALATION 3 %
4 VIAL, NEBULIZER (ML) INHALATION ONCE
Status: DISCONTINUED | OUTPATIENT
Start: 2024-02-16 | End: 2024-02-16

## 2024-02-16 RX ORDER — METHADONE HYDROCHLORIDE 10 MG/1
90 TABLET ORAL DAILY
Status: DISCONTINUED | OUTPATIENT
Start: 2024-02-16 | End: 2024-02-17 | Stop reason: HOSPADM

## 2024-02-16 RX ORDER — NAPROXEN 250 MG/1
250 TABLET ORAL 2 TIMES DAILY PRN
Status: DISCONTINUED | OUTPATIENT
Start: 2024-02-16 | End: 2024-02-17 | Stop reason: HOSPADM

## 2024-02-16 RX ORDER — AZITHROMYCIN 250 MG/1
500 TABLET, FILM COATED ORAL DAILY
Status: DISCONTINUED | OUTPATIENT
Start: 2024-02-17 | End: 2024-02-17 | Stop reason: HOSPADM

## 2024-02-16 RX ADMIN — ONDANSETRON 4 MG: 4 TABLET, ORALLY DISINTEGRATING ORAL at 08:02

## 2024-02-16 RX ADMIN — IPRATROPIUM BROMIDE AND ALBUTEROL SULFATE 3 ML: 2.5; .5 SOLUTION RESPIRATORY (INHALATION) at 01:02

## 2024-02-16 RX ADMIN — METHADONE HYDROCHLORIDE 90 MG: 10 TABLET ORAL at 11:02

## 2024-02-16 RX ADMIN — ENOXAPARIN SODIUM 40 MG: 40 INJECTION SUBCUTANEOUS at 08:02

## 2024-02-16 RX ADMIN — REMDESIVIR 200 MG: 100 INJECTION, POWDER, LYOPHILIZED, FOR SOLUTION INTRAVENOUS at 02:02

## 2024-02-16 RX ADMIN — AZITHROMYCIN DIHYDRATE 500 MG: 250 TABLET ORAL at 09:02

## 2024-02-16 RX ADMIN — CLONIDINE HYDROCHLORIDE 0.1 MG: 0.1 TABLET ORAL at 08:02

## 2024-02-16 RX ADMIN — IPRATROPIUM BROMIDE AND ALBUTEROL SULFATE 3 ML: 2.5; .5 SOLUTION RESPIRATORY (INHALATION) at 03:02

## 2024-02-16 RX ADMIN — MAGNESIUM SULFATE HEPTAHYDRATE 2 G: 40 INJECTION, SOLUTION INTRAVENOUS at 09:02

## 2024-02-16 RX ADMIN — IPRATROPIUM BROMIDE AND ALBUTEROL SULFATE 3 ML: 2.5; .5 SOLUTION RESPIRATORY (INHALATION) at 12:02

## 2024-02-16 RX ADMIN — ESCITALOPRAM OXALATE 10 MG: 10 TABLET ORAL at 09:02

## 2024-02-16 RX ADMIN — NAPROXEN 250 MG: 250 TABLET ORAL at 09:02

## 2024-02-16 RX ADMIN — IPRATROPIUM BROMIDE AND ALBUTEROL SULFATE 3 ML: 2.5; .5 SOLUTION RESPIRATORY (INHALATION) at 07:02

## 2024-02-16 RX ADMIN — HEPARIN SODIUM 7500 UNITS: 5000 INJECTION INTRAVENOUS; SUBCUTANEOUS at 06:02

## 2024-02-16 RX ADMIN — CLONIDINE HYDROCHLORIDE 0.1 MG: 0.1 TABLET ORAL at 09:02

## 2024-02-16 RX ADMIN — FLUTICASONE FUROATE AND VILANTEROL TRIFENATATE 1 PUFF: 100; 25 POWDER RESPIRATORY (INHALATION) at 10:02

## 2024-02-16 RX ADMIN — FUROSEMIDE 80 MG: 10 INJECTION, SOLUTION INTRAVENOUS at 10:02

## 2024-02-16 RX ADMIN — FUROSEMIDE 80 MG: 10 INJECTION, SOLUTION INTRAVENOUS at 01:02

## 2024-02-16 RX ADMIN — IPRATROPIUM BROMIDE AND ALBUTEROL SULFATE 3 ML: 2.5; .5 SOLUTION RESPIRATORY (INHALATION) at 08:02

## 2024-02-16 RX ADMIN — ACETAMINOPHEN 650 MG: 325 TABLET ORAL at 10:02

## 2024-02-16 RX ADMIN — NICOTINE 1 PATCH: 14 PATCH, EXTENDED RELEASE TRANSDERMAL at 10:02

## 2024-02-16 RX ADMIN — LOSARTAN POTASSIUM 100 MG: 50 TABLET, FILM COATED ORAL at 09:02

## 2024-02-16 RX ADMIN — TIOTROPIUM BROMIDE INHALATION SPRAY 2 PUFF: 3.12 SPRAY, METERED RESPIRATORY (INHALATION) at 10:02

## 2024-02-16 RX ADMIN — DEXAMETHASONE SODIUM PHOSPHATE 6 MG: 4 INJECTION, SOLUTION INTRA-ARTICULAR; INTRALESIONAL; INTRAMUSCULAR; INTRAVENOUS; SOFT TISSUE at 09:02
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
## 2024-02-16 NOTE — CARE UPDATE
Spoke with nurse [place] at [facility]. States patient's dose of methadone was uptitrated to 90 mg daily 2 days prior to his current admission and that he had indeed received that dose twice. Plan to administer while admitted.    Abdaiziz Berumen MD  [facility] PGY-3

## 2024-02-16 NOTE — ED NOTES
Two nurses both with two unsuccessful IV attempts peripherally.  Paramedic request to start EJ per Dr Orr.

## 2024-02-16 NOTE — PROGRESS NOTES
Individual Follow-Up Form    Quit Date:  To be determined    Clinical Status of Patient:  Inpatient    Length of Service:  20 minutes    Comments:  Smoking cessation education note: Smoking cessation education/handout provided. Patient reports he started smoking at age 35 and smoked 1 ppd. He reports he cut back to 0.5 ppd about 5 years ago, and has been smoking approximately 4 cigarettes/day for the past 2 years. Obtained order for 14 mg nicotine patch. He is previously enrolled in tobacco trust, and reports he wants to try to quit. Ambulatory referral to smoking cessation clinic following hospital discharge.    Diagnosis:  F17.210

## 2024-02-16 NOTE — EICU
New Patient Evaluation    HPI:  58 M history of COPD, HF, hypertension, Hep C, IVDU, presented with fever, productive cough and shortness of breath. Decreased PO inake. Reports running out of his medications 3 days prior. ABG 7.28/75/72 and was placed on BiPap. COVID +. CXR increased interstitial markings.    Camera Assessment:  /75  HR 84  O2 94%  Seen on BiPap 14/6 rate of 16 FiO2 40%, MV 11-12    Data:  WBC 7.91, H/H 12.5/37.8, platelets 148  Na 140, K 4.3, CO2 32, creatinine 1.9  BNP 15, Trop I <0.006    Assessment and Plans:  Acute on chronic hypercapnic and hypoxic respiratory failure secondary to COVID in this patient with underlying COPD. Started on Remdesevir, dexamethasone and azithromycin. Enoxaparin for VTE prophylaxis.  Repeat ABG 7.23/80/81 switched to AVAPS with target MV > 12

## 2024-02-16 NOTE — PLAN OF CARE
Problem: Adult Inpatient Plan of Care  Goal: Plan of Care Review  Outcome: Ongoing, Progressing  Goal: Patient-Specific Goal (Individualized)  Outcome: Ongoing, Progressing  Goal: Absence of Hospital-Acquired Illness or Injury  Outcome: Ongoing, Progressing  Goal: Optimal Comfort and Wellbeing  Outcome: Ongoing, Progressing  Goal: Readiness for Transition of Care  Outcome: Ongoing, Progressing     Problem: Bariatric Environmental Safety  Goal: Safety Maintained with Care  Outcome: Ongoing, Progressing     Problem: Gas Exchange Impaired  Goal: Optimal Gas Exchange  Outcome: Ongoing, Progressing     Problem: Infection  Goal: Absence of Infection Signs and Symptoms  Outcome: Ongoing, Progressing
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
## 2024-02-16 NOTE — PLAN OF CARE
CM met with pt - pt in ICU in isolation - CM spoke with pt per phone   dx:  Covid +; COPD      Pt lives with brother Tomasa Gregory  [phone] assists but will not be able to transport pt to home.   Pt will need WC Van to home with O2      Pt has home O2, nebulizer.    Pharmacy -- [facility] in [place]      Future Appointments   Date Time Provider Department Center   2/22/2024 10:00 AM David Beckett PA-C [facility]   2/27/2024 10:00 AM Arnaldo Hopkins CTTS [facility]        02/16/24 1651   Discharge Assessment   Assessment Type Discharge Planning Assessment   Confirmed/corrected address, phone number and insurance Yes   Confirmed Demographics Correct on Facesheet   Source of Information patient;health record   Communicated RYLAN with patient/caregiver Date not available/Unable to determine   People in Home sibling(s)  (brother Vinny)   Do you expect to return to your current living situation? Yes   Do you have help at home or someone to help you manage your care at home? Yes   Who are your caregiver(s) and their phone number(s)? sister Bri George  [phone]   Prior to hospitilization cognitive status: Alert/Oriented   Current cognitive status: Alert/Oriented   Equipment Currently Used at Home oxygen;nebulizer  (O2 per [facility])   Patient currently being followed by outpatient case management? No   Do you currently have service(s) that help you manage your care at home? No   Do you take prescription medications? Yes  ([facility])   Discharge Plan A Home;Home with family   Discharge Plan B Home;Home with family;Home Health   DME Needed Upon Discharge  none   Discharge Plan discussed with: Patient   Transition of Care Barriers   (Transportation)

## 2024-02-16 NOTE — RESPIRATORY THERAPY
ABG done at 0215.   Bipap settings 14/6 40%.   Contacted EICU with results. Switched to AVAPS mode per Dr. Sams with a target Vt of 500. Pt tolerating well.     Latest Reference Range & Units 02/16/24 02:15   POC PH 7.35 - 7.45  7.255 (LL)   POC PCO2 35 - 45 mmHg 79.9 (HH)   POC PO2 80 - 100 mmHg 81   POC HCO3 24 - 28 mmol/L 35.5 (H)   POC SATURATED O2 95 - 100 % 93   Sample  ARTERIAL   POC TCO2 23 - 27 mmol/L 38 (H)   POC BE -2 to 2 mmol/L 8 (H)   FiO2  40   DelSys  CPAP/BiPAP   Site  LR   Mode  BiPAP   Rate  16

## 2024-02-16 NOTE — PROGRESS NOTES
"Progress Note  [facility]    Attending: Dr. Emmett Nunez  Admit Date: 2/15/2024  Today's Date: 02/16/2024    SUBJECTIVE:     HPI:  Patient is a 58-year-old male with past medical history of congestive heart failure, COPD, MICHELLE, hepatitis-C, hypertension, chronic hypercapnia (50s), IV drug use who brought in by EMS 2/15 for 3 day history of shortness breath with associated fever and productive cough with yellow sputum.  Reports decreased appetite and coughing when attempting to eat. Upon initial evaluation, patient alert and able to answer questions.  States that he ran out of inhalers and all of his medications about 3 days ago. Denies chills, chest pain, abdominal pain, NV/D.     While in ED, patient afebrile, blood pressure 146/116, heart rate of 90. Patient initially satting in the low 90s while on NC but desaturates to mid 80s on nasal cannula after "coughing fit" and while sleeping. ABG completed revealing pH of 7.284, and hypercapnia of 74.8.  Patient transitioned to BiPAP and oxygenation I'm proved to high 90s.  CMP creatinine 1.9 patient appears to be at baseline.  CBC within normal limits with no leukocytosis (hemoglobin 12.5 hematocrit 37.8 WBC 7.91).  Patient tested COVID positive while in ED. Chest XR remarkable for increased interstitial markings bilaterally, without focal consolidations or opacities. BNP negative (15).  Troponin negative.  Flu negative.  Patient directly admitted to ICU for acute hypoxic hypercapnic respiratory failure requiring BiPAP.    Patient seen and examined this morning. He reports that his breathing is improved from yesterday. Saturation was in the mid 90s at rest.  Review of Systems   Constitutional:  Negative for chills and fever.   Respiratory:  Positive for cough. Negative for shortness of breath.    Cardiovascular:  Negative for chest pain.   Gastrointestinal:  Negative for abdominal pain, nausea and vomiting.   Neurological:  Negative for " "dizziness.     OBJECTIVE:     Vital Signs Trends/Hx Reviewed  Vitals:    02/16/24 0345 02/16/24 0354 02/16/24 0400 02/16/24 0500   BP: 119/74  (!) 128/91 (!) 128/91   Pulse: 77 76 74 86   Resp: 20 (!) 23 (!) 22 20   Temp:   98 °F (36.7 °C)    TempSrc:       SpO2: (!) 94% (!) 94% (!) 93% 96%   Weight:       Height:           Oxygen Concentration (%):  [35-40] 35        Physical Exam  Constitutional:       General: He is not in acute distress.  HENT:      Head: Normocephalic and atraumatic.      Right Ear: External ear normal.      Left Ear: External ear normal.      Nose: Nose normal.   Eyes:      Extraocular Movements: Extraocular movements intact.   Cardiovascular:      Pulses: Normal pulses.      Heart sounds: Normal heart sounds.   Pulmonary:      Effort: Pulmonary effort is normal. No respiratory distress.      Breath sounds: Normal breath sounds. No wheezing.      Comments: BiPAP in place originally, patient subsequently weaned to 1L NC  Breath sounds were coarse  Abdominal:      Palpations: Abdomen is soft.      Tenderness: There is no abdominal tenderness. There is no guarding.   Musculoskeletal:         General: Normal range of motion.      Cervical back: Normal range of motion.      Right lower leg: Edema present.      Left lower leg: Edema present.   Skin:     General: Skin is warm and dry.   Neurological:      Mental Status: He is alert and oriented to person, place, and time.   Psychiatric:         Mood and Affect: Mood normal.         Laboratory:  Recent Labs   Lab 02/15/24  2150   WBC 7.91   RBC 3.99*   HGB 12.5*   HCT 37.8*   *   MCV 95   MCH 31.3*   MCHC 33.1     Recent Labs   Lab 02/15/24  2150      K 4.3   CL 99   CO2 32*   BUN 12   CREATININE 1.9*   CALCIUM 8.4*     Recent Labs   Lab 02/16/24  0215   PH 7.255*   PCO2 79.9*   PO2 81   HCO3 35.5*   POCSATURATED 93   BE 8*         Chest Imaging:   CXR: "No acute abnormality"    ASSESSMENT & RECOMMENDATIONS   The patient was admitted to " the ICU for acute hypoxic respiratory failure in the setting of a COPD exacerbation most likely 2/2 C19. His respiratory status and mentation appear greatly improved.     Neuro/Psych  #JUVENCIO:  Plan:  On lexapro 10mg at home, will continue     CV  #HFpEF  #HTN  Meds at home: clonidine 0.1mg (bid?), lasix 40 bid, losartan 100mg  BNP and trop were negative, although interstitial markings on CXR, given those labs, suspicious of volume overload in addition to respiratory illness. Pt has chronic LE edema and takes lasix 40mg bid at home. Given IV 80 lasix once. Last echo was 2023.    Plan:  Continue home BP meds  Will give another dose of IV lasix 80mg  F/u repeat echo    Pulm  The patient presented with increased SOB, cough, and production of yellow sputum. He states that he had been out of his medications for the past 3 days, and was exposed to a sick contact as well.  Medications at home: Symbicort and albuterol inhaler. The patient also states that at baseline, he uses 2L O2 prn (approximately 1-2 times per week.   The patient was initially placed on BiPAP, but was escalated to AVAP 2/2 worsening ABG while on BiPAP. On  AM, the patient was weaned to 1L nasal cannula O2 and was saturated about 91-93%.    Plan:  Wean BiPAP to QHS and naps  Will monitor respiratory status and continue to wean to Room air  Will continue with Azithromycin 500mg for 3 days  Will continue remdisivir and dexamethasone, but will consider discontinuation if clinical improvement     Continue   FEN/GI  F: None  E: Na:140  K:4.3  M.5 (repleted) Phos: 3.9  N:  Diet: Cardiac and Renal with 2 gram sodium and 1800mL restrictions   No acute concerns  RENAL  In: 49.6cc, UOP: 1800cc , net -1750.4cc in last 24 hrs    BUN/Cr: 12/1.9, At baseline    #CKD3b  Based on Cr, appears stable  Plan:  Continue to monitor renal status and urine output  Will give another IV 80mg lasix     Heme  H/H: 12.5/38.1; stable  WBC:5.06  DVT prophylaxis: Lovenox  No  acute concerns    Endo  BG goal: 140-180  No acute concerns  ID  #Covid 19 infection  The patient was started with remdisivir and dexamethasone for his acute covid infection in the settting of his COPD. He was also started on azithromycin for possible COPD exacerbation.  Plan:  Will continue with azithromycin 500mg for three days  Will continue with remdisivir and dexamethasone for acute covid infection.    Feeding: NPO  Analgesia: None  Sedation: None  Thrombo PPX: Lovenox  Head of Bed: > 30 degrees  Ulcer PPX: None  Glucose: goal 140-180s  SBT/SAT: N/A  Bowel Regimen: None  Indwelling Lines: PIV  Abx: azithromycin, remdesivir     Code Status: FULL    ________________________  Gage Sandoval Jr, MD  [facility] PGY-1

## 2024-02-16 NOTE — PROGRESS NOTES
Future Appointments   Date Time Provider Department Center   2/22/2024 10:00 AM David Beckett PA-C [facility]   2/27/2024 10:00 AM Arnaldo Hopkins, [facility]
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
## 2024-02-16 NOTE — ED NOTES
Anesthesia Post Evaluation    Patient: Job Hancock    Procedure(s) Performed: Procedure(s) (LRB):  RELEASE, CARPAL TUNNEL (Right)    Final Anesthesia Type: general      Patient location during evaluation: OPS  Patient participation: Yes- Able to Participate  Level of consciousness: awake and alert  Post-procedure vital signs: reviewed and stable  Pain management: adequate  Airway patency: patent  MAX mitigation strategies: Multimodal analgesia  PONV status at discharge: No PONV  Anesthetic complications: no      Cardiovascular status: hemodynamically stable  Respiratory status: unassisted, spontaneous ventilation and room air  Hydration status: euvolemic  Follow-up not needed.          No case tracking events are documented in the log.      Pain/Neli Score: No data recorded       Report given to ARIANNA Sepulveda.

## 2024-02-17 VITALS
DIASTOLIC BLOOD PRESSURE: 80 MMHG | SYSTOLIC BLOOD PRESSURE: 133 MMHG | TEMPERATURE: 98 F | WEIGHT: 298 LBS | RESPIRATION RATE: 17 BRPM | BODY MASS INDEX: 45.16 KG/M2 | HEART RATE: 83 BPM | OXYGEN SATURATION: 93 % | HEIGHT: 68 IN

## 2024-02-17 DIAGNOSIS — U07.1 COVID-19 VIRUS DETECTED: ICD-10-CM

## 2024-02-17 PROBLEM — J96.01 ACUTE HYPOXIC RESPIRATORY FAILURE: Status: ACTIVE | Noted: 2017-10-01

## 2024-02-17 PROBLEM — J96.01 ACUTE HYPOXIC RESPIRATORY FAILURE: Status: RESOLVED | Noted: 2017-10-01 | Resolved: 2024-02-17

## 2024-02-17 LAB
ALBUMIN SERPL BCP-MCNC: 2.9 G/DL (ref 3.5–5.2)
ALP SERPL-CCNC: 95 U/L (ref 55–135)
ALT SERPL W/O P-5'-P-CCNC: 22 U/L (ref 10–44)
ANION GAP SERPL CALC-SCNC: 13 MMOL/L (ref 8–16)
AST SERPL-CCNC: 20 U/L (ref 10–40)
BASOPHILS # BLD AUTO: 0.01 K/UL (ref 0–0.2)
BASOPHILS NFR BLD: 0.2 % (ref 0–1.9)
BILIRUB SERPL-MCNC: 0.6 MG/DL (ref 0.1–1)
BUN SERPL-MCNC: 21 MG/DL (ref 6–20)
CALCIUM SERPL-MCNC: 8.6 MG/DL (ref 8.7–10.5)
CHLORIDE SERPL-SCNC: 97 MMOL/L (ref 95–110)
CO2 SERPL-SCNC: 29 MMOL/L (ref 23–29)
CREAT SERPL-MCNC: 2.1 MG/DL (ref 0.5–1.4)
DIFFERENTIAL METHOD BLD: ABNORMAL
EOSINOPHIL # BLD AUTO: 0 K/UL (ref 0–0.5)
EOSINOPHIL NFR BLD: 0.2 % (ref 0–8)
ERYTHROCYTE [DISTWIDTH] IN BLOOD BY AUTOMATED COUNT: 11.8 % (ref 11.5–14.5)
EST. GFR  (NO RACE VARIABLE): 36 ML/MIN/1.73 M^2
GLUCOSE SERPL-MCNC: 98 MG/DL (ref 70–110)
HCT VFR BLD AUTO: 33.9 % (ref 40–54)
HGB BLD-MCNC: 11.3 G/DL (ref 14–18)
IMM GRANULOCYTES # BLD AUTO: 0.03 K/UL (ref 0–0.04)
IMM GRANULOCYTES NFR BLD AUTO: 0.5 % (ref 0–0.5)
LYMPHOCYTES # BLD AUTO: 0.9 K/UL (ref 1–4.8)
LYMPHOCYTES NFR BLD: 14.5 % (ref 18–48)
MAGNESIUM SERPL-MCNC: 2.2 MG/DL (ref 1.6–2.6)
MCH RBC QN AUTO: 31 PG (ref 27–31)
MCHC RBC AUTO-ENTMCNC: 33.3 G/DL (ref 32–36)
MCV RBC AUTO: 93 FL (ref 82–98)
MONOCYTES # BLD AUTO: 0.6 K/UL (ref 0.3–1)
MONOCYTES NFR BLD: 8.5 % (ref 4–15)
NEUTROPHILS # BLD AUTO: 5 K/UL (ref 1.8–7.7)
NEUTROPHILS NFR BLD: 76.1 % (ref 38–73)
NRBC BLD-RTO: 0 /100 WBC
PHOSPHATE SERPL-MCNC: 3.2 MG/DL (ref 2.7–4.5)
PLATELET # BLD AUTO: 134 K/UL (ref 150–450)
PMV BLD AUTO: 11 FL (ref 9.2–12.9)
POTASSIUM SERPL-SCNC: 4.1 MMOL/L (ref 3.5–5.1)
PROT SERPL-MCNC: 7.1 G/DL (ref 6–8.4)
RBC # BLD AUTO: 3.64 M/UL (ref 4.6–6.2)
SODIUM SERPL-SCNC: 139 MMOL/L (ref 136–145)
WBC # BLD AUTO: 6.49 K/UL (ref 3.9–12.7)

## 2024-02-17 PROCEDURE — 27000221 HC OXYGEN, UP TO 24 HOURS

## 2024-02-17 PROCEDURE — 63600175 PHARM REV CODE 636 W HCPCS: Mod: JZ,TB

## 2024-02-17 PROCEDURE — 63600175 PHARM REV CODE 636 W HCPCS: Performed by: HOSPITALIST

## 2024-02-17 PROCEDURE — 25000242 PHARM REV CODE 250 ALT 637 W/ HCPCS: Performed by: STUDENT IN AN ORGANIZED HEALTH CARE EDUCATION/TRAINING PROGRAM

## 2024-02-17 PROCEDURE — 94640 AIRWAY INHALATION TREATMENT: CPT

## 2024-02-17 PROCEDURE — 94660 CPAP INITIATION&MGMT: CPT

## 2024-02-17 PROCEDURE — 85025 COMPLETE CBC W/AUTO DIFF WBC: CPT

## 2024-02-17 PROCEDURE — 83735 ASSAY OF MAGNESIUM: CPT

## 2024-02-17 PROCEDURE — 36415 COLL VENOUS BLD VENIPUNCTURE: CPT | Mod: XB | Performed by: STUDENT IN AN ORGANIZED HEALTH CARE EDUCATION/TRAINING PROGRAM

## 2024-02-17 PROCEDURE — 25000003 PHARM REV CODE 250

## 2024-02-17 PROCEDURE — 94761 N-INVAS EAR/PLS OXIMETRY MLT: CPT

## 2024-02-17 PROCEDURE — 84100 ASSAY OF PHOSPHORUS: CPT

## 2024-02-17 PROCEDURE — 25000003 PHARM REV CODE 250: Performed by: STUDENT IN AN ORGANIZED HEALTH CARE EDUCATION/TRAINING PROGRAM

## 2024-02-17 PROCEDURE — 25000003 PHARM REV CODE 250: Performed by: HOSPITALIST

## 2024-02-17 PROCEDURE — 63600175 PHARM REV CODE 636 W HCPCS

## 2024-02-17 PROCEDURE — 63700000 PHARM REV CODE 250 ALT 637 W/O HCPCS: Performed by: HOSPITALIST

## 2024-02-17 PROCEDURE — S4991 NICOTINE PATCH NONLEGEND: HCPCS | Performed by: HOSPITALIST

## 2024-02-17 PROCEDURE — 36415 COLL VENOUS BLD VENIPUNCTURE: CPT

## 2024-02-17 PROCEDURE — 99900035 HC TECH TIME PER 15 MIN (STAT)

## 2024-02-17 PROCEDURE — 80053 COMPREHEN METABOLIC PANEL: CPT | Performed by: STUDENT IN AN ORGANIZED HEALTH CARE EDUCATION/TRAINING PROGRAM

## 2024-02-17 RX ORDER — IBUPROFEN 200 MG
1 TABLET ORAL DAILY
Qty: 28 PATCH | Refills: 2 | Status: ON HOLD | OUTPATIENT
Start: 2024-02-17 | End: 2024-03-01

## 2024-02-17 RX ADMIN — IPRATROPIUM BROMIDE AND ALBUTEROL SULFATE 3 ML: 2.5; .5 SOLUTION RESPIRATORY (INHALATION) at 07:02

## 2024-02-17 RX ADMIN — ESCITALOPRAM OXALATE 10 MG: 10 TABLET ORAL at 08:02

## 2024-02-17 RX ADMIN — ACETAMINOPHEN 650 MG: 325 TABLET ORAL at 04:02

## 2024-02-17 RX ADMIN — REMDESIVIR 100 MG: 100 INJECTION, POWDER, LYOPHILIZED, FOR SOLUTION INTRAVENOUS at 01:02

## 2024-02-17 RX ADMIN — NAPROXEN 250 MG: 250 TABLET ORAL at 08:02

## 2024-02-17 RX ADMIN — METHADONE HYDROCHLORIDE 90 MG: 10 TABLET ORAL at 08:02

## 2024-02-17 RX ADMIN — LOSARTAN POTASSIUM 100 MG: 50 TABLET, FILM COATED ORAL at 08:02

## 2024-02-17 RX ADMIN — ONDANSETRON 4 MG: 4 TABLET, ORALLY DISINTEGRATING ORAL at 08:02

## 2024-02-17 RX ADMIN — NICOTINE 1 PATCH: 14 PATCH, EXTENDED RELEASE TRANSDERMAL at 08:02

## 2024-02-17 RX ADMIN — FLUTICASONE FUROATE AND VILANTEROL TRIFENATATE 1 PUFF: 100; 25 POWDER RESPIRATORY (INHALATION) at 07:02

## 2024-02-17 RX ADMIN — CLONIDINE HYDROCHLORIDE 0.1 MG: 0.1 TABLET ORAL at 08:02

## 2024-02-17 RX ADMIN — DEXAMETHASONE SODIUM PHOSPHATE 6 MG: 4 INJECTION, SOLUTION INTRA-ARTICULAR; INTRALESIONAL; INTRAMUSCULAR; INTRAVENOUS; SOFT TISSUE at 08:02

## 2024-02-17 RX ADMIN — ENOXAPARIN SODIUM 40 MG: 40 INJECTION SUBCUTANEOUS at 08:02

## 2024-02-17 RX ADMIN — AZITHROMYCIN DIHYDRATE 500 MG: 250 TABLET ORAL at 08:02

## 2024-02-17 RX ADMIN — TIOTROPIUM BROMIDE INHALATION SPRAY 2 PUFF: 3.12 SPRAY, METERED RESPIRATORY (INHALATION) at 07:02

## 2024-02-17 NOTE — PLAN OF CARE
1200  DC order noted.     1220  CM informed the pt via hospital phone of his discharge plan. Patient in agreement with plan to discharge home today, will need assistance with WC van transportation w/O2 at time of discharge, & verbalized understanding regarding the hospital follow up appointment PAULA Beckett. Pt uses 2L O2 via NC at home. Pox 93% on 2L O2 via NC this AM.     WC van transportation w/O2 scheduled with requested pickup at 1330.     CM informed nurse Lizeth that the pt is cleared to discharge & of transportation scheduled.       Will continue to follow.

## 2024-02-17 NOTE — PLAN OF CARE
[facility] - Intensive Care  Discharge Final Note    Primary Care Provider: Garland Xiong DO    Expected Discharge Date: 2/17/2024    Final Discharge Note (most recent)       Final Note - 02/17/24 1513          Final Note    Assessment Type Final Discharge Note (P)      Anticipated Discharge Disposition Home or Self Care (P)      Hospital Resources/Appts/Education Provided Appointments scheduled and added to AVS (P)         Post-Acute Status    Discharge Delays PFC Arranged Transportation (P)                      Contact Info       MEDICAID TRANSPORTATION        Next Steps: Follow up    Instructions: [phone]  [employer] [facility]SPORT[facility]    [facility]   Specialty: Family Medicine    [address] [place] [address], [address]  [place] [address]   Phone: [phone]       Next Steps: Follow up on 2/22/2024    Instructions: 10:00 AM   you will see PAULA Will

## 2024-02-17 NOTE — NURSING
AVS handed follow up instructions discussed. Education provided. Verbalized understanding. IV removed. EMS at the bedside to transfer home.

## 2024-02-17 NOTE — HOSPITAL COURSE
Admitted for acute hypoxic respiratory failure in the setting of COVID-19 with COPD exacerbation and volume overload. He was treated with remdesivir and dexamethasone for COVID-19 and DuoNebs with azithromycin for COPD exacerbation.  IV Lasix for volume overload.  Patient did require ICU admission temporarily for BiPAP and was appropriately diuresed. He was successfully weaned to baseline oxygen status and was determined to be stable for discharge. He was advised to follow-up with [facility] in one-week. Referral placed to smoking cessation program as well as prescription for Chantix provided. Patient was stable at discharge.

## 2024-02-17 NOTE — PLAN OF CARE
Patient remains in ICU . Report given to ARIANNA Cole. AAOx4 and follows commands. Afebrile. NSR on monitor. VSS. SpO2>88% on 2L of NC.  UOP 1150 ml/shift.    PRN Naproxen and Tylenol x 2 given for 10/10 pain. Zofran given for nausea.    Cardiac renal, low sodium, 2gm diet with 1800ml fluid restriction tolerated.    Plan of care reviewed with patient throughout shift. All questions/concerns addressed. Pt safety and isolation precautions maintained. Report given to AM nurse.    Problem: Adult Inpatient Plan of Care  Goal: Plan of Care Review  Outcome: Ongoing, Progressing     Problem: Infection  Goal: Absence of Infection Signs and Symptoms  Outcome: Ongoing, Progressing     Problem: Adult Inpatient Plan of Care  Goal: Absence of Hospital-Acquired Illness or Injury  Outcome: Ongoing, Progressing     Problem: Adult Inpatient Plan of Care  Goal: Optimal Comfort and Wellbeing  Outcome: Ongoing, Progressing     Problem: Adult Inpatient Plan of Care  Goal: Readiness for Transition of Care  Outcome: Ongoing, Progressing

## 2024-02-17 NOTE — DISCHARGE SUMMARY
[facility]  Discharge Summary      Patient Name: Ming Harrington  MRN: [id number]  MADIE: [id number]  Patient Class: IP- Inpatient  Admission Date: 2/15/2024  Hospital Length of Stay: 1 days  Discharge Date and Time:  02/17/2024 11:20 AM  Attending Physician: Jacinda Cooper MD   Discharging Provider: Garland Xiong DO  Primary Care Provider: Garland Xiong DO    Primary Care Team: Networked reference to record PCT     HPI:   No notes on file    * No surgery found *      Hospital Course:   Admitted for acute hypoxic respiratory failure in the setting of COVID-19 with COPD exacerbation and volume overload. He was treated with remdesivir and dexamethasone for COVID-19 and DuoNebs with azithromycin for COPD exacerbation.  IV Lasix for volume overload.  Patient did require ICU admission temporarily for BiPAP and was appropriately diuresed. He was successfully weaned to baseline oxygen status and was determined to be stable for discharge. He was advised to follow-up with [facility] in one-week. Referral placed to smoking cessation program as well as prescription for Chantix provided. Patient was stable at discharge.     Physical Exam  Constitutional:       Appearance: He is obese.   HENT:      Head: Normocephalic and atraumatic.   Eyes:      Pupils: Pupils are equal, round, and reactive to light.   Cardiovascular:      Rate and Rhythm: Normal rate and regular rhythm.   Pulmonary:      Effort: Pulmonary effort is normal.      Breath sounds: Wheezing present. No rhonchi or rales.      Comments: On RA with oxygen saturation of 90% on RA.   Abdominal:      Palpations: Abdomen is soft.      Tenderness: There is no abdominal tenderness.   Musculoskeletal:         General: Normal range of motion.      Right lower leg: No edema.      Left lower leg: No edema.   Skin:     General: Skin is warm.      Findings: No rash.   Neurological:      Mental Status: He is alert and oriented  to person, place, and time.   Psychiatric:         Mood and Affect: Mood normal.         Behavior: Behavior normal.       Goals of Care Treatment Preferences:  Code Status: Full Code      Consults:   Consults (From admission, onward)          Status Ordering Provider     Inpatient consult to Registered Dietitian/Nutritionist  Once        Provider:  (Not yet assigned)    Acknowledged ANNE WEEKS     Inpatient consult to Pulmonology  Once        Provider:  Vivian Ramey MD    Completed NEVA SHAVER            No new Assessment & Plan notes have been filed under this hospital service since the last note was generated.  Service: Hospital Medicine    Final Active Diagnoses:      Problems Resolved During this Admission:    Diagnosis Date Noted Date Resolved POA    PRINCIPAL PROBLEM:  Acute hypoxic respiratory failure [J96.01] 10/01/2017 02/17/2024 Yes       Discharged Condition: stable    Disposition: Home or Self Care    Follow Up:   Follow-up Information       MEDICAID TRANSPORTATION Follow up.    Why: [phone]  [employer] [facility] Follow up on 2/22/2024.    Specialty: Family Medicine  Why: 10:00 AM   you will see PAULA Will  Contact information:  [address] [place], [address]  [facility] [address] [phone]  Additional information:  Please park in [address] or D and use [address]. Take Medical Office Bldg. elevators.                         Patient Instructions:      Ambulatory referral/consult to Smoking Cessation Program   Standing Status: Future   Referral Priority: Routine Referral Type: Consultation   Referral Reason: Specialty Services Required   Requested Specialty: CTTS   Number of Visits Requested: 1       Significant Diagnostic Studies: N/A    Pending Diagnostic Studies:       None           Medications:  Reconciled Home Medications:      Medication List        START taking these medications      nicotine 21 mg/24  hr  Commonly known as: NICODERM CQ  Place 1 patch onto the skin once daily.     varenicline 0.5 mg (11)- 1 mg (42) tablet  Commonly known as: CHANTIX STARTING MONTH BOX  Take one 0.5mg tab by mouth once daily X3 days,then increase to one 0.5mg tab twice daily X4 days,then increase to one 1mg tab twice daily            CONTINUE taking these medications      * albuterol 90 mcg/actuation inhaler  Commonly known as: VENTOLIN HFA  Inhale 2 puffs into the lungs every 6 (six) hours as needed for Wheezing. Rescue     * albuterol 2.5 mg /3 mL (0.083 %) nebulizer solution  Commonly known as: PROVENTIL  Take 3 mLs (2.5 mg total) by nebulization every 4 (four) hours as needed for Wheezing or Shortness of Breath.     cloNIDine 0.1 MG tablet  Commonly known as: CATAPRES     dextromethorphan-guaiFENesin  mg/5 mL Liqd  Take 1 Dose by mouth every 4 (four) hours as needed (Cough).     EScitalopram oxalate 10 MG tablet  Commonly known as: LEXAPRO  Take 1 tablet (10 mg total) by mouth once daily.     furosemide 40 MG tablet  Commonly known as: LASIX  Take 1 tablet (40 mg total) by mouth 2 (two) times a day.     loratadine 10 mg tablet  Commonly known as: CLARITIN  Take 1 tablet (10 mg total) by mouth daily as needed for Allergies.     losartan 100 MG tablet  Commonly known as: COZAAR  Take 1 tablet (100 mg total) by mouth once daily.     magnesium citrate solution  Take 296 mLs by mouth daily as needed (Constipation).     polyethylene glycol 17 gram Pwpk  Commonly known as: GLYCOLAX  Take 17 g by mouth 2 (two) times daily.     PREVNAR 20 (PF) 0.5 mL Syrg injection  Generic drug: pneumoc 20-natacha conj-dip cr(PF)  Inject into the muscle.     promethazine 25 MG tablet  Commonly known as: PHENERGAN  Take 1 tablet (25 mg total) by mouth every 4 (four) hours.     SYMBICORT 160-4.5 mcg/actuation Hfaa  Generic drug: budesonide-formoterol 160-4.5 mcg  Inhale 2 puffs into the lungs every 12 (twelve) hours. Controller           * This list  has 2 medication(s) that are the same as other medications prescribed for you. Read the directions carefully, and ask your doctor or other care provider to review them with you.                  Indwelling Lines/Drains at time of discharge:   Lines/Drains/Airways       None                   Time spent on the discharge of patient: 30 minutes    Critical care time spent on the evaluation and treatment of severe organ dysfunction, review of pertinent labs and imaging studies, discussions with consulting providers and discussions with patient/family: 30 minutes.     Garland Xiong DO  [facility]  [facility]

## 2024-02-27 ENCOUNTER — DOCUMENTATION ONLY (OUTPATIENT)
Dept: SMOKING CESSATION | Facility: CLINIC | Age: 59
End: 2024-02-27
Payer: MEDICAID

## 2024-02-27 ENCOUNTER — PATIENT MESSAGE (OUTPATIENT)
Dept: SMOKING CESSATION | Facility: CLINIC | Age: 59
End: 2024-02-27
Payer: MEDICAID

## 2024-02-27 NOTE — PROGRESS NOTES
Patient was a no show for this appointment. Canceled visit due to need to schedule another patient today.

## 2024-02-27 NOTE — PROGRESS NOTES
Patient was a no show for this mornings tobacco cessation visit. Attempted to contact him to reschedule. Mail box is full. Could not leave message. Will email him too.

## 2024-02-29 ENCOUNTER — HOSPITAL ENCOUNTER (OUTPATIENT)
Facility: HOSPITAL | Age: 59
Discharge: HOME | End: 2024-03-01
Attending: STUDENT IN AN ORGANIZED HEALTH CARE EDUCATION/TRAINING PROGRAM | Admitting: STUDENT IN AN ORGANIZED HEALTH CARE EDUCATION/TRAINING PROGRAM
Payer: MEDICAID

## 2024-02-29 DIAGNOSIS — R06.02 SHORTNESS OF BREATH: Primary | ICD-10-CM

## 2024-02-29 DIAGNOSIS — J44.1 COPD EXACERBATION: ICD-10-CM

## 2024-02-29 DIAGNOSIS — J44.9 CHRONIC OBSTRUCTIVE PULMONARY DISEASE, UNSPECIFIED COPD TYPE: ICD-10-CM

## 2024-02-29 DIAGNOSIS — I10 HYPERTENSION, WELL CONTROLLED: ICD-10-CM

## 2024-02-29 DIAGNOSIS — R05.3 CHRONIC COUGH: ICD-10-CM

## 2024-02-29 DIAGNOSIS — N18.32 STAGE 3B CHRONIC KIDNEY DISEASE: ICD-10-CM

## 2024-02-29 LAB
BASOPHILS # BLD AUTO: 0.02 K/UL (ref 0–0.2)
BASOPHILS NFR BLD: 0.2 % (ref 0–1.9)
DIFFERENTIAL METHOD BLD: ABNORMAL
EOSINOPHIL # BLD AUTO: 0.1 K/UL (ref 0–0.5)
EOSINOPHIL NFR BLD: 1.3 % (ref 0–8)
ERYTHROCYTE [DISTWIDTH] IN BLOOD BY AUTOMATED COUNT: 12 % (ref 11.5–14.5)
HCT VFR BLD AUTO: 37.6 % (ref 40–54)
HGB BLD-MCNC: 12.5 G/DL (ref 14–18)
IMM GRANULOCYTES # BLD AUTO: 0.03 K/UL (ref 0–0.04)
IMM GRANULOCYTES NFR BLD AUTO: 0.4 % (ref 0–0.5)
LYMPHOCYTES # BLD AUTO: 1.2 K/UL (ref 1–4.8)
LYMPHOCYTES NFR BLD: 14.5 % (ref 18–48)
MCH RBC QN AUTO: 31.4 PG (ref 27–31)
MCHC RBC AUTO-ENTMCNC: 33.2 G/DL (ref 32–36)
MCV RBC AUTO: 95 FL (ref 82–98)
MONOCYTES # BLD AUTO: 0.5 K/UL (ref 0.3–1)
MONOCYTES NFR BLD: 5.9 % (ref 4–15)
NEUTROPHILS # BLD AUTO: 6.6 K/UL (ref 1.8–7.7)
NEUTROPHILS NFR BLD: 77.7 % (ref 38–73)
NRBC BLD-RTO: 0 /100 WBC
PLATELET # BLD AUTO: 157 K/UL (ref 150–450)
PMV BLD AUTO: 12 FL (ref 9.2–12.9)
RBC # BLD AUTO: 3.98 M/UL (ref 4.6–6.2)
WBC # BLD AUTO: 8.47 K/UL (ref 3.9–12.7)

## 2024-02-29 PROCEDURE — 99900035 HC TECH TIME PER 15 MIN (STAT)

## 2024-02-29 PROCEDURE — 27000221 HC OXYGEN, UP TO 24 HOURS

## 2024-02-29 PROCEDURE — 85025 COMPLETE CBC W/AUTO DIFF WBC: CPT | Performed by: STUDENT IN AN ORGANIZED HEALTH CARE EDUCATION/TRAINING PROGRAM

## 2024-02-29 PROCEDURE — 84484 ASSAY OF TROPONIN QUANT: CPT | Performed by: STUDENT IN AN ORGANIZED HEALTH CARE EDUCATION/TRAINING PROGRAM

## 2024-02-29 PROCEDURE — 99285 EMERGENCY DEPT VISIT HI MDM: CPT | Mod: 25

## 2024-02-29 PROCEDURE — 83880 ASSAY OF NATRIURETIC PEPTIDE: CPT | Performed by: STUDENT IN AN ORGANIZED HEALTH CARE EDUCATION/TRAINING PROGRAM

## 2024-02-29 PROCEDURE — 96374 THER/PROPH/DIAG INJ IV PUSH: CPT

## 2024-02-29 PROCEDURE — 25000242 PHARM REV CODE 250 ALT 637 W/ HCPCS

## 2024-02-29 PROCEDURE — 63600175 PHARM REV CODE 636 W HCPCS

## 2024-02-29 PROCEDURE — 94761 N-INVAS EAR/PLS OXIMETRY MLT: CPT

## 2024-02-29 PROCEDURE — 80053 COMPREHEN METABOLIC PANEL: CPT | Performed by: STUDENT IN AN ORGANIZED HEALTH CARE EDUCATION/TRAINING PROGRAM

## 2024-02-29 PROCEDURE — 94640 AIRWAY INHALATION TREATMENT: CPT

## 2024-02-29 RX ORDER — ENOXAPARIN SODIUM 100 MG/ML
40 INJECTION SUBCUTANEOUS EVERY 24 HOURS
Status: DISCONTINUED | OUTPATIENT
Start: 2024-03-01 | End: 2024-03-01

## 2024-02-29 RX ORDER — ONDANSETRON HYDROCHLORIDE 2 MG/ML
4 INJECTION, SOLUTION INTRAVENOUS
Status: COMPLETED | OUTPATIENT
Start: 2024-02-29 | End: 2024-02-29

## 2024-02-29 RX ORDER — IPRATROPIUM BROMIDE AND ALBUTEROL SULFATE 2.5; .5 MG/3ML; MG/3ML
3 SOLUTION RESPIRATORY (INHALATION)
Status: COMPLETED | OUTPATIENT
Start: 2024-02-29 | End: 2024-02-29

## 2024-02-29 RX ORDER — ACETAMINOPHEN 325 MG/1
650 TABLET ORAL EVERY 4 HOURS PRN
Status: DISCONTINUED | OUTPATIENT
Start: 2024-03-01 | End: 2024-03-01 | Stop reason: HOSPADM

## 2024-02-29 RX ORDER — ONDANSETRON HYDROCHLORIDE 2 MG/ML
4 INJECTION, SOLUTION INTRAVENOUS EVERY 8 HOURS PRN
Status: DISCONTINUED | OUTPATIENT
Start: 2024-03-01 | End: 2024-03-01 | Stop reason: HOSPADM

## 2024-02-29 RX ORDER — GLUCAGON 1 MG
1 KIT INJECTION
Status: DISCONTINUED | OUTPATIENT
Start: 2024-03-01 | End: 2024-03-01 | Stop reason: HOSPADM

## 2024-02-29 RX ORDER — AMOXICILLIN 250 MG
1 CAPSULE ORAL 2 TIMES DAILY PRN
Status: DISCONTINUED | OUTPATIENT
Start: 2024-03-01 | End: 2024-03-01 | Stop reason: HOSPADM

## 2024-02-29 RX ORDER — IBUPROFEN 200 MG
24 TABLET ORAL
Status: DISCONTINUED | OUTPATIENT
Start: 2024-03-01 | End: 2024-03-01 | Stop reason: HOSPADM

## 2024-02-29 RX ORDER — NALOXONE HCL 0.4 MG/ML
0.02 VIAL (ML) INJECTION
Status: DISCONTINUED | OUTPATIENT
Start: 2024-03-01 | End: 2024-03-01 | Stop reason: HOSPADM

## 2024-02-29 RX ORDER — TALC
6 POWDER (GRAM) TOPICAL NIGHTLY PRN
Status: DISCONTINUED | OUTPATIENT
Start: 2024-03-01 | End: 2024-03-01 | Stop reason: HOSPADM

## 2024-02-29 RX ORDER — SODIUM CHLORIDE 0.9 % (FLUSH) 0.9 %
5 SYRINGE (ML) INJECTION
Status: DISCONTINUED | OUTPATIENT
Start: 2024-03-01 | End: 2024-03-01 | Stop reason: HOSPADM

## 2024-02-29 RX ORDER — PREDNISONE 20 MG/1
60 TABLET ORAL
Status: COMPLETED | OUTPATIENT
Start: 2024-02-29 | End: 2024-02-29

## 2024-02-29 RX ORDER — IBUPROFEN 200 MG
16 TABLET ORAL
Status: DISCONTINUED | OUTPATIENT
Start: 2024-03-01 | End: 2024-03-01 | Stop reason: HOSPADM

## 2024-02-29 RX ADMIN — IPRATROPIUM BROMIDE AND ALBUTEROL SULFATE 3 ML: 2.5; .5 SOLUTION RESPIRATORY (INHALATION) at 08:02

## 2024-02-29 RX ADMIN — PREDNISONE 60 MG: 20 TABLET ORAL at 08:02

## 2024-02-29 RX ADMIN — ONDANSETRON 4 MG: 2 INJECTION INTRAMUSCULAR; INTRAVENOUS at 08:02

## 2024-03-01 VITALS
BODY MASS INDEX: 40.76 KG/M2 | TEMPERATURE: 98 F | SYSTOLIC BLOOD PRESSURE: 143 MMHG | HEIGHT: 68 IN | WEIGHT: 268.94 LBS | OXYGEN SATURATION: 95 % | RESPIRATION RATE: 18 BRPM | HEART RATE: 75 BPM | DIASTOLIC BLOOD PRESSURE: 96 MMHG

## 2024-03-01 PROBLEM — J44.1 COPD EXACERBATION: Chronic | Status: ACTIVE | Noted: 2017-07-21

## 2024-03-01 LAB
ALBUMIN SERPL BCP-MCNC: 3 G/DL (ref 3.5–5.2)
ALBUMIN SERPL BCP-MCNC: 3.3 G/DL (ref 3.5–5.2)
ALLENS TEST: ABNORMAL
ALP SERPL-CCNC: 87 U/L (ref 55–135)
ALP SERPL-CCNC: 97 U/L (ref 55–135)
ALT SERPL W/O P-5'-P-CCNC: 14 U/L (ref 10–44)
ALT SERPL W/O P-5'-P-CCNC: 15 U/L (ref 10–44)
ANION GAP SERPL CALC-SCNC: 14 MMOL/L (ref 8–16)
ANION GAP SERPL CALC-SCNC: 9 MMOL/L (ref 8–16)
AST SERPL-CCNC: 14 U/L (ref 10–40)
AST SERPL-CCNC: 19 U/L (ref 10–40)
BASOPHILS # BLD AUTO: 0.02 K/UL (ref 0–0.2)
BASOPHILS NFR BLD: 0.3 % (ref 0–1.9)
BILIRUB SERPL-MCNC: 0.4 MG/DL (ref 0.1–1)
BILIRUB SERPL-MCNC: 0.4 MG/DL (ref 0.1–1)
BNP SERPL-MCNC: 53 PG/ML (ref 0–99)
BUN SERPL-MCNC: 18 MG/DL (ref 6–20)
BUN SERPL-MCNC: 18 MG/DL (ref 6–20)
CALCIUM SERPL-MCNC: 8.7 MG/DL (ref 8.7–10.5)
CALCIUM SERPL-MCNC: 8.8 MG/DL (ref 8.7–10.5)
CHLORIDE SERPL-SCNC: 101 MMOL/L (ref 95–110)
CHLORIDE SERPL-SCNC: 102 MMOL/L (ref 95–110)
CO2 SERPL-SCNC: 26 MMOL/L (ref 23–29)
CO2 SERPL-SCNC: 28 MMOL/L (ref 23–29)
CREAT SERPL-MCNC: 1.8 MG/DL (ref 0.5–1.4)
CREAT SERPL-MCNC: 1.9 MG/DL (ref 0.5–1.4)
DIFFERENTIAL METHOD BLD: ABNORMAL
EOSINOPHIL # BLD AUTO: 0 K/UL (ref 0–0.5)
EOSINOPHIL NFR BLD: 0 % (ref 0–8)
ERYTHROCYTE [DISTWIDTH] IN BLOOD BY AUTOMATED COUNT: 12.1 % (ref 11.5–14.5)
EST. GFR  (NO RACE VARIABLE): 40 ML/MIN/1.73 M^2
EST. GFR  (NO RACE VARIABLE): 43 ML/MIN/1.73 M^2
FIO2: 21 %
GLUCOSE SERPL-MCNC: 146 MG/DL (ref 70–110)
GLUCOSE SERPL-MCNC: 75 MG/DL (ref 70–110)
HCT VFR BLD AUTO: 36.1 % (ref 40–54)
HGB BLD-MCNC: 11.6 G/DL (ref 14–18)
IMM GRANULOCYTES # BLD AUTO: 0.03 K/UL (ref 0–0.04)
IMM GRANULOCYTES NFR BLD AUTO: 0.4 % (ref 0–0.5)
LYMPHOCYTES # BLD AUTO: 0.4 K/UL (ref 1–4.8)
LYMPHOCYTES NFR BLD: 5.5 % (ref 18–48)
MAGNESIUM SERPL-MCNC: 2.1 MG/DL (ref 1.6–2.6)
MCH RBC QN AUTO: 30.8 PG (ref 27–31)
MCHC RBC AUTO-ENTMCNC: 32.1 G/DL (ref 32–36)
MCV RBC AUTO: 96 FL (ref 82–98)
MONOCYTES # BLD AUTO: 0.1 K/UL (ref 0.3–1)
MONOCYTES NFR BLD: 1.3 % (ref 4–15)
NEUTROPHILS # BLD AUTO: 7.2 K/UL (ref 1.8–7.7)
NEUTROPHILS NFR BLD: 92.5 % (ref 38–73)
NRBC BLD-RTO: 0 /100 WBC
PCO2 BLDA: 70.8 MMHG (ref 35–45)
PH SMN: 7.28 [PH] (ref 7.35–7.45)
PHOSPHATE SERPL-MCNC: 3.3 MG/DL (ref 2.7–4.5)
PLATELET # BLD AUTO: 140 K/UL (ref 150–450)
PMV BLD AUTO: 10.3 FL (ref 9.2–12.9)
PO2 BLDA: 47.8 MMHG (ref 80–100)
POC BASE DEFICIT: 4.1 MMOL/L (ref -2–2)
POC HCO3: 32.9 MMOL/L (ref 24–28)
POC PERFORMED BY: ABNORMAL
POC SATURATED O2: 79.6 % (ref 95–100)
POCT GLUCOSE: 141 MG/DL (ref 70–110)
POTASSIUM SERPL-SCNC: 4.6 MMOL/L (ref 3.5–5.1)
POTASSIUM SERPL-SCNC: 4.9 MMOL/L (ref 3.5–5.1)
PROT SERPL-MCNC: 7.3 G/DL (ref 6–8.4)
PROT SERPL-MCNC: 8.2 G/DL (ref 6–8.4)
RBC # BLD AUTO: 3.77 M/UL (ref 4.6–6.2)
SODIUM SERPL-SCNC: 138 MMOL/L (ref 136–145)
SODIUM SERPL-SCNC: 142 MMOL/L (ref 136–145)
SPECIMEN SOURCE: ABNORMAL
TROPONIN I SERPL DL<=0.01 NG/ML-MCNC: <0.006 NG/ML (ref 0–0.03)
WBC # BLD AUTO: 7.8 K/UL (ref 3.9–12.7)

## 2024-03-01 PROCEDURE — 63600175 PHARM REV CODE 636 W HCPCS

## 2024-03-01 PROCEDURE — 25000242 PHARM REV CODE 250 ALT 637 W/ HCPCS

## 2024-03-01 PROCEDURE — 82803 BLOOD GASES ANY COMBINATION: CPT

## 2024-03-01 PROCEDURE — G0378 HOSPITAL OBSERVATION PER HR: HCPCS

## 2024-03-01 PROCEDURE — 25000003 PHARM REV CODE 250: Performed by: STUDENT IN AN ORGANIZED HEALTH CARE EDUCATION/TRAINING PROGRAM

## 2024-03-01 PROCEDURE — 96374 THER/PROPH/DIAG INJ IV PUSH: CPT | Mod: 59

## 2024-03-01 PROCEDURE — 36415 COLL VENOUS BLD VENIPUNCTURE: CPT

## 2024-03-01 PROCEDURE — 36600 WITHDRAWAL OF ARTERIAL BLOOD: CPT

## 2024-03-01 PROCEDURE — 96372 THER/PROPH/DIAG INJ SC/IM: CPT | Performed by: HOSPITALIST

## 2024-03-01 PROCEDURE — 25000003 PHARM REV CODE 250

## 2024-03-01 PROCEDURE — 99900035 HC TECH TIME PER 15 MIN (STAT)

## 2024-03-01 PROCEDURE — 27000221 HC OXYGEN, UP TO 24 HOURS

## 2024-03-01 PROCEDURE — 80053 COMPREHEN METABOLIC PANEL: CPT

## 2024-03-01 PROCEDURE — 84100 ASSAY OF PHOSPHORUS: CPT

## 2024-03-01 PROCEDURE — 63600175 PHARM REV CODE 636 W HCPCS: Performed by: HOSPITALIST

## 2024-03-01 PROCEDURE — 94640 AIRWAY INHALATION TREATMENT: CPT

## 2024-03-01 PROCEDURE — 83735 ASSAY OF MAGNESIUM: CPT

## 2024-03-01 PROCEDURE — 94761 N-INVAS EAR/PLS OXIMETRY MLT: CPT | Mod: XB

## 2024-03-01 PROCEDURE — 63700000 PHARM REV CODE 250 ALT 637 W/O HCPCS

## 2024-03-01 PROCEDURE — 85025 COMPLETE CBC W/AUTO DIFF WBC: CPT

## 2024-03-01 RX ORDER — PREDNISONE 20 MG/1
40 TABLET ORAL DAILY
Status: DISCONTINUED | OUTPATIENT
Start: 2024-03-01 | End: 2024-03-01 | Stop reason: HOSPADM

## 2024-03-01 RX ORDER — LOSARTAN POTASSIUM 50 MG/1
100 TABLET ORAL DAILY
Status: DISCONTINUED | OUTPATIENT
Start: 2024-03-01 | End: 2024-03-01 | Stop reason: HOSPADM

## 2024-03-01 RX ORDER — PREDNISONE 20 MG/1
40 TABLET ORAL DAILY
Qty: 8 TABLET | Refills: 0 | Status: SHIPPED | OUTPATIENT
Start: 2024-03-01 | End: 2024-03-05

## 2024-03-01 RX ORDER — ESCITALOPRAM OXALATE 10 MG/1
10 TABLET ORAL DAILY
Status: DISCONTINUED | OUTPATIENT
Start: 2024-03-01 | End: 2024-03-01 | Stop reason: HOSPADM

## 2024-03-01 RX ORDER — LORATADINE 10 MG/1
10 TABLET ORAL DAILY PRN
Qty: 90 TABLET | Refills: 0 | Status: SHIPPED | OUTPATIENT
Start: 2024-03-01 | End: 2024-05-30

## 2024-03-01 RX ORDER — BENZONATATE 100 MG/1
100 CAPSULE ORAL 3 TIMES DAILY PRN
Status: DISCONTINUED | OUTPATIENT
Start: 2024-03-01 | End: 2024-03-01 | Stop reason: HOSPADM

## 2024-03-01 RX ORDER — BUDESONIDE AND FORMOTEROL FUMARATE DIHYDRATE 160; 4.5 UG/1; UG/1
2 AEROSOL RESPIRATORY (INHALATION) EVERY 12 HOURS
Qty: 10.2 G | Refills: 0 | Status: SHIPPED | OUTPATIENT
Start: 2024-03-01 | End: 2024-03-07 | Stop reason: SDUPTHER

## 2024-03-01 RX ORDER — AZITHROMYCIN 250 MG/1
500 TABLET, FILM COATED ORAL DAILY
Status: DISCONTINUED | OUTPATIENT
Start: 2024-03-01 | End: 2024-03-01 | Stop reason: HOSPADM

## 2024-03-01 RX ORDER — CLONIDINE HYDROCHLORIDE 0.1 MG/1
0.1 TABLET ORAL 2 TIMES DAILY
Qty: 180 TABLET | Refills: 0 | Status: SHIPPED | OUTPATIENT
Start: 2024-03-01 | End: 2024-03-25

## 2024-03-01 RX ORDER — LOSARTAN POTASSIUM 100 MG/1
100 TABLET ORAL DAILY
Qty: 90 TABLET | Refills: 0 | Status: SHIPPED | OUTPATIENT
Start: 2024-03-01 | End: 2024-03-25

## 2024-03-01 RX ORDER — VARENICLINE TARTRATE 0.5 (11)-1
KIT ORAL
Qty: 53 TABLET | Refills: 0 | Status: SHIPPED | OUTPATIENT
Start: 2024-03-01 | End: 2024-05-13

## 2024-03-01 RX ORDER — ENOXAPARIN SODIUM 100 MG/ML
40 INJECTION SUBCUTANEOUS EVERY 12 HOURS
Status: DISCONTINUED | OUTPATIENT
Start: 2024-03-01 | End: 2024-03-01 | Stop reason: HOSPADM

## 2024-03-01 RX ORDER — IBUPROFEN 200 MG
1 TABLET ORAL DAILY
Qty: 28 PATCH | Refills: 0 | Status: SHIPPED | OUTPATIENT
Start: 2024-03-01 | End: 2024-05-13

## 2024-03-01 RX ORDER — ALBUTEROL SULFATE 90 UG/1
2 AEROSOL, METERED RESPIRATORY (INHALATION) EVERY 6 HOURS PRN
Qty: 18 G | Refills: 0 | Status: SHIPPED | OUTPATIENT
Start: 2024-03-01 | End: 2024-03-07 | Stop reason: SDUPTHER

## 2024-03-01 RX ORDER — IPRATROPIUM BROMIDE AND ALBUTEROL SULFATE 2.5; .5 MG/3ML; MG/3ML
3 SOLUTION RESPIRATORY (INHALATION) EVERY 6 HOURS
Status: DISCONTINUED | OUTPATIENT
Start: 2024-03-01 | End: 2024-03-01 | Stop reason: HOSPADM

## 2024-03-01 RX ORDER — ACETAMINOPHEN 500 MG
1000 TABLET ORAL ONCE
Status: COMPLETED | OUTPATIENT
Start: 2024-03-01 | End: 2024-03-01

## 2024-03-01 RX ORDER — CLONIDINE HYDROCHLORIDE 0.1 MG/1
0.1 TABLET ORAL 2 TIMES DAILY
Status: DISCONTINUED | OUTPATIENT
Start: 2024-03-01 | End: 2024-03-01 | Stop reason: HOSPADM

## 2024-03-01 RX ORDER — ALBUTEROL SULFATE 0.83 MG/ML
2.5 SOLUTION RESPIRATORY (INHALATION) EVERY 4 HOURS PRN
Qty: 90 ML | Refills: 0 | Status: SHIPPED | OUTPATIENT
Start: 2024-03-01 | End: 2024-03-07 | Stop reason: SDUPTHER

## 2024-03-01 RX ORDER — FUROSEMIDE 40 MG/1
40 TABLET ORAL 2 TIMES DAILY
Qty: 180 TABLET | Refills: 0 | Status: SHIPPED | OUTPATIENT
Start: 2024-03-01 | End: 2024-04-12 | Stop reason: SDUPTHER

## 2024-03-01 RX ORDER — AZITHROMYCIN 500 MG/1
500 TABLET, FILM COATED ORAL DAILY
Qty: 2 TABLET | Refills: 0 | Status: SHIPPED | OUTPATIENT
Start: 2024-03-02 | End: 2024-03-07

## 2024-03-01 RX ORDER — FUROSEMIDE 10 MG/ML
40 INJECTION INTRAMUSCULAR; INTRAVENOUS EVERY 12 HOURS
Status: DISCONTINUED | OUTPATIENT
Start: 2024-03-01 | End: 2024-03-01 | Stop reason: HOSPADM

## 2024-03-01 RX ORDER — METHADONE HYDROCHLORIDE 10 MG/1
90 TABLET ORAL EVERY MORNING
Status: DISCONTINUED | OUTPATIENT
Start: 2024-03-01 | End: 2024-03-01 | Stop reason: HOSPADM

## 2024-03-01 RX ADMIN — CLONIDINE HYDROCHLORIDE 0.1 MG: 0.1 TABLET ORAL at 08:03

## 2024-03-01 RX ADMIN — ENOXAPARIN SODIUM 40 MG: 40 INJECTION SUBCUTANEOUS at 11:03

## 2024-03-01 RX ADMIN — METHADONE HYDROCHLORIDE 90 MG: 10 TABLET ORAL at 08:03

## 2024-03-01 RX ADMIN — FUROSEMIDE 40 MG: 10 INJECTION, SOLUTION INTRAVENOUS at 08:03

## 2024-03-01 RX ADMIN — LOSARTAN POTASSIUM 100 MG: 50 TABLET, FILM COATED ORAL at 08:03

## 2024-03-01 RX ADMIN — ESCITALOPRAM OXALATE 10 MG: 10 TABLET ORAL at 08:03

## 2024-03-01 RX ADMIN — ACETAMINOPHEN 1000 MG: 500 TABLET ORAL at 08:03

## 2024-03-01 RX ADMIN — IPRATROPIUM BROMIDE AND ALBUTEROL SULFATE 3 ML: 2.5; .5 SOLUTION RESPIRATORY (INHALATION) at 07:03

## 2024-03-01 RX ADMIN — AZITHROMYCIN DIHYDRATE 500 MG: 250 TABLET ORAL at 08:03

## 2024-03-01 NOTE — PROGRESS NOTES
Discharge orders noted. Additional clinical references attached. Patient's discharge instructions given by bedside RN. Virtual nurse cued into room and reviewed discharge instructions. Education provided on new medication, diagnosis, and follow-up appointments. Teach back method used. Patient verbalized understanding. All questions answered. Patient to take shower and eat lunch prior to leaving. Bedside nurse updated on patient status and to request transportation to [facility] when ready.   03/01/24 1249   AVS Confirmation   Discharge instructions and AVS given to and reviewed with patient and/or significant other. Yes

## 2024-03-01 NOTE — ED NOTES
Report received from MINI Luque paramedic.    Patient resting in bed with sister at bedside. Patient awake, alert, oriented x 4. Dyspnea at rest. Abdominal breathing. Screaming for pudding. Patient placed on monitor and changed into gown.
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
## 2024-03-01 NOTE — PROGRESS NOTES
"[facility]  [facility]ult [facility]  Progress Note    SUMMARY     Recommendations  Recommendation:   1. Continue Cardiac, Renal diet 1800 ml fluid restriciton   2. Add diabetic 2000 kcals restriction   3. Monitor need for ONS   4. Monitor weight and labs    Goals: Pt will be on diet by RD follow up    Nutrition Goal Status: new  Communication of RD Recs: other (comment) (POC)    Assessment and Plan  Nutrition Problem  Altered nutrition related labs     Related to (etiology):   DM2    Signs and Symptoms (as evidenced by):   Glucose 146H     Interventions:  Collaboration with other providers   CHO consistent Diet     Nutrition Diagnosis Status:   New    Reason for Assessment  Reason For Assessment: identified at risk by screening criteria  Diagnosis: cardiac disease  Relevant Medical History: COPD, HTN, Hep C, Hernia, CHF, incisional hernia , hyperkalemia, DM, CKD3  Interdisciplinary Rounds: did not attend  General Information Comments: Pt is currently on a cardiac renal-1800ml diet. Fahad-18/skin intact. Per chart review no meal intake noted. Noted pt has lost 9 lbs in 2 months. Unabelt to conduct NFPE at this time d/t pt on isolation for COVID.  Nutrition Discharge Planning: d/c on cardiac/diabetic/renal diet    Nutrition Risk Screen  Nutrition Risk Screen: no indicators present    Nutrition/Diet History  Patient Reported Diet/Restrictions/Preferences: diabetic diet, heart healthy, renal  Food Preferences: [place]  Spiritual, Cultural Beliefs, Judaism Practices, Values that Affect Care: no  Factors Affecting Nutritional Intake: None identified at this time    Anthropometrics  Temp: 97.4 °F (36.3 °C)  Height Method: Stated  Height: 5' 8" (172.7 cm)  Height (inches): 68 in  Weight Method: Bed Scale  Weight: 122 kg (268 lb 15.4 oz)  Weight (lb): 268.96 lb  Ideal Body Weight (IBW), Male: 154 lb  % Ideal Body Weight, Male (lb): 174.65 %  BMI (Calculated): 40.9  BMI Grade: greater than 40 - morbid obesity  Usual Body " Weight (UBW), k kg (10/10/23)  % Usual Body Weight: 97.03  % Weight Change From Usual Weight: -3.18 %     Lab/Procedures/Meds  Pertinent Labs Reviewed: reviewed  Pertinent Labs Comments: Creatinine 1.9H, GFR 40L, Glucose 146H  Pertinent Medications Reviewed: reviewed  Pertinent Medications Comments: furosemide    Estimated/Assessed Needs  Weight Used For Calorie Calculations: 70 kg (154 lb 5.2 oz) (IBW)  Energy Calorie Requirements (kcal): 2100 kcals (30 kcals/kg IBW)  Energy Need Method: Kcal/kg  Protein Requirements: 70g (1g/kg IBW)  Weight Used For Protein Calculations: 70 kg (154 lb 5.2 oz) (IBW)     Estimated Fluid Requirement Method: RDA Method  RDA Method (mL): 2100  CHO Requirement: 260g    Nutrition Prescription Ordered  Current Diet Order: Cardiac renal diet -1800ml fluid restriction    Evaluation of Received Nutrient/Fluid Intake  I/O: 240/450  Energy Calories Required: not meeting needs  Protein Required: not meeting needs  Fluid Required: not meeting needs  Comments: LBM-24  Tolerance: not tolerating  % Intake of Estimated Energy Needs: Other: No meal intake noted   % Meal Intake: Other: No meal intake noted     Nutrition Risk  Level of Risk/Frequency of Follow-up: low - moderate (1x/weekly)     Monitor and Evaluation  Food and Nutrient Intake: energy intake, food and beverage intake  Food and Nutrient Adminstration: diet order  Anthropometric Measurements: weight, weight change, body mass index  Biochemical Data, Medical Tests and Procedures: electrolyte and renal panel, gastrointestinal profile, glucose/endocrine profile, lipid profile     Nutrition Follow-Up  RD Follow-up?: Yes

## 2024-03-01 NOTE — PLAN OF CARE
Pt clear to D/C home today. Pt has nebulizer and home O2 with [facility]. SW set up Pt f/u appts. Pt HHS arranged with [facility] and will admit Pt on 3/7/4. No other CM needs identified. Pt clear from CM.     Future Appointments   Date Time Provider Department Center   3/7/2024  1:00 PM David Beckett PA-C [facility]        03/01/24 1210   Final Note   Assessment Type Final Discharge Note   Anticipated Discharge Disposition Home-Health   What phone number can be called within the next 1-3 days to see how you are doing after discharge? [phone]   Hospital Resources/Appts/Education Provided Appointments scheduled and added to AVS   Post-Acute Status   Post-Acute Authorization Home Health   Home Health Status Set-up Complete/Auth obtained  ([facility])   Discharge Delays None known at this time

## 2024-03-01 NOTE — PLAN OF CARE
SW spoke with Pt. Pt was independent with ADL's but is requiring more assistance. Pt has a nebulizer and home O2 with [facility]. Pt requested HHS if possible at D/C. Pt also requesting assistance with transportation. No other needs identified. SW to assist with any future needs.     SW requested Pt f/u appts.    SW sent request to several [facility] services     [place] - Telemetry  Discharge Assessment    Primary Care Provider: Garland Xiong DO     Discharge Assessment (most recent)       BRIEF DISCHARGE ASSESSMENT - 03/01/24 1126          Discharge Planning    Assessment Type Discharge Planning Brief Assessment     Resource/Environmental Concerns none     Support Systems Family members     Assistance Needed Home Health Services     Equipment Currently Used at Home oxygen;nebulizer (P)      Current Living Arrangements home (P)      Patient/Family Anticipates Transition to home;home with family;home with help/services (P)      Patient/Family Anticipated Services at Transition none (P)      DME Needed Upon Discharge  none (P)      Discharge Plan A Home;Home with family (P)      Discharge Plan B Home Health (P)                    Nayana Yates LMSW  Phone: [phone]  [facility]
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
## 2024-03-01 NOTE — ASSESSMENT & PLAN NOTE
On last admission, patient received methadone 90mg as maintenance for his opioid use disorder.    Plan:  Will call Methadone clinic to confirm dosage and resume treatment if appropriate

## 2024-03-01 NOTE — ED TRIAGE NOTES
Pt presents to ED today c/o wheezing unrelieved by home breathing treatments  Recently had covid   NAD noted

## 2024-03-01 NOTE — ASSESSMENT & PLAN NOTE
The patient's SOB began 2 days ago. He states that since is discharge on 2/17, he has not had any medications for his COPD. The patient had wheezes in both the upper and lower lung fields bilaterally, however, his O2 sat was 94% on 1.5L and he had no increased work of breathing or altered mental status or alertness. The ABG showed a pH of 7.276, PCO2: 70.8, and PO2: 47.8. Possible etiologies include COPD exacerbation, HF exacerbation/fluid overload, drug induced, obesity hypoventilation.     Plan:  Duonebs Q6 hours, prednisone 40 QD x5 days, Azithromycin 500mg daily x3 days for possible COPD exacerbation  Will diurese with IV40 lasix bid  BiPAP nightly   BNP ordered, will follow for results

## 2024-03-01 NOTE — PROGRESS NOTES
03/01/24 0123   Patient Request   Patient Requested sandwich or meal tray   Nurse Notification   Bedside Nurse Notified? Yes   Name of Bedside Nurse ARIANNA Morin   Nurse Notfication Method Secure Chat   Nurse Notified Of Patient Request   Admission   Initial VN Admission Questions Complete   Shift   Virtual Nurse - Patient Verbalized Approval Of Camera Use;VN Rounding   Safety/Activity   Patient Rounds bed in low position;clutter free environment maintained;call light in patient/parent reach;visualized patient;placement of personal items at bedside   Safety Promotion/Fall Prevention assistive device/personal item within reach;commode/urinal/bedpan at bedside;Fall Risk reviewed with patient/family;side rails raised x 2   Positioning   Body Position supine   Head of Bed (HOB) Positioning HOB at 30-45 degrees     VN cued in to pt's room with permission. Admission questions completed. Plan of care reviewed with pt. Call bell w/in reach. Instructed to call for needs/assist oob.

## 2024-03-01 NOTE — PLAN OF CARE
Recommendations  Recommendation:   1. Continue Cardiac, Renal diet 1800 ml fluid restriciton   2. Add diabetic 2000 kcals restriction   3. Monitor need for ONS   4. Monitor weight and labs    Goals: Pt will be on diet by RD follow up    Nutrition Goal Status: new  Communication of RD Recs: other (comment) (POC)

## 2024-03-01 NOTE — PROGRESS NOTES
Pharmacist Renal Dose Adjustment Note    Ming Harrington is a 58 y.o. male being treated with the medication enoxaparin    Patient Data:    Vital Signs (Most Recent):  Temp: 97.4 °F (36.3 °C) (03/01/24 0805)  Pulse: 66 (03/01/24 0805)  Resp: 18 (03/01/24 0825)  BP: 124/76 (03/01/24 0805)  SpO2: (!) 92 % (03/01/24 0805) Vital Signs (72h Range):  Temp:  [97.4 °F (36.3 °C)-98.3 °F (36.8 °C)]   Pulse:  [66-85]   Resp:  [15-23]   BP: (112-159)/()   SpO2:  [89 %-99 %]      Recent Labs   Lab 02/29/24 2329 03/01/24  0245   CREATININE 1.8* 1.9*     Serum creatinine: 1.9 mg/dL (H) 03/01/24 0245  Estimated creatinine clearance: 53.2 mL/min (A)    Medication:enoxaparin dose: 40mg frequency q24h will be changed to medication:enoxaparin dose:40mg frequency:bid    Pharmacist's Name: Patrice Camarena  Pharmacist's Extension: [phone]

## 2024-03-01 NOTE — HPI
"Ming Harrington is a 57y/o male with a past medical history congestive heart failure, COPD, MICHELLE, hepatitis-C, hypertension, chronic hypercapnia (50s), IV drug use who presents to [facility] for 2 days of shortness a breath.  The patient states he has been experiencing shortness on breath and associated cough with the production of light brown phlegm with occasional blood-streaked.  He was previously admitted to hospital for acute hypoxic hypercapnic respiratory failure requiring BiPAP and ICU level care.  He states that following this admission, he did not have refills on his medications including his COPD medication. He denies any chest pain, lightheadedness, dizziness, but does endorse occasional nausea. He states that at home, he is on 2-3 L Nasal cannula.     In the ED, BP: 159/116, HR: 85, Temp: 98.3F,SpO2: 94% on 1.5L. The patient CBC: WBC: 8.47, H/H: 12.5/37.6. CXR: "bibasilar ground-glass airspace opacities, suggestive of pneumonitis. " He received 3 Nebulizer treatments and 1 dose of prednisone 60mg. He was admitted to hospitals Family [facility] for possible acute COPD exacebation vs volume overload.  "
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
## 2024-03-02 NOTE — HOSPITAL COURSE
Patient admitted for COPD exacerbation on 2/29/2024 secondary to medication noncompliance, patient reported that he did not have any home medications since 02/17.  Patient is on 2 L oxygen NC at home. ABG showed a pH of 7.276, PCO2: 70.8, and PO2: 47.8. Possible etiologies include COPD exacerbation, HF exacerbation/fluid overload, drug induced, obesity hypoventilation.  Patient treated with Duonebs q6h, prednisone 40 QD x5 days, Azithromycin 500mg daily x3 days along with IV Lasix 40 mg BID. BiPAP ordered.  Patient continued to improve clinically and remained stable on 1.5-2 L oxygen NC.  Home medications were ordered to the bedside and strong emphasis on close follow-up with PCP encouraged. Pt stable at time of discharge, verbalized understanding of plan and was appropriate for discharge. All questions answered and ED return precautions provided.  Patient to follow-up with PCP outpatient.

## 2024-03-02 NOTE — DISCHARGE SUMMARY
"[facility]  Discharge Summary      Patient Name: Ming Harrington  MRN: [id number]  MADIE: [id number]  Patient Class: OP- Observation  Admission Date: 2/29/2024  Hospital Length of Stay: 0 days  Discharge Date and Time: 3/1/2024  2:40 PM  Attending Physician: Dr. Karl Gillespie  Discharging Provider: Karely Engle MD  Primary Care Provider: Garland Xiong DO    Primary Care Team: Networked reference to record PCT     HPI:   Ming Harrington is a 59y/o male with a past medical history congestive heart failure, COPD, MICHELLE, hepatitis-C, hypertension, chronic hypercapnia (50s), IV drug use who presents to [facility] for 2 days of shortness a breath.  The patient states he has been experiencing shortness on breath and associated cough with the production of light brown phlegm with occasional blood-streaked.  He was previously admitted to hospital for acute hypoxic hypercapnic respiratory failure requiring BiPAP and ICU level care.  He states that following this admission, he did not have refills on his medications including his COPD medication. He denies any chest pain, lightheadedness, dizziness, but does endorse occasional nausea. He states that at home, he is on 2-3 L Nasal cannula.     In the ED, BP: 159/116, HR: 85, Temp: 98.3F,SpO2: 94% on 1.5L. The patient CBC: WBC: 8.47, H/H: 12.5/37.6. CXR: "bibasilar ground-glass airspace opacities, suggestive of pneumonitis. " He received 3 Nebulizer treatments and 1 dose of prednisone 60mg. He was admitted to \[facility]\"" Family Medicine for possible acute COPD exacebation vs volume overload.    * No surgery found *      Hospital Course:   Patient admitted for COPD exacerbation on 2/29/2024 secondary to medication noncompliance, patient reported that he did not have any home medications since 02/17.  Patient is on 2 L oxygen NC at home. ABG showed a pH of 7.276, PCO2: 70.8, and PO2: 47.8. Possible etiologies include COPD exacerbation, HF exacerbation/fluid overload, drug " induced, obesity hypoventilation.  Patient treated with Duonebs q6h, prednisone 40 QD x5 days, Azithromycin 500mg daily x3 days along with IV Lasix 40 mg BID. BiPAP ordered.  Patient continued to improve clinically and remained stable on 1.5-2 L oxygen NC.  Home medications were ordered to the bedside and strong emphasis on close follow-up with PCP encouraged. Pt stable at time of discharge, verbalized understanding of plan and was appropriate for discharge. All questions answered and ED return precautions provided.  Patient to follow-up with PCP outpatient.       Physical Exam  Constitutional:       General: He is not in acute distress.  HENT:      Head: Normocephalic.      Right Ear: External ear normal.      Left Ear: External ear normal.   Eyes:      Extraocular Movements: Extraocular movements intact.   Cardiovascular:      Pulses: Normal pulses.      Heart sounds: Normal heart sounds. No murmur heard.     No gallop.   Pulmonary:      Effort: Pulmonary effort is normal. No respiratory distress.      Breath sounds: Wheezing (improving) present.   Musculoskeletal:      Right lower leg: Edema (improving) present.      Left lower leg: Edema (improving) present.   Neurological:      Mental Status: He is alert and oriented to person, place, and time.      Cranial Nerves: No cranial nerve deficit.      Goals of Care Treatment Preferences:  Code Status: Full Code      Consults:     No new Assessment & Plan notes have been filed under this hospital service since the last note was generated.  Service: Hospital Medicine    Final Active Diagnoses:    Diagnosis Date Noted POA    PRINCIPAL PROBLEM:  COPD exacerbation [J44.1] 07/21/2017 Yes     Chronic    Generalized anxiety disorder [F41.1] 04/17/2019 Yes    Shortness of breath [R06.02] 01/17/2018 Yes    Opioid use disorder, severe, on maintenance therapy, dependence [F11.20] 01/08/2013 Yes    Essential hypertension [I10]  Yes      Problems Resolved During this Admission:        Discharged Condition: good    Disposition: Home or Self Care    Follow Up:   Follow-up Information       [facility]. Go on 3/7/2024.    Specialty: Family Medicine  Why: PAULA Becktet @ 1:00pm  Contact information:  [address], [address]  [facility] [address] [phone]  Additional information:  Please park in [address] or D and use [address]. Take Medical Office Bldg. elevators.                         Patient Instructions:      Ambulatory referral/consult to Smoking Cessation Program   Standing Status: Future   Referral Priority: Routine Referral Type: Consultation   Referral Reason: Specialty Services Required   Requested Specialty: CTTS   Number of Visits Requested: 1     Ambulatory referral/consult to Home Health   Standing Status: Future   Referral Priority: Routine Referral Type: Home Health   Referral Reason: Specialty Services Required   Requested Specialty: Home Health Services   Number of Visits Requested: 1     Notify your health care provider if you experience any of the following:  temperature >100.4     Notify your health care provider if you experience any of the following:  persistent nausea and vomiting or diarrhea     Notify your health care provider if you experience any of the following:  severe uncontrolled pain     Notify your health care provider if you experience any of the following:  difficulty breathing or increased cough     Notify your health care provider if you experience any of the following:  severe persistent headache     Notify your health care provider if you experience any of the following:  persistent dizziness, light-headedness, or visual disturbances     Notify your health care provider if you experience any of the following:  increased confusion or weakness     Activity as tolerated       Significant Diagnostic Studies: Labs: CMP   Recent Labs   Lab 02/29/24  2329 03/01/24  0245    138   K 4.6 4.9    101   CO2 26 28    GLU 75 146*   BUN 18 18   CREATININE 1.8* 1.9*   CALCIUM 8.8 8.7   PROT 8.2 7.3   ALBUMIN 3.3* 3.0*   BILITOT 0.4 0.4   ALKPHOS 97 87   AST 19 14   ALT 15 14   ANIONGAP 14 9   , CBC   Recent Labs   Lab 02/29/24 2329 03/01/24  0245   WBC 8.47 7.80   HGB 12.5* 11.6*   HCT 37.6* 36.1*    140*   , Troponin   Recent Labs   Lab 02/29/24 2329   TROPONINI <0.006   , and All labs within the past 24 hours have been reviewed      Radiology:     Imaging Results              X-Ray Chest AP Portable (Final result)  Result time 02/29/24 22:15:29      Final result by Jeff Massey MD (02/29/24 22:15:29)                   Impression:      Bibasilar ground-glass airspace opacities, suggestive of pneumonitis.    Probable skin fold overlying the right lateral hemithorax.  Repeat of the PA projection may be obtained,  if there is high clinical concern for basilar pneumothorax.      Electronically signed by: Jeff Massey MD  Date:    02/29/2024  Time:    22:15               Narrative:    EXAMINATION:  XR CHEST AP PORTABLE    CLINICAL HISTORY:  dyspnea s/p covid;    TECHNIQUE:  Single frontal view of the chest was performed.    COMPARISON:  02/15/2024.    FINDINGS:  The trachea is unremarkable.  There are calcifications of the aortic knob.  The cardiomediastinal silhouette is stable.  There is no evidence of free air beneath the hemidiaphragms.  There are no pleural effusions.  There is a probable skin fold overlying the right lateral hemithorax.  No pneumothorax is identified.  There are bibasilar ground-glass airspace opacities.  There are degenerative changes in the osseous structures.                                      Cardiac Graphics:     Echo    Result Date: 2/16/2024    Left Ventricle: The left ventricle is normal in size. Normal wall   thickness. There is concentric remodeling. There is normal systolic   function. Biplane (2D) method of discs ejection fraction is 53%. There is   normal diastolic function.    Right  Ventricle: Mild right ventricular enlargement. Wall thickness is   normal. Right ventricle wall motion  is normal. Systolic function is   normal.    Right Atrium: Right atrium is mildly dilated.    Aorta: Aortic root is mildly dilated measuring 4.27 cm. Ascending aorta   is normal.    Pulmonary Artery: The estimated pulmonary artery systolic pressure is   40 mmHg.    IVC/SVC: Normal venous pressure at 3 mmHg.          Pending Diagnostic Studies:       None           Medications:  Reconciled Home Medications:      Medication List        START taking these medications      azithromycin 500 MG tablet  Commonly known as: ZITHROMAX  Take 1 tablet (500 mg total) by mouth once daily.     predniSONE 20 MG tablet  Commonly known as: DELTASONE  Take 2 tablets (40 mg total) by mouth once daily. for 4 days            CHANGE how you take these medications      * albuterol 90 mcg/actuation inhaler  Commonly known as: VENTOLIN HFA  Inhale 2 puffs into the lungs every 6 (six) hours as needed for Wheezing or Shortness of Breath. Rescue  What changed: reasons to take this     * albuterol 2.5 mg /3 mL (0.083 %) nebulizer solution  Commonly known as: PROVENTIL  Take 3 mLs (2.5 mg total) by nebulization every 4 (four) hours as needed for Wheezing or Shortness of Breath.  What changed: Another medication with the same name was changed. Make sure you understand how and when to take each.     cloNIDine 0.1 MG tablet  Commonly known as: CATAPRES  Take 1 tablet (0.1 mg total) by mouth 2 (two) times daily.  What changed: how to take this           * This list has 2 medication(s) that are the same as other medications prescribed for you. Read the directions carefully, and ask your doctor or other care provider to review them with you.                CONTINUE taking these medications      dextromethorphan-guaiFENesin  mg/5 mL Liqd  Take 1 Dose by mouth every 4 (four) hours as needed (Cough).     EScitalopram oxalate 10 MG tablet  Commonly  known as: LEXAPRO  Take 1 tablet (10 mg total) by mouth once daily.     furosemide 40 MG tablet  Commonly known as: LASIX  Take 1 tablet (40 mg total) by mouth 2 (two) times a day.     loratadine 10 mg tablet  Commonly known as: CLARITIN  Take 1 tablet (10 mg total) by mouth daily as needed for Allergies.     losartan 100 MG tablet  Commonly known as: COZAAR  Take 1 tablet (100 mg total) by mouth once daily.     magnesium citrate solution  Take 296 mLs by mouth daily as needed (Constipation).     nicotine 21 mg/24 hr  Commonly known as: NICODERM CQ  Place 1 patch onto the skin once daily.     polyethylene glycol 17 gram Pwpk  Commonly known as: GLYCOLAX  Take 17 g by mouth 2 (two) times daily.     promethazine 25 MG tablet  Commonly known as: PHENERGAN  Take 1 tablet (25 mg total) by mouth every 4 (four) hours.     SYMBICORT 160-4.5 mcg/actuation Hfaa  Generic drug: budesonide-formoterol 160-4.5 mcg  Inhale 2 puffs into the lungs every 12 (twelve) hours. Controller     varenicline 0.5 mg (11)- 1 mg (42) tablet  Commonly known as: CHANTIX STARTING MONTH BOX  Take one 0.5mg tab by mouth once daily X3 days,then increase to one 0.5mg tab twice daily X4 days,then increase to one 1mg tab twice daily            STOP taking these medications      PREVNAR 20 (PF) 0.5 mL Syrg injection  Generic drug: pneumoc 20-natacha conj-dip cr(PF)              Indwelling Lines/Drains at time of discharge:   Lines/Drains/Airways       None                   Time spent on the discharge of patient: 30 minutes         Karely Engle MD  Department of Hospital Medicine  [facility]

## 2024-03-07 ENCOUNTER — OFFICE VISIT (OUTPATIENT)
Dept: FAMILY MEDICINE | Facility: HOSPITAL | Age: 59
End: 2024-03-07
Payer: MEDICAID

## 2024-03-07 VITALS
HEIGHT: 68 IN | WEIGHT: 265.19 LBS | SYSTOLIC BLOOD PRESSURE: 92 MMHG | DIASTOLIC BLOOD PRESSURE: 66 MMHG | OXYGEN SATURATION: 90 % | HEART RATE: 94 BPM | BODY MASS INDEX: 40.19 KG/M2

## 2024-03-07 DIAGNOSIS — K59.03 CONSTIPATION DUE TO OPIOID THERAPY: ICD-10-CM

## 2024-03-07 DIAGNOSIS — R51.9 NONINTRACTABLE EPISODIC HEADACHE, UNSPECIFIED HEADACHE TYPE: Primary | ICD-10-CM

## 2024-03-07 DIAGNOSIS — R05.3 CHRONIC COUGH: ICD-10-CM

## 2024-03-07 DIAGNOSIS — T40.2X5A CONSTIPATION DUE TO OPIOID THERAPY: ICD-10-CM

## 2024-03-07 DIAGNOSIS — J44.9 CHRONIC OBSTRUCTIVE PULMONARY DISEASE, UNSPECIFIED COPD TYPE: ICD-10-CM

## 2024-03-07 PROCEDURE — 99213 OFFICE O/P EST LOW 20 MIN: CPT | Performed by: PHYSICIAN ASSISTANT

## 2024-03-07 RX ORDER — DEXTROMETHORPHAN HYDROBROMIDE, GUAIFENESIN 5; 100 MG/5ML; MG/5ML
650 LIQUID ORAL EVERY 8 HOURS
Qty: 30 TABLET | Refills: 2 | Status: SHIPPED | OUTPATIENT
Start: 2024-03-07

## 2024-03-07 RX ORDER — LACTULOSE 10 G/15ML
10-20 SOLUTION ORAL DAILY PRN
Qty: 600 ML | Refills: 3 | Status: SHIPPED | OUTPATIENT
Start: 2024-03-07

## 2024-03-07 RX ORDER — ALBUTEROL SULFATE 90 UG/1
2 AEROSOL, METERED RESPIRATORY (INHALATION) EVERY 6 HOURS PRN
Qty: 18 G | Refills: 5 | Status: SHIPPED | OUTPATIENT
Start: 2024-03-07 | End: 2024-03-26 | Stop reason: SDUPTHER

## 2024-03-07 RX ORDER — PROMETHAZINE HYDROCHLORIDE 25 MG/1
25 TABLET ORAL EVERY 4 HOURS
Qty: 60 TABLET | Refills: 1 | Status: SHIPPED | OUTPATIENT
Start: 2024-03-07 | End: 2024-05-22 | Stop reason: SDUPTHER

## 2024-03-07 RX ORDER — POLYETHYLENE GLYCOL 3350 17 G/17G
17 POWDER, FOR SOLUTION ORAL 2 TIMES DAILY
Qty: 100 EACH | Refills: 2 | Status: SHIPPED | OUTPATIENT
Start: 2024-03-07 | End: 2024-05-13

## 2024-03-07 RX ORDER — BUDESONIDE AND FORMOTEROL FUMARATE DIHYDRATE 160; 4.5 UG/1; UG/1
2 AEROSOL RESPIRATORY (INHALATION) EVERY 12 HOURS
Qty: 10.2 G | Refills: 5 | Status: SHIPPED | OUTPATIENT
Start: 2024-03-07 | End: 2024-03-26 | Stop reason: SDUPTHER

## 2024-03-07 RX ORDER — ALBUTEROL SULFATE 0.83 MG/ML
2.5 SOLUTION RESPIRATORY (INHALATION) EVERY 4 HOURS PRN
Qty: 90 ML | Refills: 5 | Status: SHIPPED | OUTPATIENT
Start: 2024-03-07 | End: 2024-03-26

## 2024-03-07 RX ORDER — PROMETHAZINE HYDROCHLORIDE 6.25 MG/5ML
SYRUP ORAL
COMMUNITY
Start: 2024-01-11 | End: 2024-03-07
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
## 2024-03-16 ENCOUNTER — NURSE TRIAGE (OUTPATIENT)
Dept: ADMINISTRATIVE | Facility: CLINIC | Age: 59
End: 2024-03-16
Payer: MEDICAID

## 2024-03-17 NOTE — TELEPHONE ENCOUNTER
Pt states inside of mouth burning, tongue real dry, can't swallow.Pt states has been going on for a few days. Pt denies sores/ulcers in mouth. States hurts to swallow. Per protocol,  go to ED now. Pt verbalizes understanding.   Reason for Disposition   [1] Drooling or spitting out saliva (because can't swallow) AND [2] new-onset    Additional Information   Negative: SEVERE difficulty breathing (e.g., struggling for each breath, speaks in single words, stridor)   Negative: Sounds like a life-threatening emergency to the triager    Protocols used: Mouth Pain-A-AH

## 2024-03-25 ENCOUNTER — OFFICE VISIT (OUTPATIENT)
Dept: FAMILY MEDICINE | Facility: HOSPITAL | Age: 59
End: 2024-03-25
Payer: MEDICAID

## 2024-03-25 VITALS
DIASTOLIC BLOOD PRESSURE: 96 MMHG | WEIGHT: 273.81 LBS | HEART RATE: 93 BPM | SYSTOLIC BLOOD PRESSURE: 141 MMHG | BODY MASS INDEX: 41.5 KG/M2 | HEIGHT: 68 IN

## 2024-03-25 DIAGNOSIS — N18.32 STAGE 3B CHRONIC KIDNEY DISEASE: ICD-10-CM

## 2024-03-25 DIAGNOSIS — J44.9 CHRONIC OBSTRUCTIVE PULMONARY DISEASE, UNSPECIFIED COPD TYPE: ICD-10-CM

## 2024-03-25 DIAGNOSIS — B18.2 CHRONIC HEPATITIS C WITHOUT HEPATIC COMA: ICD-10-CM

## 2024-03-25 DIAGNOSIS — I10 UNCONTROLLED HYPERTENSION: ICD-10-CM

## 2024-03-25 DIAGNOSIS — M54.50 ACUTE BILATERAL LOW BACK PAIN WITHOUT SCIATICA: Primary | ICD-10-CM

## 2024-03-25 DIAGNOSIS — I50.30 HEART FAILURE WITH PRESERVED EJECTION FRACTION, UNSPECIFIED HF CHRONICITY: ICD-10-CM

## 2024-03-25 PROCEDURE — 99215 OFFICE O/P EST HI 40 MIN: CPT | Performed by: STUDENT IN AN ORGANIZED HEALTH CARE EDUCATION/TRAINING PROGRAM

## 2024-03-25 RX ORDER — LIDOCAINE 50 MG/G
1 PATCH TOPICAL DAILY
Qty: 30 PATCH | Refills: 2 | Status: SHIPPED | OUTPATIENT
Start: 2024-03-25

## 2024-03-25 RX ORDER — CLONIDINE HYDROCHLORIDE 0.1 MG/1
0.1 TABLET ORAL DAILY
Qty: 30 TABLET | Refills: 0 | Status: SHIPPED | OUTPATIENT
Start: 2024-03-25 | End: 2024-04-12 | Stop reason: SDUPTHER

## 2024-03-25 RX ORDER — DAPAGLIFLOZIN 5 MG/1
5 TABLET, FILM COATED ORAL DAILY
Qty: 90 TABLET | Refills: 2 | Status: SHIPPED | OUTPATIENT
Start: 2024-03-25 | End: 2024-05-13

## 2024-03-25 RX ORDER — METHOCARBAMOL 500 MG/1
500 TABLET, FILM COATED ORAL 3 TIMES DAILY PRN
Qty: 30 TABLET | Refills: 0 | Status: SHIPPED | OUTPATIENT
Start: 2024-03-25 | End: 2024-04-04

## 2024-03-25 RX ORDER — AMLODIPINE AND VALSARTAN 5; 160 MG/1; MG/1
1 TABLET ORAL DAILY
Qty: 90 TABLET | Refills: 3 | Status: SHIPPED | OUTPATIENT
Start: 2024-03-25 | End: 2024-05-13 | Stop reason: SDUPTHER

## 2024-03-25 NOTE — PROGRESS NOTES
Progress Note  [facility]    Subjective:      Patient ID: Ming Harrington is a 58 y.o. male    Chief Complaint: Follow-up and Back Pain    Ming Harrington is a 58-year-old male with a PMHx  PMHx  COPD uses 2L O2 at baseline, CHF (HFpEF), HTN, CKD3a, Hepatitis C, opioid dependence on chronic methadone, bipolar presenting today for follow-up and with complaint of back pain.     Reports chronic back pain but recent worsening. This back pain has been occurring for multiple years but with recent exacerbation. Pain is located within the lower back.  Currently he has not been taking his methadone which he takes for opiate use disorder and for pain for over a week. He has tried some Aleve at home which has helped with the pain. He denies any injury, acute bladder/bowel incontinence, lower extremity weakness, or saddle anesthesia.    #HTN - chronic issue currently controlled current medications include losartan 100 mg daily and clonidine 0.1 mg 3 times daily.  Not checking blood pressure at home.  In office /96.    #COPD - current medications include Symbicort and albuterol.  He currently needs a refill of his medications.  Reports no recent exacerbations or hospitalizations.               Health Maintenance         Date Due Completion Date    Shingles Vaccine (1 of 2) Never done ---    LDCT Lung Screen 12/01/2019 12/1/2018    COVID-19 Vaccine (3 - 2023-24 season) 09/01/2023 4/23/2021    Colorectal Cancer Screening 01/25/2025 (Originally 1965) ---    Hemoglobin A1c (Diabetic Prevention Screening) 12/21/2026 12/21/2023    Lipid Panel 12/21/2028 12/21/2023    TETANUS VACCINE 12/04/2033 12/4/2023            Review of Systems   Constitutional:  Negative for appetite change, fever and unexpected weight change.   HENT:  Negative for congestion, hearing loss and sore throat.    Eyes:  Negative for visual disturbance.   Respiratory:  Negative for shortness of breath.    Cardiovascular:  Negative for chest pain.    Gastrointestinal:  Positive for constipation. Negative for abdominal pain, blood in stool, diarrhea, nausea and vomiting.   Genitourinary:  Negative for difficulty urinating, dysuria and frequency.   Musculoskeletal:  Positive for back pain. Negative for arthralgias and myalgias.   Skin:  Negative for rash.   Neurological:  Negative for dizziness, weakness, numbness and headaches.   Psychiatric/Behavioral:  Negative for dysphoric mood and sleep disturbance. The patient is not nervous/anxious.         Objective:      Vitals:    03/25/24 1319   BP: (!) 141/96   Pulse: 93     BP Readings from Last 3 Encounters:   03/25/24 (!) 141/96   03/07/24 92/66   03/01/24 (!) 143/96     Body mass index is 41.63 kg/m².    Physical Exam  Constitutional:       Appearance: He is obese.   HENT:      Head: Normocephalic and atraumatic.   Eyes:      Pupils: Pupils are equal, round, and reactive to light.   Cardiovascular:      Rate and Rhythm: Normal rate and regular rhythm.   Pulmonary:      Effort: Pulmonary effort is normal.      Breath sounds: Normal breath sounds.      Comments: Prolonged expiratory phase with decreased breath sounds bilaterally  Abdominal:      Palpations: Abdomen is soft.      Tenderness: There is no abdominal tenderness.   Musculoskeletal:      Comments: No spinous process tenderness.   Skin:     General: Skin is warm.      Findings: No rash.   Neurological:      Mental Status: He is alert and oriented to person, place, and time.   Psychiatric:         Mood and Affect: Mood normal.         Behavior: Behavior normal.       Assessment/Plan:     Ming Harrington is a 58-year-old male with a PMHx  PMHx  COPD uses 2L O2 at baseline, CHF (HFpEF), HTN, CKD3a, Hepatitis C, opioid dependence on chronic methadone, bipolar presenting today for follow-up and with complaint of back pain.     Acute bilateral low back pain without sciatica  -     methocarbamoL (ROBAXIN) 500 MG Tab; Take 1 tablet (500 mg total) by mouth 3 (three)  times daily as needed (back pain and muscle spasms).  Dispense: 30 tablet; Refill: 0  -     LIDOcaine (LIDODERM) 5 %; Place 1 patch onto the skin once daily. Remove & Discard patch within 12 hours or as directed by MD. Apply to affected area for 12 hours.  Dispense: 30 patch; Refill: 2  -     Ambulatory referral/consult to Physical/Occupational Therapy; Future; Expected date: 04/01/2024  -     acute on chronic issue. Plan on referral to PT for evaluation and treatment. Robaxin, lidocaine patches, Tylenol as needed. Avoid NSAIDs given CKD. Continue to maintain active and not sedentary. Continued effort at weight loss.    Uncontrolled hypertension  -     amlodipine-valsartan (EXFORGE) 5-160 mg per tablet; Take 1 tablet by mouth once daily.  Dispense: 90 tablet; Refill: 3  -     cloNIDine (CATAPRES) 0.1 MG tablet; Take 1 tablet (0.1 mg total) by mouth once daily.  Dispense: 30 tablet; Refill: 0  -    chronic issue currently uncontrolled. Will plan to discontinue losartan and clonidine and start combination therapy amlodipine-valsartan 5-160 mg daily. Will provide a 30 day supply of clonidine with plan to be weaned off this medication this month. Advised to record blood pressure daily report back to clinic in 2 weeks for re-evaluation. Continued effort at lifestyle modification including low-salt diet and increased cardiovascular exercise.  Exercise that patient can tolerate.     Chronic hepatitis C  -     HEPATITIS C RNA, QUANTITATIVE, PCR; Future; Expected date: 03/25/2024  -    from chart review, unclear if patient has completed treatment for hepatitis C infection. Will repeat a hepatitis RNA quantitative level to determine if clearance.    Heart failure with preserved ejection fraction, unspecified HF chronicity  CKD stage IIIA  -     dapagliflozin propanediol (FARXIGA) 5 mg Tab tablet; Take 1 tablet (5 mg total) by mouth once daily.  Dispense: 90 tablet; Refill: 2  -    diagnosed and seen on previous  echocardiogram. Patient also with underlying CKD.  Given mortality benefits and renal protective benefits will plan on starting dapagliflozin 5 mg daily.    Chronic obstructive pulmonary disease, unspecified COPD type  -     SYMBICORT 160-4.5 mcg/actuation HFAA; Inhale 2 puffs into the lungs every 12 (twelve) hours. Controller  Dispense: 10.2 g; Refill: 5  -     albuterol (VENTOLIN HFA) 90 mcg/actuation inhaler; Inhale 2 puffs into the lungs every 6 (six) hours as needed for Wheezing or Shortness of Breath. Rescue  Dispense: 18 g; Refill: 5  -    chronic issue currently controlled.  Refill provided.  Continued effort at smoking cessation    Follow-up: 2 weeks for re-evaluation for blood pressure and sooner if needed    Garland Xiong DO  [facility], PGY-3  03/25/2024      This note was partially created using [employer]â€™s Voice Recognition software. Typographical and content errors may occur with this process. While efforts are made to detect and correct such errors, in some cases errors will persist. For this reason, wording in this document should be considered in the proper context and not strictly verbatim

## 2024-03-25 NOTE — PATIENT INSTRUCTIONS
INDIVIDUALIZED HYPERTENSION COMPREHENSIVE PLAN      LIFESTYLE MODIFICATION      A) DIET  EAT LESS SALT  Salt is known as sodium chloride. It is measured in grams (g) or milligrams (mg).  RESTRICT salt with consuming less than 2300 mg (2.3 g) of sodium per day  LIMIT/AVOID high salt foods such as:  canned soups, TV dinners, deli meats, hot dogs, salted potato chips, pickles, and olives  condiments: soy sauce, ketchup, mustard, mayonnaise    DO NOT ADD salt to food at your table. Use black pepper as an alternative seasoning instead of salt.    DASH DIET (Dietary Approaches to Stop Hypertension)  MEDITERRANEAN-STYLE DIET  These diets emphasize intake of fruits and vegetables, whole grains, legumes, low-fat dairy, nuts, use of olive oil (Mediterranean diet specifically), limiting red meat consumption (few times/month), and eating fish and poultry at least twice/week.    DRINK LESS ALCOHOL  In individuals who drink alcohol, reduce alcohol to:  Men: ?2 drinks daily  Women: ?1 drink daily.      B) EXERCISE  Cardiovascular exercise, at least 150 minutes of moderate-intensity physical therapy per week which corresponds to 30 minutes per day, five or more days a week.  Brisk Walking, Jogging, Bicycling, Swimming, Gardening, Pushing a Lawnmower.  If significant limitations related to chronic pain and mobility: Can try activities such as Meir Chi or seated chair exercises.   Not enough time to exercise?  Park in a parking space far away from the entrance of a store and take the stairs instead of the elevator      CURRENT BLOOD PRESSURE MEDICATIONS    Amlodipine-Valsartan 5-160 mg daily  Clonidine 0.1 mg daily       NEXT CLINIC FOLLOW-UP    2-4 weeks      GOALS    Blood Pressure Goal: < 140/90  Today's In-Office BP: 141/96      HOME BLOOD PRESSURE READINGS    DATE  (Month/Date/Year)  Example: 01/01/2023 Morning Blood Pressure Reading  (Systolic/Diastolic)  Example: 154/92 Evening Blood Pressure  Reading  (Systolic/Diastolic)  Example: 135/87

## 2024-03-26 ENCOUNTER — DOCUMENTATION ONLY (OUTPATIENT)
Dept: SMOKING CESSATION | Facility: CLINIC | Age: 59
End: 2024-03-26
Payer: MEDICAID

## 2024-03-26 PROBLEM — N18.32 STAGE 3B CHRONIC KIDNEY DISEASE: Status: ACTIVE | Noted: 2024-03-26

## 2024-03-26 PROBLEM — I50.30 HEART FAILURE WITH PRESERVED EJECTION FRACTION: Status: ACTIVE | Noted: 2024-03-26

## 2024-03-26 RX ORDER — BUDESONIDE AND FORMOTEROL FUMARATE DIHYDRATE 160; 4.5 UG/1; UG/1
2 AEROSOL RESPIRATORY (INHALATION) EVERY 12 HOURS
Qty: 10.2 G | Refills: 5 | Status: SHIPPED | OUTPATIENT
Start: 2024-03-26 | End: 2024-05-13 | Stop reason: SDUPTHER

## 2024-03-26 RX ORDER — ALBUTEROL SULFATE 90 UG/1
2 AEROSOL, METERED RESPIRATORY (INHALATION) EVERY 6 HOURS PRN
Qty: 18 G | Refills: 5 | Status: SHIPPED | OUTPATIENT
Start: 2024-03-26 | End: 2024-04-30 | Stop reason: SDUPTHER

## 2024-03-26 NOTE — PROGRESS NOTES
Called patient to see if hr wishes to reschedule scon visit with tobacco cessation clinic. Could not leave message.

## 2024-03-28 NOTE — PROGRESS NOTES
I assume primary medical responsibility for this patient. I have reviewed the history, physical, and assessement & treatment plan with the resident and agree that the care is reasonable and necessary. This service has been performed by a resident without the presence of a teaching physician under the primary care exception. If necessary, an addendum of additional findings or evaluation beyond the resident documentation will be noted below.    Agree with plan in BP medication transition and wean of clonidine. No longer taking methadone currently and not actively withdrawing.

## 2024-04-10 DIAGNOSIS — I10 UNCONTROLLED HYPERTENSION: ICD-10-CM

## 2024-04-10 DIAGNOSIS — N18.32 STAGE 3B CHRONIC KIDNEY DISEASE: ICD-10-CM

## 2024-04-11 RX ORDER — FUROSEMIDE 40 MG/1
40 TABLET ORAL 2 TIMES DAILY
Qty: 180 TABLET | Refills: 0 | Status: CANCELLED | OUTPATIENT
Start: 2024-04-11 | End: 2024-07-10

## 2024-04-11 RX ORDER — CLONIDINE HYDROCHLORIDE 0.1 MG/1
0.1 TABLET ORAL DAILY
Qty: 30 TABLET | Refills: 0 | Status: CANCELLED | OUTPATIENT
Start: 2024-04-11

## 2024-04-12 DIAGNOSIS — N18.32 STAGE 3B CHRONIC KIDNEY DISEASE: ICD-10-CM

## 2024-04-12 DIAGNOSIS — I10 UNCONTROLLED HYPERTENSION: ICD-10-CM

## 2024-04-12 RX ORDER — CLONIDINE HYDROCHLORIDE 0.1 MG/1
0.1 TABLET ORAL DAILY
Qty: 30 TABLET | Refills: 0 | Status: SHIPPED | OUTPATIENT
Start: 2024-04-12 | End: 2024-04-16 | Stop reason: SDUPTHER

## 2024-04-12 RX ORDER — FUROSEMIDE 40 MG/1
40 TABLET ORAL 2 TIMES DAILY
Qty: 180 TABLET | Refills: 0 | Status: SHIPPED | OUTPATIENT
Start: 2024-04-12 | End: 2024-04-16 | Stop reason: SDUPTHER

## 2024-04-16 ENCOUNTER — EXTERNAL HOME HEALTH (OUTPATIENT)
Dept: HOME HEALTH SERVICES | Facility: HOSPITAL | Age: 59
End: 2024-04-16
Payer: MEDICAID

## 2024-04-16 DIAGNOSIS — N18.32 STAGE 3B CHRONIC KIDNEY DISEASE: ICD-10-CM

## 2024-04-16 DIAGNOSIS — I10 UNCONTROLLED HYPERTENSION: ICD-10-CM

## 2024-04-16 RX ORDER — FUROSEMIDE 40 MG/1
40 TABLET ORAL 2 TIMES DAILY
Qty: 180 TABLET | Refills: 0 | Status: SHIPPED | OUTPATIENT
Start: 2024-04-16 | End: 2024-05-13

## 2024-04-16 RX ORDER — CLONIDINE HYDROCHLORIDE 0.1 MG/1
0.1 TABLET ORAL DAILY
Qty: 30 TABLET | Refills: 0 | Status: SHIPPED | OUTPATIENT
Start: 2024-04-16 | End: 2024-05-13

## 2024-04-30 DIAGNOSIS — R05.3 CHRONIC COUGH: ICD-10-CM

## 2024-04-30 DIAGNOSIS — J44.9 CHRONIC OBSTRUCTIVE PULMONARY DISEASE, UNSPECIFIED COPD TYPE: ICD-10-CM

## 2024-04-30 RX ORDER — PROMETHAZINE HYDROCHLORIDE 25 MG/1
25 TABLET ORAL EVERY 4 HOURS
Qty: 60 TABLET | Refills: 1 | OUTPATIENT
Start: 2024-04-30

## 2024-04-30 RX ORDER — PROMETHAZINE HYDROCHLORIDE 25 MG/1
25 TABLET ORAL EVERY 4 HOURS
Qty: 60 TABLET | Refills: 1 | Status: CANCELLED | OUTPATIENT
Start: 2024-04-30

## 2024-04-30 RX ORDER — ALBUTEROL SULFATE 90 UG/1
2 AEROSOL, METERED RESPIRATORY (INHALATION) EVERY 6 HOURS PRN
Qty: 18 G | Refills: 5 | Status: SHIPPED | OUTPATIENT
Start: 2024-04-30 | End: 2024-05-13 | Stop reason: SDUPTHER

## 2024-05-10 ENCOUNTER — OFFICE VISIT (OUTPATIENT)
Dept: FAMILY MEDICINE | Facility: HOSPITAL | Age: 59
End: 2024-05-10
Payer: MEDICAID

## 2024-05-10 VITALS
SYSTOLIC BLOOD PRESSURE: 137 MMHG | WEIGHT: 281.31 LBS | BODY MASS INDEX: 42.63 KG/M2 | DIASTOLIC BLOOD PRESSURE: 87 MMHG | HEART RATE: 81 BPM | HEIGHT: 68 IN

## 2024-05-10 DIAGNOSIS — R60.9 EDEMA, UNSPECIFIED TYPE: ICD-10-CM

## 2024-05-10 DIAGNOSIS — R39.9 LOWER URINARY TRACT SYMPTOMS (LUTS): Primary | ICD-10-CM

## 2024-05-10 PROCEDURE — 99214 OFFICE O/P EST MOD 30 MIN: CPT | Performed by: STUDENT IN AN ORGANIZED HEALTH CARE EDUCATION/TRAINING PROGRAM

## 2024-05-10 RX ORDER — BUMETANIDE 1 MG/1
1 TABLET ORAL 2 TIMES DAILY
Qty: 6 TABLET | Refills: 0 | Status: SHIPPED | OUTPATIENT
Start: 2024-05-10 | End: 2024-05-13

## 2024-05-10 RX ORDER — TAMSULOSIN HYDROCHLORIDE 0.4 MG/1
0.4 CAPSULE ORAL DAILY
Qty: 30 CAPSULE | Refills: 2 | Status: SHIPPED | OUTPATIENT
Start: 2024-05-10 | End: 2024-05-13
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
## 2024-05-13 ENCOUNTER — OFFICE VISIT (OUTPATIENT)
Dept: FAMILY MEDICINE | Facility: HOSPITAL | Age: 59
End: 2024-05-13
Payer: MEDICAID

## 2024-05-13 VITALS
BODY MASS INDEX: 42.63 KG/M2 | WEIGHT: 281.31 LBS | SYSTOLIC BLOOD PRESSURE: 130 MMHG | HEART RATE: 68 BPM | DIASTOLIC BLOOD PRESSURE: 84 MMHG | HEIGHT: 68 IN

## 2024-05-13 DIAGNOSIS — R39.9 LOWER URINARY TRACT SYMPTOMS (LUTS): ICD-10-CM

## 2024-05-13 DIAGNOSIS — R60.0 BILATERAL LOWER EXTREMITY EDEMA: Primary | ICD-10-CM

## 2024-05-13 DIAGNOSIS — J44.9 CHRONIC OBSTRUCTIVE PULMONARY DISEASE, UNSPECIFIED COPD TYPE: ICD-10-CM

## 2024-05-13 DIAGNOSIS — K59.00 CONSTIPATION, UNSPECIFIED CONSTIPATION TYPE: ICD-10-CM

## 2024-05-13 DIAGNOSIS — I10 UNCONTROLLED HYPERTENSION: ICD-10-CM

## 2024-05-13 DIAGNOSIS — G47.00 INSOMNIA, UNSPECIFIED TYPE: ICD-10-CM

## 2024-05-13 PROCEDURE — 99214 OFFICE O/P EST MOD 30 MIN: CPT | Performed by: STUDENT IN AN ORGANIZED HEALTH CARE EDUCATION/TRAINING PROGRAM

## 2024-05-13 RX ORDER — ALBUTEROL SULFATE 90 UG/1
2 AEROSOL, METERED RESPIRATORY (INHALATION) EVERY 6 HOURS PRN
Qty: 18 G | Refills: 5 | Status: SHIPPED | OUTPATIENT
Start: 2024-05-13 | End: 2024-06-11 | Stop reason: SDUPTHER

## 2024-05-13 RX ORDER — AMLODIPINE AND VALSARTAN 5; 160 MG/1; MG/1
1 TABLET ORAL DAILY
Qty: 90 TABLET | Refills: 3 | Status: SHIPPED | OUTPATIENT
Start: 2024-05-13 | End: 2024-05-22 | Stop reason: SDUPTHER

## 2024-05-13 RX ORDER — BUMETANIDE 1 MG/1
1 TABLET ORAL DAILY
Qty: 10 TABLET | Refills: 0 | Status: SHIPPED | OUTPATIENT
Start: 2024-05-13 | End: 2024-05-20 | Stop reason: SDUPTHER

## 2024-05-13 RX ORDER — DOXEPIN HYDROCHLORIDE 10 MG/ML
3 SOLUTION ORAL NIGHTLY PRN
Qty: 120 ML | Refills: 1 | Status: SHIPPED | OUTPATIENT
Start: 2024-05-13 | End: 2025-05-13

## 2024-05-13 RX ORDER — TAMSULOSIN HYDROCHLORIDE 0.4 MG/1
0.4 CAPSULE ORAL DAILY
Qty: 30 CAPSULE | Refills: 2 | Status: SHIPPED | OUTPATIENT
Start: 2024-05-13 | End: 2025-05-13

## 2024-05-13 RX ORDER — DOXEPIN HYDROCHLORIDE 10 MG/ML
3 SOLUTION ORAL NIGHTLY PRN
Qty: 120 ML | Refills: 1 | Status: SHIPPED | OUTPATIENT
Start: 2024-05-13 | End: 2024-05-13

## 2024-05-13 RX ORDER — TAMSULOSIN HYDROCHLORIDE 0.4 MG/1
0.4 CAPSULE ORAL DAILY
Qty: 30 CAPSULE | Refills: 2 | Status: SHIPPED | OUTPATIENT
Start: 2024-05-13 | End: 2024-05-13 | Stop reason: SDUPTHER

## 2024-05-13 RX ORDER — BUDESONIDE AND FORMOTEROL FUMARATE DIHYDRATE 160; 4.5 UG/1; UG/1
2 AEROSOL RESPIRATORY (INHALATION) EVERY 12 HOURS
Qty: 10.2 G | Refills: 5 | Status: SHIPPED | OUTPATIENT
Start: 2024-05-13 | End: 2024-06-13

## 2024-05-13 RX ORDER — POLYETHYLENE GLYCOL 3350 17 G/17G
17 POWDER, FOR SOLUTION ORAL 2 TIMES DAILY
Qty: 1020 G | Refills: 0 | Status: SHIPPED | OUTPATIENT
Start: 2024-05-13 | End: 2024-05-22 | Stop reason: SDUPTHER

## 2024-05-15 ENCOUNTER — HOSPITAL ENCOUNTER (EMERGENCY)
Facility: HOSPITAL | Age: 59
Discharge: HOME | End: 2024-05-15
Attending: EMERGENCY MEDICINE
Payer: MEDICAID

## 2024-05-15 ENCOUNTER — TELEPHONE (OUTPATIENT)
Dept: ORTHOPEDICS | Facility: CLINIC | Age: 59
End: 2024-05-15
Payer: MEDICAID

## 2024-05-15 VITALS
TEMPERATURE: 98 F | BODY MASS INDEX: 42.73 KG/M2 | DIASTOLIC BLOOD PRESSURE: 82 MMHG | WEIGHT: 281 LBS | SYSTOLIC BLOOD PRESSURE: 149 MMHG | HEART RATE: 93 BPM | OXYGEN SATURATION: 90 % | RESPIRATION RATE: 21 BRPM

## 2024-05-15 DIAGNOSIS — T23.251A PARTIAL THICKNESS BURN OF PALM OF RIGHT HAND, INITIAL ENCOUNTER: Primary | ICD-10-CM

## 2024-05-15 PROCEDURE — 16030 DRESS/DEBRID P-THICK BURN L: CPT

## 2024-05-15 PROCEDURE — 25000003 PHARM REV CODE 250: Performed by: EMERGENCY MEDICINE

## 2024-05-15 PROCEDURE — 99284 EMERGENCY DEPT VISIT MOD MDM: CPT | Mod: 25

## 2024-05-15 RX ORDER — OXYCODONE AND ACETAMINOPHEN 7.5; 325 MG/1; MG/1
1 TABLET ORAL EVERY 6 HOURS PRN
Qty: 12 TABLET | Refills: 0 | Status: SHIPPED | OUTPATIENT
Start: 2024-05-15 | End: 2024-05-29

## 2024-05-15 RX ORDER — SILVER SULFADIAZINE 10 G/1000G
CREAM TOPICAL 2 TIMES DAILY
Qty: 85 G | Refills: 1 | Status: SHIPPED | OUTPATIENT
Start: 2024-05-15

## 2024-05-15 RX ORDER — OXYCODONE AND ACETAMINOPHEN 10; 325 MG/1; MG/1
1 TABLET ORAL ONCE
Status: COMPLETED | OUTPATIENT
Start: 2024-05-15 | End: 2024-05-15

## 2024-05-15 RX ORDER — AMOXICILLIN AND CLAVULANATE POTASSIUM 875; 125 MG/1; MG/1
1 TABLET, FILM COATED ORAL 2 TIMES DAILY
Qty: 20 TABLET | Refills: 0 | Status: SHIPPED | OUTPATIENT
Start: 2024-05-15

## 2024-05-15 RX ORDER — ONDANSETRON 4 MG/1
4 TABLET, ORALLY DISINTEGRATING ORAL
Status: COMPLETED | OUTPATIENT
Start: 2024-05-15 | End: 2024-05-15

## 2024-05-15 RX ORDER — SILVER SULFADIAZINE 10 G/1000G
1 CREAM TOPICAL
Status: COMPLETED | OUTPATIENT
Start: 2024-05-15 | End: 2024-05-15

## 2024-05-15 RX ADMIN — SILVER SULFADIAZINE 1 TUBE: 10 CREAM TOPICAL at 09:05

## 2024-05-15 RX ADMIN — OXYCODONE AND ACETAMINOPHEN 1 TABLET: 10; 325 TABLET ORAL at 07:05

## 2024-05-15 RX ADMIN — ONDANSETRON 4 MG: 4 TABLET, ORALLY DISINTEGRATING ORAL at 07:05
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
## 2024-05-15 NOTE — TELEPHONE ENCOUNTER
----- Message from Denise Schmidt PA-C sent at 5/15/2024  2:49 PM CDT -----  Sure I can see him!  ----- Message -----  From: Denise Ghotra MA  Sent: 5/15/2024   1:03 PM CDT  To: KASSI Dos Santos Padma!    By any chance would you be able to see this patient?  ----- Message -----  From: Unruly Hollis Jr., MD  Sent: 5/15/2024   9:54 AM CDT  To: Bunny Raygoza call patient for appt in 3-5 days Thx!

## 2024-05-15 NOTE — PROGRESS NOTES
Progress Note  [facility]    Subjective:      Patient ID: Ming Harrington is a 58 y.o. male    Chief Complaint: Follow-up    Ming Harrington is a 58-year-old male with a PMHx COPD uses 2L O2 PRN, CHF (HFpEF), HTN, CKD3a, Hepatitis C, opioid dependence on chronic methadone, bipolar presenting for follow-up.     # lower extremity edema - chronic issue that has recently started worsening and patient was seen in clinic 3 days ago. From chart review, no weight gain over the past 3 days.  Patient denies any shortness of breath, orthopnea, PND, or chest pain. Reports his home Lasix 40 mg twice daily has been ineffective and was prescribed Bumex but says he never did  this medication. He did obtain some labs that showed no worsening renal function and no evidence of volume overload. He has been evaluated for this same issue multiple times in the past and determined that it was dependent edema.    #LUTS - endorses symptoms of difficulty urinating which include a weak stream and nocturia. Present for multiple months. Endorses he feels his bladder is not completely empty after urinating. At last clinic visit, PSA level was wnl and UA was without infection. He was provided tamsulosin 0.4 mg daily but did not  medication.    #Insomnia - reports this is a chronic issue but over the past few weeks he has not been able to sleep. He reports that his brother recently passed away and this has caused some anxiety.  He reports some nights getting no sleep at all.  Reports he tried trazodone in the past but was ineffective.     #COPD - current medications include Symbicort daily and albuterol as needed. He uses 2 L of oxygen as needed. Reports no recent exacerbations or hospitalizations.  He is currently in need of refill of medications.     #HTN - chronic issue current medications include amlodipine-valsartan 5-160 mg daily.  Patient has been weaned off his clonidine.  From chart review, patient's blood pressure has remained  controlled with systolics in the 130s and diastolics in the 80s.  He reports not checking his blood pressure at home.  Patient due for refill.     # constipation - chronic issue likely secondary to methadone use.  Reports bowel movements sometimes every 3-4 days.  Endorses benefit with [employer] in the past.            Health Maintenance         Date Due Completion Date    Shingles Vaccine (1 of 2) Never done ---    COVID-19 Vaccine (3 - 2023-24 season) 09/01/2023 4/23/2021    Colorectal Cancer Screening 01/25/2025 (Originally 10/11/2009) ---    Annual UACr 06/20/2024 6/20/2023    Hemoglobin A1c (Diabetic Prevention Screening) 12/21/2026 12/21/2023    Lipid Panel 12/21/2028 12/21/2023    TETANUS VACCINE 12/04/2033 12/4/2023            Review of Systems   Constitutional:  Negative for appetite change, fever and unexpected weight change.   HENT:  Negative for congestion, hearing loss and sore throat.    Eyes:  Negative for visual disturbance.   Respiratory:  Negative for shortness of breath.    Cardiovascular:  Positive for leg swelling. Negative for chest pain.   Gastrointestinal:  Positive for constipation. Negative for abdominal pain, blood in stool, diarrhea, nausea and vomiting.   Genitourinary:  Positive for difficulty urinating. Negative for dysuria and frequency.   Musculoskeletal:  Positive for back pain. Negative for arthralgias and myalgias.   Skin:  Negative for rash.   Neurological:  Negative for dizziness, weakness, numbness and headaches.   Psychiatric/Behavioral:  Positive for sleep disturbance. Negative for dysphoric mood. The patient is not nervous/anxious.         Objective:      Vitals:    05/13/24 1044   BP: 130/84   Pulse: 68     BP Readings from Last 3 Encounters:   05/15/24 (!) 149/82   05/13/24 130/84   05/10/24 137/87     Body mass index is 42.77 kg/m².    Physical Exam  Constitutional:       Appearance: He is obese.   HENT:      Head: Normocephalic and atraumatic.   Eyes:      Pupils: Pupils  are equal, round, and reactive to light.   Cardiovascular:      Rate and Rhythm: Normal rate and regular rhythm.   Pulmonary:      Effort: Pulmonary effort is normal.      Breath sounds: Normal breath sounds.      Comments: CTAB  No wheezing, no rhonchi, no rales  Abdominal:      Palpations: Abdomen is soft.      Tenderness: There is no abdominal tenderness.   Musculoskeletal:      Right lower leg: Edema present.      Left lower leg: Edema present.   Skin:     General: Skin is warm.      Findings: No rash.   Neurological:      Mental Status: He is alert and oriented to person, place, and time.   Psychiatric:         Mood and Affect: Mood normal.         Behavior: Behavior normal.         Assessment/Plan:     Ming Harrington is a 58-year-old male with a PMHx COPD uses 2L O2 PRN, CHF (HFpEF), HTN, CKD3a, Hepatitis C, opioid dependence on chronic methadone, bipolar presenting for follow-up.     Bilateral lower extremity edema  -     bumetanide (BUMEX) 1 MG tablet; Take 1 tablet (1 mg total) by mouth once daily.  Dispense: 10 tablet; Refill: 0  -    likely dependent edema advised patient to elevate legs above heart while lying down. Will provide a short course of Bumex.  From review of labs, no worsening renal function and lungs are clear on exam today. Continue follow-up with daily weights and monitoring for weight gain.    Lower urinary tract symptoms (LUTS)  -     tamsulosin (FLOMAX) 0.4 mg Cap; Take 1 capsule (0.4 mg total) by mouth once daily.  Dispense: 30 capsule; Refill: 2  -     advised to start use of this medication daily. If no improvement in symptoms will plan to establish patient with Urology    Chronic obstructive pulmonary disease, unspecified COPD type  -     albuterol (VENTOLIN HFA) 90 mcg/actuation inhaler; Inhale 2 puffs into the lungs every 6 (six) hours as needed for Wheezing or Shortness of Breath. Rescue  Dispense: 18 g; Refill: 5  -     SYMBICORT 160-4.5 mcg/actuation HFAA; Inhale 2 puffs into the  lungs every 12 (twelve) hours. Controller  Dispense: 10.2 g; Refill: 5  -     chronic issue currently controlled.  Refill provided.    Uncontrolled hypertension  -     amlodipine-valsartan (EXFORGE) 5-160 mg per tablet; Take 1 tablet by mouth once daily.  Dispense: 90 tablet; Refill: 3  -    remains uncontrolled with in office blood pressure readings 149/82. Plan for patient to report back to clinic in 2-4 weeks for re-evaluation.    Insomnia, unspecified type  -     EKG 12-lead; Future  -     doxepin (SINEQUAN) 10 mg/mL solution; Take 0.3 mLs (3 mg total) by mouth nightly as needed for Insomnia.  Dispense: 120 mL; Refill: 1  -     will try a course of doxepin to help with insomnia. Previously patient has failed treatment with trazodone.  Will obtain a EKG to monitor for QTC prolongation.    Constipation, unspecified constipation type  -     polyethylene glycol (GLYCOLAX) 17 gram/dose powder; Take 17 g by mouth 2 (two) times daily.  Dispense: 1020 g; Refill: 0  -     advised we can start this medication twice daily to help with constipation. Patient in agreement with plan.    Follow-up: 2-4 weeks    Garland Xiong DO  [facility], PGY-3  05/13/2024      This note was partially created using [employer] Voice Recognition software. Typographical and content errors may occur with this process. While efforts are made to detect and correct such errors, in some cases errors will persist. For this reason, wording in this document should be considered in the proper context and not strictly verbatim

## 2024-05-20 DIAGNOSIS — R60.0 BILATERAL LOWER EXTREMITY EDEMA: ICD-10-CM

## 2024-05-20 RX ORDER — BUMETANIDE 1 MG/1
1 TABLET ORAL DAILY
Qty: 10 TABLET | Refills: 0 | Status: SHIPPED | OUTPATIENT
Start: 2024-05-20 | End: 2024-06-13 | Stop reason: SDUPTHER

## 2024-05-21 ENCOUNTER — TELEPHONE (OUTPATIENT)
Dept: FAMILY MEDICINE | Facility: HOSPITAL | Age: 59
End: 2024-05-21
Payer: MEDICAID

## 2024-05-21 NOTE — TELEPHONE ENCOUNTER
Called patient to discuss lab results. Hepatitis-C quantitative results showing active infection. Will attempt to communicate with patient later today or this week.    Garland Xiong DO  [facility], PGY-3  05/21/2024

## 2024-05-21 NOTE — TELEPHONE ENCOUNTER
Called and spoke to patient about active hepatitis-C infection. Patient is scheduled for an appointment tomorrow at 10:40 a.m.. Will see patient tomorrow to discuss treatment.    Garland Xiong DO  [facility], PGY-3  05/21/2024

## 2024-05-22 ENCOUNTER — OFFICE VISIT (OUTPATIENT)
Dept: FAMILY MEDICINE | Facility: HOSPITAL | Age: 59
End: 2024-05-22
Payer: MEDICAID

## 2024-05-22 VITALS
WEIGHT: 278.25 LBS | BODY MASS INDEX: 42.17 KG/M2 | SYSTOLIC BLOOD PRESSURE: 140 MMHG | HEART RATE: 91 BPM | DIASTOLIC BLOOD PRESSURE: 93 MMHG | HEIGHT: 68 IN

## 2024-05-22 DIAGNOSIS — R05.3 CHRONIC COUGH: ICD-10-CM

## 2024-05-22 DIAGNOSIS — R60.0 LIPEDEMA OF LOWER EXTREMITY: ICD-10-CM

## 2024-05-22 DIAGNOSIS — T23.251D PARTIAL THICKNESS BURN OF PALM OF RIGHT HAND, SUBSEQUENT ENCOUNTER: ICD-10-CM

## 2024-05-22 DIAGNOSIS — B18.2 CHRONIC HEPATITIS C WITHOUT HEPATIC COMA: Primary | ICD-10-CM

## 2024-05-22 DIAGNOSIS — K59.00 CONSTIPATION, UNSPECIFIED CONSTIPATION TYPE: ICD-10-CM

## 2024-05-22 DIAGNOSIS — I10 UNCONTROLLED HYPERTENSION: ICD-10-CM

## 2024-05-22 PROCEDURE — 99215 OFFICE O/P EST HI 40 MIN: CPT | Performed by: STUDENT IN AN ORGANIZED HEALTH CARE EDUCATION/TRAINING PROGRAM

## 2024-05-22 RX ORDER — PROMETHAZINE HYDROCHLORIDE 25 MG/1
25 TABLET ORAL EVERY 4 HOURS
Qty: 60 TABLET | Refills: 0 | Status: SHIPPED | OUTPATIENT
Start: 2024-05-22 | End: 2024-05-29 | Stop reason: SDUPTHER

## 2024-05-22 RX ORDER — AMLODIPINE AND VALSARTAN 5; 160 MG/1; MG/1
1 TABLET ORAL DAILY
Qty: 90 TABLET | Refills: 3 | Status: SHIPPED | OUTPATIENT
Start: 2024-05-22 | End: 2024-06-13 | Stop reason: SDUPTHER

## 2024-05-22 RX ORDER — POLYETHYLENE GLYCOL 3350 17 G/17G
17 POWDER, FOR SOLUTION ORAL 2 TIMES DAILY
Qty: 1020 G | Refills: 1 | Status: SHIPPED | OUTPATIENT
Start: 2024-05-22
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
## 2024-05-23 NOTE — PROGRESS NOTES
I assume primary medical responsibility for this patient. I have reviewed the history, physical, and assessement & treatment plan with the resident and agree that the care is reasonable and necessary. This service has been performed by a resident without the presence of a teaching physician under the primary care exception. If necessary, an addendum of additional findings or evaluation beyond the resident documentation will be noted below.      Albina De Leon MD    \[facility]\"" Family Medicine

## 2024-05-23 NOTE — PROGRESS NOTES

## 2024-05-29 ENCOUNTER — OFFICE VISIT (OUTPATIENT)
Dept: FAMILY MEDICINE | Facility: HOSPITAL | Age: 59
End: 2024-05-29
Payer: MEDICAID

## 2024-05-29 VITALS
HEART RATE: 77 BPM | HEIGHT: 68 IN | SYSTOLIC BLOOD PRESSURE: 125 MMHG | DIASTOLIC BLOOD PRESSURE: 88 MMHG | WEIGHT: 276.69 LBS | BODY MASS INDEX: 41.93 KG/M2

## 2024-05-29 DIAGNOSIS — I10 ESSENTIAL HYPERTENSION: Primary | ICD-10-CM

## 2024-05-29 DIAGNOSIS — M54.50 CHRONIC BILATERAL LOW BACK PAIN WITHOUT SCIATICA: ICD-10-CM

## 2024-05-29 DIAGNOSIS — R60.0 LIPEDEMA OF LOWER EXTREMITY: ICD-10-CM

## 2024-05-29 DIAGNOSIS — B18.2 CHRONIC HEPATITIS C WITHOUT HEPATIC COMA: ICD-10-CM

## 2024-05-29 DIAGNOSIS — R05.3 CHRONIC COUGH: ICD-10-CM

## 2024-05-29 DIAGNOSIS — L30.4 INTERTRIGO: ICD-10-CM

## 2024-05-29 DIAGNOSIS — G89.29 CHRONIC BILATERAL LOW BACK PAIN WITHOUT SCIATICA: ICD-10-CM

## 2024-05-29 DIAGNOSIS — F41.1 GENERALIZED ANXIETY DISORDER: ICD-10-CM

## 2024-05-29 PROCEDURE — 99215 OFFICE O/P EST HI 40 MIN: CPT | Performed by: STUDENT IN AN ORGANIZED HEALTH CARE EDUCATION/TRAINING PROGRAM

## 2024-05-29 RX ORDER — ESCITALOPRAM OXALATE 10 MG/1
10 TABLET ORAL DAILY
Qty: 180 TABLET | Refills: 1 | Status: SHIPPED | OUTPATIENT
Start: 2024-05-29 | End: 2025-05-24

## 2024-05-29 RX ORDER — METHOCARBAMOL 500 MG/1
500 TABLET, FILM COATED ORAL 3 TIMES DAILY PRN
Qty: 30 TABLET | Refills: 0 | Status: SHIPPED | OUTPATIENT
Start: 2024-05-29 | End: 2024-06-08

## 2024-05-29 RX ORDER — NYSTATIN 100000 [USP'U]/G
POWDER TOPICAL 2 TIMES DAILY
Qty: 60 G | Refills: 0 | Status: SHIPPED | OUTPATIENT
Start: 2024-05-29

## 2024-05-29 RX ORDER — PROMETHAZINE HYDROCHLORIDE 25 MG/1
25 TABLET ORAL EVERY 4 HOURS
Qty: 60 TABLET | Refills: 0 | Status: SHIPPED | OUTPATIENT
Start: 2024-05-29 | End: 2024-06-11 | Stop reason: SDUPTHER

## 2024-05-29 NOTE — PROGRESS NOTES
Progress Note  [facility]    Subjective:      Patient ID: Ming Harrington is a 58 y.o. male    Chief Complaint: Leg Swelling (Right ,leaking fluid) and Medication Refill    Ming Harrington is a 58-year-old male with a PMHx COPD uses 2L O2 PRN, CHF (HFpEF), HTN, CKD stage 3a, hepatitis-C, opioid use disorder on methadone, JUVENCIO presenting for follow-up for management and treatment of his active hepatitis-C and uncontrolled hypertension.     #Hepatitis C - patient with recent labs showing active infection. From chart review, diagnosed with hepatitis-C in 2011 while he was incarcerated in [place].  He was seen by hepatology in the past and was planning to receive treatment but was lost to follow-up.  Patient also with remote history of IV drug use. Previous Fibroscan May 2019 with Fibrosis score F2 indicating moderate liver scarring.     #HTN - chronic issue current medications include amlodipine-valsartan 5-160 mg daily. Patient previously on clonidine but has been weaned off this medication.  In office /88.  Reports similar readings at home when he is consistently taking his blood pressure medications.    #JUVENCIO - chronic issue current medications include Lexapro 10 mg daily.  Recent worsening anxiety due to loss of his brother a few weeks ago. He is concerned about his health.    # lower extremity swelling   - edema has been a chronic issue (R>L) determined dependent etiology. Review of recent echocardiogram showing normal EF with no reported systolic or diastolic dysfunction. Also recently CMP showing stable kidney function. He says his right thigh is enlarged and causing him difficulties with ambulating. He is requesting any interventions that can be performed to help with him being able to walk properly.  Patient has been advised to elevate his legs above his heart and to obtain compression stockings.  Today, he also reports a friction rash and erythema on his right posterior knee.                       Health  Maintenance         Date Due Completion Date    Shingles Vaccine (1 of 2) Never done ---    COVID-19 Vaccine (3 - 2023-24 season) 09/01/2023 4/23/2021    Annual UACr 06/20/2024 6/20/2023    Colorectal Cancer Screening 01/25/2025 (Originally 10/11/2009) ---    Hemoglobin A1c (Diabetic Prevention Screening) 12/21/2026 12/21/2023    Lipid Panel 12/21/2028 12/21/2023    TETANUS VACCINE 12/04/2033 12/4/2023            Review of Systems   Constitutional:  Negative for appetite change, fever and unexpected weight change.   HENT:  Negative for congestion, hearing loss and sore throat.    Eyes:  Negative for visual disturbance.   Respiratory:  Negative for shortness of breath.    Cardiovascular:  Positive for leg swelling. Negative for chest pain.   Gastrointestinal:  Negative for abdominal pain, blood in stool, constipation, diarrhea, nausea and vomiting.   Genitourinary:  Negative for difficulty urinating, dysuria and frequency.   Musculoskeletal:  Positive for back pain. Negative for arthralgias and myalgias.   Skin:  Positive for rash.   Neurological:  Negative for dizziness, weakness, numbness and headaches.   Psychiatric/Behavioral:  Negative for dysphoric mood. The patient is not nervous/anxious.         Objective:      Vitals:    05/29/24 1542   BP: 125/88   Pulse: 77     BP Readings from Last 3 Encounters:   05/29/24 125/88   05/22/24 (!) 140/93   05/15/24 (!) 149/82     Body mass index is 42.07 kg/m².    Physical Exam  Constitutional:       Appearance: He is obese.   HENT:      Head: Normocephalic and atraumatic.   Eyes:      Pupils: Pupils are equal, round, and reactive to light.   Cardiovascular:      Rate and Rhythm: Normal rate and regular rhythm.   Pulmonary:      Effort: Pulmonary effort is normal.      Breath sounds: Normal breath sounds.   Musculoskeletal:      Right lower leg: Edema present.      Left lower leg: Edema present.      Comments: Lipedema within b/l lower extremities (R>L).   Intertrigo over  skin folds of right medial knee   Skin:     General: Skin is warm.      Findings: No rash.      Comments: Right palm burn wound healing appropriately. No underlying erythema   Neurological:      Mental Status: He is alert and oriented to person, place, and time.   Psychiatric:         Mood and Affect: Mood normal.         Behavior: Behavior normal.         Assessment/Plan:     Ming Harrington is a 58-year-old male with a PMHx COPD uses 2L O2 PRN, CHF (HFpEF), HTN, CKD stage 3a, hepatitis-C, opioid use disorder on methadone presenting for follow-up for management and treatment of his active hepatitis-C and uncontrolled hypertension.     Essential hypertension  - chronic issue currently controlled. Will continue current medications amlodipine-valsartan 5-160 mg daily. BP goal < 140/90.  Continued effort at cessation and lifestyle modifications.    Chronic hepatitis C  -     Ambulatory referral/consult to Hepatology; Future; Expected date: 06/05/2024  -     HEPATITIS B VIRAL DNA, QUANTITATIVE; Future; Expected date: 05/29/2024  -     US Abdomen Limited; Future; Expected date: 05/29/2024  -    AST to Platelet Ratio Index (APRI): 0.5 points. FIB-4 2.42.   -     patient with a history of hepatitis-B infection in past with evidence of clearance. Previous Fibroscan May 2019 with Fibrosis score F2 indicating moderate liver scarring. Given history and concern for hepatitis-B reactivation will plan for referral to hepatology for treatment of active hepatitis-C infection. Will also obtain a updated right upper quadrant ultrasound prior to hepatology evaluation     Chronic cough  -     promethazine (PHENERGAN) 25 MG tablet; Take 1 tablet (25 mg total) by mouth every 4 (four) hours.  Dispense: 60 tablet; Refill: 0  -     requesting refill.  Refill provided.    Generalized anxiety disorder  -     EScitalopram oxalate (LEXAPRO) 10 MG tablet; Take 1 tablet (10 mg total) by mouth once daily.  Dispense: 180 tablet; Refill: 1  -     chronic  issue currently controlled.  Continue current medications.    Intertrigo  -     nystatin (MYCOSTATIN) powder; Apply topically 2 (two) times daily. Apply to the affected areas 2 to 3 times daily until healing is complete.  Dispense: 60 g; Refill: 0  -     apply to skin folds under the right thigh. Can also consider fluconazole cream if no improvement with nystatin powder.  Advised to keep the area dry and clean.    Lipedema of lower extremity  -     Ambulatory referral/consult to Plastic Surgery; Future; Expected date: 06/05/2024  -     patient with significant lipidemia (R> L) limiting patient's ability to ambulate. Given limitations patient may benefit from surgical evaluation but may have limitations regarding insurance coverage. Will plan for plastic surgery evaluation for lipedema reduction surgery.     Chronic bilateral low back pain without sciatica  -     methocarbamoL (ROBAXIN) 500 MG Tab; Take 1 tablet (500 mg total) by mouth 3 (three) times daily as needed (muscle spsm and pain).  Dispense: 30 tablet; Refill: 0  -     continue follow-up methadone clinic.    Follow-up: 2-3 months    Garland Xiong DO  [facility], PGY-3  05/29/2024      This note was partially created using [employer] Voice Recognition software. Typographical and content errors may occur with this process. While efforts are made to detect and correct such errors, in some cases errors will persist. For this reason, wording in this document should be considered in the proper context and not strictly verbatim

## 2024-05-31 ENCOUNTER — TELEPHONE (OUTPATIENT)
Dept: HEPATOLOGY | Facility: CLINIC | Age: 59
End: 2024-05-31
Payer: MEDICAID

## 2024-05-31 NOTE — TELEPHONE ENCOUNTER
Garland Xiong, DO ordered that patient be scheduled for a hepatology consult visit for hep c.  Patient last seen by Dr. Rebollar in 2019.  Attempt made to reach patient to setup consult visit with PA Scheuermann.  I left a VM asking that he call hepatology for scheduling.

## 2024-05-31 NOTE — TELEPHONE ENCOUNTER
----- Message from Salma Luu MA sent at 5/30/2024  4:55 PM CDT -----  Regarding: FW: Appt request    ----- Message -----  From: Jagruti Manzano  Sent: 5/30/2024   4:46 PM CDT  To: [facility] Hepatitis C Staff  Subject: Appt request                                     Good afternoon,     Could you please assist the pt in scheduling an appt with a hepatologist to receive tx for HCV?    Thank you so much!

## 2024-06-11 DIAGNOSIS — R05.3 CHRONIC COUGH: ICD-10-CM

## 2024-06-11 DIAGNOSIS — J44.9 CHRONIC OBSTRUCTIVE PULMONARY DISEASE, UNSPECIFIED COPD TYPE: ICD-10-CM

## 2024-06-11 RX ORDER — CLONIDINE HYDROCHLORIDE 0.1 MG/1
TABLET ORAL
OUTPATIENT
Start: 2024-06-11

## 2024-06-11 RX ORDER — ALBUTEROL SULFATE 90 UG/1
2 AEROSOL, METERED RESPIRATORY (INHALATION) EVERY 6 HOURS PRN
Qty: 18 G | Refills: 1 | Status: SHIPPED | OUTPATIENT
Start: 2024-06-11 | End: 2025-06-11

## 2024-06-11 RX ORDER — PROMETHAZINE HYDROCHLORIDE 25 MG/1
25 TABLET ORAL EVERY 6 HOURS PRN
Qty: 60 TABLET | Refills: 0 | Status: SHIPPED | OUTPATIENT
Start: 2024-06-11

## 2024-06-11 RX ORDER — CLONIDINE HYDROCHLORIDE 0.1 MG/1
TABLET ORAL
COMMUNITY
Start: 2024-05-24

## 2024-06-13 ENCOUNTER — OFFICE VISIT (OUTPATIENT)
Dept: FAMILY MEDICINE | Facility: HOSPITAL | Age: 59
End: 2024-06-13
Payer: MEDICAID

## 2024-06-13 ENCOUNTER — TELEPHONE (OUTPATIENT)
Dept: HEPATOLOGY | Facility: CLINIC | Age: 59
End: 2024-06-13
Payer: MEDICAID

## 2024-06-13 VITALS
BODY MASS INDEX: 39.92 KG/M2 | DIASTOLIC BLOOD PRESSURE: 95 MMHG | HEART RATE: 71 BPM | HEIGHT: 68 IN | SYSTOLIC BLOOD PRESSURE: 154 MMHG | WEIGHT: 263.44 LBS

## 2024-06-13 DIAGNOSIS — I10 UNCONTROLLED HYPERTENSION: ICD-10-CM

## 2024-06-13 DIAGNOSIS — B18.2 CHRONIC HEPATITIS C WITHOUT HEPATIC COMA: Primary | ICD-10-CM

## 2024-06-13 DIAGNOSIS — I10 ESSENTIAL HYPERTENSION: ICD-10-CM

## 2024-06-13 DIAGNOSIS — R60.0 BILATERAL LOWER EXTREMITY EDEMA: ICD-10-CM

## 2024-06-13 DIAGNOSIS — N18.32 STAGE 3B CHRONIC KIDNEY DISEASE: ICD-10-CM

## 2024-06-13 DIAGNOSIS — J44.9 CHRONIC OBSTRUCTIVE PULMONARY DISEASE, UNSPECIFIED COPD TYPE: ICD-10-CM

## 2024-06-13 PROCEDURE — 99214 OFFICE O/P EST MOD 30 MIN: CPT | Performed by: STUDENT IN AN ORGANIZED HEALTH CARE EDUCATION/TRAINING PROGRAM

## 2024-06-13 RX ORDER — BUMETANIDE 1 MG/1
1 TABLET ORAL DAILY
Qty: 90 TABLET | Refills: 2 | Status: SHIPPED | OUTPATIENT
Start: 2024-06-13 | End: 2025-03-10

## 2024-06-13 RX ORDER — AMLODIPINE AND VALSARTAN 5; 160 MG/1; MG/1
1 TABLET ORAL DAILY
Qty: 90 TABLET | Refills: 3 | Status: SHIPPED | OUTPATIENT
Start: 2024-06-13 | End: 2025-06-13

## 2024-06-13 RX ORDER — AMLODIPINE AND VALSARTAN 5; 160 MG/1; MG/1
1 TABLET ORAL DAILY
Qty: 90 TABLET | Refills: 3 | Status: SHIPPED | OUTPATIENT
Start: 2024-06-13 | End: 2024-06-13

## 2024-06-13 NOTE — PROGRESS NOTES
Clinic Note  [facility]    Subjective:      Ming Harrington is a 58 y.o. male PMHx COPD uses 2L O2 PRN, CHF (HFpEF), HTN, CKD stage 3a, hepatitis-C, opioid use disorder on methadone, JUVENCIO, chronic non-adherence to medications/follow ups/referrals.    Hep C - active, patient with recent labs showing active infection. From chart review, diagnosed with hepatitis-C in 2011 while he was incarcerated in [place]. He was seen by hepatology in the past and was planning to receive treatment but was lost to follow-up. Patient also with remote history of IV drug use. Previous Fibroscan May 2019 with Fibrosis score F2 indicating moderate liver scarring. Since referral last month - got VM from hepatology and didn't call y back. Number given. Abd US not done.   HTN - says he doesn't have any of his medications     COPD - never did chest CT or PFTs, previously on symbicort, SOB at baseline with occasionally productive cough, no acute worsening    Leg swelling - recently switched to bumex which has been helpful       Review of Systems   All other systems reviewed and are negative.         Objective:      Vitals:    06/13/24 1409   BP: (!) 154/95   Pulse: 71     Body mass index is 40.06 kg/m².      Physical Exam  Constitutional:       Appearance: Normal appearance.   HENT:      Mouth/Throat:      Mouth: Mucous membranes are moist.      Pharynx: Oropharynx is clear.   Cardiovascular:      Rate and Rhythm: Normal rate and regular rhythm.      Pulses: Normal pulses.      Heart sounds: Normal heart sounds.   Pulmonary:      Effort: Pulmonary effort is normal.      Breath sounds: Normal breath sounds.   Abdominal:      General: Abdomen is flat. Bowel sounds are normal.      Palpations: Abdomen is soft.   Musculoskeletal:      Right lower leg: Edema (@baseline) present.      Left lower leg: Edema (@baseline) present.   Skin:     General: Skin is warm and dry.      Capillary Refill: Capillary refill takes less than 2 seconds.    Neurological:      General: No focal deficit present.      Mental Status: He is alert and oriented to person, place, and time. Mental status is at baseline.   Psychiatric:         Mood and Affect: Mood normal.         Behavior: Behavior normal.            Assessment/Plan:        1. Chronic hepatitis C without hepatic coma    Given hx there is legitimate concern that patient will be unable to to adhere to 12-week course of treatment - nonetheless if rosalie returns to our clinic without yet being seen by hepatology it would likely be worthwhile to try to start him on treatemnt from our clinic. His labs suggest immunity through cleared infection to Hep B.    - Ambulatory referral/consult to Hepatology; Future    2. Bilateral lower extremity edema    Controlled - likely 2/2 uncontrolled CKD.    - bumetanide (BUMEX) 1 MG tablet; Take 1 tablet (1 mg total) by mouth once daily.  Dispense: 90 tablet; Refill: 2    3. Uncontrolled hypertension    Due to nonadherence. Refill given.    - amlodipine-valsartan (EXFORGE) 5-160 mg per tablet; Take 1 tablet by mouth once daily.  Dispense: 90 tablet; Refill: 3    4. Chronic obstructive pulmonary disease, unspecified COPD type    Hasn't followed up with PFTs of chest CT however smoking hx and hospitalizations for exacerbations make dx very likely - still has frequent SOB on symbicort (Though is not always adherent) - will try to get coverage for trelegy.    - fluticasone-umeclidin-vilanter (TRELEGY ELLIPTA) 200-62.5-25 mcg inhaler; Inhale 1 puff into the lungs once daily.  Dispense: 60 each; Refill: 5    5. CKD3B    Creatinine always around 2, egfr 30-45. Multiple referrals to nephrology. Needs continued discussion with next provider.      Patient discussed with attending physician, Dr. Ximena Berumen MD  [facility] PGY-3  06/13/2024      The following information is provided to all patients.  This information is to help you find resources for any of the problems  found today that may be affecting your health:                Living healthy guide: www.[facility].[place].[facility]       Understanding Diabetes: www.diabetes.org       Eating healthy: www.cdc.gov/healthyweight      CDC home safety checklist: www.cdc.gov/steadi/patient.html      Agency on Aging: www.goea.[place].[facility]       Alcoholics anonymous (AA): www.aa.org      Physical Activity: www.richard.nih.gov/et4xqog       Tobacco use: www.quitwithusla.org

## 2024-06-13 NOTE — TELEPHONE ENCOUNTER
Patient last seen by Dr. Rebollar in 2019.  Lost to f/u.  Needs treatment for hep c.  Dr. Garland Xiong asked that he be scheduled for a consult visit.  Attempt made to reach patient to setup consult visit with PA Scheuermann.  I left a VM asking that he call hepatology back.

## 2024-06-13 NOTE — TELEPHONE ENCOUNTER
"----- Message from Ernesto Matta MA sent at 6/13/2024  3:40 PM CDT -----    ----- Message -----  From: Mike Kan  Sent: 6/13/2024   3:28 PM CDT  To: [facility] Hepatology Scheduling    Consult/Advisory    Name Of Caller: Self    Contact Preference?: [phone]     What is the nature of the call?: Returning call to office    Additional Notes:  "Thank you for all that you do for our patients"  "

## 2024-06-17 DIAGNOSIS — I10 UNCONTROLLED HYPERTENSION: ICD-10-CM

## 2024-06-17 RX ORDER — AMLODIPINE AND VALSARTAN 5; 160 MG/1; MG/1
1 TABLET ORAL DAILY
Qty: 90 TABLET | Refills: 3 | Status: CANCELLED | OUTPATIENT
Start: 2024-06-17 | End: 2025-06-17

## 2024-06-18 NOTE — PROGRESS NOTES

## 2024-06-18 NOTE — PROGRESS NOTES
I assume primary medical responsibility for this patient. I have reviewed the history, physical, and assessement & treatment plan with the resident and agree that the care is reasonable and necessary. This service has been performed by a resident without the presence of a teaching physician under the primary care exception. If necessary, an addendum of additional findings or evaluation beyond the resident documentation will be noted below.      Albina De Leon MD    [facility]

## 2024-06-27 DIAGNOSIS — N18.32 CKD STAGE 3B, GFR 30-44 ML/MIN: Primary | ICD-10-CM

## 2024-07-01 ENCOUNTER — OFFICE VISIT (OUTPATIENT)
Dept: HEPATOLOGY | Facility: CLINIC | Age: 59
End: 2024-07-01
Payer: MEDICAID

## 2024-07-01 ENCOUNTER — PROCEDURE VISIT (OUTPATIENT)
Dept: HEPATOLOGY | Facility: CLINIC | Age: 59
End: 2024-07-01
Payer: MEDICAID

## 2024-07-01 VITALS — BODY MASS INDEX: 39.59 KG/M2 | HEIGHT: 68 IN | WEIGHT: 261.25 LBS

## 2024-07-01 DIAGNOSIS — B18.2 CHRONIC HEPATITIS C WITHOUT HEPATIC COMA: ICD-10-CM

## 2024-07-01 PROCEDURE — 1159F MED LIST DOCD IN RCRD: CPT | Mod: CPTII,,, | Performed by: PHYSICIAN ASSISTANT

## 2024-07-01 PROCEDURE — 1160F RVW MEDS BY RX/DR IN RCRD: CPT | Mod: CPTII,,, | Performed by: PHYSICIAN ASSISTANT

## 2024-07-01 PROCEDURE — 91200 LIVER ELASTOGRAPHY: CPT | Mod: 26,S$PBB,, | Performed by: PHYSICIAN ASSISTANT

## 2024-07-01 PROCEDURE — 91200 LIVER ELASTOGRAPHY: CPT | Mod: PBBFAC | Performed by: PHYSICIAN ASSISTANT

## 2024-07-01 PROCEDURE — 4010F ACE/ARB THERAPY RXD/TAKEN: CPT | Mod: CPTII,,, | Performed by: PHYSICIAN ASSISTANT

## 2024-07-01 PROCEDURE — 99203 OFFICE O/P NEW LOW 30 MIN: CPT | Mod: S$PBB,,, | Performed by: PHYSICIAN ASSISTANT

## 2024-07-01 PROCEDURE — 99214 OFFICE O/P EST MOD 30 MIN: CPT | Mod: PBBFAC | Performed by: PHYSICIAN ASSISTANT

## 2024-07-01 PROCEDURE — 3008F BODY MASS INDEX DOCD: CPT | Mod: CPTII,,, | Performed by: PHYSICIAN ASSISTANT

## 2024-07-01 PROCEDURE — 99999 PR PBB SHADOW E&M-EST. PATIENT-LVL IV: CPT | Mod: PBBFAC,,, | Performed by: PHYSICIAN ASSISTANT

## 2024-07-01 RX ORDER — VELPATASVIR AND SOFOSBUVIR 100; 400 MG/1; MG/1
1 TABLET, FILM COATED ORAL DAILY
Qty: 28 TABLET | Refills: 2 | Status: ACTIVE | OUTPATIENT
Start: 2024-07-01

## 2024-07-01 RX ORDER — LISINOPRIL 2.5 MG/1
2.5 TABLET ORAL DAILY
COMMUNITY

## 2024-07-01 NOTE — PATIENT INSTRUCTIONS
Don't forget [facility] [place]  Wait for [facility] to call you and send you your Hep C medicine  Visit with me in 6 weeks

## 2024-07-01 NOTE — PROCEDURES
FibroScan Other Locations    Date/Time: 7/1/2024 10:45 AM    Performed by: Scheuermann, Jennifer B., PA  Authorized by: Scheuermann, Jennifer B., PA    Diagnosis:  HCV    Probe:  XL    Universal Protocol: Patient's identity, procedure and site were verified, confirmatory pause was performed.  Discussed procedure including risks and potential complications.  Questions answered.  Patient verbalizes understanding and wishes to proceed with VCTE.     Procedure: After providing explanations of the procedure, patient was placed in the supine position with right arm in maximum abduction to allow optimal exposure of right lateral abdomen.  Patient was briefly assessed, Testing was performed in the mid-axillary location, 50Hz Shear Wave pulses were applied and the resulting Shear Wave and Propagation Speed detected with a 3.5 MHz ultrasonic signal, using the FibroScan probe, Skin to liver capsule distance and liver parenchyma were accessed during the entire examination with the FibroScan probe, Patient was instructed to breathe normally and to abstain from sudden movements during the procedure, allowing for random measurements of liver stiffness. At least 10 Shear Waves were produced, Individual measurements of each Shear Wave were calculated.  Patient tolerated the procedure well with no complications.  Meets discharge criteria as was dismissed.  Rates pain 0 out of 10.  Patient will follow up with ordering provider to review results.    Findings  Median liver stiffness score:  16.1  CAP Reading: dB/m:  216    IQR/med %:  10  Interpretation  Fibrosis interpretation is based on medial liver stiffness - Kilopascal (kPa).    Fibrosis Stage:  F4  Steatosis interpretation is based on controlled attenuation parameter - (dB/m).    Steatosis Grade:  <S1

## 2024-07-01 NOTE — PROGRESS NOTES
"HEPATOLOGY CLINIC VISIT NOTE - HCV clinic  REFERRING PROVIDER: Garland Xiong DO  CHIEF COMPLAINT: Hepatitis C     HISTORY       This is a 58 y.o. Black or  male with chronic hepatitis C, here for further eval / mngmt. Was actually seen in Hepatology clinic by other providers, once in 2013, again in 2019 - lost to f/u both times. Those notes were reviewed; history obtained from chart and pt. PMH significant for CKD, COPD.    Was asleep in waiting room. Very drowsy during visit  States he didn't sleep well last night.    HCV history:  Originally diagnosed 2011 while incarcerated in [place]  Risks for HCV:  tattoo, drug use, incarceration  - Prior Treatment: No  - Genotype 1A  - HCV RNA 5.2 mill IU/mL - 5/2024    Liver staging:  HCV FibroSURE 2/6/13: A0-1, F3  FibroScan 4/16/19: kPa 7.9 (F2),  (S0)    Labs and imaging w/o obvious evidence of advanced fibrosis  6/2023 noncontrast CT: unremarkable liver / spleen  Has u/s abd scheduled this Fri 7/5/24  Labs: liver panel typically normal. Plt 130s-150s      Denies jaundice, dark urine, abdominal distention, hematemesis, melena, slowed mentation.   No abnormal skin rashes. No generalized joint pain.     PMH, PSH, SOCIAL HX, FAMILY HX      Reviewed in Epic  Pertinent findings:  FAMILY HX: neg for liver diease  SOCIAL HX: resides in [place].  Tobacco - "quit 5-6 mo ago"  Alcohol - denies  Drugs - hx of IV/TED      ROS: as per HPI, in addition:  Insomnia  Leg edema, Rt > Lt; improving recently    PHYSICAL EXAM:  Friendly Black or  male, in no acute distress; drowsy. Oriented to person, place and time  HEENT: Sclerae anicteric.   NECK: Supple  LUNGS: Normal respiratory effort.   ABDOMEN: Flat, soft, nontender. No ascites or hernias. Incisional scar in lower abdomen  SKIN: Warm and dry. No jaundice, No obvious rashes.   EXTREMITIES: (+) B/L FLAKITA, Rt > Lt  NEURO/PSYCH: Normal gate. Memory intact. Thought and speech pattern appropriate. " Lethargic during visit. No depression or anxiety noted.    PERTINENT DIAGNOSTIC RESULTS      Lab Results   Component Value Date    WBC 4.73 05/22/2024    HGB 11.8 (L) 05/22/2024     05/22/2024     Lab Results   Component Value Date    INR 1.1 05/22/2024     Lab Results   Component Value Date    AST 32 05/10/2024    ALT 26 05/10/2024    BILITOT 0.6 05/10/2024    ALBUMIN 3.5 05/10/2024    ALKPHOS 95 05/10/2024    CREATININE 2.1 (H) 05/10/2024    BUN 30 (H) 05/10/2024     05/10/2024    K 3.8 05/10/2024    AFP 5.1 04/09/2019     CT ABDOMEN PELVIS WITHOUT CONTRAST - 6/21/23     CLINICAL HISTORY:  Benign prostatic hyperplasia (BPH) suspected;     FINDINGS:  Comparison is 06/26/2019.  There are bibasal changes of atelectasis versus consolidation.  The liver, gallbladder, spleen, stomach, pancreas, and duodenum show nothing unusual.  There is biliary tract dilatation which is stable.  Adrenal glands show nothing unusual.  Kidneys demonstrate a left upper pole renal cyst.  Anterior abdominal wall collection has resolved.  There is a right lower pole renal calculus without obstruction.  There is a right upper pole renal cyst.  No para-aortic, retroperitoneal, or mesenteric adenopathy seen.  Bowel loops show nothing unusual.  No abscess collection or inflammation seen.  Bones reveal DJD.     Impression:  No acute process seen in no significant abnormality seen.    ASSESSMENT        58 y.o. Black or  male with:  1. Chronic HCV, genotype  1A  - treatment naive  -- HAV status - Immunity documented 5/2024  -- HBV status - Isolated HBcAb (+)        PLAN        Keep upcoming u/s appt  FibroScan today  Epclusa x 12 weeks sent to [facility]  -- Patient to notify me of Rx start date so labs can be scheduled.  F/U visit in 6 wks      ___________________________________________________________________  EDUCATION:  The natural history of Hepatitis C, including potential progression to cirrhosis  was reviewed. We discussed the increased progression of liver disease secondary to alcohol use; patient was advised to avoid alcohol completely.     Transmission of Hepatitis C was reviewed, including possible sexual transmission. Sexual contacts should be screened. Patient should avoid sharing personal products such as razors, toothbrushes, etc.     HCV RX  Discussed goal of HCV eradication to prevent progression of liver disease.  Discussed use of Epclusa daily x 12 weeks w/ potential side effects of fatigue and headache.     Reviewed limitations on acid suppressant medications due to DDI w/ Epclusa:  -- Antacids, H2 Receptor Antagonist, PPI - Pt not taking  Patient instructed to contact me if experiencing acid related symptoms so further recommendations can be made regarding acid suppression therapy.      Herbal / alternative therapies must be discontinued  Discussed importance of medication adherence and risk of treatment failure / viral resistance if not adherent. Pt has verbalized understanding.    __________________________________________________________________    Duration of encounter: 35 min  This includes face-to-face time and non face-to-face time preparing to see the patient (eg, review of tests), obtaining and/or reviewing separately obtained history, documenting clinical information in the electronic or other health record, independently interpreting resultsand communicating results to the patient/family/caregiver, or care coordination.

## 2024-07-05 ENCOUNTER — HOSPITAL ENCOUNTER (OUTPATIENT)
Dept: RADIOLOGY | Facility: HOSPITAL | Age: 59
Discharge: HOME | End: 2024-07-05
Attending: STUDENT IN AN ORGANIZED HEALTH CARE EDUCATION/TRAINING PROGRAM
Payer: MEDICAID

## 2024-07-05 DIAGNOSIS — B18.2 CHRONIC HEPATITIS C WITHOUT HEPATIC COMA: ICD-10-CM

## 2024-07-05 PROCEDURE — 76705 ECHO EXAM OF ABDOMEN: CPT | Mod: 26,,, | Performed by: RADIOLOGY

## 2024-07-05 PROCEDURE — 76705 ECHO EXAM OF ABDOMEN: CPT | Mod: TC

## 2024-07-17 ENCOUNTER — TELEPHONE (OUTPATIENT)
Dept: HEPATOLOGY | Facility: CLINIC | Age: 59
End: 2024-07-17
Payer: MEDICAID

## 2024-07-17 DIAGNOSIS — B18.2 CHRONIC HEPATITIS C WITHOUT HEPATIC COMA: Primary | ICD-10-CM

## 2024-07-17 NOTE — TELEPHONE ENCOUNTER
Pt beginning 12 weeks Epclusa on 7/18/24  Anticipated treatment end date: 10/9/24  F 4  Siri 1A  Prior HCV treatment: No      Pls update episode and schedule:  - keep appt w/ me on 8/12 so we can talk about his liver  - LFT, HCV RNA at week 6 -   - LFT, HCV RNA - SVR12 - 1/9/25

## 2024-07-18 NOTE — TELEPHONE ENCOUNTER
I spoke with patient.  Msg from PA Scheuermann relayed and mailed to him.  Lab draws scheduled 8/28/24 and 1/9/25; appt reminder notices mailed.

## 2024-08-12 ENCOUNTER — OFFICE VISIT (OUTPATIENT)
Dept: HEPATOLOGY | Facility: CLINIC | Age: 59
End: 2024-08-12
Payer: MEDICAID

## 2024-08-12 VITALS — HEIGHT: 68 IN | BODY MASS INDEX: 39.99 KG/M2 | WEIGHT: 263.88 LBS

## 2024-08-12 DIAGNOSIS — K83.8 DILATED BILE DUCT: Primary | ICD-10-CM

## 2024-08-12 DIAGNOSIS — K74.60 HEPATIC CIRRHOSIS, UNSPECIFIED HEPATIC CIRRHOSIS TYPE, UNSPECIFIED WHETHER ASCITES PRESENT: ICD-10-CM

## 2024-08-12 DIAGNOSIS — B18.2 CHRONIC HEPATITIS C WITHOUT HEPATIC COMA: ICD-10-CM

## 2024-08-12 PROCEDURE — 3008F BODY MASS INDEX DOCD: CPT | Mod: CPTII,,, | Performed by: PHYSICIAN ASSISTANT

## 2024-08-12 PROCEDURE — 99214 OFFICE O/P EST MOD 30 MIN: CPT | Mod: PBBFAC | Performed by: PHYSICIAN ASSISTANT

## 2024-08-12 PROCEDURE — 1160F RVW MEDS BY RX/DR IN RCRD: CPT | Mod: CPTII,,, | Performed by: PHYSICIAN ASSISTANT

## 2024-08-12 PROCEDURE — 99214 OFFICE O/P EST MOD 30 MIN: CPT | Mod: S$PBB,,, | Performed by: PHYSICIAN ASSISTANT

## 2024-08-12 PROCEDURE — 99999 PR PBB SHADOW E&M-EST. PATIENT-LVL IV: CPT | Mod: PBBFAC,,, | Performed by: PHYSICIAN ASSISTANT

## 2024-08-12 PROCEDURE — 4010F ACE/ARB THERAPY RXD/TAKEN: CPT | Mod: CPTII,,, | Performed by: PHYSICIAN ASSISTANT

## 2024-08-12 PROCEDURE — 1159F MED LIST DOCD IN RCRD: CPT | Mod: CPTII,,, | Performed by: PHYSICIAN ASSISTANT

## 2024-08-12 RX ORDER — FUROSEMIDE 40 MG/1
TABLET ORAL
COMMUNITY
Start: 2024-07-29

## 2024-08-12 NOTE — PROGRESS NOTES
"HEPATOLOGY CLINIC VISIT NOTE - HCV clinic  CHIEF COMPLAINT: Hepatitis C     HISTORY       This is a 58 y.o. Black or  male with chronic hepatitis C, here for f/u.     HCV history:  Originally diagnosed 2011 while incarcerated in [place]  Risks for HCV:  tattoo, drug use, incarceration  - Prior Treatment: No  - Genotype 1A  - HCV RNA 5.2 mill IU/mL - 5/2024    Liver staging:  HCV FibroSURE 2/6/13: A0-1, F3  FibroScan 4/16/19: kPa 7.9 (F2),  (S0)    Interval history (8/12/24):  FibroScan 7/1/24: kPa 16.1 (F4),  (S0)    Began 12 weeks epclusa on 7/18/24   Virologic response: labs scheduled later this month   Adherence issues: none   Tolerability issues: none    U/S today: no liver lesions, SM, or ascites. CBD dilated 1.9cm; central intrahepatic duct prominence  Recent labs: low plt 130s-150s; liver panel normal     No abd pain, biliary sx    MELD 15 (5/2024) - driven by Cr 2.1    No jaundice, dark urine, hematemesis, melena, slowed mentation, abdominal distention.       PMH, PSH, SOCIAL HX, FAMILY HX      Reviewed in Epic  Pertinent findings:  FAMILY HX: neg for liver diease  SOCIAL HX: resides in [place].  Tobacco - initial visit: "quit 5-6 mo ago"  Alcohol - denies  Drugs - hx of IV/TED      ROS: as per HPI, in addition:  Insomnia  Leg edema, Rt > Lt; improving recently    PHYSICAL EXAM:  Friendly Black or  male, in no acute distress; drowsy. Oriented to person, place and time  HEENT: Sclerae anicteric.   NECK: Supple  LUNGS: Normal respiratory effort.   ABDOMEN: Flat, soft, nontender.   SKIN: Warm and dry. No jaundice, No obvious rashes.   NEURO/PSYCH: Normal gate. Memory intact. Thought and speech pattern appropriate. No depression or anxiety noted.    PERTINENT DIAGNOSTIC RESULTS      Lab Results   Component Value Date    WBC 4.73 05/22/2024    HGB 11.8 (L) 05/22/2024     05/22/2024     Lab Results   Component Value Date    INR 1.1 05/22/2024     Lab Results "   Component Value Date    AST 32 05/10/2024    ALT 26 05/10/2024    BILITOT 0.6 05/10/2024    ALBUMIN 3.5 05/10/2024    ALKPHOS 95 05/10/2024    CREATININE 2.1 (H) 05/10/2024    BUN 30 (H) 05/10/2024     05/10/2024    K 3.8 05/10/2024    AFP 5.1 04/09/2019     Results for orders placed during the hospital encounter of 07/05/24  US Abdomen Limited  CLINICAL HISTORY:Chronic viral hepatitis C  TECHNIQUE:Limited ultrasound of the right upper quadrant of the abdomen (including pancreas, liver, gallbladder, common bile duct, and spleen) was performed.  COMPARISON:CT abdomen pelvis without contrast dated 06/21/2023    FINDINGS:  Liver: Measures 16.2 cm, normal size.  Homogeneous echotexture without discrete lesion.  Gallbladder: Partial fluid-filled containing 3.4 cm shadowing stone.  No abnormal gallbladder wall thickening or pericholecystic fluid.  Negative sonographic Moore's.  Biliary system: The common duct is dilated measuring up to 1.9 cm.  Central intrahepatic ductal prominence.  Spleen: Measures 8.6 cm, nonenlarged.  Miscellaneous: The visualized pancreas is unremarkable with 3 mm pancreatic duct. No upper abdominal ascites.    Impression  No discrete liver lesion.  Cholelithiasis.  Dilatation of the extrahepatic duct up to 1.9 cm.  Recommend correlation with biliary indices and subsequent assessment such as MRCP.    ASSESSMENT        58 y.o. Black or  male with:  1. Chronic HCV, genotype  1A  - on epclusa now  -- HAV status - Immunity documented 5/2024  -- HBV status - Isolated HBcAb (+)    2. Newly diagnosed cirrhosis, well-compensated  -- FibroScan 7/1/24: kPa 16.1 (F4),  (S0)  -- HCC screen: U/S today ok. Needs AFP  -- EV screen: not indicated    3. Dilated CBD, prominence of intrahepatic bile ducts      PLAN        Continue epclusa x 12 weeks w/ labs as scheduled  Add AFP to next labs.   CBC, CMP, INR, AFP, U/S, VISIT every 6 months: due 2/2025 if AFP ok.   MRCP  Pt requested  help scheduling PCP appt - scheduled while he was here today    Discussed cirrhosis and need for long term liver monitoring and HCC screening every 6 months    __________________________________________________________________    Duration of encounter: 38 min  This includes face-to-face time and non face-to-face time preparing to see the patient (eg, review of tests), obtaining and/or reviewing separately obtained history, documenting clinical information in the electronic or other health record, independently interpreting resultsand communicating results to the patient/family/caregiver, or care coordination.

## 2024-08-21 ENCOUNTER — OFFICE VISIT (OUTPATIENT)
Dept: FAMILY MEDICINE | Facility: HOSPITAL | Age: 59
End: 2024-08-21
Payer: MEDICAID

## 2024-08-21 VITALS
RESPIRATION RATE: 16 BRPM | BODY MASS INDEX: 41.63 KG/M2 | DIASTOLIC BLOOD PRESSURE: 72 MMHG | WEIGHT: 274.69 LBS | HEART RATE: 80 BPM | SYSTOLIC BLOOD PRESSURE: 108 MMHG | HEIGHT: 68 IN | OXYGEN SATURATION: 93 %

## 2024-08-21 DIAGNOSIS — L30.4 INTERTRIGO: ICD-10-CM

## 2024-08-21 DIAGNOSIS — M79.89 RIGHT LEG SWELLING: Primary | ICD-10-CM

## 2024-08-21 DIAGNOSIS — F41.1 GENERALIZED ANXIETY DISORDER: ICD-10-CM

## 2024-08-21 DIAGNOSIS — R39.9 LOWER URINARY TRACT SYMPTOMS (LUTS): ICD-10-CM

## 2024-08-21 DIAGNOSIS — J44.9 CHRONIC OBSTRUCTIVE PULMONARY DISEASE, UNSPECIFIED COPD TYPE: ICD-10-CM

## 2024-08-21 DIAGNOSIS — I10 UNCONTROLLED HYPERTENSION: ICD-10-CM

## 2024-08-21 DIAGNOSIS — R60.0 BILATERAL LOWER EXTREMITY EDEMA: ICD-10-CM

## 2024-08-21 PROCEDURE — 99214 OFFICE O/P EST MOD 30 MIN: CPT

## 2024-08-21 RX ORDER — BUMETANIDE 1 MG/1
1 TABLET ORAL DAILY
Qty: 90 TABLET | Refills: 2 | Status: SHIPPED | OUTPATIENT
Start: 2024-08-21 | End: 2024-08-21

## 2024-08-21 RX ORDER — LORATADINE 10 MG/1
10 TABLET ORAL DAILY
Qty: 30 TABLET | Refills: 6 | Status: SHIPPED | OUTPATIENT
Start: 2024-08-21 | End: 2025-03-19

## 2024-08-21 RX ORDER — ESCITALOPRAM OXALATE 10 MG/1
10 TABLET ORAL DAILY
Qty: 180 TABLET | Refills: 1 | Status: SHIPPED | OUTPATIENT
Start: 2024-08-21 | End: 2024-08-21

## 2024-08-21 RX ORDER — NYSTATIN 100000 [USP'U]/G
POWDER TOPICAL 2 TIMES DAILY
Qty: 60 G | Refills: 0 | Status: SHIPPED | OUTPATIENT
Start: 2024-08-21 | End: 2024-08-21

## 2024-08-21 RX ORDER — ALBUTEROL SULFATE 90 UG/1
2 INHALANT RESPIRATORY (INHALATION) EVERY 6 HOURS PRN
Qty: 18 G | Refills: 1 | Status: SHIPPED | OUTPATIENT
Start: 2024-08-21 | End: 2024-08-21

## 2024-08-21 RX ORDER — TAMSULOSIN HYDROCHLORIDE 0.4 MG/1
0.4 CAPSULE ORAL DAILY
Qty: 30 CAPSULE | Refills: 2 | Status: SHIPPED | OUTPATIENT
Start: 2024-08-21 | End: 2025-08-21

## 2024-08-21 RX ORDER — FLUTICASONE PROPIONATE 50 MCG
1 SPRAY, SUSPENSION (ML) NASAL DAILY
Qty: 16 G | Refills: 1 | Status: SHIPPED | OUTPATIENT
Start: 2024-08-21

## 2024-08-21 RX ORDER — BUMETANIDE 1 MG/1
1 TABLET ORAL DAILY
Qty: 90 TABLET | Refills: 2 | Status: SHIPPED | OUTPATIENT
Start: 2024-08-21 | End: 2025-05-18

## 2024-08-21 RX ORDER — TAMSULOSIN HYDROCHLORIDE 0.4 MG/1
0.4 CAPSULE ORAL DAILY
Qty: 30 CAPSULE | Refills: 2 | Status: SHIPPED | OUTPATIENT
Start: 2024-08-21 | End: 2024-08-21

## 2024-08-21 RX ORDER — NYSTATIN 100000 [USP'U]/G
POWDER TOPICAL 2 TIMES DAILY
Qty: 60 G | Refills: 0 | Status: SHIPPED | OUTPATIENT
Start: 2024-08-21

## 2024-08-21 RX ORDER — ESCITALOPRAM OXALATE 10 MG/1
20 TABLET ORAL DAILY
Qty: 180 TABLET | Refills: 1 | Status: SHIPPED | OUTPATIENT
Start: 2024-08-21 | End: 2025-08-16

## 2024-08-21 RX ORDER — AMLODIPINE AND VALSARTAN 5; 160 MG/1; MG/1
1 TABLET ORAL DAILY
Qty: 90 TABLET | Refills: 3 | Status: SHIPPED | OUTPATIENT
Start: 2024-08-21 | End: 2025-08-21

## 2024-08-21 RX ORDER — ALBUTEROL SULFATE 90 UG/1
2 INHALANT RESPIRATORY (INHALATION) EVERY 6 HOURS PRN
Qty: 18 G | Refills: 1 | Status: SHIPPED | OUTPATIENT
Start: 2024-08-21 | End: 2025-08-21

## 2024-08-21 RX ORDER — AMLODIPINE AND VALSARTAN 5; 160 MG/1; MG/1
1 TABLET ORAL DAILY
Qty: 90 TABLET | Refills: 3 | Status: SHIPPED | OUTPATIENT
Start: 2024-08-21 | End: 2024-08-21
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
## 2024-08-26 RX ORDER — TRAZODONE HYDROCHLORIDE 50 MG/1
50 TABLET ORAL NIGHTLY
Qty: 30 TABLET | Refills: 11 | Status: SHIPPED | OUTPATIENT
Start: 2024-08-26 | End: 2025-08-26

## 2024-08-29 NOTE — PROGRESS NOTES
I assume primary medical responsibility for this patient. I have reviewed the history, physical, and assessement & treatment plan with the resident and agree that the care is reasonable and necessary. This service has been performed by a resident without the presence of a teaching physician under the primary care exception. If necessary, an addendum of additional findings or evaluation beyond the resident documentation will be noted below.    Agree with plan as detailed. Patient referred to plastic surgery in 6/2024 for what was diagnosed as lipidemia at [facility]. Followed by nephrology at [facility] as well. Needs continued follow up regarding electrolyte and renal surveillance.

## 2024-09-06 ENCOUNTER — OFFICE VISIT (OUTPATIENT)
Dept: FAMILY MEDICINE | Facility: HOSPITAL | Age: 59
End: 2024-09-06
Payer: MEDICAID

## 2024-09-06 VITALS
WEIGHT: 277.75 LBS | DIASTOLIC BLOOD PRESSURE: 99 MMHG | BODY MASS INDEX: 42.1 KG/M2 | HEIGHT: 68 IN | HEART RATE: 93 BPM | SYSTOLIC BLOOD PRESSURE: 140 MMHG

## 2024-09-06 DIAGNOSIS — J44.9 CHRONIC OBSTRUCTIVE PULMONARY DISEASE, UNSPECIFIED COPD TYPE: ICD-10-CM

## 2024-09-06 DIAGNOSIS — R05.3 CHRONIC COUGH: ICD-10-CM

## 2024-09-06 DIAGNOSIS — R51.9 NONINTRACTABLE EPISODIC HEADACHE, UNSPECIFIED HEADACHE TYPE: ICD-10-CM

## 2024-09-06 DIAGNOSIS — M79.89 RIGHT LEG SWELLING: Primary | ICD-10-CM

## 2024-09-06 DIAGNOSIS — L30.4 INTERTRIGO: ICD-10-CM

## 2024-09-06 DIAGNOSIS — I10 UNCONTROLLED HYPERTENSION: ICD-10-CM

## 2024-09-06 DIAGNOSIS — G47.00 INSOMNIA, UNSPECIFIED TYPE: ICD-10-CM

## 2024-09-06 PROCEDURE — 99213 OFFICE O/P EST LOW 20 MIN: CPT

## 2024-09-06 RX ORDER — PROMETHAZINE HYDROCHLORIDE 25 MG/1
25 TABLET ORAL EVERY 6 HOURS PRN
Qty: 60 TABLET | Refills: 0 | Status: SHIPPED | OUTPATIENT
Start: 2024-09-06

## 2024-09-06 RX ORDER — ALBUTEROL SULFATE 90 UG/1
2 INHALANT RESPIRATORY (INHALATION) EVERY 6 HOURS PRN
Qty: 18 G | Refills: 1 | Status: SHIPPED | OUTPATIENT
Start: 2024-09-06 | End: 2025-09-06

## 2024-09-06 RX ORDER — NYSTATIN 100000 [USP'U]/G
POWDER TOPICAL 2 TIMES DAILY
Qty: 60 G | Refills: 0 | Status: SHIPPED | OUTPATIENT
Start: 2024-09-06

## 2024-09-06 RX ORDER — TRAZODONE HYDROCHLORIDE 50 MG/1
50 TABLET ORAL NIGHTLY
Qty: 30 TABLET | Refills: 11 | Status: SHIPPED | OUTPATIENT
Start: 2024-09-06 | End: 2025-09-06

## 2024-09-06 RX ORDER — AMLODIPINE AND VALSARTAN 10; 160 MG/1; MG/1
1 TABLET ORAL DAILY
Qty: 90 TABLET | Refills: 3 | Status: SHIPPED | OUTPATIENT
Start: 2024-09-06 | End: 2025-09-06

## 2024-09-06 RX ORDER — DEXTROMETHORPHAN HYDROBROMIDE, GUAIFENESIN 5; 100 MG/5ML; MG/5ML
650 LIQUID ORAL EVERY 8 HOURS
Qty: 30 TABLET | Refills: 2 | Status: SHIPPED | OUTPATIENT
Start: 2024-09-06

## 2024-09-06 NOTE — PROGRESS NOTES
"  [facility] FAMILY PRACTICE CLINIC NOTE  Follow-up Visit      SUBJECTIVE:     Patient: Ming Harrington is a 58 y.o. male.    Chief Compliant:   Chief Complaint   Patient presents with    Leg Swelling     RIGHT    Nausea    Medication Refill       History of Present Illness:  58 year old male w/ PMHx COPD uses 2L O2 PRN, CHF (HFpEF), HTN, CKD stage 3a, hepatitis-C, opioid use disorder on methadone, JUVENCIO, chronic non-adherence presenting today for follow up leg pain and swelling. Has endorsed 4-5 months of swelling to R thigh without recent changes. Has endorsed weeping and oozing of drainage, however, not noted on exam today. Was previously seen last month for same issue, however, Requesting refills for medications as below in A/P.       ROS  A 10+ review of systems was performed with pertinent positives and negatives noted above in the history of present illness. Other systems were negative unless otherwise specified.    OBJECTIVE:     Vital Signs (Most Recent)  Vitals:    09/06/24 1003   BP: (!) 140/99   Pulse: 93   Weight: 126 kg (277 lb 12.5 oz)   Height: 5' 8" (1.727 m)     BMI: Body mass index is 42.24 kg/m².     Physical Exam:  Physical Exam  Constitutional:       Appearance: He is obese.   HENT:      Head: Normocephalic.      Mouth/Throat:      Mouth: Mucous membranes are moist.      Pharynx: Oropharynx is clear.   Eyes:      Extraocular Movements: Extraocular movements intact.      Pupils: Pupils are equal, round, and reactive to light.   Cardiovascular:      Rate and Rhythm: Normal rate and regular rhythm.   Pulmonary:      Effort: Pulmonary effort is normal.      Breath sounds: Normal breath sounds.   Abdominal:      General: Abdomen is flat.      Palpations: Abdomen is soft.   Musculoskeletal:      Cervical back: Normal range of motion.      Comments: Swollen and edematous RLE > LLE  No drainage noted   Skin:     General: Skin is warm and dry.      Capillary Refill: Capillary refill takes less than 2 seconds. "   Neurological:      General: No focal deficit present.      Mental Status: He is alert.          ASSESSMENT:   Ming Harrington is a 58 y.o. male who presents to clinic to for    1. Right leg swelling    2. Uncontrolled hypertension    3. Chronic obstructive pulmonary disease, unspecified COPD type    4. Nonintractable episodic headache, unspecified headache type    5. Intertrigo    6. Chronic cough    7. Insomnia, unspecified type         PLAN:       Right leg swelling  - Likely lipidemia diagnosed at previous visit.   - Previously referred to plastic surgery for lipidemia, however, unable to be contacted.   - Instructed patient of ultrasound ordered at previous visit for better visualization.  - Instructed patient definitive option moving forward is lipedema reduction surgery,     Uncontrolled hypertension  -     amlodipine-valsartan (EXFORGE)  mg per tablet; Take 1 tablet by mouth once daily.  Dispense: 90 tablet; Refill: 3  - BP uncontrolled at 140/99. Previously on exforge 5-160. Instructed patient to follow up for better titration of BP meds. Increased dosage as above.    Chronic obstructive pulmonary disease, unspecified COPD type  -     albuterol (VENTOLIN HFA) 90 mcg/actuation inhaler; Inhale 2 puffs into the lungs every 6 (six) hours as needed for Wheezing or Shortness of Breath. Rescue  Dispense: 18 g; Refill: 1    Nonintractable episodic headache, unspecified headache type  -     acetaminophen (TYLENOL) 650 MG TbSR; Take 1 tablet (650 mg total) by mouth every 8 (eight) hours.  Dispense: 30 tablet; Refill: 2    Intertrigo  -     nystatin (MYCOSTATIN) powder; Apply topically 2 (two) times daily. Apply to the affected areas 2 to 3 times daily until healing is complete.  Dispense: 60 g; Refill: 0    Chronic cough  -     promethazine (PHENERGAN) 25 MG tablet; Take 1 tablet (25 mg total) by mouth every 6 (six) hours as needed for Nausea.  Dispense: 60 tablet; Refill: 0    Insomnia, unspecified type  -      traZODone (DESYREL) 50 MG tablet; Take 1 tablet (50 mg total) by mouth every evening.  Dispense: 30 tablet; Refill: 11  - Denies snoring or apneic episodes while sleeping. Controlled with trazadone. Will hold of on sleep study for now.     Provided patient with anticipatory guidance and return precautions. Treatment plan discussed with patient, all questions answered, and patient acknowledged understanding and verbal agreement.    Follow-up in: 2 weeks; or sooner PRN if acute concerns arise.    Case discussed with Dr. MACY Melendez Do, MD  [facility] PGY-2

## 2024-09-19 ENCOUNTER — HOSPITAL ENCOUNTER (EMERGENCY)
Facility: HOSPITAL | Age: 59
Discharge: HOME | End: 2024-09-19
Attending: EMERGENCY MEDICINE
Payer: MEDICAID

## 2024-09-19 ENCOUNTER — HOSPITAL ENCOUNTER (OUTPATIENT)
Dept: RADIOLOGY | Facility: HOSPITAL | Age: 59
Discharge: HOME | End: 2024-09-19
Attending: PHYSICIAN ASSISTANT
Payer: MEDICAID

## 2024-09-19 VITALS
WEIGHT: 260 LBS | SYSTOLIC BLOOD PRESSURE: 144 MMHG | OXYGEN SATURATION: 98 % | TEMPERATURE: 98 F | DIASTOLIC BLOOD PRESSURE: 79 MMHG | HEIGHT: 68 IN | HEART RATE: 66 BPM | RESPIRATION RATE: 18 BRPM | BODY MASS INDEX: 39.4 KG/M2

## 2024-09-19 DIAGNOSIS — I10 UNCONTROLLED HYPERTENSION: ICD-10-CM

## 2024-09-19 DIAGNOSIS — R60.0 BILATERAL LOWER EXTREMITY EDEMA: ICD-10-CM

## 2024-09-19 DIAGNOSIS — J44.9 CHRONIC OBSTRUCTIVE PULMONARY DISEASE, UNSPECIFIED COPD TYPE: Primary | ICD-10-CM

## 2024-09-19 DIAGNOSIS — K83.8 DILATED BILE DUCT: ICD-10-CM

## 2024-09-19 DIAGNOSIS — L03.115 CELLULITIS OF RIGHT LOWER EXTREMITY: ICD-10-CM

## 2024-09-19 LAB
ALBUMIN SERPL BCP-MCNC: 3.7 G/DL (ref 3.5–5.2)
ALP SERPL-CCNC: 91 U/L (ref 55–135)
ALT SERPL W/O P-5'-P-CCNC: 25 U/L (ref 10–44)
ANION GAP SERPL CALC-SCNC: 8 MMOL/L (ref 8–16)
AST SERPL-CCNC: 48 U/L (ref 10–40)
BASOPHILS # BLD AUTO: 0.03 K/UL (ref 0–0.2)
BASOPHILS NFR BLD: 0.6 % (ref 0–1.9)
BILIRUB SERPL-MCNC: 0.5 MG/DL (ref 0.1–1)
BNP SERPL-MCNC: 27 PG/ML (ref 0–99)
BUN SERPL-MCNC: 15 MG/DL (ref 6–20)
CALCIUM SERPL-MCNC: 9.1 MG/DL (ref 8.7–10.5)
CHLORIDE SERPL-SCNC: 103 MMOL/L (ref 95–110)
CO2 SERPL-SCNC: 24 MMOL/L (ref 23–29)
CREAT SERPL-MCNC: 1.6 MG/DL (ref 0.5–1.4)
DIFFERENTIAL METHOD BLD: ABNORMAL
EOSINOPHIL # BLD AUTO: 0.3 K/UL (ref 0–0.5)
EOSINOPHIL NFR BLD: 6.3 % (ref 0–8)
ERYTHROCYTE [DISTWIDTH] IN BLOOD BY AUTOMATED COUNT: 12.2 % (ref 11.5–14.5)
EST. GFR  (NO RACE VARIABLE): 49.3 ML/MIN/1.73 M^2
GLUCOSE SERPL-MCNC: 77 MG/DL (ref 70–110)
HCT VFR BLD AUTO: 33.6 % (ref 40–54)
HGB BLD-MCNC: 11.2 G/DL (ref 14–18)
IMM GRANULOCYTES # BLD AUTO: 0.01 K/UL (ref 0–0.04)
IMM GRANULOCYTES NFR BLD AUTO: 0.2 % (ref 0–0.5)
LYMPHOCYTES # BLD AUTO: 1.1 K/UL (ref 1–4.8)
LYMPHOCYTES NFR BLD: 21.8 % (ref 18–48)
MCH RBC QN AUTO: 31.6 PG (ref 27–31)
MCHC RBC AUTO-ENTMCNC: 33.3 G/DL (ref 32–36)
MCV RBC AUTO: 95 FL (ref 82–98)
MONOCYTES # BLD AUTO: 0.3 K/UL (ref 0.3–1)
MONOCYTES NFR BLD: 6.5 % (ref 4–15)
NEUTROPHILS # BLD AUTO: 3.4 K/UL (ref 1.8–7.7)
NEUTROPHILS NFR BLD: 64.6 % (ref 38–73)
NRBC BLD-RTO: 0 /100 WBC
OHS QRS DURATION: 94 MS
OHS QTC CALCULATION: 464 MS
PLATELET # BLD AUTO: 171 K/UL (ref 150–450)
PMV BLD AUTO: 11.1 FL (ref 9.2–12.9)
POTASSIUM SERPL-SCNC: 5.1 MMOL/L (ref 3.5–5.1)
PROT SERPL-MCNC: 8.3 G/DL (ref 6–8.4)
RBC # BLD AUTO: 3.54 M/UL (ref 4.6–6.2)
SARS-COV-2 RDRP RESP QL NAA+PROBE: NEGATIVE
SODIUM SERPL-SCNC: 135 MMOL/L (ref 136–145)
TROPONIN I SERPL DL<=0.01 NG/ML-MCNC: <0.006 NG/ML (ref 0–0.03)
WBC # BLD AUTO: 5.24 K/UL (ref 3.9–12.7)

## 2024-09-19 PROCEDURE — 27000221 HC OXYGEN, UP TO 24 HOURS

## 2024-09-19 PROCEDURE — U0002 COVID-19 LAB TEST NON-CDC: HCPCS

## 2024-09-19 PROCEDURE — 63600175 PHARM REV CODE 636 W HCPCS

## 2024-09-19 PROCEDURE — 96374 THER/PROPH/DIAG INJ IV PUSH: CPT

## 2024-09-19 PROCEDURE — 25000003 PHARM REV CODE 250

## 2024-09-19 PROCEDURE — 85025 COMPLETE CBC W/AUTO DIFF WBC: CPT

## 2024-09-19 PROCEDURE — 74181 MRI ABDOMEN W/O CONTRAST: CPT | Mod: TC

## 2024-09-19 PROCEDURE — 83880 ASSAY OF NATRIURETIC PEPTIDE: CPT

## 2024-09-19 PROCEDURE — 93005 ELECTROCARDIOGRAM TRACING: CPT

## 2024-09-19 PROCEDURE — 94761 N-INVAS EAR/PLS OXIMETRY MLT: CPT

## 2024-09-19 PROCEDURE — 94640 AIRWAY INHALATION TREATMENT: CPT

## 2024-09-19 PROCEDURE — 80053 COMPREHEN METABOLIC PANEL: CPT

## 2024-09-19 PROCEDURE — 99285 EMERGENCY DEPT VISIT HI MDM: CPT | Mod: 25

## 2024-09-19 PROCEDURE — 76376 3D RENDER W/INTRP POSTPROCES: CPT | Mod: TC

## 2024-09-19 PROCEDURE — 25000242 PHARM REV CODE 250 ALT 637 W/ HCPCS

## 2024-09-19 PROCEDURE — 84484 ASSAY OF TROPONIN QUANT: CPT

## 2024-09-19 PROCEDURE — 93010 ELECTROCARDIOGRAM REPORT: CPT | Mod: ,,, | Performed by: INTERNAL MEDICINE

## 2024-09-19 RX ORDER — BUMETANIDE 1 MG/1
1 TABLET ORAL DAILY
Qty: 90 TABLET | Refills: 0 | Status: SHIPPED | OUTPATIENT
Start: 2024-09-19 | End: 2025-06-16

## 2024-09-19 RX ORDER — ALBUTEROL SULFATE 90 UG/1
2 INHALANT RESPIRATORY (INHALATION) EVERY 6 HOURS PRN
Qty: 18 G | Refills: 0 | Status: SHIPPED | OUTPATIENT
Start: 2024-09-19 | End: 2025-09-19

## 2024-09-19 RX ORDER — DEXAMETHASONE SODIUM PHOSPHATE 4 MG/ML
8 INJECTION, SOLUTION INTRA-ARTICULAR; INTRALESIONAL; INTRAMUSCULAR; INTRAVENOUS; SOFT TISSUE
Status: COMPLETED | OUTPATIENT
Start: 2024-09-19 | End: 2024-09-19

## 2024-09-19 RX ORDER — CEPHALEXIN 500 MG/1
500 CAPSULE ORAL
Status: COMPLETED | OUTPATIENT
Start: 2024-09-19 | End: 2024-09-19

## 2024-09-19 RX ORDER — IPRATROPIUM BROMIDE AND ALBUTEROL SULFATE 2.5; .5 MG/3ML; MG/3ML
3 SOLUTION RESPIRATORY (INHALATION)
Status: COMPLETED | OUTPATIENT
Start: 2024-09-19 | End: 2024-09-19

## 2024-09-19 RX ORDER — PREDNISONE 50 MG/1
50 TABLET ORAL DAILY
Qty: 4 TABLET | Refills: 0 | Status: SHIPPED | OUTPATIENT
Start: 2024-09-20 | End: 2024-09-24

## 2024-09-19 RX ORDER — CEPHALEXIN 500 MG/1
500 CAPSULE ORAL 4 TIMES DAILY
Qty: 20 CAPSULE | Refills: 0 | Status: SHIPPED | OUTPATIENT
Start: 2024-09-19 | End: 2024-09-24

## 2024-09-19 RX ORDER — AMLODIPINE AND VALSARTAN 10; 160 MG/1; MG/1
1 TABLET ORAL DAILY
Qty: 90 TABLET | Refills: 0 | Status: SHIPPED | OUTPATIENT
Start: 2024-09-19 | End: 2025-09-19

## 2024-09-19 RX ORDER — ACETAMINOPHEN 325 MG/1
650 TABLET ORAL
Status: COMPLETED | OUTPATIENT
Start: 2024-09-19 | End: 2024-09-19

## 2024-09-19 RX ADMIN — IPRATROPIUM BROMIDE AND ALBUTEROL SULFATE 3 ML: 2.5; .5 SOLUTION RESPIRATORY (INHALATION) at 10:09

## 2024-09-19 RX ADMIN — ACETAMINOPHEN 650 MG: 325 TABLET ORAL at 03:09

## 2024-09-19 RX ADMIN — DEXAMETHASONE SODIUM PHOSPHATE 8 MG: 4 INJECTION INTRA-ARTICULAR; INTRALESIONAL; INTRAMUSCULAR; INTRAVENOUS; SOFT TISSUE at 10:09

## 2024-09-19 RX ADMIN — CEPHALEXIN 500 MG: 500 CAPSULE ORAL at 03:09
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
## 2024-09-19 NOTE — DISCHARGE INSTRUCTIONS
As discussed the ultrasound did not show DVT  You were given a breathing treatment  I have refilled the medications you requested  Please wear your home oxygen even when leaving the house.    The swelling of your legs is chronic, along with the redness however since it was not been improved with the topical medications that you were prescribed I will provide you with a antibiotics however need to follow up with the primary care physician.
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
## 2024-09-26 ENCOUNTER — TELEPHONE (OUTPATIENT)
Dept: HEPATOLOGY | Facility: CLINIC | Age: 59
End: 2024-09-26
Payer: MEDICAID

## 2024-09-26 NOTE — TELEPHONE ENCOUNTER
I spoke with patient.  He states that he forgot about his appt this morning.  Appointment with PA Scheuermann scheduled again on 10/4/24; appt reminder notice mailed.

## 2024-09-26 NOTE — TELEPHONE ENCOUNTER
----- Message from Kamilah Rose Marie sent at 9/26/2024  9:38 AM CDT -----  Type:  Sooner Apoointment Request    Caller is requesting a sooner appointment.  Caller declined first available appointment listed below.  Caller will not accept being placed on the waitlist and is requesting a message be sent to doctor.  Name of Caller: Pt   When is the first available appointment? N/A  Symptoms:F/u visit/hep c  Would the patient rather a call back or a response via [facility]? Call   Best Call Back Number:[phone]   Additional Information: pt forgot about apt today, need to reschedule to get refills

## 2024-10-03 ENCOUNTER — OFFICE VISIT (OUTPATIENT)
Dept: HEPATOLOGY | Facility: CLINIC | Age: 59
End: 2024-10-03
Payer: MEDICAID

## 2024-10-03 ENCOUNTER — TELEPHONE (OUTPATIENT)
Dept: HEPATOLOGY | Facility: CLINIC | Age: 59
End: 2024-10-03
Payer: MEDICAID

## 2024-10-03 VITALS — BODY MASS INDEX: 41.1 KG/M2 | HEIGHT: 68 IN | WEIGHT: 271.19 LBS

## 2024-10-03 DIAGNOSIS — K74.60 HEPATIC CIRRHOSIS, UNSPECIFIED HEPATIC CIRRHOSIS TYPE, UNSPECIFIED WHETHER ASCITES PRESENT: Primary | ICD-10-CM

## 2024-10-03 DIAGNOSIS — B18.2 CHRONIC HEPATITIS C WITHOUT HEPATIC COMA: ICD-10-CM

## 2024-10-03 DIAGNOSIS — K83.8 DILATED BILE DUCT: ICD-10-CM

## 2024-10-03 DIAGNOSIS — K86.89 DILATED PANCREATIC DUCT: ICD-10-CM

## 2024-10-03 PROCEDURE — 99999 PR PBB SHADOW E&M-EST. PATIENT-LVL IV: CPT | Mod: PBBFAC,,, | Performed by: PHYSICIAN ASSISTANT

## 2024-10-03 PROCEDURE — 99215 OFFICE O/P EST HI 40 MIN: CPT | Mod: S$PBB,,, | Performed by: PHYSICIAN ASSISTANT

## 2024-10-03 PROCEDURE — 99214 OFFICE O/P EST MOD 30 MIN: CPT | Mod: PBBFAC | Performed by: PHYSICIAN ASSISTANT

## 2024-10-03 PROCEDURE — 4010F ACE/ARB THERAPY RXD/TAKEN: CPT | Mod: CPTII,,, | Performed by: PHYSICIAN ASSISTANT

## 2024-10-03 PROCEDURE — 1159F MED LIST DOCD IN RCRD: CPT | Mod: CPTII,,, | Performed by: PHYSICIAN ASSISTANT

## 2024-10-03 PROCEDURE — 3008F BODY MASS INDEX DOCD: CPT | Mod: CPTII,,, | Performed by: PHYSICIAN ASSISTANT

## 2024-10-03 PROCEDURE — 1160F RVW MEDS BY RX/DR IN RCRD: CPT | Mod: CPTII,,, | Performed by: PHYSICIAN ASSISTANT

## 2024-10-03 NOTE — TELEPHONE ENCOUNTER
----- Message from Jennifer Scheuermann, PA sent at 10/3/2024 12:35 PM CDT -----  Pls call to schedule LFT, AFP, HCV, HBV DNA. They were due Aug for wk 6 labs but I didn't realize when he was here that they weren't done.

## 2024-10-03 NOTE — TELEPHONE ENCOUNTER
I spoke with patient and msg from PA Scheuermann relayed.  Lab draw scheduled 10/8/24; reminder notice mailed.

## 2024-10-03 NOTE — Clinical Note
Pls call to schedule LFT, AFP, HCV, HBV DNA. They were due Aug for wk 6 labs but I didn't realize when he was here that they weren't done.
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
## 2024-10-04 ENCOUNTER — TELEPHONE (OUTPATIENT)
Dept: ENDOSCOPY | Facility: HOSPITAL | Age: 59
End: 2024-10-04
Payer: MEDICAID

## 2024-10-04 DIAGNOSIS — K83.8 DILATED BILE DUCT: Primary | ICD-10-CM

## 2024-10-04 NOTE — TELEPHONE ENCOUNTER
Spoke to pt to schedule procedure(s) Upper Endoscopy Ultrasound (EUS)       Physician to perform procedure(s) Dr. MIGUEL Moncada  Date of Procedure (s) 10/22/24  Arrival Time 10:00 AM  Time of Procedure(s) 11:00 AM   Location of Procedure(s) [place] 2nd Floor  Type of Rx Prep sent to patient: N/A  Instructions provided to patient via Email    Patient was informed on the following information and verbalized understanding. Screening questionnaire reviewed with patient and complete. If procedure requires anesthesia, a responsible adult needs to be present to accompany the patient home, patient cannot drive after receiving anesthesia. Appointment details are tentative, especially check-in time. Patient will receive a prep-op call 7 days prior to confirm check-in time for procedure. If applicable the patient should contact their pharmacy to verify Rx for procedure prep is ready for pick-up. Patient was advised to call the scheduling department at [phone] if pharmacy states no Rx is available. Patient was advised to call the endoscopy scheduling department if any questions or concerns arise.       Endoscopy Scheduling Department        Upper Endoscopic Ultrasound (EUS) Prep Instructions    Date of procedure: 10/22/24 Arrive at: 10:00 AM    Location of Department:   [facility] [address], [place] [address]   Take the Elevators to 2nd Floor Endoscopy Procedural Area    How to prep:      Day Before Procedure 10/21/24     You may have a light evening meal.   No solid food after 7:00 pm.   Continue drinking clear liquids.      Day of the Procedure 10/22/24     You may have water/clear liquids until 4 hours before your procedure or as directed by the scheduling nurse 7:00 AM.   See below for list.    What You CANNOT do:   Do not drink milk or anything colored red.  Do not drink alcohol.  No gum chewing or candy morning of procedure    Liquids That Are OK to Drink:   Water  Sports drinks (Gatorade  Power-Aid)  Coffee or tea (no cream or nondairy creamer)  Clear juices without pulp (apple, white grape)  Gelatin desserts (no fruit or toppings)  Clear soda (sprite, coke, ginger ale)  Chicken broth (until 12 midnight the night before procedure)            IMPORTANT INFORMATION TO KNOW BEFORE YOUR PROCEDURE    [facility] 2nd Floor    If your procedure requires the administration of anesthesia, it is necessary for a responsible adult to drive you home. (Medical Transportation, Uber, Lyft, Taxi, etc. may ONLY be used if a responsible adult is present to accompany you home.  The responsible adult CAN'T be the  of the service).      person must be available to return to pick you up within 15 minutes of being notified of discharge.       Please bring a picture ID, insurance card, & copayment      Take Medications as directed below:    If you begin taking any blood thinning medications or injectable weight loss/diabetes medications (other than insulin) , please contact the endoscopy scheduling department listed below as soon as possible.    If you are diabetic see the attached instruction sheet regarding your medication.     If you take HEART, BLOOD PRESSURE, SEIZURE, PAIN, LUNG (including inhalers/nebulizers), ANTI-REJECTION (transplant patients), or PSYCHIATRIC medications, please take at your regular times with a sip of water or as directed by the scheduling nurse.     Important contact information:    Endoscopy [phone] Hours of operation Monday-Friday 8:00-4:30pm.    Questions about insurance or financial obligations call [phone] or [phone].    If you have questions regarding the prep or need to reschedule, please call [phone]. After hours questions requiring immediate assistance, contact [facility] nurse line at [phone] or [phone].   NOTE:     On occasion, unforeseen circumstances may cause a delay in your procedure start time.  We respect your time and appreciate your patience during these circumstances.      Comments: n/a

## 2024-10-08 ENCOUNTER — LAB VISIT (OUTPATIENT)
Dept: LAB | Facility: HOSPITAL | Age: 59
End: 2024-10-08
Attending: PHYSICIAN ASSISTANT
Payer: MEDICAID

## 2024-10-08 DIAGNOSIS — K74.60 HEPATIC CIRRHOSIS, UNSPECIFIED HEPATIC CIRRHOSIS TYPE, UNSPECIFIED WHETHER ASCITES PRESENT: ICD-10-CM

## 2024-10-08 DIAGNOSIS — R60.0 BILATERAL LOWER EXTREMITY EDEMA: ICD-10-CM

## 2024-10-08 DIAGNOSIS — K83.8 DILATED BILE DUCT: ICD-10-CM

## 2024-10-08 DIAGNOSIS — B18.2 CHRONIC HEPATITIS C WITHOUT HEPATIC COMA: ICD-10-CM

## 2024-10-08 LAB
AFP SERPL-MCNC: 2.9 NG/ML (ref 0–8.4)
ALBUMIN SERPL BCP-MCNC: 3.7 G/DL (ref 3.5–5.2)
ALP SERPL-CCNC: 103 U/L (ref 55–135)
ALT SERPL W/O P-5'-P-CCNC: 13 U/L (ref 10–44)
AST SERPL-CCNC: 14 U/L (ref 10–40)
BILIRUB DIRECT SERPL-MCNC: 0.2 MG/DL (ref 0.1–0.3)
BILIRUB SERPL-MCNC: 0.3 MG/DL (ref 0.1–1)
PROT SERPL-MCNC: 7.9 G/DL (ref 6–8.4)

## 2024-10-08 PROCEDURE — 36415 COLL VENOUS BLD VENIPUNCTURE: CPT | Performed by: PHYSICIAN ASSISTANT

## 2024-10-08 PROCEDURE — 82105 ALPHA-FETOPROTEIN SERUM: CPT | Performed by: PHYSICIAN ASSISTANT

## 2024-10-08 PROCEDURE — 87517 HEPATITIS B DNA QUANT: CPT | Performed by: PHYSICIAN ASSISTANT

## 2024-10-08 PROCEDURE — 87522 HEPATITIS C REVRS TRNSCRPJ: CPT | Performed by: PHYSICIAN ASSISTANT

## 2024-10-08 PROCEDURE — 80076 HEPATIC FUNCTION PANEL: CPT | Performed by: PHYSICIAN ASSISTANT

## 2024-10-08 RX ORDER — CLONIDINE HYDROCHLORIDE 0.1 MG/1
0.1 TABLET ORAL 2 TIMES DAILY
Qty: 60 TABLET | Refills: 1 | Status: SHIPPED | OUTPATIENT
Start: 2024-10-08

## 2024-10-08 RX ORDER — CLONIDINE HYDROCHLORIDE 0.1 MG/1
TABLET ORAL
Status: CANCELLED | OUTPATIENT
Start: 2024-10-08

## 2024-10-08 RX ORDER — BUMETANIDE 1 MG/1
1 TABLET ORAL DAILY
Qty: 90 TABLET | Refills: 0 | Status: SHIPPED | OUTPATIENT
Start: 2024-10-08 | End: 2025-07-05

## 2024-10-08 RX ORDER — BUMETANIDE 1 MG/1
1 TABLET ORAL DAILY
Qty: 90 TABLET | Refills: 0 | Status: CANCELLED | OUTPATIENT
Start: 2024-10-08 | End: 2025-07-05

## 2024-10-09 LAB
HCV RNA SERPL QL NAA+PROBE: NOT DETECTED
HCV RNA SPEC NAA+PROBE-ACNC: NOT DETECTED IU/ML
HEPATITIS B VIRUS DNA: NORMAL
HEPATITIS B VIRUS PCR, QUANT: NOT DETECTED IU/ML

## 2024-10-10 ENCOUNTER — TELEPHONE (OUTPATIENT)
Dept: HEPATOLOGY | Facility: CLINIC | Age: 59
End: 2024-10-10
Payer: MEDICAID

## 2024-10-10 DIAGNOSIS — K74.60 HEPATIC CIRRHOSIS, UNSPECIFIED HEPATIC CIRRHOSIS TYPE, UNSPECIFIED WHETHER ASCITES PRESENT: Primary | ICD-10-CM

## 2024-10-10 NOTE — TELEPHONE ENCOUNTER
Pt began 12 weeks Epclusa on 7/18/24  Anticipated treatment end date: 10/9/24  F 4  Siri 1A  Prior HCV treatment: No      10/8  LFT normal  HCV neg  AFP normal  HBV neg    Pls tell pt labs look good  Liver numbers normal  Liver cancer screening lab was normal  Hep C virus negative. Still small chance it can return over next 3 months.    Labs to see if Hep C cured due: LFT, HCV RNA - SVR12 - 1/9/25    Pls schedule CBC, CMP, INR, AFP, U/S, VISIT - 3/2025    And tell him happy birthday!

## 2024-10-10 NOTE — TELEPHONE ENCOUNTER
Attempt made to reach patient.  Unable to LVM so msg from provider mailed to him.  F/u with testing 3/13/25; appt reminder notice mailed.

## 2024-10-12 ENCOUNTER — TELEPHONE (OUTPATIENT)
Dept: ENDOSCOPY | Facility: HOSPITAL | Age: 59
End: 2024-10-12
Payer: MEDICAID

## 2024-10-12 NOTE — TELEPHONE ENCOUNTER
Spoke to patient to reschedule procedure(s) Upper Endoscopy Ultrasound (EUS)       Physician to perform procedure(s) Dr. MIGUEL Moncada  Date of Procedure (s) 11/5/2024  Arrival Time 10:00 AM  Time of Procedure(s) 11:00 AM   Location of Procedure(s) [place] 2nd Floor  Type of Rx Prep sent to patient: Other  Instructions provided to patient via Email    Patient was informed on the following information and verbalized understanding. Screening questionnaire reviewed with patient and complete. If procedure requires anesthesia, a responsible adult needs to be present to accompany the patient home, patient cannot drive after receiving anesthesia. Appointment details are tentative, especially check-in time. Patient will receive a prep-op call 7 days prior to confirm check-in time for procedure. If applicable the patient should contact their pharmacy to verify Rx for procedure prep is ready for pick-up. Patient was advised to call the scheduling department at [phone] if pharmacy states no Rx is available. Patient was advised to call the endoscopy scheduling department if any questions or concerns arise.       Endoscopy Scheduling Department         Upper Endoscopic Ultrasound (EUS) Prep Instructions    Date of procedure: 11/5/2024 Arrive at: 10:00 AM    Location of Department:   [facility] [address], [place] [address]   Take the Elevators to 2nd Floor Endoscopy Procedural Area    How to prep:      Day Before Procedure 11/4/2024     You may have a light evening meal.   No solid food after 7:00 pm.   Continue drinking clear liquids.      Day of the Procedure 11/5/2024     You may have water/clear liquids until 2 hours before your procedure or as directed by the scheduling nurse 9:00 AM.   See below for list.    What You CANNOT do:   Do not drink milk or anything colored red.  Do not drink alcohol.  No gum chewing or candy morning of procedure    Liquids That Are OK to Drink:   Water  Sports drinks  (Gatorade, Power-Aid)  Coffee or tea (no cream or nondairy creamer)  Clear juices without pulp (apple, white grape)  Gelatin desserts (no fruit or toppings)  Clear soda (sprite, coke, ginger ale)  Chicken broth (until 12 midnight the night before procedure)        Comments:     IMPORTANT INFORMATION TO KNOW BEFORE YOUR PROCEDURE    [facility] 2nd Floor    If your procedure requires the administration of anesthesia, it is necessary for a responsible adult to drive you home. (Medical Transportation, Uber, Lyft, Taxi, etc. may ONLY be used if a responsible adult is present to accompany you home.  The responsible adult CAN'T be the  of the service).      person must be available to return to pick you up within 15 minutes of being notified of discharge.       Please bring a picture ID, insurance card, & copayment      Take Medications as directed below:      If you begin taking any blood thinning medications or injectable weight loss/diabetes medications (other than insulin) , please contact the endoscopy scheduling department listed below as soon as possible.    If you are diabetic see the attached instruction sheet regarding your medication.     If you take HEART, BLOOD PRESSURE, SEIZURE, PAIN, LUNG (including inhalers/nebulizers), ANTI-REJECTION (transplant patients), or PSYCHIATRIC medications, please take at your regular times with a sip of water or as directed by the scheduling nurse.     Important contact information:    Endoscopy [phone] Hours of operation Monday-Friday 8:00-4:30pm.    Questions about insurance or financial obligations call [phone] or [phone].    If you have questions regarding the prep or need to reschedule, please call [phone]. After hours questions requiring immediate assistance, contact [facility] nurse line at [phone] or [phone].   NOTE:     On occasion, unforeseen circumstances may cause a delay in your  procedure start time. We respect your time and appreciate your patience during these circumstances.      Comments:

## 2024-10-29 ENCOUNTER — TELEPHONE (OUTPATIENT)
Dept: GASTROENTEROLOGY | Facility: CLINIC | Age: 59
End: 2024-10-29
Payer: MEDICAID

## 2024-11-04 ENCOUNTER — ANESTHESIA EVENT (OUTPATIENT)
Dept: ENDOSCOPY | Facility: HOSPITAL | Age: 59
End: 2024-11-04
Payer: MEDICAID

## 2024-11-04 ENCOUNTER — TELEPHONE (OUTPATIENT)
Dept: GASTROENTEROLOGY | Facility: CLINIC | Age: 59
End: 2024-11-04
Payer: MEDICAID

## 2024-11-04 NOTE — TELEPHONE ENCOUNTER
----- Message from Devi sent at 11/4/2024 11:39 AM CST -----  Regarding: Pt called to speak to the nurse regarding her 11/5/24 procedure appt and she would like a call back today asap  Type:  Needs Medical Advice    Who Called: Pt called to speak to the nurse regarding her 11/5/24 procedure appt and she would like a call back today asap  Would the patient rather a call back or a response via [employer]an[employer]ener? Call Back  Best Call Back Number: [phone]

## 2024-11-04 NOTE — ANESTHESIA PREPROCEDURE EVALUATION
[facility]  Anesthesia Pre-Operative Evaluation         Patient Name: Ming Harrington  YOB: 1965  MRN: [id number]    SUBJECTIVE:     11/05/2024    Procedure(s) (LRB):  ULTRASOUND, UPPER GI TRACT, ENDOSCOPIC (N/A)    Ming Harrington is a 59 y.o. male here for Procedure(s) (LRB):  ULTRASOUND, UPPER GI TRACT, ENDOSCOPIC (N/A)    Drips:     Patient Active Problem List   Diagnosis    Uncontrolled hypertension    Opioid use disorder, severe, on maintenance therapy, dependence    Hepatitis C virus infection    COPD exacerbation    Polysubstance abuse    Shortness of breath    COPD with acute exacerbation    Asthma exacerbation in COPD    Acute exacerbation of COPD with asthma    Severe asthma    Acute on chronic congestive heart failure    COPD (chronic obstructive pulmonary disease)    IV drug user    Generalized anxiety disorder    Fluid collection at surgical site    Chronic wound infection of abdomen    Bilateral lower extremity edema    Tobacco dependence    Right leg swelling    Asthma with exacerbation    Chronic constipation    Stasis dermatitis of both legs    Left leg pain    IGTN (ingrowing toe nail)    Chronic edema    Insomnia    Stage 3a chronic kidney disease    CHF (congestive heart failure)    Environmental allergies    Chronic back pain    Constipation due to opioid therapy    Chronic cough    Heart failure with preserved ejection fraction    Stage 3b chronic kidney disease       Review of patient's allergies indicates:   Allergen Reactions    Iodine and iodide containing products Anaphylaxis and Swelling     Facial swelling    Shellfish containing products Anaphylaxis             Compazine [prochlorperazine edisylate] Hallucinations       Current Facility-Administered Medications on File Prior to Encounter   Medication Dose Route Frequency Provider Last Rate Last Admin    0.9%  NaCl infusion   Intravenous Continuous Melissa Nielsen MD   New Bag at 11/05/24 6704    lidocaine (PF) 10 mg/ml  (1%) injection 10 mg  1 mL Intradermal Once Melissa Nielsen MD         Current Outpatient Medications on File Prior to Encounter   Medication Sig Dispense Refill    albuterol (VENTOLIN HFA) 90 mcg/actuation inhaler Inhale 2 puffs into the lungs every 6 (six) hours as needed for Wheezing or Shortness of Breath. Rescue 18 g 0    amlodipine-valsartan (EXFORGE)  mg per tablet Take 1 tablet by mouth once daily. 90 tablet 0    EScitalopram oxalate (LEXAPRO) 10 MG tablet Take 2 tablets (20 mg total) by mouth once daily. 180 tablet 1    fluticasone-umeclidin-vilanter (TRELEGY ELLIPTA) 200-62.5-25 mcg inhaler Inhale 1 puff into the lungs once daily. 60 each 5    lactulose (CHRONULAC) 20 gram/30 mL Soln Take 15-30 mLs (10-20 g total) by mouth daily as needed (constipation). 600 mL 3    LIDOcaine (LIDODERM) 5 % Place 1 patch onto the skin once daily. Remove & Discard patch within 12 hours or as directed by MD. Apply to affected area for 12 hours. 30 patch 2    loratadine (CLARITIN) 10 mg tablet Take 1 tablet (10 mg total) by mouth once daily. 30 tablet 6    methadone (METHADOSE) 40 mg disintegrating tablet Take 40 mg by mouth every 6 (six) hours as needed for Pain. Pt states the dose is 95 mg      promethazine (PHENERGAN) 25 MG tablet Take 1 tablet (25 mg total) by mouth every 6 (six) hours as needed for Nausea. 60 tablet 0    sofosbuvir-velpatasvir 400-100 mg Tab Take 1 tablet by mouth once daily. 28 tablet 2    acetaminophen (TYLENOL) 650 MG TbSR Take 1 tablet (650 mg total) by mouth every 8 (eight) hours. (Patient not taking: Reported on 10/3/2024) 30 tablet 2    doxepin (SINEQUAN) 10 mg/mL solution Take 0.3 mLs (3 mg total) by mouth nightly as needed for Insomnia. (Patient not taking: Reported on 10/3/2024) 120 mL 1    fluticasone propionate (FLONASE) 50 mcg/actuation nasal spray 1 spray (50 mcg total) by Each Nostril route once daily. (Patient not taking: Reported on 10/3/2024) 16 g 1    nystatin (MYCOSTATIN)  powder Apply topically 2 (two) times daily. Apply to the affected areas 2 to 3 times daily until healing is complete. (Patient not taking: Reported on 10/3/2024) 60 g 0    polyethylene glycol (GLYCOLAX) 17 gram/dose powder Take 17 g by mouth 2 (two) times daily. (Patient not taking: Reported on 10/3/2024) 1020 g 1    silver sulfADIAZINE 1% (SILVADENE) 1 % cream Apply topically 2 (two) times daily. (Patient not taking: Reported on 10/3/2024) 85 g 1    tamsulosin (FLOMAX) 0.4 mg Cap Take 1 capsule (0.4 mg total) by mouth once daily. (Patient not taking: Reported on 10/3/2024) 30 capsule 2    traZODone (DESYREL) 50 MG tablet Take 1 tablet (50 mg total) by mouth every evening. 30 tablet 11       Past Surgical History:   Procedure Laterality Date    AMPUTATION      left hand tip of fingers    APPLICATION OF WOUND VACUUM-ASSISTED CLOSURE DEVICE N/A 8/5/2019    Procedure: APPLICATION, WOUND VAC;  Surgeon: Kenyon Chawla MD;  Location: [address];  Service: General;  Laterality: N/A;    DEBRIDEMENT OF WOUND OF ABDOMEN N/A 8/5/2019    Procedure: DEBRIDEMENT, WOUND, ABDOMEN;  Surgeon: Kenyon Chawla MD;  Location: [address];  Service: General;  Laterality: N/A;    DIAGNOSTIC LAPAROSCOPY N/A 4/24/2019    Procedure: LAPAROSCOPY, DIAGNOSTIC;  Surgeon: Kenyon Chawla MD;  Location: [address];  Service: General;  Laterality: N/A;    EVACUATION OF HEMATOMA  4/24/2019    Procedure: EVACUATION, HEMATOMA;  Surgeon: Kenyon Chawla MD;  Location: [address];  Service: General;;    REPAIR OF RECURRENT INCISIONAL HERNIA N/A 4/22/2019    Procedure: REPAIR, HERNIA, INCISIONAL, RECURRENT ( OPEN WITH MESH);  Surgeon: Kenyon Chawla MD;  Location: [address];  Service: General;  Laterality: N/A;    UMBILICAL HERNIA REPAIR  1998    UMBILICAL HERNIA REPAIR  2013    Recurrent.  By Dr. Matta    WOUND EXPLORATION N/A 4/24/2019    Procedure: EXPLORATION, WOUND;  Surgeon: Kenyon DE LOS SANTOS  MD [place];  Location: [facility] OR [address] [place] [address];  Service: General;  Laterality: N/A;       Social History     Socioeconomic History    Marital status: Single   Tobacco Use    Smoking status: Former     Current packs/day: 0.50     Average packs/day: 0.8 packs/day for 25.5 years (21.6 ttl pk-yrs)     Types: Cigarettes     Start date: 2000    Smokeless tobacco: Never    Tobacco comments:     Enrolled in the [employer] on 3/3/16 ([facility] Member ID # [id number]). Ambulatory referral to Smoking Cessation clinic following hospital discharge. Reports he is currently smoking about 4 cigarettes/day for the past 2 years.    Substance and Sexual Activity    Alcohol use: Not Currently     Alcohol/week: 2.0 standard drinks of alcohol     Types: 2 Glasses of wine per week     Comment: never a heavy drinker, used to drink socially    Drug use: Not Currently     Types: Heroin, Hydrocodone, Benzodiazepines     Comment: former marijuana use, h/o IVDA and intranasal drug use     Sexual activity: Yes     Partners: Female     Social Drivers of Health     Food Insecurity: No Food Insecurity (1/5/2023)    Hunger Vital Sign     Worried About Running Out of Food in the Last Year: Never true     Ran Out of Food in the Last Year: Never true   Transportation Needs: No Transportation Needs (1/5/2023)    PRAPARE - Transportation     Lack of Transportation (Medical): No     Lack of Transportation (Non-Medical): No   Physical Activity: Inactive (1/5/2023)    Exercise Vital Sign     Days of Exercise per Week: 0 days     Minutes of Exercise per Session: 0 min   Stress: Stress Concern Present (1/5/2023)    [employer] - Occupational Stress Questionnaire     Feeling of Stress : To some extent   Housing Stability: Low Risk  (1/5/2023)    Housing Stability Vital Sign     Unable to Pay for Housing in the Last Year: No     Number of Places Lived in the Last Year: 1     Unstable Housing in the Last Year: No         OBJECTIVE:      Vital Signs Range (Last 24H):  Temp:  [37.1 °C (98.8 °F)] 37.1 °C (98.8 °F)  Pulse:  [86] 86  Resp:  [18] 18  SpO2:  [95 %] 95 %  BP: (147)/(106) 147/106    Significant Labs:  Lab Results   Component Value Date    WBC 5.24 09/19/2024    HGB 11.2 (L) 09/19/2024    HCT 33.6 (L) 09/19/2024     09/19/2024    CHOL 145 12/21/2023    TRIG 73 12/21/2023    HDL 62 12/21/2023    ALT 13 10/08/2024    AST 14 10/08/2024     (L) 09/19/2024    K 5.1 09/19/2024     09/19/2024    CREATININE 1.6 (H) 09/19/2024    BUN 15 09/19/2024    CO2 24 09/19/2024    TSH 0.100 (L) 12/02/2018    PSA 0.14 05/10/2024    INR 1.1 05/22/2024    HGBA1C 4.5 12/21/2023       Diagnostic Studies:    EKG:   Results for orders placed or performed during the hospital encounter of 09/19/24   EKG 12-lead    Collection Time: 09/19/24 12:54 PM   Result Value Ref Range    QRS Duration 94 ms    [facility] QTC Calculation 464 ms    Narrative    Test Reason : R06.00,    Vent. Rate : 062 BPM     Atrial Rate : 062 BPM     P-R Int : 204 ms          QRS Dur : 094 ms      QT Int : 458 ms       P-R-T Axes : 079 071 052 degrees     QTc Int : 464 ms    Sinus rhythm with Premature atrial complexes  Otherwise normal ECG  When compared with ECG of 30-JUN-2023 07:19,  Premature atrial complexes are now Present  Nonspecific T wave abnormality no longer evident in Inferior leads  T wave inversion no longer evident in Anterior leads  Confirmed by Russ Weiss MD ([id number]) on 9/19/2024 1:33:05 PM    Referred By: [email or web address]   SELF           Confirmed By:Russ Weiss MD       2D ECHO:  TTE:  No results found. However, due to the size of the patient record, not all encounters were searched. Please check Results Review for a complete set of results.  Results for orders placed or performed during the hospital encounter of 02/15/24   Echo   Result Value Ref Range    BSA 2.55 m2    Sebastian's Biplane MOD Ejection Fraction 53 %    LVOT stroke volume 121.55 cm3    LVIDd 4.35 3.5 - 6.0  "cm    LV Systolic Volume 38.53 mL    LV Systolic Volume Index 15.9 mL/m2    LVIDs 3.12 2.1 - 4.0 cm    LV Diastolic Volume 85.20 mL    LV Diastolic Volume Index 35.21 mL/m2    IVS 1.04 0.6 - 1.1 cm    LVOT diameter 2.36 cm    LVOT area 4.4 cm2    FS 28 28 - 44 %    Left Ventricle Relative Wall Thickness 0.47 cm    PW 1.03 0.6 - 1.1 cm    LV mass 152.27 g    LV Mass Index 63 g/m2    MV Peak E Nolan 0.78 m/s    TDI LATERAL 0.08 m/s    TDI SEPTAL 0.08 m/s    E/E' ratio 9.75 m/s    MV Peak A Nolan 1.04 m/s    TR Max Nolan 3.05 m/s    E/A ratio 0.75     E wave deceleration time 169.44 msec    MV "A" wave duration [id number] msec    LV SEPTAL E/E' RATIO 9.75 m/s    LV LATERAL E/E' RATIO 9.75 m/s    PV Peak S Nolan 0.72 m/s    PV Peak D Nolan 0.46 m/s    Pulm vein S/D ratio 1.57     LVOT peak nolan 1.41 m/s    Left Ventricular Outflow Tract Mean Velocity 0.96 cm/s    Left Ventricular Outflow Tract Mean Gradient 4.30 mmHg    RVDD 4.20 cm    RV S' 18.39 cm/s    TAPSE 2.93 cm    LA size 2.94 cm    Left Atrium Minor Axis 4.23 cm    Left Atrium Major Axis 4.60 cm    LA Vol (MOD) 49.03 cm3    KEITH (MOD) 20.3 mL/m2    RA Major Axis 4.37 cm    RA Width 4.56 cm    AV mean gradient 7 mmHg    AV peak gradient 12 mmHg    Ao peak nolan 1.72 m/s    Ao VTI 28.60 cm    LVOT peak VTI 27.80 cm    AV valve area 4.25 cm²    AV Velocity Ratio 0.82     AV index (prosthetic) 0.97     JONN by Velocity Ratio 3.58 cm²    MV mean gradient 2 mmHg    MV peak gradient 4 mmHg    MV stenosis pressure 1/2 time 49.14 ms    MV valve area p 1/2 method 4.48 cm2    MV valve area by continuity eq 5.71 cm2    MV VTI 21.3 cm    Triscuspid Valve Regurgitation Peak Gradient 37 mmHg    PV PEAK VELOCITY 1.38 m/s    PV peak gradient 8 mmHg    Pulmonary Valve Mean Velocity 1.02 m/s    Sinus 4.27 cm    STJ 3.81 cm    IVC diameter 2.00 cm    Mean e' 0.08 m/s    ZLVIDS -6.20     ZLVIDD -9.67     KEITH 19.1 mL/m2    LA Vol 46.26 cm3    RV-rivera mid d 3.4 cm    LA WIDTH 4.2 cm    TV " resting pulmonary artery pressure 40 mmHg    RV TB RVSP 6 mmHg    Est. RA pres 3 mmHg    Narrative      Left Ventricle: The left ventricle is normal in size. Normal wall   thickness. There is concentric remodeling. There is normal systolic   function. Biplane (2D) method of discs ejection fraction is 53%. There is   normal diastolic function.    Right Ventricle: Mild right ventricular enlargement. Wall thickness is   normal. Right ventricle wall motion  is normal. Systolic function is   normal.    Right Atrium: Right atrium is mildly dilated.    Aorta: Aortic root is mildly dilated measuring 4.27 cm. Ascending aorta   is normal.    Pulmonary Artery: The estimated pulmonary artery systolic pressure is   40 mmHg.    IVC/SVC: Normal venous pressure at 3 mmHg.           Pre-op Assessment    I have reviewed the Patient Summary Reports.     I have reviewed the Nursing Notes. I have reviewed the NPO Status.   I have reviewed the Medications.     Review of Systems  Anesthesia Hx:    Sedation in PACU requiring re-intubation  History of prior surgery of interest to airway management or planning:            Denies Personal Hx of Anesthesia complications.                    Social:  Former Smoker, Social Alcohol Use Former drug use       Cardiovascular:     Hypertension       CHF     LAND           Shortness of Breath       Congestive Heart Failure (CHF)                Hypertension         Pulmonary:   COPD Asthma  Shortness of breath     Asthma:   Chronic Obstructive Pulmonary Disease (COPD):                      Renal/:  Chronic Renal Disease, CKD        Kidney Function/Disease             Hepatic/GI:      Liver Disease, Hepatitis, C           Liver Disease, Hepatitis        Neurological:  Neurology Normal Denies TIA.  Denies CVA.    Denies Seizures.                                Endocrine:  Diabetes    Diabetes                    Obesity / BMI > 30, Morbid Obesity / BMI > 40  Psych:  Psychiatric History                   Physical Exam  General: Well nourished, Cooperative, Alert and Oriented    Airway:  Mallampati: III   Mouth Opening: Normal  TM Distance: Normal  Tongue: Normal  Neck ROM: Normal ROM    Dental:  Edentulous    Heart:  1-2+ lower extremity edema bilaterally       Anesthesia Plan  Type of Anesthesia, risks & benefits discussed:    Anesthesia Type: Gen Natural Airway  Intra-op Monitoring Plan: Standard ASA Monitors  Post Op Pain Control Plan: multimodal analgesia  Induction:  IV  Informed Consent: Informed consent signed with the Patient and all parties understand the risks and agree with anesthesia plan.  All questions answered.   ASA Score: 3  Day of Surgery Review of History & Physical: H&P Update referred to the surgeon/provider.  Anesthesia Plan Notes:     Limit fluids.   Pt advised to return to PCP to have refills of meds, including Bumex.     Ready For Surgery From Anesthesia Perspective.     .

## 2024-11-05 ENCOUNTER — TELEPHONE (OUTPATIENT)
Dept: GASTROENTEROLOGY | Facility: CLINIC | Age: 59
End: 2024-11-05
Payer: MEDICAID

## 2024-11-05 ENCOUNTER — ANESTHESIA (OUTPATIENT)
Dept: ENDOSCOPY | Facility: HOSPITAL | Age: 59
End: 2024-11-05
Payer: MEDICAID

## 2024-11-05 ENCOUNTER — HOSPITAL ENCOUNTER (EMERGENCY)
Facility: HOSPITAL | Age: 59
Discharge: HOME | End: 2024-11-05
Attending: EMERGENCY MEDICINE
Payer: MEDICAID

## 2024-11-05 ENCOUNTER — HOSPITAL ENCOUNTER (OUTPATIENT)
Facility: HOSPITAL | Age: 59
Discharge: HOME | End: 2024-11-05
Attending: INTERNAL MEDICINE | Admitting: INTERNAL MEDICINE
Payer: MEDICAID

## 2024-11-05 VITALS
RESPIRATION RATE: 20 BRPM | OXYGEN SATURATION: 96 % | HEART RATE: 76 BPM | DIASTOLIC BLOOD PRESSURE: 91 MMHG | HEIGHT: 67 IN | TEMPERATURE: 98 F | SYSTOLIC BLOOD PRESSURE: 135 MMHG | WEIGHT: 281.63 LBS | BODY MASS INDEX: 44.2 KG/M2

## 2024-11-05 VITALS
SYSTOLIC BLOOD PRESSURE: 127 MMHG | BODY MASS INDEX: 43.95 KG/M2 | DIASTOLIC BLOOD PRESSURE: 64 MMHG | TEMPERATURE: 98 F | OXYGEN SATURATION: 94 % | HEART RATE: 82 BPM | HEIGHT: 67 IN | RESPIRATION RATE: 13 BRPM | WEIGHT: 280 LBS

## 2024-11-05 DIAGNOSIS — B37.2 CANDIDIASIS OF SKIN: ICD-10-CM

## 2024-11-05 DIAGNOSIS — I10 UNCONTROLLED HYPERTENSION: ICD-10-CM

## 2024-11-05 DIAGNOSIS — M79.604 PAIN OF RIGHT LOWER EXTREMITY: ICD-10-CM

## 2024-11-05 DIAGNOSIS — F41.1 GENERALIZED ANXIETY DISORDER: ICD-10-CM

## 2024-11-05 DIAGNOSIS — M79.89 LEG SWELLING: Primary | ICD-10-CM

## 2024-11-05 DIAGNOSIS — R60.0 BILATERAL LOWER EXTREMITY EDEMA: ICD-10-CM

## 2024-11-05 DIAGNOSIS — B36.9 FUNGAL SKIN INFECTION: ICD-10-CM

## 2024-11-05 DIAGNOSIS — K83.8 DILATED CBD, ACQUIRED: ICD-10-CM

## 2024-11-05 PROCEDURE — 63600175 PHARM REV CODE 636 W HCPCS: Performed by: NURSE ANESTHETIST, CERTIFIED REGISTERED

## 2024-11-05 PROCEDURE — 43235 EGD DIAGNOSTIC BRUSH WASH: CPT | Mod: 52,,, | Performed by: INTERNAL MEDICINE

## 2024-11-05 PROCEDURE — 37000008 HC ANESTHESIA 1ST 15 MINUTES: Performed by: INTERNAL MEDICINE

## 2024-11-05 PROCEDURE — 25000003 PHARM REV CODE 250: Performed by: STUDENT IN AN ORGANIZED HEALTH CARE EDUCATION/TRAINING PROGRAM

## 2024-11-05 PROCEDURE — 37000009 HC ANESTHESIA EA ADD 15 MINS: Performed by: INTERNAL MEDICINE

## 2024-11-05 PROCEDURE — 43235 EGD DIAGNOSTIC BRUSH WASH: CPT | Mod: 74 | Performed by: INTERNAL MEDICINE

## 2024-11-05 PROCEDURE — 99284 EMERGENCY DEPT VISIT MOD MDM: CPT

## 2024-11-05 PROCEDURE — 25000003 PHARM REV CODE 250: Performed by: NURSE ANESTHETIST, CERTIFIED REGISTERED

## 2024-11-05 RX ORDER — SODIUM CHLORIDE 9 MG/ML
INJECTION, SOLUTION INTRAVENOUS CONTINUOUS
Status: DISCONTINUED | OUTPATIENT
Start: 2024-11-05 | End: 2024-11-05 | Stop reason: HOSPADM

## 2024-11-05 RX ORDER — ACETAMINOPHEN 500 MG
1000 TABLET ORAL
Status: DISCONTINUED | OUTPATIENT
Start: 2024-11-05 | End: 2024-11-05 | Stop reason: HOSPADM

## 2024-11-05 RX ORDER — ESCITALOPRAM OXALATE 10 MG/1
20 TABLET ORAL DAILY
Qty: 180 TABLET | Refills: 1 | Status: SHIPPED | OUTPATIENT
Start: 2024-11-05 | End: 2025-10-31

## 2024-11-05 RX ORDER — BUMETANIDE 1 MG/1
1 TABLET ORAL DAILY
Qty: 90 TABLET | Refills: 0 | Status: SHIPPED | OUTPATIENT
Start: 2024-11-05 | End: 2025-08-02

## 2024-11-05 RX ORDER — ACETAMINOPHEN 500 MG
500 TABLET ORAL EVERY 6 HOURS PRN
Qty: 100 TABLET | Refills: 0 | Status: SHIPPED | OUTPATIENT
Start: 2024-11-05

## 2024-11-05 RX ORDER — TOPICAL ANESTHETIC 200 MG/ML
SPRAY DENTAL; PERIODONTAL
Status: DISCONTINUED | OUTPATIENT
Start: 2024-11-05 | End: 2024-11-05

## 2024-11-05 RX ORDER — PROPOFOL 10 MG/ML
VIAL (ML) INTRAVENOUS
Status: DISCONTINUED | OUTPATIENT
Start: 2024-11-05 | End: 2024-11-05

## 2024-11-05 RX ORDER — NYSTATIN 100000 [USP'U]/G
POWDER TOPICAL 2 TIMES DAILY
Qty: 60 G | Refills: 0 | Status: SHIPPED | OUTPATIENT
Start: 2024-11-05

## 2024-11-05 RX ORDER — AMLODIPINE AND VALSARTAN 10; 160 MG/1; MG/1
1 TABLET ORAL DAILY
Qty: 90 TABLET | Refills: 0 | Status: SHIPPED | OUTPATIENT
Start: 2024-11-05 | End: 2025-11-05

## 2024-11-05 RX ORDER — LIDOCAINE HYDROCHLORIDE 20 MG/ML
INJECTION, SOLUTION EPIDURAL; INFILTRATION; INTRACAUDAL; PERINEURAL
Status: DISCONTINUED | OUTPATIENT
Start: 2024-11-05 | End: 2024-11-05

## 2024-11-05 RX ORDER — DEXMEDETOMIDINE HYDROCHLORIDE 100 UG/ML
INJECTION, SOLUTION INTRAVENOUS
Status: DISCONTINUED | OUTPATIENT
Start: 2024-11-05 | End: 2024-11-05

## 2024-11-05 RX ORDER — METHADONE HYDROCHLORIDE 40 MG/1
40 TABLET ORAL EVERY 6 HOURS PRN
COMMUNITY

## 2024-11-05 RX ADMIN — LIDOCAINE HYDROCHLORIDE 60 MG: 20 INJECTION, SOLUTION EPIDURAL; INFILTRATION; INTRACAUDAL; PERINEURAL at 11:11

## 2024-11-05 RX ADMIN — PROPOFOL 70 MG: 10 INJECTION, EMULSION INTRAVENOUS at 11:11

## 2024-11-05 RX ADMIN — LIDOCAINE HYDROCHLORIDE 40 MG: 20 INJECTION, SOLUTION EPIDURAL; INFILTRATION; INTRACAUDAL; PERINEURAL at 11:11

## 2024-11-05 RX ADMIN — PROPOFOL 30 MG: 10 INJECTION, EMULSION INTRAVENOUS at 11:11

## 2024-11-05 RX ADMIN — DEXMEDETOMIDINE HYDROCHLORIDE 8 MCG: 100 INJECTION, SOLUTION, CONCENTRATE INTRAVENOUS at 11:11

## 2024-11-05 RX ADMIN — SODIUM CHLORIDE: 0.9 INJECTION, SOLUTION INTRAVENOUS at 11:11

## 2024-11-05 RX ADMIN — PROPOFOL 20 MG: 10 INJECTION, EMULSION INTRAVENOUS at 11:11

## 2024-11-05 RX ADMIN — TOPICAL ANESTHETIC 1 EACH: 200 SPRAY DENTAL; PERIODONTAL at 11:11

## 2024-11-05 RX ADMIN — GLYCOPYRROLATE 0.2 MG: 0.2 INJECTION, SOLUTION INTRAMUSCULAR; INTRAVITREAL at 11:11
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
## 2024-11-05 NOTE — PROVATION PATIENT INSTRUCTIONS
Discharge Summary/Instructions after an Endoscopic Procedure  Patient Name: Ming Harrington  Patient MRN: [id number]  Patient YOB: 1965  Tuesday, November 5, 2024  Yariel Moncada MD  Dear patient,  As a result of recent federal legislation (The Federal Cures Act), you may   receive lab or pathology results from your procedure in your [facility]   account before your physician is able to contact you. Your physician or   their representative will relay the results to you with their   recommendations at their soonest availability.  Thank you,  Your health is very important to us during the Covid Crisis. Following your   procedure today, you will receive a daily text for 2 weeks asking about   signs or symptoms of Covid 19.  Please respond to this text when you   receive it so we can follow up and keep you as safe as possible.   RESTRICTIONS:  During your procedure today, you received medications for sedation.  These   medications may affect your judgment, balance and coordination.  Therefore,   for 24 hours, you have the following restrictions:   - DO NOT drive a car, operate machinery, make legal/financial decisions,   sign important papers or drink alcohol.    ACTIVITY:  Today: no heavy lifting, straining or running due to procedural   sedation/anesthesia.  The following day: return to full activity including work.  DIET:  Eat and drink normally unless instructed otherwise.     TREATMENT FOR COMMON SIDE EFFECTS:  - Mild abdominal pain, nausea, belching, bloating or excessive gas:  rest,   eat lightly and use a heating pad.  - Sore Throat: treat with throat lozenges and/or gargle with warm salt   water.  - Because air was used during the procedure, expelling large amounts of air   from your rectum or belching is normal.  - If a bowel prep was taken, you may not have a bowel movement for 1-3 days.    This is normal.  SYMPTOMS TO WATCH FOR AND REPORT TO YOUR PHYSICIAN:  1. Abdominal pain or bloating, other than gas  cramps.  2. Chest pain.  3. Back pain.  4. Signs of infection such as: chills or fever occurring within 24 hours   after the procedure.  5. Rectal bleeding, which would show as bright red, maroon, or black stools.   (A tablespoon of blood from the rectum is not serious, especially if   hemorrhoids are present.)  6. Vomiting.  7. Weakness or dizziness.  GO DIRECTLY TO THE NEAREST EMERGENCY ROOM IF YOU HAVE ANY OF THE FOLLOWING:      Difficulty breathing              Chills and/or fever over 101 F   Persistent vomiting and/or vomiting blood   Severe abdominal pain   Severe chest pain   Black, tarry stools   Bleeding- more than one tablespoon   Any other symptom or condition that you feel may need urgent attention  Your doctor recommends these additional instructions:  If any biopsies were taken, your doctors clinic will contact you in 1 to 2   weeks with any results.  - Discharge patient to home.   - Resume previous diet.   - Continue present medications.   - Repeat the upper endoscopic ultrasound at appointment to be scheduled for   retreatment.  For questions, problems or results please call your physician - Yariel Moncada MD.  EMERGENCY PHONE NUMBER: [phone],  LAB RESULTS: [phone]  IF A COMPLICATION OR EMERGENCY SITUATION ARISES AND YOU ARE UNABLE TO REACH   YOUR PHYSICIAN - GO DIRECTLY TO THE EMERGENCY ROOM.  Yariel Moncada MD  11/5/2024 11:54:13 AM  This report has been verified and signed electronically.  Dear patient,  As a result of recent federal legislation (The Federal Cures Act), you may   receive lab or pathology results from your procedure in your [facility]   account before your physician is able to contact you. Your physician or   their representative will relay the results to you with their   recommendations at their soonest availability.  Thank you,  PROVATION

## 2024-11-05 NOTE — ANESTHESIA POSTPROCEDURE EVALUATION
Anesthesia Post Evaluation    Patient: Ming Harrington    Procedure(s) Performed: Procedure(s) (LRB):  ULTRASOUND, UPPER GI TRACT, ENDOSCOPIC (N/A)    Final Anesthesia Type: general      Patient location during evaluation: PACU  Patient participation: Yes- Able to Participate  Level of consciousness: awake  Post-procedure vital signs: reviewed and stable  Pain management: adequate  Airway patency: patent    PONV status at discharge: No PONV  Anesthetic complications: yes  Perioperative Events: unplanned hospital admission      Erlinda-operative Events Comments: Pt went to ER post procedure   Cardiovascular status: blood pressure returned to baseline, hypertensive and stable  Respiratory status: unassisted  Hydration status: hypervolemic  Follow-up not needed.              Vitals Value Taken Time   /105 11/05/24 1320   Temp 36.9 °C (98.4 °F) 11/05/24 1320   Pulse 83 11/05/24 1320   Resp 20 11/05/24 1320   SpO2 92 % 11/05/24 1320         Event Time   Out of Recovery 12:29:06         Pain/Isis Score: Isis Score: 9 (11/5/2024 12:25 PM)

## 2024-11-05 NOTE — TELEPHONE ENCOUNTER
----- Message from Aubrey sent at 11/5/2024  8:30 AM CST -----  Contact: pt  Type: Requesting to speak with nurse        Who Called: PT  Regarding: running late to endo appointment. Pt was dropped off at wrong location.   Would the patient rather a call back or a response via [facility]? Call back  Best Call Back Number: [phone]   Additional Information:

## 2024-11-05 NOTE — ED NOTES
Pt arrived to ED with s/o of upper right leg swelling that started approzimately 2 months aog and has worsened this week. Pt endorses that he has a leaky wound behind his right knee, but skin behind his right knee is intact. Denies n/v/d. PMH htn.
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
## 2024-11-05 NOTE — TRANSFER OF CARE
"Anesthesia Transfer of Care Note    Patient: Ming Harrington    Procedure(s) Performed: Procedure(s) (LRB):  ULTRASOUND, UPPER GI TRACT, ENDOSCOPIC (N/A)    Patient location: PACU    Anesthesia Type: general    Transport from OR: Transported from OR on 6-10 L/min O2 by face mask with adequate spontaneous ventilation    Post pain: adequate analgesia    Post assessment: no apparent anesthetic complications    Post vital signs: stable    Level of consciousness: awake    Nausea/Vomiting: no nausea/vomiting    Complications: none    Transfer of care protocol was followed      Last vitals: Visit Vitals  BP (!) 147/106 (BP Location: Left arm, Patient Position: Lying)   Pulse 86   Temp 37.1 °C (98.8 °F) (Temporal)   Resp 18   Ht 5' 7" (1.702 m)   Wt 127 kg (280 lb)   SpO2 95%   BMI 43.85 kg/m²     "

## 2024-11-05 NOTE — ED NOTES
Provided patient pudding and he is resting in stretcher comfortably at this time. Respirations even and unlabored, equal rise and fall to chest. Will continue to monitor.

## 2024-11-05 NOTE — PLAN OF CARE
Dr. Moncada visited at bedside, discussed findings and recommendations with patient and family member; all questions asked and answered. Verbalized understanding of information given. Handout provided at time of discharge.

## 2024-11-05 NOTE — DISCHARGE INSTRUCTIONS
Take acetaminophen (also called Tylenol) for your pain.   - You may take up to 1,000 mg every 6 hours as needed  - Do not take more than 4,000 mg in 24 hours (1 day) as this may cause liver damage  - Many other medicines include acetaminophen (Tylenol) such as: Norco, Vicodin, Tylenol #3, many cold medicines, etc.  - Please read all labels carefully and do not combine medicines that include acetaminophen.  - If you have a history of liver disease or drink alcohol heavily, do not take acetaminophen (Tylenol) since it can damage your liver

## 2024-11-06 ENCOUNTER — TELEPHONE (OUTPATIENT)
Dept: ENDOSCOPY | Facility: HOSPITAL | Age: 59
End: 2024-11-06
Payer: MEDICAID

## 2024-11-06 NOTE — TELEPHONE ENCOUNTER
Called pt to reschedule EUS procedure with no answer. Voicemail left with direct phone number for return call to schedule.

## 2024-11-08 ENCOUNTER — TELEPHONE (OUTPATIENT)
Dept: ENDOSCOPY | Facility: HOSPITAL | Age: 59
End: 2024-11-08
Payer: MEDICAID

## 2024-11-08 DIAGNOSIS — K83.8 DILATED BILE DUCT: Primary | ICD-10-CM

## 2024-11-08 NOTE — TELEPHONE ENCOUNTER
Spoke to patient to schedule procedure(s) Upper Endoscopy Ultrasound (EUS)       Physician to perform procedure(s) Dr. MIGUEL Moncada  Date of Procedure (s) 11/29/2024  Arrival Time 10:00 AM  Time of Procedure(s) 11:00 AM   Location of Procedure(s) [facility] 2nd Floor  Type of Rx Prep sent to patient: Other  Instructions provided to patient via Postal Mail    Patient was informed on the following information and verbalized understanding. Screening questionnaire reviewed with patient and complete. If procedure requires anesthesia, a responsible adult needs to be present to accompany the patient home, patient cannot drive after receiving anesthesia. Appointment details are tentative, especially check-in time. Patient will receive a prep-op call 7 days prior to confirm check-in time for procedure. If applicable the patient should contact their pharmacy to verify Rx for procedure prep is ready for pick-up. Patient was advised to call the scheduling department at [phone] if pharmacy states no Rx is available. Patient was advised to call the endoscopy scheduling department if any questions or concerns arise.       Endoscopy Scheduling Department      Upper Endoscopy (EGD) Prep Instructions    Date of procedure: 11/29/2024 Arrive at: 10:00 AM    Location of Department:   [facility] [address], [place] [address]   Take the Elevators to 2nd Floor Endoscopy Procedural Area    How to prep:      Day Before Procedure 11/28/2024     You may have a light evening meal.   No solid food after 7:00 pm.   Continue drinking clear liquids.      Day of the Procedure 11/29/2024     You may have water/clear liquids until 2 hours before your procedure or as directed by the scheduling nurse 9:00 AM.   See below for list.    What You CANNOT do:   Do not drink milk or anything colored red.  Do not drink alcohol.  No gum chewing or candy morning of procedure    Liquids That Are OK to Drink:   Water  Sports drinks  (Gatorade, Power-Aid)  Coffee or tea (no cream or nondairy creamer)  Clear juices without pulp (apple, white grape)  Gelatin desserts (no fruit or toppings)  Clear soda (sprite, coke, ginger ale)  Chicken broth (until 12 midnight the night before procedure)        Comments:     IMPORTANT INFORMATION TO KNOW BEFORE YOUR PROCEDURE    [facility] 2nd Floor    If your procedure requires the administration of anesthesia, it is necessary for a responsible adult to drive you home. (Medical Transportation, Uber, Lyft, Taxi, etc. may ONLY be used if a responsible adult is present to accompany you home.  The responsible adult CAN'T be the  of the service).      person must be available to return to pick you up within 15 minutes of being notified of discharge.       Please bring a picture ID, insurance card, & copayment      Take Medications as directed below:        If you begin taking any blood thinning medications or injectable weight loss/diabetes medications (other than insulin) , please contact the endoscopy scheduling department listed below as soon as possible.    If you are diabetic see the attached instruction sheet regarding your medication.     If you take HEART, BLOOD PRESSURE, SEIZURE, PAIN, LUNG (including inhalers/nebulizers), ANTI-REJECTION (transplant patients), or PSYCHIATRIC medications, please take at your regular times with a sip of water or as directed by the scheduling nurse.     Important contact information:    Endoscopy [phone] Hours of operation Monday-Friday 8:00-4:30pm.    Questions about insurance or financial obligations call [phone] or [phone].    If you have questions regarding the prep or need to reschedule, please call [phone]. After hours questions requiring immediate assistance, contact [facility] nurse line at [phone] or [phone].   NOTE:     On occasion, unforeseen circumstances may cause a delay in  your procedure start time. We respect your time and appreciate your patience during these circumstances.      Comments:

## 2024-11-27 ENCOUNTER — TELEPHONE (OUTPATIENT)
Dept: ENDOSCOPY | Facility: HOSPITAL | Age: 59
End: 2024-11-27
Payer: MEDICAID

## 2024-11-27 NOTE — TELEPHONE ENCOUNTER
Spoke with patient about arrival time @ *.[phone]       NPO status reviewed: Patient may eat until 7:00pm.  After 7pm, pt may have CLEAR liquids ONLY until 3 hrs prior to arrival, then completely NPO..    Medications: Do not take Insulin or oral diabetic medications the day of the procedure.    Take as prescribed: heart, seizure and blood pressure medication in the morning with a sip of water (less than an ounce).  Take any breathing medications and bring inhalers to hospital with you.     Leave all valuables and jewelry at home. Wear comfortable clothes to procedure to change into hospital gown.   You cannot drive for 24 hours after your procedure because you will receive sedation for your procedure to make you comfortable.    A ride must be provided at discharge.

## 2024-11-29 ENCOUNTER — ANESTHESIA EVENT (OUTPATIENT)
Dept: ENDOSCOPY | Facility: HOSPITAL | Age: 59
End: 2024-11-29
Payer: MEDICAID

## 2024-11-29 ENCOUNTER — ANESTHESIA (OUTPATIENT)
Dept: ENDOSCOPY | Facility: HOSPITAL | Age: 59
End: 2024-11-29
Payer: MEDICAID

## 2024-11-29 ENCOUNTER — HOSPITAL ENCOUNTER (OUTPATIENT)
Facility: HOSPITAL | Age: 59
Discharge: HOME | End: 2024-11-29
Attending: INTERNAL MEDICINE | Admitting: INTERNAL MEDICINE
Payer: MEDICAID

## 2024-11-29 VITALS
BODY MASS INDEX: 42.44 KG/M2 | OXYGEN SATURATION: 94 % | WEIGHT: 280 LBS | HEIGHT: 68 IN | DIASTOLIC BLOOD PRESSURE: 76 MMHG | TEMPERATURE: 100 F | SYSTOLIC BLOOD PRESSURE: 136 MMHG | HEART RATE: 99 BPM | RESPIRATION RATE: 12 BRPM

## 2024-11-29 DIAGNOSIS — K83.1 BILE DUCT OBSTRUCTION: ICD-10-CM

## 2024-11-29 PROCEDURE — 37000008 HC ANESTHESIA 1ST 15 MINUTES: Performed by: INTERNAL MEDICINE

## 2024-11-29 PROCEDURE — 25000003 PHARM REV CODE 250: Performed by: NURSE ANESTHETIST, CERTIFIED REGISTERED

## 2024-11-29 PROCEDURE — 43259 EGD US EXAM DUODENUM/JEJUNUM: CPT | Performed by: INTERNAL MEDICINE

## 2024-11-29 PROCEDURE — 37000009 HC ANESTHESIA EA ADD 15 MINS: Performed by: INTERNAL MEDICINE

## 2024-11-29 PROCEDURE — 43259 EGD US EXAM DUODENUM/JEJUNUM: CPT | Mod: ,,, | Performed by: INTERNAL MEDICINE

## 2024-11-29 PROCEDURE — 63600175 PHARM REV CODE 636 W HCPCS: Performed by: NURSE ANESTHETIST, CERTIFIED REGISTERED

## 2024-11-29 RX ORDER — SODIUM CHLORIDE 9 MG/ML
INJECTION, SOLUTION INTRAVENOUS CONTINUOUS
Status: DISCONTINUED | OUTPATIENT
Start: 2024-11-29 | End: 2024-11-29 | Stop reason: HOSPADM

## 2024-11-29 RX ORDER — TOPICAL ANESTHETIC 200 MG/ML
SPRAY DENTAL; PERIODONTAL
Status: DISCONTINUED | OUTPATIENT
Start: 2024-11-29 | End: 2024-11-29

## 2024-11-29 RX ORDER — LIDOCAINE HYDROCHLORIDE 20 MG/ML
INJECTION INTRAVENOUS
Status: DISCONTINUED | OUTPATIENT
Start: 2024-11-29 | End: 2024-11-29

## 2024-11-29 RX ORDER — DEXMEDETOMIDINE HYDROCHLORIDE 100 UG/ML
INJECTION, SOLUTION INTRAVENOUS
Status: DISCONTINUED | OUTPATIENT
Start: 2024-11-29 | End: 2024-11-29

## 2024-11-29 RX ORDER — PROPOFOL 10 MG/ML
VIAL (ML) INTRAVENOUS CONTINUOUS PRN
Status: DISCONTINUED | OUTPATIENT
Start: 2024-11-29 | End: 2024-11-29

## 2024-11-29 RX ADMIN — DEXMEDETOMIDINE HYDROCHLORIDE 8 MCG: 100 INJECTION, SOLUTION, CONCENTRATE INTRAVENOUS at 10:11

## 2024-11-29 RX ADMIN — PROPOFOL 30 MG: 10 INJECTION, EMULSION INTRAVENOUS at 10:11

## 2024-11-29 RX ADMIN — GLYCOPYRROLATE 0.2 MG: 0.2 INJECTION, SOLUTION INTRAMUSCULAR; INTRAVITREAL at 10:11

## 2024-11-29 RX ADMIN — TOPICAL ANESTHETIC 1 EACH: 200 SPRAY DENTAL; PERIODONTAL at 10:11

## 2024-11-29 RX ADMIN — PROPOFOL 70 MG: 10 INJECTION, EMULSION INTRAVENOUS at 10:11

## 2024-11-29 RX ADMIN — PROPOFOL 150 MCG/KG/MIN: 10 INJECTION, EMULSION INTRAVENOUS at 10:11

## 2024-11-29 RX ADMIN — LIDOCAINE HYDROCHLORIDE 100 MG: 20 INJECTION, SOLUTION INTRAVENOUS at 10:11
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
## 2024-11-29 NOTE — ANESTHESIA PREPROCEDURE EVALUATION
11/29/2024  Ming Harrington is a 59 y.o., male.      Pre-op Assessment    I have reviewed the Patient Summary Reports.     I have reviewed the Nursing Notes. I have reviewed the NPO Status.      Review of Systems  Anesthesia Hx:               Denies Personal Hx of Anesthesia complications.                    Cardiovascular:     Hypertension       CHF                Shortness of Breath       Congestive Heart Failure (CHF)                Hypertension         Pulmonary:   COPD Asthma  Shortness of breath     Asthma:   Chronic Obstructive Pulmonary Disease (COPD):                      Renal/:  Chronic Renal Disease        Kidney Function/Disease             Hepatic/GI:      Liver Disease, Hepatitis           Liver Disease, Hepatitis        Endocrine:  Diabetes    Diabetes                      Psych:  Psychiatric History              Physical Exam  General: Well nourished    Airway:  Mallampati: III   Mouth Opening: Normal    Anesthesia Plan  Type of Anesthesia, risks & benefits discussed:    Anesthesia Type: Gen Natural Airway  Informed Consent: Informed consent signed with the Patient and all parties understand the risks and agree with anesthesia plan.  All questions answered.   ASA Score: 3    Ready For Surgery From Anesthesia Perspective.   .

## 2024-11-29 NOTE — TRANSFER OF CARE
"Anesthesia Transfer of Care Note    Patient: Ming Harrington    Procedure(s) Performed: Procedure(s):  ULTRASOUND, UPPER GI TRACT, ENDOSCOPIC    Patient location: GI    Anesthesia Type: general    Transport from OR: Transported from OR on room air with adequate spontaneous ventilation    Post pain: adequate analgesia    Post assessment: no apparent anesthetic complications and tolerated procedure well    Post vital signs: stable    Level of consciousness: awake and alert    Nausea/Vomiting: no nausea/vomiting    Complications: none    Transfer of care protocol was followed      Last vitals: Visit Vitals  BP (!) 157/88 (BP Location: Left arm, Patient Position: Lying)   Pulse 102   Temp 37.7 °C (99.9 °F) (Skin)   Resp 20   Ht 5' 8" (1.727 m)   Wt 127 kg (280 lb)   SpO2 (!) 93%   BMI 42.57 kg/m²     "

## 2024-11-29 NOTE — ANESTHESIA POSTPROCEDURE EVALUATION
Anesthesia Post Evaluation    Patient: Ming Harrington    Procedure(s) Performed: Procedure(s):  ULTRASOUND, UPPER GI TRACT, ENDOSCOPIC    Final Anesthesia Type: general      Patient location during evaluation: PACU  Patient participation: Yes- Able to Participate  Level of consciousness: awake and alert  Post-procedure vital signs: reviewed and stable  Pain management: adequate  Airway patency: patent    PONV status at discharge: No PONV  Anesthetic complications: no      Cardiovascular status: blood pressure returned to baseline  Respiratory status: unassisted  Hydration status: euvolemic              Vitals Value Taken Time   /76 11/29/24 1130   Temp 36.7 11/29/24 1143   Pulse 99 11/29/24 1130   Resp 12 11/29/24 1130   SpO2 94 % 11/29/24 1130         Event Time   Out of Recovery 11:32:56         Pain/Isis Score: Isis Score: 10 (11/29/2024 11:30 AM)

## 2024-11-29 NOTE — [employer] PATIENT INSTRUCTIONS
Discharge Summary/Instructions after an Endoscopic Procedure  Patient Name: Ming Harrington  Patient MRN: [id number]  Patient YOB: 1965  Friday, November 29, 2024  Yariel Moncada MD  Dear patient,  As a result of recent federal legislation (The Federal Cures Act), you may   receive lab or pathology results from your procedure in your [facility]   account before your physician is able to contact you. Your physician or   their representative will relay the results to you with their   recommendations at their soonest availability.  Thank you,  Your health is very important to us during the Covid Crisis. Following your   procedure today, you will receive a daily text for 2 weeks asking about   signs or symptoms of Covid 19.  Please respond to this text when you   receive it so we can follow up and keep you as safe as possible.   RESTRICTIONS:  During your procedure today, you received medications for sedation.  These   medications may affect your judgment, balance and coordination.  Therefore,   for 24 hours, you have the following restrictions:   - DO NOT drive a car, operate machinery, make legal/financial decisions,   sign important papers or drink alcohol.    ACTIVITY:  Today: no heavy lifting, straining or running due to procedural   sedation/anesthesia.  The following day: return to full activity including work.  DIET:  Eat and drink normally unless instructed otherwise.     TREATMENT FOR COMMON SIDE EFFECTS:  - Mild abdominal pain, nausea, belching, bloating or excessive gas:  rest,   eat lightly and use a heating pad.  - Sore Throat: treat with throat lozenges and/or gargle with warm salt   water.  - Because air was used during the procedure, expelling large amounts of air   from your rectum or belching is normal.  - If a bowel prep was taken, you may not have a bowel movement for 1-3 days.    This is normal.  SYMPTOMS TO WATCH FOR AND REPORT TO YOUR PHYSICIAN:  1. Abdominal pain or bloating, other than gas  cramps.  2. Chest pain.  3. Back pain.  4. Signs of infection such as: chills or fever occurring within 24 hours   after the procedure.  5. Rectal bleeding, which would show as bright red, maroon, or black stools.   (A tablespoon of blood from the rectum is not serious, especially if   hemorrhoids are present.)  6. Vomiting.  7. Weakness or dizziness.  GO DIRECTLY TO THE NEAREST EMERGENCY ROOM IF YOU HAVE ANY OF THE FOLLOWING:      Difficulty breathing              Chills and/or fever over 101 F   Persistent vomiting and/or vomiting blood   Severe abdominal pain   Severe chest pain   Black, tarry stools   Bleeding- more than one tablespoon   Any other symptom or condition that you feel may need urgent attention  Your doctor recommends these additional instructions:  If any biopsies were taken, your doctors clinic will contact you in 1 to 2   weeks with any results.  - Discharge patient to home.   - Resume previous diet.   - Continue present medications.   - Repeat the upper endoscopic ultrasound in 1 year for surveillance.   - Could consider surgical referral for Type 1 Choledochal cyst but presence   of cirrhosis will limit surgical options  For questions, problems or results please call your physician - Yariel Moncada MD.  EMERGENCY PHONE NUMBER: [phone],  LAB RESULTS: [phone]  IF A COMPLICATION OR EMERGENCY SITUATION ARISES AND YOU ARE UNABLE TO REACH   YOUR PHYSICIAN - GO DIRECTLY TO THE EMERGENCY ROOM.  Yariel Moncada MD  11/29/2024 11:39:27 AM  This report has been verified and signed electronically.  Dear patient,  As a result of recent federal legislation (The Federal Cures Act), you may   receive lab or pathology results from your procedure in your [facility]   account before your physician is able to contact you. Your physician or   their representative will relay the results to you with their   recommendations at their soonest availability.  Thank you,  [employer]ATION

## 2024-12-11 ENCOUNTER — HOSPITAL ENCOUNTER (OUTPATIENT)
Facility: HOSPITAL | Age: 59
Discharge: HOME | End: 2024-12-13
Attending: EMERGENCY MEDICINE | Admitting: STUDENT IN AN ORGANIZED HEALTH CARE EDUCATION/TRAINING PROGRAM
Payer: MEDICAID

## 2024-12-11 DIAGNOSIS — I10 UNCONTROLLED HYPERTENSION: ICD-10-CM

## 2024-12-11 DIAGNOSIS — F41.1 GENERALIZED ANXIETY DISORDER: ICD-10-CM

## 2024-12-11 DIAGNOSIS — J44.1 COPD EXACERBATION: Primary | ICD-10-CM

## 2024-12-11 DIAGNOSIS — J44.9 CHRONIC OBSTRUCTIVE PULMONARY DISEASE, UNSPECIFIED COPD TYPE: ICD-10-CM

## 2024-12-11 LAB
ALBUMIN SERPL BCP-MCNC: 4.1 G/DL (ref 3.5–5.2)
ALP SERPL-CCNC: 93 U/L (ref 40–150)
ALT SERPL W/O P-5'-P-CCNC: 11 U/L (ref 10–44)
ANION GAP SERPL CALC-SCNC: 11 MMOL/L (ref 8–16)
AST SERPL-CCNC: 18 U/L (ref 10–40)
BASOPHILS # BLD AUTO: 0.02 K/UL (ref 0–0.2)
BASOPHILS NFR BLD: 0.3 % (ref 0–1.9)
BILIRUB SERPL-MCNC: 0.4 MG/DL (ref 0.1–1)
BNP SERPL-MCNC: 38 PG/ML (ref 0–99)
BUN SERPL-MCNC: 19 MG/DL (ref 6–20)
CALCIUM SERPL-MCNC: 9.4 MG/DL (ref 8.7–10.5)
CHLORIDE SERPL-SCNC: 103 MMOL/L (ref 95–110)
CK SERPL-CCNC: 364 U/L (ref 20–200)
CO2 SERPL-SCNC: 23 MMOL/L (ref 23–29)
CREAT SERPL-MCNC: 2 MG/DL (ref 0.5–1.4)
CTP QC/QA: YES
CTP QC/QA: YES
DIFFERENTIAL METHOD BLD: ABNORMAL
EOSINOPHIL # BLD AUTO: 0.2 K/UL (ref 0–0.5)
EOSINOPHIL NFR BLD: 3.3 % (ref 0–8)
ERYTHROCYTE [DISTWIDTH] IN BLOOD BY AUTOMATED COUNT: 11.6 % (ref 11.5–14.5)
EST. GFR  (NO RACE VARIABLE): 38 ML/MIN/1.73 M^2
GLUCOSE SERPL-MCNC: 73 MG/DL (ref 70–110)
HCT VFR BLD AUTO: 34.2 % (ref 40–54)
HGB BLD-MCNC: 11.2 G/DL (ref 14–18)
IMM GRANULOCYTES # BLD AUTO: 0.01 K/UL (ref 0–0.04)
IMM GRANULOCYTES NFR BLD AUTO: 0.1 % (ref 0–0.5)
LYMPHOCYTES # BLD AUTO: 1 K/UL (ref 1–4.8)
LYMPHOCYTES NFR BLD: 15.2 % (ref 18–48)
MAGNESIUM SERPL-MCNC: 2.2 MG/DL (ref 1.6–2.6)
MCH RBC QN AUTO: 30.6 PG (ref 27–31)
MCHC RBC AUTO-ENTMCNC: 32.7 G/DL (ref 32–36)
MCV RBC AUTO: 93 FL (ref 82–98)
MONOCYTES # BLD AUTO: 0.4 K/UL (ref 0.3–1)
MONOCYTES NFR BLD: 6.1 % (ref 4–15)
NEUTROPHILS # BLD AUTO: 5 K/UL (ref 1.8–7.7)
NEUTROPHILS NFR BLD: 75 % (ref 38–73)
NRBC BLD-RTO: 0 /100 WBC
OHS QRS DURATION: 92 MS
OHS QTC CALCULATION: 468 MS
PLATELET # BLD AUTO: 156 K/UL (ref 150–450)
PMV BLD AUTO: 10 FL (ref 9.2–12.9)
POC MOLECULAR INFLUENZA A AGN: NEGATIVE
POC MOLECULAR INFLUENZA B AGN: NEGATIVE
POTASSIUM SERPL-SCNC: 4.8 MMOL/L (ref 3.5–5.1)
PROT SERPL-MCNC: 8.4 G/DL (ref 6–8.4)
RBC # BLD AUTO: 3.66 M/UL (ref 4.6–6.2)
SARS-COV-2 RDRP RESP QL NAA+PROBE: NEGATIVE
SODIUM SERPL-SCNC: 137 MMOL/L (ref 136–145)
TROPONIN I SERPL DL<=0.01 NG/ML-MCNC: <0.006 NG/ML (ref 0–0.03)
WBC # BLD AUTO: 6.71 K/UL (ref 3.9–12.7)

## 2024-12-11 PROCEDURE — 25000003 PHARM REV CODE 250

## 2024-12-11 PROCEDURE — 80053 COMPREHEN METABOLIC PANEL: CPT | Performed by: EMERGENCY MEDICINE

## 2024-12-11 PROCEDURE — 27000221 HC OXYGEN, UP TO 24 HOURS

## 2024-12-11 PROCEDURE — 82550 ASSAY OF CK (CPK): CPT | Performed by: EMERGENCY MEDICINE

## 2024-12-11 PROCEDURE — 93005 ELECTROCARDIOGRAM TRACING: CPT

## 2024-12-11 PROCEDURE — 96374 THER/PROPH/DIAG INJ IV PUSH: CPT

## 2024-12-11 PROCEDURE — 94799 UNLISTED PULMONARY SVC/PX: CPT

## 2024-12-11 PROCEDURE — 99900035 HC TECH TIME PER 15 MIN (STAT)

## 2024-12-11 PROCEDURE — 63600175 PHARM REV CODE 636 W HCPCS

## 2024-12-11 PROCEDURE — 85025 COMPLETE CBC W/AUTO DIFF WBC: CPT | Performed by: EMERGENCY MEDICINE

## 2024-12-11 PROCEDURE — 94761 N-INVAS EAR/PLS OXIMETRY MLT: CPT

## 2024-12-11 PROCEDURE — 25000242 PHARM REV CODE 250 ALT 637 W/ HCPCS: Performed by: EMERGENCY MEDICINE

## 2024-12-11 PROCEDURE — 83880 ASSAY OF NATRIURETIC PEPTIDE: CPT | Performed by: EMERGENCY MEDICINE

## 2024-12-11 PROCEDURE — 93010 ELECTROCARDIOGRAM REPORT: CPT | Mod: ,,, | Performed by: INTERNAL MEDICINE

## 2024-12-11 PROCEDURE — 87635 SARS-COV-2 COVID-19 AMP PRB: CPT | Performed by: EMERGENCY MEDICINE

## 2024-12-11 PROCEDURE — 94644 CONT INHLJ TX 1ST HOUR: CPT

## 2024-12-11 PROCEDURE — 99285 EMERGENCY DEPT VISIT HI MDM: CPT | Mod: 25

## 2024-12-11 PROCEDURE — 83735 ASSAY OF MAGNESIUM: CPT | Performed by: EMERGENCY MEDICINE

## 2024-12-11 PROCEDURE — 96375 TX/PRO/DX INJ NEW DRUG ADDON: CPT

## 2024-12-11 PROCEDURE — 87798 DETECT AGENT NOS DNA AMP: CPT | Mod: 59

## 2024-12-11 PROCEDURE — 87502 INFLUENZA DNA AMP PROBE: CPT

## 2024-12-11 PROCEDURE — G0378 HOSPITAL OBSERVATION PER HR: HCPCS

## 2024-12-11 PROCEDURE — 96372 THER/PROPH/DIAG INJ SC/IM: CPT

## 2024-12-11 PROCEDURE — 84484 ASSAY OF TROPONIN QUANT: CPT | Performed by: EMERGENCY MEDICINE

## 2024-12-11 PROCEDURE — 63700000 PHARM REV CODE 250 ALT 637 W/O HCPCS

## 2024-12-11 PROCEDURE — 63600175 PHARM REV CODE 636 W HCPCS: Performed by: EMERGENCY MEDICINE

## 2024-12-11 RX ORDER — TAMSULOSIN HYDROCHLORIDE 0.4 MG/1
0.4 CAPSULE ORAL DAILY
Status: DISCONTINUED | OUTPATIENT
Start: 2024-12-11 | End: 2024-12-11

## 2024-12-11 RX ORDER — FUROSEMIDE 40 MG/1
40 TABLET ORAL DAILY
Status: DISCONTINUED | OUTPATIENT
Start: 2024-12-12 | End: 2024-12-12

## 2024-12-11 RX ORDER — FUROSEMIDE 10 MG/ML
80 INJECTION INTRAMUSCULAR; INTRAVENOUS
Status: COMPLETED | OUTPATIENT
Start: 2024-12-11 | End: 2024-12-11

## 2024-12-11 RX ORDER — VALSARTAN 160 MG/1
160 TABLET ORAL DAILY
Status: DISCONTINUED | OUTPATIENT
Start: 2024-12-11 | End: 2024-12-13 | Stop reason: HOSPADM

## 2024-12-11 RX ORDER — TALC
6 POWDER (GRAM) TOPICAL NIGHTLY PRN
Status: DISCONTINUED | OUTPATIENT
Start: 2024-12-11 | End: 2024-12-13 | Stop reason: HOSPADM

## 2024-12-11 RX ORDER — NALOXONE HCL 0.4 MG/ML
0.02 VIAL (ML) INJECTION
Status: DISCONTINUED | OUTPATIENT
Start: 2024-12-11 | End: 2024-12-13 | Stop reason: HOSPADM

## 2024-12-11 RX ORDER — METHYLPREDNISOLONE SOD SUCC 125 MG
125 VIAL (EA) INJECTION
Status: COMPLETED | OUTPATIENT
Start: 2024-12-11 | End: 2024-12-11

## 2024-12-11 RX ORDER — ONDANSETRON HYDROCHLORIDE 2 MG/ML
4 INJECTION, SOLUTION INTRAVENOUS
Status: COMPLETED | OUTPATIENT
Start: 2024-12-11 | End: 2024-12-11

## 2024-12-11 RX ORDER — DOCUSATE SODIUM 100 MG
200 CAPSULE ORAL
Status: DISCONTINUED | OUTPATIENT
Start: 2024-12-11 | End: 2024-12-13 | Stop reason: HOSPADM

## 2024-12-11 RX ORDER — GLUCAGON 1 MG
1 KIT INJECTION
Status: DISCONTINUED | OUTPATIENT
Start: 2024-12-11 | End: 2024-12-13 | Stop reason: HOSPADM

## 2024-12-11 RX ORDER — CEFTRIAXONE 1 G/1
1 INJECTION, POWDER, FOR SOLUTION INTRAMUSCULAR; INTRAVENOUS
Status: DISCONTINUED | OUTPATIENT
Start: 2024-12-11 | End: 2024-12-12

## 2024-12-11 RX ORDER — VALSARTAN 160 MG/1
160 TABLET ORAL 2 TIMES DAILY
Status: DISCONTINUED | OUTPATIENT
Start: 2024-12-11 | End: 2024-12-11

## 2024-12-11 RX ORDER — AMLODIPINE BESYLATE 5 MG/1
10 TABLET ORAL DAILY
Status: DISCONTINUED | OUTPATIENT
Start: 2024-12-11 | End: 2024-12-13 | Stop reason: HOSPADM

## 2024-12-11 RX ORDER — ALBUTEROL SULFATE 2.5 MG/.5ML
15 SOLUTION RESPIRATORY (INHALATION)
Status: COMPLETED | OUTPATIENT
Start: 2024-12-11 | End: 2024-12-11

## 2024-12-11 RX ORDER — ENOXAPARIN SODIUM 100 MG/ML
40 INJECTION SUBCUTANEOUS EVERY 24 HOURS
Status: DISCONTINUED | OUTPATIENT
Start: 2024-12-11 | End: 2024-12-13 | Stop reason: HOSPADM

## 2024-12-11 RX ORDER — ACETAMINOPHEN 325 MG/1
650 TABLET ORAL EVERY 4 HOURS PRN
Status: DISCONTINUED | OUTPATIENT
Start: 2024-12-11 | End: 2024-12-13 | Stop reason: HOSPADM

## 2024-12-11 RX ORDER — AZITHROMYCIN 250 MG/1
500 TABLET, FILM COATED ORAL DAILY
Status: DISCONTINUED | OUTPATIENT
Start: 2024-12-12 | End: 2024-12-11

## 2024-12-11 RX ORDER — CLONIDINE HYDROCHLORIDE 0.1 MG/1
0.1 TABLET ORAL 2 TIMES DAILY
Status: DISCONTINUED | OUTPATIENT
Start: 2024-12-11 | End: 2024-12-13 | Stop reason: HOSPADM

## 2024-12-11 RX ORDER — IBUPROFEN 200 MG
24 TABLET ORAL
Status: DISCONTINUED | OUTPATIENT
Start: 2024-12-11 | End: 2024-12-13 | Stop reason: HOSPADM

## 2024-12-11 RX ORDER — AZITHROMYCIN 250 MG/1
500 TABLET, FILM COATED ORAL DAILY
Status: COMPLETED | OUTPATIENT
Start: 2024-12-11 | End: 2024-12-13

## 2024-12-11 RX ORDER — SODIUM CHLORIDE 0.9 % (FLUSH) 0.9 %
5 SYRINGE (ML) INJECTION
Status: DISCONTINUED | OUTPATIENT
Start: 2024-12-11 | End: 2024-12-13 | Stop reason: HOSPADM

## 2024-12-11 RX ORDER — ONDANSETRON HYDROCHLORIDE 2 MG/ML
4 INJECTION, SOLUTION INTRAVENOUS EVERY 8 HOURS PRN
Status: DISCONTINUED | OUTPATIENT
Start: 2024-12-11 | End: 2024-12-13 | Stop reason: HOSPADM

## 2024-12-11 RX ORDER — CEFTRIAXONE 1 G/1
1 INJECTION, POWDER, FOR SOLUTION INTRAMUSCULAR; INTRAVENOUS
Status: DISCONTINUED | OUTPATIENT
Start: 2024-12-12 | End: 2024-12-11

## 2024-12-11 RX ORDER — ESCITALOPRAM OXALATE 10 MG/1
20 TABLET ORAL DAILY
Status: DISCONTINUED | OUTPATIENT
Start: 2024-12-11 | End: 2024-12-13 | Stop reason: HOSPADM

## 2024-12-11 RX ORDER — IBUPROFEN 200 MG
16 TABLET ORAL
Status: DISCONTINUED | OUTPATIENT
Start: 2024-12-11 | End: 2024-12-13 | Stop reason: HOSPADM

## 2024-12-11 RX ORDER — FUROSEMIDE 40 MG/1
40 TABLET ORAL 2 TIMES DAILY
Status: ON HOLD | COMMUNITY
Start: 2024-11-07 | End: 2024-12-13

## 2024-12-11 RX ORDER — POLYETHYLENE GLYCOL 3350 17 G/17G
17 POWDER, FOR SOLUTION ORAL DAILY PRN
Status: DISCONTINUED | OUTPATIENT
Start: 2024-12-11 | End: 2024-12-13 | Stop reason: HOSPADM

## 2024-12-11 RX ADMIN — CEFTRIAXONE SODIUM 1 G: 1 INJECTION, POWDER, FOR SOLUTION INTRAMUSCULAR; INTRAVENOUS at 10:12

## 2024-12-11 RX ADMIN — ONDANSETRON 4 MG: 2 INJECTION INTRAMUSCULAR; INTRAVENOUS at 10:12

## 2024-12-11 RX ADMIN — METHYLPREDNISOLONE SODIUM SUCCINATE 125 MG: 125 INJECTION, POWDER, FOR SOLUTION INTRAMUSCULAR; INTRAVENOUS at 11:12

## 2024-12-11 RX ADMIN — ENOXAPARIN SODIUM 40 MG: 40 INJECTION SUBCUTANEOUS at 05:12

## 2024-12-11 RX ADMIN — MELATONIN TAB 3 MG 6 MG: 3 TAB at 08:12

## 2024-12-11 RX ADMIN — ALBUTEROL SULFATE 15 MG: 2.5 SOLUTION RESPIRATORY (INHALATION) at 10:12

## 2024-12-11 RX ADMIN — ESCITALOPRAM OXALATE 20 MG: 10 TABLET ORAL at 03:12

## 2024-12-11 RX ADMIN — AZITHROMYCIN DIHYDRATE 500 MG: 250 TABLET ORAL at 03:12

## 2024-12-11 RX ADMIN — Medication 200 ML: at 05:12

## 2024-12-11 RX ADMIN — ACETAMINOPHEN 650 MG: 325 TABLET ORAL at 05:12

## 2024-12-11 RX ADMIN — VALSARTAN 160 MG: 160 TABLET, FILM COATED ORAL at 03:12

## 2024-12-11 RX ADMIN — FUROSEMIDE 80 MG: 10 INJECTION, SOLUTION INTRAMUSCULAR; INTRAVENOUS at 10:12

## 2024-12-11 RX ADMIN — CLONIDINE HYDROCHLORIDE 0.1 MG: 0.1 TABLET ORAL at 08:12

## 2024-12-11 RX ADMIN — ACETAMINOPHEN 650 MG: 325 TABLET ORAL at 08:12

## 2024-12-11 RX ADMIN — Medication 200 ML: at 08:12

## 2024-12-11 RX ADMIN — AMLODIPINE BESYLATE 10 MG: 5 TABLET ORAL at 03:12
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
## 2024-12-11 NOTE — ASSESSMENT & PLAN NOTE
Creatine stable for now. BMP reviewed- noted Estimated Creatinine Clearance: 51 mL/min (A) (based on SCr of 2 mg/dL (H)). according to latest data. Based on current GFR, CKD stage is stage 3 - GFR 30-59.      Plan:  - Monitor UOP and serial BMP and adjust therapy as needed.   - Renally dose meds.   - Avoid nephrotoxic medications and procedures.

## 2024-12-11 NOTE — HPI
Patient is a 58 yo male w/ PMHx of COPD uses 2L O2 PRN, CHF (HFpEF), HTN, CKD stage 3b, hepatitis-C, cirrhosis, opioid use disorder on methadone, JUVENCIO presenting today with shortness of breath. Symptoms have worsened since yesterday. He has also had a productive cough. He has also had chest tightness since last night. Patient says he ran out of all his medications including his inhalers.     He currently denies any fever/chills, chest pain, nausea, vomiting, diarrhea, constipation. Has been tolerating PO without any issues. Non-compliant with medications as he has run out for the past several days. At baseline patient AAOx4 and lives at home with brother. Denies any alcohol use, tobacco use or drug use. History of former smoker 1pk x day for 25yrs. Exposed to brothers smoking. Of note, he is able to ambulate without assistance and self care at home.      In the ED, initial vital signs /109, HR 70, SpO2 96% on room air. Labs include CBC with stable H/H 11.2/34.2 and WBC within normal limits 6.71. CMP with Na 137 K 4.8 Cl 103 CO2 23 and BUN/Cr 19/2.0 (baseline Cr 1.8). CXR w/ LLL opacity concerning for pneumonia. Flu and COVID negative. Trops 0.006, BNP 38. . EKG nsr. Initiated on 80 IV lasix, breathing treatments, 125mg solumedrol, and zofran. [facility] consulted for evaluation for admission for COPD exacerbation.
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
## 2024-12-11 NOTE — ASSESSMENT & PLAN NOTE
Patient's COPD is with exacerbation noted by continued dyspnea and worsening of baseline hypoxia currently. On admission prior treatments he had clinical signs of increased work of breathing, including patient speaking in incomplete sentences, use of accessory muscle use, however no tripoding.  Patient has diffuse wheezes and requiring nasal cannula in ED. Patient usually has albuterol and Trilegy at home, however has ran out of medications for months. Exposure to recent sick contact.   COVID and Flu negative  PFTs several years ago: FEV/FVC of 51%.   Approx 25pk yr former smoker. Exposed to brother smoking.     Plan:  - Prednisone 40mg x 5days (end date 12/15)  - Azithromycin 500mg x 3 days (end date 12/13)  - Ceftriaxone 1g x 3 days (end date 12/13)  - Breo inhaler daily  - Duonebs q4hrs  - IS   - Monitor Pulse Ox   - Smoking cessation counseling  - O2 saturation goal > 88-92%  - Respiratory panel PCR  - Procalcitonin

## 2024-12-11 NOTE — ASSESSMENT & PLAN NOTE
Patient's blood pressure range in the last 24 hours was: BP  Min: 100/57  Max: 165/109.The patient's inpatient anti-hypertensive regimen is listed below:  Current Antihypertensives  amLODIPine tablet 10 mg, Daily, Oral  cloNIDine tablet 0.1 mg, 2 times daily, Oral  valsartan tablet 160 mg, Daily, Oral  furosemide tablet 40 mg, Daily, Oral    Plan  - BP is controlled, no changes needed to their regimen

## 2024-12-11 NOTE — PHARMACY MED REC
"[facility]           Pharmacy  Admission Medication History     The home medication history was taken by Iraida Samuels.      Medication history obtained from Medications listed below were obtained from: Patient/family.Patient states he has been out of medications for 4-5 days. Only medication he took today was clonidine.     Based on information gathered for medication list, you may go to "Admission" then "Reconcile Home Medications" tabs to review and/or act upon those items.     The home medication list has been updated by the Pharmacy department.   Please read ALL comments highlighted in yellow.   Please address this information as you see fit.    Feel free to contact us if you have any questions or require assistance.    The medications listed below were removed from the home medication list.  Please reorder if appropriate:    Patient reports NOT TAKING the following medication(s):  Flonase       Current Facility-Administered Medications on File Prior to Encounter   Medication Dose Route Frequency Provider Last Rate Last Admin    0.9%  NaCl infusion   Intravenous Continuous Melissa Nielsen MD   New Bag at 11/05/24 1143    lidocaine (PF) 10 mg/ml (1%) injection 10 mg  1 mL Intradermal Once Melissa Nielsen MD        mupirocin 2 % ointment   Topical (Top) 1 time in Clinic/HOD Asher Ward MD         Current Outpatient Medications on File Prior to Encounter   Medication Sig Dispense Refill    acetaminophen (TYLENOL) 500 MG tablet Take 1 tablet (500 mg total) by mouth every 6 (six) hours as needed for Pain. 100 tablet 0    albuterol (VENTOLIN HFA) 90 mcg/actuation inhaler Inhale 2 puffs into the lungs every 6 (six) hours as needed for Wheezing or Shortness of Breath. Rescue 18 g 0    amlodipine-valsartan (EXFORGE)  mg per tablet Take 1 tablet by mouth once daily. 90 tablet 0    cloNIDine (CATAPRES) 0.1 MG tablet Take 1 tablet (0.1 mg total) by mouth 2 (two) times " daily. 60 tablet 1    EScitalopram oxalate (LEXAPRO) 10 MG tablet Take 2 tablets (20 mg total) by mouth once daily. 180 tablet 1    furosemide (LASIX) 40 MG tablet Take 40 mg by mouth 2 (two) times daily.      lactulose (CHRONULAC) 20 gram/30 mL Soln Take 15-30 mLs (10-20 g total) by mouth daily as needed (constipation). 600 mL 3    LIDOcaine (LIDODERM) 5 % Place 1 patch onto the skin once daily. Remove & Discard patch within 12 hours or as directed by MD. Apply to affected area for 12 hours. 30 patch 2    loratadine (CLARITIN) 10 mg tablet Take 1 tablet (10 mg total) by mouth once daily. 30 tablet 6    methadone (METHADOSE) 40 mg disintegrating tablet Take 40 mg by mouth every 6 (six) hours as needed for Pain. Pt states the dose is 95 mg      nystatin (MYCOSTATIN) powder Apply topically 2 (two) times daily. 60 g 0    promethazine (PHENERGAN) 25 MG tablet Take 1 tablet (25 mg total) by mouth every 6 (six) hours as needed for Nausea. 60 tablet 0    silver sulfADIAZINE 1% (SILVADENE) 1 % cream Apply topically 2 (two) times daily. 85 g 1    tamsulosin (FLOMAX) 0.4 mg Cap Take 1 capsule (0.4 mg total) by mouth once daily. 30 capsule 2    traZODone (DESYREL) 50 MG tablet Take 1 tablet (50 mg total) by mouth every evening. 30 tablet 11    fluticasone-umeclidin-vilanter (TRELEGY ELLIPTA) 200-62.5-25 mcg inhaler Inhale 1 puff into the lungs once daily. 60 each 5    polyethylene glycol (GLYCOLAX) 17 gram/dose powder Take 17 g by mouth 2 (two) times daily. (Patient not taking: Reported on 10/3/2024) 1020 g 1       Please address this information as you see fit.  Feel free to contact us if you have any questions or require assistance.    Iraida Samuels  [phone]                  .

## 2024-12-11 NOTE — ED NOTES
pt needs a consult for anesthesia for IV placement. Multiple staff members have attempted to establish an IV on this pt. His original one was pulled out by him.   Providers notified.

## 2024-12-11 NOTE — ED NOTES
Dinner tray provided; pt sitting up in bed quietly eating. Tolerating well. NAD noted. Denies further needs at this time.

## 2024-12-11 NOTE — ASSESSMENT & PLAN NOTE
Patient presented to the ED with complaints of SOB. Low concern to CHF exacerbation. BNP 38 and Trop <0.006. Presence of mild lower extremity edema.   Baseline Home 2L NC  EKG on admission with normal sinus rhythm, no ischemic changes on review.   CXR: Minor patchy opacities lung bases may relate to atelectasis, aspiration, or pneumonia.     Results for orders placed during the hospital encounter of 02/15/24    Echo    Interpretation Summary    Left Ventricle: The left ventricle is normal in size. Normal wall thickness. There is concentric remodeling. There is normal systolic function. Biplane (2D) method of discs ejection fraction is 53%. There is normal diastolic function.    Right Ventricle: Mild right ventricular enlargement. Wall thickness is normal. Right ventricle wall motion  is normal. Systolic function is normal.    Right Atrium: Right atrium is mildly dilated.    Aorta: Aortic root is mildly dilated measuring 4.27 cm. Ascending aorta is normal.    Pulmonary Artery: The estimated pulmonary artery systolic pressure is 40 mmHg.    IVC/SVC: Normal venous pressure at 3 mmHg.    Plan:  - Continue Furosemide and monitor clinical status closely.   - BiPAP QHS PRN   - Monitor on telemetry.   - Monitor strict Is&Os and daily weights.    - Place on fluid restriction of 1.5 L.

## 2024-12-11 NOTE — ED NOTES
Bed: EXAM 14  Expected date:   Expected time:   Means of arrival:   Comments:  Emergency use only

## 2024-12-12 LAB
ADENOVIRUS: NOT DETECTED
ALBUMIN SERPL BCP-MCNC: 3.5 G/DL (ref 3.5–5.2)
ALP SERPL-CCNC: 85 U/L (ref 40–150)
ALT SERPL W/O P-5'-P-CCNC: 5 U/L (ref 10–44)
ANION GAP SERPL CALC-SCNC: 10 MMOL/L (ref 8–16)
AST SERPL-CCNC: 15 U/L (ref 10–40)
BASOPHILS # BLD AUTO: 0.01 K/UL (ref 0–0.2)
BASOPHILS NFR BLD: 0.2 % (ref 0–1.9)
BILIRUB SERPL-MCNC: 0.3 MG/DL (ref 0.1–1)
BORDETELLA PARAPERTUSSIS (IS1001): NOT DETECTED
BORDETELLA PERTUSSIS (PTXP): NOT DETECTED
BUN SERPL-MCNC: 24 MG/DL (ref 6–20)
CALCIUM SERPL-MCNC: 9 MG/DL (ref 8.7–10.5)
CHLAMYDIA PNEUMONIAE: NOT DETECTED
CHLORIDE SERPL-SCNC: 103 MMOL/L (ref 95–110)
CO2 SERPL-SCNC: 25 MMOL/L (ref 23–29)
CORONAVIRUS 229E, COMMON COLD VIRUS: NOT DETECTED
CORONAVIRUS HKU1, COMMON COLD VIRUS: NOT DETECTED
CORONAVIRUS NL63, COMMON COLD VIRUS: NOT DETECTED
CORONAVIRUS OC43, COMMON COLD VIRUS: NOT DETECTED
CREAT SERPL-MCNC: 2 MG/DL (ref 0.5–1.4)
DIFFERENTIAL METHOD BLD: ABNORMAL
EOSINOPHIL # BLD AUTO: 0 K/UL (ref 0–0.5)
EOSINOPHIL NFR BLD: 0 % (ref 0–8)
ERYTHROCYTE [DISTWIDTH] IN BLOOD BY AUTOMATED COUNT: 11.6 % (ref 11.5–14.5)
EST. GFR  (NO RACE VARIABLE): 38 ML/MIN/1.73 M^2
FLUBV RNA NPH QL NAA+NON-PROBE: NOT DETECTED
GLUCOSE SERPL-MCNC: 114 MG/DL (ref 70–110)
HCT VFR BLD AUTO: 33.1 % (ref 40–54)
HGB BLD-MCNC: 10.7 G/DL (ref 14–18)
HPIV1 RNA NPH QL NAA+NON-PROBE: NOT DETECTED
HPIV2 RNA NPH QL NAA+NON-PROBE: NOT DETECTED
HPIV3 RNA NPH QL NAA+NON-PROBE: NOT DETECTED
HPIV4 RNA NPH QL NAA+NON-PROBE: NOT DETECTED
HUMAN METAPNEUMOVIRUS: NOT DETECTED
IMM GRANULOCYTES # BLD AUTO: 0.02 K/UL (ref 0–0.04)
IMM GRANULOCYTES NFR BLD AUTO: 0.3 % (ref 0–0.5)
INFLUENZA A (SUBTYPES H1,H1-2009,H3): NOT DETECTED
LYMPHOCYTES # BLD AUTO: 0.6 K/UL (ref 1–4.8)
LYMPHOCYTES NFR BLD: 10.4 % (ref 18–48)
MAGNESIUM SERPL-MCNC: 2.1 MG/DL (ref 1.6–2.6)
MCH RBC QN AUTO: 30.4 PG (ref 27–31)
MCHC RBC AUTO-ENTMCNC: 32.3 G/DL (ref 32–36)
MCV RBC AUTO: 94 FL (ref 82–98)
MONOCYTES # BLD AUTO: 0.4 K/UL (ref 0.3–1)
MONOCYTES NFR BLD: 6.3 % (ref 4–15)
MYCOPLASMA PNEUMONIAE: NOT DETECTED
NEUTROPHILS # BLD AUTO: 4.8 K/UL (ref 1.8–7.7)
NEUTROPHILS NFR BLD: 82.8 % (ref 38–73)
NRBC BLD-RTO: 0 /100 WBC
PHOSPHATE SERPL-MCNC: 4.3 MG/DL (ref 2.7–4.5)
PLATELET # BLD AUTO: 139 K/UL (ref 150–450)
PMV BLD AUTO: 10.5 FL (ref 9.2–12.9)
POTASSIUM SERPL-SCNC: 5.1 MMOL/L (ref 3.5–5.1)
PROT SERPL-MCNC: 7.5 G/DL (ref 6–8.4)
RBC # BLD AUTO: 3.52 M/UL (ref 4.6–6.2)
RESPIRATORY INFECTION PANEL SOURCE: NORMAL
RSV RNA NPH QL NAA+NON-PROBE: NOT DETECTED
RV+EV RNA NPH QL NAA+NON-PROBE: NOT DETECTED
SARS-COV-2 RNA RESP QL NAA+PROBE: NOT DETECTED
SODIUM SERPL-SCNC: 138 MMOL/L (ref 136–145)
WBC # BLD AUTO: 5.85 K/UL (ref 3.9–12.7)

## 2024-12-12 PROCEDURE — 94640 AIRWAY INHALATION TREATMENT: CPT

## 2024-12-12 PROCEDURE — 25000242 PHARM REV CODE 250 ALT 637 W/ HCPCS

## 2024-12-12 PROCEDURE — 99900035 HC TECH TIME PER 15 MIN (STAT)

## 2024-12-12 PROCEDURE — 96374 THER/PROPH/DIAG INJ IV PUSH: CPT | Mod: 59

## 2024-12-12 PROCEDURE — 63700000 PHARM REV CODE 250 ALT 637 W/O HCPCS

## 2024-12-12 PROCEDURE — 63600175 PHARM REV CODE 636 W HCPCS

## 2024-12-12 PROCEDURE — 85025 COMPLETE CBC W/AUTO DIFF WBC: CPT

## 2024-12-12 PROCEDURE — 36415 COLL VENOUS BLD VENIPUNCTURE: CPT

## 2024-12-12 PROCEDURE — 96376 TX/PRO/DX INJ SAME DRUG ADON: CPT

## 2024-12-12 PROCEDURE — 94640 AIRWAY INHALATION TREATMENT: CPT | Mod: XB

## 2024-12-12 PROCEDURE — 94799 UNLISTED PULMONARY SVC/PX: CPT

## 2024-12-12 PROCEDURE — G0378 HOSPITAL OBSERVATION PER HR: HCPCS

## 2024-12-12 PROCEDURE — 27000221 HC OXYGEN, UP TO 24 HOURS

## 2024-12-12 PROCEDURE — 25000003 PHARM REV CODE 250

## 2024-12-12 PROCEDURE — 83735 ASSAY OF MAGNESIUM: CPT

## 2024-12-12 PROCEDURE — 94799 UNLISTED PULMONARY SVC/PX: CPT | Mod: XB

## 2024-12-12 PROCEDURE — 96372 THER/PROPH/DIAG INJ SC/IM: CPT

## 2024-12-12 PROCEDURE — 80053 COMPREHEN METABOLIC PANEL: CPT

## 2024-12-12 PROCEDURE — 94761 N-INVAS EAR/PLS OXIMETRY MLT: CPT

## 2024-12-12 PROCEDURE — 27100171 HC OXYGEN HIGH FLOW UP TO 24 HOURS

## 2024-12-12 PROCEDURE — 84100 ASSAY OF PHOSPHORUS: CPT

## 2024-12-12 RX ORDER — METHADONE HYDROCHLORIDE 10 MG/1
60 TABLET ORAL DAILY
Status: DISCONTINUED | OUTPATIENT
Start: 2024-12-13 | End: 2024-12-13

## 2024-12-12 RX ORDER — METHOCARBAMOL 500 MG/1
500 TABLET, FILM COATED ORAL 4 TIMES DAILY PRN
Status: DISCONTINUED | OUTPATIENT
Start: 2024-12-12 | End: 2024-12-13 | Stop reason: HOSPADM

## 2024-12-12 RX ORDER — LIDOCAINE 50 MG/G
1 PATCH TOPICAL
Status: DISCONTINUED | OUTPATIENT
Start: 2024-12-12 | End: 2024-12-13 | Stop reason: HOSPADM

## 2024-12-12 RX ORDER — ALBUTEROL SULFATE 2.5 MG/.5ML
2.5 SOLUTION RESPIRATORY (INHALATION) EVERY 4 HOURS
Status: DISCONTINUED | OUTPATIENT
Start: 2024-12-12 | End: 2024-12-13 | Stop reason: HOSPADM

## 2024-12-12 RX ORDER — FLUTICASONE FUROATE AND VILANTEROL 100; 25 UG/1; UG/1
1 POWDER RESPIRATORY (INHALATION) DAILY
Status: DISCONTINUED | OUTPATIENT
Start: 2024-12-12 | End: 2024-12-13 | Stop reason: HOSPADM

## 2024-12-12 RX ORDER — METHADONE HYDROCHLORIDE 10 MG/1
40 TABLET ORAL ONCE
Status: COMPLETED | OUTPATIENT
Start: 2024-12-12 | End: 2024-12-12

## 2024-12-12 RX ORDER — KETOROLAC TROMETHAMINE 30 MG/ML
15 INJECTION, SOLUTION INTRAMUSCULAR; INTRAVENOUS ONCE
Status: COMPLETED | OUTPATIENT
Start: 2024-12-12 | End: 2024-12-12

## 2024-12-12 RX ORDER — FUROSEMIDE 10 MG/ML
80 INJECTION INTRAMUSCULAR; INTRAVENOUS ONCE
Status: COMPLETED | OUTPATIENT
Start: 2024-12-12 | End: 2024-12-12

## 2024-12-12 RX ORDER — METHADONE HYDROCHLORIDE 10 MG/1
40 TABLET ORAL DAILY
Status: DISCONTINUED | OUTPATIENT
Start: 2024-12-12 | End: 2024-12-12

## 2024-12-12 RX ADMIN — Medication 200 ML: at 08:12

## 2024-12-12 RX ADMIN — ALBUTEROL SULFATE 2.5 MG: 2.5 SOLUTION RESPIRATORY (INHALATION) at 04:12

## 2024-12-12 RX ADMIN — Medication 200 ML: at 12:12

## 2024-12-12 RX ADMIN — CLONIDINE HYDROCHLORIDE 0.1 MG: 0.1 TABLET ORAL at 08:12

## 2024-12-12 RX ADMIN — METHOCARBAMOL 500 MG: 500 TABLET ORAL at 05:12

## 2024-12-12 RX ADMIN — ENOXAPARIN SODIUM 40 MG: 40 INJECTION SUBCUTANEOUS at 05:12

## 2024-12-12 RX ADMIN — Medication 200 ML: at 03:12

## 2024-12-12 RX ADMIN — ALBUTEROL SULFATE 2.5 MG: 2.5 SOLUTION RESPIRATORY (INHALATION) at 11:12

## 2024-12-12 RX ADMIN — ALBUTEROL SULFATE 2.5 MG: 2.5 SOLUTION RESPIRATORY (INHALATION) at 08:12

## 2024-12-12 RX ADMIN — POLYETHYLENE GLYCOL 3350 17 G: 17 POWDER, FOR SOLUTION ORAL at 10:12

## 2024-12-12 RX ADMIN — METHADONE HYDROCHLORIDE 40 MG: 10 TABLET ORAL at 09:12

## 2024-12-12 RX ADMIN — LIDOCAINE 1 PATCH: 50 PATCH CUTANEOUS at 03:12

## 2024-12-12 RX ADMIN — AZITHROMYCIN DIHYDRATE 500 MG: 250 TABLET ORAL at 08:12

## 2024-12-12 RX ADMIN — AMLODIPINE BESYLATE 10 MG: 5 TABLET ORAL at 08:12

## 2024-12-12 RX ADMIN — PREDNISONE 50 MG: 20 TABLET ORAL at 08:12

## 2024-12-12 RX ADMIN — Medication 200 ML: at 01:12

## 2024-12-12 RX ADMIN — Medication 200 ML: at 04:12

## 2024-12-12 RX ADMIN — Medication 200 ML: at 11:12

## 2024-12-12 RX ADMIN — ACETAMINOPHEN 650 MG: 325 TABLET ORAL at 01:12

## 2024-12-12 RX ADMIN — FUROSEMIDE 40 MG: 40 TABLET ORAL at 08:12

## 2024-12-12 RX ADMIN — ACETAMINOPHEN 650 MG: 325 TABLET ORAL at 07:12

## 2024-12-12 RX ADMIN — METHOCARBAMOL 500 MG: 500 TABLET ORAL at 12:12

## 2024-12-12 RX ADMIN — METHADONE HYDROCHLORIDE 40 MG: 10 TABLET ORAL at 08:12

## 2024-12-12 RX ADMIN — KETOROLAC TROMETHAMINE 15 MG: 30 INJECTION, SOLUTION INTRAMUSCULAR; INTRAVENOUS at 07:12

## 2024-12-12 RX ADMIN — FLUTICASONE FUROATE AND VILANTEROL TRIFENATATE 1 PUFF: 100; 25 POWDER RESPIRATORY (INHALATION) at 08:12

## 2024-12-12 RX ADMIN — VALSARTAN 160 MG: 160 TABLET, FILM COATED ORAL at 08:12

## 2024-12-12 RX ADMIN — Medication 200 ML: at 09:12

## 2024-12-12 RX ADMIN — FUROSEMIDE 80 MG: 10 INJECTION, SOLUTION INTRAMUSCULAR; INTRAVENOUS at 09:12

## 2024-12-12 RX ADMIN — ESCITALOPRAM OXALATE 20 MG: 10 TABLET ORAL at 08:12

## 2024-12-12 NOTE — ASSESSMENT & PLAN NOTE
Patient's blood pressure range in the last 24 hours was: BP  Min: 100/57  Max: 147/72.The patient's inpatient anti-hypertensive regimen is listed below:  Current Antihypertensives  amLODIPine tablet 10 mg, Daily, Oral  cloNIDine tablet 0.1 mg, 2 times daily, Oral  valsartan tablet 160 mg, Daily, Oral  , 2 times daily, Oral    Plan  - BP is controlled, no changes needed to their regimen

## 2024-12-12 NOTE — NURSING
"Patient c/o HA and back pain rating 8/10. Patient stated "I need dilaudid". Spoke to MD Rose Marie. MD will come to bedside to speak with patient.   "

## 2024-12-12 NOTE — NURSING
Patient requesting dilaudid for pain. Spoke to MD Rose Marie. Will reassess pain 30 minutes post Robaxin. If pain persists, notify team.

## 2024-12-12 NOTE — ED NOTES
Transport to ED. Telebox applied to pt.    Pt declined hospital gown and states that he wishes to remain in his own clothing.

## 2024-12-12 NOTE — NURSING
Patient c/o HA and lower back pain rating 8/10. Reported that tylenol nor toradol relieved his pain. MD Che, notified. Awaiting further orders.

## 2024-12-12 NOTE — NURSING
Patient c/o unrelieved HA after PRN tylenol and one time dose of toradol administered. Notified Al Camarillo MD. Team will come to bedside to assess patient at rounds.

## 2024-12-12 NOTE — PLAN OF CARE
SW met with pt at bedside this AM to complete DCA. Pt reported 8/10, RN notified, but pt was in a pleasant mood with positive affect throughout assessment. Per pt he lives at home with his brother Vinny [phone] but plans on having his sister Rosita [phone] help transport him home at time of d/c. Pt expressed that he is independent in all his ADLs but does [facility]. Pt stated that he typically stays on 2L of O2 at home. Pt uses medicaid transport to get to and from doctors appts. Pt does not receive any HH or HD services. Pt did not have any additional questions or concerns for sw at this time. White board updated with CM name and contact information.  Discharge brochure provided.  Pt encouraged to call with any questions or concerns.  Cm will continue to follow pt through transitions of care and assist with any discharge needs.    Dajuanchristopher Gallegos, MSW  [phone]    Future Appointments   Date Time Provider Department Center   1/9/2025 11:30 AM APPOINTMENT [facility]   3/13/2025  8:45 AM [facility] OIC-US1 MASTER [facility] ULTR [facility]   3/13/2025 10:00 AM LAB, APPOINTMENT [facility] LAB [facility]   3/13/2025 10:30 AM Scheuermann, Jennifer B., PA [facility] HEPC [address]        12/12/24 1220   Discharge Planning   Assessment Type Discharge Planning Brief Assessment   Resource/Environmental Concerns none   Support Systems Family members   Equipment Currently Used at Home oxygen   Current Living Arrangements home   Care Facility Name home   Patient/Family Anticipates Transition to home with family   Patient/Family Anticipated Services at Transition none   DME Needed Upon Discharge  other (see comments)  (TBD)   Discharge Plan A Home with family   Health Literacy   How often do you need to have someone help you when you read instructions, pamphlets, or other written material from your doctor or pharmacy? Sometimes

## 2024-12-12 NOTE — ED NOTES
Attempted to call report to receiving floor; staff states that nurse is currently in a room with a patient and unavailable. Notified floor staff to attempt call back to ED nurse's station when nurse is ready for report.

## 2024-12-12 NOTE — SUBJECTIVE & OBJECTIVE
Interval History: Patient seen today laying in bed comfortably with no acute concerns. Patient hemodynamically stable. Afebrile for the past 24hrs. No overnight events. Patient improved with use of duonebs, steroids and breo inhalers. He states he can breath better at this time and was able to rest well. Patient has good appetite and asking for second breakfast. He admits to a headache that did not improve with tylenol. No other acute concerns at this time.     Review of Systems   Constitutional:  Negative for fever.   HENT:  Negative for congestion, ear pain, rhinorrhea and sore throat.    Eyes:  Negative for visual disturbance.   Respiratory:  Positive for shortness of breath (improving) and wheezing. Negative for cough and chest tightness.    Cardiovascular:  Negative for chest pain, palpitations and leg swelling.   Gastrointestinal:  Negative for abdominal distention, abdominal pain, constipation, diarrhea, nausea and vomiting.   Endocrine: Negative for polyuria.   Genitourinary:  Negative for difficulty urinating and dysuria.   Musculoskeletal: Negative.    Skin: Negative.    Neurological:  Negative for headaches.     Objective:     Vital Signs (Most Recent):  Temp: 97.7 °F (36.5 °C) (12/12/24 0739)  Pulse: 66 (12/12/24 0859)  Resp: 20 (12/12/24 0859)  BP: 131/81 (12/12/24 0739)  SpO2: 100 % (12/12/24 0859) Vital Signs (24h Range):  Temp:  [97.7 °F (36.5 °C)-98 °F (36.7 °C)] 97.7 °F (36.5 °C)  Pulse:  [59-86] 66  Resp:  [13-25] 20  SpO2:  [86 %-100 %] 100 %  BP: (100-147)/(57-87) 131/81     Weight: 123.6 kg (272 lb 7.8 oz)  Body mass index is 41.43 kg/m².    Intake/Output Summary (Last 24 hours) at 12/12/2024 1046  Last data filed at 12/12/2024 1026  Gross per 24 hour   Intake 640 ml   Output 3375 ml   Net -2735 ml         Physical Exam  Constitutional:       General: He is not in acute distress.     Appearance: He is obese. He is not ill-appearing.   HENT:      Head: Normocephalic and atraumatic.      Right  Ear: External ear normal.      Left Ear: External ear normal.      Nose: Nose normal. No congestion or rhinorrhea.      Mouth/Throat:      Mouth: Mucous membranes are dry.   Eyes:      Pupils: Pupils are equal, round, and reactive to light.   Cardiovascular:      Rate and Rhythm: Normal rate and regular rhythm.      Pulses: Normal pulses.      Heart sounds: Normal heart sounds. No murmur heard.  Pulmonary:      Effort: No respiratory distress.      Breath sounds: Wheezing (expiratory. All lung fields) present.   Abdominal:      General: Abdomen is flat. Bowel sounds are normal. There is no distension.      Palpations: Abdomen is soft.      Tenderness: There is no abdominal tenderness.   Musculoskeletal:         General: Normal range of motion.      Cervical back: Normal range of motion and neck supple.      Right lower leg: No edema.      Left lower leg: No edema.   Skin:     General: Skin is warm and dry.   Neurological:      Mental Status: He is alert and oriented to person, place, and time. Mental status is at baseline.      Cranial Nerves: No cranial nerve deficit.      Sensory: No sensory deficit.      Motor: No weakness.   Psychiatric:         Mood and Affect: Mood normal.             Significant Labs: All pertinent labs within the past 24 hours have been reviewed.    Significant Imaging: I have reviewed all pertinent imaging results/findings within the past 24 hours.

## 2024-12-12 NOTE — PROGRESS NOTES
[facility] Medicine  Progress Note    Patient Name: Ming Harrington  MRN: [id number]  Patient Class: OP- Observation   Admission Date: 12/11/2024  Length of Stay: 0 days  Attending Physician: Lit Dallas III, MD  Primary Care Provider: Garland Xiong DO        Subjective     Principal Problem:COPD exacerbation        HPI:  Patient is a 58 yo male w/ PMHx of COPD uses 2L O2 PRN, CHF (HFpEF), HTN, CKD stage 3b, hepatitis-C, cirrhosis, opioid use disorder on methadone, JUVENCIO presenting today with shortness of breath. Symptoms have worsened since yesterday. He has also had a productive cough. He has also had chest tightness since last night. Patient says he ran out of all his medications including his inhalers.     He currently denies any fever/chills, chest pain, nausea, vomiting, diarrhea, constipation. Has been tolerating PO without any issues. Non-compliant with medications as he has run out for the past several days. At baseline patient AAOx4 and lives at home with brother. Denies any alcohol use, tobacco use or drug use. History of former smoker 1pk x day for 25yrs. Exposed to brothers smoking. Of note, he is able to ambulate without assistance and self care at home.      In the ED, initial vital signs /109, HR 70, SpO2 96% on room air. Labs include CBC with stable H/H 11.2/34.2 and WBC within normal limits 6.71. CMP with Na 137 K 4.8 Cl 103 CO2 23 and BUN/Cr 19/2.0 (baseline Cr 1.8). CXR w/ LLL opacity concerning for pneumonia. Flu and COVID negative. Trops 0.006, BNP 38. . EKG nsr. Initiated on 80 IV lasix, breathing treatments, 125mg solumedrol, and zofran. [facility] consulted for evaluation for admission for COPD exacerbation.     Overview/Hospital Course:  59yr old gentleman admitted for concerns of COPD exacerbation. History of former smoker 1pk x day for 25yrs. Non-compliant with medications as he has run out for the past several days. Initial vital signs /109, HR  70, SpO2 90% on room air. On admission prior treatments he had clinical signs of increased work of breathing, including patient speaking in incomplete sentences, use of accessory muscle use, however no tripoding. Improved slightly with 80 IV lasix, breathing treatments, 125mg solumedrol, and zofran. Patient continued with scheduled duonebs, steroids and breo through his stay. Started CAP coverage due to concerns of bacterial pneumonia. However discontinued rocephin due to afebrile presentation, normal WBC and negative PCR testing.       Interval History: Patient seen today laying in bed comfortably with no acute concerns. Patient hemodynamically stable. Afebrile for the past 24hrs. No overnight events. Patient improved with use of duonebs, steroids and breo inhalers. He states he can breath better at this time and was able to rest well. Patient has good appetite and asking for second breakfast. He admits to a headache that did not improve with tylenol. No other acute concerns at this time.     Review of Systems   Constitutional:  Negative for fever.   HENT:  Negative for congestion, ear pain, rhinorrhea and sore throat.    Eyes:  Negative for visual disturbance.   Respiratory:  Positive for shortness of breath (improving) and wheezing. Negative for cough and chest tightness.    Cardiovascular:  Negative for chest pain, palpitations and leg swelling.   Gastrointestinal:  Negative for abdominal distention, abdominal pain, constipation, diarrhea, nausea and vomiting.   Endocrine: Negative for polyuria.   Genitourinary:  Negative for difficulty urinating and dysuria.   Musculoskeletal: Negative.    Skin: Negative.    Neurological:  Negative for headaches.     Objective:     Vital Signs (Most Recent):  Temp: 97.7 °F (36.5 °C) (12/12/24 0739)  Pulse: 66 (12/12/24 0859)  Resp: 20 (12/12/24 0859)  BP: 131/81 (12/12/24 0739)  SpO2: 100 % (12/12/24 0859) Vital Signs (24h Range):  Temp:  [97.7 °F (36.5 °C)-98 °F (36.7 °C)]  97.7 °F (36.5 °C)  Pulse:  [59-86] 66  Resp:  [13-25] 20  SpO2:  [86 %-100 %] 100 %  BP: (100-147)/(57-87) 131/81     Weight: 123.6 kg (272 lb 7.8 oz)  Body mass index is 41.43 kg/m².    Intake/Output Summary (Last 24 hours) at 12/12/2024 1046  Last data filed at 12/12/2024 1026  Gross per 24 hour   Intake 640 ml   Output 3375 ml   Net -2735 ml         Physical Exam  Constitutional:       General: He is not in acute distress.     Appearance: He is obese. He is not ill-appearing.   HENT:      Head: Normocephalic and atraumatic.      Right Ear: External ear normal.      Left Ear: External ear normal.      Nose: Nose normal. No congestion or rhinorrhea.      Mouth/Throat:      Mouth: Mucous membranes are dry.   Eyes:      Pupils: Pupils are equal, round, and reactive to light.   Cardiovascular:      Rate and Rhythm: Normal rate and regular rhythm.      Pulses: Normal pulses.      Heart sounds: Normal heart sounds. No murmur heard.  Pulmonary:      Effort: No respiratory distress.      Breath sounds: Wheezing (expiratory. All lung fields) present.   Abdominal:      General: Abdomen is flat. Bowel sounds are normal. There is no distension.      Palpations: Abdomen is soft.      Tenderness: There is no abdominal tenderness.   Musculoskeletal:         General: Normal range of motion.      Cervical back: Normal range of motion and neck supple.      Right lower leg: No edema.      Left lower leg: No edema.   Skin:     General: Skin is warm and dry.   Neurological:      Mental Status: He is alert and oriented to person, place, and time. Mental status is at baseline.      Cranial Nerves: No cranial nerve deficit.      Sensory: No sensory deficit.      Motor: No weakness.   Psychiatric:         Mood and Affect: Mood normal.             Significant Labs: All pertinent labs within the past 24 hours have been reviewed.    Significant Imaging: I have reviewed all pertinent imaging results/findings within the past 24  hours.    Assessment and Plan     * COPD exacerbation  Patient's COPD is with exacerbation noted by continued dyspnea and worsening of baseline hypoxia currently. On admission prior treatments he had clinical signs of increased work of breathing, including patient speaking in incomplete sentences, use of accessory muscle use, however no tripoding.  Patient has diffuse wheezes and requiring nasal cannula in ED. Patient usually has albuterol and Trilegy at home, however has ran out of medications for months. Exposure to recent sick contact.   COVID and Flu negative  Respiratory panel PCR = negative  Procalcitonin = pending   PFTs several years ago: FEV/FVC of 51%.   Approx 25pk yr former smoker. Exposed to brother smoking.       Plan:  - Prednisone 40mg x 5days (end date 12/15)  - Azithromycin 500mg x 3 days (end date 12/13)  - Dc'd ceftriaxone  - Breo inhaler daily  - Duonebs q4hrs  - IS   - Monitor Pulse Ox   - Smoking cessation counseling  - O2 saturation goal > 88-92%        Stage 3b chronic kidney disease  Creatine stable for now. BMP reviewed- noted Estimated Creatinine Clearance: 50.3 mL/min (A) (based on SCr of 2 mg/dL (H)). according to latest data. Based on current GFR, CKD stage is stage 3 - GFR 30-59.      Plan:  - Monitor UOP and serial BMP and adjust therapy as needed.   - Renally dose meds.   - Avoid nephrotoxic medications and procedures.    (HFpEF) heart failure with preserved ejection fraction  Patient presented to the ED with complaints of SOB. Low concern to CHF exacerbation. BNP 38 and Trop <0.006. Presence of mild lower extremity edema.   Baseline Home 2L NC  EKG on admission with normal sinus rhythm, no ischemic changes on review.   CXR: Minor patchy opacities lung bases may relate to atelectasis, aspiration, or pneumonia.     Results for orders placed during the hospital encounter of 02/15/24    Echo    Interpretation Summary    Left Ventricle: The left ventricle is normal in size. Normal wall  thickness. There is concentric remodeling. There is normal systolic function. Biplane (2D) method of discs ejection fraction is 53%. There is normal diastolic function.    Right Ventricle: Mild right ventricular enlargement. Wall thickness is normal. Right ventricle wall motion  is normal. Systolic function is normal.    Right Atrium: Right atrium is mildly dilated.    Aorta: Aortic root is mildly dilated measuring 4.27 cm. Ascending aorta is normal.    Pulmonary Artery: The estimated pulmonary artery systolic pressure is 40 mmHg.    IVC/SVC: Normal venous pressure at 3 mmHg.    Plan:  - Continue Furosemide and monitor clinical status closely.   - BiPAP QHS PRN   - Monitor on telemetry.   - Monitor strict Is&Os and daily weights.    - Place on fluid restriction of 1.5 L.     Generalized anxiety disorder  Uses Lexapro at home.    Plan:   - Continue home med.       HTN (hypertension)  Patient's blood pressure range in the last 24 hours was: BP  Min: 100/57  Max: 147/72.The patient's inpatient anti-hypertensive regimen is listed below:  Current Antihypertensives  amLODIPine tablet 10 mg, Daily, Oral  cloNIDine tablet 0.1 mg, 2 times daily, Oral  valsartan tablet 160 mg, Daily, Oral  , 2 times daily, Oral    Plan  - BP is controlled, no changes needed to their regimen      VTE Risk Mitigation (From admission, onward)           Ordered     enoxaparin injection 40 mg  Daily         12/11/24 1330     IP VTE HIGH RISK PATIENT  Once         12/11/24 1330     Place sequential compression device  Until discontinued         12/11/24 1330                    Discharge Planning   RYLAN:      Code Status: Full Code   Medical Readiness for Discharge Date:              Julian Trotter MD  Department of Hospital Medicine   [facility]

## 2024-12-12 NOTE — NURSING
Pt care assumed. Pt  in bed, oriented to self  and situation. Pt receiving O2 at 3L, on continuous cardiac monitoring. VS obtained and recorded. Pt in personal clothing, refusing to don hospital gown. Pt oriented to room. White board updated w/current POC. Pt instructed to call w/any needs, verbalized understanding. Call light w/in pt's reach.

## 2024-12-12 NOTE — ASSESSMENT & PLAN NOTE
Patient's COPD is with exacerbation noted by continued dyspnea and worsening of baseline hypoxia currently. On admission prior treatments he had clinical signs of increased work of breathing, including patient speaking in incomplete sentences, use of accessory muscle use, however no tripoding.  Patient has diffuse wheezes and requiring nasal cannula in ED. Patient usually has albuterol and Trilegy at home, however has ran out of medications for months. Exposure to recent sick contact.   COVID and Flu negative  Respiratory panel PCR = negative  Procalcitonin = pending   PFTs several years ago: FEV/FVC of 51%.   Approx 25pk yr former smoker. Exposed to brother smoking.       Plan:  - Prednisone 40mg x 5days (end date 12/15)  - Azithromycin 500mg x 3 days (end date 12/13)  - Dc'd ceftriaxone  - Breo inhaler daily  - Duonebs q4hrs  - IS   - Monitor Pulse Ox   - Smoking cessation counseling  - O2 saturation goal > 88-92%

## 2024-12-12 NOTE — PLAN OF CARE
Patient on oxygen with documented flow.  Will attempt to wean per O2 order protocol.  The proper method of use, as well as anticipated side effects, of this aerosol treatment are discussed and demonstrated to the patient.   Will continue to monitor.'

## 2024-12-12 NOTE — HOSPITAL COURSE
59yr old gentleman admitted for concerns of COPD exacerbation. History of former smoker 1pk x day for 25yrs. Non-compliant with medications as he has run out for the past several days. Initial vital signs /109, HR 70, SpO2 90% on room air. On admission prior treatments he had clinical signs of increased work of breathing, including patient speaking in incomplete sentences, use of accessory muscle use, however no tripoding. Improved slightly with 80 IV lasix, breathing treatments, 125mg solumedrol, and zofran. Patient continued with scheduled duonebs, steroids and breo through his stay. Started CAP coverage due to concerns of bacterial pneumonia. However discontinued rocephin due to afebrile presentation, normal WBC and negative PCR testing. Patient finished Zithromax improved with inhalers. Off supplemental O2. He was cleared for discharge.     On the day of discharge patient was back to baseline and hemodynamically stable. He was sent home to resume home meds (as below). Refilled his home meds as he was completely out of everything and this was the likely. He was additionally provided one more day of steroids to complete regimen. Close outpatient f/u was advised with PCP. The importance of medication adherence was again stressed to the patient. Patient agreeable to discharge plan, expressed understanding, all questions answered, return precautions were discussed.

## 2024-12-12 NOTE — ASSESSMENT & PLAN NOTE
Creatine stable for now. BMP reviewed- noted Estimated Creatinine Clearance: 50.3 mL/min (A) (based on SCr of 2 mg/dL (H)). according to latest data. Based on current GFR, CKD stage is stage 3 - GFR 30-59.      Plan:  - Monitor UOP and serial BMP and adjust therapy as needed.   - Renally dose meds.   - Avoid nephrotoxic medications and procedures.

## 2024-12-12 NOTE — ASSESSMENT & PLAN NOTE
Patient presented to the ED with complaints of SOB. Low concern to CHF exacerbation. BNP 38 and Trop <0.006. Presence of mild lower extremity edema.   Baseline Home 2L NC  EKG on admission with normal sinus rhythm, no ischemic changes on review.   CXR: Minor patchy opacities lung bases may relate to atelectasis, aspiration, or pneumonia.     Results for orders placed during the hospital encounter of 02/15/24    Echo    Interpretation Summary    Left Ventricle: The left ventricle is normal in size. Normal wall thickness. There is concentric remodeling. There is normal systolic function. Biplane (2D) method of discs ejection fraction is 53%. There is normal diastolic function.    Right Ventricle: Mild right ventricular enlargement. Wall thickness is normal. Right ventricle wall motion  is normal. Systolic function is normal.    Right Atrium: Right atrium is mildly dilated.    Aorta: Aortic root is mildly dilated measuring 4.27 cm. Ascending aorta is normal.    Pulmonary Artery: The estimated pulmonary artery systolic pressure is 40 mmHg.    IVC/SVC: Normal venous pressure at 3 mmHg.    Plan:  - Continue Furosemide and monitor clinical status closely.   - BiPAP QHS PRN   - Monitor on telemetry.   - Monitor strict Is&Os and daily weights.    - Place on fluid restriction of 1.5 L.

## 2024-12-12 NOTE — PLAN OF CARE
Pt safety maintained. Pt on 2L O2 via nc. Pt on continuous cardiac monitoring. Medication administered per MAR. Pt instructed to call w/any needs, verbalized understanding. Bed in lowest position, locked and bed alarms on. Call light w/in pt's reach.       Problem: Adult Inpatient Plan of Care  Goal: Plan of Care Review  Outcome: Progressing  Goal: Patient-Specific Goal (Individualized)  Outcome: Progressing  Goal: Absence of Hospital-Acquired Illness or Injury  Outcome: Progressing  Goal: Optimal Comfort and Wellbeing  Outcome: Progressing     Problem: Comorbidity Management  Goal: Maintenance of COPD Symptom Control  Outcome: Progressing  Goal: Blood Pressure in Desired Range  Outcome: Progressing

## 2024-12-12 NOTE — PLAN OF CARE
12/12/24 0002   Admission   Initial VN Admission Questions Complete   Communication Issues? None   Shift   Pain Management Interventions relaxation techniques promoted;quiet environment facilitated   Virtual Nurse - Patient Verbalized Approval Of VN Rounding;Camera Use   Type of Frequent Check   Type Patient Rounds   Safety/Activity   Patient Rounds bed in low position;bed wheels locked;call light in patient/parent reach;clutter free environment maintained;ID band on;visualized patient;placement of personal items at bedside   Safety Promotion/Fall Prevention instructed to call staff for mobility;assistive device/personal item within reach;bed alarm set;medications reviewed;side rails raised x 2   Safety Precautions emergency equipment at bedside   Safety Bands on Patient Fall Risk Band;Allergy Band   Activity Management Ambulated in room - L4   Activity Assistance Provided assistance, stand-by   Elimination Assistance urinal within reach   Positioning   Body Position position maintained   Head of Bed (HOB) Positioning HOB elevated   Positioning/Transfer Devices pillows;in use     VIRTUAL NURSE:  Cued into patient's room.  Permission received per patient to turn camera to view patient.  Introduced as VN for night shift that will be working with floor nurse and nursing assistant.  Educated patient on VN's role in patient care and  VIP model.  Plan of care reviewed with patient.  Education per flowsheet.   Informed patient that staff will round on them every 2 hours but to use call light for any other needs they may have; informed of fall risk and fall precautions.  Patient verbalized understanding.  Call light within reach; bed siderails up x3.  Opportunity given for questions and questions answered.  Admission assessment questions answered.  Patient denies complaints or any needs at this time. Instructed to call for assistance.  Will cont to monitor and intervene as needed.

## 2024-12-13 VITALS
TEMPERATURE: 97 F | BODY MASS INDEX: 41.3 KG/M2 | WEIGHT: 272.5 LBS | RESPIRATION RATE: 12 BRPM | HEIGHT: 68 IN | OXYGEN SATURATION: 94 % | SYSTOLIC BLOOD PRESSURE: 120 MMHG | DIASTOLIC BLOOD PRESSURE: 85 MMHG | HEART RATE: 78 BPM

## 2024-12-13 PROCEDURE — 94640 AIRWAY INHALATION TREATMENT: CPT | Mod: XB

## 2024-12-13 PROCEDURE — 90656 IIV3 VACC NO PRSV 0.5 ML IM: CPT | Performed by: STUDENT IN AN ORGANIZED HEALTH CARE EDUCATION/TRAINING PROGRAM

## 2024-12-13 PROCEDURE — 63600175 PHARM REV CODE 636 W HCPCS

## 2024-12-13 PROCEDURE — 99900035 HC TECH TIME PER 15 MIN (STAT)

## 2024-12-13 PROCEDURE — 94761 N-INVAS EAR/PLS OXIMETRY MLT: CPT

## 2024-12-13 PROCEDURE — 94799 UNLISTED PULMONARY SVC/PX: CPT

## 2024-12-13 PROCEDURE — 63700000 PHARM REV CODE 250 ALT 637 W/O HCPCS

## 2024-12-13 PROCEDURE — G0378 HOSPITAL OBSERVATION PER HR: HCPCS

## 2024-12-13 PROCEDURE — 25000242 PHARM REV CODE 250 ALT 637 W/ HCPCS

## 2024-12-13 PROCEDURE — 25000003 PHARM REV CODE 250

## 2024-12-13 PROCEDURE — 96376 TX/PRO/DX INJ SAME DRUG ADON: CPT

## 2024-12-13 PROCEDURE — 90471 IMMUNIZATION ADMIN: CPT | Performed by: STUDENT IN AN ORGANIZED HEALTH CARE EDUCATION/TRAINING PROGRAM

## 2024-12-13 PROCEDURE — 27000221 HC OXYGEN, UP TO 24 HOURS

## 2024-12-13 PROCEDURE — 63600175 PHARM REV CODE 636 W HCPCS: Performed by: STUDENT IN AN ORGANIZED HEALTH CARE EDUCATION/TRAINING PROGRAM

## 2024-12-13 RX ORDER — METHADONE HYDROCHLORIDE 10 MG/1
90 TABLET ORAL DAILY
Status: DISCONTINUED | OUTPATIENT
Start: 2024-12-13 | End: 2024-12-13 | Stop reason: HOSPADM

## 2024-12-13 RX ORDER — ESCITALOPRAM OXALATE 10 MG/1
20 TABLET ORAL DAILY
Qty: 180 TABLET | Refills: 0 | Status: SHIPPED | OUTPATIENT
Start: 2024-12-13 | End: 2025-03-13

## 2024-12-13 RX ORDER — KETOROLAC TROMETHAMINE 30 MG/ML
15 INJECTION, SOLUTION INTRAMUSCULAR; INTRAVENOUS ONCE
Status: COMPLETED | OUTPATIENT
Start: 2024-12-13 | End: 2024-12-13

## 2024-12-13 RX ORDER — FUROSEMIDE 40 MG/1
40 TABLET ORAL 2 TIMES DAILY
Qty: 180 TABLET | Refills: 0 | Status: SHIPPED | OUTPATIENT
Start: 2024-12-13 | End: 2025-03-13

## 2024-12-13 RX ORDER — PREDNISONE 20 MG/1
40 TABLET ORAL DAILY
Qty: 2 TABLET | Refills: 0 | Status: SHIPPED | OUTPATIENT
Start: 2024-12-14 | End: 2024-12-15

## 2024-12-13 RX ORDER — AMLODIPINE AND VALSARTAN 10; 160 MG/1; MG/1
1 TABLET ORAL DAILY
Qty: 90 TABLET | Refills: 0 | Status: SHIPPED | OUTPATIENT
Start: 2024-12-13 | End: 2025-03-13

## 2024-12-13 RX ORDER — ALBUTEROL SULFATE 90 UG/1
2 INHALANT RESPIRATORY (INHALATION) EVERY 6 HOURS PRN
Qty: 18 G | Refills: 0 | Status: SHIPPED | OUTPATIENT
Start: 2024-12-13 | End: 2025-12-13

## 2024-12-13 RX ORDER — KETOROLAC TROMETHAMINE 30 MG/ML
30 INJECTION, SOLUTION INTRAMUSCULAR; INTRAVENOUS ONCE
Status: DISCONTINUED | OUTPATIENT
Start: 2024-12-13 | End: 2024-12-13

## 2024-12-13 RX ADMIN — ESCITALOPRAM OXALATE 20 MG: 10 TABLET ORAL at 09:12

## 2024-12-13 RX ADMIN — VALSARTAN 160 MG: 160 TABLET, FILM COATED ORAL at 09:12

## 2024-12-13 RX ADMIN — Medication 200 ML: at 05:12

## 2024-12-13 RX ADMIN — AZITHROMYCIN DIHYDRATE 500 MG: 250 TABLET ORAL at 09:12

## 2024-12-13 RX ADMIN — PREDNISONE 50 MG: 20 TABLET ORAL at 09:12

## 2024-12-13 RX ADMIN — FLUTICASONE FUROATE AND VILANTEROL TRIFENATATE 1 PUFF: 100; 25 POWDER RESPIRATORY (INHALATION) at 08:12

## 2024-12-13 RX ADMIN — KETOROLAC TROMETHAMINE 15 MG: 30 INJECTION, SOLUTION INTRAMUSCULAR; INTRAVENOUS at 11:12

## 2024-12-13 RX ADMIN — ACETAMINOPHEN 650 MG: 325 TABLET ORAL at 09:12

## 2024-12-13 RX ADMIN — METHADONE HYDROCHLORIDE 90 MG: 10 TABLET ORAL at 09:12

## 2024-12-13 RX ADMIN — Medication 200 ML: at 11:12

## 2024-12-13 RX ADMIN — Medication 200 ML: at 09:12

## 2024-12-13 RX ADMIN — ALBUTEROL SULFATE 2.5 MG: 2.5 SOLUTION RESPIRATORY (INHALATION) at 08:12

## 2024-12-13 RX ADMIN — INFLUENZA VIRUS VACCINE 0.5 ML: 15; 15; 15 SUSPENSION INTRAMUSCULAR at 11:12

## 2024-12-13 RX ADMIN — AMLODIPINE BESYLATE 10 MG: 5 TABLET ORAL at 09:12

## 2024-12-13 RX ADMIN — CLONIDINE HYDROCHLORIDE 0.1 MG: 0.1 TABLET ORAL at 09:12

## 2024-12-13 RX ADMIN — ALBUTEROL SULFATE 2.5 MG: 2.5 SOLUTION RESPIRATORY (INHALATION) at 03:12

## 2024-12-13 NOTE — PLAN OF CARE
The proper method of use, as well as anticipated side effects, of this aerosol treatment are discussed and demonstrated to the patient.   Pt on RA with documented sats.  The proper method of use, as well as anticipated side effects, of this metered-dose inhaler are discussed and demonstrated to the patient.

## 2024-12-13 NOTE — PROGRESS NOTES
Pharmacist Renal Dose Adjustment Note    Ming Harrington is a 59 y.o. male being treated with the medication ketorolac    Patient Data:    Vital Signs (Most Recent):  Temp: 97 °F (36.1 °C) (12/13/24 0715)  Pulse: 78 (12/13/24 0830)  Resp: 20 (12/13/24 0830)  BP: 120/85 (12/13/24 0715)  SpO2: (!) 94 % (12/13/24 0830) Vital Signs (72h Range):  Temp:  [97 °F (36.1 °C)-98.3 °F (36.8 °C)]   Pulse:  [59-86]   Resp:  [13-25]   BP: (100-165)/()   SpO2:  [86 %-100 %]      Recent Labs   Lab 12/11/24  1027 12/12/24  0533   CREATININE 2.0* 2.0*     Serum creatinine: 2 mg/dL (H) 12/12/24 0533  Estimated creatinine clearance: 50.3 mL/min (A)    Medication:ketorolac dose: 30 mg frequency once will be changed to medication:ketorolac dose:15 mg frequency:once    Pharmacist's Name: Mildred Valdez  Pharmacist's Extension: [phone]

## 2024-12-13 NOTE — PLAN OF CARE
Pt is cleared to d/c home today. plans to have his sister Rosita [phone] help transport him home later today. Pt has f/u appts listed on AVS. Pt did not have any additional questions at this time. Rounds completed on pt. All questions addressed. Bedside nurse to discuss d/c medications. Discussed importance to attend all f/u appts and take medications as prescribed. Verbalized understanding. Case Management to sign off.     2:21PM sw followed up with pt about transport, per pt he no longer has a ride home. SW arranged for a [facility] van with O2 to transport the pt back home.     ETA 3:00 PM     Dajuan Gallegos, FARZANEH  [phone]    Future Appointments   Date Time Provider Department Center   12/19/2024  9:00 AM David Beckett PA-C [facility]   1/9/2025 11:30 AM APPOINTMENT LAB, NOHEMY MICHELE [facility] LAB [facility]   [facility]13[facility]2025  [facility]   3/13/2025 10:00 AM LAB, APPOINTMENT [place] [facility] LAB VNP [facility]   3/13/2025 10:30 AM Scheuermann, Jennifer B., PA [facility] MERI [facility]        12/13/24 1050   Final Note   Assessment Type Final Discharge Note   Anticipated Discharge Disposition Home   What phone number can be called within the next 1-3 days to see how you are doing after discharge? [phone]   Post-Acute Status   Discharge Delays None known at this time

## 2024-12-13 NOTE — PLAN OF CARE
Pt AAOx3. Medications administered per order. 2L NC. Pt complaining of pain, unrelieved by prn medications, states he takes methadone 90mg daily. Dr. Garcia notified. Cardiac monitor maintained. Ambulates independently in room. Encouraged to call with questions/concerns/assistance. Safety.

## 2024-12-13 NOTE — DISCHARGE SUMMARY
[facility] Medicine  Discharge Summary      Patient Name: Ming Harrington  MRN: [id number]  MADIE: [id number]  Patient Class: OP- Observation  Admission Date: 12/11/2024  Hospital Length of Stay: 0 days  Discharge Date and Time:  12/13/2024 11:02 AM  Attending Physician: Lit Dallas III, MD   Discharging Provider: Julian Trotter MD  Primary Care Provider: Garland Xiong DO    Primary Care Team: Networked reference to record PCT     HPI:   Patient is a 58 yo male w/ PMHx of COPD uses 2L O2 PRN, CHF (HFpEF), HTN, CKD stage 3b, hepatitis-C, cirrhosis, opioid use disorder on methadone, JUVENCIO presenting today with shortness of breath. Symptoms have worsened since yesterday. He has also had a productive cough. He has also had chest tightness since last night. Patient says he ran out of all his medications including his inhalers.     He currently denies any fever/chills, chest pain, nausea, vomiting, diarrhea, constipation. Has been tolerating PO without any issues. Non-compliant with medications as he has run out for the past several days. At baseline patient AAOx4 and lives at home with brother. Denies any alcohol use, tobacco use or drug use. History of former smoker 1pk x day for 25yrs. Exposed to brothers smoking. Of note, he is able to ambulate without assistance and self care at home.      In the ED, initial vital signs /109, HR 70, SpO2 96% on room air. Labs include CBC with stable H/H 11.2/34.2 and WBC within normal limits 6.71. CMP with Na 137 K 4.8 Cl 103 CO2 23 and BUN/Cr 19/2.0 (baseline Cr 1.8). CXR w/ LLL opacity concerning for pneumonia. Flu and COVID negative. Trops 0.006, BNP 38. . EKG nsr. Initiated on 80 IV lasix, breathing treatments, 125mg solumedrol, and zofran. [facility] consulted for evaluation for admission for COPD exacerbation.     * No surgery found *      Hospital Course:   59yr old gentleman admitted for concerns of COPD exacerbation. History of  former smoker 1pk x day for 25yrs. Non-compliant with medications as he has run out for the past several days. Initial vital signs /109, HR 70, SpO2 90% on room air. On admission prior treatments he had clinical signs of increased work of breathing, including patient speaking in incomplete sentences, use of accessory muscle use, however no tripoding. Improved slightly with 80 IV lasix, breathing treatments, 125mg solumedrol, and zofran. Patient continued with scheduled duonebs, steroids and breo through his stay. Started CAP coverage due to concerns of bacterial pneumonia. However discontinued rocephin due to afebrile presentation, normal WBC and negative PCR testing. Patient finished Zithromax improved with inhalers. Off supplemental O2. He was cleared for discharge.     On the day of discharge patient was back to baseline and hemodynamically stable. He was sent home to resume home meds (as below). Refilled his home meds as he was completely out of everything and this was the likely. He was additionally provided one more day of steroids to complete regimen. Close outpatient f/u was advised with PCP. The importance of medication adherence was again stressed to the patient. Patient agreeable to discharge plan, expressed understanding, all questions answered, return precautions were discussed.        Physical Exam  Constitutional:       General: He is not in acute distress.     Appearance: He is obese. He is not ill-appearing.   HENT:      Head: Normocephalic and atraumatic.      Right Ear: External ear normal.      Left Ear: External ear normal.      Nose: Nose normal. No congestion or rhinorrhea.      Mouth/Throat:      Mouth: Mucous membranes are dry.   Eyes:      Pupils: Pupils are equal, round, and reactive to light.   Cardiovascular:      Rate and Rhythm: Normal rate and regular rhythm.      Pulses: Normal pulses.      Heart sounds: Normal heart sounds. No murmur heard.  Pulmonary:      Effort: No  respiratory distress.      Breath sounds: Improved air flow without wheezing.   Abdominal:      General: Abdomen is flat. Bowel sounds are normal. There is no distension.      Palpations: Abdomen is soft.      Tenderness: There is no abdominal tenderness.   Musculoskeletal:         General: Normal range of motion.      Cervical back: Normal range of motion and neck supple.      Right lower leg: No edema.      Left lower leg: No edema.   Skin:     General: Skin is warm and dry.   Neurological:      Mental Status: He is alert and oriented to person, place, and time. Mental status is at baseline.      Cranial Nerves: No cranial nerve deficit.      Sensory: No sensory deficit.      Motor: No weakness.   Psychiatric:         Mood and Affect: Mood normal.     Goals of Care Treatment Preferences:  Code Status: Full Code      SDOH Screening:  The patient was screened for food insecurity, housing instability, transportation needs, utility difficulties, and interpersonal safety. The social determinant(s) of health identified as a concern this admission are:  Utility difficulties  Transportation difficulties    Social Drivers of Health with Concerns     Transportation Needs: Unmet Transportation Needs (12/11/2024)   Utilities: At Risk (12/11/2024)        Consults:   Consults (From admission, onward)          Status Ordering Provider     Inpatient consult to Social Work  Once        Provider:  (Not yet assigned)    Completed LUIZ STOUT     Inpatient consult to Anesthesiology  Once        Provider:  (Not yet assigned)    Acknowledged SILAS LIN            * COPD exacerbation  Patient's COPD is with exacerbation noted by continued dyspnea and worsening of baseline hypoxia currently. On admission prior treatments he had clinical signs of increased work of breathing, including patient speaking in incomplete sentences, use of accessory muscle use, however no tripoding.  Patient has diffuse wheezes and requiring  nasal cannula in ED. Patient usually has albuterol and Trilegy at home, however has ran out of medications for months. Exposure to recent sick contact.   COVID and Flu negative  Respiratory panel PCR = negative  Procalcitonin = pending   PFTs several years ago: FEV/FVC of 51%.   Approx 25pk yr former smoker. Exposed to brother smoking.       Plan:  - Prednisone 40mg x 5days (end date 12/15)  - Azithromycin 500mg x 3 days (end date 12/13)  - Dc'd ceftriaxone  - Breo inhaler daily  - Duonebs q4hrs  - IS   - Monitor Pulse Ox   - Smoking cessation counseling  - O2 saturation goal > 88-92%        Stage 3b chronic kidney disease  Creatine stable for now. BMP reviewed- noted Estimated Creatinine Clearance: 50.3 mL/min (A) (based on SCr of 2 mg/dL (H)). according to latest data. Based on current GFR, CKD stage is stage 3 - GFR 30-59.      Plan:  - Monitor UOP and serial BMP and adjust therapy as needed.   - Renally dose meds.   - Avoid nephrotoxic medications and procedures.    (HFpEF) heart failure with preserved ejection fraction  Patient presented to the ED with complaints of SOB. Low concern to CHF exacerbation. BNP 38 and Trop <0.006. Presence of mild lower extremity edema.   Baseline Home 2L NC  EKG on admission with normal sinus rhythm, no ischemic changes on review.   CXR: Minor patchy opacities lung bases may relate to atelectasis, aspiration, or pneumonia.     Results for orders placed during the hospital encounter of 02/15/24    Echo    Interpretation Summary    Left Ventricle: The left ventricle is normal in size. Normal wall thickness. There is concentric remodeling. There is normal systolic function. Biplane (2D) method of discs ejection fraction is 53%. There is normal diastolic function.    Right Ventricle: Mild right ventricular enlargement. Wall thickness is normal. Right ventricle wall motion  is normal. Systolic function is normal.    Right Atrium: Right atrium is mildly dilated.    Aorta: Aortic root is  mildly dilated measuring 4.27 cm. Ascending aorta is normal.    Pulmonary Artery: The estimated pulmonary artery systolic pressure is 40 mmHg.    IVC/SVC: Normal venous pressure at 3 mmHg.    Plan:  - Continue Furosemide and monitor clinical status closely.   - BiPAP QHS PRN   - Monitor on telemetry.   - Monitor strict Is&Os and daily weights.    - Place on fluid restriction of 1.5 L.     Generalized anxiety disorder  Uses Lexapro at home.    Plan:   - Continue home med.       HTN (hypertension)  Patient's blood pressure range in the last 24 hours was: BP  Min: 113/71  Max: 126/66.The patient's inpatient anti-hypertensive regimen is listed below:  Current Antihypertensives  amLODIPine tablet 10 mg, Daily, Oral  cloNIDine tablet 0.1 mg, 2 times daily, Oral  valsartan tablet 160 mg, Daily, Oral  , Daily, Oral  furosemide (LASIX) tablet, 2 times daily, Oral    Plan  - BP is controlled, no changes needed to their regimen      Final Active Diagnoses:    Diagnosis Date Noted POA    PRINCIPAL PROBLEM:  COPD exacerbation [J44.1] 07/21/2017 Yes     Chronic    Stage 3b chronic kidney disease [N18.32] 03/26/2024 Yes    (HFpEF) heart failure with preserved ejection fraction [I50.30] 03/26/2024 Yes    Generalized anxiety disorder [F41.1] 04/17/2019 Yes    HTN (hypertension) [I10]  Yes      Problems Resolved During this Admission:       Discharged Condition: good    Disposition: Home or Self Care    Follow Up:    Patient Instructions:      Diet Cardiac     Diet renal     Notify your health care provider if you experience any of the following:  increased confusion or weakness     Notify your health care provider if you experience any of the following:  persistent dizziness, light-headedness, or visual disturbances     Notify your health care provider if you experience any of the following:  difficulty breathing or increased cough     Notify your health care provider if you experience any of the following:  temperature >100.4      Activity as tolerated       Significant Diagnostic Studies: N/A    Pending Diagnostic Studies:       Procedure Component Value Units Date/Time    CBC with Automated Differential [[id number]]     Order Status: Sent Lab Status: No result     Specimen: Blood     Comprehensive Metabolic Panel (CMP) [[id number]]     Order Status: Sent Lab Status: No result     Specimen: Blood     Magnesium [[id number]]     Order Status: Sent Lab Status: No result     Specimen: Blood     Phosphorus [[id number]]     Order Status: Sent Lab Status: No result     Specimen: Blood            Medications:  Reconciled Home Medications:      Medication List        START taking these medications      predniSONE 20 MG tablet  Commonly known as: DELTASONE  Take 2 tablets (40 mg total) by mouth once daily. for 1 day  Start taking on: December 14, 2024            CONTINUE taking these medications      acetaminophen 500 MG tablet  Commonly known as: TYLENOL  Take 1 tablet (500 mg total) by mouth every 6 (six) hours as needed for Pain.     albuterol 90 mcg/actuation inhaler  Commonly known as: VENTOLIN HFA  Inhale 2 puffs into the lungs every 6 (six) hours as needed for Wheezing or Shortness of Breath. Rescue     amlodipine-valsartan  mg per tablet  Commonly known as: EXFORGE  Take 1 tablet by mouth once daily.     cloNIDine 0.1 MG tablet  Commonly known as: CATAPRES  Take 1 tablet (0.1 mg total) by mouth 2 (two) times daily.     EScitalopram oxalate 10 MG tablet  Commonly known as: LEXAPRO  Take 2 tablets (20 mg total) by mouth once daily.     furosemide 40 MG tablet  Commonly known as: LASIX  Take 1 tablet (40 mg total) by mouth 2 (two) times daily.     lactulose 20 gram/30 mL Soln  Commonly known as: CHRONULAC  Take 15-30 mLs (10-20 g total) by mouth daily as needed (constipation).     LIDOcaine 5 %  Commonly known as: LIDODERM  Place 1 patch onto the skin once daily. Remove & Discard patch within 12 hours or as directed by MD. Apply to  affected area for 12 hours.     loratadine 10 mg tablet  Commonly known as: CLARITIN  Take 1 tablet (10 mg total) by mouth once daily.     methadone 40 mg disintegrating tablet  Commonly known as: METHADOSE  Take 40 mg by mouth every 6 (six) hours as needed for Pain. Pt states the dose is 95 mg     nystatin powder  Commonly known as: MYCOSTATIN  Apply topically 2 (two) times daily.     polyethylene glycol 17 gram/dose powder  Commonly known as: GLYCOLAX  Take 17 g by mouth 2 (two) times daily.     promethazine 25 MG tablet  Commonly known as: PHENERGAN  Take 1 tablet (25 mg total) by mouth every 6 (six) hours as needed for Nausea.     silver sulfADIAZINE 1% 1 % cream  Commonly known as: SILVADENE  Apply topically 2 (two) times daily.     tamsulosin 0.4 mg Cap  Commonly known as: FLOMAX  Take 1 capsule (0.4 mg total) by mouth once daily.     traZODone 50 MG tablet  Commonly known as: DESYREL  Take 1 tablet (50 mg total) by mouth every evening.     TRELEGY ELLIPTA 200-62.5-25 mcg inhaler  Generic drug: fluticasone-umeclidin-vilanter  Inhale 1 puff into the lungs once daily.            STOP taking these medications      bumetanide 1 MG tablet  Commonly known as: BUMEX     doxepin 10 mg/mL solution  Commonly known as: SINEQUAN     sofosbuvir-velpatasvir 400-100 mg Tab              Indwelling Lines/Drains at time of discharge:   Lines/Drains/Airways       None                   Time spent on the discharge of patient: 35 minutes    Julian Trotter MD  Department of Hospital Medicine  [facility]

## 2024-12-13 NOTE — PLAN OF CARE
Problem: Adult Inpatient Plan of Care  Goal: Plan of Care Review  Outcome: Progressing  Goal: Patient-Specific Goal (Individualized)  Outcome: Progressing  Goal: Absence of Hospital-Acquired Illness or Injury  Outcome: Progressing  Goal: Optimal Comfort and Wellbeing  Outcome: Progressing  Goal: Readiness for Transition of Care  Outcome: Progressing     Problem: Bariatric Environmental Safety  Goal: Safety Maintained with Care  Outcome: Progressing     Problem: Comorbidity Management  Goal: Maintenance of Asthma Control  Outcome: Progressing  Goal: Maintenance of COPD Symptom Control  Outcome: Progressing  Goal: Maintenance of Heart Failure Symptom Control  Outcome: Progressing  Goal: Blood Pressure in Desired Range  Outcome: Progressing     Problem: Fall Injury Risk  Goal: Absence of Fall and Fall-Related Injury  Outcome: Progressing

## 2024-12-13 NOTE — PLAN OF CARE
Discharge orders noted. AVS prepared with medication list, importance of medication compliance, follow up appointments, diet, home care instructions, treatment plan, self management, and when to seek medical attention. Detailed clinical reference list attached. AVS printed and handed to patient by bedside nurse. VN reviewed discharge instructions with patient using teachback method.  Allowed time for questions, all questions answered.  Patient verbalized complete understanding of discharge instructions and voices no concerns.      Discharge instructions complete.  Bedside delivery complete.  IV still in place, bedside nurse informed.  Pt waiting on transportation.  Bedside nurse notified.

## 2024-12-13 NOTE — ASSESSMENT & PLAN NOTE
Patient's blood pressure range in the last 24 hours was: BP  Min: 113/71  Max: 126/66.The patient's inpatient anti-hypertensive regimen is listed below:  Current Antihypertensives  amLODIPine tablet 10 mg, Daily, Oral  cloNIDine tablet 0.1 mg, 2 times daily, Oral  valsartan tablet 160 mg, Daily, Oral  , Daily, Oral  furosemide (LASIX) tablet, 2 times daily, Oral    Plan  - BP is controlled, no changes needed to their regimen

## 2024-12-21 NOTE — ED NOTES
Patient updated that orders have been placed for methadone and that it will be dispensed per orders. Pt verbalizes understanding.    Progress Note - Urology   Name: Toro Rodriguez 75 y.o. male I MRN: [id number]  Unit/Bed#: -01 I Date of Admission: 12/15/2024   Date of Service: 12/21/2024 I Hospital Day: 6       Toro is a 75-year-old male with a suprapubic tube in place.  He was taken to the operating room by Dr. Espino on December 15, 2024 for evacuation of clots and a bladder biopsy.  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  Dr. Espino was unable to identify the right ureteral orifice.  The patient's creatinine continues to rise and today a CT scan shows right-sided hydronephrosis.  This is relatively similar to prior.  The patient's creatinine is 1.82.  His creatinine was as high as 2.09 on this hospitalization.    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  Next, I recommend consultation with interventional radiology for placement of a right nephrostomy tube.  As the patient is not showing signs of fever, leukocytosis, or significantly worsening renal function, this can be performed in a nonemergent fashion.  Urology will continue to follow.

## 2024-12-24 ENCOUNTER — TELEPHONE (OUTPATIENT)
Dept: FAMILY MEDICINE | Facility: HOSPITAL | Age: 59
End: 2024-12-24
Payer: MEDICAID

## 2024-12-24 NOTE — TELEPHONE ENCOUNTER
Returned patients call. No answer left voicemail. Patient needs appt to discuss this medication with provider.      ----- Message from Anais sent at 12/20/2024  3:48 PM CST -----  Type:  RX Refill Request    Who Called: pt   Refill or New Rx:new   RX Name and Strength:ozempic (not on list)  How is the patient currently taking it? (ex. 1XDay):N/A  Is this a 30 day or 90 day RX:      Preferred Pharmacy with phone number:[facility] & [address]   Phone: [phone]  Fax: [phone]  Local or Mail Order:Local   Ordering Provider:  Would the patient rather a call back or a response via [facility]? Call   Best Call Back Number: [phone]  Additional Information:

## 2024-12-26 PROBLEM — Z86.79 HISTORY OF CHF (CONGESTIVE HEART FAILURE): Status: ACTIVE | Noted: 2024-12-26

## 2025-01-06 ENCOUNTER — HOSPITAL ENCOUNTER (EMERGENCY)
Facility: HOSPITAL | Age: 60
Discharge: HOME | End: 2025-01-07
Attending: EMERGENCY MEDICINE
Payer: MEDICAID

## 2025-01-06 DIAGNOSIS — J44.9 CHRONIC OBSTRUCTIVE PULMONARY DISEASE, UNSPECIFIED COPD TYPE: ICD-10-CM

## 2025-01-06 DIAGNOSIS — G47.00 INSOMNIA, UNSPECIFIED TYPE: ICD-10-CM

## 2025-01-06 DIAGNOSIS — R06.02 SOB (SHORTNESS OF BREATH): ICD-10-CM

## 2025-01-06 DIAGNOSIS — R39.9 LOWER URINARY TRACT SYMPTOMS (LUTS): ICD-10-CM

## 2025-01-06 DIAGNOSIS — T40.2X5A CONSTIPATION DUE TO OPIOID THERAPY: ICD-10-CM

## 2025-01-06 DIAGNOSIS — I10 UNCONTROLLED HYPERTENSION: ICD-10-CM

## 2025-01-06 DIAGNOSIS — K59.03 CONSTIPATION DUE TO OPIOID THERAPY: ICD-10-CM

## 2025-01-06 DIAGNOSIS — J44.1 COPD WITH ACUTE EXACERBATION: Primary | ICD-10-CM

## 2025-01-06 DIAGNOSIS — R06.02 SHORTNESS OF BREATH: ICD-10-CM

## 2025-01-06 DIAGNOSIS — F41.1 GENERALIZED ANXIETY DISORDER: ICD-10-CM

## 2025-01-06 LAB
ALBUMIN SERPL BCP-MCNC: 3.6 G/DL (ref 3.5–5.2)
ALP SERPL-CCNC: 96 U/L (ref 40–150)
ALT SERPL W/O P-5'-P-CCNC: 10 U/L (ref 10–44)
ANION GAP SERPL CALC-SCNC: 9 MMOL/L (ref 8–16)
AST SERPL-CCNC: 13 U/L (ref 10–40)
BASOPHILS # BLD AUTO: 0.02 K/UL (ref 0–0.2)
BASOPHILS NFR BLD: 0.4 % (ref 0–1.9)
BILIRUB SERPL-MCNC: 0.2 MG/DL (ref 0.1–1)
BNP SERPL-MCNC: 88 PG/ML (ref 0–99)
BUN SERPL-MCNC: 22 MG/DL (ref 6–20)
CALCIUM SERPL-MCNC: 8.8 MG/DL (ref 8.7–10.5)
CHLORIDE SERPL-SCNC: 106 MMOL/L (ref 95–110)
CO2 SERPL-SCNC: 23 MMOL/L (ref 23–29)
CREAT SERPL-MCNC: 1.8 MG/DL (ref 0.5–1.4)
DIFFERENTIAL METHOD BLD: ABNORMAL
EOSINOPHIL # BLD AUTO: 0.2 K/UL (ref 0–0.5)
EOSINOPHIL NFR BLD: 3.3 % (ref 0–8)
ERYTHROCYTE [DISTWIDTH] IN BLOOD BY AUTOMATED COUNT: 12 % (ref 11.5–14.5)
EST. GFR  (NO RACE VARIABLE): 43 ML/MIN/1.73 M^2
GLUCOSE SERPL-MCNC: 98 MG/DL (ref 70–110)
HCT VFR BLD AUTO: 31 % (ref 40–54)
HGB BLD-MCNC: 10.2 G/DL (ref 14–18)
IMM GRANULOCYTES # BLD AUTO: 0.01 K/UL (ref 0–0.04)
IMM GRANULOCYTES NFR BLD AUTO: 0.2 % (ref 0–0.5)
LYMPHOCYTES # BLD AUTO: 1 K/UL (ref 1–4.8)
LYMPHOCYTES NFR BLD: 18.5 % (ref 18–48)
MCH RBC QN AUTO: 31.1 PG (ref 27–31)
MCHC RBC AUTO-ENTMCNC: 32.9 G/DL (ref 32–36)
MCV RBC AUTO: 95 FL (ref 82–98)
MONOCYTES # BLD AUTO: 0.4 K/UL (ref 0.3–1)
MONOCYTES NFR BLD: 7.1 % (ref 4–15)
NEUTROPHILS # BLD AUTO: 3.7 K/UL (ref 1.8–7.7)
NEUTROPHILS NFR BLD: 70.5 % (ref 38–73)
NRBC BLD-RTO: 0 /100 WBC
PLATELET # BLD AUTO: 147 K/UL (ref 150–450)
PMV BLD AUTO: 10.1 FL (ref 9.2–12.9)
POTASSIUM SERPL-SCNC: 4.7 MMOL/L (ref 3.5–5.1)
PROT SERPL-MCNC: 7.7 G/DL (ref 6–8.4)
RBC # BLD AUTO: 3.28 M/UL (ref 4.6–6.2)
SODIUM SERPL-SCNC: 138 MMOL/L (ref 136–145)
TROPONIN I SERPL DL<=0.01 NG/ML-MCNC: <0.006 NG/ML (ref 0–0.03)
WBC # BLD AUTO: 5.23 K/UL (ref 3.9–12.7)

## 2025-01-06 PROCEDURE — 83880 ASSAY OF NATRIURETIC PEPTIDE: CPT | Performed by: EMERGENCY MEDICINE

## 2025-01-06 PROCEDURE — 80053 COMPREHEN METABOLIC PANEL: CPT | Performed by: EMERGENCY MEDICINE

## 2025-01-06 PROCEDURE — 94640 AIRWAY INHALATION TREATMENT: CPT

## 2025-01-06 PROCEDURE — 93010 ELECTROCARDIOGRAM REPORT: CPT | Mod: ,,, | Performed by: STUDENT IN AN ORGANIZED HEALTH CARE EDUCATION/TRAINING PROGRAM

## 2025-01-06 PROCEDURE — 93005 ELECTROCARDIOGRAM TRACING: CPT

## 2025-01-06 PROCEDURE — 99285 EMERGENCY DEPT VISIT HI MDM: CPT | Mod: 25

## 2025-01-06 PROCEDURE — 85025 COMPLETE CBC W/AUTO DIFF WBC: CPT | Performed by: EMERGENCY MEDICINE

## 2025-01-06 PROCEDURE — 96374 THER/PROPH/DIAG INJ IV PUSH: CPT

## 2025-01-06 PROCEDURE — 63600175 PHARM REV CODE 636 W HCPCS: Performed by: EMERGENCY MEDICINE

## 2025-01-06 PROCEDURE — 84484 ASSAY OF TROPONIN QUANT: CPT | Performed by: EMERGENCY MEDICINE

## 2025-01-06 PROCEDURE — 25000242 PHARM REV CODE 250 ALT 637 W/ HCPCS: Performed by: EMERGENCY MEDICINE

## 2025-01-06 RX ORDER — METHYLPREDNISOLONE SOD SUCC 125 MG
125 VIAL (EA) INJECTION
Status: DISCONTINUED | OUTPATIENT
Start: 2025-01-06 | End: 2025-01-07 | Stop reason: HOSPADM

## 2025-01-06 RX ORDER — TAMSULOSIN HYDROCHLORIDE 0.4 MG/1
0.4 CAPSULE ORAL DAILY
Qty: 90 CAPSULE | Refills: 0 | Status: SHIPPED | OUTPATIENT
Start: 2025-01-06 | End: 2025-04-06

## 2025-01-06 RX ORDER — FUROSEMIDE 10 MG/ML
80 INJECTION INTRAMUSCULAR; INTRAVENOUS
Status: COMPLETED | OUTPATIENT
Start: 2025-01-06 | End: 2025-01-06

## 2025-01-06 RX ORDER — AMLODIPINE AND VALSARTAN 10; 160 MG/1; MG/1
1 TABLET ORAL DAILY
Qty: 90 TABLET | Refills: 0 | Status: SHIPPED | OUTPATIENT
Start: 2025-01-06 | End: 2025-04-06

## 2025-01-06 RX ORDER — FUROSEMIDE 40 MG/1
40 TABLET ORAL 2 TIMES DAILY
Qty: 180 TABLET | Refills: 0 | Status: SHIPPED | OUTPATIENT
Start: 2025-01-06 | End: 2025-04-06

## 2025-01-06 RX ORDER — ALBUTEROL SULFATE 90 UG/1
2 INHALANT RESPIRATORY (INHALATION) EVERY 6 HOURS PRN
Qty: 18 G | Refills: 0 | Status: SHIPPED | OUTPATIENT
Start: 2025-01-06 | End: 2026-01-06

## 2025-01-06 RX ORDER — LACTULOSE 10 G/15ML
10-20 SOLUTION ORAL DAILY PRN
Qty: 600 ML | Refills: 3 | Status: SHIPPED | OUTPATIENT
Start: 2025-01-06

## 2025-01-06 RX ORDER — TRAZODONE HYDROCHLORIDE 50 MG/1
50 TABLET ORAL NIGHTLY
Qty: 30 TABLET | Refills: 2 | Status: SHIPPED | OUTPATIENT
Start: 2025-01-06 | End: 2026-01-06

## 2025-01-06 RX ORDER — CLONIDINE HYDROCHLORIDE 0.1 MG/1
0.1 TABLET ORAL 2 TIMES DAILY
Qty: 60 TABLET | Refills: 1 | Status: SHIPPED | OUTPATIENT
Start: 2025-01-06

## 2025-01-06 RX ORDER — IPRATROPIUM BROMIDE AND ALBUTEROL SULFATE 2.5; .5 MG/3ML; MG/3ML
3 SOLUTION RESPIRATORY (INHALATION)
Status: COMPLETED | OUTPATIENT
Start: 2025-01-06 | End: 2025-01-06

## 2025-01-06 RX ORDER — PREDNISONE 20 MG/1
60 TABLET ORAL
Status: COMPLETED | OUTPATIENT
Start: 2025-01-07 | End: 2025-01-07

## 2025-01-06 RX ORDER — ESCITALOPRAM OXALATE 10 MG/1
20 TABLET ORAL DAILY
Qty: 180 TABLET | Refills: 0 | Status: SHIPPED | OUTPATIENT
Start: 2025-01-06 | End: 2025-04-06

## 2025-01-06 RX ORDER — PREDNISONE 20 MG/1
60 TABLET ORAL DAILY
Qty: 12 TABLET | Refills: 0 | Status: SHIPPED | OUTPATIENT
Start: 2025-01-06 | End: 2025-01-10

## 2025-01-06 RX ADMIN — FUROSEMIDE 80 MG: 10 INJECTION, SOLUTION INTRAMUSCULAR; INTRAVENOUS at 09:01

## 2025-01-06 RX ADMIN — IPRATROPIUM BROMIDE AND ALBUTEROL SULFATE 3 ML: 2.5; .5 SOLUTION RESPIRATORY (INHALATION) at 07:01

## 2025-01-07 VITALS
OXYGEN SATURATION: 95 % | BODY MASS INDEX: 42.44 KG/M2 | DIASTOLIC BLOOD PRESSURE: 78 MMHG | TEMPERATURE: 99 F | HEIGHT: 68 IN | RESPIRATION RATE: 20 BRPM | HEART RATE: 66 BPM | WEIGHT: 280 LBS | SYSTOLIC BLOOD PRESSURE: 101 MMHG

## 2025-01-07 LAB
OHS QRS DURATION: 90 MS
OHS QTC CALCULATION: 442 MS

## 2025-01-07 PROCEDURE — 63600175 PHARM REV CODE 636 W HCPCS: Performed by: EMERGENCY MEDICINE

## 2025-01-07 RX ADMIN — PREDNISONE 60 MG: 20 TABLET ORAL at 12:01

## 2025-01-07 NOTE — DISCHARGE INSTRUCTIONS
Thank you for coming in to see us today! It was nice to meet you, and I hope you feel better soon. Please feel free to return to the ER at any time should your symptoms get worse, or if you have different emergent concerns.    Our goal in the emergency department is to always give you outstanding care and exceptional service. You may receive a survey by mail or e-mail in the next week regarding your experience in our ED. We would greatly appreciate your completing and returning the survey. Your feedback provides us with a way to recognize our staff who give very good care and it helps us learn how to improve when your experience was below our aspiration of excellence.       Sincerely,    Manuelito Callahan MD  Medical Director, Emergency Department  [facility]
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
## 2025-02-07 ENCOUNTER — TELEPHONE (OUTPATIENT)
Dept: HEPATOLOGY | Facility: CLINIC | Age: 60
End: 2025-02-07
Payer: MEDICAID

## 2025-02-07 NOTE — TELEPHONE ENCOUNTER
----- Message from ARIANNA France sent at 2/6/2025  1:29 PM CST -----  Multiple attempts made to coordinate 1/9/25 svr 12 draw unsuccessful.  I linked quant to patient's hcc f/u with testing scheduled 3/13/25 and a reminder notice mailed.

## 2025-02-11 ENCOUNTER — OFFICE VISIT (OUTPATIENT)
Dept: FAMILY MEDICINE | Facility: HOSPITAL | Age: 60
End: 2025-02-11
Payer: MEDICAID

## 2025-02-11 VITALS
HEIGHT: 68 IN | DIASTOLIC BLOOD PRESSURE: 97 MMHG | BODY MASS INDEX: 42.07 KG/M2 | HEART RATE: 71 BPM | SYSTOLIC BLOOD PRESSURE: 143 MMHG | WEIGHT: 277.56 LBS

## 2025-02-11 DIAGNOSIS — M79.89 LOCALIZED SWELLING OF LOWER EXTREMITY: Primary | ICD-10-CM

## 2025-02-11 DIAGNOSIS — R51.9 INTRACTABLE HEADACHE, UNSPECIFIED CHRONICITY PATTERN, UNSPECIFIED HEADACHE TYPE: ICD-10-CM

## 2025-02-11 DIAGNOSIS — F41.1 GENERALIZED ANXIETY DISORDER: ICD-10-CM

## 2025-02-11 DIAGNOSIS — I10 UNCONTROLLED HYPERTENSION: ICD-10-CM

## 2025-02-11 DIAGNOSIS — J44.9 CHRONIC OBSTRUCTIVE PULMONARY DISEASE, UNSPECIFIED COPD TYPE: ICD-10-CM

## 2025-02-11 DIAGNOSIS — R39.9 LOWER URINARY TRACT SYMPTOMS (LUTS): ICD-10-CM

## 2025-02-11 PROCEDURE — 99213 OFFICE O/P EST LOW 20 MIN: CPT

## 2025-02-11 RX ORDER — TAMSULOSIN HYDROCHLORIDE 0.4 MG/1
0.4 CAPSULE ORAL DAILY
Qty: 90 CAPSULE | Refills: 0 | Status: SHIPPED | OUTPATIENT
Start: 2025-02-11 | End: 2025-05-12

## 2025-02-11 RX ORDER — CIPROFLOXACIN HYDROCHLORIDE 3 MG/ML
1 SOLUTION/ DROPS OPHTHALMIC 4 TIMES DAILY
COMMUNITY
Start: 2025-02-04

## 2025-02-11 RX ORDER — ALBUTEROL SULFATE 90 UG/1
2 INHALANT RESPIRATORY (INHALATION) EVERY 6 HOURS PRN
Qty: 68 G | Refills: 0 | Status: SHIPPED | OUTPATIENT
Start: 2025-02-11 | End: 2026-02-11

## 2025-02-11 RX ORDER — PREDNISOLONE ACETATE 10 MG/ML
1 SUSPENSION/ DROPS OPHTHALMIC 4 TIMES DAILY
COMMUNITY
Start: 2025-02-04

## 2025-02-11 RX ORDER — KETOROLAC TROMETHAMINE 5 MG/ML
1 SOLUTION OPHTHALMIC 4 TIMES DAILY
COMMUNITY
Start: 2025-02-04

## 2025-02-11 RX ORDER — KETOROLAC TROMETHAMINE 15 MG/ML
7.5 INJECTION, SOLUTION INTRAMUSCULAR; INTRAVENOUS
Status: SHIPPED | OUTPATIENT
Start: 2025-02-11 | End: 2025-02-14

## 2025-02-11 RX ORDER — AMLODIPINE AND VALSARTAN 10; 160 MG/1; MG/1
1 TABLET ORAL DAILY
Qty: 90 TABLET | Refills: 0 | Status: SHIPPED | OUTPATIENT
Start: 2025-02-11 | End: 2025-05-12

## 2025-02-11 RX ORDER — ESCITALOPRAM OXALATE 10 MG/1
20 TABLET ORAL DAILY
Qty: 180 TABLET | Refills: 0 | Status: SHIPPED | OUTPATIENT
Start: 2025-02-11 | End: 2025-05-12

## 2025-02-11 NOTE — PROGRESS NOTES
"  [facility]  Follow-up Visit      SUBJECTIVE:     Patient: Ming Harrington is a 59 y.o. male.    Chief Compliant:   Chief Complaint   Patient presents with    Shortness of Breath    Medication Refill       History of Present Illness:  59 year old male PMHx of COPD uses 2L O2 PRN, CHF (HFpEF), HTN, CKD stage 3b, hepatitis-C, cirrhosis, opioid use disorder on methadone, JUVENCIO presents for lower extremity edema and shortness of breath. He has a past medical history of asthma, COPD, HFrEF, HTN, and DM. States his right leg has been increasingly swollen for the past year and he wants "a furosemide shot." Reports episodic shortness of breath for the past 7-8 months and denotes increased anxiety throughout this time due to legal issues due to custody of grandson. States he thinks the anxiety causes occasional "small stab pains" in his left chest, which he is able to punch away. States severe headaches everyday, requests medication for pain in clinic. Requests refills on all of his medications.       ROS  A 10+ review of systems was performed with pertinent positives and negatives noted above in the history of present illness. Other systems were negative unless otherwise specified.    OBJECTIVE:     Vital Signs (Most Recent)  Vitals:    02/11/25 1435   BP: (!) 143/97   BP Location: Right arm   Patient Position: Sitting   Pulse: 71   Weight: 125.9 kg (277 lb 9 oz)   Height: 5' 8" (1.727 m)     BMI: Body mass index is 42.2 kg/m².     Physical Exam:  Physical Exam  Constitutional:       Appearance: Normal appearance. He is obese.   HENT:      Head: Normocephalic and atraumatic.      Mouth/Throat:      Mouth: Mucous membranes are moist.      Pharynx: Oropharynx is clear.   Eyes:      Extraocular Movements: Extraocular movements intact.      Conjunctiva/sclera: Conjunctivae normal.      Pupils: Pupils are equal, round, and reactive to light.   Cardiovascular:      Rate and Rhythm: Normal rate and regular rhythm.    "   Pulses: Normal pulses.      Heart sounds: Normal heart sounds.   Pulmonary:      Effort: Pulmonary effort is normal.      Breath sounds: Normal breath sounds.   Abdominal:      General: Abdomen is flat.      Palpations: Abdomen is soft.   Musculoskeletal:         General: Normal range of motion.      Cervical back: Normal range of motion and neck supple.      Comments: Swollen and edematous RLE > LLE  No drainage noted   Skin:     General: Skin is warm and dry.      Capillary Refill: Capillary refill takes less than 2 seconds.   Neurological:      General: No focal deficit present.      Mental Status: He is alert and oriented to person, place, and time.   Psychiatric:         Mood and Affect: Mood normal.         Behavior: Behavior normal.         Thought Content: Thought content normal.          ASSESSMENT:   Ming Harrington is a 59 y.o. male who presents to clinic for extremity swelling.    1. Localized swelling of lower extremity    2. Intractable headache, unspecified chronicity pattern, unspecified headache type    3. Generalized anxiety disorder    4. Chronic obstructive pulmonary disease, unspecified COPD type    5. Uncontrolled hypertension    6. Lower urinary tract symptoms (LUTS)         PLAN:     Localized swelling of lower extremity  - O2 saturations well, no rales, rhonchi, crackles on lung exam. Edematous lower extremities on exam. Instructed patient that clinic does not administer lasix and that he should go to the ED for further lasix administration if he is continuing to have shortness of breath. Instructed patient that if he does not want to go the ED, he can trial his home lasix TID as opposed to BID, patient appears neutral to either option. ED precautions discussed.      Intractable headache, unspecified chronicity pattern, unspecified headache type  -     ketorolac injection 7.5 mg    Generalized anxiety disorder  -     EScitalopram oxalate (LEXAPRO) 10 MG tablet; Take 2 tablets (20 mg total) by  mouth once daily.  Dispense: 180 tablet; Refill: 0    Chronic obstructive pulmonary disease, unspecified COPD type  -     albuterol (VENTOLIN HFA) 90 mcg/actuation inhaler; Inhale 2 puffs into the lungs every 6 (six) hours as needed for Wheezing or Shortness of Breath. Rescue  Dispense: 68 g; Refill: 0  -     fluticasone-umeclidin-vilanter (TRELEGY ELLIPTA) 200-62.5-25 mcg inhaler; Inhale 1 puff into the lungs once daily.  Dispense: 60 each; Refill: 0    Uncontrolled hypertension  -     amlodipine-valsartan (EXFORGE)  mg per tablet; Take 1 tablet by mouth once daily.  Dispense: 90 tablet; Refill: 0    Lower urinary tract symptoms (LUTS)  -     tamsulosin (FLOMAX) 0.4 mg Cap; Take 1 capsule (0.4 mg total) by mouth once daily.  Dispense: 90 capsule; Refill: 0    Provided patient with anticipatory guidance and return precautions. Treatment plan discussed with patient, all questions answered, and patient acknowledged understanding and verbal agreement.    Case discussed with Dr. ESTRELLITA Melendez Do, MD  [facility] PGY-2

## 2025-03-12 DIAGNOSIS — I10 UNCONTROLLED HYPERTENSION: ICD-10-CM

## 2025-03-12 DIAGNOSIS — I50.9 CONGESTIVE HEART FAILURE, UNSPECIFIED HF CHRONICITY, UNSPECIFIED HEART FAILURE TYPE: Primary | ICD-10-CM

## 2025-03-12 RX ORDER — CLONIDINE HYDROCHLORIDE 0.1 MG/1
0.1 TABLET ORAL 2 TIMES DAILY
Qty: 60 TABLET | Refills: 1 | Status: ON HOLD | OUTPATIENT
Start: 2025-03-12

## 2025-03-12 RX ORDER — AMLODIPINE AND VALSARTAN 10; 160 MG/1; MG/1
1 TABLET ORAL DAILY
Qty: 90 TABLET | Refills: 0 | Status: ON HOLD | OUTPATIENT
Start: 2025-03-12 | End: 2025-06-10

## 2025-03-12 RX ORDER — FUROSEMIDE 40 MG/1
40 TABLET ORAL 2 TIMES DAILY
Qty: 180 TABLET | Refills: 0 | Status: ON HOLD | OUTPATIENT
Start: 2025-03-12 | End: 2025-06-10

## 2025-03-13 ENCOUNTER — HOSPITAL ENCOUNTER (INPATIENT)
Facility: HOSPITAL | Age: 60
LOS: 5 days | Discharge: HOME | DRG: 291 | End: 2025-03-18
Attending: EMERGENCY MEDICINE | Admitting: STUDENT IN AN ORGANIZED HEALTH CARE EDUCATION/TRAINING PROGRAM
Payer: MEDICAID

## 2025-03-13 ENCOUNTER — OFFICE VISIT (OUTPATIENT)
Dept: HEPATOLOGY | Facility: CLINIC | Age: 60
End: 2025-03-13
Payer: MEDICAID

## 2025-03-13 ENCOUNTER — HOSPITAL ENCOUNTER (OUTPATIENT)
Dept: RADIOLOGY | Facility: HOSPITAL | Age: 60
Discharge: HOME | End: 2025-03-13
Attending: PHYSICIAN ASSISTANT
Payer: MEDICAID

## 2025-03-13 VITALS — HEIGHT: 68 IN | WEIGHT: 282.44 LBS | BODY MASS INDEX: 42.8 KG/M2

## 2025-03-13 DIAGNOSIS — G47.00 INSOMNIA, UNSPECIFIED TYPE: ICD-10-CM

## 2025-03-13 DIAGNOSIS — K74.60 HEPATIC CIRRHOSIS, UNSPECIFIED HEPATIC CIRRHOSIS TYPE, UNSPECIFIED WHETHER ASCITES PRESENT: ICD-10-CM

## 2025-03-13 DIAGNOSIS — K74.60 HEPATIC CIRRHOSIS, UNSPECIFIED HEPATIC CIRRHOSIS TYPE, UNSPECIFIED WHETHER ASCITES PRESENT: Primary | ICD-10-CM

## 2025-03-13 DIAGNOSIS — R06.02 SHORTNESS OF BREATH: ICD-10-CM

## 2025-03-13 DIAGNOSIS — I50.9 ACUTE ON CHRONIC CONGESTIVE HEART FAILURE, UNSPECIFIED HEART FAILURE TYPE: Primary | ICD-10-CM

## 2025-03-13 DIAGNOSIS — R07.9 CHEST PAIN: ICD-10-CM

## 2025-03-13 DIAGNOSIS — B18.2 CHRONIC HEPATITIS C WITHOUT HEPATIC COMA: ICD-10-CM

## 2025-03-13 DIAGNOSIS — J44.9 CHRONIC OBSTRUCTIVE PULMONARY DISEASE, UNSPECIFIED COPD TYPE: ICD-10-CM

## 2025-03-13 PROBLEM — J45.909 SEVERE ASTHMA: Status: RESOLVED | Noted: 2019-03-14 | Resolved: 2025-03-13

## 2025-03-13 PROBLEM — R39.9 LOWER URINARY TRACT SYMPTOMS (LUTS): Status: ACTIVE | Noted: 2025-03-13

## 2025-03-13 PROBLEM — N18.32 STAGE 3B CHRONIC KIDNEY DISEASE: Status: RESOLVED | Noted: 2024-03-26 | Resolved: 2025-03-13

## 2025-03-13 LAB
BIPAP: 0
BNP SERPL-MCNC: 33 PG/ML (ref 0–99)
CORRECTED TEMPERATURE (PCO2): 45 MMHG
CORRECTED TEMPERATURE (PH): 7.38
CORRECTED TEMPERATURE (PO2): 47.1 MMHG
ESTIMATED AVG GLUCOSE: 80 MG/DL (ref 68–131)
FIO2: 21 %
HBA1C MFR BLD: 4.4 % (ref 4–5.6)
HCT VFR BLD CALC: 37 %
HCV AB SERPL QL IA: REACTIVE
HIV 1+2 AB+HIV1 P24 AG SERPL QL IA: NORMAL
INFLUENZA A, MOLECULAR: NEGATIVE
INFLUENZA B, MOLECULAR: NEGATIVE
PCO2 BLDA: 45 MMHG
PH SMN: 7.38 [PH]
PO2 BLDA: 47.1 MMHG
POC BASE DEFICIT: 0.9 MMOL/L
POC HCO3: 25 MMOL/L
POC IONIZED CALCIUM: 1.04 MMOL/L (ref 1.06–1.42)
POC PERFORMED BY: ABNORMAL
POC TEMPERATURE: 37 C
POTASSIUM BLD-SCNC: 4.1 MMOL/L (ref 3.5–5.1)
SARS-COV-2 RDRP RESP QL NAA+PROBE: NEGATIVE
SODIUM BLD-SCNC: 142 MMOL/L (ref 136–145)
SPECIMEN SOURCE: ABNORMAL
SPECIMEN SOURCE: NORMAL
TROPONIN I SERPL DL<=0.01 NG/ML-MCNC: <3 NG/L (ref 0–35)

## 2025-03-13 PROCEDURE — 96372 THER/PROPH/DIAG INJ SC/IM: CPT

## 2025-03-13 PROCEDURE — 25000242 PHARM REV CODE 250 ALT 637 W/ HCPCS

## 2025-03-13 PROCEDURE — 12000002 HC ACUTE/MED SURGE SEMI-PRIVATE ROOM

## 2025-03-13 PROCEDURE — 63600175 PHARM REV CODE 636 W HCPCS

## 2025-03-13 PROCEDURE — 25000003 PHARM REV CODE 250

## 2025-03-13 PROCEDURE — 94640 AIRWAY INHALATION TREATMENT: CPT | Mod: XB

## 2025-03-13 PROCEDURE — 96374 THER/PROPH/DIAG INJ IV PUSH: CPT

## 2025-03-13 PROCEDURE — 25000242 PHARM REV CODE 250 ALT 637 W/ HCPCS: Performed by: STUDENT IN AN ORGANIZED HEALTH CARE EDUCATION/TRAINING PROGRAM

## 2025-03-13 PROCEDURE — 99213 OFFICE O/P EST LOW 20 MIN: CPT | Mod: PBBFAC,25 | Performed by: PHYSICIAN ASSISTANT

## 2025-03-13 PROCEDURE — 82803 BLOOD GASES ANY COMBINATION: CPT

## 2025-03-13 PROCEDURE — 63600175 PHARM REV CODE 636 W HCPCS: Performed by: STUDENT IN AN ORGANIZED HEALTH CARE EDUCATION/TRAINING PROGRAM

## 2025-03-13 PROCEDURE — 25000003 PHARM REV CODE 250: Performed by: EMERGENCY MEDICINE

## 2025-03-13 PROCEDURE — 1160F RVW MEDS BY RX/DR IN RCRD: CPT | Mod: CPTII,,, | Performed by: PHYSICIAN ASSISTANT

## 2025-03-13 PROCEDURE — 83036 HEMOGLOBIN GLYCOSYLATED A1C: CPT | Performed by: PHYSICIAN ASSISTANT

## 2025-03-13 PROCEDURE — 87502 INFLUENZA DNA AMP PROBE: CPT

## 2025-03-13 PROCEDURE — 87389 HIV-1 AG W/HIV-1&-2 AB AG IA: CPT | Performed by: PHYSICIAN ASSISTANT

## 2025-03-13 PROCEDURE — 83880 ASSAY OF NATRIURETIC PEPTIDE: CPT | Performed by: EMERGENCY MEDICINE

## 2025-03-13 PROCEDURE — 80358 DRUG SCREENING METHADONE: CPT | Performed by: EMERGENCY MEDICINE

## 2025-03-13 PROCEDURE — 84484 ASSAY OF TROPONIN QUANT: CPT

## 2025-03-13 PROCEDURE — 87522 HEPATITIS C REVRS TRNSCRPJ: CPT | Mod: 91 | Performed by: PHYSICIAN ASSISTANT

## 2025-03-13 PROCEDURE — 99900035 HC TECH TIME PER 15 MIN (STAT)

## 2025-03-13 PROCEDURE — 99999 PR PBB SHADOW E&M-EST. PATIENT-LVL III: CPT | Mod: PBBFAC,,, | Performed by: PHYSICIAN ASSISTANT

## 2025-03-13 PROCEDURE — G0378 HOSPITAL OBSERVATION PER HR: HCPCS

## 2025-03-13 PROCEDURE — 99214 OFFICE O/P EST MOD 30 MIN: CPT | Mod: S$PBB,,, | Performed by: PHYSICIAN ASSISTANT

## 2025-03-13 PROCEDURE — 86803 HEPATITIS C AB TEST: CPT | Performed by: PHYSICIAN ASSISTANT

## 2025-03-13 PROCEDURE — 94761 N-INVAS EAR/PLS OXIMETRY MLT: CPT | Mod: XB

## 2025-03-13 PROCEDURE — 76705 ECHO EXAM OF ABDOMEN: CPT | Mod: 26,,, | Performed by: RADIOLOGY

## 2025-03-13 PROCEDURE — 83735 ASSAY OF MAGNESIUM: CPT | Performed by: STUDENT IN AN ORGANIZED HEALTH CARE EDUCATION/TRAINING PROGRAM

## 2025-03-13 PROCEDURE — 87635 SARS-COV-2 COVID-19 AMP PRB: CPT

## 2025-03-13 PROCEDURE — 76705 ECHO EXAM OF ABDOMEN: CPT | Mod: TC

## 2025-03-13 PROCEDURE — 80053 COMPREHEN METABOLIC PANEL: CPT | Mod: 91 | Performed by: STUDENT IN AN ORGANIZED HEALTH CARE EDUCATION/TRAINING PROGRAM

## 2025-03-13 PROCEDURE — 3008F BODY MASS INDEX DOCD: CPT | Mod: CPTII,,, | Performed by: PHYSICIAN ASSISTANT

## 2025-03-13 PROCEDURE — 1159F MED LIST DOCD IN RCRD: CPT | Mod: CPTII,,, | Performed by: PHYSICIAN ASSISTANT

## 2025-03-13 PROCEDURE — 4010F ACE/ARB THERAPY RXD/TAKEN: CPT | Mod: CPTII,,, | Performed by: PHYSICIAN ASSISTANT

## 2025-03-13 PROCEDURE — 25000242 PHARM REV CODE 250 ALT 637 W/ HCPCS: Performed by: EMERGENCY MEDICINE

## 2025-03-13 RX ORDER — ESCITALOPRAM OXALATE 20 MG/1
20 TABLET ORAL DAILY
Status: DISCONTINUED | OUTPATIENT
Start: 2025-03-14 | End: 2025-03-18 | Stop reason: HOSPADM

## 2025-03-13 RX ORDER — ALBUTEROL SULFATE 2.5 MG/.5ML
2.5 SOLUTION RESPIRATORY (INHALATION) EVERY 4 HOURS PRN
Status: DISCONTINUED | OUTPATIENT
Start: 2025-03-13 | End: 2025-03-13

## 2025-03-13 RX ORDER — ALBUTEROL SULFATE 90 UG/1
2 INHALANT RESPIRATORY (INHALATION) EVERY 6 HOURS PRN
Status: DISCONTINUED | OUTPATIENT
Start: 2025-03-13 | End: 2025-03-13

## 2025-03-13 RX ORDER — CLONIDINE HYDROCHLORIDE 0.1 MG/1
0.1 TABLET ORAL 2 TIMES DAILY
Status: DISCONTINUED | OUTPATIENT
Start: 2025-03-13 | End: 2025-03-18 | Stop reason: HOSPADM

## 2025-03-13 RX ORDER — FUROSEMIDE 10 MG/ML
60 INJECTION INTRAMUSCULAR; INTRAVENOUS
Status: COMPLETED | OUTPATIENT
Start: 2025-03-13 | End: 2025-03-13

## 2025-03-13 RX ORDER — SODIUM CHLORIDE 0.9 % (FLUSH) 0.9 %
10 SYRINGE (ML) INJECTION
Status: DISCONTINUED | OUTPATIENT
Start: 2025-03-13 | End: 2025-03-18 | Stop reason: HOSPADM

## 2025-03-13 RX ORDER — ALBUTEROL SULFATE 2.5 MG/.5ML
2.5 SOLUTION RESPIRATORY (INHALATION)
Status: DISCONTINUED | OUTPATIENT
Start: 2025-03-13 | End: 2025-03-17

## 2025-03-13 RX ORDER — ALBUTEROL SULFATE 90 UG/1
2 INHALANT RESPIRATORY (INHALATION) 4 TIMES DAILY
Status: DISCONTINUED | OUTPATIENT
Start: 2025-03-13 | End: 2025-03-13

## 2025-03-13 RX ORDER — IBUPROFEN 200 MG
16 TABLET ORAL
Status: DISCONTINUED | OUTPATIENT
Start: 2025-03-13 | End: 2025-03-18 | Stop reason: HOSPADM

## 2025-03-13 RX ORDER — CLONIDINE HYDROCHLORIDE 0.1 MG/1
0.1 TABLET ORAL 2 TIMES DAILY
Qty: 60 TABLET | Refills: 1 | OUTPATIENT
Start: 2025-03-13

## 2025-03-13 RX ORDER — FUROSEMIDE 10 MG/ML
60 INJECTION INTRAMUSCULAR; INTRAVENOUS EVERY 12 HOURS
Status: DISCONTINUED | OUTPATIENT
Start: 2025-03-14 | End: 2025-03-14

## 2025-03-13 RX ORDER — IPRATROPIUM BROMIDE AND ALBUTEROL SULFATE 2.5; .5 MG/3ML; MG/3ML
3 SOLUTION RESPIRATORY (INHALATION)
Status: COMPLETED | OUTPATIENT
Start: 2025-03-13 | End: 2025-03-13

## 2025-03-13 RX ORDER — IBUPROFEN 200 MG
24 TABLET ORAL
Status: DISCONTINUED | OUTPATIENT
Start: 2025-03-13 | End: 2025-03-18 | Stop reason: HOSPADM

## 2025-03-13 RX ORDER — FLUTICASONE FUROATE AND VILANTEROL 200; 25 UG/1; UG/1
1 POWDER RESPIRATORY (INHALATION) DAILY
Status: DISCONTINUED | OUTPATIENT
Start: 2025-03-14 | End: 2025-03-13

## 2025-03-13 RX ORDER — VALSARTAN 160 MG/1
160 TABLET ORAL DAILY
Status: DISCONTINUED | OUTPATIENT
Start: 2025-03-14 | End: 2025-03-14

## 2025-03-13 RX ORDER — ACETAMINOPHEN 325 MG/1
650 TABLET ORAL EVERY 4 HOURS PRN
Status: DISCONTINUED | OUTPATIENT
Start: 2025-03-13 | End: 2025-03-18 | Stop reason: HOSPADM

## 2025-03-13 RX ORDER — PREDNISONE 20 MG/1
40 TABLET ORAL DAILY
Status: DISCONTINUED | OUTPATIENT
Start: 2025-03-13 | End: 2025-03-18

## 2025-03-13 RX ORDER — GLUCAGON 1 MG
1 KIT INJECTION
Status: DISCONTINUED | OUTPATIENT
Start: 2025-03-13 | End: 2025-03-18 | Stop reason: HOSPADM

## 2025-03-13 RX ORDER — ACETAMINOPHEN 500 MG
1000 TABLET ORAL
Status: COMPLETED | OUTPATIENT
Start: 2025-03-13 | End: 2025-03-13

## 2025-03-13 RX ORDER — FUROSEMIDE 40 MG/1
40 TABLET ORAL 2 TIMES DAILY
Qty: 180 TABLET | Refills: 0 | OUTPATIENT
Start: 2025-03-13 | End: 2025-06-11

## 2025-03-13 RX ORDER — POLYETHYLENE GLYCOL 3350 17 G/17G
17 POWDER, FOR SOLUTION ORAL DAILY
Status: DISCONTINUED | OUTPATIENT
Start: 2025-03-14 | End: 2025-03-14

## 2025-03-13 RX ORDER — SODIUM CHLORIDE 0.9 % (FLUSH) 0.9 %
10 SYRINGE (ML) INJECTION EVERY 12 HOURS PRN
Status: DISCONTINUED | OUTPATIENT
Start: 2025-03-13 | End: 2025-03-18 | Stop reason: HOSPADM

## 2025-03-13 RX ORDER — AMLODIPINE BESYLATE 10 MG/1
10 TABLET ORAL DAILY
Status: DISCONTINUED | OUTPATIENT
Start: 2025-03-14 | End: 2025-03-18 | Stop reason: HOSPADM

## 2025-03-13 RX ORDER — HEPARIN SODIUM 5000 [USP'U]/ML
5000 INJECTION, SOLUTION INTRAVENOUS; SUBCUTANEOUS EVERY 12 HOURS
Status: DISCONTINUED | OUTPATIENT
Start: 2025-03-13 | End: 2025-03-14

## 2025-03-13 RX ORDER — FLUTICASONE FUROATE AND VILANTEROL 200; 25 UG/1; UG/1
1 POWDER RESPIRATORY (INHALATION) DAILY
Status: DISCONTINUED | OUTPATIENT
Start: 2025-03-14 | End: 2025-03-18 | Stop reason: HOSPADM

## 2025-03-13 RX ORDER — NALOXONE HCL 0.4 MG/ML
0.02 VIAL (ML) INJECTION
Status: DISCONTINUED | OUTPATIENT
Start: 2025-03-13 | End: 2025-03-18 | Stop reason: HOSPADM

## 2025-03-13 RX ADMIN — CLONIDINE HYDROCHLORIDE 0.1 MG: 0.1 TABLET ORAL at 09:03

## 2025-03-13 RX ADMIN — IPRATROPIUM BROMIDE AND ALBUTEROL SULFATE 3 ML: .5; 3 SOLUTION RESPIRATORY (INHALATION) at 04:03

## 2025-03-13 RX ADMIN — FUROSEMIDE 60 MG: 10 INJECTION, SOLUTION INTRAMUSCULAR; INTRAVENOUS at 06:03

## 2025-03-13 RX ADMIN — PREDNISONE 40 MG: 20 TABLET ORAL at 07:03

## 2025-03-13 RX ADMIN — IPRATROPIUM BROMIDE AND ALBUTEROL SULFATE 3 ML: .5; 3 SOLUTION RESPIRATORY (INHALATION) at 02:03

## 2025-03-13 RX ADMIN — HEPARIN SODIUM 5000 UNITS: 5000 INJECTION INTRAVENOUS; SUBCUTANEOUS at 08:03

## 2025-03-13 RX ADMIN — IPRATROPIUM BROMIDE AND ALBUTEROL SULFATE 3 ML: .5; 3 SOLUTION RESPIRATORY (INHALATION) at 01:03

## 2025-03-13 RX ADMIN — ACETAMINOPHEN 1000 MG: 500 TABLET ORAL at 02:03

## 2025-03-13 RX ADMIN — ALBUTEROL SULFATE 2.5 MG: 2.5 SOLUTION RESPIRATORY (INHALATION) at 08:03

## 2025-03-13 RX ADMIN — ACETAMINOPHEN 650 MG: 325 TABLET ORAL at 11:03
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
## 2025-03-13 NOTE — ED NOTES
Pt called to room for blood draw when I noticed pt unable to speak in complete sentences. Pt on 2 lpm O2 via NC. Pt's pulse ox was 83% with audible wheeziong. Pt's O2 increased to 3 lpm and MD Faustina notified.

## 2025-03-13 NOTE — PROGRESS NOTES
"HEPATOLOGY CLINIC VISIT NOTE - HCV clinic  CHIEF COMPLAINT: Hepatitis C     HISTORY       This is a 59 y.o. Black or  male with chronic hepatitis C, here for f/u.     HCV history:  Originally diagnosed 2011 while incarcerated in [place]  Risks for HCV:  tattoo, drug use, incarceration  - Prior Treatment: No  - Genotype 1A    Cirrhosis history:  HCV FibroSURE 2/6/13: A0-1, F3  FibroScan 4/16/19: kPa 7.9 (F2),  (S0)  FibroScan 7/1/24: kPa 16.1 (F4),  (S0)    Cirrhosis maintenance:  Varices screening - EGD (w/ EUS) 11/2024: no EV  HAV immunity - Immunity documented 2024    HBV status - Isolated HBcAb (+)    Interval history (8/12/24):  Began 12 weeks epclusa 7/18/24  No adherence / tolerance issues  U/S today: no liver lesions, SM, or ascites.   CBD dilated 1.9cm; central intrahepatic duct prominence  Recent labs: plt 130s-150s; liver panel normal. MELD 15 (driven by Cr 2.1)  No s/s liver decompensation  No abd pain, biliary sx    Interval history (10/3/24):  Will finish epclusa next week.   Missed on-treatment labs (LFT, HCV, AFP) - discovered after pt left ofc  No tolerance / adherence issues.   No s/s liver decompensation    MRCP 9/2024:  Severe CBD dilation 2.2 cm w/ abrupt tapering at ampulla  Severe dilation of the intrahepatic biliary ducts.  Main pancreatic duct mildly dilated 4 mm at the pancreatic neck/proximal body.  No masses, etiology noted.  Large GS noted  ERCP recommended    Pt reports constant upper abdominal pain, perhaps present for years? No nocturnal pain. Difficult to characterize. Occasional vomiting, frequency unclear    Sleepy this AM b/c "took sleeping med" last night    Interval history (03/13/2025):  HCV for SVR: missed appt 1/2025; drawn today but pending    Routine cirrhosis labs / imaging  U/S 10/2024: no liver lesions or ascites  Labs today: all pending    Denies jaundice, dark urine, hematemesis, melena, slowed mentation, abdominal distention.     Saw PBS for " "dilated CBD & PD  EUS 11/29/24: Atrophy of pancreas. Dilated CBD up to 20mm. 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  Tobacco - initial visit: "quit 5-6 mo ago"  Alcohol - denies  Drugs - hx of IV/TED      ROS: as per HPI    PHYSICAL EXAM:  Friendly Black or  male, in no acute distress; drowsy. Oriented to person, place and time  HEENT: Sclerae anicteric.   NECK: Supple  LUNGS: Normal respiratory effort.   ABDOMEN: Flat, soft, nontender.   SKIN: Warm and dry. No jaundice, No obvious rashes.   NEURO/PSYCH: Normal gate. Memory intact. Thought and speech pattern appropriate. No depression or anxiety noted.    PERTINENT DIAGNOSTIC RESULTS      Lab Results   Component Value Date    WBC 5.23 01/06/2025    HGB 10.2 (L) 01/06/2025     (L) 01/06/2025     Lab Results   Component Value Date    INR 1.1 05/22/2024     Lab Results   Component Value Date    AST 13 01/06/2025    ALT 10 01/06/2025    BILITOT 0.2 01/06/2025    ALBUMIN 3.6 01/06/2025    ALKPHOS 96 01/06/2025    CREATININE 1.8 (H) 01/06/2025    BUN 22 (H) 01/06/2025     01/06/2025    K 4.7 01/06/2025    AFP 2.9 10/08/2024     Results for orders placed during the hospital encounter of 03/13/25  US Abdomen Limited  CLINICAL HISTORY:Unspecified cirrhosis of liver  TECHNIQUE:Limited ultrasound of the right upper quadrant of the abdomen including pancreas, liver, gallbladder, common bile duct was performed.  COMPARISON:MRI 09/19/2024, ultrasound 07/05/2024    FINDINGS:  Pancreas: Pancreatic duct is dilated measuring 4 mm.  Liver: Normal in size, measuring 15.2 cm. Homogeneous echotexture. No focal hepatic lesions.  Gallbladder: Multiple gallstones measuring up to 3.4 cm.  There is no gallbladder " wall thickening or pericholecystic fluid.  No sonographic Moore's sign.  Biliary system: The common duct is dilated, ranging from 16-20 mm, with intrahepatic biliary ductal dilatation.  Spleen: Normal in size with a homogeneous echotexture, measuring 9.3 x 5.2Cm.  Miscellaneous: No ascites.    Impression  Persistent intra and extrahepatic biliary ductal dilatation and pancreatic ductal dilatation, similar in size to MRI MRCP 09/19/2024.  No filling defects or obstructing mass identified.  Findings remain concerning for presence of occult ampullary mass, stricture or sphincter dysfunction.  ERCP is recommended.    Cholelithiasis.  No cholecystitis.    ASSESSMENT        59 y.o. Black or  male with:  1. Chronic HCV, genotype  1A: completed Epclusa, SVR labs pending    2. Cirrhosis, well-compensated  -- FibroScan 7/1/24: kPa 16.1 (F4),  (S0)  -- HCC screen: u/s today ok. AFP pending  -- EV screen: not indicated (but none present on 11/2024 EGD)    3. Dilatation of CBD, intrahepatic BD, PD  -- EUS 11/2024 w/o concerning finding. Repeat recommended 11/2025    PLAN        Await pending labs  CBC, CMP, INR, AFP, U/S, VISIT every 6 months: due 9/2025 if labs ok  EUS due 11/2025      __________________________________________________________________    Duration of encounter: 31min  This includes face-to-face time and non face-to-face time preparing to see the patient (eg, review of tests), obtaining and/or reviewing separately obtained history, documenting clinical information in the electronic or other health record, independently interpreting resultsand communicating results to the patient/family/caregiver, or care coordination.

## 2025-03-14 ENCOUNTER — TELEPHONE (OUTPATIENT)
Dept: HEPATOLOGY | Facility: CLINIC | Age: 60
End: 2025-03-14
Payer: MEDICAID

## 2025-03-14 DIAGNOSIS — Z86.19 HEPATITIS C VIRUS INFECTION CURED AFTER ANTIVIRAL DRUG THERAPY: Primary | ICD-10-CM

## 2025-03-14 PROBLEM — M25.451: Status: ACTIVE | Noted: 2025-03-14

## 2025-03-14 LAB
ALBUMIN SERPL BCP-MCNC: 3.7 G/DL (ref 3.5–5.2)
ALBUMIN SERPL BCP-MCNC: 4.2 G/DL (ref 3.5–5.2)
ALP SERPL-CCNC: 80 U/L (ref 40–150)
ALP SERPL-CCNC: 90 U/L (ref 40–150)
ALT SERPL W/O P-5'-P-CCNC: 26 U/L (ref 10–44)
ALT SERPL W/O P-5'-P-CCNC: 29 U/L (ref 10–44)
ANION GAP SERPL CALC-SCNC: 11 MMOL/L (ref 8–16)
ANION GAP SERPL CALC-SCNC: 11 MMOL/L (ref 8–16)
ANION GAP SERPL CALC-SCNC: 9 MMOL/L (ref 8–16)
AST SERPL-CCNC: 32 U/L (ref 10–40)
AST SERPL-CCNC: 48 U/L (ref 10–40)
BASOPHILS # BLD AUTO: 0.01 K/UL (ref 0–0.2)
BASOPHILS NFR BLD: 0.3 % (ref 0–1.9)
BILIRUB SERPL-MCNC: 0.5 MG/DL (ref 0.1–1)
BILIRUB SERPL-MCNC: 0.6 MG/DL (ref 0.1–1)
BUN SERPL-MCNC: 16 MG/DL (ref 6–20)
BUN SERPL-MCNC: 17 MG/DL (ref 6–20)
BUN SERPL-MCNC: 20 MG/DL (ref 6–20)
CALCIUM SERPL-MCNC: 8.8 MG/DL (ref 8.7–10.5)
CALCIUM SERPL-MCNC: 9.5 MG/DL (ref 8.7–10.5)
CALCIUM SERPL-MCNC: 9.5 MG/DL (ref 8.7–10.5)
CHLORIDE SERPL-SCNC: 102 MMOL/L (ref 95–110)
CHLORIDE SERPL-SCNC: 104 MMOL/L (ref 95–110)
CHLORIDE SERPL-SCNC: 107 MMOL/L (ref 95–110)
CO2 SERPL-SCNC: 22 MMOL/L (ref 23–29)
CO2 SERPL-SCNC: 25 MMOL/L (ref 23–29)
CO2 SERPL-SCNC: 25 MMOL/L (ref 23–29)
CREAT SERPL-MCNC: 1.6 MG/DL (ref 0.5–1.4)
CREAT SERPL-MCNC: 1.7 MG/DL (ref 0.5–1.4)
CREAT SERPL-MCNC: 1.8 MG/DL (ref 0.5–1.4)
DIFFERENTIAL METHOD BLD: ABNORMAL
EOSINOPHIL # BLD AUTO: 0 K/UL (ref 0–0.5)
EOSINOPHIL NFR BLD: 0 % (ref 0–8)
ERYTHROCYTE [DISTWIDTH] IN BLOOD BY AUTOMATED COUNT: 11.8 % (ref 11.5–14.5)
EST. GFR  (NO RACE VARIABLE): 42.6 ML/MIN/1.73 M^2
EST. GFR  (NO RACE VARIABLE): 45.6 ML/MIN/1.73 M^2
EST. GFR  (NO RACE VARIABLE): 49 ML/MIN/1.73 M^2
GLUCOSE SERPL-MCNC: 113 MG/DL (ref 70–110)
GLUCOSE SERPL-MCNC: 82 MG/DL (ref 70–110)
GLUCOSE SERPL-MCNC: 84 MG/DL (ref 70–110)
HCT VFR BLD AUTO: 37 % (ref 40–54)
HCV RNA SERPL QL NAA+PROBE: NOT DETECTED
HCV RNA SPEC NAA+PROBE-ACNC: NOT DETECTED IU/ML
HGB BLD-MCNC: 12.3 G/DL (ref 14–18)
IMM GRANULOCYTES # BLD AUTO: 0.01 K/UL (ref 0–0.04)
IMM GRANULOCYTES NFR BLD AUTO: 0.3 % (ref 0–0.5)
LYMPHOCYTES # BLD AUTO: 0.3 K/UL (ref 1–4.8)
LYMPHOCYTES NFR BLD: 7.1 % (ref 18–48)
MAGNESIUM SERPL-MCNC: 1.8 MG/DL (ref 1.6–2.6)
MAGNESIUM SERPL-MCNC: 2.2 MG/DL (ref 1.6–2.6)
MCH RBC QN AUTO: 31.3 PG (ref 27–31)
MCHC RBC AUTO-ENTMCNC: 33.2 G/DL (ref 32–36)
MCV RBC AUTO: 94 FL (ref 82–98)
MONOCYTES # BLD AUTO: 0.1 K/UL (ref 0.3–1)
MONOCYTES NFR BLD: 1.5 % (ref 4–15)
NEUTROPHILS # BLD AUTO: 3.6 K/UL (ref 1.8–7.7)
NEUTROPHILS NFR BLD: 90.8 % (ref 38–73)
NRBC BLD-RTO: 0 /100 WBC
PHOSPHATE SERPL-MCNC: 3.3 MG/DL (ref 2.7–4.5)
PLATELET # BLD AUTO: 151 K/UL (ref 150–450)
PMV BLD AUTO: 10.4 FL (ref 9.2–12.9)
POTASSIUM SERPL-SCNC: 4.5 MMOL/L (ref 3.5–5.1)
POTASSIUM SERPL-SCNC: 5.2 MMOL/L (ref 3.5–5.1)
POTASSIUM SERPL-SCNC: 5.2 MMOL/L (ref 3.5–5.1)
PROT SERPL-MCNC: 7.8 G/DL (ref 6–8.4)
PROT SERPL-MCNC: 9.2 G/DL (ref 6–8.4)
RBC # BLD AUTO: 3.93 M/UL (ref 4.6–6.2)
SODIUM SERPL-SCNC: 138 MMOL/L (ref 136–145)
SODIUM SERPL-SCNC: 138 MMOL/L (ref 136–145)
SODIUM SERPL-SCNC: 140 MMOL/L (ref 136–145)
WBC # BLD AUTO: 3.92 K/UL (ref 3.9–12.7)

## 2025-03-14 PROCEDURE — 80053 COMPREHEN METABOLIC PANEL: CPT

## 2025-03-14 PROCEDURE — 96372 THER/PROPH/DIAG INJ SC/IM: CPT

## 2025-03-14 PROCEDURE — 99285 EMERGENCY DEPT VISIT HI MDM: CPT | Mod: 25

## 2025-03-14 PROCEDURE — 25000242 PHARM REV CODE 250 ALT 637 W/ HCPCS: Performed by: STUDENT IN AN ORGANIZED HEALTH CARE EDUCATION/TRAINING PROGRAM

## 2025-03-14 PROCEDURE — S0109 METHADONE ORAL 5MG: HCPCS

## 2025-03-14 PROCEDURE — 63600175 PHARM REV CODE 636 W HCPCS: Performed by: STUDENT IN AN ORGANIZED HEALTH CARE EDUCATION/TRAINING PROGRAM

## 2025-03-14 PROCEDURE — 94640 AIRWAY INHALATION TREATMENT: CPT | Mod: XB

## 2025-03-14 PROCEDURE — G0378 HOSPITAL OBSERVATION PER HR: HCPCS

## 2025-03-14 PROCEDURE — 63600175 PHARM REV CODE 636 W HCPCS

## 2025-03-14 PROCEDURE — 96376 TX/PRO/DX INJ SAME DRUG ADON: CPT

## 2025-03-14 PROCEDURE — 94761 N-INVAS EAR/PLS OXIMETRY MLT: CPT

## 2025-03-14 PROCEDURE — 25000003 PHARM REV CODE 250

## 2025-03-14 PROCEDURE — 20600001 HC STEP DOWN PRIVATE ROOM

## 2025-03-14 PROCEDURE — 84100 ASSAY OF PHOSPHORUS: CPT

## 2025-03-14 PROCEDURE — 80048 BASIC METABOLIC PNL TOTAL CA: CPT | Mod: XB

## 2025-03-14 PROCEDURE — 85025 COMPLETE CBC W/AUTO DIFF WBC: CPT

## 2025-03-14 PROCEDURE — 83735 ASSAY OF MAGNESIUM: CPT

## 2025-03-14 PROCEDURE — 27000221 HC OXYGEN, UP TO 24 HOURS

## 2025-03-14 RX ORDER — METHADONE HYDROCHLORIDE 5 MG/5ML
95 SOLUTION ORAL DAILY
Status: DISCONTINUED | OUTPATIENT
Start: 2025-03-14 | End: 2025-03-18

## 2025-03-14 RX ORDER — SENNOSIDES 8.6 MG/1
8.6 TABLET ORAL NIGHTLY
Status: DISCONTINUED | OUTPATIENT
Start: 2025-03-14 | End: 2025-03-18 | Stop reason: HOSPADM

## 2025-03-14 RX ORDER — POLYETHYLENE GLYCOL 3350 17 G/17G
17 POWDER, FOR SOLUTION ORAL 2 TIMES DAILY
Status: DISCONTINUED | OUTPATIENT
Start: 2025-03-14 | End: 2025-03-18 | Stop reason: HOSPADM

## 2025-03-14 RX ORDER — ENOXAPARIN SODIUM 100 MG/ML
40 INJECTION SUBCUTANEOUS EVERY 12 HOURS
Status: DISCONTINUED | OUTPATIENT
Start: 2025-03-14 | End: 2025-03-15

## 2025-03-14 RX ORDER — FUROSEMIDE 40 MG/1
40 TABLET ORAL 2 TIMES DAILY
Status: DISCONTINUED | OUTPATIENT
Start: 2025-03-14 | End: 2025-03-16

## 2025-03-14 RX ORDER — ONDANSETRON 4 MG/1
4 TABLET, FILM COATED ORAL EVERY 8 HOURS PRN
Status: DISCONTINUED | OUTPATIENT
Start: 2025-03-14 | End: 2025-03-18 | Stop reason: HOSPADM

## 2025-03-14 RX ORDER — TAMSULOSIN HYDROCHLORIDE 0.4 MG/1
0.4 CAPSULE ORAL DAILY
Status: DISCONTINUED | OUTPATIENT
Start: 2025-03-14 | End: 2025-03-18 | Stop reason: HOSPADM

## 2025-03-14 RX ADMIN — ALBUTEROL SULFATE 2.5 MG: 2.5 SOLUTION RESPIRATORY (INHALATION) at 07:03

## 2025-03-14 RX ADMIN — TIOTROPIUM BROMIDE INHALATION SPRAY 2 PUFF: 3.12 SPRAY, METERED RESPIRATORY (INHALATION) at 08:03

## 2025-03-14 RX ADMIN — ESCITALOPRAM OXALATE 20 MG: 5 TABLET, FILM COATED ORAL at 08:03

## 2025-03-14 RX ADMIN — LACTULOSE 10 G: 20 SOLUTION ORAL at 09:03

## 2025-03-14 RX ADMIN — FLUTICASONE FUROATE AND VILANTEROL TRIFENATATE 1 PUFF: 200; 25 POWDER RESPIRATORY (INHALATION) at 08:03

## 2025-03-14 RX ADMIN — ENOXAPARIN SODIUM 40 MG: 40 INJECTION SUBCUTANEOUS at 08:03

## 2025-03-14 RX ADMIN — ENOXAPARIN SODIUM 40 MG: 40 INJECTION SUBCUTANEOUS at 01:03

## 2025-03-14 RX ADMIN — ONDANSETRON HYDROCHLORIDE 4 MG: 4 TABLET, FILM COATED ORAL at 10:03

## 2025-03-14 RX ADMIN — ALBUTEROL SULFATE 2.5 MG: 2.5 SOLUTION RESPIRATORY (INHALATION) at 08:03

## 2025-03-14 RX ADMIN — CLONIDINE HYDROCHLORIDE 0.1 MG: 0.1 TABLET ORAL at 08:03

## 2025-03-14 RX ADMIN — FUROSEMIDE 60 MG: 10 INJECTION, SOLUTION INTRAMUSCULAR; INTRAVENOUS at 08:03

## 2025-03-14 RX ADMIN — SODIUM ZIRCONIUM CYCLOSILICATE 10 G: 5 POWDER, FOR SUSPENSION ORAL at 01:03

## 2025-03-14 RX ADMIN — ALBUTEROL SULFATE 2.5 MG: 2.5 SOLUTION RESPIRATORY (INHALATION) at 11:03

## 2025-03-14 RX ADMIN — ACETAMINOPHEN 650 MG: 325 TABLET ORAL at 10:03

## 2025-03-14 RX ADMIN — VALSARTAN 160 MG: 160 TABLET, FILM COATED ORAL at 08:03

## 2025-03-14 RX ADMIN — LACTULOSE 30 G: 20 SOLUTION ORAL at 02:03

## 2025-03-14 RX ADMIN — POLYETHYLENE GLYCOL 3350 17 G: 17 POWDER, FOR SOLUTION ORAL at 09:03

## 2025-03-14 RX ADMIN — TAMSULOSIN HYDROCHLORIDE 0.4 MG: 0.4 CAPSULE ORAL at 01:03

## 2025-03-14 RX ADMIN — LACTULOSE 20 G: 20 SOLUTION ORAL at 08:03

## 2025-03-14 RX ADMIN — METHADONE HYDROCHLORIDE 95 MG: 5 SOLUTION ORAL at 12:03

## 2025-03-14 RX ADMIN — ALBUTEROL SULFATE 2.5 MG: 2.5 SOLUTION RESPIRATORY (INHALATION) at 03:03

## 2025-03-14 RX ADMIN — HEPARIN SODIUM 5000 UNITS: 5000 INJECTION INTRAVENOUS; SUBCUTANEOUS at 08:03

## 2025-03-14 RX ADMIN — AMLODIPINE BESYLATE 10 MG: 10 TABLET ORAL at 08:03

## 2025-03-14 RX ADMIN — PREDNISONE 40 MG: 20 TABLET ORAL at 08:03

## 2025-03-14 RX ADMIN — FUROSEMIDE 40 MG: 40 TABLET ORAL at 06:03

## 2025-03-14 NOTE — PROGRESS NOTES
[facility] - Emergency Dept  Hospital Medicine  Progress Note    Patient Name: Ming Harrington  MRN: [id number]  Patient Class: OP- Observation   Admission Date: 3/13/2025  Length of Stay: 0 days  Attending Physician: Gigi Newell MD  Primary Care Provider: Garland Xiong DO        Subjective     Principal Problem:COPD exacerbation        HPI:  Ming Harrington is a 59 y.o. M with a PMHx of COPD on 2L O2 PRN, HFpEF, HTN, chronic HCV complicated by cirrhosis, CKD, opioid use disorder on methadone, and JUVENCIO presenting for increased shortness of breath in the setting missed medication doses. Patient reports his symptoms onset around one week ago. He has noticed increased dyspnea on exertion and orthopnea. States he ran out of his lasix around four days ago. Tried to get it refilled at the pharmacy, but was unable to. Also reports that he has been out of his Trelegy ellipta as well. He has been increasing the use of his Ventolin inhaler without relief. Associated symptoms include a sharp, substernal chest pain that occurs with breathing and worsening lower extremity edema, bilaterally. Patient denies any sick contacts. No fever, chills, productive cough, nausea, vomiting, diarrhea, burning with urination. Prior to onset of symptoms, patient able to get around the house without any issues. However, now he is only able to walk about 5 steps before becoming out of breath. Former smoker about 15 years at around 1 pack per day. Has stopped smoking for 1.5 years.     ED Course  On arrival, patient hypertensive at 184/104, otherwise afebrile. Saturations reportedly dropped to the 80s and required supplemental oxygen. CBC unremarkable. CMP with a Cr of 1.8 (around baseline). BNP and troponin negative. Flu and covid negative. Patient given lasix and duonebs with improvement in his symptoms. At time of admit, patient weaned down to 2L O2 NC. Admitted to hospital medicine.       Overview/Hospital Course:  59M with PMHx of COPD on 2L O2 PRN,  HFpEF, HTN, chronic HCV complicated by cirrhosis, CKD, opioid use disorder on methadone, and JUVENCIO admitted for COPD exacerbation and volume overload. Treating COPD exacerbation with scheduled inhalers and prednisone. Deferring antibiotics. Treating volume overload with IV lasix. Continuing methadone 95mg inpatient. Bowel regimen for his constipation.     Interval History/Significant Events: NAEON. Robust urine output overnight with 3.5L.    Will continue IV lasix 60mg this AM.   Confirmed methadone dose and will continue inpatient.   Lokelma for persistent hyperkalemia.   CT thigh for unilateral enlargement of his right thigh.   Bowel reg for his constipation.     Review of Systems  Objective:     Vital Signs (Most Recent):  Temp: 97.7 °F (36.5 °C) (03/14/25 1118)  Pulse: 80 (03/14/25 1118)  Resp: 20 (03/14/25 1234)  BP: 132/84 (03/14/25 1118)  SpO2: 98 % (03/14/25 1118) Vital Signs (24h Range):  Temp:  [96.5 °F (35.8 °C)-98.7 °F (37.1 °C)] 97.7 °F (36.5 °C)  Pulse:  [65-84] 80  Resp:  [16-22] 20  SpO2:  [94 %-100 %] 98 %  BP: (120-145)/(71-94) 132/84     Weight: 129.3 kg (285 lb 0.9 oz)  Body mass index is 43.34 kg/m².     Physical Exam  Vitals and nursing note reviewed.   Constitutional:       Appearance: He is obese.   HENT:      Head: Normocephalic and atraumatic.      Mouth/Throat:      Mouth: Mucous membranes are dry.   Eyes:      Extraocular Movements: Extraocular movements intact.   Cardiovascular:      Rate and Rhythm: Normal rate and regular rhythm.      Pulses: Normal pulses.      Heart sounds: Normal heart sounds.   Pulmonary:      Effort: Pulmonary effort is normal. No respiratory distress.      Breath sounds: Wheezing and rales present.   Abdominal:      General: Abdomen is flat. There is no distension.      Palpations: Abdomen is soft.      Tenderness: There is abdominal tenderness.   Musculoskeletal:      Right lower leg: Edema present.      Left lower leg: Edema present.   Skin:     General: Skin is  warm.   Neurological:      Mental Status: He is alert and oriented to person, place, and time.   Psychiatric:         Mood and Affect: Mood normal.                Significant Labs: All pertinent labs within the past 24 hours have been reviewed.    Significant Imaging: I have reviewed all pertinent imaging results/findings within the past 24 hours.      Assessment & Plan  COPD exacerbation  History of COPD exacerbations requiring inpatient treatment. Patient reporting that he has been out of his Trelegy Ellipta.  Patient's COPD is with exacerbation noted by worsening of baseline hypoxia currently. Continue scheduled inhalers Steroids and Supplemental oxygen and monitor respiratory status closely.   Patient also with history of CHF. BNP and troponin wnl. Flu/Covid negative.     Plan:   - Scheduled Breo.   - Albuterol inhaler scheduled  - Prednisone 40mg QD x5days  - No need for antibiotics; patient not reporting of increased cough or productive cough.   (HFpEF) heart failure with preserved ejection fraction  Patient with HFpEF presenting with symptoms of exacerbation likely due to missed lasix doses. Patient reporting of SOB, dyspnea on exertion, and orthopnea.     Most recent BNP and echo results are listed below.  Recent Labs     03/13/25  1420   BNP 33     Latest ECHO  Results for orders placed during the hospital encounter of 02/15/24    Echo    Interpretation Summary    Left Ventricle: The left ventricle is normal in size. Normal wall thickness. There is concentric remodeling. There is normal systolic function. Biplane (2D) method of discs ejection fraction is 53%. There is normal diastolic function.    Right Ventricle: Mild right ventricular enlargement. Wall thickness is normal. Right ventricle wall motion  is normal. Systolic function is normal.    Right Atrium: Right atrium is mildly dilated.    Aorta: Aortic root is mildly dilated measuring 4.27 cm. Ascending aorta is normal.    Pulmonary Artery: The  estimated pulmonary artery systolic pressure is 40 mmHg.    IVC/SVC: Normal venous pressure at 3 mmHg.    Current Heart Failure Medications  furosemide injection 60 mg, Every 12 hours, Intravenous    Plan  - Continue IV lasix 60mg for now. Will adjust based on clinical status  - Monitor strict I&Os and daily weights.    - Place on telemetry  - Low sodium diet  - Place on fluid restriction of 1.5 L.   - Cardiology has not been consulted  HTN (hypertension)  Patient's blood pressure range in the last 24 hours was: BP  Min: 120/71  Max: 145/91.The patient's inpatient anti-hypertensive regimen is listed below:  Current Antihypertensives  amLODIPine tablet 10 mg, Daily, Oral  cloNIDine tablet 0.1 mg, 2 times daily, Oral  furosemide injection 60 mg, Every 12 hours, Intravenous    Plan  - Holding valsartan due to hyperkalemia  - Continue home BP meds as listed above   Opioid use disorder, severe, on maintenance therapy, dependence  Appears that patient is on methadone as maintenance for his opioid use disorder.    Plan:  - Confirmed that patient attends [facility] methadone clinic  - Will continue methadone 95mg while inpatient    Chronic constipation  - Lactulose and Miralax  Hyperkalemia  Hyperkalemia is likely due to  unknown etiology .The patients most recent potassium results are listed below.  Recent Labs     03/13/25  1006 03/13/25  2357 03/14/25  0339   K 4.8 5.2* 5.2*     Plan  - Monitor for arrhythmias with EKG and/or continuous telemetry.   - Treat the hyperkalemia with lokelma  - Recheck BMP today.   - The patient's hyperkalemia is stable      Swelling of joint of pelvic region or thigh, right  Unilateral enlargement of patient's right thigh. Reports it has been ongoing for about one year. DVT US in 09/2024 negative, otherwise, no dedicated imaging to the leg.    - CT right thigh w/o contrast; will follow    Stage 3a chronic kidney disease  Creatine stable for now. BMP reviewed- noted Estimated Creatinine Clearance:  64.4 mL/min (A) (based on SCr of 1.6 mg/dL (H)). according to latest data. Based on current GFR, CKD stage is stage 3 - GFR 30-59.  Monitor UOP and serial BMP and adjust therapy as needed. Renally dose meds. Avoid nephrotoxic medications and procedures.  Hepatitis C virus infection  Cirrhosis  Chronic Hep C infection s/p Epclusa. Follows hepatology outpatient. Originally diagnosed 2011 while incarcerated in [place]. Risks for HCV:  tattoo, drug use, incarceration.    MELD-Na score calculated; MELD 3.0: 11 at 3/14/2025  3:39 AM  MELD-Na: 11 at 3/14/2025  3:39 AM  Calculated from:  Serum Creatinine: 1.6 mg/dL at 3/14/2025  3:39 AM  Serum Sodium: 138 mmol/L (Using max of 137 mmol/L) at 3/14/2025  3:39 AM  Total Bilirubin: 0.5 mg/dL (Using min of 1 mg/dL) at 3/14/2025  3:39 AM  Serum Albumin: 3.7 g/dL (Using max of 3.5 g/dL) at 3/14/2025  3:39 AM  INR(ratio): 1 at 3/13/2025 10:06 AM  Age at listing (hypothetical): 59 years  Sex: Male at 3/14/2025  3:39 AM    - Cirrhosis due to chronic HCV. Follows Hepatology outpatient.    - HCV viral load pending  - FibroScan 7/1/24: kPa 16.1 (F4),  (S0).  - EGD 11/2024 without esophageal varices.  - Repeat US abdomen today stable. Chronic findings of persistent intra and extrahepatic biliary ductal dilatation and pancreatic ductal dilatation, similar in size to MRI MRCP 09/19/2024.  No filling defects or obstructing mass identified. Cholelithiasis.  No cholecystitis.  - Ductal dilatation is chronic s/p EUS 11/2024 w/o concerning finding. Repeat recommended 11/2025 per outpatient hepatology note.   - Will avoid any hepatotoxic meds, and monitor closely  Generalized anxiety disorder  - Resume home lexapro    Tobacco dependence  Former smoker. Reports quitting for over last 1.5 years. Reports smoking for >15 years around 1 pack per day.     - No need for patches at this time.  VTE Risk Mitigation (From admission, onward)           Ordered     enoxaparin injection 40 mg  Every 12 hours          03/14/25 1055     IP VTE HIGH RISK PATIENT  Once         03/13/25 1923     Place sequential compression device  Until discontinued         03/13/25 1923                    Discharge Planning   RYLAN:      Code Status: Full Code   Medical Readiness for Discharge Date:   Discharge Plan A: Home, Home with family                        Keith Camarena MD  Department of Hospital Medicine   [facility] - [facility] Dept

## 2025-03-14 NOTE — ASSESSMENT & PLAN NOTE
History of COPD exacerbations requiring inpatient treatment. Patient reporting that he has been out of his Trelegy Ellipta.  Patient's COPD is with exacerbation noted by worsening of baseline hypoxia currently. Continue scheduled inhalers Steroids and Supplemental oxygen and monitor respiratory status closely.   Patient also with history of CHF. BNP and troponin wnl. Flu/Covid negative.     Plan:   - Scheduled Breo.   - Albuterol inhaler scheduled  - Prednisone 40mg QD x5days  - No need for antibiotics; patient not reporting of increased cough or productive cough.

## 2025-03-14 NOTE — ASSESSMENT & PLAN NOTE
Patient's blood pressure range in the last 24 hours was: BP  Min: 144/94  Max: 184/104.The patient's inpatient anti-hypertensive regimen is listed below:  Current Antihypertensives  amLODIPine tablet 10 mg, Daily, Oral  valsartan tablet 160 mg, Daily, Oral  cloNIDine tablet 0.1 mg, 2 times daily, Oral    Plan  - BP is controlled, no changes needed to their regimen  - Continue home BP meds as listed above

## 2025-03-14 NOTE — ED NOTES
Assumed care of the patient. Report received from Lian KELLER. No family/visitors at bedside at this time. Pt denies any complaints or needs.

## 2025-03-14 NOTE — ASSESSMENT & PLAN NOTE
Hyperkalemia is likely due to unknown etiology.The patients most recent potassium results are listed below.  Recent Labs     03/13/25  1006 03/13/25  2357 03/14/25  0339   K 4.8 5.2* 5.2*     Plan  - Monitor for arrhythmias with EKG and/or continuous telemetry.   - Treat the hyperkalemia with lokelma  - Recheck BMP today.   - The patient's hyperkalemia is stable

## 2025-03-14 NOTE — HOSPITAL COURSE
59M with PMHx of COPD on 2L O2 PRN, HFpEF, HTN, chronic HCV complicated by cirrhosis, CKD, opioid use disorder on methadone, and JUVENCIO admitted for COPD exacerbation and volume overload. Treating COPD exacerbation with scheduled inhalers and prednisone. He was initially treated with IV Lasix 40 mg with good urine response. He was then transitioned to PO Lasix however he stated his shortness of breath was worsening and he was transitioned back to IV Lasix 80 mg BID. He is continuing methadone 95mg inpatient. His breathing improved and he was transitioned to PO Lasix 80 mg BID. He was discharged home with follow-up with PCP, Heart Failure clinic, and sleep referral for insomnia.

## 2025-03-14 NOTE — ASSESSMENT & PLAN NOTE
Chronic Hep C infection s/p Epclusa. Follows hepatology outpatient. Originally diagnosed 2011 while incarcerated in [place]. Risks for HCV:  tattoo, drug use, incarceration.    - Liver functions stable  - HCV viral load pending

## 2025-03-14 NOTE — ASSESSMENT & PLAN NOTE
Appears that patient is on methadone as maintenance for his opioid use disorder.    Plan:  - Confirmed that patient attends [facility] methadone clinic  - Will continue methadone 95mg while inpatient

## 2025-03-14 NOTE — ASSESSMENT & PLAN NOTE
Patient's blood pressure range in the last 24 hours was: BP  Min: 120/71  Max: 145/91.The patient's inpatient anti-hypertensive regimen is listed below:  Current Antihypertensives  amLODIPine tablet 10 mg, Daily, Oral  cloNIDine tablet 0.1 mg, 2 times daily, Oral  furosemide injection 60 mg, Every 12 hours, Intravenous    Plan  - Holding valsartan due to hyperkalemia  - Continue home BP meds as listed above
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
## 2025-03-14 NOTE — ASSESSMENT & PLAN NOTE
Creatine stable for now. BMP reviewed- noted Estimated Creatinine Clearance: 64.4 mL/min (A) (based on SCr of 1.6 mg/dL (H)). according to latest data. Based on current GFR, CKD stage is stage 3 - GFR 30-59.  Monitor UOP and serial BMP and adjust therapy as needed. Renally dose meds. Avoid nephrotoxic medications and procedures.

## 2025-03-14 NOTE — HPI
Ming Harrington is a 59 y.o. M with a PMHx of COPD on 2L O2 PRN, HFpEF, HTN, chronic HCV complicated by cirrhosis, CKD, opioid use disorder on methadone, and JUVENCIO presenting for increased shortness of breath in the setting missed medication doses. Patient reports his symptoms onset around one week ago. He has noticed increased dyspnea on exertion and orthopnea. States he ran out of his lasix around four days ago. Tried to get it refilled at the pharmacy, but was unable to. Also reports that he has been out of his Trelegy ellipta as well. He has been increasing the use of his Ventolin inhaler without relief. Associated symptoms include a sharp, substernal chest pain that occurs with breathing and worsening lower extremity edema, bilaterally. Patient denies any sick contacts. No fever, chills, productive cough, nausea, vomiting, diarrhea, burning with urination. Prior to onset of symptoms, patient able to get around the house without any issues. However, now he is only able to walk about 5 steps before becoming out of breath. Former smoker about 15 years at around 1 pack per day. Has stopped smoking for 1.5 years.     ED Course  On arrival, patient hypertensive at 184/104, otherwise afebrile. Saturations reportedly dropped to the 80s and required supplemental oxygen. CBC unremarkable. CMP with a Cr of 1.8 (around baseline). BNP and troponin negative. Flu and covid negative. Patient given lasix and duonebs with improvement in his symptoms. At time of admit, patient weaned down to 2L O2 NC. Admitted to hospital medicine.

## 2025-03-14 NOTE — ASSESSMENT & PLAN NOTE
Unilateral enlargement of patient's right thigh. Reports it has been ongoing for about one year. DVT US in 09/2024 negative, otherwise, no dedicated imaging to the leg.    - CT right thigh w/o contrast; will follow

## 2025-03-14 NOTE — ASSESSMENT & PLAN NOTE
Former smoker. Reports quitting for over last 1.5 years. Reports smoking for >15 years around 1 pack per day.     - No need for patches at this time.
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
## 2025-03-14 NOTE — ED NOTES
Telemetry Verification   Patient placed on Telemetry Box  Verified with War Room  Box # [id number]   Monitor Tech Sofia   Rate 92   Rhythm NS

## 2025-03-14 NOTE — ASSESSMENT & PLAN NOTE
Patient with HFpEF presenting with symptoms of exacerbation likely due to missed lasix doses. Patient reporting of SOB, dyspnea on exertion, and orthopnea.     Most recent BNP and echo results are listed below.  Recent Labs     03/13/25  1420   BNP 33     Latest ECHO  Results for orders placed during the hospital encounter of 02/15/24    Echo    Interpretation Summary    Left Ventricle: The left ventricle is normal in size. Normal wall thickness. There is concentric remodeling. There is normal systolic function. Biplane (2D) method of discs ejection fraction is 53%. There is normal diastolic function.    Right Ventricle: Mild right ventricular enlargement. Wall thickness is normal. Right ventricle wall motion  is normal. Systolic function is normal.    Right Atrium: Right atrium is mildly dilated.    Aorta: Aortic root is mildly dilated measuring 4.27 cm. Ascending aorta is normal.    Pulmonary Artery: The estimated pulmonary artery systolic pressure is 40 mmHg.    IVC/SVC: Normal venous pressure at 3 mmHg.    Current Heart Failure Medications  valsartan tablet 160 mg, Daily, Oral  furosemide injection 60 mg, Every 12 hours, Intravenous    Plan  - Continue IV lasix 60mg BID  - Monitor strict I&Os and daily weights.    - Place on telemetry  - Low sodium diet  - Place on fluid restriction of 1.5 L.   - Cardiology has not been consulted

## 2025-03-14 NOTE — ASSESSMENT & PLAN NOTE
Creatine stable for now. BMP reviewed- noted Estimated Creatinine Clearance: 57.3 mL/min (A) (based on SCr of 1.8 mg/dL (H)). according to latest data. Based on current GFR, CKD stage is stage 3 - GFR 30-59.  Monitor UOP and serial BMP and adjust therapy as needed. Renally dose meds. Avoid nephrotoxic medications and procedures.

## 2025-03-14 NOTE — PLAN OF CARE
[facility] - Emergency Dept  Initial Discharge Assessment       Primary Care Provider: Garland Xiong DO    Admission Diagnosis: Shortness of breath [R06.02]    Admission Date: 3/13/2025    Pt is independent with their ADL's and ambulation, does not require assistance.     Post acute was discussed with pt. Pt d/c home with no needs at the moment. Pt insurance transportation to transport pt home.     Expected Discharge Date:     Transition of Care Barriers: (P) None    Payor: MEDICAID / [place] / Product Type: Managed Medicaid /     Extended Emergency Contact Information  Primary Emergency Contact: Rosita Lopez   [place]  Mobile Phone: [phone]  Relation: Sister  Preferred language: English  Secondary Emergency Contact: Vinny Harrington  Mobile Phone: [phone]  Relation: Brother    Discharge Plan A: (P) Home, Home with family  Discharge Plan B: (P) Home with family, Home      [facility] - [facility] [address]  [address] [address]  Phone: [phone] Fax: [phone]      Initial Assessment (most recent)       Adult Discharge Assessment - 03/14/25 1133          Discharge Assessment    Assessment Type Discharge Planning Assessment (P)      Confirmed/corrected address, phone number and insurance Yes (P)      Confirmed Demographics Correct on Facesheet (P)      Source of Information patient (P)      Does patient/caregiver understand observation status Yes (P)      Reason For Admission Cirrhosis (P)      People in Home sibling(s) (P)      Do you expect to return to your current living situation? Yes (P)      Do you have help at home or someone to help you manage your care at home? No (P)      Prior to hospitilization cognitive status: Alert/Oriented (P)      Current cognitive status: Alert/Oriented (P)      Walking or Climbing Stairs Difficulty no (P)      Dressing/Bathing Difficulty no (P)      Home Accessibility  stairs to enter home (P)      Number of Stairs, Main Entrance four (P)      Home Layout Able to live on 1st floor (P)      Equipment Currently Used at Home none (P)      Readmission within 30 days? No (P)      Patient currently being followed by outpatient case management? No (P)      Do you currently have service(s) that help you manage your care at home? No (P)      Do you take prescription medications? Yes (P)      Do you have prescription coverage? Yes (P)      Coverage Payor: MEDICAID - [employer] ([facility]) - (P)      Do you have any problems affording any of your prescribed medications? No (P)      Is the patient taking medications as prescribed? yes (P)      Who is going to help you get home at discharge? family/friend (P)      How do you get to doctors appointments? health plan transportation (P)      Are you on dialysis? No (P)      Do you take coumadin? No (P)      Discharge Plan A Home;Home with family (P)      Discharge Plan B Home with family;Home (P)      DME Needed Upon Discharge  none (P)      Discharge Plan discussed with: Patient (P)      Transition of Care Barriers None (P)         Physical Activity    On average, how many days per week do you engage in moderate to strenuous exercise (like a brisk walk)? 0 days (P)      On average, how many minutes do you engage in exercise at this level? 0 min (P)         Financial Resource Strain    How hard is it for you to pay for the very basics like food, housing, medical care, and heating? Not hard at all (P)         Housing Stability    In the last 12 months, was there a time when you were not able to pay the mortgage or rent on time? No (P)      At any time in the past 12 months, were you homeless or living in a shelter (including now)? No (P)         Transportation Needs    Has the lack of transportation kept you from medical appointments, meetings, work or from getting things needed for daily living? No (P)         Food Insecurity    Within the  past 12 months, you worried that your food would run out before you got the money to buy more. Never true (P)      Within the past 12 months, the food you bought just didn't last and you didn't have money to get more. Never true (P)         Stress    Do you feel stress - tense, restless, nervous, or anxious, or unable to sleep at night because your mind is troubled all the time - these days? Not at all (P)         Social Isolation    How often do you feel lonely or isolated from those around you?  Never (P)         Alcohol Use    Q1: How often do you have a drink containing alcohol? Never (P)      Q2: How many drinks containing alcohol do you have on a typical day when you are drinking? Patient does not drink (P)      Q3: How often do you have six or more drinks on one occasion? Never (P)         Utilities    In the past 12 months has the electric, gas, oil, or water company threatened to shut off services in your home? No (P)         Health Literacy    How often do you need to have someone help you when you read instructions, pamphlets, or other written material from your doctor or pharmacy? Never (P)         OTHER    Name(s) of People in Home brother (P)                    FARZANEH Ramirez, RHYSW.   Case Management  [facility]  Email: [email or web address]

## 2025-03-14 NOTE — ASSESSMENT & PLAN NOTE
Patient with HFpEF presenting with symptoms of exacerbation likely due to missed lasix doses. Patient reporting of SOB, dyspnea on exertion, and orthopnea.     Most recent BNP and echo results are listed below.  Recent Labs     03/13/25  1420   BNP 33     Latest ECHO  Results for orders placed during the hospital encounter of 02/15/24    Echo    Interpretation Summary    Left Ventricle: The left ventricle is normal in size. Normal wall thickness. There is concentric remodeling. There is normal systolic function. Biplane (2D) method of discs ejection fraction is 53%. There is normal diastolic function.    Right Ventricle: Mild right ventricular enlargement. Wall thickness is normal. Right ventricle wall motion  is normal. Systolic function is normal.    Right Atrium: Right atrium is mildly dilated.    Aorta: Aortic root is mildly dilated measuring 4.27 cm. Ascending aorta is normal.    Pulmonary Artery: The estimated pulmonary artery systolic pressure is 40 mmHg.    IVC/SVC: Normal venous pressure at 3 mmHg.    Current Heart Failure Medications  furosemide injection 60 mg, Every 12 hours, Intravenous    Plan  - Continue IV lasix 60mg for now. Will adjust based on clinical status  - Monitor strict I&Os and daily weights.    - Place on telemetry  - Low sodium diet  - Place on fluid restriction of 1.5 L.   - Cardiology has not been consulted

## 2025-03-14 NOTE — ASSESSMENT & PLAN NOTE
Appears that patient is on methadone as maintenance for his opioid use disorder.    Plan:  - Hold for now, will confirm with pharmacist and Methadone clinic with dosage before resuming
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
## 2025-03-14 NOTE — ASSESSMENT & PLAN NOTE
MELD-Na score calculated; MELD 3.0: 13 at 3/13/2025 10:06 AM  MELD-Na: 12 at 3/13/2025 10:06 AM  Calculated from:  Serum Creatinine: 1.8 mg/dL at 3/13/2025 10:06 AM  Serum Sodium: 140 mmol/L (Using max of 137 mmol/L) at 3/13/2025 10:06 AM  Total Bilirubin: 0.7 mg/dL (Using min of 1 mg/dL) at 3/13/2025 10:06 AM  Serum Albumin: 4 g/dL (Using max of 3.5 g/dL) at 3/13/2025 10:06 AM  INR(ratio): 1 at 3/13/2025 10:06 AM  Age at listing (hypothetical): 59 years  Sex: Male at 3/13/2025 10:06 AM    - Cirrhosis due to chronic HCV. Follows Hepatology outpatient.   - FibroScan 7/1/24: kPa 16.1 (F4),  (S0).  - EGD 11/2024 without esophageal varices.  - Repeat US abdomen today stable. Chronic findings of persistent intra and extrahepatic biliary ductal dilatation and pancreatic ductal dilatation, similar in size to MRI MRCP 09/19/2024.  No filling defects or obstructing mass identified. Cholelithiasis.  No cholecystitis.  - Ductal dilatation is chronic s/p EUS 11/2024 w/o concerning finding. Repeat recommended 11/2025 per outpatient hepatology note.   - Will avoid any hepatotoxic meds, and monitor closely

## 2025-03-14 NOTE — SUBJECTIVE & OBJECTIVE
Interval History/Significant Events: NAEON. Robust urine output overnight with 3.5L.    Will continue IV lasix 60mg this AM.   Confirmed methadone dose and will continue inpatient.   Lokelma for persistent hyperkalemia.   CT thigh for unilateral enlargement of his right thigh.   Bowel reg for his constipation.     Review of Systems  Objective:     Vital Signs (Most Recent):  Temp: 97.7 °F (36.5 °C) (03/14/25 1118)  Pulse: 80 (03/14/25 1118)  Resp: 20 (03/14/25 1234)  BP: 132/84 (03/14/25 1118)  SpO2: 98 % (03/14/25 1118) Vital Signs (24h Range):  Temp:  [96.5 °F (35.8 °C)-98.7 °F (37.1 °C)] 97.7 °F (36.5 °C)  Pulse:  [65-84] 80  Resp:  [16-22] 20  SpO2:  [94 %-100 %] 98 %  BP: (120-145)/(71-94) 132/84     Weight: 129.3 kg (285 lb 0.9 oz)  Body mass index is 43.34 kg/m².     Physical Exam  Vitals and nursing note reviewed.   Constitutional:       Appearance: He is obese.   HENT:      Head: Normocephalic and atraumatic.      Mouth/Throat:      Mouth: Mucous membranes are dry.   Eyes:      Extraocular Movements: Extraocular movements intact.   Cardiovascular:      Rate and Rhythm: Normal rate and regular rhythm.      Pulses: Normal pulses.      Heart sounds: Normal heart sounds.   Pulmonary:      Effort: Pulmonary effort is normal. No respiratory distress.      Breath sounds: Wheezing and rales present.   Abdominal:      General: Abdomen is flat. There is no distension.      Palpations: Abdomen is soft.      Tenderness: There is abdominal tenderness.   Musculoskeletal:      Right lower leg: Edema present.      Left lower leg: Edema present.   Skin:     General: Skin is warm.   Neurological:      Mental Status: He is alert and oriented to person, place, and time.   Psychiatric:         Mood and Affect: Mood normal.                Significant Labs: All pertinent labs within the past 24 hours have been reviewed.    Significant Imaging: I have reviewed all pertinent imaging results/findings within the past 24 hours.   23.1

## 2025-03-14 NOTE — TELEPHONE ENCOUNTER
Pt began 12 weeks Epclusa on 7/18/24  Anticipated treatment end date: 10/9/24  F 3  Siri 1A  Prior HCV treatment: No    SVR labs not done    3/13: stable  LFT ok  HCV neg  AFP normal      These labs document SVR following successful HCV treatment with Epclusa    please tell patient:  1.) Lab test shows there is NO Hepatitis C in the blood. This means the Hepatitis C is cured!! We do not expect the virus to return. This does not give protection from Hepatitis C and patient could be infected again if ever exposed to the virus again.    2.) Cirrhosis is still present and he needs monitoring of liver and liver cancer screening every 6 months for the rest of his life. Current labs show liver is working fine and liver cancer screening looked fine. Due again 9/2025      Please schedule   - CBC, CMP, INR, AFP, U/S ABDOMEN, VISIT - 9/2025

## 2025-03-15 LAB
ALBUMIN SERPL BCP-MCNC: 3.8 G/DL (ref 3.5–5.2)
ALP SERPL-CCNC: 78 U/L (ref 40–150)
ALT SERPL W/O P-5'-P-CCNC: 20 U/L (ref 10–44)
ANION GAP SERPL CALC-SCNC: 12 MMOL/L (ref 8–16)
AST SERPL-CCNC: 20 U/L (ref 10–40)
BASOPHILS # BLD AUTO: 0 K/UL (ref 0–0.2)
BASOPHILS NFR BLD: 0 % (ref 0–1.9)
BILIRUB SERPL-MCNC: 0.4 MG/DL (ref 0.1–1)
BUN SERPL-MCNC: 24 MG/DL (ref 6–20)
CALCIUM SERPL-MCNC: 8.9 MG/DL (ref 8.7–10.5)
CHLORIDE SERPL-SCNC: 100 MMOL/L (ref 95–110)
CO2 SERPL-SCNC: 25 MMOL/L (ref 23–29)
CREAT SERPL-MCNC: 1.9 MG/DL (ref 0.5–1.4)
DIFFERENTIAL METHOD BLD: ABNORMAL
EOSINOPHIL # BLD AUTO: 0 K/UL (ref 0–0.5)
EOSINOPHIL NFR BLD: 0 % (ref 0–8)
ERYTHROCYTE [DISTWIDTH] IN BLOOD BY AUTOMATED COUNT: 11.9 % (ref 11.5–14.5)
EST. GFR  (NO RACE VARIABLE): 39.9 ML/MIN/1.73 M^2
GLUCOSE SERPL-MCNC: 137 MG/DL (ref 70–110)
HCT VFR BLD AUTO: 34.5 % (ref 40–54)
HGB BLD-MCNC: 11.5 G/DL (ref 14–18)
IMM GRANULOCYTES # BLD AUTO: 0.01 K/UL (ref 0–0.04)
IMM GRANULOCYTES NFR BLD AUTO: 0.2 % (ref 0–0.5)
LYMPHOCYTES # BLD AUTO: 0.4 K/UL (ref 1–4.8)
LYMPHOCYTES NFR BLD: 6.2 % (ref 18–48)
MAGNESIUM SERPL-MCNC: 2.1 MG/DL (ref 1.6–2.6)
MCH RBC QN AUTO: 30.9 PG (ref 27–31)
MCHC RBC AUTO-ENTMCNC: 33.3 G/DL (ref 32–36)
MCV RBC AUTO: 93 FL (ref 82–98)
MONOCYTES # BLD AUTO: 0.2 K/UL (ref 0.3–1)
MONOCYTES NFR BLD: 3.3 % (ref 4–15)
NEUTROPHILS # BLD AUTO: 5.4 K/UL (ref 1.8–7.7)
NEUTROPHILS NFR BLD: 90.3 % (ref 38–73)
NRBC BLD-RTO: 0 /100 WBC
PHOSPHATE SERPL-MCNC: 3.6 MG/DL (ref 2.7–4.5)
PLATELET # BLD AUTO: 158 K/UL (ref 150–450)
PMV BLD AUTO: 11.3 FL (ref 9.2–12.9)
POTASSIUM SERPL-SCNC: 4.8 MMOL/L (ref 3.5–5.1)
PROT SERPL-MCNC: 7.8 G/DL (ref 6–8.4)
RBC # BLD AUTO: 3.72 M/UL (ref 4.6–6.2)
SODIUM SERPL-SCNC: 137 MMOL/L (ref 136–145)
WBC # BLD AUTO: 6 K/UL (ref 3.9–12.7)

## 2025-03-15 PROCEDURE — 36415 COLL VENOUS BLD VENIPUNCTURE: CPT

## 2025-03-15 PROCEDURE — 94640 AIRWAY INHALATION TREATMENT: CPT | Mod: XB

## 2025-03-15 PROCEDURE — 25000003 PHARM REV CODE 250: Performed by: INTERNAL MEDICINE

## 2025-03-15 PROCEDURE — 63600175 PHARM REV CODE 636 W HCPCS: Performed by: STUDENT IN AN ORGANIZED HEALTH CARE EDUCATION/TRAINING PROGRAM

## 2025-03-15 PROCEDURE — S0109 METHADONE ORAL 5MG: HCPCS

## 2025-03-15 PROCEDURE — 25000003 PHARM REV CODE 250

## 2025-03-15 PROCEDURE — 80053 COMPREHEN METABOLIC PANEL: CPT

## 2025-03-15 PROCEDURE — 20600001 HC STEP DOWN PRIVATE ROOM

## 2025-03-15 PROCEDURE — 85025 COMPLETE CBC W/AUTO DIFF WBC: CPT

## 2025-03-15 PROCEDURE — 96372 THER/PROPH/DIAG INJ SC/IM: CPT

## 2025-03-15 PROCEDURE — 94761 N-INVAS EAR/PLS OXIMETRY MLT: CPT

## 2025-03-15 PROCEDURE — 84100 ASSAY OF PHOSPHORUS: CPT

## 2025-03-15 PROCEDURE — 83735 ASSAY OF MAGNESIUM: CPT

## 2025-03-15 PROCEDURE — 27000221 HC OXYGEN, UP TO 24 HOURS

## 2025-03-15 PROCEDURE — 25000242 PHARM REV CODE 250 ALT 637 W/ HCPCS: Performed by: STUDENT IN AN ORGANIZED HEALTH CARE EDUCATION/TRAINING PROGRAM

## 2025-03-15 PROCEDURE — 63600175 PHARM REV CODE 636 W HCPCS

## 2025-03-15 RX ORDER — FUROSEMIDE 10 MG/ML
40 INJECTION INTRAMUSCULAR; INTRAVENOUS ONCE
Status: COMPLETED | OUTPATIENT
Start: 2025-03-15 | End: 2025-03-15

## 2025-03-15 RX ORDER — ENOXAPARIN SODIUM 100 MG/ML
40 INJECTION SUBCUTANEOUS EVERY 12 HOURS
Status: DISCONTINUED | OUTPATIENT
Start: 2025-03-16 | End: 2025-03-18 | Stop reason: HOSPADM

## 2025-03-15 RX ORDER — TALC
6 POWDER (GRAM) TOPICAL NIGHTLY PRN
Status: DISCONTINUED | OUTPATIENT
Start: 2025-03-15 | End: 2025-03-18 | Stop reason: HOSPADM

## 2025-03-15 RX ORDER — MUPIROCIN 20 MG/G
OINTMENT TOPICAL 2 TIMES DAILY
Status: DISCONTINUED | OUTPATIENT
Start: 2025-03-15 | End: 2025-03-18 | Stop reason: HOSPADM

## 2025-03-15 RX ADMIN — SENNOSIDES 8.6 MG: 8.6 TABLET, FILM COATED ORAL at 08:03

## 2025-03-15 RX ADMIN — POLYETHYLENE GLYCOL 3350 17 G: 17 POWDER, FOR SOLUTION ORAL at 09:03

## 2025-03-15 RX ADMIN — PREDNISONE 40 MG: 20 TABLET ORAL at 09:03

## 2025-03-15 RX ADMIN — Medication 6 MG: at 10:03

## 2025-03-15 RX ADMIN — CLONIDINE HYDROCHLORIDE 0.1 MG: 0.1 TABLET ORAL at 08:03

## 2025-03-15 RX ADMIN — METHADONE HYDROCHLORIDE 95 MG: 5 SOLUTION ORAL at 09:03

## 2025-03-15 RX ADMIN — POLYETHYLENE GLYCOL 3350 17 G: 17 POWDER, FOR SOLUTION ORAL at 08:03

## 2025-03-15 RX ADMIN — FLUTICASONE FUROATE AND VILANTEROL TRIFENATATE 1 PUFF: 200; 25 POWDER RESPIRATORY (INHALATION) at 09:03

## 2025-03-15 RX ADMIN — MUPIROCIN: 20 OINTMENT TOPICAL at 08:03

## 2025-03-15 RX ADMIN — AMLODIPINE BESYLATE 10 MG: 10 TABLET ORAL at 09:03

## 2025-03-15 RX ADMIN — ALBUTEROL SULFATE 2.5 MG: 2.5 SOLUTION RESPIRATORY (INHALATION) at 07:03

## 2025-03-15 RX ADMIN — ALBUTEROL SULFATE 2.5 MG: 2.5 SOLUTION RESPIRATORY (INHALATION) at 08:03

## 2025-03-15 RX ADMIN — ALBUTEROL SULFATE 2.5 MG: 2.5 SOLUTION RESPIRATORY (INHALATION) at 03:03

## 2025-03-15 RX ADMIN — FUROSEMIDE 40 MG: 40 TABLET ORAL at 05:03

## 2025-03-15 RX ADMIN — MUPIROCIN: 20 OINTMENT TOPICAL at 10:03

## 2025-03-15 RX ADMIN — FUROSEMIDE 40 MG: 10 INJECTION, SOLUTION INTRAVENOUS at 02:03

## 2025-03-15 RX ADMIN — ESCITALOPRAM OXALATE 20 MG: 5 TABLET, FILM COATED ORAL at 09:03

## 2025-03-15 RX ADMIN — TAMSULOSIN HYDROCHLORIDE 0.4 MG: 0.4 CAPSULE ORAL at 09:03

## 2025-03-15 RX ADMIN — FUROSEMIDE 40 MG: 40 TABLET ORAL at 09:03

## 2025-03-15 RX ADMIN — CLONIDINE HYDROCHLORIDE 0.1 MG: 0.1 TABLET ORAL at 09:03

## 2025-03-15 RX ADMIN — TIOTROPIUM BROMIDE INHALATION SPRAY 2 PUFF: 3.12 SPRAY, METERED RESPIRATORY (INHALATION) at 09:03

## 2025-03-15 RX ADMIN — ENOXAPARIN SODIUM 40 MG: 40 INJECTION SUBCUTANEOUS at 09:03

## 2025-03-15 RX ADMIN — ALBUTEROL SULFATE 2.5 MG: 2.5 SOLUTION RESPIRATORY (INHALATION) at 12:03

## 2025-03-15 NOTE — PLAN OF CARE
AAOx4, O2 sats >95% on 2.5L NC. Plan of care discussed with patient. Patient ambulating with standby assist and walker, fall precautions in place; no falls/injuries through the shift. Discussed medications and care. All questions and concerns addressed. Patient resting comfortably in recliner with no acute distress, call light and urinal within reach. Care continues.    Problem: Adult Inpatient Plan of Care  Goal: Plan of Care Review  Outcome: Progressing  Goal: Patient-Specific Goal (Individualized)  Outcome: Progressing  Goal: Absence of Hospital-Acquired Illness or Injury  Outcome: Progressing  Goal: Optimal Comfort and Wellbeing  Outcome: Progressing  Goal: Readiness for Transition of Care  Outcome: Progressing     Problem: Bariatric Environmental Safety  Goal: Safety Maintained with Care  Outcome: Progressing     Problem: Skin Injury Risk Increased  Goal: Skin Health and Integrity  Outcome: Progressing

## 2025-03-15 NOTE — PLAN OF CARE
Patient aaox 4, cont. B/b able voice needs. Patient has had intermittent painswelling in RLE   Problem: Adult Inpatient Plan of Care  Goal: Patient-Specific Goal (Individualized)  Outcome: Progressing     Problem: Adult Inpatient Plan of Care  Goal: Absence of Hospital-Acquired Illness or Injury  Outcome: Progressing     Problem: Adult Inpatient Plan of Care  Goal: Optimal Comfort and Wellbeing  Outcome: Progressing     Problem: Adult Inpatient Plan of Care  Goal: Readiness for Transition of Care  Outcome: Progressing     Problem: Bariatric Environmental Safety  Goal: Safety Maintained with Care  Outcome: Progressing     Problem: Skin Injury Risk Increased  Goal: Skin Health and Integrity  Outcome: Progressing

## 2025-03-15 NOTE — ASSESSMENT & PLAN NOTE
Creatine stable for now. BMP reviewed- noted Estimated Creatinine Clearance: 52.4 mL/min (A) (based on SCr of 1.9 mg/dL (H)). according to latest data. Based on current GFR, CKD stage is stage 3 - GFR 30-59.  Monitor UOP and serial BMP and adjust therapy as needed. Renally dose meds. Avoid nephrotoxic medications and procedures.

## 2025-03-15 NOTE — ASSESSMENT & PLAN NOTE
Appears that patient is on methadone as maintenance for his opioid use disorder.    Plan:  - [facility]  - Will continue methadone 95mg while inpatient

## 2025-03-15 NOTE — ASSESSMENT & PLAN NOTE
Patient's blood pressure range in the last 24 hours was: BP  Min: 113/59  Max: 161/78.The patient's inpatient anti-hypertensive regimen is listed below:  Current Antihypertensives  amLODIPine tablet 10 mg, Daily, Oral  cloNIDine tablet 0.1 mg, 2 times daily, Oral  furosemide tablet 40 mg, 2 times daily, Oral    Plan  - Holding valsartan due to hyperkalemia  - Continue home BP meds as listed above

## 2025-03-15 NOTE — PROGRESS NOTES
[facility]  Progress Note    Patient Name: Ming Harrington  MRN: [id number]  Patient Class: OP- Observation   Admission Date: 3/13/2025  Length of Stay: 0 days  Attending Physician: Gigi Newell MD  Primary Care Provider: Garland Xiong DO        Subjective     Principal Problem:COPD exacerbation        HPI:  Ming Harrington is a 59 y.o. M with a PMHx of COPD on 2L O2 PRN, HFpEF, HTN, chronic HCV complicated by cirrhosis, CKD, opioid use disorder on methadone, and JUVENCIO presenting for increased shortness of breath in the setting missed medication doses. Patient reports his symptoms onset around one week ago. He has noticed increased dyspnea on exertion and orthopnea. States he ran out of his lasix around four days ago. Tried to get it refilled at the pharmacy, but was unable to. Also reports that he has been out of his Trelegy ellipta as well. He has been increasing the use of his Ventolin inhaler without relief. Associated symptoms include a sharp, substernal chest pain that occurs with breathing and worsening lower extremity edema, bilaterally. Patient denies any sick contacts. No fever, chills, productive cough, nausea, vomiting, diarrhea, burning with urination. Prior to onset of symptoms, patient able to get around the house without any issues. However, now he is only able to walk about 5 steps before becoming out of breath. Former smoker about 15 years at around 1 pack per day. Has stopped smoking for 1.5 years.     ED Course  On arrival, patient hypertensive at 184/104, otherwise afebrile. Saturations reportedly dropped to the 80s and required supplemental oxygen. CBC unremarkable. CMP with a Cr of 1.8 (around baseline). BNP and troponin negative. Flu and covid negative. Patient given lasix and duonebs with improvement in his symptoms. At time of admit, patient weaned down to 2L O2 NC. Admitted to hospital medicine.     Overview/Hospital Course:  59M with PMHx of COPD on 2L O2  PRN, HFpEF, HTN, chronic HCV complicated by cirrhosis, CKD, opioid use disorder on methadone, and JUVENCIO admitted for COPD exacerbation and volume overload. Treating COPD exacerbation with scheduled inhalers and prednisone. Deferring antibiotics. Treating volume overload with IV lasix. Continuing methadone 95mg inpatient. Bowel regimen for his constipation.     Interval History: NAEO. Still feels that he has excess fluid. Has been net negative 6 liters in 2 days.    Review of Systems   Constitutional:  Negative for fever.   Respiratory:  Positive for shortness of breath and wheezing.    Cardiovascular:  Positive for leg swelling. Negative for chest pain.   Gastrointestinal:  Negative for vomiting.   Musculoskeletal:  Positive for myalgias.     Objective:     Vital Signs (Most Recent):  Temp: 98.5 °F (36.9 °C) (03/15/25 1100)  Pulse: 70 (03/15/25 1100)  Resp: 18 (03/15/25 1100)  BP: 130/73 (03/15/25 1100)  SpO2: 99 % (03/15/25 1100) Vital Signs (24h Range):  Temp:  [97.6 °F (36.4 °C)-98.7 °F (37.1 °C)] 98.5 °F (36.9 °C)  Pulse:  [63-95] 70  Resp:  [16-20] 18  SpO2:  [92 %-99 %] 99 %  BP: (113-161)/(59-81) 130/73     Weight: 121.5 kg (267 lb 13.7 oz)  Body mass index is 40.73 kg/m².    Intake/Output Summary (Last 24 hours) at 3/15/2025 1206  Last data filed at 3/15/2025 0334  Gross per 24 hour   Intake 476 ml   Output 1225 ml   Net -749 ml         Physical Exam  Vitals and nursing note reviewed.   Constitutional:       General: He is not in acute distress.     Appearance: He is normal weight. He is not toxic-appearing.      Interventions: Nasal cannula in place.   HENT:      Head: Normocephalic and atraumatic.   Eyes:      Extraocular Movements: Extraocular movements intact.      Pupils: Pupils are equal, round, and reactive to light.   Cardiovascular:      Rate and Rhythm: Normal rate and regular rhythm.   Pulmonary:      Effort: Pulmonary effort is normal. No respiratory distress.      Breath sounds: Wheezing present.    Abdominal:      General: Abdomen is flat. There is no distension.   Musculoskeletal:         General: Swelling (bilateral thighs) present.      Cervical back: Normal range of motion and neck supple.   Skin:     General: Skin is warm and dry.   Neurological:      General: No focal deficit present.      Mental Status: He is alert.      GCS: GCS eye subscore is 4. GCS verbal subscore is 5. GCS motor subscore is 6.   Psychiatric:         Mood and Affect: Mood normal.         Behavior: Behavior normal. Behavior is cooperative.               Significant Labs: All pertinent labs within the past 24 hours have been reviewed.    Significant Imaging: I have reviewed all pertinent imaging results/findings within the past 24 hours.      Assessment & Plan  COPD exacerbation  History of COPD exacerbations requiring inpatient treatment. Patient reporting that he has been out of his Trelegy Ellipta.  Patient's COPD is with exacerbation noted by worsening of baseline hypoxia currently. Continue scheduled inhalers Steroids and Supplemental oxygen and monitor respiratory status closely.   Patient also with history of CHF. BNP and troponin wnl. Flu/Covid negative.     Plan:   - Scheduled Breo.   - Albuterol inhaler scheduled  - Prednisone 40mg QD x5days  - No need for antibiotics; patient not reporting of increased cough or productive cough.   (HFpEF) heart failure with preserved ejection fraction  Patient with HFpEF presenting with symptoms of exacerbation likely due to missed lasix doses. Patient reporting of SOB, dyspnea on exertion, and orthopnea.     Most recent BNP and echo results are listed below.  Recent Labs     03/13/25  1420   BNP 33     Latest ECHO  Results for orders placed during the hospital encounter of 02/15/24    Echo    Interpretation Summary    Left Ventricle: The left ventricle is normal in size. Normal wall thickness. There is concentric remodeling. There is normal systolic function. Biplane (2D) method of discs  ejection fraction is 53%. There is normal diastolic function.    Right Ventricle: Mild right ventricular enlargement. Wall thickness is normal. Right ventricle wall motion  is normal. Systolic function is normal.    Right Atrium: Right atrium is mildly dilated.    Aorta: Aortic root is mildly dilated measuring 4.27 cm. Ascending aorta is normal.    Pulmonary Artery: The estimated pulmonary artery systolic pressure is 40 mmHg.    IVC/SVC: Normal venous pressure at 3 mmHg.    Current Heart Failure Medications  furosemide tablet 40 mg, 2 times daily, Oral    Plan  - attempting to transition to oral for now. Will adjust based on clinical status  - Monitor strict I&Os and daily weights.    - Place on telemetry  - Low sodium diet  - Place on fluid restriction of 1.5 L.   - Cardiology has not been consulted  HTN (hypertension)  Patient's blood pressure range in the last 24 hours was: BP  Min: 113/59  Max: 161/78.The patient's inpatient anti-hypertensive regimen is listed below:  Current Antihypertensives  amLODIPine tablet 10 mg, Daily, Oral  cloNIDine tablet 0.1 mg, 2 times daily, Oral  furosemide tablet 40 mg, 2 times daily, Oral    Plan  - Holding valsartan due to hyperkalemia  - Continue home BP meds as listed above   Opioid use disorder, severe, on maintenance therapy, dependence  Appears that patient is on methadone as maintenance for his opioid use disorder.    Plan:  - Confirmed that patient attends [facility] methadone clinic  - Will continue methadone 95mg while inpatient    Chronic constipation  - Lactulose and Miralax  Hyperkalemia  Hyperkalemia is likely due to  unknown etiology .The patients most recent potassium results are listed below.  Recent Labs     03/14/25  0339 03/14/25  1323 03/15/25  0257   K 5.2* 4.5 4.8     Plan  - Monitor for arrhythmias with EKG and/or continuous telemetry.   - Treat the hyperkalemia with lokelma  - Recheck BMP today.   - The patient's hyperkalemia is stable      Swelling of joint of  pelvic region or thigh, right  Unilateral enlargement of patient's right thigh. Reports it has been ongoing for about one year. DVT US in 09/2024 negative, otherwise, no dedicated imaging to the leg.    CT right thigh w/o contrast shows cellulitis vs lymphedema. Likely lymphedema due to chronicity and worsening with increased volume.    Stage 3a chronic kidney disease  Creatine stable for now. BMP reviewed- noted Estimated Creatinine Clearance: 52.4 mL/min (A) (based on SCr of 1.9 mg/dL (H)). according to latest data. Based on current GFR, CKD stage is stage 3 - GFR 30-59.  Monitor UOP and serial BMP and adjust therapy as needed. Renally dose meds. Avoid nephrotoxic medications and procedures.  Cirrhosis  Chronic Hep C infection s/p Epclusa. Follows hepatology outpatient. Originally diagnosed 2011 while incarcerated in [place]. Risks for HCV:  tattoo, drug use, incarceration.    MELD-Na score calculated; MELD 3.0: 13 at 3/15/2025  2:57 AM  MELD-Na: 13 at 3/15/2025  2:57 AM  Calculated from:  Serum Creatinine: 1.9 mg/dL at 3/15/2025  2:57 AM  Serum Sodium: 137 mmol/L at 3/15/2025  2:57 AM  Total Bilirubin: 0.4 mg/dL (Using min of 1 mg/dL) at 3/15/2025  2:57 AM  Serum Albumin: 3.8 g/dL (Using max of 3.5 g/dL) at 3/15/2025  2:57 AM  INR(ratio): 1 at 3/13/2025 10:06 AM  Age at listing (hypothetical): 59 years  Sex: Male at 3/15/2025  2:57 AM    - Cirrhosis due to chronic HCV. Follows Hepatology outpatient.    - HCV viral load pending  - FibroScan 7/1/24: kPa 16.1 (F4),  (S0).  - EGD 11/2024 without esophageal varices.  - Repeat US abdomen today stable. Chronic findings of persistent intra and extrahepatic biliary ductal dilatation and pancreatic ductal dilatation, similar in size to MRI MRCP 09/19/2024.  No filling defects or obstructing mass identified. Cholelithiasis.  No cholecystitis.  - Ductal dilatation is chronic s/p EUS 11/2024 w/o concerning finding. Repeat recommended 11/2025 per outpatient hepatology note.    - Will avoid any hepatotoxic meds, and monitor closely  Generalized anxiety disorder  - Resume home lexapro    Tobacco dependence  Former smoker. Reports quitting for over last 1.5 years. Reports smoking for >15 years around 1 pack per day.     - No need for patches at this time.  VTE Risk Mitigation (From admission, onward)           Ordered     enoxaparin injection 40 mg  Every 12 hours         03/14/25 1055     IP VTE HIGH RISK PATIENT  Once         03/13/25 1923     Place sequential compression device  Until discontinued         03/13/25 1923                    Discharge Planning   RYLAN:      Code Status: Full Code   Medical Readiness for Discharge Date:   Discharge Plan A: Home, Home with family                        Monika Barba DO  Department of Hospital Medicine   [facility] - [facility]

## 2025-03-15 NOTE — ASSESSMENT & PLAN NOTE
Patient with HFpEF presenting with symptoms of exacerbation likely due to missed lasix doses. Patient reporting of SOB, dyspnea on exertion, and orthopnea.     Most recent BNP and echo results are listed below.  Recent Labs     03/13/25  1420   BNP 33     Latest ECHO  Results for orders placed during the hospital encounter of 02/15/24    Echo    Interpretation Summary    Left Ventricle: The left ventricle is normal in size. Normal wall thickness. There is concentric remodeling. There is normal systolic function. Biplane (2D) method of discs ejection fraction is 53%. There is normal diastolic function.    Right Ventricle: Mild right ventricular enlargement. Wall thickness is normal. Right ventricle wall motion  is normal. Systolic function is normal.    Right Atrium: Right atrium is mildly dilated.    Aorta: Aortic root is mildly dilated measuring 4.27 cm. Ascending aorta is normal.    Pulmonary Artery: The estimated pulmonary artery systolic pressure is 40 mmHg.    IVC/SVC: Normal venous pressure at 3 mmHg.    Current Heart Failure Medications  furosemide tablet 40 mg, 2 times daily, Oral    Plan  - attempting to transition to oral for now. Will adjust based on clinical status  - Monitor strict I&Os and daily weights.    - Place on telemetry  - Low sodium diet  - Place on fluid restriction of 1.5 L.   - Cardiology has not been consulted

## 2025-03-15 NOTE — ASSESSMENT & PLAN NOTE
Unilateral enlargement of patient's right thigh. Reports it has been ongoing for about one year. DVT US in 09/2024 negative, otherwise, no dedicated imaging to the leg.    CT right thigh w/o contrast shows cellulitis vs lymphedema. Likely lymphedema due to chronicity and worsening with increased volume.

## 2025-03-15 NOTE — ASSESSMENT & PLAN NOTE
Chronic Hep C infection s/p Epclusa. Follows hepatology outpatient. Originally diagnosed 2011 while incarcerated in [place]. Risks for HCV:  tattoo, drug use, incarceration.    MELD-Na score calculated; MELD 3.0: 13 at 3/15/2025  2:57 AM  MELD-Na: 13 at 3/15/2025  2:57 AM  Calculated from:  Serum Creatinine: 1.9 mg/dL at 3/15/2025  2:57 AM  Serum Sodium: 137 mmol/L at 3/15/2025  2:57 AM  Total Bilirubin: 0.4 mg/dL (Using min of 1 mg/dL) at 3/15/2025  2:57 AM  Serum Albumin: 3.8 g/dL (Using max of 3.5 g/dL) at 3/15/2025  2:57 AM  INR(ratio): 1 at 3/13/2025 10:06 AM  Age at listing (hypothetical): 59 years  Sex: Male at 3/15/2025  2:57 AM    - Cirrhosis due to chronic HCV. Follows Hepatology outpatient.    - HCV viral load pending  - FibroScan 7/1/24: kPa 16.1 (F4),  (S0).  - EGD 11/2024 without esophageal varices.  - Repeat US abdomen today stable. Chronic findings of persistent intra and extrahepatic biliary ductal dilatation and pancreatic ductal dilatation, similar in size to MRI MRCP 09/19/2024.  No filling defects or obstructing mass identified. Cholelithiasis.  No cholecystitis.  - Ductal dilatation is chronic s/p EUS 11/2024 w/o concerning finding. Repeat recommended 11/2025 per outpatient hepatology note.   - Will avoid any hepatotoxic meds, and monitor closely

## 2025-03-16 LAB
ALBUMIN SERPL BCP-MCNC: 4 G/DL (ref 3.5–5.2)
ALP SERPL-CCNC: 83 U/L (ref 40–150)
ALT SERPL W/O P-5'-P-CCNC: 17 U/L (ref 10–44)
ANION GAP SERPL CALC-SCNC: 9 MMOL/L (ref 8–16)
AST SERPL-CCNC: 15 U/L (ref 10–40)
BASOPHILS # BLD AUTO: 0.01 K/UL (ref 0–0.2)
BASOPHILS NFR BLD: 0.1 % (ref 0–1.9)
BILIRUB SERPL-MCNC: 0.4 MG/DL (ref 0.1–1)
BUN SERPL-MCNC: 29 MG/DL (ref 6–20)
CALCIUM SERPL-MCNC: 9.3 MG/DL (ref 8.7–10.5)
CHLORIDE SERPL-SCNC: 98 MMOL/L (ref 95–110)
CO2 SERPL-SCNC: 28 MMOL/L (ref 23–29)
CREAT SERPL-MCNC: 2 MG/DL (ref 0.5–1.4)
DIFFERENTIAL METHOD BLD: ABNORMAL
EOSINOPHIL # BLD AUTO: 0 K/UL (ref 0–0.5)
EOSINOPHIL NFR BLD: 0.1 % (ref 0–8)
ERYTHROCYTE [DISTWIDTH] IN BLOOD BY AUTOMATED COUNT: 12.1 % (ref 11.5–14.5)
EST. GFR  (NO RACE VARIABLE): 37.5 ML/MIN/1.73 M^2
GLUCOSE SERPL-MCNC: 118 MG/DL (ref 70–110)
HCT VFR BLD AUTO: 38.6 % (ref 40–54)
HGB BLD-MCNC: 12.2 G/DL (ref 14–18)
IMM GRANULOCYTES # BLD AUTO: 0.03 K/UL (ref 0–0.04)
IMM GRANULOCYTES NFR BLD AUTO: 0.3 % (ref 0–0.5)
LYMPHOCYTES # BLD AUTO: 1 K/UL (ref 1–4.8)
LYMPHOCYTES NFR BLD: 12 % (ref 18–48)
MAGNESIUM SERPL-MCNC: 2.1 MG/DL (ref 1.6–2.6)
MCH RBC QN AUTO: 30 PG (ref 27–31)
MCHC RBC AUTO-ENTMCNC: 31.6 G/DL (ref 32–36)
MCV RBC AUTO: 95 FL (ref 82–98)
MONOCYTES # BLD AUTO: 0.5 K/UL (ref 0.3–1)
MONOCYTES NFR BLD: 6 % (ref 4–15)
NEUTROPHILS # BLD AUTO: 7 K/UL (ref 1.8–7.7)
NEUTROPHILS NFR BLD: 81.5 % (ref 38–73)
NRBC BLD-RTO: 0 /100 WBC
PHOSPHATE SERPL-MCNC: 2.7 MG/DL (ref 2.7–4.5)
PLATELET # BLD AUTO: 182 K/UL (ref 150–450)
PMV BLD AUTO: 11.4 FL (ref 9.2–12.9)
POTASSIUM SERPL-SCNC: 4.5 MMOL/L (ref 3.5–5.1)
PROT SERPL-MCNC: 8.2 G/DL (ref 6–8.4)
RBC # BLD AUTO: 4.07 M/UL (ref 4.6–6.2)
SODIUM SERPL-SCNC: 135 MMOL/L (ref 136–145)
WBC # BLD AUTO: 8.65 K/UL (ref 3.9–12.7)

## 2025-03-16 PROCEDURE — 85025 COMPLETE CBC W/AUTO DIFF WBC: CPT

## 2025-03-16 PROCEDURE — 25000003 PHARM REV CODE 250: Performed by: INTERNAL MEDICINE

## 2025-03-16 PROCEDURE — 94761 N-INVAS EAR/PLS OXIMETRY MLT: CPT

## 2025-03-16 PROCEDURE — 63600175 PHARM REV CODE 636 W HCPCS

## 2025-03-16 PROCEDURE — 84100 ASSAY OF PHOSPHORUS: CPT

## 2025-03-16 PROCEDURE — 20600001 HC STEP DOWN PRIVATE ROOM

## 2025-03-16 PROCEDURE — 94640 AIRWAY INHALATION TREATMENT: CPT

## 2025-03-16 PROCEDURE — 36415 COLL VENOUS BLD VENIPUNCTURE: CPT

## 2025-03-16 PROCEDURE — 99900031 HC PATIENT EDUCATION (STAT)

## 2025-03-16 PROCEDURE — S0109 METHADONE ORAL 5MG: HCPCS

## 2025-03-16 PROCEDURE — 63600175 PHARM REV CODE 636 W HCPCS: Performed by: STUDENT IN AN ORGANIZED HEALTH CARE EDUCATION/TRAINING PROGRAM

## 2025-03-16 PROCEDURE — 25000003 PHARM REV CODE 250

## 2025-03-16 PROCEDURE — 80053 COMPREHEN METABOLIC PANEL: CPT

## 2025-03-16 PROCEDURE — 25000242 PHARM REV CODE 250 ALT 637 W/ HCPCS: Performed by: STUDENT IN AN ORGANIZED HEALTH CARE EDUCATION/TRAINING PROGRAM

## 2025-03-16 PROCEDURE — 83735 ASSAY OF MAGNESIUM: CPT

## 2025-03-16 PROCEDURE — 27000221 HC OXYGEN, UP TO 24 HOURS

## 2025-03-16 PROCEDURE — 99900035 HC TECH TIME PER 15 MIN (STAT)

## 2025-03-16 RX ORDER — TRAZODONE HYDROCHLORIDE 50 MG/1
50 TABLET ORAL NIGHTLY PRN
Status: DISCONTINUED | OUTPATIENT
Start: 2025-03-16 | End: 2025-03-18 | Stop reason: HOSPADM

## 2025-03-16 RX ORDER — FUROSEMIDE 10 MG/ML
80 INJECTION INTRAMUSCULAR; INTRAVENOUS EVERY 12 HOURS
Status: DISCONTINUED | OUTPATIENT
Start: 2025-03-16 | End: 2025-03-18

## 2025-03-16 RX ADMIN — ACETAMINOPHEN 650 MG: 325 TABLET ORAL at 09:03

## 2025-03-16 RX ADMIN — ALBUTEROL SULFATE 2.5 MG: 2.5 SOLUTION RESPIRATORY (INHALATION) at 08:03

## 2025-03-16 RX ADMIN — POLYETHYLENE GLYCOL 3350 17 G: 17 POWDER, FOR SOLUTION ORAL at 09:03

## 2025-03-16 RX ADMIN — ALBUTEROL SULFATE 2.5 MG: 2.5 SOLUTION RESPIRATORY (INHALATION) at 12:03

## 2025-03-16 RX ADMIN — CLONIDINE HYDROCHLORIDE 0.1 MG: 0.1 TABLET ORAL at 08:03

## 2025-03-16 RX ADMIN — ESCITALOPRAM OXALATE 20 MG: 5 TABLET, FILM COATED ORAL at 08:03

## 2025-03-16 RX ADMIN — CLONIDINE HYDROCHLORIDE 0.1 MG: 0.1 TABLET ORAL at 09:03

## 2025-03-16 RX ADMIN — TRAZODONE HYDROCHLORIDE 50 MG: 50 TABLET ORAL at 09:03

## 2025-03-16 RX ADMIN — FUROSEMIDE 40 MG: 40 TABLET ORAL at 08:03

## 2025-03-16 RX ADMIN — TRAZODONE HYDROCHLORIDE 50 MG: 50 TABLET ORAL at 03:03

## 2025-03-16 RX ADMIN — FUROSEMIDE 80 MG: 10 INJECTION, SOLUTION INTRAVENOUS at 10:03

## 2025-03-16 RX ADMIN — FUROSEMIDE 80 MG: 10 INJECTION, SOLUTION INTRAVENOUS at 09:03

## 2025-03-16 RX ADMIN — SENNOSIDES 8.6 MG: 8.6 TABLET, FILM COATED ORAL at 09:03

## 2025-03-16 RX ADMIN — MUPIROCIN: 20 OINTMENT TOPICAL at 09:03

## 2025-03-16 RX ADMIN — AMLODIPINE BESYLATE 10 MG: 10 TABLET ORAL at 08:03

## 2025-03-16 RX ADMIN — POLYETHYLENE GLYCOL 3350 17 G: 17 POWDER, FOR SOLUTION ORAL at 08:03

## 2025-03-16 RX ADMIN — MUPIROCIN: 20 OINTMENT TOPICAL at 08:03

## 2025-03-16 RX ADMIN — LACTULOSE 10 G: 20 SOLUTION ORAL at 11:03

## 2025-03-16 RX ADMIN — ALBUTEROL SULFATE 2.5 MG: 2.5 SOLUTION RESPIRATORY (INHALATION) at 07:03

## 2025-03-16 RX ADMIN — ENOXAPARIN SODIUM 40 MG: 40 INJECTION SUBCUTANEOUS at 09:03

## 2025-03-16 RX ADMIN — ENOXAPARIN SODIUM 40 MG: 40 INJECTION SUBCUTANEOUS at 08:03

## 2025-03-16 RX ADMIN — FLUTICASONE FUROATE AND VILANTEROL TRIFENATATE 1 PUFF: 200; 25 POWDER RESPIRATORY (INHALATION) at 08:03

## 2025-03-16 RX ADMIN — METHADONE HYDROCHLORIDE 95 MG: 5 SOLUTION ORAL at 08:03

## 2025-03-16 RX ADMIN — PREDNISONE 40 MG: 20 TABLET ORAL at 08:03

## 2025-03-16 RX ADMIN — TAMSULOSIN HYDROCHLORIDE 0.4 MG: 0.4 CAPSULE ORAL at 08:03

## 2025-03-16 RX ADMIN — TIOTROPIUM BROMIDE INHALATION SPRAY 2 PUFF: 3.12 SPRAY, METERED RESPIRATORY (INHALATION) at 08:03

## 2025-03-16 NOTE — ASSESSMENT & PLAN NOTE
Former smoker. Reports quitting for over last 1.5 years. Reports smoking for >15 years around 1 pack per day.     Plan  - No need for patches at this time.

## 2025-03-16 NOTE — ASSESSMENT & PLAN NOTE
Patient with HFpEF presenting with symptoms of exacerbation likely due to missed lasix doses. Patient reporting of SOB, dyspnea on exertion, and orthopnea.     Most recent BNP and echo results are listed below.  Recent Labs     03/13/25  1420   BNP 33     Latest ECHO  Results for orders placed during the hospital encounter of 02/15/24    Echo    Interpretation Summary    Left Ventricle: The left ventricle is normal in size. Normal wall thickness. There is concentric remodeling. There is normal systolic function. Biplane (2D) method of discs ejection fraction is 53%. There is normal diastolic function.    Right Ventricle: Mild right ventricular enlargement. Wall thickness is normal. Right ventricle wall motion  is normal. Systolic function is normal.    Right Atrium: Right atrium is mildly dilated.    Aorta: Aortic root is mildly dilated measuring 4.27 cm. Ascending aorta is normal.    Pulmonary Artery: The estimated pulmonary artery systolic pressure is 40 mmHg.    IVC/SVC: Normal venous pressure at 3 mmHg.    Current Heart Failure Medications  furosemide injection 80 mg, Every 12 hours, Intravenous    Plan  - Attempted to transition to PO Lasix 40 mg BID - complaining of poor I/O    - transitioned back to IV Lasix 80 mg BID   - Monitor strict I&Os and daily weights.    - Place on telemetry  - Low sodium diet  - Place on fluid restriction of 1.5 L.   - Cardiology has not been consulted

## 2025-03-16 NOTE — ASSESSMENT & PLAN NOTE
Patient's blood pressure range in the last 24 hours was: BP  Min: 122/74  Max: 135/81.The patient's inpatient anti-hypertensive regimen is listed below:  Current Antihypertensives  amLODIPine tablet 10 mg, Daily, Oral  cloNIDine tablet 0.1 mg, 2 times daily, Oral  furosemide injection 80 mg, Every 12 hours, Intravenous    Plan  - Holding valsartan due to hyperkalemia   - consider restarting ARB after hyperkalemia resolved   - Continue home BP meds as listed above

## 2025-03-16 NOTE — SUBJECTIVE & OBJECTIVE
Interval History: NAEON. AF. VSS. Patient reports shortness of breath and poor urine output with PO Lasix 40 mg BID. He describes swelling in abdominal and bilateral thighs     Transitioned back to IV Lasix 80 mg BID     Review of Systems   Constitutional:  Negative for fever.   Respiratory:  Positive for shortness of breath and wheezing. Negative for cough, chest tightness and stridor.    Cardiovascular:  Positive for leg swelling. Negative for chest pain and palpitations.   Gastrointestinal:  Positive for abdominal distention. Negative for abdominal pain, diarrhea, nausea and vomiting.   Skin: Negative.    Neurological: Negative.    Psychiatric/Behavioral: Negative.       Objective:     Vital Signs (Most Recent):  Temp: 97.9 °F (36.6 °C) (03/16/25 1242)  Pulse: 85 (03/16/25 1242)  Resp: 19 (03/16/25 1242)  BP: 129/61 (03/16/25 1242)  SpO2: 99 % (03/16/25 1242) Vital Signs (24h Range):  Temp:  [97.6 °F (36.4 °C)-98.4 °F (36.9 °C)] 97.9 °F (36.6 °C)  Pulse:  [69-96] 85  Resp:  [17-20] 19  SpO2:  [90 %-100 %] 99 %  BP: (122-135)/(59-84) 129/61     Weight: 124.6 kg (274 lb 11.1 oz)  Body mass index is 41.77 kg/m².    Intake/Output Summary (Last 24 hours) at 3/16/2025 1334  Last data filed at 3/16/2025 1236  Gross per 24 hour   Intake 1420 ml   Output 2100 ml   Net -680 ml         Physical Exam  Vitals and nursing note reviewed.   Constitutional:       General: He is not in acute distress.     Appearance: He is normal weight. He is not toxic-appearing.      Interventions: Nasal cannula in place.   HENT:      Head: Normocephalic and atraumatic.   Eyes:      Extraocular Movements: Extraocular movements intact.      Pupils: Pupils are equal, round, and reactive to light.   Cardiovascular:      Rate and Rhythm: Normal rate and regular rhythm.   Pulmonary:      Effort: Pulmonary effort is normal. No respiratory distress.      Breath sounds: Wheezing present.   Abdominal:      General: Abdomen is flat. There is no  distension.   Musculoskeletal:         General: Swelling (bilateral thighs) present.      Cervical back: Normal range of motion and neck supple.      Right lower leg: No edema.      Left lower leg: No edema.   Skin:     General: Skin is warm and dry.   Neurological:      General: No focal deficit present.      Mental Status: He is alert.      GCS: GCS eye subscore is 4. GCS verbal subscore is 5. GCS motor subscore is 6.   Psychiatric:         Mood and Affect: Mood normal.         Behavior: Behavior normal. Behavior is cooperative.               Significant Labs: CBC:   Recent Labs   Lab 03/15/25  0257 03/16/25  0539   WBC 6.00 8.65   HGB 11.5* 12.2*   HCT 34.5* 38.6*    182     CMP:   Recent Labs   Lab 03/15/25  0257 03/16/25  0540    135*   K 4.8 4.5    98   CO2 25 28   * 118*   BUN 24* 29*   CREATININE 1.9* 2.0*   CALCIUM 8.9 9.3   PROT 7.8 8.2   ALBUMIN 3.8 4.0   BILITOT 0.4 0.4   ALKPHOS 78 83   AST 20 15   ALT 20 17   ANIONGAP 12 9       Significant Imaging: I have reviewed all pertinent imaging results/findings within the past 24 hours.

## 2025-03-16 NOTE — PROGRESS NOTES
[facility] - Cardiology [facility] Medicine  Progress Note    Patient Name: Ming Harrington  MRN: [id number]  Patient Class: IP- Inpatient   Admission Date: 3/13/2025  Length of Stay: 1 days  Attending Physician: Gigi Newell MD  Primary Care Provider: Garland Xiong DO        Subjective     Principal Problem:COPD exacerbation        HPI:  Ming Harrington is a 59 y.o. M with a PMHx of COPD on 2L O2 PRN, HFpEF, HTN, chronic HCV complicated by cirrhosis, CKD, opioid use disorder on methadone, and JUVENCIO presenting for increased shortness of breath in the setting missed medication doses. Patient reports his symptoms onset around one week ago. He has noticed increased dyspnea on exertion and orthopnea. States he ran out of his lasix around four days ago. Tried to get it refilled at the pharmacy, but was unable to. Also reports that he has been out of his Trelegy ellipta as well. He has been increasing the use of his Ventolin inhaler without relief. Associated symptoms include a sharp, substernal chest pain that occurs with breathing and worsening lower extremity edema, bilaterally. Patient denies any sick contacts. No fever, chills, productive cough, nausea, vomiting, diarrhea, burning with urination. Prior to onset of symptoms, patient able to get around the house without any issues. However, now he is only able to walk about 5 steps before becoming out of breath. Former smoker about 15 years at around 1 pack per day. Has stopped smoking for 1.5 years.     ED Course  On arrival, patient hypertensive at 184/104, otherwise afebrile. Saturations reportedly dropped to the 80s and required supplemental oxygen. CBC unremarkable. CMP with a Cr of 1.8 (around baseline). BNP and troponin negative. Flu and covid negative. Patient given lasix and duonebs with improvement in his symptoms. At time of admit, patient weaned down to 2L O2 NC. Admitted to hospital medicine.     Overview/Hospital Course:  59M with PMHx of COPD on 2L O2  PRN, HFpEF, HTN, chronic HCV complicated by cirrhosis, CKD, opioid use disorder on methadone, and JUVENCIO admitted for COPD exacerbation and volume overload. Treating COPD exacerbation with scheduled inhalers and prednisone. Deferring antibiotics. Treating volume overload with IV lasix. Continuing methadone 95mg inpatient. Bowel regimen for his constipation.     Interval History: NAEON. AF. VSS. Patient reports shortness of breath and poor urine output with PO Lasix 40 mg BID. He describes swelling in abdominal and bilateral thighs     Transitioned back to IV Lasix 80 mg BID     Review of Systems   Constitutional:  Negative for fever.   Respiratory:  Positive for shortness of breath and wheezing. Negative for cough, chest tightness and stridor.    Cardiovascular:  Positive for leg swelling. Negative for chest pain and palpitations.   Gastrointestinal:  Positive for abdominal distention. Negative for abdominal pain, diarrhea, nausea and vomiting.   Skin: Negative.    Neurological: Negative.    Psychiatric/Behavioral: Negative.       Objective:     Vital Signs (Most Recent):  Temp: 97.9 °F (36.6 °C) (03/16/25 1242)  Pulse: 85 (03/16/25 1242)  Resp: 19 (03/16/25 1242)  BP: 129/61 (03/16/25 1242)  SpO2: 99 % (03/16/25 1242) Vital Signs (24h Range):  Temp:  [97.6 °F (36.4 °C)-98.4 °F (36.9 °C)] 97.9 °F (36.6 °C)  Pulse:  [69-96] 85  Resp:  [17-20] 19  SpO2:  [90 %-100 %] 99 %  BP: (122-135)/(59-84) 129/61     Weight: 124.6 kg (274 lb 11.1 oz)  Body mass index is 41.77 kg/m².    Intake/Output Summary (Last 24 hours) at 3/16/2025 1334  Last data filed at 3/16/2025 1236  Gross per 24 hour   Intake 1420 ml   Output 2100 ml   Net -680 ml         Physical Exam  Vitals and nursing note reviewed.   Constitutional:       General: He is not in acute distress.     Appearance: He is normal weight. He is not toxic-appearing.      Interventions: Nasal cannula in place.   HENT:      Head: Normocephalic and atraumatic.   Eyes:       Extraocular Movements: Extraocular movements intact.      Pupils: Pupils are equal, round, and reactive to light.   Cardiovascular:      Rate and Rhythm: Normal rate and regular rhythm.   Pulmonary:      Effort: Pulmonary effort is normal. No respiratory distress.      Breath sounds: Wheezing present.   Abdominal:      General: Abdomen is flat. There is no distension.   Musculoskeletal:         General: Swelling (bilateral thighs) present.      Cervical back: Normal range of motion and neck supple.      Right lower leg: No edema.      Left lower leg: No edema.   Skin:     General: Skin is warm and dry.   Neurological:      General: No focal deficit present.      Mental Status: He is alert.      GCS: GCS eye subscore is 4. GCS verbal subscore is 5. GCS motor subscore is 6.   Psychiatric:         Mood and Affect: Mood normal.         Behavior: Behavior normal. Behavior is cooperative.               Significant Labs: CBC:   Recent Labs   Lab 03/15/25  0257 03/16/25  0539   WBC 6.00 8.65   HGB 11.5* 12.2*   HCT 34.5* 38.6*    182     CMP:   Recent Labs   Lab 03/15/25  0257 03/16/25  0540    135*   K 4.8 4.5    98   CO2 25 28   * 118*   BUN 24* 29*   CREATININE 1.9* 2.0*   CALCIUM 8.9 9.3   PROT 7.8 8.2   ALBUMIN 3.8 4.0   BILITOT 0.4 0.4   ALKPHOS 78 83   AST 20 15   ALT 20 17   ANIONGAP 12 9       Significant Imaging: I have reviewed all pertinent imaging results/findings within the past 24 hours.      Assessment & Plan  COPD exacerbation  History of COPD exacerbations requiring inpatient treatment. Patient reporting that he has been out of his Trelegy Ellipta.  Patient's COPD is with exacerbation noted by worsening of baseline hypoxia currently. Continue scheduled inhalers Steroids and Supplemental oxygen and monitor respiratory status closely.   Patient also with history of CHF. BNP and troponin wnl. Flu/Covid negative.     Plan:   - Scheduled Breo.   - Albuterol inhaler scheduled  -  Prednisone 40mg QD x5days  - No need for antibiotics; patient not reporting of increased cough or productive cough.   (HFpEF) heart failure with preserved ejection fraction  Patient with HFpEF presenting with symptoms of exacerbation likely due to missed lasix doses. Patient reporting of SOB, dyspnea on exertion, and orthopnea.     Most recent BNP and echo results are listed below.  Recent Labs     03/13/25  1420   BNP 33     Latest ECHO  Results for orders placed during the hospital encounter of 02/15/24    Echo    Interpretation Summary    Left Ventricle: The left ventricle is normal in size. Normal wall thickness. There is concentric remodeling. There is normal systolic function. Biplane (2D) method of discs ejection fraction is 53%. There is normal diastolic function.    Right Ventricle: Mild right ventricular enlargement. Wall thickness is normal. Right ventricle wall motion  is normal. Systolic function is normal.    Right Atrium: Right atrium is mildly dilated.    Aorta: Aortic root is mildly dilated measuring 4.27 cm. Ascending aorta is normal.    Pulmonary Artery: The estimated pulmonary artery systolic pressure is 40 mmHg.    IVC/SVC: Normal venous pressure at 3 mmHg.    Current Heart Failure Medications  furosemide injection 80 mg, Every 12 hours, Intravenous    Plan  - Attempted to transition to PO Lasix 40 mg BID - complaining of poor I/O    - transitioned back to IV Lasix 80 mg BID   - Monitor strict I&Os and daily weights.    - Place on telemetry  - Low sodium diet  - Place on fluid restriction of 1.5 L.   - Cardiology has not been consulted  HTN (hypertension)  Patient's blood pressure range in the last 24 hours was: BP  Min: 122/74  Max: 135/81.The patient's inpatient anti-hypertensive regimen is listed below:  Current Antihypertensives  amLODIPine tablet 10 mg, Daily, Oral  cloNIDine tablet 0.1 mg, 2 times daily, Oral  furosemide injection 80 mg, Every 12 hours, Intravenous    Plan  - Holding  valsartan due to hyperkalemia   - consider restarting ARB after hyperkalemia resolved   - Continue home BP meds as listed above   Opioid use disorder, severe, on maintenance therapy, dependence  Appears that patient is on methadone as maintenance for his opioid use disorder.    Plan:  - Confirmed that patient attends [facility] methadone clinic  - Will continue methadone 95mg while inpatient    Chronic constipation  Plan  - Lactulose and Miralax  Hyperkalemia  Hyperkalemia is likely due to  unknown etiology .The patients most recent potassium results are listed below.  Recent Labs     03/14/25  1323 03/15/25  0257 03/16/25  0540   K 4.5 4.8 4.5     Resolved    Plan  - Monitor for arrhythmias with EKG and/or continuous telemetry.   - Treat the hyperkalemia with lokelma  - Recheck BMP today.   - The patient's hyperkalemia is stable      Swelling of joint of pelvic region or thigh, right  Unilateral enlargement of patient's right thigh. Reports it has been ongoing for about one year. DVT US in 09/2024 negative, otherwise, no dedicated imaging to the leg.    CT right thigh w/o contrast shows cellulitis vs lymphedema. Likely lymphedema due to chronicity and worsening with increased volume.    Stage 3a chronic kidney disease  Creatine stable for now. BMP reviewed- noted Estimated Creatinine Clearance: 50.5 mL/min (A) (based on SCr of 2 mg/dL (H)). according to latest data. Based on current GFR, CKD stage is stage 3 - GFR 30-59.      Plan  - monitor UOP and serial BMP and adjust therapy as needed.   - Renally dose meds.   - Avoid nephrotoxic medications and procedures.  Cirrhosis  Chronic Hep C infection s/p Epclusa. Follows hepatology outpatient. Originally diagnosed 2011 while incarcerated in [place]. Risks for HCV:  tattoo, drug use, incarceration.    MELD-Na score calculated; MELD 3.0: 13 at 3/15/2025  2:57 AM  MELD-Na: 13 at 3/15/2025  2:57 AM  Calculated from:  Serum Creatinine: 1.9 mg/dL at 3/15/2025  2:57 AM  Serum Sodium: 137  mmol/L at 3/15/2025  2:57 AM  Total Bilirubin: 0.4 mg/dL (Using min of 1 mg/dL) at 3/15/2025  2:57 AM  Serum Albumin: 3.8 g/dL (Using max of 3.5 g/dL) at 3/15/2025  2:57 AM  INR(ratio): 1 at 3/13/2025 10:06 AM  Age at listing (hypothetical): 59 years  Sex: Male at 3/15/2025  2:57 AM    - Cirrhosis due to chronic HCV. Follows Hepatology outpatient.    - HCV viral load pending  - FibroScan 7/1/24: kPa 16.1 (F4),  (S0).  - EGD 11/2024 without esophageal varices.  - Repeat US abdomen today stable. Chronic findings of persistent intra and extrahepatic biliary ductal dilatation and pancreatic ductal dilatation, similar in size to MRI MRCP 09/19/2024.  No filling defects or obstructing mass identified. Cholelithiasis.  No cholecystitis.  - Ductal dilatation is chronic s/p EUS 11/2024 w/o concerning finding. Repeat recommended 11/2025 per outpatient hepatology note.   - Will avoid any hepatotoxic meds, and monitor closely  Generalized anxiety disorder  Stable    Plan  - Resume home lexapro    Tobacco dependence  Former smoker. Reports quitting for over last 1.5 years. Reports smoking for >15 years around 1 pack per day.     Plan  - No need for patches at this time.  VTE Risk Mitigation (From admission, onward)           Ordered     enoxaparin injection 40 mg  Every 12 hours         03/15/25 1305     IP VTE HIGH RISK PATIENT  Once         03/13/25 1923     Place sequential compression device  Until discontinued         03/13/25 1923                    Discharge Planning   RYLAN:      Code Status: Full Code   Medical Readiness for Discharge Date:   Discharge Plan A: Home, Home with family                        Asael Gipson MD  Department of Hospital Medicine   [facility] - Cardiology Step[facility]

## 2025-03-16 NOTE — ASSESSMENT & PLAN NOTE
Creatine stable for now. BMP reviewed- noted Estimated Creatinine Clearance: 50.5 mL/min (A) (based on SCr of 2 mg/dL (H)). according to latest data. Based on current GFR, CKD stage is stage 3 - GFR 30-59.      Plan  - monitor UOP and serial BMP and adjust therapy as needed.   - Renally dose meds.   - Avoid nephrotoxic medications and procedures.

## 2025-03-16 NOTE — ASSESSMENT & PLAN NOTE
Appears that patient is on methadone as maintenance for his opioid use disorder.    Plan:  - Confirmed that patient attends [facility] methadone clinic  - Will continue methadone 95mg while inpatient

## 2025-03-16 NOTE — ASSESSMENT & PLAN NOTE
Hyperkalemia is likely due to unknown etiology.The patients most recent potassium results are listed below.  Recent Labs     03/14/25  1323 03/15/25  0257 03/16/25  0540   K 4.5 4.8 4.5     Resolved    Plan  - Monitor for arrhythmias with EKG and/or continuous telemetry.   - Treat the hyperkalemia with lokelma  - Recheck BMP today.   - The patient's hyperkalemia is stable

## 2025-03-17 PROBLEM — E66.01 SEVERE OBESITY (BMI >= 40): Status: ACTIVE | Noted: 2025-03-17

## 2025-03-17 PROBLEM — D64.9 CHRONIC ANEMIA: Status: ACTIVE | Noted: 2025-03-17

## 2025-03-17 LAB
ALBUMIN SERPL BCP-MCNC: 3.6 G/DL (ref 3.5–5.2)
ALP SERPL-CCNC: 66 U/L (ref 40–150)
ALT SERPL W/O P-5'-P-CCNC: 22 U/L (ref 10–44)
ANION GAP SERPL CALC-SCNC: 9 MMOL/L (ref 8–16)
AST SERPL-CCNC: 17 U/L (ref 10–40)
AV AREA BY CONTINUOUS VTI: 3.6 CM2
AV INDEX (PROSTH): 1.02
AV LVOT MEAN GRADIENT: 3 MMHG
AV LVOT PEAK GRADIENT: 7 MMHG
AV MEAN GRADIENT: 4 MMHG
AV PEAK GRADIENT: 8 MMHG
AV VALVE AREA BY VELOCITY RATIO: 3.2 CM²
AV VALVE AREA: 3.5 CM2
AV VELOCITY RATIO: 0.93
BASOPHILS # BLD AUTO: 0.01 K/UL (ref 0–0.2)
BASOPHILS NFR BLD: 0.2 % (ref 0–1.9)
BILIRUB SERPL-MCNC: 0.5 MG/DL (ref 0.1–1)
BSA FOR ECHO PROCEDURE: 2.44 M2
BUN SERPL-MCNC: 32 MG/DL (ref 6–20)
CALCIUM SERPL-MCNC: 8.9 MG/DL (ref 8.7–10.5)
CHLORIDE SERPL-SCNC: 101 MMOL/L (ref 95–110)
CO2 SERPL-SCNC: 28 MMOL/L (ref 23–29)
CREAT SERPL-MCNC: 2 MG/DL (ref 0.5–1.4)
CV ECHO LV RWT: 0.43 CM
DIFFERENTIAL METHOD BLD: ABNORMAL
DOP CALC AO PEAK VEL: 1.4 M/S
DOP CALC AO VTI: 25.3 CM
DOP CALC LVOT AREA: 3.5 CM2
DOP CALC LVOT DIAMETER: 2.1 CM
DOP CALC LVOT PEAK VEL: 1.3 M/S
DOP CALC LVOT STROKE VOLUME: 89.7 CM3
DOP CALCLVOT PEAK VEL VTI: 25.9 CM
E WAVE DECELERATION TIME: 278 MS
E/A RATIO: 1.19
E/E' RATIO: 7 M/S
ECHO EF ESTIMATED: 67 %
ECHO LV POSTERIOR WALL: 1 CM (ref 0.6–1.1)
EJECTION FRACTION: 65 %
EOSINOPHIL # BLD AUTO: 0 K/UL (ref 0–0.5)
EOSINOPHIL NFR BLD: 0.3 % (ref 0–8)
ERYTHROCYTE [DISTWIDTH] IN BLOOD BY AUTOMATED COUNT: 12.2 % (ref 11.5–14.5)
EST. GFR  (NO RACE VARIABLE): 37.5 ML/MIN/1.73 M^2
FRACTIONAL SHORTENING: 37 % (ref 28–44)
GLUCOSE SERPL-MCNC: 83 MG/DL (ref 70–110)
HCT VFR BLD AUTO: 35.6 % (ref 40–54)
HGB BLD-MCNC: 11.4 G/DL (ref 14–18)
IMM GRANULOCYTES # BLD AUTO: 0.02 K/UL (ref 0–0.04)
IMM GRANULOCYTES NFR BLD AUTO: 0.3 % (ref 0–0.5)
INTERVENTRICULAR SEPTUM: 1 CM (ref 0.6–1.1)
IVC DIAMETER: 1.2 CM
LA MAJOR: 5.1 CM
LA MINOR: 5.4 CM
LA WIDTH: 3.7 CM
LEFT ATRIUM SIZE: 3.3 CM
LEFT ATRIUM VOLUME INDEX MOD: 26 ML/M2
LEFT ATRIUM VOLUME INDEX: 23 ML/M2
LEFT ATRIUM VOLUME MOD: 60 ML
LEFT ATRIUM VOLUME: 54 CM3
LEFT INTERNAL DIMENSION IN SYSTOLE: 2.9 CM (ref 2.1–4)
LEFT VENTRICLE DIASTOLIC VOLUME INDEX: 42.06 ML/M2
LEFT VENTRICLE DIASTOLIC VOLUME: 98 ML
LEFT VENTRICLE MASS INDEX: 68.2 G/M2
LEFT VENTRICLE SYSTOLIC VOLUME INDEX: 13.7 ML/M2
LEFT VENTRICLE SYSTOLIC VOLUME: 32 ML
LEFT VENTRICULAR INTERNAL DIMENSION IN DIASTOLE: 4.6 CM (ref 3.5–6)
LEFT VENTRICULAR MASS: 158.8 G
LV LATERAL E/E' RATIO: 7.4
LV SEPTAL E/E' RATIO: 7.4
LYMPHOCYTES # BLD AUTO: 1.3 K/UL (ref 1–4.8)
LYMPHOCYTES NFR BLD: 18.9 % (ref 18–48)
MAGNESIUM SERPL-MCNC: 2.1 MG/DL (ref 1.6–2.6)
MCH RBC QN AUTO: 30.5 PG (ref 27–31)
MCHC RBC AUTO-ENTMCNC: 32 G/DL (ref 32–36)
MCV RBC AUTO: 95 FL (ref 82–98)
MONOCYTES # BLD AUTO: 0.6 K/UL (ref 0.3–1)
MONOCYTES NFR BLD: 8.7 % (ref 4–15)
MV PEAK A VEL: 0.62 M/S
MV PEAK E VEL: 0.74 M/S
NEUTROPHILS # BLD AUTO: 4.8 K/UL (ref 1.8–7.7)
NEUTROPHILS NFR BLD: 71.6 % (ref 38–73)
NRBC BLD-RTO: 0 /100 WBC
OHS CV RV/LV RATIO: 0.93 CM
PHOSPHATE SERPL-MCNC: 3 MG/DL (ref 2.7–4.5)
PLATELET # BLD AUTO: 152 K/UL (ref 150–450)
PMV BLD AUTO: 10.9 FL (ref 9.2–12.9)
POTASSIUM SERPL-SCNC: 4.1 MMOL/L (ref 3.5–5.1)
PROT SERPL-MCNC: 7.3 G/DL (ref 6–8.4)
RA MAJOR: 4.94 CM
RA PRESSURE ESTIMATED: 3 MMHG
RA WIDTH: 4.84 CM
RBC # BLD AUTO: 3.74 M/UL (ref 4.6–6.2)
RIGHT ATRIAL AREA: 22.8 CM2
RIGHT VENTRICLE DIASTOLIC BASEL DIMENSION: 4.3 CM
SINUS: 3.39 CM
SODIUM SERPL-SCNC: 138 MMOL/L (ref 136–145)
TDI LATERAL: 0.1 M/S
TDI SEPTAL: 0.1 M/S
TDI: 0.1 M/S
TRICUSPID ANNULAR PLANE SYSTOLIC EXCURSION: 2.19 CM
WBC # BLD AUTO: 6.65 K/UL (ref 3.9–12.7)
Z-SCORE OF LEFT VENTRICULAR DIMENSION IN END DIASTOLE: -7.19
Z-SCORE OF LEFT VENTRICULAR DIMENSION IN END SYSTOLE: -5.32

## 2025-03-17 PROCEDURE — 20600001 HC STEP DOWN PRIVATE ROOM

## 2025-03-17 PROCEDURE — 94640 AIRWAY INHALATION TREATMENT: CPT

## 2025-03-17 PROCEDURE — 85025 COMPLETE CBC W/AUTO DIFF WBC: CPT

## 2025-03-17 PROCEDURE — 99900035 HC TECH TIME PER 15 MIN (STAT)

## 2025-03-17 PROCEDURE — 25000003 PHARM REV CODE 250

## 2025-03-17 PROCEDURE — 83735 ASSAY OF MAGNESIUM: CPT

## 2025-03-17 PROCEDURE — 63600175 PHARM REV CODE 636 W HCPCS

## 2025-03-17 PROCEDURE — 27000221 HC OXYGEN, UP TO 24 HOURS

## 2025-03-17 PROCEDURE — 63600175 PHARM REV CODE 636 W HCPCS: Performed by: STUDENT IN AN ORGANIZED HEALTH CARE EDUCATION/TRAINING PROGRAM

## 2025-03-17 PROCEDURE — 80053 COMPREHEN METABOLIC PANEL: CPT

## 2025-03-17 PROCEDURE — 84100 ASSAY OF PHOSPHORUS: CPT

## 2025-03-17 PROCEDURE — 36415 COLL VENOUS BLD VENIPUNCTURE: CPT

## 2025-03-17 PROCEDURE — 94761 N-INVAS EAR/PLS OXIMETRY MLT: CPT

## 2025-03-17 PROCEDURE — 25000003 PHARM REV CODE 250: Performed by: INTERNAL MEDICINE

## 2025-03-17 PROCEDURE — S0109 METHADONE ORAL 5MG: HCPCS

## 2025-03-17 PROCEDURE — 25000242 PHARM REV CODE 250 ALT 637 W/ HCPCS: Performed by: STUDENT IN AN ORGANIZED HEALTH CARE EDUCATION/TRAINING PROGRAM

## 2025-03-17 RX ORDER — ALBUTEROL SULFATE 2.5 MG/.5ML
2.5 SOLUTION RESPIRATORY (INHALATION) EVERY 6 HOURS PRN
Status: DISCONTINUED | OUTPATIENT
Start: 2025-03-17 | End: 2025-03-18 | Stop reason: HOSPADM

## 2025-03-17 RX ORDER — CIPROFLOXACIN HYDROCHLORIDE 3 MG/ML
SOLUTION/ DROPS OPHTHALMIC
COMMUNITY

## 2025-03-17 RX ORDER — ACETAMINOPHEN 500 MG
3 TABLET ORAL DAILY PRN
COMMUNITY

## 2025-03-17 RX ORDER — TAMSULOSIN HYDROCHLORIDE 0.4 MG/1
0.4 CAPSULE ORAL DAILY
COMMUNITY

## 2025-03-17 RX ORDER — ALBUTEROL SULFATE 2.5 MG/.5ML
2.5 SOLUTION RESPIRATORY (INHALATION) EVERY 4 HOURS PRN
Status: DISCONTINUED | OUTPATIENT
Start: 2025-03-17 | End: 2025-03-17

## 2025-03-17 RX ADMIN — POLYETHYLENE GLYCOL 3350 17 G: 17 POWDER, FOR SOLUTION ORAL at 09:03

## 2025-03-17 RX ADMIN — ACETAMINOPHEN 650 MG: 325 TABLET ORAL at 09:03

## 2025-03-17 RX ADMIN — TRAZODONE HYDROCHLORIDE 50 MG: 50 TABLET ORAL at 09:03

## 2025-03-17 RX ADMIN — METHADONE HYDROCHLORIDE 95 MG: 5 SOLUTION ORAL at 09:03

## 2025-03-17 RX ADMIN — FLUTICASONE FUROATE AND VILANTEROL TRIFENATATE 1 PUFF: 200; 25 POWDER RESPIRATORY (INHALATION) at 07:03

## 2025-03-17 RX ADMIN — TIOTROPIUM BROMIDE INHALATION SPRAY 2 PUFF: 3.12 SPRAY, METERED RESPIRATORY (INHALATION) at 07:03

## 2025-03-17 RX ADMIN — PREDNISONE 40 MG: 20 TABLET ORAL at 09:03

## 2025-03-17 RX ADMIN — MUPIROCIN: 20 OINTMENT TOPICAL at 09:03

## 2025-03-17 RX ADMIN — TAMSULOSIN HYDROCHLORIDE 0.4 MG: 0.4 CAPSULE ORAL at 09:03

## 2025-03-17 RX ADMIN — ESCITALOPRAM OXALATE 20 MG: 5 TABLET, FILM COATED ORAL at 09:03

## 2025-03-17 RX ADMIN — Medication 6 MG: at 09:03

## 2025-03-17 RX ADMIN — ENOXAPARIN SODIUM 40 MG: 40 INJECTION SUBCUTANEOUS at 09:03

## 2025-03-17 RX ADMIN — ALBUTEROL SULFATE 2.5 MG: 2.5 SOLUTION RESPIRATORY (INHALATION) at 07:03

## 2025-03-17 RX ADMIN — SENNOSIDES 8.6 MG: 8.6 TABLET, FILM COATED ORAL at 09:03

## 2025-03-17 RX ADMIN — CLONIDINE HYDROCHLORIDE 0.1 MG: 0.1 TABLET ORAL at 09:03

## 2025-03-17 RX ADMIN — AMLODIPINE BESYLATE 10 MG: 10 TABLET ORAL at 09:03

## 2025-03-17 RX ADMIN — FUROSEMIDE 80 MG: 10 INJECTION, SOLUTION INTRAVENOUS at 09:03

## 2025-03-17 RX ADMIN — LACTULOSE 10 G: 20 SOLUTION ORAL at 09:03

## 2025-03-17 NOTE — ASSESSMENT & PLAN NOTE
History of COPD exacerbations requiring inpatient treatment. Patient reporting that he has been out of his Trelegy Ellipta.  Patient's COPD is with exacerbation noted by worsening of baseline hypoxia currently. Continue scheduled inhalers Steroids and Supplemental oxygen and monitor respiratory status closely.   Patient also with history of CHF. BNP and troponin wnl. Flu/Covid negative.     Plan:   - duonebs prn   - Prednisone 40mg QD 5 day course completed   - No need for antibiotics; patient not reporting of increased cough or productive cough.

## 2025-03-17 NOTE — ASSESSMENT & PLAN NOTE
Appears that patient is on methadone as maintenance for his opioid use disorder.    Plan:  - Confirmed that patient attends [facility] methadone clinic  - Will continue methadone 95mg while inpatient

## 2025-03-17 NOTE — PHARMACY MED REC
"      Admission Medication History     The home medication history was taken by Akanksha Katz.    You may go to "Admission" then "Reconcile Home Medications" tabs to review and/or act upon these items.     The home medication list has been updated by the Pharmacy department.   Please read ALL comments highlighted in yellow.   Please address this information as you see fit.    Feel free to contact us if you have any questions or require assistance.      The medications listed below were removed from the home medication list. Please reorder if appropriate:  Patient reports no longer taking the following medication(s):  Lactulose 20 g/30 ml solution  Lidocaine 5% patch  Promethazine 25 mg    Medications listed below were obtained from: Patient/family and Analytic software- [employer] Medications   Medication Sig    acetaminophen (TYLENOL) 500 MG tablet Take 3 tablets by mouth daily as needed (headache).    albuterol (VENTOLIN HFA) 90 mcg/actuation inhaler Inhale 2 puffs into the lungs every 6 (six) hours as needed for Wheezing or Shortness of Breath. Rescue    amlodipine-valsartan (EXFORGE)  mg per tablet Take 1 tablet by mouth once daily.    ciprofloxacin HCl (CILOXAN) 0.3 % ophthalmic solution Place 1 drop into the right eye twice daily and place 1 drop into the left eye three times daily    cloNIDine (CATAPRES) 0.1 MG tablet Take 1 tablet (0.1 mg total) by mouth 2 (two) times daily.    EScitalopram oxalate (LEXAPRO) 10 MG tablet Take 20 mg by mouth every evening.    fluticasone-umeclidin-vilanter (TRELEGY ELLIPTA) 200-62.5-25 mcg inhaler Inhale 1 puff into the lungs once daily.    furosemide (LASIX) 40 MG tablet Take 40 mg by mouth 2 (two) times daily.    ketorolac 0.5% (ACULAR) 0.5 % Drop Place 1 drop into the right eye twice daily and place 1 drop into the left eye three times daily    loratadine (CLARITIN) 10 mg tablet Take 1 tablet (10 mg total) by mouth once daily.    methadone HCl 10 mg tablet " (METHADONE ORAL) Take 95 mg by mouth Daily. Mixes it in water    polyethylene glycol (GLYCOLAX) 17 gram/dose powder Take 17 g by mouth daily as needed for Constipation.    prednisoLONE acetate (PRED FORTE) 1 % DrpS Place 1 drop into the right eye twice daily and place 1 drop into the left eye three times daily    tamsulosin (FLOMAX) 0.4 mg Cap Take 0.4 mg by mouth once daily.    traZODone (DESYREL) 50 MG tablet Take 100 mg by mouth every evening.       Potential issues to be addressed PRIOR TO DISCHARGE  Patient requested refills for the following medications: (METHADONE 10 MG, LIDOCAINE 5% PATCH AND TRAZODONE 50 MG)    Akanksha Katz  [id number]            .

## 2025-03-17 NOTE — NURSING
Plan of care reviewed with patient. Patient is AOX4 and VS stable. Patient remained free of falls and trauma, fall precautions are in place. Patient is ambulating independently. Patient receives prn pain meds. Patient has no questions at this time. Wheels are locked and the bed is in lowest position. The call bell is within reach. Telemetry is on.

## 2025-03-17 NOTE — ASSESSMENT & PLAN NOTE
Patient's blood pressure range in the last 24 hours was: BP  Min: 107/56  Max: 135/90.The patient's inpatient anti-hypertensive regimen is listed below:  Current Antihypertensives  amLODIPine tablet 10 mg, Daily, Oral  cloNIDine tablet 0.1 mg, 2 times daily, Oral  furosemide injection 80 mg, Every 12 hours, Intravenous    Plan  - Holding valsartan due to hyperkalemia   - consider restarting ARB after hyperkalemia resolved   - Continue home BP meds as listed above

## 2025-03-17 NOTE — PROGRESS NOTES
[facility] - Cardiology [facility] Medicine  Progress Note    Patient Name: Ming Harrington  MRN: [id number]  Patient Class: IP- Inpatient   Admission Date: 3/13/2025  Length of Stay: 2 days  Attending Physician: Gigi Newell MD  Primary Care Provider: Garland Xiong DO        Subjective     Principal Problem:(HFpEF) heart failure with preserved ejection fraction        HPI:  Ming Harrington is a 59 y.o. M with a PMHx of COPD on 2L O2 PRN, HFpEF, HTN, chronic HCV complicated by cirrhosis, CKD, opioid use disorder on methadone, and JUVENCIO presenting for increased shortness of breath in the setting missed medication doses. Patient reports his symptoms onset around one week ago. He has noticed increased dyspnea on exertion and orthopnea. States he ran out of his lasix around four days ago. Tried to get it refilled at the pharmacy, but was unable to. Also reports that he has been out of his Trelegy ellipta as well. He has been increasing the use of his Ventolin inhaler without relief. Associated symptoms include a sharp, substernal chest pain that occurs with breathing and worsening lower extremity edema, bilaterally. Patient denies any sick contacts. No fever, chills, productive cough, nausea, vomiting, diarrhea, burning with urination. Prior to onset of symptoms, patient able to get around the house without any issues. However, now he is only able to walk about 5 steps before becoming out of breath. Former smoker about 15 years at around 1 pack per day. Has stopped smoking for 1.5 years.     ED Course  On arrival, patient hypertensive at 184/104, otherwise afebrile. Saturations reportedly dropped to the 80s and required supplemental oxygen. CBC unremarkable. CMP with a Cr of 1.8 (around baseline). BNP and troponin negative. Flu and covid negative. Patient given lasix and duonebs with improvement in his symptoms. At time of admit, patient weaned down to 2L O2 NC. Admitted to hospital medicine.     Overview/Hospital  Course:  59M with PMHx of COPD on 2L O2 PRN, HFpEF, HTN, chronic HCV complicated by cirrhosis, CKD, opioid use disorder on methadone, and JUVENCIO admitted for COPD exacerbation and volume overload. Treating COPD exacerbation with scheduled inhalers and prednisone. He was initially treated with IV Lasix 40 mg with good urine response. He was then transitioned to PO Lasix however he stated his shortness of breath was worsening and he was transitioned back to IV Lasix 80 mg BID. He is continuing methadone 95mg inpatient.    Interval History: NAEON. AF. VSS. 2L. Patient continues to report swelling in his thighs with associated shortness of breath however improving on IV Lasix. Otherwise he is well with no other complaints.     Review of Systems   Constitutional:  Negative for fever.   Respiratory:  Positive for shortness of breath and wheezing. Negative for cough, chest tightness and stridor.    Cardiovascular:  Positive for leg swelling. Negative for chest pain and palpitations.   Gastrointestinal:  Positive for abdominal distention. Negative for abdominal pain, diarrhea, nausea and vomiting.   Skin: Negative.    Neurological: Negative.    Psychiatric/Behavioral: Negative.       Objective:     Vital Signs (Most Recent):  Temp: 98.6 °F (37 °C) (03/17/25 0744)  Pulse: 92 (03/17/25 1000)  Resp: 18 (03/17/25 0901)  BP: (!) 107/56 (03/17/25 0744)  SpO2: 98 % (03/17/25 0744) Vital Signs (24h Range):  Temp:  [97.4 °F (36.3 °C)-98.6 °F (37 °C)] 98.6 °F (37 °C)  Pulse:  [61-92] 92  Resp:  [17-20] 18  SpO2:  [90 %-99 %] 98 %  BP: (107-135)/(56-90) 107/56     Weight: 124.6 kg (274 lb 11.1 oz)  Body mass index is 41.77 kg/m².    Intake/Output Summary (Last 24 hours) at 3/17/2025 1128  Last data filed at 3/17/2025 0929  Gross per 24 hour   Intake 1470 ml   Output 2450 ml   Net -980 ml         Physical Exam  Vitals and nursing note reviewed.   Constitutional:       General: He is not in acute distress.     Appearance: He is normal  weight. He is not toxic-appearing.      Interventions: Nasal cannula in place.   HENT:      Head: Normocephalic and atraumatic.   Eyes:      Extraocular Movements: Extraocular movements intact.      Pupils: Pupils are equal, round, and reactive to light.   Cardiovascular:      Rate and Rhythm: Normal rate and regular rhythm.   Pulmonary:      Effort: Pulmonary effort is normal. No respiratory distress.      Breath sounds: Wheezing present.   Abdominal:      General: Abdomen is flat. There is no distension.   Musculoskeletal:         General: Swelling (bilateral thighs) present.      Cervical back: Normal range of motion and neck supple.      Right lower leg: No edema.      Left lower leg: No edema.   Skin:     General: Skin is warm and dry.   Neurological:      General: No focal deficit present.      Mental Status: He is alert.      GCS: GCS eye subscore is 4. GCS verbal subscore is 5. GCS motor subscore is 6.   Psychiatric:         Mood and Affect: Mood normal.         Behavior: Behavior normal. Behavior is cooperative.               Significant Labs: CBC:   Recent Labs   Lab 03/16/25  0539 03/17/25  0927   WBC 8.65 6.65   HGB 12.2* 11.4*   HCT 38.6* 35.6*    152     CMP:   Recent Labs   Lab 03/16/25  0540 03/17/25  0927   * 138   K 4.5 4.1   CL 98 101   CO2 28 28   * 83   BUN 29* 32*   CREATININE 2.0* 2.0*   CALCIUM 9.3 8.9   PROT 8.2 7.3   ALBUMIN 4.0 3.6   BILITOT 0.4 0.5   ALKPHOS 83 66   AST 15 17   ALT 17 22   ANIONGAP 9 9       Significant Imaging: I have reviewed all pertinent imaging results/findings within the past 24 hours.      Assessment & Plan  COPD exacerbation  History of COPD exacerbations requiring inpatient treatment. Patient reporting that he has been out of his Trelegy Ellipta.  Patient's COPD is with exacerbation noted by worsening of baseline hypoxia currently. Continue scheduled inhalers Steroids and Supplemental oxygen and monitor respiratory status closely.   Patient  "also with history of CHF. BNP and troponin wnl. Flu/Covid negative.     Plan:   - duonebs prn   - Prednisone 40mg QD 5 day course completed   - No need for antibiotics; patient not reporting of increased cough or productive cough.   (HFpEF) heart failure with preserved ejection fraction  Patient with HFpEF presenting with symptoms of exacerbation likely due to missed lasix doses. Patient reporting of SOB, dyspnea on exertion, and orthopnea.     Most recent BNP and echo results are listed below.  No results for input(s): "BNP" in the last 72 hours.    Latest ECHO  Results for orders placed during the hospital encounter of 02/15/24    Echo    Interpretation Summary    Left Ventricle: The left ventricle is normal in size. Normal wall thickness. There is concentric remodeling. There is normal systolic function. Biplane (2D) method of discs ejection fraction is 53%. There is normal diastolic function.    Right Ventricle: Mild right ventricular enlargement. Wall thickness is normal. Right ventricle wall motion  is normal. Systolic function is normal.    Right Atrium: Right atrium is mildly dilated.    Aorta: Aortic root is mildly dilated measuring 4.27 cm. Ascending aorta is normal.    Pulmonary Artery: The estimated pulmonary artery systolic pressure is 40 mmHg.    IVC/SVC: Normal venous pressure at 3 mmHg.    Current Heart Failure Medications  furosemide injection 80 mg, Every 12 hours, Intravenous    Plan  - Attempted to transition to PO Lasix 40 mg BID - complaining of poor I/O    - continue with IV Lasix 80 mg BID   - Monitor strict I&Os and daily weights.    - Place on telemetry  - Low sodium diet  - Place on fluid restriction of 1.5 L.   - Cardiology has not been consulted  HTN (hypertension)  Patient's blood pressure range in the last 24 hours was: BP  Min: 107/56  Max: 135/90.The patient's inpatient anti-hypertensive regimen is listed below:  Current Antihypertensives  amLODIPine tablet 10 mg, Daily, " Oral  cloNIDine tablet 0.1 mg, 2 times daily, Oral  furosemide injection 80 mg, Every 12 hours, Intravenous    Plan  - Holding valsartan due to hyperkalemia   - consider restarting ARB after hyperkalemia resolved   - Continue home BP meds as listed above   Opioid use disorder, severe, on maintenance therapy, dependence  Appears that patient is on methadone as maintenance for his opioid use disorder.    Plan:  - Confirmed that patient attends [facility] methadone clinic  - Will continue methadone 95mg while inpatient    Chronic constipation  Plan  - Lactulose and Miralax  Hyperkalemia  Hyperkalemia is likely due to  unknown etiology .The patients most recent potassium results are listed below.  Recent Labs     03/15/25  0257 03/16/25  0540 03/17/25  0927   K 4.8 4.5 4.1     Resolved    Plan  - Monitor for arrhythmias with EKG and/or continuous telemetry.   - Treat the hyperkalemia with lokelma  - Recheck BMP today.   - The patient's hyperkalemia is stable      Swelling of joint of pelvic region or thigh, right  Unilateral enlargement of patient's right thigh. Reports it has been ongoing for about one year. DVT US in 09/2024 negative, otherwise, no dedicated imaging to the leg.    CT right thigh w/o contrast shows cellulitis vs lymphedema. Likely lymphedema due to chronicity and worsening with increased volume.    Stage 3a chronic kidney disease  Creatine stable for now. BMP reviewed- noted Estimated Creatinine Clearance: 50.5 mL/min (A) (based on SCr of 2 mg/dL (H)). according to latest data. Based on current GFR, CKD stage is stage 3 - GFR 30-59.      Plan  - monitor UOP and serial BMP and adjust therapy as needed.   - Renally dose meds.   - Avoid nephrotoxic medications and procedures.  Cirrhosis  Chronic Hep C infection s/p Epclusa. Follows hepatology outpatient. Originally diagnosed 2011 while incarcerated in [place]. Risks for HCV:  tattoo, drug use, incarceration.    MELD-Na score calculated; MELD 3.0: 13 at 3/15/2025   2:57 AM  MELD-Na: 13 at 3/15/2025  2:57 AM  Calculated from:  Serum Creatinine: 1.9 mg/dL at 3/15/2025  2:57 AM  Serum Sodium: 137 mmol/L at 3/15/2025  2:57 AM  Total Bilirubin: 0.4 mg/dL (Using min of 1 mg/dL) at 3/15/2025  2:57 AM  Serum Albumin: 3.8 g/dL (Using max of 3.5 g/dL) at 3/15/2025  2:57 AM  INR(ratio): 1 at 3/13/2025 10:06 AM  Age at listing (hypothetical): 59 years  Sex: Male at 3/15/2025  2:57 AM    - Cirrhosis due to chronic HCV. Follows Hepatology outpatient.    - HCV viral load pending  - FibroScan 7/1/24: kPa 16.1 (F4),  (S0).  - EGD 11/2024 without esophageal varices.  - Repeat US abdomen today stable. Chronic findings of persistent intra and extrahepatic biliary ductal dilatation and pancreatic ductal dilatation, similar in size to MRI MRCP 09/19/2024.  No filling defects or obstructing mass identified. Cholelithiasis.  No cholecystitis.  - Ductal dilatation is chronic s/p EUS 11/2024 w/o concerning finding. Repeat recommended 11/2025 per outpatient hepatology note.   - Will avoid any hepatotoxic meds, and monitor closely  Generalized anxiety disorder  Stable    Plan  - Resume home lexapro    Tobacco dependence  Former smoker. Reports quitting for over last 1.5 years. Reports smoking for >15 years around 1 pack per day.     Plan  - No need for patches at this time.  VTE Risk Mitigation (From admission, onward)           Ordered     enoxaparin injection 40 mg  Every 12 hours         03/15/25 1305     IP VTE HIGH RISK PATIENT  Once         03/13/25 1923     Place sequential compression device  Until discontinued         03/13/25 1923                    Discharge Planning   RYLAN: 3/19/2025     Code Status: Full Code   Medical Readiness for Discharge Date:   Discharge Plan A: Home, Home with family                        Asael Gipson MD  Department of Hospital Medicine   [facility] - Cardiology Stepdown

## 2025-03-17 NOTE — PLAN OF CARE
Alert and oriented X4, Tylenol prn given for overall pain, comfort measures provided, VS WNL, plan of care explained to patient, lactulose prn given per patient request, educated about fluid restriction and compliance, instructed to call for assistance, call light within reach, will continue to monitor.    Problem: Adult Inpatient Plan of Care  Goal: Plan of Care Review  Outcome: Progressing  Goal: Readiness for Transition of Care  Outcome: Progressing     Problem: Fall Injury Risk  Goal: Absence of Fall and Fall-Related Injury  Outcome: Progressing

## 2025-03-17 NOTE — ASSESSMENT & PLAN NOTE
"Patient with HFpEF presenting with symptoms of exacerbation likely due to missed lasix doses. Patient reporting of SOB, dyspnea on exertion, and orthopnea.     Most recent BNP and echo results are listed below.  No results for input(s): "BNP" in the last 72 hours.    Latest ECHO  Results for orders placed during the hospital encounter of 02/15/24    Echo    Interpretation Summary    Left Ventricle: The left ventricle is normal in size. Normal wall thickness. There is concentric remodeling. There is normal systolic function. Biplane (2D) method of discs ejection fraction is 53%. There is normal diastolic function.    Right Ventricle: Mild right ventricular enlargement. Wall thickness is normal. Right ventricle wall motion  is normal. Systolic function is normal.    Right Atrium: Right atrium is mildly dilated.    Aorta: Aortic root is mildly dilated measuring 4.27 cm. Ascending aorta is normal.    Pulmonary Artery: The estimated pulmonary artery systolic pressure is 40 mmHg.    IVC/SVC: Normal venous pressure at 3 mmHg.    Current Heart Failure Medications  furosemide injection 80 mg, Every 12 hours, Intravenous    Plan  - Attempted to transition to PO Lasix 40 mg BID - complaining of poor I/O    - continue with IV Lasix 80 mg BID   - Monitor strict I&Os and daily weights.    - Place on telemetry  - Low sodium diet  - Place on fluid restriction of 1.5 L.   - Cardiology has not been consulted  "

## 2025-03-17 NOTE — CARE UPDATE
I have reviewed the chart of Ming Harrington who is hospitalized for the following:    Active Hospital Problems    Diagnosis    *(HFpEF) heart failure with preserved ejection fraction    Severe obesity (BMI >= 40)     BMI 41.57, continue to monitor daily with IV diuresis       Chronic anemia     H/H stable, continue to monitor       Swelling of joint of pelvic region or thigh, right    Cirrhosis    Stage 3a chronic kidney disease    Chronic constipation    Tobacco dependence    Generalized anxiety disorder    Hyperkalemia    COPD exacerbation    Opioid use disorder, severe, on maintenance therapy, dependence    HTN (hypertension)        Ratna Collins PA-C  Unit Based [facility]

## 2025-03-17 NOTE — ASSESSMENT & PLAN NOTE
Hyperkalemia is likely due to unknown etiology.The patients most recent potassium results are listed below.  Recent Labs     03/15/25  0257 03/16/25  0540 03/17/25  0927   K 4.8 4.5 4.1     Resolved    Plan  - Monitor for arrhythmias with EKG and/or continuous telemetry.   - Treat the hyperkalemia with lokelma  - Recheck BMP today.   - The patient's hyperkalemia is stable

## 2025-03-17 NOTE — SUBJECTIVE & OBJECTIVE
Interval History: NAEON. AF. VSS. 2L. Patient continues to report swelling in his thighs with associated shortness of breath however improving on IV Lasix. Otherwise he is well with no other complaints.     Review of Systems   Constitutional:  Negative for fever.   Respiratory:  Positive for shortness of breath and wheezing. Negative for cough, chest tightness and stridor.    Cardiovascular:  Positive for leg swelling. Negative for chest pain and palpitations.   Gastrointestinal:  Positive for abdominal distention. Negative for abdominal pain, diarrhea, nausea and vomiting.   Skin: Negative.    Neurological: Negative.    Psychiatric/Behavioral: Negative.       Objective:     Vital Signs (Most Recent):  Temp: 98.6 °F (37 °C) (03/17/25 0744)  Pulse: 92 (03/17/25 1000)  Resp: 18 (03/17/25 0901)  BP: (!) 107/56 (03/17/25 0744)  SpO2: 98 % (03/17/25 0744) Vital Signs (24h Range):  Temp:  [97.4 °F (36.3 °C)-98.6 °F (37 °C)] 98.6 °F (37 °C)  Pulse:  [61-92] 92  Resp:  [17-20] 18  SpO2:  [90 %-99 %] 98 %  BP: (107-135)/(56-90) 107/56     Weight: 124.6 kg (274 lb 11.1 oz)  Body mass index is 41.77 kg/m².    Intake/Output Summary (Last 24 hours) at 3/17/2025 1128  Last data filed at 3/17/2025 0929  Gross per 24 hour   Intake 1470 ml   Output 2450 ml   Net -980 ml         Physical Exam  Vitals and nursing note reviewed.   Constitutional:       General: He is not in acute distress.     Appearance: He is normal weight. He is not toxic-appearing.      Interventions: Nasal cannula in place.   HENT:      Head: Normocephalic and atraumatic.   Eyes:      Extraocular Movements: Extraocular movements intact.      Pupils: Pupils are equal, round, and reactive to light.   Cardiovascular:      Rate and Rhythm: Normal rate and regular rhythm.   Pulmonary:      Effort: Pulmonary effort is normal. No respiratory distress.      Breath sounds: Wheezing present.   Abdominal:      General: Abdomen is flat. There is no distension.    Musculoskeletal:         General: Swelling (bilateral thighs) present.      Cervical back: Normal range of motion and neck supple.      Right lower leg: No edema.      Left lower leg: No edema.   Skin:     General: Skin is warm and dry.   Neurological:      General: No focal deficit present.      Mental Status: He is alert.      GCS: GCS eye subscore is 4. GCS verbal subscore is 5. GCS motor subscore is 6.   Psychiatric:         Mood and Affect: Mood normal.         Behavior: Behavior normal. Behavior is cooperative.               Significant Labs: CBC:   Recent Labs   Lab 03/16/25  0539 03/17/25  0927   WBC 8.65 6.65   HGB 12.2* 11.4*   HCT 38.6* 35.6*    152     CMP:   Recent Labs   Lab 03/16/25  0540 03/17/25  0927   * 138   K 4.5 4.1   CL 98 101   CO2 28 28   * 83   BUN 29* 32*   CREATININE 2.0* 2.0*   CALCIUM 9.3 8.9   PROT 8.2 7.3   ALBUMIN 4.0 3.6   BILITOT 0.4 0.5   ALKPHOS 83 66   AST 15 17   ALT 17 22   ANIONGAP 9 9       Significant Imaging: I have reviewed all pertinent imaging results/findings within the past 24 hours.

## 2025-03-17 NOTE — CARE UPDATE
Unit [facility] Care Support Interaction      I have reviewed the chart of Ming Harrington who is hospitalized for COPD exacerbation. The patient is currently located in the following unit: CSU        I have assisted the primary physician in management of the following:      MRSA Decolonization - CHG ordered     Ratna Collins PA-C  Unit Based [facility]

## 2025-03-18 ENCOUNTER — EPISODE CHANGES (OUTPATIENT)
Dept: CARDIOLOGY | Facility: CLINIC | Age: 60
End: 2025-03-18

## 2025-03-18 VITALS
DIASTOLIC BLOOD PRESSURE: 71 MMHG | WEIGHT: 276.69 LBS | BODY MASS INDEX: 41.93 KG/M2 | HEIGHT: 68 IN | HEART RATE: 85 BPM | SYSTOLIC BLOOD PRESSURE: 117 MMHG | RESPIRATION RATE: 18 BRPM | TEMPERATURE: 98 F | OXYGEN SATURATION: 95 %

## 2025-03-18 PROBLEM — J44.1 COPD EXACERBATION: Chronic | Status: RESOLVED | Noted: 2017-07-21 | Resolved: 2025-03-18

## 2025-03-18 PROBLEM — I50.30 (HFPEF) HEART FAILURE WITH PRESERVED EJECTION FRACTION: Status: RESOLVED | Noted: 2024-03-26 | Resolved: 2025-03-18

## 2025-03-18 LAB
ALBUMIN SERPL BCP-MCNC: 3.8 G/DL (ref 3.5–5.2)
ALP SERPL-CCNC: 70 U/L (ref 40–150)
ALT SERPL W/O P-5'-P-CCNC: 31 U/L (ref 10–44)
ANION GAP SERPL CALC-SCNC: 10 MMOL/L (ref 8–16)
AST SERPL-CCNC: 19 U/L (ref 10–40)
BASOPHILS # BLD AUTO: 0.01 K/UL (ref 0–0.2)
BASOPHILS NFR BLD: 0.1 % (ref 0–1.9)
BILIRUB SERPL-MCNC: 0.4 MG/DL (ref 0.1–1)
BUN SERPL-MCNC: 37 MG/DL (ref 6–20)
CALCIUM SERPL-MCNC: 9 MG/DL (ref 8.7–10.5)
CHLORIDE SERPL-SCNC: 97 MMOL/L (ref 95–110)
CO2 SERPL-SCNC: 29 MMOL/L (ref 23–29)
CREAT SERPL-MCNC: 2.2 MG/DL (ref 0.5–1.4)
DIFFERENTIAL METHOD BLD: ABNORMAL
EOSINOPHIL # BLD AUTO: 0 K/UL (ref 0–0.5)
EOSINOPHIL NFR BLD: 0 % (ref 0–8)
ERYTHROCYTE [DISTWIDTH] IN BLOOD BY AUTOMATED COUNT: 12.1 % (ref 11.5–14.5)
EST. GFR  (NO RACE VARIABLE): 33.5 ML/MIN/1.73 M^2
GLUCOSE SERPL-MCNC: 125 MG/DL (ref 70–110)
HCT VFR BLD AUTO: 35.3 % (ref 40–54)
HGB BLD-MCNC: 11.2 G/DL (ref 14–18)
IMM GRANULOCYTES # BLD AUTO: 0.04 K/UL (ref 0–0.04)
IMM GRANULOCYTES NFR BLD AUTO: 0.6 % (ref 0–0.5)
LYMPHOCYTES # BLD AUTO: 0.9 K/UL (ref 1–4.8)
LYMPHOCYTES NFR BLD: 12.4 % (ref 18–48)
MAGNESIUM SERPL-MCNC: 2.3 MG/DL (ref 1.6–2.6)
MCH RBC QN AUTO: 30 PG (ref 27–31)
MCHC RBC AUTO-ENTMCNC: 31.7 G/DL (ref 32–36)
MCV RBC AUTO: 95 FL (ref 82–98)
MONOCYTES # BLD AUTO: 0.5 K/UL (ref 0.3–1)
MONOCYTES NFR BLD: 7.7 % (ref 4–15)
NEUTROPHILS # BLD AUTO: 5.4 K/UL (ref 1.8–7.7)
NEUTROPHILS NFR BLD: 79.2 % (ref 38–73)
NRBC BLD-RTO: 0 /100 WBC
PHOSPHATE SERPL-MCNC: 4.1 MG/DL (ref 2.7–4.5)
PLATELET # BLD AUTO: 170 K/UL (ref 150–450)
PMV BLD AUTO: 10.3 FL (ref 9.2–12.9)
POTASSIUM SERPL-SCNC: 4.7 MMOL/L (ref 3.5–5.1)
PROT SERPL-MCNC: 7.9 G/DL (ref 6–8.4)
RBC # BLD AUTO: 3.73 M/UL (ref 4.6–6.2)
SODIUM SERPL-SCNC: 136 MMOL/L (ref 136–145)
WBC # BLD AUTO: 6.84 K/UL (ref 3.9–12.7)

## 2025-03-18 PROCEDURE — 94761 N-INVAS EAR/PLS OXIMETRY MLT: CPT

## 2025-03-18 PROCEDURE — 80053 COMPREHEN METABOLIC PANEL: CPT

## 2025-03-18 PROCEDURE — 83735 ASSAY OF MAGNESIUM: CPT

## 2025-03-18 PROCEDURE — 85025 COMPLETE CBC W/AUTO DIFF WBC: CPT

## 2025-03-18 PROCEDURE — 36415 COLL VENOUS BLD VENIPUNCTURE: CPT

## 2025-03-18 PROCEDURE — 84100 ASSAY OF PHOSPHORUS: CPT

## 2025-03-18 PROCEDURE — 25000003 PHARM REV CODE 250

## 2025-03-18 PROCEDURE — 94640 AIRWAY INHALATION TREATMENT: CPT

## 2025-03-18 PROCEDURE — 63600175 PHARM REV CODE 636 W HCPCS: Performed by: STUDENT IN AN ORGANIZED HEALTH CARE EDUCATION/TRAINING PROGRAM

## 2025-03-18 RX ORDER — TRAZODONE HYDROCHLORIDE 50 MG/1
100 TABLET ORAL NIGHTLY
Qty: 180 TABLET | Refills: 3 | Status: SHIPPED | OUTPATIENT
Start: 2025-03-18 | End: 2026-03-18

## 2025-03-18 RX ORDER — FUROSEMIDE 80 MG/1
80 TABLET ORAL 3 TIMES DAILY
Status: DISCONTINUED | OUTPATIENT
Start: 2025-03-18 | End: 2025-03-18 | Stop reason: HOSPADM

## 2025-03-18 RX ORDER — ALBUTEROL SULFATE 90 UG/1
2 INHALANT RESPIRATORY (INHALATION) EVERY 6 HOURS PRN
Qty: 68 G | Refills: 0 | Status: SHIPPED | OUTPATIENT
Start: 2025-03-18 | End: 2026-03-18

## 2025-03-18 RX ORDER — FUROSEMIDE 80 MG/1
80 TABLET ORAL 2 TIMES DAILY
Qty: 60 TABLET | Refills: 11 | Status: SHIPPED | OUTPATIENT
Start: 2025-03-18 | End: 2026-03-18

## 2025-03-18 RX ORDER — AMLODIPINE BESYLATE 10 MG/1
10 TABLET ORAL DAILY
Qty: 90 TABLET | Refills: 3 | Status: SHIPPED | OUTPATIENT
Start: 2025-03-19 | End: 2026-03-19

## 2025-03-18 RX ADMIN — FLUTICASONE FUROATE AND VILANTEROL TRIFENATATE 1 PUFF: 200; 25 POWDER RESPIRATORY (INHALATION) at 07:03

## 2025-03-18 RX ADMIN — LACTULOSE 10 G: 20 SOLUTION ORAL at 08:03

## 2025-03-18 RX ADMIN — FUROSEMIDE 80 MG: 80 TABLET ORAL at 08:03

## 2025-03-18 RX ADMIN — CLONIDINE HYDROCHLORIDE 0.1 MG: 0.1 TABLET ORAL at 08:03

## 2025-03-18 RX ADMIN — METHADONE HYDROCHLORIDE 95 MG: 5 TABLET ORAL at 08:03

## 2025-03-18 RX ADMIN — AMLODIPINE BESYLATE 10 MG: 10 TABLET ORAL at 08:03

## 2025-03-18 RX ADMIN — TIOTROPIUM BROMIDE INHALATION SPRAY 2 PUFF: 3.12 SPRAY, METERED RESPIRATORY (INHALATION) at 07:03

## 2025-03-18 RX ADMIN — ENOXAPARIN SODIUM 40 MG: 40 INJECTION SUBCUTANEOUS at 08:03

## 2025-03-18 RX ADMIN — POLYETHYLENE GLYCOL 3350 17 G: 17 POWDER, FOR SOLUTION ORAL at 08:03

## 2025-03-18 RX ADMIN — ESCITALOPRAM OXALATE 20 MG: 5 TABLET, FILM COATED ORAL at 08:03

## 2025-03-18 RX ADMIN — ACETAMINOPHEN 650 MG: 325 TABLET ORAL at 08:03

## 2025-03-18 RX ADMIN — TAMSULOSIN HYDROCHLORIDE 0.4 MG: 0.4 CAPSULE ORAL at 08:03

## 2025-03-18 NOTE — PLAN OF CARE
In basket message sent to PCP regarding hosp f/u appointment.    UPDATE:        Future Appointments   Date Time Provider Department Center   4/10/2025 10:30 AM David Beckett PA-C [facility]   9/18/2025  8:45 AM [facility] OIC-US[facility] MASTER [facility]   9/18/2025  9:50 AM LAB, APPOINTMENT [facility] LAB VNP [facility]   9/18/2025 10:30 AM Scheuermann, Jennifer B., PA [facility]         Elli Tiwari, CHW, RSW, BSW  Case Management  [id number]

## 2025-03-18 NOTE — DISCHARGE SUMMARY
Evelio Gonzalez - Cardiology [facility] Medicine  Discharge Summary      Patient Name: Ming Harrington  MRN: [id number]  MADIE: [id number]  Patient Class: IP- Inpatient  Admission Date: 3/13/2025  Hospital Length of Stay: 3 days  Discharge Date and Time:  03/18/2025 10:55 AM  Attending Physician: Gigi Newell MD   Discharging Provider: Asael Gipson MD  Primary Care Provider: Garland Xiong DO  [facility] Medicine Team: [facility] [place] HOSP MED 2 Asael Gipson MD  Primary Care Team: [facility] [place] HOSP MED 2    HPI:   Ming Harrington is a 59 y.o. M with a PMHx of COPD on 2L O2 PRN, HFpEF, HTN, chronic HCV complicated by cirrhosis, CKD, opioid use disorder on methadone, and JUVENCIO presenting for increased shortness of breath in the setting missed medication doses. Patient reports his symptoms onset around one week ago. He has noticed increased dyspnea on exertion and orthopnea. States he ran out of his lasix around four days ago. Tried to get it refilled at the pharmacy, but was unable to. Also reports that he has been out of his Trelegy ellipta as well. He has been increasing the use of his Ventolin inhaler without relief. Associated symptoms include a sharp, substernal chest pain that occurs with breathing and worsening lower extremity edema, bilaterally. Patient denies any sick contacts. No fever, chills, productive cough, nausea, vomiting, diarrhea, burning with urination. Prior to onset of symptoms, patient able to get around the house without any issues. However, now he is only able to walk about 5 steps before becoming out of breath. Former smoker about 15 years at around 1 pack per day. Has stopped smoking for 1.5 years.     ED Course  On arrival, patient hypertensive at 184/104, otherwise afebrile. Saturations reportedly dropped to the 80s and required supplemental oxygen. CBC unremarkable. CMP with a Cr of 1.8 (around baseline). BNP and troponin negative. Flu and covid negative. Patient given lasix and duonebs with improvement in his symptoms. At  time of admit, patient weaned down to 2L O2 NC. Admitted to hospital medicine.     * No surgery found *      Hospital Course:   59M with PMHx of COPD on 2L O2 PRN, HFpEF, HTN, chronic HCV complicated by cirrhosis, CKD, opioid use disorder on methadone, and JUVENCIO admitted for COPD exacerbation and volume overload. Treating COPD exacerbation with scheduled inhalers and prednisone. He was initially treated with IV Lasix 40 mg with good urine response. He was then transitioned to PO Lasix however he stated his shortness of breath was worsening and he was transitioned back to IV Lasix 80 mg BID. He is continuing methadone 95mg inpatient. His breathing improved and he was transitioned to PO Lasix 80 mg BID. He was discharged home with follow-up with PCP, Heart Failure clinic, and sleep referral for insomnia.      Goals of Care Treatment Preferences:  Code Status: Full Code      SDOH Screening:  The patient was screened for utility difficulties, food insecurity, transport difficulties, housing insecurity, and interpersonal safety and there were no concerns identified this admission.     Consults:     Assessment & Plan  COPD exacerbation (Resolved: 3/18/2025)  History of COPD exacerbations requiring inpatient treatment. Patient reporting that he has been out of his Trelegy Ellipta.  Patient's COPD is with exacerbation noted by worsening of baseline hypoxia currently. Continue scheduled inhalers Steroids and Supplemental oxygen and monitor respiratory status closely.   Patient also with history of CHF. BNP and troponin wnl. Flu/Covid negative.     Plan:   - duonebs prn   - Prednisone 40mg QD 5 day course completed   - No need for antibiotics; patient not reporting of increased cough or productive cough.   (HFpEF) heart failure with preserved ejection fraction  Patient with HFpEF presenting with symptoms of exacerbation likely due to missed lasix doses. Patient reporting of SOB, dyspnea on exertion, and orthopnea.       Latest  ECHO  Results for orders placed during the hospital encounter of 02/15/24    Echo    Interpretation Summary    Left Ventricle: The left ventricle is normal in size. Normal wall thickness. There is concentric remodeling. There is normal systolic function. Biplane (2D) method of discs ejection fraction is 53%. There is normal diastolic function.    Right Ventricle: Mild right ventricular enlargement. Wall thickness is normal. Right ventricle wall motion  is normal. Systolic function is normal.    Right Atrium: Right atrium is mildly dilated.    Aorta: Aortic root is mildly dilated measuring 4.27 cm. Ascending aorta is normal.    Pulmonary Artery: The estimated pulmonary artery systolic pressure is 40 mmHg.    IVC/SVC: Normal venous pressure at 3 mmHg.    Current Heart Failure Medications  furosemide tablet 80 mg, 3 times daily, Oral  furosemide (LASIX) tablet, 2 times daily, Oral    Plan  - Attempted to transition to PO Lasix 40 mg BID - complaining of poor I/O    - discharged with PO Lasix 80 mg BID   - Monitor strict I&Os and daily weights.    - Place on telemetry  - Low sodium diet  - Place on fluid restriction of 1.5 L.   - Cardiology has not been consulted  - referral to Heart Failure Clinic   HTN (hypertension)  Patient's blood pressure range in the last 24 hours was: BP  Min: 107/56  Max: 134/109.The patient's inpatient anti-hypertensive regimen is listed below:  Current Antihypertensives  amLODIPine tablet 10 mg, Daily, Oral  cloNIDine tablet 0.1 mg, 2 times daily, Oral  furosemide tablet 80 mg, 3 times daily, Oral  amlodipine (NORVASC) tablet, Daily, Oral  furosemide (LASIX) tablet, 2 times daily, Oral    Plan  - Held valsartan due to hyperkalemia; d/c   - switched to Amlodipine 10 mg daily upon discharge   - Continue home BP meds as listed above   Opioid use disorder, severe, on maintenance therapy, dependence  Appears that patient is on methadone as maintenance for his opioid use disorder.    Plan:  -  Confirmed that patient attends [facility] methadone clinic  - Will continue methadone 95mg while inpatient    Chronic constipation  Plan  - Lactulose and Miralax  Hyperkalemia  Hyperkalemia is likely due to  unknown etiology .The patients most recent potassium results are listed below.  Recent Labs     03/16/25  0540 03/17/25  0927 03/18/25  0228   K 4.5 4.1 4.7     Resolved    Plan  - Monitor for arrhythmias with EKG and/or continuous telemetry.   - Treat the hyperkalemia with lokelma  - Recheck BMP today.   - The patient's hyperkalemia is stable      Swelling of joint of pelvic region or thigh, right  Unilateral enlargement of patient's right thigh. Reports it has been ongoing for about one year. DVT US in 09/2024 negative, otherwise, no dedicated imaging to the leg.    CT right thigh w/o contrast shows cellulitis vs lymphedema. Likely lymphedema due to chronicity and worsening with increased volume.    Stage 3a chronic kidney disease  Creatine stable for now. BMP reviewed- noted Estimated Creatinine Clearance: 46.1 mL/min (A) (based on SCr of 2.2 mg/dL (H)). according to latest data. Based on current GFR, CKD stage is stage 3 - GFR 30-59.      Plan  - monitor UOP and serial BMP and adjust therapy as needed.   - Renally dose meds.   - Avoid nephrotoxic medications and procedures.  Cirrhosis  Chronic Hep C infection s/p Epclusa. Follows hepatology outpatient. Originally diagnosed 2011 while incarcerated in [place]. Risks for HCV:  tattoo, drug use, incarceration.    MELD-Na score calculated; MELD 3.0: 13 at 3/15/2025  2:57 AM  MELD-Na: 13 at 3/15/2025  2:57 AM  Calculated from:  Serum Creatinine: 1.9 mg/dL at 3/15/2025  2:57 AM  Serum Sodium: 137 mmol/L at 3/15/2025  2:57 AM  Total Bilirubin: 0.4 mg/dL (Using min of 1 mg/dL) at 3/15/2025  2:57 AM  Serum Albumin: 3.8 g/dL (Using max of 3.5 g/dL) at 3/15/2025  2:57 AM  INR(ratio): 1 at 3/13/2025 10:06 AM  Age at listing (hypothetical): 59 years  Sex: Male at 3/15/2025  2:57  AM    - Cirrhosis due to chronic HCV. Follows Hepatology outpatient.    - HCV viral load pending  - FibroScan 7/1/24: kPa 16.1 (F4),  (S0).  - EGD 11/2024 without esophageal varices.  - Repeat US abdomen today stable. Chronic findings of persistent intra and extrahepatic biliary ductal dilatation and pancreatic ductal dilatation, similar in size to MRI MRCP 09/19/2024.  No filling defects or obstructing mass identified. Cholelithiasis.  No cholecystitis.  - Ductal dilatation is chronic s/p EUS 11/2024 w/o concerning finding. Repeat recommended 11/2025 per outpatient hepatology note.   - Will avoid any hepatotoxic meds, and monitor closely  Generalized anxiety disorder  Stable    Plan  - Resume home lexapro    Tobacco dependence  Former smoker. Reports quitting for over last 1.5 years. Reports smoking for >15 years around 1 pack per day.     Plan  - No need for patches at this time.  Severe obesity (BMI >= 40)  Body mass index is 42.07 kg/m². Morbid obesity complicates all aspects of disease management from diagnostic modalities to treatment. Weight loss encouraged and health benefits explained to patient.       Chronic anemia  Anemia is likely due to chronic disease due to Chronic Kidney Disease. Most recent hemoglobin and hematocrit are listed below.  Recent Labs     03/16/25  0539 03/17/25  0927 03/18/25  0228   HGB 12.2* 11.4* 11.2*   HCT 38.6* 35.6* 35.3*     Plan  - Monitor serial CBC: Daily  - Transfuse PRBC if patient becomes hemodynamically unstable, symptomatic or H/H drops below 7/21.  - Patient has not received any PRBC transfusions to date  - Patient's anemia is currently stable    Insomnia  Patient report chronic insomnia with poor response to trazadone, mirtazapine and melatonin     Plan  - sleep study referral     Final Active Diagnoses:    Diagnosis Date Noted POA    PRINCIPAL PROBLEM:  (HFpEF) heart failure with preserved ejection fraction [I50.30] 03/26/2024 Yes    Severe obesity (BMI >= 40)  [E66.01] 03/17/2025 Yes    Chronic anemia [D64.9] 03/17/2025 Yes    Swelling of joint of pelvic region or thigh, right [M25.451] 03/14/2025 Yes    Cirrhosis [K74.60] 03/13/2025 Unknown    Stage 3a chronic kidney disease [N18.31] 06/20/2023 Yes    Chronic constipation [K59.09] 05/12/2022 Yes    Tobacco dependence [F17.200] 02/25/2020 Yes    Generalized anxiety disorder [F41.1] 04/17/2019 Yes    Hyperkalemia [E87.5] 10/01/2017 Yes    COPD exacerbation [J44.1] 07/21/2017 Yes     Chronic    Opioid use disorder, severe, on maintenance therapy, dependence [F11.20] 01/08/2013 Yes    HTN (hypertension) [I10]  Yes      Problems Resolved During this Admission:    Diagnosis Date Noted Date Resolved POA    Stage 3b chronic kidney disease [N18.32] 03/26/2024 03/13/2025 Yes    Severe asthma [J45.909] 03/14/2019 03/13/2025 Yes    Hepatitis C virus infection [B19.20] 02/06/2013 03/14/2025 Yes       Discharged Condition: stable    Disposition:     Follow Up:    Patient Instructions:      Ambulatory referral/consult to Heart Failure Transitional Care Clinic   Standing Status: Future   Referral Priority: Routine Referral Type: Consultation   Referral Reason: Specialty Services Required   Requested Specialty: Cardiology   Number of Visits Requested: 1     Ambulatory referral/consult to Internal Medicine   Standing Status: Future   Referral Priority: Routine Referral Type: Consultation   Referral Reason: Specialty Services Required   Requested Specialty: Internal Medicine   Number of Visits Requested: 1     Ambulatory referral/consult to Sleep Disorders   Standing Status: Future   Referral Priority: Routine Referral Type: Consultation   Requested Specialty: Sleep Medicine   Number of Visits Requested: 1       Significant Diagnostic Studies: Labs: CMP   Recent Labs   Lab 03/17/25  0927 03/18/25  0228    136   K 4.1 4.7    97   CO2 28 29   GLU 83 125*   BUN 32* 37*   CREATININE 2.0* 2.2*   CALCIUM 8.9 9.0   PROT 7.3 7.9    ALBUMIN 3.6 3.8   BILITOT 0.5 0.4   ALKPHOS 66 70   AST 17 19   ALT 22 31   ANIONGAP 9 10    and CBC   Recent Labs   Lab 03/17/25  0927 03/18/25  0228   WBC 6.65 6.84   HGB 11.4* 11.2*   HCT 35.6* 35.3*    170       Pending Diagnostic Studies:       Procedure Component Value Units Date/Time    Methadone Conf, Urine Random [[id number]] Collected: 03/13/25 1835    Order Status: Sent Lab Status: In process Updated: 03/13/25 1857    Specimen: Urine, Clean Catch            Medications:  Reconciled Home Medications:      Medication List        START taking these medications      amLODIPine 10 MG tablet  Commonly known as: NORVASC  Take 1 tablet (10 mg total) by mouth once daily.  Start taking on: March 19, 2025            CHANGE how you take these medications      EScitalopram oxalate 10 MG tablet  Commonly known as: LEXAPRO  Take 2 tablets (20 mg total) by mouth once daily.  What changed: when to take this     furosemide 80 MG tablet  Commonly known as: LASIX  Take 1 tablet (80 mg total) by mouth 2 (two) times a day.  What changed:   medication strength  how much to take  when to take this  additional instructions     polyethylene glycol 17 gram/dose powder  Commonly known as: GLYCOLAX  Take 17 g by mouth 2 (two) times daily.  What changed:   when to take this  reasons to take this            CONTINUE taking these medications      acetaminophen 500 MG tablet  Commonly known as: TYLENOL  Take 3 tablets by mouth daily as needed (headache).     albuterol 90 mcg/actuation inhaler  Commonly known as: VENTOLIN HFA  Inhale 2 puffs into the lungs every 6 (six) hours as needed for Wheezing or Shortness of Breath. Rescue     ciprofloxacin HCl 0.3 % ophthalmic solution  Commonly known as: CILOXAN  Place 1 drop into the right eye twice daily and place 1 drop into the left eye three times daily     cloNIDine 0.1 MG tablet  Commonly known as: CATAPRES  Take 1 tablet (0.1 mg total) by mouth 2 (two) times daily.      fluticasone-umeclidin-vilanter 200-62.5-25 mcg inhaler  Commonly known as: TRELEGY ELLIPTA  Inhale 1 puff into the lungs once daily.     ketorolac 0.5% 0.5 % Drop  Commonly known as: ACULAR  Place 1 drop into the right eye twice daily and place 1 drop into the left eye three times daily     loratadine 10 mg tablet  Commonly known as: CLARITIN  Take 1 tablet (10 mg total) by mouth once daily.     methadone 10 MG tablet  Commonly known as: DOLOPHINE  Take 95 mg by mouth Daily. Mixes it in water     prednisoLONE acetate 1 % Drps  Commonly known as: PRED FORTE  Place 1 drop into the right eye twice daily and place 1 drop into the left eye three times daily     tamsulosin 0.4 mg Cap  Commonly known as: FLOMAX  Take 0.4 mg by mouth once daily.     traZODone 50 MG tablet  Commonly known as: DESYREL  Take 2 tablets (100 mg total) by mouth every evening.            STOP taking these medications      amlodipine-valsartan  mg per tablet  Commonly known as: EXFORGE              Indwelling Lines/Drains at time of discharge:   Lines/Drains/Airways       None                   Time spent on the discharge of patient: 60 minutes         Asael Gipson MD  Department of Hospital Medicine  [facility] - Cardiology Stepdown

## 2025-03-18 NOTE — ASSESSMENT & PLAN NOTE
Creatine stable for now. BMP reviewed- noted Estimated Creatinine Clearance: 46.1 mL/min (A) (based on SCr of 2.2 mg/dL (H)). according to latest data. Based on current GFR, CKD stage is stage 3 - GFR 30-59.      Plan  - monitor UOP and serial BMP and adjust therapy as needed.   - Renally dose meds.   - Avoid nephrotoxic medications and procedures.

## 2025-03-18 NOTE — ASSESSMENT & PLAN NOTE
Anemia is likely due to chronic disease due to Chronic Kidney Disease. Most recent hemoglobin and hematocrit are listed below.  Recent Labs     03/16/25  0539 03/17/25  0927 03/18/25  0228   HGB 12.2* 11.4* 11.2*   HCT 38.6* 35.6* 35.3*     Plan  - Monitor serial CBC: Daily  - Transfuse PRBC if patient becomes hemodynamically unstable, symptomatic or H/H drops below 7/21.  - Patient has not received any PRBC transfusions to date  - Patient's anemia is currently stable

## 2025-03-18 NOTE — ASSESSMENT & PLAN NOTE
Patient with HFpEF presenting with symptoms of exacerbation likely due to missed lasix doses. Patient reporting of SOB, dyspnea on exertion, and orthopnea.       Latest ECHO  Results for orders placed during the hospital encounter of 02/15/24    Echo    Interpretation Summary    Left Ventricle: The left ventricle is normal in size. Normal wall thickness. There is concentric remodeling. There is normal systolic function. Biplane (2D) method of discs ejection fraction is 53%. There is normal diastolic function.    Right Ventricle: Mild right ventricular enlargement. Wall thickness is normal. Right ventricle wall motion  is normal. Systolic function is normal.    Right Atrium: Right atrium is mildly dilated.    Aorta: Aortic root is mildly dilated measuring 4.27 cm. Ascending aorta is normal.    Pulmonary Artery: The estimated pulmonary artery systolic pressure is 40 mmHg.    IVC/SVC: Normal venous pressure at 3 mmHg.    Current Heart Failure Medications  furosemide tablet 80 mg, 3 times daily, Oral  furosemide (LASIX) tablet, 2 times daily, Oral    Plan  - Attempted to transition to PO Lasix 40 mg BID - complaining of poor I/O    - discharged with PO Lasix 80 mg BID   - Monitor strict I&Os and daily weights.    - Place on telemetry  - Low sodium diet  - Place on fluid restriction of 1.5 L.   - Cardiology has not been consulted  - referral to Heart Failure Clinic

## 2025-03-18 NOTE — ASSESSMENT & PLAN NOTE
Appears that patient is on methadone as maintenance for his opioid use disorder.    Plan:  - Confirmed that patient attends [facility] methadone clinic  - Will continue methadone 95mg while inpatient

## 2025-03-18 NOTE — ASSESSMENT & PLAN NOTE
Patient report chronic insomnia with poor response to trazadone, mirtazapine and melatonin     Plan  - sleep study referral

## 2025-03-18 NOTE — ASSESSMENT & PLAN NOTE
Hyperkalemia is likely due to unknown etiology.The patients most recent potassium results are listed below.  Recent Labs     03/16/25  0540 03/17/25  0927 03/18/25  0228   K 4.5 4.1 4.7     Resolved    Plan  - Monitor for arrhythmias with EKG and/or continuous telemetry.   - Treat the hyperkalemia with lokelma  - Recheck BMP today.   - The patient's hyperkalemia is stable

## 2025-03-18 NOTE — PLAN OF CARE
Patient without any evidence of hospital acquired infection, no c/o pain and safe environment for bariatric patient. Patient has been mostly having issues with sleeping at this time.  Doctor informed.

## 2025-03-18 NOTE — ASSESSMENT & PLAN NOTE
Patient's blood pressure range in the last 24 hours was: BP  Min: 107/56  Max: 134/109.The patient's inpatient anti-hypertensive regimen is listed below:  Current Antihypertensives  amLODIPine tablet 10 mg, Daily, Oral  cloNIDine tablet 0.1 mg, 2 times daily, Oral  furosemide tablet 80 mg, 3 times daily, Oral  amlodipine (NORVASC) tablet, Daily, Oral  furosemide (LASIX) tablet, 2 times daily, Oral    Plan  - Held valsartan due to hyperkalemia; d/c   - switched to Amlodipine 10 mg daily upon discharge   - Continue home BP meds as listed above

## 2025-03-19 NOTE — PLAN OF CARE
E[facility]io Gonzalez - Cardiology Stepdown  Discharge Final Note    Primary Care Provider: Garland Xiong DO    Expected Discharge Date: 3/18/2025    Final Discharge Note (most recent)       Final Note - 03/18/25 0915          Final Note    Assessment Type Final Discharge Note     Anticipated Discharge Disposition Home or Self Care     Hospital Resources/Appts/Education Provided Provided patient/caregiver with written discharge plan information                 SW reviewed pt chart and discussed pt during a.m. care team huddle. Pt hs a discharge order and is discharged to home with no SW/CM needs.       Carmen Murillo, MSW  [phone]

## 2025-03-21 LAB
EDDP UR-MCNC: 4378 NG/ML
METHADONE CHAIN OF CUSTODY: NORMAL
METHADONE UR-MCNC: 727 NG/ML
TOXICOLOGIST REVIEW: NORMAL

## 2025-03-26 ENCOUNTER — DOCUMENTATION ONLY (OUTPATIENT)
Dept: CARDIOLOGY | Facility: CLINIC | Age: 60
End: 2025-03-26
Payer: MEDICAID

## 2025-03-26 NOTE — PROGRESS NOTES
Heart Failure Transitional Care Clinic ([facility]CC) Team notified of pt referral via Ambulatory Referral to Heart Failure Transitional Care ([id number]).    Patient screened today 3- by provider and RN for enrollment to program.      Pt was deemed not a candidate for enrollment at this time related to patient current admission diagnosis/ problem will not benefit from the Heart Failure Transitional Care Program. R/O PT PP COPD.     Pt will require additional follow up planning per primary team.     If pt status, diagnosis, or treatment plan changes , please place AMB referral to Heart Failure Transitional Care Clinic ([id number]) for HFTCC enrollment re-evalution.

## 2025-03-27 ENCOUNTER — RESULTS FOLLOW-UP (OUTPATIENT)
Dept: EMERGENCY MEDICINE | Facility: HOSPITAL | Age: 60
End: 2025-03-27

## 2025-04-11 ENCOUNTER — HOSPITAL ENCOUNTER (EMERGENCY)
Facility: HOSPITAL | Age: 60
Discharge: HOME | End: 2025-04-11
Attending: EMERGENCY MEDICINE
Payer: MEDICAID

## 2025-04-11 VITALS
TEMPERATURE: 98 F | RESPIRATION RATE: 15 BRPM | HEART RATE: 62 BPM | WEIGHT: 285 LBS | SYSTOLIC BLOOD PRESSURE: 115 MMHG | OXYGEN SATURATION: 94 % | BODY MASS INDEX: 43.19 KG/M2 | DIASTOLIC BLOOD PRESSURE: 70 MMHG | HEIGHT: 68 IN

## 2025-04-11 DIAGNOSIS — M79.604 RIGHT LEG PAIN: ICD-10-CM

## 2025-04-11 DIAGNOSIS — J45.909 ASTHMA, UNSPECIFIED ASTHMA SEVERITY, UNSPECIFIED WHETHER COMPLICATED, UNSPECIFIED WHETHER PERSISTENT: Primary | ICD-10-CM

## 2025-04-11 DIAGNOSIS — J44.9 CHRONIC OBSTRUCTIVE PULMONARY DISEASE, UNSPECIFIED COPD TYPE: ICD-10-CM

## 2025-04-11 DIAGNOSIS — S91.301A WOUND OF RIGHT FOOT: ICD-10-CM

## 2025-04-11 DIAGNOSIS — M54.31 SCIATICA OF RIGHT SIDE: ICD-10-CM

## 2025-04-11 LAB
ABSOLUTE EOSINOPHIL (OHS): 0.28 K/UL
ABSOLUTE MONOCYTE (OHS): 0.45 K/UL (ref 0.3–1)
ABSOLUTE NEUTROPHIL COUNT (OHS): 3.62 K/UL (ref 1.8–7.7)
ALBUMIN SERPL BCP-MCNC: 4 G/DL (ref 3.5–5.2)
ALP SERPL-CCNC: 81 UNIT/L (ref 40–150)
ALT SERPL W/O P-5'-P-CCNC: 13 UNIT/L (ref 10–44)
ANION GAP (OHS): 13 MMOL/L (ref 8–16)
AST SERPL-CCNC: 22 UNIT/L (ref 11–45)
BASOPHILS # BLD AUTO: 0.02 K/UL
BASOPHILS NFR BLD AUTO: 0.3 %
BILIRUB SERPL-MCNC: 0.5 MG/DL (ref 0.1–1)
BUN SERPL-MCNC: 36 MG/DL (ref 6–20)
CALCIUM SERPL-MCNC: 9.4 MG/DL (ref 8.7–10.5)
CHLORIDE SERPL-SCNC: 103 MMOL/L (ref 95–110)
CO2 SERPL-SCNC: 20 MMOL/L (ref 23–29)
CREAT SERPL-MCNC: 2.2 MG/DL (ref 0.5–1.4)
CRP SERPL-MCNC: 0.2 MG/L
ERYTHROCYTE [DISTWIDTH] IN BLOOD BY AUTOMATED COUNT: 11.9 % (ref 11.5–14.5)
GFR SERPLBLD CREATININE-BSD FMLA CKD-EPI: 33 ML/MIN/1.73/M2
GLUCOSE SERPL-MCNC: 94 MG/DL (ref 70–110)
HCT VFR BLD AUTO: 33.2 % (ref 40–54)
HGB BLD-MCNC: 11.2 GM/DL (ref 14–18)
HOLD SPECIMEN: NORMAL
IMM GRANULOCYTES # BLD AUTO: 0.01 K/UL (ref 0–0.04)
IMM GRANULOCYTES NFR BLD AUTO: 0.2 % (ref 0–0.5)
LACTATE SERPL-SCNC: 0.8 MMOL/L (ref 0.5–2.2)
LYMPHOCYTES # BLD AUTO: 1.43 K/UL (ref 1–4.8)
MCH RBC QN AUTO: 30.9 PG (ref 27–31)
MCHC RBC AUTO-ENTMCNC: 33.7 G/DL (ref 32–36)
MCV RBC AUTO: 92 FL (ref 82–98)
NUCLEATED RBC (/100WBC) (OHS): 0 /100 WBC
PLATELET # BLD AUTO: 176 K/UL (ref 150–450)
PMV BLD AUTO: 11.2 FL (ref 9.2–12.9)
POTASSIUM SERPL-SCNC: 4.4 MMOL/L (ref 3.5–5.1)
PROCALCITONIN SERPL-MCNC: <0.02 NG/ML
PROT SERPL-MCNC: 8.7 GM/DL (ref 6–8.4)
RBC # BLD AUTO: 3.63 M/UL (ref 4.6–6.2)
RELATIVE EOSINOPHIL (OHS): 4.8 %
RELATIVE LYMPHOCYTE (OHS): 24.6 % (ref 18–48)
RELATIVE MONOCYTE (OHS): 7.7 % (ref 4–15)
RELATIVE NEUTROPHIL (OHS): 62.4 % (ref 38–73)
SODIUM SERPL-SCNC: 136 MMOL/L (ref 136–145)
WBC # BLD AUTO: 5.81 K/UL (ref 3.9–12.7)

## 2025-04-11 PROCEDURE — 99285 EMERGENCY DEPT VISIT HI MDM: CPT | Mod: 25

## 2025-04-11 PROCEDURE — 87040 BLOOD CULTURE FOR BACTERIA: CPT | Performed by: EMERGENCY MEDICINE

## 2025-04-11 PROCEDURE — 84145 PROCALCITONIN (PCT): CPT | Performed by: EMERGENCY MEDICINE

## 2025-04-11 PROCEDURE — 86140 C-REACTIVE PROTEIN: CPT | Performed by: EMERGENCY MEDICINE

## 2025-04-11 PROCEDURE — 80053 COMPREHEN METABOLIC PANEL: CPT | Performed by: EMERGENCY MEDICINE

## 2025-04-11 PROCEDURE — 94640 AIRWAY INHALATION TREATMENT: CPT

## 2025-04-11 PROCEDURE — 94761 N-INVAS EAR/PLS OXIMETRY MLT: CPT

## 2025-04-11 PROCEDURE — 83605 ASSAY OF LACTIC ACID: CPT | Performed by: EMERGENCY MEDICINE

## 2025-04-11 PROCEDURE — 25000242 PHARM REV CODE 250 ALT 637 W/ HCPCS: Performed by: EMERGENCY MEDICINE

## 2025-04-11 PROCEDURE — 36415 COLL VENOUS BLD VENIPUNCTURE: CPT | Performed by: EMERGENCY MEDICINE

## 2025-04-11 PROCEDURE — 85025 COMPLETE CBC W/AUTO DIFF WBC: CPT | Performed by: EMERGENCY MEDICINE

## 2025-04-11 RX ORDER — PREDNISONE 20 MG/1
40 TABLET ORAL DAILY
Qty: 10 TABLET | Refills: 0 | Status: ON HOLD | OUTPATIENT
Start: 2025-04-11 | End: 2025-04-16

## 2025-04-11 RX ORDER — IPRATROPIUM BROMIDE AND ALBUTEROL SULFATE 2.5; .5 MG/3ML; MG/3ML
3 SOLUTION RESPIRATORY (INHALATION)
Status: COMPLETED | OUTPATIENT
Start: 2025-04-11 | End: 2025-04-11

## 2025-04-11 RX ORDER — GABAPENTIN 300 MG/1
300 CAPSULE ORAL 3 TIMES DAILY
Qty: 90 CAPSULE | Refills: 1 | Status: ON HOLD | OUTPATIENT
Start: 2025-04-11 | End: 2026-04-11

## 2025-04-11 RX ORDER — ALBUTEROL SULFATE 90 UG/1
2 INHALANT RESPIRATORY (INHALATION) EVERY 6 HOURS PRN
Qty: 68 G | Refills: 0 | Status: ON HOLD | OUTPATIENT
Start: 2025-04-11 | End: 2026-04-11

## 2025-04-11 RX ADMIN — IPRATROPIUM BROMIDE AND ALBUTEROL SULFATE 3 ML: 2.5; .5 SOLUTION RESPIRATORY (INHALATION) at 02:04

## 2025-04-11 NOTE — ED NOTES
Pt not satisfied with dc plan. Provider to BS for discussion. Provider to order walker and general surgery referral in addition to current  DC plan. Pt provided with walker and encouraged to take tylenol and/or motrin for pain and follow up with podiatry, ortho, and general surgery.  Pt verbalized understanding but continues to express dissatisfaction with dc plan. Case management consulted.

## 2025-04-11 NOTE — ED NOTES
Patient on cardiac monitor, automatic blood pressure cuff and pulse oximeter.      Comfort measures initiated. Care plan discussed with pt. Call light with reach of pt. Warm blankets provided. Pt denies needs/wants/complaints at this time.

## 2025-04-11 NOTE — ED NOTES
Pt presents to Ed with c/o swelling to  right thigh  x months. States its has been increasingly getting worse and reports painful ambulation. Swelling is to right thigh. Distal CMS intact. No redness or warmth noted.     Skin is dry. Pt reports wound to right foot. No wound appreciated. heal is calloused.
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
## 2025-04-14 ENCOUNTER — TELEPHONE (OUTPATIENT)
Dept: PODIATRY | Facility: CLINIC | Age: 60
End: 2025-04-14
Payer: MEDICAID

## 2025-04-14 NOTE — TELEPHONE ENCOUNTER
Called and spoke to patient. Patient concerned about losing his leg/foot. Informed him to go to the ER and that if he is concerned about the loss of his leg/foot, that is a serious emergent issue. Emphasized to go to the ER. Patient understood and said he expected us to say this. He stated he had a wound that was not looked at during his previous  ER visit. He sounded very concerned about it. Emphasized ER once again. Scheduled f/u appointment from hospital. Patient understood and was thankful.

## 2025-04-15 ENCOUNTER — TELEPHONE (OUTPATIENT)
Dept: SURGERY | Facility: CLINIC | Age: 60
End: 2025-04-15
Payer: MEDICAID

## 2025-04-15 ENCOUNTER — TELEPHONE (OUTPATIENT)
Dept: NEUROSURGERY | Facility: CLINIC | Age: 60
End: 2025-04-15
Payer: MEDICAID

## 2025-04-15 NOTE — TELEPHONE ENCOUNTER
Returned pt's call, pt stated that he would like to schedule an appointment with . Pt denied having an MRI within the last 6 months. I scheduled pt to see Seda on April 23 at 2 and xrays at 1. Pt confirmed and voiced understanding

## 2025-04-15 NOTE — TELEPHONE ENCOUNTER
----- Message from Kamilah sent at 4/15/2025  9:40 AM CDT -----  Type:  Sooner Appointment RequestCaller is requesting a sooner appointment.  Caller declined first available appointment listed below.  Caller will not accept being placed on the waitlist and is requesting a message be sent to doctor.Name of Caller: Pt When is the first available appointment? N/A Symptoms: M79.604 (ICD-10-CM) - Right leg painWould the patient rather a call back or a response via [facility]? Call Best Call Back Number: [phone] Additional Information: pt has referral in chart          04/15/2025            1644  Attempted to contact patient regarding the above message. No answer. Left voicemail.

## 2025-04-16 LAB
BACTERIA BLD CULT: NORMAL
BACTERIA BLD CULT: NORMAL

## 2025-04-17 ENCOUNTER — OFFICE VISIT (OUTPATIENT)
Dept: FAMILY MEDICINE | Facility: HOSPITAL | Age: 60
End: 2025-04-17
Payer: MEDICAID

## 2025-04-17 ENCOUNTER — HOSPITAL ENCOUNTER (INPATIENT)
Facility: HOSPITAL | Age: 60
LOS: 2 days | Discharge: HOME | DRG: 291 | End: 2025-04-22
Attending: EMERGENCY MEDICINE | Admitting: FAMILY MEDICINE
Payer: MEDICAID

## 2025-04-17 VITALS
OXYGEN SATURATION: 91 % | BODY MASS INDEX: 40.6 KG/M2 | WEIGHT: 267.88 LBS | HEART RATE: 83 BPM | DIASTOLIC BLOOD PRESSURE: 156 MMHG | HEIGHT: 68 IN | SYSTOLIC BLOOD PRESSURE: 178 MMHG

## 2025-04-17 DIAGNOSIS — J44.1 COPD EXACERBATION: Primary | ICD-10-CM

## 2025-04-17 DIAGNOSIS — I16.0 HYPERTENSIVE URGENCY: Primary | ICD-10-CM

## 2025-04-17 DIAGNOSIS — R06.02 SHORTNESS OF BREATH: ICD-10-CM

## 2025-04-17 PROBLEM — M79.604 CHRONIC PAIN OF RIGHT LOWER EXTREMITY: Status: ACTIVE | Noted: 2025-04-17

## 2025-04-17 PROBLEM — G89.29 CHRONIC PAIN OF RIGHT LOWER EXTREMITY: Status: ACTIVE | Noted: 2025-04-17

## 2025-04-17 PROBLEM — N40.0 BPH (BENIGN PROSTATIC HYPERPLASIA): Status: ACTIVE | Noted: 2025-04-17

## 2025-04-17 LAB
ABSOLUTE EOSINOPHIL (OHS): 0.19 K/UL
ABSOLUTE MONOCYTE (OHS): 0.6 K/UL (ref 0.3–1)
ABSOLUTE NEUTROPHIL COUNT (OHS): 3.54 K/UL (ref 1.8–7.7)
ALBUMIN SERPL BCP-MCNC: 4 G/DL (ref 3.5–5.2)
ALLENS TEST: YES
ALP SERPL-CCNC: 72 UNIT/L (ref 40–150)
ALT SERPL W/O P-5'-P-CCNC: 16 UNIT/L (ref 10–44)
ANION GAP (OHS): 8 MMOL/L (ref 8–16)
AST SERPL-CCNC: 16 UNIT/L (ref 11–45)
BASOPHILS # BLD AUTO: 0.02 K/UL
BASOPHILS NFR BLD AUTO: 0.3 %
BILIRUB SERPL-MCNC: 0.9 MG/DL (ref 0.1–1)
BILIRUB UR QL STRIP.AUTO: NEGATIVE
BILIRUB UR QL STRIP.AUTO: NEGATIVE
BNP SERPL-MCNC: 14 PG/ML (ref 0–99)
BUN SERPL-MCNC: 41 MG/DL (ref 6–20)
CALCIUM SERPL-MCNC: 9.4 MG/DL (ref 8.7–10.5)
CHLORIDE SERPL-SCNC: 96 MMOL/L (ref 95–110)
CLARITY UR: CLEAR
CLARITY UR: CLEAR
CO2 SERPL-SCNC: 33 MMOL/L (ref 23–29)
COLOR UR AUTO: YELLOW
COLOR UR AUTO: YELLOW
CREAT SERPL-MCNC: 2.3 MG/DL (ref 0.5–1.4)
CREAT UR-MCNC: 71.8 MG/DL (ref 23–375)
ERYTHROCYTE [DISTWIDTH] IN BLOOD BY AUTOMATED COUNT: 11.9 % (ref 11.5–14.5)
FIO2: 21 %
FIO2: 24 %
GFR SERPLBLD CREATININE-BSD FMLA CKD-EPI: 32 ML/MIN/1.73/M2
GLUCOSE SERPL-MCNC: 97 MG/DL (ref 70–110)
GLUCOSE UR QL STRIP: NEGATIVE
GLUCOSE UR QL STRIP: NEGATIVE
HCT VFR BLD AUTO: 35.4 % (ref 40–54)
HCT VFR BLD CALC: 35.3 % (ref 36–54)
HGB BLD-MCNC: 11.5 G/DL (ref 9–18)
HGB BLD-MCNC: 11.5 GM/DL (ref 14–18)
HGB UR QL STRIP: NEGATIVE
HGB UR QL STRIP: NEGATIVE
HOLD SPECIMEN: NORMAL
IMM GRANULOCYTES # BLD AUTO: 0 K/UL (ref 0–0.04)
IMM GRANULOCYTES NFR BLD AUTO: 0 % (ref 0–0.5)
KETONES UR QL STRIP: NEGATIVE
KETONES UR QL STRIP: NEGATIVE
LDH SERPL L TO P-CCNC: 0.4 MMOL/L (ref 0.5–2.2)
LEUKOCYTE ESTERASE UR QL STRIP: NEGATIVE
LEUKOCYTE ESTERASE UR QL STRIP: NEGATIVE
LIPASE SERPL-CCNC: 21 U/L (ref 4–60)
LPM: 1
LYMPHOCYTES # BLD AUTO: 1.72 K/UL (ref 1–4.8)
MCH RBC QN AUTO: 30.3 PG (ref 27–31)
MCHC RBC AUTO-ENTMCNC: 32.5 G/DL (ref 32–36)
MCV RBC AUTO: 93 FL (ref 82–98)
NITRITE UR QL STRIP: NEGATIVE
NITRITE UR QL STRIP: NEGATIVE
NUCLEATED RBC (/100WBC) (OHS): 0 /100 WBC
OHS QRS DURATION: 100 MS
OHS QRS DURATION: 96 MS
OHS QTC CALCULATION: 481 MS
OHS QTC CALCULATION: 494 MS
OSMOLALITY UR: 333 MOSM/KG (ref 50–1200)
PCO2 BLDA: 68.3 MMHG (ref 35–45)
PCO2 BLDA: 79.1 MMHG (ref 35–45)
PH SMN: 7.3 [PH] (ref 7.35–7.45)
PH SMN: 7.32 [PH] (ref 7.35–7.45)
PH UR STRIP: 7 [PH]
PH UR STRIP: 7 [PH]
PLATELET # BLD AUTO: 176 K/UL (ref 150–450)
PMV BLD AUTO: 9.9 FL (ref 9.2–12.9)
PO2 BLDA: 21 MMHG (ref 40–60)
PO2 BLDA: 61 MMHG (ref 80–100)
POC BASE DEFICIT: 7.2 MMOL/L (ref -2–2)
POC BASE DEFICIT: 9.4 MMOL/L (ref -2–2)
POC HCO3: 35.2 MMOL/L (ref 24–28)
POC HCO3: 38.5 MMOL/L (ref 24–28)
POC IONIZED CALCIUM: 1.21 MMOL/L (ref 1.06–1.42)
POC PERFORMED BY: ABNORMAL
POC PERFORMED BY: ABNORMAL
POC SATURATED O2: 26.7 % (ref 95–100)
POC SATURATED O2: 89.1 % (ref 95–100)
POCT GLUCOSE: 113 MG/DL (ref 70–110)
POCT GLUCOSE: 171 MG/DL (ref 70–110)
POTASSIUM BLD-SCNC: 4.4 MMOL/L (ref 3.5–5.1)
POTASSIUM SERPL-SCNC: 3.8 MMOL/L (ref 3.5–5.1)
PROT SERPL-MCNC: 8.3 GM/DL (ref 6–8.4)
PROT UR QL STRIP: NEGATIVE
PROT UR QL STRIP: NEGATIVE
RBC # BLD AUTO: 3.8 M/UL (ref 4.6–6.2)
RELATIVE EOSINOPHIL (OHS): 3.1 %
RELATIVE LYMPHOCYTE (OHS): 28.3 % (ref 18–48)
RELATIVE MONOCYTE (OHS): 9.9 % (ref 4–15)
RELATIVE NEUTROPHIL (OHS): 58.4 % (ref 38–73)
SODIUM BLD-SCNC: 142 MMOL/L (ref 136–145)
SODIUM SERPL-SCNC: 137 MMOL/L (ref 136–145)
SODIUM UR-SCNC: 50 MMOL/L (ref 20–250)
SP GR UR STRIP: 1.01
SP GR UR STRIP: 1.01
SPECIMEN SOURCE: ABNORMAL
SPECIMEN SOURCE: ABNORMAL
TROPONIN I SERPL DL<=0.01 NG/ML-MCNC: 0.01 NG/ML
UROBILINOGEN UR STRIP-ACNC: NEGATIVE EU/DL
UROBILINOGEN UR STRIP-ACNC: NEGATIVE EU/DL
UUN UR-MCNC: 429 MG/DL (ref 140–1050)
WBC # BLD AUTO: 6.07 K/UL (ref 3.9–12.7)

## 2025-04-17 PROCEDURE — 83605 ASSAY OF LACTIC ACID: CPT

## 2025-04-17 PROCEDURE — 63600175 PHARM REV CODE 636 W HCPCS: Mod: JZ,TB | Performed by: EMERGENCY MEDICINE

## 2025-04-17 PROCEDURE — 83935 ASSAY OF URINE OSMOLALITY: CPT | Performed by: NURSE PRACTITIONER

## 2025-04-17 PROCEDURE — 99900035 HC TECH TIME PER 15 MIN (STAT)

## 2025-04-17 PROCEDURE — 94761 N-INVAS EAR/PLS OXIMETRY MLT: CPT | Mod: XB

## 2025-04-17 PROCEDURE — 27000221 HC OXYGEN, UP TO 24 HOURS

## 2025-04-17 PROCEDURE — 82570 ASSAY OF URINE CREATININE: CPT | Performed by: NURSE PRACTITIONER

## 2025-04-17 PROCEDURE — 82803 BLOOD GASES ANY COMBINATION: CPT | Mod: 91

## 2025-04-17 PROCEDURE — G0378 HOSPITAL OBSERVATION PER HR: HCPCS

## 2025-04-17 PROCEDURE — 96374 THER/PROPH/DIAG INJ IV PUSH: CPT

## 2025-04-17 PROCEDURE — 83880 ASSAY OF NATRIURETIC PEPTIDE: CPT | Performed by: EMERGENCY MEDICINE

## 2025-04-17 PROCEDURE — 63700000 PHARM REV CODE 250 ALT 637 W/O HCPCS: Performed by: NURSE PRACTITIONER

## 2025-04-17 PROCEDURE — 99285 EMERGENCY DEPT VISIT HI MDM: CPT | Mod: 25,27

## 2025-04-17 PROCEDURE — 84132 ASSAY OF SERUM POTASSIUM: CPT

## 2025-04-17 PROCEDURE — 82330 ASSAY OF CALCIUM: CPT

## 2025-04-17 PROCEDURE — 85014 HEMATOCRIT: CPT

## 2025-04-17 PROCEDURE — 94640 AIRWAY INHALATION TREATMENT: CPT

## 2025-04-17 PROCEDURE — 25000003 PHARM REV CODE 250: Performed by: NURSE PRACTITIONER

## 2025-04-17 PROCEDURE — 81003 URINALYSIS AUTO W/O SCOPE: CPT | Mod: 91 | Performed by: NURSE PRACTITIONER

## 2025-04-17 PROCEDURE — 84540 ASSAY OF URINE/UREA-N: CPT | Performed by: NURSE PRACTITIONER

## 2025-04-17 PROCEDURE — 84295 ASSAY OF SERUM SODIUM: CPT

## 2025-04-17 PROCEDURE — 83735 ASSAY OF MAGNESIUM: CPT | Performed by: FAMILY MEDICINE

## 2025-04-17 PROCEDURE — 82803 BLOOD GASES ANY COMBINATION: CPT

## 2025-04-17 PROCEDURE — 93010 ELECTROCARDIOGRAM REPORT: CPT | Mod: ,,, | Performed by: INTERNAL MEDICINE

## 2025-04-17 PROCEDURE — 27000190 HC CPAP FULL FACE MASK W/VALVE

## 2025-04-17 PROCEDURE — 96372 THER/PROPH/DIAG INJ SC/IM: CPT | Performed by: NURSE PRACTITIONER

## 2025-04-17 PROCEDURE — 93005 ELECTROCARDIOGRAM TRACING: CPT

## 2025-04-17 PROCEDURE — 99214 OFFICE O/P EST MOD 30 MIN: CPT | Performed by: PHYSICIAN ASSISTANT

## 2025-04-17 PROCEDURE — 96361 HYDRATE IV INFUSION ADD-ON: CPT

## 2025-04-17 PROCEDURE — 84484 ASSAY OF TROPONIN QUANT: CPT | Performed by: EMERGENCY MEDICINE

## 2025-04-17 PROCEDURE — 25000003 PHARM REV CODE 250: Performed by: EMERGENCY MEDICINE

## 2025-04-17 PROCEDURE — 83690 ASSAY OF LIPASE: CPT | Performed by: FAMILY MEDICINE

## 2025-04-17 PROCEDURE — 85025 COMPLETE CBC W/AUTO DIFF WBC: CPT | Performed by: EMERGENCY MEDICINE

## 2025-04-17 PROCEDURE — 5A09357 ASSISTANCE WITH RESPIRATORY VENTILATION, LESS THAN 24 CONSECUTIVE HOURS, CONTINUOUS POSITIVE AIRWAY PRESSURE: ICD-10-PCS | Performed by: HOSPITALIST

## 2025-04-17 PROCEDURE — 84300 ASSAY OF URINE SODIUM: CPT | Performed by: NURSE PRACTITIONER

## 2025-04-17 PROCEDURE — 25000242 PHARM REV CODE 250 ALT 637 W/ HCPCS: Performed by: EMERGENCY MEDICINE

## 2025-04-17 PROCEDURE — 84075 ASSAY ALKALINE PHOSPHATASE: CPT | Performed by: EMERGENCY MEDICINE

## 2025-04-17 PROCEDURE — 25000242 PHARM REV CODE 250 ALT 637 W/ HCPCS: Performed by: NURSE PRACTITIONER

## 2025-04-17 PROCEDURE — 36600 WITHDRAWAL OF ARTERIAL BLOOD: CPT

## 2025-04-17 PROCEDURE — 94640 AIRWAY INHALATION TREATMENT: CPT | Mod: XB

## 2025-04-17 PROCEDURE — 63600175 PHARM REV CODE 636 W HCPCS: Performed by: NURSE PRACTITIONER

## 2025-04-17 PROCEDURE — 81003 URINALYSIS AUTO W/O SCOPE: CPT | Mod: 59 | Performed by: NURSE PRACTITIONER

## 2025-04-17 RX ORDER — GLUCAGON 1 MG
1 KIT INJECTION
Status: DISCONTINUED | OUTPATIENT
Start: 2025-04-17 | End: 2025-04-22 | Stop reason: HOSPADM

## 2025-04-17 RX ORDER — IBUPROFEN 200 MG
16 TABLET ORAL
Status: DISCONTINUED | OUTPATIENT
Start: 2025-04-17 | End: 2025-04-22 | Stop reason: HOSPADM

## 2025-04-17 RX ORDER — IPRATROPIUM BROMIDE AND ALBUTEROL SULFATE 2.5; .5 MG/3ML; MG/3ML
3 SOLUTION RESPIRATORY (INHALATION) ONCE
Status: COMPLETED | OUTPATIENT
Start: 2025-04-17 | End: 2025-04-17

## 2025-04-17 RX ORDER — METHYLPREDNISOLONE SOD SUCC 125 MG
125 VIAL (EA) INJECTION
Status: COMPLETED | OUTPATIENT
Start: 2025-04-17 | End: 2025-04-17

## 2025-04-17 RX ORDER — ESCITALOPRAM OXALATE 10 MG/1
20 TABLET ORAL DAILY
Status: DISCONTINUED | OUTPATIENT
Start: 2025-04-17 | End: 2025-04-22 | Stop reason: HOSPADM

## 2025-04-17 RX ORDER — INSULIN ASPART 100 [IU]/ML
0-5 INJECTION, SOLUTION INTRAVENOUS; SUBCUTANEOUS
Status: DISCONTINUED | OUTPATIENT
Start: 2025-04-17 | End: 2025-04-22 | Stop reason: HOSPADM

## 2025-04-17 RX ORDER — FLUTICASONE FUROATE AND VILANTEROL 100; 25 UG/1; UG/1
1 POWDER RESPIRATORY (INHALATION) DAILY
Status: DISCONTINUED | OUTPATIENT
Start: 2025-04-17 | End: 2025-04-21

## 2025-04-17 RX ORDER — IBUPROFEN 200 MG
24 TABLET ORAL
Status: DISCONTINUED | OUTPATIENT
Start: 2025-04-17 | End: 2025-04-22 | Stop reason: HOSPADM

## 2025-04-17 RX ORDER — ENOXAPARIN SODIUM 100 MG/ML
40 INJECTION SUBCUTANEOUS EVERY 24 HOURS
Status: DISCONTINUED | OUTPATIENT
Start: 2025-04-17 | End: 2025-04-22 | Stop reason: HOSPADM

## 2025-04-17 RX ORDER — FUROSEMIDE 10 MG/ML
80 INJECTION INTRAMUSCULAR; INTRAVENOUS EVERY 12 HOURS
Status: DISCONTINUED | OUTPATIENT
Start: 2025-04-18 | End: 2025-04-19

## 2025-04-17 RX ORDER — FUROSEMIDE 10 MG/ML
60 INJECTION INTRAMUSCULAR; INTRAVENOUS ONCE
Status: COMPLETED | OUTPATIENT
Start: 2025-04-17 | End: 2025-04-17

## 2025-04-17 RX ORDER — POLYETHYLENE GLYCOL 3350 17 G/17G
17 POWDER, FOR SOLUTION ORAL DAILY PRN
Status: DISCONTINUED | OUTPATIENT
Start: 2025-04-17 | End: 2025-04-17

## 2025-04-17 RX ORDER — ONDANSETRON HYDROCHLORIDE 2 MG/ML
4 INJECTION, SOLUTION INTRAVENOUS EVERY 8 HOURS PRN
Status: DISCONTINUED | OUTPATIENT
Start: 2025-04-17 | End: 2025-04-22 | Stop reason: HOSPADM

## 2025-04-17 RX ORDER — IPRATROPIUM BROMIDE AND ALBUTEROL SULFATE 2.5; .5 MG/3ML; MG/3ML
9 SOLUTION RESPIRATORY (INHALATION)
Status: COMPLETED | OUTPATIENT
Start: 2025-04-17 | End: 2025-04-17

## 2025-04-17 RX ORDER — GABAPENTIN 300 MG/1
300 CAPSULE ORAL 3 TIMES DAILY
Status: DISCONTINUED | OUTPATIENT
Start: 2025-04-17 | End: 2025-04-17

## 2025-04-17 RX ORDER — METHADONE HYDROCHLORIDE 10 MG/1
95 TABLET ORAL DAILY
Refills: 0 | Status: DISCONTINUED | OUTPATIENT
Start: 2025-04-17 | End: 2025-04-17

## 2025-04-17 RX ORDER — TALC
6 POWDER (GRAM) TOPICAL NIGHTLY PRN
Status: DISCONTINUED | OUTPATIENT
Start: 2025-04-17 | End: 2025-04-22 | Stop reason: HOSPADM

## 2025-04-17 RX ORDER — FUROSEMIDE 10 MG/ML
100 INJECTION INTRAMUSCULAR; INTRAVENOUS ONCE
Status: DISCONTINUED | OUTPATIENT
Start: 2025-04-17 | End: 2025-04-17

## 2025-04-17 RX ORDER — SODIUM CHLORIDE 0.9 % (FLUSH) 0.9 %
5 SYRINGE (ML) INJECTION
Status: DISCONTINUED | OUTPATIENT
Start: 2025-04-17 | End: 2025-04-22 | Stop reason: HOSPADM

## 2025-04-17 RX ORDER — PREDNISONE 20 MG/1
40 TABLET ORAL DAILY
Status: DISCONTINUED | OUTPATIENT
Start: 2025-04-18 | End: 2025-04-18

## 2025-04-17 RX ORDER — AMOXICILLIN 250 MG
1 CAPSULE ORAL 2 TIMES DAILY
Status: DISCONTINUED | OUTPATIENT
Start: 2025-04-17 | End: 2025-04-22 | Stop reason: HOSPADM

## 2025-04-17 RX ORDER — NALOXONE HCL 0.4 MG/ML
0.02 VIAL (ML) INJECTION
Status: DISCONTINUED | OUTPATIENT
Start: 2025-04-17 | End: 2025-04-22 | Stop reason: HOSPADM

## 2025-04-17 RX ORDER — TRAZODONE HYDROCHLORIDE 100 MG/1
100 TABLET ORAL NIGHTLY
Status: DISCONTINUED | OUTPATIENT
Start: 2025-04-17 | End: 2025-04-17

## 2025-04-17 RX ORDER — POLYETHYLENE GLYCOL 3350 17 G/17G
17 POWDER, FOR SOLUTION ORAL 2 TIMES DAILY
Status: DISCONTINUED | OUTPATIENT
Start: 2025-04-17 | End: 2025-04-22 | Stop reason: HOSPADM

## 2025-04-17 RX ORDER — IPRATROPIUM BROMIDE AND ALBUTEROL SULFATE 2.5; .5 MG/3ML; MG/3ML
3 SOLUTION RESPIRATORY (INHALATION) EVERY 4 HOURS
Status: DISCONTINUED | OUTPATIENT
Start: 2025-04-17 | End: 2025-04-19

## 2025-04-17 RX ORDER — AZITHROMYCIN 250 MG/1
250 TABLET, FILM COATED ORAL DAILY
Status: DISCONTINUED | OUTPATIENT
Start: 2025-04-18 | End: 2025-04-18

## 2025-04-17 RX ORDER — TAMSULOSIN HYDROCHLORIDE 0.4 MG/1
0.4 CAPSULE ORAL DAILY
Status: DISCONTINUED | OUTPATIENT
Start: 2025-04-17 | End: 2025-04-22 | Stop reason: HOSPADM

## 2025-04-17 RX ORDER — ACETAMINOPHEN 325 MG/1
650 TABLET ORAL EVERY 4 HOURS PRN
Status: DISCONTINUED | OUTPATIENT
Start: 2025-04-17 | End: 2025-04-22 | Stop reason: HOSPADM

## 2025-04-17 RX ORDER — AZITHROMYCIN 250 MG/1
500 TABLET, FILM COATED ORAL ONCE
Status: COMPLETED | OUTPATIENT
Start: 2025-04-17 | End: 2025-04-17

## 2025-04-17 RX ADMIN — POLYETHYLENE GLYCOL (3350) 17 G: 17 POWDER, FOR SOLUTION ORAL at 08:04

## 2025-04-17 RX ADMIN — ENOXAPARIN SODIUM 40 MG: 40 INJECTION SUBCUTANEOUS at 06:04

## 2025-04-17 RX ADMIN — TAMSULOSIN HYDROCHLORIDE 0.4 MG: 0.4 CAPSULE ORAL at 06:04

## 2025-04-17 RX ADMIN — SENNOSIDES AND DOCUSATE SODIUM 1 TABLET: 50; 8.6 TABLET ORAL at 08:04

## 2025-04-17 RX ADMIN — SODIUM CHLORIDE 500 ML: 9 INJECTION, SOLUTION INTRAVENOUS at 11:04

## 2025-04-17 RX ADMIN — IPRATROPIUM BROMIDE AND ALBUTEROL SULFATE 9 ML: 2.5; .5 SOLUTION RESPIRATORY (INHALATION) at 10:04

## 2025-04-17 RX ADMIN — FUROSEMIDE 60 MG: 10 INJECTION, SOLUTION INTRAMUSCULAR; INTRAVENOUS at 05:04

## 2025-04-17 RX ADMIN — IPRATROPIUM BROMIDE AND ALBUTEROL SULFATE 3 ML: 2.5; .5 SOLUTION RESPIRATORY (INHALATION) at 11:04

## 2025-04-17 RX ADMIN — AZITHROMYCIN DIHYDRATE 500 MG: 250 TABLET ORAL at 06:04

## 2025-04-17 RX ADMIN — GABAPENTIN 300 MG: 300 CAPSULE ORAL at 06:04

## 2025-04-17 RX ADMIN — ESCITALOPRAM OXALATE 20 MG: 10 TABLET ORAL at 06:04

## 2025-04-17 RX ADMIN — METHYLPREDNISOLONE SODIUM SUCCINATE 125 MG: 125 INJECTION, POWDER, FOR SOLUTION INTRAMUSCULAR; INTRAVENOUS at 11:04

## 2025-04-17 RX ADMIN — IPRATROPIUM BROMIDE AND ALBUTEROL SULFATE 3 ML: 2.5; .5 SOLUTION RESPIRATORY (INHALATION) at 02:04

## 2025-04-17 RX ADMIN — METHADONE HYDROCHLORIDE 100 MG: 10 TABLET ORAL at 06:04

## 2025-04-17 RX ADMIN — IPRATROPIUM BROMIDE AND ALBUTEROL SULFATE 3 ML: 2.5; .5 SOLUTION RESPIRATORY (INHALATION) at 07:04
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
## 2025-04-17 NOTE — ASSESSMENT & PLAN NOTE
ANNITA is likely due to pre-renal azotemia due to intravascular volume depletion. Baseline creatinine is 1.7. Most recent creatinine and eGFR are listed below.  Recent Labs     04/17/25  1025   CREATININE 2.3*   EGFRNORACEVR 32*      Plan  - ANNITA is stable  - Avoid nephrotoxins and renally dose meds for GFR listed above  - Monitor urine output, serial BMP, and adjust therapy as needed  - Retroperitoneal US   -Urine studies

## 2025-04-17 NOTE — PROGRESS NOTES
Pharmacist Renal Dose Adjustment Note    Ming Harrington is a 59 y.o. male being treated with the medication Enoxaparin    Patient Data:    Vital Signs (Most Recent):  Temp: 97.8 °F (36.6 °C) (04/17/25 0948)  Pulse: (!) 57 (04/17/25 1332)  Resp: 17 (04/17/25 1332)  BP: 100/62 (04/17/25 1332)  SpO2: 100 % (04/17/25 1332) Vital Signs (72h Range):  Temp:  [97.8 °F (36.6 °C)]   Pulse:  [52-83]   Resp:  [12-26]   BP: ()/()   SpO2:  [91 %-100 %]      Recent Labs   Lab 04/11/25  1141 04/17/25  1025   CREATININE 2.2* 2.3*     Serum creatinine: 2.3 mg/dL (H) 04/17/25 1025  Estimated creatinine clearance: 43 mL/min (A)    Medication:Enoxparin dose: 30mg frequency q24h will be changed to medication:Enoxaparin dose:40mg frequency:q24h    Pharmacist's Name: Ileana Smith  Pharmacist's Extension: [phone]

## 2025-04-17 NOTE — ASSESSMENT & PLAN NOTE
Patient with chronic leg pain secondary to likely lymphedema.  Bilateral leg ultrasounds for/11/20 5- for DVT.    Plan:  Resume home gabapentin

## 2025-04-17 NOTE — ASSESSMENT & PLAN NOTE
Patient's COPD is with exacerbation noted by continued dyspnea, use of accessory muscles for breathing, and paradoxical chest wall movement currently.  Patient is currently on COPD Pathway. Continue scheduled inhalers Steroids, Antibiotics, and Supplemental oxygen and monitor respiratory status closely.       Plan:  -Prednisone 40 mg daily  -Azithromycin 500 mg day 1 then 250 mg x 2 days   -Duo Neb/Inhalers every 4 hrs
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
## 2025-04-17 NOTE — ASSESSMENT & PLAN NOTE
Patient has Diastolic (HFpEF) heart failure that is Acute on chronic. On presentation their CHF was decompensated. Evidence of decompensated CHF on presentation includes: edema, elevated JVD, orthopnea, dyspnea on exertion (LAND), and shortness of breath. The etiology of their decompensation is likely medication non-compliance. Most recent BNP and echo results are listed below.  Recent Labs     04/17/25  1025   BNP 14     Latest ECHO  Results for orders placed during the hospital encounter of 03/13/25    Echo    Interpretation Summary    Left Ventricle: The left ventricle is normal in size. Normal wall thickness. There is concentric remodeling. Normal wall motion. There is normal systolic function with a visually estimated ejection fraction of 60 - 65%. Ejection fraction is approximately 65%. There is normal diastolic function.    Right Ventricle: The right ventricle has mild enlargement. Wall thickness is normal. Systolic function is normal.    The right atrium is dilated.    Pulmonary Artery: Pulmonary artery pressure could not be estimated.    IVC/SVC: Normal venous pressure at 3 mmHg.    Current Heart Failure Medications  furosemide injection 60 mg, Once, Intravenous    Plan  - Monitor strict I&Os and daily weights.    - Place on telemetry  - Low sodium diet  - Place on fluid restriction of 1.5 L.   - Cardiology has not been consulted  - The patient's volume status is improving but not at their baseline as indicated by edema, elevated JVD, orthopnea, dyspnea on exertion (LAND), and shortness of breath  - Lasix 60 mg x 1,  -Duo Neb treatment q4 hours  -No home trilogy, started BREO and Spiriva

## 2025-04-17 NOTE — ED NOTES
Primary RN made 2 attempts at establishing IV access with no success.  Brian RAINES RN at pt bedside for US guided IV

## 2025-04-17 NOTE — ASSESSMENT & PLAN NOTE
Patient's blood pressure range in the last 24 hours was: BP  Min: 89/53  Max: 178/156.The patient's inpatient anti-hypertensive regimen is listed below:  Current Antihypertensives  furosemide injection 60 mg, Once, Intravenous    Plan  - BP is uncontrolled, will adjust as follows: hypotensive currently  - Hold home BP medications and resume when appropriate

## 2025-04-17 NOTE — PROGRESS NOTES
VIRTUAL NURSE: Pt arrived to unit. Permission received per patient to turn camera to view patient. VIP model explained; patient informed this VN will be working with bedside nurse and the rest of the care team. Plan of care reviewed with patient.  Educated patient on fall risk and fall risk precautions in place. Call light within reach, side rails up x2. Admission questions completed. Patient instucted to ask staff for assistance. Patient verbalized complete understanding. Patient denies complaints or any needs at this time. Will continue to be available and intervene as needed.           04/17/25 1646   Admission   Initial VN Admission Questions Complete   Communication Issues? None   Shift   Virtual Nurse - Rounding Complete   Virtual Nurse - Patient Verbalized Approval Of Camera Use   Safety/Activity   Patient Rounds bed in low position;call light in patient/parent reach;clutter free environment maintained;visualized patient   Safety Promotion/Fall Prevention side rails raised x 2   Positioning   Body Position supine   Head of Bed (HOB) Positioning HOB elevated         Labs, notes, orders, and careplan reviewed.

## 2025-04-17 NOTE — ASSESSMENT & PLAN NOTE
Patient is on methadone.  Patient reports last bowel movement 4 days ago.    Plan:  Senna and MiraLax scheduled

## 2025-04-17 NOTE — HPI
"59 y.o. male h/o  COPD uses 2L O2 PRN, CHF (HFpEF  last EF 60-65%) a non smoker, constipation HTN, CKD stage 3b, hepatitis-C, cirrhosis, opioid use disorder on methadone,BPH,  JUVENCIO, lower extremity edema, asthma, COPD, HFrEF, HTN, and DM who presented to the ED with 4 days of worsening shortness of breath.  He was last seen in clinic today for hospital follow up. He reported "I do not feel good." Pt  had c/o increased SOB and chest pains and right  leg pain.   BP elevated, (178/156) O2 sats at 91%. He reported being  out of all his medications for a couple of weeks to include his home lasix.  In addition patient was seen in the emergency room for bilateral lower extremity edema which is chronic in nature with BLE with Upon arrival to the emergency room, patient was tachypnic, bradycardic, unable to speak in full sentences and lethargic.  He stated that he had been actually out of his medications for a month.  H also reported having an upper respiratory infection within the past week with coughing of green sputum.  He states that his SOB was worse yesterday.  He denies any fever, diarrhea or dysuria.  He does endorse some left lower quadrant abdominal pain, nausea, vomiting twice, poor oral intake.  Of note he states that his last bowel movement was 4 days ago after having to use a laxative.  He denies any blood in the urine or stool.    In the ED Vitas were:  /79, HR 55, RR 36, Temp 97.8 , sats 94%. CBC stable with H/H 11/35 and WBC 6.  CMP was significant for Na 137, K +3.8, BUN 41/creatinine 2.3 (baseline creatinine 1.7), glucose 97.  Normal AST/ALT, TB, ALT.  BNP 14, troponin negative.       Chest Xray showed No acute intrathoracic abnormality.    In the ED pt was given DuoNeb treatment, methylprednisolone, senna.     [facility] for management of a  mixed picture of COPD and CHF exacerbation.     "

## 2025-04-17 NOTE — PROGRESS NOTES
"FAMILY MEDICINE  New Visit Progress Note   Recent Hospital Discharge     PRESENTING HISTORY     Chief Complaint/Reason for Admission:  Follow up Hospital Discharge   Chief Complaint   Patient presents with    Follow-up     PCP: David Beckett PA-C    History of Present Illness:  Ming Harrington is a 59 y.o. M with a PMHx of COPD on 2L O2 PRN, HFpEF, HTN, chronic HCV complicated by cirrhosis, CKD, opioid use disorder on methadone here today for hospital follow up.     Today he says "I do not feel good." Pt c/o increased SOB and chest pains. Also c/o R leg pain, but this is a more chronic issue. BP elevated, O2 sats much lower than normal. Has been out of all his medications for a couple weeks it seems. This includes his home lasix.     Vitals:    04/17/25 0913   BP: (!) 178/156   Patient Position: Sitting   Pulse: 83   SpO2: (!) 91%   Weight: 121.5 kg (267 lb 13.7 oz)   Height: 5' 8" (1.727 m)     Ming was seen today for follow-up.    Diagnoses and all orders for this visit:    Hypertensive urgency   -Spoke to ED. Sending patient down for evaluation for increased SOB and chest pain with associated hypertensive emergency/urgency.       Scheduled Follow-up :  Future Appointments   Date Time Provider Department Center   4/23/2025  2:00 PM Seda Allen PA-C [facility] NEUROSU [facility]   5/19/2025  2:00 PM Jamin Delong Jr., DPJULIÁN [facility] POD [place]   9/18/2025  8:45 AM [facility] OIC-US1 MASTER [facility] ULTR [facility]   9/18/2025  9:50 AM LAB, APPOINTMENT [facility] LAB [address] [facility]   9/18/2025 10:30 AM Scheuermann, Jennifer B., PA [facility] HEPC [facility]       Post Visit Medication List:     Medication List            Accurate as of April 17, 2025  9:46 AM. If you have any questions, ask your nurse or doctor.                CONTINUE taking these medications      acetaminophen 500 MG tablet  Commonly known as: TYLENOL     albuterol 90 mcg/actuation inhaler  Commonly known as: VENTOLIN HFA  Inhale 2 puffs into the lungs " every 6 (six) hours as needed for Wheezing or Shortness of Breath. Rescue     amLODIPine 10 MG tablet  Commonly known as: NORVASC  Take 1 tablet (10 mg total) by mouth once daily.     ciprofloxacin HCl 0.3 % ophthalmic solution  Commonly known as: CILOXAN     cloNIDine 0.1 MG tablet  Commonly known as: CATAPRES  Take 1 tablet (0.1 mg total) by mouth 2 (two) times daily.     EScitalopram oxalate 10 MG tablet  Commonly known as: LEXAPRO  Take 2 tablets (20 mg total) by mouth once daily.     fluticasone-umeclidin-vilanter 200-62.5-25 mcg inhaler  Commonly known as: TRELEGY ELLIPTA  Inhale 1 puff into the lungs once daily.     furosemide 80 MG tablet  Commonly known as: LASIX  Take 1 tablet (80 mg total) by mouth 2 (two) times a day.     gabapentin 300 MG capsule  Commonly known as: NEURONTIN  Take 1 capsule (300 mg total) by mouth 3 (three) times daily.     ketorolac 0.5% 0.5 % Drop  Commonly known as: ACULAR     loratadine 10 mg tablet  Commonly known as: CLARITIN  Take 1 tablet (10 mg total) by mouth once daily.     methadone 10 MG tablet  Commonly known as: DOLOPHINE     polyethylene glycol 17 gram/dose powder  Commonly known as: GLYCOLAX  Take 17 g by mouth 2 (two) times daily.     prednisoLONE acetate 1 % Drps  Commonly known as: PRED FORTE     tamsulosin 0.4 mg Cap  Commonly known as: FLOMAX     traZODone 50 MG tablet  Commonly known as: DESYREL  Take 2 tablets (100 mg total) by mouth every evening.              Signing Provider:  David Beckett PA-C

## 2025-04-17 NOTE — ED NOTES
Per Hospital Medicine, pt will be taken off of BiPap to be put on O2 and receive a breathing tx and then a VBG will be obtained.  Respiratory called with plan.

## 2025-04-17 NOTE — PHARMACY MED REC
"[facility]           Pharmacy  Admission Medication History     The home medication history was taken by Iraida Samuels.      Medication history obtained from UNABLE TO ASSESS PATIENT    Based on information gathered for medication list, you may go to "Admission" then "Reconcile Home Medications" tabs to review and/or act upon those items.     The home medication list has been updated by the Pharmacy department.   Please read ALL comments highlighted in yellow.   Please address this information as you see fit.    Feel free to contact us if you have any questions or require assistance.    The medications listed below were removed from the home medication list.  Please reorder if appropriate:    Patient reports NOT TAKING the following medication(s):  Claritin 10mg      Current Facility-Administered Medications on File Prior to Encounter   Medication Dose Route Frequency Provider Last Rate Last Admin    0.9%  NaCl infusion   Intravenous Continuous Melissa Nielsen MD   New Bag at 11/05/24 1143    lidocaine (PF) 10 mg/ml (1%) injection 10 mg  1 mL Intradermal Once Melissa Nielsen MD         Current Outpatient Medications on File Prior to Encounter   Medication Sig Dispense Refill    acetaminophen (TYLENOL) 500 MG tablet Take 3 tablets by mouth daily as needed (headache). (Patient not taking: Reported on 4/17/2025)      albuterol (VENTOLIN HFA) 90 mcg/actuation inhaler Inhale 2 puffs into the lungs every 6 (six) hours as needed for Wheezing or Shortness of Breath. Rescue 68 g 0    amLODIPine (NORVASC) 10 MG tablet Take 1 tablet (10 mg total) by mouth once daily. (Patient not taking: Reported on 4/17/2025) 90 tablet 3    ciprofloxacin HCl (CILOXAN) 0.3 % ophthalmic solution Place 1 drop into the right eye twice daily and place 1 drop into the left eye three times daily (Patient not taking: Reported on 4/17/2025)      cloNIDine (CATAPRES) 0.1 MG tablet Take 1 tablet (0.1 mg total) by mouth 2 " (two) times daily. (Patient not taking: Reported on 2025) 60 tablet 1    EScitalopram oxalate (LEXAPRO) 10 MG tablet Take 2 tablets (20 mg total) by mouth once daily. (Patient not taking: Reported on 2025) 180 tablet 0    fluticasone-umeclidin-vilanter (TRELEGY ELLIPTA) 200-62.5-25 mcg inhaler Inhale 1 puff into the lungs once daily. (Patient not taking: Reported on 2025) 60 each 0    furosemide (LASIX) 80 MG tablet Take 1 tablet (80 mg total) by mouth 2 (two) times a day. (Patient not taking: Reported on 2025) 60 tablet 11    gabapentin (NEURONTIN) 300 MG capsule Take 1 capsule (300 mg total) by mouth 3 (three) times daily. (Patient not taking: Reported on 2025) 90 capsule 1    ketorolac 0.5% (ACULAR) 0.5 % Drop Place 1 drop into the right eye twice daily and place 1 drop into the left eye three times daily (Patient not taking: Reported on 2025)      methadone (DOLOPHINE) 10 MG tablet Take 95 mg by mouth Daily. Mixes it in water (Patient not taking: Reported on 2025)      polyethylene glycol (GLYCOLAX) 17 gram/dose powder Take 17 g by mouth 2 (two) times daily. (Patient not taking: Reported on 2025) 1020 g 1    prednisoLONE acetate (PRED FORTE) 1 % DrpS Place 1 drop into the right eye twice daily and place 1 drop into the left eye three times daily (Patient not taking: Reported on 2025)      [] predniSONE (DELTASONE) 20 MG tablet Take 2 tablets (40 mg total) by mouth once daily. for 5 days 10 tablet 0    tamsulosin (FLOMAX) 0.4 mg Cap Take 0.4 mg by mouth once daily. (Patient not taking: Reported on 2025)      traZODone (DESYREL) 50 MG tablet Take 2 tablets (100 mg total) by mouth every evening. 180 tablet 3       Please address this information as you see fit.  Feel free to contact us if you have any questions or require assistance.    Iraida Samuels  [phone]                  .

## 2025-04-18 LAB
ABSOLUTE EOSINOPHIL (OHS): 0 K/UL
ABSOLUTE MONOCYTE (OHS): 0.4 K/UL (ref 0.3–1)
ABSOLUTE NEUTROPHIL COUNT (OHS): 5.63 K/UL (ref 1.8–7.7)
ALBUMIN SERPL BCP-MCNC: 3.9 G/DL (ref 3.5–5.2)
ALP SERPL-CCNC: 74 UNIT/L (ref 40–150)
ALT SERPL W/O P-5'-P-CCNC: 14 UNIT/L (ref 10–44)
ANION GAP (OHS): 9 MMOL/L (ref 8–16)
AST SERPL-CCNC: 15 UNIT/L (ref 11–45)
BASOPHILS # BLD AUTO: 0.01 K/UL
BASOPHILS NFR BLD AUTO: 0.1 %
BILIRUB SERPL-MCNC: 0.5 MG/DL (ref 0.1–1)
BUN SERPL-MCNC: 40 MG/DL (ref 6–20)
CALCIUM SERPL-MCNC: 9.6 MG/DL (ref 8.7–10.5)
CHLORIDE SERPL-SCNC: 99 MMOL/L (ref 95–110)
CO2 SERPL-SCNC: 30 MMOL/L (ref 23–29)
CREAT SERPL-MCNC: 2.1 MG/DL (ref 0.5–1.4)
ERYTHROCYTE [DISTWIDTH] IN BLOOD BY AUTOMATED COUNT: 11.8 % (ref 11.5–14.5)
FIO2: 21 %
GFR SERPLBLD CREATININE-BSD FMLA CKD-EPI: 35 ML/MIN/1.73/M2
GLUCOSE SERPL-MCNC: 108 MG/DL (ref 70–110)
HCT VFR BLD AUTO: 33.5 % (ref 40–54)
HGB BLD-MCNC: 11.2 GM/DL (ref 14–18)
IMM GRANULOCYTES # BLD AUTO: 0.03 K/UL (ref 0–0.04)
IMM GRANULOCYTES NFR BLD AUTO: 0.4 % (ref 0–0.5)
LYMPHOCYTES # BLD AUTO: 0.67 K/UL (ref 1–4.8)
MAGNESIUM SERPL-MCNC: 2.3 MG/DL (ref 1.6–2.6)
MCH RBC QN AUTO: 30.8 PG (ref 27–31)
MCHC RBC AUTO-ENTMCNC: 33.4 G/DL (ref 32–36)
MCV RBC AUTO: 92 FL (ref 82–98)
NUCLEATED RBC (/100WBC) (OHS): 0 /100 WBC
PCO2 BLDA: 69.1 MMHG (ref 35–45)
PH SMN: 7.34 [PH] (ref 7.35–7.45)
PHOSPHATE SERPL-MCNC: 3.9 MG/DL (ref 2.7–4.5)
PLATELET # BLD AUTO: 163 K/UL (ref 150–450)
PMV BLD AUTO: 10.7 FL (ref 9.2–12.9)
PO2 BLDA: 19.1 MMHG (ref 40–60)
POC BASE DEFICIT: 9 MMOL/L (ref -2–2)
POC HCO3: 37.1 MMOL/L (ref 24–28)
POC PERFORMED BY: ABNORMAL
POC SATURATED O2: 25.2 % (ref 95–100)
POCT GLUCOSE: 108 MG/DL (ref 70–110)
POCT GLUCOSE: 117 MG/DL (ref 70–110)
POCT GLUCOSE: 139 MG/DL (ref 70–110)
POCT GLUCOSE: 162 MG/DL (ref 70–110)
POTASSIUM SERPL-SCNC: 4.4 MMOL/L (ref 3.5–5.1)
PROT SERPL-MCNC: 8.3 GM/DL (ref 6–8.4)
RBC # BLD AUTO: 3.64 M/UL (ref 4.6–6.2)
RELATIVE EOSINOPHIL (OHS): 0 %
RELATIVE LYMPHOCYTE (OHS): 9.9 % (ref 18–48)
RELATIVE MONOCYTE (OHS): 5.9 % (ref 4–15)
RELATIVE NEUTROPHIL (OHS): 83.7 % (ref 38–73)
SODIUM SERPL-SCNC: 138 MMOL/L (ref 136–145)
SPECIMEN SOURCE: ABNORMAL
WBC # BLD AUTO: 6.74 K/UL (ref 3.9–12.7)

## 2025-04-18 PROCEDURE — 80053 COMPREHEN METABOLIC PANEL: CPT | Performed by: NURSE PRACTITIONER

## 2025-04-18 PROCEDURE — G0378 HOSPITAL OBSERVATION PER HR: HCPCS

## 2025-04-18 PROCEDURE — 25000242 PHARM REV CODE 250 ALT 637 W/ HCPCS: Performed by: NURSE PRACTITIONER

## 2025-04-18 PROCEDURE — 36415 COLL VENOUS BLD VENIPUNCTURE: CPT | Performed by: NURSE PRACTITIONER

## 2025-04-18 PROCEDURE — 94640 AIRWAY INHALATION TREATMENT: CPT | Mod: XB

## 2025-04-18 PROCEDURE — 99900035 HC TECH TIME PER 15 MIN (STAT)

## 2025-04-18 PROCEDURE — 85025 COMPLETE CBC W/AUTO DIFF WBC: CPT | Performed by: NURSE PRACTITIONER

## 2025-04-18 PROCEDURE — 63600175 PHARM REV CODE 636 W HCPCS: Performed by: NURSE PRACTITIONER

## 2025-04-18 PROCEDURE — 63700000 PHARM REV CODE 250 ALT 637 W/O HCPCS: Performed by: NURSE PRACTITIONER

## 2025-04-18 PROCEDURE — 63600175 PHARM REV CODE 636 W HCPCS: Mod: JZ,TB

## 2025-04-18 PROCEDURE — 82803 BLOOD GASES ANY COMBINATION: CPT

## 2025-04-18 PROCEDURE — 27000221 HC OXYGEN, UP TO 24 HOURS

## 2025-04-18 PROCEDURE — 94761 N-INVAS EAR/PLS OXIMETRY MLT: CPT | Mod: XB

## 2025-04-18 PROCEDURE — 84100 ASSAY OF PHOSPHORUS: CPT | Performed by: NURSE PRACTITIONER

## 2025-04-18 PROCEDURE — 96372 THER/PROPH/DIAG INJ SC/IM: CPT | Performed by: NURSE PRACTITIONER

## 2025-04-18 PROCEDURE — 25000003 PHARM REV CODE 250: Performed by: NURSE PRACTITIONER

## 2025-04-18 PROCEDURE — 94660 CPAP INITIATION&MGMT: CPT

## 2025-04-18 RX ORDER — KETOROLAC TROMETHAMINE 30 MG/ML
15 INJECTION, SOLUTION INTRAMUSCULAR; INTRAVENOUS ONCE
Status: COMPLETED | OUTPATIENT
Start: 2025-04-18 | End: 2025-04-18

## 2025-04-18 RX ORDER — METHADONE HYDROCHLORIDE 10 MG/1
100 TABLET ORAL DAILY
Refills: 0 | Status: DISCONTINUED | OUTPATIENT
Start: 2025-04-18 | End: 2025-04-22 | Stop reason: HOSPADM

## 2025-04-18 RX ADMIN — SENNOSIDES AND DOCUSATE SODIUM 1 TABLET: 50; 8.6 TABLET ORAL at 08:04

## 2025-04-18 RX ADMIN — METHADONE HYDROCHLORIDE 100 MG: 10 TABLET ORAL at 08:04

## 2025-04-18 RX ADMIN — TAMSULOSIN HYDROCHLORIDE 0.4 MG: 0.4 CAPSULE ORAL at 08:04

## 2025-04-18 RX ADMIN — IPRATROPIUM BROMIDE AND ALBUTEROL SULFATE 3 ML: 2.5; .5 SOLUTION RESPIRATORY (INHALATION) at 07:04

## 2025-04-18 RX ADMIN — IPRATROPIUM BROMIDE AND ALBUTEROL SULFATE 3 ML: 2.5; .5 SOLUTION RESPIRATORY (INHALATION) at 04:04

## 2025-04-18 RX ADMIN — POLYETHYLENE GLYCOL (3350) 17 G: 17 POWDER, FOR SOLUTION ORAL at 08:04

## 2025-04-18 RX ADMIN — AZITHROMYCIN DIHYDRATE 250 MG: 250 TABLET ORAL at 08:04

## 2025-04-18 RX ADMIN — ENOXAPARIN SODIUM 40 MG: 40 INJECTION SUBCUTANEOUS at 04:04

## 2025-04-18 RX ADMIN — TIOTROPIUM BROMIDE INHALATION SPRAY 2 PUFF: 3.12 SPRAY, METERED RESPIRATORY (INHALATION) at 07:04

## 2025-04-18 RX ADMIN — FUROSEMIDE 80 MG: 10 INJECTION, SOLUTION INTRAVENOUS at 08:04

## 2025-04-18 RX ADMIN — KETOROLAC TROMETHAMINE 15 MG: 30 INJECTION, SOLUTION INTRAMUSCULAR; INTRAVENOUS at 08:04

## 2025-04-18 RX ADMIN — IPRATROPIUM BROMIDE AND ALBUTEROL SULFATE 3 ML: 2.5; .5 SOLUTION RESPIRATORY (INHALATION) at 11:04

## 2025-04-18 RX ADMIN — IPRATROPIUM BROMIDE AND ALBUTEROL SULFATE 3 ML: 2.5; .5 SOLUTION RESPIRATORY (INHALATION) at 03:04

## 2025-04-18 RX ADMIN — ONDANSETRON 4 MG: 2 INJECTION INTRAMUSCULAR; INTRAVENOUS at 01:04

## 2025-04-18 RX ADMIN — FLUTICASONE FUROATE AND VILANTEROL TRIFENATATE 1 PUFF: 100; 25 POWDER RESPIRATORY (INHALATION) at 07:04

## 2025-04-18 RX ADMIN — ESCITALOPRAM OXALATE 20 MG: 10 TABLET ORAL at 08:04

## 2025-04-18 RX ADMIN — PREDNISONE 40 MG: 20 TABLET ORAL at 08:04

## 2025-04-18 NOTE — PROGRESS NOTES
"[facility] Medicine  Progress Note    Patient Name: Ming Harrington  MRN: [id number]  Patient Class: OP- Observation   Admission Date: 4/17/2025  Length of Stay: 0 days  Attending Physician: Lit Dallas III, MD  Primary Care Provider: David Beckett PA-C        Subjective     Principal Problem:<principal problem not specified>        HPI:  59 y.o. male h/o  COPD uses 2L O2 PRN, CHF (HFpEF  last EF 60-65%) a non smoker, constipation HTN, CKD stage 3b, hepatitis-C, cirrhosis, opioid use disorder on methadone,BPH,  JUVENCIO, lower extremity edema, asthma, COPD, HFrEF, HTN, and DM who presented to the ED with 4 days of worsening shortness of breath.  He was last seen in clinic today for hospital follow up. He reported "I do not feel good." Pt  had c/o increased SOB and chest pains and right  leg pain.   BP elevated, (178/156) O2 sats at 91%. He reported being  out of all his medications for a couple of weeks to include his home lasix.  In addition patient was seen in the emergency room for bilateral lower extremity edema which is chronic in nature with BLE with Upon arrival to the emergency room, patient was tachypnic, bradycardic, unable to speak in full sentences and lethargic.  He stated that he had been actually out of his medications for a month.  H also reported having an upper respiratory infection within the past week with coughing of green sputum.  He states that his SOB was worse yesterday.  He denies any fever, diarrhea or dysuria.  He does endorse some left lower quadrant abdominal pain, nausea, vomiting twice, poor oral intake.  Of note he states that his last bowel movement was 4 days ago after having to use a laxative.  He denies any blood in the urine or stool.    In the ED Vitas were:  /79, HR 55, RR 36, Temp 97.8 , sats 94%. CBC stable with H/H 11/35 and WBC 6.  CMP was significant for Na 137, K +3.8, BUN 41/creatinine 2.3 (baseline creatinine 1.7), glucose 97.  Normal AST/ALT, TB, ALT.  " BNP 14, troponin negative.       Chest Xray showed No acute intrathoracic abnormality.    In the ED pt was given DuoNeb treatment, methylprednisolone, senna.     Pt was admitted to [facility] for management of a  mixed picture of COPD and CHF exacerbation.       Overview/Hospital Course:  No notes on file    Interval History: NAOE and VSS    Review of Systems  ROS negative except as stated in HPI   Objective:     Vital Signs (Most Recent):  Temp: 98.1 °F (36.7 °C) (04/18/25 0800)  Pulse: 69 (04/18/25 0800)  Resp: 18 (04/18/25 0800)  BP: (!) 100/58 (04/18/25 0800)  SpO2: (!) 91 % (04/18/25 0800) Vital Signs (24h Range):  Temp:  [97.8 °F (36.6 °C)-98.1 °F (36.7 °C)] 98.1 °F (36.7 °C)  Pulse:  [52-87] 69  Resp:  [12-26] 18  SpO2:  [91 %-100 %] 91 %  BP: ()/() 100/58     Weight: 122 kg (268 lb 15.4 oz)  Body mass index is 40.9 kg/m².    Intake/Output Summary (Last 24 hours) at 4/18/2025 0824  Last data filed at 4/18/2025 0648  Gross per 24 hour   Intake 576 ml   Output 3400 ml   Net -2824 ml         Physical Exam    Constitutional:       Appearance: Normal appearance.   HENT:      Head: Normocephalic and atraumatic.      Nose: Rhinorrhea present.      Mouth/Throat:      Mouth: Mucous membranes are moist.   Cardiovascular:      Rate and Rhythm: Regular rhythm. No bradycardia present.      Pulses: Normal pulses.      Heart sounds: Normal heart sounds. No murmur heard.     No friction rub. No gallop.   Pulmonary:      Effort: no respiratory distress present.      Breath sounds: Wheezing present.   Abdominal:      General: There is no distension.      Palpations: There is no mass.      Tenderness: There is no abdominal tenderness (LLQ). There is no right CVA tenderness, left CVA tenderness, guarding or rebound.   Musculoskeletal:      Cervical back: Normal range of motion and neck supple.      Right lower leg: Edema (+2 to thighs) present.      Left lower leg: Edema (+2 to thighs) present.   Skin:      General: Skin is warm and dry.      Capillary Refill: Capillary refill takes less than 2 seconds.      Coloration: Skin is not jaundiced.      Findings: No bruising or rash.   Neurological:      General: No focal deficit present.      Mental Status: He is alert and oriented to person, place, and time.   Psychiatric:         Mood and Affect: Mood normal.         Behavior: Behavior normal.       Significant Labs: All pertinent labs within the past 24 hours have been reviewed.    Significant Imaging: I have reviewed all pertinent imaging results/findings within the past 24 hours.      Assessment & Plan  HTN (hypertension)  Patient's blood pressure range in the last 24 hours was: BP  Min: 89/53  Max: 125/78.The patient's inpatient anti-hypertensive regimen is listed below:  Current Antihypertensives  furosemide injection 80 mg, Every 12 hours, Intravenous    Plan  - BP is uncontrolled, will adjust as follows: hypotensive currently  - Hold home BP medications and resume when appropriate  Opioid use disorder, severe, on maintenance therapy, dependence  Plan:  Resumed home methadode  if  no longer lethargic    COPD exacerbation  Patient's COPD is with exacerbation noted by continued dyspnea, use of accessory muscles for breathing, and paradoxical chest wall movement currently.  Patient is currently on COPD Pathway. Continue scheduled inhalers Steroids, Antibiotics, and Supplemental oxygen and monitor respiratory status closely.       Plan:  -Prednisone 40 mg daily (Day 1 is 4/18)  -Azithromycin 500 mg day 1 then 250 mg x 2 days  (Day 2 is 4/18)  -Duo Neb/Inhalers every 4 hrs   Continue to diurese with Furosemide q 12hrs    Type 2 diabetes mellitus, without long-term current use of insulin  Plan:  Gaol 140-180 while inpatient    CTM    Acute exacerbation of CHF (congestive heart failure)  Patient has Diastolic (HFpEF) heart failure that is Acute on chronic. On presentation their CHF was decompensated. Evidence of  decompensated CHF on presentation includes: edema, elevated JVD, orthopnea, dyspnea on exertion (LAND), and shortness of breath. The etiology of their decompensation is likely medication non-compliance. Most recent BNP and echo results are listed below.  Recent Labs     04/17/25  1025   BNP 14     Latest ECHO  Results for orders placed during the hospital encounter of 03/13/25    Echo    Interpretation Summary    Left Ventricle: The left ventricle is normal in size. Normal wall thickness. There is concentric remodeling. Normal wall motion. There is normal systolic function with a visually estimated ejection fraction of 60 - 65%. Ejection fraction is approximately 65%. There is normal diastolic function.    Right Ventricle: The right ventricle has mild enlargement. Wall thickness is normal. Systolic function is normal.    The right atrium is dilated.    Pulmonary Artery: Pulmonary artery pressure could not be estimated.    IVC/SVC: Normal venous pressure at 3 mmHg.    Current Heart Failure Medications  furosemide injection 80 mg, Every 12 hours, Intravenous    Plan  - Monitor strict I&Os and daily weights.    - Place on telemetry  - Low sodium diet  - Place on fluid restriction of 1.5 L.   - Cardiology has not been consulted  - The patient's volume status is improving but not at their baseline as indicated by edema, elevated JVD, orthopnea, dyspnea on exertion (LAND), and shortness of breath  - Lasix 60 mg x 1,  -Duo Neb treatment q4 hours  -No home trilogy, started BREO and Spiriva      JUVENCIO (generalized anxiety disorder)  Plan:  Resumed home escitalopram    ANNITA (acute kidney injury)  ANNITA is likely due to pre-renal azotemia due to intravascular volume depletion. Baseline creatinine is  1.7 . Most recent creatinine and eGFR are listed below.  Recent Labs     04/17/25  1025 04/18/25  0358   CREATININE 2.3* 2.1*   EGFRNORACEVR 32* 35*      Plan  - ANNITA is stable  - Avoid nephrotoxins and renally dose meds for GFR listed  above  - Monitor urine output, serial BMP, and adjust therapy as needed  - Retroperitoneal US   -Urine studies  Left leg pain    Patient with chronic leg pain secondary to likely lymphedema.  Bilateral leg ultrasounds for/11/20 5- for DVT.    Plan:  Resume home gabapentin  Drug-induced constipation  Patient is on methadone.  Patient reports last bowel movement 4 days ago.    Plan:  Senna and MiraLax scheduled    Chronic pain of right lower extremity      BPH (benign prostatic hyperplasia)  Plan:  Resume home BPH    VTE Risk Mitigation (From admission, onward)           Ordered     enoxaparin injection 40 mg  Daily         04/17/25 1426     IP VTE HIGH RISK PATIENT  Once         04/17/25 1426     Place sequential compression device  Until discontinued         04/17/25 1426                    Discharge Planning   RYLAN:      Code Status: Full Code   Medical Readiness for Discharge Date:                            Jacy Garcia MD  Department of Hospital Medicine   [place] [facility]

## 2025-04-18 NOTE — NURSING
PERIPHERAL IV INSERTION     Attempted 20g peripheral IV insertion. Attempt unsuccessful.    Reviewed with Bedside nurse, Mandie .   Please call Rapid Response RN, Reshma Barajas RN with any questions or concerns at N Phone:[phone].

## 2025-04-18 NOTE — NURSING
Pt wide awake AAOx4, refused ordered HS BIPAP. Significance explained pt voiced complete understanding, continue to refuse.

## 2025-04-18 NOTE — PLAN OF CARE
Problem: Skin Injury Risk Increased  Goal: Skin Health and Integrity  Outcome: Progressing     Problem: Diabetes Comorbidity  Goal: Blood Glucose Level Within Targeted Range  Outcome: Progressing     Problem: Acute Kidney Injury/Impairment  Goal: Fluid and Electrolyte Balance  Outcome: Progressing

## 2025-04-18 NOTE — ASSESSMENT & PLAN NOTE
Patient has Diastolic (HFpEF) heart failure that is Acute on chronic. On presentation their CHF was decompensated. Evidence of decompensated CHF on presentation includes: edema, elevated JVD, orthopnea, dyspnea on exertion (LAND), and shortness of breath. The etiology of their decompensation is likely medication non-compliance. Most recent BNP and echo results are listed below.  Recent Labs     04/17/25  1025   BNP 14     Latest ECHO  Results for orders placed during the hospital encounter of 03/13/25    Echo    Interpretation Summary    Left Ventricle: The left ventricle is normal in size. Normal wall thickness. There is concentric remodeling. Normal wall motion. There is normal systolic function with a visually estimated ejection fraction of 60 - 65%. Ejection fraction is approximately 65%. There is normal diastolic function.    Right Ventricle: The right ventricle has mild enlargement. Wall thickness is normal. Systolic function is normal.    The right atrium is dilated.    Pulmonary Artery: Pulmonary artery pressure could not be estimated.    IVC/SVC: Normal venous pressure at 3 mmHg.    Current Heart Failure Medications  furosemide injection 80 mg, Every 12 hours, Intravenous    Plan  - Monitor strict I&Os and daily weights.    - Place on telemetry  - Low sodium diet  - Place on fluid restriction of 1.5 L.   - Cardiology has not been consulted  - The patient's volume status is improving but not at their baseline as indicated by edema, elevated JVD, orthopnea, dyspnea on exertion (LAND), and shortness of breath  - Lasix 60 mg x 1,  -Duo Neb treatment q4 hours  -No home trilogy, started BREO and Spiriva

## 2025-04-18 NOTE — SUBJECTIVE & OBJECTIVE
Interval History: NAOE and VSS    Review of Systems  ROS negative except as stated in HPI   Objective:     Vital Signs (Most Recent):  Temp: 98.1 °F (36.7 °C) (04/18/25 0800)  Pulse: 69 (04/18/25 0800)  Resp: 18 (04/18/25 0800)  BP: (!) 100/58 (04/18/25 0800)  SpO2: (!) 91 % (04/18/25 0800) Vital Signs (24h Range):  Temp:  [97.8 °F (36.6 °C)-98.1 °F (36.7 °C)] 98.1 °F (36.7 °C)  Pulse:  [52-87] 69  Resp:  [12-26] 18  SpO2:  [91 %-100 %] 91 %  BP: ()/() 100/58     Weight: 122 kg (268 lb 15.4 oz)  Body mass index is 40.9 kg/m².    Intake/Output Summary (Last 24 hours) at 4/18/2025 0824  Last data filed at 4/18/2025 0648  Gross per 24 hour   Intake 576 ml   Output 3400 ml   Net -2824 ml         Physical Exam    Constitutional:       Appearance: Normal appearance.   HENT:      Head: Normocephalic and atraumatic.      Nose: Rhinorrhea present.      Mouth/Throat:      Mouth: Mucous membranes are moist.   Cardiovascular:      Rate and Rhythm: Regular rhythm. No bradycardia present.      Pulses: Normal pulses.      Heart sounds: Normal heart sounds. No murmur heard.     No friction rub. No gallop.   Pulmonary:      Effort: no respiratory distress present.      Breath sounds: Wheezing present.   Abdominal:      General: There is no distension.      Palpations: There is no mass.      Tenderness: There is no abdominal tenderness (LLQ). There is no right CVA tenderness, left CVA tenderness, guarding or rebound.   Musculoskeletal:      Cervical back: Normal range of motion and neck supple.      Right lower leg: Edema (+2 to thighs) present.      Left lower leg: Edema (+2 to thighs) present.   Skin:     General: Skin is warm and dry.      Capillary Refill: Capillary refill takes less than 2 seconds.      Coloration: Skin is not jaundiced.      Findings: No bruising or rash.   Neurological:      General: No focal deficit present.      Mental Status: He is alert and oriented to person, place, and time.   Psychiatric:          Mood and Affect: Mood normal.         Behavior: Behavior normal.       Significant Labs: All pertinent labs within the past 24 hours have been reviewed.    Significant Imaging: I have reviewed all pertinent imaging results/findings within the past 24 hours.

## 2025-04-18 NOTE — ASSESSMENT & PLAN NOTE
ANNITA is likely due to pre-renal azotemia due to intravascular volume depletion. Baseline creatinine is 1.7. Most recent creatinine and eGFR are listed below.  Recent Labs     04/17/25  1025 04/18/25  0358   CREATININE 2.3* 2.1*   EGFRNORACEVR 32* 35*      Plan  - ANNITA is stable  - Avoid nephrotoxins and renally dose meds for GFR listed above  - Monitor urine output, serial BMP, and adjust therapy as needed  - Retroperitoneal US   -Urine studies

## 2025-04-18 NOTE — ASSESSMENT & PLAN NOTE
Patient's blood pressure range in the last 24 hours was: BP  Min: 89/53  Max: 125/78.The patient's inpatient anti-hypertensive regimen is listed below:  Current Antihypertensives  furosemide injection 80 mg, Every 12 hours, Intravenous    Plan  - BP is uncontrolled, will adjust as follows: hypotensive currently  - Hold home BP medications and resume when appropriate

## 2025-04-19 LAB
ABSOLUTE EOSINOPHIL (OHS): 0.03 K/UL
ABSOLUTE MONOCYTE (OHS): 0.43 K/UL (ref 0.3–1)
ABSOLUTE NEUTROPHIL COUNT (OHS): 5.22 K/UL (ref 1.8–7.7)
ALBUMIN SERPL BCP-MCNC: 3.9 G/DL (ref 3.5–5.2)
ALP SERPL-CCNC: 75 UNIT/L (ref 40–150)
ALT SERPL W/O P-5'-P-CCNC: 16 UNIT/L (ref 10–44)
ANION GAP (OHS): 13 MMOL/L (ref 8–16)
AST SERPL-CCNC: 15 UNIT/L (ref 11–45)
BASOPHILS # BLD AUTO: 0.01 K/UL
BASOPHILS NFR BLD AUTO: 0.1 %
BILIRUB SERPL-MCNC: 0.4 MG/DL (ref 0.1–1)
BUN SERPL-MCNC: 45 MG/DL (ref 6–20)
CALCIUM SERPL-MCNC: 9.4 MG/DL (ref 8.7–10.5)
CHLORIDE SERPL-SCNC: 98 MMOL/L (ref 95–110)
CO2 SERPL-SCNC: 31 MMOL/L (ref 23–29)
CREAT SERPL-MCNC: 2.4 MG/DL (ref 0.5–1.4)
ERYTHROCYTE [DISTWIDTH] IN BLOOD BY AUTOMATED COUNT: 11.7 % (ref 11.5–14.5)
GFR SERPLBLD CREATININE-BSD FMLA CKD-EPI: 30 ML/MIN/1.73/M2
GLUCOSE SERPL-MCNC: 106 MG/DL (ref 70–110)
HCT VFR BLD AUTO: 33.7 % (ref 40–54)
HGB BLD-MCNC: 11 GM/DL (ref 14–18)
HOLD SPECIMEN: NORMAL
IMM GRANULOCYTES # BLD AUTO: 0.02 K/UL (ref 0–0.04)
IMM GRANULOCYTES NFR BLD AUTO: 0.3 % (ref 0–0.5)
LYMPHOCYTES # BLD AUTO: 0.99 K/UL (ref 1–4.8)
MAGNESIUM SERPL-MCNC: 2.5 MG/DL (ref 1.6–2.6)
MCH RBC QN AUTO: 31.1 PG (ref 27–31)
MCHC RBC AUTO-ENTMCNC: 32.6 G/DL (ref 32–36)
MCV RBC AUTO: 95 FL (ref 82–98)
NUCLEATED RBC (/100WBC) (OHS): 0 /100 WBC
PHOSPHATE SERPL-MCNC: 4 MG/DL (ref 2.7–4.5)
PLATELET # BLD AUTO: 137 K/UL (ref 150–450)
PMV BLD AUTO: 10.8 FL (ref 9.2–12.9)
POCT GLUCOSE: 118 MG/DL (ref 70–110)
POCT GLUCOSE: 138 MG/DL (ref 70–110)
POCT GLUCOSE: 138 MG/DL (ref 70–110)
POTASSIUM SERPL-SCNC: 5 MMOL/L (ref 3.5–5.1)
PROT SERPL-MCNC: 8.2 GM/DL (ref 6–8.4)
RBC # BLD AUTO: 3.54 M/UL (ref 4.6–6.2)
RELATIVE EOSINOPHIL (OHS): 0.4 %
RELATIVE LYMPHOCYTE (OHS): 14.8 % (ref 18–48)
RELATIVE MONOCYTE (OHS): 6.4 % (ref 4–15)
RELATIVE NEUTROPHIL (OHS): 78 % (ref 38–73)
SODIUM SERPL-SCNC: 142 MMOL/L (ref 136–145)
WBC # BLD AUTO: 6.7 K/UL (ref 3.9–12.7)

## 2025-04-19 PROCEDURE — G0378 HOSPITAL OBSERVATION PER HR: HCPCS

## 2025-04-19 PROCEDURE — 96372 THER/PROPH/DIAG INJ SC/IM: CPT | Performed by: NURSE PRACTITIONER

## 2025-04-19 PROCEDURE — 25000003 PHARM REV CODE 250: Performed by: NURSE PRACTITIONER

## 2025-04-19 PROCEDURE — 94640 AIRWAY INHALATION TREATMENT: CPT | Mod: XB

## 2025-04-19 PROCEDURE — 99900035 HC TECH TIME PER 15 MIN (STAT)

## 2025-04-19 PROCEDURE — 96372 THER/PROPH/DIAG INJ SC/IM: CPT

## 2025-04-19 PROCEDURE — 63600175 PHARM REV CODE 636 W HCPCS

## 2025-04-19 PROCEDURE — 85025 COMPLETE CBC W/AUTO DIFF WBC: CPT | Performed by: NURSE PRACTITIONER

## 2025-04-19 PROCEDURE — 25000003 PHARM REV CODE 250

## 2025-04-19 PROCEDURE — 27000221 HC OXYGEN, UP TO 24 HOURS

## 2025-04-19 PROCEDURE — 83735 ASSAY OF MAGNESIUM: CPT | Performed by: FAMILY MEDICINE

## 2025-04-19 PROCEDURE — 94761 N-INVAS EAR/PLS OXIMETRY MLT: CPT

## 2025-04-19 PROCEDURE — 25000242 PHARM REV CODE 250 ALT 637 W/ HCPCS: Performed by: NURSE PRACTITIONER

## 2025-04-19 PROCEDURE — 25000242 PHARM REV CODE 250 ALT 637 W/ HCPCS

## 2025-04-19 PROCEDURE — 80053 COMPREHEN METABOLIC PANEL: CPT | Performed by: NURSE PRACTITIONER

## 2025-04-19 PROCEDURE — 36415 COLL VENOUS BLD VENIPUNCTURE: CPT | Performed by: NURSE PRACTITIONER

## 2025-04-19 PROCEDURE — 84100 ASSAY OF PHOSPHORUS: CPT | Performed by: NURSE PRACTITIONER

## 2025-04-19 PROCEDURE — 63600175 PHARM REV CODE 636 W HCPCS: Performed by: NURSE PRACTITIONER

## 2025-04-19 RX ORDER — KETOROLAC TROMETHAMINE 30 MG/ML
15 INJECTION, SOLUTION INTRAMUSCULAR; INTRAVENOUS EVERY 6 HOURS PRN
Status: DISCONTINUED | OUTPATIENT
Start: 2025-04-19 | End: 2025-04-19

## 2025-04-19 RX ORDER — SYRING-NEEDL,DISP,INSUL,0.3 ML 29 G X1/2"
296 SYRINGE, EMPTY DISPOSABLE MISCELLANEOUS ONCE
Status: COMPLETED | OUTPATIENT
Start: 2025-04-19 | End: 2025-04-19

## 2025-04-19 RX ORDER — PREDNISONE 20 MG/1
40 TABLET ORAL DAILY
Status: COMPLETED | OUTPATIENT
Start: 2025-04-19 | End: 2025-04-22

## 2025-04-19 RX ORDER — KETOROLAC TROMETHAMINE 30 MG/ML
15 INJECTION, SOLUTION INTRAMUSCULAR; INTRAVENOUS ONCE
Status: COMPLETED | OUTPATIENT
Start: 2025-04-19 | End: 2025-04-19

## 2025-04-19 RX ORDER — HYDROXYZINE 50 MG/ML
50 INJECTION, SOLUTION INTRAMUSCULAR ONCE
Status: COMPLETED | OUTPATIENT
Start: 2025-04-19 | End: 2025-04-19

## 2025-04-19 RX ORDER — FUROSEMIDE 40 MG/1
80 TABLET ORAL 2 TIMES DAILY
Status: DISCONTINUED | OUTPATIENT
Start: 2025-04-19 | End: 2025-04-22 | Stop reason: HOSPADM

## 2025-04-19 RX ORDER — IPRATROPIUM BROMIDE AND ALBUTEROL SULFATE 2.5; .5 MG/3ML; MG/3ML
3 SOLUTION RESPIRATORY (INHALATION)
Status: DISCONTINUED | OUTPATIENT
Start: 2025-04-19 | End: 2025-04-22 | Stop reason: HOSPADM

## 2025-04-19 RX ADMIN — IPRATROPIUM BROMIDE AND ALBUTEROL SULFATE 3 ML: 2.5; .5 SOLUTION RESPIRATORY (INHALATION) at 07:04

## 2025-04-19 RX ADMIN — MAGNESIUM CITRATE 296 ML: 1.75 LIQUID ORAL at 03:04

## 2025-04-19 RX ADMIN — Medication 6 MG: at 12:04

## 2025-04-19 RX ADMIN — TIOTROPIUM BROMIDE INHALATION SPRAY 2 PUFF: 3.12 SPRAY, METERED RESPIRATORY (INHALATION) at 07:04

## 2025-04-19 RX ADMIN — SENNOSIDES AND DOCUSATE SODIUM 1 TABLET: 50; 8.6 TABLET ORAL at 08:04

## 2025-04-19 RX ADMIN — PREDNISONE 40 MG: 20 TABLET ORAL at 10:04

## 2025-04-19 RX ADMIN — TAMSULOSIN HYDROCHLORIDE 0.4 MG: 0.4 CAPSULE ORAL at 08:04

## 2025-04-19 RX ADMIN — ENOXAPARIN SODIUM 40 MG: 40 INJECTION SUBCUTANEOUS at 05:04

## 2025-04-19 RX ADMIN — IPRATROPIUM BROMIDE AND ALBUTEROL SULFATE 3 ML: 2.5; .5 SOLUTION RESPIRATORY (INHALATION) at 12:04

## 2025-04-19 RX ADMIN — FUROSEMIDE 80 MG: 40 TABLET ORAL at 08:04

## 2025-04-19 RX ADMIN — ONDANSETRON 4 MG: 2 INJECTION INTRAMUSCULAR; INTRAVENOUS at 07:04

## 2025-04-19 RX ADMIN — POLYETHYLENE GLYCOL (3350) 17 G: 17 POWDER, FOR SOLUTION ORAL at 08:04

## 2025-04-19 RX ADMIN — METHADONE HYDROCHLORIDE 100 MG: 10 TABLET ORAL at 08:04

## 2025-04-19 RX ADMIN — HYDROXYZINE HYDROCHLORIDE 50 MG: 50 INJECTION, SOLUTION INTRAMUSCULAR at 03:04

## 2025-04-19 RX ADMIN — ESCITALOPRAM OXALATE 20 MG: 10 TABLET ORAL at 08:04

## 2025-04-19 RX ADMIN — FLUTICASONE FUROATE AND VILANTEROL TRIFENATATE 1 PUFF: 100; 25 POWDER RESPIRATORY (INHALATION) at 07:04

## 2025-04-19 RX ADMIN — IPRATROPIUM BROMIDE AND ALBUTEROL SULFATE 3 ML: 2.5; .5 SOLUTION RESPIRATORY (INHALATION) at 08:04

## 2025-04-19 RX ADMIN — IPRATROPIUM BROMIDE AND ALBUTEROL SULFATE 3 ML: 2.5; .5 SOLUTION RESPIRATORY (INHALATION) at 03:04

## 2025-04-19 RX ADMIN — KETOROLAC TROMETHAMINE 15 MG: 30 INJECTION, SOLUTION INTRAMUSCULAR; INTRAVENOUS at 03:04

## 2025-04-19 RX ADMIN — FUROSEMIDE 80 MG: 10 INJECTION, SOLUTION INTRAVENOUS at 08:04

## 2025-04-19 NOTE — PROGRESS NOTES
"[facility] Medicine  Progress Note    Patient Name: Ming Harrington  MRN: [id number]  Patient Class: OP- Observation   Admission Date: 4/17/2025  Length of Stay: 0 days  Attending Physician: Jacinda Cooper MD  Primary Care Provider: David Beckett PA-C        Subjective     Principal Problem:COPD exacerbation        HPI:  59 y.o. male h/o  COPD uses 2L O2 PRN, CHF (HFpEF  last EF 60-65%) a non smoker, constipation HTN, CKD stage 3b, hepatitis-C, cirrhosis, opioid use disorder on methadone,BPH,  JUVENCIO, lower extremity edema, asthma, COPD, HFrEF, HTN, and DM who presented to the ED with 4 days of worsening shortness of breath.  He was last seen in clinic today for hospital follow up. He reported "I do not feel good." Pt  had c/o increased SOB and chest pains and right  leg pain.   BP elevated, (178/156) O2 sats at 91%. He reported being  out of all his medications for a couple of weeks to include his home lasix.  In addition patient was seen in the emergency room for bilateral lower extremity edema which is chronic in nature with BLE with Upon arrival to the emergency room, patient was tachypnic, bradycardic, unable to speak in full sentences and lethargic.  He stated that he had been actually out of his medications for a month.  H also reported having an upper respiratory infection within the past week with coughing of green sputum.  He states that his SOB was worse yesterday.  He denies any fever, diarrhea or dysuria.  He does endorse some left lower quadrant abdominal pain, nausea, vomiting twice, poor oral intake.  Of note he states that his last bowel movement was 4 days ago after having to use a laxative.  He denies any blood in the urine or stool.    In the ED Vitas were:  /79, HR 55, RR 36, Temp 97.8 , sats 94%. CBC stable with H/H 11/35 and WBC 6.  CMP was significant for Na 137, K +3.8, BUN 41/creatinine 2.3 (baseline creatinine 1.7), glucose 97.  Normal AST/ALT, TB, ALT.  BNP 14, troponin " negative.       Chest Xray showed No acute intrathoracic abnormality.    In the ED pt was given DuoNeb treatment, methylprednisolone, senna.     Pt was admitted to [facility] for management of a  mixed picture of COPD and CHF exacerbation.       Overview/Hospital Course:  No notes on file    Interval History: NAOE. VSS and remained afebrile.     Review of Systems  Objective:     Vital Signs (Most Recent):  Temp: 97.5 °F (36.4 °C) (04/19/25 0408)  Pulse: 62 (04/19/25 0408)  Resp: 18 (04/19/25 0408)  BP: 139/86 (04/19/25 0408)  SpO2: 96 % (04/19/25 0408) Vital Signs (24h Range):  Temp:  [97.5 °F (36.4 °C)-98.1 °F (36.7 °C)] 97.5 °F (36.4 °C)  Pulse:  [62-87] 62  Resp:  [17-19] 18  SpO2:  [90 %-97 %] 96 %  BP: (100-139)/(58-86) 139/86     Weight: 122 kg (268 lb 15.4 oz)  Body mass index is 40.9 kg/m².    Intake/Output Summary (Last 24 hours) at 4/19/2025 0501  Last data filed at 4/19/2025 0035  Gross per 24 hour   Intake --   Output 1050 ml   Net -1050 ml         Physical Exam      Constitutional:       Appearance: Normal appearance.   HENT:      Head: Normocephalic and atraumatic.      Nose: Rhinorrhea present.      Mouth/Throat:      Mouth: Mucous membranes are moist.   Cardiovascular:      Rate and Rhythm: Regular rhythm. No bradycardia present.      Pulses: Normal pulses.      Heart sounds: Normal heart sounds. No murmur heard.     No friction rub. No gallop.   Pulmonary:      Effort: no respiratory distress present.      Breath sounds: Wheezing present.   Abdominal:      General: There is no distension.      Palpations: There is no mass.      Tenderness: There is no abdominal tenderness (LLQ). There is no right CVA tenderness, left CVA tenderness, guarding or rebound.   Musculoskeletal:      Cervical back: Normal range of motion and neck supple.      Right lower leg: Edema (+2 to thighs) present.      Left lower leg: Edema (+2 to thighs) present.   Skin:     General: Skin is warm and dry.      Capillary  Refill: Capillary refill takes less than 2 seconds.      Coloration: Skin is not jaundiced.      Findings: No bruising or rash.   Neurological:      General: No focal deficit present.      Mental Status: He is alert and oriented to person, place, and time.   Psychiatric:         Mood and Affect: Mood normal.         Behavior: Behavior normal.     Significant Labs: All pertinent labs within the past 24 hours have been reviewed.    Significant Imaging: I have reviewed all pertinent imaging results/findings within the past 24 hours.      Assessment & Plan  HTN (hypertension)  Patient's blood pressure range in the last 24 hours was: BP  Min: 100/58  Max: 139/86.The patient's inpatient anti-hypertensive regimen is listed below:  Current Antihypertensives  furosemide injection 80 mg, Every 12 hours, Intravenous    Plan  - BP is uncontrolled, will adjust as follows: hypotensive currently  - Hold home BP medications and resume when appropriate  Opioid use disorder, severe, on maintenance therapy, dependence  Plan:  Resumed home methadode  if  no longer lethargic    COPD exacerbation  Patient's COPD is with exacerbation noted by continued dyspnea, use of accessory muscles for breathing, and paradoxical chest wall movement currently.  Patient is currently on COPD Pathway. Continue scheduled inhalers Steroids, Antibiotics, and Supplemental oxygen and monitor respiratory status closely.       Plan:  -Prednisone 40 mg daily (Day 2 is 4/18)  -Azithromycin 500 mg day 1 then 250 mg x 2 days  (Day 3 is 4/18)  -Duo Neb/Inhalers every 4 hrs   -Continue to diurese with Furosemide q 12hrs orals   -Ambulatory sats today  -Rest rted prednisone 40 mg daily, feel this is more COPD picture    Type 2 diabetes mellitus, without long-term current use of insulin  Plan:  Gaol 140-180 while inpatient    CTM    Acute exacerbation of CHF (congestive heart failure)  Patient has Diastolic (HFpEF) heart failure that is Acute on chronic. On  presentation their CHF was decompensated. Evidence of decompensated CHF on presentation includes: edema, elevated JVD, orthopnea, dyspnea on exertion (LAND), and shortness of breath. The etiology of their decompensation is likely medication non-compliance. Most recent BNP and echo results are listed below.  Recent Labs     04/17/25  1025   BNP 14     Latest ECHO  Results for orders placed during the hospital encounter of 03/13/25    Echo    Interpretation Summary    Left Ventricle: The left ventricle is normal in size. Normal wall thickness. There is concentric remodeling. Normal wall motion. There is normal systolic function with a visually estimated ejection fraction of 60 - 65%. Ejection fraction is approximately 65%. There is normal diastolic function.    Right Ventricle: The right ventricle has mild enlargement. Wall thickness is normal. Systolic function is normal.    The right atrium is dilated.    Pulmonary Artery: Pulmonary artery pressure could not be estimated.    IVC/SVC: Normal venous pressure at 3 mmHg.    Current Heart Failure Medications  furosemide injection 80 mg, Every 12 hours, Intravenous  Given - Lasix 60 mg x 1 upon admission        Plan  - Monitor strict I&Os and daily weights.    - Place on telemetry  - Low sodium diet  - Place on fluid restriction of 1.5 L.   - Cardiology has not been consulted  - The patient's volume status is improving but not at their baseline as indicated by edema, elevated JVD, orthopnea, dyspnea on exertion (LAND), and shortness of breath  -Duo Neb treatment q4 hours  -No home trilogy, continue BREO and Spiriva      JUVENCIO (generalized anxiety disorder)  Plan:  Resumed home escitalopram    ANNITA (acute kidney injury)  ANNITA is likely due to pre-renal azotemia due to intravascular volume depletion. Baseline creatinine is  1.7 . Most recent creatinine and eGFR are listed below.  Recent Labs     04/17/25  1025 04/18/25  0358   CREATININE 2.3* 2.1*   EGFRNORACEVR 32* 35*       Plan  - ANNITA is stable  - Avoid nephrotoxins and renally dose meds for GFR listed above  - Monitor urine output, serial BMP, and adjust therapy as needed  - Retroperitoneal US showed: Simple to minimally complex right renal cysts.   -Urine studies with FEUrea  < 35% , suggestive of Pre-renal   Left leg pain    Patient with chronic leg pain secondary to likely lymphedema.  Bilateral leg ultrasounds for/11/20 5- for DVT.    Plan:  Resume home gabapentin  Drug-induced constipation  Patient is on methadone.  Patient reports last bowel movement 4 days ago.    Plan:  Senna and MiraLax scheduled    Chronic pain of right lower extremity      BPH (benign prostatic hyperplasia)  Plan:  Resume home BPH    VTE Risk Mitigation (From admission, onward)           Ordered     enoxaparin injection 40 mg  Daily         04/17/25 1426     IP VTE HIGH RISK PATIENT  Once         04/17/25 1426     Place sequential compression device  Until discontinued         04/17/25 1426                    Discharge Planning   RYLAN:      Code Status: Full Code   Medical Readiness for Discharge Date:                            Jacy Garcia MD  Department of Hospital Medicine   [place] [facility]

## 2025-04-19 NOTE — NURSING
Pt c/o right leg pain and swelling. Dr Linnette MD observed at BS assessing. Ketorolac 15mg IVP given as ordered.

## 2025-04-19 NOTE — NURSING
Home Oxygen Evaluation    Date Performed: 2025    1) Patient's Home O2 Sat on room air, while at rest: 94        If O2 sats on room air at rest are 88% or below, patient qualifies. No additional testing needed. Document N/A in steps 2 and 3. If 89% or above, complete steps 2.      2) Patient's O2 Sat on room air while exercisin        If O2 sats on room air while exercising remain 89% or above patient does not qualify, no further testing needed Document N/A in step 3. If O2 sats on room air while exercising are 88% or below, continue to step 3.      3) Patient's O2 Sat while exercising on O2: 95 at 4 LPM         (Must show improvement from #2 for patients to qualify)    If O2 sats improve on oxygen, patient qualifies for portable oxygen. If not, the patient does not qualify.

## 2025-04-19 NOTE — ASSESSMENT & PLAN NOTE
Patient's blood pressure range in the last 24 hours was: BP  Min: 112/73  Max: 139/86.The patient's inpatient anti-hypertensive regimen is listed below:  Current Antihypertensives  furosemide tablet 80 mg, 2 times daily, Oral    Plan  - BP is uncontrolled, will adjust as follows: hypotensive currently  - Hold home BP medications and resume when appropriate

## 2025-04-19 NOTE — NURSING
Pt wide awake, AAOx4, refused ordered HS Bipap. Significance explained, pt voice complete understanding, continue to refuse.

## 2025-04-19 NOTE — ASSESSMENT & PLAN NOTE
Patient's COPD is with exacerbation noted by continued dyspnea, use of accessory muscles for breathing, and paradoxical chest wall movement currently.  Patient is currently on COPD Pathway. Continue scheduled inhalers Steroids, Antibiotics, and Supplemental oxygen and monitor respiratory status closely.       Plan:  -Prednisone 40 mg daily (Day 2 is 4/18)  -Azithromycin 500 mg day 1 then 250 mg x 4 days  -Duo Neb/Inhalers every 4 hrs   -Continue to diurese with Furosemide q 12hrs orals   -Ambulatory sats today  -Rest rted prednisone 40 mg daily, feel this is more COPD picture

## 2025-04-19 NOTE — ASSESSMENT & PLAN NOTE
ANNITA is likely due to pre-renal azotemia due to intravascular volume depletion. Baseline creatinine is 1.7. Most recent creatinine and eGFR are listed below.  Recent Labs     04/17/25  1025 04/18/25  0358   CREATININE 2.3* 2.1*   EGFRNORACEVR 32* 35*      Plan  - ANNITA is stable  - Avoid nephrotoxins and renally dose meds for GFR listed above  - Monitor urine output, serial BMP, and adjust therapy as needed  - Retroperitoneal US showed: Simple to minimally complex right renal cysts.   -Urine studies with FEUrea  < 35% , suggestive of Pre-renal

## 2025-04-19 NOTE — ASSESSMENT & PLAN NOTE
ANNITA is likely due to pre-renal azotemia due to intravascular volume depletion. Baseline creatinine is 1.7. Most recent creatinine and eGFR are listed below.  Recent Labs     04/17/25  1025 04/18/25  0358 04/19/25  0552   CREATININE 2.3* 2.1* 2.4*   EGFRNORACEVR 32* 35* 30*      Plan  - ANNITA is stable  - Avoid nephrotoxins and renally dose meds for GFR listed above  - Monitor urine output, serial BMP, and adjust therapy as needed  - Retroperitoneal US showed: Simple to minimally complex right renal cysts.   -Urine studies with FEUrea  < 35% , suggestive of Pre-renal

## 2025-04-19 NOTE — ASSESSMENT & PLAN NOTE
Patient's blood pressure range in the last 24 hours was: BP  Min: 100/58  Max: 139/86.The patient's inpatient anti-hypertensive regimen is listed below:  Current Antihypertensives  furosemide injection 80 mg, Every 12 hours, Intravenous    Plan  - BP is uncontrolled, will adjust as follows: hypotensive currently  - Hold home BP medications and resume when appropriate

## 2025-04-19 NOTE — ASSESSMENT & PLAN NOTE
Patient's COPD is with exacerbation noted by continued dyspnea, use of accessory muscles for breathing, and paradoxical chest wall movement currently.  Patient is currently on COPD Pathway. Continue scheduled inhalers Steroids, Antibiotics, and Supplemental oxygen and monitor respiratory status closely.       Plan:  -Prednisone 40 mg daily (Day 2 is 4/18)  -Azithromycin 500 mg day 1 then 250 mg x 2 days  (Day 3 is 4/18)  -Duo Neb/Inhalers every 4 hrs   -Continue to diurese with Furosemide q 12hrs orals   -Ambulatory sats today  -Rest rted prednisone 40 mg daily, feel this is more COPD picture

## 2025-04-19 NOTE — ASSESSMENT & PLAN NOTE
Patient has Diastolic (HFpEF) heart failure that is Acute on chronic. On presentation their CHF was decompensated. Evidence of decompensated CHF on presentation includes: edema, elevated JVD, orthopnea, dyspnea on exertion (LAND), and shortness of breath. The etiology of their decompensation is likely medication non-compliance. Most recent BNP and echo results are listed below.  Recent Labs     04/17/25  1025   BNP 14     Latest ECHO  Results for orders placed during the hospital encounter of 03/13/25    Echo    Interpretation Summary    Left Ventricle: The left ventricle is normal in size. Normal wall thickness. There is concentric remodeling. Normal wall motion. There is normal systolic function with a visually estimated ejection fraction of 60 - 65%. Ejection fraction is approximately 65%. There is normal diastolic function.    Right Ventricle: The right ventricle has mild enlargement. Wall thickness is normal. Systolic function is normal.    The right atrium is dilated.    Pulmonary Artery: Pulmonary artery pressure could not be estimated.    IVC/SVC: Normal venous pressure at 3 mmHg.    Current Heart Failure Medications  furosemide tablet 80 mg, 2 times daily, Oral  Given - Lasix 60 mg x 1 upon admission        Plan  - Monitor strict I&Os and daily weights.    - Place on telemetry  - Low sodium diet  - Place on fluid restriction of 1.5 L.   - Cardiology has not been consulted  - The patient's volume status is improving but not at their baseline as indicated by edema, elevated JVD, orthopnea, dyspnea on exertion (LAND), and shortness of breath  -Duo Neb treatment q4 hours  -No home trilogy, continue BREO and Spiriva

## 2025-04-19 NOTE — SUBJECTIVE & OBJECTIVE
Interval History: NAOE. VSS and remained afebrile.     Review of Systems  Objective:     Vital Signs (Most Recent):  Temp: 97.5 °F (36.4 °C) (04/19/25 0408)  Pulse: 62 (04/19/25 0408)  Resp: 18 (04/19/25 0408)  BP: 139/86 (04/19/25 0408)  SpO2: 96 % (04/19/25 0408) Vital Signs (24h Range):  Temp:  [97.5 °F (36.4 °C)-98.1 °F (36.7 °C)] 97.5 °F (36.4 °C)  Pulse:  [62-87] 62  Resp:  [17-19] 18  SpO2:  [90 %-97 %] 96 %  BP: (100-139)/(58-86) 139/86     Weight: 122 kg (268 lb 15.4 oz)  Body mass index is 40.9 kg/m².    Intake/Output Summary (Last 24 hours) at 4/19/2025 0501  Last data filed at 4/19/2025 0035  Gross per 24 hour   Intake --   Output 1050 ml   Net -1050 ml         Physical Exam      Constitutional:       Appearance: Normal appearance.   HENT:      Head: Normocephalic and atraumatic.      Nose: Rhinorrhea present.      Mouth/Throat:      Mouth: Mucous membranes are moist.   Cardiovascular:      Rate and Rhythm: Regular rhythm. No bradycardia present.      Pulses: Normal pulses.      Heart sounds: Normal heart sounds. No murmur heard.     No friction rub. No gallop.   Pulmonary:      Effort: no respiratory distress present.      Breath sounds: Wheezing present.   Abdominal:      General: There is no distension.      Palpations: There is no mass.      Tenderness: There is no abdominal tenderness (LLQ). There is no right CVA tenderness, left CVA tenderness, guarding or rebound.   Musculoskeletal:      Cervical back: Normal range of motion and neck supple.      Right lower leg: Edema (+2 to thighs) present.      Left lower leg: Edema (+2 to thighs) present.   Skin:     General: Skin is warm and dry.      Capillary Refill: Capillary refill takes less than 2 seconds.      Coloration: Skin is not jaundiced.      Findings: No bruising or rash.   Neurological:      General: No focal deficit present.      Mental Status: He is alert and oriented to person, place, and time.   Psychiatric:         Mood and Affect: Mood  normal.         Behavior: Behavior normal.     Significant Labs: All pertinent labs within the past 24 hours have been reviewed.    Significant Imaging: I have reviewed all pertinent imaging results/findings within the past 24 hours.

## 2025-04-19 NOTE — PROGRESS NOTES
"[facility] Medicine  Progress Note    Patient Name: Ming Harrington  MRN: [id number]  Patient Class: OP- Observation   Admission Date: 4/17/2025  Length of Stay: 0 days  Attending Physician: Jacinda Cooper MD  Primary Care Provider: David Beckett PA-C        Subjective     Principal Problem:COPD exacerbation        HPI:  59 y.o. male h/o  COPD uses 2L O2 PRN, CHF (HFpEF  last EF 60-65%) a non smoker, constipation HTN, CKD stage 3b, hepatitis-C, cirrhosis, opioid use disorder on methadone,BPH,  JUVENCIO, lower extremity edema, asthma, COPD, HFrEF, HTN, and DM who presented to the ED with 4 days of worsening shortness of breath.  He was last seen in clinic today for hospital follow up. He reported "I do not feel good." Pt  had c/o increased SOB and chest pains and right  leg pain.   BP elevated, (178/156) O2 sats at 91%. He reported being  out of all his medications for a couple of weeks to include his home lasix.  In addition patient was seen in the emergency room for bilateral lower extremity edema which is chronic in nature with BLE with Upon arrival to the emergency room, patient was tachypnic, bradycardic, unable to speak in full sentences and lethargic.  He stated that he had been actually out of his medications for a month.  H also reported having an upper respiratory infection within the past week with coughing of green sputum.  He states that his SOB was worse yesterday.  He denies any fever, diarrhea or dysuria.  He does endorse some left lower quadrant abdominal pain, nausea, vomiting twice, poor oral intake.  Of note he states that his last bowel movement was 4 days ago after having to use a laxative.  He denies any blood in the urine or stool.    In the ED Vitas were:  /79, HR 55, RR 36, Temp 97.8 , sats 94%. CBC stable with H/H 11/35 and WBC 6.  CMP was significant for Na 137, K +3.8, BUN 41/creatinine 2.3 (baseline creatinine 1.7), glucose 97.  Normal AST/ALT, TB, ALT.  BNP 14, troponin " negative.       Chest Xray showed No acute intrathoracic abnormality.    In the ED pt was given DuoNeb treatment, methylprednisolone, senna.     Pt was admitted to [facility] for management of a  mixed picture of COPD and CHF exacerbation.       Overview/Hospital Course:  No notes on file    No new subjective & objective note has been filed under this hospital service since the last note was generated.    Physical Exam      Constitutional:       Appearance: Normal appearance.   HENT:      Head: Normocephalic and atraumatic.      Nose: Rhinorrhea present.      Mouth/Throat:      Mouth: Mucous membranes are moist.   Cardiovascular:      Rate and Rhythm: Regular rhythm. No bradycardia present.      Pulses: Normal pulses.      Heart sounds: Normal heart sounds. No murmur heard.     No friction rub. No gallop.   Pulmonary:      Effort: no respiratory distress present.      Breath sounds: Wheezing present.   Abdominal:      General: There is no distension.      Palpations: There is no mass.      Tenderness: There is no abdominal tenderness (LLQ). There is no right CVA tenderness, left CVA tenderness, guarding or rebound.   Musculoskeletal:      Cervical back: Normal range of motion and neck supple.      Right lower leg: Edema (+2 to thighs) present.      Left lower leg: Edema (+2 to thighs) present.   Skin:     General: Skin is warm and dry.      Capillary Refill: Capillary refill takes less than 2 seconds.      Coloration: Skin is not jaundiced.      Findings: No bruising or rash.   Neurological:      General: No focal deficit present.      Mental Status: He is alert and oriented to person, place, and time.   Psychiatric:         Mood and Affect: Mood normal.         Behavior: Behavior normal.      Significant Labs: All pertinent labs within the past 24 hours have been reviewed.     Significant Imaging: I have reviewed all pertinent imaging results/findings within the past 24 hours.    Assessment & Plan  HTN  (hypertension)  Patient's blood pressure range in the last 24 hours was: BP  Min: 112/73  Max: 139/86.The patient's inpatient anti-hypertensive regimen is listed below:  Current Antihypertensives  furosemide tablet 80 mg, 2 times daily, Oral    Plan  - BP is uncontrolled, will adjust as follows: hypotensive currently  - Hold home BP medications and resume when appropriate  Opioid use disorder, severe, on maintenance therapy, dependence  Plan:  Resumed home methadode  if  no longer lethargic    COPD exacerbation  Patient's COPD is with exacerbation noted by continued dyspnea, use of accessory muscles for breathing, and paradoxical chest wall movement currently.  Patient is currently on COPD Pathway. Continue scheduled inhalers Steroids, Antibiotics, and Supplemental oxygen and monitor respiratory status closely.       Plan:  -Prednisone 40 mg daily (Day 2 is 4/18)  -Azithromycin 500 mg day 1 then 250 mg x 4 days  -Duo Neb/Inhalers every 4 hrs   -Continue to diurese with Furosemide q 12hrs orals   -Ambulatory sats today  -Rest rted prednisone 40 mg daily, feel this is more COPD picture    Type 2 diabetes mellitus, without long-term current use of insulin  Plan:  Gaol 140-180 while inpatient  SS  CTM    Acute exacerbation of CHF (congestive heart failure)  Patient has Diastolic (HFpEF) heart failure that is Acute on chronic. On presentation their CHF was decompensated. Evidence of decompensated CHF on presentation includes: edema, elevated JVD, orthopnea, dyspnea on exertion (LAND), and shortness of breath. The etiology of their decompensation is likely medication non-compliance. Most recent BNP and echo results are listed below.  Recent Labs     04/17/25  1025   BNP 14     Latest ECHO  Results for orders placed during the hospital encounter of 03/13/25    Echo    Interpretation Summary    Left Ventricle: The left ventricle is normal in size. Normal wall thickness. There is concentric remodeling. Normal wall motion.  There is normal systolic function with a visually estimated ejection fraction of 60 - 65%. Ejection fraction is approximately 65%. There is normal diastolic function.    Right Ventricle: The right ventricle has mild enlargement. Wall thickness is normal. Systolic function is normal.    The right atrium is dilated.    Pulmonary Artery: Pulmonary artery pressure could not be estimated.    IVC/SVC: Normal venous pressure at 3 mmHg.    Current Heart Failure Medications  furosemide tablet 80 mg, 2 times daily, Oral  Given - Lasix 60 mg x 1 upon admission        Plan  - Monitor strict I&Os and daily weights.    - Place on telemetry  - Low sodium diet  - Place on fluid restriction of 1.5 L.   - Cardiology has not been consulted  - The patient's volume status is improving but not at their baseline as indicated by edema, elevated JVD, orthopnea, dyspnea on exertion (LAND), and shortness of breath  -Duo Neb treatment q4 hours  -No home trilogy, continue BREO and Spiriva      JUVENCIO (generalized anxiety disorder)  Plan:  Resumed home escitalopram    ANNITA (acute kidney injury)  ANNITA is likely due to pre-renal azotemia due to intravascular volume depletion. Baseline creatinine is  1.7 . Most recent creatinine and eGFR are listed below.  Recent Labs     04/17/25  1025 04/18/25  0358 04/19/25  0552   CREATININE 2.3* 2.1* 2.4*   EGFRNORACEVR 32* 35* 30*      Plan  - ANNITA is stable  - Avoid nephrotoxins and renally dose meds for GFR listed above  - Monitor urine output, serial BMP, and adjust therapy as needed  - Retroperitoneal US showed: Simple to minimally complex right renal cysts.   -Urine studies with FEUrea  < 35% , suggestive of Pre-renal   Left leg pain    Patient with chronic leg pain secondary to likely lymphedema.  Bilateral leg ultrasounds for/11/20 5- for DVT.    Plan:  Resume home gabapentin  Drug-induced constipation  Patient is on methadone.  Patient reports last bowel movement 4 days ago.    Plan:  Senna and MiraLax  scheduled    Chronic pain of right lower extremity      BPH (benign prostatic hyperplasia)  Plan:  Resume home BPH      VTE Risk Mitigation (From admission, onward)           Ordered     enoxaparin injection 40 mg  Daily         04/17/25 1426     IP VTE HIGH RISK PATIENT  Once         04/17/25 1426     Place sequential compression device  Until discontinued         04/17/25 1426                    Discharge Planning   RYLAN:      Code Status: Full Code   Medical Readiness for Discharge Date:                            Jacy Garcia MD  Department of Hospital Medicine   [place] [facility]

## 2025-04-19 NOTE — ASSESSMENT & PLAN NOTE
Patient has Diastolic (HFpEF) heart failure that is Acute on chronic. On presentation their CHF was decompensated. Evidence of decompensated CHF on presentation includes: edema, elevated JVD, orthopnea, dyspnea on exertion (LAND), and shortness of breath. The etiology of their decompensation is likely medication non-compliance. Most recent BNP and echo results are listed below.  Recent Labs     04/17/25  1025   BNP 14     Latest ECHO  Results for orders placed during the hospital encounter of 03/13/25    Echo    Interpretation Summary    Left Ventricle: The left ventricle is normal in size. Normal wall thickness. There is concentric remodeling. Normal wall motion. There is normal systolic function with a visually estimated ejection fraction of 60 - 65%. Ejection fraction is approximately 65%. There is normal diastolic function.    Right Ventricle: The right ventricle has mild enlargement. Wall thickness is normal. Systolic function is normal.    The right atrium is dilated.    Pulmonary Artery: Pulmonary artery pressure could not be estimated.    IVC/SVC: Normal venous pressure at 3 mmHg.    Current Heart Failure Medications  furosemide injection 80 mg, Every 12 hours, Intravenous  Given - Lasix 60 mg x 1 upon admission        Plan  - Monitor strict I&Os and daily weights.    - Place on telemetry  - Low sodium diet  - Place on fluid restriction of 1.5 L.   - Cardiology has not been consulted  - The patient's volume status is improving but not at their baseline as indicated by edema, elevated JVD, orthopnea, dyspnea on exertion (LAND), and shortness of breath  -Duo Neb treatment q4 hours  -No home trilogy, continue BREO and Spiriva

## 2025-04-20 LAB
ABSOLUTE EOSINOPHIL (OHS): 0.02 K/UL
ABSOLUTE MONOCYTE (OHS): 0.58 K/UL (ref 0.3–1)
ABSOLUTE NEUTROPHIL COUNT (OHS): 5.2 K/UL (ref 1.8–7.7)
ALBUMIN SERPL BCP-MCNC: 3.7 G/DL (ref 3.5–5.2)
ALP SERPL-CCNC: 63 UNIT/L (ref 40–150)
ALT SERPL W/O P-5'-P-CCNC: 16 UNIT/L (ref 10–44)
ANION GAP (OHS): 9 MMOL/L (ref 8–16)
AST SERPL-CCNC: 12 UNIT/L (ref 11–45)
BASOPHILS # BLD AUTO: 0.01 K/UL
BASOPHILS NFR BLD AUTO: 0.1 %
BILIRUB SERPL-MCNC: 0.3 MG/DL (ref 0.1–1)
BUN SERPL-MCNC: 44 MG/DL (ref 6–20)
CALCIUM SERPL-MCNC: 9.1 MG/DL (ref 8.7–10.5)
CHLORIDE SERPL-SCNC: 99 MMOL/L (ref 95–110)
CO2 SERPL-SCNC: 32 MMOL/L (ref 23–29)
CREAT SERPL-MCNC: 2.1 MG/DL (ref 0.5–1.4)
ERYTHROCYTE [DISTWIDTH] IN BLOOD BY AUTOMATED COUNT: 12 % (ref 11.5–14.5)
GFR SERPLBLD CREATININE-BSD FMLA CKD-EPI: 35 ML/MIN/1.73/M2
GLUCOSE SERPL-MCNC: 108 MG/DL (ref 70–110)
HCT VFR BLD AUTO: 32.6 % (ref 40–54)
HGB BLD-MCNC: 10.4 GM/DL (ref 14–18)
IMM GRANULOCYTES # BLD AUTO: 0.02 K/UL (ref 0–0.04)
IMM GRANULOCYTES NFR BLD AUTO: 0.3 % (ref 0–0.5)
LYMPHOCYTES # BLD AUTO: 0.93 K/UL (ref 1–4.8)
MAGNESIUM SERPL-MCNC: 2.7 MG/DL (ref 1.6–2.6)
MCH RBC QN AUTO: 30.4 PG (ref 27–31)
MCHC RBC AUTO-ENTMCNC: 31.9 G/DL (ref 32–36)
MCV RBC AUTO: 95 FL (ref 82–98)
NUCLEATED RBC (/100WBC) (OHS): 0 /100 WBC
PHOSPHATE SERPL-MCNC: 3.3 MG/DL (ref 2.7–4.5)
PLATELET # BLD AUTO: 129 K/UL (ref 150–450)
PMV BLD AUTO: 10.4 FL (ref 9.2–12.9)
POCT GLUCOSE: 105 MG/DL (ref 70–110)
POCT GLUCOSE: 129 MG/DL (ref 70–110)
POCT GLUCOSE: 133 MG/DL (ref 70–110)
POTASSIUM SERPL-SCNC: 4.8 MMOL/L (ref 3.5–5.1)
PROT SERPL-MCNC: 7.5 GM/DL (ref 6–8.4)
RBC # BLD AUTO: 3.42 M/UL (ref 4.6–6.2)
RELATIVE EOSINOPHIL (OHS): 0.3 %
RELATIVE LYMPHOCYTE (OHS): 13.8 % (ref 18–48)
RELATIVE MONOCYTE (OHS): 8.6 % (ref 4–15)
RELATIVE NEUTROPHIL (OHS): 76.9 % (ref 38–73)
SODIUM SERPL-SCNC: 140 MMOL/L (ref 136–145)
WBC # BLD AUTO: 6.76 K/UL (ref 3.9–12.7)

## 2025-04-20 PROCEDURE — 36415 COLL VENOUS BLD VENIPUNCTURE: CPT | Performed by: NURSE PRACTITIONER

## 2025-04-20 PROCEDURE — 11000001 HC ACUTE MED/SURG PRIVATE ROOM

## 2025-04-20 PROCEDURE — 94640 AIRWAY INHALATION TREATMENT: CPT | Mod: XB

## 2025-04-20 PROCEDURE — 83735 ASSAY OF MAGNESIUM: CPT | Performed by: FAMILY MEDICINE

## 2025-04-20 PROCEDURE — 94761 N-INVAS EAR/PLS OXIMETRY MLT: CPT

## 2025-04-20 PROCEDURE — 63600175 PHARM REV CODE 636 W HCPCS: Performed by: NURSE PRACTITIONER

## 2025-04-20 PROCEDURE — 84100 ASSAY OF PHOSPHORUS: CPT | Performed by: NURSE PRACTITIONER

## 2025-04-20 PROCEDURE — 99900035 HC TECH TIME PER 15 MIN (STAT)

## 2025-04-20 PROCEDURE — 27000221 HC OXYGEN, UP TO 24 HOURS

## 2025-04-20 PROCEDURE — 25000242 PHARM REV CODE 250 ALT 637 W/ HCPCS

## 2025-04-20 PROCEDURE — 80053 COMPREHEN METABOLIC PANEL: CPT | Performed by: NURSE PRACTITIONER

## 2025-04-20 PROCEDURE — 85025 COMPLETE CBC W/AUTO DIFF WBC: CPT | Performed by: NURSE PRACTITIONER

## 2025-04-20 PROCEDURE — 25000003 PHARM REV CODE 250: Performed by: NURSE PRACTITIONER

## 2025-04-20 RX ADMIN — IPRATROPIUM BROMIDE AND ALBUTEROL SULFATE 3 ML: 2.5; .5 SOLUTION RESPIRATORY (INHALATION) at 11:04

## 2025-04-20 RX ADMIN — Medication 6 MG: at 02:04

## 2025-04-20 RX ADMIN — FUROSEMIDE 80 MG: 40 TABLET ORAL at 09:04

## 2025-04-20 RX ADMIN — IPRATROPIUM BROMIDE AND ALBUTEROL SULFATE 3 ML: 2.5; .5 SOLUTION RESPIRATORY (INHALATION) at 03:04

## 2025-04-20 RX ADMIN — POLYETHYLENE GLYCOL (3350) 17 G: 17 POWDER, FOR SOLUTION ORAL at 08:04

## 2025-04-20 RX ADMIN — PREDNISONE 40 MG: 20 TABLET ORAL at 09:04

## 2025-04-20 RX ADMIN — TAMSULOSIN HYDROCHLORIDE 0.4 MG: 0.4 CAPSULE ORAL at 09:04

## 2025-04-20 RX ADMIN — IPRATROPIUM BROMIDE AND ALBUTEROL SULFATE 3 ML: 2.5; .5 SOLUTION RESPIRATORY (INHALATION) at 07:04

## 2025-04-20 RX ADMIN — FLUTICASONE FUROATE AND VILANTEROL TRIFENATATE 1 PUFF: 100; 25 POWDER RESPIRATORY (INHALATION) at 07:04

## 2025-04-20 RX ADMIN — ENOXAPARIN SODIUM 40 MG: 40 INJECTION SUBCUTANEOUS at 05:04

## 2025-04-20 RX ADMIN — FUROSEMIDE 80 MG: 40 TABLET ORAL at 08:04

## 2025-04-20 RX ADMIN — SENNOSIDES AND DOCUSATE SODIUM 1 TABLET: 50; 8.6 TABLET ORAL at 08:04

## 2025-04-20 RX ADMIN — POLYETHYLENE GLYCOL (3350) 17 G: 17 POWDER, FOR SOLUTION ORAL at 09:04

## 2025-04-20 RX ADMIN — METHADONE HYDROCHLORIDE 100 MG: 10 TABLET ORAL at 09:04

## 2025-04-20 RX ADMIN — ESCITALOPRAM OXALATE 20 MG: 10 TABLET ORAL at 09:04

## 2025-04-20 RX ADMIN — SENNOSIDES AND DOCUSATE SODIUM 1 TABLET: 50; 8.6 TABLET ORAL at 09:04

## 2025-04-20 NOTE — CARE UPDATE
Inpatient Upgrade Note    Ming Harrington has warranted treatment spanning two or more midnights of hospital level care for the management of heart failure and COPD exacerbation. He continues to require supplemental oxygen, monitoring of vital signs, and breathing treatements & monitoring of urine output for titration of medications . His condition is also complicated by the following comorbidities: Hypertension, Chronic respiratory disease, Chronic kidney disease, and Heart failure.    Tamika Davis MD  [facility], PGY2

## 2025-04-20 NOTE — ASSESSMENT & PLAN NOTE
ANNITA is likely due to pre-renal azotemia due to intravascular volume depletion. Baseline creatinine is 1.7. Most recent creatinine and eGFR are listed below.  Recent Labs     04/18/25  0358 04/19/25  0552 04/20/25  0636   CREATININE 2.1* 2.4* 2.1*   EGFRNORACEVR 35* 30* 35*      Plan  - ANNITA is stable  - Avoid nephrotoxins and renally dose meds for GFR listed above  - Monitor urine output, serial BMP, and adjust therapy as needed  - Retroperitoneal US showed: Simple to minimally complex right renal cysts.   -Urine studies with FEUrea  < 35% , suggestive of Pre-renal

## 2025-04-20 NOTE — PROGRESS NOTES
"[facility] Medicine  Progress Note    Patient Name: Ming Harrington  MRN: [id number]  Patient Class: OP- Observation   Admission Date: 4/17/2025  Length of Stay: 0 days  Attending Physician: Jacinda Cooper MD  Primary Care Provider: David Beckett PA-C        Subjective     Principal Problem:COPD exacerbation        HPI:  59 y.o. male h/o  COPD uses 2L O2 PRN, CHF (HFpEF  last EF 60-65%) a non smoker, constipation HTN, CKD stage 3b, hepatitis-C, cirrhosis, opioid use disorder on methadone,BPH,  JUVENCIO, lower extremity edema, asthma, COPD, HFrEF, HTN, and DM who presented to the ED with 4 days of worsening shortness of breath.  He was last seen in clinic today for hospital follow up. He reported "I do not feel good." Pt  had c/o increased SOB and chest pains and right  leg pain.   BP elevated, (178/156) O2 sats at 91%. He reported being  out of all his medications for a couple of weeks to include his home lasix.  In addition patient was seen in the emergency room for bilateral lower extremity edema which is chronic in nature with BLE with Upon arrival to the emergency room, patient was tachypnic, bradycardic, unable to speak in full sentences and lethargic.  He stated that he had been actually out of his medications for a month.  H also reported having an upper respiratory infection within the past week with coughing of green sputum.  He states that his SOB was worse yesterday.  He denies any fever, diarrhea or dysuria.  He does endorse some left lower quadrant abdominal pain, nausea, vomiting twice, poor oral intake.  Of note he states that his last bowel movement was 4 days ago after having to use a laxative.  He denies any blood in the urine or stool.    In the ED Vitas were:  /79, HR 55, RR 36, Temp 97.8 , sats 94%. CBC stable with H/H 11/35 and WBC 6.  CMP was significant for Na 137, K +3.8, BUN 41/creatinine 2.3 (baseline creatinine 1.7), glucose 97.  Normal AST/ALT, TB, ALT.  BNP 14, troponin " "negative.       Chest Xray showed No acute intrathoracic abnormality.    In the ED pt was given DuoNeb treatment, methylprednisolone, senna.     [facility] management of a  mixed picture of COPD and CHF exacerbation.       Overview/Hospital Course:  No notes on file    Interval History: ~!.9L UOP. Patient feels "dejected" that he is on PO Lasix. Thought he was getting IV. Desires coming to the hospital "every 3 days" to get IV lasix to get fluid off. Reports that the lasix tablets are not helping him at home.    Review of Systems   Respiratory:  Positive for shortness of breath and wheezing.    Cardiovascular:  Positive for leg swelling. Negative for chest pain.     Objective:     Vital Signs (Most Recent):  Temp: 98.1 °F (36.7 °C) (04/20/25 0909)  Pulse: 86 (04/20/25 1116)  Resp: 18 (04/20/25 1116)  BP: 136/76 (04/20/25 0909)  SpO2: 95 % (04/20/25 1116) Vital Signs (24h Range):  Temp:  [97.9 °F (36.6 °C)-98.5 °F (36.9 °C)] 98.1 °F (36.7 °C)  Pulse:  [67-92] 86  Resp:  [18-20] 18  SpO2:  [85 %-97 %] 95 %  BP: (125-136)/(76-88) 136/76     Weight: 125.8 kg (277 lb 5.4 oz)  Body mass index is 42.17 kg/m².    Intake/Output Summary (Last 24 hours) at 4/20/2025 1122  Last data filed at 4/20/2025 1043  Gross per 24 hour   Intake 480 ml   Output 1775 ml   Net -1295 ml         Physical Exam  HENT:      Right Ear: External ear normal.      Left Ear: External ear normal.   Eyes:      Extraocular Movements: Extraocular movements intact.      Conjunctiva/sclera: Conjunctivae normal.   Cardiovascular:      Rate and Rhythm: Normal rate and regular rhythm.      Pulses: Normal pulses.   Pulmonary:      Effort: Pulmonary effort is normal.      Breath sounds: Wheezing present.   Abdominal:      Comments: Obese abdomen   Musculoskeletal:      Right lower leg: Edema present.      Left lower leg: Edema present.   Skin:     General: Skin is warm.      Coloration: Skin is not jaundiced.   Neurological:      " Mental Status: He is alert and oriented to person, place, and time.   Psychiatric:         Mood and Affect: Mood normal.         Behavior: Behavior normal.               Significant Labs: All pertinent labs within the past 24 hours have been reviewed.  CBC:   Recent Labs   Lab 04/19/25  0552 04/20/25  0636   WBC 6.70 6.76   HGB 11.0* 10.4*   HCT 33.7* 32.6*   * 129*     CMP:   Recent Labs   Lab 04/19/25  0552 04/20/25  0636    140   K 5.0 4.8   CL 98 99   CO2 31* 32*   BUN 45* 44*   CREATININE 2.4* 2.1*   CALCIUM 9.4 9.1   ALBUMIN 3.9 3.7   BILITOT 0.4 0.3   ALKPHOS 75 63   AST 15 12   ALT 16 16   ANIONGAP 13 9       Significant Imaging: I have reviewed all pertinent imaging results/findings within the past 24 hours.      Assessment & Plan  HTN (hypertension)  Patient's blood pressure range in the last 24 hours was: BP  Min: 125/80  Max: 136/76.The patient's inpatient anti-hypertensive regimen is listed below:  Current Antihypertensives  furosemide tablet 80 mg, 2 times daily, Oral    Plan  - BP is uncontrolled, will adjust as follows: hypotensive currently  - Hold home BP medications and resume when appropriate  Opioid use disorder, severe, on maintenance therapy, dependence  Plan:  Resumed home methadode  if  no longer lethargic    COPD exacerbation  Patient's COPD is with exacerbation noted by continued dyspnea, use of accessory muscles for breathing, and paradoxical chest wall movement currently.  Patient is currently on COPD Pathway. Continue scheduled inhalers Steroids, Antibiotics, and Supplemental oxygen and monitor respiratory status closely.       Plan:  -Prednisone 40 mg daily (Day 2 is 4/18)  - s/p Azithromycin 500 mg day 1 then 250 mg x 2 days  -Duo Neb/Inhalers every 4 hrs   -Continue to diurese with Furosemide q 12hrs orals   -Ambulatory sats today  -Restarted prednisone 40 mg daily, feel this is more COPD picture    Type 2 diabetes mellitus, without long-term current use of  "insulin  Plan:  Gaol 140-180 while inpatient    CTM    Acute exacerbation of CHF (congestive heart failure)  Patient has Diastolic (HFpEF) heart failure that is Acute on chronic. On presentation their CHF was decompensated. Evidence of decompensated CHF on presentation includes: edema, elevated JVD, orthopnea, dyspnea on exertion (LAND), and shortness of breath. The etiology of their decompensation is likely medication non-compliance. Most recent BNP and echo results are listed below.  No results for input(s): "BNP" in the last 72 hours.    Latest ECHO  Results for orders placed during the hospital encounter of 03/13/25    Echo    Interpretation Summary    Left Ventricle: The left ventricle is normal in size. Normal wall thickness. There is concentric remodeling. Normal wall motion. There is normal systolic function with a visually estimated ejection fraction of 60 - 65%. Ejection fraction is approximately 65%. There is normal diastolic function.    Right Ventricle: The right ventricle has mild enlargement. Wall thickness is normal. Systolic function is normal.    The right atrium is dilated.    Pulmonary Artery: Pulmonary artery pressure could not be estimated.    IVC/SVC: Normal venous pressure at 3 mmHg.    Current Heart Failure Medications  furosemide tablet 80 mg, 2 times daily, Oral  Given - Lasix 60 mg x 1 upon admission        Plan  - Monitor strict I&Os and daily weights.    - Place on telemetry  - Low sodium diet  - Place on fluid restriction of 1.5 L.   - Cardiology has not been consulted  - The patient's volume status is improving but not at their baseline as indicated by edema, elevated JVD, orthopnea, dyspnea on exertion (LAND), and shortness of breath  -Duo Neb treatment q4 hours  -No home trilogy, continue BREO and Spiriva  JUVENCIO (generalized anxiety disorder)  Plan:  Resumed home escitalopram    ANNITA (acute kidney injury)  ANNITA is likely due to pre-renal azotemia due to intravascular volume depletion. " Baseline creatinine is  1.7 . Most recent creatinine and eGFR are listed below.  Recent Labs     04/18/25  0358 04/19/25  0552 04/20/25  0636   CREATININE 2.1* 2.4* 2.1*   EGFRNORACEVR 35* 30* 35*      Plan  - ANNITA is stable  - Avoid nephrotoxins and renally dose meds for GFR listed above  - Monitor urine output, serial BMP, and adjust therapy as needed  - Retroperitoneal US showed: Simple to minimally complex right renal cysts.   -Urine studies with FEUrea  < 35% , suggestive of Pre-renal   Left leg pain  Patient with chronic leg pain secondary to likely lymphedema.  Bilateral leg ultrasounds for/11/20 5- for DVT.    Plan:  Resume home gabapentin  Drug-induced constipation  Patient is on methadone.  Patient reports last bowel movement 4 days ago.    Plan:  Senna and MiraLax scheduled    Chronic pain of right lower extremity      BPH (benign prostatic hyperplasia)  Plan:  Resume home BPH    VTE Risk Mitigation (From admission, onward)           Ordered     enoxaparin injection 40 mg  Daily         04/17/25 1426     IP VTE HIGH RISK PATIENT  Once         04/17/25 1426     Place sequential compression device  Until discontinued         04/17/25 1426                    Discharge Planning   RYLAN:      Code Status: Full Code   Medical Readiness for Discharge Date:                Tamika Davis MD  Department of Hospital Medicine   [facility]

## 2025-04-20 NOTE — ASSESSMENT & PLAN NOTE
"Patient has Diastolic (HFpEF) heart failure that is Acute on chronic. On presentation their CHF was decompensated. Evidence of decompensated CHF on presentation includes: edema, elevated JVD, orthopnea, dyspnea on exertion (LAND), and shortness of breath. The etiology of their decompensation is likely medication non-compliance. Most recent BNP and echo results are listed below.  No results for input(s): "BNP" in the last 72 hours.    Latest ECHO  Results for orders placed during the hospital encounter of 03/13/25    Echo    Interpretation Summary    Left Ventricle: The left ventricle is normal in size. Normal wall thickness. There is concentric remodeling. Normal wall motion. There is normal systolic function with a visually estimated ejection fraction of 60 - 65%. Ejection fraction is approximately 65%. There is normal diastolic function.    Right Ventricle: The right ventricle has mild enlargement. Wall thickness is normal. Systolic function is normal.    The right atrium is dilated.    Pulmonary Artery: Pulmonary artery pressure could not be estimated.    IVC/SVC: Normal venous pressure at 3 mmHg.    Current Heart Failure Medications  furosemide tablet 80 mg, 2 times daily, Oral  Given - Lasix 60 mg x 1 upon admission        Plan  - Monitor strict I&Os and daily weights.    - Place on telemetry  - Low sodium diet  - Place on fluid restriction of 1.5 L.   - Cardiology has not been consulted  - The patient's volume status is improving but not at their baseline as indicated by edema, elevated JVD, orthopnea, dyspnea on exertion (LAND), and shortness of breath  -Duo Neb treatment q4 hours  -No home trilogy, continue BREO and Spiriva  "

## 2025-04-20 NOTE — PLAN OF CARE
Patient awake and alert. Patient is given medications as ordered per MAR. Cardiac monitoring in place. Patient on 2L NC. Urinal at bedside. Blood Glucose monitoring maintained. PRN Tylenol for pain. Safety maintained. Instructed to use call light for assistance.       Problem: Adult Inpatient Plan of Care  Goal: Plan of Care Review  Outcome: Progressing     Problem: Diabetes Comorbidity  Goal: Blood Glucose Level Within Targeted Range  Outcome: Progressing     Problem: Acute Kidney Injury/Impairment  Goal: Fluid and Electrolyte Balance  Outcome: Progressing

## 2025-04-20 NOTE — NURSING
RAPID RESPONSE NURSE PROACTIVE ROUNDING NOTE     20g PIV placed in R wrist via palpation. Blood return noted, flushes well.

## 2025-04-20 NOTE — PLAN OF CARE
Pt on RA, no respiratory distress noted. Will continue to monitor. Patient given aerosol treatment with no adverse reactions noted. Patient instructed on proper use.   Problem: Adult Inpatient Plan of Care  Goal: Plan of Care Review  Outcome: Progressing

## 2025-04-20 NOTE — ASSESSMENT & PLAN NOTE
Patient's blood pressure range in the last 24 hours was: BP  Min: 125/80  Max: 136/76.The patient's inpatient anti-hypertensive regimen is listed below:  Current Antihypertensives  furosemide tablet 80 mg, 2 times daily, Oral    Plan  - BP is uncontrolled, will adjust as follows: hypotensive currently  - Hold home BP medications and resume when appropriate

## 2025-04-20 NOTE — ASSESSMENT & PLAN NOTE
Patient's COPD is with exacerbation noted by continued dyspnea, use of accessory muscles for breathing, and paradoxical chest wall movement currently.  Patient is currently on COPD Pathway. Continue scheduled inhalers Steroids, Antibiotics, and Supplemental oxygen and monitor respiratory status closely.       Plan:  -Prednisone 40 mg daily (Day 2 is 4/18)  - s/p Azithromycin 500 mg day 1 then 250 mg x 2 days  -Duo Neb/Inhalers every 4 hrs   -Continue to diurese with Furosemide q 12hrs orals   -Ambulatory sats today  -Restarted prednisone 40 mg daily, feel this is more COPD picture

## 2025-04-20 NOTE — SUBJECTIVE & OBJECTIVE
"Interval History: ~!.9L UOP. Patient feels "dejected" that he is on PO Lasix. Thought he was getting IV. Desires coming to the hospital "every 3 days" to get IV lasix to get fluid off. Reports that the lasix tablets are not helping him at home.    Review of Systems   Respiratory:  Positive for shortness of breath and wheezing.    Cardiovascular:  Positive for leg swelling. Negative for chest pain.     Objective:     Vital Signs (Most Recent):  Temp: 98.1 °F (36.7 °C) (04/20/25 0909)  Pulse: 86 (04/20/25 1116)  Resp: 18 (04/20/25 1116)  BP: 136/76 (04/20/25 0909)  SpO2: 95 % (04/20/25 1116) Vital Signs (24h Range):  Temp:  [97.9 °F (36.6 °C)-98.5 °F (36.9 °C)] 98.1 °F (36.7 °C)  Pulse:  [67-92] 86  Resp:  [18-20] 18  SpO2:  [85 %-97 %] 95 %  BP: (125-136)/(76-88) 136/76     Weight: 125.8 kg (277 lb 5.4 oz)  Body mass index is 42.17 kg/m².    Intake/Output Summary (Last 24 hours) at 4/20/2025 1122  Last data filed at 4/20/2025 1043  Gross per 24 hour   Intake 480 ml   Output 1775 ml   Net -1295 ml         Physical Exam  HENT:      Right Ear: External ear normal.      Left Ear: External ear normal.   Eyes:      Extraocular Movements: Extraocular movements intact.      Conjunctiva/sclera: Conjunctivae normal.   Cardiovascular:      Rate and Rhythm: Normal rate and regular rhythm.      Pulses: Normal pulses.   Pulmonary:      Effort: Pulmonary effort is normal.      Breath sounds: Wheezing present.   Abdominal:      Comments: Obese abdomen   Musculoskeletal:      Right lower leg: Edema present.      Left lower leg: Edema present.   Skin:     General: Skin is warm.      Coloration: Skin is not jaundiced.   Neurological:      Mental Status: He is alert and oriented to person, place, and time.   Psychiatric:         Mood and Affect: Mood normal.         Behavior: Behavior normal.               Significant Labs: All pertinent labs within the past 24 hours have been reviewed.  CBC:   Recent Labs   Lab 04/19/25  0552 " 04/20/25  0636   WBC 6.70 6.76   HGB 11.0* 10.4*   HCT 33.7* 32.6*   * 129*     CMP:   Recent Labs   Lab 04/19/25  0552 04/20/25  0636    140   K 5.0 4.8   CL 98 99   CO2 31* 32*   BUN 45* 44*   CREATININE 2.4* 2.1*   CALCIUM 9.4 9.1   ALBUMIN 3.9 3.7   BILITOT 0.4 0.3   ALKPHOS 75 63   AST 15 12   ALT 16 16   ANIONGAP 13 9       Significant Imaging: I have reviewed all pertinent imaging results/findings within the past 24 hours.

## 2025-04-21 LAB
ABSOLUTE EOSINOPHIL (OHS): 0 K/UL
ABSOLUTE MONOCYTE (OHS): 0.69 K/UL (ref 0.3–1)
ABSOLUTE NEUTROPHIL COUNT (OHS): 5.88 K/UL (ref 1.8–7.7)
ALBUMIN SERPL BCP-MCNC: 3.8 G/DL (ref 3.5–5.2)
ALP SERPL-CCNC: 64 UNIT/L (ref 40–150)
ALT SERPL W/O P-5'-P-CCNC: 17 UNIT/L (ref 10–44)
ANION GAP (OHS): 9 MMOL/L (ref 8–16)
AST SERPL-CCNC: 14 UNIT/L (ref 11–45)
BASOPHILS # BLD AUTO: 0.01 K/UL
BASOPHILS NFR BLD AUTO: 0.1 %
BILIRUB SERPL-MCNC: 0.5 MG/DL (ref 0.1–1)
BUN SERPL-MCNC: 38 MG/DL (ref 6–20)
CALCIUM SERPL-MCNC: 9.6 MG/DL (ref 8.7–10.5)
CHLORIDE SERPL-SCNC: 97 MMOL/L (ref 95–110)
CO2 SERPL-SCNC: 33 MMOL/L (ref 23–29)
CREAT SERPL-MCNC: 2 MG/DL (ref 0.5–1.4)
ERYTHROCYTE [DISTWIDTH] IN BLOOD BY AUTOMATED COUNT: 11.9 % (ref 11.5–14.5)
GFR SERPLBLD CREATININE-BSD FMLA CKD-EPI: 38 ML/MIN/1.73/M2
GLUCOSE SERPL-MCNC: 108 MG/DL (ref 70–110)
HCT VFR BLD AUTO: 34.1 % (ref 40–54)
HGB BLD-MCNC: 10.9 GM/DL (ref 14–18)
IMM GRANULOCYTES # BLD AUTO: 0.03 K/UL (ref 0–0.04)
IMM GRANULOCYTES NFR BLD AUTO: 0.4 % (ref 0–0.5)
LYMPHOCYTES # BLD AUTO: 1.13 K/UL (ref 1–4.8)
MAGNESIUM SERPL-MCNC: 2.7 MG/DL (ref 1.6–2.6)
MCH RBC QN AUTO: 30.6 PG (ref 27–31)
MCHC RBC AUTO-ENTMCNC: 32 G/DL (ref 32–36)
MCV RBC AUTO: 96 FL (ref 82–98)
NUCLEATED RBC (/100WBC) (OHS): 0 /100 WBC
PHOSPHATE SERPL-MCNC: 2.8 MG/DL (ref 2.7–4.5)
PLATELET # BLD AUTO: 148 K/UL (ref 150–450)
PMV BLD AUTO: 10.9 FL (ref 9.2–12.9)
POCT GLUCOSE: 106 MG/DL (ref 70–110)
POCT GLUCOSE: 129 MG/DL (ref 70–110)
POCT GLUCOSE: 146 MG/DL (ref 70–110)
POCT GLUCOSE: 149 MG/DL (ref 70–110)
POTASSIUM SERPL-SCNC: 5 MMOL/L (ref 3.5–5.1)
PROT SERPL-MCNC: 7.7 GM/DL (ref 6–8.4)
RBC # BLD AUTO: 3.56 M/UL (ref 4.6–6.2)
RELATIVE EOSINOPHIL (OHS): 0 %
RELATIVE LYMPHOCYTE (OHS): 14.6 % (ref 18–48)
RELATIVE MONOCYTE (OHS): 8.9 % (ref 4–15)
RELATIVE NEUTROPHIL (OHS): 76 % (ref 38–73)
SODIUM SERPL-SCNC: 139 MMOL/L (ref 136–145)
WBC # BLD AUTO: 7.74 K/UL (ref 3.9–12.7)

## 2025-04-21 PROCEDURE — 25000003 PHARM REV CODE 250: Performed by: NURSE PRACTITIONER

## 2025-04-21 PROCEDURE — 85025 COMPLETE CBC W/AUTO DIFF WBC: CPT | Performed by: NURSE PRACTITIONER

## 2025-04-21 PROCEDURE — 25000003 PHARM REV CODE 250

## 2025-04-21 PROCEDURE — 63600175 PHARM REV CODE 636 W HCPCS: Performed by: NURSE PRACTITIONER

## 2025-04-21 PROCEDURE — 94640 AIRWAY INHALATION TREATMENT: CPT

## 2025-04-21 PROCEDURE — 99900035 HC TECH TIME PER 15 MIN (STAT)

## 2025-04-21 PROCEDURE — 36415 COLL VENOUS BLD VENIPUNCTURE: CPT | Performed by: NURSE PRACTITIONER

## 2025-04-21 PROCEDURE — 25000242 PHARM REV CODE 250 ALT 637 W/ HCPCS

## 2025-04-21 PROCEDURE — 80053 COMPREHEN METABOLIC PANEL: CPT | Performed by: NURSE PRACTITIONER

## 2025-04-21 PROCEDURE — 27000221 HC OXYGEN, UP TO 24 HOURS

## 2025-04-21 PROCEDURE — 94761 N-INVAS EAR/PLS OXIMETRY MLT: CPT

## 2025-04-21 PROCEDURE — 83735 ASSAY OF MAGNESIUM: CPT | Performed by: FAMILY MEDICINE

## 2025-04-21 PROCEDURE — 11000001 HC ACUTE MED/SURG PRIVATE ROOM

## 2025-04-21 PROCEDURE — 84100 ASSAY OF PHOSPHORUS: CPT | Performed by: NURSE PRACTITIONER

## 2025-04-21 RX ORDER — METOLAZONE 10 MG/1
10 TABLET ORAL DAILY
Status: DISCONTINUED | OUTPATIENT
Start: 2025-04-21 | End: 2025-04-21

## 2025-04-21 RX ORDER — FLUTICASONE FUROATE AND VILANTEROL 200; 25 UG/1; UG/1
1 POWDER RESPIRATORY (INHALATION) DAILY
Status: DISCONTINUED | OUTPATIENT
Start: 2025-04-21 | End: 2025-04-22 | Stop reason: HOSPADM

## 2025-04-21 RX ORDER — MONTELUKAST SODIUM 10 MG/1
10 TABLET ORAL DAILY
Status: DISCONTINUED | OUTPATIENT
Start: 2025-04-21 | End: 2025-04-22 | Stop reason: HOSPADM

## 2025-04-21 RX ADMIN — FUROSEMIDE 80 MG: 40 TABLET ORAL at 08:04

## 2025-04-21 RX ADMIN — PREDNISONE 40 MG: 20 TABLET ORAL at 08:04

## 2025-04-21 RX ADMIN — METHADONE HYDROCHLORIDE 100 MG: 10 TABLET ORAL at 08:04

## 2025-04-21 RX ADMIN — IPRATROPIUM BROMIDE AND ALBUTEROL SULFATE 3 ML: 2.5; .5 SOLUTION RESPIRATORY (INHALATION) at 04:04

## 2025-04-21 RX ADMIN — ACETAMINOPHEN 650 MG: 325 TABLET ORAL at 01:04

## 2025-04-21 RX ADMIN — ESCITALOPRAM OXALATE 20 MG: 10 TABLET ORAL at 08:04

## 2025-04-21 RX ADMIN — FLUTICASONE FUROATE AND VILANTEROL TRIFENATATE 1 PUFF: 200; 25 POWDER RESPIRATORY (INHALATION) at 04:04

## 2025-04-21 RX ADMIN — SENNOSIDES AND DOCUSATE SODIUM 1 TABLET: 50; 8.6 TABLET ORAL at 08:04

## 2025-04-21 RX ADMIN — MONTELUKAST 10 MG: 10 TABLET, FILM COATED ORAL at 02:04

## 2025-04-21 RX ADMIN — ENOXAPARIN SODIUM 40 MG: 40 INJECTION SUBCUTANEOUS at 05:04

## 2025-04-21 RX ADMIN — FLUTICASONE FUROATE AND VILANTEROL TRIFENATATE 1 PUFF: 100; 25 POWDER RESPIRATORY (INHALATION) at 07:04

## 2025-04-21 RX ADMIN — POLYETHYLENE GLYCOL (3350) 17 G: 17 POWDER, FOR SOLUTION ORAL at 08:04

## 2025-04-21 RX ADMIN — Medication 6 MG: at 01:04

## 2025-04-21 RX ADMIN — METOLAZONE 10 MG: 10 TABLET ORAL at 10:04

## 2025-04-21 RX ADMIN — IPRATROPIUM BROMIDE AND ALBUTEROL SULFATE 3 ML: 2.5; .5 SOLUTION RESPIRATORY (INHALATION) at 07:04

## 2025-04-21 RX ADMIN — TAMSULOSIN HYDROCHLORIDE 0.4 MG: 0.4 CAPSULE ORAL at 08:04

## 2025-04-21 RX ADMIN — ACETAMINOPHEN 650 MG: 325 TABLET ORAL at 08:04

## 2025-04-21 RX ADMIN — IPRATROPIUM BROMIDE AND ALBUTEROL SULFATE 3 ML: 2.5; .5 SOLUTION RESPIRATORY (INHALATION) at 11:04

## 2025-04-21 NOTE — PLAN OF CARE
Problem: Skin Injury Risk Increased  Goal: Skin Health and Integrity  Outcome: Progressing     Problem: Adult Inpatient Plan of Care  Goal: Plan of Care Review  Outcome: Progressing  Goal: Patient-Specific Goal (Individualized)  Outcome: Progressing     Problem: Diabetes Comorbidity  Goal: Blood Glucose Level Within Targeted Range  Outcome: Progressing

## 2025-04-21 NOTE — DISCHARGE INSTRUCTIONS
For Medicaid long term care services  ( home sitter/aide) please call [facility] # [phone] option 3

## 2025-04-21 NOTE — ASSESSMENT & PLAN NOTE
ANNITA is likely due to pre-renal azotemia due to intravascular volume depletion. Baseline creatinine is 1.7. Most recent creatinine and eGFR are listed below.  Recent Labs     04/19/25  0552 04/20/25  0636 04/21/25  0440   CREATININE 2.4* 2.1* 2.0*   EGFRNORACEVR 30* 35* 38*      Plan  - ANNITA is stable  - Avoid nephrotoxins and renally dose meds for GFR listed above  - Monitor urine output, serial BMP, and adjust therapy as needed  - Retroperitoneal US showed: Simple to minimally complex right renal cysts.   -Urine studies with FEUrea  < 35% , suggestive of Pre-renal

## 2025-04-21 NOTE — PLAN OF CARE
[facility] - Telemetry  Initial Discharge Assessment       Primary Care Provider: David Beckett PA-C    Admission Diagnosis: Shortness of breath [R06.02]  COPD exacerbation [J44.1]    Admission Date: 4/17/2025  Expected Discharge Date: 4/21/2025    Transition of Care Barriers: (P) None    Payor: MEDICAID / Plan: [place]UE [place] / Product Type: Managed Medicaid /     Extended Emergency Contact Information  Primary Emergency Contact: Rosita Lopez   [place]  Mobile Phone: [phone]  Relation: Sister  Preferred language: English  Secondary Emergency Contact: Vinny Harrington  Mobile Phone: [phone]  Relation: Brother    Discharge Plan A: (P) Home with family         [facility] [address]  [address] [address]  Phone: [phone] Fax: [phone]      Initial Assessment (most recent)       Adult Discharge Assessment - 04/21/25 1506          Discharge Assessment    Assessment Type Discharge Planning Assessment (P)      Confirmed/corrected address, phone number and insurance Yes (P)      Confirmed Demographics Correct on Facesheet (P)      Source of Information patient (P)      Communicated RYLAN with patient/caregiver Date not available/Unable to determine (P)      Reason For Admission COPD exacerbation (P)      People in Home sibling(s) (P)      Do you expect to return to your current living situation? Yes (P)      Do you have help at home or someone to help you manage your care at home? No (P)      Current cognitive status: Alert/Oriented (P)      Dressing/Bathing Difficulty yes (P)      Dressing/Bathing bathing difficulty, assistance 1 person;dressing difficulty, assistance 1 person (P)      Equipment Currently Used at Home none (P)      Readmission within 30 days? Yes (P)      Patient currently being followed by outpatient case management? Unable to determine (comments) (P)      Do you currently have service(s) that help you  "manage your care at home? No (P)      Do you take prescription medications? Yes (P)      Do you have prescription coverage? Yes (P)      Do you have any problems affording any of your prescribed medications? No (P)      Is the patient taking medications as prescribed? yes (P)      Who is going to help you get home at discharge? medicaid transport (P)      How do you get to doctors appointments? health plan transportation (P)      Are you on dialysis? No (P)      Do you take coumadin? No (P)      Discharge Plan A Home with family (P)      DME Needed Upon Discharge  other (see comments) (P)    possible oxygen    Discharge Plan discussed with: Patient (P)      Transition of Care Barriers None (P)         Physical Activity    On average, how many days per week do you engage in moderate to strenuous exercise (like a brisk walk)? 0 days (P)      On average, how many minutes do you engage in exercise at this level? 0 min (P)         Transportation Needs    In the past 12 months, has lack of transportation kept you from medical appointments or from getting medications? No (P)      In the past 12 months, has lack of transportation kept you from meetings, work, or from getting things needed for daily living? No (P)         Food Insecurity    Within the past 12 months, you worried that your food would run out before you got the money to buy more. Never true (P)      Within the past 12 months, the food you bought just didn't last and you didn't have money to get more. Never true (P)         OTHER    Name(s) of People in Home Brother Vinny (P)                         CM met with pt at the bedside to obtain DCA. Pt lives with his brother Vinny. Pt reports he has no DME or HH. He has had an aide in the past to assist with bathing and dressing around 3 - 4 years ago, but they "took it away from me". He is not sure why it stopped, but he feels he could use the assistance. Pt uses medicaid transport for appointments. He reports " that his current pharmacy [facility] has not been delivering his meds on time and he wants to start using [facility] on [address] in [place]. Updated new pharmacy contact on pt profile.   Walk test ordered for home oxygen. CM will continue to follow for dc needs.    4:17pm- Per MD, pt will stay another night.    Future Appointments   Date Time Provider Department Center   4/23/2025  2:00 PM Seda Allen, PA-C [facility] NE[facility]   5/19/2025  2:00 PM Jamin Delong Jr., DPM [facility] POD [place]   9/18/2025  8:45 AM [facility] OIC-US1 MASTER [facility] ULTR [facility]   9/18/2025  9:50 AM LAB, APPOINTMENT [facility] LAB [facility]   9/18/2025 10:30 AM Scheuermann, Jennifer B., PA [facility] HEPC [address]     Lynnette Alexis RN, [facility]  Case Management- [place]  [phone]

## 2025-04-21 NOTE — SUBJECTIVE & OBJECTIVE
Interval History: NAEON. 400mL UOP documented. Coarse breath sounds persistent, increasing dose of Breo to 200 & adding Singulair.    Review of Systems   Constitutional:  Negative for chills and fever.   Respiratory:  Positive for wheezing. Negative for shortness of breath.    Cardiovascular:  Negative for chest pain.   Gastrointestinal:  Negative for nausea and vomiting.     Objective:     Vital Signs (Most Recent):  Temp: 99.5 °F (37.5 °C) (04/21/25 1246)  Pulse: 96 (04/21/25 1246)  Resp: 20 (04/21/25 1141)  BP: 119/75 (04/21/25 1246)  SpO2: (!) 94 % (04/21/25 1246) Vital Signs (24h Range):  Temp:  [97.9 °F (36.6 °C)-99.5 °F (37.5 °C)] 99.5 °F (37.5 °C)  Pulse:  [] 96  Resp:  [18-21] 20  SpO2:  [93 %-98 %] 94 %  BP: (106-164)/(75-92) 119/75     Weight: 124.3 kg (274 lb 0.5 oz)  Body mass index is 41.67 kg/m².    Intake/Output Summary (Last 24 hours) at 4/21/2025 1315  Last data filed at 4/21/2025 0644  Gross per 24 hour   Intake 750 ml   Output 200 ml   Net 550 ml         Physical Exam  Constitutional:       General: He is not in acute distress.     Appearance: He is obese.   Eyes:      General:         Right eye: No discharge.         Left eye: No discharge.      Conjunctiva/sclera: Conjunctivae normal.   Cardiovascular:      Rate and Rhythm: Normal rate.      Pulses: Normal pulses.      Heart sounds: Normal heart sounds.   Pulmonary:      Effort: Pulmonary effort is normal. No respiratory distress.      Breath sounds: Wheezing present.      Comments: Wheezes bilaterally  Abdominal:      Palpations: Abdomen is soft.   Musculoskeletal:      Right lower leg: No edema.      Left lower leg: No edema.   Neurological:      Mental Status: He is alert and oriented to person, place, and time.   Psychiatric:         Mood and Affect: Mood normal.         Behavior: Behavior normal.               Significant Labs: All pertinent labs within the past 24 hours have been reviewed.  CBC:   Recent Labs   Lab 04/20/25  0636  04/21/25  0440   WBC 6.76 7.74   HGB 10.4* 10.9*   HCT 32.6* 34.1*   * 148*     CMP:   Recent Labs   Lab 04/20/25  0636 04/21/25  0440    139   K 4.8 5.0   CL 99 97   CO2 32* 33*   BUN 44* 38*   CREATININE 2.1* 2.0*   CALCIUM 9.1 9.6   ALBUMIN 3.7 3.8   BILITOT 0.3 0.5   ALKPHOS 63 64   AST 12 14   ALT 16 17   ANIONGAP 9 9       Significant Imaging: I have reviewed all pertinent imaging results/findings within the past 24 hours.

## 2025-04-21 NOTE — ASSESSMENT & PLAN NOTE
"Patient has Diastolic (HFpEF) heart failure that is Acute on chronic. On presentation their CHF was decompensated. Evidence of decompensated CHF on presentation includes: edema, elevated JVD, orthopnea, dyspnea on exertion (LAND), and shortness of breath. The etiology of their decompensation is likely medication non-compliance. Most recent BNP and echo results are listed below.  No results for input(s): "BNP" in the last 72 hours.    Latest ECHO  Results for orders placed during the hospital encounter of 03/13/25    Echo    Interpretation Summary    Left Ventricle: The left ventricle is normal in size. Normal wall thickness. There is concentric remodeling. Normal wall motion. There is normal systolic function with a visually estimated ejection fraction of 60 - 65%. Ejection fraction is approximately 65%. There is normal diastolic function.    Right Ventricle: The right ventricle has mild enlargement. Wall thickness is normal. Systolic function is normal.    The right atrium is dilated.    Pulmonary Artery: Pulmonary artery pressure could not be estimated.    IVC/SVC: Normal venous pressure at 3 mmHg.    Current Heart Failure Medications  furosemide tablet 80 mg, 2 times daily, Oral  Given - Lasix 60 mg x 1 upon admission        Plan  - Monitor strict I&Os and daily weights.    - Place on telemetry  - Low sodium diet  - Place on fluid restriction of 1.5 L.   - Cardiology has not been consulted  - The patient's volume status is improving but not at their baseline as indicated by edema, elevated JVD, orthopnea, dyspnea on exertion (LAND), and shortness of breath  - Lasix 80mg PO BID  "

## 2025-04-21 NOTE — PROGRESS NOTES
"[facility] Medicine  Progress Note    Patient Name: Ming Harrington  MRN: [id number]  Patient Class: IP- Inpatient   Admission Date: 4/17/2025  Length of Stay: 1 days  Attending Physician: Lit Dallas III, MD  Primary Care Provider: David Beckett PA-C      Subjective     Principal Problem:COPD exacerbation    HPI:  59 y.o. male h/o  COPD uses 2L O2 PRN, CHF (HFpEF  last EF 60-65%) a non smoker, constipation HTN, CKD stage 3b, hepatitis-C, cirrhosis, opioid use disorder on methadone,BPH,  JUVENCIO, lower extremity edema, asthma, COPD, HFrEF, HTN, and DM who presented to the ED with 4 days of worsening shortness of breath.  He was last seen in clinic today for hospital follow up. He reported "I do not feel good." Pt  had c/o increased SOB and chest pains and right  leg pain.   BP elevated, (178/156) O2 sats at 91%. He reported being  out of all his medications for a couple of weeks to include his home lasix.  In addition patient was seen in the emergency room for bilateral lower extremity edema which is chronic in nature with BLE with Upon arrival to the emergency room, patient was tachypnic, bradycardic, unable to speak in full sentences and lethargic.  He stated that he had been actually out of his medications for a month.  H also reported having an upper respiratory infection within the past week with coughing of green sputum.  He states that his SOB was worse yesterday.  He denies any fever, diarrhea or dysuria.  He does endorse some left lower quadrant abdominal pain, nausea, vomiting twice, poor oral intake.  Of note he states that his last bowel movement was 4 days ago after having to use a laxative.  He denies any blood in the urine or stool.    In the ED Vitas were:  /79, HR 55, RR 36, Temp 97.8 , sats 94%. CBC stable with H/H 11/35 and WBC 6.  CMP was significant for Na 137, K +3.8, BUN 41/creatinine 2.3 (baseline creatinine 1.7), glucose 97.  Normal AST/ALT, TB, ALT.  BNP 14, troponin " negative.       Chest Xray showed No acute intrathoracic abnormality.    In the ED pt was given DuoNeb treatment, methylprednisolone, senna.     [facility] for management of a  mixed picture of COPD and CHF exacerbation.       Overview/Hospital Course:  No notes on file    Interval History: NAEON. 400mL UOP documented. Coarse breath sounds persistent, increasing dose of Breo to 200 & adding Singulair.    Review of Systems   Constitutional:  Negative for chills and fever.   Respiratory:  Positive for wheezing. Negative for shortness of breath.    Cardiovascular:  Negative for chest pain.   Gastrointestinal:  Negative for nausea and vomiting.     Objective:     Vital Signs (Most Recent):  Temp: 99.5 °F (37.5 °C) (04/21/25 1246)  Pulse: 96 (04/21/25 1246)  Resp: 20 (04/21/25 1141)  BP: 119/75 (04/21/25 1246)  SpO2: (!) 94 % (04/21/25 1246) Vital Signs (24h Range):  Temp:  [97.9 °F (36.6 °C)-99.5 °F (37.5 °C)] 99.5 °F (37.5 °C)  Pulse:  [] 96  Resp:  [18-21] 20  SpO2:  [93 %-98 %] 94 %  BP: (106-164)/(75-92) 119/75     Weight: 124.3 kg (274 lb 0.5 oz)  Body mass index is 41.67 kg/m².    Intake/Output Summary (Last 24 hours) at 4/21/2025 1315  Last data filed at 4/21/2025 0644  Gross per 24 hour   Intake 750 ml   Output 200 ml   Net 550 ml         Physical Exam  Constitutional:       General: He is not in acute distress.     Appearance: He is obese.   Eyes:      General:         Right eye: No discharge.         Left eye: No discharge.      Conjunctiva/sclera: Conjunctivae normal.   Cardiovascular:      Rate and Rhythm: Normal rate.      Pulses: Normal pulses.      Heart sounds: Normal heart sounds.   Pulmonary:      Effort: Pulmonary effort is normal. No respiratory distress.      Breath sounds: Wheezing present.      Comments: Wheezes bilaterally  Abdominal:      Palpations: Abdomen is soft.   Musculoskeletal:      Right lower leg: No edema.      Left lower leg: No edema.    Neurological:      Mental Status: He is alert and oriented to person, place, and time.   Psychiatric:         Mood and Affect: Mood normal.         Behavior: Behavior normal.               Significant Labs: All pertinent labs within the past 24 hours have been reviewed.  CBC:   Recent Labs   Lab 04/20/25  0636 04/21/25  0440   WBC 6.76 7.74   HGB 10.4* 10.9*   HCT 32.6* 34.1*   * 148*     CMP:   Recent Labs   Lab 04/20/25  0636 04/21/25  0440    139   K 4.8 5.0   CL 99 97   CO2 32* 33*   BUN 44* 38*   CREATININE 2.1* 2.0*   CALCIUM 9.1 9.6   ALBUMIN 3.7 3.8   BILITOT 0.3 0.5   ALKPHOS 63 64   AST 12 14   ALT 16 17   ANIONGAP 9 9       Significant Imaging: I have reviewed all pertinent imaging results/findings within the past 24 hours.      Assessment & Plan  HTN (hypertension)  Patient's blood pressure range in the last 24 hours was: BP  Min: 106/85  Max: 164/92.The patient's inpatient anti-hypertensive regimen is listed below:  Current Antihypertensives  furosemide tablet 80 mg, 2 times daily, Oral    Plan  - BP is uncontrolled, will adjust as follows: hypotensive currently  - Hold home BP medications and resume when appropriate  Opioid use disorder, severe, on maintenance therapy, dependence  Plan:  Resumed home methadode  if  no longer lethargic    COPD exacerbation  Patient's COPD is with exacerbation noted by continued dyspnea, use of accessory muscles for breathing, and paradoxical chest wall movement currently.  Patient is currently on COPD Pathway. Continue scheduled inhalers Steroids, Antibiotics, and Supplemental oxygen and monitor respiratory status closely.       Plan:  -Prednisone 40 mg daily  - s/p Azithromycin 500 mg day 1 then 250 mg x 2 days  -Duo Neb/Inhalers every 4 hrs   -Restarted prednisone 40 mg daily, feel this is more COPD picture  - Increased Breo dose 100->200mg, added Montelukast    Type 2 diabetes mellitus, without long-term current use of insulin  Plan:  Gaol 140-180  "while inpatient    CT    Acute exacerbation of CHF (congestive heart failure)  Patient has Diastolic (HFpEF) heart failure that is Acute on chronic. On presentation their CHF was decompensated. Evidence of decompensated CHF on presentation includes: edema, elevated JVD, orthopnea, dyspnea on exertion (LAND), and shortness of breath. The etiology of their decompensation is likely medication non-compliance. Most recent BNP and echo results are listed below.  No results for input(s): "BNP" in the last 72 hours.    Latest ECHO  Results for orders placed during the hospital encounter of 03/13/25    Echo    Interpretation Summary    Left Ventricle: The left ventricle is normal in size. Normal wall thickness. There is concentric remodeling. Normal wall motion. There is normal systolic function with a visually estimated ejection fraction of 60 - 65%. Ejection fraction is approximately 65%. There is normal diastolic function.    Right Ventricle: The right ventricle has mild enlargement. Wall thickness is normal. Systolic function is normal.    The right atrium is dilated.    Pulmonary Artery: Pulmonary artery pressure could not be estimated.    IVC/SVC: Normal venous pressure at 3 mmHg.    Current Heart Failure Medications  furosemide tablet 80 mg, 2 times daily, Oral  Given - Lasix 60 mg x 1 upon admission        Plan  - Monitor strict I&Os and daily weights.    - Place on telemetry  - Low sodium diet  - Place on fluid restriction of 1.5 L.   - Cardiology has not been consulted  - The patient's volume status is improving but not at their baseline as indicated by edema, elevated JVD, orthopnea, dyspnea on exertion (LAND), and shortness of breath  - Lasix 80mg PO BID  JUVENCIO (generalized anxiety disorder)  Plan:  Resumed home escitalopram    ANNITA (acute kidney injury)  ANNITA is likely due to pre-renal azotemia due to intravascular volume depletion. Baseline creatinine is  1.7 . Most recent creatinine and eGFR are listed " below.  Recent Labs     04/19/25  0552 04/20/25  0636 04/21/25  0440   CREATININE 2.4* 2.1* 2.0*   EGFRNORACEVR 30* 35* 38*      Plan  - ANNITA is stable  - Avoid nephrotoxins and renally dose meds for GFR listed above  - Monitor urine output, serial BMP, and adjust therapy as needed  - Retroperitoneal US showed: Simple to minimally complex right renal cysts.   -Urine studies with FEUrea  < 35% , suggestive of Pre-renal   Left leg pain  Patient with chronic leg pain secondary to likely lymphedema.  Bilateral leg ultrasounds for/11/20 5- for DVT.    Plan:  Resume home gabapentin  Drug-induced constipation  Patient is on methadone.  Patient reports last bowel movement 4 days ago.    Plan:  Senna and MiraLax scheduled    Chronic pain of right lower extremity      BPH (benign prostatic hyperplasia)  Plan:  Resume home BPH    VTE Risk Mitigation (From admission, onward)           Ordered     enoxaparin injection 40 mg  Daily         04/17/25 1426     IP VTE HIGH RISK PATIENT  Once         04/17/25 1426     Place sequential compression device  Until discontinued         04/17/25 1426                    Discharge Planning   RYLAN: 4/21/2025     Code Status: Full Code   Medical Readiness for Discharge Date:              Tamika Davis MD  Department of Hospital Medicine   [facility]

## 2025-04-21 NOTE — NURSING
Home Oxygen Evaluation    Date Performed: 2025    1) Patient's Home O2 Sat on room air, while at rest: 88%        If O2 sats on room air at rest are 88% or below, patient qualifies. No additional testing needed. Document N/A in steps 2 and 3. If 89% or above, complete steps 2.      2) Patient's O2 Sat on room air while exercisin%        If O2 sats on room air while exercising remain 89% or above patient does not qualify, no further testing needed Document N/A in step 3. If O2 sats on room air while exercising are 88% or below, continue to step 3.      3) Patient's O2 Sat while exercising on O2: 96% at 2 LPM         (Must show improvement from #2 for patients to qualify)    If O2 sats improve on oxygen, patient qualifies for portable oxygen. If not, the patient does not qualify.

## 2025-04-21 NOTE — ASSESSMENT & PLAN NOTE
Patient's blood pressure range in the last 24 hours was: BP  Min: 106/85  Max: 164/92.The patient's inpatient anti-hypertensive regimen is listed below:  Current Antihypertensives  furosemide tablet 80 mg, 2 times daily, Oral    Plan  - BP is uncontrolled, will adjust as follows: hypotensive currently  - Hold home BP medications and resume when appropriate

## 2025-04-21 NOTE — ASSESSMENT & PLAN NOTE
Patient's COPD is with exacerbation noted by continued dyspnea, use of accessory muscles for breathing, and paradoxical chest wall movement currently.  Patient is currently on COPD Pathway. Continue scheduled inhalers Steroids, Antibiotics, and Supplemental oxygen and monitor respiratory status closely.       Plan:  -Prednisone 40 mg daily  - s/p Azithromycin 500 mg day 1 then 250 mg x 2 days  -Duo Neb/Inhalers every 4 hrs   -Restarted prednisone 40 mg daily, feel this is more COPD picture  - Increased Breo dose 100->200mg, added Montelukast

## 2025-04-22 VITALS
HEIGHT: 68 IN | WEIGHT: 274.06 LBS | RESPIRATION RATE: 21 BRPM | HEART RATE: 87 BPM | OXYGEN SATURATION: 95 % | DIASTOLIC BLOOD PRESSURE: 89 MMHG | TEMPERATURE: 98 F | BODY MASS INDEX: 41.53 KG/M2 | SYSTOLIC BLOOD PRESSURE: 146 MMHG

## 2025-04-22 LAB
ABSOLUTE EOSINOPHIL (OHS): 0.01 K/UL
ABSOLUTE MONOCYTE (OHS): 0.86 K/UL (ref 0.3–1)
ABSOLUTE NEUTROPHIL COUNT (OHS): 6.43 K/UL (ref 1.8–7.7)
ALBUMIN SERPL BCP-MCNC: 4 G/DL (ref 3.5–5.2)
ALP SERPL-CCNC: 70 UNIT/L (ref 40–150)
ALT SERPL W/O P-5'-P-CCNC: 35 UNIT/L (ref 10–44)
ANION GAP (OHS): 11 MMOL/L (ref 8–16)
AST SERPL-CCNC: 29 UNIT/L (ref 11–45)
BASOPHILS # BLD AUTO: 0.01 K/UL
BASOPHILS NFR BLD AUTO: 0.1 %
BILIRUB SERPL-MCNC: 0.5 MG/DL (ref 0.1–1)
BUN SERPL-MCNC: 38 MG/DL (ref 6–20)
CALCIUM SERPL-MCNC: 9.9 MG/DL (ref 8.7–10.5)
CHLORIDE SERPL-SCNC: 94 MMOL/L (ref 95–110)
CO2 SERPL-SCNC: 32 MMOL/L (ref 23–29)
CREAT SERPL-MCNC: 2.1 MG/DL (ref 0.5–1.4)
ERYTHROCYTE [DISTWIDTH] IN BLOOD BY AUTOMATED COUNT: 12.2 % (ref 11.5–14.5)
GFR SERPLBLD CREATININE-BSD FMLA CKD-EPI: 35 ML/MIN/1.73/M2
GLUCOSE SERPL-MCNC: 97 MG/DL (ref 70–110)
HCT VFR BLD AUTO: 33.3 % (ref 40–54)
HGB BLD-MCNC: 10.8 GM/DL (ref 14–18)
IMM GRANULOCYTES # BLD AUTO: 0.06 K/UL (ref 0–0.04)
IMM GRANULOCYTES NFR BLD AUTO: 0.7 % (ref 0–0.5)
LYMPHOCYTES # BLD AUTO: 1.42 K/UL (ref 1–4.8)
MAGNESIUM SERPL-MCNC: 2.4 MG/DL (ref 1.6–2.6)
MCH RBC QN AUTO: 31.1 PG (ref 27–31)
MCHC RBC AUTO-ENTMCNC: 32.4 G/DL (ref 32–36)
MCV RBC AUTO: 96 FL (ref 82–98)
NUCLEATED RBC (/100WBC) (OHS): 0 /100 WBC
PHOSPHATE SERPL-MCNC: 3.2 MG/DL (ref 2.7–4.5)
PLATELET # BLD AUTO: 163 K/UL (ref 150–450)
PMV BLD AUTO: 10.9 FL (ref 9.2–12.9)
POCT GLUCOSE: 115 MG/DL (ref 70–110)
POTASSIUM SERPL-SCNC: 4.3 MMOL/L (ref 3.5–5.1)
PROT SERPL-MCNC: 8.1 GM/DL (ref 6–8.4)
RBC # BLD AUTO: 3.47 M/UL (ref 4.6–6.2)
RELATIVE EOSINOPHIL (OHS): 0.1 %
RELATIVE LYMPHOCYTE (OHS): 16.2 % (ref 18–48)
RELATIVE MONOCYTE (OHS): 9.8 % (ref 4–15)
RELATIVE NEUTROPHIL (OHS): 73.1 % (ref 38–73)
SODIUM SERPL-SCNC: 137 MMOL/L (ref 136–145)
WBC # BLD AUTO: 8.79 K/UL (ref 3.9–12.7)

## 2025-04-22 PROCEDURE — 85025 COMPLETE CBC W/AUTO DIFF WBC: CPT | Performed by: NURSE PRACTITIONER

## 2025-04-22 PROCEDURE — 84100 ASSAY OF PHOSPHORUS: CPT | Performed by: NURSE PRACTITIONER

## 2025-04-22 PROCEDURE — 94640 AIRWAY INHALATION TREATMENT: CPT

## 2025-04-22 PROCEDURE — 25000003 PHARM REV CODE 250: Performed by: NURSE PRACTITIONER

## 2025-04-22 PROCEDURE — 36415 COLL VENOUS BLD VENIPUNCTURE: CPT | Performed by: NURSE PRACTITIONER

## 2025-04-22 PROCEDURE — 94761 N-INVAS EAR/PLS OXIMETRY MLT: CPT

## 2025-04-22 PROCEDURE — 63600175 PHARM REV CODE 636 W HCPCS: Performed by: NURSE PRACTITIONER

## 2025-04-22 PROCEDURE — 80053 COMPREHEN METABOLIC PANEL: CPT | Performed by: NURSE PRACTITIONER

## 2025-04-22 PROCEDURE — 25000003 PHARM REV CODE 250

## 2025-04-22 PROCEDURE — 83735 ASSAY OF MAGNESIUM: CPT | Performed by: FAMILY MEDICINE

## 2025-04-22 PROCEDURE — 25000242 PHARM REV CODE 250 ALT 637 W/ HCPCS

## 2025-04-22 PROCEDURE — 27000221 HC OXYGEN, UP TO 24 HOURS

## 2025-04-22 PROCEDURE — 99900035 HC TECH TIME PER 15 MIN (STAT)

## 2025-04-22 RX ORDER — FLUTICASONE FUROATE, UMECLIDINIUM BROMIDE AND VILANTEROL TRIFENATATE 200; 62.5; 25 UG/1; UG/1; UG/1
1 POWDER RESPIRATORY (INHALATION) DAILY
Qty: 60 EACH | Refills: 0 | Status: SHIPPED | OUTPATIENT
Start: 2025-04-22

## 2025-04-22 RX ORDER — MONTELUKAST SODIUM 10 MG/1
10 TABLET ORAL DAILY
Qty: 30 TABLET | Refills: 0 | Status: SHIPPED | OUTPATIENT
Start: 2025-04-23 | End: 2025-05-23

## 2025-04-22 RX ORDER — FUROSEMIDE 80 MG/1
80 TABLET ORAL 2 TIMES DAILY
Qty: 60 TABLET | Refills: 11 | Status: SHIPPED | OUTPATIENT
Start: 2025-04-22 | End: 2026-04-22

## 2025-04-22 RX ORDER — ONDANSETRON 8 MG/1
8 TABLET, ORALLY DISINTEGRATING ORAL ONCE
Status: DISCONTINUED | OUTPATIENT
Start: 2025-04-22 | End: 2025-04-22

## 2025-04-22 RX ORDER — ONDANSETRON 8 MG/1
8 TABLET, ORALLY DISINTEGRATING ORAL ONCE
Status: COMPLETED | OUTPATIENT
Start: 2025-04-22 | End: 2025-04-22

## 2025-04-22 RX ADMIN — POLYETHYLENE GLYCOL (3350) 17 G: 17 POWDER, FOR SOLUTION ORAL at 08:04

## 2025-04-22 RX ADMIN — FUROSEMIDE 80 MG: 40 TABLET ORAL at 08:04

## 2025-04-22 RX ADMIN — ONDANSETRON 8 MG: 8 TABLET, ORALLY DISINTEGRATING ORAL at 08:04

## 2025-04-22 RX ADMIN — SENNOSIDES AND DOCUSATE SODIUM 1 TABLET: 50; 8.6 TABLET ORAL at 08:04

## 2025-04-22 RX ADMIN — IPRATROPIUM BROMIDE AND ALBUTEROL SULFATE 3 ML: 2.5; .5 SOLUTION RESPIRATORY (INHALATION) at 11:04

## 2025-04-22 RX ADMIN — ACETAMINOPHEN 650 MG: 325 TABLET ORAL at 12:04

## 2025-04-22 RX ADMIN — PREDNISONE 40 MG: 20 TABLET ORAL at 08:04

## 2025-04-22 RX ADMIN — IPRATROPIUM BROMIDE AND ALBUTEROL SULFATE 3 ML: 2.5; .5 SOLUTION RESPIRATORY (INHALATION) at 07:04

## 2025-04-22 RX ADMIN — ESCITALOPRAM OXALATE 20 MG: 10 TABLET ORAL at 08:04

## 2025-04-22 RX ADMIN — FLUTICASONE FUROATE AND VILANTEROL TRIFENATATE 1 PUFF: 200; 25 POWDER RESPIRATORY (INHALATION) at 07:04

## 2025-04-22 RX ADMIN — METHADONE HYDROCHLORIDE 100 MG: 10 TABLET ORAL at 08:04

## 2025-04-22 RX ADMIN — MONTELUKAST 10 MG: 10 TABLET, FILM COATED ORAL at 08:04

## 2025-04-22 RX ADMIN — TAMSULOSIN HYDROCHLORIDE 0.4 MG: 0.4 CAPSULE ORAL at 08:04

## 2025-04-22 NOTE — PLAN OF CARE
04/22/25 1009   Final Note   Assessment Type Final Discharge Note   Anticipated Discharge Disposition Home   Hospital Resources/Appts/Education Provided Appointments scheduled and added to AVS     Pt will dc home today with oxygen. Transport will be arranged with [facility] W/ asnelmo.  IB message sent to Pulmonary office to schedule appointment for PFT.   IB message sent to [facility] for hospital follow up.    11:14pm- CM met with pt at bedside. He has his portable oxygen and meds delivered at bedside. VN going over his AVS. [facility] anselmo requested for transport home at 12pm .    [facility] - Pulmonary  Pulmonology  [phone]  [address] [place] [address]  Next Steps: Schedule an appointment as soon as possible for a visit  Instructions: Office messaged to schedule a follow up with Pulmonolgy for PFT's.  David Beckett PA-C  PCP - General  [facility]  [phone]  [address] [place] [address]  Next Steps: Follow up  Ochscarlene Dme  DME Provider Orthotics  [phone]  [address] [place] [address]  Next Steps: Follow up  Instructions: Call when you arrive home to have home oxygen set up delivered.    Future Appointments   Date Time Provider Department Center   4/23/2025  2:00 PM Seda Allen PA-C [facility] NEUROSU [place] [facility]   5/19/2025  2:00 PM Jamin Delong Jr., DPM [facility] POD [place]   9/18/2025  8:45 AM [facility] OIC-US1 MASTER [facility] ULTR [facility]   9/18/2025  9:50 AM LAB, APPOINTMENT [facility] LAB [facility]   9/18/2025 10:30 AM Scheuermann, Jennifer B., PA [facility]     Lynnette Alexis, RN, [facility]  Case Management- [place]  [phone]

## 2025-04-22 NOTE — HOSPITAL COURSE
"Patient's CHF exacerbation was managed with IV lasix, which produced good urine output. Lower extremities were without pitting edema on day of discharge. COPD exacerbation was managed with scheduled breathing treatments & inhalers. Patient was prescribed Montelukast & portable oxygen on discharge.  On day of discharge, patient was hemodynamically stable. He was sent home with instructions to continue home medications, change dosage/frequency of home medications, and/or take new medications as mentioned under "Medication Reconciliation" below. These changes were further explained by patient's nurse or the clinical pharmacist. Patient will need to schedule with their PCP for hospital discharge followup. Patient agreeable to discharge plan & expressed understanding of all aforementioned changes. All questions were answered and return precautions were discussed & detailed in the after-visit summary.   "

## 2025-04-22 NOTE — ASSESSMENT & PLAN NOTE
Patient's blood pressure range in the last 24 hours was: BP  Min: 118/90  Max: 161/94.The patient's inpatient anti-hypertensive regimen is listed below:  Current Antihypertensives  furosemide tablet 80 mg, 2 times daily, Oral    Plan  - BP is uncontrolled, will adjust as follows: hypotensive currently  - Hold home BP medications and resume when appropriate

## 2025-04-22 NOTE — DISCHARGE SUMMARY
"[facility] Medicine  Discharge Summary      Patient Name: Ming Harrington  MRN: [id number]  MADIE: [id number]  Patient Class: IP- Inpatient  Admission Date: 4/17/2025  Hospital Length of Stay: 2 days  Discharge Date and Time: 04/22/2025 8:47 AM  Attending Physician: Lit Dallas III, MD   Discharging Provider: Tamika Davis MD  Primary Care Provider: David Beckett PA-C    Primary Care Team: Networked reference to record PCT     HPI:   59 y.o. male h/o  COPD uses 2L O2 PRN, CHF (HFpEF  last EF 60-65%) a non smoker, constipation HTN, CKD stage 3b, hepatitis-C, cirrhosis, opioid use disorder on methadone,BPH,  JUVENCIO, lower extremity edema, asthma, COPD, HFrEF, HTN, and DM who presented to the ED with 4 days of worsening shortness of breath.  He was last seen in clinic today for hospital follow up. He reported "I do not feel good." Pt  had c/o increased SOB and chest pains and right  leg pain.   BP elevated, (178/156) O2 sats at 91%. He reported being  out of all his medications for a couple of weeks to include his home lasix.  In addition patient was seen in the emergency room for bilateral lower extremity edema which is chronic in nature with BLE with Upon arrival to the emergency room, patient was tachypnic, bradycardic, unable to speak in full sentences and lethargic.  He stated that he had been actually out of his medications for a month.  H also reported having an upper respiratory infection within the past week with coughing of green sputum.  He states that his SOB was worse yesterday.  He denies any fever, diarrhea or dysuria.  He does endorse some left lower quadrant abdominal pain, nausea, vomiting twice, poor oral intake.  Of note he states that his last bowel movement was 4 days ago after having to use a laxative.  He denies any blood in the urine or stool.    In the ED Vitas were:  /79, HR 55, RR 36, Temp 97.8 , sats 94%. CBC stable with H/H 11/35 and WBC 6.  CMP was significant for Na 137, K " "+3.8, BUN 41/creatinine 2.3 (baseline creatinine 1.7), glucose 97.  Normal AST/ALT, TB, ALT.  BNP 14, troponin negative.       Chest Xray showed No acute intrathoracic abnormality.    In the ED pt was given DuoNeb treatment, methylprednisolone, senna.     [facility] for management of a  mixed picture of COPD and CHF exacerbation.       * No surgery found *      Hospital Course:   Patient's CHF exacerbation was managed with IV lasix, which produced good urine output. Lower extremities were without pitting edema on day of discharge. COPD exacerbation was managed with scheduled breathing treatments & inhalers. Patient was prescribed Montelukast & portable oxygen on discharge.  On day of discharge, patient was hemodynamically stable. He was sent home with instructions to continue home medications, change dosage/frequency of home medications, and/or take new medications as mentioned under "Medication Reconciliation" below. These changes were further explained by patient's nurse or the clinical pharmacist. Patient will need to schedule with their PCP for hospital discharge followup. Patient agreeable to discharge plan & expressed understanding of all aforementioned changes. All questions were answered and return precautions were discussed & detailed in the after-visit summary.      Physical Exam  Constitutional:       General: He is not in acute distress.     Appearance: He is obese. He is not ill-appearing.   Eyes:      General:         Right eye: No discharge.         Left eye: No discharge.      Conjunctiva/sclera: Conjunctivae normal.   Cardiovascular:      Rate and Rhythm: Normal rate.      Pulses: Normal pulses.   Pulmonary:      Effort: Pulmonary effort is normal. No respiratory distress.      Breath sounds: Normal breath sounds.   Musculoskeletal:      Right lower leg: No edema.      Left lower leg: No edema.   Skin:     General: Skin is warm.      Coloration: Skin is not jaundiced. "   Neurological:      Mental Status: He is alert and oriented to person, place, and time.   Psychiatric:         Mood and Affect: Mood normal.         Behavior: Behavior normal.           Goals of Care Treatment Preferences:  Code Status: Full Code      SDOH Screening:  The patient was screened for food insecurity, housing instability, transportation needs, utility difficulties, and interpersonal safety. The social determinant(s) of health identified as a concern this admission are:  Housing instability  Utility difficulties    Will discuss with case management and/or community health workers.    Social Drivers of Health with Concerns     Food Insecurity: No Food Insecurity (4/21/2025)   Recent Concern: Food Insecurity - Food Insecurity Present (4/17/2025)   Housing Stability: High Risk (4/17/2025)   Utilities: At Risk (4/17/2025)        Consults:     Assessment & Plan  HTN (hypertension)  Patient's blood pressure range in the last 24 hours was: BP  Min: 118/90  Max: 161/94.The patient's inpatient anti-hypertensive regimen is listed below:  Current Antihypertensives  furosemide tablet 80 mg, 2 times daily, Oral    Plan  - BP is uncontrolled, will adjust as follows: hypotensive currently  - Hold home BP medications and resume when appropriate  Opioid use disorder, severe, on maintenance therapy, dependence  Plan:  Resumed home methadode  if  no longer lethargic    COPD exacerbation  Patient's COPD is with exacerbation noted by continued dyspnea, use of accessory muscles for breathing, and paradoxical chest wall movement currently.  Patient is currently on COPD Pathway. Continue scheduled inhalers Steroids, Antibiotics, and Supplemental oxygen and monitor respiratory status closely.       Plan:  -Prednisone 40 mg daily  - s/p Azithromycin 500 mg day 1 then 250 mg x 2 days  -Duo Neb/Inhalers every 4 hrs   -Restarted prednisone 40 mg daily, feel this is more COPD picture  - Increased Breo dose 100->200mg, added  "Montelukast    Type 2 diabetes mellitus, without long-term current use of insulin  Plan:  Gaol 140-180 while inpatient    CTM    Acute exacerbation of CHF (congestive heart failure)  Patient has Diastolic (HFpEF) heart failure that is Acute on chronic. On presentation their CHF was decompensated. Evidence of decompensated CHF on presentation includes: edema, elevated JVD, orthopnea, dyspnea on exertion (LAND), and shortness of breath. The etiology of their decompensation is likely medication non-compliance. Most recent BNP and echo results are listed below.  No results for input(s): "BNP" in the last 72 hours.    Latest ECHO  Results for orders placed during the hospital encounter of 03/13/25    Echo    Interpretation Summary    Left Ventricle: The left ventricle is normal in size. Normal wall thickness. There is concentric remodeling. Normal wall motion. There is normal systolic function with a visually estimated ejection fraction of 60 - 65%. Ejection fraction is approximately 65%. There is normal diastolic function.    Right Ventricle: The right ventricle has mild enlargement. Wall thickness is normal. Systolic function is normal.    The right atrium is dilated.    Pulmonary Artery: Pulmonary artery pressure could not be estimated.    IVC/SVC: Normal venous pressure at 3 mmHg.    Current Heart Failure Medications  furosemide tablet 80 mg, 2 times daily, Oral  Given - Lasix 60 mg x 1 upon admission        Plan  - Monitor strict I&Os and daily weights.    - Place on telemetry  - Low sodium diet  - Place on fluid restriction of 1.5 L.   - Cardiology has not been consulted  - The patient's volume status is improving but not at their baseline as indicated by edema, elevated JVD, orthopnea, dyspnea on exertion (LAND), and shortness of breath  - Lasix 80mg PO BID  JUVENCIO (generalized anxiety disorder)  Plan:  Resumed home escitalopram    ANNITA (acute kidney injury)  ANNITA is likely due to pre-renal azotemia due to " intravascular volume depletion. Baseline creatinine is  1.7 . Most recent creatinine and eGFR are listed below.  Recent Labs     04/20/25  0636 04/21/25  0440 04/22/25  0453   CREATININE 2.1* 2.0* 2.1*   EGFRNORACEVR 35* 38* 35*      Plan  - ANNITA is stable  - Avoid nephrotoxins and renally dose meds for GFR listed above  - Monitor urine output, serial BMP, and adjust therapy as needed  - Retroperitoneal US showed: Simple to minimally complex right renal cysts.   -Urine studies with FEUrea  < 35% , suggestive of Pre-renal   Left leg pain  Patient with chronic leg pain secondary to likely lymphedema.  Bilateral leg ultrasounds for/11/20 5- for DVT.    Plan:  Resume home gabapentin  Drug-induced constipation  Patient is on methadone.  Patient reports last bowel movement 4 days ago.    Plan:  Senna and MiraLax scheduled    Chronic pain of right lower extremity      BPH (benign prostatic hyperplasia)  Plan:  Resume home BPH      Final Active Diagnoses:    Diagnosis Date Noted POA    PRINCIPAL PROBLEM:  COPD exacerbation [J44.1] 07/18/2014 Yes    Chronic pain of right lower extremity [M79.604, G89.29] 04/17/2025 Yes    BPH (benign prostatic hyperplasia) [N40.0] 04/17/2025 Yes    Drug-induced constipation [K59.03] 01/03/2024 Yes    ANNITA (acute kidney injury) [N17.9] 01/04/2023 Yes    Left leg pain [M79.605] 01/04/2023 Yes    JUVENCIO (generalized anxiety disorder) [F41.1] 04/17/2019 Yes    Acute exacerbation of CHF (congestive heart failure) [I50.9] 03/28/2019 Yes    Type 2 diabetes mellitus, without long-term current use of insulin [E11.9] 10/02/2017 Yes    Opioid use disorder, severe, on maintenance therapy, dependence [F11.20] 01/08/2013 Yes    HTN (hypertension) [I10]  Yes      Problems Resolved During this Admission:       Discharged Condition: stable    Disposition: Home or Self Care    Follow Up:    Patient Instructions:      OXYGEN FOR HOME USE     Order Specific Question Answer Comments   Liter Flow 2    Duration  "Continuous    Qualifying Test Performed at: Activity    Oxygen saturation at rest 88    Oxygen saturation with activity 81    Oxygen saturation with activity on oxygen 96    Portable mode: continuous    Route nasal cannula    Device: home concentrator with portable concentrator    Length of need (in months): 99 mos    Patient condition with qualifying saturation COPD    Height: 5' 8" (1.727 m)    Weight: 124.3 kg (274 lb 0.5 oz)    Alternative treatment measures have been tried or considered and deemed clinically ineffective. Yes      Diet Cardiac     Diet diabetic     Notify your health care provider if you experience any of the following:  difficulty breathing or increased cough     Complete PFT w/ bronchodilator   Standing Status: Future Standing Exp. Date: 04/21/26     Order Specific Question Answer Comments   Release to patient Immediate      Activity as tolerated       Significant Diagnostic Studies: Labs: CMP   Recent Labs   Lab 04/21/25  0440 04/22/25  0453    137   K 5.0 4.3   CL 97 94*   CO2 33* 32*   BUN 38* 38*   CREATININE 2.0* 2.1*   CALCIUM 9.6 9.9   ALBUMIN 3.8 4.0   BILITOT 0.5 0.5   ALKPHOS 64 70   AST 14 29   ALT 17 35   ANIONGAP 9 11    and CBC   Recent Labs   Lab 04/21/25 0440 04/22/25  0453   WBC 7.74 8.79   HGB 10.9* 10.8*   HCT 34.1* 33.3*   * 163       Pending Diagnostic Studies:       None           Medications:  Reconciled Home Medications:      Medication List        START taking these medications      montelukast 10 mg tablet  Commonly known as: SINGULAIR  Take 1 tablet (10 mg total) by mouth once daily.  Start taking on: April 23, 2025            CONTINUE taking these medications      albuterol 90 mcg/actuation inhaler  Commonly known as: VENTOLIN HFA  Inhale 2 puffs into the lungs every 6 (six) hours as needed for Wheezing or Shortness of Breath. Rescue     traZODone 50 MG tablet  Commonly known as: DESYREL  Take 2 tablets (100 mg total) by mouth every evening.   "   TRELEGY ELLIPTA 200-62.5-25 mcg inhaler  Generic drug: fluticasone-umeclidin-vilanter  Inhale 1 puff into the lungs once daily.            STOP taking these medications      acetaminophen 500 MG tablet  Commonly known as: TYLENOL     amLODIPine 10 MG tablet  Commonly known as: NORVASC     ciprofloxacin HCl 0.3 % ophthalmic solution  Commonly known as: CILOXAN     cloNIDine 0.1 MG tablet  Commonly known as: CATAPRES     EScitalopram oxalate 10 MG tablet  Commonly known as: LEXAPRO     gabapentin 300 MG capsule  Commonly known as: NEURONTIN     ketorolac 0.5% 0.5 % Drop  Commonly known as: ACULAR     prednisoLONE acetate 1 % Drps  Commonly known as: PRED FORTE     predniSONE 20 MG tablet  Commonly known as: DELTASONE            ASK your doctor about these medications      furosemide 80 MG tablet  Commonly known as: LASIX  Take 1 tablet (80 mg total) by mouth 2 (two) times a day.     methadone 10 MG tablet  Commonly known as: DOLOPHINE  Take 95 mg by mouth Daily. Mixes it in water     polyethylene glycol 17 gram/dose powder  Commonly known as: GLYCOLAX  Take 17 g by mouth 2 (two) times daily.     tamsulosin 0.4 mg Cap  Commonly known as: FLOMAX  Take 0.4 mg by mouth once daily.              Indwelling Lines/Drains at time of discharge:   Lines/Drains/Airways       Drain  Duration             Male External Urinary Catheter 04/17/25 1034 4 days                    Time spent on the discharge of patient: >30 minutes         Tamika Davis MD  Department of Hospital Medicine  [facility]

## 2025-04-22 NOTE — NURSING
04/22/25 1142   AVS Confirmation   Discharge instructions and AVS provided to and reviewed with patient and/or significant other. Yes         AVS printed and handed to patient by bedside nurse. VN reviewed discharge instructions with patient using teachback method.  Allowed time for questions, all questions answered.  Patient verbalized complete understanding of discharge instructions but is concerned that his gabapentin has been stopped. Dr Davis notified via secure chat to come to bedside to speak with patient. Discharge instructions complete. Bedside delivery complete. Bedside nurse notified.

## 2025-04-22 NOTE — ASSESSMENT & PLAN NOTE
ANNITA is likely due to pre-renal azotemia due to intravascular volume depletion. Baseline creatinine is 1.7. Most recent creatinine and eGFR are listed below.  Recent Labs     04/20/25  0636 04/21/25  0440 04/22/25  0453   CREATININE 2.1* 2.0* 2.1*   EGFRNORACEVR 35* 38* 35*      Plan  - ANNITA is stable  - Avoid nephrotoxins and renally dose meds for GFR listed above  - Monitor urine output, serial BMP, and adjust therapy as needed  - Retroperitoneal US showed: Simple to minimally complex right renal cysts.   -Urine studies with FEUrea  < 35% , suggestive of Pre-renal

## 2025-04-22 NOTE — PLAN OF CARE
Problem: Adult Inpatient Plan of Care  Goal: Plan of Care Review  Outcome: Progressing   Updated care plan.

## 2025-04-22 NOTE — PLAN OF CARE
Pt on documented O2, no respiratory distress noted. Will continue to monitor. Breathing tx. And MDI given, [place]

## 2025-04-27 NOTE — PLAN OF CARE
1:13 pm SW contacted pt [phone] to follow up to see if O2 was delivered to his home. SW expressed to pt that dme rep expressed that O2 is in route to his home now. While on the phone with pt. SW was made aware the O2  was outside of pts home via secure chat. SW informed pt that  was outside of his home. Pt confirmed that he sees . While on phone with pt his O2 was being delivered. Pt did not have any questions or concerns for sw. No additional cm needs at this time.       Future Appointments   Date Time Provider Department Center   5/12/2025  8:00 AM [facility]   5/19/2025  2:00 PM Jamin Delong Jr., DPM [facility] POD [place]   7/16/2025  1:30 PM Iván Alejandro MD [facility] PULMO [place]   9/18/2025  8:45 AM [facility] OIC-US1 MASTER [facility] ULTR [facility]   9/18/2025  9:50 AM LAB, APPOINTMENT [facility]   9/18/2025 10:30 AM Scheuermann, Jennifer B., PA [facility] HEPC [facility]      04/27/25 1313   Post-Acute Status   Post-Acute Authorization Other   Hospital Resources/Appts/Education Provided Appointments scheduled and added to AVS   Discharge Plan   Discharge Plan A Home with family

## 2025-04-28 ENCOUNTER — TELEPHONE (OUTPATIENT)
Dept: NEUROSURGERY | Facility: CLINIC | Age: 60
End: 2025-04-28
Payer: MEDICAID

## 2025-04-28 NOTE — TELEPHONE ENCOUNTER
Attempted to contact pt to reschedule appointment with taylor, he stated give him a minute he will call back

## 2025-05-18 ENCOUNTER — HOSPITAL ENCOUNTER (INPATIENT)
Facility: HOSPITAL | Age: 60
LOS: 2 days | Discharge: HOME | DRG: 191 | End: 2025-05-21
Attending: EMERGENCY MEDICINE | Admitting: FAMILY MEDICINE
Payer: COMMERCIAL

## 2025-05-18 DIAGNOSIS — R06.02 SHORTNESS OF BREATH: ICD-10-CM

## 2025-05-18 DIAGNOSIS — J44.1 COPD WITH ACUTE EXACERBATION: Primary | ICD-10-CM

## 2025-05-18 DIAGNOSIS — J44.9 CHRONIC OBSTRUCTIVE PULMONARY DISEASE, UNSPECIFIED COPD TYPE: ICD-10-CM

## 2025-05-18 DIAGNOSIS — R94.31 QT PROLONGATION: ICD-10-CM

## 2025-05-18 DIAGNOSIS — M79.5 FOREIGN BODY (FB) IN SOFT TISSUE: ICD-10-CM

## 2025-05-18 DIAGNOSIS — N40.0 BENIGN PROSTATIC HYPERPLASIA, UNSPECIFIED WHETHER LOWER URINARY TRACT SYMPTOMS PRESENT: ICD-10-CM

## 2025-05-18 LAB
ABSOLUTE EOSINOPHIL (OHS): 0.24 K/UL
ABSOLUTE MONOCYTE (OHS): 0.42 K/UL (ref 0.3–1)
ABSOLUTE NEUTROPHIL COUNT (OHS): 3.37 K/UL (ref 1.8–7.7)
ALBUMIN SERPL BCP-MCNC: 3.8 G/DL (ref 3.5–5.2)
ALP SERPL-CCNC: 71 UNIT/L (ref 40–150)
ALT SERPL W/O P-5'-P-CCNC: 20 UNIT/L (ref 10–44)
ANION GAP (OHS): 11 MMOL/L (ref 8–16)
AST SERPL-CCNC: 18 UNIT/L (ref 11–45)
BASOPHILS # BLD AUTO: 0.03 K/UL
BASOPHILS NFR BLD AUTO: 0.5 %
BILIRUB SERPL-MCNC: 0.7 MG/DL (ref 0.1–1)
BNP SERPL-MCNC: 22 PG/ML (ref 0–99)
BUN SERPL-MCNC: 42 MG/DL (ref 6–20)
CALCIUM SERPL-MCNC: 9.1 MG/DL (ref 8.7–10.5)
CHLORIDE SERPL-SCNC: 102 MMOL/L (ref 95–110)
CO2 SERPL-SCNC: 26 MMOL/L (ref 23–29)
CREAT SERPL-MCNC: 2.4 MG/DL (ref 0.5–1.4)
ERYTHROCYTE [DISTWIDTH] IN BLOOD BY AUTOMATED COUNT: 12 % (ref 11.5–14.5)
GFR SERPLBLD CREATININE-BSD FMLA CKD-EPI: 30 ML/MIN/1.73/M2
GLUCOSE SERPL-MCNC: 84 MG/DL (ref 70–110)
HCT VFR BLD AUTO: 32.8 % (ref 40–54)
HGB BLD-MCNC: 11 GM/DL (ref 14–18)
IMM GRANULOCYTES # BLD AUTO: 0.01 K/UL (ref 0–0.04)
IMM GRANULOCYTES NFR BLD AUTO: 0.2 % (ref 0–0.5)
LYMPHOCYTES # BLD AUTO: 1.53 K/UL (ref 1–4.8)
MCH RBC QN AUTO: 31.1 PG (ref 27–31)
MCHC RBC AUTO-ENTMCNC: 33.5 G/DL (ref 32–36)
MCV RBC AUTO: 93 FL (ref 82–98)
NUCLEATED RBC (/100WBC) (OHS): 0 /100 WBC
PLATELET # BLD AUTO: 173 K/UL (ref 150–450)
PMV BLD AUTO: 10.7 FL (ref 9.2–12.9)
POTASSIUM SERPL-SCNC: 4 MMOL/L (ref 3.5–5.1)
PROT SERPL-MCNC: 8 GM/DL (ref 6–8.4)
RBC # BLD AUTO: 3.54 M/UL (ref 4.6–6.2)
RELATIVE EOSINOPHIL (OHS): 4.3 %
RELATIVE LYMPHOCYTE (OHS): 27.3 % (ref 18–48)
RELATIVE MONOCYTE (OHS): 7.5 % (ref 4–15)
RELATIVE NEUTROPHIL (OHS): 60.2 % (ref 38–73)
SODIUM SERPL-SCNC: 139 MMOL/L (ref 136–145)
TROPONIN I SERPL DL<=0.01 NG/ML-MCNC: 0.01 NG/ML
WBC # BLD AUTO: 5.6 K/UL (ref 3.9–12.7)

## 2025-05-18 PROCEDURE — 99285 EMERGENCY DEPT VISIT HI MDM: CPT | Mod: 25

## 2025-05-18 PROCEDURE — 85025 COMPLETE CBC W/AUTO DIFF WBC: CPT | Performed by: EMERGENCY MEDICINE

## 2025-05-18 PROCEDURE — 83880 ASSAY OF NATRIURETIC PEPTIDE: CPT | Performed by: EMERGENCY MEDICINE

## 2025-05-18 PROCEDURE — 99900035 HC TECH TIME PER 15 MIN (STAT)

## 2025-05-18 PROCEDURE — 27000221 HC OXYGEN, UP TO 24 HOURS

## 2025-05-18 PROCEDURE — 93005 ELECTROCARDIOGRAM TRACING: CPT

## 2025-05-18 PROCEDURE — 25000242 PHARM REV CODE 250 ALT 637 W/ HCPCS: Performed by: EMERGENCY MEDICINE

## 2025-05-18 PROCEDURE — 84484 ASSAY OF TROPONIN QUANT: CPT | Performed by: EMERGENCY MEDICINE

## 2025-05-18 PROCEDURE — 80053 COMPREHEN METABOLIC PANEL: CPT | Performed by: EMERGENCY MEDICINE

## 2025-05-18 PROCEDURE — 93010 ELECTROCARDIOGRAM REPORT: CPT | Mod: ,,, | Performed by: STUDENT IN AN ORGANIZED HEALTH CARE EDUCATION/TRAINING PROGRAM

## 2025-05-18 PROCEDURE — 94640 AIRWAY INHALATION TREATMENT: CPT

## 2025-05-18 RX ORDER — IPRATROPIUM BROMIDE AND ALBUTEROL SULFATE 2.5; .5 MG/3ML; MG/3ML
3 SOLUTION RESPIRATORY (INHALATION)
Status: COMPLETED | OUTPATIENT
Start: 2025-05-18 | End: 2025-05-18

## 2025-05-18 RX ADMIN — IPRATROPIUM BROMIDE AND ALBUTEROL SULFATE 3 ML: 2.5; .5 SOLUTION RESPIRATORY (INHALATION) at 10:05

## 2025-05-18 NOTE — Clinical Note
Diagnosis: Shortness of breath [786.05.ICD-9-CM]   Future Attending Provider: TIP NICHOLAS [[id number]]

## 2025-05-19 PROBLEM — L03.115 CELLULITIS OF RIGHT LOWER EXTREMITY: Status: ACTIVE | Noted: 2025-05-19

## 2025-05-19 LAB
ABSOLUTE EOSINOPHIL (OHS): 0 K/UL
ABSOLUTE MONOCYTE (OHS): 0.01 K/UL (ref 0.3–1)
ABSOLUTE NEUTROPHIL COUNT (OHS): 2.47 K/UL (ref 1.8–7.7)
ALBUMIN SERPL BCP-MCNC: 3.7 G/DL (ref 3.5–5.2)
ALP SERPL-CCNC: 72 UNIT/L (ref 40–150)
ALT SERPL W/O P-5'-P-CCNC: 22 UNIT/L (ref 10–44)
AMPHET UR QL SCN: NEGATIVE
ANION GAP (OHS): 10 MMOL/L (ref 8–16)
AST SERPL-CCNC: 18 UNIT/L (ref 11–45)
BARBITURATE SCN PRESENT UR: NEGATIVE
BASOPHILS # BLD AUTO: 0 K/UL
BASOPHILS NFR BLD AUTO: 0 %
BENZODIAZ UR QL SCN: NEGATIVE
BILIRUB SERPL-MCNC: 0.6 MG/DL (ref 0.1–1)
BUN SERPL-MCNC: 40 MG/DL (ref 6–20)
CALCIUM SERPL-MCNC: 9.1 MG/DL (ref 8.7–10.5)
CANNABINOIDS UR QL SCN: NEGATIVE
CHLORIDE SERPL-SCNC: 101 MMOL/L (ref 95–110)
CO2 SERPL-SCNC: 27 MMOL/L (ref 23–29)
COCAINE UR QL SCN: NEGATIVE
CREAT SERPL-MCNC: 2.3 MG/DL (ref 0.5–1.4)
CREAT UR-MCNC: 56.3 MG/DL (ref 23–375)
CREAT UR-MCNC: 58 MG/DL (ref 23–375)
ERYTHROCYTE [DISTWIDTH] IN BLOOD BY AUTOMATED COUNT: 11.9 % (ref 11.5–14.5)
ETHANOL UR-MCNC: <10 MG/DL
GFR SERPLBLD CREATININE-BSD FMLA CKD-EPI: 32 ML/MIN/1.73/M2
GLUCOSE SERPL-MCNC: 151 MG/DL (ref 70–110)
HCT VFR BLD AUTO: 33.8 % (ref 40–54)
HGB BLD-MCNC: 11.3 GM/DL (ref 14–18)
IMM GRANULOCYTES # BLD AUTO: 0.01 K/UL (ref 0–0.04)
IMM GRANULOCYTES NFR BLD AUTO: 0.4 % (ref 0–0.5)
INFLUENZA A MOLECULAR (OHS): NEGATIVE
INFLUENZA B MOLECULAR (OHS): NEGATIVE
LYMPHOCYTES # BLD AUTO: 0.22 K/UL (ref 1–4.8)
MAGNESIUM SERPL-MCNC: 2.7 MG/DL (ref 1.6–2.6)
MCH RBC QN AUTO: 31.1 PG (ref 27–31)
MCHC RBC AUTO-ENTMCNC: 33.4 G/DL (ref 32–36)
MCV RBC AUTO: 93 FL (ref 82–98)
METHADONE UR QL SCN: ABNORMAL
NUCLEATED RBC (/100WBC) (OHS): 0 /100 WBC
OHS QRS DURATION: 96 MS
OHS QTC CALCULATION: 474 MS
OPIATES UR QL SCN: ABNORMAL
PCP UR QL: NEGATIVE
PHOSPHATE SERPL-MCNC: 4.6 MG/DL (ref 2.7–4.5)
PLATELET # BLD AUTO: 154 K/UL (ref 150–450)
PMV BLD AUTO: 10.4 FL (ref 9.2–12.9)
POTASSIUM SERPL-SCNC: 4 MMOL/L (ref 3.5–5.1)
PROT SERPL-MCNC: 8.1 GM/DL (ref 6–8.4)
RBC # BLD AUTO: 3.63 M/UL (ref 4.6–6.2)
RELATIVE EOSINOPHIL (OHS): 0 %
RELATIVE LYMPHOCYTE (OHS): 8.1 % (ref 18–48)
RELATIVE MONOCYTE (OHS): 0.4 % (ref 4–15)
RELATIVE NEUTROPHIL (OHS): 91.1 % (ref 38–73)
SARS-COV-2 RDRP RESP QL NAA+PROBE: NEGATIVE
SODIUM SERPL-SCNC: 138 MMOL/L (ref 136–145)
SODIUM UR-SCNC: 79 MMOL/L (ref 20–250)
UUN UR-MCNC: 297 MG/DL (ref 140–1050)
WBC # BLD AUTO: 2.71 K/UL (ref 3.9–12.7)

## 2025-05-19 PROCEDURE — 11000001 HC ACUTE MED/SURG PRIVATE ROOM

## 2025-05-19 PROCEDURE — 80358 DRUG SCREENING METHADONE: CPT

## 2025-05-19 PROCEDURE — 94640 AIRWAY INHALATION TREATMENT: CPT | Mod: XB

## 2025-05-19 PROCEDURE — 25000242 PHARM REV CODE 250 ALT 637 W/ HCPCS

## 2025-05-19 PROCEDURE — 96365 THER/PROPH/DIAG IV INF INIT: CPT

## 2025-05-19 PROCEDURE — 25000003 PHARM REV CODE 250

## 2025-05-19 PROCEDURE — 83735 ASSAY OF MAGNESIUM: CPT

## 2025-05-19 PROCEDURE — 82570 ASSAY OF URINE CREATININE: CPT

## 2025-05-19 PROCEDURE — 99900035 HC TECH TIME PER 15 MIN (STAT)

## 2025-05-19 PROCEDURE — 80053 COMPREHEN METABOLIC PANEL: CPT

## 2025-05-19 PROCEDURE — 80307 DRUG TEST PRSMV CHEM ANLYZR: CPT

## 2025-05-19 PROCEDURE — 96372 THER/PROPH/DIAG INJ SC/IM: CPT

## 2025-05-19 PROCEDURE — 84100 ASSAY OF PHOSPHORUS: CPT

## 2025-05-19 PROCEDURE — 97530 THERAPEUTIC ACTIVITIES: CPT

## 2025-05-19 PROCEDURE — 63600175 PHARM REV CODE 636 W HCPCS

## 2025-05-19 PROCEDURE — U0002 COVID-19 LAB TEST NON-CDC: HCPCS

## 2025-05-19 PROCEDURE — S4991 NICOTINE PATCH NONLEGEND: HCPCS

## 2025-05-19 PROCEDURE — 97535 SELF CARE MNGMENT TRAINING: CPT

## 2025-05-19 PROCEDURE — 94761 N-INVAS EAR/PLS OXIMETRY MLT: CPT

## 2025-05-19 PROCEDURE — 97165 OT EVAL LOW COMPLEX 30 MIN: CPT

## 2025-05-19 PROCEDURE — 96375 TX/PRO/DX INJ NEW DRUG ADDON: CPT

## 2025-05-19 PROCEDURE — 84300 ASSAY OF URINE SODIUM: CPT

## 2025-05-19 PROCEDURE — 87502 INFLUENZA DNA AMP PROBE: CPT

## 2025-05-19 PROCEDURE — 96376 TX/PRO/DX INJ SAME DRUG ADON: CPT

## 2025-05-19 PROCEDURE — 27000221 HC OXYGEN, UP TO 24 HOURS

## 2025-05-19 PROCEDURE — 84540 ASSAY OF URINE/UREA-N: CPT

## 2025-05-19 PROCEDURE — 97162 PT EVAL MOD COMPLEX 30 MIN: CPT

## 2025-05-19 PROCEDURE — 85025 COMPLETE CBC W/AUTO DIFF WBC: CPT

## 2025-05-19 RX ORDER — NALOXONE HCL 0.4 MG/ML
0.02 VIAL (ML) INJECTION
Status: DISCONTINUED | OUTPATIENT
Start: 2025-05-19 | End: 2025-05-21 | Stop reason: HOSPADM

## 2025-05-19 RX ORDER — METHOCARBAMOL 500 MG/1
500 TABLET, FILM COATED ORAL ONCE
Status: COMPLETED | OUTPATIENT
Start: 2025-05-19 | End: 2025-05-19

## 2025-05-19 RX ORDER — METHADONE HYDROCHLORIDE 10 MG/1
95 TABLET ORAL DAILY
Refills: 0 | Status: DISCONTINUED | OUTPATIENT
Start: 2025-05-19 | End: 2025-05-19

## 2025-05-19 RX ORDER — IBUPROFEN 200 MG
16 TABLET ORAL
Status: DISCONTINUED | OUTPATIENT
Start: 2025-05-19 | End: 2025-05-21 | Stop reason: HOSPADM

## 2025-05-19 RX ORDER — TAMSULOSIN HYDROCHLORIDE 0.4 MG/1
0.4 CAPSULE ORAL DAILY
Status: DISCONTINUED | OUTPATIENT
Start: 2025-05-19 | End: 2025-05-21 | Stop reason: HOSPADM

## 2025-05-19 RX ORDER — MONTELUKAST SODIUM 10 MG/1
10 TABLET ORAL DAILY
Status: DISCONTINUED | OUTPATIENT
Start: 2025-05-19 | End: 2025-05-21 | Stop reason: HOSPADM

## 2025-05-19 RX ORDER — TALC
6 POWDER (GRAM) TOPICAL NIGHTLY PRN
Status: DISCONTINUED | OUTPATIENT
Start: 2025-05-19 | End: 2025-05-21 | Stop reason: HOSPADM

## 2025-05-19 RX ORDER — METHADONE HYDROCHLORIDE 10 MG/1
100 TABLET ORAL DAILY
Refills: 0 | Status: DISCONTINUED | OUTPATIENT
Start: 2025-05-19 | End: 2025-05-20

## 2025-05-19 RX ORDER — IPRATROPIUM BROMIDE AND ALBUTEROL SULFATE 2.5; .5 MG/3ML; MG/3ML
3 SOLUTION RESPIRATORY (INHALATION) EVERY 4 HOURS
Status: DISCONTINUED | OUTPATIENT
Start: 2025-05-19 | End: 2025-05-19

## 2025-05-19 RX ORDER — IBUPROFEN 200 MG
24 TABLET ORAL
Status: DISCONTINUED | OUTPATIENT
Start: 2025-05-19 | End: 2025-05-21 | Stop reason: HOSPADM

## 2025-05-19 RX ORDER — PREDNISONE 20 MG/1
40 TABLET ORAL DAILY
Status: DISCONTINUED | OUTPATIENT
Start: 2025-05-19 | End: 2025-05-21 | Stop reason: HOSPADM

## 2025-05-19 RX ORDER — CEFTRIAXONE 1 G/1
1 INJECTION, POWDER, FOR SOLUTION INTRAMUSCULAR; INTRAVENOUS
Status: DISCONTINUED | OUTPATIENT
Start: 2025-05-19 | End: 2025-05-21 | Stop reason: HOSPADM

## 2025-05-19 RX ORDER — FLUTICASONE FUROATE AND VILANTEROL 200; 25 UG/1; UG/1
1 POWDER RESPIRATORY (INHALATION) DAILY
Status: DISCONTINUED | OUTPATIENT
Start: 2025-05-19 | End: 2025-05-21 | Stop reason: HOSPADM

## 2025-05-19 RX ORDER — ACETAMINOPHEN 325 MG/1
650 TABLET ORAL ONCE
Status: COMPLETED | OUTPATIENT
Start: 2025-05-19 | End: 2025-05-19

## 2025-05-19 RX ORDER — ONDANSETRON HYDROCHLORIDE 2 MG/ML
4 INJECTION, SOLUTION INTRAVENOUS EVERY 8 HOURS PRN
Status: DISCONTINUED | OUTPATIENT
Start: 2025-05-19 | End: 2025-05-21 | Stop reason: HOSPADM

## 2025-05-19 RX ORDER — GLUCAGON 1 MG
1 KIT INJECTION
Status: DISCONTINUED | OUTPATIENT
Start: 2025-05-19 | End: 2025-05-21 | Stop reason: HOSPADM

## 2025-05-19 RX ORDER — AMOXICILLIN 250 MG
1 CAPSULE ORAL DAILY
Status: DISCONTINUED | OUTPATIENT
Start: 2025-05-19 | End: 2025-05-21 | Stop reason: HOSPADM

## 2025-05-19 RX ORDER — FUROSEMIDE 10 MG/ML
40 INJECTION INTRAMUSCULAR; INTRAVENOUS EVERY 12 HOURS
Status: DISCONTINUED | OUTPATIENT
Start: 2025-05-19 | End: 2025-05-21

## 2025-05-19 RX ORDER — ENOXAPARIN SODIUM 100 MG/ML
40 INJECTION SUBCUTANEOUS EVERY 12 HOURS
Status: DISCONTINUED | OUTPATIENT
Start: 2025-05-19 | End: 2025-05-21 | Stop reason: HOSPADM

## 2025-05-19 RX ORDER — AMLODIPINE BESYLATE 5 MG/1
10 TABLET ORAL DAILY
Status: DISCONTINUED | OUTPATIENT
Start: 2025-05-19 | End: 2025-05-21 | Stop reason: HOSPADM

## 2025-05-19 RX ORDER — ACETAMINOPHEN 325 MG/1
650 TABLET ORAL EVERY 4 HOURS PRN
Status: DISCONTINUED | OUTPATIENT
Start: 2025-05-19 | End: 2025-05-21 | Stop reason: HOSPADM

## 2025-05-19 RX ORDER — IBUPROFEN 200 MG
1 TABLET ORAL DAILY
Status: DISCONTINUED | OUTPATIENT
Start: 2025-05-19 | End: 2025-05-21 | Stop reason: HOSPADM

## 2025-05-19 RX ORDER — IPRATROPIUM BROMIDE AND ALBUTEROL SULFATE 2.5; .5 MG/3ML; MG/3ML
3 SOLUTION RESPIRATORY (INHALATION) EVERY 6 HOURS
Status: DISCONTINUED | OUTPATIENT
Start: 2025-05-19 | End: 2025-05-21 | Stop reason: HOSPADM

## 2025-05-19 RX ORDER — METHADONE HYDROCHLORIDE 10 MG/1
95 TABLET ORAL DAILY
COMMUNITY

## 2025-05-19 RX ORDER — POLYETHYLENE GLYCOL 3350 17 G/17G
17 POWDER, FOR SOLUTION ORAL DAILY PRN
Status: DISCONTINUED | OUTPATIENT
Start: 2025-05-19 | End: 2025-05-20

## 2025-05-19 RX ORDER — SODIUM CHLORIDE 0.9 % (FLUSH) 0.9 %
5 SYRINGE (ML) INJECTION
Status: DISCONTINUED | OUTPATIENT
Start: 2025-05-19 | End: 2025-05-21 | Stop reason: HOSPADM

## 2025-05-19 RX ORDER — FUROSEMIDE 10 MG/ML
80 INJECTION INTRAMUSCULAR; INTRAVENOUS ONCE
Status: COMPLETED | OUTPATIENT
Start: 2025-05-19 | End: 2025-05-19

## 2025-05-19 RX ADMIN — ACETAMINOPHEN 650 MG: 325 TABLET ORAL at 09:05

## 2025-05-19 RX ADMIN — FUROSEMIDE 40 MG: 10 INJECTION, SOLUTION INTRAVENOUS at 10:05

## 2025-05-19 RX ADMIN — CEFTRIAXONE SODIUM 1 G: 500 INJECTION, POWDER, FOR SOLUTION INTRAMUSCULAR; INTRAVENOUS at 01:05

## 2025-05-19 RX ADMIN — MONTELUKAST SODIUM 10 MG: 10 TABLET, FILM COATED ORAL at 09:05

## 2025-05-19 RX ADMIN — NICOTINE 1 PATCH: 21 PATCH, EXTENDED RELEASE TRANSDERMAL at 10:05

## 2025-05-19 RX ADMIN — IPRATROPIUM BROMIDE AND ALBUTEROL SULFATE 3 ML: 2.5; .5 SOLUTION RESPIRATORY (INHALATION) at 04:05

## 2025-05-19 RX ADMIN — ACETAMINOPHEN 650 MG: 325 TABLET ORAL at 01:05

## 2025-05-19 RX ADMIN — METHOCARBAMOL 500 MG: 500 TABLET ORAL at 11:05

## 2025-05-19 RX ADMIN — FLUTICASONE FUROATE AND VILANTEROL TRIFENATATE 1 PUFF: 200; 25 POWDER RESPIRATORY (INHALATION) at 07:05

## 2025-05-19 RX ADMIN — TAMSULOSIN HYDROCHLORIDE 0.4 MG: 0.4 CAPSULE ORAL at 10:05

## 2025-05-19 RX ADMIN — FUROSEMIDE 40 MG: 10 INJECTION, SOLUTION INTRAVENOUS at 09:05

## 2025-05-19 RX ADMIN — IPRATROPIUM BROMIDE AND ALBUTEROL SULFATE 3 ML: 2.5; .5 SOLUTION RESPIRATORY (INHALATION) at 01:05

## 2025-05-19 RX ADMIN — AZITHROMYCIN MONOHYDRATE 500 MG: 500 INJECTION, POWDER, LYOPHILIZED, FOR SOLUTION INTRAVENOUS at 01:05

## 2025-05-19 RX ADMIN — PREDNISONE 40 MG: 20 TABLET ORAL at 10:05

## 2025-05-19 RX ADMIN — AMLODIPINE BESYLATE 10 MG: 5 TABLET ORAL at 10:05

## 2025-05-19 RX ADMIN — FUROSEMIDE 80 MG: 10 INJECTION, SOLUTION INTRAVENOUS at 01:05

## 2025-05-19 RX ADMIN — ENOXAPARIN SODIUM 40 MG: 40 INJECTION SUBCUTANEOUS at 10:05

## 2025-05-19 RX ADMIN — ENOXAPARIN SODIUM 40 MG: 40 INJECTION SUBCUTANEOUS at 09:05

## 2025-05-19 RX ADMIN — METHADONE HYDROCHLORIDE 100 MG: 10 TABLET ORAL at 10:05

## 2025-05-19 RX ADMIN — SENNOSIDES AND DOCUSATE SODIUM 1 TABLET: 50; 8.6 TABLET ORAL at 10:05

## 2025-05-19 NOTE — ED NOTES
Writer rounded on the pt at this time.  Pt is sleeping comfortably in bed at this time and in no obvious distress.  VSS.

## 2025-05-19 NOTE — PLAN OF CARE
Problem: Occupational Therapy  Goal: Occupational Therapy Goal  Outcome: Met     Pt was agreeable and participated in OT/PT evaluation.  Pt reports that at PLOF, he was able to walk without assistance/DME and perform most ADLS on his own.  He reports that he only needed A with socks/shoes for ADLS and would take sponge baths due to fear of falling when stepping in/out of tub on his own.  Pt complete his OT evaluation and noted to perform his ADLS at baseline but noted to require CGA-min A with func mobility due to pain in RLE and SOB.  Pt noted with some limitation in func mobility due to SOB and pain which may be addressed by further PT services and DME.   Due to these factors, pt is discharged from acute OT services, however post acute OT is in order to assess and train with DME recommendations.     Danisha Leal, OT  5/19/2025
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
## 2025-05-19 NOTE — ASSESSMENT & PLAN NOTE
Patient's blood pressure range in the last 24 hours was: BP  Min: 100/50  Max: 130/82.The patient's inpatient anti-hypertensive regimen is listed below:  Current Antihypertensives  amLODIPine tablet 10 mg, Daily, Oral    Plan  - BP is controlled, no changes needed to their regimen  - If uncontrolled can resume home Valsartan 160mg and Clonidine 0.1 mg
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
## 2025-05-19 NOTE — PROGRESS NOTES
[facility] Emergency Dept  [facility]  Progress Note    Patient Name: Ming Harrington  MRN: [id number]  Patient Class: OP- Observation   Admission Date: 5/18/2025  Length of Stay: 0 days  Attending Physician: Lit Dallas III, MD  Primary Care Provider: David Beckett PA-C        Subjective     Principal Problem:Acute exacerbation of COPD with asthma        HPI:  Patient is a 58 yo male w/ PMHx of HFpEF, COPD on 2L, HTN, anxiety, opioid use disorder on methadone.  Patient brought in by EMS for worsening shortness of breath.  Was saturating 85% at home, was placed on CPAP and given dexamethasone and magnesium.  In ED patient maintaining sats on 2 L NC.  Patient complaining of malaise, cough, wheezing, and shortness of breath since yesterday.  He states that he has 2 inhalers that he is compliant with.  Endorses fevers, chills, loose bowel movements.  He is concerned about stepping on glass a few months ago and associated right heel pain. Also having swelling and induration in his right medial thigh that is mildly tender and started months ago as well, denies inciting injury or open wounds.   Of note patient appears to be poor historian with limited knowledge of current home medications.    In the ED, initial vital signs /82, HR 84, Temp 98.1, SpO2 99 % on 2 L NC. Labs include CBC with stable H/H 11/32.8 and WBC within normal limits 5.6. CMP with  BUN/Cr 42/2.4 (baseline Cr 2.1). CXR no acute abnormalities.  XR foot no changes from previous. EKG with .  BNP and troponin WNL.  Initiated on DuoNebs in ED. [facility] consulted for evaluation for admission for acute COPD exacerbation.      Overview/Hospital Course:  No notes on file    Interval History: NAEON. VSS. Afebrile. UOP approximately 1.6 L with 80 mg of Lasix. Denies any consumption of high sodium foods at home. Endorses home cooked meals done by sibling.     Review of Systems   Constitutional:  Negative for chills and fever.   Respiratory:   Positive for shortness of breath and wheezing. Negative for cough.    Cardiovascular:  Positive for leg swelling. Negative for chest pain.   Gastrointestinal:  Negative for abdominal pain, diarrhea, nausea and vomiting.   Skin:  Negative for rash.   Neurological:  Negative for weakness.   Psychiatric/Behavioral:  The patient is not nervous/anxious.      Objective:     Vital Signs (Most Recent):  Temp: 98.7 °F (37.1 °C) (05/19/25 0601)  Pulse: 81 (05/19/25 0601)  Resp: 20 (05/19/25 0601)  BP: 120/64 (05/19/25 0601)  SpO2: (!) 93 % (05/19/25 0601) Vital Signs (24h Range):  Temp:  [98.1 °F (36.7 °C)-98.7 °F (37.1 °C)] 98.7 °F (37.1 °C)  Pulse:  [70-84] 81  Resp:  [14-24] 20  SpO2:  [92 %-99 %] 93 %  BP: (100-135)/(50-82) 120/64     Weight: 127 kg (280 lb)  Body mass index is 42.57 kg/m².    Intake/Output Summary (Last 24 hours) at 5/19/2025 0752  Last data filed at 5/19/2025 0639  Gross per 24 hour   Intake 250.14 ml   Output 1600 ml   Net -1349.86 ml         Physical Exam  Constitutional:       General: He is not in acute distress.     Appearance: He is obese.   Eyes:      General:         Right eye: No discharge.         Left eye: No discharge.      Conjunctiva/sclera: Conjunctivae normal.   Cardiovascular:      Rate and Rhythm: Normal rate.      Pulses: Normal pulses.      Heart sounds: Normal heart sounds.   Pulmonary:      Effort: Pulmonary effort is normal. No respiratory distress.      Breath sounds: Wheezing present.      Comments: Wheezes bilaterally  Abdominal:      General: There is no distension.      Palpations: Abdomen is soft.   Musculoskeletal:      Right lower leg: Edema present.      Left lower leg: Edema present.      Comments: 1+ pitting edema in bilateral lower extremities. R thigh significantly more swollen than L   Skin:     Comments: R medial thigh indurated with hyperpigmentation, mildly tender. No palpable fluctuance/masses.   R foot no open wounds. Tender to palpation on heel   Neurological:       Mental Status: He is alert and oriented to person, place, and time.   Psychiatric:         Mood and Affect: Mood normal.         Behavior: Behavior normal.               Significant Labs: All pertinent labs within the past 24 hours have been reviewed.  CBC:   Recent Labs   Lab 05/18/25 2227 05/19/25  0540   WBC 5.60 2.71*   HGB 11.0* 11.3*   HCT 32.8* 33.8*    154     CMP:   Recent Labs   Lab 05/18/25 2227 05/19/25  0540    138   K 4.0 4.0    101   CO2 26 27   GLU 84 151*   BUN 42* 40*   CREATININE 2.4* 2.3*   CALCIUM 9.1 9.1   PROT 8.0 8.1   ALBUMIN 3.8 3.7   BILITOT 0.7 0.6   ALKPHOS 71 72   AST 18 18   ALT 20 22   ANIONGAP 11 10       Significant Imaging: I have reviewed all pertinent imaging results/findings within the past 24 hours.      Assessment & Plan  Acute exacerbation of COPD with asthma  S/p methylprednisone, magnesium, CPAP by EMS. Was on baseline 2L NC in ED. Patient with diffuse wheezing on exam. States that he has been using his home albuterol and trilegy inhaler daily. CXR no focal opacities or consolidations. Negative for COVID/ Flu    - Ceftriaxone 1mg daily, Azithromycin 500mg  - Prednisone 40mg x5 days  - Duonebs q6hr, can decrease pending clinical improvement  - Breo Inhaler daily  - Montelukast daily    Cellulitis of right lower extremity  Patient with right medial thigh swelling and area of induration on skin for the past few months per patient. No obvious borders. No fluctuance or masses however mildly tender on exam. Concerned for cellulitis in setting of lymphedema. XR leg with diffuse edema. WBC wnl.    - Ceftriaxone daily  - DVT US RLE    HTN (hypertension)  Patient's blood pressure range in the last 24 hours was: BP  Min: 100/50  Max: 135/65.The patient's inpatient anti-hypertensive regimen is listed below:  Current Antihypertensives  amLODIPine tablet 10 mg, Daily, Oral  furosemide injection 40 mg, Every 12 hours, Intravenous    Plan  - BP is controlled, no  changes needed to their regimen  -BP medication on hold as patient normotensive. Will resume home Valsartan 160mg and Clonidine 0.1 mg when appropriate.  Opioid use disorder, severe, on maintenance therapy, dependence  Per patient and previous records on Methadone 95mg for opioid use disorder.    - Methadone 100mg daily (95mg not on formulary)  - Urine methadone and  Urine tox screen pending    JUVENCIO (generalized anxiety disorder)  Will hold home Escitalopram in setting of Qtc prolongation    ANNITA (acute kidney injury)  ANNITA is likely due to volume overload vs progression of CKD. Baseline creatinine is 2.1. Most recent creatinine and eGFR are listed below.  Recent Labs     05/18/25  2227 05/19/25  0540   CREATININE 2.4* 2.3*   EGFRNORACEVR 30* 32*      Plan  - Improving. Upon further review patient's prior creatinine has ranged between 2.1-2.3. Unclear if this is new baseline.   - Avoid nephrotoxins and renally dose meds for GFR listed above  - Monitor urine output, serial BMP, and adjust therapy as needed  - Last CARMINA 4/2025 showing Simple to minimally complex right renal cysts.  - Urine studies: Fe Urea 30%, suggestive of prerenal disease  - Continue diuresis  (HFpEF) heart failure with preserved ejection fraction  Results for orders placed during the hospital encounter of 03/13/25    Echo    Interpretation Summary    Left Ventricle: The left ventricle is normal in size. Normal wall thickness. There is concentric remodeling. Normal wall motion. There is normal systolic function with a visually estimated ejection fraction of 60 - 65%. Ejection fraction is approximately 65%. There is normal diastolic function.    Right Ventricle: The right ventricle has mild enlargement. Wall thickness is normal. Systolic function is normal.    The right atrium is dilated.    Pulmonary Artery: Pulmonary artery pressure could not be estimated.    IVC/SVC: Normal venous pressure at 3 mmHg.    Echo from previous admission showing improved  HF. BNP wnl. Patient does have lower extremity edema however no crackles on pulmonary exam. Suspect COPD rather than CHF as cause of SOB, however patient does appear to be volume up on exam.    - Lasix 40 mg BID. Transition to PO near discharge  - Monitor UOP    Chronic pain of right lower extremity  Pt endorses chronic RLE pain and bilateral leg swelling. Patient concerned about stepping on glass. R foot exam no wounds or masses palpated. Heel tender to palpation. XR remains unchanged. Does not appear infected. States that he has difficulty walking due to the pain.    - PT/OT to assess mobility    BPH (benign prostatic hyperplasia)  - Resume home tamsulosin    VTE Risk Mitigation (From admission, onward)           Ordered     enoxaparin injection 40 mg  Every 12 hours         05/19/25 0013     IP VTE HIGH RISK PATIENT  Once         05/19/25 0013     Place sequential compression device  Until discontinued         05/19/25 0013                    Discharge Planning   RYLAN:      Code Status: Full Code   Medical Readiness for Discharge Date:          Case discussed with staff. Attestation to follow.      Asher Kiser MD  [facility], PGY-3  05/19/2025

## 2025-05-19 NOTE — ED NOTES
Report received /care assumed. Pt sitting up on stretcher, AAOx3 in NAD c/o bilat leg pain purwick to suction

## 2025-05-19 NOTE — ED NOTES
Pt reports that his bilateral lower extremities are hurting him really badly. Offered pt PRN PO tylenol and he refused admin stating that the tylenol will not help.

## 2025-05-19 NOTE — ASSESSMENT & PLAN NOTE
ANNITA is likely due to volume overload vs progression of CKD. Baseline creatinine is 2.1. Most recent creatinine and eGFR are listed below.  Recent Labs     05/18/25  2227 05/19/25  0540   CREATININE 2.4* 2.3*   EGFRNORACEVR 30* 32*      Plan  - Improving. Upon further review patient's prior creatinine has ranged between 2.1-2.3. Unclear if this is new baseline.   - Avoid nephrotoxins and renally dose meds for GFR listed above  - Monitor urine output, serial BMP, and adjust therapy as needed  - Last CARMINA 4/2025 showing Simple to minimally complex right renal cysts.  - Urine studies: Fe Urea 30%, suggestive of prerenal disease  - Continue diuresis

## 2025-05-19 NOTE — ED NOTES
Pt told RN that he takes his Methadone at 5 AM every day.  Pt requesting a dose now, along with a time change for the methadone administration.  Message sent to provider.  Will change administration time per MD

## 2025-05-19 NOTE — ASSESSMENT & PLAN NOTE
Patient with right medial thigh swelling and area of induration on skin for the past few months per patient. No obvious borders. No fluctuance or masses however mildly tender on exam. Concerned for cellulitis in setting of lymphedema. XR leg with diffuse edema. WBC wnl.    - Ceftriaxone daily  - DVT US RLE

## 2025-05-19 NOTE — PLAN OF CARE
CM met with pt - he is AAO x 3 - confirmed demographics   dx:  SOB, COPD       Pt lives with his brother Vinny Harrington  [phone]   Sisters Rosita Lopez  and Kedar Harrington   live in [place] and NO.      Pt has Home O2 - not sure of provider   Pt will need transport to home     Therapy worked with pt --- recc HH/pt/ot and dme:  RW, Hosp bed - explained to pt - bath bench is a purchase item.      Pt states he wants hosp bed and RW.      Future Appointments   Date Time Provider Department Center   7/16/2025  1:30 PM Iván Alejandro MD [facility] [place]   9/18/2025  8:45 AM [facility] OIC[facility]US[facility] MASTER [facility]   9/18/2025  9:50 AM LAB, APPOINTMENT [facility]   9/18/2025 10:30 AM Scheuermann, Jennifer B., PA [facility]PC [facility]        05/19/25 8121   Discharge Assessment   Assessment Type Discharge Planning Assessment   Confirmed/corrected address, phone number and insurance Yes   Confirmed Demographics Correct on Facesheet   Source of Information patient;health record   Communicated RYLAN with patient/caregiver Date not available/Unable to determine   Reason For Admission SOB, Ashtma, COPD   People in Home sibling(s)  (Brother Vinny Harrington  )   Do you expect to return to your current living situation? Yes   Do you have help at home or someone to help you manage your care at home? Yes   Who are your caregiver(s) and their phone number(s)? Brother Per and friend who lives with them.   Prior to hospitilization cognitive status: Alert/Oriented   Current cognitive status: Alert/Oriented   Walking or Climbing Stairs Difficulty yes   Walking or Climbing Stairs ambulation difficulty, requires equipment   Do you have any problems with:   (family transports pt)   Equipment Currently Used at Home oxygen  (+ home O2 - not sure of provider)   Patient currently being followed by outpatient case management? No   Do you currently have service(s) that help you  manage your care at home? No   How do you get to doctors appointments? family or friend will provide   Are you on dialysis? No   Do you take coumadin? No   Discharge Plan A Home;Home with family   Discharge Plan B Home;Home with family;Home Health   DME Needed Upon Discharge  hospital bed;walker, rolling;bath bench   Discharge Plan discussed with: Patient   Transition of Care Barriers Transportation   Physical Activity   On average, how many days per week do you engage in moderate to strenuous exercise (like a brisk walk)? 3 days   Financial Resource Strain   How hard is it for you to pay for the very basics like food, housing, medical care, and heating? Not hard   Housing Stability   In the last 12 months, was there a time when you were not able to pay the mortgage or rent on time? N   At any time in the past 12 months, were you homeless or living in a shelter (including now)? N   Transportation Needs   In the past 12 months, has lack of transportation kept you from medical appointments or from getting medications? no   Food Insecurity   Within the past 12 months, you worried that your food would run out before you got the money to buy more. Sometimes   Within the past 12 months, the food you bought just didn't last and you didn't have money to get more. Sometimes   Social Isolation   How often do you feel lonely or isolated from those around you?  Never   Alcohol Use   Q1: How often do you have a drink containing alcohol? Never   Utilities   In the past 12 months has the electric, gas, oil, or water company threatened to shut off services in your home? No   Health Literacy   How often do you need to have someone help you when you read instructions, pamphlets, or other written material from your doctor or pharmacy? Never

## 2025-05-19 NOTE — ED NOTES
"Pt requesting another chocolate pudding at this time.  Pt stated "That's all I'm going to eat when I am here in the hospital"  RN educated the patient on the importance of eating a balanced diet in regards to nutrients and protein- the pt stated "I don't need any of that stuff"  No evidence of learning noted.  "

## 2025-05-19 NOTE — ASSESSMENT & PLAN NOTE
Results for orders placed during the hospital encounter of 03/13/25    Echo    Interpretation Summary    Left Ventricle: The left ventricle is normal in size. Normal wall thickness. There is concentric remodeling. Normal wall motion. There is normal systolic function with a visually estimated ejection fraction of 60 - 65%. Ejection fraction is approximately 65%. There is normal diastolic function.    Right Ventricle: The right ventricle has mild enlargement. Wall thickness is normal. Systolic function is normal.    The right atrium is dilated.    Pulmonary Artery: Pulmonary artery pressure could not be estimated.    IVC/SVC: Normal venous pressure at 3 mmHg.    Echo from previous admission showing improved HF. BNP wnl. Patient does have lower extremity edema however no crackles on pulmonary exam. Suspect COPD rather than CHF as cause of SOB, however patient does appear to be volume up on exam.    - Lasix 80 IV once  - Can resume home regimen after diuresis  - Monitor UOP

## 2025-05-19 NOTE — ASSESSMENT & PLAN NOTE
S/p methylprednisone, magnesium, CPAP by EMS. Was on baseline 2L NC in ED. Patient with diffuse wheezing on exam. States that he has been using his home albuterol and trilegy inhaler daily. CXR no focal opacities or consolidations.    - Ceftriaxone 1mg daily, Azithromycin 500mg  - Prednisone 40mg x5 days  - Duonebs q4hr, can decrease pending clinical improvement  - Breo Inhaler daily  - Montelukast daily  - Covid/Flu testing

## 2025-05-19 NOTE — SUBJECTIVE & OBJECTIVE
Interval History: NAEON. VSS. Afebrile. UOP approximately 1.6 L with 80 mg of Lasix. Denies any consumption of high sodium foods at home. Endorses home cooked meals done by sibling.     Review of Systems   Constitutional:  Negative for chills and fever.   Respiratory:  Positive for shortness of breath and wheezing. Negative for cough.    Cardiovascular:  Positive for leg swelling. Negative for chest pain.   Gastrointestinal:  Negative for abdominal pain, diarrhea, nausea and vomiting.   Skin:  Negative for rash.   Neurological:  Negative for weakness.   Psychiatric/Behavioral:  The patient is not nervous/anxious.      Objective:     Vital Signs (Most Recent):  Temp: 98.7 °F (37.1 °C) (05/19/25 0601)  Pulse: 81 (05/19/25 0601)  Resp: 20 (05/19/25 0601)  BP: 120/64 (05/19/25 0601)  SpO2: (!) 93 % (05/19/25 0601) Vital Signs (24h Range):  Temp:  [98.1 °F (36.7 °C)-98.7 °F (37.1 °C)] 98.7 °F (37.1 °C)  Pulse:  [70-84] 81  Resp:  [14-24] 20  SpO2:  [92 %-99 %] 93 %  BP: (100-135)/(50-82) 120/64     Weight: 127 kg (280 lb)  Body mass index is 42.57 kg/m².    Intake/Output Summary (Last 24 hours) at 5/19/2025 0752  Last data filed at 5/19/2025 0639  Gross per 24 hour   Intake 250.14 ml   Output 1600 ml   Net -1349.86 ml         Physical Exam  Constitutional:       General: He is not in acute distress.     Appearance: He is obese.   Eyes:      General:         Right eye: No discharge.         Left eye: No discharge.      Conjunctiva/sclera: Conjunctivae normal.   Cardiovascular:      Rate and Rhythm: Normal rate.      Pulses: Normal pulses.      Heart sounds: Normal heart sounds.   Pulmonary:      Effort: Pulmonary effort is normal. No respiratory distress.      Breath sounds: Wheezing present.      Comments: Wheezes bilaterally  Abdominal:      General: There is no distension.      Palpations: Abdomen is soft.   Musculoskeletal:      Right lower leg: Edema present.      Left lower leg: Edema present.      Comments: 1+  pitting edema in bilateral lower extremities. R thigh significantly more swollen than L   Skin:     Comments: R medial thigh indurated with hyperpigmentation, mildly tender. No palpable fluctuance/masses.   R foot no open wounds. Tender to palpation on heel   Neurological:      Mental Status: He is alert and oriented to person, place, and time.   Psychiatric:         Mood and Affect: Mood normal.         Behavior: Behavior normal.               Significant Labs: All pertinent labs within the past 24 hours have been reviewed.  CBC:   Recent Labs   Lab 05/18/25 2227 05/19/25  0540   WBC 5.60 2.71*   HGB 11.0* 11.3*   HCT 32.8* 33.8*    154     CMP:   Recent Labs   Lab 05/18/25 2227 05/19/25  0540    138   K 4.0 4.0    101   CO2 26 27   GLU 84 151*   BUN 42* 40*   CREATININE 2.4* 2.3*   CALCIUM 9.1 9.1   PROT 8.0 8.1   ALBUMIN 3.8 3.7   BILITOT 0.7 0.6   ALKPHOS 71 72   AST 18 18   ALT 20 22   ANIONGAP 11 10       Significant Imaging: I have reviewed all pertinent imaging results/findings within the past 24 hours.

## 2025-05-19 NOTE — HPI
Patient is a 58 yo male w/ PMHx of HFpEF, COPD on 2L, HTN, anxiety, opioid use disorder on methadone.  Patient brought in by EMS for worsening shortness of breath.  Was saturating 85% at home, was placed on CPAP and given dexamethasone and magnesium.  In ED patient maintaining sats on 2 L NC.  Patient complaining of malaise, cough, wheezing, and shortness of breath since yesterday.  He states that he has 2 inhalers that he is compliant with.  Endorses fevers, chills, loose bowel movements.  He is concerned about stepping on glass a few months ago and associated right heel pain. Also having swelling and induration in his right medial thigh that is mildly tender and started months ago as well, denies inciting injury or open wounds.   Of note patient appears to be poor historian with limited knowledge of current home medications.    In the ED, initial vital signs /82, HR 84, Temp 98.1, SpO2 99 % on 2 L NC. Labs include CBC with stable H/H 11/32.8 and WBC within normal limits 5.6. CMP with  BUN/Cr 42/2.4 (baseline Cr 2.1). CXR no acute abnormalities.  XR foot no changes from previous. EKG with .  BNP and troponin WNL.  Initiated on DuoNebs in ED. [facility] consulted for evaluation for admission for acute COPD exacerbation.

## 2025-05-19 NOTE — RESPIRATORY THERAPY
Patient given aerosol treatment with no adverse reactions noted. Patient instructed on proper use.
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
## 2025-05-19 NOTE — PT/OT/SLP PROGRESS
Physical Therapy      Patient Name:  Ming Harrington   MRN:  [id number]    Patient not seen today secondary to Testing/imaging (xray/CT/MRI). Ultrasound. Will follow-up as able.    5/29/25- 08:41

## 2025-05-19 NOTE — ASSESSMENT & PLAN NOTE
Per patient and previous records on Methadone 95mg for opioid use disorder.    - Methadone 100mg daily (95mg not on formulary)  - Urine methadone and  Urine tox screen pending

## 2025-05-19 NOTE — ASSESSMENT & PLAN NOTE
Pt endorses chronic RLE pain and bilateral leg swelling. Patient concerned about stepping on glass. R foot exam no wounds or masses palpated. Heel tender to palpation. XR remains unchanged. Does not appear infected. States that he has difficulty walking due to the pain.    - PT/OT to assess mobility

## 2025-05-19 NOTE — ASSESSMENT & PLAN NOTE
Per patient and previous records on Methadone 95mg for opioid use disorder.    - Methadone 100mg daily (95mg not on formulary)  - Urine methadone  - Urine tox screen

## 2025-05-19 NOTE — PLAN OF CARE
Problem: Physical Therapy  Goal: Physical Therapy Goal  Description: Goals to be met by:      Patient will increase functional independence with mobility by performin. Supine to sit with Modified [place]  2. Sit to supine with Modified [place]   3. Sit to stand transfer with Modified [place] with use of LRAD.  4. Bed to chair transfer with Modified [place] using LRAD.   5. Gait  x 100 feet with Modified [place] using LRAD.   6. Ascend/descend 5 stair with bilateral Handrails Stand-by Assistance.    Outcome: Progressing     PT/OT co-evaluation completed due to anticipated complexity of pt's presentation. Pt's PLOF: Independent with no AD but reports increased difficulty with ADLs/bathing at home and report of recent falls. Pt at this time requires MIN A/CGA with bed mobility and CGA with transfers to standing with use of RW and few steps along bedside with use of RW (mobility distance limited due to pain). PT recommendations TBD with further progress- pt has 5 steps to enter home. Therapy will continue to progress pt as able.

## 2025-05-19 NOTE — NURSING
Pt arrived to unit at 1830. VS taken. NAD noted. Will continue to monitor and handoff report to next shift.

## 2025-05-19 NOTE — ASSESSMENT & PLAN NOTE
Patient's blood pressure range in the last 24 hours was: BP  Min: 100/50  Max: 135/65.The patient's inpatient anti-hypertensive regimen is listed below:  Current Antihypertensives  amLODIPine tablet 10 mg, Daily, Oral  furosemide injection 40 mg, Every 12 hours, Intravenous    Plan  - BP is controlled, no changes needed to their regimen  -BP medication on hold as patient normotensive. Will resume home Valsartan 160mg and Clonidine 0.1 mg when appropriate.

## 2025-05-19 NOTE — ASSESSMENT & PLAN NOTE
ANNITA is likely due to volume overload vs progression of CKD. Baseline creatinine is 2.1. Most recent creatinine and eGFR are listed below.  Recent Labs     05/18/25 2227   CREATININE 2.4*   EGFRNORACEVR 30*      Plan  - ANNITA is stable  - Avoid nephrotoxins and renally dose meds for GFR listed above  - Monitor urine output, serial BMP, and adjust therapy as needed  - Last CARMINA 4/2025 showing Simple to minimally complex right renal cysts.  - Urine studies: Fe Urea 30%, suggestive of prerenal disease  - Continue diuresis

## 2025-05-19 NOTE — ASSESSMENT & PLAN NOTE
Results for orders placed during the hospital encounter of 03/13/25    Echo    Interpretation Summary    Left Ventricle: The left ventricle is normal in size. Normal wall thickness. There is concentric remodeling. Normal wall motion. There is normal systolic function with a visually estimated ejection fraction of 60 - 65%. Ejection fraction is approximately 65%. There is normal diastolic function.    Right Ventricle: The right ventricle has mild enlargement. Wall thickness is normal. Systolic function is normal.    The right atrium is dilated.    Pulmonary Artery: Pulmonary artery pressure could not be estimated.    IVC/SVC: Normal venous pressure at 3 mmHg.    Echo from previous admission showing improved HF. BNP wnl. Patient does have lower extremity edema however no crackles on pulmonary exam. Suspect COPD rather than CHF as cause of SOB, however patient does appear to be volume up on exam.    - Lasix 40 mg BID. Transition to PO near discharge  - Monitor UOP

## 2025-05-19 NOTE — ED NOTES
Shortness of Breath (Arrived by EMS for SOB. History of Asthma and COPD. Had RA sat of 85% at home and RR was 44. Placed on CPAP per EMS and sats increased to 100. EMS gave Dexamethasone 16 mg IV and Magnesium 2 grams en route to ED. )     Pt also complains of pain in the bottom of his right foot from stepping on glass 2 months ago.  No open wound noted on the bottom of his foot.
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
## 2025-05-19 NOTE — ASSESSMENT & PLAN NOTE
S/p methylprednisone, magnesium, CPAP by EMS. Was on baseline 2L NC in ED. Patient with diffuse wheezing on exam. States that he has been using his home albuterol and trilegy inhaler daily. CXR no focal opacities or consolidations. Negative for COVID/ Flu    - Ceftriaxone 1mg daily, Azithromycin 500mg  - Prednisone 40mg x5 days  - Duonebs q6hr, can decrease pending clinical improvement  - Breo Inhaler daily  - Montelukast daily

## 2025-05-20 LAB
ABSOLUTE EOSINOPHIL (OHS): 0 K/UL
ABSOLUTE MONOCYTE (OHS): 0.6 K/UL (ref 0.3–1)
ABSOLUTE NEUTROPHIL COUNT (OHS): 7.79 K/UL (ref 1.8–7.7)
ALBUMIN SERPL BCP-MCNC: 4.2 G/DL (ref 3.5–5.2)
ALP SERPL-CCNC: 77 UNIT/L (ref 40–150)
ALT SERPL W/O P-5'-P-CCNC: 23 UNIT/L (ref 10–44)
ANION GAP (OHS): 14 MMOL/L (ref 8–16)
AST SERPL-CCNC: 17 UNIT/L (ref 11–45)
BASOPHILS # BLD AUTO: 0 K/UL
BASOPHILS NFR BLD AUTO: 0 %
BILIRUB SERPL-MCNC: 0.3 MG/DL (ref 0.1–1)
BUN SERPL-MCNC: 36 MG/DL (ref 6–20)
CALCIUM SERPL-MCNC: 10.1 MG/DL (ref 8.7–10.5)
CHLORIDE SERPL-SCNC: 99 MMOL/L (ref 95–110)
CO2 SERPL-SCNC: 27 MMOL/L (ref 23–29)
CREAT SERPL-MCNC: 2 MG/DL (ref 0.5–1.4)
ERYTHROCYTE [DISTWIDTH] IN BLOOD BY AUTOMATED COUNT: 11.9 % (ref 11.5–14.5)
GFR SERPLBLD CREATININE-BSD FMLA CKD-EPI: 38 ML/MIN/1.73/M2
GLUCOSE SERPL-MCNC: 116 MG/DL (ref 70–110)
HCT VFR BLD AUTO: 37.3 % (ref 40–54)
HGB BLD-MCNC: 12.5 GM/DL (ref 14–18)
IMM GRANULOCYTES # BLD AUTO: 0.03 K/UL (ref 0–0.04)
IMM GRANULOCYTES NFR BLD AUTO: 0.3 % (ref 0–0.5)
LYMPHOCYTES # BLD AUTO: 0.49 K/UL (ref 1–4.8)
MAGNESIUM SERPL-MCNC: 2.4 MG/DL (ref 1.6–2.6)
MCH RBC QN AUTO: 31.1 PG (ref 27–31)
MCHC RBC AUTO-ENTMCNC: 33.5 G/DL (ref 32–36)
MCV RBC AUTO: 93 FL (ref 82–98)
NUCLEATED RBC (/100WBC) (OHS): 0 /100 WBC
PHOSPHATE SERPL-MCNC: 3.1 MG/DL (ref 2.7–4.5)
PLATELET # BLD AUTO: 186 K/UL (ref 150–450)
PMV BLD AUTO: 11.1 FL (ref 9.2–12.9)
POTASSIUM SERPL-SCNC: 5.2 MMOL/L (ref 3.5–5.1)
PROT SERPL-MCNC: 9 GM/DL (ref 6–8.4)
RBC # BLD AUTO: 4.02 M/UL (ref 4.6–6.2)
RELATIVE EOSINOPHIL (OHS): 0 %
RELATIVE LYMPHOCYTE (OHS): 5.5 % (ref 18–48)
RELATIVE MONOCYTE (OHS): 6.7 % (ref 4–15)
RELATIVE NEUTROPHIL (OHS): 87.5 % (ref 38–73)
SODIUM SERPL-SCNC: 140 MMOL/L (ref 136–145)
WBC # BLD AUTO: 8.91 K/UL (ref 3.9–12.7)

## 2025-05-20 PROCEDURE — 27000221 HC OXYGEN, UP TO 24 HOURS

## 2025-05-20 PROCEDURE — 99900035 HC TECH TIME PER 15 MIN (STAT)

## 2025-05-20 PROCEDURE — 25000003 PHARM REV CODE 250

## 2025-05-20 PROCEDURE — 83735 ASSAY OF MAGNESIUM: CPT

## 2025-05-20 PROCEDURE — 63600175 PHARM REV CODE 636 W HCPCS

## 2025-05-20 PROCEDURE — 94640 AIRWAY INHALATION TREATMENT: CPT

## 2025-05-20 PROCEDURE — 25000003 PHARM REV CODE 250: Performed by: FAMILY MEDICINE

## 2025-05-20 PROCEDURE — 25000003 PHARM REV CODE 250: Performed by: STUDENT IN AN ORGANIZED HEALTH CARE EDUCATION/TRAINING PROGRAM

## 2025-05-20 PROCEDURE — S4991 NICOTINE PATCH NONLEGEND: HCPCS

## 2025-05-20 PROCEDURE — 11000001 HC ACUTE MED/SURG PRIVATE ROOM

## 2025-05-20 PROCEDURE — 82040 ASSAY OF SERUM ALBUMIN: CPT

## 2025-05-20 PROCEDURE — 94761 N-INVAS EAR/PLS OXIMETRY MLT: CPT

## 2025-05-20 PROCEDURE — 97530 THERAPEUTIC ACTIVITIES: CPT

## 2025-05-20 PROCEDURE — 93010 ELECTROCARDIOGRAM REPORT: CPT | Mod: ,,, | Performed by: INTERNAL MEDICINE

## 2025-05-20 PROCEDURE — 85025 COMPLETE CBC W/AUTO DIFF WBC: CPT

## 2025-05-20 PROCEDURE — 25000242 PHARM REV CODE 250 ALT 637 W/ HCPCS

## 2025-05-20 PROCEDURE — 84100 ASSAY OF PHOSPHORUS: CPT

## 2025-05-20 PROCEDURE — 36415 COLL VENOUS BLD VENIPUNCTURE: CPT

## 2025-05-20 PROCEDURE — 93005 ELECTROCARDIOGRAM TRACING: CPT

## 2025-05-20 PROCEDURE — 97116 GAIT TRAINING THERAPY: CPT

## 2025-05-20 RX ORDER — LIDOCAINE 50 MG/G
1 PATCH TOPICAL
Status: DISCONTINUED | OUTPATIENT
Start: 2025-05-20 | End: 2025-05-21 | Stop reason: HOSPADM

## 2025-05-20 RX ORDER — AZITHROMYCIN 250 MG/1
500 TABLET, FILM COATED ORAL DAILY
Status: COMPLETED | OUTPATIENT
Start: 2025-05-21 | End: 2025-05-21

## 2025-05-20 RX ORDER — POLYETHYLENE GLYCOL 3350 17 G/17G
17 POWDER, FOR SOLUTION ORAL 2 TIMES DAILY
Status: DISCONTINUED | OUTPATIENT
Start: 2025-05-20 | End: 2025-05-21 | Stop reason: HOSPADM

## 2025-05-20 RX ORDER — METHADONE HYDROCHLORIDE 10 MG/1
100 TABLET ORAL DAILY
Refills: 0 | Status: DISCONTINUED | OUTPATIENT
Start: 2025-05-20 | End: 2025-05-21 | Stop reason: HOSPADM

## 2025-05-20 RX ORDER — HYDROCODONE BITARTRATE AND ACETAMINOPHEN 5; 325 MG/1; MG/1
1 TABLET ORAL ONCE
Status: DISCONTINUED | OUTPATIENT
Start: 2025-05-20 | End: 2025-05-20

## 2025-05-20 RX ORDER — MUPIROCIN 20 MG/G
OINTMENT TOPICAL 2 TIMES DAILY
Status: DISCONTINUED | OUTPATIENT
Start: 2025-05-20 | End: 2025-05-21 | Stop reason: HOSPADM

## 2025-05-20 RX ADMIN — PREDNISONE 40 MG: 20 TABLET ORAL at 08:05

## 2025-05-20 RX ADMIN — MONTELUKAST SODIUM 10 MG: 10 TABLET, FILM COATED ORAL at 08:05

## 2025-05-20 RX ADMIN — IPRATROPIUM BROMIDE AND ALBUTEROL SULFATE 3 ML: 2.5; .5 SOLUTION RESPIRATORY (INHALATION) at 07:05

## 2025-05-20 RX ADMIN — FUROSEMIDE 40 MG: 10 INJECTION, SOLUTION INTRAVENOUS at 09:05

## 2025-05-20 RX ADMIN — AMLODIPINE BESYLATE 10 MG: 5 TABLET ORAL at 08:05

## 2025-05-20 RX ADMIN — ACETAMINOPHEN 650 MG: 325 TABLET ORAL at 09:05

## 2025-05-20 RX ADMIN — FUROSEMIDE 40 MG: 10 INJECTION, SOLUTION INTRAVENOUS at 08:05

## 2025-05-20 RX ADMIN — SENNOSIDES AND DOCUSATE SODIUM 1 TABLET: 50; 8.6 TABLET ORAL at 08:05

## 2025-05-20 RX ADMIN — TRAZODONE HYDROCHLORIDE 25 MG: 50 TABLET ORAL at 01:05

## 2025-05-20 RX ADMIN — NICOTINE 1 PATCH: 21 PATCH, EXTENDED RELEASE TRANSDERMAL at 08:05

## 2025-05-20 RX ADMIN — TAMSULOSIN HYDROCHLORIDE 0.4 MG: 0.4 CAPSULE ORAL at 08:05

## 2025-05-20 RX ADMIN — IPRATROPIUM BROMIDE AND ALBUTEROL SULFATE 3 ML: 2.5; .5 SOLUTION RESPIRATORY (INHALATION) at 01:05

## 2025-05-20 RX ADMIN — METHADONE HYDROCHLORIDE 100 MG: 10 TABLET ORAL at 06:05

## 2025-05-20 RX ADMIN — MUPIROCIN: 20 OINTMENT TOPICAL at 09:05

## 2025-05-20 RX ADMIN — TRAZODONE HYDROCHLORIDE 25 MG: 50 TABLET ORAL at 09:05

## 2025-05-20 RX ADMIN — AZITHROMYCIN MONOHYDRATE 500 MG: 500 INJECTION, POWDER, LYOPHILIZED, FOR SOLUTION INTRAVENOUS at 01:05

## 2025-05-20 RX ADMIN — FLUTICASONE FUROATE AND VILANTEROL TRIFENATATE 1 PUFF: 200; 25 POWDER RESPIRATORY (INHALATION) at 07:05

## 2025-05-20 RX ADMIN — IPRATROPIUM BROMIDE AND ALBUTEROL SULFATE 3 ML: 2.5; .5 SOLUTION RESPIRATORY (INHALATION) at 12:05

## 2025-05-20 RX ADMIN — ENOXAPARIN SODIUM 40 MG: 40 INJECTION SUBCUTANEOUS at 09:05

## 2025-05-20 RX ADMIN — POLYETHYLENE GLYCOL 3350 17 G: 17 POWDER, FOR SOLUTION ORAL at 12:05

## 2025-05-20 RX ADMIN — ACETAMINOPHEN 650 MG: 325 TABLET ORAL at 08:05

## 2025-05-20 RX ADMIN — MUPIROCIN: 20 OINTMENT TOPICAL at 10:05

## 2025-05-20 RX ADMIN — LACTULOSE 15 G: 20 SOLUTION ORAL at 12:05

## 2025-05-20 RX ADMIN — LIDOCAINE 1 PATCH: 50 PATCH CUTANEOUS at 02:05

## 2025-05-20 RX ADMIN — CEFTRIAXONE SODIUM 1 G: 500 INJECTION, POWDER, FOR SOLUTION INTRAMUSCULAR; INTRAVENOUS at 01:05

## 2025-05-20 RX ADMIN — ENOXAPARIN SODIUM 40 MG: 40 INJECTION SUBCUTANEOUS at 08:05

## 2025-05-20 NOTE — PLAN OF CARE
Problem: Adult Inpatient Plan of Care  Goal: Plan of Care Review  Outcome: Progressing      VIRTUAL NURSE: Pt arrived to unit. Permission received per patient to turn camera to view patient. VIP model explained; patient informed this VN will be working with bedside nurse and the rest of the care team. Plan of care reviewed with patient.  Educated patient on VTE, fall risk and fall risk precautions in place. Call light within reach, side rails up x2. Admission questions completed. Patient instucted to ask staff for assistance. Patient verbalized complete understanding. Initiated plan of care.

## 2025-05-20 NOTE — PROGRESS NOTES
[facility] Medicine  Progress Note    Patient Name: Ming Harrington  MRN: [id number]  Patient Class: IP- Inpatient   Admission Date: 5/18/2025  Length of Stay: 1 days  Attending Physician: Melissa Kruger MD  Primary Care Provider: David Beckett PA-C        Subjective     Principal Problem:Acute exacerbation of COPD with asthma        HPI:  Patient is a 58 yo male w/ PMHx of HFpEF, COPD on 2L, HTN, anxiety, opioid use disorder on methadone.  Patient brought in by EMS for worsening shortness of breath.  Was saturating 85% at home, was placed on CPAP and given dexamethasone and magnesium.  In ED patient maintaining sats on 2 L NC.  Patient complaining of malaise, cough, wheezing, and shortness of breath since yesterday.  He states that he has 2 inhalers that he is compliant with.  Endorses fevers, chills, loose bowel movements.  He is concerned about stepping on glass a few months ago and associated right heel pain. Also having swelling and induration in his right medial thigh that is mildly tender and started months ago as well, denies inciting injury or open wounds.   Of note patient appears to be poor historian with limited knowledge of current home medications.    In the ED, initial vital signs /82, HR 84, Temp 98.1, SpO2 99 % on 2 L NC. Labs include CBC with stable H/H 11/32.8 and WBC within normal limits 5.6. CMP with  BUN/Cr 42/2.4 (baseline Cr 2.1). CXR no acute abnormalities.  XR foot no changes from previous. EKG with .  BNP and troponin WNL.  Initiated on DuoNebs in ED. [facility] consulted for evaluation for admission for acute COPD exacerbation.      Overview/Hospital Course:  No notes on file    Interval History: NAEON. VSS. Afebrile. UOP approximately 5.9 L with 80 mg of Lasix. On increased oxygen today.    Review of Systems   Constitutional:  Negative for chills and fever.   Respiratory:  Positive for shortness of breath and wheezing. Negative for cough.     Cardiovascular:  Negative for chest pain and leg swelling.   Gastrointestinal:  Negative for abdominal pain, diarrhea, nausea and vomiting.   Skin:  Negative for rash.   Neurological:  Negative for weakness.   Psychiatric/Behavioral:  The patient is not nervous/anxious.      Objective:     Vital Signs (Most Recent):  Temp: 97.7 °F (36.5 °C) (05/20/25 0746)  Pulse: 80 (05/20/25 0746)  Resp: 19 (05/20/25 0746)  BP: (!) 115/90 (05/20/25 0746)  SpO2: 95 % (05/20/25 0746) Vital Signs (24h Range):  Temp:  [97.7 °F (36.5 °C)-98.8 °F (37.1 °C)] 97.7 °F (36.5 °C)  Pulse:  [76-94] 80  Resp:  [17-40] 19  SpO2:  [82 %-98 %] 95 %  BP: (108-160)/(58-90) 115/90     Weight: 118.9 kg (262 lb 2 oz)  Body mass index is 39.86 kg/m².    Intake/Output Summary (Last 24 hours) at 5/20/2025 0851  Last data filed at 5/20/2025 0813  Gross per 24 hour   Intake 600 ml   Output 6450 ml   Net -5850 ml         Physical Exam  Constitutional:       General: He is not in acute distress.     Appearance: He is obese.   Eyes:      General:         Right eye: No discharge.         Left eye: No discharge.      Conjunctiva/sclera: Conjunctivae normal.   Cardiovascular:      Rate and Rhythm: Normal rate.      Pulses: Normal pulses.      Heart sounds: Normal heart sounds.   Pulmonary:      Effort: Pulmonary effort is normal. No respiratory distress.      Breath sounds: Wheezing present.      Comments: Wheezes bilaterally  Abdominal:      General: There is no distension.      Palpations: Abdomen is soft.   Musculoskeletal:      Right lower leg: Edema present.      Left lower leg: Edema present.      Comments: Right thigh more swollen than L   Skin:     Comments: R medial thigh indurated with hyperpigmentation, mildly tender. No palpable fluctuance/masses.   R foot no open wounds. Tender to palpation on heel   Neurological:      Mental Status: He is alert and oriented to person, place, and time.   Psychiatric:         Mood and Affect: Mood normal.          Behavior: Behavior normal.               Significant Labs: All pertinent labs within the past 24 hours have been reviewed.  CBC:   Recent Labs   Lab 05/18/25 2227 05/19/25  0540 05/20/25  0329   WBC 5.60 2.71* 8.91   HGB 11.0* 11.3* 12.5*   HCT 32.8* 33.8* 37.3*    154 186     CMP:   Recent Labs   Lab 05/18/25 2227 05/19/25  0540 05/20/25  0329    138 140   K 4.0 4.0 5.2*    101 99   CO2 26 27 27   GLU 84 151* 116*   BUN 42* 40* 36*   CREATININE 2.4* 2.3* 2.0*   CALCIUM 9.1 9.1 10.1   PROT 8.0 8.1 9.0*   ALBUMIN 3.8 3.7 4.2   BILITOT 0.7 0.6 0.3   ALKPHOS 71 72 77   AST 18 18 17   ALT 20 22 23   ANIONGAP 11 10 14       Significant Imaging: I have reviewed all pertinent imaging results/findings within the past 24 hours.      Assessment & Plan  Acute exacerbation of COPD with asthma  S/p methylprednisone, magnesium, CPAP by EMS. Was on baseline 2L NC in ED. Patient with diffuse wheezing on exam. States that he has been using his home albuterol and trilegy inhaler daily. CXR no focal opacities or consolidations. Negative for COVID/ Flu    - Ceftriaxone 1mg daily, Azithromycin 500mg  - Prednisone 40mg x5 days  - Duonebs q6hr, can decrease pending clinical improvement  - Breo Inhaler daily  - Montelukast daily    Cellulitis of right lower extremity  Patient with right medial thigh swelling and area of induration on skin for the past few months per patient. No obvious borders. No fluctuance or masses however mildly tender on exam. Concerned for cellulitis in setting of lymphedema. XR leg with diffuse edema. WBC wnl.    - Ceftriaxone daily  - DVT US RLE    HTN (hypertension)  Patient's blood pressure range in the last 24 hours was: BP  Min: 111/81  Max: 160/66.The patient's inpatient anti-hypertensive regimen is listed below:  Current Antihypertensives  amLODIPine tablet 10 mg, Daily, Oral  furosemide injection 40 mg, Every 12 hours, Intravenous    Plan  - BP is controlled, no changes needed to their  regimen  -BP medication on hold as patient normotensive. Will resume home Valsartan 160mg and Clonidine 0.1 mg when appropriate.  Opioid use disorder, severe, on maintenance therapy, dependence  Per patient and previous records on Methadone 95mg for opioid use disorder. States he follows up at [facility]    - Methadone 100mg daily (95mg not on formulary)  - Urine methadone and  Urine tox screen pending    JUVENCIO (generalized anxiety disorder)  Will hold home Escitalopram in setting of Qtc prolongation    ANNITA (acute kidney injury)  ANNITA is likely due to volume overload vs progression of CKD. Baseline creatinine is 2.1. Most recent creatinine and eGFR are listed below.  Recent Labs     05/18/25  2227 05/19/25  0540 05/20/25  0329   CREATININE 2.4* 2.3* 2.0*   EGFRNORACEVR 30* 32* 38*      Plan  - Improving. Upon further review patient's prior creatinine has ranged between 2.1-2.3. Unclear if this is new baseline.   - Avoid nephrotoxins and renally dose meds for GFR listed above  - Monitor urine output, serial BMP, and adjust therapy as needed  - Last CARMINA 4/2025 showing Simple to minimally complex right renal cysts.  - Urine studies: Fe Urea 30%, suggestive of prerenal disease  - Continue diuresis  (HFpEF) heart failure with preserved ejection fraction  Results for orders placed during the hospital encounter of 03/13/25    Echo    Interpretation Summary    Left Ventricle: The left ventricle is normal in size. Normal wall thickness. There is concentric remodeling. Normal wall motion. There is normal systolic function with a visually estimated ejection fraction of 60 - 65%. Ejection fraction is approximately 65%. There is normal diastolic function.    Right Ventricle: The right ventricle has mild enlargement. Wall thickness is normal. Systolic function is normal.    The right atrium is dilated.    Pulmonary Artery: Pulmonary artery pressure could not be estimated.    IVC/SVC: Normal venous pressure at 3  mmHg.    Echo from previous admission showing improved HF. BNP wnl. Patient does have lower extremity edema however no crackles on pulmonary exam. Suspect COPD rather than CHF as cause of SOB, however patient does appear to be volume up on exam.    - Lasix 40 mg BID. Transition to PO near discharge  - Monitor UOP    Chronic pain of right lower extremity  Pt endorses chronic RLE pain and bilateral leg swelling. Patient concerned about stepping on glass. R foot exam no wounds or masses palpated. Heel tender to palpation. XR remains unchanged. Does not appear infected. States that he has difficulty walking due to the pain.    - PT/OT to assess mobility    BPH (benign prostatic hyperplasia)  - Resume home tamsulosin    VTE Risk Mitigation (From admission, onward)           Ordered     enoxaparin injection 40 mg  Every 12 hours         05/19/25 0013     IP VTE HIGH RISK PATIENT  Once         05/19/25 0013     Place sequential compression device  Until discontinued         05/19/25 0013                    Discharge Planning   RYLAN: 5/20/2025     Code Status: Full Code   Medical Readiness for Discharge Date:   Discharge Plan A: Home, Home with family          Case to be discussed with staff. Attestation to follow.      Asher Kiser MD  [facility], PGY-3  05/20/2025

## 2025-05-20 NOTE — PLAN OF CARE
Problem: Skin Injury Risk Increased  Goal: Skin Health and Integrity  Outcome: Progressing     Problem: Adult Inpatient Plan of Care  Goal: Plan of Care Review  Outcome: Progressing  Goal: Patient-Specific Goal (Individualized)  Outcome: Progressing  Goal: Absence of Hospital-Acquired Illness or Injury  Outcome: Progressing  Goal: Optimal Comfort and Wellbeing  Outcome: Progressing     Problem: Acute Kidney Injury/Impairment  Goal: Fluid and Electrolyte Balance  Outcome: Progressing  Goal: Improved Oral Intake  Outcome: Progressing

## 2025-05-20 NOTE — ASSESSMENT & PLAN NOTE
Per patient and previous records on Methadone 95mg for opioid use disorder. States he follows up at [facility]    - Methadone 100mg daily (95mg not on formulary)  - Urine methadone and  Urine tox screen pending

## 2025-05-20 NOTE — PLAN OF CARE
Problem: Physical Therapy  Goal: Physical Therapy Goal  Description: Goals to be met by:      Patient will increase functional independence with mobility by performin. Supine to sit with Modified [place]  2. Sit to supine with Modified [place]   3. Sit to stand transfer with Modified [place] with use of LRAD.  4. Bed to chair transfer with Modified [place] using LRAD.   5. Gait  x 100 feet with Modified [place] using LRAD.   6. Ascend/descend 5 stair with bilateral Handrails Stand-by Assistance.    Outcome: Progressing    Pt participates in bed mobility, transfers to standing, ambulation and stair navigation with use of RW. Therapy will continue to progress pt as able.

## 2025-05-20 NOTE — PT/OT/SLP PROGRESS
Physical Therapy Treatment    Patient Name:  Ming Harrington   MRN:  [id number]    Recommendations:     Discharge Recommendations: Low Intensity Therapy  Discharge Equipment Recommendations: hospital bed, walker, rolling, bath bench  Barriers to discharge: None    Assessment:     Ming Harrington is a 59 y.o. male admitted with a medical diagnosis of Acute exacerbation of COPD with asthma.  He presents with the following impairments/functional limitations: weakness, impaired endurance, impaired self care skills, impaired functional mobility, gait instability, impaired balance, pain, edema, impaired cardiopulmonary response to activity.    Pt participates in bed mobility, transfers to standing, ambulation and stair navigation with use of RW. Therapy will continue to progress pt as able.        Rehab Prognosis: Good; patient would benefit from acute skilled PT services to address these deficits and reach maximum level of function.    Recent Surgery: * No surgery found *      Plan:     During this hospitalization, patient to be seen 3 x/week to address the identified rehab impairments via gait training, therapeutic activities, therapeutic exercises, neuromuscular re-education and progress toward the following goals:    Plan of Care Expires:  06/19/25    Subjective     Chief Complaint: pain  Patient/Family Comments/goals: to return home  Pain/Comfort:  Pain Rating 1: 5/10  Location - Side 1: Right  Location 1: heel  Pain Addressed 1: Reposition, Cessation of Activity, Nurse notified  Pain Rating Post-Intervention 1:  (not rated)      Objective:     Communicated with Nurse prior to session.  Patient found HOB elevated with oxygen, telemetry upon PT entry to room.     General Precautions: Standard, fall  Orthopedic Precautions: N/A  Braces: N/A  Respiratory Status: Nasal cannula     Functional Mobility:  Bed Mobility:     Supine to Sit: supervision  Transfers:     Sit to Stand:  supervision with rolling walker  Bed to Chair: supervision  with  rolling walker  using  Step Transfer  Gait: ~20ft with use of RW and SBA  Stairs:  Pt ascended/descended 5 stair(s) with Rolling Walker as bilateral handrails with Contact Guard Assistance; use of portable step in room with RW around to use as BHR.       AM-PAC 6 CLICK MOBILITY  Turning over in bed (including adjusting bedclothes, sheets and blankets)?: 4  Sitting down on and standing up from a chair with arms (e.g., wheelchair, bedside commode, etc.): 3  Moving from lying on back to sitting on the side of the bed?: 4  Moving to and from a bed to a chair (including a wheelchair)?: 3  Need to walk in hospital room?: 3  Climbing 3-5 steps with a railing?: 3  Basic Mobility Total Score: 20       Treatment & Education:  Pt educated on safe use of RW with mobility.   Pt educated on use of BUE assistance on one rail (lateral step navigation for home safety)  Pt SpO2 95% on documented O2.   Pt educated on use of call button for mobility needs in room; pt understanding.     Patient left up in chair with all lines intact, call button in reach, chair alarm on, and Nurse notified.    GOALS:   Multidisciplinary Problems       Physical Therapy Goals          Problem: Physical Therapy    Goal Priority Disciplines Outcome Interventions   Physical Therapy Goal     PT, PT/OT Progressing    Description: Goals to be met by:      Patient will increase functional independence with mobility by performin. Supine to sit with Modified [place]  2. Sit to supine with Modified [place]   3. Sit to stand transfer with Modified [place] with use of LRAD.  4. Bed to chair transfer with Modified [place] using LRAD.   5. Gait  x 100 feet with Modified [place] using LRAD.   6. Ascend/descend 5 stair with bilateral Handrails Stand-by Assistance.                         DME Justifications:  Don requires the head of the bed to be elevated more than 30 degrees most of the time due to CHF, Chromic pulmonary  disease, or problems with aspiration. Pt requires immediate and frequent changes in trunk positioning. The positioning of the body cannot be sufficiently resolved by the use of pillows and wedges.     Don's mobility limitation cannot be sufficiently resolved by the use of a cane. His functional mobility deficit can be sufficiently resolved with the use of a Rolling Walker. Patient's mobility limitation significantly impairs their ability to participate in one of more activities of daily living.  The use of a RW will significantly improve the patient's ability to participate in MRADLS and the patient will use it on regular basis in the home..    Time Tracking:     PT Received On: 05/20/25  PT Start Time: 1129     PT Stop Time: 1207  PT Total Time (min): 38 min     Billable Minutes: Gait Training 20 and Therapeutic Activity 10    Treatment Type: Treatment  PT/PTA: PT     Number of PTA visits since last PT visit: 0     05/20/2025

## 2025-05-20 NOTE — PLAN OF CARE
Problem: Adult Inpatient Plan of Care  Goal: Plan of Care Review  Outcome: Progressing  Goal: Optimal Comfort and Wellbeing  Outcome: Progressing     Problem: COPD (Chronic Obstructive Pulmonary Disease)  Goal: Optimal Chronic Illness Coping  Outcome: Progressing  Goal: Optimal Level of Functional [place]  Outcome: Progressing     Problem: Comorbidity Management  Goal: Maintenance of Asthma Control  Outcome: Progressing  Goal: Maintenance of Heart Failure Symptom Control  Outcome: Progressing   Plan of care reviewed with patient. Questions answered. All safety measure implemented during shift.

## 2025-05-20 NOTE — PLAN OF CARE
Order for RW and Hosp Bed forwarded to [place] per [facility] -- Yessy     dx:  SOB, COPD        Pt lives with his brother Vinny Juany     Sisters Rosita Lopez  and Kedar Harrington   live in [place] and NO.       Pt has Home O2 - not sure of provider   Pt will need transport to home

## 2025-05-20 NOTE — ASSESSMENT & PLAN NOTE
ANNITA is likely due to volume overload vs progression of CKD. Baseline creatinine is 2.1. Most recent creatinine and eGFR are listed below.  Recent Labs     05/18/25  2227 05/19/25  0540 05/20/25  0329   CREATININE 2.4* 2.3* 2.0*   EGFRNORACEVR 30* 32* 38*      Plan  - Improving. Upon further review patient's prior creatinine has ranged between 2.1-2.3. Unclear if this is new baseline.   - Avoid nephrotoxins and renally dose meds for GFR listed above  - Monitor urine output, serial BMP, and adjust therapy as needed  - Last CARMINA 4/2025 showing Simple to minimally complex right renal cysts.  - Urine studies: Fe Urea 30%, suggestive of prerenal disease  - Continue diuresis

## 2025-05-20 NOTE — ASSESSMENT & PLAN NOTE
Patient's blood pressure range in the last 24 hours was: BP  Min: 111/81  Max: 160/66.The patient's inpatient anti-hypertensive regimen is listed below:  Current Antihypertensives  amLODIPine tablet 10 mg, Daily, Oral  furosemide injection 40 mg, Every 12 hours, Intravenous    Plan  - BP is controlled, no changes needed to their regimen  -BP medication on hold as patient normotensive. Will resume home Valsartan 160mg and Clonidine 0.1 mg when appropriate.

## 2025-05-20 NOTE — SUBJECTIVE & OBJECTIVE
Interval History: NAEON. VSS. Afebrile. UOP approximately 5.9 L with 80 mg of Lasix. On increased oxygen today.    Review of Systems   Constitutional:  Negative for chills and fever.   Respiratory:  Positive for shortness of breath and wheezing. Negative for cough.    Cardiovascular:  Negative for chest pain and leg swelling.   Gastrointestinal:  Negative for abdominal pain, diarrhea, nausea and vomiting.   Skin:  Negative for rash.   Neurological:  Negative for weakness.   Psychiatric/Behavioral:  The patient is not nervous/anxious.      Objective:     Vital Signs (Most Recent):  Temp: 97.7 °F (36.5 °C) (05/20/25 0746)  Pulse: 80 (05/20/25 0746)  Resp: 19 (05/20/25 0746)  BP: (!) 115/90 (05/20/25 0746)  SpO2: 95 % (05/20/25 0746) Vital Signs (24h Range):  Temp:  [97.7 °F (36.5 °C)-98.8 °F (37.1 °C)] 97.7 °F (36.5 °C)  Pulse:  [76-94] 80  Resp:  [17-40] 19  SpO2:  [82 %-98 %] 95 %  BP: (108-160)/(58-90) 115/90     Weight: 118.9 kg (262 lb 2 oz)  Body mass index is 39.86 kg/m².    Intake/Output Summary (Last 24 hours) at 5/20/2025 0851  Last data filed at 5/20/2025 0813  Gross per 24 hour   Intake 600 ml   Output 6450 ml   Net -5850 ml         Physical Exam  Constitutional:       General: He is not in acute distress.     Appearance: He is obese.   Eyes:      General:         Right eye: No discharge.         Left eye: No discharge.      Conjunctiva/sclera: Conjunctivae normal.   Cardiovascular:      Rate and Rhythm: Normal rate.      Pulses: Normal pulses.      Heart sounds: Normal heart sounds.   Pulmonary:      Effort: Pulmonary effort is normal. No respiratory distress.      Breath sounds: Wheezing present.      Comments: Wheezes bilaterally  Abdominal:      General: There is no distension.      Palpations: Abdomen is soft.   Musculoskeletal:      Right lower leg: Edema present.      Left lower leg: Edema present.      Comments: Right thigh more swollen than L   Skin:     Comments: R medial thigh indurated with  hyperpigmentation, mildly tender. No palpable fluctuance/masses.   R foot no open wounds. Tender to palpation on heel   Neurological:      Mental Status: He is alert and oriented to person, place, and time.   Psychiatric:         Mood and Affect: Mood normal.         Behavior: Behavior normal.               Significant Labs: All pertinent labs within the past 24 hours have been reviewed.  CBC:   Recent Labs   Lab 05/18/25 2227 05/19/25  0540 05/20/25  0329   WBC 5.60 2.71* 8.91   HGB 11.0* 11.3* 12.5*   HCT 32.8* 33.8* 37.3*    154 186     CMP:   Recent Labs   Lab 05/18/25 2227 05/19/25  0540 05/20/25  0329    138 140   K 4.0 4.0 5.2*    101 99   CO2 26 27 27   GLU 84 151* 116*   BUN 42* 40* 36*   CREATININE 2.4* 2.3* 2.0*   CALCIUM 9.1 9.1 10.1   PROT 8.0 8.1 9.0*   ALBUMIN 3.8 3.7 4.2   BILITOT 0.7 0.6 0.3   ALKPHOS 71 72 77   AST 18 18 17   ALT 20 22 23   ANIONGAP 11 10 14       Significant Imaging: I have reviewed all pertinent imaging results/findings within the past 24 hours.

## 2025-05-20 NOTE — PROGRESS NOTES
Pharmacist Intervention IV to PO Note    Ming Harrington is a 59 y.o. male being treated with IV medication azithromycin    Patient Data:    Vital Signs (Most Recent):  Temp: 97.7 °F (36.5 °C) (05/20/25 0746)  Pulse: 80 (05/20/25 0746)  Resp: 19 (05/20/25 0746)  BP: (!) 115/90 (05/20/25 0746)  SpO2: 95 % (05/20/25 0746) Vital Signs (72h Range):  Temp:  [97.7 °F (36.5 °C)-98.8 °F (37.1 °C)]   Pulse:  [70-94]   Resp:  [14-40]   BP: (100-160)/(50-90)   SpO2:  [82 %-99 %]      CBC:  Recent Labs   Lab 05/18/25 2227 05/19/25 0540 05/20/25  0329   WBC 5.60 2.71* 8.91   RBC 3.54* 3.63* 4.02*   HGB 11.0* 11.3* 12.5*   HCT 32.8* 33.8* 37.3*    154 186   MCV 93 93 93   MCH 31.1* 31.1* 31.1*   MCHC 33.5 33.4 33.5     CMP:     Recent Labs   Lab 05/18/25 2227 05/19/25 0540 05/20/25  0329   GLU 84 151* 116*   CALCIUM 9.1 9.1 10.1   ALBUMIN 3.8 3.7 4.2   PROT 8.0 8.1 9.0*    138 140   K 4.0 4.0 5.2*   CO2 26 27 27    101 99   BUN 42* 40* 36*   CREATININE 2.4* 2.3* 2.0*   ALKPHOS 71 72 77   ALT 20 22 23   AST 18 18 17   BILITOT 0.7 0.6 0.3       Dietary Orders:  Diet Orders            Diet Adult Regular: Regular starting at 05/19 0048            Based on the following criteria, this patient qualifies for intravenous to oral conversion:  [x] The patients gastrointestinal tract is functioning (tolerating medications via oral or enteral route for 24 hours and tolerating food or enteral feeds for 24 hours).  [x] The patient is hemodynamically stable for 24 hours (heart rate <100 beats per minute, systolic blood pressure >99 mm Hg, and respiratory rate <20 breaths per minute).  [x] The patient shows clinical improvement (afebrile for at least 24 hours and white blood cell count downtrending or normalized). Additionally, the patient must be non-neutropenic (absolute neutrophil count >500 cells/mm3).  [x] For antimicrobials, the patient has received IV therapy for at least 24 hours.    IV medication azithromycin will be  changed to oral medication     Pharmacist's Name: Patrice Camarena  Pharmacist's Extension: [phone]

## 2025-05-21 VITALS
TEMPERATURE: 98 F | RESPIRATION RATE: 22 BRPM | BODY MASS INDEX: 39.66 KG/M2 | HEIGHT: 68 IN | WEIGHT: 261.69 LBS | DIASTOLIC BLOOD PRESSURE: 72 MMHG | OXYGEN SATURATION: 97 % | SYSTOLIC BLOOD PRESSURE: 114 MMHG | HEART RATE: 94 BPM

## 2025-05-21 LAB
ABSOLUTE EOSINOPHIL (OHS): 0.02 K/UL
ABSOLUTE MONOCYTE (OHS): 0.25 K/UL (ref 0.3–1)
ABSOLUTE NEUTROPHIL COUNT (OHS): 5.33 K/UL (ref 1.8–7.7)
ALBUMIN SERPL BCP-MCNC: 4 G/DL (ref 3.5–5.2)
ALP SERPL-CCNC: 71 UNIT/L (ref 40–150)
ALT SERPL W/O P-5'-P-CCNC: 17 UNIT/L (ref 10–44)
ANION GAP (OHS): 8 MMOL/L (ref 8–16)
AST SERPL-CCNC: 12 UNIT/L (ref 11–45)
BASOPHILS # BLD AUTO: 0.01 K/UL
BASOPHILS NFR BLD AUTO: 0.2 %
BILIRUB SERPL-MCNC: 0.3 MG/DL (ref 0.1–1)
BUN SERPL-MCNC: 31 MG/DL (ref 6–20)
CALCIUM SERPL-MCNC: 9.5 MG/DL (ref 8.7–10.5)
CHLORIDE SERPL-SCNC: 98 MMOL/L (ref 95–110)
CO2 SERPL-SCNC: 32 MMOL/L (ref 23–29)
CREAT SERPL-MCNC: 2 MG/DL (ref 0.5–1.4)
ERYTHROCYTE [DISTWIDTH] IN BLOOD BY AUTOMATED COUNT: 12.3 % (ref 11.5–14.5)
GFR SERPLBLD CREATININE-BSD FMLA CKD-EPI: 38 ML/MIN/1.73/M2
GLUCOSE SERPL-MCNC: 98 MG/DL (ref 70–110)
HCT VFR BLD AUTO: 37.6 % (ref 40–54)
HGB BLD-MCNC: 12.3 GM/DL (ref 14–18)
IMM GRANULOCYTES # BLD AUTO: 0.01 K/UL (ref 0–0.04)
IMM GRANULOCYTES NFR BLD AUTO: 0.2 % (ref 0–0.5)
LYMPHOCYTES # BLD AUTO: 0.54 K/UL (ref 1–4.8)
MAGNESIUM SERPL-MCNC: 2.2 MG/DL (ref 1.6–2.6)
MCH RBC QN AUTO: 31 PG (ref 27–31)
MCHC RBC AUTO-ENTMCNC: 32.7 G/DL (ref 32–36)
MCV RBC AUTO: 95 FL (ref 82–98)
NUCLEATED RBC (/100WBC) (OHS): 0 /100 WBC
OHS QRS DURATION: 92 MS
OHS QTC CALCULATION: 447 MS
PHOSPHATE SERPL-MCNC: 2.9 MG/DL (ref 2.7–4.5)
PLATELET # BLD AUTO: 190 K/UL (ref 150–450)
PMV BLD AUTO: 10.8 FL (ref 9.2–12.9)
POTASSIUM SERPL-SCNC: 4.1 MMOL/L (ref 3.5–5.1)
PROT SERPL-MCNC: 8.4 GM/DL (ref 6–8.4)
RBC # BLD AUTO: 3.97 M/UL (ref 4.6–6.2)
RELATIVE EOSINOPHIL (OHS): 0.3 %
RELATIVE LYMPHOCYTE (OHS): 8.8 % (ref 18–48)
RELATIVE MONOCYTE (OHS): 4.1 % (ref 4–15)
RELATIVE NEUTROPHIL (OHS): 86.4 % (ref 38–73)
SODIUM SERPL-SCNC: 138 MMOL/L (ref 136–145)
WBC # BLD AUTO: 6.16 K/UL (ref 3.9–12.7)

## 2025-05-21 PROCEDURE — 25000003 PHARM REV CODE 250

## 2025-05-21 PROCEDURE — 27000221 HC OXYGEN, UP TO 24 HOURS

## 2025-05-21 PROCEDURE — 85025 COMPLETE CBC W/AUTO DIFF WBC: CPT

## 2025-05-21 PROCEDURE — 25000242 PHARM REV CODE 250 ALT 637 W/ HCPCS

## 2025-05-21 PROCEDURE — 82310 ASSAY OF CALCIUM: CPT

## 2025-05-21 PROCEDURE — 83735 ASSAY OF MAGNESIUM: CPT

## 2025-05-21 PROCEDURE — 25000003 PHARM REV CODE 250: Performed by: STUDENT IN AN ORGANIZED HEALTH CARE EDUCATION/TRAINING PROGRAM

## 2025-05-21 PROCEDURE — 97530 THERAPEUTIC ACTIVITIES: CPT | Mod: CQ

## 2025-05-21 PROCEDURE — 94640 AIRWAY INHALATION TREATMENT: CPT

## 2025-05-21 PROCEDURE — 99900035 HC TECH TIME PER 15 MIN (STAT)

## 2025-05-21 PROCEDURE — S4991 NICOTINE PATCH NONLEGEND: HCPCS

## 2025-05-21 PROCEDURE — 25000003 PHARM REV CODE 250: Performed by: FAMILY MEDICINE

## 2025-05-21 PROCEDURE — 94761 N-INVAS EAR/PLS OXIMETRY MLT: CPT

## 2025-05-21 PROCEDURE — 84100 ASSAY OF PHOSPHORUS: CPT

## 2025-05-21 PROCEDURE — 36415 COLL VENOUS BLD VENIPUNCTURE: CPT

## 2025-05-21 PROCEDURE — 63600175 PHARM REV CODE 636 W HCPCS

## 2025-05-21 PROCEDURE — 63700000 PHARM REV CODE 250 ALT 637 W/O HCPCS: Performed by: STUDENT IN AN ORGANIZED HEALTH CARE EDUCATION/TRAINING PROGRAM

## 2025-05-21 PROCEDURE — 97116 GAIT TRAINING THERAPY: CPT | Mod: CQ

## 2025-05-21 RX ORDER — PREDNISONE 20 MG/1
40 TABLET ORAL DAILY
Qty: 4 TABLET | Refills: 0 | Status: SHIPPED | OUTPATIENT
Start: 2025-05-22 | End: 2025-05-24

## 2025-05-21 RX ORDER — FLUTICASONE FUROATE, UMECLIDINIUM BROMIDE AND VILANTEROL TRIFENATATE 200; 62.5; 25 UG/1; UG/1; UG/1
1 POWDER RESPIRATORY (INHALATION) DAILY
Qty: 60 EACH | Refills: 0 | Status: SHIPPED | OUTPATIENT
Start: 2025-05-21

## 2025-05-21 RX ORDER — AMOXICILLIN AND CLAVULANATE POTASSIUM 875; 125 MG/1; MG/1
1 TABLET, FILM COATED ORAL EVERY 12 HOURS
Qty: 4 TABLET | Refills: 0 | Status: SHIPPED | OUTPATIENT
Start: 2025-05-22 | End: 2025-05-24

## 2025-05-21 RX ORDER — FUROSEMIDE 40 MG/1
80 TABLET ORAL 2 TIMES DAILY
Status: DISCONTINUED | OUTPATIENT
Start: 2025-05-21 | End: 2025-05-21 | Stop reason: HOSPADM

## 2025-05-21 RX ORDER — GABAPENTIN 100 MG/1
100 CAPSULE ORAL 3 TIMES DAILY PRN
Qty: 90 CAPSULE | Refills: 11 | Status: SHIPPED | OUTPATIENT
Start: 2025-05-21 | End: 2026-05-21

## 2025-05-21 RX ORDER — FUROSEMIDE 80 MG/1
80 TABLET ORAL 2 TIMES DAILY
Qty: 60 TABLET | Refills: 11 | Status: SHIPPED | OUTPATIENT
Start: 2025-05-21 | End: 2026-05-21

## 2025-05-21 RX ORDER — MONTELUKAST SODIUM 10 MG/1
10 TABLET ORAL DAILY
Qty: 30 TABLET | Refills: 0 | Status: SHIPPED | OUTPATIENT
Start: 2025-05-21 | End: 2025-06-20

## 2025-05-21 RX ORDER — ALBUTEROL SULFATE 90 UG/1
2 INHALANT RESPIRATORY (INHALATION) EVERY 6 HOURS PRN
Qty: 54 G | Refills: 0 | Status: SHIPPED | OUTPATIENT
Start: 2025-05-21 | End: 2026-05-21

## 2025-05-21 RX ADMIN — NICOTINE 1 PATCH: 21 PATCH, EXTENDED RELEASE TRANSDERMAL at 10:05

## 2025-05-21 RX ADMIN — FLUTICASONE FUROATE AND VILANTEROL TRIFENATATE 1 PUFF: 200; 25 POWDER RESPIRATORY (INHALATION) at 07:05

## 2025-05-21 RX ADMIN — PREDNISONE 40 MG: 20 TABLET ORAL at 10:05

## 2025-05-21 RX ADMIN — IPRATROPIUM BROMIDE AND ALBUTEROL SULFATE 3 ML: 2.5; .5 SOLUTION RESPIRATORY (INHALATION) at 02:05

## 2025-05-21 RX ADMIN — ACETAMINOPHEN 650 MG: 325 TABLET ORAL at 04:05

## 2025-05-21 RX ADMIN — METHADONE HYDROCHLORIDE 100 MG: 10 TABLET ORAL at 05:05

## 2025-05-21 RX ADMIN — IPRATROPIUM BROMIDE AND ALBUTEROL SULFATE 3 ML: 2.5; .5 SOLUTION RESPIRATORY (INHALATION) at 12:05

## 2025-05-21 RX ADMIN — ACETAMINOPHEN 650 MG: 325 TABLET ORAL at 10:05

## 2025-05-21 RX ADMIN — FUROSEMIDE 80 MG: 40 TABLET ORAL at 10:05

## 2025-05-21 RX ADMIN — MONTELUKAST SODIUM 10 MG: 10 TABLET, FILM COATED ORAL at 10:05

## 2025-05-21 RX ADMIN — IPRATROPIUM BROMIDE AND ALBUTEROL SULFATE 3 ML: 2.5; .5 SOLUTION RESPIRATORY (INHALATION) at 07:05

## 2025-05-21 RX ADMIN — TAMSULOSIN HYDROCHLORIDE 0.4 MG: 0.4 CAPSULE ORAL at 10:05

## 2025-05-21 RX ADMIN — SENNOSIDES AND DOCUSATE SODIUM 1 TABLET: 50; 8.6 TABLET ORAL at 10:05

## 2025-05-21 RX ADMIN — CEFTRIAXONE SODIUM 1 G: 500 INJECTION, POWDER, FOR SOLUTION INTRAMUSCULAR; INTRAVENOUS at 01:05

## 2025-05-21 RX ADMIN — MUPIROCIN: 20 OINTMENT TOPICAL at 10:05

## 2025-05-21 RX ADMIN — ENOXAPARIN SODIUM 40 MG: 40 INJECTION SUBCUTANEOUS at 10:05

## 2025-05-21 RX ADMIN — HYPROMELLOSE 2910 1 DROP: 5 SOLUTION/ DROPS OPHTHALMIC at 03:05

## 2025-05-21 RX ADMIN — LIDOCAINE 1 PATCH: 50 PATCH CUTANEOUS at 03:05

## 2025-05-21 RX ADMIN — AZITHROMYCIN DIHYDRATE 500 MG: 250 TABLET ORAL at 10:05

## 2025-05-21 NOTE — ASSESSMENT & PLAN NOTE
S/p methylprednisone, magnesium, CPAP by EMS. Was on baseline 2L NC in ED. Patient with diffuse wheezing on exam. States that he has been using his home albuterol and trilegy inhaler daily. CXR no focal opacities or consolidations. Negative for COVID/ Flu    S/P Ceftriaxone 1mg daily, Azithromycin 500mg  - Prednisone 40mg x5 days  - Duonebs q6hr, can decrease pending clinical improvement  - Breo Inhaler daily  - Montelukast daily

## 2025-05-21 NOTE — PROGRESS NOTES
"ARA spoke with Karen at [facility] --   She has submitted for auth for Hosp bed but needs more documentation for justification "pt requires immediate (or frequent) changes in body position.    Secure chat sent to therapy requesting this addition.    RW will be delivered to pt's room today.     "

## 2025-05-21 NOTE — DISCHARGE SUMMARY
[facility] Medicine  Discharge Summary      Patient Name: Ming Harrington  MRN: [id number]  MADIE: [id number]  Patient Class: IP- Inpatient  Admission Date: 5/18/2025  Hospital Length of Stay: 2 days  Discharge Date and Time: 05/21/2025 1:03 PM  Attending Physician: Melissa Kruger MD   Discharging Provider: Julian Trotter MD  Primary Care Provider: David Beckett PA-C    Primary Care Team: Networked reference to record PCT     HPI:   Patient is a 60 yo male w/ PMHx of HFpEF, COPD on 2L, HTN, anxiety, opioid use disorder on methadone.  Patient brought in by EMS for worsening shortness of breath.  Was saturating 85% at home, was placed on CPAP and given dexamethasone and magnesium.  In ED patient maintaining sats on 2 L NC.  Patient complaining of malaise, cough, wheezing, and shortness of breath since yesterday.  He states that he has 2 inhalers that he is compliant with.  Endorses fevers, chills, loose bowel movements.  He is concerned about stepping on glass a few months ago and associated right heel pain. Also having swelling and induration in his right medial thigh that is mildly tender and started months ago as well, denies inciting injury or open wounds.   Of note patient appears to be poor historian with limited knowledge of current home medications.    In the ED, initial vital signs /82, HR 84, Temp 98.1, SpO2 99 % on 2 L NC. Labs include CBC with stable H/H 11/32.8 and WBC within normal limits 5.6. CMP with  BUN/Cr 42/2.4 (baseline Cr 2.1). CXR no acute abnormalities.  XR foot no changes from previous. EKG with .  BNP and troponin WNL.  Initiated on DuoNebs in ED. [facility] consulted for evaluation for admission for acute COPD exacerbation.      * No surgery found *      Hospital Course:   59yr old gentleman admitted for COPD exacerbation + CHF exacerbation was managed with IV lasix and completed 3 days of rocephin and azithromycin. Produced good urine output. Lower  "extremities were without pitting edema on day of discharge. COPD exacerbation was managed with scheduled breathing treatments & inhalers. Returned to baseline O2 2L NC without evidence of respiratory distress. He was provided 2 days of Augmentin and 2 days of prednisone to finish his COPD treatment outpatient. Portable oxygen on discharge to be provided as patient states he no longer has O2 at home.     On day of discharge, patient was hemodynamically stable. He was sent home with instructions to continue home medications, change dosage/frequency of home medications, and/or take new medications as mentioned under "Medication Reconciliation" below. These changes were further explained by patient's nurse or the clinical pharmacist. Patient will need to schedule with their PCP for hospital discharge followup. Patient agreeable to discharge plan & expressed understanding of all aforementioned changes. All questions were answered and return precautions were discussed & detailed in the after-visit summary.       Physical Exam  Constitutional:       General: He is not in acute distress.     Appearance: He is obese.   Eyes:      General:         Right eye: No discharge.         Left eye: No discharge.      Conjunctiva/sclera: Conjunctivae normal.   Cardiovascular:      Rate and Rhythm: Normal rate.      Pulses: Normal pulses.      Heart sounds: Normal heart sounds.   Pulmonary:      Effort: Pulmonary effort is normal. No respiratory distress.      Breath sounds: No wheezing present.      Comments: Decreased breath sounds   Abdominal:      General: There is no distension.      Palpations: Abdomen is soft.   Musculoskeletal:      Right lower leg: No edema present.      Left lower leg: Mild edema (improved from yesterday) present.      Comments: Right thigh more swollen than L   Skin:     Comments: R medial thigh indurated with hyperpigmentation, mildly tender. No palpable fluctuance/masses.   R foot no open wounds. Tender to " palpation on heel   Neurological:      Mental Status: He is alert and oriented to person, place, and time.   Psychiatric:         Mood and Affect: Mood normal.         Behavior: Behavior normal.                   Significant Labs: All pertinent labs within the past 24 hours have been reviewed.  CBC:         Recent Labs   Lab 05/18/25 2227 05/19/25  0540 05/20/25  0329   WBC 5.60 2.71* 8.91   HGB 11.0* 11.3* 12.5*   HCT 32.8* 33.8* 37.3*    154 186      CMP:         Recent Labs   Lab 05/18/25 2227 05/19/25  0540 05/20/25  0329    138 140   K 4.0 4.0 5.2*    101 99   CO2 26 27 27   GLU 84 151* 116*   BUN 42* 40* 36*   CREATININE 2.4* 2.3* 2.0*   CALCIUM 9.1 9.1 10.1   PROT 8.0 8.1 9.0*   ALBUMIN 3.8 3.7 4.2   BILITOT 0.7 0.6 0.3   ALKPHOS 71 72 77   AST 18 18 17   ALT 20 22 23   ANIONGAP 11 10 14         Significant Imaging: I have reviewed all pertinent imaging results/findings within the past 24 hours.    Goals of Care Treatment Preferences:  Code Status: Full Code      SDOH Screening:  The patient was screened for food insecurity, housing instability, transportation needs, utility difficulties, and interpersonal safety. The social determinant(s) of health identified as a concern this admission are:  Food insecurity    Will discuss with case management and/or community health workers.    Social Drivers of Health with Concerns     Food Insecurity: Food Insecurity Present (5/19/2025)   Housing Stability: Low Risk  (5/19/2025)   Recent Concern: Housing Stability - High Risk (4/17/2025)   Utilities: Not At Risk (5/19/2025)   Recent Concern: Utilities - At Risk (4/17/2025)        Consults:     Assessment & Plan  Acute exacerbation of COPD with asthma  S/p methylprednisone, magnesium, CPAP by EMS. Was on baseline 2L NC in ED. Patient with diffuse wheezing on exam. States that he has been using his home albuterol and trilegy inhaler daily. CXR no focal opacities or consolidations. Negative for COVID/  Flu    S/P Ceftriaxone 1mg daily, Azithromycin 500mg  - Prednisone 40mg x5 days  - Duonebs q6hr, can decrease pending clinical improvement  - Breo Inhaler daily  - Montelukast daily    Cellulitis of right lower extremity  Patient with right medial thigh swelling and area of induration on skin for the past few months per patient. No obvious borders. No fluctuance or masses however mildly tender on exam. Concerned for cellulitis in setting of lymphedema. XR leg with diffuse edema. WBC wnl.    - Ceftriaxone daily  - DVT US RLE    HTN (hypertension)  Patient's blood pressure range in the last 24 hours was: BP  Min: 93/66  Max: 123/75.The patient's inpatient anti-hypertensive regimen is listed below:  Current Antihypertensives  amLODIPine tablet 10 mg, Daily, Oral  furosemide tablet 80 mg, 2 times daily, Oral  furosemide (LASIX) tablet, 2 times daily, Oral    Plan  - BP is controlled, no changes needed to their regimen  -BP medication on hold as patient normotensive. Will resume home Valsartan 160mg and Clonidine 0.1 mg when appropriate.  Opioid use disorder, severe, on maintenance therapy, dependence  Per patient and previous records on Methadone 95mg for opioid use disorder. States he [facility] up at [facility]    - Methadone 100mg daily (95mg not on formulary)  - Urine methadone and  Urine tox screen pending    JUVENCIO (generalized anxiety disorder)  Will hold home Escitalopram in setting of Qtc prolongation    ANNITA (acute kidney injury)  ANNITA is likely due to volume overload vs progression of CKD. Baseline creatinine is 2.1. Most recent creatinine and eGFR are listed below.  Recent Labs     05/18/25  2227 05/19/25  0540 05/20/25  0329   CREATININE 2.4* 2.3* 2.0*   EGFRNORACEVR 30* 32* 38*      Plan  - Improving. Upon further review patient's prior creatinine has ranged between 2.1-2.3. Unclear if this is new baseline.   - Avoid nephrotoxins and renally dose meds for GFR listed above  - Monitor urine output,  serial BMP, and adjust therapy as needed  - Last CARMINA 4/2025 showing Simple to minimally complex right renal cysts.  - Urine studies: Fe Urea 30%, suggestive of prerenal disease  - Continue diuresis  (HFpEF) heart failure with preserved ejection fraction  Results for orders placed during the hospital encounter of 03/13/25    Echo    Interpretation Summary    Left Ventricle: The left ventricle is normal in size. Normal wall thickness. There is concentric remodeling. Normal wall motion. There is normal systolic function with a visually estimated ejection fraction of 60 - 65%. Ejection fraction is approximately 65%. There is normal diastolic function.    Right Ventricle: The right ventricle has mild enlargement. Wall thickness is normal. Systolic function is normal.    The right atrium is dilated.    Pulmonary Artery: Pulmonary artery pressure could not be estimated.    IVC/SVC: Normal venous pressure at 3 mmHg.    Echo from previous admission showing improved HF. BNP wnl. Patient does have lower extremity edema however no crackles on pulmonary exam. Suspect COPD rather than CHF as cause of SOB, however patient does appear to be volume up on exam.    - Transition to PO today  - Monitor UOP  Chronic pain of right lower extremity  Pt endorses chronic RLE pain and bilateral leg swelling. Patient concerned about stepping on glass. R foot exam no wounds or masses palpated. Heel tender to palpation. XR remains unchanged. Does not appear infected. States that he has difficulty walking due to the pain.    - PT/OT to assess mobility    BPH (benign prostatic hyperplasia)  - Resume home tamsulosin    Final Active Diagnoses:    Diagnosis Date Noted POA    PRINCIPAL PROBLEM:  Acute exacerbation of COPD with asthma [J44.1] 12/02/2018 Yes    Cellulitis of right lower extremity [L03.115] 05/19/2025 Yes    BPH (benign prostatic hyperplasia) [N40.0] 04/17/2025 Yes    Chronic pain of right lower extremity [M79.604, G89.29] 04/17/2025 Yes  "   (HFpEF) heart failure with preserved ejection fraction [I50.30] 03/26/2024 Yes    ANNITA (acute kidney injury) [N17.9] 01/04/2023 Yes    JUVENCIO (generalized anxiety disorder) [F41.1] 04/17/2019 Yes    Opioid use disorder, severe, on maintenance therapy, dependence [F11.20] 01/08/2013 Yes    HTN (hypertension) [I10]  Yes      Problems Resolved During this Admission:       Discharged Condition: stable    Disposition: Home or Self Care    Follow Up:   Follow-up Information       [facility] Follow up.    Specialty: Family Medicine  Contact information:  [address]  [address]  [facility] [address] [phone]  Additional information:  Please park in [address] or D and use [address]. [facility] elevators.             [facility] - OUTPATIENT .    Contact information:  [address] [facility] [address]             [facility] Follow up.    Specialty: DME Provider  Contact information:  [address] [facility] [address] [phone]                           Patient Instructions:      HOSPITAL BED FOR HOME USE     Order Specific Question Answer Comments   Type: Semi-electric    Length of need (1-99 months): 6    Does patient have medical equipment at home? oxygen + home O2 - not sure of provider   Height: 5' 8" (1.727 m)    Weight: 127 kg (280 lb)    Please check all that apply: Patient requires positioning of the body in ways not feasible in an ordinary bed due to a medical condition which is expected to last at least one month.    Please check all that apply: Patient requires, for the alleviation of pain, positioning of the body in ways not feasible in an ordinary bed.    Please check all that apply: Patient requires the head of bed to be elevated more than 30 degrees most of the time due to congestive heart failure, chronic pulmonary disease, or aspiration.  Pillows and wedges have been considered and ruled out.      WALKER FOR HOME USE " "    Order Specific Question Answer Comments   Type of Walker: Adult (5'4"-6'6")    With wheels? Yes    Height: 5' 8" (1.727 m)    Weight: 127 kg (280 lb)    Length of need (1-99 months): 99    Does patient have medical equipment at home? oxygen + home O2 - not sure of provider   Please check all that apply: Patient's condition impairs ambulation.    Please check all that apply: Patient is unable to safely ambulate without equipment.      HOSPITAL BED FOR HOME USE     Order Specific Question Answer Comments   Type: Semi-electric    Length of need (1-99 months): 99    Does patient have medical equipment at home? none    Height: 5' 8" (1.727 m)    Weight: 118.9 kg (262 lb 2 oz)    Please check all that apply: Patient requires positioning of the body in ways not feasible in an ordinary bed due to a medical condition which is expected to last at least one month.    Please check all that apply: Patient requires frequent changes in body position and/or has an immediate need for a change in body position.      WALKER FOR HOME USE     Order Specific Question Answer Comments   Type of Walker: Rollator with brakes and/or seat    With wheels? Yes    Height: 5' 8" (1.727 m)    Weight: 118.9 kg (262 lb 2 oz)    Length of need (1-99 months): 99    Does patient have medical equipment at home? none    Please check all that apply: Patient's condition impairs ambulation.      OXYGEN FOR HOME USE     Order Specific Question Answer Comments   Liter Flow 2    Duration Continuous    Qualifying Test Performed at: Rest    Oxygen saturation: 88    Portable mode: pulse dose acceptable    Mode: Portable concentrator    Route nasal cannula    Device: home concentrator with portable concentrator    Length of need (in months): 99 mos    Patient condition with qualifying saturation COPD    Height: 5' 8" (1.727 m)    Weight: 118.7 kg (261 lb 11 oz)    Alternative treatment measures have been tried or considered and deemed clinically ineffective. " Yes      Ambulatory referral/consult to Internal Medicine   Standing Status: Future   Referral Priority: Routine Referral Type: Consultation   Referral Reason: Specialty Services Required Referral Location: [facility] - OUTPATIENT   Requested Specialty: Internal Medicine   Number of Visits Requested: 1     Ambulatory referral/consult to Urology   Standing Status: Future   Referral Priority: Routine Referral Type: Consultation   Referral Reason: Specialty Services Required   Requested Specialty: Urology   Number of Visits Requested: 1     Diet Cardiac     Reason for not Ordering Smoking Cessation Referral     Order Specific Question Answer Comments   Reason for not ordering: Patient refused      Reason for not Prescribing Nicotine Replacement     Order Specific Question Answer Comments   Reason for not Prescribing: Patient refused        Significant Diagnostic Studies: N/A    Pending Diagnostic Studies:       Procedure Component Value Units Date/Time    Basic metabolic panel [[id number]]     Order Status: Sent Lab Status: No result     Specimen: Blood     CBC with Automated Differential [[id number]]     Order Status: Sent Lab Status: No result     Specimen: Blood     Narrative:      The following orders were created for panel order CBC with Automated Differential.  Procedure                               Abnormality         Status                     ---------                               -----------         ------                     CBC with Differential[[id number]]                                                        Please view results for these tests on the individual orders.    CBC with Differential [[id number]]     Order Status: Sent Lab Status: No result     Specimen: Blood     Comprehensive Metabolic Panel (CMP) [[id number]]     Order Status: Sent Lab Status: No result     Specimen: Blood     Magnesium [[id number]]     Order Status: Sent Lab Status: No result     Specimen: Blood      Methadone Conf, Urine Random [[id number]] Collected: 05/19/25 0052    Order Status: Sent Lab Status: In process Updated: 05/19/25 0058    Specimen: Urine, Clean Catch     Phosphorus [[id number]]     Order Status: Sent Lab Status: No result     Specimen: Blood            Medications:  Reconciled Home Medications:      Medication List        START taking these medications      amoxicillin-clavulanate 875-125mg 875-125 mg per tablet  Commonly known as: AUGMENTIN  Take 1 tablet by mouth every 12 (twelve) hours. for 2 days  Start taking on: May 22, 2025     predniSONE 20 MG tablet  Commonly known as: DELTASONE  Take 2 tablets (40 mg total) by mouth once daily. for 2 days  Start taking on: May 22, 2025            CONTINUE taking these medications      albuterol 90 mcg/actuation inhaler  Commonly known as: VENTOLIN HFA  Inhale 2 puffs into the lungs every 6 (six) hours as needed for Wheezing or Shortness of Breath. Rescue     furosemide 80 MG tablet  Commonly known as: LASIX  Take 1 tablet (80 mg total) by mouth 2 (two) times daily.     methadone 10 MG tablet  Commonly known as: DOLOPHINE  Take 95 mg by mouth once daily.     montelukast 10 mg tablet  Commonly known as: SINGULAIR  Take 1 tablet (10 mg total) by mouth once daily.     polyethylene glycol 17 gram/dose powder  Commonly known as: GLYCOLAX  Take 17 g by mouth 2 (two) times daily.     tamsulosin 0.4 mg Cap  Commonly known as: FLOMAX  Take 0.4 mg by mouth once daily.     traZODone 50 MG tablet  Commonly known as: DESYREL  Take 2 tablets (100 mg total) by mouth every evening.     TRELEGY ELLIPTA 200-62.5-25 mcg inhaler  Generic drug: fluticasone-umeclidin-vilanter  Inhale 1 puff into the lungs once daily.              Indwelling Lines/Drains at time of discharge:   Lines/Drains/Airways       None                   Time spent on the discharge of patient: 45 minutes         Julian Trotter MD  Department of Hospital Medicine  [facility]

## 2025-05-21 NOTE — ASSESSMENT & PLAN NOTE
Results for orders placed during the hospital encounter of 03/13/25    Echo    Interpretation Summary    Left Ventricle: The left ventricle is normal in size. Normal wall thickness. There is concentric remodeling. Normal wall motion. There is normal systolic function with a visually estimated ejection fraction of 60 - 65%. Ejection fraction is approximately 65%. There is normal diastolic function.    Right Ventricle: The right ventricle has mild enlargement. Wall thickness is normal. Systolic function is normal.    The right atrium is dilated.    Pulmonary Artery: Pulmonary artery pressure could not be estimated.    IVC/SVC: Normal venous pressure at 3 mmHg.    Echo from previous admission showing improved HF. BNP wnl. Patient does have lower extremity edema however no crackles on pulmonary exam. Suspect COPD rather than CHF as cause of SOB, however patient does appear to be volume up on exam.    - Transition to PO today  - Monitor UOP

## 2025-05-21 NOTE — NURSING
Home Oxygen Evaluation    Date Performed: 5/21/2025    1) Patient's Home O2 Sat on room air, while at rest: 88%        If O2 sats on room air at rest are 88% or below, patient qualifies. No additional testing needed. Document N/A in steps 2 and 3. If 89% or above, complete steps 2.      2) Patient's O2 Sat on room air while exercising: N/A        If O2 sats on room air while exercising remain 89% or above patient does not qualify, no further testing needed Document N/A in step 3. If O2 sats on room air while exercising are 88% or below, continue to step 3.      3) Patient's O2 Sat while exercising on O2: N/A at N/A LPM         (Must show improvement from #2 for patients to qualify)    If O2 sats improve on oxygen, patient qualifies for portable oxygen. If not, the patient does not qualify.

## 2025-05-21 NOTE — PLAN OF CARE
Pt stable for d/c to home today --   CM met with pt prior to d/c - he needs transport to home with O2.    CM confirmed with Juliana with [facility] - pt's O2 is currently provided by [facility] - he doesn't qualify for a portable concentrator due to insurance.      RW delivered to pt per [facility];  Karen with [facility] is working on auth for a hospital bed.      Pt's insurance is not accepted by [facility] at clinics.   Unable to make f/u apt at [facility].    CM sent pt info and referral for Urology f/u to [facility]  - routed - faxed to ;  p #  [phone].      [facility] WC van with O2 set up for pt - he doesn't have anyone who can bring his portable tank to hospital.    Jacki  DME Provider [phone] [address] [place] [address] [place]oni SINGH [id number]      Next Steps: Follow up  Instructions: Jacki is working on authorization for your hospital bed Rolling walker to be delivered to your room today    [facility]  DME Provider Orthotics [phone] [address] [facility] [place] [address]     Next Steps: Follow up  Instructions: [facility] supplies your Oxygen  confirmed today - your insurance doesn't cover a portable shoulder unit.    A SPECIAL NOTICE          Next Steps: Follow up  Instructions: YOUR INSURANCE IS NOT ACCEPTED AT [facility] OFFICES    [facility]  Behavioral Health Psychiatry Family Medicine [phone]  [address] [place] [id number]     Next Steps: Follow up on 5/30/2025  Instructions: 11:30 AM  YOU WILL SEE DR. DAVIDA SHEPHERD    [facility] - Nephrology/Colon & Rectal/Urology  Nephrology Colon and Rectal Surgery Urology [phone] [address] [facility] [place] [address]     Next Steps: Follow up  Instructions: A REFERRAL HAS BEEN SENT TO THIS OFFICE YOU WILL BE CONTACTED WITH A FOLLOW UP APT IF YOU DON'T HEAR FROM THE OFFICE IN 2 TO 3 BUSINESS DAYS, PLEASE CALL TO FOLLOW UP.                05/21/25 4099    Final Note   Assessment Type Final Discharge Note   Anticipated Discharge Disposition Home   What phone number can be called within the next 1-3 days to see how you are doing after discharge? [phone]   Hospital Resources/Appts/Education Provided Appointments scheduled and added to AVS;Post-Acute resouces added to AVS   Post-Acute Status   Discharge Delays None known at this time

## 2025-05-21 NOTE — PLAN OF CARE
JuanyMing #[id number] (CSN: [id number]) (59 y.o. M) (Adm: 05/18/25)  [facility] [id number] A  PCP    JAMES XAVIER  Date of Birth    1965     Demographics    Address:   [address] [place] [address]    Home Phone:   [phone]    Work Phone:   [phone]    Mobile Phone:   [phone]              SSN:       Insurance:   [facility]    Marital Status:   Single    Alevism:   Anabaptist                Admission Dx    R06.02 Shortness of breath   M79.5 Foreign body (FB) in soft tissue     Chief Complaint    Complaint Comment   Shortness of Breath Arrived by EMS for SOB.  History of Asthma and COPD.  Had RA sat of 85% at home and RR was 44.  Placed on CPAP per EMS and sats increased to 100.  EMS gave Dexamethasone 16 mg IV and Magnesium 2 grams en route to ED.       Documents Filed to Patient    Power of  Living Will Clinical Unknown Study Attachment Consent Form ABN Waiver After Visit Summary Lab Result Scan Code Status MyChart Status    Not on File  Not on File  Not on File  Not on File  Filed  Not on File  Filed  Not on File  FULL [Updated on 05/19/25 0013]  Declined     Admission Information    Current Information    Attending Provider Admitting Provider Admission Type Admission Status   Melissa Kruger MD James, Ernest III, MD Emergency Confirmed Admission          Admission Date/Time Discharge Date Hospital Service Auth/Cert Status   05/18/25  2156  Family Medicine Incomplete          Hospital Area Unit Room/Bed    [facility] TELEMETRY UNIT K402/K402 A            Discharge Disposition Discharge Destination   Home or Self Care      Hospital Account    Name Acct ID Class Status Primary Coverage   JuanyMing [id number] IP- Inpatient Open [facility] MARKETPLACE            Guarantor Account (for Hospital Account #[id number])    Name Relation to Pt Service Area Active? Acct Type   Ming Harrington Self OHSSA  Yes Personal/Family   Address Phone     [address] [place] [id number] [phone](H)              Coverage Information (for Hospital Account #[id number])      1. [facility]    F/O Payor/Plan Precert #   [employer] [facility] [employer] [id number]   Subscriber Subscriber #   Ming Harrington [id number]   Address Phone   [address]  [place] [address] [phone]     2. [place]    F/[place] Payor/Plan Precert #   [place] [id number]   Subscriber Subscriber #   Ming Harrington [id number]   Address Phone   [address] [address]  [place] [address] [phone]              Emergency Contact Information    Name: Rosita Lopez Relationship: Sister   Address:     City:  State:  Zip:  Phone:     Business phone:       Ambulatory referral/consult to Urology [[id number]] (Order [id number])  Outpatient Referral  Date and Time: 5/21/2025 11:45 AM Department: [facility] Telemetry Unit Rel By/Authorizing: Julian Trotter MD     Linked Results    Procedure Abnormality Status   Ambulatory referral/consult to Urology         Dept Order Information    Date Department   5/21/2025 [facility] Telemetry Unit             Order Information    Order Date/Time Release Date/Time Start Date/Time End Date/Time   05/21/25 11:45 AM None 5/21/2025 None           Order Details    Frequency Duration Priority Order Class   None None Routine Internal Referral       Quantity    Ordering Quantity   1     Quantity    Ordering Quantity Ordering Quantity   1 1         Order Details    Order ID   [id number]               Reprint Order Requisition    Ambulatory referral/consult to Urology (Order #[id number]) on 5/21/25           Future Order Information    Expected Expires      5/28/2025 6/21/2026                   Associated Diagnoses     ICD-10-CM ICD-9-CM   Benign prostatic hyperplasia, unspecified whether lower  "urinary tract symptoms present    N40.0 600.00         Ambulatory referral/consult to Urology: Patient Communication     Not Released  Not seen         Order Questions    Question Answer   Primary reason for referral: Men's Health - BPH(Enlarged Prostate, Weak Stream); Elevated PSA, Erectile Dysfunction; Low T (Testosterone); Infertility; Vasectomy; Vasectomy Reversal; Penile Prosthesis/Replacement; Circumcision   Note: This question is linked to a decision tree used for scheduling when applicable.   Primary reason for referral: BPH(Weak Stream)   Note: This question is linked to a decision tree used for scheduling when applicable.                    Process Instructions    Patient should be seen by the "Expected Date".     Collection Information              Acknowledgement Info    For At Acknowledged By Acknowledged On   Placing Order 25 1145 Razia FishmanTRIXIE 25 1244                         Patient Details  MRN:[id number]  Name Legal Sex:  SSISHA   Verónica Harrington Male 1965          Address Phone Email Aliases   [address] [place] [address] Home: [phone]  Work: [phone]  Cell: [phone] [email or web address] VERÓNICA HARRINGTON          Inpatient Unit Inpatient Room Inpatient Bed    [facility] TELEMETRY UNIT K402 K402 A           PCP Allergies     David Beckett PA-C Iodine And Iodide Containing Products, Shellfish Containing Products, Prochlorperazine Edisylate                Primary Visit Coverage    Payer Plan Sponsor Code Group Number Group Name   [facility]          Primary Visit Coverage Subscriber    Subscriber ID Subscriber Name Subscriber Address   [id number] VERÓNICA HARRINGTON [address] [place] [address]       Secondary Visit Coverage    Payer Plan Sponsor Code Group Number Group Name   [place]  [id number]        Secondary Visit Coverage Subscriber    Subscriber ID Subscriber Name Subscriber " Address   [id number] [place] [address]           Additional Information    Associated Reports External References   Priority and Order Details [facility] Referral Criteria-Urology Clinic   Collection Information                ADT-Related Order Information         Encounter    View Encounter               Order Provider Info        Phone Pager E-mail   Ordering User  Julian Trotter MD  [phone] (Primary Office Phone) -- [email or web address]   Authorizing Provider  Julian Trotter MD  [phone] (Primary Office Phone) -- [email or web address]   Attending Provider Melissa Kruger [phone] (Primary Office Phone) -- --     Ambulatory referral/consult to Urology [[id number]]    Electronically signed by: Julian Trotter MD on 05/21/25 6423 Status: Active   Ordering user: Julian Trotter MD 05/21/25 1143 Ordering provider: Julian Trotter MD   Authorized by: Julian Trotter MD Ordering mode: Standard   Frequency:  05/21/25 -     Acknowledged: Razia Fishman LPN 05/21/25 1244 for Placing Order   Diagnoses  Benign prostatic hyperplasia, unspecified whether lower urinary tract symptoms present [N40.0]     Questionnaire    Question Answer   Primary reason for referral: Men's Health - BPH(Enlarged Prostate, Weak Stream); Elevated PSA, Erectile Dysfunction; Low T (Testosterone); Infertility; Vasectomy; Vasectomy Reversal; Penile Prosthesis/Replacement; Circumcision   Primary reason for referral: BPH(Weak Stream)       Order Diagnosis and CPT Display    Order Diagnosis: Benign prostatic hyperplasia, unspecified whether lower urinary tract symptoms present [N40.0 (ICD-10-CM)] CPT:                  Electronically signed by: Julian Trotter MD Lic # [id number] NPI: [id number]

## 2025-05-21 NOTE — PLAN OF CARE
Future Appointments   Date Time Provider Department Center   7/16/2025  1:30 PM Iván Alejandro MD [facility] PULMO [place]   9/18/2025  8:45 AM [facility] OI-US1 MASTER [facility] ULTR [facility]   9/18/2025  9:50 AM LAB, APPOINTMENT [facility] LAB [facility]   9/18/2025 10:30 AM Sche[facility]ermann[facility] B., [facility]

## 2025-05-21 NOTE — ASSESSMENT & PLAN NOTE
Per patient and previous records on Methadone 95mg for opioid use disorder. States he follows up at [facility]    - Methadone 100mg daily (95mg not on formulary)  - Urine methadone and  Urine tox screen pending

## 2025-05-21 NOTE — HOSPITAL COURSE
"59yr old gentleman admitted for COPD exacerbation + CHF exacerbation was managed with IV lasix and completed 3 days of rocephin and azithromycin. Produced good urine output. Lower extremities were without pitting edema on day of discharge. COPD exacerbation was managed with scheduled breathing treatments & inhalers. Returned to baseline O2 2L NC without evidence of respiratory distress. He was provided 2 days of Augmentin and 2 days of prednisone to finish his COPD treatment outpatient. Portable oxygen on discharge to be provided as patient states he no longer has O2 at home.     On day of discharge, patient was hemodynamically stable. He was sent home with instructions to continue home medications, change dosage/frequency of home medications, and/or take new medications as mentioned under "Medication Reconciliation" below. These changes were further explained by patient's nurse or the clinical pharmacist. Patient will need to schedule with their PCP for hospital discharge followup. Patient agreeable to discharge plan & expressed understanding of all aforementioned changes. All questions were answered and return precautions were discussed & detailed in the after-visit summary.    "

## 2025-05-21 NOTE — PT/OT/SLP PROGRESS
"Physical Therapy Treatment    Patient Name:  Ming Harrington   MRN:  [id number]    Recommendations:     Discharge Recommendations: Low Intensity Therapy  Discharge Equipment Recommendations: hospital bed, bath bench, walker, rolling  Barriers to discharge: None    Assessment:     Ming Harrington is a 59 y.o. male admitted with a medical diagnosis of Acute exacerbation of COPD with asthma.  He presents with the following impairments/functional limitations: weakness, impaired endurance, impaired self care skills, impaired functional mobility, gait instability, impaired balance, decreased coordination, decreased safety awareness, decreased lower extremity function, decreased ROM, pain, edema, impaired cardiopulmonary response to activity. Pt able to perform 2 ambulation training trials ~80 ft and ~100 ft with RW, 3L of O2 donned requiring SBA. Recommending low intensity therapy upon discharge.    Rehab Prognosis: Good; patient would benefit from acute skilled PT services to address these deficits and reach maximum level of function.    Recent Surgery: * No surgery found *      Plan:     During this hospitalization, patient to be seen 3 x/week to address the identified rehab impairments via gait training, therapeutic activities, therapeutic exercises, neuromuscular re-education and progress toward the following goals:    Plan of Care Expires:  06/19/25    Subjective     Chief Complaint: None Expressed  Patient/Family Comments/goals: "I'm ready to get out of bed".  Pain/Comfort:  Pain Rating 1:  (Did not complain of pain)  Pain Rating Post-Intervention 1:  (Did not complain of pain)      Objective:     Communicated with nurse prior to session.  Patient found HOB elevated with oxygen, telemetry upon PT entry to room.     General Precautions: Standard, fall  Orthopedic Precautions: N/A  Braces: N/A  Respiratory Status: Nasal cannula, flow 3 L/min     Functional Mobility:  Bed Mobility:     Rolling Left:  modified independence and " supervision  Scooting: modified independence, supervision, and to EOB  Supine to Sit: supervision  Transfers:     Sit to Stand:  supervision with rolling walker  Gait: 2 trials ~80 ft and ~100 ft with RW requiring SBA      AM-PAC 6 CLICK MOBILITY  Turning over in bed (including adjusting bedclothes, sheets and blankets)?: 4  Sitting down on and standing up from a chair with arms (e.g., wheelchair, bedside commode, etc.): 3  Moving from lying on back to sitting on the side of the bed?: 4  Moving to and from a bed to a chair (including a wheelchair)?: 3  Need to walk in hospital room?: 3  Climbing 3-5 steps with a railing?: 3  Basic Mobility Total Score: 20       Treatment & Education:  Pt able to perform 2 ambulation training trials with RW, 3L of O2 donned, requiring SBA 1st trial ~80 ft and 2nd trial ~100 ft. During 2nd ambulation trial pt statically stood for a prolonged standing rest break within RW boundaries. Demonstrates a slow gait speed with a decreased step length bilaterally. Required occasional verbal cueing to ambulate within RW boundaries. Pt instructed in and performed 1 x 15 reps of sit to stand trials from B/S chair requiring SBA. Also, performed 1 x 20 reps of heel raises with hands resting on window balcony requiring SBA. Pt requested to use bathroom and when finished able to perform self hygiene standing at sink to wash hands and face with SBA.    Patient left up in chair with all lines intact, call button in reach, chair alarm on, nurse notified, and Set up to eat breakfast..    GOALS:   Multidisciplinary Problems       Physical Therapy Goals          Problem: Physical Therapy    Goal Priority Disciplines Outcome Interventions   Physical Therapy Goal     PT, PT/OT Progressing    Description: Goals to be met by:      Patient will increase functional independence with mobility by performin. Supine to sit with Modified [place]  2. Sit to supine with Modified [place]   3. Sit  to stand transfer with Modified [place] with use of LRAD.  4. Bed to chair transfer with Modified [place] using LRAD.   5. Gait  x 100 feet with Modified [place] using LRAD.   6. Ascend/descend 5 stair with bilateral Handrails Stand-by Assistance.                         DME Justifications:  The mobility limitation cannot be sufficiently resolved by the use of a cane.   Patient's functional mobility deficit can be sufficiently resolved with the use of a rolling walker. Patient's mobility limitation significantly impairs their ability to participate in one of more activities of daily living. The use of a rolling walker will significantly improve the patient's ability to participate in MRADLS and the patient will use it on regular basis in the home.      This patient requires a hospital bed due to:   Required elevation of the head of the bed more than 30 degrees most of the time due to COPD. Pt requires immediate and frequent changes in trunk positioning. The positioning of their body cannot be sufficiently resolved with the use of pillows and wedges       Time Tracking:     PT Received On: 05/21/25  PT Start Time: 0743     PT Stop Time: 0827  PT Total Time (min): 44 min     Billable Minutes: Gait Training 23 and Therapeutic Activity 21    Treatment Type: Treatment  PT/PTA: PTA     Number of PTA visits since last PT visit: 1 05/21/2025

## 2025-05-21 NOTE — PROGRESS NOTES
Virtual Nurse:Discharge orders noted; additional clinical references attached. Patient's discharge instruction packet given by bedside RN.    Cued into patient's room.  Permission received per patient to turn camera to view patient.  Introduced as VN that will be instructing on discharge instructions.  Educated patient on reason for admission; medications to hold, continue, and start, appointment to follow-up with doctor, and when to return to ED. Teach back method used. Verbalized understanding  Number given for 24/7 Nurse Line. Opportunity given for questions and questions answered.  Bedside nurse updated.        05/21/25 4493   Shift   Virtual Nurse - Patient Verbalized Approval Of Camera Use;VN Rounding   Type of Frequent Check   Type Patient Rounds   Safety/Activity   Patient Rounds visualized patient   Positioning   Body Position sitting up in bed   Head of Bed (HOB) Positioning HOB at 45 degrees

## 2025-05-21 NOTE — PLAN OF CARE
05/20/25 1916   Patient Assessment/Suction   Level of Consciousness (AVPU) alert   Respiratory Effort Normal;Unlabored   Expansion/Accessory Muscles/Retractions no use of accessory muscles;no retractions   All Lung Fields Breath Sounds coarse;Anterior:;Lateral:   Rhythm/Pattern, Respiratory depth regular;pattern regular;unlabored   Cough Frequency infrequent   Cough Type nonproductive   Skin Integrity   $ Wound Care Tech Time 15 min   Area Observed Behind ear   Skin Appearance without discoloration   PRE-TX-O2   Device (Oxygen Therapy) room air  (found on room air with sats of 95%)   SpO2 95 %   Pulse Oximetry Type Intermittent   $ Pulse Oximetry - Multiple Charge Pulse Oximetry - Multiple   Pulse 98   Resp 20   Aerosol Therapy   $ Aerosol Therapy Charges Aerosol Treatment   Daily Review of Necessity (SVN) completed   Respiratory Treatment Status (SVN) given   Treatment Route (SVN) mask   Patient Position sitting in chair   Post Treatment Assessment (SVN) breath sounds unchanged   Signs of Intolerance (SVN) none   Breath Sounds Post-Respiratory Treatment   Throughout All Fields Post-Treatment All Fields   Throughout All Fields Post-Treatment no change   Post-treatment Heart Rate (beats/min) 96   Post-treatment Resp Rate (breaths/min) 18   Preset CPAP/BiPAP Settings   Mode Of Delivery BiPAP;standby   CPAP/BIPAP charged w/in last 24 h NO   $ Initial CPAP/BiPAP Setup? No   $ Is patient using? No/refused   Reason patient is not wearing? Patient refused   Respiratory Evaluation   $ Care Plan Tech Time 15 min

## 2025-05-21 NOTE — ASSESSMENT & PLAN NOTE
Patient's blood pressure range in the last 24 hours was: BP  Min: 93/66  Max: 123/75.The patient's inpatient anti-hypertensive regimen is listed below:  Current Antihypertensives  amLODIPine tablet 10 mg, Daily, Oral  furosemide tablet 80 mg, 2 times daily, Oral  furosemide (LASIX) tablet, 2 times daily, Oral    Plan  - BP is controlled, no changes needed to their regimen  -BP medication on hold as patient normotensive. Will resume home Valsartan 160mg and Clonidine 0.1 mg when appropriate.

## 2025-05-23 LAB
EDDP UR QL CFM: 1867 NG/ML
METHADONE UR CFM-MCNC: 618 NG/ML
TOXICOLOGIST REVIEW: NORMAL

## 2025-05-26 NOTE — PHYSICIAN QUERY
Due to the conflicting clinical picture, please clinically validate the diagnosis of ANNITA.   If validated, please provide additional clinical support for the diagnosis:     ANNITA is confirmed.  Specify clinical indicatos to support ANNITA (specify): ____creatinine improved to 2 (closer to baseline) from 2.4 with IV diuresis

## 2025-06-03 ENCOUNTER — HOSPITAL ENCOUNTER (EMERGENCY)
Facility: HOSPITAL | Age: 60
Discharge: HOME | End: 2025-06-03
Attending: EMERGENCY MEDICINE
Payer: MEDICAID

## 2025-06-03 VITALS
HEIGHT: 68 IN | OXYGEN SATURATION: 97 % | BODY MASS INDEX: 39.56 KG/M2 | TEMPERATURE: 98 F | DIASTOLIC BLOOD PRESSURE: 63 MMHG | SYSTOLIC BLOOD PRESSURE: 108 MMHG | WEIGHT: 261 LBS | HEART RATE: 64 BPM | RESPIRATION RATE: 18 BRPM

## 2025-06-03 DIAGNOSIS — G89.29 CHRONIC HEEL PAIN, RIGHT: ICD-10-CM

## 2025-06-03 DIAGNOSIS — M79.671 CHRONIC HEEL PAIN, RIGHT: ICD-10-CM

## 2025-06-03 DIAGNOSIS — M72.2 PLANTAR FASCIITIS OF RIGHT FOOT: ICD-10-CM

## 2025-06-03 DIAGNOSIS — R60.0 BILATERAL LEG EDEMA: Primary | ICD-10-CM

## 2025-06-03 LAB
ABSOLUTE EOSINOPHIL (OHS): 0.26 K/UL
ABSOLUTE MONOCYTE (OHS): 0.49 K/UL (ref 0.3–1)
ABSOLUTE NEUTROPHIL COUNT (OHS): 4.9 K/UL (ref 1.8–7.7)
ALBUMIN SERPL BCP-MCNC: 4 G/DL (ref 3.5–5.2)
ALP SERPL-CCNC: 72 UNIT/L (ref 40–150)
ALT SERPL W/O P-5'-P-CCNC: 21 UNIT/L (ref 10–44)
ANION GAP (OHS): 11 MMOL/L (ref 8–16)
AST SERPL-CCNC: 20 UNIT/L (ref 11–45)
BASOPHILS # BLD AUTO: 0.02 K/UL
BASOPHILS NFR BLD AUTO: 0.3 %
BILIRUB SERPL-MCNC: 0.5 MG/DL (ref 0.1–1)
BNP SERPL-MCNC: 21 PG/ML (ref 0–99)
BUN SERPL-MCNC: 32 MG/DL (ref 6–20)
CALCIUM SERPL-MCNC: 9.4 MG/DL (ref 8.7–10.5)
CHLORIDE SERPL-SCNC: 98 MMOL/L (ref 95–110)
CO2 SERPL-SCNC: 28 MMOL/L (ref 23–29)
CREAT SERPL-MCNC: 2.2 MG/DL (ref 0.5–1.4)
ERYTHROCYTE [DISTWIDTH] IN BLOOD BY AUTOMATED COUNT: 11.9 % (ref 11.5–14.5)
GFR SERPLBLD CREATININE-BSD FMLA CKD-EPI: 33 ML/MIN/1.73/M2
GLUCOSE SERPL-MCNC: 96 MG/DL (ref 70–110)
HCT VFR BLD AUTO: 35.6 % (ref 40–54)
HGB BLD-MCNC: 11.9 GM/DL (ref 14–18)
IMM GRANULOCYTES # BLD AUTO: 0.02 K/UL (ref 0–0.04)
IMM GRANULOCYTES NFR BLD AUTO: 0.3 % (ref 0–0.5)
LYMPHOCYTES # BLD AUTO: 1.63 K/UL (ref 1–4.8)
MCH RBC QN AUTO: 31.2 PG (ref 27–31)
MCHC RBC AUTO-ENTMCNC: 33.4 G/DL (ref 32–36)
MCV RBC AUTO: 93 FL (ref 82–98)
NUCLEATED RBC (/100WBC) (OHS): 0 /100 WBC
PLATELET # BLD AUTO: 141 K/UL (ref 150–450)
PMV BLD AUTO: 11.2 FL (ref 9.2–12.9)
POTASSIUM SERPL-SCNC: 4.5 MMOL/L (ref 3.5–5.1)
PROT SERPL-MCNC: 8.4 GM/DL (ref 6–8.4)
RBC # BLD AUTO: 3.81 M/UL (ref 4.6–6.2)
RELATIVE EOSINOPHIL (OHS): 3.6 %
RELATIVE LYMPHOCYTE (OHS): 22.3 % (ref 18–48)
RELATIVE MONOCYTE (OHS): 6.7 % (ref 4–15)
RELATIVE NEUTROPHIL (OHS): 66.8 % (ref 38–73)
SODIUM SERPL-SCNC: 137 MMOL/L (ref 136–145)
TROPONIN I SERPL DL<=0.01 NG/ML-MCNC: 0.01 NG/ML
WBC # BLD AUTO: 7.32 K/UL (ref 3.9–12.7)

## 2025-06-03 PROCEDURE — 82040 ASSAY OF SERUM ALBUMIN: CPT | Performed by: NURSE PRACTITIONER

## 2025-06-03 PROCEDURE — 99284 EMERGENCY DEPT VISIT MOD MDM: CPT | Mod: 25

## 2025-06-03 PROCEDURE — 84484 ASSAY OF TROPONIN QUANT: CPT | Performed by: NURSE PRACTITIONER

## 2025-06-03 PROCEDURE — 96374 THER/PROPH/DIAG INJ IV PUSH: CPT

## 2025-06-03 PROCEDURE — 25000003 PHARM REV CODE 250: Performed by: EMERGENCY MEDICINE

## 2025-06-03 PROCEDURE — 83880 ASSAY OF NATRIURETIC PEPTIDE: CPT | Performed by: NURSE PRACTITIONER

## 2025-06-03 PROCEDURE — 85025 COMPLETE CBC W/AUTO DIFF WBC: CPT | Performed by: NURSE PRACTITIONER

## 2025-06-03 PROCEDURE — 63600175 PHARM REV CODE 636 W HCPCS: Performed by: EMERGENCY MEDICINE

## 2025-06-03 RX ORDER — FUROSEMIDE 10 MG/ML
80 INJECTION INTRAMUSCULAR; INTRAVENOUS
Status: COMPLETED | OUTPATIENT
Start: 2025-06-03 | End: 2025-06-03

## 2025-06-03 RX ORDER — DEXTROMETHORPHAN HYDROBROMIDE, GUAIFENESIN 5; 100 MG/5ML; MG/5ML
650 LIQUID ORAL EVERY 8 HOURS
Qty: 20 TABLET | Refills: 0 | Status: SHIPPED | OUTPATIENT
Start: 2025-06-03 | End: 2025-06-12

## 2025-06-03 RX ORDER — ACETAMINOPHEN 325 MG/1
650 TABLET ORAL
Status: COMPLETED | OUTPATIENT
Start: 2025-06-03 | End: 2025-06-03

## 2025-06-03 RX ORDER — GABAPENTIN 300 MG/1
300 CAPSULE ORAL
Status: COMPLETED | OUTPATIENT
Start: 2025-06-03 | End: 2025-06-03

## 2025-06-03 RX ADMIN — ACETAMINOPHEN 650 MG: 325 TABLET ORAL at 06:06

## 2025-06-03 RX ADMIN — FUROSEMIDE 80 MG: 10 INJECTION, SOLUTION INTRAVENOUS at 05:06

## 2025-06-03 RX ADMIN — GABAPENTIN 300 MG: 300 CAPSULE ORAL at 06:06

## 2025-06-03 NOTE — FIRST PROVIDER EVALUATION
Emergency Department TeleTriage Encounter Note      CHIEF COMPLAINT    Chief Complaint   Patient presents with    Leg Swelling     Leg swelling worsening over the past few days. Hx CHF. States compliant with lasix.        VITAL SIGNS   Initial Vitals [06/03/25 1553]   BP Pulse Resp Temp SpO2   139/87 79 18 98.2 °F (36.8 °C) (!) 93 %      MAP       --            ALLERGIES    Review of patient's allergies indicates:   Allergen Reactions    Iodine and iodide containing products Anaphylaxis and Swelling     Facial swelling    Shellfish containing products Anaphylaxis             Compazine [prochlorperazine edisylate] Hallucinations       PROVIDER TRIAGE NOTE  Right leg swelling; was seen approximately three weeks ago for similar. Mild SOB but denies chest pain. Had ultrasound on 5-19-25 that was negative for DVT.     Limited physical exam via telehealth: The patient is awake, alert, answering questions appropriately and is not in respiratory distress.  As the Teletriage provider, I performed an initial assessment and ordered appropriate labs and imaging studies, if any, to facilitate the patient's care once placed in the ED. Once a room is available, care and a full evaluation will be completed by an alternate ED provider.  Any additional orders and the final disposition will be determined by that provider.  All imaging and labs will not be followed-up by the Teletriage Team, including myself.          ORDERS  Labs Reviewed - No data to display    ED Orders (720h ago, onward)      Start Ordered     Status Ordering Provider    06/03/25 1558 06/03/25 1557  Saline lock IV  Once         Ordered ALISSON SOLORZANO    06/03/25 1558 06/03/25 1557  CBC auto differential  Once         Ordered ALISSON SOLORZANO    06/03/25 1558 06/03/25 1557  Comprehensive Metabolic Panel  STAT         Ordered ALISSON SOLORZANO    06/03/25 1558 06/03/25 1557  Brain natriuretic peptide  STAT         Ordered ALISSON SOLORZANO    06/03/25 1558  06/03/25 1557  Troponin I  STAT         Ordered ALISSON SOLORZANO    06/03/25 1558 06/03/25 1557  CBC with Differential  PROCEDURE ONCE         Ordered ALISSON SOLORZANO              Virtual Visit Note: The provider triage portion of this emergency department evaluation and documentation was performed via [employer], a HIPAA-compliant telemedicine application, in concert with a tele-presenter in the room. A face to face patient evaluation with one of my colleagues will occur once the patient is placed in an emergency department room.      DISCLAIMER: This note was prepared with [employer] voice recognition transcription software. Garbled syntax, mangled pronouns, and other bizarre constructions may be attributed to that software system.

## 2025-06-03 NOTE — ED NOTES
Patient presented to the ED with complaints of bilateral leg pain and swelling for last two week, states it's been progressive getting worse, patient has hx of CHF and compliment  on lasix, patient also reports little SOB, SPO2 95% on room air, no nausea, vomiting, no other complaints at this time, patient is connected to the monitor, all vitals stable, warm blanket provided, no other needs voiced at this time.
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
## 2025-06-03 NOTE — ED NOTES
Patient requesting chocolate pudding, per Dr. Lyles patient ok to eat chocolate pudding, needs fulfilled.

## 2025-06-19 ENCOUNTER — TELEPHONE (OUTPATIENT)
Dept: HEPATOLOGY | Facility: CLINIC | Age: 60
End: 2025-06-19
Payer: MEDICAID

## 2025-06-19 NOTE — TELEPHONE ENCOUNTER
Patient scheduled for an appointment with PA Scheuermann along with testing on 9/18/25.  Provider not in clinic physically on that day. Attempt made to reach patient. The above info left on his voicemail. I stressed that appointments ordered by PA Scheuermann were moved to 9/22/25; appointment reminder notice mailed to him along with this note.

## 2025-06-21 ENCOUNTER — HOSPITAL ENCOUNTER (EMERGENCY)
Facility: HOSPITAL | Age: 60
Discharge: HOME | End: 2025-06-21
Attending: EMERGENCY MEDICINE
Payer: COMMERCIAL

## 2025-06-21 VITALS
HEIGHT: 68 IN | SYSTOLIC BLOOD PRESSURE: 108 MMHG | RESPIRATION RATE: 30 BRPM | TEMPERATURE: 98 F | WEIGHT: 270.75 LBS | BODY MASS INDEX: 41.03 KG/M2 | HEART RATE: 79 BPM | DIASTOLIC BLOOD PRESSURE: 60 MMHG | OXYGEN SATURATION: 95 %

## 2025-06-21 DIAGNOSIS — J44.1 COPD EXACERBATION: ICD-10-CM

## 2025-06-21 DIAGNOSIS — R06.02 SHORTNESS OF BREATH: Primary | ICD-10-CM

## 2025-06-21 DIAGNOSIS — R60.0 BILATERAL LOWER EXTREMITY EDEMA: ICD-10-CM

## 2025-06-21 LAB
ABSOLUTE EOSINOPHIL (OHS): 0.48 K/UL
ABSOLUTE MONOCYTE (OHS): 0.49 K/UL (ref 0.3–1)
ABSOLUTE NEUTROPHIL COUNT (OHS): 2.93 K/UL (ref 1.8–7.7)
ALBUMIN SERPL BCP-MCNC: 3.9 G/DL (ref 3.5–5.2)
ALP SERPL-CCNC: 84 UNIT/L (ref 40–150)
ALT SERPL W/O P-5'-P-CCNC: 18 UNIT/L (ref 10–44)
ANION GAP (OHS): 7 MMOL/L (ref 8–16)
AST SERPL-CCNC: 25 UNIT/L (ref 11–45)
BASOPHILS # BLD AUTO: 0.04 K/UL
BASOPHILS NFR BLD AUTO: 0.8 %
BILIRUB SERPL-MCNC: 0.5 MG/DL (ref 0.1–1)
BNP SERPL-MCNC: 18 PG/ML (ref 0–99)
BUN SERPL-MCNC: 23 MG/DL (ref 6–20)
CALCIUM SERPL-MCNC: 9.4 MG/DL (ref 8.7–10.5)
CHLORIDE SERPL-SCNC: 102 MMOL/L (ref 95–110)
CO2 SERPL-SCNC: 29 MMOL/L (ref 23–29)
CREAT SERPL-MCNC: 2.1 MG/DL (ref 0.5–1.4)
ERYTHROCYTE [DISTWIDTH] IN BLOOD BY AUTOMATED COUNT: 11.8 % (ref 11.5–14.5)
GFR SERPLBLD CREATININE-BSD FMLA CKD-EPI: 35 ML/MIN/1.73/M2
GLUCOSE SERPL-MCNC: 101 MG/DL (ref 70–110)
HCT VFR BLD AUTO: 35.3 % (ref 40–54)
HGB BLD-MCNC: 11.9 GM/DL (ref 14–18)
IMM GRANULOCYTES # BLD AUTO: 0.01 K/UL (ref 0–0.04)
IMM GRANULOCYTES NFR BLD AUTO: 0.2 % (ref 0–0.5)
LYMPHOCYTES # BLD AUTO: 1.29 K/UL (ref 1–4.8)
MCH RBC QN AUTO: 31.6 PG (ref 27–31)
MCHC RBC AUTO-ENTMCNC: 33.7 G/DL (ref 32–36)
MCV RBC AUTO: 94 FL (ref 82–98)
NUCLEATED RBC (/100WBC) (OHS): 0 /100 WBC
PLATELET # BLD AUTO: 217 K/UL (ref 150–450)
PMV BLD AUTO: 9.9 FL (ref 9.2–12.9)
POTASSIUM SERPL-SCNC: 3.9 MMOL/L (ref 3.5–5.1)
PROT SERPL-MCNC: 8.3 GM/DL (ref 6–8.4)
RBC # BLD AUTO: 3.76 M/UL (ref 4.6–6.2)
RELATIVE EOSINOPHIL (OHS): 9.2 %
RELATIVE LYMPHOCYTE (OHS): 24.6 % (ref 18–48)
RELATIVE MONOCYTE (OHS): 9.4 % (ref 4–15)
RELATIVE NEUTROPHIL (OHS): 55.8 % (ref 38–73)
SODIUM SERPL-SCNC: 138 MMOL/L (ref 136–145)
TROPONIN I SERPL DL<=0.01 NG/ML-MCNC: <0.006 NG/ML
WBC # BLD AUTO: 5.24 K/UL (ref 3.9–12.7)

## 2025-06-21 PROCEDURE — 25000242 PHARM REV CODE 250 ALT 637 W/ HCPCS: Performed by: EMERGENCY MEDICINE

## 2025-06-21 PROCEDURE — 83880 ASSAY OF NATRIURETIC PEPTIDE: CPT | Performed by: PHYSICIAN ASSISTANT

## 2025-06-21 PROCEDURE — 94640 AIRWAY INHALATION TREATMENT: CPT

## 2025-06-21 PROCEDURE — 96374 THER/PROPH/DIAG INJ IV PUSH: CPT

## 2025-06-21 PROCEDURE — 93005 ELECTROCARDIOGRAM TRACING: CPT

## 2025-06-21 PROCEDURE — 99285 EMERGENCY DEPT VISIT HI MDM: CPT | Mod: 25

## 2025-06-21 PROCEDURE — 63600175 PHARM REV CODE 636 W HCPCS: Performed by: EMERGENCY MEDICINE

## 2025-06-21 PROCEDURE — 85025 COMPLETE CBC W/AUTO DIFF WBC: CPT | Performed by: PHYSICIAN ASSISTANT

## 2025-06-21 PROCEDURE — 93010 ELECTROCARDIOGRAM REPORT: CPT | Mod: ,,, | Performed by: INTERNAL MEDICINE

## 2025-06-21 PROCEDURE — 80053 COMPREHEN METABOLIC PANEL: CPT | Performed by: PHYSICIAN ASSISTANT

## 2025-06-21 PROCEDURE — 84484 ASSAY OF TROPONIN QUANT: CPT | Performed by: PHYSICIAN ASSISTANT

## 2025-06-21 RX ORDER — IPRATROPIUM BROMIDE AND ALBUTEROL SULFATE 2.5; .5 MG/3ML; MG/3ML
3 SOLUTION RESPIRATORY (INHALATION)
Status: COMPLETED | OUTPATIENT
Start: 2025-06-21 | End: 2025-06-21

## 2025-06-21 RX ORDER — FUROSEMIDE 10 MG/ML
40 INJECTION INTRAMUSCULAR; INTRAVENOUS
Status: COMPLETED | OUTPATIENT
Start: 2025-06-21 | End: 2025-06-21

## 2025-06-21 RX ADMIN — FUROSEMIDE 40 MG: 10 INJECTION, SOLUTION INTRAVENOUS at 02:06

## 2025-06-21 RX ADMIN — IPRATROPIUM BROMIDE AND ALBUTEROL SULFATE 3 ML: .5; 3 SOLUTION RESPIRATORY (INHALATION) at 02:06
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
## 2025-06-21 NOTE — ED NOTES
Patient was complaining he had to urinate but was feeling unable, bladder scan picked up ~496mL of urine. Patient began to void and total was able to void 300 mL that was measurable.

## 2025-06-21 NOTE — FIRST PROVIDER EVALUATION
Emergency Department TeleTriage Encounter Note      CHIEF COMPLAINT    Chief Complaint   Patient presents with    Shortness of Breath     Increased shortness of breath and lower extremity swelling ~3 days. Hx CHF. Compliant with Lasix. Speaking in clear sentences without difficulty.       VITAL SIGNS   Initial Vitals [06/21/25 1223]   BP Pulse Resp Temp SpO2   (!) 130/94 91 (!) 22 98.3 °F (36.8 °C) (!) 93 %      MAP       --            ALLERGIES    Review of patient's allergies indicates:   Allergen Reactions    Iodine and iodide containing products Anaphylaxis and Swelling     Facial swelling    Shellfish containing products Anaphylaxis             Compazine [prochlorperazine edisylate] Hallucinations       PROVIDER TRIAGE NOTE  Patient presents with edema and SOB. No chest pain. He is out of BP meds and Lasix.       ORDERS  Labs Reviewed - No data to display    ED Orders (720h ago, onward)      Start Ordered     Status Ordering Provider    06/21/25 1229 06/21/25 1228  EKG 12-lead  Once         Completed by JOI YUAN on 6/21/2025 at 12:29 PM [facility]EMA PALMA              Virtual Visit Note: The provider triage portion of this emergency department evaluation and documentation was performed via [employer], a HIPAA-compliant telemedicine application, in concert with a tele-presenter in the room. A face to face patient evaluation with one of my colleagues will occur once the patient is placed in an emergency department room.      DISCLAIMER: This note was prepared with [employer] voice recognition transcription software. Garbled syntax, mangled pronouns, and other bizarre constructions may be attributed to that software system.

## 2025-06-21 NOTE — ED NOTES
Dyspnea: Patient complains of shortness of breath at rest.  Symptoms include difficulty breathing, dyspnea on exertion, edema bilateral lower extremities, and wheezing. Symptoms began 3 days ago, gradually worsening since that time.  Patient denies constant cough, cough after eating, post nasal drip, sputum production, and unresolving pneumonia. Associated symptoms include dyspnea and wheezing. Patient has not had recent travel.  Weight he thinks has been increasing but he doesn't weigh himself and he takes diuretics.  Appetite has been decreased. Symptoms are exacerbated by lying flat, moderate activity, strenuous activity, and walking. Symptoms are alleviated by diuretics. Patient also reports stress incontinence and dysuria. Unable to void spontaneously on the toilet but has had incontinence from sitting at rest.  To his knowledge does not take any medications for prostate or have any prostate issues. Also has not had a normal BM in 3 days to which he attributes poor appetite from the fluid overload.

## 2025-06-26 ENCOUNTER — TELEPHONE (OUTPATIENT)
Dept: NEUROSURGERY | Facility: CLINIC | Age: 60
End: 2025-06-26
Payer: COMMERCIAL

## 2025-06-26 ENCOUNTER — HOSPITAL ENCOUNTER (OUTPATIENT)
Dept: RADIOLOGY | Facility: HOSPITAL | Age: 60
Discharge: HOME | End: 2025-06-26
Attending: PHYSICIAN ASSISTANT
Payer: COMMERCIAL

## 2025-06-26 DIAGNOSIS — K74.60 HEPATIC CIRRHOSIS, UNSPECIFIED HEPATIC CIRRHOSIS TYPE, UNSPECIFIED WHETHER ASCITES PRESENT: ICD-10-CM

## 2025-06-26 PROCEDURE — 76705 ECHO EXAM OF ABDOMEN: CPT | Mod: 26,,, | Performed by: STUDENT IN AN ORGANIZED HEALTH CARE EDUCATION/TRAINING PROGRAM

## 2025-06-26 PROCEDURE — 76705 ECHO EXAM OF ABDOMEN: CPT | Mod: TC

## 2025-06-26 NOTE — TELEPHONE ENCOUNTER
Attempted to contact the pt, I left a message stating that I was calling to get him rescheduled for his appointment he missed.

## 2025-06-27 ENCOUNTER — RESULTS FOLLOW-UP (OUTPATIENT)
Dept: HEPATOLOGY | Facility: CLINIC | Age: 60
End: 2025-06-27
Payer: COMMERCIAL

## 2025-06-27 ENCOUNTER — TELEPHONE (OUTPATIENT)
Dept: HEPATOLOGY | Facility: CLINIC | Age: 60
End: 2025-06-27
Payer: COMMERCIAL

## 2025-06-27 NOTE — TELEPHONE ENCOUNTER
Spoke with the pt regarding his u/s result from 6/26. Pt stated that someone or he received a text regarding doing his US early that's why it got early tests. I told the pt that he will need to keep his labs and follow up appointment for September. Pt verbalized understanding.

## 2025-06-27 NOTE — TELEPHONE ENCOUNTER
----- Message from Jennifer Scheuermann, PA sent at 6/27/2025  1:44 PM CDT -----  Pls tell pt ultrasound from 6/26 looks ok.        No changes from prior ultrasound. No liver masses or tumors.  This actually wasn't due until Sept; Not sure why it was done early.  For now just keep labs and visit w/ me as scheduled on September 22nd  thanks  ----- Message -----  From: Dayo, Rad Results In  Sent: 6/26/2025   4:21 PM CDT  To: Jennifer B. Scheuermann, PA

## 2025-06-29 LAB
OHS QRS DURATION: 94 MS
OHS QTC CALCULATION: 464 MS

## 2025-07-19 ENCOUNTER — HOSPITAL ENCOUNTER (EMERGENCY)
Facility: HOSPITAL | Age: 60
Discharge: HOME | End: 2025-07-19
Attending: EMERGENCY MEDICINE
Payer: COMMERCIAL

## 2025-07-19 VITALS
BODY MASS INDEX: 40.92 KG/M2 | TEMPERATURE: 99 F | WEIGHT: 270 LBS | RESPIRATION RATE: 16 BRPM | HEART RATE: 64 BPM | SYSTOLIC BLOOD PRESSURE: 178 MMHG | DIASTOLIC BLOOD PRESSURE: 93 MMHG | OXYGEN SATURATION: 94 % | HEIGHT: 68 IN

## 2025-07-19 DIAGNOSIS — J44.9 CHRONIC OBSTRUCTIVE PULMONARY DISEASE, UNSPECIFIED COPD TYPE: ICD-10-CM

## 2025-07-19 DIAGNOSIS — R06.02 SHORTNESS OF BREATH: ICD-10-CM

## 2025-07-19 DIAGNOSIS — I50.9 ACUTE ON CHRONIC CONGESTIVE HEART FAILURE, UNSPECIFIED HEART FAILURE TYPE: Primary | ICD-10-CM

## 2025-07-19 DIAGNOSIS — J44.1 COPD EXACERBATION: ICD-10-CM

## 2025-07-19 LAB
ABSOLUTE EOSINOPHIL (OHS): 0.3 K/UL
ABSOLUTE MONOCYTE (OHS): 0.29 K/UL (ref 0.3–1)
ABSOLUTE NEUTROPHIL COUNT (OHS): 3.25 K/UL (ref 1.8–7.7)
ALBUMIN SERPL BCP-MCNC: 3.8 G/DL (ref 3.5–5.2)
ALP SERPL-CCNC: 72 UNIT/L (ref 40–150)
ALT SERPL W/O P-5'-P-CCNC: 8 UNIT/L (ref 10–44)
ANION GAP (OHS): 7 MMOL/L (ref 8–16)
AST SERPL-CCNC: 15 UNIT/L (ref 11–45)
BASOPHILS # BLD AUTO: 0.02 K/UL
BASOPHILS NFR BLD AUTO: 0.4 %
BILIRUB SERPL-MCNC: 0.5 MG/DL (ref 0.1–1)
BNP SERPL-MCNC: 179 PG/ML (ref 0–99)
BUN SERPL-MCNC: 14 MG/DL (ref 6–20)
CALCIUM SERPL-MCNC: 8.9 MG/DL (ref 8.7–10.5)
CHLORIDE SERPL-SCNC: 105 MMOL/L (ref 95–110)
CO2 SERPL-SCNC: 28 MMOL/L (ref 23–29)
CREAT SERPL-MCNC: 1.6 MG/DL (ref 0.5–1.4)
CTP QC/QA: YES
CTP QC/QA: YES
ERYTHROCYTE [DISTWIDTH] IN BLOOD BY AUTOMATED COUNT: 11.8 % (ref 11.5–14.5)
GFR SERPLBLD CREATININE-BSD FMLA CKD-EPI: 49 ML/MIN/1.73/M2
GLUCOSE SERPL-MCNC: 84 MG/DL (ref 70–110)
HCT VFR BLD AUTO: 35.2 % (ref 40–54)
HGB BLD-MCNC: 11.6 GM/DL (ref 14–18)
IMM GRANULOCYTES # BLD AUTO: 0.01 K/UL (ref 0–0.04)
IMM GRANULOCYTES NFR BLD AUTO: 0.2 % (ref 0–0.5)
LYMPHOCYTES # BLD AUTO: 1.3 K/UL (ref 1–4.8)
MAGNESIUM SERPL-MCNC: 2 MG/DL (ref 1.6–2.6)
MCH RBC QN AUTO: 30.9 PG (ref 27–31)
MCHC RBC AUTO-ENTMCNC: 33 G/DL (ref 32–36)
MCV RBC AUTO: 94 FL (ref 82–98)
NUCLEATED RBC (/100WBC) (OHS): 0 /100 WBC
PLATELET # BLD AUTO: 141 K/UL (ref 150–450)
PLATELET BLD QL SMEAR: ABNORMAL
PMV BLD AUTO: 10.4 FL (ref 9.2–12.9)
POC MOLECULAR INFLUENZA A AGN: NEGATIVE
POC MOLECULAR INFLUENZA B AGN: NEGATIVE
POTASSIUM SERPL-SCNC: 4.4 MMOL/L (ref 3.5–5.1)
PROT SERPL-MCNC: 7.9 GM/DL (ref 6–8.4)
RBC # BLD AUTO: 3.76 M/UL (ref 4.6–6.2)
RELATIVE EOSINOPHIL (OHS): 5.8 %
RELATIVE LYMPHOCYTE (OHS): 25.1 % (ref 18–48)
RELATIVE MONOCYTE (OHS): 5.6 % (ref 4–15)
RELATIVE NEUTROPHIL (OHS): 62.9 % (ref 38–73)
SARS-COV-2 RDRP RESP QL NAA+PROBE: NEGATIVE
SODIUM SERPL-SCNC: 140 MMOL/L (ref 136–145)
TROPONIN I SERPL DL<=0.01 NG/ML-MCNC: <0.006 NG/ML
WBC # BLD AUTO: 5.17 K/UL (ref 3.9–12.7)

## 2025-07-19 PROCEDURE — 94761 N-INVAS EAR/PLS OXIMETRY MLT: CPT

## 2025-07-19 PROCEDURE — 87635 SARS-COV-2 COVID-19 AMP PRB: CPT | Performed by: EMERGENCY MEDICINE

## 2025-07-19 PROCEDURE — 87502 INFLUENZA DNA AMP PROBE: CPT

## 2025-07-19 PROCEDURE — 25000242 PHARM REV CODE 250 ALT 637 W/ HCPCS: Performed by: EMERGENCY MEDICINE

## 2025-07-19 PROCEDURE — 96375 TX/PRO/DX INJ NEW DRUG ADDON: CPT

## 2025-07-19 PROCEDURE — 96374 THER/PROPH/DIAG INJ IV PUSH: CPT

## 2025-07-19 PROCEDURE — 85025 COMPLETE CBC W/AUTO DIFF WBC: CPT | Performed by: EMERGENCY MEDICINE

## 2025-07-19 PROCEDURE — 83880 ASSAY OF NATRIURETIC PEPTIDE: CPT | Performed by: EMERGENCY MEDICINE

## 2025-07-19 PROCEDURE — 93010 ELECTROCARDIOGRAM REPORT: CPT | Mod: ,,, | Performed by: INTERNAL MEDICINE

## 2025-07-19 PROCEDURE — 93005 ELECTROCARDIOGRAM TRACING: CPT

## 2025-07-19 PROCEDURE — 94640 AIRWAY INHALATION TREATMENT: CPT

## 2025-07-19 PROCEDURE — 82040 ASSAY OF SERUM ALBUMIN: CPT | Performed by: EMERGENCY MEDICINE

## 2025-07-19 PROCEDURE — 83735 ASSAY OF MAGNESIUM: CPT | Performed by: EMERGENCY MEDICINE

## 2025-07-19 PROCEDURE — 63600175 PHARM REV CODE 636 W HCPCS: Performed by: EMERGENCY MEDICINE

## 2025-07-19 PROCEDURE — 99285 EMERGENCY DEPT VISIT HI MDM: CPT | Mod: 25

## 2025-07-19 PROCEDURE — 84484 ASSAY OF TROPONIN QUANT: CPT | Performed by: EMERGENCY MEDICINE

## 2025-07-19 RX ORDER — DEXAMETHASONE SODIUM PHOSPHATE 4 MG/ML
8 INJECTION, SOLUTION INTRA-ARTICULAR; INTRALESIONAL; INTRAMUSCULAR; INTRAVENOUS; SOFT TISSUE
Status: COMPLETED | OUTPATIENT
Start: 2025-07-19 | End: 2025-07-19

## 2025-07-19 RX ORDER — ALBUTEROL SULFATE 90 UG/1
2 INHALANT RESPIRATORY (INHALATION) EVERY 6 HOURS PRN
Qty: 54 G | Refills: 0 | Status: SHIPPED | OUTPATIENT
Start: 2025-07-19

## 2025-07-19 RX ORDER — DROPERIDOL 2.5 MG/ML
0.62 INJECTION, SOLUTION INTRAMUSCULAR; INTRAVENOUS
Status: COMPLETED | OUTPATIENT
Start: 2025-07-19 | End: 2025-07-19

## 2025-07-19 RX ORDER — IPRATROPIUM BROMIDE AND ALBUTEROL SULFATE 2.5; .5 MG/3ML; MG/3ML
3 SOLUTION RESPIRATORY (INHALATION)
Status: COMPLETED | OUTPATIENT
Start: 2025-07-19 | End: 2025-07-19

## 2025-07-19 RX ORDER — FUROSEMIDE 80 MG/1
80 TABLET ORAL 2 TIMES DAILY
Qty: 60 TABLET | Refills: 0 | Status: SHIPPED | OUTPATIENT
Start: 2025-07-19 | End: 2026-07-19

## 2025-07-19 RX ORDER — BENZONATATE 100 MG/1
100 CAPSULE ORAL EVERY 8 HOURS PRN
Qty: 14 CAPSULE | Refills: 0 | Status: SHIPPED | OUTPATIENT
Start: 2025-07-19 | End: 2025-07-30

## 2025-07-19 RX ORDER — FUROSEMIDE 10 MG/ML
80 INJECTION INTRAMUSCULAR; INTRAVENOUS
Status: COMPLETED | OUTPATIENT
Start: 2025-07-19 | End: 2025-07-19

## 2025-07-19 RX ADMIN — DROPERIDOL 0.62 MG: 2.5 INJECTION, SOLUTION INTRAMUSCULAR; INTRAVENOUS at 01:07

## 2025-07-19 RX ADMIN — FUROSEMIDE 80 MG: 10 INJECTION, SOLUTION INTRAVENOUS at 02:07

## 2025-07-19 RX ADMIN — IPRATROPIUM BROMIDE AND ALBUTEROL SULFATE 3 ML: .5; 3 SOLUTION RESPIRATORY (INHALATION) at 11:07

## 2025-07-19 RX ADMIN — DEXAMETHASONE SODIUM PHOSPHATE 8 MG: 4 INJECTION, SOLUTION INTRA-ARTICULAR; INTRALESIONAL; INTRAMUSCULAR; INTRAVENOUS; SOFT TISSUE at 01:07
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
## 2025-07-19 NOTE — DISCHARGE INSTRUCTIONS
Please call your primary care physician Monday for a follow-up appointment for further evaluation and management.  Please return with any new or worsening symptoms.

## 2025-07-19 NOTE — ED TRIAGE NOTES
Patient arrives to the ED w/ complaints of SOB. Reports changes in environment and increased humidity worsens symptoms. Reports CP r/t coughing. Reports 7/10 midsternal pressure. Reports 8/10 parietal headache. Denies dizziness, blurred vision, weakness, N/V. Denies taking meds for symptoms PTA.

## 2025-07-22 DIAGNOSIS — J44.9 CHRONIC OBSTRUCTIVE PULMONARY DISEASE, UNSPECIFIED COPD TYPE: ICD-10-CM

## 2025-07-22 RX ORDER — ALBUTEROL SULFATE 90 UG/1
2 INHALANT RESPIRATORY (INHALATION) EVERY 6 HOURS PRN
Qty: 54 G | Refills: 0 | Status: CANCELLED | OUTPATIENT
Start: 2025-07-22 | End: 2026-07-22

## 2025-07-26 LAB
OHS QRS DURATION: 90 MS
OHS QTC CALCULATION: 451 MS

## 2025-08-15 ENCOUNTER — HOSPITAL ENCOUNTER (EMERGENCY)
Facility: HOSPITAL | Age: 60
Discharge: HOME | End: 2025-08-15
Payer: COMMERCIAL

## 2025-08-15 VITALS
OXYGEN SATURATION: 92 % | TEMPERATURE: 99 F | HEART RATE: 86 BPM | HEIGHT: 68 IN | BODY MASS INDEX: 40.92 KG/M2 | SYSTOLIC BLOOD PRESSURE: 157 MMHG | DIASTOLIC BLOOD PRESSURE: 100 MMHG | RESPIRATION RATE: 20 BRPM | WEIGHT: 270 LBS

## 2025-08-15 DIAGNOSIS — S01.432A: Primary | ICD-10-CM

## 2025-08-15 PROCEDURE — 25000003 PHARM REV CODE 250

## 2025-08-15 PROCEDURE — 99284 EMERGENCY DEPT VISIT MOD MDM: CPT | Mod: 25

## 2025-08-15 RX ORDER — ACETAMINOPHEN 500 MG
1000 TABLET ORAL
Status: COMPLETED | OUTPATIENT
Start: 2025-08-15 | End: 2025-08-15

## 2025-08-15 RX ADMIN — ACETAMINOPHEN 1000 MG: 500 TABLET ORAL at 07:08

## 2025-08-31 ENCOUNTER — HOSPITAL ENCOUNTER (INPATIENT)
Facility: HOSPITAL | Age: 60
LOS: 1 days | Discharge: HOME | DRG: 292 | End: 2025-09-03
Attending: EMERGENCY MEDICINE | Admitting: FAMILY MEDICINE
Payer: COMMERCIAL

## 2025-08-31 DIAGNOSIS — J44.1 COPD EXACERBATION: ICD-10-CM

## 2025-08-31 DIAGNOSIS — I50.9 ACUTE ON CHRONIC CONGESTIVE HEART FAILURE, UNSPECIFIED HEART FAILURE TYPE: Primary | ICD-10-CM

## 2025-08-31 DIAGNOSIS — J44.9 CHRONIC OBSTRUCTIVE PULMONARY DISEASE, UNSPECIFIED COPD TYPE: ICD-10-CM

## 2025-08-31 DIAGNOSIS — R06.02 SHORTNESS OF BREATH: ICD-10-CM

## 2025-08-31 DIAGNOSIS — R06.2 WHEEZING: ICD-10-CM

## 2025-08-31 DIAGNOSIS — J44.1 COPD WITH ACUTE EXACERBATION: ICD-10-CM

## 2025-08-31 DIAGNOSIS — R09.02 HYPOXIA: ICD-10-CM

## 2025-08-31 DIAGNOSIS — K59.00 CONSTIPATION, UNSPECIFIED CONSTIPATION TYPE: ICD-10-CM

## 2025-08-31 DIAGNOSIS — G47.00 INSOMNIA, UNSPECIFIED TYPE: ICD-10-CM

## 2025-08-31 DIAGNOSIS — R60.0 LOWER EXTREMITY EDEMA: ICD-10-CM

## 2025-08-31 LAB
ABSOLUTE EOSINOPHIL (OHS): 0.7 K/UL
ABSOLUTE MONOCYTE (OHS): 0.4 K/UL (ref 0.3–1)
ABSOLUTE NEUTROPHIL COUNT (OHS): 4.38 K/UL (ref 1.8–7.7)
ALBUMIN SERPL BCP-MCNC: 4 G/DL (ref 3.5–5.2)
ALLENS TEST: YES
ALP SERPL-CCNC: 65 UNIT/L (ref 40–150)
ALT SERPL W/O P-5'-P-CCNC: 10 UNIT/L (ref 10–44)
AMPHET UR QL SCN: NEGATIVE
ANION GAP (OHS): 8 MMOL/L (ref 8–16)
AST SERPL-CCNC: 37 UNIT/L (ref 11–45)
BARBITURATE SCN PRESENT UR: NEGATIVE
BASOPHILS # BLD AUTO: 0.04 K/UL
BASOPHILS NFR BLD AUTO: 0.6 %
BENZODIAZ UR QL SCN: NEGATIVE
BILIRUB SERPL-MCNC: 0.5 MG/DL (ref 0.1–1)
BILIRUB UR QL STRIP.AUTO: NEGATIVE
BUN SERPL-MCNC: 28 MG/DL (ref 6–20)
CALCIUM SERPL-MCNC: 9.4 MG/DL (ref 8.7–10.5)
CANNABINOIDS UR QL SCN: NEGATIVE
CHLORIDE SERPL-SCNC: 103 MMOL/L (ref 95–110)
CLARITY UR: CLEAR
CO2 SERPL-SCNC: 27 MMOL/L (ref 23–29)
COCAINE UR QL SCN: NEGATIVE
COLOR UR AUTO: YELLOW
CREAT SERPL-MCNC: 2 MG/DL (ref 0.5–1.4)
CREAT UR-MCNC: 58.9 MG/DL (ref 23–375)
EAG (OHS): 82 MG/DL (ref 68–131)
ERYTHROCYTE [DISTWIDTH] IN BLOOD BY AUTOMATED COUNT: 12.1 % (ref 11.5–14.5)
FIO2: 36 %
FLUAV AG UPPER RESP QL IA.RAPID: NEGATIVE
FLUBV AG UPPER RESP QL IA.RAPID: NEGATIVE
GFR SERPLBLD CREATININE-BSD FMLA CKD-EPI: 38 ML/MIN/1.73/M2
GLUCOSE SERPL-MCNC: 91 MG/DL (ref 70–110)
GLUCOSE UR QL STRIP: NEGATIVE
HBA1C MFR BLD: 4.5 % (ref 4–5.6)
HCT VFR BLD AUTO: 34 % (ref 40–54)
HGB BLD-MCNC: 11.1 GM/DL (ref 14–18)
HGB UR QL STRIP: NEGATIVE
IMM GRANULOCYTES # BLD AUTO: 0.02 K/UL (ref 0–0.04)
IMM GRANULOCYTES NFR BLD AUTO: 0.3 % (ref 0–0.5)
KETONES UR QL STRIP: NEGATIVE
LEUKOCYTE ESTERASE UR QL STRIP: ABNORMAL
LPM: 4
LYMPHOCYTES # BLD AUTO: 1.12 K/UL (ref 1–4.8)
MAGNESIUM SERPL-MCNC: 2.6 MG/DL (ref 1.6–2.6)
MCH RBC QN AUTO: 31.1 PG (ref 27–31)
MCHC RBC AUTO-ENTMCNC: 32.6 G/DL (ref 32–36)
MCV RBC AUTO: 95 FL (ref 82–98)
METHADONE UR QL SCN: ABNORMAL
MICROSCOPIC COMMENT: NORMAL
NITRITE UR QL STRIP: NEGATIVE
NT-PROBNP SERPL-MCNC: 124 PG/ML
NUCLEATED RBC (/100WBC) (OHS): 0 /100 WBC
OPIATES UR QL SCN: ABNORMAL
PCO2 BLDA: 65.4 MMHG (ref 35–45)
PCP UR QL: NEGATIVE
PH SMN: 7.29 [PH] (ref 7.35–7.45)
PH UR STRIP: 7 [PH]
PLATELET # BLD AUTO: 181 K/UL (ref 150–450)
PMV BLD AUTO: 10.3 FL (ref 9.2–12.9)
PO2 BLDA: 90.5 MMHG (ref 80–100)
POC BASE DEFICIT: 3.3 MMOL/L (ref -2–2)
POC HCO3: 31.5 MMOL/L (ref 24–28)
POC PERFORMED BY: ABNORMAL
POC SATURATED O2: 96.4 % (ref 95–100)
POTASSIUM SERPL-SCNC: 5.8 MMOL/L (ref 3.5–5.1)
PROT SERPL-MCNC: 8.5 GM/DL (ref 6–8.4)
PROT UR QL STRIP: NEGATIVE
RBC # BLD AUTO: 3.57 M/UL (ref 4.6–6.2)
RELATIVE EOSINOPHIL (OHS): 10.5 %
RELATIVE LYMPHOCYTE (OHS): 16.8 % (ref 18–48)
RELATIVE MONOCYTE (OHS): 6 % (ref 4–15)
RELATIVE NEUTROPHIL (OHS): 65.8 % (ref 38–73)
SARS-COV-2 RDRP RESP QL NAA+PROBE: NEGATIVE
SITE: ABNORMAL
SODIUM SERPL-SCNC: 138 MMOL/L (ref 136–145)
SP GR UR STRIP: 1.01
SPECIMEN SOURCE: ABNORMAL
SQUAMOUS #/AREA URNS AUTO: <1 /HPF
TROPONIN I SERPL HS-MCNC: <3 NG/L
UROBILINOGEN UR STRIP-ACNC: NEGATIVE EU/DL
WBC # BLD AUTO: 6.66 K/UL (ref 3.9–12.7)
WBC #/AREA URNS AUTO: 1 /HPF (ref 0–5)

## 2025-08-31 PROCEDURE — 25000003 PHARM REV CODE 250

## 2025-08-31 PROCEDURE — 80053 COMPREHEN METABOLIC PANEL: CPT | Performed by: EMERGENCY MEDICINE

## 2025-08-31 PROCEDURE — 85025 COMPLETE CBC W/AUTO DIFF WBC: CPT | Performed by: EMERGENCY MEDICINE

## 2025-08-31 PROCEDURE — 80307 DRUG TEST PRSMV CHEM ANLYZR: CPT

## 2025-08-31 PROCEDURE — 83036 HEMOGLOBIN GLYCOSYLATED A1C: CPT

## 2025-08-31 PROCEDURE — 99900035 HC TECH TIME PER 15 MIN (STAT)

## 2025-08-31 PROCEDURE — 63600175 PHARM REV CODE 636 W HCPCS: Performed by: EMERGENCY MEDICINE

## 2025-08-31 PROCEDURE — 83735 ASSAY OF MAGNESIUM: CPT | Performed by: EMERGENCY MEDICINE

## 2025-08-31 PROCEDURE — 96372 THER/PROPH/DIAG INJ SC/IM: CPT

## 2025-08-31 PROCEDURE — 93010 ELECTROCARDIOGRAM REPORT: CPT | Mod: ,,, | Performed by: INTERNAL MEDICINE

## 2025-08-31 PROCEDURE — 94640 AIRWAY INHALATION TREATMENT: CPT | Mod: XB

## 2025-08-31 PROCEDURE — 63600175 PHARM REV CODE 636 W HCPCS

## 2025-08-31 PROCEDURE — 94761 N-INVAS EAR/PLS OXIMETRY MLT: CPT

## 2025-08-31 PROCEDURE — 93005 ELECTROCARDIOGRAM TRACING: CPT

## 2025-08-31 PROCEDURE — 25000242 PHARM REV CODE 250 ALT 637 W/ HCPCS

## 2025-08-31 PROCEDURE — 82803 BLOOD GASES ANY COMBINATION: CPT

## 2025-08-31 PROCEDURE — U0002 COVID-19 LAB TEST NON-CDC: HCPCS

## 2025-08-31 PROCEDURE — G0378 HOSPITAL OBSERVATION PER HR: HCPCS

## 2025-08-31 PROCEDURE — 94644 CONT INHLJ TX 1ST HOUR: CPT

## 2025-08-31 PROCEDURE — 83880 ASSAY OF NATRIURETIC PEPTIDE: CPT | Performed by: EMERGENCY MEDICINE

## 2025-08-31 PROCEDURE — 36600 WITHDRAWAL OF ARTERIAL BLOOD: CPT

## 2025-08-31 PROCEDURE — 81001 URINALYSIS AUTO W/SCOPE: CPT | Mod: XB

## 2025-08-31 PROCEDURE — 25000242 PHARM REV CODE 250 ALT 637 W/ HCPCS: Performed by: EMERGENCY MEDICINE

## 2025-08-31 PROCEDURE — 87502 INFLUENZA DNA AMP PROBE: CPT

## 2025-08-31 PROCEDURE — 84484 ASSAY OF TROPONIN QUANT: CPT | Performed by: EMERGENCY MEDICINE

## 2025-08-31 PROCEDURE — 27000221 HC OXYGEN, UP TO 24 HOURS

## 2025-08-31 RX ORDER — ALBUTEROL SULFATE 2.5 MG/.5ML
10 SOLUTION RESPIRATORY (INHALATION)
Status: COMPLETED | OUTPATIENT
Start: 2025-08-31 | End: 2025-08-31

## 2025-08-31 RX ORDER — ACETAMINOPHEN 325 MG/1
650 TABLET ORAL EVERY 4 HOURS PRN
Status: DISCONTINUED | OUTPATIENT
Start: 2025-08-31 | End: 2025-09-02

## 2025-08-31 RX ORDER — SODIUM CHLORIDE 0.9 % (FLUSH) 0.9 %
5 SYRINGE (ML) INJECTION
Status: DISCONTINUED | OUTPATIENT
Start: 2025-08-31 | End: 2025-08-31

## 2025-08-31 RX ORDER — IPRATROPIUM BROMIDE AND ALBUTEROL SULFATE 2.5; .5 MG/3ML; MG/3ML
3 SOLUTION RESPIRATORY (INHALATION) EVERY 4 HOURS
Status: DISCONTINUED | OUTPATIENT
Start: 2025-09-01 | End: 2025-09-03 | Stop reason: HOSPADM

## 2025-08-31 RX ORDER — SODIUM CHLORIDE 0.9 % (FLUSH) 0.9 %
10 SYRINGE (ML) INJECTION
Status: DISCONTINUED | OUTPATIENT
Start: 2025-08-31 | End: 2025-09-03 | Stop reason: HOSPADM

## 2025-08-31 RX ORDER — POLYETHYLENE GLYCOL 3350 17 G/17G
17 POWDER, FOR SOLUTION ORAL DAILY PRN
Status: DISCONTINUED | OUTPATIENT
Start: 2025-08-31 | End: 2025-09-03 | Stop reason: HOSPADM

## 2025-08-31 RX ORDER — FLUTICASONE FUROATE AND VILANTEROL 100; 25 UG/1; UG/1
1 POWDER RESPIRATORY (INHALATION) DAILY
Status: DISCONTINUED | OUTPATIENT
Start: 2025-08-31 | End: 2025-09-03 | Stop reason: HOSPADM

## 2025-08-31 RX ORDER — IPRATROPIUM BROMIDE AND ALBUTEROL SULFATE 2.5; .5 MG/3ML; MG/3ML
3 SOLUTION RESPIRATORY (INHALATION)
Status: COMPLETED | OUTPATIENT
Start: 2025-08-31 | End: 2025-08-31

## 2025-08-31 RX ORDER — IPRATROPIUM BROMIDE AND ALBUTEROL SULFATE 2.5; .5 MG/3ML; MG/3ML
3 SOLUTION RESPIRATORY (INHALATION)
Status: DISCONTINUED | OUTPATIENT
Start: 2025-08-31 | End: 2025-08-31

## 2025-08-31 RX ORDER — FLUTICASONE PROPIONATE 50 MCG
2 SPRAY, SUSPENSION (ML) NASAL DAILY
Status: COMPLETED | OUTPATIENT
Start: 2025-08-31 | End: 2025-08-31

## 2025-08-31 RX ORDER — ENOXAPARIN SODIUM 100 MG/ML
40 INJECTION SUBCUTANEOUS EVERY 24 HOURS
Status: DISCONTINUED | OUTPATIENT
Start: 2025-08-31 | End: 2025-09-03 | Stop reason: HOSPADM

## 2025-08-31 RX ORDER — ALBUTEROL SULFATE 90 UG/1
2 INHALANT RESPIRATORY (INHALATION) EVERY 6 HOURS PRN
Status: DISCONTINUED | OUTPATIENT
Start: 2025-08-31 | End: 2025-09-03 | Stop reason: HOSPADM

## 2025-08-31 RX ORDER — GABAPENTIN 100 MG/1
100 CAPSULE ORAL 3 TIMES DAILY PRN
Status: DISCONTINUED | OUTPATIENT
Start: 2025-08-31 | End: 2025-09-03 | Stop reason: HOSPADM

## 2025-08-31 RX ORDER — FUROSEMIDE 10 MG/ML
40 INJECTION INTRAMUSCULAR; INTRAVENOUS
Status: COMPLETED | OUTPATIENT
Start: 2025-08-31 | End: 2025-08-31

## 2025-08-31 RX ORDER — IBUPROFEN 200 MG
16 TABLET ORAL
Status: DISCONTINUED | OUTPATIENT
Start: 2025-08-31 | End: 2025-09-03 | Stop reason: HOSPADM

## 2025-08-31 RX ORDER — TALC
6 POWDER (GRAM) TOPICAL NIGHTLY PRN
Status: DISCONTINUED | OUTPATIENT
Start: 2025-08-31 | End: 2025-09-03 | Stop reason: HOSPADM

## 2025-08-31 RX ORDER — PREDNISONE 20 MG/1
40 TABLET ORAL DAILY
Status: DISCONTINUED | OUTPATIENT
Start: 2025-09-01 | End: 2025-09-03 | Stop reason: HOSPADM

## 2025-08-31 RX ORDER — ONDANSETRON HYDROCHLORIDE 2 MG/ML
4 INJECTION, SOLUTION INTRAVENOUS EVERY 8 HOURS PRN
Status: DISCONTINUED | OUTPATIENT
Start: 2025-08-31 | End: 2025-09-03 | Stop reason: HOSPADM

## 2025-08-31 RX ORDER — NALOXONE HCL 0.4 MG/ML
0.02 VIAL (ML) INJECTION
Status: DISCONTINUED | OUTPATIENT
Start: 2025-08-31 | End: 2025-09-03 | Stop reason: HOSPADM

## 2025-08-31 RX ORDER — TRAZODONE HYDROCHLORIDE 100 MG/1
100 TABLET ORAL NIGHTLY
Status: DISCONTINUED | OUTPATIENT
Start: 2025-08-31 | End: 2025-09-03 | Stop reason: HOSPADM

## 2025-08-31 RX ORDER — TAMSULOSIN HYDROCHLORIDE 0.4 MG/1
0.4 CAPSULE ORAL DAILY
Status: DISCONTINUED | OUTPATIENT
Start: 2025-09-01 | End: 2025-09-03 | Stop reason: HOSPADM

## 2025-08-31 RX ORDER — DEXAMETHASONE SODIUM PHOSPHATE 4 MG/ML
8 INJECTION, SOLUTION INTRA-ARTICULAR; INTRALESIONAL; INTRAMUSCULAR; INTRAVENOUS; SOFT TISSUE
Status: COMPLETED | OUTPATIENT
Start: 2025-08-31 | End: 2025-08-31

## 2025-08-31 RX ORDER — FUROSEMIDE 10 MG/ML
80 INJECTION INTRAMUSCULAR; INTRAVENOUS 2 TIMES DAILY
Status: DISCONTINUED | OUTPATIENT
Start: 2025-08-31 | End: 2025-09-01

## 2025-08-31 RX ORDER — ESCITALOPRAM OXALATE 20 MG/1
20 TABLET ORAL DAILY
Status: ON HOLD | COMMUNITY
Start: 2025-07-02 | End: 2025-09-03

## 2025-08-31 RX ORDER — AMOXICILLIN 250 MG
1 CAPSULE ORAL 2 TIMES DAILY
Status: DISCONTINUED | OUTPATIENT
Start: 2025-08-31 | End: 2025-09-01

## 2025-08-31 RX ORDER — GLUCAGON 1 MG
1 KIT INJECTION
Status: DISCONTINUED | OUTPATIENT
Start: 2025-08-31 | End: 2025-09-03 | Stop reason: HOSPADM

## 2025-08-31 RX ORDER — IBUPROFEN 200 MG
24 TABLET ORAL
Status: DISCONTINUED | OUTPATIENT
Start: 2025-08-31 | End: 2025-09-03 | Stop reason: HOSPADM

## 2025-08-31 RX ADMIN — TRAZODONE HYDROCHLORIDE 100 MG: 100 TABLET ORAL at 09:08

## 2025-08-31 RX ADMIN — ALBUTEROL SULFATE 10 MG: 2.5 SOLUTION RESPIRATORY (INHALATION) at 07:08

## 2025-08-31 RX ADMIN — IPRATROPIUM BROMIDE AND ALBUTEROL SULFATE 3 ML: 2.5; .5 SOLUTION RESPIRATORY (INHALATION) at 05:08

## 2025-08-31 RX ADMIN — IPRATROPIUM BROMIDE AND ALBUTEROL SULFATE 3 ML: 2.5; .5 SOLUTION RESPIRATORY (INHALATION) at 11:08

## 2025-08-31 RX ADMIN — FUROSEMIDE 40 MG: 10 INJECTION, SOLUTION INTRAMUSCULAR; INTRAVENOUS at 07:08

## 2025-08-31 RX ADMIN — FLUTICASONE PROPIONATE 100 MCG: 50 SPRAY, METERED NASAL at 09:08

## 2025-08-31 RX ADMIN — FUROSEMIDE 80 MG: 10 INJECTION, SOLUTION INTRAVENOUS at 09:08

## 2025-08-31 RX ADMIN — ENOXAPARIN SODIUM 40 MG: 40 INJECTION SUBCUTANEOUS at 09:08

## 2025-08-31 RX ADMIN — DEXAMETHASONE SODIUM PHOSPHATE 8 MG: 4 INJECTION, SOLUTION INTRA-ARTICULAR; INTRALESIONAL; INTRAMUSCULAR; INTRAVENOUS; SOFT TISSUE at 07:08

## 2025-08-31 RX ADMIN — SENNOSIDES AND DOCUSATE SODIUM 1 TABLET: 50; 8.6 TABLET ORAL at 09:08

## 2025-08-31 RX ADMIN — AZITHROMYCIN 500 MG: 500 INJECTION, POWDER, LYOPHILIZED, FOR SOLUTION INTRAVENOUS at 11:08

## 2025-09-01 LAB
ABSOLUTE EOSINOPHIL (OHS): 0 K/UL
ABSOLUTE MONOCYTE (OHS): 0.06 K/UL (ref 0.3–1)
ABSOLUTE NEUTROPHIL COUNT (OHS): 2.45 K/UL (ref 1.8–7.7)
ALBUMIN SERPL BCP-MCNC: 3.8 G/DL (ref 3.5–5.2)
ALP SERPL-CCNC: 67 UNIT/L (ref 40–150)
ALT SERPL W/O P-5'-P-CCNC: 9 UNIT/L (ref 10–44)
ANION GAP (OHS): 9 MMOL/L (ref 8–16)
AST SERPL-CCNC: 18 UNIT/L (ref 11–45)
BASOPHILS # BLD AUTO: 0 K/UL
BASOPHILS NFR BLD AUTO: 0 %
BILIRUB SERPL-MCNC: 0.5 MG/DL (ref 0.1–1)
BUN SERPL-MCNC: 27 MG/DL (ref 6–20)
CALCIUM SERPL-MCNC: 9.3 MG/DL (ref 8.7–10.5)
CHLORIDE SERPL-SCNC: 102 MMOL/L (ref 95–110)
CO2 SERPL-SCNC: 30 MMOL/L (ref 23–29)
CREAT SERPL-MCNC: 2 MG/DL (ref 0.5–1.4)
ERYTHROCYTE [DISTWIDTH] IN BLOOD BY AUTOMATED COUNT: 11.9 % (ref 11.5–14.5)
GFR SERPLBLD CREATININE-BSD FMLA CKD-EPI: 38 ML/MIN/1.73/M2
GLUCOSE SERPL-MCNC: 131 MG/DL (ref 70–110)
HCT VFR BLD AUTO: 35.3 % (ref 40–54)
HGB BLD-MCNC: 11.2 GM/DL (ref 14–18)
IMM GRANULOCYTES # BLD AUTO: 0 K/UL (ref 0–0.04)
IMM GRANULOCYTES NFR BLD AUTO: 0 % (ref 0–0.5)
LYMPHOCYTES # BLD AUTO: 0.37 K/UL (ref 1–4.8)
MAGNESIUM SERPL-MCNC: 2.4 MG/DL (ref 1.6–2.6)
MCH RBC QN AUTO: 30.4 PG (ref 27–31)
MCHC RBC AUTO-ENTMCNC: 31.7 G/DL (ref 32–36)
MCV RBC AUTO: 96 FL (ref 82–98)
NUCLEATED RBC (/100WBC) (OHS): 0 /100 WBC
PHOSPHATE SERPL-MCNC: 3.7 MG/DL (ref 2.7–4.5)
PLATELET # BLD AUTO: 152 K/UL (ref 150–450)
PMV BLD AUTO: 11.2 FL (ref 9.2–12.9)
POTASSIUM SERPL-SCNC: 4.6 MMOL/L (ref 3.5–5.1)
PROT SERPL-MCNC: 7.7 GM/DL (ref 6–8.4)
RBC # BLD AUTO: 3.68 M/UL (ref 4.6–6.2)
RELATIVE EOSINOPHIL (OHS): 0 %
RELATIVE LYMPHOCYTE (OHS): 12.8 % (ref 18–48)
RELATIVE MONOCYTE (OHS): 2.1 % (ref 4–15)
RELATIVE NEUTROPHIL (OHS): 85.1 % (ref 38–73)
SODIUM SERPL-SCNC: 141 MMOL/L (ref 136–145)
WBC # BLD AUTO: 2.88 K/UL (ref 3.9–12.7)

## 2025-09-01 PROCEDURE — 85025 COMPLETE CBC W/AUTO DIFF WBC: CPT

## 2025-09-01 PROCEDURE — G0378 HOSPITAL OBSERVATION PER HR: HCPCS

## 2025-09-01 PROCEDURE — 94640 AIRWAY INHALATION TREATMENT: CPT | Mod: XB

## 2025-09-01 PROCEDURE — 83735 ASSAY OF MAGNESIUM: CPT

## 2025-09-01 PROCEDURE — 94761 N-INVAS EAR/PLS OXIMETRY MLT: CPT

## 2025-09-01 PROCEDURE — 84100 ASSAY OF PHOSPHORUS: CPT

## 2025-09-01 PROCEDURE — 96372 THER/PROPH/DIAG INJ SC/IM: CPT

## 2025-09-01 PROCEDURE — 99900035 HC TECH TIME PER 15 MIN (STAT)

## 2025-09-01 PROCEDURE — 27000221 HC OXYGEN, UP TO 24 HOURS

## 2025-09-01 PROCEDURE — 63600175 PHARM REV CODE 636 W HCPCS: Performed by: GENERAL PRACTICE

## 2025-09-01 PROCEDURE — 25000003 PHARM REV CODE 250: Performed by: GENERAL PRACTICE

## 2025-09-01 PROCEDURE — 63600175 PHARM REV CODE 636 W HCPCS

## 2025-09-01 PROCEDURE — 36415 COLL VENOUS BLD VENIPUNCTURE: CPT

## 2025-09-01 PROCEDURE — 25000242 PHARM REV CODE 250 ALT 637 W/ HCPCS

## 2025-09-01 PROCEDURE — 25000003 PHARM REV CODE 250

## 2025-09-01 PROCEDURE — 80053 COMPREHEN METABOLIC PANEL: CPT

## 2025-09-01 RX ORDER — FUROSEMIDE 40 MG/1
80 TABLET ORAL 2 TIMES DAILY
Status: DISCONTINUED | OUTPATIENT
Start: 2025-09-02 | End: 2025-09-02

## 2025-09-01 RX ORDER — FUROSEMIDE 10 MG/ML
80 INJECTION INTRAMUSCULAR; INTRAVENOUS 2 TIMES DAILY
Status: COMPLETED | OUTPATIENT
Start: 2025-09-01 | End: 2025-09-01

## 2025-09-01 RX ORDER — AMOXICILLIN 250 MG
1 CAPSULE ORAL 2 TIMES DAILY
Status: DISCONTINUED | OUTPATIENT
Start: 2025-09-01 | End: 2025-09-03 | Stop reason: HOSPADM

## 2025-09-01 RX ORDER — ESCITALOPRAM OXALATE 10 MG/1
20 TABLET ORAL DAILY
Status: DISCONTINUED | OUTPATIENT
Start: 2025-09-01 | End: 2025-09-03 | Stop reason: HOSPADM

## 2025-09-01 RX ADMIN — TAMSULOSIN HYDROCHLORIDE 0.4 MG: 0.4 CAPSULE ORAL at 09:09

## 2025-09-01 RX ADMIN — IPRATROPIUM BROMIDE AND ALBUTEROL SULFATE 3 ML: 2.5; .5 SOLUTION RESPIRATORY (INHALATION) at 04:09

## 2025-09-01 RX ADMIN — FUROSEMIDE 80 MG: 10 INJECTION, SOLUTION INTRAVENOUS at 09:09

## 2025-09-01 RX ADMIN — FLUTICASONE FUROATE AND VILANTEROL TRIFENATATE 1 PUFF: 100; 25 POWDER RESPIRATORY (INHALATION) at 07:09

## 2025-09-01 RX ADMIN — SENNOSIDES AND DOCUSATE SODIUM 1 TABLET: 50; 8.6 TABLET ORAL at 09:09

## 2025-09-01 RX ADMIN — ESCITALOPRAM OXALATE 20 MG: 10 TABLET ORAL at 04:09

## 2025-09-01 RX ADMIN — ACETAMINOPHEN 650 MG: 325 TABLET ORAL at 07:09

## 2025-09-01 RX ADMIN — IPRATROPIUM BROMIDE AND ALBUTEROL SULFATE 3 ML: 2.5; .5 SOLUTION RESPIRATORY (INHALATION) at 07:09

## 2025-09-01 RX ADMIN — PREDNISONE 40 MG: 20 TABLET ORAL at 09:09

## 2025-09-01 RX ADMIN — TRAZODONE HYDROCHLORIDE 100 MG: 100 TABLET ORAL at 09:09

## 2025-09-01 RX ADMIN — ENOXAPARIN SODIUM 40 MG: 40 INJECTION SUBCUTANEOUS at 04:09

## 2025-09-01 RX ADMIN — AZITHROMYCIN 500 MG: 500 INJECTION, POWDER, LYOPHILIZED, FOR SOLUTION INTRAVENOUS at 09:09

## 2025-09-01 RX ADMIN — IPRATROPIUM BROMIDE AND ALBUTEROL SULFATE 3 ML: 2.5; .5 SOLUTION RESPIRATORY (INHALATION) at 02:09

## 2025-09-01 RX ADMIN — IPRATROPIUM BROMIDE AND ALBUTEROL SULFATE 3 ML: 2.5; .5 SOLUTION RESPIRATORY (INHALATION) at 11:09

## 2025-09-02 LAB
ABSOLUTE EOSINOPHIL (OHS): 0.03 K/UL
ABSOLUTE MONOCYTE (OHS): 0.39 K/UL (ref 0.3–1)
ABSOLUTE NEUTROPHIL COUNT (OHS): 3.78 K/UL (ref 1.8–7.7)
ALBUMIN SERPL BCP-MCNC: 3.4 G/DL (ref 3.5–5.2)
ALP SERPL-CCNC: 60 UNIT/L (ref 40–150)
ALT SERPL W/O P-5'-P-CCNC: <8 UNIT/L (ref 10–44)
ANION GAP (OHS): 8 MMOL/L (ref 8–16)
AST SERPL-CCNC: 15 UNIT/L (ref 11–45)
BASOPHILS # BLD AUTO: 0.01 K/UL
BASOPHILS NFR BLD AUTO: 0.2 %
BILIRUB SERPL-MCNC: 0.4 MG/DL (ref 0.1–1)
BUN SERPL-MCNC: 32 MG/DL (ref 6–20)
CALCIUM SERPL-MCNC: 9.3 MG/DL (ref 8.7–10.5)
CHLORIDE SERPL-SCNC: 100 MMOL/L (ref 95–110)
CO2 SERPL-SCNC: 32 MMOL/L (ref 23–29)
CREAT SERPL-MCNC: 1.7 MG/DL (ref 0.5–1.4)
ERYTHROCYTE [DISTWIDTH] IN BLOOD BY AUTOMATED COUNT: 12.1 % (ref 11.5–14.5)
GFR SERPLBLD CREATININE-BSD FMLA CKD-EPI: 46 ML/MIN/1.73/M2
GLUCOSE SERPL-MCNC: 108 MG/DL (ref 70–110)
HCT VFR BLD AUTO: 33.1 % (ref 40–54)
HGB BLD-MCNC: 10.6 GM/DL (ref 14–18)
IMM GRANULOCYTES # BLD AUTO: 0.01 K/UL (ref 0–0.04)
IMM GRANULOCYTES NFR BLD AUTO: 0.2 % (ref 0–0.5)
LYMPHOCYTES # BLD AUTO: 0.71 K/UL (ref 1–4.8)
MAGNESIUM SERPL-MCNC: 2.3 MG/DL (ref 1.6–2.6)
MCH RBC QN AUTO: 30.2 PG (ref 27–31)
MCHC RBC AUTO-ENTMCNC: 32 G/DL (ref 32–36)
MCV RBC AUTO: 94 FL (ref 82–98)
NUCLEATED RBC (/100WBC) (OHS): 0 /100 WBC
OHS QRS DURATION: 90 MS
OHS QTC CALCULATION: 474 MS
PHOSPHATE SERPL-MCNC: 3.7 MG/DL (ref 2.7–4.5)
PLATELET # BLD AUTO: 145 K/UL (ref 150–450)
PMV BLD AUTO: 11.1 FL (ref 9.2–12.9)
POTASSIUM SERPL-SCNC: 4.4 MMOL/L (ref 3.5–5.1)
PROT SERPL-MCNC: 7 GM/DL (ref 6–8.4)
RBC # BLD AUTO: 3.51 M/UL (ref 4.6–6.2)
RELATIVE EOSINOPHIL (OHS): 0.6 %
RELATIVE LYMPHOCYTE (OHS): 14.4 % (ref 18–48)
RELATIVE MONOCYTE (OHS): 7.9 % (ref 4–15)
RELATIVE NEUTROPHIL (OHS): 76.7 % (ref 38–73)
SODIUM SERPL-SCNC: 140 MMOL/L (ref 136–145)
WBC # BLD AUTO: 4.93 K/UL (ref 3.9–12.7)

## 2025-09-02 PROCEDURE — 11000001 HC ACUTE MED/SURG PRIVATE ROOM

## 2025-09-02 PROCEDURE — 99900035 HC TECH TIME PER 15 MIN (STAT)

## 2025-09-02 PROCEDURE — 25000003 PHARM REV CODE 250: Performed by: GENERAL PRACTICE

## 2025-09-02 PROCEDURE — 94640 AIRWAY INHALATION TREATMENT: CPT | Mod: XB

## 2025-09-02 PROCEDURE — 83735 ASSAY OF MAGNESIUM: CPT

## 2025-09-02 PROCEDURE — 94761 N-INVAS EAR/PLS OXIMETRY MLT: CPT

## 2025-09-02 PROCEDURE — 27000221 HC OXYGEN, UP TO 24 HOURS

## 2025-09-02 PROCEDURE — 63600175 PHARM REV CODE 636 W HCPCS: Performed by: GENERAL PRACTICE

## 2025-09-02 PROCEDURE — 25000003 PHARM REV CODE 250

## 2025-09-02 PROCEDURE — 36415 COLL VENOUS BLD VENIPUNCTURE: CPT

## 2025-09-02 PROCEDURE — 85025 COMPLETE CBC W/AUTO DIFF WBC: CPT

## 2025-09-02 PROCEDURE — 80053 COMPREHEN METABOLIC PANEL: CPT

## 2025-09-02 PROCEDURE — S0109 METHADONE ORAL 5MG: HCPCS | Performed by: FAMILY MEDICINE

## 2025-09-02 PROCEDURE — 84100 ASSAY OF PHOSPHORUS: CPT

## 2025-09-02 PROCEDURE — 63600175 PHARM REV CODE 636 W HCPCS: Performed by: FAMILY MEDICINE

## 2025-09-02 PROCEDURE — 25000003 PHARM REV CODE 250: Performed by: FAMILY MEDICINE

## 2025-09-02 PROCEDURE — 25000242 PHARM REV CODE 250 ALT 637 W/ HCPCS

## 2025-09-02 PROCEDURE — 63600175 PHARM REV CODE 636 W HCPCS

## 2025-09-02 RX ORDER — ACETAMINOPHEN 325 MG/1
650 TABLET ORAL EVERY 4 HOURS PRN
Status: DISCONTINUED | OUTPATIENT
Start: 2025-09-02 | End: 2025-09-03 | Stop reason: HOSPADM

## 2025-09-02 RX ORDER — METHADONE HYDROCHLORIDE 10 MG/1
10 TABLET ORAL DAILY
Refills: 0 | Status: DISCONTINUED | OUTPATIENT
Start: 2025-09-02 | End: 2025-09-02

## 2025-09-02 RX ORDER — FUROSEMIDE 10 MG/ML
80 INJECTION INTRAMUSCULAR; INTRAVENOUS EVERY 12 HOURS
Status: DISCONTINUED | OUTPATIENT
Start: 2025-09-02 | End: 2025-09-02

## 2025-09-02 RX ORDER — METHADONE HYDROCHLORIDE 10 MG/1
90 TABLET ORAL DAILY
Status: DISCONTINUED | OUTPATIENT
Start: 2025-09-02 | End: 2025-09-03 | Stop reason: HOSPADM

## 2025-09-02 RX ORDER — FUROSEMIDE 10 MG/ML
80 INJECTION INTRAMUSCULAR; INTRAVENOUS EVERY 12 HOURS
Status: DISCONTINUED | OUTPATIENT
Start: 2025-09-02 | End: 2025-09-03

## 2025-09-02 RX ORDER — METHADONE HYDROCHLORIDE 10 MG/1
95 TABLET ORAL DAILY
Refills: 0 | Status: DISCONTINUED | OUTPATIENT
Start: 2025-09-02 | End: 2025-09-02

## 2025-09-02 RX ORDER — METHADONE HYDROCHLORIDE 5 MG/5ML
5 SOLUTION ORAL DAILY
Status: DISCONTINUED | OUTPATIENT
Start: 2025-09-02 | End: 2025-09-03 | Stop reason: HOSPADM

## 2025-09-02 RX ORDER — ACETAMINOPHEN 500 MG
1000 TABLET ORAL ONCE
Status: COMPLETED | OUTPATIENT
Start: 2025-09-02 | End: 2025-09-02

## 2025-09-02 RX ADMIN — METHADONE HYDROCHLORIDE 90 MG: 10 TABLET ORAL at 01:09

## 2025-09-02 RX ADMIN — GABAPENTIN 100 MG: 100 CAPSULE ORAL at 02:09

## 2025-09-02 RX ADMIN — LACTULOSE 15 G: 20 SOLUTION ORAL at 04:09

## 2025-09-02 RX ADMIN — IPRATROPIUM BROMIDE AND ALBUTEROL SULFATE 3 ML: 2.5; .5 SOLUTION RESPIRATORY (INHALATION) at 07:09

## 2025-09-02 RX ADMIN — LACTULOSE 15 G: 20 SOLUTION ORAL at 09:09

## 2025-09-02 RX ADMIN — FUROSEMIDE 80 MG: 10 INJECTION, SOLUTION INTRAVENOUS at 09:09

## 2025-09-02 RX ADMIN — FLUTICASONE FUROATE AND VILANTEROL TRIFENATATE 1 PUFF: 100; 25 POWDER RESPIRATORY (INHALATION) at 07:09

## 2025-09-02 RX ADMIN — AZITHROMYCIN 500 MG: 500 INJECTION, POWDER, LYOPHILIZED, FOR SOLUTION INTRAVENOUS at 09:09

## 2025-09-02 RX ADMIN — ACETAMINOPHEN 1000 MG: 500 TABLET ORAL at 09:09

## 2025-09-02 RX ADMIN — TAMSULOSIN HYDROCHLORIDE 0.4 MG: 0.4 CAPSULE ORAL at 09:09

## 2025-09-02 RX ADMIN — TRAZODONE HYDROCHLORIDE 100 MG: 100 TABLET ORAL at 09:09

## 2025-09-02 RX ADMIN — IPRATROPIUM BROMIDE AND ALBUTEROL SULFATE 3 ML: 2.5; .5 SOLUTION RESPIRATORY (INHALATION) at 03:09

## 2025-09-02 RX ADMIN — ENOXAPARIN SODIUM 40 MG: 40 INJECTION SUBCUTANEOUS at 04:09

## 2025-09-02 RX ADMIN — SENNOSIDES AND DOCUSATE SODIUM 1 TABLET: 50; 8.6 TABLET ORAL at 09:09

## 2025-09-02 RX ADMIN — METHADONE HYDROCHLORIDE 5 MG: 5 SOLUTION ORAL at 01:09

## 2025-09-02 RX ADMIN — ESCITALOPRAM OXALATE 20 MG: 10 TABLET ORAL at 09:09

## 2025-09-02 RX ADMIN — IPRATROPIUM BROMIDE AND ALBUTEROL SULFATE 3 ML: 2.5; .5 SOLUTION RESPIRATORY (INHALATION) at 11:09

## 2025-09-02 RX ADMIN — FUROSEMIDE 80 MG: 10 INJECTION, SOLUTION INTRAVENOUS at 10:09

## 2025-09-02 RX ADMIN — GABAPENTIN 100 MG: 100 CAPSULE ORAL at 09:09

## 2025-09-02 RX ADMIN — PREDNISONE 40 MG: 20 TABLET ORAL at 09:09

## 2025-09-02 RX ADMIN — ACETAMINOPHEN 650 MG: 325 TABLET ORAL at 02:09

## 2025-09-03 VITALS
SYSTOLIC BLOOD PRESSURE: 119 MMHG | DIASTOLIC BLOOD PRESSURE: 74 MMHG | WEIGHT: 241.19 LBS | TEMPERATURE: 98 F | HEART RATE: 97 BPM | RESPIRATION RATE: 18 BRPM | BODY MASS INDEX: 36.55 KG/M2 | OXYGEN SATURATION: 91 % | HEIGHT: 68 IN

## 2025-09-03 LAB
ABSOLUTE EOSINOPHIL (OHS): 0.04 K/UL
ABSOLUTE MONOCYTE (OHS): 0.36 K/UL (ref 0.3–1)
ABSOLUTE NEUTROPHIL COUNT (OHS): 3.82 K/UL (ref 1.8–7.7)
ALBUMIN SERPL BCP-MCNC: 3.5 G/DL (ref 3.5–5.2)
ALP SERPL-CCNC: 59 UNIT/L (ref 40–150)
ALT SERPL W/O P-5'-P-CCNC: 9 UNIT/L (ref 10–44)
ANION GAP (OHS): 7 MMOL/L (ref 8–16)
AST SERPL-CCNC: 16 UNIT/L (ref 11–45)
BASOPHILS # BLD AUTO: 0.01 K/UL
BASOPHILS NFR BLD AUTO: 0.2 %
BILIRUB SERPL-MCNC: 0.3 MG/DL (ref 0.1–1)
BUN SERPL-MCNC: 27 MG/DL (ref 6–20)
CALCIUM SERPL-MCNC: 9.1 MG/DL (ref 8.7–10.5)
CHLORIDE SERPL-SCNC: 97 MMOL/L (ref 95–110)
CO2 SERPL-SCNC: 35 MMOL/L (ref 23–29)
CREAT SERPL-MCNC: 1.8 MG/DL (ref 0.5–1.4)
ERYTHROCYTE [DISTWIDTH] IN BLOOD BY AUTOMATED COUNT: 12.3 % (ref 11.5–14.5)
GFR SERPLBLD CREATININE-BSD FMLA CKD-EPI: 43 ML/MIN/1.73/M2
GLUCOSE SERPL-MCNC: 87 MG/DL (ref 70–110)
HCT VFR BLD AUTO: 34 % (ref 40–54)
HGB BLD-MCNC: 11 GM/DL (ref 14–18)
IMM GRANULOCYTES # BLD AUTO: 0.02 K/UL (ref 0–0.04)
IMM GRANULOCYTES NFR BLD AUTO: 0.4 % (ref 0–0.5)
LYMPHOCYTES # BLD AUTO: 0.95 K/UL (ref 1–4.8)
MAGNESIUM SERPL-MCNC: 2.1 MG/DL (ref 1.6–2.6)
MCH RBC QN AUTO: 30.5 PG (ref 27–31)
MCHC RBC AUTO-ENTMCNC: 32.4 G/DL (ref 32–36)
MCV RBC AUTO: 94 FL (ref 82–98)
NUCLEATED RBC (/100WBC) (OHS): 0 /100 WBC
PHOSPHATE SERPL-MCNC: 4 MG/DL (ref 2.7–4.5)
PLATELET # BLD AUTO: 135 K/UL (ref 150–450)
PLATELET BLD QL SMEAR: ABNORMAL
PMV BLD AUTO: 10.9 FL (ref 9.2–12.9)
POTASSIUM SERPL-SCNC: 3.9 MMOL/L (ref 3.5–5.1)
PROT SERPL-MCNC: 7.2 GM/DL (ref 6–8.4)
RBC # BLD AUTO: 3.61 M/UL (ref 4.6–6.2)
RELATIVE EOSINOPHIL (OHS): 0.8 %
RELATIVE LYMPHOCYTE (OHS): 18.3 % (ref 18–48)
RELATIVE MONOCYTE (OHS): 6.9 % (ref 4–15)
RELATIVE NEUTROPHIL (OHS): 73.4 % (ref 38–73)
SODIUM SERPL-SCNC: 139 MMOL/L (ref 136–145)
WBC # BLD AUTO: 5.2 K/UL (ref 3.9–12.7)

## 2025-09-03 PROCEDURE — 25000003 PHARM REV CODE 250

## 2025-09-03 PROCEDURE — 25000003 PHARM REV CODE 250: Performed by: FAMILY MEDICINE

## 2025-09-03 PROCEDURE — 84100 ASSAY OF PHOSPHORUS: CPT

## 2025-09-03 PROCEDURE — 94640 AIRWAY INHALATION TREATMENT: CPT

## 2025-09-03 PROCEDURE — 94761 N-INVAS EAR/PLS OXIMETRY MLT: CPT

## 2025-09-03 PROCEDURE — 36415 COLL VENOUS BLD VENIPUNCTURE: CPT

## 2025-09-03 PROCEDURE — 82040 ASSAY OF SERUM ALBUMIN: CPT

## 2025-09-03 PROCEDURE — 25000242 PHARM REV CODE 250 ALT 637 W/ HCPCS

## 2025-09-03 PROCEDURE — 83735 ASSAY OF MAGNESIUM: CPT

## 2025-09-03 PROCEDURE — 63600175 PHARM REV CODE 636 W HCPCS

## 2025-09-03 PROCEDURE — 99900035 HC TECH TIME PER 15 MIN (STAT)

## 2025-09-03 PROCEDURE — 25000003 PHARM REV CODE 250: Performed by: GENERAL PRACTICE

## 2025-09-03 PROCEDURE — 27000221 HC OXYGEN, UP TO 24 HOURS

## 2025-09-03 PROCEDURE — 85025 COMPLETE CBC W/AUTO DIFF WBC: CPT

## 2025-09-03 PROCEDURE — S0109 METHADONE ORAL 5MG: HCPCS | Performed by: FAMILY MEDICINE

## 2025-09-03 RX ORDER — FUROSEMIDE 80 MG/1
80 TABLET ORAL 2 TIMES DAILY
Qty: 60 TABLET | Refills: 11 | Status: SHIPPED | OUTPATIENT
Start: 2025-09-03 | End: 2026-09-03

## 2025-09-03 RX ORDER — TAMSULOSIN HYDROCHLORIDE 0.4 MG/1
0.4 CAPSULE ORAL DAILY
Qty: 30 CAPSULE | Refills: 2 | Status: SHIPPED | OUTPATIENT
Start: 2025-09-03

## 2025-09-03 RX ORDER — POLYETHYLENE GLYCOL 3350 17 G/17G
17 POWDER, FOR SOLUTION ORAL 2 TIMES DAILY
Qty: 1020 G | Refills: 1 | Status: SHIPPED | OUTPATIENT
Start: 2025-09-03

## 2025-09-03 RX ORDER — CLONIDINE HYDROCHLORIDE 0.1 MG/1
0.1 TABLET ORAL 2 TIMES DAILY
Qty: 60 TABLET | Refills: 2 | Status: SHIPPED | OUTPATIENT
Start: 2025-09-03 | End: 2025-09-03

## 2025-09-03 RX ORDER — FLUTICASONE FUROATE, UMECLIDINIUM BROMIDE AND VILANTEROL TRIFENATATE 200; 62.5; 25 UG/1; UG/1; UG/1
1 POWDER RESPIRATORY (INHALATION) DAILY
Qty: 60 EACH | Refills: 2 | Status: SHIPPED | OUTPATIENT
Start: 2025-09-03

## 2025-09-03 RX ORDER — TRAZODONE HYDROCHLORIDE 50 MG/1
100 TABLET ORAL NIGHTLY
Qty: 60 TABLET | Refills: 2 | Status: SHIPPED | OUTPATIENT
Start: 2025-09-03 | End: 2025-12-02

## 2025-09-03 RX ORDER — PREDNISONE 20 MG/1
40 TABLET ORAL DAILY
Qty: 2 TABLET | Refills: 0 | Status: SHIPPED | OUTPATIENT
Start: 2025-09-04 | End: 2025-09-05

## 2025-09-03 RX ORDER — CLONIDINE HYDROCHLORIDE 0.1 MG/1
0.1 TABLET ORAL 2 TIMES DAILY
Qty: 60 TABLET | Refills: 2 | Status: SHIPPED | OUTPATIENT
Start: 2025-09-03 | End: 2025-12-02

## 2025-09-03 RX ORDER — AMLODIPINE BESYLATE 10 MG/1
10 TABLET ORAL DAILY
Qty: 90 TABLET | Refills: 3 | Status: SHIPPED | OUTPATIENT
Start: 2025-09-03 | End: 2025-09-03

## 2025-09-03 RX ORDER — AMLODIPINE BESYLATE 10 MG/1
10 TABLET ORAL DAILY
Qty: 90 TABLET | Refills: 3 | Status: SHIPPED | OUTPATIENT
Start: 2025-09-03 | End: 2026-09-03

## 2025-09-03 RX ORDER — FUROSEMIDE 40 MG/1
80 TABLET ORAL 2 TIMES DAILY
Status: DISCONTINUED | OUTPATIENT
Start: 2025-09-03 | End: 2025-09-03 | Stop reason: HOSPADM

## 2025-09-03 RX ORDER — ESCITALOPRAM OXALATE 20 MG/1
20 TABLET ORAL DAILY
Qty: 30 TABLET | Refills: 2 | Status: SHIPPED | OUTPATIENT
Start: 2025-09-03

## 2025-09-03 RX ORDER — ALBUTEROL SULFATE 90 UG/1
2 INHALANT RESPIRATORY (INHALATION) EVERY 6 HOURS PRN
Qty: 54 G | Refills: 0 | Status: SHIPPED | OUTPATIENT
Start: 2025-09-03

## 2025-09-03 RX ORDER — GABAPENTIN 100 MG/1
100 CAPSULE ORAL 3 TIMES DAILY PRN
Qty: 30 CAPSULE | Refills: 2 | Status: SHIPPED | OUTPATIENT
Start: 2025-09-03 | End: 2026-09-03

## 2025-09-03 RX ADMIN — METHADONE HYDROCHLORIDE 5 MG: 5 SOLUTION ORAL at 09:09

## 2025-09-03 RX ADMIN — IPRATROPIUM BROMIDE AND ALBUTEROL SULFATE 3 ML: 2.5; .5 SOLUTION RESPIRATORY (INHALATION) at 03:09

## 2025-09-03 RX ADMIN — FUROSEMIDE 80 MG: 40 TABLET ORAL at 09:09

## 2025-09-03 RX ADMIN — TAMSULOSIN HYDROCHLORIDE 0.4 MG: 0.4 CAPSULE ORAL at 09:09

## 2025-09-03 RX ADMIN — FLUTICASONE FUROATE AND VILANTEROL TRIFENATATE 1 PUFF: 100; 25 POWDER RESPIRATORY (INHALATION) at 07:09

## 2025-09-03 RX ADMIN — ESCITALOPRAM OXALATE 20 MG: 10 TABLET ORAL at 09:09

## 2025-09-03 RX ADMIN — IPRATROPIUM BROMIDE AND ALBUTEROL SULFATE 3 ML: 2.5; .5 SOLUTION RESPIRATORY (INHALATION) at 07:09

## 2025-09-03 RX ADMIN — IPRATROPIUM BROMIDE AND ALBUTEROL SULFATE 3 ML: 2.5; .5 SOLUTION RESPIRATORY (INHALATION) at 11:09

## 2025-09-03 RX ADMIN — METHADONE HYDROCHLORIDE 90 MG: 10 TABLET ORAL at 09:09

## 2025-09-03 RX ADMIN — PREDNISONE 40 MG: 20 TABLET ORAL at 09:09

## 2025-09-03 RX ADMIN — SENNOSIDES AND DOCUSATE SODIUM 1 TABLET: 50; 8.6 TABLET ORAL at 09:09

## (undated) DEVICE — BLADE SURG CARBON STEEL SZ11

## (undated) DEVICE — SUT VICRYL 3-0 27 SH

## (undated) DEVICE — TUBING HF INSUFFLATION W/ FLTR

## (undated) DEVICE — TRAY MINOR GEN SURG

## (undated) DEVICE — Device

## (undated) DEVICE — TROCAR ENDOPATH XCEL 12MM 10CM

## (undated) DEVICE — SEE MEDLINE ITEM 146417

## (undated) DEVICE — WARMER DRAPE STERILE LF

## (undated) DEVICE — GOWN SURGICAL X-LARGE

## (undated) DEVICE — SEE MEDLINE ITEM 157117

## (undated) DEVICE — ADHESIVE MASTISOL VIAL 48/BX

## (undated) DEVICE — ELECTRODE REM PLYHSV RETURN 9

## (undated) DEVICE — POWDER ARISTA AH 3G

## (undated) DEVICE — NDL HYPO REG 25G X 1 1/2

## (undated) DEVICE — TUBE PENROSE DRAIN 12IN X 5/8I

## (undated) DEVICE — BANDAGE ADHESIVE

## (undated) DEVICE — SEE MEDLINE ITEM 156902

## (undated) DEVICE — SUT 1 48IN PDS II VIO MONO

## (undated) DEVICE — SPONGE DERMACEA 4-PLY 2X2

## (undated) DEVICE — CANNULA ENDOPATH XCEL 5X100MM

## (undated) DEVICE — SEE MEDLINE ITEM 157148

## (undated) DEVICE — SUT COATED VICRYL 4/0 27IN

## (undated) DEVICE — DRAPE ABDOMINAL TIBURON 14X11

## (undated) DEVICE — DRESSING TEGADERM 2 3/8 X 2.75

## (undated) DEVICE — DRAPE INCISE IOBAN 2 23X17IN

## (undated) DEVICE — TOWEL OR XRAY WHITE 17X26IN

## (undated) DEVICE — SUT GUT PL. 4-0 27 FS-2

## (undated) DEVICE — BLADE 4 INCH EDGE UN-INS

## (undated) DEVICE — CLOSURE SKIN STERI STRIP 1/4X4

## (undated) DEVICE — SEE MEDLINE ITEM 152622

## (undated) DEVICE — SPONGE IV DRAIN 4X4 STERILE

## (undated) DEVICE — ADHESIVE DERMABOND ADVANCED

## (undated) DEVICE — APPLICATOR CHLORAPREP ORN 26ML

## (undated) DEVICE — SUT SILK 3-0 SH 18IN BLACK

## (undated) DEVICE — SUT PROLENE 0 CT1 30IN BLUE

## (undated) DEVICE — SUT 0 VICRYL / UR6 (J603)

## (undated) DEVICE — BINDER ABDOMINAL 9 46-62

## (undated) DEVICE — DRESSING ABSRBNT ISLAND 3.6X8

## (undated) DEVICE — EVACUATOR WOUND BULB 100CC

## (undated) DEVICE — TROCAR ENDOPATH XCEL 5MM 7.5CM

## (undated) DEVICE — DRAIN CHANNEL ROUND 10FR